# Patient Record
Sex: MALE | Race: OTHER
[De-identification: names, ages, dates, MRNs, and addresses within clinical notes are randomized per-mention and may not be internally consistent; named-entity substitution may affect disease eponyms.]

---

## 2019-06-11 ENCOUNTER — HOSPITAL ENCOUNTER (INPATIENT)
Dept: HOSPITAL 72 - EMR | Age: 64
LOS: 3 days | Discharge: HOME | DRG: 853 | End: 2019-06-14
Payer: SELF-PAY

## 2019-06-11 VITALS — SYSTOLIC BLOOD PRESSURE: 98 MMHG | DIASTOLIC BLOOD PRESSURE: 59 MMHG

## 2019-06-11 VITALS — BODY MASS INDEX: 29.66 KG/M2 | HEIGHT: 67 IN | WEIGHT: 189 LBS

## 2019-06-11 DIAGNOSIS — D69.6: ICD-10-CM

## 2019-06-11 DIAGNOSIS — F10.20: ICD-10-CM

## 2019-06-11 DIAGNOSIS — E11.65: ICD-10-CM

## 2019-06-11 DIAGNOSIS — A41.9: Primary | ICD-10-CM

## 2019-06-11 DIAGNOSIS — K35.80: ICD-10-CM

## 2019-06-11 DIAGNOSIS — G93.41: ICD-10-CM

## 2019-06-11 DIAGNOSIS — I44.7: ICD-10-CM

## 2019-06-11 LAB
ADD MANUAL DIFF: NO
ANION GAP SERPL CALC-SCNC: 16 MMOL/L (ref 5–15)
APTT BLD: 21 SEC (ref 23–33)
BUN SERPL-MCNC: 13 MG/DL (ref 7–18)
CALCIUM SERPL-MCNC: 9.1 MG/DL (ref 8.5–10.1)
CHLORIDE SERPL-SCNC: 98 MMOL/L (ref 98–107)
CO2 SERPL-SCNC: 21 MMOL/L (ref 21–32)
CREAT SERPL-MCNC: 1.3 MG/DL (ref 0.55–1.3)
ERYTHROCYTE [DISTWIDTH] IN BLOOD BY AUTOMATED COUNT: 11.7 % (ref 11.6–14.8)
HCT VFR BLD CALC: 45.4 % (ref 42–52)
HGB BLD-MCNC: 15.6 G/DL (ref 14.2–18)
INR PPP: 1 (ref 0.9–1.1)
MCV RBC AUTO: 84 FL (ref 80–99)
PLATELET # BLD: 150 K/UL (ref 150–450)
POTASSIUM SERPL-SCNC: 4 MMOL/L (ref 3.5–5.1)
RBC # BLD AUTO: 5.4 M/UL (ref 4.7–6.1)
SODIUM SERPL-SCNC: 134 MMOL/L (ref 136–145)
WBC # BLD AUTO: 4 K/UL (ref 4.8–10.8)

## 2019-06-11 PROCEDURE — 71045 X-RAY EXAM CHEST 1 VIEW: CPT

## 2019-06-11 PROCEDURE — 80048 BASIC METABOLIC PNL TOTAL CA: CPT

## 2019-06-11 PROCEDURE — 36415 COLL VENOUS BLD VENIPUNCTURE: CPT

## 2019-06-11 PROCEDURE — 96365 THER/PROPH/DIAG IV INF INIT: CPT

## 2019-06-11 PROCEDURE — 82550 ASSAY OF CK (CPK): CPT

## 2019-06-11 PROCEDURE — 81003 URINALYSIS AUTO W/O SCOPE: CPT

## 2019-06-11 PROCEDURE — 94003 VENT MGMT INPAT SUBQ DAY: CPT

## 2019-06-11 PROCEDURE — 93306 TTE W/DOPPLER COMPLETE: CPT

## 2019-06-11 PROCEDURE — 74177 CT ABD & PELVIS W/CONTRAST: CPT

## 2019-06-11 PROCEDURE — 96367 TX/PROPH/DG ADDL SEQ IV INF: CPT

## 2019-06-11 PROCEDURE — 82746 ASSAY OF FOLIC ACID SERUM: CPT

## 2019-06-11 PROCEDURE — 87040 BLOOD CULTURE FOR BACTERIA: CPT

## 2019-06-11 PROCEDURE — 83036 HEMOGLOBIN GLYCOSYLATED A1C: CPT

## 2019-06-11 PROCEDURE — 82607 VITAMIN B-12: CPT

## 2019-06-11 PROCEDURE — 99285 EMERGENCY DEPT VISIT HI MDM: CPT

## 2019-06-11 PROCEDURE — 84484 ASSAY OF TROPONIN QUANT: CPT

## 2019-06-11 PROCEDURE — 82553 CREATINE MB FRACTION: CPT

## 2019-06-11 PROCEDURE — 96375 TX/PRO/DX INJ NEW DRUG ADDON: CPT

## 2019-06-11 PROCEDURE — 82150 ASSAY OF AMYLASE: CPT

## 2019-06-11 PROCEDURE — 86901 BLOOD TYPING SEROLOGIC RH(D): CPT

## 2019-06-11 PROCEDURE — 82248 BILIRUBIN DIRECT: CPT

## 2019-06-11 PROCEDURE — 82962 GLUCOSE BLOOD TEST: CPT

## 2019-06-11 PROCEDURE — 86850 RBC ANTIBODY SCREEN: CPT

## 2019-06-11 PROCEDURE — 85025 COMPLETE CBC W/AUTO DIFF WBC: CPT

## 2019-06-11 PROCEDURE — 80329 ANALGESICS NON-OPIOID 1 OR 2: CPT

## 2019-06-11 PROCEDURE — 83605 ASSAY OF LACTIC ACID: CPT

## 2019-06-11 PROCEDURE — 85730 THROMBOPLASTIN TIME PARTIAL: CPT

## 2019-06-11 PROCEDURE — 83690 ASSAY OF LIPASE: CPT

## 2019-06-11 PROCEDURE — 94150 VITAL CAPACITY TEST: CPT

## 2019-06-11 PROCEDURE — 85007 BL SMEAR W/DIFF WBC COUNT: CPT

## 2019-06-11 PROCEDURE — 86900 BLOOD TYPING SEROLOGIC ABO: CPT

## 2019-06-11 PROCEDURE — 85610 PROTHROMBIN TIME: CPT

## 2019-06-11 PROCEDURE — 93005 ELECTROCARDIOGRAM TRACING: CPT

## 2019-06-11 PROCEDURE — 80053 COMPREHEN METABOLIC PANEL: CPT

## 2019-06-11 NOTE — NUR
ED Nurse Note:

Receieved report. Pt BIBA from home, c/o flu-like symptoms for 5 days. 
Ambulance personnel states pt is an alcoholic but has stopped drinking for a 
few weeks and is complaining of painful hard growth in RUQ. Upon assessment, 
the area is tender. Will carry out ER MD's orders.

## 2019-06-12 VITALS — SYSTOLIC BLOOD PRESSURE: 113 MMHG | DIASTOLIC BLOOD PRESSURE: 63 MMHG

## 2019-06-12 VITALS — SYSTOLIC BLOOD PRESSURE: 109 MMHG | DIASTOLIC BLOOD PRESSURE: 72 MMHG

## 2019-06-12 VITALS — DIASTOLIC BLOOD PRESSURE: 69 MMHG | SYSTOLIC BLOOD PRESSURE: 123 MMHG

## 2019-06-12 VITALS — SYSTOLIC BLOOD PRESSURE: 112 MMHG | DIASTOLIC BLOOD PRESSURE: 77 MMHG

## 2019-06-12 VITALS — DIASTOLIC BLOOD PRESSURE: 57 MMHG | SYSTOLIC BLOOD PRESSURE: 104 MMHG

## 2019-06-12 VITALS — SYSTOLIC BLOOD PRESSURE: 108 MMHG | DIASTOLIC BLOOD PRESSURE: 71 MMHG

## 2019-06-12 VITALS — DIASTOLIC BLOOD PRESSURE: 75 MMHG | SYSTOLIC BLOOD PRESSURE: 112 MMHG

## 2019-06-12 VITALS — SYSTOLIC BLOOD PRESSURE: 93 MMHG | DIASTOLIC BLOOD PRESSURE: 59 MMHG

## 2019-06-12 VITALS — DIASTOLIC BLOOD PRESSURE: 62 MMHG | SYSTOLIC BLOOD PRESSURE: 94 MMHG

## 2019-06-12 VITALS — SYSTOLIC BLOOD PRESSURE: 100 MMHG | DIASTOLIC BLOOD PRESSURE: 61 MMHG

## 2019-06-12 VITALS — SYSTOLIC BLOOD PRESSURE: 103 MMHG | DIASTOLIC BLOOD PRESSURE: 65 MMHG

## 2019-06-12 VITALS — DIASTOLIC BLOOD PRESSURE: 67 MMHG | SYSTOLIC BLOOD PRESSURE: 100 MMHG

## 2019-06-12 VITALS — DIASTOLIC BLOOD PRESSURE: 67 MMHG | SYSTOLIC BLOOD PRESSURE: 120 MMHG

## 2019-06-12 VITALS — SYSTOLIC BLOOD PRESSURE: 112 MMHG | DIASTOLIC BLOOD PRESSURE: 75 MMHG

## 2019-06-12 VITALS — SYSTOLIC BLOOD PRESSURE: 93 MMHG | DIASTOLIC BLOOD PRESSURE: 61 MMHG

## 2019-06-12 LAB
ALBUMIN SERPL-MCNC: 3.5 G/DL (ref 3.4–5)
ALBUMIN/GLOB SERPL: 0.9 {RATIO} (ref 1–2.7)
ALP SERPL-CCNC: 160 U/L (ref 46–116)
ALT SERPL-CCNC: 29 U/L (ref 12–78)
APPEARANCE UR: CLEAR
APTT PPP: YELLOW S
AST SERPL-CCNC: 41 U/L (ref 15–37)
BILIRUB DIRECT SERPL-MCNC: 0.3 MG/DL (ref 0–0.3)
BILIRUB SERPL-MCNC: 1.2 MG/DL (ref 0.2–1)
CK MB SERPL-MCNC: 0.9 NG/ML (ref 0–3.6)
CK SERPL-CCNC: 96 U/L (ref 26–308)
GLOBULIN SER-MCNC: 3.9 G/DL
GLUCOSE UR STRIP-MCNC: NEGATIVE MG/DL
KETONES UR QL STRIP: (no result)
LEUKOCYTE ESTERASE UR QL STRIP: (no result)
NITRITE UR QL STRIP: NEGATIVE
PH UR STRIP: 5 [PH] (ref 4.5–8)
PROT UR QL STRIP: (no result)
SP GR UR STRIP: 1 (ref 1–1.03)
UROBILINOGEN UR-MCNC: NORMAL MG/DL (ref 0–1)

## 2019-06-12 PROCEDURE — 0DTJ4ZZ RESECTION OF APPENDIX, PERCUTANEOUS ENDOSCOPIC APPROACH: ICD-10-PCS

## 2019-06-12 RX ADMIN — DOCUSATE SODIUM SCH MG: 100 CAPSULE, LIQUID FILLED ORAL at 17:48

## 2019-06-12 RX ADMIN — DEXTROSE MONOHYDRATE SCH MLS/HR: 50 INJECTION, SOLUTION INTRAVENOUS at 16:06

## 2019-06-12 RX ADMIN — DEXTROSE MONOHYDRATE SCH MLS/HR: 50 INJECTION, SOLUTION INTRAVENOUS at 21:21

## 2019-06-12 RX ADMIN — DEXTROSE AND SODIUM CHLORIDE SCH MLS/HR: 5; .45 INJECTION, SOLUTION INTRAVENOUS at 10:35

## 2019-06-12 RX ADMIN — DEXTROSE AND SODIUM CHLORIDE SCH MLS/HR: 5; .45 INJECTION, SOLUTION INTRAVENOUS at 21:21

## 2019-06-12 NOTE — CARDIAC ELECTROPHYSIOLOGY PN
Subjective


Subjective


[id number]





Objective





Last 24 Hour Vital Signs








  Date Time  Temp Pulse Resp B/P (MAP) Pulse Ox O2 Delivery O2 Flow Rate FiO2


 


6/12/19 08:05 98.7 111 22 112/75 97 Room Air 2.0 


 


6/12/19 08:04  111 22 112/75 97 Room Air  


 


6/12/19 06:35 98.7 113 24 112/75 92 Nasal Cannula 2.0 


 


6/12/19 05:10 98.7 118 23 109/72 94 Nasal Cannula 2.0 


 


6/12/19 03:30 98.7 120 22 108/71 93 Nasal Cannula 2.0 


 


6/12/19 02:00 98.9 125 23 112/77 93 Nasal Cannula 2.0 


 


6/12/19 01:00 98.9 128 22 123/69 91 Room Air  


 


6/12/19 00:00 99.6 137 26 120/67 93 Nasal Cannula 2.0 


 


6/11/19 22:55  150 31   Room Air  


 


6/11/19 22:55 102.0 15 31 98/59 93 Room Air  


 


6/11/19 22:30 102.6 147 18 98/60 (73) 98 Room Air  











Laboratory Tests








Test


  6/11/19


23:20 6/12/19


00:00 6/12/19


03:50 6/12/19


04:00


 


White Blood Count


  4.0 K/UL


(4.8-10.8)  L 


  


  


 


 


Red Blood Count


  5.40 M/UL


(4.70-6.10) 


  


  


 


 


Hemoglobin


  15.6 G/DL


(14.2-18.0) 


  


  


 


 


Hematocrit


  45.4 %


(42.0-52.0) 


  


  


 


 


Mean Corpuscular Volume 84 FL (80-99)     


 


Mean Corpuscular Hemoglobin


  28.9 PG


(27.0-31.0) 


  


  


 


 


Mean Corpuscular Hemoglobin


Concent 34.3 G/DL


(32.0-36.0) 


  


  


 


 


Red Cell Distribution Width


  11.7 %


(11.6-14.8) 


  


  


 


 


Platelet Count


  150 K/UL


(150-450) 


  


  


 


 


Mean Platelet Volume


  8.6 FL


(6.5-10.1) 


  


  


 


 


Neutrophils (%) (Auto)


  % (45.0-75.0)


  


  


  


 


 


Lymphocytes (%) (Auto)


  % (20.0-45.0)


  


  


  


 


 


Monocytes (%) (Auto)  % (1.0-10.0)     


 


Eosinophils (%) (Auto)  % (0.0-3.0)     


 


Basophils (%) (Auto)  % (0.0-2.0)     


 


Prothrombin Time


  10.2 SEC


(9.30-11.50) 


  


  


 


 


Prothromb Time International


Ratio 1.0 (0.9-1.1)  


  


  


  


 


 


Activated Partial


Thromboplast Time 21 SEC (23-33)


L 


  


  


 


 


Sodium Level


  134 MMOL/L


(136-145)  L 


  


  


 


 


Potassium Level


  4.0 MMOL/L


(3.5-5.1) 


  


  


 


 


Chloride Level


  98 MMOL/L


() 


  


  


 


 


Carbon Dioxide Level


  21 MMOL/L


(21-32) 


  


  


 


 


Anion Gap


  16 mmol/L


(5-15)  H 


  


  


 


 


Blood Urea Nitrogen


  13 mg/dL


(7-18) 


  


  


 


 


Creatinine


  1.3 MG/DL


(0.55-1.30) 


  


  


 


 


Estimat Glomerular Filtration


Rate 55.6 mL/min


(>60) 


  


  


 


 


Glucose Level


  265 MG/DL


()  H 


  


  


 


 


Lactic Acid Level


  4.40 mmol/L


(0.4-2.0)  H 4.40 mmol/L


(0.66-2.22)  H 


  3.30 mmol/L


(0.4-2.0)  H


 


Calcium Level


  9.1 MG/DL


(8.5-10.1) 


  


  


 


 


Total Bilirubin


  1.2 MG/DL


(0.2-1.0)  H 


  


  


 


 


Direct Bilirubin


  0.3 MG/DL


(0.0-0.3) 


  


  


 


 


Aspartate Amino Transf


(AST/SGOT) 41 U/L (15-37)


H 


  


  


 


 


Alanine Aminotransferase


(ALT/SGPT) 29 U/L (12-78)


  


  


  


 


 


Alkaline Phosphatase


  160 U/L


()  H 


  


  


 


 


Total Creatine Kinase


  96 U/L


() 


  


  


 


 


Creatine Kinase MB


  0.9 NG/ML


(0.0-3.6) 


  


  


 


 


Creatine Kinase MB Relative


Index 0.9  


  


  


  


 


 


Troponin I


  0.000 ng/mL


(0.000-0.056) 


  


  


 


 


Total Protein


  7.4 G/DL


(6.4-8.2) 


  


  


 


 


Albumin


  3.5 G/DL


(3.4-5.0) 


  


  


 


 


Globulin 3.9 g/dL     


 


Albumin/Globulin Ratio


  0.9 (1.0-2.7)


L 


  


  


 


 


Serum Alcohol < 3 mg/dL     


 


Urine Color   Yellow   


 


Urine Appearance   Clear   


 


Urine pH   5 (4.5-8.0)   


 


Urine Specific Gravity


  


  


  1.005


(1.005-1.035) 


 


 


Urine Protein


  


  


  2+ (NEGATIVE)


H 


 


 


Urine Glucose (UA)


  


  


  Negative


(NEGATIVE) 


 


 


Urine Ketones


  


  


  1+ (NEGATIVE)


H 


 


 


Urine Blood


  


  


  2+ (NEGATIVE)


H 


 


 


Urine Nitrite


  


  


  Negative


(NEGATIVE) 


 


 


Urine Bilirubin


  


  


  Negative


(NEGATIVE) 


 


 


Urine Urobilinogen


  


  


  Normal MG/DL


(0.0-1.0) 


 


 


Urine Leukocyte Esterase


  


  


  1+ (NEGATIVE)


H 


 


 


Urine RBC


  


  


  5-10 /HPF (0 -


0)  H 


 


 


Urine WBC


  


  


  2-4 /HPF (0 -


0) 


 


 


Urine Squamous Epithelial


Cells 


  


  Few /LPF


(NONE/OCC) 


 


 


Urine Bacteria


  


  


  Few /HPF


(NONE) 


 

















Jake George MD Jun 12, 2019 12:05

## 2019-06-12 NOTE — OPERATIVE NOTE - DICTATED
DATE OF OPERATION:  06/12/2019

PREOPERATIVE DIAGNOSIS:  Acute appendicitis.



POSTOPERATIVE DIAGNOSIS:  Acute appendicitis.



OPERATION PERFORMED:  Laparoscopic appendectomy.



ATTENDING PHYSICIAN:  Norberto Vazquez M.D.



ASSISTANT:  None.



ANESTHESIOLOGIST:  Dr. Idris Greene.



ANESTHESIA:  General GETA.



ESTIMATED BLOOD LOSS:  Minimal.



IV FLUIDS:  Please see anesthesia records.



COMPLICATIONS:  None.



DRAINS:  None.



WOUND CLASSIFICATION:  Class 3.



COUNTS:  Sponge and needle count correct x2.



SPECIMEN:  Appendix sent to pathology for review.



ANTIBIOTICS:  The patient on scheduled IV Zosyn.



INDICATIONS FOR PROCEDURE:  This is a 63-year-old male who presented to the

emergency department at [facility] complaining of worsening

abdominal pain.  The patient was identified to have abdominal tenderness,

dehydration, and lactic acidosis.  CT scan was performed which

demonstrated acute uncomplicated appendicitis.  Surgery was indicated and

recommended.  Risks, benefits, and alternatives were discussed with the

patient in detail, who expressed understanding and consented to surgery.



OPERATIVE NOTE:  The patient was taken to the operating room, placed on the

operating room table in supine position with left arm tucked.  All bony

prominences well padded.  SCDs were placed.  Preoperative time-out taken

identifying the patient, procedure, operative staff, and surgical staff.

General anesthesia was induced and the patient was intubated.  The abdomen

was clipped, prepped, draped in a standard surgical fashion.  Local

anesthetic was infiltrated through all port sites and throughout the case

for the patient's comfort.  An infraumbilical incision made using fresh

#11 scalpel and carried down to the fascia, which was elevated and

incised.  Entry into the abdomen was obtained using open Yulisa technique

without complication.  A 12 mm Yulisa trocar was inserted and the abdomen

was insufflated to 12 to 15 mmHg.  The patient tolerated the insufflation

well.  Laparoscope was inserted and the abdomen was inspected.  No injury

from initial trocar placement identified.  The right and left upper

quadrants were otherwise normal.  In the right lower quadrant, there was

dilated inflamed appendix identified.  The patient did have bilateral

inguinal hernias noted and uncomplicated.  Following this, secondary

trocars were placed under direct visualization beginning with a left lower

quadrant and a 12 mm followed by a suprapubic 5 mm without complication.

A grasper was used and the appendix was grasped and retracted toward the

pelvis.  The cecum was identified and tenia followed to the confluence

where the base of the appendix noted.  A window was made at the base of

the appendix without complication.  Following this, a laparoscopic linear

stapler was used to divide the base of the appendix without complication.

Following this, the mesoappendix was divided using a laparoscopic linear

staple in similar fashion.  Good hemostasis was identified from the staple

lines and both staple lines were hemostatic and stable.  The appendix was

placed in endoscopic retrieval bag and removed using the left lower

quadrant port.  The abdomen was inspected and no other abnormalities noted

at this time.  There was no pus, abscess, or other abnormalities

identified.  At this time, we began the conclusion of procedure.

Secondary trocars removed under direct visualization followed by the

umbilical trocar site.  The abdomen was desufflated.  The infraumbilical

and left lower quadrant port site fascia were reapproximated using

figure-of-eight #0 Vicryl sutures.  The remaining skin incisions were

reapproximated using 4-0 Monocryl subcuticular interrupted sutures.

Wounds were cleansed and benzoin, Steri-Strips followed by dressings were

applied.  The patient tolerated the procedure well, was extubated and

taken to postanesthetic care unit in a stable condition.









  ______________________________________________

  Norberto Vazquez M.D.





DR:  Sparkle

D:  06/12/2019 14:59

T:  06/12/2019 18:39

JOB#:  [id number]

CC:



HARDEEP

## 2019-06-12 NOTE — NUR
ED Nurse Note:

Pt in bed resting. Wife just arrived at bedside. ER MD updated pt's wife on 
plan of care and admission copletetion. Will continue to monitor.

## 2019-06-12 NOTE — CARDIOLOGY REPORT
--------------- APPROVED REPORT --------------





EKG Measurement

Heart [id number]

OR [id number]

[id number]

[id number]

HNe261





Sinus tachycardia

Cannot rule out Anterior infarct, age undetermined

T wave abnormality, consider inferolateral ischemia

Abnormal ECG

## 2019-06-12 NOTE — ANETHESIA PREOPERATIVE EVAL
Anesthesia Pre-op PMH/ROS


General


Date of Evaluation:  Jun 12, 2019


Anesthesiologist:  [place]


ASA Score:  ASA 2


Mallampati Score


Class I : Soft palate, uvula, fauces, pillars visible


Class II: Soft palate, uvula, fauces visible


Class III: Soft palate, base of uvula visible


Class IV: Only hard plate visible


Mallampati Classification:  Class II


Surgeon:  Taylor


Diagnosis:  Acute appendicitis


Surgical Procedure:  Lap appy


Anesthesia History:  none


Social History:  alcohol use


Family History:  no anesthesia problems


Allergies:  


Coded Allergies:  


     No Known Allergies (Unverified , 6/11/19)


Medications:  see eMAR


Patient NPO?:  Yes


NPO Date:  Jun 12, 2019


NPO Time:  18:00





Past Medical History


Cardiovascular:  Reports: arrhythmia - admitted in SVT, now resolved; 


   Denies: HTN, CAD, MI, valve dz, other


Pulmonary:  Denies: asthma, COPD, SERGEI, other


Gastrointestinal/Genitourinary:  Reports: GERD; 


   Denies: CRI, ESRD, other


Neurologic/Psychiatric:  Denies: dementia, CVA, depression/anxiety, TIA, other


Endocrine:  Denies: DM, hypothyroidism, steroids, other


HEENT:  Denies: cataract (L), cataract (R), glaucoma, Native (L), Native (R), other


Hematology/Immune:  Denies: anemia, DVT, bleeding disorder, other


Musculoskeletal/Integumentary:  Denies: OA, RA, DJD, DDD, edema, other





Anesthesia Pre-op Phys. Exam


Physician Exam





Last Vital Signs








  Date Time  Temp Pulse Resp B/P (MAP) Pulse Ox O2 Delivery O2 Flow Rate FiO2


 


6/12/19 08:05 98.7 111 22 112/75 97 Room Air 2.0 








Constitutional:  NAD


Cardiovascular:  RRR


Respiratory:  CTA





Airway Exam


Mallampati Score:  Class II





Anesthesia Pre-op A/P


Labs





Hematology








Test


  6/11/19


23:20


 


White Blood Count


  4.0 K/UL


(4.8-10.8)  L


 


Red Blood Count


  5.40 M/UL


(4.70-6.10)


 


Hemoglobin


  15.6 G/DL


(14.2-18.0)


 


Hematocrit


  45.4 %


(42.0-52.0)


 


Mean Corpuscular Volume 84 FL (80-99)  


 


Mean Corpuscular Hemoglobin


  28.9 PG


(27.0-31.0)


 


Mean Corpuscular Hemoglobin


Concent 34.3 G/DL


(32.0-36.0)


 


Red Cell Distribution Width


  11.7 %


(11.6-14.8)


 


Platelet Count


  150 K/UL


(150-450)


 


Mean Platelet Volume


  8.6 FL


(6.5-10.1)


 


Neutrophils (%) (Auto)


  % (45.0-75.0)


 


 


Lymphocytes (%) (Auto)


  % (20.0-45.0)


 


 


Monocytes (%) (Auto)  % (1.0-10.0)  


 


Eosinophils (%) (Auto)  % (0.0-3.0)  


 


Basophils (%) (Auto)  % (0.0-2.0)  








Coagulation








Test


  6/11/19


23:20


 


Prothrombin Time


  10.2 SEC


(9.30-11.50)


 


Prothromb Time International


Ratio 1.0 (0.9-1.1)  


 


 


Activated Partial


Thromboplast Time 21 SEC (23-33)


L








Chemistry








Test


  6/11/19


23:20 6/12/19


00:00 6/12/19


04:00


 


Sodium Level


  134 MMOL/L


(136-145)  L 


  


 


 


Potassium Level


  4.0 MMOL/L


(3.5-5.1) 


  


 


 


Chloride Level


  98 MMOL/L


() 


  


 


 


Carbon Dioxide Level


  21 MMOL/L


(21-32) 


  


 


 


Anion Gap


  16 mmol/L


(5-15)  H 


  


 


 


Blood Urea Nitrogen


  13 mg/dL


(7-18) 


  


 


 


Creatinine


  1.3 MG/DL


(0.55-1.30) 


  


 


 


Estimat Glomerular Filtration


Rate 55.6 mL/min


(>60) 


  


 


 


Glucose Level


  265 MG/DL


()  H 


  


 


 


Lactic Acid Level


  4.40 mmol/L


(0.4-2.0)  H 4.40 mmol/L


(0.66-2.22)  H 3.30 mmol/L


(0.4-2.0)  H


 


Calcium Level


  9.1 MG/DL


(8.5-10.1) 


  


 


 


Total Bilirubin


  1.2 MG/DL


(0.2-1.0)  H 


  


 


 


Direct Bilirubin


  0.3 MG/DL


(0.0-0.3) 


  


 


 


Aspartate Amino Transf


(AST/SGOT) 41 U/L (15-37)


H 


  


 


 


Alanine Aminotransferase


(ALT/SGPT) 29 U/L (12-78)


  


  


 


 


Alkaline Phosphatase


  160 U/L


()  H 


  


 


 


Total Creatine Kinase


  96 U/L


() 


  


 


 


Creatine Kinase MB


  0.9 NG/ML


(0.0-3.6) 


  


 


 


Creatine Kinase MB Relative


Index 0.9  


  


  


 


 


Troponin I


  0.000 ng/mL


(0.000-0.056) 


  


 


 


Total Protein


  7.4 G/DL


(6.4-8.2) 


  


 


 


Albumin


  3.5 G/DL


(3.4-5.0) 


  


 


 


Globulin 3.9 g/dL    


 


Albumin/Globulin Ratio


  0.9 (1.0-2.7)


L 


  


 











Studies


Pre-op Studies:  EKG - SVT





Risk Assessment & Plan


Assessment:


ASA II


Plan:


GA


Status Change Before Surgery:  No





Pre-Antibiotics


Drug:  Ancef 2g


Given Within 1 Hr of Incision:  Syl Swartz MD Jun 12, 2019 12:32

## 2019-06-12 NOTE — CONSULTATION
DATE OF CONSULTATION:  06/12/2019

INFECTIOUS DISEASE CONSULTATION



CONSULTING PHYSICIAN:  Lamin Felix M.D.



PRIMARY ATTENDING PHYSICIAN:  Keena Jensen M.D.



REASON FOR CONSULT:  Sepsis and acute appendicitis.



HISTORY OF PRESENT ILLNESS:  The patient is a 63-year-old  male

admitted last night complaining of abdominal pain in the right lower

quadrant.  The patient was found to have a fever of 102.6 in the ER and

had tachycardia with heart rate of 150.  The pain started yesterday, but

the day before yesterday, the patient did not feel good and has to stop

working.



PAST MEDICAL HISTORY:  History of alcohol abuse.



PAST SURGICAL HISTORY:  No history of previous surgery.



MEDICATIONS:   sodium chloride, and Tylenol.  He got a dose of Zosyn

in the ER.



ALLERGIES:  No known drug allergies.



SOCIAL HISTORY:  .  Lives at home with family.  Originally, he is

from [facility].  He is a .



REVIEW OF SYSTEMS:  Fever started yesterday, nausea without vomiting, and

abdominal pain.  No coughing.  No shortness of breath.  No diarrhea.



PHYSICAL EXAMINATION:

VITAL SIGNS:  Temperature 98.7, pulse 111, blood pressure 112/75.

GENERAL APPEARANCE:  No acute distress.  Seems overweight.

HEAD AND NECK:  Pink conjunctiva.

HEART:  Normal rate.

LUNGS:  Clear.

ABDOMEN:  Soft.  Tender in the right lower quadrant.

EXTREMITIES:  Has no edema.

NEUROLOGIC:  Awake, alert, and oriented x3.



LABORATORY AND DIAGNOSTIC DATA:  WBC is 4, hemoglobin 15.6, hematocrit

45.4, and platelets is 150,000.  Lactic acid is 3.3.  Sodium 134,

potassium 4, BUN 13, creatinine 1.3, and glucose is 355.  Bilirubin 1.2.



IMPRESSION:

1. Sepsis with fever and tachycardia and lactic acidosis.

2. Acute appendicitis.

3. Hyperglycemia.



RECOMMENDATIONS:  We will continue with Zosyn.  The patient will be seen

by the surgeon.



At the end of my exam, I thank Dr. Jensen for involving me in the care

of this patient.









  ______________________________________________

  Lamin Felix M.D.





DR:  NAFISA

D:  06/12/2019 10:44

T:  06/12/2019 17:00

JOB#:  [id number]

CC:



HARDEEP

## 2019-06-12 NOTE — EMERGENCY ROOM REPORT
History of Present Illness


General


Chief Complaint:  Flu Like Symptoms


Source:  Patient





Present Illness


HPI


Patient is a 63-year-old male brought in by EMS after increased abdominal pain.

  Patient had been reportedly having pain to the right lower abdomen.  This was 

worse with movement.  Per EMS patient had a prior history of alcohol abuse.  

Patient was noted to be somewhat tachycardic by EMS.  He has been having 

increased nausea .


Allergies:  


Coded Allergies:  


     No Known Allergies (Unverified , 6/11/19)





Patient History


Past Medical History:  see triage record


Reviewed Nursing Documentation:  PMH: Agreed; PSxH: Agreed





Nursing Documentation-PMH


Past Medical History:  No Stated History





Review of Systems


All Other Systems:  negative except mentioned in HPI





Physical Exam





Vital Signs








  Date Time  Temp Pulse Resp B/P (MAP) Pulse Ox O2 Delivery O2 Flow Rate FiO2


 


6/11/19 22:30 102.6 147 18 98/60 (73) 98 Room Air  


 


6/12/19 00:00       2.0 








Sp02 EP Interpretation:  reviewed, normal


General Appearance:  normal inspection, well appearing, no apparent distress, 

alert, GCS 15


Head:  atraumatic


ENT:  normal ENT inspection, hearing grossly normal, normal voice


Neck:  normal inspection, full range of motion, supple, no bony tend


Respiratory:  normal inspection, lungs clear, normal breath sounds, no 

respiratory distress, no retraction, no wheezing


Cardiovascular #1:  no edema, tachycardia


Gastrointestinal:  normal bowel sounds, no hernia, guarding, tenderness


Genitourinary:  no CVA tenderness


Musculoskeletal:  normal inspection, back normal, normal range of motion


Neurologic:  normal inspection, alert, oriented x3, responsive, CNs III-XII nml 

as tested, speech normal


Psychiatric:  normal inspection, judgement/insight normal, mood/affect normal


Skin:  normal inspection, normal color, no rash





Medical Decision Making


Diagnostic Impression:  


 Primary Impression:  


 Tachycardia


 Additional Impressions:  


 Febrile illness, acute


 Appendicitis


ER Course


Patient presented for abdominal pain.  Differential diagnosis include was not 

limited to myocardial infarction, pneumonia, appendicitis, cholecystitis, 

colitis among others.  Because of complexity of patient's case laboratory 

testing and imaging studies were ordered.  Patient was noted to have initial 

EKG with wide-complex tachycardia with a rate of 147.  QTc was 554.   patient 

was started on IV fluids he was given IV magnesium with improvement in his QRS 

duration as well as QT interval.  Patient was noted to have al low white blood 

count. Per the patient's wife patient does not drink alcohol regularly. Patient 

was started on IV fluids as well as IV antibiotics.  Dr. Vazquez was 

contacted for surgical consult.  Dr. Keena White was contacted for inpatient 

management due to panel physician





Labs








Test


  6/11/19


23:20 6/12/19


00:00


 


White Blood Count


  4.0 K/UL


(4.8-10.8) 


 


 


Red Blood Count


  5.40 M/UL


(4.70-6.10) 


 


 


Hemoglobin


  15.6 G/DL


(14.2-18.0) 


 


 


Hematocrit


  45.4 %


(42.0-52.0) 


 


 


Mean Corpuscular Volume 84 FL (80-99)  


 


Mean Corpuscular Hemoglobin


  28.9 PG


(27.0-31.0) 


 


 


Mean Corpuscular Hemoglobin


Concent 34.3 G/DL


(32.0-36.0) 


 


 


Red Cell Distribution Width


  11.7 %


(11.6-14.8) 


 


 


Platelet Count


  150 K/UL


(150-450) 


 


 


Mean Platelet Volume


  8.6 FL


(6.5-10.1) 


 


 


Neutrophils (%) (Auto)  % (45.0-75.0)  


 


Lymphocytes (%) (Auto)  % (20.0-45.0)  


 


Monocytes (%) (Auto)  % (1.0-10.0)  


 


Eosinophils (%) (Auto)  % (0.0-3.0)  


 


Basophils (%) (Auto)  % (0.0-2.0)  


 


Prothrombin Time


  10.2 SEC


(9.30-11.50) 


 


 


Prothromb Time International


Ratio 1.0 (0.9-1.1) 


  


 


 


Activated Partial


Thromboplast Time 21 SEC (23-33) 


  


 


 


Sodium Level


  134 MMOL/L


(136-145) 


 


 


Potassium Level


  4.0 MMOL/L


(3.5-5.1) 


 


 


Chloride Level


  98 MMOL/L


() 


 


 


Carbon Dioxide Level


  21 MMOL/L


(21-32) 


 


 


Anion Gap


  16 mmol/L


(5-15) 


 


 


Blood Urea Nitrogen


  13 mg/dL


(7-18) 


 


 


Creatinine


  1.3 MG/DL


(0.55-1.30) 


 


 


Estimat Glomerular Filtration


Rate 55.6 mL/min


(>60) 


 


 


Glucose Level


  265 MG/DL


() 


 


 


Calcium Level


  9.1 MG/DL


(8.5-10.1) 


 


 


Total Bilirubin


  1.2 MG/DL


(0.2-1.0) 


 


 


Direct Bilirubin


  0.3 MG/DL


(0.0-0.3) 


 


 


Aspartate Amino Transf


(AST/SGOT) 41 U/L (15-37) 


  


 


 


Alanine Aminotransferase


(ALT/SGPT) 29 U/L (12-78) 


  


 


 


Alkaline Phosphatase


  160 U/L


() 


 


 


Total Creatine Kinase


  96 U/L


() 


 


 


Creatine Kinase MB


  0.9 NG/ML


(0.0-3.6) 


 


 


Creatine Kinase MB Relative


Index 0.9 


  


 


 


Troponin I


  0.000 ng/mL


(0.000-0.056) 


 


 


Total Protein


  7.4 G/DL


(6.4-8.2) 


 


 


Albumin


  3.5 G/DL


(3.4-5.0) 


 


 


Globulin 3.9 g/dL  


 


Albumin/Globulin Ratio 0.9 (1.0-2.7)  


 


Serum Alcohol < 3 mg/dL  


 


Lactic Acid Level


  


  4.40 mmol/L


(0.66-2.22)








EKG Diagnostic Results


Rate:  tachycardiac


Rhythm:  NSR


ST Segments:  other - qt prolongation 147





Last Vital Signs








  Date Time  Temp Pulse Resp B/P (MAP) Pulse Ox O2 Delivery O2 Flow Rate FiO2


 


6/12/19 02:00 98.9 125 23 112/77 93 Nasal Cannula 2.0 








Status:  improved


Disposition:  ADMITTED AS INPATIENT


Condition:  Serious


Referrals:  


NOT CHOSEN [facility]/MD,REFERRING (PCP)











Rafa Barrett MD Jun 12, 2019 03:27

## 2019-06-12 NOTE — PRE-PROCEDURE NOTE/ATTESTATION
Pre-Procedure Note/Attestation


Complete Prior to Procedure


Planned Procedure:  not applicable


Procedure Narrative:


laparoscopic appendectomy possible open





Indications for Procedure


Pre-Operative Diagnosis:


acute appendicitis





Attestation


I attest that I discussed the nature of the procedure; its benefits; risks and 

complications; and alternatives (and the risks and benefits of such alternatives

), prior to the procedure, with the patient (or the patient's legal 

representative).





I attest that, if there was a reasonable possibility of needing a blood 

transfusion, the patient (or the patient's legal representative) was given the 

[facility] standardized written summary, pursuant 

to the [employer] [place] [employer] Blood Safety Act ([place] Health and Safety Code # 1645, as 

amended).





I attest that I re-evaluated the patient just prior to the surgery and that 

there has been no change in the patient's H&P, except as documented below:











Norberto Vazquez Jun 12, 2019 11:39

## 2019-06-12 NOTE — BRIEF OPERATIVE NOTE
Immediate Post Operative Note


Operative Note


Pre-op Diagnosis:


acute appendicitis


Procedure:


lap appy


Post-op Diagnosis:  same as pre-op


Surgeon:  sheeba


Anesthesiologist:  montana


Anesthesia:  general, local


Specimen:  yes


Complications:  none


Condition:  stable


Fluids:  see records


Estimated Blood Loss:  minimal


Drains:  none


Implant(s) used?:  No











Norberto Vazquez Jun 12, 2019 14:52

## 2019-06-12 NOTE — GENERAL PROGRESS NOTE
Assessment/Plan


Problem List:  


(1) Abdominal pain


ICD Codes:  R10.9 - Unspecified abdominal pain


SNOMED:  30387836


(2) Febrile illness, acute


ICD Codes:  R50.9 - Fever, unspecified


SNOMED:  276447308


(3) Tachycardia


ICD Codes:  R00.0 - Tachycardia, unspecified


SNOMED:  8731591


Assessment/Plan:


ordered CT


repeat labs





Subjective


ROS Limited/Unobtainable:  Yes


Allergies:  


Coded Allergies:  


     No Known Allergies (Unverified , 6/11/19)





Objective





Last 24 Hour Vital Signs








  Date Time  Temp Pulse Resp B/P (MAP) Pulse Ox O2 Delivery O2 Flow Rate FiO2


 


6/12/19 08:05 98.7 111 22 112/75 97 Room Air 2.0 


 


6/12/19 08:04  111 22 112/75 97 Room Air  


 


6/12/19 06:35 98.7 113 24 112/75 92 Nasal Cannula 2.0 


 


6/12/19 05:10 98.7 118 23 109/72 94 Nasal Cannula 2.0 


 


6/12/19 03:30 98.7 120 22 108/71 93 Nasal Cannula 2.0 


 


6/12/19 02:00 98.9 125 23 112/77 93 Nasal Cannula 2.0 


 


6/12/19 01:00 98.9 128 22 123/69 91 Room Air  


 


6/12/19 00:00 99.6 137 26 120/67 93 Nasal Cannula 2.0 


 


6/11/19 22:55  150 31   Room Air  


 


6/11/19 22:55 102.0 15 31 98/59 93 Room Air  


 


6/11/19 22:30 102.6 147 18 98/60 (73) 98 Room Air  








Laboratory Tests


6/11/19 23:20: 


White Blood Count 4.0L, Red Blood Count 5.40, Hemoglobin 15.6, Hematocrit 45.4, 

Mean Corpuscular Volume 84, Mean Corpuscular Hemoglobin 28.9, Mean Corpuscular 

Hemoglobin Concent 34.3, Red Cell Distribution Width 11.7, Platelet Count 150, 

Mean Platelet Volume 8.6, Neutrophils (%) (Auto) , Lymphocytes (%) (Auto) , 

Monocytes (%) (Auto) , Eosinophils (%) (Auto) , Basophils (%) (Auto) , 

Prothrombin Time 10.2, Prothromb Time International Ratio 1.0, Activated 

Partial Thromboplast Time 21L, Sodium Level 134L, Potassium Level 4.0, Chloride 

Level 98, Carbon Dioxide Level 21, Anion Gap 16H, Blood Urea Nitrogen 13, 

Creatinine 1.3, Estimat Glomerular Filtration Rate 55.6, Glucose Level 265H, 

Lactic Acid Level 4.40H, Calcium Level 9.1, Total Bilirubin 1.2H, Direct 

Bilirubin 0.3, Aspartate Amino Transf (AST/SGOT) 41H, Alanine Aminotransferase (

ALT/SGPT) 29, Alkaline Phosphatase 160H, Total Creatine Kinase 96, Creatine 

Kinase MB 0.9, Creatine Kinase MB Relative Index 0.9, Troponin I 0.000, Total 

Protein 7.4, Albumin 3.5, Globulin 3.9, Albumin/Globulin Ratio 0.9L, Serum 

Alcohol < 3


6/12/19 00:00: Lactic Acid Level 4.40H


6/12/19 03:50: 


Urine Color Yellow, Urine Appearance Clear, Urine pH 5, Urine Specific Gravity 

1.005, Urine Protein 2+H, Urine Glucose (UA) Negative, Urine Ketones 1+H, Urine 

Blood 2+H, Urine Nitrite Negative, Urine Bilirubin Negative, Urine Urobilinogen 

Normal, Urine Leukocyte Esterase 1+H, Urine RBC 5-10H, Urine WBC 2-4, Urine 

Squamous Epithelial Cells Few, Urine Bacteria Few


6/12/19 04:00: Lactic Acid Level 3.30H


Height (Feet):  5


Height (Inches):  7.00


Weight (Pounds):  190


General Appearance:  alert


EENT:  normal ENT inspection


Neck:  supple


Cardiovascular:  normal rate


Respiratory/Chest:  decreased breath sounds


Abdomen:  soft, tender


Extremities:  non-tender











Ronen Lopez MD Jun 12, 2019 10:18

## 2019-06-12 NOTE — NUR
HAND-OFF: 

Report given to Jenna ARCHER. Endorsed admission/ possible surgery prep/ report 
to tele floor after 0730 per instructions from night shift House Supervisor and 
CN. Reported pt has been NPO since 6/12/19 0000. Pt ready for disposition.

## 2019-06-12 NOTE — NUR
NURSE NOTES:

Received report from SUZI Kaur. Patient in bed asleep showing no signs of acute distress. 
Respiration even and non labored on room air. No SOB noted. IV lines patent and intact. Call 
light within reach. Bed in lowest position, wheels locked. All needs attended and met. Will 
continue to monitor.

## 2019-06-12 NOTE — NUR
Pt family member will like to speak to  for guidance on how to file for medical 
services.  L/m for Ginny to contact family member Amador  .

## 2019-06-12 NOTE — CONSULTATION
History of Present Illness


General


Date patient seen:  Jun 12, 2019


Reason for Hospitalization:  Flu Like Symptoms





Present Illness


HPI


63M RLQ abdominal pain for 1 day.  +nausea.  no emesis.  +EtOH.  in ED abnormal 

labs and CT with acute appy.  surgery called to evaluate. patient seen, chart 

reviewed, patient examined.  tachy.


Allergies:  


Coded Allergies:  


     No Known Allergies (Unverified , 6/11/19)





Medication History


Scheduled


No Known Medications* (NKM - No Known Medications*), 0 ., (Reported)





Patient History


History Provided By:  Patient, Medical Record, PMD


Healthcare decision maker





Resuscitation status





Advanced Directive on File








Past Medical/Surgical History


Past Medical/Surgical History:  


(1) Tachycardia


(2) Appendicitis


(3) Febrile illness, acute


(4) Abdominal pain





Review of Systems


Review of Symptoms


General ROS: no weight loss or fever


Psychological ROS: no depression or mood changes, no memory loss


Ophthalmic ROS: no visual changes or eye irritation


ENT ROS: no nasal congestion, hearing loss, dizziness


Allergy and Immunology ROS: no allergic symptoms or urticaria


Hematological and Lymphatic ROS: no swollen glands, unusual bleeding or bruising


Endocrine ROS: no polyuria, polydipsia, weight changes, temperature intolerance


Respiratory ROS: no cough, shortness of breath, or wheezing


Cardiovascular ROS: no chest pain or dyspnea on exertion


Gastrointestinal ROS:  abdominal pain, no bright red blood in stool.


Musculoskeletal ROS: no myalgias or arthralgias


Neurological ROS: no TIA or stroke symptoms


Dermatological ROS: no new or changing skin lesions, rashes or pruritis





Physical Exam


Physical Exam


General appearance:  alert, cooperative, no distress, appears stated age


Head:  Normocephalic, without obvious abnormality, atraumatic


Eyes:  conjunctivae/corneas clear. PERRL, EOM's intact. Fundi benign


Throat:  Lips, mucosa, and tongue normal. Teeth and gums normal


Neck:  supple, symmetrical, trachea midline, no adenopathy, thyroid: not 

enlarged, symmetric, no tenderness/mass/nodules, no carotid bruit and no JVD


Lungs:  clear to auscultation bilaterally


Heart:  regular rate and rhythm, S1, S2 normal, no murmur, click, rub or gallop


Abdomen:  soft, RLQ tender. Bowel sounds normal. No masses,  no organomegaly


Extremities:  extremities normal, atraumatic, no cyanosis or edema


Pulses:  2+ and symmetric


Skin:  Skin color, texture, turgor normal. No rashes or lesions


Neurologic:  Grossly normal





Last 24 Hour Vital Signs








  Date Time  Temp Pulse Resp B/P (MAP) Pulse Ox O2 Delivery O2 Flow Rate FiO2


 


6/12/19 08:05 98.7 111 22 112/75 97 Room Air 2.0 


 


6/12/19 08:04  111 22 112/75 97 Room Air  


 


6/12/19 06:35 98.7 113 24 112/75 92 Nasal Cannula 2.0 


 


6/12/19 05:10 98.7 118 23 109/72 94 Nasal Cannula 2.0 


 


6/12/19 03:30 98.7 120 22 108/71 93 Nasal Cannula 2.0 


 


6/12/19 02:00 98.9 125 23 112/77 93 Nasal Cannula 2.0 


 


6/12/19 01:00 98.9 128 22 123/69 91 Room Air  


 


6/12/19 00:00 99.6 137 26 120/67 93 Nasal Cannula 2.0 


 


6/11/19 22:55  150 31   Room Air  


 


6/11/19 22:55 102.0 15 31 98/59 93 Room Air  


 


6/11/19 22:30 102.6 147 18 98/60 (73) 98 Room Air  











Laboratory Tests








Test


  6/11/19


23:20 6/12/19


00:00 6/12/19


03:50 6/12/19


04:00


 


White Blood Count


  4.0 K/UL


(4.8-10.8)  L 


  


  


 


 


Red Blood Count


  5.40 M/UL


(4.70-6.10) 


  


  


 


 


Hemoglobin


  15.6 G/DL


(14.2-18.0) 


  


  


 


 


Hematocrit


  45.4 %


(42.0-52.0) 


  


  


 


 


Mean Corpuscular Volume 84 FL (80-99)     


 


Mean Corpuscular Hemoglobin


  28.9 PG


(27.0-31.0) 


  


  


 


 


Mean Corpuscular Hemoglobin


Concent 34.3 G/DL


(32.0-36.0) 


  


  


 


 


Red Cell Distribution Width


  11.7 %


(11.6-14.8) 


  


  


 


 


Platelet Count


  150 K/UL


(150-450) 


  


  


 


 


Mean Platelet Volume


  8.6 FL


(6.5-10.1) 


  


  


 


 


Neutrophils (%) (Auto)


  % (45.0-75.0)


  


  


  


 


 


Lymphocytes (%) (Auto)


  % (20.0-45.0)


  


  


  


 


 


Monocytes (%) (Auto)  % (1.0-10.0)     


 


Eosinophils (%) (Auto)  % (0.0-3.0)     


 


Basophils (%) (Auto)  % (0.0-2.0)     


 


Prothrombin Time


  10.2 SEC


(9.30-11.50) 


  


  


 


 


Prothromb Time International


Ratio 1.0 (0.9-1.1)  


  


  


  


 


 


Activated Partial


Thromboplast Time 21 SEC (23-33)


L 


  


  


 


 


Sodium Level


  134 MMOL/L


(136-145)  L 


  


  


 


 


Potassium Level


  4.0 MMOL/L


(3.5-5.1) 


  


  


 


 


Chloride Level


  98 MMOL/L


() 


  


  


 


 


Carbon Dioxide Level


  21 MMOL/L


(21-32) 


  


  


 


 


Anion Gap


  16 mmol/L


(5-15)  H 


  


  


 


 


Blood Urea Nitrogen


  13 mg/dL


(7-18) 


  


  


 


 


Creatinine


  1.3 MG/DL


(0.55-1.30) 


  


  


 


 


Estimat Glomerular Filtration


Rate 55.6 mL/min


(>60) 


  


  


 


 


Glucose Level


  265 MG/DL


()  H 


  


  


 


 


Lactic Acid Level


  4.40 mmol/L


(0.4-2.0)  H 4.40 mmol/L


(0.66-2.22)  H 


  3.30 mmol/L


(0.4-2.0)  H


 


Calcium Level


  9.1 MG/DL


(8.5-10.1) 


  


  


 


 


Total Bilirubin


  1.2 MG/DL


(0.2-1.0)  H 


  


  


 


 


Direct Bilirubin


  0.3 MG/DL


(0.0-0.3) 


  


  


 


 


Aspartate Amino Transf


(AST/SGOT) 41 U/L (15-37)


H 


  


  


 


 


Alanine Aminotransferase


(ALT/SGPT) 29 U/L (12-78)


  


  


  


 


 


Alkaline Phosphatase


  160 U/L


()  H 


  


  


 


 


Total Creatine Kinase


  96 U/L


() 


  


  


 


 


Creatine Kinase MB


  0.9 NG/ML


(0.0-3.6) 


  


  


 


 


Creatine Kinase MB Relative


Index 0.9  


  


  


  


 


 


Troponin I


  0.000 ng/mL


(0.000-0.056) 


  


  


 


 


Total Protein


  7.4 G/DL


(6.4-8.2) 


  


  


 


 


Albumin


  3.5 G/DL


(3.4-5.0) 


  


  


 


 


Globulin 3.9 g/dL     


 


Albumin/Globulin Ratio


  0.9 (1.0-2.7)


L 


  


  


 


 


Serum Alcohol < 3 mg/dL     


 


Urine Color   Yellow   


 


Urine Appearance   Clear   


 


Urine pH   5 (4.5-8.0)   


 


Urine Specific Gravity


  


  


  1.005


(1.005-1.035) 


 


 


Urine Protein


  


  


  2+ (NEGATIVE)


H 


 


 


Urine Glucose (UA)


  


  


  Negative


(NEGATIVE) 


 


 


Urine Ketones


  


  


  1+ (NEGATIVE)


H 


 


 


Urine Blood


  


  


  2+ (NEGATIVE)


H 


 


 


Urine Nitrite


  


  


  Negative


(NEGATIVE) 


 


 


Urine Bilirubin


  


  


  Negative


(NEGATIVE) 


 


 


Urine Urobilinogen


  


  


  Normal MG/DL


(0.0-1.0) 


 


 


Urine Leukocyte Esterase


  


  


  1+ (NEGATIVE)


H 


 


 


Urine RBC


  


  


  5-10 /HPF (0 -


0)  H 


 


 


Urine WBC


  


  


  2-4 /HPF (0 -


0) 


 


 


Urine Squamous Epithelial


Cells 


  


  Few /LPF


(NONE/OCC) 


 


 


Urine Bacteria


  


  


  Few /HPF


(NONE) 


 








Height (Feet):  5


Height (Inches):  7.00


Weight (Pounds):  190


Medications





Current Medications








 Medications


  (Trade)  Dose


 Ordered  Sig/Julisa


 Route


 PRN Reason  Start Time


 Stop Time Status Last Admin


Dose Admin


 


 Acetaminophen


  (Tylenol)  650 mg  Q4H  PRN


 ORAL


 Mild Pain/Temp > 100.5  6/12/19 09:00


 7/12/19 08:59   


 


 


 Barium Sulfate


  (Readi-Cat 2)  450 ml  NOW  PRN


 ORAL


 Radiology Procedure  6/12/19 10:15


 6/14/19 10:13   


 


 


 Dextrose/Sodium


 Chloride  1,000 ml @ 


 75 mls/hr  [id number]


 IV


   6/12/19 09:05


 7/12/19 09:04  6/12/19 10:35


 


 


 Iopamidol


  (Isovue-300


 100ml)  100 ml  NOW  PRN


 INJ


 Radiology Procedure  6/11/19 23:00


     


 


 


 Iopamidol


  (Isovue-300


 100ml)  100 ml  NOW  PRN


 INJ


 Radiology Procedure  6/12/19 10:15


 6/13/19 10:14   


 


 


 Piperacillin Sod/


 Tazobactam Sod


 3.375 gm/Sodium


 Chloride  110 ml @ 


 27.5 mls/hr  EVERY 8  HOURS


 IVPB


   6/12/19 14:00


 6/17/19 13:59   


 











Assessment/Plan


Problem List:  


(1) Appendicitis


Assessment & Plan:  63M acute appendicitis.  CT with acute appy.  exam with 

focal RLQ tenderness.  labs as above





NPO


IV fluids


IV Abx


consent


to OR for lap vs open appy


consent


thank you


ICD Codes:  K37 - Unspecified appendicitis


SNOMED:  [id number]











Norberto Vazquez Jun 12, 2019 11:38

## 2019-06-12 NOTE — CARDIOLOGY REPORT
--------------- APPROVED REPORT --------------





EXAM: Two-dimensional and M-mode echocardiogram with Doppler and color Doppler.



M-Mode DIMENSIONS 

IVSd1.0 (0.7-1.1cm)Left Atrium (MM)3.6 (1.6-4.0cm)

LVDd4.5 (3.5-5.6cm)Aortic Root3.0 (2.0-3.7cm)

PWd0.8 (0.7-1.1cm)Aortic Cusp Exc.2.1 (1.5-2.0cm)

IVSs1.5 cm

LVDs2.8 (2.5-4.0cm)

PWs1.5 cm





Technically difficult study due to poor acoustical windows.

Normal left ventricular chamber size, systolic function and wall motion to extent 

visualized.

Left ventricular ejection fraction estimated to be  55-60%.

 Mild left ventricular hypertrophy by 2-D.

 Anterior Echo-free space, may be due to pericardial fat or effusion.

All other cardiac chamber sizes are within normal limits. 

Aortic valve calcification with normal cusp excursion .

Mildly thickened mitral valve leaflets with normal excursion.

Mild mitral annulus and aortic root calcification.

Pulmonic valve not well visualized.

IVC at normal size with physiologic collapse .



A  color flow and spectral Doppler study was performed and revealed:

Trace aortic insufficiency .

Mitral diastolic velocities suggest reduced left ventricular relaxation c/w mild LV 

diastolic dysfunction (Grade I )

Trace  mitral regurgitation.

Trace tricuspid regurgitation.

Tricuspid  systolic velocities suggests peak right ventricular systolic pressure of  18 

mmHg.

## 2019-06-12 NOTE — NUR
ED Nurse Note:

Pt asleep in bed with wife at bedside. No distres noted. When awoken and asked, 
pt denied pain and went back to sleep. Will continue to monitor.

## 2019-06-12 NOTE — NUR
CASE MANAGEMENT: INITIAL REVIEW 



62 YO M PRESENTED TO ED FROM HOME 



CC: FLU LIKE SYMPTOMS. RIGHT LOWER PAIN.



PMHx: NONE STATED. 



SI:ABD PAIN.

T 102.6  RR 18 B/P 98/60 SATS 98% ON RA 

WBC 4   LACTIC ACID 4.4 TBILI 1.2 AST 41  



IS:NS BOLUS X1 

MAG SULFATE IV X1 

ATIVAN IV X1 

NS BOLUS X1 



***PATIENT ADMITTED TO TELE 6/11/2019 @ 3864***



DCP: PATIENT TO BE DISCHARGED TO HOME ONCE MEDICALLY CLEARED 



PLAN OF CARE: 

Pre-op Diagnosis:

acute appendicitis

Procedure:

lap appy

Post-op Diagnosis:  same as pre-op

-------------------------------------------------------------------------------

Addendum: 06/12/19 at 1607 by Gely Vazquez CM

-------------------------------------------------------------------------------

INTERQUAL MET

## 2019-06-12 NOTE — CONSULTATION
History of Present Illness


General


Date patient seen:  Jun 12, 2019


Chief Complaint:  Alcohol Intoxication/ AMS


Referring physician:  Dr. White





Present Illness


HPI


Marino Jesus is a 63-year-old male with a known history of alcohol 

abuse who was BIBA to [facility] ED complaining of right lower abdominal pain, 

worsened by movement with nausea.





He was diagnosed with appendicitis and underwent laparoscopic appendectomy this 

morning.





He is now evalutated by neurology for mental status and alcohol withdrawal.


Allergies:  


Coded Allergies:  


     No Known Allergies (Unverified , 6/11/19)





Medication History


Scheduled


No Known Medications* (NKM - No Known Medications*), 0 ., (Reported)





Patient History


History Provided By:  Patient, Medical Record


Healthcare decision maker





Resuscitation status


Full Code


Advanced Directive on File








Past Medical/Surgical History


Past Medical/Surgical History:  


(1) Alcohol abuse





Review of Systems


Constitutional:  Denies: no symptoms, see HPI, chills, sweats, fever, malaise, 

weakness, other


Eye:  Denies: no symptoms, see HPI, eye pain, blurred vision, tearing, double 

vision, nose pain, nose congestion, acuity changes, discharge, other


ENT:  Denies: no symptoms, see HPI, ear pain, ear discharge, nose pain, nose 

congestion, throat pain, throat swelling, mouth pain, hearing loss, nasal 

discharge, other


Respiratory:  Denies: no symptoms, see HPI, cough, orthopnea, shortness of 

breath, stridor, wheezing, BEE, sputum, other


Cardiovascular:  Denies: no symptoms, see HPI, chest pain, edema, palpitations, 

syncope, PND, other


Gastrointestinal:  Denies: no symptoms, see HPI, abdominal pain, constipation, 

diarrhea, nausea, vomiting, melena, hematemesis, other


Genitourinary:  Denies: no symptoms, see HPI, discharge, dysuria, frequency, 

hematuria, pain, retention, incontinence, urgency, vag bleed/dc, other


Musculoskeletal:  Denies: no symptoms, see HPI, back pain, gout, joint pain, 

joint swelling, muscle pain, muscle stiffness, other


Skin:  Denies: no symptoms, see HPI, rash, change in color, change in hair/nails

, dryness, lesions, other


Psychiatric:  Denies: no symptoms, see HPI, prior hx, anxiety, depressed 

feelings, emotional problems, SI, HI, hallucinations, other


Neurological:  Denies: no symptoms, see HPI, headache, numbness, paresthesia, 

seizure, tingling, tremors, focal weakness, syncope, dizziness, other


Endocrine:  Denies: no symptoms, see HPI, excessive sweating, flushing, 

intolerance to temperature, increased thirst, increased urine, unexplained 

weight loss, other


Hematologic/Lymphatic:  Denies: no symptoms, see HPI, anemia, blood clots, easy 

bleeding, easy bruising, swollen glands, diathesis, other





Physical Exam


General Appearance:  WD/WN, no apparent distress, alert, obese


Lines, tubes and drains:  peripheral


HEENT:  normocephalic, atraumatic, anicteric, mucous membranes moist, PERRL, 

EOMI, pharynx normal, supple, no JVD


Neck:  non-tender, normal alignment, supple, normal inspection


Respiratory/Chest:  normal breath sounds, no respiratory distress, no accessory 

muscle use


Cardiovascular/Chest:  no JVD


Extremities:  normal range of motion, non-tender, normal inspection, no calf 

tenderness, normal capillary refill, non-pitting, no edema, no cyanosis


Skin Exam:  normal pigmentation, warm/dry


Neurologic:  CNs II-XII grossly normal, no motor/sensory deficits, alert, 

oriented x 3, responsive, normal mood/affect, no Babinski


Musculoskeletal:  normal muscle bulk, no effusion





Last 24 Hour Vital Signs








  Date Time  Temp Pulse Resp B/P (MAP) Pulse Ox O2 Delivery O2 Flow Rate FiO2


 


6/12/19 16:00  103      


 


6/12/19 15:11 98.8 104 21 113/63 95 Nasal Cannula 3 


 


6/12/19 15:00  102 18 103/65 96 Nasal Cannula 3 


 


6/12/19 14:45  103 19 93/61 99 Simple Mask 6 


 


6/12/19 14:35  105 20 93/59 99 Simple Mask 6 


 


6/12/19 14:28  108 20 94/62 98 Simple Mask 6 


 


6/12/19 14:23  109 16 100/61 97 Simple Mask 6 


 


6/12/19 14:21  111 16  97   


 


6/12/19 14:18 98.7 111 16 104/57 97 Simple Mask 6 


 


6/12/19 13:16      Room Air  


 


6/12/19 12:00  113      


 


6/12/19 08:05 98.7 111 22 112/75 97 Room Air 2.0 


 


6/12/19 08:04  111 22 112/75 97 Room Air  


 


6/12/19 08:00  114      


 


6/12/19 06:35 98.7 113 24 112/75 92 Nasal Cannula 2.0 


 


6/12/19 05:10 98.7 118 23 109/72 94 Nasal Cannula 2.0 


 


6/12/19 03:30 98.7 120 22 108/71 93 Nasal Cannula 2.0 


 


6/12/19 02:00 98.9 125 23 112/77 93 Nasal Cannula 2.0 


 


6/12/19 01:00 98.9 128 22 123/69 91 Room Air  


 


6/12/19 00:00 99.6 137 26 120/67 93 Nasal Cannula 2.0 


 


6/11/19 22:55  150 31   Room Air  


 


6/11/19 22:55 102.0 15 31 98/59 93 Room Air  


 


6/11/19 22:30 102.6 147 18 98/60 (73) 98 Room Air  

















Intake and Output  


 


 6/11/19 6/12/19





 19:00 07:00


 


  


 


# Bowel Movements  2











Laboratory Tests








Test


  6/11/19


23:20 6/12/19


00:00 6/12/19


03:50 6/12/19


04:00


 


White Blood Count


  4.0 K/UL


(4.8-10.8)  L 


  


  


 


 


Red Blood Count


  5.40 M/UL


(4.70-6.10) 


  


  


 


 


Hemoglobin


  15.6 G/DL


(14.2-18.0) 


  


  


 


 


Hematocrit


  45.4 %


(42.0-52.0) 


  


  


 


 


Mean Corpuscular Volume 84 FL (80-99)     


 


Mean Corpuscular Hemoglobin


  28.9 PG


(27.0-31.0) 


  


  


 


 


Mean Corpuscular Hemoglobin


Concent 34.3 G/DL


(32.0-36.0) 


  


  


 


 


Red Cell Distribution Width


  11.7 %


(11.6-14.8) 


  


  


 


 


Platelet Count


  150 K/UL


(150-450) 


  


  


 


 


Mean Platelet Volume


  8.6 FL


(6.5-10.1) 


  


  


 


 


Neutrophils (%) (Auto)


  % (45.0-75.0)


  


  


  


 


 


Lymphocytes (%) (Auto)


  % (20.0-45.0)


  


  


  


 


 


Monocytes (%) (Auto)  % (1.0-10.0)     


 


Eosinophils (%) (Auto)  % (0.0-3.0)     


 


Basophils (%) (Auto)  % (0.0-2.0)     


 


Prothrombin Time


  10.2 SEC


(9.30-11.50) 


  


  


 


 


Prothromb Time International


Ratio 1.0 (0.9-1.1)  


  


  


  


 


 


Activated Partial


Thromboplast Time 21 SEC (23-33)


L 


  


  


 


 


Sodium Level


  134 MMOL/L


(136-145)  L 


  


  


 


 


Potassium Level


  4.0 MMOL/L


(3.5-5.1) 


  


  


 


 


Chloride Level


  98 MMOL/L


() 


  


  


 


 


Carbon Dioxide Level


  21 MMOL/L


(21-32) 


  


  


 


 


Anion Gap


  16 mmol/L


(5-15)  H 


  


  


 


 


Blood Urea Nitrogen


  13 mg/dL


(7-18) 


  


  


 


 


Creatinine


  1.3 MG/DL


(0.55-1.30) 


  


  


 


 


Estimat Glomerular Filtration


Rate 55.6 mL/min


(>60) 


  


  


 


 


Glucose Level


  265 MG/DL


()  H 


  


  


 


 


Lactic Acid Level


  4.40 mmol/L


(0.4-2.0)  H 4.40 mmol/L


(0.66-2.22)  H 


  3.30 mmol/L


(0.4-2.0)  H


 


Calcium Level


  9.1 MG/DL


(8.5-10.1) 


  


  


 


 


Total Bilirubin


  1.2 MG/DL


(0.2-1.0)  H 


  


  


 


 


Direct Bilirubin


  0.3 MG/DL


(0.0-0.3) 


  


  


 


 


Aspartate Amino Transf


(AST/SGOT) 41 U/L (15-37)


H 


  


  


 


 


Alanine Aminotransferase


(ALT/SGPT) 29 U/L (12-78)


  


  


  


 


 


Alkaline Phosphatase


  160 U/L


()  H 


  


  


 


 


Total Creatine Kinase


  96 U/L


() 


  


  


 


 


Creatine Kinase MB


  0.9 NG/ML


(0.0-3.6) 


  


  


 


 


Creatine Kinase MB Relative


Index 0.9  


  


  


  


 


 


Troponin I


  0.000 ng/mL


(0.000-0.056) 


  


  


 


 


Total Protein


  7.4 G/DL


(6.4-8.2) 


  


  


 


 


Albumin


  3.5 G/DL


(3.4-5.0) 


  


  


 


 


Globulin 3.9 g/dL     


 


Albumin/Globulin Ratio


  0.9 (1.0-2.7)


L 


  


  


 


 


Serum Alcohol < 3 mg/dL     


 


Urine Color   Yellow   


 


Urine Appearance   Clear   


 


Urine pH   5 (4.5-8.0)   


 


Urine Specific Gravity


  


  


  1.005


(1.005-1.035) 


 


 


Urine Protein


  


  


  2+ (NEGATIVE)


H 


 


 


Urine Glucose (UA)


  


  


  Negative


(NEGATIVE) 


 


 


Urine Ketones


  


  


  1+ (NEGATIVE)


H 


 


 


Urine Blood


  


  


  2+ (NEGATIVE)


H 


 


 


Urine Nitrite


  


  


  Negative


(NEGATIVE) 


 


 


Urine Bilirubin


  


  


  Negative


(NEGATIVE) 


 


 


Urine Urobilinogen


  


  


  Normal MG/DL


(0.0-1.0) 


 


 


Urine Leukocyte Esterase


  


  


  1+ (NEGATIVE)


H 


 


 


Urine RBC


  


  


  5-10 /HPF (0 -


0)  H 


 


 


Urine WBC


  


  


  2-4 /HPF (0 -


0) 


 


 


Urine Squamous Epithelial


Cells 


  


  Few /LPF


(NONE/OCC) 


 


 


Urine Bacteria


  


  


  Few /HPF


(NONE) 


 








Height (Feet):  5


Height (Inches):  7.00


Weight (Pounds):  189


Medications





Current Medications








 Medications


  (Trade)  Dose


 Ordered  Sig/Julisa


 Route


 PRN Reason  Start Time


 Stop Time Status Last Admin


Dose Admin


 


 Acetaminophen


  (Tylenol)  650 mg  Q4H  PRN


 ORAL


 Temp > 100.5  6/12/19 15:15


 7/12/19 08:59   


 


 


 Acetaminophen


  (Tylenol)  650 mg  Q6H  PRN


 ORAL


 Mild Pain (Pain Scale 1-3)  6/12/19 15:00


 7/12/19 14:59   


 


 


 Acetaminophen/


 Hydrocodone Bitart


  (Norco 10/325)  1 tab  Q4H  PRN


 ORAL


 Severe Pain (Pain Scale 7-10)  6/12/19 15:00


 6/19/19 14:59   


 


 


 Acetaminophen/


 Hydrocodone Bitart


  (Norco 5/325)  1 tab  Q4H  PRN


 ORAL


 Moderate Pain (Pain Scale 4-6)  6/12/19 15:00


 6/19/19 14:59   


 


 


 Al Hydroxide/Mg


 Hydroxide


  (Mylanta)  15 ml  Q6H  PRN


 ORAL


 DYSPEPSIA  6/12/19 15:00


 7/12/19 14:59   


 


 


 Barium Sulfate


  (Readi-Cat 2)  450 ml  NOW  PRN


 ORAL


 Radiology Procedure  6/12/19 10:15


 6/14/19 10:13   


 


 


 Dextrose/Sodium


 Chloride  1,000 ml @ 


 75 mls/hr  [id number]


 IV


   6/12/19 09:05


 7/12/19 09:04  6/12/19 10:35


 


 


 Diphenhydramine


 HCl


  (Benadryl)  25 mg  Q8H  PRN


 ORAL


 Itching/Pruritis  6/12/19 15:00


 7/12/19 14:59   


 


 


 Docusate Sodium


  (Colace)  100 mg  TWICE A  DAY


 ORAL


   6/12/19 18:00


 7/12/19 17:59  6/12/19 17:48


 


 


 Iopamidol


  (Isovue-300


 100ml)  100 ml  NOW  PRN


 INJ


 Radiology Procedure  6/11/19 23:00


     


 


 


 Iopamidol


  (Isovue-300


 100ml)  100 ml  NOW  PRN


 INJ


 Radiology Procedure  6/12/19 10:15


 6/13/19 10:14   


 


 


 Magnesium


 Hydroxide


  (Mom)  30 ml  BIDPRN  PRN


 ORAL


 Constipation  6/12/19 15:00


 7/12/19 14:59   


 


 


 Ondansetron HCl


  (Zofran)  4 mg  Q6H  PRN


 IVP


 Nausea & Vomiting  6/12/19 15:00


 7/12/19 14:59   


 


 


 Piperacillin Sod/


 Tazobactam Sod


 3.375 gm/Sodium


 Chloride  110 ml @ 


 27.5 mls/hr  EVERY 8  HOURS


 IVPB


   6/12/19 14:00


 6/17/19 13:59  6/12/19 16:06


 


 


 Sennosides


  (Senokot)  8.6 mg  BIDPRN  PRN


 ORAL


 Constipation  6/12/19 15:00


 7/12/19 14:59   


 











Assessment/Plan


Problem List:  


(1) Tachycardia


ICD Codes:  R00.0 - Tachycardia, unspecified


SNOMED:  3214837


(2) Appendicitis


ICD Codes:  K37 - Unspecified appendicitis


SNOMED:  98431439


(3) Febrile illness, acute


ICD Codes:  R50.9 - Fever, unspecified


SNOMED:  751124560


(4) Abdominal pain


ICD Codes:  R10.9 - Unspecified abdominal pain


SNOMED:  22922060


(5) Alcohol abuse


ICD Codes:  F10.10 - Alcohol abuse, uncomplicated


SNOMED:  88334406


(6) Acute metabolic encephalopathy


ICD Codes:  G93.41 - Metabolic encephalopathy


SNOMED:  09719401, 243037870


Status:  doing well, stable


Assessment/Plan:


Assess for alcohol withdrawal symptoms utilizing the CIWA scale.





Abx as per PMD/ID/GI





IF score > 8 then 1mg Ativan to be given





Manage postop pain





Continue neuro checks Q4





SBP< 140





Maintain normoglycemia- start oral hypoglycemic during hospitalization if 

necessary to prevent post op complications infections secondary to or worsened 

by hyperglycemia. 





No indication for neuroradiology at this time. 





Na 135-145





Checking HgBA1c 





Check Vitamin B12/ Folate levels











Margo Savage N.P. Jun 12, 2019 20:43

## 2019-06-12 NOTE — NUR
Received report from OR nurse Cespedes. pt came back to unit after surgery.  Pt back on cardiac 
monitor.   Pt in stable condition.

## 2019-06-12 NOTE — CONSULTATION
DATE OF CONSULTATION:  06/12/2019

CARDIOLOGY CONSULTATION:



CONSULTING PHYSICIAN:  Jake George M.D.



REFERRING PHYSICIAN:   Keena Jensen M.D.



REASON FOR CONSULTATION:  Wide complex tachycardia.



HISTORY OF PRESENT ILLNESS:  The patient is a 63-year-old gentleman with no

prior cardiac history, brought in by paramedics for increasing abdominal

pain, right lower quadrant.  The patient has prior history of alcohol use

and was tachycardic in the ER.  His first EKG at 22:50 showed wide complex

tachycardia at 144 beats per minute with left bundle-branch block

morphology, likely sinus tachycardia with aberrancy.  Repeat EKG on

06/11/2019 at 23:48 the same heart rate of 144, shows sinus tachycardia

with the aberrancy.  A Cardiology consultation was requested for further

evaluation.



REVIEW OF SYSTEMS:  Negative other than what was mentioned in history of

present illness.



PAST MEDICAL HISTORY:  As mentioned above.



FAMILY HISTORY:  Noncontributory.



SOCIAL HISTORY:  He lives at home.  Does not smoke.  Has history of

drinking.



PHYSICAL EXAMINATION:

VITAL SIGNS:  Show blood pressure of 112/75, pulse 110, respirations 18,

and temperature 98.7.

HEAD AND NECK:  Showed no JVD.

LUNGS:  Clear.

CARDIOVASCULAR:  Tachycardic.  S1, S2 with no gallop.

ABDOMEN:  Distended and tender.

EXTREMITIES:  No pitting edema.



DIAGNOSTIC DATA:  His CT of abdomen showed possible uncomplicated acute

appendicitis.



LABORATORY DATA:  Labs show white count of 4, hemoglobin of 15.7,

hematocrit of 45.4, and platelet count of 150.  Sodium 134, potassium is

4.0, BUN of 13, creatinine 1.7.  Lactic acid is 4.4.  Troponin is

negative.



ASSESSMENT AND PLAN:

1. Wide complex tachycardia.  This is likely aberrancy in sinus

tachycardia.  Even though the aberrancy is within the right bundle-branch

block morphology; however, currently the patient had left bundle-branch

block morphology.  This was causing even in the identical rate of 144

beats per minute.  The patient _______ the aberrancy, so it is

intermittent aberrant conduction.  Troponin is negative.  The patient has

no prior myocardial infarction or coronary artery disease or congestive

heart failure.  We will get an echocardiogram for further evaluation and

management.

2. Acute appendicitis.  The patient will be undergoing surgery by Dr. Vazquez.  The patient also was evaluated by Dr. Lopez from GI

perspective.



Thank you very much, Dr. Jensen, for allowing me to participate in the

care of this patient.  Please do not hesitate to contact me for any

questions regarding my evaluation.









  ______________________________________________

  Jake George M.D.





DR:  JAY

D:  06/12/2019 12:06

T:  06/12/2019 17:21

JOB#:  [id number]

CC:

## 2019-06-12 NOTE — IMMEDIATE POST-OP EVALUATION
Immediate Post-Op Evalulation


Immediate Post-Op Evalulation


Procedure:  Lap appy


Date of Evaluation:  Jun 12, 2019


Time of Evaluation:  14:23


IV Fluids:  500


Blood Products:  0


Estimated Blood Loss:  min


Urinary Output:  0


Blood Pressure Systolic:  104


Blood Pressure Diastolic:  57


Pulse Rate:  111


Respiratory Rate:  16


O2 Sat by Pulse Oximetry:  97


Temperature (Fahrenheit):  98.7


Pain Score (1-10):  0


Nausea:  No


Vomiting:  No


Complications


0


Patient Status:  awake, reacts, patent, none


Hydration Status:  adequate


Drug:  Ancef 2g


Given Within 1 Hr of Incision:  Yes











Syl Jamison MD Jun 12, 2019 14:21

## 2019-06-12 NOTE — DIAGNOSTIC IMAGING REPORT
Clinical Indication: Abdominal pain for 2 days

 

Technique:   No oral contrast utilized, per emergency room physician request  IV

administration nonionic contrast. Venous phase spiral acquisition obtained through

the abdomen and pelvis. Multiplanar reconstructions were generated. Total dose length

product 1141 mGycm. CTDIvol(s) 19 mGy. Dose reduction achieved using automated

exposure control

 

 

Comparison: none

 

Findings: The appendix is dilated and there is periappendiceal inflammation. A small

appendicolith is seen centrally in the lumen. Appendiceal diameter is 13 mm. No

extraluminal gas or fluid collections are associated. There is mild wall thickening

of the adjacent cecum and ascending colon.

 

No evidence of diverticulosis or diverticulitis. No small bowel distention. No free

or loculated intraperitoneal gas or fluid is evident. The distal esophagus, stomach,

duodenum are unremarkable.

 

The liver, gallbladder, bile ducts, pancreas, spleen, adrenals, kidneys are

unremarkable. No renal or ureteral calculi, hydronephrosis, or hydroureter. No pelvic

mass or adenopathy. There are mild degenerative changes of the lumbosacral junction.

 

Included lung bases are clear except for mild posterior dependent atelectatic

changes.

 

Impression: Positive for uncomplicated acute appendicitis

 

This agrees with the preliminary interpretation provided overnight by [facility]

teleradiology service.

 

 

 

The CT scanner at [facility] is accredited by the American College of

Radiology and the scans are performed using protocols designed to limit radiation

exposure to as low as reasonably achievable to attain images of sufficient resolution

adequate for diagnostic evaluation.

## 2019-06-12 NOTE — NUR
HAND-OFF: 

Report given to Erin Ying.  Pt surgical wound is intact.  Pt sleeping confortably in 
bed.

## 2019-06-13 VITALS — DIASTOLIC BLOOD PRESSURE: 85 MMHG | SYSTOLIC BLOOD PRESSURE: 107 MMHG

## 2019-06-13 VITALS — DIASTOLIC BLOOD PRESSURE: 93 MMHG | SYSTOLIC BLOOD PRESSURE: 136 MMHG

## 2019-06-13 VITALS — SYSTOLIC BLOOD PRESSURE: 135 MMHG | DIASTOLIC BLOOD PRESSURE: 81 MMHG

## 2019-06-13 VITALS — SYSTOLIC BLOOD PRESSURE: 104 MMHG | DIASTOLIC BLOOD PRESSURE: 72 MMHG

## 2019-06-13 VITALS — DIASTOLIC BLOOD PRESSURE: 81 MMHG | SYSTOLIC BLOOD PRESSURE: 129 MMHG

## 2019-06-13 VITALS — SYSTOLIC BLOOD PRESSURE: 113 MMHG | DIASTOLIC BLOOD PRESSURE: 78 MMHG

## 2019-06-13 LAB
ADD MANUAL DIFF: YES
ALBUMIN SERPL-MCNC: 2.8 G/DL (ref 3.4–5)
ALBUMIN/GLOB SERPL: 0.8 {RATIO} (ref 1–2.7)
ALP SERPL-CCNC: 102 U/L (ref 46–116)
ALT SERPL-CCNC: 27 U/L (ref 12–78)
AMYLASE SERPL-CCNC: 24 U/L (ref 25–115)
ANION GAP SERPL CALC-SCNC: 10 MMOL/L (ref 5–15)
AST SERPL-CCNC: 19 U/L (ref 15–37)
BILIRUB SERPL-MCNC: 0.9 MG/DL (ref 0.2–1)
BUN SERPL-MCNC: 20 MG/DL (ref 7–18)
CALCIUM SERPL-MCNC: 7.8 MG/DL (ref 8.5–10.1)
CHLORIDE SERPL-SCNC: 100 MMOL/L (ref 98–107)
CO2 SERPL-SCNC: 26 MMOL/L (ref 21–32)
CREAT SERPL-MCNC: 1.1 MG/DL (ref 0.55–1.3)
ERYTHROCYTE [DISTWIDTH] IN BLOOD BY AUTOMATED COUNT: 12.7 % (ref 11.6–14.8)
GLOBULIN SER-MCNC: 3.5 G/DL
HCT VFR BLD CALC: 39.5 % (ref 42–52)
HGB BLD-MCNC: 13.5 G/DL (ref 14.2–18)
MCV RBC AUTO: 88 FL (ref 80–99)
PLATELET # BLD: 121 K/UL (ref 150–450)
POTASSIUM SERPL-SCNC: 3.6 MMOL/L (ref 3.5–5.1)
RBC # BLD AUTO: 4.49 M/UL (ref 4.7–6.1)
SODIUM SERPL-SCNC: 136 MMOL/L (ref 136–145)
WBC # BLD AUTO: 18.4 K/UL (ref 4.8–10.8)

## 2019-06-13 RX ADMIN — DEXTROSE AND SODIUM CHLORIDE SCH MLS/HR: 5; .45 INJECTION, SOLUTION INTRAVENOUS at 11:45

## 2019-06-13 RX ADMIN — DOCUSATE SODIUM SCH MG: 100 CAPSULE, LIQUID FILLED ORAL at 09:23

## 2019-06-13 RX ADMIN — HYDROCODONE BITARTRATE AND ACETAMINOPHEN PRN TAB: 5; 325 TABLET ORAL at 03:49

## 2019-06-13 RX ADMIN — HYDROCODONE BITARTRATE AND ACETAMINOPHEN PRN TAB: 5; 325 TABLET ORAL at 23:54

## 2019-06-13 RX ADMIN — DOCUSATE SODIUM SCH MG: 100 CAPSULE, LIQUID FILLED ORAL at 16:58

## 2019-06-13 RX ADMIN — DEXTROSE MONOHYDRATE SCH MLS/HR: 50 INJECTION, SOLUTION INTRAVENOUS at 23:44

## 2019-06-13 RX ADMIN — DEXTROSE MONOHYDRATE SCH MLS/HR: 50 INJECTION, SOLUTION INTRAVENOUS at 13:35

## 2019-06-13 RX ADMIN — DEXTROSE MONOHYDRATE SCH MLS/HR: 50 INJECTION, SOLUTION INTRAVENOUS at 05:35

## 2019-06-13 NOTE — INFECTIOUS DISEASES PROG NOTE
Assessment/Plan


Assessment/Plan


IMPRESSION:


1. Sepsis with fever and tachycardia and lactic acidosis.


2. Acute appendicitis.


3. Hyperglycemia.DM type 2





RECOMMENDATIONS:  


We will continue with Zosyn perioperatively.





Subjective


ROS Limited/Unobtainable:  No


Constitutional:  Reports: no symptoms


Gastrointestinal/Abdominal:  Reports: no symptoms, other - had appendectomy 

yesterday, tolerate feeding


Genitourinary:  Reports: no symptoms


Allergies:  


Coded Allergies:  


     No Known Allergies (Unverified , 6/11/19)





Objective


Vital Signs





Last 24 Hour Vital Signs








  Date Time  Temp Pulse Resp B/P (MAP) Pulse Ox O2 Delivery O2 Flow Rate FiO2


 


6/13/19 09:00      Room Air  


 


6/13/19 08:00 98.5 96 18 113/78 (90) 94   


 


6/13/19 04:00  85      


 


6/13/19 04:00 97.3 98 18 107/85 (92) 97   


 


6/13/19 00:00 98.1 99 18 104/72 (83) 99   


 


6/13/19 00:00  99      


 


6/12/19 21:00      Room Air  


 


6/12/19 20:00  98      


 


6/12/19 20:00 97.5 98 18 100/67 (78) 95   


 


6/12/19 16:00  103      


 


6/12/19 15:11 98.8 104 21 113/63 95 Nasal Cannula 3 


 


6/12/19 15:00  102 18 103/65 96 Nasal Cannula 3 


 


6/12/19 14:45  103 19 93/61 99 Simple Mask 6 


 


6/12/19 14:35  105 20 93/59 99 Simple Mask 6 


 


6/12/19 14:28  108 20 94/62 98 Simple Mask 6 


 


6/12/19 14:23  109 16 100/61 97 Simple Mask 6 


 


6/12/19 14:21  111 16  97   


 


6/12/19 14:18 98.7 111 16 104/57 97 Simple Mask 6 


 


6/12/19 13:16      Room Air  


 


6/12/19 12:00  113      








Height (Feet):  5


Height (Inches):  7.00


Weight (Pounds):  189


General Appearance:  no acute distress


HEENT:  mucous membranes moist


Respiratory/Chest:  lungs clear


Cardiovascular:  normal rate


Abdomen:  soft, non tender


Extremities:  no edema


Neurologic/Psychiatric:  alert, oriented x 3, responsive





Laboratory Tests








Test


  6/13/19


05:25


 


White Blood Count


  18.4 K/UL


(4.8-10.8)  #H


 


Red Blood Count


  4.49 M/UL


(4.70-6.10)  L


 


Hemoglobin


  13.5 G/DL


(14.2-18.0)  L


 


Hematocrit


  39.5 %


(42.0-52.0)  L


 


Mean Corpuscular Volume 88 FL (80-99)  


 


Mean Corpuscular Hemoglobin


  30.0 PG


(27.0-31.0)


 


Mean Corpuscular Hemoglobin


Concent 34.1 G/DL


(32.0-36.0)


 


Red Cell Distribution Width


  12.7 %


(11.6-14.8)


 


Platelet Count


  121 K/UL


(150-450)  L


 


Mean Platelet Volume


  9.8 FL


(6.5-10.1)


 


Neutrophils (%) (Auto)


  % (45.0-75.0)


 


 


Lymphocytes (%) (Auto)


  % (20.0-45.0)


 


 


Monocytes (%) (Auto)  % (1.0-10.0)  


 


Eosinophils (%) (Auto)  % (0.0-3.0)  


 


Basophils (%) (Auto)  % (0.0-2.0)  


 


Differential Total Cells


Counted 100  


 


 


Neutrophils % (Manual) 88 % (45-75)  H


 


Lymphocytes % (Manual) 6 % (20-45)  L


 


Monocytes % (Manual) 4 % (1-10)  


 


Eosinophils % (Manual) 0 % (0-3)  


 


Basophils % (Manual) 0 % (0-2)  


 


Band Neutrophils 2 % (0-8)  


 


Platelet Estimate Decreased  L


 


Platelet Morphology Normal  


 


Red Blood Cell Morphology Normal  


 


Sodium Level


  136 MMOL/L


(136-145)


 


Potassium Level


  3.6 MMOL/L


(3.5-5.1)


 


Chloride Level


  100 MMOL/L


()


 


Carbon Dioxide Level


  26 MMOL/L


(21-32)


 


Anion Gap


  10 mmol/L


(5-15)


 


Blood Urea Nitrogen


  20 mg/dL


(7-18)  H


 


Creatinine


  1.1 MG/DL


(0.55-1.30)


 


Estimat Glomerular Filtration


Rate > 60 mL/min


(>60)


 


Glucose Level


  277 MG/DL


()  H


 


Hemoglobin A1c


  11.2 %


(4.3-6.0)  H


 


Calcium Level


  7.8 MG/DL


(8.5-10.1)  L


 


Total Bilirubin


  0.9 MG/DL


(0.2-1.0)


 


Aspartate Amino Transf


(AST/SGOT) 19 U/L (15-37)


 


 


Alanine Aminotransferase


(ALT/SGPT) 27 U/L (12-78)


 


 


Alkaline Phosphatase


  102 U/L


()


 


Total Protein


  6.3 G/DL


(6.4-8.2)  L


 


Albumin


  2.8 G/DL


(3.4-5.0)  L


 


Globulin 3.5 g/dL  


 


Albumin/Globulin Ratio


  0.8 (1.0-2.7)


L


 


Amylase Level


  24 U/L


()  L


 


Lipase


  45 U/L


()  L


 


Vitamin B12 Level


  254 PG/ML


(193-986)


 


Folate


  8.1 NG/ML


(8.6-58.9)  L











Current Medications








 Medications


  (Trade)  Dose


 Ordered  Sig/Julisa


 Route


 PRN Reason  Start Time


 Stop Time Status Last Admin


Dose Admin


 


 Acetaminophen


  (Tylenol)  650 mg  Q4H  PRN


 ORAL


 Temp > 100.5  6/12/19 15:15


 7/12/19 08:59   


 


 


 Acetaminophen


  (Tylenol)  650 mg  Q6H  PRN


 ORAL


 Mild Pain (Pain Scale 1-3)  6/12/19 15:00


 7/12/19 14:59   


 


 


 Acetaminophen/


 Hydrocodone Bitart


  (Norco 10/325)  1 tab  Q4H  PRN


 ORAL


 Severe Pain (Pain Scale 7-10)  6/12/19 15:00


 6/19/19 14:59   


 


 


 Acetaminophen/


 Hydrocodone Bitart


  (Norco 5/325)  1 tab  Q4H  PRN


 ORAL


 Moderate Pain (Pain Scale 4-6)  6/12/19 15:00


 6/19/19 14:59  6/13/19 03:49


 


 


 Al Hydroxide/Mg


 Hydroxide


  (Mylanta)  15 ml  Q6H  PRN


 ORAL


 DYSPEPSIA  6/12/19 15:00


 7/12/19 14:59   


 


 


 Barium Sulfate


  (Readi-Cat 2)  450 ml  NOW  PRN


 ORAL


 Radiology Procedure  6/12/19 10:15


 6/14/19 10:13   


 


 


 Dextrose/Sodium


 Chloride  1,000 ml @ 


 75 mls/hr  [id number]


 IV


   6/12/19 09:05


 7/12/19 09:04  6/12/19 21:21


 


 


 Diazepam


  (Valium)  10 mg  Q2H  PRN


 ORAL


 For Anxiety  6/13/19 00:00


 6/20/19 00:00   


 


 


 Diphenhydramine


 HCl


  (Benadryl)  25 mg  Q8H  PRN


 ORAL


 Itching/Pruritis  6/12/19 15:00


 7/12/19 14:59   


 


 


 Docusate Sodium


  (Colace)  100 mg  TWICE A  DAY


 ORAL


   6/12/19 18:00


 7/12/19 17:59  6/13/19 09:23


 


 


 Iopamidol


  (Isovue-300


 100ml)  100 ml  NOW  PRN


 INJ


 Radiology Procedure  6/11/19 23:00


     


 


 


 Magnesium


 Hydroxide


  (Mom)  30 ml  BIDPRN  PRN


 ORAL


 Constipation  6/12/19 15:00


 7/12/19 14:59   


 


 


 Ondansetron HCl


  (Zofran)  4 mg  Q6H  PRN


 IVP


 Nausea & Vomiting  6/12/19 15:00


 7/12/19 14:59   


 


 


 Piperacillin Sod/


 Tazobactam Sod


 3.375 gm/Sodium


 Chloride  110 ml @ 


 27.5 mls/hr  EVERY 8  HOURS


 IVPB


   6/12/19 14:00


 6/17/19 13:59  6/13/19 05:35


 


 


 Sennosides


  (Senokot)  8.6 mg  BIDPRN  PRN


 ORAL


 Constipation  6/12/19 15:00


 7/12/19 14:59   


 

















Lamin Felix MD Jun 13, 2019 10:45

## 2019-06-13 NOTE — NUR
CASE MANAGEMENT:REVIEW



6/13/19

SI: SEPSIS. ACUTE APPENDICITIS

POD #1.....S/P LAP APPY

98.5   96  18  113/78  94% ON RA

BUN+18.4    [id number]





IS: IVF@75/HR

IV ZOSYN [id number]

**: TELEMETRY STATUS

## 2019-06-13 NOTE — SURGERY PROGRESS NOTE
Surgery Progress Note


Subjective


Procedure Performed


lap appy


Additional Comments


Patient seen and examined at bedside much improved today no nausea vomiting 

fever chills tolerating diet ambulatory pain well controlled leukocytosis 18,000





Objective





Last 24 Hour Vital Signs








  Date Time  Temp Pulse Resp B/P (MAP) Pulse Ox O2 Delivery O2 Flow Rate FiO2


 


6/13/19 10:59  74 22  98   


 


6/13/19 09:00      Room Air  


 


6/13/19 08:00  97      


 


6/13/19 08:00 98.5 96 18 113/78 (90) 94   


 


6/13/19 04:00  85      


 


6/13/19 04:00 97.3 98 18 107/85 (92) 97   


 


6/13/19 00:00 98.1 99 18 104/72 (83) 99   


 


6/13/19 00:00  99      


 


6/12/19 21:00      Room Air  


 


6/12/19 20:00  98      


 


6/12/19 20:00 97.5 98 18 100/67 (78) 95   


 


6/12/19 16:00  103      


 


6/12/19 15:11 98.8 104 21 113/63 95 Nasal Cannula 3 


 


6/12/19 15:00  102 18 103/65 96 Nasal Cannula 3 


 


6/12/19 14:45  103 19 93/61 99 Simple Mask 6 


 


6/12/19 14:35  105 20 93/59 99 Simple Mask 6 


 


6/12/19 14:28  108 20 94/62 98 Simple Mask 6 


 


6/12/19 14:23  109 16 100/61 97 Simple Mask 6 


 


6/12/19 14:21  111 16  97   


 


6/12/19 14:18 98.7 111 16 104/57 97 Simple Mask 6 


 


6/12/19 13:16      Room Air  








I&O











Intake and Output  


 


 6/12/19 6/13/19





 19:00 07:00


 


Intake Total 600 ml 738.75 ml


 


Output Total 15 ml 


 


Balance 585 ml 738.75 ml


 


  


 


Intake Oral  240 ml


 


IV Total 600 ml 498.75 ml


 


Output Estimated Blood Loss 15 ml 


 


# Voids 1 2


 


# Bowel Movements 2 








Dressing:  dry


Wound:  clean


Drains:  none


Cardiovascular:  RSR


Respiratory:  clear


Abdomen:  soft, flat, non-tender, present bowel sounds


Extremities:  no tenderness, no cyanosis





Laboratory Tests








Test


  6/13/19


05:25


 


White Blood Count


  18.4 K/UL


(4.8-10.8)  #H


 


Red Blood Count


  4.49 M/UL


(4.70-6.10)  L


 


Hemoglobin


  13.5 G/DL


(14.2-18.0)  L


 


Hematocrit


  39.5 %


(42.0-52.0)  L


 


Mean Corpuscular Volume 88 FL (80-99)  


 


Mean Corpuscular Hemoglobin


  30.0 PG


(27.0-31.0)


 


Mean Corpuscular Hemoglobin


Concent 34.1 G/DL


(32.0-36.0)


 


Red Cell Distribution Width


  12.7 %


(11.6-14.8)


 


Platelet Count


  121 K/UL


(150-450)  L


 


Mean Platelet Volume


  9.8 FL


(6.5-10.1)


 


Neutrophils (%) (Auto)


  % (45.0-75.0)


 


 


Lymphocytes (%) (Auto)


  % (20.0-45.0)


 


 


Monocytes (%) (Auto)  % (1.0-10.0)  


 


Eosinophils (%) (Auto)  % (0.0-3.0)  


 


Basophils (%) (Auto)  % (0.0-2.0)  


 


Differential Total Cells


Counted 100  


 


 


Neutrophils % (Manual) 88 % (45-75)  H


 


Lymphocytes % (Manual) 6 % (20-45)  L


 


Monocytes % (Manual) 4 % (1-10)  


 


Eosinophils % (Manual) 0 % (0-3)  


 


Basophils % (Manual) 0 % (0-2)  


 


Band Neutrophils 2 % (0-8)  


 


Platelet Estimate Decreased  L


 


Platelet Morphology Normal  


 


Red Blood Cell Morphology Normal  


 


Sodium Level


  136 MMOL/L


(136-145)


 


Potassium Level


  3.6 MMOL/L


(3.5-5.1)


 


Chloride Level


  100 MMOL/L


()


 


Carbon Dioxide Level


  26 MMOL/L


(21-32)


 


Anion Gap


  10 mmol/L


(5-15)


 


Blood Urea Nitrogen


  20 mg/dL


(7-18)  H


 


Creatinine


  1.1 MG/DL


(0.55-1.30)


 


Estimat Glomerular Filtration


Rate > 60 mL/min


(>60)


 


Glucose Level


  277 MG/DL


()  H


 


Hemoglobin A1c


  11.2 %


(4.3-6.0)  H


 


Calcium Level


  7.8 MG/DL


(8.5-10.1)  L


 


Total Bilirubin


  0.9 MG/DL


(0.2-1.0)


 


Aspartate Amino Transf


(AST/SGOT) 19 U/L (15-37)


 


 


Alanine Aminotransferase


(ALT/SGPT) 27 U/L (12-78)


 


 


Alkaline Phosphatase


  102 U/L


()


 


Total Protein


  6.3 G/DL


(6.4-8.2)  L


 


Albumin


  2.8 G/DL


(3.4-5.0)  L


 


Globulin 3.5 g/dL  


 


Albumin/Globulin Ratio


  0.8 (1.0-2.7)


L


 


Amylase Level


  24 U/L


()  L


 


Lipase


  45 U/L


()  L


 


Vitamin B12 Level


  254 PG/ML


(193-986)


 


Folate


  8.1 NG/ML


(8.6-58.9)  L











Plan


Problems:  


(1) Appendicitis


Assessment & Plan:  63M acute appendicitis.  CT with acute appy.  exam with 

focal RLQ tenderness.  labs as above





Status post lap appendectomy


Leukocytosis today





Not ready for discharge


Continue with IV antibiotics


Repeat a.m. labs


Diet as tolerated


Activity as tolerated


Thank you














Norberto Vazquez Jun 13, 2019 12:07

## 2019-06-13 NOTE — NUR
P.T Note:

P.T evaluation completed. Pt is baseline independent in all aspect of functional activities 
therefore skilled P.T service is not warranted at this time. Encouraged patient OOB 
activities vs bedrest during stay. Pt verbalized  understanding. No further P.T follow up 
needed.D/C P.T services.

## 2019-06-13 NOTE — NUR
NURSE NOTES:



Received report from SUZI Taylor. Patient in bed awake showing no signs of acute distress. 
Family at bed side. Respiration even and non labored on room air. No SOB noted. Left AC and 
right AC IV patent and intact. Call light within reach. Bed in lowest position, wheels 
locked. All needs attended and met. Will continue to monitor.

## 2019-06-13 NOTE — NEUROLOGY PROGRESS NOTE
Interim History


Interim History


ROS Limited/Unobtainable:  Yes


Interim History


This visit was conducted on June 13, 2019 with  Dr. William Arce.





Review of Systems


Neuro Review of Systems


Stable but with increased tachycardia, pain, and leukocytosis today.





Objective


Physical Exam





Last Vital Signs








  Date Time  Temp Pulse Resp B/P (MAP) Pulse Ox O2 Delivery O2 Flow Rate FiO2


 


6/13/19 00:00 98.1 99 18 104/72 (83) 99   


 


6/12/19 21:00      Room Air  


 


6/12/19 15:11       3 











Laboratory Tests








Test


  6/12/19


03:50 6/12/19


04:00


 


Urine Color Yellow   


 


Urine Appearance Clear   


 


Urine pH 5 (4.5-8.0)   


 


Urine Specific Gravity


  1.005


(1.005-1.035) 


 


 


Urine Protein


  2+ (NEGATIVE)


H 


 


 


Urine Glucose (UA)


  Negative


(NEGATIVE) 


 


 


Urine Ketones


  1+ (NEGATIVE)


H 


 


 


Urine Blood


  2+ (NEGATIVE)


H 


 


 


Urine Nitrite


  Negative


(NEGATIVE) 


 


 


Urine Bilirubin


  Negative


(NEGATIVE) 


 


 


Urine Urobilinogen


  Normal MG/DL


(0.0-1.0) 


 


 


Urine Leukocyte Esterase


  1+ (NEGATIVE)


H 


 


 


Urine RBC


  5-10 /HPF (0 -


0)  H 


 


 


Urine WBC


  2-4 /HPF (0 -


0) 


 


 


Urine Squamous Epithelial


Cells Few /LPF


(NONE/OCC) 


 


 


Urine Bacteria


  Few /HPF


(NONE) 


 


 


Lactic Acid Level


  


  3.30 mmol/L


(0.4-2.0)  H








General:  well developed, well nourished


Head:  normocophalic


Neck:  no rigidity


EENT:  benign





Neurologic Exam


Mental Status:  awake, alert, oriented x4, normal cognition, good mathematical 

skills, normal recent memory, normal remote memory, preserved visuospatial 

function


Speech:  normal speech, no dysarthia


Language:  normal language, no aphasia


Cranial Nerve II:  fundus normal, visual fields, no papilledema


Cranial Nerves III, IV, VI:  PERRLA, EOMI, pupils


Cranial Nerve V:  normal facial sensations, temporales function normal, 

masseters function normal, pterygoids function normal


Cranial Nerve VII:  no facial asymmetry, normal facial expressions


Cranial Nerve VIII:  normal hearing, no nystagmus


Cranial Nerve IX:  normal palate elevation, gag response


Cranial Nerve X:  no voice hoarseness


Cranial Nerve XI:  SCM symmetric, trapezii function normal


Cranial Nerve XII:  tongue midline, no tongue atrophy/fasciculations


Motor System:  normal muscle tone, strength 5/5, no involuntary movement, no 

muscle wasting


Sensory:  normal pinprick, normal light touch, normal position sense, normal 

graphesthesia


Coordination:  normal finger to nose bilaterally, normal heel to shin 

bilaterally, negative Romberg test


Deep Tendon Reflexes:  2+ bicep (L), 2+ bicep (R), 2+ tricep (L), 2+ tricep (R)

, 2+ brachioradialis (L), 2+ brachioradialis (R), 2+ knee (L), 2+ knee (R), 2+ 

ankle (L), 2+ ankle (R)


Stance:  normal


Gait:  stable, normal regular, heel + toe gait





Impression/Recommendations


Problems:  


(1) Tachycardia


(2) Appendicitis


(3) Febrile illness, acute


(4) Abdominal pain


(5) Alcohol abuse


(6) Acute metabolic encephalopathy


Status:  doing well, stable


Recommendations


Maintain Normothermia with Tylenol


Abx as per ID





Maintain SBP< 140





Q4 neuro Obs 





PT intense when able. 





Pain management 





Na 135-145





Margo Han N.P. Jun 13, 2019 02:14

## 2019-06-13 NOTE — GI PROGRESS NOTE
Assessment/Plan


Problems:  


(1) Acute metabolic encephalopathy


ICD Codes:  G93.41 - Metabolic encephalopathy


SNOMED:  64079587, 833765720


(2) Abdominal pain


ICD Codes:  R10.9 - Unspecified abdominal pain


SNOMED:  42816861


(3) Appendicitis


ICD Codes:  K37 - Unspecified appendicitis


SNOMED:  86091427


(4) Alcohol abuse


ICD Codes:  F10.10 - Alcohol abuse, uncomplicated


SNOMED:  14056435


Status:  stable


Status Narrative


Discussed with Dr. Lopez.


Assessment/Plan


Status post laparoscopic appendectomy


Follow-up surgical recommendations


Monitor for postoperative nausea vomiting, Zofran as needed


Pain management


PPI


Follow labs


Outpatient GI procedures





The patient was seen and examined at bedside and all new and available data was 

reviewed in the patients chart. I agree with the above findings, impression 

and plan.  (Patient seen earlier today. Signature stamp does not reflect 

patient encounter time.). - Ronen Lopez MD





Subjective


Subjective


abdominal pain improved





Objective





Last 24 Hour Vital Signs








  Date Time  Temp Pulse Resp B/P (MAP) Pulse Ox O2 Delivery O2 Flow Rate FiO2


 


6/13/19 08:00 98.5 96 18 113/78 (90) 94   


 


6/13/19 04:00  85      


 


6/13/19 04:00 97.3 98 18 107/85 (92) 97   


 


6/13/19 00:00 98.1 99 18 104/72 (83) 99   


 


6/13/19 00:00  99      


 


6/12/19 21:00      Room Air  


 


6/12/19 20:00  98      


 


6/12/19 20:00 97.5 98 18 100/67 (78) 95   


 


6/12/19 16:00  103      


 


6/12/19 15:11 98.8 104 21 113/63 95 Nasal Cannula 3 


 


6/12/19 15:00  102 18 103/65 96 Nasal Cannula 3 


 


6/12/19 14:45  103 19 93/61 99 Simple Mask 6 


 


6/12/19 14:35  105 20 93/59 99 Simple Mask 6 


 


6/12/19 14:28  108 20 94/62 98 Simple Mask 6 


 


6/12/19 14:23  109 16 100/61 97 Simple Mask 6 


 


6/12/19 14:21  111 16  97   


 


6/12/19 14:18 98.7 111 16 104/57 97 Simple Mask 6 


 


6/12/19 13:16      Room Air  


 


6/12/19 12:00  113      

















Intake and Output  


 


 6/12/19 6/13/19





 19:00 07:00


 


Intake Total 600 ml 738.75 ml


 


Output Total 15 ml 


 


Balance 585 ml 738.75 ml


 


  


 


Intake Oral  240 ml


 


IV Total 600 ml 498.75 ml


 


Output Estimated Blood Loss 15 ml 


 


# Voids 1 2


 


# Bowel Movements 2 











Laboratory Tests








Test


  6/13/19


05:25


 


White Blood Count


  18.4 K/UL


(4.8-10.8)  #H


 


Red Blood Count


  4.49 M/UL


(4.70-6.10)  L


 


Hemoglobin


  13.5 G/DL


(14.2-18.0)  L


 


Hematocrit


  39.5 %


(42.0-52.0)  L


 


Mean Corpuscular Volume 88 FL (80-99)  


 


Mean Corpuscular Hemoglobin


  30.0 PG


(27.0-31.0)


 


Mean Corpuscular Hemoglobin


Concent 34.1 G/DL


(32.0-36.0)


 


Red Cell Distribution Width


  12.7 %


(11.6-14.8)


 


Platelet Count


  121 K/UL


(150-450)  L


 


Mean Platelet Volume


  9.8 FL


(6.5-10.1)


 


Neutrophils (%) (Auto)


  % (45.0-75.0)


 


 


Lymphocytes (%) (Auto)


  % (20.0-45.0)


 


 


Monocytes (%) (Auto)  % (1.0-10.0)  


 


Eosinophils (%) (Auto)  % (0.0-3.0)  


 


Basophils (%) (Auto)  % (0.0-2.0)  


 


Differential Total Cells


Counted 100  


 


 


Neutrophils % (Manual) 88 % (45-75)  H


 


Lymphocytes % (Manual) 6 % (20-45)  L


 


Monocytes % (Manual) 4 % (1-10)  


 


Eosinophils % (Manual) 0 % (0-3)  


 


Basophils % (Manual) 0 % (0-2)  


 


Band Neutrophils 2 % (0-8)  


 


Platelet Estimate Decreased  L


 


Platelet Morphology Normal  


 


Red Blood Cell Morphology Normal  


 


Sodium Level


  136 MMOL/L


(136-145)


 


Potassium Level


  3.6 MMOL/L


(3.5-5.1)


 


Chloride Level


  100 MMOL/L


()


 


Carbon Dioxide Level


  26 MMOL/L


(21-32)


 


Anion Gap


  10 mmol/L


(5-15)


 


Blood Urea Nitrogen


  20 mg/dL


(7-18)  H


 


Creatinine


  1.1 MG/DL


(0.55-1.30)


 


Estimat Glomerular Filtration


Rate > 60 mL/min


(>60)


 


Glucose Level


  277 MG/DL


()  H


 


Hemoglobin A1c


  11.2 %


(4.3-6.0)  H


 


Calcium Level


  7.8 MG/DL


(8.5-10.1)  L


 


Total Bilirubin


  0.9 MG/DL


(0.2-1.0)


 


Aspartate Amino Transf


(AST/SGOT) 19 U/L (15-37)


 


 


Alanine Aminotransferase


(ALT/SGPT) 27 U/L (12-78)


 


 


Alkaline Phosphatase


  102 U/L


()


 


Total Protein


  6.3 G/DL


(6.4-8.2)  L


 


Albumin


  2.8 G/DL


(3.4-5.0)  L


 


Globulin 3.5 g/dL  


 


Albumin/Globulin Ratio


  0.8 (1.0-2.7)


L


 


Amylase Level


  24 U/L


()  L


 


Lipase


  45 U/L


()  L


 


Vitamin B12 Level


  254 PG/ML


(193-986)


 


Folate


  8.1 NG/ML


(8.6-58.9)  L








Height (Feet):  5


Height (Inches):  7.00


Weight (Pounds):  189


General Appearance:  WD/WN, no apparent distress, alert


Cardiovascular:  normal rate


Respiratory/Chest:  normal breath sounds, no respiratory distress


Abdominal Exam:  normal bowel sounds, non tender, soft


Extremities:  non-tender











Alyssa Porter NP Jun 13, 2019 10:11

## 2019-06-13 NOTE — CONSULTATION
DATE OF CONSULTATION:  06/13/2019

CONSULTING PHYSICIAN:  Lewis Castillo M.D.



HISTORY OF PRESENT ILLNESS:  This is a 63-year-old male with a history of

cardiac issues and appendicitis who has been admitted to the hospital for

medical stabilization.  The patient has a history of alcohol abuse.  The

patient is having low energy, anxiety, and poor memory.



PAST PSYCHIATRIC HISTORY:  Anxiety.



PAST MEDICAL HISTORY:  As above.



ALLERGIES:  No known drug allergies.



SUBSTANCE ABUSE HISTORY:  Significant for alcohol.



MENTAL STATUS EXAMINATION:  The patient is alert and oriented x3.  Mood is

anxious.  Affect is constricted.  Congruent mood.  Thought process is

concrete.  Thought content, no suicidal or homicidal ideation.



ASSESSMENT:

Axis I  Alcohol abuse.

Axis II  Deferred.

Axis III  As above.

Axis IV  Low.

Axis V  50.



PLAN:

1. The patient will be started on Valium p.o. p.r.n. for alcohol

withdrawal.

2. We will continue to follow and readjust the medications.









  ______________________________________________

  Lewis Castillo M.D.





DR:  DANIEL

D:  06/13/2019 00:03

T:  06/13/2019 13:21

JOB#:  [id number]

CC:

## 2019-06-13 NOTE — NUR
NURSE NOTES:

Received report from Stuart ARCHER. Patient in bed asleep but responsive to verbal stimuli. No 
signs of distress noted. Respiration even and non labored on room air. Denied pain. IV lines 
in RAC 18G running with D5 1/2NS @75ml/hr and LAC 18G SL patent and intact. Call light 
within reach. Bed in lowest position and locked.. Will continue to plan of care.

## 2019-06-13 NOTE — PROGRESS NOTE
DATE:  06/13/2019



SUBJECTIVE:  The patient is doing better.  Continues to be anxious and has

some psychomotor agitation.  No suicidal or homicidal ideation.  Low

energy.  Insomnia.



MENTAL STATUS EXAMINATION:  Alert and oriented x3.  Mood is anxious.

Affect is blunted, congruent with mood and appropriate.  Thought process

is linear.  Thought content, no suicidal or homicidal ideations.  Memory

is impaired.  Insight and judgment is fair.



ASSESSMENT:

1. Alcohol dependence.

2. Alcohol withdrawal.



PLAN:

1. The patient will be continued p.r.n. Valium.

2. Thiamine and folate.

3. Provide the patient with reality orientation and supportive

therapy.









  ______________________________________________

  Lewis Castillo M.D.





DR:  ALTHEA

D:  06/13/2019 22:31

T:  06/13/2019 22:58

JOB#:  [id number]

CC:

## 2019-06-13 NOTE — HISTORY AND PHYSICAL REPORT
DATE OF ADMISSION:  06/11/2019

HISTORY OF PRESENT ILLNESS:  The patient is admitted for acute

appendicitis.  The patient is complaining of abdominal pain.  Admitted for

acute appendicitis.  The patient also complained of abdominal pain and

fever of 102.  The patient does have recent alcohol abuse.  The patient

has some wide complex tachycardia on the EKG.



PAST MEDICAL HISTORY:  None.



PAST SURGICAL HISTORY:  None.



SOCIAL HISTORY:  Denies history of smoking.  Denies history of drug abuse.

Does have history of alcohol abuse.



FAMILY HISTORY:  Noncontributory.



REVIEW OF SYSTEMS:  HEENT:  Denies headaches.  RESPIRATORY:  Denies

shortness of breath.  Denies cough.  CARDIOVASCULAR:  Denies chest pain.

GASTROINTESTINAL:  Reports abdominal pain and fever for 1 day.

EXTREMITIES:  Denies pain.  CENTRAL NERVOUS SYSTEM:  No change in vision

or speech pattern.



PHYSICAL EXAMINATION:

VITAL SIGNS:  Temperature 102, pulse 103, and blood pressure 93/61.

HEENT:  PERRLA.

NECK:  Supple.  No lymphadenopathy.

CHEST:  Clear to auscultation.

CARDIOVASCULAR:  Tachycardic.

GASTROINTESTINAL:  Soft.  There is right lower quadrant tenderness.

Abdomen is soft.  No rebound.

EXTREMITIES:  No edema.  Reflexes equal on both sides.  Moves all 4

extremities.  Sensory intact to light touch.



LABORATORY DATA:  WBC of 4, hemoglobin 15.2, platelets 150.  Sodium 134,

potassium 4, BUN of 13, creatinine 1.3, and glucose of 263.



ASSESSMENT AND PLAN:  Acute appendicitis, abdominal pain, elevated sugar.

I have asked Dr. George, Dr. Vazquez, Dr. Lopez, Dr. Castillo, Dr. Arce, and Dr. Lamin Felix to see the patient with altered mental

status and also to rule out DTs and also for his alcohol abuse and also

for his abnormal EKG.  Have been consulted for those reasons.









  ______________________________________________

  Keena Jensen M.D.





DR:  NATIVIDAD

D:  06/12/2019 21:30

T:  06/12/2019 22:46

JOB#:  [id number]

CC:

## 2019-06-13 NOTE — CARDIAC ELECTROPHYSIOLOGY PN
Assessment/Plan


Assessment/Plan





1. Wide complex tachycardia.  This is likely aberrancy in sinus


tachycardia.  Even though the aberrancy is within the right bundle-branch


block morphology; however, currently the patient had left bundle-branch


block morphology.   Troponins are negative.  The patient has


no prior myocardial infarction or coronary artery disease or congestive


heart failure. Echocardiogram Nl EF


2. Acute appendicitis.  S/P Lap Apy by Dr. Vazquez.   





OK to DC from cardiac stand point


DW RN





Subjective


Subjective


No CP or SOB. In SR on tele. Had lap Apy yesterday.





Objective





Last 24 Hour Vital Signs








  Date Time  Temp Pulse Resp B/P (MAP) Pulse Ox O2 Delivery O2 Flow Rate FiO2


 


6/13/19 08:00 98.5 96 18 113/78 (90) 94   


 


6/13/19 04:00  85      


 


6/13/19 04:00 97.3 98 18 107/85 (92) 97   


 


6/13/19 00:00 98.1 99 18 104/72 (83) 99   


 


6/13/19 00:00  99      


 


6/12/19 21:00      Room Air  


 


6/12/19 20:00  98      


 


6/12/19 20:00 97.5 98 18 100/67 (78) 95   


 


6/12/19 16:00  103      


 


6/12/19 15:11 98.8 104 21 113/63 95 Nasal Cannula 3 


 


6/12/19 15:00  102 18 103/65 96 Nasal Cannula 3 


 


6/12/19 14:45  103 19 93/61 99 Simple Mask 6 


 


6/12/19 14:35  105 20 93/59 99 Simple Mask 6 


 


6/12/19 14:28  108 20 94/62 98 Simple Mask 6 


 


6/12/19 14:23  109 16 100/61 97 Simple Mask 6 


 


6/12/19 14:21  111 16  97   


 


6/12/19 14:18 98.7 111 16 104/57 97 Simple Mask 6 


 


6/12/19 13:16      Room Air  


 


6/12/19 12:00  113      

















Intake and Output  


 


 6/12/19 6/13/19





 19:00 07:00


 


Intake Total 600 ml 738.75 ml


 


Output Total 15 ml 


 


Balance 585 ml 738.75 ml


 


  


 


Intake Oral  240 ml


 


IV Total 600 ml 498.75 ml


 


Output Estimated Blood Loss 15 ml 


 


# Voids 1 2


 


# Bowel Movements 2 











Laboratory Tests








Test


  6/13/19


05:25


 


White Blood Count


  18.4 K/UL


(4.8-10.8)  #H


 


Red Blood Count


  4.49 M/UL


(4.70-6.10)  L


 


Hemoglobin


  13.5 G/DL


(14.2-18.0)  L


 


Hematocrit


  39.5 %


(42.0-52.0)  L


 


Mean Corpuscular Volume 88 FL (80-99)  


 


Mean Corpuscular Hemoglobin


  30.0 PG


(27.0-31.0)


 


Mean Corpuscular Hemoglobin


Concent 34.1 G/DL


(32.0-36.0)


 


Red Cell Distribution Width


  12.7 %


(11.6-14.8)


 


Platelet Count


  121 K/UL


(150-450)  L


 


Mean Platelet Volume


  9.8 FL


(6.5-10.1)


 


Neutrophils (%) (Auto)


  % (45.0-75.0)


 


 


Lymphocytes (%) (Auto)


  % (20.0-45.0)


 


 


Monocytes (%) (Auto)  % (1.0-10.0)  


 


Eosinophils (%) (Auto)  % (0.0-3.0)  


 


Basophils (%) (Auto)  % (0.0-2.0)  


 


Differential Total Cells


Counted 100  


 


 


Neutrophils % (Manual) 88 % (45-75)  H


 


Lymphocytes % (Manual) 6 % (20-45)  L


 


Monocytes % (Manual) 4 % (1-10)  


 


Eosinophils % (Manual) 0 % (0-3)  


 


Basophils % (Manual) 0 % (0-2)  


 


Band Neutrophils 2 % (0-8)  


 


Platelet Estimate Decreased  L


 


Platelet Morphology Normal  


 


Red Blood Cell Morphology Normal  


 


Sodium Level


  136 MMOL/L


(136-145)


 


Potassium Level


  3.6 MMOL/L


(3.5-5.1)


 


Chloride Level


  100 MMOL/L


()


 


Carbon Dioxide Level


  26 MMOL/L


(21-32)


 


Anion Gap


  10 mmol/L


(5-15)


 


Blood Urea Nitrogen


  20 mg/dL


(7-18)  H


 


Creatinine


  1.1 MG/DL


(0.55-1.30)


 


Estimat Glomerular Filtration


Rate > 60 mL/min


(>60)


 


Glucose Level


  277 MG/DL


()  H


 


Hemoglobin A1c


  11.2 %


(4.3-6.0)  H


 


Calcium Level


  7.8 MG/DL


(8.5-10.1)  L


 


Total Bilirubin


  0.9 MG/DL


(0.2-1.0)


 


Aspartate Amino Transf


(AST/SGOT) 19 U/L (15-37)


 


 


Alanine Aminotransferase


(ALT/SGPT) 27 U/L (12-78)


 


 


Alkaline Phosphatase


  102 U/L


()


 


Total Protein


  6.3 G/DL


(6.4-8.2)  L


 


Albumin


  2.8 G/DL


(3.4-5.0)  L


 


Globulin 3.5 g/dL  


 


Albumin/Globulin Ratio


  0.8 (1.0-2.7)


L


 


Amylase Level


  24 U/L


()  L


 


Lipase


  45 U/L


()  L


 


Vitamin B12 Level


  254 PG/ML


(193-986)


 


Folate


  8.1 NG/ML


(8.6-58.9)  L








Objective





HEAD AND NECK:  Showed no JVD.


LUNGS:  Clear.


CARDIOVASCULAR:  Tachycardic.  S1, S2 with no gallop.


ABDOMEN:  S/P Lap Apy.


EXTREMITIES:  No pitting edema.











Jake George MD Jun 13, 2019 10:07

## 2019-06-13 NOTE — 48 HOUR POST ANESTHESIA EVAL
Post Anesthesia Evaluation


Procedure:  Lap appy


Date of Evaluation:  Jun 13, 2019


Time of Evaluation:  10:57


Blood Pressure Systolic:  128


0:  65


Pulse Rate:  74


Respiratory Rate:  22


Temperature (Fahrenheit):  97.6


O2 Sat by Pulse Oximetry:  98


Airway:  patent


Nausea:  No


Vomiting:  No


Pain Intensity:  2


Hydration Status:  adequate


Cardiopulmonary Status:


stable


Mental Status/LOC:  patient returned to baseline


Follow-up Care/Observations:


n/a


Post-Anesthesia Complications:


none


Follow-up care needed:  N/A











Jesús Dickinson MD Jun 13, 2019 10:59

## 2019-06-13 NOTE — GENERAL PROGRESS NOTE
Assessment/Plan


Problem List:  


(1) Appendicitis


ICD Codes:  K37 - Unspecified appendicitis


SNOMED:  17467994


(2) Abdominal pain


ICD Codes:  R10.9 - Unspecified abdominal pain


SNOMED:  56065818


(3) Febrile illness, acute


ICD Codes:  R50.9 - Fever, unspecified


SNOMED:  466663213


(4) Tachycardia


ICD Codes:  R00.0 - Tachycardia, unspecified


SNOMED:  9062878


Status:  stable, progressing


Assessment/Plan:


s/p appendectomy


at times has fever


niddm .consulted dr garcía





Subjective


Gastrointestinal/Abdominal:  Reports: abdominal pain


Allergies:  


Coded Allergies:  


     No Known Allergies (Unverified , 6/11/19)





Objective





Last 24 Hour Vital Signs








  Date Time  Temp Pulse Resp B/P (MAP) Pulse Ox O2 Delivery O2 Flow Rate FiO2


 


6/13/19 16:00 98.1 84 20 129/81 (97) 95   


 


6/13/19 16:00  91      


 


6/13/19 12:00 97.9 88 20 135/81 (99) 96   


 


6/13/19 12:00  94      


 


6/13/19 10:59  74 22  98   


 


6/13/19 09:00      Room Air  


 


6/13/19 08:00  97      


 


6/13/19 08:00 98.5 96 18 113/78 (90) 94   


 


6/13/19 04:00  85      


 


6/13/19 04:00 97.3 98 18 107/85 (92) 97   


 


6/13/19 00:00 98.1 99 18 104/72 (83) 99   


 


6/13/19 00:00  99      

















Intake and Output  


 


 6/12/19 6/13/19





 18:59 06:59


 


Intake Total 600 ml 738.75 ml


 


Output Total 15 ml 


 


Balance 585 ml 738.75 ml


 


  


 


Intake Oral  240 ml


 


IV Total 600 ml 498.75 ml


 


Output Estimated Blood Loss 15 ml 


 


# Voids 1 2


 


# Bowel Movements 2 








Laboratory Tests


6/13/19 05:25: 


White Blood Count 18.4#H, Red Blood Count 4.49L, Hemoglobin 13.5L, Hematocrit 

39.5L, Mean Corpuscular Volume 88, Mean Corpuscular Hemoglobin 30.0, Mean 

Corpuscular Hemoglobin Concent 34.1, Red Cell Distribution Width 12.7, Platelet 

Count 121L, Mean Platelet Volume 9.8, Neutrophils (%) (Auto) , Lymphocytes (%) (

Auto) , Monocytes (%) (Auto) , Eosinophils (%) (Auto) , Basophils (%) (Auto) , 

Differential Total Cells Counted 100, Neutrophils % (Manual) 88H, Lymphocytes % 

(Manual) 6L, Monocytes % (Manual) 4, Eosinophils % (Manual) 0, Basophils % (

Manual) 0, Band Neutrophils 2, Platelet Estimate DecreasedL, Platelet 

Morphology Normal, Red Blood Cell Morphology Normal, Sodium Level 136, 

Potassium Level 3.6, Chloride Level 100, Carbon Dioxide Level 26, Anion Gap 10, 

Blood Urea Nitrogen 20H, Creatinine 1.1, Estimat Glomerular Filtration Rate > 60

, Glucose Level 277H, Hemoglobin A1c 11.2H, Calcium Level 7.8L, Total Bilirubin 

0.9, Aspartate Amino Transf (AST/SGOT) 19, Alanine Aminotransferase (ALT/SGPT) 

27, Alkaline Phosphatase 102, Total Protein 6.3L, Albumin 2.8L, Globulin 3.5, 

Albumin/Globulin Ratio 0.8L, Amylase Level 24L, Lipase 45L, Vitamin B12 Level 

254, Folate 8.1L


Height (Feet):  5


Height (Inches):  7.00


Weight (Pounds):  189


Neck:  supple


Cardiovascular:  normal rate


Respiratory/Chest:  lungs clear











Keena Jensen MD Jun 13, 2019 22:05

## 2019-06-14 VITALS — DIASTOLIC BLOOD PRESSURE: 91 MMHG | SYSTOLIC BLOOD PRESSURE: 141 MMHG

## 2019-06-14 VITALS — SYSTOLIC BLOOD PRESSURE: 132 MMHG | DIASTOLIC BLOOD PRESSURE: 87 MMHG

## 2019-06-14 VITALS — DIASTOLIC BLOOD PRESSURE: 88 MMHG | SYSTOLIC BLOOD PRESSURE: 134 MMHG

## 2019-06-14 VITALS — DIASTOLIC BLOOD PRESSURE: 96 MMHG | SYSTOLIC BLOOD PRESSURE: 145 MMHG

## 2019-06-14 VITALS — SYSTOLIC BLOOD PRESSURE: 134 MMHG | DIASTOLIC BLOOD PRESSURE: 87 MMHG

## 2019-06-14 LAB
ADD MANUAL DIFF: NO
ANION GAP SERPL CALC-SCNC: 8 MMOL/L (ref 5–15)
BASOPHILS NFR BLD AUTO: 0.7 % (ref 0–2)
BUN SERPL-MCNC: 16 MG/DL (ref 7–18)
CALCIUM SERPL-MCNC: 8.4 MG/DL (ref 8.5–10.1)
CHLORIDE SERPL-SCNC: 102 MMOL/L (ref 98–107)
CO2 SERPL-SCNC: 26 MMOL/L (ref 21–32)
CREAT SERPL-MCNC: 0.9 MG/DL (ref 0.55–1.3)
EOSINOPHIL NFR BLD AUTO: 3.1 % (ref 0–3)
ERYTHROCYTE [DISTWIDTH] IN BLOOD BY AUTOMATED COUNT: 12.7 % (ref 11.6–14.8)
HCT VFR BLD CALC: 38.9 % (ref 42–52)
HGB BLD-MCNC: 13.1 G/DL (ref 14.2–18)
LYMPHOCYTES NFR BLD AUTO: 11.9 % (ref 20–45)
MCV RBC AUTO: 87 FL (ref 80–99)
MONOCYTES NFR BLD AUTO: 5.8 % (ref 1–10)
NEUTROPHILS NFR BLD AUTO: 78.5 % (ref 45–75)
PLATELET # BLD: 124 K/UL (ref 150–450)
POTASSIUM SERPL-SCNC: 3.6 MMOL/L (ref 3.5–5.1)
RBC # BLD AUTO: 4.45 M/UL (ref 4.7–6.1)
SODIUM SERPL-SCNC: 136 MMOL/L (ref 136–145)
WBC # BLD AUTO: 10.2 K/UL (ref 4.8–10.8)

## 2019-06-14 RX ADMIN — INSULIN ASPART SCH UNITS: 100 INJECTION, SOLUTION INTRAVENOUS; SUBCUTANEOUS at 16:53

## 2019-06-14 RX ADMIN — INSULIN ASPART SCH UNITS: 100 INJECTION, SOLUTION INTRAVENOUS; SUBCUTANEOUS at 16:52

## 2019-06-14 RX ADMIN — DEXTROSE MONOHYDRATE SCH MLS/HR: 50 INJECTION, SOLUTION INTRAVENOUS at 07:02

## 2019-06-14 RX ADMIN — INSULIN ASPART SCH UNITS: 100 INJECTION, SOLUTION INTRAVENOUS; SUBCUTANEOUS at 12:08

## 2019-06-14 RX ADMIN — DOCUSATE SODIUM SCH MG: 100 CAPSULE, LIQUID FILLED ORAL at 08:39

## 2019-06-14 RX ADMIN — INSULIN ASPART SCH UNITS: 100 INJECTION, SOLUTION INTRAVENOUS; SUBCUTANEOUS at 12:09

## 2019-06-14 RX ADMIN — DOCUSATE SODIUM SCH MG: 100 CAPSULE, LIQUID FILLED ORAL at 18:12

## 2019-06-14 NOTE — NUR
NURSE NOTES:

Spoke to dietician about diet education for patient. Dietician will try to speak to patient 
today and give diet teaching.

## 2019-06-14 NOTE — NUR
NURSE NOTES:

Patient is in bed awake and able to verbalize needs. Denies pain or SOB. Stable. Patient 
encouraged to use call light for assistance, verbalized understanding. Patient is in bed in 
locked and lowest position with call light within reach. Will continue to monitor.

## 2019-06-14 NOTE — CARDIAC ELECTROPHYSIOLOGY PN
Assessment/Plan


Assessment/Plan


1. Wide complex tachycardia.  This is likely aberrancy in sinus


tachycardia.  Even though the aberrancy is within the right bundle-branch


block morphology; however, currently the patient had left bundle-branch


block morphology.   Troponins are negative.  The patient has


no prior myocardial infarction or coronary artery disease or congestive


heart failure. Echocardiogram Nl EF


2. Acute appendicitis.  S/P Lap Apy by Dr. Vazquez.   





OK to DC from cardiac stand point


DW RN





Subjective


Subjective


No CP or SOB. Eating solid food.





Objective





Last 24 Hour Vital Signs








  Date Time  Temp Pulse Resp B/P (MAP) Pulse Ox O2 Delivery O2 Flow Rate FiO2


 


6/14/19 09:00      Room Air  


 


6/14/19 08:00 97.8 80 18 141/91 (108) 95   


 


6/14/19 04:00  82      


 


6/14/19 04:00 98.0 80 20 134/87 (103) 98   


 


6/14/19 00:00 99.2 83 20 134/88 (103) 94   


 


6/14/19 00:00  89      


 


6/13/19 21:00      Room Air  


 


6/13/19 20:00  87      


 


6/13/19 20:00 98.7 89 20 136/93 (107) 94   


 


6/13/19 16:00 98.1 84 20 129/81 (97) 95   


 


6/13/19 16:00  91      

















Intake and Output  


 


 6/13/19 6/14/19





 19:00 07:00


 


Intake Total 690 ml 


 


Balance 690 ml 


 


  


 


Intake Oral 280 ml 


 


IV Total 410 ml 


 


# Voids 3 











Laboratory Tests








Test


  6/14/19


05:00


 


White Blood Count


  10.2 K/UL


(4.8-10.8)


 


Red Blood Count


  4.45 M/UL


(4.70-6.10)  L


 


Hemoglobin


  13.1 G/DL


(14.2-18.0)  L


 


Hematocrit


  38.9 %


(42.0-52.0)  L


 


Mean Corpuscular Volume 87 FL (80-99)  


 


Mean Corpuscular Hemoglobin


  29.4 PG


(27.0-31.0)


 


Mean Corpuscular Hemoglobin


Concent 33.7 G/DL


(32.0-36.0)


 


Red Cell Distribution Width


  12.7 %


(11.6-14.8)


 


Platelet Count


  124 K/UL


(150-450)  L


 


Mean Platelet Volume


  8.9 FL


(6.5-10.1)


 


Neutrophils (%) (Auto)


  78.5 %


(45.0-75.0)  H


 


Lymphocytes (%) (Auto)


  11.9 %


(20.0-45.0)  L


 


Monocytes (%) (Auto)


  5.8 %


(1.0-10.0)


 


Eosinophils (%) (Auto)


  3.1 %


(0.0-3.0)  H


 


Basophils (%) (Auto)


  0.7 %


(0.0-2.0)


 


Sodium Level


  136 MMOL/L


(136-145)


 


Potassium Level


  3.6 MMOL/L


(3.5-5.1)


 


Chloride Level


  102 MMOL/L


()


 


Carbon Dioxide Level


  26 MMOL/L


(21-32)


 


Anion Gap


  8 mmol/L


(5-15)


 


Blood Urea Nitrogen


  16 mg/dL


(7-18)


 


Creatinine


  0.9 MG/DL


(0.55-1.30)


 


Estimat Glomerular Filtration


Rate > 60 mL/min


(>60)


 


Glucose Level


  224 MG/DL


()  H


 


Calcium Level


  8.4 MG/DL


(8.5-10.1)  L











Microbiology








 Date/Time


Source Procedure


Growth Status


 


 


 6/11/19 23:30


Blood Blood Culture - Preliminary


NO GROWTH AFTER 48 HOURS Resulted


 


 6/11/19 23:30


Blood Blood Culture - Preliminary


NO GROWTH AFTER 48 HOURS Resulted








Objective





HEAD AND NECK:  Showed no JVD.


LUNGS:  Clear.


CARDIOVASCULAR:  Tachycardic.  S1, S2 with no gallop.


ABDOMEN:  S/P Lap Apy.


EXTREMITIES:  No pitting edema.











Jake George MD Jun 14, 2019 13:12

## 2019-06-14 NOTE — NUR
NURSE NOTES:

Patient taught how to check blood sugar. Patient able to do return demonstration. Patient 
also taught how to administer insulin. Patient able to administer insulin independently. 
Thorough patient teaching given to patient about diabetes, insulin administration and 
effects, and blood sugar management.

## 2019-06-14 NOTE — CONSULTATION
DATE OF CONSULTATION:  06/14/2019

ENDOCRINOLOGY CONSULTATION



CONSULTING PHYSICIAN:  Nick Mcmahon M.D.



REFERRING PHYSICIAN:  Keena Jensen M.D.



REASON FOR CONSULTATION:  Diabetes management.



HISTORY OF PRESENT ILLNESS:  The patient is a 63-year-old male with history

of diabetes, presented to the hospital with abdominal pain, admitted for

acute appendicitis and high temperature and I was called to manage

diabetes.



PAST MEDICAL HISTORY:  None.



PAST SURGICAL HISTORY:  None.



SOCIAL HISTORY:  No smoking, alcohol, or drug use.



FAMILY HISTORY:  Noncontributory.



REVIEW OF SYSTEMS:  As per HPI.



PHYSICAL EXAMINATION:

VITAL SIGNS:  Blood pressure 134/87, pulse 82, temperature 98 degrees, and

respiratory rate of 20.

HEENT:  Pupils are reactive to light.  Sclerae are anicteric.

NECK:  No JVD.

LUNGS:  Clear.

HEART:  Regular.

ABDOMEN:  Positive bowel sounds.

EXTREMITIES:  No clubbing, cyanosis, or edema.



LABORATORY DATA:  Lactic acid of 4.4.  Sodium 136, potassium 3.6, chloride

100, bicarbonate 26, BUN 20, and creatinine 1.1.  Hemoglobin A1c of

11.2.



DIAGNOSES:

1. Appendicitis.

2. Diabetes, out of control.



PLAN:

1. Start Levemir 15 units daily.

2. Start NovoLog 5 units before each meal.

3. NovoLog sliding scale insulin at bedtime.

4. Further adjustment according to blood glucose values.  We will follow

the patient during hospital stay.



Thank you, Dr. Jensen, for the courtesy of this consultation.









  ______________________________________________

  Nick Mcmahon M.D.





DR:  RN/PL

D:  06/14/2019 06:47

T:  06/14/2019 06:58

JOB#:  [id number]

CC:



HARDEEP

## 2019-06-14 NOTE — CONSULTATION
History of Present Illness


General


Chief Complaint:  Flu Like Symptoms


Referring physician:  Dr. White





Present Illness


Allergies:  


Coded Allergies:  


     No Known Allergies (Unverified , 6/11/19)





Medication History


Scheduled


No Known Medications* (NKM - No Known Medications*), 0 ., (Reported)





Patient History


Healthcare decision maker





Resuscitation status





Advanced Directive on File








Physical Exam





Last 24 Hour Vital Signs








  Date Time  Temp Pulse Resp B/P (MAP) Pulse Ox O2 Delivery O2 Flow Rate FiO2


 


6/14/19 12:00 97.6 76 20 145/96 (112) 96   


 


6/14/19 09:00      Room Air  


 


6/14/19 08:00 97.8 80 18 141/91 (108) 95   


 


6/14/19 04:00  82      


 


6/14/19 04:00 98.0 80 20 134/87 (103) 98   


 


6/14/19 00:00 99.2 83 20 134/88 (103) 94   


 


6/14/19 00:00  89      


 


6/13/19 21:00      Room Air  


 


6/13/19 20:00  87      


 


6/13/19 20:00 98.7 89 20 136/93 (107) 94   


 


6/13/19 16:00 98.1 84 20 129/81 (97) 95   


 


6/13/19 16:00  91      

















Intake and Output  


 


 6/13/19 6/14/19





 19:00 07:00


 


Intake Total 690 ml 


 


Balance 690 ml 


 


  


 


Intake Oral 280 ml 


 


IV Total 410 ml 


 


# Voids 3 











Laboratory Tests








Test


  6/14/19


05:00


 


White Blood Count


  10.2 K/UL


(4.8-10.8)


 


Red Blood Count


  4.45 M/UL


(4.70-6.10)  L


 


Hemoglobin


  13.1 G/DL


(14.2-18.0)  L


 


Hematocrit


  38.9 %


(42.0-52.0)  L


 


Mean Corpuscular Volume 87 FL (80-99)  


 


Mean Corpuscular Hemoglobin


  29.4 PG


(27.0-31.0)


 


Mean Corpuscular Hemoglobin


Concent 33.7 G/DL


(32.0-36.0)


 


Red Cell Distribution Width


  12.7 %


(11.6-14.8)


 


Platelet Count


  124 K/UL


(150-450)  L


 


Mean Platelet Volume


  8.9 FL


(6.5-10.1)


 


Neutrophils (%) (Auto)


  78.5 %


(45.0-75.0)  H


 


Lymphocytes (%) (Auto)


  11.9 %


(20.0-45.0)  L


 


Monocytes (%) (Auto)


  5.8 %


(1.0-10.0)


 


Eosinophils (%) (Auto)


  3.1 %


(0.0-3.0)  H


 


Basophils (%) (Auto)


  0.7 %


(0.0-2.0)


 


Sodium Level


  136 MMOL/L


(136-145)


 


Potassium Level


  3.6 MMOL/L


(3.5-5.1)


 


Chloride Level


  102 MMOL/L


()


 


Carbon Dioxide Level


  26 MMOL/L


(21-32)


 


Anion Gap


  8 mmol/L


(5-15)


 


Blood Urea Nitrogen


  16 mg/dL


(7-18)


 


Creatinine


  0.9 MG/DL


(0.55-1.30)


 


Estimat Glomerular Filtration


Rate > 60 mL/min


(>60)


 


Glucose Level


  224 MG/DL


()  H


 


Calcium Level


  8.4 MG/DL


(8.5-10.1)  L








Height (Feet):  5


Height (Inches):  7.00


Weight (Pounds):  189


Medications





Current Medications








 Medications


  (Trade)  Dose


 Ordered  Sig/Julisa


 Route


 PRN Reason  Start Time


 Stop Time Status Last Admin


Dose Admin


 


 Acetaminophen


  (Tylenol)  650 mg  Q4H  PRN


 ORAL


 Temp > 100.5  6/14/19 07:37


 7/12/19 07:36   


 


 


 Acetaminophen/


 Hydrocodone Bitart


  (Norco 10/325)  1 tab  Q4H  PRN


 ORAL


 Severe Pain (Pain Scale 7-10)  6/14/19 07:37


 6/19/19 07:36   


 


 


 Acetaminophen/


 Hydrocodone Bitart


  (Norco 5/325)  1 tab  Q4H  PRN


 ORAL


 Moderate Pain (Pain Scale 4-6)  6/14/19 07:37


 6/19/19 07:36   


 


 


 Al Hydroxide/Mg


 Hydroxide


  (Mylanta)  15 ml  Q6H  PRN


 ORAL


 DYSPEPSIA  6/14/19 07:38


 7/14/19 07:37   


 


 


 Dextrose


  (Dextrose 50%)  25 ml  Q30M  PRN


 IV


 Hypoglycemia  6/14/19 07:45


 7/14/19 06:44   


 


 


 Dextrose


  (Dextrose 50%)  50 ml  Q30M  PRN


 IV


 Hypoglycemia  6/14/19 07:45


 7/14/19 06:44   


 


 


 Diazepam


  (Valium)  10 mg  Q2H  PRN


 ORAL


 For Anxiety  6/14/19 07:37


 6/21/19 07:36   


 


 


 Diphenhydramine


 HCl


  (Benadryl)  25 mg  Q8H  PRN


 ORAL


 Itching/Pruritis  6/14/19 07:37


 7/14/19 07:36   


 


 


 Docusate Sodium


  (Colace)  100 mg  TWICE A  DAY


 ORAL


   6/14/19 09:00


 7/12/19 17:59  6/14/19 08:39


 


 


 Insulin Aspart


  (NovoLOG)    BEFORE MEALS AND  HS


 SUBQ


   6/14/19 11:30


 7/14/19 11:29  6/14/19 12:09


 


 


 Insulin Aspart


  (NovoLOG)  5 units  NOVOTIAC


 SUBQ


   6/14/19 11:50


 7/14/19 11:49  6/14/19 12:08


 


 


 Insulin Detemir


  (Levemir)  15 units  DAILY


 SUBQ


   6/14/19 09:00


 7/14/19 08:59  6/14/19 08:43


 


 


 Magnesium


 Hydroxide


  (Mom)  30 ml  BIDPRN  PRN


 ORAL


 Constipation  6/14/19 07:38


 7/14/19 07:37   


 


 


 Ondansetron HCl


  (Zofran)  4 mg  Q6H  PRN


 IVP


 Nausea & Vomiting  6/14/19 09:00


 7/12/19 14:59   


 


 


 Sennosides


  (Senokot)  8.6 mg  BIDPRN  PRN


 ORAL


 Constipation  6/14/19 07:38


 7/14/19 07:37   


 











Assessment/Plan


Assessment/Plan:


Hematology Consultation





REQ MD: Keena Jensen


RFC: Thrombocytopenia, anemia, leukocytosis


Source:  Patient


DOS: 6/14/19





HPI


Patient is a 63-year-old male brought in by EMS after increased abdominal pain.

  Patient had been reportedly having pain to the right lower abdomen.  This was 

worse with movement.  Per EMS patient had a prior history of alcohol abuse.  

Patient was noted to be somewhat tachycardic by EMS.  He has been having 

increased nausea . noted to have appendicitis, is now s/o lap choly and is 

stable for dc.





Coded Allergies:  


     No Known Allergies (Unverified , 6/11/19)





Patient History


Past Medical History:  see triage record


Reviewed Nursing Documentation:  PMH: Agreed; PSxH: Agreed





Nursing Documentation-PMH


Past Medical History:  No Stated History





ROS:  negative except mentioned in HPI





PE


Vital Signs





Last 24 Hour Vital Signs








  Date Time  Temp Pulse Resp B/P (MAP) Pulse Ox O2 Delivery O2 Flow Rate FiO2


 


6/14/19 12:00 97.6 76 20 145/96 (112) 96   


 


6/14/19 09:00      Room Air  


 


6/14/19 08:00 97.8 80 18 141/91 (108) 95   


 


6/14/19 04:00  82      


 


6/14/19 04:00 98.0 80 20 134/87 (103) 98   


 


6/14/19 00:00 99.2 83 20 134/88 (103) 94   


 


6/14/19 00:00  89      


 


6/13/19 21:00      Room Air  


 


6/13/19 20:00  87      


 


6/13/19 20:00 98.7 89 20 136/93 (107) 94   


 


6/13/19 16:00 98.1 84 20 129/81 (97) 95   


 


6/13/19 16:00  91      








Physical Exam:


Vitals: reviewed


General Appearance:  NAD


HEENT:  normocephalic, atraumatic


Neck:  non-tender, normal alignment


Respiratory/Chest:  normal breath sounds bilaterally


Cardiovascular/Chest:  normal peripheral pulses, normal rate


Abdomen:  normal bowel sounds, soft, nontender


Extremities:  normal range of motion





Laboratory Tests








Test


  6/14/19


05:00


 


White Blood Count


  10.2 K/UL


(4.8-10.8)


 


Red Blood Count


  4.45 M/UL


(4.70-6.10)  L


 


Hemoglobin


  13.1 G/DL


(14.2-18.0)  L


 


Hematocrit


  38.9 %


(42.0-52.0)  L


 


Mean Corpuscular Volume 87 FL (80-99)  


 


Mean Corpuscular Hemoglobin


  29.4 PG


(27.0-31.0)


 


Mean Corpuscular Hemoglobin


Concent 33.7 G/DL


(32.0-36.0)


 


Red Cell Distribution Width


  12.7 %


(11.6-14.8)


 


Platelet Count


  124 K/UL


(150-450)  L


 


Mean Platelet Volume


  8.9 FL


(6.5-10.1)


 


Neutrophils (%) (Auto)


  78.5 %


(45.0-75.0)  H


 


Lymphocytes (%) (Auto)


  11.9 %


(20.0-45.0)  L


 


Monocytes (%) (Auto)


  5.8 %


(1.0-10.0)


 


Eosinophils (%) (Auto)


  3.1 %


(0.0-3.0)  H


 


Basophils (%) (Auto)


  0.7 %


(0.0-2.0)


 


Sodium Level


  136 MMOL/L


(136-145)


 


Potassium Level


  3.6 MMOL/L


(3.5-5.1)


 


Chloride Level


  102 MMOL/L


()


 


Carbon Dioxide Level


  26 MMOL/L


(21-32)


 


Anion Gap


  8 mmol/L


(5-15)


 


Blood Urea Nitrogen


  16 mg/dL


(7-18)


 


Creatinine


  0.9 MG/DL


(0.55-1.30)


 


Estimat Glomerular Filtration


Rate > 60 mL/min


(>60)


 


Glucose Level


  224 MG/DL


()  H


 


Calcium Level


  8.4 MG/DL


(8.5-10.1)  L











Assessment and Recs:


# Thrombocytopenia at this time range of 100-150k is s/p surgery


--> peripheral smear is reviewed


--> hepatitis and hiv have been reviewed, outpatient labs


--> imaging does now show cirrhosis and hsm


# Leukocytosis is due to underlying infection, s/p surgery


--> no evidence of malignancy, smear has been reviewed


--> hold off on tumor markers, no evidence of malignancy


--> esr and crp if does not resolve


# MF Tachycardia


--> as per cards eval, stable for dc


#  Febrile illness, acute


--> on abx, administered s/p surgery


--> ivf have been given





The timing of this note does not necessarily reflect the time of the patient 

was seen.





GREATLY APPRECIATE CONSULTATION.











Emmett Cook MD Jun 14, 2019 13:42

## 2019-06-14 NOTE — NUR
HAND-OFF: 

Report given to SUZI melton. Pt. transferred to [facility]-. No acute distress noted.

## 2019-06-14 NOTE — NEUROLOGY PROGRESS NOTE
Interim History


Interim History


ROS Limited/Unobtainable:  No


Interim History


This visit was performed on June 14, 2019 with Dr. Arce.





Objective


Physical Exam





Last Vital Signs








  Date Time  Temp Pulse Resp B/P (MAP) Pulse Ox O2 Delivery O2 Flow Rate FiO2


 


6/14/19 16:00 97.5 80 18 132/87 (102) 96   


 


6/14/19 09:00      Room Air  


 


6/12/19 15:11       3 











Laboratory Tests








Test


  6/14/19


05:00


 


White Blood Count


  10.2 K/UL


(4.8-10.8)


 


Red Blood Count


  4.45 M/UL


(4.70-6.10)  L


 


Hemoglobin


  13.1 G/DL


(14.2-18.0)  L


 


Hematocrit


  38.9 %


(42.0-52.0)  L


 


Mean Corpuscular Volume 87 FL (80-99)  


 


Mean Corpuscular Hemoglobin


  29.4 PG


(27.0-31.0)


 


Mean Corpuscular Hemoglobin


Concent 33.7 G/DL


(32.0-36.0)


 


Red Cell Distribution Width


  12.7 %


(11.6-14.8)


 


Platelet Count


  124 K/UL


(150-450)  L


 


Mean Platelet Volume


  8.9 FL


(6.5-10.1)


 


Neutrophils (%) (Auto)


  78.5 %


(45.0-75.0)  H


 


Lymphocytes (%) (Auto)


  11.9 %


(20.0-45.0)  L


 


Monocytes (%) (Auto)


  5.8 %


(1.0-10.0)


 


Eosinophils (%) (Auto)


  3.1 %


(0.0-3.0)  H


 


Basophils (%) (Auto)


  0.7 %


(0.0-2.0)


 


Sodium Level


  136 MMOL/L


(136-145)


 


Potassium Level


  3.6 MMOL/L


(3.5-5.1)


 


Chloride Level


  102 MMOL/L


()


 


Carbon Dioxide Level


  26 MMOL/L


(21-32)


 


Anion Gap


  8 mmol/L


(5-15)


 


Blood Urea Nitrogen


  16 mg/dL


(7-18)


 


Creatinine


  0.9 MG/DL


(0.55-1.30)


 


Estimat Glomerular Filtration


Rate > 60 mL/min


(>60)


 


Glucose Level


  224 MG/DL


()  H


 


Calcium Level


  8.4 MG/DL


(8.5-10.1)  L








General:  well developed, well nourished


Head:  normocophalic


Neck:  no rigidity


EENT:  benign





Neurologic Exam


Mental Status:  awake, alert, oriented x4, normal cognition, good mathematical 

skills, normal recent memory, normal remote memory, preserved visuospatial 

function


Speech:  normal speech, no dysarthia


Language:  normal language, no aphasia


Cranial Nerve II:  fundus normal, visual fields, no papilledema


Cranial Nerves III, IV, VI:  PERRLA, EOMI, pupils


Cranial Nerve V:  normal facial sensations, temporales function normal, 

masseters function normal, pterygoids function normal


Cranial Nerve VII:  no facial asymmetry, normal facial expressions


Cranial Nerve VIII:  normal hearing, no nystagmus


Cranial Nerve IX:  normal palate elevation, gag response


Cranial Nerve X:  no voice hoarseness


Cranial Nerve XI:  SCM symmetric, trapezii function normal


Cranial Nerve XII:  tongue midline, no tongue atrophy/fasciculations


Motor System:  normal muscle tone, strength 5/5, no involuntary movement, no 

muscle wasting


Sensory:  normal pinprick, normal light touch, normal position sense, normal 

graphesthesia


Coordination:  normal finger to nose bilaterally, normal heel to shin 

bilaterally, negative Romberg test


Deep Tendon Reflexes:  2+ bicep (L), 2+ bicep (R), 2+ tricep (L), 2+ tricep (R)

, 2+ brachioradialis (L), 2+ brachioradialis (R), 2+ knee (L), 2+ knee (R), 2+ 

ankle (L), 2+ ankle (R)


Stance:  normal


Gait:  stable, normal regular, heel + toe gait





Impression/Recommendations


Problems:  


(1) Tachycardia


(2) Appendicitis


(3) Febrile illness, acute


(4) Abdominal pain


(5) Alcohol abuse


(6) Acute metabolic encephalopathy


Status:  stable, progressing


Recommendations


Maintain Normothermia with Tylenol


Abx as per ID





Maintain SBP< 140





Q4 neuro Obs 





PT intense when able. 





Pain management 





Na 135-145





Margo Han N.P. Jun 14, 2019 19:29

## 2019-06-14 NOTE — SURGERY PROGRESS NOTE
Surgery Progress Note


Subjective


Procedure Performed


lap appy


Symptoms:  improved, tolerating diet, passing flatus, pain decreased





Objective





Last 24 Hour Vital Signs








  Date Time  Temp Pulse Resp B/P (MAP) Pulse Ox O2 Delivery O2 Flow Rate FiO2


 


6/14/19 09:00      Room Air  


 


6/14/19 08:00 97.8 80 18 141/91 (108) 95   


 


6/14/19 04:00  82      


 


6/14/19 04:00 98.0 80 20 134/87 (103) 98   


 


6/14/19 00:00 99.2 83 20 134/88 (103) 94   


 


6/14/19 00:00  89      


 


6/13/19 21:00      Room Air  


 


6/13/19 20:00  87      


 


6/13/19 20:00 98.7 89 20 136/93 (107) 94   


 


6/13/19 16:00 98.1 84 20 129/81 (97) 95   


 


6/13/19 16:00  91      








I&O











Intake and Output  


 


 6/13/19 6/14/19





 19:00 07:00


 


Intake Total 690 ml 


 


Balance 690 ml 


 


  


 


Intake Oral 280 ml 


 


IV Total 410 ml 


 


# Voids 3 








Dressing:  dry


Wound:  clean


Cardiovascular:  RSR


Respiratory:  clear


Abdomen:  soft, flat, non-tender, present bowel sounds


Extremities:  no tenderness, no cyanosis





Laboratory Tests








Test


  6/14/19


05:00


 


White Blood Count


  10.2 K/UL


(4.8-10.8)


 


Red Blood Count


  4.45 M/UL


(4.70-6.10)  L


 


Hemoglobin


  13.1 G/DL


(14.2-18.0)  L


 


Hematocrit


  38.9 %


(42.0-52.0)  L


 


Mean Corpuscular Volume 87 FL (80-99)  


 


Mean Corpuscular Hemoglobin


  29.4 PG


(27.0-31.0)


 


Mean Corpuscular Hemoglobin


Concent 33.7 G/DL


(32.0-36.0)


 


Red Cell Distribution Width


  12.7 %


(11.6-14.8)


 


Platelet Count


  124 K/UL


(150-450)  L


 


Mean Platelet Volume


  8.9 FL


(6.5-10.1)


 


Neutrophils (%) (Auto)


  78.5 %


(45.0-75.0)  H


 


Lymphocytes (%) (Auto)


  11.9 %


(20.0-45.0)  L


 


Monocytes (%) (Auto)


  5.8 %


(1.0-10.0)


 


Eosinophils (%) (Auto)


  3.1 %


(0.0-3.0)  H


 


Basophils (%) (Auto)


  0.7 %


(0.0-2.0)


 


Sodium Level


  136 MMOL/L


(136-145)


 


Potassium Level


  3.6 MMOL/L


(3.5-5.1)


 


Chloride Level


  102 MMOL/L


()


 


Carbon Dioxide Level


  26 MMOL/L


(21-32)


 


Anion Gap


  8 mmol/L


(5-15)


 


Blood Urea Nitrogen


  16 mg/dL


(7-18)


 


Creatinine


  0.9 MG/DL


(0.55-1.30)


 


Estimat Glomerular Filtration


Rate > 60 mL/min


(>60)


 


Glucose Level


  224 MG/DL


()  H


 


Calcium Level


  8.4 MG/DL


(8.5-10.1)  L











Plan


Problems:  


(1) Appendicitis


Assessment & Plan:  63M acute appendicitis.  CT with acute appy.  exam with 

focal RLQ tenderness.  labs as above





Status post lap appendectomy


Leukocytosis today





discharge





Diet as tolerated


Activity as tolerated


Thank you














Norberto Vazquez Jun 14, 2019 12:21

## 2019-06-14 NOTE — NUR
NURSE NOTES:

Patient encouraged to check blood sugar. Patient is able to check blood sugar independently. 
Patient was also given teaching about insulin and insulin administration. Patient is able to 
administer insulin independently. Patient verbalized understanding of insulin side effects 
and hypoglycemia. Patient verbalized understanding of complications of diabetes. Will 
continue to monitor.

## 2019-06-14 NOTE — NUR
NURSE NOTES:

Pt brought up via hospital bed w/all belongings accounted for and in no apparent distress. 
Pt A&Ox4, VSS, and dressing C/D/I. Will endorse to oncoming nurseSuad.

## 2019-06-14 NOTE — INFECTIOUS DISEASES PROG NOTE
Assessment/Plan


Assessment/Plan


IMPRESSION:


1. Sepsis with fever and tachycardia and lactic acidosis.


2. Acute appendicitis.


3. Hyperglycemia.DM type 2


4. Leukocytosis resolved





RECOMMENDATIONS:  


May discontinue Zosyn





Subjective


ROS Limited/Unobtainable:  No


Constitutional:  Reports: no symptoms


Gastrointestinal/Abdominal:  Reports: no symptoms


Genitourinary:  Reports: no symptoms


Endocrine:  Reports: other - started on Insulin


Allergies:  


Coded Allergies:  


     No Known Allergies (Unverified , 6/11/19)





Objective


Vital Signs





Last 24 Hour Vital Signs








  Date Time  Temp Pulse Resp B/P (MAP) Pulse Ox O2 Delivery O2 Flow Rate FiO2


 


6/14/19 09:00      Room Air  


 


6/14/19 08:00 97.8 80 18 141/91 (108) 95   


 


6/14/19 04:00  82      


 


6/14/19 04:00 98.0 80 20 134/87 (103) 98   


 


6/14/19 00:00 99.2 83 20 134/88 (103) 94   


 


6/14/19 00:00  89      


 


6/13/19 21:00      Room Air  


 


6/13/19 20:00  87      


 


6/13/19 20:00 98.7 89 20 136/93 (107) 94   


 


6/13/19 16:00 98.1 84 20 129/81 (97) 95   


 


6/13/19 16:00  91      


 


6/13/19 12:00 97.9 88 20 135/81 (99) 96   


 


6/13/19 12:00  94      


 


6/13/19 10:59  74 22  98   








Height (Feet):  5


Height (Inches):  7.00


Weight (Pounds):  189


HEENT:  mucous membranes moist


Respiratory/Chest:  lungs clear


Cardiovascular:  normal rate


Abdomen:  soft, non tender


Extremities:  no edema


Neurologic/Psychiatric:  alert, oriented x 3, responsive





Microbiology








 Date/Time


Source Procedure


Growth Status


 


 


 6/11/19 23:30


Blood Blood Culture - Preliminary


NO GROWTH AFTER 48 HOURS Resulted


 


 6/11/19 23:30


Blood Blood Culture - Preliminary


NO GROWTH AFTER 48 HOURS Resulted











Laboratory Tests








Test


  6/14/19


05:00


 


White Blood Count


  10.2 K/UL


(4.8-10.8)


 


Red Blood Count


  4.45 M/UL


(4.70-6.10)  L


 


Hemoglobin


  13.1 G/DL


(14.2-18.0)  L


 


Hematocrit


  38.9 %


(42.0-52.0)  L


 


Mean Corpuscular Volume 87 FL (80-99)  


 


Mean Corpuscular Hemoglobin


  29.4 PG


(27.0-31.0)


 


Mean Corpuscular Hemoglobin


Concent 33.7 G/DL


(32.0-36.0)


 


Red Cell Distribution Width


  12.7 %


(11.6-14.8)


 


Platelet Count


  124 K/UL


(150-450)  L


 


Mean Platelet Volume


  8.9 FL


(6.5-10.1)


 


Neutrophils (%) (Auto)


  78.5 %


(45.0-75.0)  H


 


Lymphocytes (%) (Auto)


  11.9 %


(20.0-45.0)  L


 


Monocytes (%) (Auto)


  5.8 %


(1.0-10.0)


 


Eosinophils (%) (Auto)


  3.1 %


(0.0-3.0)  H


 


Basophils (%) (Auto)


  0.7 %


(0.0-2.0)


 


Sodium Level


  136 MMOL/L


(136-145)


 


Potassium Level


  3.6 MMOL/L


(3.5-5.1)


 


Chloride Level


  102 MMOL/L


()


 


Carbon Dioxide Level


  26 MMOL/L


(21-32)


 


Anion Gap


  8 mmol/L


(5-15)


 


Blood Urea Nitrogen


  16 mg/dL


(7-18)


 


Creatinine


  0.9 MG/DL


(0.55-1.30)


 


Estimat Glomerular Filtration


Rate > 60 mL/min


(>60)


 


Glucose Level


  224 MG/DL


()  H


 


Calcium Level


  8.4 MG/DL


(8.5-10.1)  L











Current Medications








 Medications


  (Trade)  Dose


 Ordered  Sig/Julisa


 Route


 PRN Reason  Start Time


 Stop Time Status Last Admin


Dose Admin


 


 Acetaminophen


  (Tylenol)  650 mg  Q4H  PRN


 ORAL


 Temp > 100.5  6/14/19 07:37


 7/12/19 07:36   


 


 


 Acetaminophen/


 Hydrocodone Bitart


  (Norco 10/325)  1 tab  Q4H  PRN


 ORAL


 Severe Pain (Pain Scale 7-10)  6/14/19 07:37


 6/19/19 07:36   


 


 


 Acetaminophen/


 Hydrocodone Bitart


  (Norco 5/325)  1 tab  Q4H  PRN


 ORAL


 Moderate Pain (Pain Scale 4-6)  6/14/19 07:37


 6/19/19 07:36   


 


 


 Al Hydroxide/Mg


 Hydroxide


  (Mylanta)  15 ml  Q6H  PRN


 ORAL


 DYSPEPSIA  6/14/19 07:38


 7/14/19 07:37   


 


 


 Dextrose


  (Dextrose 50%)  25 ml  Q30M  PRN


 IV


 Hypoglycemia  6/14/19 07:45


 7/14/19 06:44   


 


 


 Dextrose


  (Dextrose 50%)  50 ml  Q30M  PRN


 IV


 Hypoglycemia  6/14/19 07:45


 7/14/19 06:44   


 


 


 Diazepam


  (Valium)  10 mg  Q2H  PRN


 ORAL


 For Anxiety  6/14/19 07:37


 6/21/19 07:36   


 


 


 Diphenhydramine


 HCl


  (Benadryl)  25 mg  Q8H  PRN


 ORAL


 Itching/Pruritis  6/14/19 07:37


 7/14/19 07:36   


 


 


 Docusate Sodium


  (Colace)  100 mg  TWICE A  DAY


 ORAL


   6/14/19 09:00


 7/12/19 17:59  6/14/19 08:39


 


 


 Insulin Aspart


  (NovoLOG)    BEFORE MEALS AND  HS


 SUBQ


   6/14/19 11:30


 7/14/19 11:29   


 


 


 Insulin Aspart


  (NovoLOG)  5 units  NOVOTIAC


 SUBQ


   6/14/19 11:50


 7/14/19 11:49   


 


 


 Insulin Detemir


  (Levemir)  15 units  DAILY


 SUBQ


   6/14/19 09:00


 7/14/19 08:59  6/14/19 08:43


 


 


 Magnesium


 Hydroxide


  (Mom)  30 ml  BIDPRN  PRN


 ORAL


 Constipation  6/14/19 07:38


 7/14/19 07:37   


 


 


 Ondansetron HCl


  (Zofran)  4 mg  Q6H  PRN


 IVP


 Nausea & Vomiting  6/14/19 09:00


 7/12/19 14:59   


 


 


 Piperacillin Sod/


 Tazobactam Sod


 3.375 gm/Sodium


 Chloride  110 ml @ 


 27.5 mls/hr  EVERY 8  HOURS


 IVPB


   6/14/19 14:00


 6/19/19 13:59   


 


 


 Sennosides


  (Senokot)  8.6 mg  BIDPRN  PRN


 ORAL


 Constipation  6/14/19 07:38


 7/14/19 07:37   


 

















Lamin Felix MD Jun 14, 2019 10:45

## 2019-06-14 NOTE — NUR
NURSE NOTES:

Patient discharged home as ordered. Stable. Denies pain or SOB. Thorough discharge teaching 
given by RN, patient verbalized understanding. Patient has prescription and all belongings. 
No IV access. Patient was instructed to  medications at [facility] on [address]. Skin is clean, 
dry, and intact. Verbalized understanding. Patient assisted downstairs without incident.

## 2019-06-14 NOTE — NUR
CASE MANAGEMENT:REVIEW



6/14/19

SI: SEPSIS. ACUTE APPENDICITIS

POD #2.....S/P LAP APPY

98.0   80  20  134/87  98% ON RA

H/H-13.1/38.9   PLT-124  GLUCOSE+224





IS: IV ZOSYN Q8HRS

SS INSULIN AC+HS

NORCO PO Q4HRS PRN PAIN

**: TELEMETRY STATUS



PLAN:

REGULAR DIET

AMBULATE

## 2019-06-15 NOTE — DISCHARGE SUMMARY
Discharge Summary


Discharge Summary


_


DATE OF ADMISSION: 6/11/2019





DATE OF DISCHARGE: 6/14/2019








DISCHARGED BY: Dr Jensen





REASON FOR ADMISSION: 


63 years old male with  history of alcohol abuse,  presented with abdominal 

pain.  


Pain was located in right lower abdomen and was worse with movement.  


Upon evaluation patient was febrile 102.6, tachycardic heart rate 147.  Blood 

pressure was 98/60.  Pulse oximetry was stable on room air.  


Laboratory work-up revealed WBC 4, hemoglobin 15.6 ,platelet count 150.  INR 

1.0.  


lactic acid 4.4 


Sodium 134.  


BUN 13, creatinine 1.3.  


Glucose 265.  


Total bilirubin 1.2, direct bilirubin 0.3 . AST 41,  ALT 29.


Troponin negative . CK 96 . EKG revealed sinus tachycardia with QT prolongation


Albumin 3.5 .


Serum alcohol level less than 3.  


Chest x-ray demonstrated no acute cardiopulmonary pathology..


CT of the abdomen and pelvis was positive for uncomplicated acute appendicitis.

  


Patient subsequently admitted for further management.


 


CONSULTANTS:


cardiologist Dr. Hart


neurologist Dr. Arce


ID specialist Dr. Marcano


hematologist/oncologist Dr. Cook


endocrinologist Dr. Mcmahon


surgery Dr. Vazquez


psychiatrist 


 


\[facility] [place] [facility]\"" COURSE: 


Patient admitted and  started on the IV fluids .


Patient was kept n.p.o.


Pain management was addressed.


Surgeon seen and evaluated patient.


Patient subsequently undergone  on 6/12 laparoscopic appendectomy.  


Pathology of appendix revealed acute appendicitis with periappendicitis.


 


Cardiologist seen and evaluated patient prior to surgery.  


Telemetry showed wide complex tachycardia.


Per cardiologist, this was likely aberrancy in sinus tachycardia.  Patient had 

intermittent aberrant conduction.  


Troponin was negative.  Patient had no prior history of myocardial infarction, 

coronary artery disease or congestive heart failure.  


Echocardiogram revealed ejection fraction of 55 to 60% with mild left 

ventricular hypertrophy.  No evidence of wall motion abnormality.  


Right ventricular systolic pressure of 18.  





Infectious disease specialist followed.  


Patient started on Zosyn.  


Leukocytosis and fever resolved.


Antibiotic discontinue prior to discharge.





Course of recovery was uneventful.


Pain management was addressed.


Antiemetic provided as needed.


Diet was slowly introduced and advanced as tolerated.


Patient was able to tolerate diet.


Bowel regimen instituted.  


Patient was ambulated freely.  No difficulty with voiding.





GI specialist recommended outpatient GI procedure.


Patient started on PPI.


Bowel regimen instituted.


Patient was counseled cessation of alcohol.





Endocrinologist followed   for diabetes out-of-control.  


Blood   sugar was managed with long-acting Levemir, pre-meal short acting 

insulin and sliding scale of insulin was implemented as needed.  


Patient was on diabetic diet.  


Hemoglobin A1c 11.2 , clearly not at goal


Patient was counseled on compliance with medication regimen at home.  





Hematologist followed.  


Patient  was found to have low platelets , prior to discharge 124.  


Per  hematologist there were multiply causes for thrombocytopenia, but  for  

this patient it  was most probably due to history of alcohol abuse.





Psychiatrist followed.  


Patient started on thiamine and folate  supplements.  


Patient was provided with reality orientation and supportive therapy.  


Patient was counseled on abstinence from alcohol.





Patient clinically stabilized and was ready for discharge home.








FINAL DIAGNOSES: 


Sepsis  (with fever,  tachycardia,  and lactic acidosis) due to acute 

appendicitis


Acute appendicitis


Status post laparoscopic appendectomy


Wide complex tachycardia


Acute metabolic encephalopathy


Alcohol abuse


Diabetes mellitus out of control, hemoglobin A1c 11.2


Alcohol dependency


Thrombocytopenia


 


DISCHARGE MEDICATIONS:


See Medication Reconciliation list.








DISCHARGE INSTRUCTIONS:


Patient was discharged home .


Follow up with primary care provider in one week.








I have been assigned to dictate discharge summary for this account. 


I was not involved in the patient's management.











Marilyn Cerrato NP Junior 15, 2019 22:16

## 2021-01-02 ENCOUNTER — HOSPITAL ENCOUNTER (INPATIENT)
Dept: HOSPITAL 72 - EMR | Age: 66
LOS: 36 days | DRG: 207 | End: 2021-02-07
Payer: MEDICARE

## 2021-01-02 VITALS — WEIGHT: 198 LBS | HEIGHT: 67 IN | BODY MASS INDEX: 31.08 KG/M2

## 2021-01-02 VITALS — SYSTOLIC BLOOD PRESSURE: 120 MMHG | DIASTOLIC BLOOD PRESSURE: 83 MMHG

## 2021-01-02 VITALS — DIASTOLIC BLOOD PRESSURE: 80 MMHG | SYSTOLIC BLOOD PRESSURE: 115 MMHG

## 2021-01-02 VITALS — SYSTOLIC BLOOD PRESSURE: 136 MMHG | DIASTOLIC BLOOD PRESSURE: 88 MMHG

## 2021-01-02 VITALS — SYSTOLIC BLOOD PRESSURE: 139 MMHG | DIASTOLIC BLOOD PRESSURE: 90 MMHG

## 2021-01-02 VITALS — DIASTOLIC BLOOD PRESSURE: 92 MMHG | SYSTOLIC BLOOD PRESSURE: 147 MMHG

## 2021-01-02 VITALS — DIASTOLIC BLOOD PRESSURE: 81 MMHG | SYSTOLIC BLOOD PRESSURE: 113 MMHG

## 2021-01-02 VITALS — SYSTOLIC BLOOD PRESSURE: 113 MMHG | DIASTOLIC BLOOD PRESSURE: 81 MMHG

## 2021-01-02 VITALS — DIASTOLIC BLOOD PRESSURE: 87 MMHG | SYSTOLIC BLOOD PRESSURE: 135 MMHG

## 2021-01-02 VITALS — DIASTOLIC BLOOD PRESSURE: 80 MMHG | SYSTOLIC BLOOD PRESSURE: 127 MMHG

## 2021-01-02 DIAGNOSIS — T81.82XA: ICD-10-CM

## 2021-01-02 DIAGNOSIS — R19.7: ICD-10-CM

## 2021-01-02 DIAGNOSIS — E86.0: ICD-10-CM

## 2021-01-02 DIAGNOSIS — J12.82: ICD-10-CM

## 2021-01-02 DIAGNOSIS — J96.01: ICD-10-CM

## 2021-01-02 DIAGNOSIS — I10: ICD-10-CM

## 2021-01-02 DIAGNOSIS — D64.9: ICD-10-CM

## 2021-01-02 DIAGNOSIS — Z66: ICD-10-CM

## 2021-01-02 DIAGNOSIS — D68.59: ICD-10-CM

## 2021-01-02 DIAGNOSIS — D69.6: ICD-10-CM

## 2021-01-02 DIAGNOSIS — E11.65: ICD-10-CM

## 2021-01-02 DIAGNOSIS — J95.811: ICD-10-CM

## 2021-01-02 DIAGNOSIS — I48.91: ICD-10-CM

## 2021-01-02 DIAGNOSIS — U07.1: Primary | ICD-10-CM

## 2021-01-02 DIAGNOSIS — N17.9: ICD-10-CM

## 2021-01-02 DIAGNOSIS — E46: ICD-10-CM

## 2021-01-02 DIAGNOSIS — R65.21: ICD-10-CM

## 2021-01-02 DIAGNOSIS — J98.2: ICD-10-CM

## 2021-01-02 DIAGNOSIS — E87.1: ICD-10-CM

## 2021-01-02 DIAGNOSIS — F10.10: ICD-10-CM

## 2021-01-02 DIAGNOSIS — G93.41: ICD-10-CM

## 2021-01-02 DIAGNOSIS — A41.9: ICD-10-CM

## 2021-01-02 LAB
ADD MANUAL DIFF: NO
ALBUMIN SERPL-MCNC: 2.7 G/DL (ref 3.4–5)
ALBUMIN/GLOB SERPL: 0.5 {RATIO} (ref 1–2.7)
ALP SERPL-CCNC: 163 U/L (ref 46–116)
ALT SERPL-CCNC: 25 U/L (ref 12–78)
ANION GAP SERPL CALC-SCNC: 6 MMOL/L (ref 5–15)
APTT BLD: 28 SEC (ref 23–33)
AST SERPL-CCNC: 42 U/L (ref 15–37)
BASOPHILS NFR BLD AUTO: 0.4 % (ref 0–2)
BILIRUB SERPL-MCNC: 0.8 MG/DL (ref 0.2–1)
BUN SERPL-MCNC: 23 MG/DL (ref 7–18)
CALCIUM SERPL-MCNC: 8.7 MG/DL (ref 8.5–10.1)
CHLORIDE SERPL-SCNC: 91 MMOL/L (ref 98–107)
CK SERPL-CCNC: 273 U/L (ref 26–308)
CO2 SERPL-SCNC: 29 MMOL/L (ref 21–32)
CREAT SERPL-MCNC: 1.3 MG/DL (ref 0.55–1.3)
EOSINOPHIL NFR BLD AUTO: 0 % (ref 0–3)
ERYTHROCYTE [DISTWIDTH] IN BLOOD BY AUTOMATED COUNT: 12.5 % (ref 11.6–14.8)
FERRITIN SERPL-MCNC: 633 NG/ML (ref 8–388)
GLOBULIN SER-MCNC: 5.5 G/DL
HCT VFR BLD CALC: 48.1 % (ref 42–52)
HGB BLD-MCNC: 16 G/DL (ref 14.2–18)
INR PPP: 1.1 (ref 0.9–1.1)
LDH SERPL L TO P-CCNC: 402 U/L (ref 81–234)
LYMPHOCYTES NFR BLD AUTO: 14.2 % (ref 20–45)
MCV RBC AUTO: 90 FL (ref 80–99)
MONOCYTES NFR BLD AUTO: 8.7 % (ref 1–10)
NEUTROPHILS NFR BLD AUTO: 76.7 % (ref 45–75)
PHOSPHATE SERPL-MCNC: 2.7 MG/DL (ref 2.5–4.9)
PLATELET # BLD: 115 K/UL (ref 150–450)
POTASSIUM SERPL-SCNC: 4.5 MMOL/L (ref 3.5–5.1)
RBC # BLD AUTO: 5.33 M/UL (ref 4.7–6.1)
SODIUM SERPL-SCNC: 126 MMOL/L (ref 136–145)
WBC # BLD AUTO: 6.1 K/UL (ref 4.8–10.8)

## 2021-01-02 PROCEDURE — 84550 ASSAY OF BLOOD/URIC ACID: CPT

## 2021-01-02 PROCEDURE — 82962 GLUCOSE BLOOD TEST: CPT

## 2021-01-02 PROCEDURE — 96361 HYDRATE IV INFUSION ADD-ON: CPT

## 2021-01-02 PROCEDURE — 87181 SC STD AGAR DILUTION PER AGT: CPT

## 2021-01-02 PROCEDURE — 93970 EXTREMITY STUDY: CPT

## 2021-01-02 PROCEDURE — 87324 CLOSTRIDIUM AG IA: CPT

## 2021-01-02 PROCEDURE — 85610 PROTHROMBIN TIME: CPT

## 2021-01-02 PROCEDURE — 83615 LACTATE (LD) (LDH) ENZYME: CPT

## 2021-01-02 PROCEDURE — 71045 X-RAY EXAM CHEST 1 VIEW: CPT

## 2021-01-02 PROCEDURE — 94003 VENT MGMT INPAT SUBQ DAY: CPT

## 2021-01-02 PROCEDURE — 96367 TX/PROPH/DG ADDL SEQ IV INF: CPT

## 2021-01-02 PROCEDURE — 87086 URINE CULTURE/COLONY COUNT: CPT

## 2021-01-02 PROCEDURE — 84300 ASSAY OF URINE SODIUM: CPT

## 2021-01-02 PROCEDURE — 93005 ELECTROCARDIOGRAM TRACING: CPT

## 2021-01-02 PROCEDURE — 94002 VENT MGMT INPAT INIT DAY: CPT

## 2021-01-02 PROCEDURE — 87070 CULTURE OTHR SPECIMN AEROBIC: CPT

## 2021-01-02 PROCEDURE — 83880 ASSAY OF NATRIURETIC PEPTIDE: CPT

## 2021-01-02 PROCEDURE — 80048 BASIC METABOLIC PNL TOTAL CA: CPT

## 2021-01-02 PROCEDURE — 80202 ASSAY OF VANCOMYCIN: CPT

## 2021-01-02 PROCEDURE — 85379 FIBRIN DEGRADATION QUANT: CPT

## 2021-01-02 PROCEDURE — 76937 US GUIDE VASCULAR ACCESS: CPT

## 2021-01-02 PROCEDURE — 80162 ASSAY OF DIGOXIN TOTAL: CPT

## 2021-01-02 PROCEDURE — 84100 ASSAY OF PHOSPHORUS: CPT

## 2021-01-02 PROCEDURE — 81003 URINALYSIS AUTO W/O SCOPE: CPT

## 2021-01-02 PROCEDURE — 82728 ASSAY OF FERRITIN: CPT

## 2021-01-02 PROCEDURE — 82550 ASSAY OF CK (CPK): CPT

## 2021-01-02 PROCEDURE — 74018 RADEX ABDOMEN 1 VIEW: CPT

## 2021-01-02 PROCEDURE — 80053 COMPREHEN METABOLIC PANEL: CPT

## 2021-01-02 PROCEDURE — 84478 ASSAY OF TRIGLYCERIDES: CPT

## 2021-01-02 PROCEDURE — 83036 HEMOGLOBIN GLYCOSYLATED A1C: CPT

## 2021-01-02 PROCEDURE — 94660 CPAP INITIATION&MGMT: CPT

## 2021-01-02 PROCEDURE — 82977 ASSAY OF GGT: CPT

## 2021-01-02 PROCEDURE — 87081 CULTURE SCREEN ONLY: CPT

## 2021-01-02 PROCEDURE — 93306 TTE W/DOPPLER COMPLETE: CPT

## 2021-01-02 PROCEDURE — 92950 HEART/LUNG RESUSCITATION CPR: CPT

## 2021-01-02 PROCEDURE — 96365 THER/PROPH/DIAG IV INF INIT: CPT

## 2021-01-02 PROCEDURE — 86710 INFLUENZA VIRUS ANTIBODY: CPT

## 2021-01-02 PROCEDURE — 82746 ASSAY OF FOLIC ACID SERUM: CPT

## 2021-01-02 PROCEDURE — 82306 VITAMIN D 25 HYDROXY: CPT

## 2021-01-02 PROCEDURE — 83735 ASSAY OF MAGNESIUM: CPT

## 2021-01-02 PROCEDURE — 87040 BLOOD CULTURE FOR BACTERIA: CPT

## 2021-01-02 PROCEDURE — 85730 THROMBOPLASTIN TIME PARTIAL: CPT

## 2021-01-02 PROCEDURE — 82140 ASSAY OF AMMONIA: CPT

## 2021-01-02 PROCEDURE — 85025 COMPLETE CBC W/AUTO DIFF WBC: CPT

## 2021-01-02 PROCEDURE — 96375 TX/PRO/DX INJ NEW DRUG ADDON: CPT

## 2021-01-02 PROCEDURE — 84443 ASSAY THYROID STIM HORMONE: CPT

## 2021-01-02 PROCEDURE — 85007 BL SMEAR W/DIFF WBC COUNT: CPT

## 2021-01-02 PROCEDURE — 86140 C-REACTIVE PROTEIN: CPT

## 2021-01-02 PROCEDURE — 36415 COLL VENOUS BLD VENIPUNCTURE: CPT

## 2021-01-02 PROCEDURE — 87205 SMEAR GRAM STAIN: CPT

## 2021-01-02 PROCEDURE — 82803 BLOOD GASES ANY COMBINATION: CPT

## 2021-01-02 PROCEDURE — 82607 VITAMIN B-12: CPT

## 2021-01-02 PROCEDURE — 82010 KETONE BODYS QUAN: CPT

## 2021-01-02 PROCEDURE — 83605 ASSAY OF LACTIC ACID: CPT

## 2021-01-02 PROCEDURE — 99291 CRITICAL CARE FIRST HOUR: CPT

## 2021-01-02 PROCEDURE — 84484 ASSAY OF TROPONIN QUANT: CPT

## 2021-01-02 PROCEDURE — 36569 INSJ PICC 5 YR+ W/O IMAGING: CPT

## 2021-01-02 PROCEDURE — 83690 ASSAY OF LIPASE: CPT

## 2021-01-02 PROCEDURE — 82248 BILIRUBIN DIRECT: CPT

## 2021-01-02 RX ADMIN — INSULIN ASPART SCH UNITS: 100 INJECTION, SOLUTION INTRAVENOUS; SUBCUTANEOUS at 17:56

## 2021-01-02 RX ADMIN — ENOXAPARIN SODIUM SCH MG: 80 INJECTION SUBCUTANEOUS at 21:03

## 2021-01-02 NOTE — NUR
ED Nurse Note:



Report received from KIT ARCHER.

Pt awake and alert on bed. On BIPAP S/T mode, FIO2 100, PS 5, BUR 12

## 2021-01-02 NOTE — NUR
ED Nurse Note:pt. started to desaturate on the bi-pap, was switched to 
non-rebreather, called respiratory therapist ,MD notified

## 2021-01-02 NOTE — CONSULTATION
DATE OF CONSULTATION:  01/02/2021

PULMONARY CONSULTATION



CONSULTING PHYSICIAN:  Sung Lemos MD.



HISTORY OF PRESENT ILLNESS:  This is a 65-year-old male with a history of

no known medical illnesses, who came to the hospital with shortness of

breath.  The patient is living at home and there are several COVID-19

positive persons there.  He has been unwell for the last two days.  The

patient was found to be hypoxic, improved with oxygenation.  At this time,

he is admitted to the ICU.



PAST MEDICAL HISTORY:  None.



ALLERGIES:  None.



REVIEW OF SYSTEMS:  Denies any headaches, hematemesis, melena, or

hematochezia.



PHYSICAL EXAMINATION:

GENERAL:  Reveals a 65-year-old male.  At this time, he is in moderate

distress, unable to speak in clear sentences.

VITAL SIGNS:  Blood pressure is 120/80, heart rate is 94, respirations are

22.  At this time, he is on a BiPAP 15/10, FiO2 100%.

CHEST:  Decreased breath sounds bilaterally.

HEART:  Normal heart sounds.

ABDOMEN:  Soft.

EXTREMITIES:  There is no edema.



LABORATORY DATA:  Most recent lab testing shows normal CBC.  Lactic acid

2.7.  Hemoglobin A1c 10.5.  Sodium 126, glucose 359, ferritin _____,

alkaline phosphatase 160, , CRP 23.  D-dimer is 2.03.  COVID

testing is positive for genetic material.  X-ray chest was obtained, which

shows mild bilateral infiltrates.



IMPRESSION:

1. COVID-19 pneumonia.

2. Diabetes mellitus.

3. Elevated inflammatory markers.

4. High D-dimer.

5. Hyponatremia.

6. Hyperglycemia.

7. Hypoxemia.



DISCUSSION:  Admit to ICU.  We will start Decadron.  Defer consideration of

remdesivir to ID specialist.  We will need IV fluids and diabetes control.

DVT prophylaxis.  We will follow.  We will also order BiPAP and keep

oxygen more than 92%.









  ______________________________________________

  Sung Lemos M.D.





DR:  EVETTE

D:  01/02/2021 17:53

T:  01/02/2021 20:21

JOB#:  [id number]

CC:

## 2021-01-02 NOTE — NUR
ED Nurse Note:



Recieved pt from home BIBA with c/o feeling very weak with body aches, pt in 
home with 2 xcovid positive family members, pt has no medical history, is 
awake, alert and oriented x 4, arrived with NRB mask and o2 sat of 99%, pt 
desats quickly when mask removed, pt denies chest pain or any pain, denies 
fever but feels very warm and has low grade temp, pt immediately gowned and 
palced on cardiac monitoring, pt speaks Angolan only, tanesha-emt at bedside to 
assist with translation, IV line immediately started and labs drawn, pt placed 
in covid isolation until results recieved, will continue to closely monitor.

## 2021-01-02 NOTE — NUR
ED Nurse Note:



Recieved report from SUZI Roberts. Patient awake, AAO x4, O2 sat 94%on 15L via NRB 
mask, will continue monitor

## 2021-01-02 NOTE — DIAGNOSTIC IMAGING REPORT
EXAM:

  XR Chest, 1 View

 

CLINICAL HISTORY:

  COUGH

 

TECHNIQUE:

  Frontal view of the chest.

 

COMPARISON:

Chest radiograph June 11, 2019

 

FINDINGS/IMPRESSION:

Mild airspace opacities at the lung bases, suspicious for infiltrate.  

Overall, mild worsening when compared to June 11, 2019.

 

Follow-up chest radiograph recommended.

 

No pleural effusion or pneumothorax.

 

Cardiomegaly.

## 2021-01-02 NOTE — HISTORY AND PHYSICAL REPORT
DATE OF ADMISSION:  01/02/2021

HISTORY OF PRESENT ILLNESS:  This is a 65-year-old male who came to the

emergency room for having abdominal pain, acute metabolic encephalopathy,

was found to have COVID-19 pneumonia virus.  The patient has acute

respiratory distress and failure, was intubated in the ER.  The patient

also has a history of alcohol abuse.  The patient is currently nonverbal,

in isolation, on ventilator in the ICU.  He is also hypotensive.  He is on

dopamine drip.  The patient looks like having a poor prognosis.



PAST MEDICAL HISTORY:  Significant alcohol abuse.



MEDICATIONS:  None.



ALLERGIES:  None.



FAMILY HISTORY:  Noncontributory.



SOCIAL HISTORY:  Lives at home.



PHYSICAL EXAMINATION:

GENERAL:  This is an elderly Greek male, currently on ventilator,

nonverbal, critically sick.  He is currently on BiPAP.

VITAL SIGNS:  His blood pressure is 115/80, pulse 83, respirations 15,

temperature 99.4 to 99.9.

SKIN:  Good skin turgor.

HEENT:  Eyes are closed.

NECK:  Supple.

CHEST:  Bilaterally decreased breath sounds.  Scattered crackles.

CARDIOVASCULAR:  Regular rhythm, tachycardia.

ABDOMEN:  Soft.  Positive bowel sounds.

EXTREMITIES:  No edema.

GENITOURINARY:  Deferred.



LABORATORY DATA:  White count 6.1, hemoglobin 16, hematocrit 48, platelets

are 115.  Chemistry panel, lactic acid 1.90, 2.70.  His ABG, pH 7.48, pCO2

30, pO2 65.  Coagulation, INR 1.1.



IMAGING:  Chest x-ray showing mild air space opacity, lung bases _____

infiltrate, mild worsening.



ASSESSMENT:

1. Acute respiratory failure.

2. COVID pneumonia.

3. History of alcohol abuse.

4. Hypotension.



PLAN:  The patient is admitted in ICU.  He is on BiPAP, continue.  Added

Lovenox.  Continue ceftriaxone, Decadron, Zithromax.  Consider pulmonary

consult and ID consult.









  ______________________________________________

  Ari Travis M.D.





DR:  ALE

D:  01/02/2021 15:57

T:  01/02/2021 17:02

JOB#:  [id number]

CC:

## 2021-01-02 NOTE — NUR
RESPIRATORY NOTES

PT placed on bipap, but bipap appears to be malfunctioning.

Call to other RT made to bring replacement bipap.

Bipap machine switched and is working properly.

SZUI Villatoro aware. Will continue to monitor.

## 2021-01-02 NOTE — NUR
CASE MANAGEMENT: INITIAL REVIEW 



1/2/2021



64 YO M BIBA FROM HOME 



CC: FLU LIKE SYMPTOMS 50% ON RA 



PMHx: no reported past medical history



SI;COVID. HYPOXIC. RESP FAILURE 

T 99  RR 22 B/P 142/83 99% ON 15L/NRB

LABS:   CL 91 BUN 23  AST 42 ALPP 163  CRP 23.1  

ABGs PH. 7488 PCO2 30.4 PO2 65.2 O2 SAT 93.7 



IS: AZITHROMYCIN IV X1

DECADRON IV X1 

NS BOLUS X1 

CEFTRIAXONE IV X1

ACETAMINOPHEN IV X1



***PATIENT ADMITTED TO ICU 1/2/2021 @ 0615****



DCP: HOME 



PLAN OF CARE: 

URINE CX

## 2021-01-02 NOTE — EMERGENCY ROOM REPORT
History of Present Illness


General


Chief Complaint:  Flu Like Symptoms


Source:  Patient





Present Illness


HPI


Disclaimer: Please note that this report is being documented using DRAG[employer] 

technology. This can lead to erroneous entry secondary to incorrect 

interpretation by the dictating instrument.





HPI: 65-year-old male no reported past medical history presents from home due to

shortness of breath.  Patient presented by EMS.  Apparently there are 2 Covid 

positive persons living in the house with the patient.  He states he has been 

sick for the past 2 days.  O2 saturation very low on EMS arrival.  Improved on 

supplemental oxygen.  Patient denies any nausea vomiting.  Does report fever.  

Patient has not been tested for Covid.


Allergies:  


Coded Allergies:  


     No Known Allergies (Unverified , 6/11/19)





COVID-19 Screening


Contact w/high risk pt:  Yes


Experienced COVID-19 symptoms?:  Yes


COVID-19 Testing performed PTA:  No


COVID-19 Screening:  [facility]I COVID-19





Patient History


Reviewed Nursing Documentation:  PMH: Agreed; PSxH: Agreed





Nursing Documentation-PMH


Hx Cardiac Problems:  No


Hx Cancer:  No


Hx Gastrointestinal Problems:  No


Hx Neurological Problems:  No





Review of Systems


All Other Systems:  negative except mentioned in HPI





Physical Exam





Vital Signs








  Date Time  Temp Pulse Resp B/P (MAP) Pulse Ox O2 Delivery O2 Flow Rate FiO2


 


1/2/21 05:40 99.0 120 22 142/83 (102) 95 Room Air  


 


1/2/21 06:15       15.0 








Sp02 EP Interpretation:  reviewed, abnormal


General Appearance:  GCS 15, moderate distress


Head:  normocephalic, atraumatic


Eyes:  bilateral eye PERRL, bilateral eye EOMI


ENT:  hearing grossly normal, moist mucus membranes


Neck:  full range of motion, supple


Respiratory:  no wheezing, respiratory distress - Moderate respiratory distress 

noted patient able to speak in 1-2 word sentences


Cardiovascular #1:  normal peripheral pulses, no murmur, tachycardia


Gastrointestinal:  non tender, soft, non-distended, no guarding


Neurologic:  alert, oriented x3, no focal defects


Skin:  normal color, warm/dry





Procedures


Critical Care Time


Critical Care Time


Critical care time is made on this patient due to presentation with COVID-19, 

hypoxia and respiratory distress requiring my acute intervention.  Critical care

time is 40 minutes and excludes procedures.





Medical Decision Making


Diagnostic Impression:  


   Primary Impression:  


   Pneumonia due to COVID-19 virus


   Additional Impression:  


   hypoxic respiratory failure


ER Course


MDM: Differential included but not limited to COVID-19, pneumonia, influenza, 

CHF to name a few





Clinical course-IV cardiac monitoring pulse oximetry, chest x-ray and septic 

work-up initiated.  Patient symptoms consistent with COVID-19.  COVID-19 testing

was positive.  In the ER he was given IV antibiotics, IV Decadron and placed on 

BiPAP for respiratory support.  Lovenox was ordered due to an elevated D-dimer. 

Patient will require admission the hospital secondary to hypoxic respiratory 

failure.  Will be admitted to the ICU.  Patient accepted by Dr. Travis





Labs - 


Laboratory Tests








Test


 1/2/21


06:00 1/2/21


06:25


 


White Blood Count


 6.1 K/UL


(4.8-10.8) 





 


Red Blood Count


 5.33 M/UL


(4.70-6.10) 





 


Hemoglobin


 16.0 G/DL


(14.2-18.0) 





 


Hematocrit


 48.1 %


(42.0-52.0) 





 


Mean Corpuscular Volume 90 FL (80-99)   


 


Mean Corpuscular Hemoglobin


 30.0 PG


(27.0-31.0) 





 


Mean Corpuscular Hemoglobin


Concent 33.3 G/DL


(32.0-36.0) 





 


Red Cell Distribution Width


 12.5 %


(11.6-14.8) 





 


Platelet Count


 115 K/UL


(150-450)  L 





 


Mean Platelet Volume


 12.1 FL


(6.5-10.1)  H 





 


Neutrophils (%) (Auto)


 76.7 %


(45.0-75.0)  H 





 


Lymphocytes (%) (Auto)


 14.2 %


(20.0-45.0)  L 





 


Monocytes (%) (Auto)


 8.7 %


(1.0-10.0) 





 


Eosinophils (%) (Auto)


 0.0 %


(0.0-3.0) 





 


Basophils (%) (Auto)


 0.4 %


(0.0-2.0) 





 


Prothrombin Time


 12.0 SEC


(9.30-11.50)  H 





 


Prothrombin Time INR 1.1 (0.9-1.1)   


 


Activated Partial


Thromboplast Time 28 SEC (23-33)


 





 


D-Dimer


 2.03 mg/L FEU


(0.00-0.49)  H 





 


Sodium Level


 126 MMOL/L


(136-145)  L 





 


Potassium Level


 4.5 MMOL/L


(3.5-5.1) 





 


Chloride Level


 91 MMOL/L


()  L 





 


Carbon Dioxide Level


 29 MMOL/L


(21-32) 





 


Anion Gap


 6 mmol/L


(5-15) 





 


Blood Urea Nitrogen


 23 mg/dL


(7-18)  H 





 


Creatinine


 1.3 MG/DL


(0.55-1.30) 





 


Estimated Glomerular


Filtration Rate 55.2 mL/min


(>60) 





 


Glucose Level


 359 MG/DL


()  H 





 


Hemoglobin A1c


 10.5 %


(4.3-6.0)  H 





 


Lactic Acid Level


 2.70 mmol/L


(0.4-2.0)  H 





 


Calcium Level


 8.7 MG/DL


(8.5-10.1) 





 


Phosphorus Level


 2.7 MG/DL


(2.5-4.9) 





 


Magnesium Level


 2.3 MG/DL


(1.8-2.4) 





 


Ferritin


 633 NG/ML


(8-388)  H 





 


Total Bilirubin


 0.8 MG/DL


(0.2-1.0) 





 


Aspartate Amino Transferase


(AST) 42 U/L (15-37)


H 





 


Alanine Aminotransferase (ALT)


 25 U/L (12-78)


 





 


Alkaline Phosphatase


 163 U/L


()  H 





 


Lactate Dehydrogenase


 402 U/L


()  H 





 


Total Creatine Kinase


 273 U/L


() 





 


Troponin I


 0.004 ng/mL


(0.000-0.056) 





 


C-Reactive Protein,


Quantitative 23.1 mg/dL


(0.00-0.90)  H 





 


Pro-B-Type Natriuretic Peptide


 180 pg/mL


(0-125)  H 





 


Total Protein


 8.2 G/DL


(6.4-8.2) 





 


Albumin


 2.7 G/DL


(3.4-5.0)  L 





 


Globulin 5.5 g/dL   


 


Albumin/Globulin Ratio


 0.5 (1.0-2.7)


L 





 


Lipase


 103 U/L


() 





 


Beta-Hydroxybutyric Acid Pending   


 


Arterial Blood pH


 


 7.488


(7.350-7.450)


 


Arterial Blood Partial


Pressure CO2 


 30.4 mmHg


(35.0-45.0)  L


 


Arterial Blood Partial


Pressure O2 


 65.2 mmHg


(75.0-100.0)  L


 


Arterial Blood HCO3


 


 22.5 mmol/L


(22.0-26.0)


 


Arterial Blood Oxygen


Saturation 


 93.7 %


()  L


 


Arterial Blood Base Excess  0.3 (-2-2)  


 


Abelardo Test  Positive  








Microbiology








 Date/Time


Source Procedure


Growth Status





 


 1/2/21 06:00


Nasal Nares Left - Final Complete


 


 1/2/21 06:00


Nasal Nares Left - Final Complete


 


 1/2/21 06:00


Nasopharynx SARS-CoV-2 RdRp Gene Assay - Final Complete











On reevaluation: Patient improved on BiPAP








Plan-admission to the ICU


EKG Diagnostic Results


Rate:  tachycardiac


Rhythm:  other - ycardia


Other Impression


PVCs,  Q waves anteriorly, abnormal EKG





Rhythm Strip Diag. Results


EP Interpretation:  yes


Rate:  96


Rhythm:  NSR





Chest X-Ray Diagnostic Results


Chest X-Ray Diagnostic Results :  


   Chest X-Ray Ordered:  Yes


   Indication:  Shortness of Breath


   EP Interpretation:  Yes


   Interpretation:  other - Bilateral infiltrates noted, no pneumothorax or 

cardiomegaly


   Electronically Signed by:  Timi Castro MD





Last Vital Signs








  Date Time  Temp Pulse Resp B/P (MAP) Pulse Ox O2 Delivery O2 Flow Rate FiO2


 


1/2/21 06:15  112 28   Non-Rebreather 15.0 


 


1/2/21 06:15 100.2   147/92 99   








Status:  improved


Disposition:  ADMITTED AS INPATIENT


Condition:  Critical


Referrals:  


NOT CHOSEN IPA/MD,REFERRING (PCP)











Timi Castro M.D.             Jan 2, 2021 06:37

## 2021-01-03 VITALS — SYSTOLIC BLOOD PRESSURE: 118 MMHG | DIASTOLIC BLOOD PRESSURE: 79 MMHG

## 2021-01-03 VITALS — SYSTOLIC BLOOD PRESSURE: 129 MMHG | DIASTOLIC BLOOD PRESSURE: 77 MMHG

## 2021-01-03 VITALS — SYSTOLIC BLOOD PRESSURE: 125 MMHG | DIASTOLIC BLOOD PRESSURE: 82 MMHG

## 2021-01-03 VITALS — SYSTOLIC BLOOD PRESSURE: 118 MMHG | DIASTOLIC BLOOD PRESSURE: 82 MMHG

## 2021-01-03 VITALS — SYSTOLIC BLOOD PRESSURE: 123 MMHG | DIASTOLIC BLOOD PRESSURE: 80 MMHG

## 2021-01-03 VITALS — SYSTOLIC BLOOD PRESSURE: 127 MMHG | DIASTOLIC BLOOD PRESSURE: 97 MMHG

## 2021-01-03 VITALS — DIASTOLIC BLOOD PRESSURE: 90 MMHG | SYSTOLIC BLOOD PRESSURE: 124 MMHG

## 2021-01-03 VITALS — SYSTOLIC BLOOD PRESSURE: 117 MMHG | DIASTOLIC BLOOD PRESSURE: 78 MMHG

## 2021-01-03 VITALS — DIASTOLIC BLOOD PRESSURE: 87 MMHG | SYSTOLIC BLOOD PRESSURE: 128 MMHG

## 2021-01-03 VITALS — SYSTOLIC BLOOD PRESSURE: 137 MMHG | DIASTOLIC BLOOD PRESSURE: 88 MMHG

## 2021-01-03 VITALS — DIASTOLIC BLOOD PRESSURE: 74 MMHG | SYSTOLIC BLOOD PRESSURE: 121 MMHG

## 2021-01-03 VITALS — SYSTOLIC BLOOD PRESSURE: 121 MMHG | DIASTOLIC BLOOD PRESSURE: 76 MMHG

## 2021-01-03 VITALS — SYSTOLIC BLOOD PRESSURE: 114 MMHG | DIASTOLIC BLOOD PRESSURE: 74 MMHG

## 2021-01-03 VITALS — SYSTOLIC BLOOD PRESSURE: 126 MMHG | DIASTOLIC BLOOD PRESSURE: 76 MMHG

## 2021-01-03 VITALS — DIASTOLIC BLOOD PRESSURE: 84 MMHG | SYSTOLIC BLOOD PRESSURE: 126 MMHG

## 2021-01-03 VITALS — DIASTOLIC BLOOD PRESSURE: 82 MMHG | SYSTOLIC BLOOD PRESSURE: 115 MMHG

## 2021-01-03 VITALS — DIASTOLIC BLOOD PRESSURE: 85 MMHG | SYSTOLIC BLOOD PRESSURE: 123 MMHG

## 2021-01-03 VITALS — DIASTOLIC BLOOD PRESSURE: 85 MMHG | SYSTOLIC BLOOD PRESSURE: 120 MMHG

## 2021-01-03 VITALS — DIASTOLIC BLOOD PRESSURE: 78 MMHG | SYSTOLIC BLOOD PRESSURE: 110 MMHG

## 2021-01-03 VITALS — DIASTOLIC BLOOD PRESSURE: 72 MMHG | SYSTOLIC BLOOD PRESSURE: 116 MMHG

## 2021-01-03 VITALS — DIASTOLIC BLOOD PRESSURE: 77 MMHG | SYSTOLIC BLOOD PRESSURE: 117 MMHG

## 2021-01-03 LAB
ADD MANUAL DIFF: NO
ALBUMIN SERPL-MCNC: 2.6 G/DL (ref 3.4–5)
ALBUMIN/GLOB SERPL: 0.5 {RATIO} (ref 1–2.7)
ALP SERPL-CCNC: 146 U/L (ref 46–116)
ALT SERPL-CCNC: 27 U/L (ref 12–78)
ANION GAP SERPL CALC-SCNC: 9 MMOL/L (ref 5–15)
AST SERPL-CCNC: 47 U/L (ref 15–37)
BASOPHILS NFR BLD AUTO: 0.1 % (ref 0–2)
BILIRUB SERPL-MCNC: 0.6 MG/DL (ref 0.2–1)
BUN SERPL-MCNC: 30 MG/DL (ref 7–18)
CALCIUM SERPL-MCNC: 9 MG/DL (ref 8.5–10.1)
CHLORIDE SERPL-SCNC: 96 MMOL/L (ref 98–107)
CO2 SERPL-SCNC: 26 MMOL/L (ref 21–32)
CREAT SERPL-MCNC: 0.9 MG/DL (ref 0.55–1.3)
EOSINOPHIL NFR BLD AUTO: 0 % (ref 0–3)
ERYTHROCYTE [DISTWIDTH] IN BLOOD BY AUTOMATED COUNT: 12.5 % (ref 11.6–14.8)
GLOBULIN SER-MCNC: 5.4 G/DL
HCT VFR BLD CALC: 48.3 % (ref 42–52)
HGB BLD-MCNC: 15.8 G/DL (ref 14.2–18)
LYMPHOCYTES NFR BLD AUTO: 9.7 % (ref 20–45)
MCV RBC AUTO: 92 FL (ref 80–99)
MONOCYTES NFR BLD AUTO: 6.8 % (ref 1–10)
NEUTROPHILS NFR BLD AUTO: 83.4 % (ref 45–75)
PLATELET # BLD: 160 K/UL (ref 150–450)
POTASSIUM SERPL-SCNC: 4.4 MMOL/L (ref 3.5–5.1)
RBC # BLD AUTO: 5.23 M/UL (ref 4.7–6.1)
SODIUM SERPL-SCNC: 130 MMOL/L (ref 136–145)
WBC # BLD AUTO: 8.4 K/UL (ref 4.8–10.8)

## 2021-01-03 RX ADMIN — ENOXAPARIN SODIUM SCH MG: 80 INJECTION SUBCUTANEOUS at 20:49

## 2021-01-03 RX ADMIN — INSULIN ASPART SCH UNITS: 100 INJECTION, SOLUTION INTRAVENOUS; SUBCUTANEOUS at 12:32

## 2021-01-03 RX ADMIN — INSULIN ASPART SCH UNITS: 100 INJECTION, SOLUTION INTRAVENOUS; SUBCUTANEOUS at 06:10

## 2021-01-03 RX ADMIN — INSULIN ASPART SCH UNITS: 100 INJECTION, SOLUTION INTRAVENOUS; SUBCUTANEOUS at 23:29

## 2021-01-03 RX ADMIN — SODIUM CHLORIDE SCH MLS/HR: 0.9 INJECTION INTRAVENOUS at 11:51

## 2021-01-03 RX ADMIN — INSULIN ASPART SCH UNITS: 100 INJECTION, SOLUTION INTRAVENOUS; SUBCUTANEOUS at 00:00

## 2021-01-03 RX ADMIN — INSULIN ASPART SCH UNITS: 100 INJECTION, SOLUTION INTRAVENOUS; SUBCUTANEOUS at 17:51

## 2021-01-03 RX ADMIN — DEXAMETHASONE SODIUM PHOSPHATE SCH MG: 10 INJECTION INTRAMUSCULAR; INTRAVENOUS at 09:00

## 2021-01-03 RX ADMIN — DEXAMETHASONE SODIUM PHOSPHATE SCH MG: 10 INJECTION INTRAMUSCULAR; INTRAVENOUS at 08:31

## 2021-01-03 RX ADMIN — ENOXAPARIN SODIUM SCH MG: 80 INJECTION SUBCUTANEOUS at 08:32

## 2021-01-03 RX ADMIN — AZITHROMYCIN DIHYDRATE SCH MLS/HR: 500 INJECTION, POWDER, LYOPHILIZED, FOR SOLUTION INTRAVENOUS at 14:44

## 2021-01-03 NOTE — NUR
NURSE NOTES:



patient turns in my direction when knocking on the door, he raised his hand and waived upon 
me entering the room, 

he remains on bipap with setting of 10/5 at 100% fio2 with backup rate of 12. 1/2 ns started 
at 100ml/hr through the right forearm peripheral IV, made aware of the purpose or the iv 
fluids and also educated on the antibiotic medication. 

he remains able to use the controller to change the channel on the television and is able to 
use his cellular phone.

## 2021-01-03 NOTE — NUR
NURSE NOTES:



Lg DANIELS of Dr. Thomson updated on the patient respiratory status, 

informed Respiratory therapist will titrate the patient fio2.

## 2021-01-03 NOTE — NUR
NURSE HAND-OFF REPORT: 



Latest Vital Signs: Temperature 97.1 , Pulse 82 , B/P 129 /77 , Respiratory Rate 23 , O2 SAT 
97 , Bi-pap, O2 Flow Rate 15.0 .  

Vital Sign Comment: 



EKG Rhythm: Sinus Rhythm

Rhythm change?: N 

MD Notified?: -

MD Response: 



Latest Singh Fall Score: 45  

Fall Risk: High Risk 

Safety Measures: Call light Within Reach, Bed Alarm Zone 1, Side Rails Side Rails x2, Bed 
position Low and Locked.

Fall Precautions: 

Yellow Socks

Yellow Gown

Door Sign

Patient Fall Education



Report given to SUZI Vu.
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
## 2021-01-03 NOTE — NUR
TRANSFER TO FLOOR:

Patient transferred to 246L as ordered, per Katie.   Report given to SUZI Dickerson. 
All Belongings sent with the pt. RN,ER tech, and RT transferred the pt to the 
floor in stable condition.

## 2021-01-03 NOTE — NUR
ED Nurse Note:

pt is on the bed sleeping and vitals are stable. pt has novolog order but we do 
not have in the pyxis.

## 2021-01-03 NOTE — NUR
NURSE NOTES:

Attempt to wean from BiPAP to nonrebreather. Pt non tolerating and desaturates to 79%.

Continued on BiPAP 100% fiO2.

Will monitor.

## 2021-01-03 NOTE — NUR
-------------------------------------------------------------------------------

          *** Note buck in EDM - 01/03/21 at 0325 by JACEK ***           

-------------------------------------------------------------------------------

ER DISCHARGE NOTE:

Patient is cleared to be discharged per ERMD, pt is aox4, on room air, with 
stable vital signs. pt was given dc and prescription instructions, pt was able 
to verbalize understanding, pt id band and iv site removed without 
complications. pt is able to ambulate with steady gait. pt took all belongings.

## 2021-01-03 NOTE — NUR
NURSE NOTES:

Patient asleep but easily arousable. Able to make needs known. Tolerating BiPAP settings 
well. Denies pain at this time. Accucheck performed and treated per protocol. Will continue 
to monitor.

## 2021-01-03 NOTE — NUR
NURSE NOTES:

Pt noncompliant and continuously tries to remove BiPAP despite all needs met.

Explained and reoriented patient. Nutrition and fluids offered.

## 2021-01-03 NOTE — NUR
NURSE NOTES:



oral care provided at the bedside, patient lips were cracked and dry, 

moisturized and cleaned with water. patient is able to clear throat and has a strong cough 
reflex. 

he is able to reposition himself with minimal assistance in the bed.

## 2021-01-03 NOTE — NUR
NURSE NOTES:

Patient accidently pulled out IV access. Inserted new IV access L AC 18g & L FA 18g, Patient 
is still wearing personal garments at this time. Presented hospital gown for patient but 
patient refusing to remove and put on hospital gown at this time- will respect patients 
wishes. Educated patient plan of care during ICU stay, informed patient the need to remove 
clothing and use gown during hospitalization, patient still refuses gown. Also informed 
patient about not getting out of bed but to use the call light for assistance (bed locked, 
lowest position and alarm engaged). Informed and educated patient about BIPAP usage and its 
benefits. Informed and educated patient overall plan of care, patient reiterated back with 
some reinforcement needed.

## 2021-01-03 NOTE — NUR
NURSE NOTES:



Dr. Travis updated on the patient condition and asked to consult dr. Davis for 
infectious disease. 

no temperature or increase in white blood cells on morning labs.

## 2021-01-03 NOTE — NUR
NURSE NOTES:

Received patient from ER under the care of Dr. Travis for the admitting dx of Covid-19 (+) 
and acute respiratory failure. Noted patient NKA and Full code status. On droplet/airborne 
isolation for Covid-19 (+) status. Patient is alert and oriented x4 verbally responsive and 
able to make needs known with clear speech in Persian. Patient also able to understand some 
English. Tolerating BiPAP settings well and patient noted saturation >98%. No acute distress 
or discomfort noted. Denies pain at this time. Will continue to monitor.

## 2021-01-03 NOTE — PULMONOLOGY PROGRESS NOTE
Subjective


ROS Limited/Unobtainable:  No


Constitutional:  Reports: no symptoms


HEENT:  Repors: no symptoms


Respiratory:  Reports: shortness of breath - better


Cardiovascular:  Reports: no symptoms


Gastrointestinal/Abdominal:  Reports: no symptoms


Allergies:  


Coded Allergies:  


     No Known Allergies (Unverified , 6/11/19)





Objective





Last 24 Hour Vital Signs








  Date Time  Temp Pulse Resp B/P (MAP) Pulse Ox O2 Delivery O2 Flow Rate FiO2


 


1/3/21 13:00  84 17 118/79 (92) 95   


 


1/3/21 12:00      Bi-pap  


 


1/3/21 12:00  84      


 


1/3/21 12:00 97.0 77 19 126/84 (98) 99   


 


1/3/21 12:00        100


 


1/3/21 11:00  77 19 115/82 (93) 95   


 


1/3/21 10:00  82 21 118/82 (94) 96   


 


1/3/21 09:00  81 19 120/85 (97) 98   


 


1/3/21 08:00  77      


 


1/3/21 08:00      Bi-pap  


 


1/3/21 08:00 97.0 80 17 116/72 (87) 99   


 


1/3/21 08:00        100


 


1/3/21 07:00  78 28 128/87 (101) 100   


 


1/3/21 06:00  83 24 127/97 (107) 96   


 


1/3/21 05:00  83 18 117/77 (90) 94   


 


1/3/21 04:00  86 22 137/88 (104) 92   


 


1/3/21 04:00      Bi-pap  


 


1/3/21 04:00  91      


 


1/3/21 04:00        100


 


1/3/21 03:00 97.6 83 20 124/90 (101) 98   


 


1/3/21 02:50  85      


 


1/3/21 02:40 97.2 72 24 126/75 98 Bi-pap  100


 


1/3/21 02:24      Bi-pap  


 


1/3/21 01:43  75 25  100   100


 


1/3/21 00:45 97.8 78 18 125/82 98 Bi-pap  100


 


1/2/21 23:00 98.9 81 22 113/81 99 Bi-pap 15.0 100


 


1/2/21 22:45  82 24  100   100


 


1/2/21 20:05 98.9 81 21 113/81 97 Bi-pap 15.0 100


 


1/2/21 19:00  83 26  99   100


 


1/2/21 17:35 98.9 83 22 120/83 97 Bi-pap 15.0 100

















Intake and Output  


 


 1/2/21 1/3/21





 19:00 07:00


 


Intake Total  400 ml


 


Output Total  500 ml


 


Balance  -100 ml


 


  


 


Intake Oral  400 ml


 


Output Urine Total  500 ml


 


# Voids 3 1








Objective


saturating at 100% on BiPAP today


General Appearance:  no acute distress


HEENT:  atraumatic


Respiratory:  decreased breath sounds


Cardiovascular:  normal rate, regular rhythm


Abdomen:  soft, non tender





Microbiology








 Date/Time


Source Procedure


Growth Status





 


 1/2/21 06:00


Nasal Nares Left - Final Complete


 


 1/2/21 06:00


Nasal Nares Left - Final Complete


 


 1/2/21 06:00


Nasopharynx SARS-CoV-2 RdRp Gene Assay - Final Complete








Laboratory Tests


1/3/21 04:45: 


White Blood Count 8.4, Red Blood Count 5.23, Hemoglobin 15.8, Hematocrit 48.3, 

Mean Corpuscular Volume 92, Mean Corpuscular Hemoglobin 30.3, Mean Corpuscular 

Hemoglobin Concent 32.8, Red Cell Distribution Width 12.5, Platelet Count 160, 

Mean Platelet Volume 12.3H, Neutrophils (%) (Auto) 83.4H, Lymphocytes (%) (Auto)

9.7L, Monocytes (%) (Auto) 6.8, Eosinophils (%) (Auto) 0.0, Basophils (%) (Auto)

0.1, Sodium Level 130L, Potassium Level 4.4, Chloride Level 96L, Carbon Dioxide 

Level 26, Anion Gap 9, Blood Urea Nitrogen 30H, Creatinine 0.9, Estimat 

Glomerular Filtration Rate > 60, Glucose Level 399H, Calcium Level 9.0, Total 

Bilirubin 0.6, Aspartate Amino Transf (AST/SGOT) 47H, Alanine Aminotransferase 

(ALT/SGPT) 27, Alkaline Phosphatase 146H, Total Protein 8.0, Albumin 2.6L, 

Globulin 5.4, Albumin/Globulin Ratio 0.5L


1/3/21 11:25: 


Arterial Blood pH 7.442, Arterial Blood Partial Pressure CO2 40.2, Arterial 

Blood Partial Pressure O2 [Pending], Arterial Blood HCO3 26.8H, Arterial Blood 

Oxygen Saturation 93.6L, Arterial Blood Base Excess 2.6H, Abelardo Test Positive


1/3/21 12:04: POC Whole Blood Glucose 392H





Current Medications








 Medications


  (Trade)  Dose


 Ordered  Sig/Julisa


 Route


 PRN Reason  Start Time


 Stop Time Status Last Admin


Dose Admin


 


 Acetaminophen


  (Tylenol)  650 mg  Q6H  PRN


 ORAL


 Temp >100.5  1/3/21 04:00


 2/2/21 03:59   





 


 Azithromycin 500


 mg/Dextrose  275 ml @ 


 275 mls/hr  Q24HRS


 IV


   1/3/21 14:00


 1/9/21 14:59  1/3/21 14:44





 


 Ceftriaxone


 Sodium 1 gm/


 Dextrose  55 ml @ 


 110 mls/hr  Q24H


 IVPB


   1/3/21 10:00


 1/10/21 09:59  1/3/21 11:51





 


 Dexamethasone


 Sodium Phosphate


  (Decadron 10mg/


 ml Inj)  6 mg  DAILY


 IV


   1/3/21 09:00


 1/12/21 09:01  1/3/21 08:31





 


 Dexamethasone


 Sodium Phosphate


  (Decadron 10mg/


 ml Inj)  6 mg  DAILY


 IV


   1/3/21 09:00


 1/12/21 09:01   





 


 Dextrose


  (Dextrose 50%)  25 ml  Q30M  PRN


 IV


 Hypoglycemia  1/2/21 18:00


 4/2/21 17:59   





 


 Dextrose


  (Dextrose 50%)  50 ml  Q30M  PRN


 IV


 Hypoglycemia  1/2/21 18:00


 4/2/21 17:59   





 


 Enoxaparin Sodium


  (Lovenox)  80 mg  EVERY 12  HOURS


 SUBQ


   1/2/21 21:00


 4/2/21 20:59  1/3/21 08:32





 


 Insulin Aspart


  (NovoLOG)    Q6HR


 SUBQ


   1/2/21 18:00


 4/2/21 17:59  1/3/21 12:32





 


 Sodium Chloride  1,000 ml @ 


 100 mls/hr  Q10H


 IV


   1/3/21 07:45


 2/2/21 07:44  1/3/21 11:51














Assessment/Plan


Assessment/Plan


1. COVID-19 pneumonia.


   - on Decadron, azithromycin, and ceftriaxone


   - Defer consideration of remdesivir to ID specialist.


   - currently saturating at 100% on BiPAP 10/5


2. Diabetes mellitus.


3. Elevated inflammatory markers.


4. High D-dimer.


5. Hyponatremia.


6. Hyperglycemia.


   - BG control


7. Hypoxemia., secondary to #1; improved





DVT prophylaxis.











Lg Miranda                  Yadiel 3, 2021 15:17

## 2021-01-03 NOTE — NUR
NURSE NOTES:



respiratory therapist titrated FIo2 to 90% with saturations holding at %. 

patient is talkative and denies any distress or shortness of breath, he is able to have full 
sentence conversations with no delay or having to stop to catch his breath. he is also able 
to reposition himself in bed to high fowlers with minimal assist. blood sugar taken with 
results of 343, educated the patient he will be getting insulin to cover his elevated blood 
sugars.

## 2021-01-03 NOTE — NUR
NURSE NOTES:

Report received from SUZI Bautista.

Pt A/Ox3 not oriented to time. Filipino speaker. PERRLA.

Observed on BiPAP with settings 10/5 at 100% fiO2 saturating 99%.

5-lead EKG shows SR at 70BPM. Afebrile.

Urinal at bedside. Call light within reach. 

Will continue monitoring.

## 2021-01-03 NOTE — NUR
NURSE NOTES:

Patient refused VRE culture swab. Explained risks and benefits, patient verbalized 
understanding but still refused. Respect patient rights.

## 2021-01-03 NOTE — NUR
NURSE NOTES:



at the bedside with patient providing education about dexamethasone and Lovenox, 

all questions answered and concerns addressed, made aware there is a ABG order to assess the 
oxygenation and the respiratory therapist will be in the room shortly to obtain blood 
sample. she remains on bipap at 10/5 at fio2 of 100% with backup rate of 12, he is currently 
saturations at 96-97% with no use of accessory muscles. he is able to speak in full 
sentences in native language of Hungarian, patient repositioned at lifted up to justyna head of 
the bed, patient is alert and oriented to name,time, place and situation. will continue to 
monitor.

## 2021-01-03 NOTE — NUR
NURSE HAND-OFF REPORT: 



Latest Vital Signs: Temperature 97.6 , Pulse 78 , B/P 128 /87 , Respiratory Rate 28 , O2 SAT 
100 , Bi-pap, O2 Flow Rate 15.0 .  

Vital Sign Comment: WNL



EKG Rhythm: Sinus Rhythm

Rhythm change?: N 

MD Notified?: -

MD Response: 



Latest Singh Fall Score: 60  

Fall Risk: High Risk 

Safety Measures: Call light Within Reach, Bed Alarm Zone 2, Side Rails Side Rails x2, Bed 
position Low and Locked.

Fall Precautions: 

Yellow Socks

Yellow Gown

Door Sign

Patient Fall Education



Report given to SUZI Bautista.

## 2021-01-04 VITALS — DIASTOLIC BLOOD PRESSURE: 94 MMHG | SYSTOLIC BLOOD PRESSURE: 139 MMHG

## 2021-01-04 VITALS — SYSTOLIC BLOOD PRESSURE: 144 MMHG | DIASTOLIC BLOOD PRESSURE: 92 MMHG

## 2021-01-04 VITALS — SYSTOLIC BLOOD PRESSURE: 141 MMHG | DIASTOLIC BLOOD PRESSURE: 88 MMHG

## 2021-01-04 VITALS — DIASTOLIC BLOOD PRESSURE: 79 MMHG | SYSTOLIC BLOOD PRESSURE: 154 MMHG

## 2021-01-04 VITALS — SYSTOLIC BLOOD PRESSURE: 128 MMHG | DIASTOLIC BLOOD PRESSURE: 81 MMHG

## 2021-01-04 VITALS — DIASTOLIC BLOOD PRESSURE: 79 MMHG | SYSTOLIC BLOOD PRESSURE: 120 MMHG

## 2021-01-04 VITALS — DIASTOLIC BLOOD PRESSURE: 79 MMHG | SYSTOLIC BLOOD PRESSURE: 148 MMHG

## 2021-01-04 VITALS — DIASTOLIC BLOOD PRESSURE: 86 MMHG | SYSTOLIC BLOOD PRESSURE: 139 MMHG

## 2021-01-04 VITALS — DIASTOLIC BLOOD PRESSURE: 75 MMHG | SYSTOLIC BLOOD PRESSURE: 116 MMHG

## 2021-01-04 VITALS — DIASTOLIC BLOOD PRESSURE: 107 MMHG | SYSTOLIC BLOOD PRESSURE: 148 MMHG

## 2021-01-04 VITALS — SYSTOLIC BLOOD PRESSURE: 129 MMHG | DIASTOLIC BLOOD PRESSURE: 79 MMHG

## 2021-01-04 VITALS — SYSTOLIC BLOOD PRESSURE: 150 MMHG | DIASTOLIC BLOOD PRESSURE: 74 MMHG

## 2021-01-04 VITALS — SYSTOLIC BLOOD PRESSURE: 143 MMHG | DIASTOLIC BLOOD PRESSURE: 72 MMHG

## 2021-01-04 VITALS — SYSTOLIC BLOOD PRESSURE: 128 MMHG | DIASTOLIC BLOOD PRESSURE: 73 MMHG

## 2021-01-04 VITALS — DIASTOLIC BLOOD PRESSURE: 82 MMHG | SYSTOLIC BLOOD PRESSURE: 150 MMHG

## 2021-01-04 VITALS — DIASTOLIC BLOOD PRESSURE: 76 MMHG | SYSTOLIC BLOOD PRESSURE: 146 MMHG

## 2021-01-04 VITALS — DIASTOLIC BLOOD PRESSURE: 86 MMHG | SYSTOLIC BLOOD PRESSURE: 127 MMHG

## 2021-01-04 VITALS — DIASTOLIC BLOOD PRESSURE: 113 MMHG | SYSTOLIC BLOOD PRESSURE: 146 MMHG

## 2021-01-04 VITALS — SYSTOLIC BLOOD PRESSURE: 128 MMHG | DIASTOLIC BLOOD PRESSURE: 71 MMHG

## 2021-01-04 VITALS — SYSTOLIC BLOOD PRESSURE: 132 MMHG | DIASTOLIC BLOOD PRESSURE: 82 MMHG

## 2021-01-04 VITALS — DIASTOLIC BLOOD PRESSURE: 86 MMHG | SYSTOLIC BLOOD PRESSURE: 126 MMHG

## 2021-01-04 VITALS — DIASTOLIC BLOOD PRESSURE: 78 MMHG | SYSTOLIC BLOOD PRESSURE: 128 MMHG

## 2021-01-04 VITALS — DIASTOLIC BLOOD PRESSURE: 74 MMHG | SYSTOLIC BLOOD PRESSURE: 129 MMHG

## 2021-01-04 VITALS — SYSTOLIC BLOOD PRESSURE: 138 MMHG | DIASTOLIC BLOOD PRESSURE: 86 MMHG

## 2021-01-04 LAB
APPEARANCE UR: CLEAR
APTT PPP: YELLOW S
GLUCOSE UR STRIP-MCNC: (no result) MG/DL
KETONES UR QL STRIP: (no result)
LEUKOCYTE ESTERASE UR QL STRIP: NEGATIVE
NITRITE UR QL STRIP: NEGATIVE
PH UR STRIP: 6 [PH] (ref 4.5–8)
PROT UR QL STRIP: (no result)
SP GR UR STRIP: 1.02 (ref 1–1.03)
UROBILINOGEN UR-MCNC: 8 MG/DL (ref 0–1)

## 2021-01-04 PROCEDURE — XW033E5 INTRODUCTION OF REMDESIVIR ANTI-INFECTIVE INTO PERIPHERAL VEIN, PERCUTANEOUS APPROACH, NEW TECHNOLOGY GROUP 5: ICD-10-PCS

## 2021-01-04 RX ADMIN — LORAZEPAM PRN MG: 1 TABLET ORAL at 22:55

## 2021-01-04 RX ADMIN — DEXAMETHASONE SODIUM PHOSPHATE SCH MG: 10 INJECTION INTRAMUSCULAR; INTRAVENOUS at 08:52

## 2021-01-04 RX ADMIN — INSULIN ASPART SCH UNITS: 100 INJECTION, SOLUTION INTRAVENOUS; SUBCUTANEOUS at 23:01

## 2021-01-04 RX ADMIN — ENOXAPARIN SODIUM SCH MG: 80 INJECTION SUBCUTANEOUS at 08:57

## 2021-01-04 RX ADMIN — SODIUM CHLORIDE SCH MLS/HR: 0.9 INJECTION INTRAVENOUS at 10:03

## 2021-01-04 RX ADMIN — LORAZEPAM PRN MG: 1 TABLET ORAL at 06:07

## 2021-01-04 RX ADMIN — ENOXAPARIN SODIUM SCH MG: 80 INJECTION SUBCUTANEOUS at 20:28

## 2021-01-04 RX ADMIN — INSULIN ASPART SCH UNITS: 100 INJECTION, SOLUTION INTRAVENOUS; SUBCUTANEOUS at 12:50

## 2021-01-04 RX ADMIN — DEXAMETHASONE SODIUM PHOSPHATE SCH MG: 10 INJECTION INTRAMUSCULAR; INTRAVENOUS at 08:54

## 2021-01-04 RX ADMIN — AZITHROMYCIN DIHYDRATE SCH MLS/HR: 500 INJECTION, POWDER, LYOPHILIZED, FOR SOLUTION INTRAVENOUS at 14:10

## 2021-01-04 RX ADMIN — INSULIN ASPART SCH UNITS: 100 INJECTION, SOLUTION INTRAVENOUS; SUBCUTANEOUS at 18:22

## 2021-01-04 RX ADMIN — INSULIN ASPART SCH UNITS: 100 INJECTION, SOLUTION INTRAVENOUS; SUBCUTANEOUS at 05:13

## 2021-01-04 NOTE — NUR
NURSE NOTES:Called RT as patient broke the BIPAP mask. Patient now on non-rebreather, 
awaiting RT to put BIPAP back on.

## 2021-01-04 NOTE — NUR
NURSE HAND-OFF REPORT: 



Latest Vital Signs: Temperature 98.1 , Pulse 85 , B/P 132 /82 , Respiratory Rate 20 , O2 SAT 
100 , Bi-pap, O2 Flow Rate 15.0 .  

Vital Sign Comment: WNL



EKG Rhythm: Sinus Rhythm

Rhythm change?: N 

MD Notified?: -

MD Response: 



Latest Singh Fall Score: 45  

Fall Risk: High Risk 

Safety Measures: Call light Within Reach, Bed Alarm Zone 1, Side Rails Side Rails x3, Bed 
position Low and Locked.

Fall Precautions: 

Yellow Socks

Yellow Gown

Door Sign

Patient Fall Education



Report given to SUZI Gonsalez.

## 2021-01-04 NOTE — DIAGNOSTIC IMAGING REPORT
Indication: Shortness of breath, leg edema

 

Technique: Grayscale and duplex images of the  bilateral lower extremity veins

 

Comparison: None

 

Findings: Bilaterally,   grayscale and duplex images demonstrate no evidence of

intraluminal thrombus. Normal phasic Doppler waveforms, demonstrating normal

augmentation response and no evidence of valvular insufficiency. Greater saphenous

vein(s) and tibial veins are patent. Normal compressibility. 

 

Impression: Negative for evidence of lower extremity deep venous thrombosis 

bilaterally

## 2021-01-04 NOTE — NUR
NURSE NOTES:Handoff received from SUZI Samson. Patient received awake and alert and able 
to make needs known. Patient is on BIPAP with settings 10/5 saturating at 98% tachypneic at 
32 RR, patient denies pain at this time. Patient is also on cardiac monitor with SR. patient 
is placed on isolation precautions for COVID-19 as well as fall and aspiration precautions. 
Patient observed pulling Condom cath off and IV line during the brief moment I took him off 
restraints to offer him some oral hydration. Patient placed back on bilateral soft wrist 
restraints for pulling at devices. Will continue to monitor patient.

## 2021-01-04 NOTE — NUR
NURSE NOTES:Whilst on Lunch patient managed to pull the BIPAP mask off, RT placed patient on 
non-rebreather as they could not find the BIPAP mask. Patient still agitated despite being 
given Haldol.

## 2021-01-04 NOTE — NUR
NURSE NOTES:Daughter called to check up on her father's status. Informed her that he is 
stable but confused and that he keeps removing the BIPAP.

## 2021-01-04 NOTE — NUR
CASE MANAGEMENT:REVIEW



1/4/21

SI: COVID PNA

98.1     81  24  139/86  100% ON BIPAP W/100% FIO2

GLUCOSE+258



IS: IV AZITHROMYCIN Q24

IV ROCEPHIN Q24

IV DECADRON Q24

IVF@100/HR

LOVENOX SQ Q12

**: ICU STATUS

DCP: FROM HOME

## 2021-01-04 NOTE — NUR
NURSE NOTES:Patient placed back on BIPAP, again re-enforced importance of BIPAP remaining on 
to help patient breathe and keep oxygen saturation in normal range. Patient is being closely 
monitored to reinforce and ensure patient compliance.

## 2021-01-04 NOTE — NUR
NURSE NOTES:

pt report received from Wallace ARCHER. pt condition remains unchanged. pt is alert and oriented 
times 3. pt is on BIPAP satting 94% O2. pt HR showing 90, no further compromise noted. pt 
bed is low, locked, armed, call light within reach, bed rails up times 3. will follow plan 
of care.

## 2021-01-04 NOTE — NUR
NURSE HAND-OFF REPORT: 



Latest Vital Signs: Temperature 98.7 , Pulse 87 , B/P 144 /92 , Respiratory Rate 28 , O2 SAT 
95 , Bi-pap, O2 Flow Rate 15.0 .  

Vital Sign Comment: 



EKG Rhythm: Sinus Rhythm

Rhythm change?: N 

MD Notified?: -

MD Response: 



Latest Singh Fall Score: 45  

Fall Risk: High Risk 

Safety Measures: Call light Within Reach, Bed Alarm Zone 1, Side Rails Side Rails x3, Bed 
position Low and Locked.

Fall Precautions: 

Yellow Socks

Yellow Gown

Door Sign

Patient Fall Education



Report given to SUZI Ghosh.

## 2021-01-04 NOTE — NUR
NURSE NOTES:Haldol administered at 1350, E-mar screen timed out and did not save the scanned 
medication.
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
## 2021-01-04 NOTE — PULMONOLOGY PROGRESS NOTE
Subjective


ROS Limited/Unobtainable:  No


Interval Events:  Seen in ICU


Constitutional:  Reports: no symptoms


HEENT:  Repors: no symptoms


Respiratory:  Reports: shortness of breath - better


Cardiovascular:  Reports: no symptoms


Gastrointestinal/Abdominal:  Reports: no symptoms


Allergies:  


Coded Allergies:  


     No Known Allergies (Unverified , 6/11/19)





Objective





Last 24 Hour Vital Signs








  Date Time  Temp Pulse Resp B/P (MAP) Pulse Ox O2 Delivery O2 Flow Rate FiO2


 


1/4/21 17:00  87 29 150/82 (104) 97   


 


1/4/21 16:35  74 25  98   100


 


1/4/21 16:00  82      


 


1/4/21 16:00      Bi-pap  


 


1/4/21 16:00        100


 


1/4/21 16:00  88 23 128/73 (91) 95   


 


1/4/21 15:00 98.7 88 18 116/75 (89) 96   


 


1/4/21 14:00  74 18 146/76 (99) 100   


 


1/4/21 13:00  76 18 129/74 (92) 100   


 


1/4/21 12:00      Bi-pap  


 


1/4/21 12:00        100


 


1/4/21 12:00  97      


 


1/4/21 12:00 98.1 88 22 154/79 (104) 98   


 


1/4/21 11:45  74 25  99   100


 


1/4/21 11:00  88 24 146/113 (124) 100   


 


1/4/21 10:00  88 24 138/86 (103) 100   


 


1/4/21 09:00  85 24 141/88 (105) 100   


 


1/4/21 08:00  88      


 


1/4/21 08:00  81 24 139/86 (103) 100   


 


1/4/21 08:00      Bi-pap  


 


1/4/21 08:00        100


 


1/4/21 07:00  85 20 132/82 (99) 100   


 


1/4/21 06:56  79 28  97   100


 


1/4/21 06:37  85 20 132/82 100   


 


1/4/21 06:07  88 29 128/71 95   


 


1/4/21 06:00  80 29 128/71 (90) 95   


 


1/4/21 05:00  75 16 120/79 (93) 95   


 


1/4/21 04:00      Bi-pap  


 


1/4/21 04:00        100


 


1/4/21 04:00  82      


 


1/4/21 04:00 98.1 74 18 127/86 (100) 100   


 


1/4/21 03:00  98 14 128/78 (95) 92   


 


1/4/21 02:00  80 16 126/86 (99) 92   


 


1/4/21 01:10  76 25  95   100


 


1/4/21 01:00  70 16 129/79 (96) 99   


 


1/4/21 00:00      Bi-pap  


 


1/4/21 00:00  79      


 


1/4/21 00:00 97.9 74 18 128/81 (97) 99   


 


1/3/21 23:00  98 21 121/76 (91) 95   


 


1/3/21 22:00  100 16 121/74 (90) 95   


 


1/3/21 21:00  70 16 126/76 (93) 99   


 


1/3/21 20:00      Bi-pap  


 


1/3/21 20:00 97.6 70 18 121/74 (90) 99   


 


1/3/21 20:00        100


 


1/3/21 20:00  78      


 


1/3/21 19:22  82 23  97   100


 


1/3/21 19:00  80 23 129/77 (94) 96   


 


1/3/21 18:00  81 26 117/78 (91) 98   

















Intake and Output  


 


 1/3/21 1/4/21





 19:00 07:00


 


Intake Total 1835 ml 1253 ml


 


Output Total 400 ml 450 ml


 


Balance 1435 ml 803 ml


 


  


 


Intake Oral 760 ml 333 ml


 


IV Total 1045 ml 920 ml


 


Other 30 ml 


 


Output Urine Total 400 ml 450 ml








Objective


Very agitated


Received Haldol and Ativan


General Appearance:  no acute distress


HEENT:  atraumatic


Respiratory:  decreased breath sounds


Cardiovascular:  normal rate, regular rhythm


Abdomen:  soft, non tender





Microbiology








 Date/Time


Source Procedure


Growth Status





 


 1/2/21 06:10


Blood Blood Culture - Preliminary


NO GROWTH AFTER 24 HOURS Resulted





 1/2/21 06:00


Nasal Nares Left - Final Complete


 


 1/2/21 06:00


Nasal Nares Left - Final Complete


 


 1/2/21 06:00


Nasopharynx SARS-CoV-2 RdRp Gene Assay - Final Complete


 


 1/2/21 06:00


Blood Blood Culture - Preliminary


NO GROWTH AFTER 24 HOURS Resulted








Laboratory Tests


1/3/21 20:30: POC Whole Blood Glucose 328H


1/4/21 03:00: 


Urine Color Yellow, Urine Appearance Clear, Urine pH 6, Urine Specific Gravity 

1.020, Urine Protein 1+H, Urine Glucose (UA) 4+H, Urine Ketones 2+H, Urine Blood

Negative, Urine Nitrite Negative, Urine Bilirubin Negative, Urine Urobilinogen 

8H, Urine Leukocyte Esterase Negative, Urine RBC 0-2H, Urine WBC 0-2, Urine 

Squamous Epithelial Cells Occasional, Urine Bacteria Occasional


1/4/21 05:09: POC Whole Blood Glucose 258H





Current Medications








 Medications


  (Trade)  Dose


 Ordered  Sig/Julisa


 Route


 PRN Reason  Start Time


 Stop Time Status Last Admin


Dose Admin


 


 Acetaminophen


  (Tylenol)  650 mg  Q6H  PRN


 ORAL


 Temp >100.5  1/3/21 04:00


 2/2/21 03:59   





 


 Ceftriaxone


 Sodium 1 gm/


 Dextrose  55 ml @ 


 110 mls/hr  Q24H


 IVPB


   1/3/21 10:00


 1/10/21 09:59  1/4/21 10:03





 


 Dexamethasone


 Sodium Phosphate


  (Decadron 10mg/


 ml Inj)  6 mg  DAILY


 IV


   1/3/21 09:00


 1/12/21 09:01  1/4/21 08:52





 


 Dextrose


  (Dextrose 50%)  25 ml  Q30M  PRN


 IV


 Hypoglycemia  1/2/21 18:00


 4/2/21 17:59   





 


 Dextrose


  (Dextrose 50%)  50 ml  Q30M  PRN


 IV


 Hypoglycemia  1/2/21 18:00


 4/2/21 17:59   





 


 Enoxaparin Sodium


  (Lovenox)  80 mg  EVERY 12  HOURS


 SUBQ


   1/2/21 21:00


 4/2/21 20:59  1/4/21 08:57





 


 Haloperidol


  (Haldol)  5 mg  Q6H  PRN


 ORAL


 Agitation  1/4/21 13:45


 2/18/21 13:44  1/4/21 15:28





 


 Insulin Aspart


  (NovoLOG)    Q6HR


 SUBQ


   1/2/21 18:00


 4/2/21 17:59  1/4/21 12:50





 


 Lorazepam


  (Ativan 2mg/ml


 1ml)  1 mg  ONCE


 IV


   1/4/21 17:45


 1/4/21 19:30   





 


 Lorazepam


  (Ativan)  1 mg  THREE TIMES A DAY  PRN


 ORAL


 For Anxiety  1/4/21 05:45


 1/11/21 05:44  1/4/21 06:07





 


 Quetiapine


 Fumarate


  (SEROqueL)  50 mg  Q12HR


 ORAL


   1/4/21 21:00


 2/18/21 20:59   





 


 Remdesivir 100 mg/


 Sodium Chloride  250 ml @ 


 250 mls/hr  Q24H


 IV


   1/5/21 20:00


 1/8/21 20:59   





 


 Remdesivir 200 mg/


 Sodium Chloride  250 ml @ 


 125 mls/hr  ONCE


 IV


   1/4/21 20:00


 1/4/21 21:59   





 


 Sodium Chloride  1,000 ml @ 


 100 mls/hr  Q10H


 IV


   1/3/21 07:45


 2/2/21 07:44  1/4/21 14:10














Assessment/Plan


Assessment/Plan


1. COVID-19 pneumonia.


   - on Decadron, azithromycin, and ceftriaxone


   - Defer consideration of remdesivir to ID specialist.


   - currently saturating at 100% on BiPAP 10/5; however keeps pulling off mask


2. Diabetes mellitus.


3. Elevated inflammatory markers.


4. High D-dimer.


5. Hyponatremia.


6. Hyperglycemia.


   - BG control


7. Hypoxemia., secondary to #1; improved





DVT prophylaxis.


Added Seroquel


Continue Ativan and Sung Claudio MD            Jan 4, 2021 17:44

## 2021-01-04 NOTE — NUR
NURSE NOTES:

Patient noncompliant and removes mask/IV multiple times during shift. Pt refuses care and is 
combative.

OnBilateral soft wrist restraints for attempting to remove medical devices despite all needs 
met.

Skin intact and pulses palpated.

Will monitor.

## 2021-01-04 NOTE — NUR
NURSE NOTES:Patient in restraints but still manages to constantly remove BIPAP by shuffling 
in the bed. Patient is confused and agitated, informed him that he is in the hospital to 
which patient replied " that's why I am feeling so bad" informed him that he needs to leave 
the BIPAP machine on otherwise he will not get enough oxygen. Patient verbalizes 
understanding but then continues to remove BIPAP. Patient requiring constant supervision to 
maintain compliance and reenforce compliance.

## 2021-01-04 NOTE — NUR
NURSE NOTES:Patient managed to remove the IV line that I replaced this morning. IV line 
replaced in the right hand and again wrapped in kerlix, informed patient that he needs to 
have IV access for his medications, antibiotics and IV fluids.

## 2021-01-04 NOTE — NUR
NURSE NOTES:

Pt continuously tries to remove mask despite all needs met. When asked what he wants and 
needs patient states that, "He would rather die than be on that mask."

No ideations observed. 

Left message for Dr. Jensen with request for PRN.

Will remain close to bedside.

-------------------------------------------------------------------------------

Addendum: 01/04/21 at 0232 by Mirella Diaz RN

-------------------------------------------------------------------------------

NURSE NOTES:

Pt continuously tries to remove mask despite all needs met. When asked what he wants and 
needs patient states that, "He would rather die than be on that mask."

No ideations observed. 

Left message for Dr. Travis with request for PRN.

Will remain close to bedside.

## 2021-01-04 NOTE — NUR
NURSE NOTES:MD rounded on patient, informed him that patient is agitated and has removed his 
IV line twice as well as continuously removes the BIPAP. MD gave order for 5MG Haldol PO Q6 
Hours PRN for agitation.

## 2021-01-04 NOTE — NUR
NURSE NOTES:

pt un compliant with BIPAP. O2 Oximeter needed to be replaced as pt removed it. R hand 22G 
IV removed by pt.

## 2021-01-04 NOTE — NUR
NURSE NOTES:

called DR Travis. left a message stating pt is pulling off BIPAP, Pulling O2 Oximeter and 
pulling IV lines as well ass kicking and spitting at staff. requested ativan IV. awaiting  
call back/ response.

## 2021-01-04 NOTE — CONSULTATION
DATE OF CONSULTATION:  01/04/2021

INFECTIOUS DISEASES CONSULTATION



CONSULTING PHYSICIAN:  Ashley Davis MD



REFERRING PHYSICIAN:  Moi Travis MD



REASON FOR CONSULTATION:  COVID-19 pneumonia.



HISTORY OF PRESENTING ILLNESS:  This is a 65-year-old gentleman with

history of alcohol use, who comes in with respiratory distress and was

found to have COVID-19 pneumonia.  An Infectious Diseases consultation has

been obtained for antibiotics.



PAST MEDICAL HISTORY:  History of alcohol use.



SOCIAL HISTORY:  He has history of alcohol use.



FAMILY HISTORY:  Unknown.



REVIEW OF SYSTEMS:  Unable to obtain currently.



MEDICATIONS:  As an inpatient, he is on haloperidol, lorazepam,

ceftriaxone, azithromycin, dexamethasone, Tylenol, Lovenox, and insulin.



ALLERGIES:  No known drug allergies.



PHYSICAL EXAMINATION:

VITAL SIGNS:  On examination, temperature of 98.7, T-max of 98.7, pulse of

74, respiratory rate 25, blood pressure 128/73, and O2 saturation of 98%

on BiPAP on 100% FiO2.

GENERAL:  Examination deferred due to COVID-19.



LABORATORY DATA:  White count of 8.4, hemoglobin 15.8, hematocrit 48.3, MCV

92, platelet count of 160,000; neutrophils of 83%.  Sodium 130, potassium

4.4, chloride 96, bicarb 26, BUN 30, creatinine 0.9, glucose 399, calcium

is 9.  Total bilirubin 0.6, AST 47, ALT 27, alkaline phosphatase 146,

total protein 8, albumin 2.6.  UA is showing 0-2 white cells.  Blood

cultures are negative.  COVID-19 test on 01/02/2021 is positive.  On

01/02/2021, nasal swab was negative for influenza.  Chest x-ray is showing

mild airspace opacities at the lung bases suspicious for infiltrate,

overall mild worsening.  Ultrasound of legs showed no evidence of DVT.



ASSESSMENT:  This is a 65-year-old gentleman with history of alcohol use,

who comes in and is found to have:



1. COVID-19 pneumonia.  He is on 100% FiO2 on BiPAP with saturation of

98%.

2. New-onset diabetes.



PLAN:

1. Continue dexamethasone day 3.

2. We will start the patient on remdesivir.

3. Continue ceftriaxone.

4. Discontinue azithromycin.

5. We will follow up the patient clinically.

6. Continue isolation.



I would like to thank Dr. Travis for this consultation.









  ______________________________________________

  Ashley Davis M.D.





DR:  Gracie

D:  01/04/2021 17:06

T:  01/04/2021 18:06

JOB#:  [id number]

CC:  Moi Travis MD; Fax#:  [phone]

## 2021-01-05 VITALS — DIASTOLIC BLOOD PRESSURE: 64 MMHG | SYSTOLIC BLOOD PRESSURE: 105 MMHG

## 2021-01-05 VITALS — DIASTOLIC BLOOD PRESSURE: 58 MMHG | SYSTOLIC BLOOD PRESSURE: 87 MMHG

## 2021-01-05 VITALS — DIASTOLIC BLOOD PRESSURE: 137 MMHG | SYSTOLIC BLOOD PRESSURE: 177 MMHG

## 2021-01-05 VITALS — DIASTOLIC BLOOD PRESSURE: 66 MMHG | SYSTOLIC BLOOD PRESSURE: 91 MMHG

## 2021-01-05 VITALS — DIASTOLIC BLOOD PRESSURE: 102 MMHG | SYSTOLIC BLOOD PRESSURE: 178 MMHG

## 2021-01-05 VITALS — DIASTOLIC BLOOD PRESSURE: 69 MMHG | SYSTOLIC BLOOD PRESSURE: 106 MMHG

## 2021-01-05 VITALS — SYSTOLIC BLOOD PRESSURE: 120 MMHG | DIASTOLIC BLOOD PRESSURE: 71 MMHG

## 2021-01-05 VITALS — SYSTOLIC BLOOD PRESSURE: 119 MMHG | DIASTOLIC BLOOD PRESSURE: 65 MMHG

## 2021-01-05 VITALS — DIASTOLIC BLOOD PRESSURE: 79 MMHG | SYSTOLIC BLOOD PRESSURE: 163 MMHG

## 2021-01-05 VITALS — SYSTOLIC BLOOD PRESSURE: 181 MMHG | DIASTOLIC BLOOD PRESSURE: 88 MMHG

## 2021-01-05 VITALS — DIASTOLIC BLOOD PRESSURE: 63 MMHG | SYSTOLIC BLOOD PRESSURE: 112 MMHG

## 2021-01-05 VITALS — DIASTOLIC BLOOD PRESSURE: 72 MMHG | SYSTOLIC BLOOD PRESSURE: 118 MMHG

## 2021-01-05 VITALS — DIASTOLIC BLOOD PRESSURE: 73 MMHG | SYSTOLIC BLOOD PRESSURE: 109 MMHG

## 2021-01-05 VITALS — DIASTOLIC BLOOD PRESSURE: 73 MMHG | SYSTOLIC BLOOD PRESSURE: 120 MMHG

## 2021-01-05 VITALS — SYSTOLIC BLOOD PRESSURE: 118 MMHG | DIASTOLIC BLOOD PRESSURE: 66 MMHG

## 2021-01-05 VITALS — SYSTOLIC BLOOD PRESSURE: 136 MMHG | DIASTOLIC BLOOD PRESSURE: 81 MMHG

## 2021-01-05 VITALS — SYSTOLIC BLOOD PRESSURE: 133 MMHG | DIASTOLIC BLOOD PRESSURE: 100 MMHG

## 2021-01-05 VITALS — SYSTOLIC BLOOD PRESSURE: 155 MMHG | DIASTOLIC BLOOD PRESSURE: 98 MMHG

## 2021-01-05 VITALS — DIASTOLIC BLOOD PRESSURE: 65 MMHG | SYSTOLIC BLOOD PRESSURE: 115 MMHG

## 2021-01-05 VITALS — SYSTOLIC BLOOD PRESSURE: 129 MMHG | DIASTOLIC BLOOD PRESSURE: 63 MMHG

## 2021-01-05 VITALS — DIASTOLIC BLOOD PRESSURE: 75 MMHG | SYSTOLIC BLOOD PRESSURE: 119 MMHG

## 2021-01-05 VITALS — DIASTOLIC BLOOD PRESSURE: 84 MMHG | SYSTOLIC BLOOD PRESSURE: 154 MMHG

## 2021-01-05 VITALS — DIASTOLIC BLOOD PRESSURE: 60 MMHG | SYSTOLIC BLOOD PRESSURE: 102 MMHG

## 2021-01-05 VITALS — DIASTOLIC BLOOD PRESSURE: 89 MMHG | SYSTOLIC BLOOD PRESSURE: 163 MMHG

## 2021-01-05 VITALS — SYSTOLIC BLOOD PRESSURE: 121 MMHG | DIASTOLIC BLOOD PRESSURE: 79 MMHG

## 2021-01-05 VITALS — DIASTOLIC BLOOD PRESSURE: 78 MMHG | SYSTOLIC BLOOD PRESSURE: 152 MMHG

## 2021-01-05 VITALS — SYSTOLIC BLOOD PRESSURE: 109 MMHG | DIASTOLIC BLOOD PRESSURE: 70 MMHG

## 2021-01-05 VITALS — DIASTOLIC BLOOD PRESSURE: 88 MMHG | SYSTOLIC BLOOD PRESSURE: 195 MMHG

## 2021-01-05 VITALS — SYSTOLIC BLOOD PRESSURE: 158 MMHG | DIASTOLIC BLOOD PRESSURE: 131 MMHG

## 2021-01-05 VITALS — SYSTOLIC BLOOD PRESSURE: 99 MMHG | DIASTOLIC BLOOD PRESSURE: 73 MMHG

## 2021-01-05 VITALS — SYSTOLIC BLOOD PRESSURE: 153 MMHG | DIASTOLIC BLOOD PRESSURE: 96 MMHG

## 2021-01-05 LAB
ADD MANUAL DIFF: YES
ALBUMIN SERPL-MCNC: 2.4 G/DL (ref 3.4–5)
ALBUMIN/GLOB SERPL: 0.5 {RATIO} (ref 1–2.7)
ALP SERPL-CCNC: 190 U/L (ref 46–116)
ALT SERPL-CCNC: 40 U/L (ref 12–78)
ANION GAP SERPL CALC-SCNC: 9 MMOL/L (ref 5–15)
AST SERPL-CCNC: 85 U/L (ref 15–37)
BILIRUB DIRECT SERPL-MCNC: 0.3 MG/DL (ref 0–0.3)
BILIRUB SERPL-MCNC: 0.7 MG/DL (ref 0.2–1)
BUN SERPL-MCNC: 16 MG/DL (ref 7–18)
CALCIUM SERPL-MCNC: 8.2 MG/DL (ref 8.5–10.1)
CHLORIDE SERPL-SCNC: 99 MMOL/L (ref 98–107)
CO2 SERPL-SCNC: 26 MMOL/L (ref 21–32)
CREAT SERPL-MCNC: 0.7 MG/DL (ref 0.55–1.3)
ERYTHROCYTE [DISTWIDTH] IN BLOOD BY AUTOMATED COUNT: 13.7 % (ref 11.6–14.8)
GLOBULIN SER-MCNC: 4.6 G/DL
HCT VFR BLD CALC: 44.3 % (ref 42–52)
HGB BLD-MCNC: 15.2 G/DL (ref 14.2–18)
MCV RBC AUTO: 87 FL (ref 80–99)
PLATELET # BLD: 188 K/UL (ref 150–450)
POTASSIUM SERPL-SCNC: 3.9 MMOL/L (ref 3.5–5.1)
RBC # BLD AUTO: 5.07 M/UL (ref 4.7–6.1)
SODIUM SERPL-SCNC: 134 MMOL/L (ref 136–145)
WBC # BLD AUTO: 8.9 K/UL (ref 4.8–10.8)

## 2021-01-05 PROCEDURE — 06HN33Z INSERTION OF INFUSION DEVICE INTO LEFT FEMORAL VEIN, PERCUTANEOUS APPROACH: ICD-10-PCS

## 2021-01-05 PROCEDURE — 0BH17EZ INSERTION OF ENDOTRACHEAL AIRWAY INTO TRACHEA, VIA NATURAL OR ARTIFICIAL OPENING: ICD-10-PCS

## 2021-01-05 PROCEDURE — 5A1955Z RESPIRATORY VENTILATION, GREATER THAN 96 CONSECUTIVE HOURS: ICD-10-PCS

## 2021-01-05 RX ADMIN — PROPOFOL PRN MLS/HR: 10 INJECTION, EMULSION INTRAVENOUS at 19:58

## 2021-01-05 RX ADMIN — DEXAMETHASONE SODIUM PHOSPHATE SCH MG: 10 INJECTION INTRAMUSCULAR; INTRAVENOUS at 08:25

## 2021-01-05 RX ADMIN — INSULIN ASPART SCH UNITS: 100 INJECTION, SOLUTION INTRAVENOUS; SUBCUTANEOUS at 18:32

## 2021-01-05 RX ADMIN — INSULIN ASPART SCH UNITS: 100 INJECTION, SOLUTION INTRAVENOUS; SUBCUTANEOUS at 12:00

## 2021-01-05 RX ADMIN — ENOXAPARIN SODIUM SCH MG: 80 INJECTION SUBCUTANEOUS at 19:56

## 2021-01-05 RX ADMIN — LORAZEPAM PRN MG: 2 INJECTION, SOLUTION INTRAMUSCULAR; INTRAVENOUS at 15:14

## 2021-01-05 RX ADMIN — LORAZEPAM PRN MG: 2 INJECTION, SOLUTION INTRAMUSCULAR; INTRAVENOUS at 18:02

## 2021-01-05 RX ADMIN — SODIUM CHLORIDE SCH MLS/HR: 0.9 INJECTION INTRAVENOUS at 09:55

## 2021-01-05 RX ADMIN — LORAZEPAM PRN MG: 2 INJECTION, SOLUTION INTRAMUSCULAR; INTRAVENOUS at 09:55

## 2021-01-05 RX ADMIN — ENOXAPARIN SODIUM SCH MG: 80 INJECTION SUBCUTANEOUS at 08:27

## 2021-01-05 RX ADMIN — INSULIN ASPART SCH UNITS: 100 INJECTION, SOLUTION INTRAVENOUS; SUBCUTANEOUS at 06:00

## 2021-01-05 NOTE — NUR
NURSE NOTES:

Patient given oral care. Cooling measures ongoing at this time. Patient temperature now is 
100.7F (ax). Patient remains sedated with RASS of -2. Blood pressures and HR have been 
stable so fare. Saturations continue to be low in the 70s and 80s while on 100% FiO2, MD 
made aware.

## 2021-01-05 NOTE — PULMONOLOGY PROGRESS NOTE
Subjective


ROS Limited/Unobtainable:  No


Interval Events:  Seen in ICU


Constitutional:  Reports: no symptoms


HEENT:  Repors: no symptoms


Respiratory:  Reports: shortness of breath - better


Cardiovascular:  Reports: no symptoms


Gastrointestinal/Abdominal:  Reports: no symptoms


Allergies:  


Coded Allergies:  


     No Known Allergies (Unverified , 6/11/19)





Objective





Last 24 Hour Vital Signs








  Date Time  Temp Pulse Resp B/P (MAP) Pulse Ox O2 Delivery O2 Flow Rate FiO2


 


1/5/21 18:02  105 33 109/70 80   


 


1/5/21 18:00 99.0 102 34 109/70 (83) 79   


 


1/5/21 17:00  102 33 118/72 (87) 79   


 


1/5/21 16:00      Bi-pap  


 


1/5/21 16:00  102      


 


1/5/21 16:00  102 33 106/69 (81) 80   


 


1/5/21 15:51  97 34     100


 


1/5/21 15:44  100 31 109/73 87   


 


1/5/21 15:14  113 32 158/98 81   


 


1/5/21 15:00  110 37 109/73 (85) 80   


 


1/5/21 15:00        100


 


1/5/21 14:00  116 35 119/65 (83) 65   


 


1/5/21 13:00  130 18 136/81 (99) 71   


 


1/5/21 13:00  129 29 136/81 (99) 72   


 


1/5/21 12:48  132 16     100


 


1/5/21 12:00      Bi-pap  


 


1/5/21 12:00  130      


 


1/5/21 12:00 100.0 131 45 152/78 (102) 80   


 


1/5/21 12:00        100


 


1/5/21 11:32  124 44  83   100


 


1/5/21 11:00  129 45 195/88 (123) 79   


 


1/5/21 10:25  129 45 158/97 84   


 


1/5/21 10:00  113 45 181/88 (119) 90   


 


1/5/21 09:55  115 30 158/100 99   


 


1/5/21 09:00  104 35 158/131 (140) 91   


 


1/5/21 08:00      Bi-pap  


 


1/5/21 08:00  106      


 


1/5/21 08:00 99.6 105 39 163/89 (113) 94   


 


1/5/21 08:00        100


 


1/5/21 07:10  108 26  95   100


 


1/5/21 07:00  101 41 153/96 (115) 98   


 


1/5/21 06:00  108 31 133/100 (111) 98   


 


1/5/21 05:00  103 42 154/84 (107) 96   


 


1/5/21 04:00        100


 


1/5/21 04:00 99.4 104 37 129/63 (85) 96   


 


1/5/21 04:00      Bi-pap  


 


1/5/21 04:00  105      


 


1/5/21 03:38  104 35  97   100


 


1/5/21 03:00  100 32 163/79 (107) 96   


 


1/5/21 02:00  101 28 177/137 (150) 98   


 


1/5/21 01:00  108 36 178/102 (127) 94   


 


1/5/21 00:00 99.2 91 26 155/98 (117) 93   


 


1/5/21 00:00      Bi-pap  


 


1/5/21 00:00  104      


 


1/5/21 00:00        100


 


1/4/21 23:25  82 35 148/79 91   


 


1/4/21 23:08  94 33  94   100


 


1/4/21 23:00  95 38 148/79 (102)    


 


1/4/21 22:55  101 35 148/101 98   


 


1/4/21 22:00  93 29 148/107 (121) 90   


 


1/4/21 21:00  94 28 139/94 (109) 96   


 


1/4/21 20:00 99.5 95 32 150/74 (99) 96   


 


1/4/21 20:00        100


 


1/4/21 20:00  89      


 


1/4/21 20:00      Bi-pap  


 


1/4/21 19:24  76 29  100   100


 


1/4/21 19:00  87 28 144/92 (109) 95   

















Intake and Output  


 


 1/4/21 1/5/21





 19:00 07:00


 


Intake Total 1420 ml 650 ml


 


Balance 1420 ml 650 ml


 


  


 


Intake Oral 220 ml 


 


IV Total 1200 ml 650 ml


 


# Voids 4 








Objective


Very agitated


Received Haldol and Ativan


General Appearance:  no acute distress


HEENT:  atraumatic


Respiratory:  decreased breath sounds


Cardiovascular:  normal rate, regular rhythm


Abdomen:  soft, non tender





Microbiology








 Date/Time


Source Procedure


Growth Status





 


 1/3/21 03:05


Nasal Nares MRSA Culture - Final


NO METHICILLIN RESISTANT STAPH AUREUS... Complete








Laboratory Tests


1/4/21 23:00: POC Whole Blood Glucose [Pending]


1/5/21 05:20: 


White Blood Count 8.9, Red Blood Count 5.07, Hemoglobin 15.2, Hematocrit 44.3, 

Mean Corpuscular Volume 87, Mean Corpuscular Hemoglobin 30.0, Mean Corpuscular 

Hemoglobin Concent 34.3, Red Cell Distribution Width 13.7, Platelet Count 188, 

Mean Platelet Volume 9.1, Neutrophils (%) (Auto) , Lymphocytes (%) (Auto) , 

Monocytes (%) (Auto) , Eosinophils (%) (Auto) , Basophils (%) (Auto) , 

Differential Total Cells Counted 100, Neutrophils % (Manual) 88H, Lymphocytes % 

(Manual) 8L, Monocytes % (Manual) 4, Eosinophils % (Manual) 0, Basophils % 

(Manual) 0, Band Neutrophils 0, Platelet Estimate Adequate, Platelet Morphology 

Normal, Red Blood Cell Morphology Normal, Sodium Level 134L, Potassium Level 

3.9, Chloride Level 99, Carbon Dioxide Level 26, Anion Gap 9, Blood Urea 

Nitrogen 16, Creatinine 0.7, Estimat Glomerular Filtration Rate > 60, Glucose 

Level 115H, Calcium Level 8.2L, Total Bilirubin 0.7, Direct Bilirubin 0.3, 

Aspartate Amino Transf (AST/SGOT) 85H, Alanine Aminotransferase (ALT/SGPT) 40, A

lkaline Phosphatase 190H, Total Protein 7.0, Albumin 2.4L, Globulin 4.6, 

Albumin/Globulin Ratio 0.5L, Triglycerides Level 134


1/5/21 06:29: POC Whole Blood Glucose 134H


1/5/21 09:55: 


Arterial Blood pH 7.384, Arterial Blood Partial Pressure CO2 48.1H, Arterial 

Blood Partial Pressure O2 , Arterial Blood HCO3 28.1H, Arterial Blood Oxygen 

Saturation 74.7*L, Arterial Blood Base Excess 2.2H, Abelardo Test Positive


1/5/21 14:10: 


Arterial Blood pH 7.359, Arterial Blood Partial Pressure CO2 51.7H, Arterial 

Blood Partial Pressure O2 34.9*L, Arterial Blood HCO3 28.5H, Arterial Blood 

Oxygen Saturation 68.4*L, Arterial Blood Base Excess 2.0, Abelardo Test Positive





Current Medications








 Medications


  (Trade)  Dose


 Ordered  Sig/Julisa


 Route


 PRN Reason  Start Time


 Stop Time Status Last Admin


Dose Admin


 


 Acetaminophen


  (Tylenol)  650 mg  Q6H  PRN


 ORAL


 Temp >100.5  1/3/21 04:00


 2/2/21 03:59   





 


 Ceftriaxone


 Sodium 1 gm/


 Dextrose  55 ml @ 


 110 mls/hr  Q24H


 IVPB


   1/3/21 10:00


 1/10/21 09:59  1/5/21 09:55





 


 Dexamethasone


 Sodium Phosphate


  (Decadron 10mg/


 ml Inj)  6 mg  DAILY


 IV


   1/3/21 09:00


 1/12/21 09:01  1/5/21 08:25





 


 Dextrose


  (Dextrose 50%)  25 ml  Q30M  PRN


 IV


 Hypoglycemia  1/2/21 18:00


 4/2/21 17:59   





 


 Dextrose


  (Dextrose 50%)  50 ml  Q30M  PRN


 IV


 Hypoglycemia  1/2/21 18:00


 4/2/21 17:59   





 


 Enoxaparin Sodium


  (Lovenox)  80 mg  EVERY 12  HOURS


 SUBQ


   1/2/21 21:00


 4/2/21 20:59  1/5/21 08:27





 


 Haloperidol


  (Haldol)  5 mg  Q6H  PRN


 ORAL


 Agitation  1/4/21 13:45


 2/18/21 13:44  1/4/21 22:56





 


 Haloperidol


 Lactate 5 mg/


 Dextrose  56 ml @ 


 224 mls/hr  Q6H  PRN


 IVPB


 Agitation  1/5/21 12:30


 2/19/21 12:29  1/5/21 13:20





 


 Insulin Aspart


  (NovoLOG)    Q6HR


 SUBQ


   1/2/21 18:00


 4/2/21 17:59  1/5/21 18:32





 


 Lorazepam


  (Ativan 2mg/ml


 1ml)  2 mg  Q4H  PRN


 IV


 For Anxiety IV/PO  1/5/21 00:00


 1/12/21 00:00  1/5/21 18:02





 


 Lorazepam


  (Ativan)  1 mg  THREE TIMES A DAY  PRN


 ORAL


 For Anxiety  1/4/21 05:45


 1/11/21 05:44  1/4/21 22:55





 


 Norepinephrine


 Bitartrate  250 ml @ 


 7.5 mls/hr  Q24H  PRN


 IV


 For hypotension  1/5/21 14:45


 1/8/21 14:45   





 


 Propofol  100 ml @ 0


 mls/hr  Q12H  PRN


 IV


 Agitation  1/5/21 15:27


 1/7/21 15:26   





 


 Quetiapine


 Fumarate


  (SEROqueL)  50 mg  Q12HR


 ORAL


   1/4/21 21:00


 2/18/21 20:59  1/5/21 08:26





 


 Remdesivir 100 mg/


 Sodium Chloride  250 ml @ 


 250 mls/hr  Q24H


 IV


   1/5/21 20:00


 1/8/21 20:59   





 


 Sodium Chloride  1,000 ml @ 


 100 mls/hr  Q10H


 IV


   1/3/21 07:45


 2/2/21 07:44  1/5/21 09:54














Assessment/Plan


Assessment/Plan


1. COVID-19 pneumonia.


   - on Decadron, azithromycin, and ceftriaxone


   - Intubated today; will continue AC mode for now


2. Diabetes mellitus.; 


3. Elevated inflammatory markers.


4. High D-dimer.


5. Hyponatremia.


6. Hyperglycemia.


   - BG control


7. Hypoxemia., secondary to #1; improved





DVT prophylaxis.


Added Seroquel


Continue Ativan and Sung Claudio MD            Jan 5, 2021 18:48

## 2021-01-05 NOTE — NUR
NURSE NOTES:

Wife of pt. called and with the help of her son who speaks English put the wife on speaker 
phone. Gave them update that the pt. got intubated due to poor O2 saturation (Abg. results). 
They appreciated the update and they verbalized understanding.

## 2021-01-05 NOTE — NUR
NURSE HAND-OFF REPORT: 



Latest Vital Signs: Temperature 99.4 , Pulse 108 , B/P 153 /96 , Respiratory Rate 26 , O2 
SAT 95 , Bi-pap, O2 Flow Rate 15.0 .  

Vital Sign Comment: [monitor O2 sat]



EKG Rhythm: Sinus Rhythm

Rhythm change?: N 

MD Notified?: -

MD Response: 



Latest Singh Fall Score: 45  

Fall Risk: High Risk 

Safety Measures: Call light Within Reach, Bed Alarm Zone 1, Side Rails Side Rails x3, Bed 
position Low and Locked.

Fall Precautions: 

Yellow Socks

Yellow Gown

Door Sign

Patient Fall Education



Report given to [Olive ARCHER].

## 2021-01-05 NOTE — NUR
NURSE NOTES:

Called [phone] Dr. eLmos and left message regarding pt. Abg. result O2 sat 74.7 
awaiting for response.

## 2021-01-05 NOTE — NUR
NURSE NOTES:

Pt. in bed, awake, alert/confused. No sign of distress. On Bipap 10/5, Fi O2 at 100%. No 
grimacing noted. IV site at right FA #18g. in placed patent/intact running 1/2 NS at 
100cc/hr. Bilateral soft wrist restrains in placed. +CMS. Bed in low position, locked. Call 
light within reach. Will cont. to monitor.

## 2021-01-05 NOTE — EMERGENCY ROOM REPORT
History of Present Illness


General


Chief Complaint:  Flu Like Symptoms


Source:  Medical Record





Present Illness


Allergies:  


Coded Allergies:  


     No Known Allergies (Unverified , 6/11/19)





COVID-19 Screening


Contact w/high risk pt:  Yes


Experienced COVID-19 symptoms?:  Yes


COVID-19 Testing performed PTA:  No


COVID-19 Screening:  Positive COVID-19





Nursing Documentation-PMH


Hx Cardiac Problems:  No


Hx Cancer:  No


Hx Gastrointestinal Problems:  No


Hx Neurological Problems:  No





Physical Exam





Vital Signs








  Date Time  Temp Pulse Resp B/P (MAP) Pulse Ox O2 Delivery O2 Flow Rate FiO2


 


1/2/21 05:40 99.0 120 22 142/83 (102) 95 Room Air  


 


1/2/21 06:15       15.0 


 


1/2/21 07:40        100











Procedures


Intubation


Intubation :  


   Consent:  Emergent


   Intubation Method:  orotracheal


   Tube Size (cm):  7.5


   Medications:  Etomidate, Rocuronium


   Breath Sounds after Intubation:  equal


   Intubation Complications:  no complications


   Post Intubation Xray:  Yes


   Attempts:  One


   Patient Tolerated:  Well


   Complications:  None





Medical Decision Making


Diagnostic Impression:  


   Primary Impression:  


   Pneumonia due to COVID-19 virus


   Additional Impression:  


   hypoxic respiratory failure


ER Course


I was called to the ICU to evaluate this patient.  Covid positive.  Hypoxic on 

BiPAP.  Chest x-ray shows diffuse bilateral patchy opacities.  Patient sedated. 

I wore full PPE.  I intubated without complication.  Placed on ventilator.  

Chest x-ray confirms ET tube placement





Last Vital Signs








  Date Time  Temp Pulse Resp B/P (MAP) Pulse Ox O2 Delivery O2 Flow Rate FiO2


 


1/5/21 14:00  116 35 119/65 (83) 65   


 


1/5/21 12:48        100


 


1/5/21 12:00      Bi-pap  


 


1/5/21 12:00 100.0       


 


1/2/21 23:00       15.0 








Disposition:  ADMITTED AS INPATIENT


Condition:  Critical


Referrals:  


NOT CHOSEN IPA/MD,REFERRING (PCP)











Gene Pulido MD             Jan 5, 2021 14:49

## 2021-01-05 NOTE — NUR
NURSE NOTES:

Called Dr. Lemos regarding pt. to be intubated for meds to calmed him down. Dr. Lemos 
ordered Ativan 2mg. Q4hrs.  PRN IVP and Haldol 5mg. IVP Q6hr. PRN. to alternate.

## 2021-01-05 NOTE — GENERAL PROGRESS NOTE
Subjective


Allergies:  


Coded Allergies:  


     No Known Allergies (Unverified , 6/11/19)


Subjective


very agitated


pulling bipap macine





Objective





Last 24 Hour Vital Signs








  Date Time  Temp Pulse Resp B/P (MAP) Pulse Ox O2 Delivery O2 Flow Rate FiO2


 


1/5/21 09:55  115 30 158/100 99   


 


1/5/21 08:00 99.6 105 39 163/89 (113) 94   


 


1/5/21 07:10  108 26  95   100


 


1/5/21 07:00  101 41 153/96 (115) 98   


 


1/5/21 06:00  108 31 133/100 (111) 98   


 


1/5/21 05:00  103 42 154/84 (107) 96   


 


1/5/21 04:00        100


 


1/5/21 04:00 99.4 104 37 129/63 (85) 96   


 


1/5/21 04:00      Bi-pap  


 


1/5/21 04:00  105      


 


1/5/21 03:38  104 35  97   100


 


1/5/21 03:00  100 32 163/79 (107) 96   


 


1/5/21 02:00  101 28 177/137 (150) 98   


 


1/5/21 01:00  108 36 178/102 (127) 94   


 


1/5/21 00:00 99.2 91 26 155/98 (117) 93   


 


1/5/21 00:00      Bi-pap  


 


1/5/21 00:00  104      


 


1/5/21 00:00        100


 


1/4/21 23:25  82 35 148/79 91   


 


1/4/21 23:08  94 33  94   100


 


1/4/21 23:00  95 38 148/79 (102)    


 


1/4/21 22:55  101 35 148/101 98   


 


1/4/21 22:00  93 29 148/107 (121) 90   


 


1/4/21 21:00  94 28 139/94 (109) 96   


 


1/4/21 20:00 99.5 95 32 150/74 (99) 96   


 


1/4/21 20:00        100


 


1/4/21 20:00  89      


 


1/4/21 20:00      Bi-pap  


 


1/4/21 19:24  76 29  100   100


 


1/4/21 19:00  87 28 144/92 (109) 95   


 


1/4/21 18:25  85 23 156/88 92   


 


1/4/21 18:00  87 29 143/72 (95) 97   


 


1/4/21 17:53  76 21 150/82 95   


 


1/4/21 17:00  87 29 150/82 (104) 97   


 


1/4/21 16:35  74 25  98   100


 


1/4/21 16:00  82      


 


1/4/21 16:00      Bi-pap  


 


1/4/21 16:00        100


 


1/4/21 16:00  88 23 128/73 (91) 95   


 


1/4/21 15:00 98.7 88 18 116/75 (89) 96   


 


1/4/21 14:00  74 18 146/76 (99) 100   


 


1/4/21 13:00  76 18 129/74 (92) 100   


 


1/4/21 12:00      Bi-pap  


 


1/4/21 12:00        100


 


1/4/21 12:00  97      


 


1/4/21 12:00 98.1 88 22 154/79 (104) 98   


 


1/4/21 11:45  74 25  99   100


 


1/4/21 11:00  88 24 146/113 (124) 100   

















Intake and Output  


 


 1/4/21 1/5/21





 19:00 07:00


 


Intake Total 1420 ml 650 ml


 


Balance 1420 ml 650 ml


 


  


 


Intake Oral 220 ml 


 


IV Total 1200 ml 650 ml


 


# Voids 4 








Laboratory Tests


1/4/21 18:14: POC Whole Blood Glucose 310H


1/4/21 23:00: POC Whole Blood Glucose [Pending]


1/5/21 05:20: 


White Blood Count 8.9, Red Blood Count 5.07, Hemoglobin 15.2, Hematocrit 44.3, 

Mean Corpuscular Volume 87, Mean Corpuscular Hemoglobin 30.0, Mean Corpuscular 

Hemoglobin Concent 34.3, Red Cell Distribution Width 13.7, Platelet Count 188, 

Mean Platelet Volume 9.1, Neutrophils (%) (Auto) , Lymphocytes (%) (Auto) , 

Monocytes (%) (Auto) , Eosinophils (%) (Auto) , Basophils (%) (Auto) , 

Neutrophils % (Manual) [Pending], Lymphocytes % (Manual) [Pending], Platelet 

Estimate [Pending], Platelet Morphology [Pending], Sodium Level 134L, Potassium 

Level 3.9, Chloride Level 99, Carbon Dioxide Level 26, Anion Gap 9, Blood Urea 

Nitrogen 16, Creatinine 0.7, Estimat Glomerular Filtration Rate > 60, Glucose 

Level 115H, Calcium Level 8.2L, Total Bilirubin 0.7, Direct Bilirubin 0.3, 

Aspartate Amino Transf (AST/SGOT) 85H, Alanine Aminotransferase (ALT/SGPT) 40, 

Alkaline Phosphatase 190H, Total Protein 7.0, Albumin 2.4L, Globulin 4.6, 

Albumin/Globulin Ratio 0.5L


1/5/21 06:29: POC Whole Blood Glucose 134H


1/5/21 09:55: 


Arterial Blood pH 7.384, Arterial Blood Partial Pressure CO2 48.1H, Arterial 

Blood Partial Pressure O2 , Arterial Blood HCO3 28.1H, Arterial Blood Oxygen 

Saturation 74.7*L, Arterial Blood Base Excess 2.2H, Abelardo Test Positive


Height (Feet):  5


Height (Inches):  7.00


Weight (Pounds):  91


General Appearance:  combative


EENT:  PERRL/EOMI


Neck:  supple


Cardiovascular:  regular rhythm


Respiratory/Chest:  crackles/rales


Abdomen:  non tender, soft


Extremities:  non-tender





Assessment/Plan


Assessment/Plan:


covid pna


ac resp distress


etoh abused


agitated -halodol


cont on bipap at icu


cont iv abx and iv decadrone


pulmonary and gi on consult











Moi Travis MD              Jan 5, 2021 10:35

## 2021-01-05 NOTE — NUR
NURSE NOTES:

Received patient from Olive ARCHER. Patient is recently intubated today ETT size 7.5 and 23cm 
at the lower lip patient vents settings are AC 16/500tv, 100% FiO2 and peep of 10. There is 
two peripheral IV access that are patent and intact, SL. There is also a right femoral TLC 
that is patent and and intact. Patient Current HR is 111 ST, 121/78, 79% FiO2 and RR of 30. 
Safety measures are in place, will continue to monitor.

## 2021-01-05 NOTE — NUR
NURSE NOTES:

Patients temperature noted to be 101. Cooling measures given, Tylenol 650mg via OGT given. 
Stern catheter inserted.

## 2021-01-05 NOTE — NUR
NURSE NOTES:

pt remains moving in bed. BIPAP still on, pt sating 90% O2. O2 Oximeter repositioned 
correctly.

## 2021-01-05 NOTE — DIAGNOSTIC IMAGING REPORT
EXAM:

  XR Abdomen, 2 Views

 

CLINICAL HISTORY:

  NGT

 

TECHNIQUE:

  Frontal view of the abdomen/pelvis with upright view of the abdomen.

 

COMPARISON:

  Chest radiograph 1/2/2021

 

FINDINGS:

  Lower thorax:  Opacities are visualized within the lung bases, 

partially included in the field-of-view, similar to the previous exam.

  Intraperitoneal space:  No free air.

  Gastrointestinal tract:  Unremarkable.  No dilation.

  Bones/joints:  Unremarkable.

  Tubes, lines and devices:  An enteric tube courses below the level of 

the diaphragm with the tip overlying the expected region of the body of 

the stomach.  The side port appears to be below the gastroesophageal 

junction.

 

IMPRESSION:     

1.  An enteric tube appears to terminate within the stomach with the side 

port below the level of the gastroesophageal junction.

2.  Bibasilar opacities, partially included in the field-of-view, as seen 

previously.

## 2021-01-05 NOTE — OPERATIVE NOTE - PDOC
Operative Note


Operative Note


Date of Operation/Procedure:  Jan 5, 2021


Pre-op Diagnosis:


Sepsis


Covid positive


Respiratory insufficiency


Hypotension


Procedure:


Left femoral central venous catheter insertion


Post-op Diagnosis:  same as pre-op


Surgeon:  Norberto Vazquez MD


Anesthesia:  local


Specimen:  none


Complications:  none


Condition:  unstable


Estimated Blood Loss:  minimal


Drains:  none


Implant(s) used?:  No


Indications for Procedure


65-year-old male currently intensive care unit [facility] Covid 

positive vent support worsening and desaturating agitated hypotensive 

potentially requiring pressors right away surgery asked to place central venous 

catheter for meds fluids and care plan.  Emergency catheter insertion performed 

at bedside medically necessary for patient's best interest and continued care 

patient full code


Description of Procedure


Patient was made comfortable bedside in supine position left groin was prepped 

and draped in the same surgical fashion.  Anatomic landmarks identified.  Left 

femoral vein cannulated on first stick.  Good venous flow identified.  Guidewire

placed over needle needle removed.  Small skin incision made around guidewire.  

Dilator used triple-lumen catheter inserted over the guidewire and guidewire 

removed and discarded.  All 3 ports aspirated and flushed appropriately venous 

blood.  Line sutured in place dressings applied line ready for use.  Thank you











Norberto Vazquez                 Jan 5, 2021 15:26

## 2021-01-05 NOTE — NUR
CASE MANAGEMENT:REVIEW



1/5/21

SI: COVID PNA

99.6    113  45  181/88  90% ON BIPAP W/100% FIO2

GLUCOSE+115





IS: IV REMDESIVIR Q24 (2/5)

IV ROCEPHIN Q24

IV DECADRON Q24

IVF@100/HR

LOVENOX SQ Q12

**: ICU STATUS

DCP: FROM HOME

## 2021-01-05 NOTE — NUR
NURSE HAND-OFF REPORT: 



Latest Vital Signs: Temperature 99.0 , Pulse 107 , B/P 109 /70 , Respiratory Rate 35 , O2 
SAT 72 , Bi-pap, O2 Flow Rate 15.0 .  

Vital Sign Comment: wnl



EKG Rhythm: Sinus Tachycardia

Rhythm change?: N 

MD Notified?: -

MD Response: 



Latest Singh Fall Score: 45  

Fall Risk: High Risk 

Safety Measures: Call light Within Reach, Bed Alarm Zone 1, Side Rails Side Rails x3, Bed 
position Low and Locked.

Fall Precautions: 

Yellow Socks

Yellow Gown

Door Sign

Patient Fall Education



Report given to Tenzin ARCHER.

## 2021-01-05 NOTE — NUR
NURSE NOTES:

Pt. seen by Dr. Lemos and ordered to be intubated and central line. Informed CN and called 
ER MD.

## 2021-01-05 NOTE — DIAGNOSTIC IMAGING REPORT
Indication: Post intubation

 

Technique: One view of the chest

 

Comparison: 1/2/2021

 

Findings: Interim endotracheal intubation, endotracheal tube tip in good position

approximately 4 cm above the tito. There are extensive bilateral infiltrates, which

are markedly increased from the prior study. Pleural spaces are probably clear, not

well demonstrated

 

Impression: Satisfactory endotracheal intubation

 

Markedly increased and now extensive bilateral infiltrates

## 2021-01-06 VITALS — DIASTOLIC BLOOD PRESSURE: 61 MMHG | SYSTOLIC BLOOD PRESSURE: 107 MMHG

## 2021-01-06 VITALS — SYSTOLIC BLOOD PRESSURE: 126 MMHG | DIASTOLIC BLOOD PRESSURE: 70 MMHG

## 2021-01-06 VITALS — SYSTOLIC BLOOD PRESSURE: 113 MMHG | DIASTOLIC BLOOD PRESSURE: 71 MMHG

## 2021-01-06 VITALS — DIASTOLIC BLOOD PRESSURE: 68 MMHG | SYSTOLIC BLOOD PRESSURE: 118 MMHG

## 2021-01-06 VITALS — DIASTOLIC BLOOD PRESSURE: 109 MMHG | SYSTOLIC BLOOD PRESSURE: 155 MMHG

## 2021-01-06 VITALS — SYSTOLIC BLOOD PRESSURE: 101 MMHG | DIASTOLIC BLOOD PRESSURE: 59 MMHG

## 2021-01-06 VITALS — DIASTOLIC BLOOD PRESSURE: 104 MMHG | SYSTOLIC BLOOD PRESSURE: 125 MMHG

## 2021-01-06 VITALS — SYSTOLIC BLOOD PRESSURE: 116 MMHG | DIASTOLIC BLOOD PRESSURE: 74 MMHG

## 2021-01-06 VITALS — SYSTOLIC BLOOD PRESSURE: 154 MMHG | DIASTOLIC BLOOD PRESSURE: 89 MMHG

## 2021-01-06 VITALS — SYSTOLIC BLOOD PRESSURE: 110 MMHG | DIASTOLIC BLOOD PRESSURE: 91 MMHG

## 2021-01-06 VITALS — DIASTOLIC BLOOD PRESSURE: 74 MMHG | SYSTOLIC BLOOD PRESSURE: 120 MMHG

## 2021-01-06 VITALS — DIASTOLIC BLOOD PRESSURE: 69 MMHG | SYSTOLIC BLOOD PRESSURE: 106 MMHG

## 2021-01-06 VITALS — DIASTOLIC BLOOD PRESSURE: 75 MMHG | SYSTOLIC BLOOD PRESSURE: 112 MMHG

## 2021-01-06 VITALS — SYSTOLIC BLOOD PRESSURE: 106 MMHG | DIASTOLIC BLOOD PRESSURE: 70 MMHG

## 2021-01-06 VITALS — SYSTOLIC BLOOD PRESSURE: 120 MMHG | DIASTOLIC BLOOD PRESSURE: 65 MMHG

## 2021-01-06 VITALS — DIASTOLIC BLOOD PRESSURE: 99 MMHG | SYSTOLIC BLOOD PRESSURE: 114 MMHG

## 2021-01-06 VITALS — SYSTOLIC BLOOD PRESSURE: 107 MMHG | DIASTOLIC BLOOD PRESSURE: 67 MMHG

## 2021-01-06 VITALS — DIASTOLIC BLOOD PRESSURE: 68 MMHG | SYSTOLIC BLOOD PRESSURE: 120 MMHG

## 2021-01-06 VITALS — SYSTOLIC BLOOD PRESSURE: 121 MMHG | DIASTOLIC BLOOD PRESSURE: 73 MMHG

## 2021-01-06 VITALS — SYSTOLIC BLOOD PRESSURE: 109 MMHG | DIASTOLIC BLOOD PRESSURE: 73 MMHG

## 2021-01-06 VITALS — SYSTOLIC BLOOD PRESSURE: 118 MMHG | DIASTOLIC BLOOD PRESSURE: 71 MMHG

## 2021-01-06 VITALS — DIASTOLIC BLOOD PRESSURE: 67 MMHG | SYSTOLIC BLOOD PRESSURE: 112 MMHG

## 2021-01-06 VITALS — DIASTOLIC BLOOD PRESSURE: 67 MMHG | SYSTOLIC BLOOD PRESSURE: 120 MMHG

## 2021-01-06 VITALS — SYSTOLIC BLOOD PRESSURE: 106 MMHG | DIASTOLIC BLOOD PRESSURE: 65 MMHG

## 2021-01-06 VITALS — SYSTOLIC BLOOD PRESSURE: 101 MMHG | DIASTOLIC BLOOD PRESSURE: 68 MMHG

## 2021-01-06 VITALS — SYSTOLIC BLOOD PRESSURE: 112 MMHG | DIASTOLIC BLOOD PRESSURE: 67 MMHG

## 2021-01-06 VITALS — SYSTOLIC BLOOD PRESSURE: 89 MMHG | DIASTOLIC BLOOD PRESSURE: 62 MMHG

## 2021-01-06 VITALS — DIASTOLIC BLOOD PRESSURE: 60 MMHG | SYSTOLIC BLOOD PRESSURE: 105 MMHG

## 2021-01-06 VITALS — DIASTOLIC BLOOD PRESSURE: 75 MMHG | SYSTOLIC BLOOD PRESSURE: 122 MMHG

## 2021-01-06 VITALS — DIASTOLIC BLOOD PRESSURE: 75 MMHG | SYSTOLIC BLOOD PRESSURE: 142 MMHG

## 2021-01-06 VITALS — DIASTOLIC BLOOD PRESSURE: 73 MMHG | SYSTOLIC BLOOD PRESSURE: 138 MMHG

## 2021-01-06 VITALS — DIASTOLIC BLOOD PRESSURE: 70 MMHG | SYSTOLIC BLOOD PRESSURE: 115 MMHG

## 2021-01-06 VITALS — SYSTOLIC BLOOD PRESSURE: 129 MMHG | DIASTOLIC BLOOD PRESSURE: 76 MMHG

## 2021-01-06 LAB
ADD MANUAL DIFF: YES
ALBUMIN SERPL-MCNC: 2 G/DL (ref 3.4–5)
ALBUMIN/GLOB SERPL: 0.5 {RATIO} (ref 1–2.7)
ALP SERPL-CCNC: 164 U/L (ref 46–116)
ALT SERPL-CCNC: 60 U/L (ref 12–78)
ANION GAP SERPL CALC-SCNC: 5 MMOL/L (ref 5–15)
AST SERPL-CCNC: 69 U/L (ref 15–37)
BILIRUB DIRECT SERPL-MCNC: 0.3 MG/DL (ref 0–0.3)
BILIRUB SERPL-MCNC: 0.6 MG/DL (ref 0.2–1)
BUN SERPL-MCNC: 25 MG/DL (ref 7–18)
CALCIUM SERPL-MCNC: 8.4 MG/DL (ref 8.5–10.1)
CHLORIDE SERPL-SCNC: 101 MMOL/L (ref 98–107)
CO2 SERPL-SCNC: 29 MMOL/L (ref 21–32)
CREAT SERPL-MCNC: 0.8 MG/DL (ref 0.55–1.3)
ERYTHROCYTE [DISTWIDTH] IN BLOOD BY AUTOMATED COUNT: 13.8 % (ref 11.6–14.8)
GLOBULIN SER-MCNC: 4.4 G/DL
HCT VFR BLD CALC: 41.4 % (ref 42–52)
HGB BLD-MCNC: 14.3 G/DL (ref 14.2–18)
MCV RBC AUTO: 88 FL (ref 80–99)
PLATELET # BLD: 172 K/UL (ref 150–450)
POTASSIUM SERPL-SCNC: 4.2 MMOL/L (ref 3.5–5.1)
RBC # BLD AUTO: 4.69 M/UL (ref 4.7–6.1)
SODIUM SERPL-SCNC: 135 MMOL/L (ref 136–145)
WBC # BLD AUTO: 9.5 K/UL (ref 4.8–10.8)

## 2021-01-06 RX ADMIN — INSULIN ASPART SCH UNITS: 100 INJECTION, SOLUTION INTRAVENOUS; SUBCUTANEOUS at 00:00

## 2021-01-06 RX ADMIN — ENOXAPARIN SODIUM SCH MG: 80 INJECTION SUBCUTANEOUS at 20:01

## 2021-01-06 RX ADMIN — INSULIN ASPART SCH UNITS: 100 INJECTION, SOLUTION INTRAVENOUS; SUBCUTANEOUS at 12:00

## 2021-01-06 RX ADMIN — PROPOFOL PRN MLS/HR: 10 INJECTION, EMULSION INTRAVENOUS at 05:33

## 2021-01-06 RX ADMIN — METHYLPREDNISOLONE SODIUM SUCCINATE SCH MG: 40 INJECTION, POWDER, LYOPHILIZED, FOR SOLUTION INTRAMUSCULAR; INTRAVENOUS at 20:04

## 2021-01-06 RX ADMIN — ENOXAPARIN SODIUM SCH MG: 80 INJECTION SUBCUTANEOUS at 08:22

## 2021-01-06 RX ADMIN — CHLORHEXIDINE GLUCONATE SCH APPLIC: 213 SOLUTION TOPICAL at 20:00

## 2021-01-06 RX ADMIN — INSULIN ASPART SCH UNITS: 100 INJECTION, SOLUTION INTRAVENOUS; SUBCUTANEOUS at 18:23

## 2021-01-06 RX ADMIN — SODIUM CHLORIDE SCH MLS/HR: 0.9 INJECTION INTRAVENOUS at 10:56

## 2021-01-06 RX ADMIN — INSULIN ASPART SCH UNITS: 100 INJECTION, SOLUTION INTRAVENOUS; SUBCUTANEOUS at 06:22

## 2021-01-06 RX ADMIN — INSULIN ASPART SCH UNITS: 100 INJECTION, SOLUTION INTRAVENOUS; SUBCUTANEOUS at 23:44

## 2021-01-06 RX ADMIN — DEXAMETHASONE SODIUM PHOSPHATE SCH MG: 10 INJECTION INTRAMUSCULAR; INTRAVENOUS at 08:21

## 2021-01-06 RX ADMIN — LORAZEPAM PRN MG: 2 INJECTION, SOLUTION INTRAMUSCULAR; INTRAVENOUS at 20:01

## 2021-01-06 RX ADMIN — PROPOFOL PRN MLS/HR: 10 INJECTION, EMULSION INTRAVENOUS at 17:50

## 2021-01-06 NOTE — NUR
NURSE NOTES:

All due PM meds given, Patient lavaged and suctioned. Oral care performed. NAD at this time. 
Patient remains sedated well RASS -2. Cooling measures ongoing at this time.

## 2021-01-06 NOTE — CARDIOLOGY REPORT
--------------- APPROVED REPORT --------------





EKG Measurement

Heart [id number]

CO [id number]

[id number]

[id number]

[id number]



<Conclusion>

Sinus tachycardia 

Anterior infarct, age undetermined

Abnormal ECG

## 2021-01-06 NOTE — NUR
NURSE NOTES:



Report received from SUZI Beltran. Patient is shivering in bed. Sedated in bed. RASS -2. 
Impulsive at times, trying to reach ETT. Continued bilateral soft wrist restraints. ST on 
cardiac monitor with 's. Fever 101.5 noted. Will start cooling measures and administer 
Tylenol PRN. Orally intubated. ETT 7.5/23cm at lip line. AC 16, , FiO2 100%, P 10. O2 
sat 92-94%. Abdominal muscle use noted. Left NGT in place and kept NPO as ordered. Stern in 
place draining to gravity. IV to right FA G18 and right femoral TLC patent and asymptomatic. 
1/2 NS is running at 100cc/hr. Propofol at 15mcg/kg/min. Bed in lowest position. Side rails 
up x 3. Will resume plan of care.

## 2021-01-06 NOTE — PULMONOLOGY PROGRESS NOTE
Subjective


ROS Limited/Unobtainable:  No


Interval Events:  Seen in ICU


Constitutional:  Reports: no symptoms


HEENT:  Repors: no symptoms


Respiratory:  Reports: shortness of breath - better


Cardiovascular:  Reports: no symptoms


Gastrointestinal/Abdominal:  Reports: no symptoms


Allergies:  


Coded Allergies:  


     No Known Allergies (Unverified , 6/11/19)





Objective





Last 24 Hour Vital Signs








  Date Time  Temp Pulse Resp B/P (MAP) Pulse Ox O2 Delivery O2 Flow Rate FiO2


 


1/6/21 14:00  100 26 120/65 (83) 94   


 


1/6/21 13:27   27 120/65  Mechanical Ventilator  100


 


1/6/21 13:00  100 27 118/71 (87) 95   


 


1/6/21 12:27   27 112/69  Mechanical Ventilator  100


 


1/6/21 12:00  100      


 


1/6/21 12:00      Mechanical Ventilator  


 


1/6/21 12:00        100


 


1/6/21 12:00  100 28 120/67 (84) 94   


 


1/6/21 11:27   28 115/72  Mechanical Ventilator  100


 


1/6/21 11:00 99.4 102 28 120/68 (85) 94   


 


1/6/21 10:27   29 119/69  Mechanical Ventilator  100


 


1/6/21 10:00  108 30 109/73 (85) 92   


 


1/6/21 09:27   28 99/69  Mechanical Ventilator  100


 


1/6/21 09:00  120 29 106/70 (82) 93   


 


1/6/21 08:27   33 117/73  Mechanical Ventilator  100


 


1/6/21 08:02 101.6       


 


1/6/21 08:00  123      


 


1/6/21 08:00  127 31 129/76 (93) 92   


 


1/6/21 08:00      Mechanical Ventilator  


 


1/6/21 08:00        100


 


1/6/21 07:30  124 31     100


 


1/6/21 07:27   32 150/76  Mechanical Ventilator  100


 


1/6/21 07:00 101.5 120 30 155/109 (124) 92   


 


1/6/21 06:27   29 134/76  Mechanical Ventilator  100


 


1/6/21 06:00  100 28 118/68 (85) 84   


 


1/6/21 05:33   20 114/79  Mechanical Ventilator  100


 


1/6/21 05:27   29 112/69  Mechanical Ventilator  100


 


1/6/21 05:00  96 29 120/74 (89) 83   


 


1/6/21 04:30  95 27 125/104 (111) 77   


 


1/6/21 04:27   28 110/61  Mechanical Ventilator  100


 


1/6/21 04:00        100


 


1/6/21 04:00 99.6 93 26 106/69 (81) 78   


 


1/6/21 04:00      Mechanical Ventilator  


 


1/6/21 04:00  93      


 


1/6/21 03:30  89 31 121/73 (89) 72   


 


1/6/21 03:27   27 116/69  Mechanical Ventilator  100


 


1/6/21 03:23  93 33     100


 


1/6/21 03:00  92 27 107/67 (80) 74   


 


1/6/21 02:27   26 107/81  Mechanical Ventilator  100


 


1/6/21 02:00  91 27 105/60 (75) 70   


 


1/6/21 01:30  90 26 107/61 (76) 75   


 


1/6/21 01:27   28 102/63  Mechanical Ventilator  100


 


1/6/21 01:00  89 28 110/91 (97) 78   


 


1/6/21 00:30  91 25 101/59 (73) 80   


 


1/6/21 00:27   28 101/59  Mechanical Ventilator  100


 


1/6/21 00:00        100


 


1/6/21 00:00  90      


 


1/6/21 00:00 99.9 92 26 89/62 (71) 80   


 


1/6/21 00:00      Mechanical Ventilator  


 


1/5/21 23:30  95 28 91/66 (74) 83   


 


1/5/21 23:27   28 100/59  Mechanical Ventilator  100


 


1/5/21 23:19  93 29     100


 


1/5/21 23:00  98 27 87/58 (68) 77   


 


1/5/21 22:34 100.5       


 


1/5/21 22:30  99 28 102/60 (74) 80   


 


1/5/21 22:27   28 95/61  Mechanical Ventilator  100


 


1/5/21 22:00  104 30 112/63 (79) 83   


 


1/5/21 21:30  103 30 115/65 (82) 73   


 


1/5/21 21:27   28 98/59  Mechanical Ventilator  100


 


1/5/21 21:00  105 30 99/73 (82) 79   


 


1/5/21 20:45  109 31 118/66 (83) 74   


 


1/5/21 20:30  109 31 120/71 (87) 75   


 


1/5/21 20:27   22 123/58  Mechanical Ventilator 100.0 


 


1/5/21 20:24 101.0 108 32 105/64 (78) 84   


 


1/5/21 20:13   30 125/62  Mechanical Ventilator  100


 


1/5/21 20:00      Mechanical Ventilator  


 


1/5/21 20:00  106      


 


1/5/21 20:00        100


 


1/5/21 20:00  110 33 120/73 (89) 77   


 


1/5/21 19:58   30 121/78  Mechanical Ventilator  100


 


1/5/21 19:30  110 36 119/75 (90) 95   


 


1/5/21 19:21  120 36     100


 


1/5/21 19:00  106 33 121/79 (93) 72   


 


1/5/21 18:32  107 35 109/70 72   


 


1/5/21 18:02  105 33 109/70 80   


 


1/5/21 18:00 99.0 102 34 109/70 (83) 79   


 


1/5/21 17:00  102 33 118/72 (87) 79   


 


1/5/21 16:00      Bi-pap  


 


1/5/21 16:00  102      


 


1/5/21 16:00  102 33 106/69 (81) 80   


 


1/5/21 15:51  97 34     100


 


1/5/21 15:44  100 31 109/73 87   


 


1/5/21 15:14  113 32 158/98 81   

















Intake and Output  


 


 1/5/21 1/6/21





 19:00 07:00


 


Intake Total 900 ml 1584.350 ml


 


Output Total 240 ml 1615 ml


 


Balance 660 ml -30.650 ml


 


  


 


Free Water  50 ml


 


IV Total 900 ml 1534.350 ml


 


Output Urine Total 240 ml 1615 ml








Objective


Very agitated


Received Haldol and Ativan


General Appearance:  no acute distress


HEENT:  atraumatic


Respiratory:  decreased breath sounds


Cardiovascular:  normal rate, regular rhythm


Abdomen:  soft, non tender


Laboratory Tests


1/6/21 00:53: POC Whole Blood Glucose [Pending]


1/6/21 04:00: 


White Blood Count 9.5, Red Blood Count 4.69L, Hemoglobin 14.3, Hematocrit 41.4L,

 Mean Corpuscular Volume 88, Mean Corpuscular Hemoglobin 30.5, Mean Corpuscular 

Hemoglobin Concent 34.5, Red Cell Distribution Width 13.8, Platelet Count 172, 

Mean Platelet Volume 8.6, Neutrophils (%) (Auto) , Lymphocytes (%) (Auto) , 

Monocytes (%) (Auto) , Eosinophils (%) (Auto) , Basophils (%) (Auto) , 

Differential Total Cells Counted 100, Neutrophils % (Manual) 91H, Lymphocytes % 

(Manual) 7L, Monocytes % (Manual) 2, Eosinophils % (Manual) 0, Basophils % 

(Manual) 0, Band Neutrophils 0, Platelet Estimate Adequate, Platelet Morphology 

Normal, Red Blood Cell Morphology Normal, Sodium Level 135L, Potassium Level 

4.2, Chloride Level 101, Carbon Dioxide Level 29, Anion Gap 5, Blood Urea 

Nitrogen 25H, Creatinine 0.8, Estimat Glomerular Filtration Rate > 60, Glucose 

Level 148H, Calcium Level 8.4L, Total Bilirubin 0.6, Direct Bilirubin 0.3, 

Aspartate Amino Transf (AST/SGOT) 69H, Alanine Aminotransferase (ALT/SGPT) 60, 

Alkaline Phosphatase 164H, Total Protein 6.4, Albumin 2.0L, Globulin 4.4, 

Albumin/Globulin Ratio 0.5L


1/6/21 06:10: POC Whole Blood Glucose [Pending]


1/6/21 11:03: POC Whole Blood Glucose [Pending]





Current Medications








 Medications


  (Trade)  Dose


 Ordered  Sig/Julisa


 Route


 PRN Reason  Start Time


 Stop Time Status Last Admin


Dose Admin


 


 Acetaminophen


  (Tylenol)  650 mg  Q6H  PRN


 ORAL


 Temp >100.5  1/3/21 04:00


 2/2/21 03:59  1/6/21 07:32





 


 Ceftriaxone


 Sodium 1 gm/


 Dextrose  55 ml @ 


 110 mls/hr  Q24H


 IVPB


   1/3/21 10:00


 1/10/21 09:59  1/6/21 10:56





 


 Chlorhexidine


 Gluconate


  (Vanessa-Hex 2%)  1 applic  DAILY@2000


 TOPIC


   1/6/21 20:00


 4/6/21 19:59   





 


 Dexamethasone


 Sodium Phosphate


  (Decadron 10mg/


 ml Inj)  6 mg  DAILY


 IV


   1/3/21 09:00


 1/12/21 09:01  1/6/21 08:21





 


 Dextrose


  (Dextrose 50%)  25 ml  Q30M  PRN


 IV


 Hypoglycemia  1/2/21 18:00


 4/2/21 17:59   





 


 Dextrose


  (Dextrose 50%)  50 ml  Q30M  PRN


 IV


 Hypoglycemia  1/2/21 18:00


 4/2/21 17:59   





 


 Enoxaparin Sodium


  (Lovenox)  80 mg  EVERY 12  HOURS


 SUBQ


   1/2/21 21:00


 4/2/21 20:59  1/6/21 08:22





 


 Haloperidol


  (Haldol)  5 mg  Q6H  PRN


 ORAL


 Agitation  1/4/21 13:45


 2/18/21 13:44  1/4/21 22:56





 


 Haloperidol


 Lactate 5 mg/


 Dextrose  56 ml @ 


 224 mls/hr  Q6H  PRN


 IVPB


 Agitation  1/5/21 12:30


 2/19/21 12:29  1/5/21 13:20





 


 Insulin Aspart


  (NovoLOG)    Q6HR


 SUBQ


   1/2/21 18:00


 4/2/21 17:59  1/6/21 06:22





 


 Lorazepam


  (Ativan 2mg/ml


 1ml)  2 mg  Q4H  PRN


 IV


 For Anxiety IV/PO  1/5/21 00:00


 1/12/21 00:00  1/5/21 18:02





 


 Lorazepam


  (Ativan)  1 mg  THREE TIMES A DAY  PRN


 ORAL


 For Anxiety  1/4/21 05:45


 1/11/21 05:44  1/4/21 22:55





 


 Norepinephrine


 Bitartrate  250 ml @ 


 7.5 mls/hr  Q24H  PRN


 IV


 For hypotension  1/5/21 14:45


 1/8/21 14:45   





 


 Propofol  100 ml @ 0


 mls/hr  Q12H  PRN


 IV


 Agitation  1/5/21 15:27


 1/7/21 15:26  1/6/21 05:33





 


 Quetiapine


 Fumarate


  (SEROqueL)  50 mg  Q12HR


 ORAL


   1/4/21 21:00


 2/18/21 20:59  1/6/21 08:21





 


 Remdesivir 100 mg/


 Sodium Chloride  250 ml @ 


 250 mls/hr  Q24H


 IV


   1/5/21 20:00


 1/8/21 20:59  1/5/21 20:28





 


 Sodium Chloride  1,000 ml @ 


 100 mls/hr  Q10H


 IV


   1/3/21 07:45


 2/2/21 07:44  1/6/21 05:57














Assessment/Plan


Assessment/Plan


1. COVID-19 pneumonia.


   - on Decadron, azithromycin, and ceftriaxone


   - Intubated now; will continue AC mode for now


2. Diabetes mellitus.; 


3. Elevated inflammatory markers.


4. High D-dimer.


5. Hyponatremia.


6. Hyperglycemia.


   - BG control


7. Hypoxemia., secondary to #1; improved





DVT prophylaxis.


Added Seroquel


Continue Ativan and Sung Claudio MD            Jan 6, 2021 15:01

## 2021-01-06 NOTE — NUR
NURSE HAND-OFF REPORT: 



Latest Vital Signs: Temperature 99.1 , Pulse 121 , B/P 101 /68 , Respiratory Rate 30 , O2 
SAT 97 , Mechanical Ventilator, O2 Flow Rate  .  

Vital Sign Comment: 



EKG Rhythm: Sinus Tachycardia

Rhythm change?: N 

MD Notified?: -

MD Response: 



Latest Singh Fall Score: 60  

Fall Risk: High Risk 

Safety Measures: Call light Within Reach, Bed Alarm Zone 1, Side Rails Side Rails x3, Bed 
position Low and Locked.

Fall Precautions: 

Yellow Socks

Yellow Gown

Door Sign

Patient Fall Education



Report given to SUZI Dave.

## 2021-01-06 NOTE — NUR
NURSE NOTES:

Patient saturations remains low, saturating in the 70s, MD is aware. Patient FIO2 100%. BP 
remains stable HR is 93 NSR. ROM performed.

## 2021-01-06 NOTE — NUR
RD ASSESSMENT & RECOMMENDATIONS

SEE CARE ACTIVITY FOR COMPLETE ASSESSMENT



DAILY ESTIMATED NEEDS:

Needs based on Critical Care/ 73kg abw 

22-28  kcals/kg 

6912-4308  total kcals

1.2-2  g protein/kg

  g total protein 

25-30  mL/kg

0042-3007  total fluid mLs



NUTRITION DIAGNOSIS:

Swallowing difficulty R/T respiratory failure as evidenced by pt now

orally intubated, OGT in place, NPO



 

CURRENT TF:NPO 



 



ENTERAL NUTRITION RECOMMENDATIONS:

Vital AF 1.2 @ 60ml/hr x 24 hrs  to provide 1440ml, 1728kcal, 116g prot, 1167ml free water 



* As medically appropriate, initiate critical care and carb controlled TF formula of Vital 
AF 1.2

* Initiate @ 20ml/hr x 6hrs, advance 10ml q 4-6 hrs as tolerated to goal rate

* HOB over 30 degrees/ water flush per MD

* Does not exceed est kcal needs w/ Propofol running @ 8.083ml/hr x 24 hrs (provides 213 
lipid kcal)



 



ADDITIONAL RECOMMENDATIONS:

* Calibrated bedscale wt 

* Monitor BGs closely w/ Decadron and TF, need for long acting insulin 

* Monitor lytes, replete as needed  

* Monitor Propofol rate, need to adjust TF rate

## 2021-01-06 NOTE — NUR
NURSE NOTES:



Oral care done. /67, . O2 sat 94-96%. Afebrile. Propofol is running at 
15mcg/kg/min. RASS -2. Will continue to monitor.

## 2021-01-06 NOTE — NUR
NURSE NOTES:

Bed Bath given, am labs performed. Cooling measures given. Patient remains slightly sedated.

## 2021-01-06 NOTE — NUR
NURSE NOTES:

Patient repositioned and given oral care. Patient remains sedated RASS -2, patient still has 
low grade fever of 99.9, cooling measures ongoing. NAD at this time.

## 2021-01-06 NOTE — NUR
NURSE NOTES:



Updated Dr Travis regarding patient's current condition including NA level and current IVF. 
He ordered to keep the same IVF with the same rate. Will continue the order.

## 2021-01-06 NOTE — NUR
NURSE HAND-OFF REPORT: 



Latest Vital Signs: Temperature 99.7 , Pulse 127 , B/P 129 /76 , Respiratory Rate 31 , O2 
SAT 92 , Mechanical Ventilator, O2 Flow Rate  .  

Vital Sign Comment: 



EKG Rhythm: Sinus Tachycardia

Rhythm change?: N 

MD Notified?: -

MD Response: 



Latest Singh Fall Score: 50  

Fall Risk: High Risk 

Safety Measures: Call light Within Reach, Bed Alarm Zone 1, Side Rails Side Rails x2, Bed 
position Low and Locked.

Fall Precautions: 

Yellow Socks

Yellow Gown

Door Sign

Patient Fall Education



Report given to Tesha ARCHER.

## 2021-01-06 NOTE — NUR
NURSE NOTES:



Dr Davis here to see the patient. Updated her with patient's current condition 
including fever. Awaiting new orders to be entered.

## 2021-01-06 NOTE — NUR
NURSE NOTES:

Report received from SUZI Collins. Sedated in bed. RASS -2. Impulsive at times, trying to 
reach ETT. Continued bilateral soft wrist restraints. ST on cardiac monitor with 's. 
Fever 101.5 noted. Will start cooling measures and administer Tylenol PRN. Orally intubated. 
ETT 7.5/23cm at lip line. AC 16, , FiO2 100%, P 10. O2 sat 92-94%. Abdominal muscle 
use noted. Left NGT in place and kept NPO as ordered. Stern in place draining to gravity. IV 
to right FA G18 and right femoral TLC patent and asymptomatic. 1/2 NS is running at 
100cc/hr. Propofol at 15mcg/kg/min. Bed in lowest position. Side rails up x 3. Will resume 
plan of care.

## 2021-01-06 NOTE — INFECTIOUS DISEASES PROG NOTE
Assessment/Plan


Assessment/Plan


antibiotics : ceftriaxone, remdesivir





A


1. COVID-19 pneumonia.


on 100% FiO2 with saturation of 87 %


2. New-onset diabetes.


3. respiratory failure





P


1. Continue remdesivir day 3.


2. Continue ceftriaxone.


3. start solumedrol


4. d/c dexamethasone


5. 1 dose ivermectin


6. Continue isolation.





Subjective


ROS Limited/Unobtainable:  Yes


Allergies:  


Coded Allergies:  


     No Known Allergies (Unverified , 6/11/19)





Objective





Last 24 Hour Vital Signs








  Date Time  Temp Pulse Resp B/P (MAP) Pulse Ox O2 Delivery O2 Flow Rate FiO2


 


1/6/21 17:00  126 34 142/75 (97) 87   


 


1/6/21 16:00      Mechanical Ventilator  


 


1/6/21 16:00        100


 


1/6/21 16:00 99.0 113 32 138/73 (94) 91   


 


1/6/21 15:35  105      


 


1/6/21 15:27   37 125/77  Mechanical Ventilator  100


 


1/6/21 15:00  103 27 116/74 (88) 95   


 


1/6/21 14:27   28 116/74  Mechanical Ventilator  100


 


1/6/21 14:00  100 26 120/65 (83) 94   


 


1/6/21 13:50  101 30     100


 


1/6/21 13:27   27 120/65  Mechanical Ventilator  100


 


1/6/21 13:00  100 27 118/71 (87) 95   


 


1/6/21 12:27   27 112/69  Mechanical Ventilator  100


 


1/6/21 12:00  100      


 


1/6/21 12:00      Mechanical Ventilator  


 


1/6/21 12:00        100


 


1/6/21 12:00  100 28 120/67 (84) 94   


 


1/6/21 11:27   28 115/72  Mechanical Ventilator  100


 


1/6/21 11:00 99.4 102 28 120/68 (85) 94   


 


1/6/21 10:27   29 119/69  Mechanical Ventilator  100


 


1/6/21 10:00  108 30 109/73 (85) 92   


 


1/6/21 09:27   28 99/69  Mechanical Ventilator  100


 


1/6/21 09:00  120 29 106/70 (82) 93   


 


1/6/21 08:27   33 117/73  Mechanical Ventilator  100


 


1/6/21 08:02 101.6       


 


1/6/21 08:00  123      


 


1/6/21 08:00  127 31 129/76 (93) 92   


 


1/6/21 08:00      Mechanical Ventilator  


 


1/6/21 08:00        100


 


1/6/21 07:30  124 31     100


 


1/6/21 07:27   32 150/76  Mechanical Ventilator  100


 


1/6/21 07:00 101.5 120 30 155/109 (124) 92   


 


1/6/21 06:27   29 134/76  Mechanical Ventilator  100


 


1/6/21 06:00  100 28 118/68 (85) 84   


 


1/6/21 05:33   20 114/79  Mechanical Ventilator  100


 


1/6/21 05:27   29 112/69  Mechanical Ventilator  100


 


1/6/21 05:00  96 29 120/74 (89) 83   


 


1/6/21 04:30  95 27 125/104 (111) 77   


 


1/6/21 04:27   28 110/61  Mechanical Ventilator  100


 


1/6/21 04:00        100


 


1/6/21 04:00 99.6 93 26 106/69 (81) 78   


 


1/6/21 04:00      Mechanical Ventilator  


 


1/6/21 04:00  93      


 


1/6/21 03:30  89 31 121/73 (89) 72   


 


1/6/21 03:27   27 116/69  Mechanical Ventilator  100


 


1/6/21 03:23  93 33     100


 


1/6/21 03:00  92 27 107/67 (80) 74   


 


1/6/21 02:27   26 107/81  Mechanical Ventilator  100


 


1/6/21 02:00  91 27 105/60 (75) 70   


 


1/6/21 01:30  90 26 107/61 (76) 75   


 


1/6/21 01:27   28 102/63  Mechanical Ventilator  100


 


1/6/21 01:00  89 28 110/91 (97) 78   


 


1/6/21 00:30  91 25 101/59 (73) 80   


 


1/6/21 00:27   28 101/59  Mechanical Ventilator  100


 


1/6/21 00:00        100


 


1/6/21 00:00  90      


 


1/6/21 00:00 99.9 92 26 89/62 (71) 80   


 


1/6/21 00:00      Mechanical Ventilator  


 


1/5/21 23:30  95 28 91/66 (74) 83   


 


1/5/21 23:27   28 100/59  Mechanical Ventilator  100


 


1/5/21 23:19  93 29     100


 


1/5/21 23:00  98 27 87/58 (68) 77   


 


1/5/21 22:34 100.5       


 


1/5/21 22:30  99 28 102/60 (74) 80   


 


1/5/21 22:27   28 95/61  Mechanical Ventilator  100


 


1/5/21 22:00  104 30 112/63 (79) 83   


 


1/5/21 21:30  103 30 115/65 (82) 73   


 


1/5/21 21:27   28 98/59  Mechanical Ventilator  100


 


1/5/21 21:00  105 30 99/73 (82) 79   


 


1/5/21 20:45  109 31 118/66 (83) 74   


 


1/5/21 20:30  109 31 120/71 (87) 75   


 


1/5/21 20:27   22 123/58  Mechanical Ventilator 100.0 


 


1/5/21 20:24 101.0 108 32 105/64 (78) 84   


 


1/5/21 20:13   30 125/62  Mechanical Ventilator  100


 


1/5/21 20:00      Mechanical Ventilator  


 


1/5/21 20:00  106      


 


1/5/21 20:00        100


 


1/5/21 20:00  110 33 120/73 (89) 77   


 


1/5/21 19:58   30 121/78  Mechanical Ventilator  100


 


1/5/21 19:30  110 36 119/75 (90) 95   


 


1/5/21 19:21  120 36     100


 


1/5/21 19:00  106 33 121/79 (93) 72   


 


1/5/21 18:32  107 35 109/70 72   


 


1/5/21 18:02  105 33 109/70 80   


 


1/5/21 18:00 99.0 102 34 109/70 (83) 79   








Height (Feet):  5


Height (Inches):  7.00


Weight (Pounds):  198


HEENT:  other - intubated





Laboratory Tests








Test


 1/6/21


00:53 1/6/21


04:00 1/6/21


06:10 1/6/21


11:03


 


POC Whole Blood Glucose Pending    Pending   Pending  


 


White Blood Count


 


 9.5 K/UL


(4.8-10.8) 


 





 


Red Blood Count


 


 4.69 M/UL


(4.70-6.10)  L 


 





 


Hemoglobin


 


 14.3 G/DL


(14.2-18.0) 


 





 


Hematocrit


 


 41.4 %


(42.0-52.0)  L 


 





 


Mean Corpuscular Volume  88 FL (80-99)    


 


Mean Corpuscular Hemoglobin


 


 30.5 PG


(27.0-31.0) 


 





 


Mean Corpuscular Hemoglobin


Concent 


 34.5 G/DL


(32.0-36.0) 


 





 


Red Cell Distribution Width


 


 13.8 %


(11.6-14.8) 


 





 


Platelet Count


 


 172 K/UL


(150-450) 


 





 


Mean Platelet Volume


 


 8.6 FL


(6.5-10.1) 


 





 


Neutrophils (%) (Auto)


 


 % (45.0-75.0)


 


 





 


Lymphocytes (%) (Auto)


 


 % (20.0-45.0)


 


 





 


Monocytes (%) (Auto)   % (1.0-10.0)    


 


Eosinophils (%) (Auto)   % (0.0-3.0)    


 


Basophils (%) (Auto)   % (0.0-2.0)    


 


Differential Total Cells


Counted 


 100  


 


 





 


Neutrophils % (Manual)  91 % (45-75)  H  


 


Lymphocytes % (Manual)  7 % (20-45)  L  


 


Monocytes % (Manual)  2 % (1-10)    


 


Eosinophils % (Manual)  0 % (0-3)    


 


Basophils % (Manual)  0 % (0-2)    


 


Band Neutrophils  0 % (0-8)    


 


Platelet Estimate  Adequate    


 


Platelet Morphology  Normal    


 


Red Blood Cell Morphology  Normal    


 


Sodium Level


 


 135 MMOL/L


(136-145)  L 


 





 


Potassium Level


 


 4.2 MMOL/L


(3.5-5.1) 


 





 


Chloride Level


 


 101 MMOL/L


() 


 





 


Carbon Dioxide Level


 


 29 MMOL/L


(21-32) 


 





 


Anion Gap


 


 5 mmol/L


(5-15) 


 





 


Blood Urea Nitrogen


 


 25 mg/dL


(7-18)  H 


 





 


Creatinine


 


 0.8 MG/DL


(0.55-1.30) 


 





 


Estimat Glomerular Filtration


Rate 


 > 60 mL/min


(>60) 


 





 


Glucose Level


 


 148 MG/DL


()  H 


 





 


Calcium Level


 


 8.4 MG/DL


(8.5-10.1)  L 


 





 


Total Bilirubin


 


 0.6 MG/DL


(0.2-1.0) 


 





 


Direct Bilirubin


 


 0.3 MG/DL


(0.0-0.3) 


 





 


Aspartate Amino Transf


(AST/SGOT) 


 69 U/L (15-37)


H 


 





 


Alanine Aminotransferase


(ALT/SGPT) 


 60 U/L (12-78)


 


 





 


Alkaline Phosphatase


 


 164 U/L


()  H 


 





 


Total Protein


 


 6.4 G/DL


(6.4-8.2) 


 





 


Albumin


 


 2.0 G/DL


(3.4-5.0)  L 


 





 


Globulin  4.4 g/dL    


 


Albumin/Globulin Ratio


 


 0.5 (1.0-2.7)


L 


 














Current Medications








 Medications


  (Trade)  Dose


 Ordered  Sig/Julisa


 Route


 PRN Reason  Start Time


 Stop Time Status Last Admin


Dose Admin


 


 Acetaminophen


  (Tylenol)  650 mg  Q6H  PRN


 ORAL


 Temp >100.5  1/3/21 04:00


 2/2/21 03:59  1/6/21 07:32





 


 Ceftriaxone


 Sodium 1 gm/


 Dextrose  55 ml @ 


 110 mls/hr  Q24H


 IVPB


   1/3/21 10:00


 1/10/21 09:59  1/6/21 10:56





 


 Chlorhexidine


 Gluconate


  (Vanessa-Hex 2%)  1 applic  DAILY@2000


 TOPIC


   1/6/21 20:00


 4/6/21 19:59   





 


 Dexamethasone


 Sodium Phosphate


  (Decadron 10mg/


 ml Inj)  6 mg  DAILY


 IV


   1/3/21 09:00


 1/12/21 09:01  1/6/21 08:21





 


 Dextrose


  (Dextrose 50%)  25 ml  Q30M  PRN


 IV


 Hypoglycemia  1/2/21 18:00


 4/2/21 17:59   





 


 Dextrose


  (Dextrose 50%)  50 ml  Q30M  PRN


 IV


 Hypoglycemia  1/2/21 18:00


 4/2/21 17:59   





 


 Enoxaparin Sodium


  (Lovenox)  80 mg  EVERY 12  HOURS


 SUBQ


   1/2/21 21:00


 4/2/21 20:59  1/6/21 08:22





 


 Haloperidol


  (Haldol)  5 mg  Q6H  PRN


 ORAL


 Agitation  1/4/21 13:45


 2/18/21 13:44  1/4/21 22:56





 


 Haloperidol


 Lactate 5 mg/


 Dextrose  56 ml @ 


 224 mls/hr  Q6H  PRN


 IVPB


 Agitation  1/5/21 12:30


 2/19/21 12:29  1/5/21 13:20





 


 Insulin Aspart


  (NovoLOG)    Q6HR


 SUBQ


   1/2/21 18:00


 4/2/21 17:59  1/6/21 06:22





 


 Lorazepam


  (Ativan 2mg/ml


 1ml)  2 mg  Q4H  PRN


 IV


 For Anxiety IV/PO  1/5/21 00:00


 1/12/21 00:00  1/5/21 18:02





 


 Lorazepam


  (Ativan)  1 mg  THREE TIMES A DAY  PRN


 ORAL


 For Anxiety  1/4/21 05:45


 1/11/21 05:44  1/4/21 22:55





 


 Norepinephrine


 Bitartrate  250 ml @ 


 7.5 mls/hr  Q24H  PRN


 IV


 For hypotension  1/5/21 14:45


 1/8/21 14:45   





 


 Propofol  100 ml @ 0


 mls/hr  Q12H  PRN


 IV


 Agitation  1/5/21 15:27


 1/7/21 15:26  1/6/21 05:33





 


 Quetiapine


 Fumarate


  (SEROqueL)  50 mg  Q12HR


 ORAL


   1/4/21 21:00


 2/18/21 20:59  1/6/21 08:21





 


 Remdesivir 100 mg/


 Sodium Chloride  250 ml @ 


 250 mls/hr  Q24H


 IV


   1/5/21 20:00


 1/8/21 20:59  1/5/21 20:28





 


 Sodium Chloride  1,000 ml @ 


 100 mls/hr  Q10H


 IV


   1/3/21 07:45


 2/2/21 07:44  1/6/21 15:00




















Ashley Davis MD       Jan 6, 2021 17:15

## 2021-01-06 NOTE — GENERAL PROGRESS NOTE
Subjective


Allergies:  


Coded Allergies:  


     No Known Allergies (Unverified , 6/11/19)


Subjective


on ventilator


unresponsive


in icu





Objective





Last 24 Hour Vital Signs








  Date Time  Temp Pulse Resp B/P (MAP) Pulse Ox O2 Delivery O2 Flow Rate FiO2


 


1/6/21 17:00  126 34 142/75 (97) 87   


 


1/6/21 16:00      Mechanical Ventilator  


 


1/6/21 16:00        100


 


1/6/21 16:00 99.0 113 32 138/73 (94) 91   


 


1/6/21 15:35  105      


 


1/6/21 15:27   37 125/77  Mechanical Ventilator  100


 


1/6/21 15:00  103 27 116/74 (88) 95   


 


1/6/21 14:27   28 116/74  Mechanical Ventilator  100


 


1/6/21 14:00  100 26 120/65 (83) 94   


 


1/6/21 13:50  101 30     100


 


1/6/21 13:27   27 120/65  Mechanical Ventilator  100


 


1/6/21 13:00  100 27 118/71 (87) 95   


 


1/6/21 12:27   27 112/69  Mechanical Ventilator  100


 


1/6/21 12:00  100      


 


1/6/21 12:00      Mechanical Ventilator  


 


1/6/21 12:00        100


 


1/6/21 12:00  100 28 120/67 (84) 94   


 


1/6/21 11:27   28 115/72  Mechanical Ventilator  100


 


1/6/21 11:00 99.4 102 28 120/68 (85) 94   


 


1/6/21 10:27   29 119/69  Mechanical Ventilator  100


 


1/6/21 10:00  108 30 109/73 (85) 92   


 


1/6/21 09:27   28 99/69  Mechanical Ventilator  100


 


1/6/21 09:00  120 29 106/70 (82) 93   


 


1/6/21 08:27   33 117/73  Mechanical Ventilator  100


 


1/6/21 08:02 101.6       


 


1/6/21 08:00  123      


 


1/6/21 08:00  127 31 129/76 (93) 92   


 


1/6/21 08:00      Mechanical Ventilator  


 


1/6/21 08:00        100


 


1/6/21 07:30  124 31     100


 


1/6/21 07:27   32 150/76  Mechanical Ventilator  100


 


1/6/21 07:00 101.5 120 30 155/109 (124) 92   


 


1/6/21 06:27   29 134/76  Mechanical Ventilator  100


 


1/6/21 06:00  100 28 118/68 (85) 84   


 


1/6/21 05:33   20 114/79  Mechanical Ventilator  100


 


1/6/21 05:27   29 112/69  Mechanical Ventilator  100


 


1/6/21 05:00  96 29 120/74 (89) 83   


 


1/6/21 04:30  95 27 125/104 (111) 77   


 


1/6/21 04:27   28 110/61  Mechanical Ventilator  100


 


1/6/21 04:00        100


 


1/6/21 04:00 99.6 93 26 106/69 (81) 78   


 


1/6/21 04:00      Mechanical Ventilator  


 


1/6/21 04:00  93      


 


1/6/21 03:30  89 31 121/73 (89) 72   


 


1/6/21 03:27   27 116/69  Mechanical Ventilator  100


 


1/6/21 03:23  93 33     100


 


1/6/21 03:00  92 27 107/67 (80) 74   


 


1/6/21 02:27   26 107/81  Mechanical Ventilator  100


 


1/6/21 02:00  91 27 105/60 (75) 70   


 


1/6/21 01:30  90 26 107/61 (76) 75   


 


1/6/21 01:27   28 102/63  Mechanical Ventilator  100


 


1/6/21 01:00  89 28 110/91 (97) 78   


 


1/6/21 00:30  91 25 101/59 (73) 80   


 


1/6/21 00:27   28 101/59  Mechanical Ventilator  100


 


1/6/21 00:00        100


 


1/6/21 00:00  90      


 


1/6/21 00:00 99.9 92 26 89/62 (71) 80   


 


1/6/21 00:00      Mechanical Ventilator  


 


1/5/21 23:30  95 28 91/66 (74) 83   


 


1/5/21 23:27   28 100/59  Mechanical Ventilator  100


 


1/5/21 23:19  93 29     100


 


1/5/21 23:00  98 27 87/58 (68) 77   


 


1/5/21 22:34 100.5       


 


1/5/21 22:30  99 28 102/60 (74) 80   


 


1/5/21 22:27   28 95/61  Mechanical Ventilator  100


 


1/5/21 22:00  104 30 112/63 (79) 83   


 


1/5/21 21:30  103 30 115/65 (82) 73   


 


1/5/21 21:27   28 98/59  Mechanical Ventilator  100


 


1/5/21 21:00  105 30 99/73 (82) 79   


 


1/5/21 20:45  109 31 118/66 (83) 74   


 


1/5/21 20:30  109 31 120/71 (87) 75   


 


1/5/21 20:27   22 123/58  Mechanical Ventilator 100.0 


 


1/5/21 20:24 101.0 108 32 105/64 (78) 84   


 


1/5/21 20:13   30 125/62  Mechanical Ventilator  100


 


1/5/21 20:00      Mechanical Ventilator  


 


1/5/21 20:00  106      


 


1/5/21 20:00        100


 


1/5/21 20:00  110 33 120/73 (89) 77   


 


1/5/21 19:58   30 121/78  Mechanical Ventilator  100


 


1/5/21 19:30  110 36 119/75 (90) 95   


 


1/5/21 19:21  120 36     100


 


1/5/21 19:00  106 33 121/79 (93) 72   


 


1/5/21 18:32  107 35 109/70 72   


 


1/5/21 18:02  105 33 109/70 80   


 


1/5/21 18:00 99.0 102 34 109/70 (83) 79   

















Intake and Output  


 


 1/5/21 1/6/21





 19:00 07:00


 


Intake Total 900 ml 1584.350 ml


 


Output Total 240 ml 1615 ml


 


Balance 660 ml -30.650 ml


 


  


 


Free Water  50 ml


 


IV Total 900 ml 1534.350 ml


 


Output Urine Total 240 ml 1615 ml








Laboratory Tests


1/6/21 00:53: POC Whole Blood Glucose [Pending]


1/6/21 04:00: 


White Blood Count 9.5, Red Blood Count 4.69L, Hemoglobin 14.3, Hematocrit 41.4L,

 Mean Corpuscular Volume 88, Mean Corpuscular Hemoglobin 30.5, Mean Corpuscular 

Hemoglobin Concent 34.5, Red Cell Distribution Width 13.8, Platelet Count 172, M

thu Platelet Volume 8.6, Neutrophils (%) (Auto) , Lymphocytes (%) (Auto) , 

Monocytes (%) (Auto) , Eosinophils (%) (Auto) , Basophils (%) (Auto) , 

Differential Total Cells Counted 100, Neutrophils % (Manual) 91H, Lymphocytes % 

(Manual) 7L, Monocytes % (Manual) 2, Eosinophils % (Manual) 0, Basophils % 

(Manual) 0, Band Neutrophils 0, Platelet Estimate Adequate, Platelet Morphology 

Normal, Red Blood Cell Morphology Normal, Sodium Level 135L, Potassium Level 

4.2, Chloride Level 101, Carbon Dioxide Level 29, Anion Gap 5, Blood Urea 

Nitrogen 25H, Creatinine 0.8, Estimat Glomerular Filtration Rate > 60, Glucose 

Level 148H, Calcium Level 8.4L, Total Bilirubin 0.6, Direct Bilirubin 0.3, 

Aspartate Amino Transf (AST/SGOT) 69H, Alanine Aminotransferase (ALT/SGPT) 60, 

Alkaline Phosphatase 164H, Total Protein 6.4, Albumin 2.0L, Globulin 4.4, 

Albumin/Globulin Ratio 0.5L


1/6/21 06:10: POC Whole Blood Glucose [Pending]


1/6/21 11:03: POC Whole Blood Glucose [Pending]


Height (Feet):  5


Height (Inches):  7.00


Weight (Pounds):  198


General Appearance:  lethargic


Neck:  supple


Cardiovascular:  regular rhythm


Respiratory/Chest:  rhonchi - bilaterally


Abdomen:  non tender, soft


Extremities:  non-tender





Assessment/Plan


Assessment/Plan:


covid pna


ac resp distress on ventilator


etoh abused


agitated -halodol


cont on bipap at icu


cont iv abx and iv decadrone


pulmonary and gi on consult











Moi Travis MD              Jan 6, 2021 17:06

## 2021-01-06 NOTE — NUR
NURSE NOTES:



Turned and repositioned patient. Continued cooling measures. RASS -2. Will keep Propofol at 
15mcg/kg/min. Will continue to monitor.

## 2021-01-06 NOTE — NUR
NURSE NOTES:



Cleaned and repositioned patient. Abdominal muscle use while breathing with RR 30's noted. 
O2 sat 96-97%. Will continue to monitor.

## 2021-01-06 NOTE — NUR
CASE MANAGEMENT:REVIEW



1/6/21

SI: COVID PNA. INTUBATED 

99.6   96  29  120/74  SATS~ 83% ON VENT SUPPORT W/100% FIO2

BUN+25   GLUCOSE+148





IS: IV REMDESIVIR Q24 (4/5)

IV ROCEPHIN Q24

IV DECADRON Q24

IVF@100/HR

LOVENOX SQ Q12

**: ICU STATUS

DCP: FROM HOME

## 2021-01-06 NOTE — CONSULTATION
History of Present Illness


General


Date patient seen:  Jan 6, 2021


Reason for Hospitalization:  Flu Like Symptoms





Present Illness


HPI


65-year-old male Covid positive currently intensive care unit [facility] on support.  Recent decline status post intravenous catheter insertion.  

Continues to have abnormal labs elevated LFTs and remains critically ill 

intensive care unit on ventilator support.  Surgery called to evaluate assist 

with care.  Patient seen patient by chart reviewed.  Oozing noted from line 

placement. as report c/o abd pain on admission


Allergies:  


Coded Allergies:  


     No Known Allergies (Unverified , 6/11/19)





COVID-19 Screening


Contact w/high risk pt:  Yes


Experienced COVID-19 symptoms?:  Yes


Coronavirus symptoms experienc:  Shortness of Breath, Cough





Medication History


Scheduled


No Known Medications* (NKM - No Known Medications*), 0 ., (Reported)





Scheduled PRN


Dextromethorphan Hb/Doxylamine (Vicks Nyquil Cough Liquid), 236 ML PO EVERY 6 

HOURS PRN for For Cough, (Reported)





Patient History


Limited by:  medical condition


History Provided By:  Medical Record, PMD


Healthcare decision maker





Resuscitation status





Advanced Directive on File








Past Medical/Surgical History


Past Medical/Surgical History:  


(1) Abdominal pain


(2) Acute metabolic encephalopathy


(3) Hypoxia


(4) Pneumonia due to COVID-19 virus





Review of Systems


Review of Symptoms


General ROS: no weight loss or fever


Psychological ROS: no depression or mood changes, no memory loss


Ophthalmic ROS: no visual changes or eye irritation


ENT ROS: no nasal congestion, hearing loss, dizziness


Allergy and Immunology ROS: no allergic symptoms or urticaria


Hematological and Lymphatic ROS: no swollen glands, unusual bleeding or bruising


Endocrine ROS: no polyuria, polydipsia, weight changes, temperature intolerance


Respiratory ROS: no cough, shortness of breath, or wheezing


Cardiovascular ROS: no chest pain or dyspnea on exertion


Gastrointestinal ROS: denies abdominal pain, bright red blood in stool.


Musculoskeletal ROS: no myalgias or arthralgias


Neurological ROS: no TIA or stroke symptoms


Dermatological ROS: no new or changing skin lesions, rashes or pruritis


limited given condition





Physical Exam


Physical Exam


General appearance:  mod distress, appears stated age


Head:  Normocephalic, without obvious abnormality, atraumatic


Eyes:  conjunctivae/corneas clear. PERRL, EOM's intact. Fundi benign


Throat:  Lips, mucosa, and tongue normal. Teeth and gums normal


Neck:  supple, symmetrical, trachea midline, no adenopathy, thyroid: not 

enlarged, symmetric, no tenderness/mass/nodules, no carotid bruit and no JVD


Lungs:  dec to auscultation bilaterally ett on vent


Heart:  regular rate and rhythm, S1, S2 normal, no murmur, click, rub or gallop


Abdomen:  soft, non-tender. Bowel sounds normal. No masses,  no organomegaly


Extremities:  extremities normal, atraumatic, no cyanosis or edema


Pulses:  2+ and symmetric


Skin:  Skin color, texture, turgor normal. No rashes or lesions


Neurologic:  Grossly normal





Last 24 Hour Vital Signs








  Date Time  Temp Pulse Resp B/P (MAP) Pulse Ox O2 Delivery O2 Flow Rate FiO2


 


1/6/21 11:27   28 115/72  Mechanical Ventilator  100


 


1/6/21 11:00 99.4 102 28 120/68 (85) 94   


 


1/6/21 10:27   29 119/69  Mechanical Ventilator  100


 


1/6/21 10:00  108 30 109/73 (85) 92   


 


1/6/21 09:27   28 99/69  Mechanical Ventilator  100


 


1/6/21 09:00  120 29 106/70 (82) 93   


 


1/6/21 08:27   33 117/73  Mechanical Ventilator  100


 


1/6/21 08:02 101.6       


 


1/6/21 08:00  127 31 129/76 (93) 92   


 


1/6/21 08:00      Mechanical Ventilator  


 


1/6/21 08:00        100


 


1/6/21 07:27   32 150/76  Mechanical Ventilator  100


 


1/6/21 07:00 101.5 120 30 155/109 (124) 92   


 


1/6/21 06:27   29 134/76  Mechanical Ventilator  100


 


1/6/21 06:00  100 28 118/68 (85) 84   


 


1/6/21 05:33   20 114/79  Mechanical Ventilator  100


 


1/6/21 05:27   29 112/69  Mechanical Ventilator  100


 


1/6/21 05:00  96 29 120/74 (89) 83   


 


1/6/21 04:30  95 27 125/104 (111) 77   


 


1/6/21 04:27   28 110/61  Mechanical Ventilator  100


 


1/6/21 04:00        100


 


1/6/21 04:00 99.6 93 26 106/69 (81) 78   


 


1/6/21 04:00      Mechanical Ventilator  


 


1/6/21 04:00  93      


 


1/6/21 03:30  89 31 121/73 (89) 72   


 


1/6/21 03:27   27 116/69  Mechanical Ventilator  100


 


1/6/21 03:23  93 33     100


 


1/6/21 03:00  92 27 107/67 (80) 74   


 


1/6/21 02:27   26 107/81  Mechanical Ventilator  100


 


1/6/21 02:00  91 27 105/60 (75) 70   


 


1/6/21 01:30  90 26 107/61 (76) 75   


 


1/6/21 01:27   28 102/63  Mechanical Ventilator  100


 


1/6/21 01:00  89 28 110/91 (97) 78   


 


1/6/21 00:30  91 25 101/59 (73) 80   


 


1/6/21 00:27   28 101/59  Mechanical Ventilator  100


 


1/6/21 00:00        100


 


1/6/21 00:00  90      


 


1/6/21 00:00 99.9 92 26 89/62 (71) 80   


 


1/6/21 00:00      Mechanical Ventilator  


 


1/5/21 23:30  95 28 91/66 (74) 83   


 


1/5/21 23:27   28 100/59  Mechanical Ventilator  100


 


1/5/21 23:19  93 29     100


 


1/5/21 23:00  98 27 87/58 (68) 77   


 


1/5/21 22:34 100.5       


 


1/5/21 22:30  99 28 102/60 (74) 80   


 


1/5/21 22:27   28 95/61  Mechanical Ventilator  100


 


1/5/21 22:00  104 30 112/63 (79) 83   


 


1/5/21 21:30  103 30 115/65 (82) 73   


 


1/5/21 21:27   28 98/59  Mechanical Ventilator  100


 


1/5/21 21:00  105 30 99/73 (82) 79   


 


1/5/21 20:45  109 31 118/66 (83) 74   


 


1/5/21 20:30  109 31 120/71 (87) 75   


 


1/5/21 20:27   22 123/58  Mechanical Ventilator 100.0 


 


1/5/21 20:24 101.0 108 32 105/64 (78) 84   


 


1/5/21 20:13   30 125/62  Mechanical Ventilator  100


 


1/5/21 20:00      Mechanical Ventilator  


 


1/5/21 20:00  106      


 


1/5/21 20:00        100


 


1/5/21 20:00  110 33 120/73 (89) 77   


 


1/5/21 19:58   30 121/78  Mechanical Ventilator  100


 


1/5/21 19:30  110 36 119/75 (90) 95   


 


1/5/21 19:21  120 36     100


 


1/5/21 19:00  106 33 121/79 (93) 72   


 


1/5/21 18:32  107 35 109/70 72   


 


1/5/21 18:02  105 33 109/70 80   


 


1/5/21 18:00 99.0 102 34 109/70 (83) 79   


 


1/5/21 17:00  102 33 118/72 (87) 79   


 


1/5/21 16:00      Bi-pap  


 


1/5/21 16:00  102      


 


1/5/21 16:00  102 33 106/69 (81) 80   


 


1/5/21 15:51  97 34     100


 


1/5/21 15:44  100 31 109/73 87   


 


1/5/21 15:14  113 32 158/98 81   


 


1/5/21 15:00  110 37 109/73 (85) 80   


 


1/5/21 15:00        100


 


1/5/21 14:00  116 35 119/65 (83) 65   

















Intake and Output  


 


 1/5/21 1/6/21





 19:00 07:00


 


Intake Total 900 ml 1584.350 ml


 


Output Total 240 ml 1615 ml


 


Balance 660 ml -30.650 ml


 


  


 


Free Water  50 ml


 


IV Total 900 ml 1534.350 ml


 


Output Urine Total 240 ml 1615 ml











Laboratory Tests








Test


 1/5/21


14:10 1/6/21


00:53 1/6/21


04:00 1/6/21


06:10


 


Arterial Blood pH


 7.359


(7.350-7.450) 


 


 





 


Arterial Blood Partial


Pressure CO2 51.7 mmHg


(35.0-45.0)  H 


 


 





 


Arterial Blood Partial


Pressure O2 34.9 mmHg


(75.0-100.0) 


 


 





 


Arterial Blood HCO3


 28.5 mmol/L


(22.0-26.0)  H 


 


 





 


Arterial Blood Oxygen


Saturation 68.4 %


()  *L 


 


 





 


Arterial Blood Base Excess 2.0 (-2-2)     


 


Abelardo Test Positive     


 


POC Whole Blood Glucose  Pending    Pending  


 


White Blood Count


 


 


 9.5 K/UL


(4.8-10.8) 





 


Red Blood Count


 


 


 4.69 M/UL


(4.70-6.10)  L 





 


Hemoglobin


 


 


 14.3 G/DL


(14.2-18.0) 





 


Hematocrit


 


 


 41.4 %


(42.0-52.0)  L 





 


Mean Corpuscular Volume   88 FL (80-99)   


 


Mean Corpuscular Hemoglobin


 


 


 30.5 PG


(27.0-31.0) 





 


Mean Corpuscular Hemoglobin


Concent 


 


 34.5 G/DL


(32.0-36.0) 





 


Red Cell Distribution Width


 


 


 13.8 %


(11.6-14.8) 





 


Platelet Count


 


 


 172 K/UL


(150-450) 





 


Mean Platelet Volume


 


 


 8.6 FL


(6.5-10.1) 





 


Neutrophils (%) (Auto)


 


 


 % (45.0-75.0)


 





 


Lymphocytes (%) (Auto)


 


 


 % (20.0-45.0)


 





 


Monocytes (%) (Auto)    % (1.0-10.0)   


 


Eosinophils (%) (Auto)    % (0.0-3.0)   


 


Basophils (%) (Auto)    % (0.0-2.0)   


 


Differential Total Cells


Counted 


 


 100  


 





 


Neutrophils % (Manual)   91 % (45-75)  H 


 


Lymphocytes % (Manual)   7 % (20-45)  L 


 


Monocytes % (Manual)   2 % (1-10)   


 


Eosinophils % (Manual)   0 % (0-3)   


 


Basophils % (Manual)   0 % (0-2)   


 


Band Neutrophils   0 % (0-8)   


 


Platelet Estimate   Adequate   


 


Platelet Morphology   Normal   


 


Red Blood Cell Morphology   Normal   


 


Sodium Level


 


 


 135 MMOL/L


(136-145)  L 





 


Potassium Level


 


 


 4.2 MMOL/L


(3.5-5.1) 





 


Chloride Level


 


 


 101 MMOL/L


() 





 


Carbon Dioxide Level


 


 


 29 MMOL/L


(21-32) 





 


Anion Gap


 


 


 5 mmol/L


(5-15) 





 


Blood Urea Nitrogen


 


 


 25 mg/dL


(7-18)  H 





 


Creatinine


 


 


 0.8 MG/DL


(0.55-1.30) 





 


Estimat Glomerular Filtration


Rate 


 


 > 60 mL/min


(>60) 





 


Glucose Level


 


 


 148 MG/DL


()  H 





 


Calcium Level


 


 


 8.4 MG/DL


(8.5-10.1)  L 





 


Total Bilirubin


 


 


 0.6 MG/DL


(0.2-1.0) 





 


Direct Bilirubin


 


 


 0.3 MG/DL


(0.0-0.3) 





 


Aspartate Amino Transf


(AST/SGOT) 


 


 69 U/L (15-37)


H 





 


Alanine Aminotransferase


(ALT/SGPT) 


 


 60 U/L (12-78)


 





 


Alkaline Phosphatase


 


 


 164 U/L


()  H 





 


Total Protein


 


 


 6.4 G/DL


(6.4-8.2) 





 


Albumin


 


 


 2.0 G/DL


(3.4-5.0)  L 





 


Globulin   4.4 g/dL   


 


Albumin/Globulin Ratio


 


 


 0.5 (1.0-2.7)


L 





 


Test


 1/6/21


11:03 


 


 





 


POC Whole Blood Glucose Pending     








Height (Feet):  5


Height (Inches):  7.00


Weight (Pounds):  198


Medications





Current Medications








 Medications


  (Trade)  Dose


 Ordered  Sig/Julisa


 Route


 PRN Reason  Start Time


 Stop Time Status Last Admin


Dose Admin


 


 Acetaminophen


  (Tylenol)  650 mg  Q6H  PRN


 ORAL


 Temp >100.5  1/3/21 04:00


 2/2/21 03:59  1/6/21 07:32





 


 Ceftriaxone


 Sodium 1 gm/


 Dextrose  55 ml @ 


 110 mls/hr  Q24H


 IVPB


   1/3/21 10:00


 1/10/21 09:59  1/6/21 10:56





 


 Chlorhexidine


 Gluconate


  (Vanessa-Hex 2%)  1 applic  DAILY@2000


 TOPIC


   1/6/21 20:00


 4/6/21 19:59   





 


 Dexamethasone


 Sodium Phosphate


  (Decadron 10mg/


 ml Inj)  6 mg  DAILY


 IV


   1/3/21 09:00


 1/12/21 09:01  1/6/21 08:21





 


 Dextrose


  (Dextrose 50%)  25 ml  Q30M  PRN


 IV


 Hypoglycemia  1/2/21 18:00


 4/2/21 17:59   





 


 Dextrose


  (Dextrose 50%)  50 ml  Q30M  PRN


 IV


 Hypoglycemia  1/2/21 18:00


 4/2/21 17:59   





 


 Enoxaparin Sodium


  (Lovenox)  80 mg  EVERY 12  HOURS


 SUBQ


   1/2/21 21:00


 4/2/21 20:59  1/6/21 08:22





 


 Haloperidol


  (Haldol)  5 mg  Q6H  PRN


 ORAL


 Agitation  1/4/21 13:45


 2/18/21 13:44  1/4/21 22:56





 


 Haloperidol


 Lactate 5 mg/


 Dextrose  56 ml @ 


 224 mls/hr  Q6H  PRN


 IVPB


 Agitation  1/5/21 12:30


 2/19/21 12:29  1/5/21 13:20





 


 Insulin Aspart


  (NovoLOG)    Q6HR


 SUBQ


   1/2/21 18:00


 4/2/21 17:59  1/6/21 06:22





 


 Lorazepam


  (Ativan 2mg/ml


 1ml)  2 mg  Q4H  PRN


 IV


 For Anxiety IV/PO  1/5/21 00:00


 1/12/21 00:00  1/5/21 18:02





 


 Lorazepam


  (Ativan)  1 mg  THREE TIMES A DAY  PRN


 ORAL


 For Anxiety  1/4/21 05:45


 1/11/21 05:44  1/4/21 22:55





 


 Norepinephrine


 Bitartrate  250 ml @ 


 7.5 mls/hr  Q24H  PRN


 IV


 For hypotension  1/5/21 14:45


 1/8/21 14:45   





 


 Propofol  100 ml @ 0


 mls/hr  Q12H  PRN


 IV


 Agitation  1/5/21 15:27


 1/7/21 15:26  1/6/21 05:33





 


 Quetiapine


 Fumarate


  (SEROqueL)  50 mg  Q12HR


 ORAL


   1/4/21 21:00


 2/18/21 20:59  1/6/21 08:21





 


 Remdesivir 100 mg/


 Sodium Chloride  250 ml @ 


 250 mls/hr  Q24H


 IV


   1/5/21 20:00


 1/8/21 20:59  1/5/21 20:28





 


 Sodium Chloride  1,000 ml @ 


 100 mls/hr  Q10H


 IV


   1/3/21 07:45


 2/2/21 07:44  1/6/21 05:57














Assessment/Plan


Problem List:  


(1) Pneumonia due to COVID-19 virus


Assessment & Plan:  Interim endotracheal intubation, endotracheal tube tip in 

good position


approximately 4 cm above the tito. There are extensive bilateral infiltrates, 

which


are markedly increased from the prior study. Pleural spaces are probably clear, 

not


well demonstrated


Markedly increased and now extensive bilateral infiltrates 


cont as per pulm and iID


ICD Codes:  U07.1 - COVID-19; J12.82 - Pneumonia due to coronavirus disease 2019


SNOMED:  585292506290849486


(2) Hypoxia


ICD Codes:  R09.02 - Hypoxemia


SNOMED:  080375032


(3) Abdominal pain


Assessment & Plan:  65M abd pain, septic, covid, intubated ons upport


currently abd exam benign but limited given condition


line bo mary noted


okay for meds and feeds


nutritional optimization 


DAILY ESTIMATED NEEDS:


Needs based on Critical Care/ 73kg abw 


22-28  kcals/kg 


5799-0696  total kcals


1.2-2  g protein/kg


  g total protein 


25-30  mL/kg


3810-9454  total fluid mLs





NUTRITION DIAGNOSIS:


Swallowing difficulty R/T respiratory failure as evidenced by pt now


orally intubated, OGT in place, NPO





 


CURRENT TF:NPO 





 





ENTERAL NUTRITION RECOMMENDATIONS:


Vital AF 1.2 @ 60ml/hr x 24 hrs  to provide 1440ml, 1728kcal, 116g prot, 1167ml 

free water 





* As medically appropriate, initiate critical care and carb controlled TF 

formula of Vital AF 1.2


* Initiate @ 20ml/hr x 6hrs, advance 10ml q 4-6 hrs as tolerated to goal rate


* HOB over 30 degrees/ water flush per MD


* Does not exceed est kcal needs w/ Propofol running @ 8.083ml/hr x 24 hrs 

(provides 213 lipid kcal)





 





ADDITIONAL RECOMMENDATIONS:


* Calibrated bedscale wt 


* Monitor BGs closely w/ Decadron and TF, need for long acting insulin 


* Monitor lytes, replete as needed  


* Monitor Propofol rate, need to adjust TF rate


ICD Codes:  R10.9 - Unspecified abdominal pain


SNOMED:  38582093


(4) Acute metabolic encephalopathy


ICD Codes:  G93.41 - Metabolic encephalopathy


SNOMED:  89184435, 556016754











Norberto Vazquez                 Jan 6, 2021 13:14

## 2021-01-07 VITALS — SYSTOLIC BLOOD PRESSURE: 108 MMHG | DIASTOLIC BLOOD PRESSURE: 71 MMHG

## 2021-01-07 VITALS — SYSTOLIC BLOOD PRESSURE: 120 MMHG | DIASTOLIC BLOOD PRESSURE: 72 MMHG

## 2021-01-07 VITALS — DIASTOLIC BLOOD PRESSURE: 69 MMHG | SYSTOLIC BLOOD PRESSURE: 153 MMHG

## 2021-01-07 VITALS — DIASTOLIC BLOOD PRESSURE: 74 MMHG | SYSTOLIC BLOOD PRESSURE: 110 MMHG

## 2021-01-07 VITALS — DIASTOLIC BLOOD PRESSURE: 81 MMHG | SYSTOLIC BLOOD PRESSURE: 110 MMHG

## 2021-01-07 VITALS — DIASTOLIC BLOOD PRESSURE: 68 MMHG | SYSTOLIC BLOOD PRESSURE: 148 MMHG

## 2021-01-07 VITALS — DIASTOLIC BLOOD PRESSURE: 72 MMHG | SYSTOLIC BLOOD PRESSURE: 114 MMHG

## 2021-01-07 VITALS — DIASTOLIC BLOOD PRESSURE: 72 MMHG | SYSTOLIC BLOOD PRESSURE: 102 MMHG

## 2021-01-07 VITALS — DIASTOLIC BLOOD PRESSURE: 75 MMHG | SYSTOLIC BLOOD PRESSURE: 118 MMHG

## 2021-01-07 VITALS — SYSTOLIC BLOOD PRESSURE: 145 MMHG | DIASTOLIC BLOOD PRESSURE: 87 MMHG

## 2021-01-07 VITALS — SYSTOLIC BLOOD PRESSURE: 100 MMHG | DIASTOLIC BLOOD PRESSURE: 74 MMHG

## 2021-01-07 VITALS — DIASTOLIC BLOOD PRESSURE: 77 MMHG | SYSTOLIC BLOOD PRESSURE: 107 MMHG

## 2021-01-07 VITALS — SYSTOLIC BLOOD PRESSURE: 128 MMHG | DIASTOLIC BLOOD PRESSURE: 80 MMHG

## 2021-01-07 VITALS — DIASTOLIC BLOOD PRESSURE: 68 MMHG | SYSTOLIC BLOOD PRESSURE: 115 MMHG

## 2021-01-07 VITALS — DIASTOLIC BLOOD PRESSURE: 72 MMHG | SYSTOLIC BLOOD PRESSURE: 122 MMHG

## 2021-01-07 VITALS — SYSTOLIC BLOOD PRESSURE: 131 MMHG | DIASTOLIC BLOOD PRESSURE: 69 MMHG

## 2021-01-07 VITALS — SYSTOLIC BLOOD PRESSURE: 109 MMHG | DIASTOLIC BLOOD PRESSURE: 67 MMHG

## 2021-01-07 VITALS — SYSTOLIC BLOOD PRESSURE: 123 MMHG | DIASTOLIC BLOOD PRESSURE: 70 MMHG

## 2021-01-07 VITALS — SYSTOLIC BLOOD PRESSURE: 141 MMHG | DIASTOLIC BLOOD PRESSURE: 118 MMHG

## 2021-01-07 VITALS — SYSTOLIC BLOOD PRESSURE: 115 MMHG | DIASTOLIC BLOOD PRESSURE: 81 MMHG

## 2021-01-07 VITALS — DIASTOLIC BLOOD PRESSURE: 70 MMHG | SYSTOLIC BLOOD PRESSURE: 124 MMHG

## 2021-01-07 VITALS — SYSTOLIC BLOOD PRESSURE: 119 MMHG | DIASTOLIC BLOOD PRESSURE: 73 MMHG

## 2021-01-07 VITALS — SYSTOLIC BLOOD PRESSURE: 150 MMHG | DIASTOLIC BLOOD PRESSURE: 86 MMHG

## 2021-01-07 VITALS — DIASTOLIC BLOOD PRESSURE: 75 MMHG | SYSTOLIC BLOOD PRESSURE: 138 MMHG

## 2021-01-07 VITALS — SYSTOLIC BLOOD PRESSURE: 154 MMHG | DIASTOLIC BLOOD PRESSURE: 116 MMHG

## 2021-01-07 VITALS — DIASTOLIC BLOOD PRESSURE: 76 MMHG | SYSTOLIC BLOOD PRESSURE: 127 MMHG

## 2021-01-07 VITALS — SYSTOLIC BLOOD PRESSURE: 110 MMHG | DIASTOLIC BLOOD PRESSURE: 81 MMHG

## 2021-01-07 VITALS — SYSTOLIC BLOOD PRESSURE: 153 MMHG | DIASTOLIC BLOOD PRESSURE: 91 MMHG

## 2021-01-07 VITALS — SYSTOLIC BLOOD PRESSURE: 106 MMHG | DIASTOLIC BLOOD PRESSURE: 67 MMHG

## 2021-01-07 VITALS — SYSTOLIC BLOOD PRESSURE: 130 MMHG | DIASTOLIC BLOOD PRESSURE: 84 MMHG

## 2021-01-07 VITALS — DIASTOLIC BLOOD PRESSURE: 85 MMHG | SYSTOLIC BLOOD PRESSURE: 118 MMHG

## 2021-01-07 VITALS — DIASTOLIC BLOOD PRESSURE: 74 MMHG | SYSTOLIC BLOOD PRESSURE: 129 MMHG

## 2021-01-07 VITALS — SYSTOLIC BLOOD PRESSURE: 115 MMHG | DIASTOLIC BLOOD PRESSURE: 77 MMHG

## 2021-01-07 VITALS — DIASTOLIC BLOOD PRESSURE: 70 MMHG | SYSTOLIC BLOOD PRESSURE: 102 MMHG

## 2021-01-07 VITALS — SYSTOLIC BLOOD PRESSURE: 104 MMHG | DIASTOLIC BLOOD PRESSURE: 72 MMHG

## 2021-01-07 VITALS — DIASTOLIC BLOOD PRESSURE: 73 MMHG | SYSTOLIC BLOOD PRESSURE: 115 MMHG

## 2021-01-07 VITALS — SYSTOLIC BLOOD PRESSURE: 120 MMHG | DIASTOLIC BLOOD PRESSURE: 74 MMHG

## 2021-01-07 VITALS — SYSTOLIC BLOOD PRESSURE: 113 MMHG | DIASTOLIC BLOOD PRESSURE: 76 MMHG

## 2021-01-07 VITALS — DIASTOLIC BLOOD PRESSURE: 73 MMHG | SYSTOLIC BLOOD PRESSURE: 116 MMHG

## 2021-01-07 VITALS — SYSTOLIC BLOOD PRESSURE: 108 MMHG | DIASTOLIC BLOOD PRESSURE: 61 MMHG

## 2021-01-07 VITALS — DIASTOLIC BLOOD PRESSURE: 61 MMHG | SYSTOLIC BLOOD PRESSURE: 96 MMHG

## 2021-01-07 VITALS — SYSTOLIC BLOOD PRESSURE: 135 MMHG | DIASTOLIC BLOOD PRESSURE: 73 MMHG

## 2021-01-07 VITALS — DIASTOLIC BLOOD PRESSURE: 73 MMHG | SYSTOLIC BLOOD PRESSURE: 107 MMHG

## 2021-01-07 VITALS — SYSTOLIC BLOOD PRESSURE: 153 MMHG | DIASTOLIC BLOOD PRESSURE: 96 MMHG

## 2021-01-07 VITALS — DIASTOLIC BLOOD PRESSURE: 71 MMHG | SYSTOLIC BLOOD PRESSURE: 118 MMHG

## 2021-01-07 VITALS — SYSTOLIC BLOOD PRESSURE: 127 MMHG | DIASTOLIC BLOOD PRESSURE: 75 MMHG

## 2021-01-07 VITALS — DIASTOLIC BLOOD PRESSURE: 76 MMHG | SYSTOLIC BLOOD PRESSURE: 126 MMHG

## 2021-01-07 VITALS — SYSTOLIC BLOOD PRESSURE: 117 MMHG | DIASTOLIC BLOOD PRESSURE: 72 MMHG

## 2021-01-07 LAB
ADD MANUAL DIFF: YES
ALBUMIN SERPL-MCNC: 1.7 G/DL (ref 3.4–5)
ALBUMIN/GLOB SERPL: 0.4 {RATIO} (ref 1–2.7)
ALP SERPL-CCNC: 142 U/L (ref 46–116)
ALT SERPL-CCNC: 38 U/L (ref 12–78)
ANION GAP SERPL CALC-SCNC: 7 MMOL/L (ref 5–15)
AST SERPL-CCNC: 27 U/L (ref 15–37)
BILIRUB DIRECT SERPL-MCNC: 0.3 MG/DL (ref 0–0.3)
BILIRUB SERPL-MCNC: 0.5 MG/DL (ref 0.2–1)
BUN SERPL-MCNC: 22 MG/DL (ref 7–18)
CALCIUM SERPL-MCNC: 8.3 MG/DL (ref 8.5–10.1)
CHLORIDE SERPL-SCNC: 99 MMOL/L (ref 98–107)
CO2 SERPL-SCNC: 27 MMOL/L (ref 21–32)
CREAT SERPL-MCNC: 0.7 MG/DL (ref 0.55–1.3)
ERYTHROCYTE [DISTWIDTH] IN BLOOD BY AUTOMATED COUNT: 12.8 % (ref 11.6–14.8)
GLOBULIN SER-MCNC: 4.5 G/DL
HCT VFR BLD CALC: 42.5 % (ref 42–52)
HGB BLD-MCNC: 14 G/DL (ref 14.2–18)
MCV RBC AUTO: 93 FL (ref 80–99)
PLATELET # BLD: 223 K/UL (ref 150–450)
POTASSIUM SERPL-SCNC: 4.6 MMOL/L (ref 3.5–5.1)
RBC # BLD AUTO: 4.58 M/UL (ref 4.7–6.1)
SODIUM SERPL-SCNC: 133 MMOL/L (ref 136–145)
TRIGL SERPL-MCNC: 82 MG/DL (ref 30–150)
WBC # BLD AUTO: 9.7 K/UL (ref 4.8–10.8)

## 2021-01-07 RX ADMIN — INSULIN ASPART SCH UNITS: 100 INJECTION, SOLUTION INTRAVENOUS; SUBCUTANEOUS at 06:00

## 2021-01-07 RX ADMIN — INSULIN ASPART SCH UNITS: 100 INJECTION, SOLUTION INTRAVENOUS; SUBCUTANEOUS at 13:28

## 2021-01-07 RX ADMIN — ENOXAPARIN SODIUM SCH MG: 80 INJECTION SUBCUTANEOUS at 20:44

## 2021-01-07 RX ADMIN — METHYLPREDNISOLONE SODIUM SUCCINATE SCH MG: 40 INJECTION, POWDER, LYOPHILIZED, FOR SOLUTION INTRAMUSCULAR; INTRAVENOUS at 20:43

## 2021-01-07 RX ADMIN — MIDAZOLAM HYDROCHLORIDE PRN MLS/HR: 5 INJECTION INTRAMUSCULAR; INTRAVENOUS at 22:48

## 2021-01-07 RX ADMIN — PROPOFOL PRN MLS/HR: 10 INJECTION, EMULSION INTRAVENOUS at 04:07

## 2021-01-07 RX ADMIN — ENOXAPARIN SODIUM SCH MG: 80 INJECTION SUBCUTANEOUS at 09:51

## 2021-01-07 RX ADMIN — INSULIN ASPART SCH UNITS: 100 INJECTION, SOLUTION INTRAVENOUS; SUBCUTANEOUS at 18:18

## 2021-01-07 RX ADMIN — CHLORHEXIDINE GLUCONATE SCH APPLIC: 213 SOLUTION TOPICAL at 20:41

## 2021-01-07 RX ADMIN — SODIUM CHLORIDE SCH MLS/HR: 0.9 INJECTION INTRAVENOUS at 09:50

## 2021-01-07 RX ADMIN — PROPOFOL PRN MLS/HR: 10 INJECTION, EMULSION INTRAVENOUS at 13:13

## 2021-01-07 RX ADMIN — METHYLPREDNISOLONE SODIUM SUCCINATE SCH MG: 40 INJECTION, POWDER, LYOPHILIZED, FOR SOLUTION INTRAMUSCULAR; INTRAVENOUS at 09:50

## 2021-01-07 NOTE — NUR
NURSE HAND-OFF REPORT: 



Latest Vital Signs: Temperature 99.8 , Pulse 114 , B/P 126 /76 , Respiratory Rate 35 , O2 
SAT 97 , Mechanical Ventilator, O2 Flow Rate  .  

Vital Sign Comment: 



EKG Rhythm: Sinus Tachycardia

Rhythm change?: N 

MD Notified?: -

MD Response: 



Latest Singh Fall Score: 60  

Fall Risk: High Risk 

Safety Measures: Call light Within Reach, Bed Alarm Zone 1, Side Rails Side Rails x3, Bed 
position Low and Locked.

Fall Precautions: 

Yellow Socks

Yellow Gown

Door Sign

Patient Fall Education



Report given to SUZI Montes.

## 2021-01-07 NOTE — NUR
NURSE NOTES:



Dr. Lemos updated on the patient status this morning. made aware of the patient ABG 
results from this morning. 

no changed to the ventilator setting given and to maintain current setting. also to maintain 
the patient on propofol to sedated to a RASS scale of -2. chest x-ray reviewed and will 
place order for lasix drip. no verbal orders given at this time.

## 2021-01-07 NOTE — SURGERY PROGRESS NOTE
Surgery Progress Note


Subjective


Procedure Performed


Left femoral central venous catheter insertion


Additional Comments


no acute events


ill appearing on support


no n/v


labs noted


exam stable 


line okay 


no bleeding





Objective





Last 24 Hour Vital Signs








  Date Time  Temp Pulse Resp B/P (MAP) Pulse Ox O2 Delivery O2 Flow Rate FiO2


 


1/7/21 11:00   26 118/85  Mechanical Ventilator  100


 


1/7/21 11:00  93 32     100


 


1/7/21 10:30   28 154/116  Mechanical Ventilator  100


 


1/7/21 10:00   29 106/67  Mechanical Ventilator  100


 


1/7/21 09:00   29 109/67  Mechanical Ventilator  100


 


1/7/21 08:30   25 119/70  Mechanical Ventilator  100


 


1/7/21 08:00  92      


 


1/7/21 08:00        100


 


1/7/21 08:00 97.2 98 27 123/70 (87) 97   


 


1/7/21 08:00   29 123/70  Mechanical Ventilator  100


 


1/7/21 08:00      Mechanical Ventilator  


 


1/7/21 07:30  93 21 153/96 (115) 99   


 


1/7/21 07:10  97 30     100


 


1/7/21 07:00  94 21 153/69 (97) 96   


 


1/7/21 06:45  94 23 153/91 (111) 95   


 


1/7/21 06:30  95 23 148/68 (94) 97   


 


1/7/21 06:27   29 151/103  Mechanical Ventilator  100


 


1/7/21 06:00  95 23 130/84 (99) 96   


 


1/7/21 05:30  100 27 127/75 (92) 95   


 


1/7/21 05:27   28 155/101  Mechanical Ventilator  100


 


1/7/21 05:00  105 28 138/75 (96) 95   


 


1/7/21 04:30  101 28 150/86 (107) 98   


 


1/7/21 04:27   28 136/69  Mechanical Ventilator  100


 


1/7/21 04:07   27 110/74  Mechanical Ventilator  100


 


1/7/21 04:00  99      


 


1/7/21 04:00      Mechanical Ventilator  


 


1/7/21 04:00 97.9 84 23 110/74 (86) 98   


 


1/7/21 04:00        100


 


1/7/21 03:30  83 22 107/73 (84) 98   


 


1/7/21 03:27   28 125/89  Mechanical Ventilator  100


 


1/7/21 03:00  83 26 107/77 (87) 98   


 


1/7/21 02:30  85 22 102/72 (82) 97   


 


1/7/21 02:27   28 149/80  Mechanical Ventilator  100


 


1/7/21 02:00  89 21 104/72 (83) 99   


 


1/7/21 01:56  89 28     100


 


1/7/21 01:30  96 21 145/87 (106) 100   


 


1/7/21 01:27   28 136/58  Mechanical Ventilator  100


 


1/7/21 01:00  100 30 141/118 (126) 92   


 


1/7/21 01:00   28 113/62  Mechanical Ventilator  100


 


1/7/21 00:30  89 22 115/77 (90) 99   


 


1/7/21 00:27   28 125/75  Mechanical Ventilator  100


 


1/7/21 00:00        100


 


1/7/21 00:00 98.0 88 22 115/81 (92) 98   


 


1/7/21 00:00      Mechanical Ventilator  


 


1/7/21 00:00  100      


 


1/6/21 23:30  96 22 122/75 (91) 99   


 


1/6/21 23:27   28 126/80  Mechanical Ventilator  100


 


1/6/21 23:00  111 24 114/99 (104) 97   


 


1/6/21 22:30  114 30 154/89 (110) 89   


 


1/6/21 22:27   28 119/75  Mechanical Ventilator  100


 


1/6/21 22:00  96 25 112/75 (87) 99   


 


1/6/21 21:30  99 28 115/70 (85) 99   


 


1/6/21 21:27   30 133/69  Mechanical Ventilator  100


 


1/6/21 21:00  105 27 112/67 (82) 98   


 


1/6/21 20:31  108 28 112/67 98   


 


1/6/21 20:30  110 29 112/67 (82) 96   


 


1/6/21 20:27   28 126/75  Mechanical Ventilator  100


 


1/6/21 20:01  115 36 117/78 100   


 


1/6/21 20:00      Mechanical Ventilator  


 


1/6/21 20:00  108      


 


1/6/21 20:00 99.9 113 29 113/71 (85) 98   


 


1/6/21 20:00        100


 


1/6/21 19:58  102 27     100


 


1/6/21 19:30  117 30 106/65 (79) 97   


 


1/6/21 19:27   30 106/69  Mechanical Ventilator  100


 


1/6/21 19:00  121 30 101/68 (79) 97   


 


1/6/21 18:27   31 126/70  Mechanical Ventilator  100


 


1/6/21 18:27 99.1       


 


1/6/21 18:00  123 31 126/70 (88) 97   


 


1/6/21 17:50   29 114/71  Mechanical Ventilator  100


 


1/6/21 17:27   35 131/74  Mechanical Ventilator  100


 


1/6/21 17:00  126 34 142/75 (97) 87   


 


1/6/21 16:27   39 138/120  Mechanical Ventilator  100


 


1/6/21 16:00      Mechanical Ventilator  


 


1/6/21 16:00        100


 


1/6/21 16:00 99.0 113 32 138/73 (94) 91   


 


1/6/21 15:35  105      


 


1/6/21 15:27   37 125/77  Mechanical Ventilator  100


 


1/6/21 15:00  103 27 116/74 (88) 95   


 


1/6/21 14:27   28 116/74  Mechanical Ventilator  100


 


1/6/21 14:00  100 26 120/65 (83) 94   


 


1/6/21 13:50  101 30     100


 


1/6/21 13:27   27 120/65  Mechanical Ventilator  100


 


1/6/21 13:00  100 27 118/71 (87) 95   








I&O











Intake and Output  


 


 1/6/21 1/7/21





 19:00 07:00


 


Intake Total 1401.996 ml 1605.079 ml


 


Output Total 530 ml 780 ml


 


Balance 871.996 ml 825.079 ml


 


  


 


Free Water  50 ml


 


IV Total 1351.996 ml 1555.079 ml


 


Other 50 ml 


 


Output Urine Total 530 ml 780 ml








Dressing:  saturated


Cardiovascular:  RSR


Respiratory:  decreased breath sounds, other


Abdomen:  soft, non-tender, present bowel sounds, non-distended


Extremities:  no cyanosis





Laboratory Tests








Test


 1/6/21


18:03 1/6/21


23:33 1/7/21


03:40 1/7/21


06:58


 


POC Whole Blood Glucose Pending   Pending    Pending  


 


White Blood Count


 


 


 9.7 K/UL


(4.8-10.8) 





 


Red Blood Count


 


 


 4.58 M/UL


(4.70-6.10)  L 





 


Hemoglobin


 


 


 14.0 G/DL


(14.2-18.0)  L 





 


Hematocrit


 


 


 42.5 %


(42.0-52.0) 





 


Mean Corpuscular Volume   93 FL (80-99)   


 


Mean Corpuscular Hemoglobin


 


 


 30.5 PG


(27.0-31.0) 





 


Mean Corpuscular Hemoglobin


Concent 


 


 32.9 G/DL


(32.0-36.0) 





 


Red Cell Distribution Width


 


 


 12.8 %


(11.6-14.8) 





 


Platelet Count


 


 


 223 K/UL


(150-450) 





 


Mean Platelet Volume


 


 


 9.1 FL


(6.5-10.1) 





 


Neutrophils (%) (Auto)


 


 


 % (45.0-75.0)


 





 


Lymphocytes (%) (Auto)


 


 


 % (20.0-45.0)


 





 


Monocytes (%) (Auto)    % (1.0-10.0)   


 


Eosinophils (%) (Auto)    % (0.0-3.0)   


 


Basophils (%) (Auto)    % (0.0-2.0)   


 


Neutrophils % (Manual)   Pending   


 


Lymphocytes % (Manual)   Pending   


 


Platelet Estimate   Pending   


 


Platelet Morphology   Pending   


 


Sodium Level


 


 


 133 MMOL/L


(136-145)  L 





 


Potassium Level


 


 


 4.6 MMOL/L


(3.5-5.1) 





 


Chloride Level


 


 


 99 MMOL/L


() 





 


Carbon Dioxide Level


 


 


 27 MMOL/L


(21-32) 





 


Anion Gap


 


 


 7 mmol/L


(5-15) 





 


Blood Urea Nitrogen


 


 


 22 mg/dL


(7-18)  H 





 


Creatinine


 


 


 0.7 MG/DL


(0.55-1.30) 





 


Estimat Glomerular Filtration


Rate 


 


 > 60 mL/min


(>60) 





 


Glucose Level


 


 


 196 MG/DL


()  H 





 


Calcium Level


 


 


 8.3 MG/DL


(8.5-10.1)  L 





 


Total Bilirubin


 


 


 0.5 MG/DL


(0.2-1.0) 





 


Direct Bilirubin


 


 


 0.3 MG/DL


(0.0-0.3) 





 


Aspartate Amino Transf


(AST/SGOT) 


 


 27 U/L (15-37)


 





 


Alanine Aminotransferase


(ALT/SGPT) 


 


 38 U/L (12-78)


 





 


Alkaline Phosphatase


 


 


 142 U/L


()  H 





 


Total Protein


 


 


 6.2 G/DL


(6.4-8.2)  L 





 


Albumin


 


 


 1.7 G/DL


(3.4-5.0)  L 





 


Globulin   4.5 g/dL   


 


Albumin/Globulin Ratio


 


 


 0.4 (1.0-2.7)


L 





 


Triglycerides Level


 


 


 82 MG/DL


() 





 


Test


 1/7/21


08:12 


 


 





 


Arterial Blood pH


 7.320


(7.350-7.450) 


 


 





 


Arterial Blood Partial


Pressure CO2 49.7 mmHg


(35.0-45.0)  H 


 


 





 


Arterial Blood Partial


Pressure O2 58.1 mmHg


(75.0-100.0)  L 


 


 





 


Arterial Blood HCO3


 25.0 mmol/L


(22.0-26.0) 


 


 





 


Arterial Blood Oxygen


Saturation 89.4 %


()  *L 


 


 





 


Arterial Blood Base Excess -1.7 (-2-2)     


 


Abelardo Test Positive     











Plan


Problems:  


(1) Pneumonia due to COVID-19 virus


Assessment & Plan:  Interim endotracheal intubation, endotracheal tube tip in 

good position


approximately 4 cm above the tito. There are extensive bilateral infiltrates, 

which


are markedly increased from the prior study. Pleural spaces are probably clear, 

not


well demonstrated


Markedly increased and now extensive bilateral infiltrates 


cont as per pulm and iID





(2) Hypoxia


(3) Abdominal pain


Assessment & Plan:  65M abd pain, septic, covid, intubated ons upport


currently abd exam benign but limited given condition


line bo mary noted


okay for meds and feeds


nutritional optimization 


DAILY ESTIMATED NEEDS:


Needs based on Critical Care/ 72kg abw 


22-28  kcals/kg 


9168-2905  total kcals


1.2-2  g protein/kg


  g total protein 


25-30  mL/kg


4551-8691  total fluid mLs





NUTRITION DIAGNOSIS:


Swallowing difficulty R/T respiratory failure as evidenced by pt now


orally intubated, OGT in place, NPO





 


CURRENT TF:NPO 





 





ENTERAL NUTRITION RECOMMENDATIONS:


Vital AF 1.2 @ 60ml/hr x 24 hrs  to provide 1440ml, 1728kcal, 116g prot, 1167ml 

free water 





* As medically appropriate, initiate critical care and carb controlled TF 

formula of Vital AF 1.2


* Initiate @ 20ml/hr x 6hrs, advance 10ml q 4-6 hrs as tolerated to goal rate


* HOB over 30 degrees/ water flush per MD


* Does not exceed est kcal needs w/ Propofol running @ 8.083ml/hr x 24 hrs 

(provides 213 lipid kcal)





 





ADDITIONAL RECOMMENDATIONS:


* Calibrated bedscale wt 


* Monitor BGs closely w/ Decadron and TF, need for long acting insulin 


* Monitor lytes, replete as needed  


* Monitor Propofol rate, need to adjust TF rate  





(4) Acute metabolic encephalopathy











Norberto Vazquez                 Jan 7, 2021 12:29

## 2021-01-07 NOTE — GENERAL PROGRESS NOTE
Subjective


Allergies:  


Coded Allergies:  


     No Known Allergies (Unverified , 6/11/19)


Subjective


on ventilator


unresponsive


in icu





Objective





Last 24 Hour Vital Signs








  Date Time  Temp Pulse Resp B/P (MAP) Pulse Ox O2 Delivery O2 Flow Rate FiO2


 


1/7/21 14:55  111 27     100


 


1/7/21 13:13   27 115/73  Mechanical Ventilator  100


 


1/7/21 13:00  95 23 115/73 (87) 99   


 


1/7/21 12:30  92 23 102/70 (81) 98   


 


1/7/21 12:00        100


 


1/7/21 12:00  93      


 


1/7/21 12:00      Mechanical Ventilator  


 


1/7/21 12:00 99.2 92 21 96/61 (73) 98   


 


1/7/21 11:30  94 20 108/61 (77) 99   


 


1/7/21 11:00   26 118/85  Mechanical Ventilator  100


 


1/7/21 11:00  93 32     100


 


1/7/21 11:00  99 23 118/85 (96) 100   


 


1/7/21 10:30  97 23 154/116 (129) 98   


 


1/7/21 10:30   28 154/116  Mechanical Ventilator  100


 


1/7/21 10:00  84 24 106/67 (80) 99   


 


1/7/21 10:00   29 106/67  Mechanical Ventilator  100


 


1/7/21 09:30  85 23 100/74 (83) 99   


 


1/7/21 09:00   29 109/67  Mechanical Ventilator  100


 


1/7/21 09:00  85 24 109/67 (81) 100   


 


1/7/21 08:30  92 23 110/74 (86) 98   


 


1/7/21 08:30   25 119/70  Mechanical Ventilator  100


 


1/7/21 08:00  92      


 


1/7/21 08:00        100


 


1/7/21 08:00 97.2 98 27 123/70 (87) 97   


 


1/7/21 08:00   29 123/70  Mechanical Ventilator  100


 


1/7/21 08:00      Mechanical Ventilator  


 


1/7/21 07:30  93 21 153/96 (115) 99   


 


1/7/21 07:10  97 30     100


 


1/7/21 07:00  94 21 153/69 (97) 96   


 


1/7/21 06:45  94 23 153/91 (111) 95   


 


1/7/21 06:30  95 23 148/68 (94) 97   


 


1/7/21 06:27   29 151/103  Mechanical Ventilator  100


 


1/7/21 06:00  95 23 130/84 (99) 96   


 


1/7/21 05:30  100 27 127/75 (92) 95   


 


1/7/21 05:27   28 155/101  Mechanical Ventilator  100


 


1/7/21 05:00  105 28 138/75 (96) 95   


 


1/7/21 04:30  101 28 150/86 (107) 98   


 


1/7/21 04:27   28 136/69  Mechanical Ventilator  100


 


1/7/21 04:07   27 110/74  Mechanical Ventilator  100


 


1/7/21 04:00  99      


 


1/7/21 04:00      Mechanical Ventilator  


 


1/7/21 04:00 97.9 84 23 110/74 (86) 98   


 


1/7/21 04:00        100


 


1/7/21 03:30  83 22 107/73 (84) 98   


 


1/7/21 03:27   28 125/89  Mechanical Ventilator  100


 


1/7/21 03:00  83 26 107/77 (87) 98   


 


1/7/21 02:30  85 22 102/72 (82) 97   


 


1/7/21 02:27   28 149/80  Mechanical Ventilator  100


 


1/7/21 02:00  89 21 104/72 (83) 99   


 


1/7/21 01:56  89 28     100


 


1/7/21 01:30  96 21 145/87 (106) 100   


 


1/7/21 01:27   28 136/58  Mechanical Ventilator  100


 


1/7/21 01:00  100 30 141/118 (126) 92   


 


1/7/21 01:00   28 113/62  Mechanical Ventilator  100


 


1/7/21 00:30  89 22 115/77 (90) 99   


 


1/7/21 00:27   28 125/75  Mechanical Ventilator  100


 


1/7/21 00:00        100


 


1/7/21 00:00 98.0 88 22 115/81 (92) 98   


 


1/7/21 00:00      Mechanical Ventilator  


 


1/7/21 00:00  100      


 


1/6/21 23:30  96 22 122/75 (91) 99   


 


1/6/21 23:27   28 126/80  Mechanical Ventilator  100


 


1/6/21 23:00  111 24 114/99 (104) 97   


 


1/6/21 22:30  114 30 154/89 (110) 89   


 


1/6/21 22:27   28 119/75  Mechanical Ventilator  100


 


1/6/21 22:00  96 25 112/75 (87) 99   


 


1/6/21 21:30  99 28 115/70 (85) 99   


 


1/6/21 21:27   30 133/69  Mechanical Ventilator  100


 


1/6/21 21:00  105 27 112/67 (82) 98   


 


1/6/21 20:31  108 28 112/67 98   


 


1/6/21 20:30  110 29 112/67 (82) 96   


 


1/6/21 20:27   28 126/75  Mechanical Ventilator  100


 


1/6/21 20:01  115 36 117/78 100   


 


1/6/21 20:00      Mechanical Ventilator  


 


1/6/21 20:00  108      


 


1/6/21 20:00 99.9 113 29 113/71 (85) 98   


 


1/6/21 20:00        100


 


1/6/21 19:58  102 27     100


 


1/6/21 19:30  117 30 106/65 (79) 97   


 


1/6/21 19:27   30 106/69  Mechanical Ventilator  100


 


1/6/21 19:00  121 30 101/68 (79) 97   


 


1/6/21 18:27   31 126/70  Mechanical Ventilator  100


 


1/6/21 18:27 99.1       


 


1/6/21 18:00  123 31 126/70 (88) 97   


 


1/6/21 17:50   29 114/71  Mechanical Ventilator  100


 


1/6/21 17:27   35 131/74  Mechanical Ventilator  100


 


1/6/21 17:00  126 34 142/75 (97) 87   

















Intake and Output  


 


 1/6/21 1/7/21





 19:00 07:00


 


Intake Total 1401.996 ml 1605.079 ml


 


Output Total 530 ml 780 ml


 


Balance 871.996 ml 825.079 ml


 


  


 


Free Water  50 ml


 


IV Total 1351.996 ml 1555.079 ml


 


Other 50 ml 


 


Output Urine Total 530 ml 780 ml








Laboratory Tests


1/6/21 18:03: POC Whole Blood Glucose [Pending]


1/6/21 23:33: POC Whole Blood Glucose [Pending]


1/7/21 03:40: 


White Blood Count 9.7, Red Blood Count 4.58L, Hemoglobin 14.0L, Hematocrit 42.5,

 Mean Corpuscular Volume 93, Mean Corpuscular Hemoglobin 30.5, Mean Corpuscular 

Hemoglobin Concent 32.9, Red Cell Distribution Width 12.8, Platelet Count 223, 

Mean Platelet Volume 9.1, Neutrophils (%) (Auto) , Lymphocytes (%) (Auto) , 

Monocytes (%) (Auto) , Eosinophils (%) (Auto) , Basophils (%) (Auto) , 

Differential Total Cells Counted 100, Neutrophils % (Manual) 93H, Lymphocytes % 

(Manual) 4L, Monocytes % (Manual) 3, Eosinophils % (Manual) 0, Basophils % 

(Manual) 0, Band Neutrophils 0, Platelet Estimate Adequate, Platelet Morphology 

Normal, Red Blood Cell Morphology Normal, Sodium Level 133L, Potassium Level 

4.6, Chloride Level 99, Carbon Dioxide Level 27, Anion Gap 7, Blood Urea 

Nitrogen 22H, Creatinine 0.7, Estimat Glomerular Filtration Rate > 60, Glucose 

Level 196H, Calcium Level 8.3L, Total Bilirubin 0.5, Direct Bilirubin 0.3, 

Aspartate Amino Transf (AST/SGOT) 27, Alanine Aminotransferase (ALT/SGPT) 38, 

Alkaline Phosphatase 142H, Total Protein 6.2L, Albumin 1.7L, Globulin 4.5, 

Albumin/Globulin Ratio 0.4L, Triglycerides Level 82


1/7/21 06:58: POC Whole Blood Glucose [Pending]


1/7/21 08:12: 


Arterial Blood pH 7.320L, Arterial Blood Partial Pressure CO2 49.7H, Arterial 

Blood Partial Pressure O2 58.1L, Arterial Blood HCO3 25.0, Arterial Blood Oxygen

 Saturation 89.4*L, Arterial Blood Base Excess -1.7, Abelardo Test Positive


1/7/21 13:19: POC Whole Blood Glucose 256H


Height (Feet):  5


Height (Inches):  7.00


Weight (Pounds):  198


General Appearance:  no apparent distress


Neck:  supple


Cardiovascular:  regular rhythm


Respiratory/Chest:  crackles/rales


Abdomen:  non tender, soft


Extremities:  non-tender





Assessment/Plan


Assessment/Plan:


covid pna


ac resp distress on ventilator


etoh abused


agitated -halodol


cont on bipap at icu


cont iv abx and iv decadrone


pulmonary and gi on consult











Moi Travis MD              Jan 7, 2021 16:27

## 2021-01-07 NOTE — NUR
NURSE NOTES:

Patient repositioned and given oral care, patient doing well and remains RASS -2. SpO2 100%. 
BP stable.

## 2021-01-07 NOTE — PULMONOLOGY PROGRESS NOTE
Subjective


ROS Limited/Unobtainable:  Yes


Interval Events:  Seen in ICU, chest x-ray done shows complete whiteout 

bilaterally


Constitutional:  Reports: fever


HEENT:  Repors: no symptoms


Respiratory:  Reports: shortness of breath - better


Cardiovascular:  Reports: no symptoms


Gastrointestinal/Abdominal:  Reports: no symptoms


Allergies:  


Coded Allergies:  


     No Known Allergies (Unverified , 6/11/19)





Objective





Last 24 Hour Vital Signs








  Date Time  Temp Pulse Resp B/P (MAP) Pulse Ox O2 Delivery O2 Flow Rate FiO2


 


1/7/21 08:00        100


 


1/7/21 08:00 97.2 98 27 123/70 (87) 97   


 


1/7/21 08:00      Mechanical Ventilator  


 


1/7/21 07:30  93 21 153/96 (115) 99   


 


1/7/21 07:10  97 30     100


 


1/7/21 07:00  94 21 153/69 (97) 96   


 


1/7/21 06:45  94 23 153/91 (111) 95   


 


1/7/21 06:30  95 23 148/68 (94) 97   


 


1/7/21 06:27   29 151/103  Mechanical Ventilator  100


 


1/7/21 06:00  95 23 130/84 (99) 96   


 


1/7/21 05:30  100 27 127/75 (92) 95   


 


1/7/21 05:27   28 155/101  Mechanical Ventilator  100


 


1/7/21 05:00  105 28 138/75 (96) 95   


 


1/7/21 04:30  101 28 150/86 (107) 98   


 


1/7/21 04:27   28 136/69  Mechanical Ventilator  100


 


1/7/21 04:07   27 110/74  Mechanical Ventilator  100


 


1/7/21 04:00  99      


 


1/7/21 04:00      Mechanical Ventilator  


 


1/7/21 04:00 97.9 84 23 110/74 (86) 98   


 


1/7/21 04:00        100


 


1/7/21 03:30  83 22 107/73 (84) 98   


 


1/7/21 03:27   28 125/89  Mechanical Ventilator  100


 


1/7/21 03:00  83 26 107/77 (87) 98   


 


1/7/21 02:30  85 22 102/72 (82) 97   


 


1/7/21 02:27   28 149/80  Mechanical Ventilator  100


 


1/7/21 02:00  89 21 104/72 (83) 99   


 


1/7/21 01:56  89 28     100


 


1/7/21 01:30  96 21 145/87 (106) 100   


 


1/7/21 01:27   28 136/58  Mechanical Ventilator  100


 


1/7/21 01:00  100 30 141/118 (126) 92   


 


1/7/21 01:00   28 113/62  Mechanical Ventilator  100


 


1/7/21 00:30  89 22 115/77 (90) 99   


 


1/7/21 00:27   28 125/75  Mechanical Ventilator  100


 


1/7/21 00:00        100


 


1/7/21 00:00 98.0 88 22 115/81 (92) 98   


 


1/7/21 00:00      Mechanical Ventilator  


 


1/7/21 00:00  100      


 


1/6/21 23:30  96 22 122/75 (91) 99   


 


1/6/21 23:27   28 126/80  Mechanical Ventilator  100


 


1/6/21 23:00  111 24 114/99 (104) 97   


 


1/6/21 22:30  114 30 154/89 (110) 89   


 


1/6/21 22:27   28 119/75  Mechanical Ventilator  100


 


1/6/21 22:00  96 25 112/75 (87) 99   


 


1/6/21 21:30  99 28 115/70 (85) 99   


 


1/6/21 21:27   30 133/69  Mechanical Ventilator  100


 


1/6/21 21:00  105 27 112/67 (82) 98   


 


1/6/21 20:31  108 28 112/67 98   


 


1/6/21 20:30  110 29 112/67 (82) 96   


 


1/6/21 20:27   28 126/75  Mechanical Ventilator  100


 


1/6/21 20:01  115 36 117/78 100   


 


1/6/21 20:00      Mechanical Ventilator  


 


1/6/21 20:00  108      


 


1/6/21 20:00 99.9 113 29 113/71 (85) 98   


 


1/6/21 20:00        100


 


1/6/21 19:58  102 27     100


 


1/6/21 19:30  117 30 106/65 (79) 97   


 


1/6/21 19:27   30 106/69  Mechanical Ventilator  100


 


1/6/21 19:00  121 30 101/68 (79) 97   


 


1/6/21 18:27   31 126/70  Mechanical Ventilator  100


 


1/6/21 18:27 99.1       


 


1/6/21 18:00  123 31 126/70 (88) 97   


 


1/6/21 17:50   29 114/71  Mechanical Ventilator  100


 


1/6/21 17:27   35 131/74  Mechanical Ventilator  100


 


1/6/21 17:00  126 34 142/75 (97) 87   


 


1/6/21 16:27   39 138/120  Mechanical Ventilator  100


 


1/6/21 16:00      Mechanical Ventilator  


 


1/6/21 16:00        100


 


1/6/21 16:00 99.0 113 32 138/73 (94) 91   


 


1/6/21 15:35  105      


 


1/6/21 15:27   37 125/77  Mechanical Ventilator  100


 


1/6/21 15:00  103 27 116/74 (88) 95   


 


1/6/21 14:27   28 116/74  Mechanical Ventilator  100


 


1/6/21 14:00  100 26 120/65 (83) 94   


 


1/6/21 13:50  101 30     100


 


1/6/21 13:27   27 120/65  Mechanical Ventilator  100


 


1/6/21 13:00  100 27 118/71 (87) 95   


 


1/6/21 12:27   27 112/69  Mechanical Ventilator  100


 


1/6/21 12:00  100      


 


1/6/21 12:00      Mechanical Ventilator  


 


1/6/21 12:00        100


 


1/6/21 12:00  100 28 120/67 (84) 94   


 


1/6/21 11:27   28 115/72  Mechanical Ventilator  100


 


1/6/21 11:00 99.4 102 28 120/68 (85) 94   


 


1/6/21 10:27   29 119/69  Mechanical Ventilator  100


 


1/6/21 10:00  108 30 109/73 (85) 92   

















Intake and Output  


 


 1/6/21 1/7/21





 19:00 07:00


 


Intake Total 1401.996 ml 1496.996 ml


 


Output Total 530 ml 780 ml


 


Balance 871.996 ml 716.996 ml


 


  


 


Free Water  50 ml


 


IV Total 1351.996 ml 1446.996 ml


 


Other 50 ml 


 


Output Urine Total 530 ml 780 ml








Objective


On propofol drip now.


General Appearance:  no acute distress


HEENT:  atraumatic


Respiratory:  decreased breath sounds


Cardiovascular:  normal rate, regular rhythm


Abdomen:  soft, non tender


Laboratory Tests


1/6/21 11:03: POC Whole Blood Glucose [Pending]


1/6/21 18:03: POC Whole Blood Glucose [Pending]


1/6/21 23:33: POC Whole Blood Glucose [Pending]


1/7/21 03:40: 


White Blood Count 9.7, Red Blood Count 4.58L, Hemoglobin 14.0L, Hematocrit 42.5,

 Mean Corpuscular Volume 93, Mean Corpuscular Hemoglobin 30.5, Mean Corpuscular 

Hemoglobin Concent 32.9, Red Cell Distribution Width 12.8, Platelet Count 223, 

Mean Platelet Volume 9.1, Neutrophils (%) (Auto) , Lymphocytes (%) (Auto) , M

onocytes (%) (Auto) , Eosinophils (%) (Auto) , Basophils (%) (Auto) , 

Neutrophils % (Manual) [Pending], Lymphocytes % (Manual) [Pending], Platelet 

Estimate [Pending], Platelet Morphology [Pending], Sodium Level 133L, Potassium 

Level 4.6, Chloride Level 99, Carbon Dioxide Level 27, Anion Gap 7, Blood Urea 

Nitrogen 22H, Creatinine 0.7, Estimat Glomerular Filtration Rate > 60, Glucose 

Level 196H, Calcium Level 8.3L, Total Bilirubin 0.5, Direct Bilirubin 0.3, 

Aspartate Amino Transf (AST/SGOT) 27, Alanine Aminotransferase (ALT/SGPT) 38, 

Alkaline Phosphatase 142H, Total Protein 6.2L, Albumin 1.7L, Globulin 4.5, 

Albumin/Globulin Ratio 0.4L, Triglycerides Level 82


1/7/21 06:58: POC Whole Blood Glucose [Pending]


1/7/21 08:12: 


Arterial Blood pH 7.320L, Arterial Blood Partial Pressure CO2 49.7H, Arterial 

Blood Partial Pressure O2 58.1L, Arterial Blood HCO3 25.0, Arterial Blood Oxygen

Saturation 89.4*L, Arterial Blood Base Excess -1.7, Abelardo Test Positive





Current Medications








 Medications


  (Trade)  Dose


 Ordered  Sig/Julisa


 Route


 PRN Reason  Start Time


 Stop Time Status Last Admin


Dose Admin


 


 Acetaminophen


  (Tylenol)  650 mg  Q6H  PRN


 ORAL


 Temp >100.5  1/3/21 04:00


 2/2/21 03:59  1/6/21 17:57





 


 Ceftriaxone


 Sodium 1 gm/


 Dextrose  55 ml @ 


 110 mls/hr  Q24H


 IVPB


   1/3/21 10:00


 1/10/21 09:59  1/6/21 10:56





 


 Chlorhexidine


 Gluconate


  (Vanessa-Hex 2%)  1 applic  DAILY@2000


 TOPIC


   1/6/21 20:00


 4/6/21 19:59  1/6/21 20:00





 


 Dextrose


  (Dextrose 50%)  25 ml  Q30M  PRN


 IV


 Hypoglycemia  1/2/21 18:00


 4/2/21 17:59   





 


 Dextrose


  (Dextrose 50%)  50 ml  Q30M  PRN


 IV


 Hypoglycemia  1/2/21 18:00


 4/2/21 17:59   





 


 Enoxaparin Sodium


  (Lovenox)  80 mg  EVERY 12  HOURS


 SUBQ


   1/2/21 21:00


 4/2/21 20:59  1/6/21 20:01





 


 Haloperidol


  (Haldol)  5 mg  Q6H  PRN


 ORAL


 Agitation  1/4/21 13:45


 2/18/21 13:44  1/4/21 22:56





 


 Haloperidol


 Lactate 5 mg/


 Dextrose  56 ml @ 


 224 mls/hr  Q6H  PRN


 IVPB


 Agitation  1/5/21 12:30


 2/19/21 12:29  1/5/21 13:20





 


 Insulin Aspart


  (NovoLOG)    Q6HR


 SUBQ


   1/2/21 18:00


 4/2/21 17:59  1/7/21 06:00





 


 Lorazepam


  (Ativan 2mg/ml


 1ml)  2 mg  Q4H  PRN


 IV


 For Anxiety IV/PO  1/5/21 00:00


 1/12/21 00:00  1/6/21 20:01





 


 Lorazepam


  (Ativan)  1 mg  THREE TIMES A DAY  PRN


 ORAL


 For Anxiety  1/4/21 05:45


 1/11/21 05:44  1/4/21 22:55





 


 Methylprednisolone


 Sodium Succinate


  (Solu-MEDROL)  40 mg  EVERY 12  HOURS


 IVP


   1/6/21 21:00


 4/6/21 20:59  1/6/21 20:04





 


 Norepinephrine


 Bitartrate  250 ml @ 


 7.5 mls/hr  Q24H  PRN


 IV


 For hypotension  1/5/21 14:45


 1/8/21 14:45   





 


 Propofol  100 ml @ 0


 mls/hr  Q12H  PRN


 IV


 Agitation  1/5/21 15:27


 1/7/21 15:26  1/7/21 04:07





 


 Quetiapine


 Fumarate


  (SEROqueL)  50 mg  Q12HR


 ORAL


   1/4/21 21:00


 2/18/21 20:59  1/6/21 20:00





 


 Remdesivir 100 mg/


 Sodium Chloride  250 ml @ 


 250 mls/hr  Q24H


 IV


   1/5/21 20:00


 1/8/21 20:59  1/6/21 20:00





 


 Sodium Chloride  1,000 ml @ 


 100 mls/hr  Q10H


 IV


   1/3/21 07:45


 2/2/21 07:44  1/7/21 02:00














Assessment/Plan


Assessment/Plan


1. COVID-19 pneumonia.


   - on Decadron, azithromycin, and ceftriaxone


   - Intubated now; will continue AC mode for now


          -Cuff leak noted.  Further air instilled ABG reviewed. 


2. Diabetes mellitus.; 


3. Elevated inflammatory markers.


4. High D-dimer.


5. Hyponatremia.


6. Hyperglycemia.


   - BG control


7. Hypoxemia., secondary to #1; improved





DVT prophylaxis   On Lovenox.


On propofol drip





I will initiate Lasix drip.


May need pressors.











Sung Lemos MD            Jan 7, 2021 09:44

## 2021-01-07 NOTE — NUR
NURSE NOTES:



Dr. Felix updated at the bedside of the patient WBC of 9.7 and temperature of 97.2 taken 
axillary. 

no orders given at this time.

## 2021-01-07 NOTE — NUR
NURSE NOTES:



Lasix drip started at rate of 10ml/hr at dose of 10mg/hr. 

urine is noted to be dark myrna.

## 2021-01-07 NOTE — NUR
NURSE NOTES:

Glucose noted to be 236, insulin coverage given.Patient repositioned and oral care 
performed. Patient temperature now is 98.0F (Rectal). ROM performed, Stern care performed.

## 2021-01-07 NOTE — NUR
NURSE NOTES:

Received patient from [facility]. patient is sedated without any acute distress noted. sinus 
tachycardia on the monitor. patient is intubated ETT size 7.5, 23cm at the lip. vent 
settings AC 16  FiO2 100% PEEP 10, tolerating well. NGT on the left nare is in place. 
bilateral soft restraints on. RIGHT femoral TLC is dry and intact. currently sedated with 
versed at 8mg/hr. lasix drip @10ml/hr. 1/2NS @25ml/hr. bed to lowest position and locked. 
Call light within easy reach. Will continue plan of care.

## 2021-01-07 NOTE — DIAGNOSTIC IMAGING REPORT
Indication: Cough

 

Technique: One view of the chest

 

Comparison: 1/5/2021

 

Findings: Bilateral infiltrates are unchanged. Stable endotracheal tube position.

Interim orogastric tube placement, tip projected at the level of the gastric fundus.

 

Impression: Satisfactory orogastric tube placement. Otherwise unchanged, over 2 days,

findings as above.

## 2021-01-07 NOTE — NUR
NURSE NOTES:



Propofol stopped and started versed drip at 1mg/hr at rate of 2ml/hr, 

patient remains on Lasix drip for fluid removal from the lungs. saturations remain at 
97-99%. 

restraints remains on bilateral wrist for reaching towards that ET-tube and while versed 
drip is adjusted.

## 2021-01-07 NOTE — NUR
NURSE NOTES:



Versed drip ordered to be started at 1100 to remove fluid from the lungs as seen in the 
chest x-ray, 

saturations remains at 98-99% with and RR at 22-26.

## 2021-01-07 NOTE — NUR
NURSE NOTES:



currently rass scale is -1 with patient being restless and moving in bed, propofol increased 
to 20mcg/kg/min. 

Respiratory therapist is at the bedside collecting ABG sample. patient repositioned in bed 
with assistance, 

patient hands are restraints for reaching towards the central Line and ET-tube. Current 
ventilator setting are AC 16, TV: 500, Fio2 of 100% and peep of 10.

## 2021-01-07 NOTE — NUR
NURSE NOTES:



versed drip increased to 8mg/hr at rate of 16ml/hr. patient is comfortable and relaxed, 
remains sedated at rass scale of -2. bilaeteral wrist restraints remain on the patient while 
patient is properly sedated, will continue to monitor sedation .

## 2021-01-07 NOTE — NUR
NURSE NOTES:



urine output increased to approximately 160-250ml/hr and urine is not straw colored, 

patient remains on propofol at 30mcg/min to reach sedation of -2 rass scale. 

-------------------------------------------------------------------------------

Addendum: 01/07/21 at 2006 by Michele Johnson RN

-------------------------------------------------------------------------------

time ti be at 1600

## 2021-01-07 NOTE — INFECTIOUS DISEASES PROG NOTE
Assessment/Plan


Assessment/Plan


A


1. COVID-19 pneumonia.


2. New-onset diabetes.


3. Hypoxic respiratory failure





P


1. Continue remdesivir day 4.


2. Continue ceftriaxone.


3. Continue solumedrol


4. Continue isolation.





Subjective


ROS Limited/Unobtainable:  Yes


Constitutional:  Denies: fever


Neurologic:  Reports: other - on restraint


Allergies:  


Coded Allergies:  


     No Known Allergies (Unverified , 6/11/19)





Objective





Last 24 Hour Vital Signs








  Date Time  Temp Pulse Resp B/P (MAP) Pulse Ox O2 Delivery O2 Flow Rate FiO2


 


1/7/21 08:00        100


 


1/7/21 08:00 97.2 98 27 123/70 (87) 97   


 


1/7/21 07:30  93 21 153/96 (115) 99   


 


1/7/21 07:10  97 30     100


 


1/7/21 07:00  94 21 153/69 (97) 96   


 


1/7/21 06:45  94 23 153/91 (111) 95   


 


1/7/21 06:30  95 23 148/68 (94) 97   


 


1/7/21 06:27   29 151/103  Mechanical Ventilator  100


 


1/7/21 06:00  95 23 130/84 (99) 96   


 


1/7/21 05:30  100 27 127/75 (92) 95   


 


1/7/21 05:27   28 155/101  Mechanical Ventilator  100


 


1/7/21 05:00  105 28 138/75 (96) 95   


 


1/7/21 04:30  101 28 150/86 (107) 98   


 


1/7/21 04:27   28 136/69  Mechanical Ventilator  100


 


1/7/21 04:07   27 110/74  Mechanical Ventilator  100


 


1/7/21 04:00  99      


 


1/7/21 04:00      Mechanical Ventilator  


 


1/7/21 04:00 97.9 84 23 110/74 (86) 98   


 


1/7/21 04:00        100


 


1/7/21 03:30  83 22 107/73 (84) 98   


 


1/7/21 03:27   28 125/89  Mechanical Ventilator  100


 


1/7/21 03:00  83 26 107/77 (87) 98   


 


1/7/21 02:30  85 22 102/72 (82) 97   


 


1/7/21 02:27   28 149/80  Mechanical Ventilator  100


 


1/7/21 02:00  89 21 104/72 (83) 99   


 


1/7/21 01:56  89 28     100


 


1/7/21 01:30  96 21 145/87 (106) 100   


 


1/7/21 01:27   28 136/58  Mechanical Ventilator  100


 


1/7/21 01:00  100 30 141/118 (126) 92   


 


1/7/21 01:00   28 113/62  Mechanical Ventilator  100


 


1/7/21 00:30  89 22 115/77 (90) 99   


 


1/7/21 00:27   28 125/75  Mechanical Ventilator  100


 


1/7/21 00:00        100


 


1/7/21 00:00 98.0 88 22 115/81 (92) 98   


 


1/7/21 00:00      Mechanical Ventilator  


 


1/7/21 00:00  100      


 


1/6/21 23:30  96 22 122/75 (91) 99   


 


1/6/21 23:27   28 126/80  Mechanical Ventilator  100


 


1/6/21 23:00  111 24 114/99 (104) 97   


 


1/6/21 22:30  114 30 154/89 (110) 89   


 


1/6/21 22:27   28 119/75  Mechanical Ventilator  100


 


1/6/21 22:00  96 25 112/75 (87) 99   


 


1/6/21 21:30  99 28 115/70 (85) 99   


 


1/6/21 21:27   30 133/69  Mechanical Ventilator  100


 


1/6/21 21:00  105 27 112/67 (82) 98   


 


1/6/21 20:31  108 28 112/67 98   


 


1/6/21 20:30  110 29 112/67 (82) 96   


 


1/6/21 20:27   28 126/75  Mechanical Ventilator  100


 


1/6/21 20:01  115 36 117/78 100   


 


1/6/21 20:00      Mechanical Ventilator  


 


1/6/21 20:00  108      


 


1/6/21 20:00 99.9 113 29 113/71 (85) 98   


 


1/6/21 20:00        100


 


1/6/21 19:58  102 27     100


 


1/6/21 19:30  117 30 106/65 (79) 97   


 


1/6/21 19:27   30 106/69  Mechanical Ventilator  100


 


1/6/21 19:00  121 30 101/68 (79) 97   


 


1/6/21 18:27   31 126/70  Mechanical Ventilator  100


 


1/6/21 18:27 99.1       


 


1/6/21 18:00  123 31 126/70 (88) 97   


 


1/6/21 17:50   29 114/71  Mechanical Ventilator  100


 


1/6/21 17:27   35 131/74  Mechanical Ventilator  100


 


1/6/21 17:00  126 34 142/75 (97) 87   


 


1/6/21 16:27   39 138/120  Mechanical Ventilator  100


 


1/6/21 16:00      Mechanical Ventilator  


 


1/6/21 16:00        100


 


1/6/21 16:00 99.0 113 32 138/73 (94) 91   


 


1/6/21 15:35  105      


 


1/6/21 15:27   37 125/77  Mechanical Ventilator  100


 


1/6/21 15:00  103 27 116/74 (88) 95   


 


1/6/21 14:27   28 116/74  Mechanical Ventilator  100


 


1/6/21 14:00  100 26 120/65 (83) 94   


 


1/6/21 13:50  101 30     100


 


1/6/21 13:27   27 120/65  Mechanical Ventilator  100


 


1/6/21 13:00  100 27 118/71 (87) 95   


 


1/6/21 12:27   27 112/69  Mechanical Ventilator  100


 


1/6/21 12:00  100      


 


1/6/21 12:00      Mechanical Ventilator  


 


1/6/21 12:00        100


 


1/6/21 12:00  100 28 120/67 (84) 94   


 


1/6/21 11:27   28 115/72  Mechanical Ventilator  100


 


1/6/21 11:00 99.4 102 28 120/68 (85) 94   


 


1/6/21 10:27   29 119/69  Mechanical Ventilator  100


 


1/6/21 10:00  108 30 109/73 (85) 92   


 


1/6/21 09:27   28 99/69  Mechanical Ventilator  100


 


1/6/21 09:00  120 29 106/70 (82) 93   








Height (Feet):  5


Height (Inches):  7.00


Weight (Pounds):  198


HEENT:  other - orally intubated


Respiratory/Chest:  other - on ventilator, XLU7=280%


Cardiovascular:  normal rate


Abdomen:  soft, non tender


Neurologic/Psychiatric:  unresponsiveness, other - sedated





Laboratory Tests








Test


 1/6/21


11:03 1/6/21


18:03 1/6/21


23:33 1/7/21


03:40


 


POC Whole Blood Glucose Pending   Pending   Pending   


 


White Blood Count


 


 


 


 9.7 K/UL


(4.8-10.8)


 


Red Blood Count


 


 


 


 4.58 M/UL


(4.70-6.10)  L


 


Hemoglobin


 


 


 


 14.0 G/DL


(14.2-18.0)  L


 


Hematocrit


 


 


 


 42.5 %


(42.0-52.0)


 


Mean Corpuscular Volume    93 FL (80-99)  


 


Mean Corpuscular Hemoglobin


 


 


 


 30.5 PG


(27.0-31.0)


 


Mean Corpuscular Hemoglobin


Concent 


 


 


 32.9 G/DL


(32.0-36.0)


 


Red Cell Distribution Width


 


 


 


 12.8 %


(11.6-14.8)


 


Platelet Count


 


 


 


 223 K/UL


(150-450)


 


Mean Platelet Volume


 


 


 


 9.1 FL


(6.5-10.1)


 


Neutrophils (%) (Auto)


 


 


 


 % (45.0-75.0)





 


Lymphocytes (%) (Auto)


 


 


 


 % (20.0-45.0)





 


Monocytes (%) (Auto)     % (1.0-10.0)  


 


Eosinophils (%) (Auto)     % (0.0-3.0)  


 


Basophils (%) (Auto)     % (0.0-2.0)  


 


Neutrophils % (Manual)    Pending  


 


Lymphocytes % (Manual)    Pending  


 


Platelet Estimate    Pending  


 


Platelet Morphology    Pending  


 


Sodium Level


 


 


 


 133 MMOL/L


(136-145)  L


 


Potassium Level


 


 


 


 4.6 MMOL/L


(3.5-5.1)


 


Chloride Level


 


 


 


 99 MMOL/L


()


 


Carbon Dioxide Level


 


 


 


 27 MMOL/L


(21-32)


 


Anion Gap


 


 


 


 7 mmol/L


(5-15)


 


Blood Urea Nitrogen


 


 


 


 22 mg/dL


(7-18)  H


 


Creatinine


 


 


 


 0.7 MG/DL


(0.55-1.30)


 


Estimat Glomerular Filtration


Rate 


 


 


 > 60 mL/min


(>60)


 


Glucose Level


 


 


 


 196 MG/DL


()  H


 


Calcium Level


 


 


 


 8.3 MG/DL


(8.5-10.1)  L


 


Total Bilirubin


 


 


 


 0.5 MG/DL


(0.2-1.0)


 


Direct Bilirubin


 


 


 


 0.3 MG/DL


(0.0-0.3)


 


Aspartate Amino Transf


(AST/SGOT) 


 


 


 27 U/L (15-37)





 


Alanine Aminotransferase


(ALT/SGPT) 


 


 


 38 U/L (12-78)





 


Alkaline Phosphatase


 


 


 


 142 U/L


()  H


 


Total Protein


 


 


 


 6.2 G/DL


(6.4-8.2)  L


 


Albumin


 


 


 


 1.7 G/DL


(3.4-5.0)  L


 


Globulin    4.5 g/dL  


 


Albumin/Globulin Ratio


 


 


 


 0.4 (1.0-2.7)


L


 


Triglycerides Level


 


 


 


 82 MG/DL


()


 


Test


 1/7/21


06:58 


 


 





 


POC Whole Blood Glucose Pending     











Current Medications








 Medications


  (Trade)  Dose


 Ordered  Sig/Julisa


 Route


 PRN Reason  Start Time


 Stop Time Status Last Admin


Dose Admin


 


 Acetaminophen


  (Tylenol)  650 mg  Q6H  PRN


 ORAL


 Temp >100.5  1/3/21 04:00


 2/2/21 03:59  1/6/21 17:57





 


 Ceftriaxone


 Sodium 1 gm/


 Dextrose  55 ml @ 


 110 mls/hr  Q24H


 IVPB


   1/3/21 10:00


 1/10/21 09:59  1/6/21 10:56





 


 Chlorhexidine


 Gluconate


  (Vanessa-Hex 2%)  1 applic  DAILY@2000


 TOPIC


   1/6/21 20:00


 4/6/21 19:59  1/6/21 20:00





 


 Dextrose


  (Dextrose 50%)  25 ml  Q30M  PRN


 IV


 Hypoglycemia  1/2/21 18:00


 4/2/21 17:59   





 


 Dextrose


  (Dextrose 50%)  50 ml  Q30M  PRN


 IV


 Hypoglycemia  1/2/21 18:00


 4/2/21 17:59   





 


 Enoxaparin Sodium


  (Lovenox)  80 mg  EVERY 12  HOURS


 SUBQ


   1/2/21 21:00


 4/2/21 20:59  1/6/21 20:01





 


 Haloperidol


  (Haldol)  5 mg  Q6H  PRN


 ORAL


 Agitation  1/4/21 13:45


 2/18/21 13:44  1/4/21 22:56





 


 Haloperidol


 Lactate 5 mg/


 Dextrose  56 ml @ 


 224 mls/hr  Q6H  PRN


 IVPB


 Agitation  1/5/21 12:30


 2/19/21 12:29  1/5/21 13:20





 


 Insulin Aspart


  (NovoLOG)    Q6HR


 SUBQ


   1/2/21 18:00


 4/2/21 17:59  1/7/21 06:00





 


 Lorazepam


  (Ativan 2mg/ml


 1ml)  2 mg  Q4H  PRN


 IV


 For Anxiety IV/PO  1/5/21 00:00


 1/12/21 00:00  1/6/21 20:01





 


 Lorazepam


  (Ativan)  1 mg  THREE TIMES A DAY  PRN


 ORAL


 For Anxiety  1/4/21 05:45


 1/11/21 05:44  1/4/21 22:55





 


 Methylprednisolone


 Sodium Succinate


  (Solu-MEDROL)  40 mg  EVERY 12  HOURS


 IVP


   1/6/21 21:00


 4/6/21 20:59  1/6/21 20:04





 


 Norepinephrine


 Bitartrate  250 ml @ 


 7.5 mls/hr  Q24H  PRN


 IV


 For hypotension  1/5/21 14:45


 1/8/21 14:45   





 


 Propofol  100 ml @ 0


 mls/hr  Q12H  PRN


 IV


 Agitation  1/5/21 15:27


 1/7/21 15:26  1/7/21 04:07





 


 Quetiapine


 Fumarate


  (SEROqueL)  50 mg  Q12HR


 ORAL


   1/4/21 21:00


 2/18/21 20:59  1/6/21 20:00





 


 Remdesivir 100 mg/


 Sodium Chloride  250 ml @ 


 250 mls/hr  Q24H


 IV


   1/5/21 20:00


 1/8/21 20:59  1/6/21 20:00





 


 Sodium Chloride  1,000 ml @ 


 100 mls/hr  Q10H


 IV


   1/3/21 07:45


 2/2/21 07:44  1/7/21 02:00




















Lamin Felix MD                Jan 7, 2021 08:38

## 2021-01-08 VITALS — DIASTOLIC BLOOD PRESSURE: 75 MMHG | SYSTOLIC BLOOD PRESSURE: 114 MMHG

## 2021-01-08 VITALS — SYSTOLIC BLOOD PRESSURE: 106 MMHG | DIASTOLIC BLOOD PRESSURE: 70 MMHG

## 2021-01-08 VITALS — DIASTOLIC BLOOD PRESSURE: 72 MMHG | SYSTOLIC BLOOD PRESSURE: 109 MMHG

## 2021-01-08 VITALS — SYSTOLIC BLOOD PRESSURE: 115 MMHG | DIASTOLIC BLOOD PRESSURE: 72 MMHG

## 2021-01-08 VITALS — DIASTOLIC BLOOD PRESSURE: 70 MMHG | SYSTOLIC BLOOD PRESSURE: 93 MMHG

## 2021-01-08 VITALS — DIASTOLIC BLOOD PRESSURE: 77 MMHG | SYSTOLIC BLOOD PRESSURE: 118 MMHG

## 2021-01-08 VITALS — SYSTOLIC BLOOD PRESSURE: 117 MMHG | DIASTOLIC BLOOD PRESSURE: 72 MMHG

## 2021-01-08 VITALS — DIASTOLIC BLOOD PRESSURE: 73 MMHG | SYSTOLIC BLOOD PRESSURE: 118 MMHG

## 2021-01-08 VITALS — DIASTOLIC BLOOD PRESSURE: 72 MMHG | SYSTOLIC BLOOD PRESSURE: 96 MMHG

## 2021-01-08 VITALS — DIASTOLIC BLOOD PRESSURE: 71 MMHG | SYSTOLIC BLOOD PRESSURE: 108 MMHG

## 2021-01-08 VITALS — DIASTOLIC BLOOD PRESSURE: 74 MMHG | SYSTOLIC BLOOD PRESSURE: 110 MMHG

## 2021-01-08 VITALS — SYSTOLIC BLOOD PRESSURE: 107 MMHG | DIASTOLIC BLOOD PRESSURE: 68 MMHG

## 2021-01-08 VITALS — DIASTOLIC BLOOD PRESSURE: 67 MMHG | SYSTOLIC BLOOD PRESSURE: 97 MMHG

## 2021-01-08 VITALS — SYSTOLIC BLOOD PRESSURE: 100 MMHG | DIASTOLIC BLOOD PRESSURE: 71 MMHG

## 2021-01-08 VITALS — DIASTOLIC BLOOD PRESSURE: 71 MMHG | SYSTOLIC BLOOD PRESSURE: 112 MMHG

## 2021-01-08 VITALS — DIASTOLIC BLOOD PRESSURE: 66 MMHG | SYSTOLIC BLOOD PRESSURE: 96 MMHG

## 2021-01-08 VITALS — DIASTOLIC BLOOD PRESSURE: 69 MMHG | SYSTOLIC BLOOD PRESSURE: 107 MMHG

## 2021-01-08 VITALS — SYSTOLIC BLOOD PRESSURE: 112 MMHG | DIASTOLIC BLOOD PRESSURE: 78 MMHG

## 2021-01-08 VITALS — SYSTOLIC BLOOD PRESSURE: 121 MMHG | DIASTOLIC BLOOD PRESSURE: 77 MMHG

## 2021-01-08 VITALS — SYSTOLIC BLOOD PRESSURE: 104 MMHG | DIASTOLIC BLOOD PRESSURE: 71 MMHG

## 2021-01-08 VITALS — DIASTOLIC BLOOD PRESSURE: 73 MMHG | SYSTOLIC BLOOD PRESSURE: 103 MMHG

## 2021-01-08 VITALS — SYSTOLIC BLOOD PRESSURE: 104 MMHG | DIASTOLIC BLOOD PRESSURE: 74 MMHG

## 2021-01-08 VITALS — DIASTOLIC BLOOD PRESSURE: 71 MMHG | SYSTOLIC BLOOD PRESSURE: 107 MMHG

## 2021-01-08 VITALS — DIASTOLIC BLOOD PRESSURE: 71 MMHG | SYSTOLIC BLOOD PRESSURE: 119 MMHG

## 2021-01-08 VITALS — DIASTOLIC BLOOD PRESSURE: 71 MMHG | SYSTOLIC BLOOD PRESSURE: 111 MMHG

## 2021-01-08 VITALS — DIASTOLIC BLOOD PRESSURE: 69 MMHG | SYSTOLIC BLOOD PRESSURE: 101 MMHG

## 2021-01-08 VITALS — SYSTOLIC BLOOD PRESSURE: 115 MMHG | DIASTOLIC BLOOD PRESSURE: 69 MMHG

## 2021-01-08 VITALS — DIASTOLIC BLOOD PRESSURE: 70 MMHG | SYSTOLIC BLOOD PRESSURE: 125 MMHG

## 2021-01-08 VITALS — SYSTOLIC BLOOD PRESSURE: 109 MMHG | DIASTOLIC BLOOD PRESSURE: 70 MMHG

## 2021-01-08 VITALS — SYSTOLIC BLOOD PRESSURE: 106 MMHG | DIASTOLIC BLOOD PRESSURE: 75 MMHG

## 2021-01-08 VITALS — SYSTOLIC BLOOD PRESSURE: 131 MMHG | DIASTOLIC BLOOD PRESSURE: 71 MMHG

## 2021-01-08 VITALS — DIASTOLIC BLOOD PRESSURE: 73 MMHG | SYSTOLIC BLOOD PRESSURE: 108 MMHG

## 2021-01-08 VITALS — DIASTOLIC BLOOD PRESSURE: 72 MMHG | SYSTOLIC BLOOD PRESSURE: 115 MMHG

## 2021-01-08 VITALS — SYSTOLIC BLOOD PRESSURE: 103 MMHG | DIASTOLIC BLOOD PRESSURE: 72 MMHG

## 2021-01-08 VITALS — DIASTOLIC BLOOD PRESSURE: 78 MMHG | SYSTOLIC BLOOD PRESSURE: 113 MMHG

## 2021-01-08 VITALS — DIASTOLIC BLOOD PRESSURE: 69 MMHG | SYSTOLIC BLOOD PRESSURE: 105 MMHG

## 2021-01-08 VITALS — SYSTOLIC BLOOD PRESSURE: 115 MMHG | DIASTOLIC BLOOD PRESSURE: 70 MMHG

## 2021-01-08 VITALS — SYSTOLIC BLOOD PRESSURE: 105 MMHG | DIASTOLIC BLOOD PRESSURE: 71 MMHG

## 2021-01-08 VITALS — SYSTOLIC BLOOD PRESSURE: 113 MMHG | DIASTOLIC BLOOD PRESSURE: 74 MMHG

## 2021-01-08 VITALS — DIASTOLIC BLOOD PRESSURE: 72 MMHG | SYSTOLIC BLOOD PRESSURE: 106 MMHG

## 2021-01-08 VITALS — SYSTOLIC BLOOD PRESSURE: 117 MMHG | DIASTOLIC BLOOD PRESSURE: 77 MMHG

## 2021-01-08 VITALS — DIASTOLIC BLOOD PRESSURE: 73 MMHG | SYSTOLIC BLOOD PRESSURE: 120 MMHG

## 2021-01-08 VITALS — DIASTOLIC BLOOD PRESSURE: 73 MMHG | SYSTOLIC BLOOD PRESSURE: 106 MMHG

## 2021-01-08 VITALS — SYSTOLIC BLOOD PRESSURE: 112 MMHG | DIASTOLIC BLOOD PRESSURE: 75 MMHG

## 2021-01-08 VITALS — DIASTOLIC BLOOD PRESSURE: 70 MMHG | SYSTOLIC BLOOD PRESSURE: 105 MMHG

## 2021-01-08 VITALS — DIASTOLIC BLOOD PRESSURE: 70 MMHG | SYSTOLIC BLOOD PRESSURE: 103 MMHG

## 2021-01-08 LAB
ADD MANUAL DIFF: YES
ALBUMIN SERPL-MCNC: 1.8 G/DL (ref 3.4–5)
ALBUMIN/GLOB SERPL: 0.4 {RATIO} (ref 1–2.7)
ALP SERPL-CCNC: 140 U/L (ref 46–116)
ALT SERPL-CCNC: 32 U/L (ref 12–78)
ANION GAP SERPL CALC-SCNC: 4 MMOL/L (ref 5–15)
AST SERPL-CCNC: 19 U/L (ref 15–37)
BILIRUB DIRECT SERPL-MCNC: 0.2 MG/DL (ref 0–0.3)
BILIRUB SERPL-MCNC: 0.3 MG/DL (ref 0.2–1)
BUN SERPL-MCNC: 28 MG/DL (ref 7–18)
CALCIUM SERPL-MCNC: 8.6 MG/DL (ref 8.5–10.1)
CHLORIDE SERPL-SCNC: 98 MMOL/L (ref 98–107)
CO2 SERPL-SCNC: 34 MMOL/L (ref 21–32)
CREAT SERPL-MCNC: 0.9 MG/DL (ref 0.55–1.3)
ERYTHROCYTE [DISTWIDTH] IN BLOOD BY AUTOMATED COUNT: 13 % (ref 11.6–14.8)
GLOBULIN SER-MCNC: 4.8 G/DL
HCT VFR BLD CALC: 44.5 % (ref 42–52)
HGB BLD-MCNC: 14.5 G/DL (ref 14.2–18)
MCV RBC AUTO: 93 FL (ref 80–99)
PLATELET # BLD: 305 K/UL (ref 150–450)
POTASSIUM SERPL-SCNC: 4.5 MMOL/L (ref 3.5–5.1)
RBC # BLD AUTO: 4.78 M/UL (ref 4.7–6.1)
SODIUM SERPL-SCNC: 136 MMOL/L (ref 136–145)
WBC # BLD AUTO: 6.8 K/UL (ref 4.8–10.8)

## 2021-01-08 RX ADMIN — INSULIN ASPART SCH UNITS: 100 INJECTION, SOLUTION INTRAVENOUS; SUBCUTANEOUS at 17:27

## 2021-01-08 RX ADMIN — ENOXAPARIN SODIUM SCH MG: 80 INJECTION SUBCUTANEOUS at 09:13

## 2021-01-08 RX ADMIN — INSULIN ASPART SCH UNITS: 100 INJECTION, SOLUTION INTRAVENOUS; SUBCUTANEOUS at 23:33

## 2021-01-08 RX ADMIN — ENOXAPARIN SODIUM SCH MG: 80 INJECTION SUBCUTANEOUS at 20:24

## 2021-01-08 RX ADMIN — METHYLPREDNISOLONE SODIUM SUCCINATE SCH MG: 40 INJECTION, POWDER, LYOPHILIZED, FOR SOLUTION INTRAMUSCULAR; INTRAVENOUS at 20:23

## 2021-01-08 RX ADMIN — MIDAZOLAM HYDROCHLORIDE PRN MLS/HR: 5 INJECTION INTRAMUSCULAR; INTRAVENOUS at 11:10

## 2021-01-08 RX ADMIN — Medication PRN MLS/HR: at 21:15

## 2021-01-08 RX ADMIN — CHLORHEXIDINE GLUCONATE SCH APPLIC: 213 SOLUTION TOPICAL at 20:23

## 2021-01-08 RX ADMIN — INSULIN ASPART SCH UNITS: 100 INJECTION, SOLUTION INTRAVENOUS; SUBCUTANEOUS at 05:24

## 2021-01-08 RX ADMIN — SODIUM CHLORIDE SCH MLS/HR: 0.9 INJECTION INTRAVENOUS at 09:13

## 2021-01-08 RX ADMIN — METHYLPREDNISOLONE SODIUM SUCCINATE SCH MG: 40 INJECTION, POWDER, LYOPHILIZED, FOR SOLUTION INTRAMUSCULAR; INTRAVENOUS at 09:12

## 2021-01-08 RX ADMIN — INSULIN ASPART SCH UNITS: 100 INJECTION, SOLUTION INTRAVENOUS; SUBCUTANEOUS at 12:47

## 2021-01-08 RX ADMIN — INSULIN ASPART SCH UNITS: 100 INJECTION, SOLUTION INTRAVENOUS; SUBCUTANEOUS at 00:44

## 2021-01-08 NOTE — SURGERY PROGRESS NOTE
Surgery Progress Note


Subjective


Procedure Performed


Left femoral central venous catheter insertion


Additional Comments


ill appearing


no support


labs noted





Objective





Last 24 Hour Vital Signs








  Date Time  Temp Pulse Resp B/P (MAP) Pulse Ox O2 Delivery O2 Flow Rate FiO2


 


1/8/21 11:10   28   Mechanical Ventilator  100


 


1/8/21 10:45  111 28     80


 


1/8/21 09:30  108 29 117/77 (90) 99   


 


1/8/21 09:00  108 29 109/70 (83) 99   


 


1/8/21 09:00   28   Mechanical Ventilator  100


 


1/8/21 08:30  105 29 106/73 (84) 99   


 


1/8/21 08:00      Mechanical Ventilator  


 


1/8/21 08:00 100.4 108 30 115/72 (86) 98   


 


1/8/21 08:00   28   Mechanical Ventilator  100


 


1/8/21 08:00        100


 


1/8/21 07:30  104 27 105/70 (82) 98   


 


1/8/21 07:00   27     


 


1/8/21 07:00  105 27 104/74 (84) 98   


 


1/8/21 06:50  105 26     80


 


1/8/21 06:36   27     


 


1/8/21 06:30  108 28 131/71 (91) 98   


 


1/8/21 06:00  104 26 107/68 (81) 98   


 


1/8/21 05:30  105 28 112/71 (85) 99   


 


1/8/21 05:00  105 26 107/69 (82) 99   


 


1/8/21 05:00   27     


 


1/8/21 04:30  107 27 117/72 (87) 100   


 


1/8/21 04:15  112 29 121/77 (92) 98   


 


1/8/21 04:00        100


 


1/8/21 04:00 100.0 108 27 106/72 (83) 98   


 


1/8/21 04:00      Mechanical Ventilator  


 


1/8/21 04:00  107      


 


1/8/21 04:00   27     


 


1/8/21 03:45  109 27 106/70 (82) 99   


 


1/8/21 03:30  109 27 103/72 (82) 98   


 


1/8/21 03:00  110 26 101/69 (80) 98   


 


1/8/21 03:00   26     


 


1/8/21 02:51  112 26     80


 


1/8/21 02:30  112 26 108/73 (85) 100   


 


1/8/21 02:00   25     


 


1/8/21 02:00  113 26 119/71 (87) 100   


 


1/8/21 01:30  115 26 111/71 (84) 100   


 


1/8/21 01:00  114 26 115/69 (84) 100   


 


1/8/21 01:00   26     


 


1/8/21 00:30  115 26 115/70 (85) 100   


 


1/8/21 00:00 100.0 119 26 125/70 (88) 100   


 


1/8/21 00:00      Mechanical Ventilator  


 


1/8/21 00:00   25     


 


1/7/21 23:30  122 29 116/73 (87) 100   


 


1/7/21 23:00  122 28 122/72 (89) 100   


 


1/7/21 23:00   25     


 


1/7/21 22:52  108 28     100


 


1/7/21 22:48   30     


 


1/7/21 22:30  122 27 128/80 (96) 100   


 


1/7/21 22:00   29     


 


1/7/21 22:00  122 28 131/69 (89) 100   


 


1/7/21 21:30  121 28 113/76 (88) 100   


 


1/7/21 21:00  123 28 129/74 (92) 100   


 


1/7/21 21:00   28     


 


1/7/21 20:30  123 29 110/81 (91) 100   


 


1/7/21 20:00   31     


 


1/7/21 20:00      Mechanical Ventilator  


 


1/7/21 20:00  122      


 


1/7/21 20:00 99.2 120 34 124/75 (91) 100   


 


1/7/21 20:00        100


 


1/7/21 19:30  109 32 120/74 (89) 98   


 


1/7/21 19:11  114 35     100


 


1/7/21 19:00  111 30 126/76 (93) 97   


 


1/7/21 19:00   28   Mechanical Ventilator  100


 


1/7/21 18:30  109 30 135/73 (93) 98   


 


1/7/21 18:30   29   Mechanical Ventilator  100


 


1/7/21 18:25   28   Mechanical Ventilator  100


 


1/7/21 18:20   28   Mechanical Ventilator  100


 


1/7/21 18:15   28   Mechanical Ventilator  100


 


1/7/21 18:10   28   Mechanical Ventilator  100


 


1/7/21 18:05   28   Mechanical Ventilator  100


 


1/7/21 18:00   27   Mechanical Ventilator  100


 


1/7/21 18:00   28 108/71  Mechanical Ventilator  100


 


1/7/21 18:00  106 27 108/71 (83) 96   


 


1/7/21 17:55   29   Mechanical Ventilator  100


 


1/7/21 17:30  105 29 114/72 (86) 98   


 


1/7/21 17:00   28 118/71  Mechanical Ventilator  100


 


1/7/21 17:00  108 29 118/71 (87) 97   


 


1/7/21 16:30  107 30 115/68 (84) 98   


 


1/7/21 16:00      Mechanical Ventilator  


 


1/7/21 16:00   28 124/70  Mechanical Ventilator  100


 


1/7/21 16:00 99.8 108 27 124/70 (88) 97   


 


1/7/21 16:00  110      


 


1/7/21 15:30  110 28 118/75 (89) 96   


 


1/7/21 15:00   27 120/72  Mechanical Ventilator  100


 


1/7/21 15:00  111 27 120/72 (88) 97   


 


1/7/21 14:55  111 27     100


 


1/7/21 14:30  107 28 117/72 (87) 97   


 


1/7/21 14:00        100


 


1/7/21 14:00  105 27 127/76 (93) 96   


 


1/7/21 13:30  98 23 119/73 (88) 98   


 


1/7/21 13:13   27 115/73  Mechanical Ventilator  100


 


1/7/21 13:00   27 115/73  Mechanical Ventilator  100


 


1/7/21 13:00  95 23 115/73 (87) 99   


 


1/7/21 12:30  92 23 102/70 (81) 98   


 


1/7/21 12:00        100


 


1/7/21 12:00  93      


 


1/7/21 12:00   27 96/61  Mechanical Ventilator  100


 


1/7/21 12:00      Mechanical Ventilator  


 


1/7/21 12:00 99.2 92 21 96/61 (73) 98   








I&O











Intake and Output  


 


 1/7/21 1/8/21





 19:00 07:00


 


Intake Total 1075.11202 ml 168 ml


 


Output Total 1685 ml 2930 ml


 


Balance -609.03633 ml -2762 ml


 


  


 


IV Total 1075.56703 ml 168 ml


 


Output Urine Total 1685 ml 2930 ml








Dressing:  saturated


Cardiovascular:  RSR


Respiratory:  decreased breath sounds


Abdomen:  soft, non-tender, present bowel sounds


Extremities:  no cyanosis





Laboratory Tests








Test


 1/7/21


13:19 1/7/21


18:02 1/8/21


00:37 1/8/21


05:21


 


POC Whole Blood Glucose


 256 MG/DL


()  H 306 MG/DL


()  H 280 MG/DL


()  H 305 MG/DL


()  H


 


Test


 1/8/21


06:32 1/8/21


08:33 


 





 


White Blood Count


 6.8 K/UL


(4.8-10.8) 


 


 





 


Red Blood Count


 4.78 M/UL


(4.70-6.10) 


 


 





 


Hemoglobin


 14.5 G/DL


(14.2-18.0) 


 


 





 


Hematocrit


 44.5 %


(42.0-52.0) 


 


 





 


Mean Corpuscular Volume 93 FL (80-99)     


 


Mean Corpuscular Hemoglobin


 30.3 PG


(27.0-31.0) 


 


 





 


Mean Corpuscular Hemoglobin


Concent 32.6 G/DL


(32.0-36.0) 


 


 





 


Red Cell Distribution Width


 13.0 %


(11.6-14.8) 


 


 





 


Platelet Count


 305 K/UL


(150-450) 


 


 





 


Mean Platelet Volume


 8.2 FL


(6.5-10.1) 


 


 





 


Neutrophils (%) (Auto)


 % (45.0-75.0)


 


 


 





 


Lymphocytes (%) (Auto)


 % (20.0-45.0)


 


 


 





 


Monocytes (%) (Auto)  % (1.0-10.0)     


 


Eosinophils (%) (Auto)  % (0.0-3.0)     


 


Basophils (%) (Auto)  % (0.0-2.0)     


 


Differential Total Cells


Counted 100  


 


 


 





 


Neutrophils % (Manual) 90 % (45-75)  H   


 


Lymphocytes % (Manual) 5 % (20-45)  L   


 


Monocytes % (Manual) 5 % (1-10)     


 


Eosinophils % (Manual) 0 % (0-3)     


 


Basophils % (Manual) 0 % (0-2)     


 


Band Neutrophils 0 % (0-8)     


 


Platelet Estimate Adequate     


 


Platelet Morphology Normal     


 


Red Blood Cell Morphology Normal     


 


Sodium Level


 136 MMOL/L


(136-145) 


 


 





 


Potassium Level


 4.5 MMOL/L


(3.5-5.1) 


 


 





 


Chloride Level


 98 MMOL/L


() 


 


 





 


Carbon Dioxide Level


 34 MMOL/L


(21-32)  H 


 


 





 


Anion Gap


 4 mmol/L


(5-15)  L 


 


 





 


Blood Urea Nitrogen


 28 mg/dL


(7-18)  H 


 


 





 


Creatinine


 0.9 MG/DL


(0.55-1.30) 


 


 





 


Estimat Glomerular Filtration


Rate > 60 mL/min


(>60) 


 


 





 


Glucose Level


 296 MG/DL


()  #H 


 


 





 


Calcium Level


 8.6 MG/DL


(8.5-10.1) 


 


 





 


Total Bilirubin


 0.3 MG/DL


(0.2-1.0) 


 


 





 


Direct Bilirubin


 0.2 MG/DL


(0.0-0.3) 


 


 





 


Aspartate Amino Transf


(AST/SGOT) 19 U/L (15-37)


 


 


 





 


Alanine Aminotransferase


(ALT/SGPT) 32 U/L (12-78)


 


 


 





 


Alkaline Phosphatase


 140 U/L


()  H 


 


 





 


Total Protein


 6.6 G/DL


(6.4-8.2) 


 


 





 


Albumin


 1.8 G/DL


(3.4-5.0)  L 


 


 





 


Globulin 4.8 g/dL     


 


Albumin/Globulin Ratio


 0.4 (1.0-2.7)


L 


 


 





 


Arterial Blood pH


 


 7.380


(7.350-7.450) 


 





 


Arterial Blood Partial


Pressure CO2 


 60.0 mmHg


(35.0-45.0)  *H 


 





 


Arterial Blood Partial


Pressure O2 


 77.7 mmHg


(75.0-100.0) 


 





 


Arterial Blood HCO3


 


 34.7 mmol/L


(22.0-26.0)  H 


 





 


Arterial Blood Oxygen


Saturation 


 95.2 %


() 


 





 


Arterial Blood Base Excess  7.4 (-2-2)  H  


 


Abelardo Test  Positive    











Plan


Problems:  


(1) Pneumonia due to COVID-19 virus


Assessment & Plan:  Interim endotracheal intubation, endotracheal tube tip in 

good position


approximately 4 cm above the tito. There are extensive bilateral infiltrates, 

which


are markedly increased from the prior study. Pleural spaces are probably clear, 

not


well demonstrated


Markedly increased and now extensive bilateral infiltrates 


cont as per pulm and iID





(2) Hypoxia


(3) Abdominal pain


Assessment & Plan:  65M abd pain, septic, covid, intubated ons upport


currently abd exam benign but limited given condition


line bo mary noted


okay for meds and feeds


nutritional optimization 


DAILY ESTIMATED NEEDS:


Needs based on Critical Care/ 72kg abw 


22-28  kcals/kg 


7497-2470  total kcals


1.2-2  g protein/kg


  g total protein 


25-30  mL/kg


3236-8816  total fluid mLs





NUTRITION DIAGNOSIS:


Swallowing difficulty R/T respiratory failure as evidenced by pt now


orally intubated, OGT in place, NPO





 


CURRENT TF:NPO 





 





ENTERAL NUTRITION RECOMMENDATIONS:


Vital AF 1.2 @ 60ml/hr x 24 hrs  to provide 1440ml, 1728kcal, 116g prot, 1167ml 

free water 





* As medically appropriate, initiate critical care and carb controlled TF 

formula of Vital AF 1.2


* Initiate @ 20ml/hr x 6hrs, advance 10ml q 4-6 hrs as tolerated to goal rate


* HOB over 30 degrees/ water flush per MD


* Does not exceed est kcal needs w/ Propofol running @ 8.083ml/hr x 24 hrs 

(provides 213 lipid kcal)





 





ADDITIONAL RECOMMENDATIONS:


* Calibrated bedscale wt 


* Monitor BGs closely w/ Decadron and TF, need for long acting insulin 


* Monitor lytes, replete as needed  


* Monitor Propofol rate, need to adjust TF rate  





(4) Acute metabolic encephalopathy











Norberto Vazquez                 Jan 8, 2021 11:59

## 2021-01-08 NOTE — NUR
NURSE NOTES:



Temperature noted to be at 100.1 taken axillary, 

patient placed on cooling blanket, ice packs, 

ordered for rectal probe to arrive from central.

## 2021-01-08 NOTE — NUR
NURSE NOTES:



Dr. Davis is at the bedside and updated of patient low grade fever of 100.4 taken 
axillary, 

no verbal orders given at this time.

## 2021-01-08 NOTE — NUR
Nurse Notes: 

Patient sedated with 7.5 ETT at 23cm on ventilator AC 16  FiO2 100% PEEP 10. /70 
 Sinus Tachy on monitor. Right femoral TLC running versed 8mg/hr, Lasix 10ml/hr,  NS 
@25ml/hr. left nare NGT clamped and patent. Stern catheter draining light myrna urine. 
Patient repositioned and oral care provided.

## 2021-01-08 NOTE — NUR
Nurse Notes: 

Patient received from [facility]. Patient sedated with 7.5 ETT at 23cm on ventilator AC 16 TV 
500 FiO2 100% PEEP 10. /72  Sinus Tachy on monitor. Right femoral TLC running 
versed 8mg/hr, Lasix 10ml/hr,  NS @25ml/hr. left nare NGT clamped and patent. Stern 
catheter draining light myrna urine. skin warm dry intact.  Patient repositioned and oral 
care provided.

## 2021-01-08 NOTE — NUR
NURSE NOTES:



Patient remains on versed at dose of 8mg/hr at rate of 16ml/hr, 

currently the patient is on NPO with no feeding order. 

cooling measures placed on the patient to lower the elevated temperature, also repositioned 
in bed and removed any winkles on his bedsheets.

## 2021-01-08 NOTE — NUR
RD ASSESSMENT & RECOMMENDATIONS

SEE CARE ACTIVITY FOR COMPLETE ASSESSMENT



DAILY ESTIMATED NEEDS:

Needs based on Critical Care/ 73kg abw 

22-28  kcals/kg 

[id number]  total kcals

1.2-2  g protein/kg

  g total protein 

20-25  mL/kg

[id number]  total fluid mLs



NUTRITION DIAGNOSIS:

Swallowing difficulty R/T respiratory failure as evidenced by pt now

orally intubated, OGT in place, NPO





 

CURRENT TF:NPO 



 



ENTERAL NUTRITION RECOMMENDATIONS:

Vital AF 1.2 @ 60ml/hr x 24 hrs  to provide 1440ml, 1728kcal, 116g prot, 1167ml free water 



* As medically appropriate, initiate critical care and carb controlled TF formula of Vital 
AF 1.2

* Initiate @ 20ml/hr x 6hrs, advance 10ml q 4-6 hrs as tolerated to goal rate

* HOB over 30 degrees/ water flush per MD





 



ADDITIONAL RECOMMENDATIONS:

* Calibrated bedscale wt 

* Monitor BGs closely w/ steroidal med and TF  

    -> rec long acting insulin w/ TF 

* Monitor lytes, replete as needed

## 2021-01-08 NOTE — NUR
NURSE NOTES:



versed drip remains at 8 mg/hr at rate of 16ml/hr, patient is calm and relaxed. 

cooling tower for the cooling blanket is in the room filled with water and connected, 
awaiting for rectal probe to be placed.

## 2021-01-08 NOTE — NUR
NURSE NOTES:



Dr. Travis assessing patient at the bedside and reviewing the patient chart, no verbal 
orders given at this time.

## 2021-01-08 NOTE — GENERAL PROGRESS NOTE
Subjective


Allergies:  


Coded Allergies:  


     No Known Allergies (Unverified , 6/11/19)


Subjective


on ventilator


unresponsive


in icu





Objective





Last 24 Hour Vital Signs








  Date Time  Temp Pulse Resp B/P (MAP) Pulse Ox O2 Delivery O2 Flow Rate FiO2


 


1/8/21 13:30  110 26 93/70 (78) 98   


 


1/8/21 13:00  115 28 118/73 (88) 99   


 


1/8/21 12:30  116 28 118/77 (91) 99   


 


1/8/21 12:00      Mechanical Ventilator  


 


1/8/21 12:00        100


 


1/8/21 12:00  117      


 


1/8/21 12:00 100.7 115 31 113/78 (90) 99   


 


1/8/21 11:30  111 28 120/73 (89) 98   


 


1/8/21 11:10   28   Mechanical Ventilator  100


 


1/8/21 11:00  111 27 109/72 (84) 98   


 


1/8/21 10:45  111 28     80


 


1/8/21 10:30  112 28 114/75 (88) 99   


 


1/8/21 10:00  110 30 112/75 (87) 99   


 


1/8/21 09:30  108 29 117/77 (90) 99   


 


1/8/21 09:00  108 29 109/70 (83) 99   


 


1/8/21 09:00   28   Mechanical Ventilator  100


 


1/8/21 08:30  105 29 106/73 (84) 99   


 


1/8/21 08:00  104      


 


1/8/21 08:00      Mechanical Ventilator  


 


1/8/21 08:00 100.4 108 30 115/72 (86) 98   


 


1/8/21 08:00   28   Mechanical Ventilator  100


 


1/8/21 08:00        100


 


1/8/21 07:30  104 27 105/70 (82) 98   


 


1/8/21 07:00   27     


 


1/8/21 07:00  105 27 104/74 (84) 98   


 


1/8/21 06:50  105 26     80


 


1/8/21 06:36   27     


 


1/8/21 06:30  108 28 131/71 (91) 98   


 


1/8/21 06:00  104 26 107/68 (81) 98   


 


1/8/21 05:30  105 28 112/71 (85) 99   


 


1/8/21 05:00  105 26 107/69 (82) 99   


 


1/8/21 05:00   27     


 


1/8/21 04:30  107 27 117/72 (87) 100   


 


1/8/21 04:15  112 29 121/77 (92) 98   


 


1/8/21 04:00        100


 


1/8/21 04:00 100.0 108 27 106/72 (83) 98   


 


1/8/21 04:00      Mechanical Ventilator  


 


1/8/21 04:00  107      


 


1/8/21 04:00   27     


 


1/8/21 03:45  109 27 106/70 (82) 99   


 


1/8/21 03:30  109 27 103/72 (82) 98   


 


1/8/21 03:00  110 26 101/69 (80) 98   


 


1/8/21 03:00   26     


 


1/8/21 02:51  112 26     80


 


1/8/21 02:30  112 26 108/73 (85) 100   


 


1/8/21 02:00   25     


 


1/8/21 02:00  113 26 119/71 (87) 100   


 


1/8/21 01:30  115 26 111/71 (84) 100   


 


1/8/21 01:00  114 26 115/69 (84) 100   


 


1/8/21 01:00   26     


 


1/8/21 00:30  115 26 115/70 (85) 100   


 


1/8/21 00:00 100.0 119 26 125/70 (88) 100   


 


1/8/21 00:00      Mechanical Ventilator  


 


1/8/21 00:00   25     


 


1/7/21 23:30  122 29 116/73 (87) 100   


 


1/7/21 23:00  122 28 122/72 (89) 100   


 


1/7/21 23:00   25     


 


1/7/21 22:52  108 28     100


 


1/7/21 22:48   30     


 


1/7/21 22:30  122 27 128/80 (96) 100   


 


1/7/21 22:00   29     


 


1/7/21 22:00  122 28 131/69 (89) 100   


 


1/7/21 21:30  121 28 113/76 (88) 100   


 


1/7/21 21:00  123 28 129/74 (92) 100   


 


1/7/21 21:00   28     


 


1/7/21 20:30  123 29 110/81 (91) 100   


 


1/7/21 20:00   31     


 


1/7/21 20:00      Mechanical Ventilator  


 


1/7/21 20:00  122      


 


1/7/21 20:00 99.2 120 34 124/75 (91) 100   


 


1/7/21 20:00        100


 


1/7/21 19:30  109 32 120/74 (89) 98   


 


1/7/21 19:11  114 35     100


 


1/7/21 19:00  111 30 126/76 (93) 97   


 


1/7/21 19:00   28   Mechanical Ventilator  100


 


1/7/21 18:30  109 30 135/73 (93) 98   


 


1/7/21 18:30   29   Mechanical Ventilator  100


 


1/7/21 18:25   28   Mechanical Ventilator  100


 


1/7/21 18:20   28   Mechanical Ventilator  100


 


1/7/21 18:15   28   Mechanical Ventilator  100


 


1/7/21 18:10   28   Mechanical Ventilator  100


 


1/7/21 18:05   28   Mechanical Ventilator  100


 


1/7/21 18:00   27   Mechanical Ventilator  100


 


1/7/21 18:00   28 108/71  Mechanical Ventilator  100


 


1/7/21 18:00  106 27 108/71 (83) 96   


 


1/7/21 17:55   29   Mechanical Ventilator  100


 


1/7/21 17:30  105 29 114/72 (86) 98   


 


1/7/21 17:00   28 118/71  Mechanical Ventilator  100


 


1/7/21 17:00  108 29 118/71 (87) 97   


 


1/7/21 16:30  107 30 115/68 (84) 98   


 


1/7/21 16:00      Mechanical Ventilator  


 


1/7/21 16:00   28 124/70  Mechanical Ventilator  100


 


1/7/21 16:00 99.8 108 27 124/70 (88) 97   


 


1/7/21 16:00  110      


 


1/7/21 15:30  110 28 118/75 (89) 96   


 


1/7/21 15:00   27 120/72  Mechanical Ventilator  100


 


1/7/21 15:00  111 27 120/72 (88) 97   


 


1/7/21 14:55  111 27     100


 


1/7/21 14:30  107 28 117/72 (87) 97   

















Intake and Output  


 


 1/7/21 1/8/21





 19:00 07:00


 


Intake Total 1075.48872 ml 168 ml


 


Output Total 1685 ml 2930 ml


 


Balance -609.63621 ml -2762 ml


 


  


 


IV Total 1075.16332 ml 168 ml


 


Output Urine Total 1685 ml 2930 ml








Laboratory Tests


1/7/21 18:02: POC Whole Blood Glucose 306H


1/8/21 00:37: POC Whole Blood Glucose 280H


1/8/21 05:21: POC Whole Blood Glucose 305H


1/8/21 06:32: 


White Blood Count 6.8, Red Blood Count 4.78, Hemoglobin 14.5, Hematocrit 44.5, 

Mean Corpuscular Volume 93, Mean Corpuscular Hemoglobin 30.3, Mean Corpuscular 

Hemoglobin Concent 32.6, Red Cell Distribution Width 13.0, Platelet Count 305, 

Mean Platelet Volume 8.2, Neutrophils (%) (Auto) , Lymphocytes (%) (Auto) , 

Monocytes (%) (Auto) , Eosinophils (%) (Auto) , Basophils (%) (Auto) , 

Differential Total Cells Counted 100, Neutrophils % (Manual) 90H, Lymphocytes % 

(Manual) 5L, Monocytes % (Manual) 5, Eosinophils % (Manual) 0, Basophils % 

(Manual) 0, Band Neutrophils 0, Platelet Estimate Adequate, Platelet Morphology 

Normal, Red Blood Cell Morphology Normal, Sodium Level 136, Potassium Level 4.5,

Chloride Level 98, Carbon Dioxide Level 34H, Anion Gap 4L, Blood Urea Nitrogen 

28H, Creatinine 0.9, Estimat Glomerular Filtration Rate > 60, Glucose Level 

296#H, Calcium Level 8.6, Total Bilirubin 0.3, Direct Bilirubin 0.2, Aspartate 

Amino Transf (AST/SGOT) 19, Alanine Aminotransferase (ALT/SGPT) 32, Alkaline 

Phosphatase 140H, Total Protein 6.6, Albumin 1.8L, Globulin 4.8, 

Albumin/Globulin Ratio 0.4L


1/8/21 08:33: 


Arterial Blood pH 7.380, Arterial Blood Partial Pressure CO2 60.0*H, Arterial 

Blood Partial Pressure O2 77.7, Arterial Blood HCO3 34.7H, Arterial Blood Oxygen

Saturation 95.2, Arterial Blood Base Excess 7.4H, Abelardo Test Positive


Height (Feet):  5


Height (Inches):  7.00


Weight (Pounds):  198


General Appearance:  alert


EENT:  PERRL/EOMI


Neck:  supple


Cardiovascular:  normal rate


Respiratory/Chest:  crackles/rales


Abdomen:  non tender, soft


Extremities:  non-tender





Assessment/Plan


Assessment/Plan:


covid pna


ac resp distress on ventilator


etoh abused


agitated -halodol


cont on bipap at icu


cont iv abx and iv decadrone


pulmonary and gi on consult











Moi Travis MD              Jan 8, 2021 14:13

## 2021-01-08 NOTE — NUR
NURSE NOTES:



order to renew versed drip obtained from Dr. Lemos to continue the patient sedated at rass 
scale of -2.

## 2021-01-08 NOTE — NUR
NURSE HAND-OFF REPORT: 



Latest Vital Signs: Temperature 100.0 , Pulse 108 , B/P 112 /78 , Respiratory Rate 26 , O2 
SAT 99 , Mechanical Ventilator, O2 Flow Rate  .  

Vital Sign Comment: 



EKG Rhythm: Sinus Tachycardia

Rhythm change?: N 

MD Notified?: -

MD Response: 



Latest Singh Fall Score: 60  

Fall Risk: High Risk 

Safety Measures: Call light Within Reach, Bed Alarm Zone 1, Side Rails Side Rails x3, Bed 
position Low and Locked.

Fall Precautions: 

Yellow Socks

Yellow Gown

Door Sign

Patient Fall Education



Report given to SUZI Chung.

## 2021-01-08 NOTE — NUR
NURSE HAND-OFF REPORT: 



Latest Vital Signs: Temperature 100.0 , Pulse 104 , B/P 107 /68 , Respiratory Rate 26 , O2 
SAT 98 , Mechanical Ventilator, O2 Flow Rate  .  

Vital Sign Comment: stable



EKG Rhythm: Sinus Tachycardia

Rhythm change?: N 

MD Notified?: -

MD Response: 



Latest Singh Fall Score: 60  

Fall Risk: High Risk 

Safety Measures: Call light Within Reach, Bed Alarm Zone 1, Side Rails Side Rails x3, Bed 
position Low and Locked.

Fall Precautions: 

Yellow Socks

Yellow Gown

Door Sign

Patient Fall Education



Report given to Michele ARCHER.

## 2021-01-08 NOTE — NUR
NURSE NOTES:

new bag of versed hung. 8mg/hr rate 16ml/hr. patient remains sedated without any acute 
distress noted.

## 2021-01-08 NOTE — PULMONOLOGY PROGRESS NOTE
Subjective


ROS Limited/Unobtainable:  Yes


Interval Events:  None new reported.  On Versed drip.


Constitutional:  Reports: no symptoms


HEENT:  Repors: no symptoms


Respiratory:  Reports: shortness of breath - better


Cardiovascular:  Reports: no symptoms


Gastrointestinal/Abdominal:  Reports: no symptoms


Allergies:  


Coded Allergies:  


     No Known Allergies (Unverified , 6/11/19)





Objective





Last 24 Hour Vital Signs








  Date Time  Temp Pulse Resp B/P (MAP) Pulse Ox O2 Delivery O2 Flow Rate FiO2


 


1/8/21 14:00  109 26 113/74 (87) 98   


 


1/8/21 13:30  110 26 93/70 (78) 98   


 


1/8/21 13:00  115 28 118/73 (88) 99   


 


1/8/21 12:30  116 28 118/77 (91) 99   


 


1/8/21 12:00      Mechanical Ventilator  


 


1/8/21 12:00        100


 


1/8/21 12:00  117      


 


1/8/21 12:00 100.7 115 31 113/78 (90) 99   


 


1/8/21 11:30  111 28 120/73 (89) 98   


 


1/8/21 11:10   28   Mechanical Ventilator  100


 


1/8/21 11:00  111 27 109/72 (84) 98   


 


1/8/21 10:45  111 28     80


 


1/8/21 10:30  112 28 114/75 (88) 99   


 


1/8/21 10:00  110 30 112/75 (87) 99   


 


1/8/21 09:30  108 29 117/77 (90) 99   


 


1/8/21 09:00  108 29 109/70 (83) 99   


 


1/8/21 09:00   28   Mechanical Ventilator  100


 


1/8/21 08:30  105 29 106/73 (84) 99   


 


1/8/21 08:00  104      


 


1/8/21 08:00      Mechanical Ventilator  


 


1/8/21 08:00 100.4 108 30 115/72 (86) 98   


 


1/8/21 08:00   28   Mechanical Ventilator  100


 


1/8/21 08:00        100


 


1/8/21 07:30  104 27 105/70 (82) 98   


 


1/8/21 07:00   27     


 


1/8/21 07:00  105 27 104/74 (84) 98   


 


1/8/21 06:50  105 26     80


 


1/8/21 06:36   27     


 


1/8/21 06:30  108 28 131/71 (91) 98   


 


1/8/21 06:00  104 26 107/68 (81) 98   


 


1/8/21 05:30  105 28 112/71 (85) 99   


 


1/8/21 05:00  105 26 107/69 (82) 99   


 


1/8/21 05:00   27     


 


1/8/21 04:30  107 27 117/72 (87) 100   


 


1/8/21 04:15  112 29 121/77 (92) 98   


 


1/8/21 04:00        100


 


1/8/21 04:00 100.0 108 27 106/72 (83) 98   


 


1/8/21 04:00      Mechanical Ventilator  


 


1/8/21 04:00  107      


 


1/8/21 04:00   27     


 


1/8/21 03:45  109 27 106/70 (82) 99   


 


1/8/21 03:30  109 27 103/72 (82) 98   


 


1/8/21 03:00  110 26 101/69 (80) 98   


 


1/8/21 03:00   26     


 


1/8/21 02:51  112 26     80


 


1/8/21 02:30  112 26 108/73 (85) 100   


 


1/8/21 02:00   25     


 


1/8/21 02:00  113 26 119/71 (87) 100   


 


1/8/21 01:30  115 26 111/71 (84) 100   


 


1/8/21 01:00  114 26 115/69 (84) 100   


 


1/8/21 01:00   26     


 


1/8/21 00:30  115 26 115/70 (85) 100   


 


1/8/21 00:00 100.0 119 26 125/70 (88) 100   


 


1/8/21 00:00      Mechanical Ventilator  


 


1/8/21 00:00   25     


 


1/7/21 23:30  122 29 116/73 (87) 100   


 


1/7/21 23:00  122 28 122/72 (89) 100   


 


1/7/21 23:00   25     


 


1/7/21 22:52  108 28     100


 


1/7/21 22:48   30     


 


1/7/21 22:30  122 27 128/80 (96) 100   


 


1/7/21 22:00   29     


 


1/7/21 22:00  122 28 131/69 (89) 100   


 


1/7/21 21:30  121 28 113/76 (88) 100   


 


1/7/21 21:00  123 28 129/74 (92) 100   


 


1/7/21 21:00   28     


 


1/7/21 20:30  123 29 110/81 (91) 100   


 


1/7/21 20:00   31     


 


1/7/21 20:00      Mechanical Ventilator  


 


1/7/21 20:00  122      


 


1/7/21 20:00 99.2 120 34 124/75 (91) 100   


 


1/7/21 20:00        100


 


1/7/21 19:30  109 32 120/74 (89) 98   


 


1/7/21 19:11  114 35     100


 


1/7/21 19:00  111 30 126/76 (93) 97   


 


1/7/21 19:00   28   Mechanical Ventilator  100


 


1/7/21 18:30  109 30 135/73 (93) 98   


 


1/7/21 18:30   29   Mechanical Ventilator  100


 


1/7/21 18:25   28   Mechanical Ventilator  100


 


1/7/21 18:20   28   Mechanical Ventilator  100


 


1/7/21 18:15   28   Mechanical Ventilator  100


 


1/7/21 18:10   28   Mechanical Ventilator  100


 


1/7/21 18:05   28   Mechanical Ventilator  100


 


1/7/21 18:00   27   Mechanical Ventilator  100


 


1/7/21 18:00   28 108/71  Mechanical Ventilator  100


 


1/7/21 18:00  106 27 108/71 (83) 96   


 


1/7/21 17:55   29   Mechanical Ventilator  100


 


1/7/21 17:30  105 29 114/72 (86) 98   


 


1/7/21 17:00   28 118/71  Mechanical Ventilator  100


 


1/7/21 17:00  108 29 118/71 (87) 97   


 


1/7/21 16:30  107 30 115/68 (84) 98   


 


1/7/21 16:00      Mechanical Ventilator  


 


1/7/21 16:00   28 124/70  Mechanical Ventilator  100


 


1/7/21 16:00 99.8 108 27 124/70 (88) 97   


 


1/7/21 16:00  110      


 


1/7/21 15:30  110 28 118/75 (89) 96   


 


1/7/21 15:00   27 120/72  Mechanical Ventilator  100


 


1/7/21 15:00  111 27 120/72 (88) 97   


 


1/7/21 14:55  111 27     100


 


1/7/21 14:30  107 28 117/72 (87) 97   

















Intake and Output  


 


 1/7/21 1/8/21





 19:00 07:00


 


Intake Total 1075.85056 ml 168 ml


 


Output Total 1685 ml 2930 ml


 


Balance -609.74757 ml -2762 ml


 


  


 


IV Total 1075.29511 ml 168 ml


 


Output Urine Total 1685 ml 2930 ml








Objective


On a Versed drip


General Appearance:  no acute distress


HEENT:  atraumatic


Respiratory:  decreased breath sounds


Cardiovascular:  normal rate, regular rhythm


Abdomen:  soft, non tender


Laboratory Tests


1/7/21 18:02: POC Whole Blood Glucose 306H


1/8/21 00:37: POC Whole Blood Glucose 280H


1/8/21 05:21: POC Whole Blood Glucose 305H


1/8/21 06:32: 


White Blood Count 6.8, Red Blood Count 4.78, Hemoglobin 14.5, Hematocrit 44.5, 

Mean Corpuscular Volume 93, Mean Corpuscular Hemoglobin 30.3, Mean Corpuscular 

Hemoglobin Concent 32.6, Red Cell Distribution Width 13.0, Platelet Count 305, 

Mean Platelet Volume 8.2, Neutrophils (%) (Auto) , Lymphocytes (%) (Auto) , 

Monocytes (%) (Auto) , Eosinophils (%) (Auto) , Basophils (%) (Auto) , 

Differential Total Cells Counted 100, Neutrophils % (Manual) 90H, Lymphocytes % 

(Manual) 5L, Monocytes % (Manual) 5, Eosinophils % (Manual) 0, Basophils % 

(Manual) 0, Band Neutrophils 0, Platelet Estimate Adequate, Platelet Morphology 

Normal, Red Blood Cell Morphology Normal, Sodium Level 136, Potassium Level 4.5,

Chloride Level 98, Carbon Dioxide Level 34H, Anion Gap 4L, Blood Urea Nitrogen 

28H, Creatinine 0.9, Estimat Glomerular Filtration Rate > 60, Glucose Level 

296#H, Calcium Level 8.6, Total Bilirubin 0.3, Direct Bilirubin 0.2, Aspartate 

Amino Transf (AST/SGOT) 19, Alanine Aminotransferase (ALT/SGPT) 32, Alkaline 

Phosphatase 140H, Total Protein 6.6, Albumin 1.8L, Globulin 4.8, 

Albumin/Globulin Ratio 0.4L


1/8/21 08:33: 


Arterial Blood pH 7.380, Arterial Blood Partial Pressure CO2 60.0*H, Arterial 

Blood Partial Pressure O2 77.7, Arterial Blood HCO3 34.7H, Arterial Blood Oxygen

Saturation 95.2, Arterial Blood Base Excess 7.4H, Abelardo Test Positive





Current Medications








 Medications


  (Trade)  Dose


 Ordered  Sig/Julisa


 Route


 PRN Reason  Start Time


 Stop Time Status Last Admin


Dose Admin


 


 Acetaminophen


  (Tylenol)  650 mg  Q6H  PRN


 ORAL


 Temp >100.5  1/3/21 04:00


 2/2/21 03:59  1/8/21 12:45





 


 Ceftriaxone


 Sodium 1 gm/


 Dextrose  55 ml @ 


 110 mls/hr  Q24H


 IVPB


   1/3/21 10:00


 1/10/21 09:59  1/8/21 09:13





 


 Chlorhexidine


 Gluconate


  (Vanessa-Hex 2%)  1 applic  DAILY@2000


 TOPIC


   1/6/21 20:00


 4/6/21 19:59  1/7/21 20:41





 


 Dextrose


  (Dextrose 50%)  25 ml  Q30M  PRN


 IV


 Hypoglycemia  1/2/21 18:00


 4/2/21 17:59   





 


 Dextrose


  (Dextrose 50%)  50 ml  Q30M  PRN


 IV


 Hypoglycemia  1/2/21 18:00


 4/2/21 17:59   





 


 Enoxaparin Sodium


  (Lovenox)  80 mg  EVERY 12  HOURS


 SUBQ


   1/2/21 21:00


 4/2/21 20:59  1/8/21 09:13





 


 Furosemide 100 mg/


 Dextrose  100 ml @ 


 10 mls/hr  Q10H


 IV


   1/7/21 11:00


 2/6/21 10:59  1/8/21 06:36





 


 Haloperidol


  (Haldol)  5 mg  Q6H  PRN


 ORAL


 Agitation  1/4/21 13:45


 2/18/21 13:44  1/4/21 22:56





 


 Haloperidol


 Lactate 5 mg/


 Dextrose  56 ml @ 


 224 mls/hr  Q6H  PRN


 IVPB


 Agitation  1/5/21 12:30


 2/19/21 12:29  1/5/21 13:20





 


 Insulin Aspart


  (NovoLOG)    Q6HR


 SUBQ


   1/2/21 18:00


 4/2/21 17:59  1/8/21 12:47





 


 Lorazepam


  (Ativan 2mg/ml


 1ml)  2 mg  Q4H  PRN


 IV


 For Anxiety IV/PO  1/5/21 00:00


 1/12/21 00:00  1/6/21 20:01





 


 Lorazepam


  (Ativan)  1 mg  THREE TIMES A DAY  PRN


 ORAL


 For Anxiety  1/4/21 05:45


 1/11/21 05:44  1/4/21 22:55





 


 Methylprednisolone


 Sodium Succinate


  (Solu-MEDROL)  40 mg  EVERY 12  HOURS


 IVP


   1/6/21 21:00


 4/6/21 20:59  1/8/21 09:12





 


 Midazolam HCl  100 ml @ 0


 mls/hr  Q24H  PRN


 IV


 SEDATION  1/8/21 21:00


 1/15/21 20:59   





 


 Midazolam HCl 100


 mg/Sodium Chloride  200 ml @ 0


 mls/hr  Q24H  PRN


 IV


 SEDATION  1/8/21 00:00


 1/8/21 21:00  1/8/21 11:10





 


 Norepinephrine


 Bitartrate  250 ml @ 


 7.5 mls/hr  Q24H  PRN


 IV


 For hypotension  1/5/21 14:45


 1/8/21 14:45   





 


 Quetiapine


 Fumarate


  (SEROqueL)  50 mg  Q12HR


 ORAL


   1/4/21 21:00


 2/18/21 20:59  1/6/21 20:00





 


 Remdesivir 100 mg/


 Sodium Chloride  250 ml @ 


 250 mls/hr  Q24H


 IV


   1/5/21 20:00


 1/8/21 20:59  1/7/21 20:41





 


 Sodium Chloride  1,000 ml @ 


 25 mls/hr  Q24H


 IV


   1/3/21 07:45


 2/2/21 07:44  1/7/21 15:37














Assessment/Plan


Assessment/Plan


1. COVID-19 pneumonia.


   - on Decadron, azithromycin, and ceftriaxone


   - Intubated now; will continue AC mode for now


          -Currently 100% FiO2.


          -Cuff leak noted.  Further air instilled ABG reviewed. 


2. Diabetes mellitus.; 


3. Elevated inflammatory markers.


4. High D-dimer.


5. Hyponatremia.


6. Hyperglycemia.


   - BG control


7. Hypoxemia., secondary to #1; improved





DVT prophylaxis   On Lovenox.


On propofol drip





I will initiate Lasix drip.


May need pressors.











Sung Lemos MD            Jan 8, 2021 14:20

## 2021-01-08 NOTE — INFECTIOUS DISEASES PROG NOTE
Assessment/Plan


Assessment/Plan


antibiotics : ceftriaxone, remdesivir





A


1. COVID-19 pneumonia.


on 100% FiO2 with saturation of 98 %


s/p remdesivir


s/p ivermectin


2. New-onset diabetes.


3. respiratory failure





P


1. Complete remdesivir day 5.


2. Continue ceftriaxone.


3. continue solumedrol


4. Continue isolation.





Subjective


ROS Limited/Unobtainable:  Yes


Allergies:  


Coded Allergies:  


     No Known Allergies (Unverified , 6/11/19)





Objective





Last 24 Hour Vital Signs








  Date Time  Temp Pulse Resp B/P (MAP) Pulse Ox O2 Delivery O2 Flow Rate FiO2


 


1/8/21 12:00 100.7 115 31 113/78 (90) 99   


 


1/8/21 11:30  111 28 120/73 (89) 98   


 


1/8/21 11:10   28   Mechanical Ventilator  100


 


1/8/21 11:00  111 27 109/72 (84) 98   


 


1/8/21 10:45  111 28     80


 


1/8/21 10:30  112 28 114/75 (88) 99   


 


1/8/21 10:00  110 30 112/75 (87) 99   


 


1/8/21 09:30  108 29 117/77 (90) 99   


 


1/8/21 09:00  108 29 109/70 (83) 99   


 


1/8/21 09:00   28   Mechanical Ventilator  100


 


1/8/21 08:30  105 29 106/73 (84) 99   


 


1/8/21 08:00      Mechanical Ventilator  


 


1/8/21 08:00 100.4 108 30 115/72 (86) 98   


 


1/8/21 08:00   28   Mechanical Ventilator  100


 


1/8/21 08:00        100


 


1/8/21 07:30  104 27 105/70 (82) 98   


 


1/8/21 07:00   27     


 


1/8/21 07:00  105 27 104/74 (84) 98   


 


1/8/21 06:50  105 26     80


 


1/8/21 06:36   27     


 


1/8/21 06:30  108 28 131/71 (91) 98   


 


1/8/21 06:00  104 26 107/68 (81) 98   


 


1/8/21 05:30  105 28 112/71 (85) 99   


 


1/8/21 05:00  105 26 107/69 (82) 99   


 


1/8/21 05:00   27     


 


1/8/21 04:30  107 27 117/72 (87) 100   


 


1/8/21 04:15  112 29 121/77 (92) 98   


 


1/8/21 04:00        100


 


1/8/21 04:00 100.0 108 27 106/72 (83) 98   


 


1/8/21 04:00      Mechanical Ventilator  


 


1/8/21 04:00  107      


 


1/8/21 04:00   27     


 


1/8/21 03:45  109 27 106/70 (82) 99   


 


1/8/21 03:30  109 27 103/72 (82) 98   


 


1/8/21 03:00  110 26 101/69 (80) 98   


 


1/8/21 03:00   26     


 


1/8/21 02:51  112 26     80


 


1/8/21 02:30  112 26 108/73 (85) 100   


 


1/8/21 02:00   25     


 


1/8/21 02:00  113 26 119/71 (87) 100   


 


1/8/21 01:30  115 26 111/71 (84) 100   


 


1/8/21 01:00  114 26 115/69 (84) 100   


 


1/8/21 01:00   26     


 


1/8/21 00:30  115 26 115/70 (85) 100   


 


1/8/21 00:00 100.0 119 26 125/70 (88) 100   


 


1/8/21 00:00      Mechanical Ventilator  


 


1/8/21 00:00   25     


 


1/7/21 23:30  122 29 116/73 (87) 100   


 


1/7/21 23:00  122 28 122/72 (89) 100   


 


1/7/21 23:00   25     


 


1/7/21 22:52  108 28     100


 


1/7/21 22:48   30     


 


1/7/21 22:30  122 27 128/80 (96) 100   


 


1/7/21 22:00   29     


 


1/7/21 22:00  122 28 131/69 (89) 100   


 


1/7/21 21:30  121 28 113/76 (88) 100   


 


1/7/21 21:00  123 28 129/74 (92) 100   


 


1/7/21 21:00   28     


 


1/7/21 20:30  123 29 110/81 (91) 100   


 


1/7/21 20:00   31     


 


1/7/21 20:00      Mechanical Ventilator  


 


1/7/21 20:00  122      


 


1/7/21 20:00 99.2 120 34 124/75 (91) 100   


 


1/7/21 20:00        100


 


1/7/21 19:30  109 32 120/74 (89) 98   


 


1/7/21 19:11  114 35     100


 


1/7/21 19:00  111 30 126/76 (93) 97   


 


1/7/21 19:00   28   Mechanical Ventilator  100


 


1/7/21 18:30  109 30 135/73 (93) 98   


 


1/7/21 18:30   29   Mechanical Ventilator  100


 


1/7/21 18:25   28   Mechanical Ventilator  100


 


1/7/21 18:20   28   Mechanical Ventilator  100


 


1/7/21 18:15   28   Mechanical Ventilator  100


 


1/7/21 18:10   28   Mechanical Ventilator  100


 


1/7/21 18:05   28   Mechanical Ventilator  100


 


1/7/21 18:00   27   Mechanical Ventilator  100


 


1/7/21 18:00   28 108/71  Mechanical Ventilator  100


 


1/7/21 18:00  106 27 108/71 (83) 96   


 


1/7/21 17:55   29   Mechanical Ventilator  100


 


1/7/21 17:30  105 29 114/72 (86) 98   


 


1/7/21 17:00   28 118/71  Mechanical Ventilator  100


 


1/7/21 17:00  108 29 118/71 (87) 97   


 


1/7/21 16:30  107 30 115/68 (84) 98   


 


1/7/21 16:00      Mechanical Ventilator  


 


1/7/21 16:00   28 124/70  Mechanical Ventilator  100


 


1/7/21 16:00 99.8 108 27 124/70 (88) 97   


 


1/7/21 16:00  110      


 


1/7/21 15:30  110 28 118/75 (89) 96   


 


1/7/21 15:00   27 120/72  Mechanical Ventilator  100


 


1/7/21 15:00  111 27 120/72 (88) 97   


 


1/7/21 14:55  111 27     100


 


1/7/21 14:30  107 28 117/72 (87) 97   


 


1/7/21 14:00        100


 


1/7/21 14:00  105 27 127/76 (93) 96   


 


1/7/21 13:30  98 23 119/73 (88) 98   


 


1/7/21 13:13   27 115/73  Mechanical Ventilator  100


 


1/7/21 13:00   27 115/73  Mechanical Ventilator  100


 


1/7/21 13:00  95 23 115/73 (87) 99   


 


1/7/21 12:30  92 23 102/70 (81) 98   








Height (Feet):  5


Height (Inches):  7.00


Weight (Pounds):  198


HEENT:  other - intubated





Laboratory Tests








Test


 1/7/21


13:19 1/7/21


18:02 1/8/21


00:37 1/8/21


05:21


 


POC Whole Blood Glucose


 256 MG/DL


()  H 306 MG/DL


()  H 280 MG/DL


()  H 305 MG/DL


()  H


 


Test


 1/8/21


06:32 1/8/21


08:33 


 





 


White Blood Count


 6.8 K/UL


(4.8-10.8) 


 


 





 


Red Blood Count


 4.78 M/UL


(4.70-6.10) 


 


 





 


Hemoglobin


 14.5 G/DL


(14.2-18.0) 


 


 





 


Hematocrit


 44.5 %


(42.0-52.0) 


 


 





 


Mean Corpuscular Volume 93 FL (80-99)     


 


Mean Corpuscular Hemoglobin


 30.3 PG


(27.0-31.0) 


 


 





 


Mean Corpuscular Hemoglobin


Concent 32.6 G/DL


(32.0-36.0) 


 


 





 


Red Cell Distribution Width


 13.0 %


(11.6-14.8) 


 


 





 


Platelet Count


 305 K/UL


(150-450) 


 


 





 


Mean Platelet Volume


 8.2 FL


(6.5-10.1) 


 


 





 


Neutrophils (%) (Auto)


 % (45.0-75.0)


 


 


 





 


Lymphocytes (%) (Auto)


 % (20.0-45.0)


 


 


 





 


Monocytes (%) (Auto)  % (1.0-10.0)     


 


Eosinophils (%) (Auto)  % (0.0-3.0)     


 


Basophils (%) (Auto)  % (0.0-2.0)     


 


Differential Total Cells


Counted 100  


 


 


 





 


Neutrophils % (Manual) 90 % (45-75)  H   


 


Lymphocytes % (Manual) 5 % (20-45)  L   


 


Monocytes % (Manual) 5 % (1-10)     


 


Eosinophils % (Manual) 0 % (0-3)     


 


Basophils % (Manual) 0 % (0-2)     


 


Band Neutrophils 0 % (0-8)     


 


Platelet Estimate Adequate     


 


Platelet Morphology Normal     


 


Red Blood Cell Morphology Normal     


 


Sodium Level


 136 MMOL/L


(136-145) 


 


 





 


Potassium Level


 4.5 MMOL/L


(3.5-5.1) 


 


 





 


Chloride Level


 98 MMOL/L


() 


 


 





 


Carbon Dioxide Level


 34 MMOL/L


(21-32)  H 


 


 





 


Anion Gap


 4 mmol/L


(5-15)  L 


 


 





 


Blood Urea Nitrogen


 28 mg/dL


(7-18)  H 


 


 





 


Creatinine


 0.9 MG/DL


(0.55-1.30) 


 


 





 


Estimat Glomerular Filtration


Rate > 60 mL/min


(>60) 


 


 





 


Glucose Level


 296 MG/DL


()  #H 


 


 





 


Calcium Level


 8.6 MG/DL


(8.5-10.1) 


 


 





 


Total Bilirubin


 0.3 MG/DL


(0.2-1.0) 


 


 





 


Direct Bilirubin


 0.2 MG/DL


(0.0-0.3) 


 


 





 


Aspartate Amino Transf


(AST/SGOT) 19 U/L (15-37)


 


 


 





 


Alanine Aminotransferase


(ALT/SGPT) 32 U/L (12-78)


 


 


 





 


Alkaline Phosphatase


 140 U/L


()  H 


 


 





 


Total Protein


 6.6 G/DL


(6.4-8.2) 


 


 





 


Albumin


 1.8 G/DL


(3.4-5.0)  L 


 


 





 


Globulin 4.8 g/dL     


 


Albumin/Globulin Ratio


 0.4 (1.0-2.7)


L 


 


 





 


Arterial Blood pH


 


 7.380


(7.350-7.450) 


 





 


Arterial Blood Partial


Pressure CO2 


 60.0 mmHg


(35.0-45.0)  *H 


 





 


Arterial Blood Partial


Pressure O2 


 77.7 mmHg


(75.0-100.0) 


 





 


Arterial Blood HCO3


 


 34.7 mmol/L


(22.0-26.0)  H 


 





 


Arterial Blood Oxygen


Saturation 


 95.2 %


() 


 





 


Arterial Blood Base Excess  7.4 (-2-2)  H  


 


Abelardo Test  Positive    











Current Medications








 Medications


  (Trade)  Dose


 Ordered  Sig/Julisa


 Route


 PRN Reason  Start Time


 Stop Time Status Last Admin


Dose Admin


 


 Acetaminophen


  (Tylenol)  650 mg  Q6H  PRN


 ORAL


 Temp >100.5  1/3/21 04:00


 2/2/21 03:59  1/6/21 17:57





 


 Ceftriaxone


 Sodium 1 gm/


 Dextrose  55 ml @ 


 110 mls/hr  Q24H


 IVPB


   1/3/21 10:00


 1/10/21 09:59  1/8/21 09:13





 


 Chlorhexidine


 Gluconate


  (Vanessa-Hex 2%)  1 applic  DAILY@2000


 TOPIC


   1/6/21 20:00


 4/6/21 19:59  1/7/21 20:41





 


 Dextrose


  (Dextrose 50%)  25 ml  Q30M  PRN


 IV


 Hypoglycemia  1/2/21 18:00


 4/2/21 17:59   





 


 Dextrose


  (Dextrose 50%)  50 ml  Q30M  PRN


 IV


 Hypoglycemia  1/2/21 18:00


 4/2/21 17:59   





 


 Enoxaparin Sodium


  (Lovenox)  80 mg  EVERY 12  HOURS


 SUBQ


   1/2/21 21:00


 4/2/21 20:59  1/8/21 09:13





 


 Furosemide 100 mg/


 Dextrose  100 ml @ 


 10 mls/hr  Q10H


 IV


   1/7/21 11:00


 2/6/21 10:59  1/8/21 06:36





 


 Haloperidol


  (Haldol)  5 mg  Q6H  PRN


 ORAL


 Agitation  1/4/21 13:45


 2/18/21 13:44  1/4/21 22:56





 


 Haloperidol


 Lactate 5 mg/


 Dextrose  56 ml @ 


 224 mls/hr  Q6H  PRN


 IVPB


 Agitation  1/5/21 12:30


 2/19/21 12:29  1/5/21 13:20





 


 Insulin Aspart


  (NovoLOG)    Q6HR


 SUBQ


   1/2/21 18:00


 4/2/21 17:59  1/8/21 05:24





 


 Lorazepam


  (Ativan 2mg/ml


 1ml)  2 mg  Q4H  PRN


 IV


 For Anxiety IV/PO  1/5/21 00:00


 1/12/21 00:00  1/6/21 20:01





 


 Lorazepam


  (Ativan)  1 mg  THREE TIMES A DAY  PRN


 ORAL


 For Anxiety  1/4/21 05:45


 1/11/21 05:44  1/4/21 22:55





 


 Methylprednisolone


 Sodium Succinate


  (Solu-MEDROL)  40 mg  EVERY 12  HOURS


 IVP


   1/6/21 21:00


 4/6/21 20:59  1/8/21 09:12





 


 Midazolam HCl 100


 mg/Sodium Chloride  200 ml @ 0


 mls/hr  Q24H  PRN


 IV


 SEDATION  1/8/21 00:00


 1/10/21 00:00  1/8/21 11:10





 


 Norepinephrine


 Bitartrate  250 ml @ 


 7.5 mls/hr  Q24H  PRN


 IV


 For hypotension  1/5/21 14:45


 1/8/21 14:45   





 


 Quetiapine


 Fumarate


  (SEROqueL)  50 mg  Q12HR


 ORAL


   1/4/21 21:00


 2/18/21 20:59  1/6/21 20:00





 


 Remdesivir 100 mg/


 Sodium Chloride  250 ml @ 


 250 mls/hr  Q24H


 IV


   1/5/21 20:00


 1/8/21 20:59  1/7/21 20:41





 


 Sodium Chloride  1,000 ml @ 


 25 mls/hr  Q24H


 IV


   1/3/21 07:45


 2/2/21 07:44  1/7/21 15:37




















Ashley Davis MD       Jan 8, 2021 12:29

## 2021-01-08 NOTE — NUR
CASE MANAGEMENT:REVIEW



1/7/21

SI: COVID PNA. INTUBATED 

100.0   105  27  104/74   SATS~ 98% ON VENT SUPPORT W/80% FIO2

BUN+25   GLUCOSE+148





IS: LASIX GTT

IV REMDESIVIR Q24 (5/5)

IV SOLUMEDROL Q12

IV ROCEPHIN Q24

IVF@25/HR

LOVENOX SQ Q12

**: ICU STATUS

DCP: FROM HOME

## 2021-01-08 NOTE — NUR
NURSE NOTES:



Dr. Lemos updated at the bedside regarding patient ABG results, remains on lasix drip at 
10 mg/hr at rate of 10ml/hr.

ordered to maintain setting at AC 16, TV: 500, Fio2 of 100% and peep of 10, no verbal orders 
given at this time.

## 2021-01-09 VITALS — DIASTOLIC BLOOD PRESSURE: 75 MMHG | SYSTOLIC BLOOD PRESSURE: 106 MMHG

## 2021-01-09 VITALS — SYSTOLIC BLOOD PRESSURE: 117 MMHG | DIASTOLIC BLOOD PRESSURE: 75 MMHG

## 2021-01-09 VITALS — DIASTOLIC BLOOD PRESSURE: 73 MMHG | SYSTOLIC BLOOD PRESSURE: 121 MMHG

## 2021-01-09 VITALS — DIASTOLIC BLOOD PRESSURE: 72 MMHG | SYSTOLIC BLOOD PRESSURE: 115 MMHG

## 2021-01-09 VITALS — SYSTOLIC BLOOD PRESSURE: 101 MMHG | DIASTOLIC BLOOD PRESSURE: 68 MMHG

## 2021-01-09 VITALS — DIASTOLIC BLOOD PRESSURE: 76 MMHG | SYSTOLIC BLOOD PRESSURE: 93 MMHG

## 2021-01-09 VITALS — DIASTOLIC BLOOD PRESSURE: 71 MMHG | SYSTOLIC BLOOD PRESSURE: 116 MMHG

## 2021-01-09 VITALS — DIASTOLIC BLOOD PRESSURE: 70 MMHG | SYSTOLIC BLOOD PRESSURE: 104 MMHG

## 2021-01-09 VITALS — SYSTOLIC BLOOD PRESSURE: 109 MMHG | DIASTOLIC BLOOD PRESSURE: 73 MMHG

## 2021-01-09 VITALS — SYSTOLIC BLOOD PRESSURE: 108 MMHG | DIASTOLIC BLOOD PRESSURE: 70 MMHG

## 2021-01-09 VITALS — DIASTOLIC BLOOD PRESSURE: 70 MMHG | SYSTOLIC BLOOD PRESSURE: 101 MMHG

## 2021-01-09 VITALS — SYSTOLIC BLOOD PRESSURE: 116 MMHG | DIASTOLIC BLOOD PRESSURE: 75 MMHG

## 2021-01-09 VITALS — DIASTOLIC BLOOD PRESSURE: 71 MMHG | SYSTOLIC BLOOD PRESSURE: 95 MMHG

## 2021-01-09 VITALS — SYSTOLIC BLOOD PRESSURE: 105 MMHG | DIASTOLIC BLOOD PRESSURE: 73 MMHG

## 2021-01-09 VITALS — DIASTOLIC BLOOD PRESSURE: 71 MMHG | SYSTOLIC BLOOD PRESSURE: 111 MMHG

## 2021-01-09 VITALS — SYSTOLIC BLOOD PRESSURE: 95 MMHG | DIASTOLIC BLOOD PRESSURE: 68 MMHG

## 2021-01-09 VITALS — DIASTOLIC BLOOD PRESSURE: 69 MMHG | SYSTOLIC BLOOD PRESSURE: 107 MMHG

## 2021-01-09 VITALS — DIASTOLIC BLOOD PRESSURE: 69 MMHG | SYSTOLIC BLOOD PRESSURE: 113 MMHG

## 2021-01-09 VITALS — SYSTOLIC BLOOD PRESSURE: 105 MMHG | DIASTOLIC BLOOD PRESSURE: 76 MMHG

## 2021-01-09 VITALS — SYSTOLIC BLOOD PRESSURE: 104 MMHG | DIASTOLIC BLOOD PRESSURE: 73 MMHG

## 2021-01-09 VITALS — DIASTOLIC BLOOD PRESSURE: 69 MMHG | SYSTOLIC BLOOD PRESSURE: 101 MMHG

## 2021-01-09 VITALS — DIASTOLIC BLOOD PRESSURE: 73 MMHG | SYSTOLIC BLOOD PRESSURE: 104 MMHG

## 2021-01-09 VITALS — SYSTOLIC BLOOD PRESSURE: 109 MMHG | DIASTOLIC BLOOD PRESSURE: 70 MMHG

## 2021-01-09 VITALS — SYSTOLIC BLOOD PRESSURE: 108 MMHG | DIASTOLIC BLOOD PRESSURE: 75 MMHG

## 2021-01-09 VITALS — SYSTOLIC BLOOD PRESSURE: 105 MMHG | DIASTOLIC BLOOD PRESSURE: 72 MMHG

## 2021-01-09 VITALS — SYSTOLIC BLOOD PRESSURE: 113 MMHG | DIASTOLIC BLOOD PRESSURE: 73 MMHG

## 2021-01-09 VITALS — SYSTOLIC BLOOD PRESSURE: 111 MMHG | DIASTOLIC BLOOD PRESSURE: 70 MMHG

## 2021-01-09 VITALS — DIASTOLIC BLOOD PRESSURE: 65 MMHG | SYSTOLIC BLOOD PRESSURE: 105 MMHG

## 2021-01-09 VITALS — SYSTOLIC BLOOD PRESSURE: 113 MMHG | DIASTOLIC BLOOD PRESSURE: 77 MMHG

## 2021-01-09 VITALS — SYSTOLIC BLOOD PRESSURE: 95 MMHG | DIASTOLIC BLOOD PRESSURE: 69 MMHG

## 2021-01-09 VITALS — DIASTOLIC BLOOD PRESSURE: 70 MMHG | SYSTOLIC BLOOD PRESSURE: 109 MMHG

## 2021-01-09 VITALS — SYSTOLIC BLOOD PRESSURE: 119 MMHG | DIASTOLIC BLOOD PRESSURE: 74 MMHG

## 2021-01-09 VITALS — SYSTOLIC BLOOD PRESSURE: 105 MMHG | DIASTOLIC BLOOD PRESSURE: 68 MMHG

## 2021-01-09 VITALS — SYSTOLIC BLOOD PRESSURE: 111 MMHG | DIASTOLIC BLOOD PRESSURE: 69 MMHG

## 2021-01-09 LAB
ADD MANUAL DIFF: YES
ANION GAP SERPL CALC-SCNC: 4 MMOL/L (ref 5–15)
BUN SERPL-MCNC: 37 MG/DL (ref 7–18)
CALCIUM SERPL-MCNC: 8.2 MG/DL (ref 8.5–10.1)
CHLORIDE SERPL-SCNC: 99 MMOL/L (ref 98–107)
CO2 SERPL-SCNC: 37 MMOL/L (ref 21–32)
CREAT SERPL-MCNC: 1.1 MG/DL (ref 0.55–1.3)
ERYTHROCYTE [DISTWIDTH] IN BLOOD BY AUTOMATED COUNT: 13.6 % (ref 11.6–14.8)
HCT VFR BLD CALC: 44.4 % (ref 42–52)
HGB BLD-MCNC: 14.3 G/DL (ref 14.2–18)
MCV RBC AUTO: 93 FL (ref 80–99)
PLATELET # BLD: 324 K/UL (ref 150–450)
POTASSIUM SERPL-SCNC: 4.4 MMOL/L (ref 3.5–5.1)
RBC # BLD AUTO: 4.77 M/UL (ref 4.7–6.1)
SODIUM SERPL-SCNC: 140 MMOL/L (ref 136–145)
WBC # BLD AUTO: 4.7 K/UL (ref 4.8–10.8)

## 2021-01-09 RX ADMIN — METHYLPREDNISOLONE SODIUM SUCCINATE SCH MG: 40 INJECTION, POWDER, LYOPHILIZED, FOR SOLUTION INTRAMUSCULAR; INTRAVENOUS at 09:32

## 2021-01-09 RX ADMIN — INSULIN ASPART SCH UNITS: 100 INJECTION, SOLUTION INTRAVENOUS; SUBCUTANEOUS at 18:22

## 2021-01-09 RX ADMIN — Medication PRN MLS/HR: at 03:48

## 2021-01-09 RX ADMIN — METHYLPREDNISOLONE SODIUM SUCCINATE SCH MG: 40 INJECTION, POWDER, LYOPHILIZED, FOR SOLUTION INTRAMUSCULAR; INTRAVENOUS at 20:39

## 2021-01-09 RX ADMIN — INSULIN ASPART SCH UNITS: 100 INJECTION, SOLUTION INTRAVENOUS; SUBCUTANEOUS at 13:15

## 2021-01-09 RX ADMIN — Medication PRN MLS/HR: at 18:09

## 2021-01-09 RX ADMIN — ENOXAPARIN SODIUM SCH MG: 80 INJECTION SUBCUTANEOUS at 09:33

## 2021-01-09 RX ADMIN — INSULIN ASPART SCH UNITS: 100 INJECTION, SOLUTION INTRAVENOUS; SUBCUTANEOUS at 23:37

## 2021-01-09 RX ADMIN — ACETAZOLAMIDE SCH MG: 500 CAPSULE, EXTENDED RELEASE ORAL at 15:56

## 2021-01-09 RX ADMIN — INSULIN ASPART SCH UNITS: 100 INJECTION, SOLUTION INTRAVENOUS; SUBCUTANEOUS at 05:32

## 2021-01-09 RX ADMIN — CHLORHEXIDINE GLUCONATE SCH APPLIC: 213 SOLUTION TOPICAL at 20:39

## 2021-01-09 RX ADMIN — ACETAZOLAMIDE SCH MG: 500 CAPSULE, EXTENDED RELEASE ORAL at 20:39

## 2021-01-09 RX ADMIN — SODIUM CHLORIDE SCH MLS/HR: 0.9 INJECTION INTRAVENOUS at 09:32

## 2021-01-09 RX ADMIN — ENOXAPARIN SODIUM SCH MG: 80 INJECTION SUBCUTANEOUS at 20:45

## 2021-01-09 RX ADMIN — Medication PRN MLS/HR: at 10:00

## 2021-01-09 NOTE — NUR
NURSE NOTES:



Dr. Felix updated on the patient white blood cell count and patient having a temperature. 

no new orders given at this time.

## 2021-01-09 NOTE — NUR
Nurse Notes: 

Patient sedated with 7.5 ETT at 23cm on ventilator AC 16  FiO2 80% PEEP 10. /71 
 Sinus Tachy on monitor.  Right femoral TLC running versed 8mg/hr, Lasix 10ml/hr,  NS 
@25ml/hr. left nare NGT clamped and patent. Stern catheter draining light myrna urine. 
Patient repositioned and oral care provided.

## 2021-01-09 NOTE — NUR
NURSE NOTES:



Dr. Travis at the bedside and updated on the patient progress and condition, 

remains on versed drip for sedation at 8mg/hr, also updated the blood sugar has been 
elevated, ordered to increase the patient sliding scale to resistant and have blood sugar 
checks remains q6hrs.

## 2021-01-09 NOTE — NUR
Nurse Notes: 

Patient sedated with 7.5 ETT at 23cm on ventilator AC 16  FiO2 60% PEEP 10. /75 
 Sinus Tachy on monitor, temp 101.5F rectal patient on cooling blanket. Right femoral 
TLC running versed 8mg/hr, Lasix 10ml/hr,  NS @25ml/hr. left nare NGT clamped and patent. 
Stern catheter draining light myrna urine. Patient given CHG bath, repositioned and oral 
care provided.

## 2021-01-09 NOTE — NUR
NURSE NOTES:



patient remains on versed drip at rate of 18ml/hr at dose of 8mg/hr, patient remains sedated 
at rass scale of -2, 



tube feeding order obtained from Dr. Lemos to begin on vital af at 1.2, 

Left NG-tube remains in place and secured to the bridge of the nose, 



remains on the cooling blanket for temperature of 100.0 taken rectally, temperature is now 
at 99.3 F rectally indicated by the cooling blanket tower.

## 2021-01-09 NOTE — NUR
Nurse Notes: 

Patient sedated with 7.5 ETT at 23cm on ventilator AC 16  FiO2 80% PEEP 10. /73 
 Sinus Tachy on monitor, temp 100.5F axillary patient placed on cooling blanket. Right 
femoral TLC running versed 8mg/hr, Lasix 10ml/hr,  NS @25ml/hr. left nare NGT clamped and 
patent. Stern catheter draining light myrna urine. Patient repositioned and oral care 
provided.

## 2021-01-09 NOTE — NUR
NURSE NOTES:

Report received from SUZI Bautista. Observed pt lying in the bed, sedated. ST with HR of 110s 
noted. ETT 7.5, 23cm at lips; AC 16/500/60%/PEEP 10, saturating at 99%. L NGT noted, intact. 
F/C intact, yellow urine noted. IV On R FA 18G, intact. R Femoral TLC, running Versed at 
8mg/hr, Lasix 10ml/hr. 1/2 NS at 25cc/hr. Bed in the lowest position. Side rails up x3. Will 
continue to monitor.

## 2021-01-09 NOTE — INFECTIOUS DISEASES PROG NOTE
Assessment/Plan


Assessment/Plan


A


1. COVID-19 pneumonia.


2. New-onset diabetes.


3. Hypoxic respiratory failure





P


1. Finished remdesivir course


2. Continue ceftriaxone.


3. Continue solumedrol


4. Continue isolation.





Subjective


ROS Limited/Unobtainable:  Yes


Constitutional:  Reports: fever, other - T=101.5


Neurologic:  Reports: confusion, other - on restraint


Allergies:  


Coded Allergies:  


     No Known Allergies (Unverified , 6/11/19)





Objective





Last 24 Hour Vital Signs








  Date Time  Temp Pulse Resp B/P (MAP) Pulse Ox O2 Delivery O2 Flow Rate FiO2


 


1/9/21 10:00   25   Mechanical Ventilator  60


 


1/9/21 09:30  108 24 113/69 (84) 96   


 


1/9/21 09:00  101 23 105/65 (78) 97   


 


1/9/21 09:00   25   Mechanical Ventilator  60


 


1/9/21 08:30  96 22 101/69 (80) 96   


 


1/9/21 08:00 98.6 93 22 107/69 (82) 96   


 


1/9/21 08:00        60


 


1/9/21 08:00   26   Mechanical Ventilator  60


 


1/9/21 07:30  96 23 101/68 (79) 95   


 


1/9/21 07:00   21   Mechanical Ventilator  60


 


1/9/21 07:00  90 20 95/69 (78) 98   


 


1/9/21 06:30  90 21     60


 


1/9/21 06:00  98 21 101/70 (80) 97   


 


1/9/21 06:00   22   Mechanical Ventilator  60


 


1/9/21 05:00   22   Mechanical Ventilator  60


 


1/9/21 05:00  110 22 108/70 (83) 97   


 


1/9/21 04:00        60


 


1/9/21 04:00      Mechanical Ventilator  


 


1/9/21 04:00   26   Mechanical Ventilator  60


 


1/9/21 04:00 101.5 122 26 117/75 (89) 100   


 


1/9/21 04:00  124      


 


1/9/21 03:54  124 25     60


 


1/9/21 03:48   26   Mechanical Ventilator  80


 


1/9/21 03:00   27   Mechanical Ventilator  80


 


1/9/21 03:00  122 25 121/73 (89) 99   


 


1/9/21 02:00   25   Mechanical Ventilator  80


 


1/9/21 02:00  119 25 116/71 (86) 99   


 


1/9/21 01:00  119 27 111/71 (84) 100   


 


1/9/21 01:00   26   Mechanical Ventilator  80


 


1/9/21 00:00 100.5 116 26 109/73 (85) 99   


 


1/9/21 00:00   25   Mechanical Ventilator  80


 


1/9/21 00:00      Mechanical Ventilator  


 


1/9/21 00:00  116      


 


1/8/21 23:34  114 28     80


 


1/8/21 23:00  111 25 100/71 (81) 99   


 


1/8/21 23:00   24   Mechanical Ventilator  100


 


1/8/21 22:00   26   Mechanical Ventilator  100


 


1/8/21 22:00  105 26 103/70 (81) 98   


 


1/8/21 21:15   27   Mechanical Ventilator  100


 


1/8/21 21:00  108 28 110/74 (86) 100   


 


1/8/21 21:00   27   Mechanical Ventilator  100


 


1/8/21 20:00  108      


 


1/8/21 20:00   26   Mechanical Ventilator  100


 


1/8/21 20:00      Mechanical Ventilator  


 


1/8/21 20:00 100.3 107 25 103/73 (83) 99   


 


1/8/21 20:00        100


 


1/8/21 19:56  109 26     80


 


1/8/21 19:30  107 25 107/71 (83) 98   


 


1/8/21 19:00   26   Mechanical Ventilator  100


 


1/8/21 19:00  108 28 115/72 (86) 98   


 


1/8/21 18:30  108 26 106/75 (85) 98   


 


1/8/21 18:00  107 26 108/71 (83) 98   


 


1/8/21 18:00   27   Mechanical Ventilator  100


 


1/8/21 17:30  108 26 105/71 (82) 99   


 


1/8/21 17:00  108 27 112/78 (89) 99   


 


1/8/21 17:00   27   Mechanical Ventilator  100


 


1/8/21 16:30  110 25  98 Mechanical Ventilator  80


 


1/8/21 16:30  110 30 105/69 (81) 98   


 


1/8/21 16:00        100


 


1/8/21 16:00      Mechanical Ventilator  


 


1/8/21 16:00  106      


 


1/8/21 16:00   27   Mechanical Ventilator  100


 


1/8/21 16:00 100.0 105 26 96/66 (76) 98   


 


1/8/21 15:30  106 25 96/72 (80) 99   


 


1/8/21 15:00   26   Mechanical Ventilator  100


 


1/8/21 15:00  106 26 97/67 (77) 98   


 


1/8/21 14:40  108 25     80


 


1/8/21 14:30  108 27 104/71 (82) 99   


 


1/8/21 14:27 100.2       


 


1/8/21 14:00   26   Mechanical Ventilator  100


 


1/8/21 14:00  109 26 113/74 (87) 98   


 


1/8/21 13:30  110 26 93/70 (78) 98   


 


1/8/21 13:00  115 28 118/73 (88) 99   


 


1/8/21 13:00   29   Mechanical Ventilator  100


 


1/8/21 12:30  116 28 118/77 (91) 99   


 


1/8/21 12:00      Mechanical Ventilator  


 


1/8/21 12:00        100


 


1/8/21 12:00  117      


 


1/8/21 12:00   28   Mechanical Ventilator  100


 


1/8/21 12:00 100.7 115 31 113/78 (90) 99   


 


1/8/21 11:30  111 28 120/73 (89) 98   


 


1/8/21 11:10   28   Mechanical Ventilator  100


 


1/8/21 11:00   29   Mechanical Ventilator  100


 


1/8/21 11:00  111 27 109/72 (84) 98   


 


1/8/21 10:45  111 28     80


 


1/8/21 10:30  112 28 114/75 (88) 99   








Height (Feet):  5


Height (Inches):  7.00


Weight (Pounds):  198


HEENT:  other - orally intubated


Respiratory/Chest:  other - on ventilator, FIO2=60%


Cardiovascular:  tachycardia


Abdomen:  soft, non tender, other - OG tube


Extremities:  other - hands edema


Neurologic/Psychiatric:  aphasia





Laboratory Tests








Test


 1/8/21


17:16 1/8/21


23:17 1/9/21


04:00 1/9/21


08:20


 


POC Whole Blood Glucose


 318 MG/DL


()  H Pending  


 


 





 


White Blood Count


 


 


 4.7 K/UL


(4.8-10.8)  L 





 


Red Blood Count


 


 


 4.77 M/UL


(4.70-6.10) 





 


Hemoglobin


 


 


 14.3 G/DL


(14.2-18.0) 





 


Hematocrit


 


 


 44.4 %


(42.0-52.0) 





 


Mean Corpuscular Volume   93 FL (80-99)   


 


Mean Corpuscular Hemoglobin


 


 


 29.9 PG


(27.0-31.0) 





 


Mean Corpuscular Hemoglobin


Concent 


 


 32.1 G/DL


(32.0-36.0) 





 


Red Cell Distribution Width


 


 


 13.6 %


(11.6-14.8) 





 


Platelet Count


 


 


 324 K/UL


(150-450) 





 


Mean Platelet Volume


 


 


 8.4 FL


(6.5-10.1) 





 


Neutrophils (%) (Auto)


 


 


 % (45.0-75.0)


 





 


Lymphocytes (%) (Auto)


 


 


 % (20.0-45.0)


 





 


Monocytes (%) (Auto)    % (1.0-10.0)   


 


Eosinophils (%) (Auto)    % (0.0-3.0)   


 


Basophils (%) (Auto)    % (0.0-2.0)   


 


Differential Total Cells


Counted 


 


 100  


 





 


Neutrophils % (Manual)   93 % (45-75)  H 


 


Lymphocytes % (Manual)   5 % (20-45)  L 


 


Monocytes % (Manual)   2 % (1-10)   


 


Eosinophils % (Manual)   0 % (0-3)   


 


Basophils % (Manual)   0 % (0-2)   


 


Band Neutrophils   0 % (0-8)   


 


Platelet Estimate   Adequate   


 


Platelet Morphology   Normal   


 


Red Blood Cell Morphology   Normal   


 


Sodium Level


 


 


 140 MMOL/L


(136-145) 





 


Potassium Level


 


 


 4.4 MMOL/L


(3.5-5.1) 





 


Chloride Level


 


 


 99 MMOL/L


() 





 


Carbon Dioxide Level


 


 


 37 MMOL/L


(21-32)  H 





 


Anion Gap


 


 


 4 mmol/L


(5-15)  L 





 


Blood Urea Nitrogen


 


 


 37 mg/dL


(7-18)  H 





 


Creatinine


 


 


 1.1 MG/DL


(0.55-1.30) 





 


Estimat Glomerular Filtration


Rate 


 


 > 60 mL/min


(>60) 





 


Glucose Level


 


 


 318 MG/DL


()  H 





 


Calcium Level


 


 


 8.2 MG/DL


(8.5-10.1)  L 





 


Arterial Blood pH


 


 


 


 7.430


(7.350-7.450)


 


Arterial Blood Partial


Pressure CO2 


 


 


 60.7 mmHg


(35.0-45.0)  *H


 


Arterial Blood Partial


Pressure O2 


 


 


 59.8 mmHg


(75.0-100.0)  L


 


Arterial Blood HCO3


 


 


 


 39.4 mmol/L


(22.0-26.0)  H


 


Arterial Blood Oxygen


Saturation 


 


 


 90.2 %


()  L


 


Arterial Blood Base Excess    12.2 (-2-2)  *H


 


Abelardo Test    Positive  











Current Medications








 Medications


  (Trade)  Dose


 Ordered  Sig/Julisa


 Route


 PRN Reason  Start Time


 Stop Time Status Last Admin


Dose Admin


 


 Acetaminophen


  (Tylenol)  650 mg  Q6H  PRN


 ORAL


 Temp >100.5  1/3/21 04:00


 2/2/21 03:59  1/8/21 12:45





 


 Ceftriaxone


 Sodium 1 gm/


 Dextrose  55 ml @ 


 110 mls/hr  Q24H


 IVPB


   1/3/21 10:00


 1/10/21 09:59  1/9/21 09:32





 


 Chlorhexidine


 Gluconate


  (Vanessa-Hex 2%)  1 applic  DAILY@2000


 TOPIC


   1/6/21 20:00


 4/6/21 19:59  1/8/21 20:23





 


 Dextrose


  (Dextrose 50%)  25 ml  Q30M  PRN


 IV


 Hypoglycemia  1/2/21 18:00


 4/2/21 17:59   





 


 Dextrose


  (Dextrose 50%)  50 ml  Q30M  PRN


 IV


 Hypoglycemia  1/2/21 18:00


 4/2/21 17:59   





 


 Enoxaparin Sodium


  (Lovenox)  80 mg  EVERY 12  HOURS


 SUBQ


   1/2/21 21:00


 4/2/21 20:59  1/9/21 09:33





 


 Furosemide 100 mg/


 Dextrose  100 ml @ 


 10 mls/hr  Q10H


 IV


   1/7/21 11:00


 2/6/21 10:59  1/9/21 02:13





 


 Haloperidol


  (Haldol)  5 mg  Q6H  PRN


 ORAL


 Agitation  1/4/21 13:45


 2/18/21 13:44  1/4/21 22:56





 


 Haloperidol


 Lactate 5 mg/


 Dextrose  56 ml @ 


 224 mls/hr  Q6H  PRN


 IVPB


 Agitation  1/5/21 12:30


 2/19/21 12:29  1/5/21 13:20





 


 Insulin Aspart


  (NovoLOG)    Q6HR


 SUBQ


   1/2/21 18:00


 4/2/21 17:59  1/9/21 05:32





 


 Lorazepam


  (Ativan 2mg/ml


 1ml)  2 mg  Q4H  PRN


 IV


 For Anxiety IV/PO  1/5/21 00:00


 1/12/21 00:00  1/6/21 20:01





 


 Lorazepam


  (Ativan)  1 mg  THREE TIMES A DAY  PRN


 ORAL


 For Anxiety  1/4/21 05:45


 1/11/21 05:44  1/4/21 22:55





 


 Methylprednisolone


 Sodium Succinate


  (Solu-MEDROL)  40 mg  EVERY 12  HOURS


 IVP


   1/6/21 21:00


 4/6/21 20:59  1/9/21 09:32





 


 Midazolam HCl  100 ml @ 0


 mls/hr  Q24H  PRN


 IV


 SEDATION  1/8/21 21:00


 1/15/21 20:59  1/9/21 10:00





 


 Quetiapine


 Fumarate


  (SEROqueL)  50 mg  Q12HR


 ORAL


   1/4/21 21:00


 2/18/21 20:59  1/8/21 20:23





 


 Sodium Chloride  1,000 ml @ 


 25 mls/hr  Q24H


 IV


   1/3/21 07:45


 2/2/21 07:44  1/9/21 05:31




















Lamin Felix MD                Jan 9, 2021 10:28

## 2021-01-09 NOTE — DIAGNOSTIC IMAGING REPORT
EXAM:

  XR Chest, 1 View

 

CLINICAL HISTORY:

  ABN CHST

 

TECHNIQUE:

  Frontal view of the chest.

 

COMPARISON:

  Chest radiograph on 1/7/2021

 

FINDINGS:

  Hardware: Endotracheal tube terminates in the region of the mid 

thoracic trachea, approximately 4.9 cm above the tito.  An enteric tube 

terminates in the region of the stomach.

 

  Lungs/pleura: Patchy opacities throughout the lungs with slightly 

improved aeration in the upper lungs.

 

  Heart/mediastinum: Stable mild enlargement of the cardiac silhouette.

 

  Soft tissues: Unremarkable.

 

  Bones: No acute fracture.

 

  Upper abdomen: Normal.

 

IMPRESSION:   

1.  Endotracheal tube terminates in the region of the mid thoracic 

trachea, approximately 4.9 cm above the tito.  An enteric tube 

terminates in the region of the stomach.

2.  Patchy opacities throughout the lungs with slightly improved aeration 

in the upper lungs.

## 2021-01-09 NOTE — NUR
NURSE HAND-OFF REPORT: 



Latest Vital Signs: Temperature 100.0 , Pulse 88 , B/P 95 /68 , Respiratory Rate 21 , O2 SAT 
97 , Mechanical Ventilator, O2 Flow Rate  .  

Vital Sign Comment: 



EKG Rhythm: Sinus Rhythm

Rhythm change?: N 

MD Notified?: -

MD Response: 



Latest Singh Fall Score: 60  

Fall Risk: High Risk 

Safety Measures: Call light Within Reach, Bed Alarm Zone 1, Side Rails Side Rails x3, Bed 
position Low and Locked.

Fall Precautions: 

Yellow Socks

Yellow Gown

Door Sign

Patient Fall Education



Report given to SUZI Pacheco. 

sedated on versed at 8mg/hr at rate of 16ml/hr, lasix drip at 10mg/hr at 10ml/hr.

## 2021-01-09 NOTE — NUR
NURSE HAND-OFF REPORT: 



Latest Vital Signs: Temperature 101.5 , Pulse 90 , B/P 95 /69 , Respiratory Rate 21 , O2 SAT 
98 , Mechanical Ventilator, O2 Flow Rate  .  

Vital Sign Comment: WNL



EKG Rhythm: Sinus Rhythm

Rhythm change?: N 

MD Notified?: -

MD Response: 



Latest Singh Fall Score: 60  

Fall Risk: High Risk 

Safety Measures: Call light Within Reach, Bed Alarm Zone 1, Side Rails Side Rails x3, Bed 
position Low and Locked.

Fall Precautions: 

Yellow Socks

Yellow Gown

Door Sign

Patient Fall Education



Report given to SUZI Bautista.

## 2021-01-09 NOTE — NUR
NURSE NOTES:



Dr. Lemos notified of patient abg results and setting of AC16, TV: 60, peep of 10. Et-tube 
is 7.5 at 23 cm. 

patient remains NPO at this time with NG-tube clamped. 

to continue lasix drip and will also have Diamox added to the patient medication. 

no further orders given at this time.

## 2021-01-09 NOTE — NUR
Nurse Notes: 

Patient sedated with 7.5 ETT at 23cm on ventilator AC 16  FiO2 60% PEEP 10. /70 
HR98 NSR on monitor temp 98.8F rectal.  Right femoral TLC running versed 8mg/hr, Lasix 
10ml/hr,  NS @25ml/hr. left nare NGT clamped and patent. Stenr catheter draining pale 
urine. Patient repositioned and oral care provided.

## 2021-01-09 NOTE — GENERAL PROGRESS NOTE
Subjective


Allergies:  


Coded Allergies:  


     No Known Allergies (Unverified , 6/11/19)


Subjective


on ventilator 60% fio2


on sediation


unresponsive


in icu





Objective





Last 24 Hour Vital Signs








  Date Time  Temp Pulse Resp B/P (MAP) Pulse Ox O2 Delivery O2 Flow Rate FiO2


 


1/9/21 14:00   25   Mechanical Ventilator  60


 


1/9/21 13:00   24   Mechanical Ventilator  60


 


1/9/21 13:00  88 23 105/76 (86) 96   


 


1/9/21 12:30  90 21 93/76 (82) 96   


 


1/9/21 12:00 97.5 94 23 104/70 (81) 96   


 


1/9/21 12:00   24   Mechanical Ventilator  60


 


1/9/21 12:00  100      


 


1/9/21 12:00        60


 


1/9/21 12:00      Mechanical Ventilator  


 


1/9/21 11:30  99 24 108/75 (86) 97   


 


1/9/21 11:00  111 24 104/73 (83) 97   


 


1/9/21 11:00   23   Mechanical Ventilator  60


 


1/9/21 10:30  88 22     60


 


1/9/21 10:30  108 23 113/73 (86) 97   


 


1/9/21 10:00   25   Mechanical Ventilator  60


 


1/9/21 10:00  111 24 104/73 (83) 97   


 


1/9/21 09:30  108 24 113/69 (84) 96   


 


1/9/21 09:00  101 23 105/65 (78) 97   


 


1/9/21 09:00   25   Mechanical Ventilator  60


 


1/9/21 08:30  96 22 101/69 (80) 96   


 


1/9/21 08:00 98.6 93 22 107/69 (82) 96   


 


1/9/21 08:00  93      


 


1/9/21 08:00      Mechanical Ventilator  


 


1/9/21 08:00        60


 


1/9/21 08:00   26   Mechanical Ventilator  60


 


1/9/21 07:30  96 23 101/68 (79) 95   


 


1/9/21 07:00   21   Mechanical Ventilator  60


 


1/9/21 07:00  90 20 95/69 (78) 98   


 


1/9/21 06:30  90 21     60


 


1/9/21 06:00  98 21 101/70 (80) 97   


 


1/9/21 06:00   22   Mechanical Ventilator  60


 


1/9/21 05:00   22   Mechanical Ventilator  60


 


1/9/21 05:00  110 22 108/70 (83) 97   


 


1/9/21 04:00        60


 


1/9/21 04:00      Mechanical Ventilator  


 


1/9/21 04:00   26   Mechanical Ventilator  60


 


1/9/21 04:00 101.5 122 26 117/75 (89) 100   


 


1/9/21 04:00  124      


 


1/9/21 03:54  124 25     60


 


1/9/21 03:48   26   Mechanical Ventilator  80


 


1/9/21 03:00   27   Mechanical Ventilator  80


 


1/9/21 03:00  122 25 121/73 (89) 99   


 


1/9/21 02:00   25   Mechanical Ventilator  80


 


1/9/21 02:00  119 25 116/71 (86) 99   


 


1/9/21 01:00  119 27 111/71 (84) 100   


 


1/9/21 01:00   26   Mechanical Ventilator  80


 


1/9/21 00:00 100.5 116 26 109/73 (85) 99   


 


1/9/21 00:00   25   Mechanical Ventilator  80


 


1/9/21 00:00      Mechanical Ventilator  


 


1/9/21 00:00  116      


 


1/8/21 23:34  114 28     80


 


1/8/21 23:00  111 25 100/71 (81) 99   


 


1/8/21 23:00   24   Mechanical Ventilator  100


 


1/8/21 22:00   26   Mechanical Ventilator  100


 


1/8/21 22:00  105 26 103/70 (81) 98   


 


1/8/21 21:15   27   Mechanical Ventilator  100


 


1/8/21 21:00  108 28 110/74 (86) 100   


 


1/8/21 21:00   27   Mechanical Ventilator  100


 


1/8/21 20:00  108      


 


1/8/21 20:00   26   Mechanical Ventilator  100


 


1/8/21 20:00      Mechanical Ventilator  


 


1/8/21 20:00 100.3 107 25 103/73 (83) 99   


 


1/8/21 20:00        100


 


1/8/21 19:56  109 26     80


 


1/8/21 19:30  107 25 107/71 (83) 98   


 


1/8/21 19:00   26   Mechanical Ventilator  100


 


1/8/21 19:00  108 28 115/72 (86) 98   


 


1/8/21 18:30  108 26 106/75 (85) 98   


 


1/8/21 18:00  107 26 108/71 (83) 98   


 


1/8/21 18:00   27   Mechanical Ventilator  100


 


1/8/21 17:30  108 26 105/71 (82) 99   


 


1/8/21 17:00  108 27 112/78 (89) 99   


 


1/8/21 17:00   27   Mechanical Ventilator  100


 


1/8/21 16:30  110 25  98 Mechanical Ventilator  80


 


1/8/21 16:30  110 30 105/69 (81) 98   


 


1/8/21 16:00        100


 


1/8/21 16:00      Mechanical Ventilator  


 


1/8/21 16:00  106      


 


1/8/21 16:00   27   Mechanical Ventilator  100


 


1/8/21 16:00 100.0 105 26 96/66 (76) 98   


 


1/8/21 15:30  106 25 96/72 (80) 99   


 


1/8/21 15:00   26   Mechanical Ventilator  100


 


1/8/21 15:00  106 26 97/67 (77) 98   

















Intake and Output  


 


 1/8/21 1/9/21





 19:00 07:00


 


Intake Total 681.27038 ml 785.1993 ml


 


Output Total 1510 ml 575 ml


 


Balance -829.98743 ml 210.1993 ml


 


  


 


Free Water 95 ml 


 


IV Total 586.37244 ml 755.1993 ml


 


Other  30 ml


 


Output Urine Total 1510 ml 575 ml








Laboratory Tests


1/8/21 17:16: POC Whole Blood Glucose 318H


1/8/21 23:17: POC Whole Blood Glucose [Pending]


1/9/21 04:00: 


White Blood Count 4.7L, Red Blood Count 4.77, Hemoglobin 14.3, Hematocrit 44.4, 

Mean Corpuscular Volume 93, Mean Corpuscular Hemoglobin 29.9, Mean Corpuscular 

Hemoglobin Concent 32.1, Red Cell Distribution Width 13.6, Platelet Count 324, 

Mean Platelet Volume 8.4, Neutrophils (%) (Auto) , Lymphocytes (%) (Auto) , 

Monocytes (%) (Auto) , Eosinophils (%) (Auto) , Basophils (%) (Auto) , 

Differential Total Cells Counted 100, Neutrophils % (Manual) 93H, Lymphocytes % 

(Manual) 5L, Monocytes % (Manual) 2, Eosinophils % (Manual) 0, Basophils % 

(Manual) 0, Band Neutrophils 0, Platelet Estimate Adequate, Platelet Morphology 

Normal, Red Blood Cell Morphology Normal, Sodium Level 140, Potassium Level 4.4,

 Chloride Level 99, Carbon Dioxide Level 37H, Anion Gap 4L, Blood Urea Nitrogen 

37H, Creatinine 1.1, Estimat Glomerular Filtration Rate > 60, Glucose Level 318H

, Calcium Level 8.2L


1/9/21 08:20: 


Arterial Blood pH 7.430, Arterial Blood Partial Pressure CO2 60.7*H, Arterial 

Blood Partial Pressure O2 59.8L, Arterial Blood HCO3 39.4H, Arterial Blood 

Oxygen Saturation 90.2L, Arterial Blood Base Excess 12.2*H, Abelardo Test Positive


Height (Feet):  5


Height (Inches):  7.00


Weight (Pounds):  198


General Appearance:  lethargic, moderate distress


Neck:  supple


Cardiovascular:  regular rhythm


Respiratory/Chest:  crackles/rales, rhonchi - bilaterally


Abdomen:  non tender, soft


Extremities:  non-tender





Assessment/Plan


Assessment/Plan:


covid pna


ac resp distress on ventilator


etoh abused


agitated -halodol


cont on bipap at icu


cont iv abx and iv decadrone


pulmonary and gi on consult


dw charge nurse











Moi Travis MD              Jan 9, 2021 14:43

## 2021-01-09 NOTE — PULMONOLOGY PROGRESS NOTE
Subjective


ROS Limited/Unobtainable:  Yes


Interval Events:  None new reported.  On Versed drip.


Constitutional:  Reports: fever, other - T=101.5


HEENT:  Repors: no symptoms


Respiratory:  Reports: shortness of breath - better


Cardiovascular:  Reports: no symptoms


Gastrointestinal/Abdominal:  Reports: no symptoms


Allergies:  


Coded Allergies:  


     No Known Allergies (Unverified , 6/11/19)





Objective





Last 24 Hour Vital Signs








  Date Time  Temp Pulse Resp B/P (MAP) Pulse Ox O2 Delivery O2 Flow Rate FiO2


 


1/9/21 11:30  99 24 108/75 (86) 97   


 


1/9/21 11:00  111 24 104/73 (83) 97   


 


1/9/21 10:30  108 23 113/73 (86) 97   


 


1/9/21 10:00   25   Mechanical Ventilator  60


 


1/9/21 10:00  111 24 104/73 (83) 97   


 


1/9/21 09:30  108 24 113/69 (84) 96   


 


1/9/21 09:00  101 23 105/65 (78) 97   


 


1/9/21 09:00   25   Mechanical Ventilator  60


 


1/9/21 08:30  96 22 101/69 (80) 96   


 


1/9/21 08:00 98.6 93 22 107/69 (82) 96   


 


1/9/21 08:00      Mechanical Ventilator  


 


1/9/21 08:00        60


 


1/9/21 08:00   26   Mechanical Ventilator  60


 


1/9/21 07:30  96 23 101/68 (79) 95   


 


1/9/21 07:00   21   Mechanical Ventilator  60


 


1/9/21 07:00  90 20 95/69 (78) 98   


 


1/9/21 06:30  90 21     60


 


1/9/21 06:00  98 21 101/70 (80) 97   


 


1/9/21 06:00   22   Mechanical Ventilator  60


 


1/9/21 05:00   22   Mechanical Ventilator  60


 


1/9/21 05:00  110 22 108/70 (83) 97   


 


1/9/21 04:00        60


 


1/9/21 04:00      Mechanical Ventilator  


 


1/9/21 04:00   26   Mechanical Ventilator  60


 


1/9/21 04:00 101.5 122 26 117/75 (89) 100   


 


1/9/21 04:00  124      


 


1/9/21 03:54  124 25     60


 


1/9/21 03:48   26   Mechanical Ventilator  80


 


1/9/21 03:00   27   Mechanical Ventilator  80


 


1/9/21 03:00  122 25 121/73 (89) 99   


 


1/9/21 02:00   25   Mechanical Ventilator  80


 


1/9/21 02:00  119 25 116/71 (86) 99   


 


1/9/21 01:00  119 27 111/71 (84) 100   


 


1/9/21 01:00   26   Mechanical Ventilator  80


 


1/9/21 00:00 100.5 116 26 109/73 (85) 99   


 


1/9/21 00:00   25   Mechanical Ventilator  80


 


1/9/21 00:00      Mechanical Ventilator  


 


1/9/21 00:00  116      


 


1/8/21 23:34  114 28     80


 


1/8/21 23:00  111 25 100/71 (81) 99   


 


1/8/21 23:00   24   Mechanical Ventilator  100


 


1/8/21 22:00   26   Mechanical Ventilator  100


 


1/8/21 22:00  105 26 103/70 (81) 98   


 


1/8/21 21:15   27   Mechanical Ventilator  100


 


1/8/21 21:00  108 28 110/74 (86) 100   


 


1/8/21 21:00   27   Mechanical Ventilator  100


 


1/8/21 20:00  108      


 


1/8/21 20:00   26   Mechanical Ventilator  100


 


1/8/21 20:00      Mechanical Ventilator  


 


1/8/21 20:00 100.3 107 25 103/73 (83) 99   


 


1/8/21 20:00        100


 


1/8/21 19:56  109 26     80


 


1/8/21 19:30  107 25 107/71 (83) 98   


 


1/8/21 19:00   26   Mechanical Ventilator  100


 


1/8/21 19:00  108 28 115/72 (86) 98   


 


1/8/21 18:30  108 26 106/75 (85) 98   


 


1/8/21 18:00  107 26 108/71 (83) 98   


 


1/8/21 18:00   27   Mechanical Ventilator  100


 


1/8/21 17:30  108 26 105/71 (82) 99   


 


1/8/21 17:00  108 27 112/78 (89) 99   


 


1/8/21 17:00   27   Mechanical Ventilator  100


 


1/8/21 16:30  110 25  98 Mechanical Ventilator  80


 


1/8/21 16:30  110 30 105/69 (81) 98   


 


1/8/21 16:00        100


 


1/8/21 16:00      Mechanical Ventilator  


 


1/8/21 16:00  106      


 


1/8/21 16:00   27   Mechanical Ventilator  100


 


1/8/21 16:00 100.0 105 26 96/66 (76) 98   


 


1/8/21 15:30  106 25 96/72 (80) 99   


 


1/8/21 15:00   26   Mechanical Ventilator  100


 


1/8/21 15:00  106 26 97/67 (77) 98   


 


1/8/21 14:40  108 25     80


 


1/8/21 14:30  108 27 104/71 (82) 99   


 


1/8/21 14:27 100.2       


 


1/8/21 14:00   26   Mechanical Ventilator  100


 


1/8/21 14:00  109 26 113/74 (87) 98   


 


1/8/21 13:30  110 26 93/70 (78) 98   


 


1/8/21 13:00  115 28 118/73 (88) 99   


 


1/8/21 13:00   29   Mechanical Ventilator  100


 


1/8/21 12:30  116 28 118/77 (91) 99   

















Intake and Output  


 


 1/8/21 1/9/21





 19:00 07:00


 


Intake Total 681.41085 ml 785.1993 ml


 


Output Total 1510 ml 575 ml


 


Balance -829.47648 ml 210.1993 ml


 


  


 


Free Water 95 ml 


 


IV Total 586.93711 ml 755.1993 ml


 


Other  30 ml


 


Output Urine Total 1510 ml 575 ml








Objective


On a Versed drip


General Appearance:  no acute distress


HEENT:  atraumatic


Respiratory:  decreased breath sounds


Cardiovascular:  normal rate, regular rhythm


Abdomen:  soft, non tender


Laboratory Tests


1/8/21 17:16: POC Whole Blood Glucose 318H


1/8/21 23:17: POC Whole Blood Glucose [Pending]


1/9/21 04:00: 


White Blood Count 4.7L, Red Blood Count 4.77, Hemoglobin 14.3, Hematocrit 44.4, 

Mean Corpuscular Volume 93, Mean Corpuscular Hemoglobin 29.9, Mean Corpuscular 

Hemoglobin Concent 32.1, Red Cell Distribution Width 13.6, Platelet Count 324, 

Mean Platelet Volume 8.4, Neutrophils (%) (Auto) , Lymphocytes (%) (Auto) , 

Monocytes (%) (Auto) , Eosinophils (%) (Auto) , Basophils (%) (Auto) , 

Differential Total Cells Counted 100, Neutrophils % (Manual) 93H, Lymphocytes % 

(Manual) 5L, Monocytes % (Manual) 2, Eosinophils % (Manual) 0, Basophils % 

(Manual) 0, Band Neutrophils 0, Platelet Estimate Adequate, Platelet Morphology 

Normal, Red Blood Cell Morphology Normal, Sodium Level 140, Potassium Level 4.4,

 Chloride Level 99, Carbon Dioxide Level 37H, Anion Gap 4L, Blood Urea Nitrogen 

37H, Creatinine 1.1, Estimat Glomerular Filtration Rate > 60, Glucose Level 318H

, Calcium Level 8.2L


1/9/21 08:20: 


Arterial Blood pH 7.430, Arterial Blood Partial Pressure CO2 60.7*H, Arterial 

Blood Partial Pressure O2 59.8L, Arterial Blood HCO3 39.4H, Arterial Blood 

Oxygen Saturation 90.2L, Arterial Blood Base Excess 12.2*H, Abelardo Test Positive





Current Medications








 Medications


  (Trade)  Dose


 Ordered  Sig/Julisa


 Route


 PRN Reason  Start Time


 Stop Time Status Last Admin


Dose Admin


 


 Acetaminophen


  (Tylenol)  650 mg  Q6H  PRN


 ORAL


 Temp >100.5  1/3/21 04:00


 2/2/21 03:59  1/8/21 12:45





 


 Ceftriaxone


 Sodium 1 gm/


 Dextrose  55 ml @ 


 110 mls/hr  Q24H


 IVPB


   1/3/21 10:00


 1/10/21 09:59  1/9/21 09:32





 


 Chlorhexidine


 Gluconate


  (Vanessa-Hex 2%)  1 applic  DAILY@2000


 TOPIC


   1/6/21 20:00


 4/6/21 19:59  1/8/21 20:23





 


 Dextrose


  (Dextrose 50%)  25 ml  Q30M  PRN


 IV


 Hypoglycemia  1/2/21 18:00


 4/2/21 17:59   





 


 Dextrose


  (Dextrose 50%)  50 ml  Q30M  PRN


 IV


 Hypoglycemia  1/2/21 18:00


 4/2/21 17:59   





 


 Enoxaparin Sodium


  (Lovenox)  80 mg  EVERY 12  HOURS


 SUBQ


   1/2/21 21:00


 4/2/21 20:59  1/9/21 09:33





 


 Furosemide 100 mg/


 Dextrose  100 ml @ 


 10 mls/hr  Q10H


 IV


   1/7/21 11:00


 2/6/21 10:59  1/9/21 02:13





 


 Haloperidol


  (Haldol)  5 mg  Q6H  PRN


 ORAL


 Agitation  1/4/21 13:45


 2/18/21 13:44  1/4/21 22:56





 


 Haloperidol


 Lactate 5 mg/


 Dextrose  56 ml @ 


 224 mls/hr  Q6H  PRN


 IVPB


 Agitation  1/5/21 12:30


 2/19/21 12:29  1/5/21 13:20





 


 Insulin Aspart


  (NovoLOG)    Q6HR


 SUBQ


   1/2/21 18:00


 4/2/21 17:59  1/9/21 05:32





 


 Lorazepam


  (Ativan 2mg/ml


 1ml)  2 mg  Q4H  PRN


 IV


 For Anxiety IV/PO  1/5/21 00:00


 1/12/21 00:00  1/6/21 20:01





 


 Lorazepam


  (Ativan)  1 mg  THREE TIMES A DAY  PRN


 ORAL


 For Anxiety  1/4/21 05:45


 1/11/21 05:44  1/4/21 22:55





 


 Methylprednisolone


 Sodium Succinate


  (Solu-MEDROL)  40 mg  EVERY 12  HOURS


 IVP


   1/6/21 21:00


 4/6/21 20:59  1/9/21 09:32





 


 Midazolam HCl  100 ml @ 0


 mls/hr  Q24H  PRN


 IV


 SEDATION  1/8/21 21:00


 1/15/21 20:59  1/9/21 10:00





 


 Quetiapine


 Fumarate


  (SEROqueL)  50 mg  Q12HR


 ORAL


   1/4/21 21:00


 2/18/21 20:59  1/8/21 20:23





 


 Sodium Chloride  1,000 ml @ 


 25 mls/hr  Q24H


 IV


   1/3/21 07:45


 2/2/21 07:44  1/9/21 05:31














Assessment/Plan


Assessment/Plan


1. COVID-19 pneumonia.


   - on Decadron, azithromycin, and ceftriaxone


   - Intubated now; will continue AC mode for now


          -Currently 60% FiO2.


          -Has elevation of pCO2


2. Diabetes mellitus.; 


3. Elevated inflammatory markers.


4. High D-dimer.


5. Hyponatremia.


6. Hyperglycemia.


   - BG control


7. Hypoxemia., secondary to #1; improved





DVT prophylaxis   On Lovenox.


On propofol drip





I will continue Lasix drip.


I will add Diamox


May need pressors.











Sung Lemos MD            Jan 9, 2021 12:28

## 2021-01-09 NOTE — SURGERY PROGRESS NOTE
Surgery Progress Note


Subjective


Procedure Performed


Left femoral central venous catheter insertion


Additional Comments


weaning down to 60% now


titrate sedation


labs noted


imaging reviewed





Objective





Last 24 Hour Vital Signs








  Date Time  Temp Pulse Resp B/P (MAP) Pulse Ox O2 Delivery O2 Flow Rate FiO2


 


1/9/21 10:00   25   Mechanical Ventilator  60


 


1/9/21 10:00  111 24 104/73 (83) 97   


 


1/9/21 09:30  108 24 113/69 (84) 96   


 


1/9/21 09:00  101 23 105/65 (78) 97   


 


1/9/21 09:00   25   Mechanical Ventilator  60


 


1/9/21 08:30  96 22 101/69 (80) 96   


 


1/9/21 08:00 98.6 93 22 107/69 (82) 96   


 


1/9/21 08:00      Mechanical Ventilator  


 


1/9/21 08:00        60


 


1/9/21 08:00   26   Mechanical Ventilator  60


 


1/9/21 07:30  96 23 101/68 (79) 95   


 


1/9/21 07:00   21   Mechanical Ventilator  60


 


1/9/21 07:00  90 20 95/69 (78) 98   


 


1/9/21 06:30  90 21     60


 


1/9/21 06:00  98 21 101/70 (80) 97   


 


1/9/21 06:00   22   Mechanical Ventilator  60


 


1/9/21 05:00   22   Mechanical Ventilator  60


 


1/9/21 05:00  110 22 108/70 (83) 97   


 


1/9/21 04:00        60


 


1/9/21 04:00      Mechanical Ventilator  


 


1/9/21 04:00   26   Mechanical Ventilator  60


 


1/9/21 04:00 101.5 122 26 117/75 (89) 100   


 


1/9/21 04:00  124      


 


1/9/21 03:54  124 25     60


 


1/9/21 03:48   26   Mechanical Ventilator  80


 


1/9/21 03:00   27   Mechanical Ventilator  80


 


1/9/21 03:00  122 25 121/73 (89) 99   


 


1/9/21 02:00   25   Mechanical Ventilator  80


 


1/9/21 02:00  119 25 116/71 (86) 99   


 


1/9/21 01:00  119 27 111/71 (84) 100   


 


1/9/21 01:00   26   Mechanical Ventilator  80


 


1/9/21 00:00 100.5 116 26 109/73 (85) 99   


 


1/9/21 00:00   25   Mechanical Ventilator  80


 


1/9/21 00:00      Mechanical Ventilator  


 


1/9/21 00:00  116      


 


1/8/21 23:34  114 28     80


 


1/8/21 23:00  111 25 100/71 (81) 99   


 


1/8/21 23:00   24   Mechanical Ventilator  100


 


1/8/21 22:00   26   Mechanical Ventilator  100


 


1/8/21 22:00  105 26 103/70 (81) 98   


 


1/8/21 21:15   27   Mechanical Ventilator  100


 


1/8/21 21:00  108 28 110/74 (86) 100   


 


1/8/21 21:00   27   Mechanical Ventilator  100


 


1/8/21 20:00  108      


 


1/8/21 20:00   26   Mechanical Ventilator  100


 


1/8/21 20:00      Mechanical Ventilator  


 


1/8/21 20:00 100.3 107 25 103/73 (83) 99   


 


1/8/21 20:00        100


 


1/8/21 19:56  109 26     80


 


1/8/21 19:30  107 25 107/71 (83) 98   


 


1/8/21 19:00   26   Mechanical Ventilator  100


 


1/8/21 19:00  108 28 115/72 (86) 98   


 


1/8/21 18:30  108 26 106/75 (85) 98   


 


1/8/21 18:00  107 26 108/71 (83) 98   


 


1/8/21 18:00   27   Mechanical Ventilator  100


 


1/8/21 17:30  108 26 105/71 (82) 99   


 


1/8/21 17:00  108 27 112/78 (89) 99   


 


1/8/21 17:00   27   Mechanical Ventilator  100


 


1/8/21 16:30  110 25  98 Mechanical Ventilator  80


 


1/8/21 16:30  110 30 105/69 (81) 98   


 


1/8/21 16:00        100


 


1/8/21 16:00      Mechanical Ventilator  


 


1/8/21 16:00  106      


 


1/8/21 16:00   27   Mechanical Ventilator  100


 


1/8/21 16:00 100.0 105 26 96/66 (76) 98   


 


1/8/21 15:30  106 25 96/72 (80) 99   


 


1/8/21 15:00   26   Mechanical Ventilator  100


 


1/8/21 15:00  106 26 97/67 (77) 98   


 


1/8/21 14:40  108 25     80


 


1/8/21 14:30  108 27 104/71 (82) 99   


 


1/8/21 14:27 100.2       


 


1/8/21 14:00   26   Mechanical Ventilator  100


 


1/8/21 14:00  109 26 113/74 (87) 98   


 


1/8/21 13:30  110 26 93/70 (78) 98   


 


1/8/21 13:00  115 28 118/73 (88) 99   


 


1/8/21 13:00   29   Mechanical Ventilator  100


 


1/8/21 12:30  116 28 118/77 (91) 99   


 


1/8/21 12:00      Mechanical Ventilator  


 


1/8/21 12:00        100


 


1/8/21 12:00  117      


 


1/8/21 12:00   28   Mechanical Ventilator  100


 


1/8/21 12:00 100.7 115 31 113/78 (90) 99   


 


1/8/21 11:30  111 28 120/73 (89) 98   


 


1/8/21 11:10   28   Mechanical Ventilator  100








I&O











Intake and Output  


 


 1/8/21 1/9/21





 19:00 07:00


 


Intake Total 681.12178 ml 785.1993 ml


 


Output Total 1510 ml 575 ml


 


Balance -829.36556 ml 210.1993 ml


 


  


 


Free Water 95 ml 


 


IV Total 586.17475 ml 755.1993 ml


 


Other  30 ml


 


Output Urine Total 1510 ml 575 ml








Dressing:  saturated


Drains:  other


Cardiovascular:  RSR


Respiratory:  decreased breath sounds


Abdomen:  soft, non-tender, present bowel sounds, non-distended


Extremities:  no tenderness, no cyanosis





Laboratory Tests








Test


 1/8/21


17:16 1/8/21


23:17 1/9/21


04:00 1/9/21


08:20


 


POC Whole Blood Glucose


 318 MG/DL


()  H Pending  


 


 





 


White Blood Count


 


 


 4.7 K/UL


(4.8-10.8)  L 





 


Red Blood Count


 


 


 4.77 M/UL


(4.70-6.10) 





 


Hemoglobin


 


 


 14.3 G/DL


(14.2-18.0) 





 


Hematocrit


 


 


 44.4 %


(42.0-52.0) 





 


Mean Corpuscular Volume   93 FL (80-99)   


 


Mean Corpuscular Hemoglobin


 


 


 29.9 PG


(27.0-31.0) 





 


Mean Corpuscular Hemoglobin


Concent 


 


 32.1 G/DL


(32.0-36.0) 





 


Red Cell Distribution Width


 


 


 13.6 %


(11.6-14.8) 





 


Platelet Count


 


 


 324 K/UL


(150-450) 





 


Mean Platelet Volume


 


 


 8.4 FL


(6.5-10.1) 





 


Neutrophils (%) (Auto)


 


 


 % (45.0-75.0)


 





 


Lymphocytes (%) (Auto)


 


 


 % (20.0-45.0)


 





 


Monocytes (%) (Auto)    % (1.0-10.0)   


 


Eosinophils (%) (Auto)    % (0.0-3.0)   


 


Basophils (%) (Auto)    % (0.0-2.0)   


 


Differential Total Cells


Counted 


 


 100  


 





 


Neutrophils % (Manual)   93 % (45-75)  H 


 


Lymphocytes % (Manual)   5 % (20-45)  L 


 


Monocytes % (Manual)   2 % (1-10)   


 


Eosinophils % (Manual)   0 % (0-3)   


 


Basophils % (Manual)   0 % (0-2)   


 


Band Neutrophils   0 % (0-8)   


 


Platelet Estimate   Adequate   


 


Platelet Morphology   Normal   


 


Red Blood Cell Morphology   Normal   


 


Sodium Level


 


 


 140 MMOL/L


(136-145) 





 


Potassium Level


 


 


 4.4 MMOL/L


(3.5-5.1) 





 


Chloride Level


 


 


 99 MMOL/L


() 





 


Carbon Dioxide Level


 


 


 37 MMOL/L


(21-32)  H 





 


Anion Gap


 


 


 4 mmol/L


(5-15)  L 





 


Blood Urea Nitrogen


 


 


 37 mg/dL


(7-18)  H 





 


Creatinine


 


 


 1.1 MG/DL


(0.55-1.30) 





 


Estimat Glomerular Filtration


Rate 


 


 > 60 mL/min


(>60) 





 


Glucose Level


 


 


 318 MG/DL


()  H 





 


Calcium Level


 


 


 8.2 MG/DL


(8.5-10.1)  L 





 


Arterial Blood pH


 


 


 


 7.430


(7.350-7.450)


 


Arterial Blood Partial


Pressure CO2 


 


 


 60.7 mmHg


(35.0-45.0)  *H


 


Arterial Blood Partial


Pressure O2 


 


 


 59.8 mmHg


(75.0-100.0)  L


 


Arterial Blood HCO3


 


 


 


 39.4 mmol/L


(22.0-26.0)  H


 


Arterial Blood Oxygen


Saturation 


 


 


 90.2 %


()  L


 


Arterial Blood Base Excess    12.2 (-2-2)  *H


 


Abelardo Test    Positive  











Plan


Problems:  


(1) Pneumonia due to COVID-19 virus


Assessment & Plan:  Interim endotracheal intubation, endotracheal tube tip in 

good position


approximately 4 cm above the tito. There are extensive bilateral infiltrates, 

which


are markedly increased from the prior study. Pleural spaces are probably clear, 

not


well demonstrated


Markedly increased and now extensive bilateral infiltrates 


cont as per pulm and iID





(2) Hypoxia


(3) Abdominal pain


Assessment & Plan:  65M abd pain, septic, covid, intubated ons upport


currently abd exam benign but limited given condition


line bo mary noted


okay for meds and feeds


nutritional optimization 


DAILY ESTIMATED NEEDS:


Needs based on Critical Care/ 72kg abw 


22-28  kcals/kg 


2972-0938  total kcals


1.2-2  g protein/kg


  g total protein 


25-30  mL/kg


9150-5248  total fluid mLs





NUTRITION DIAGNOSIS:


Swallowing difficulty R/T respiratory failure as evidenced by pt now


orally intubated, OGT in place, NPO





 


CURRENT TF:NPO 





 





ENTERAL NUTRITION RECOMMENDATIONS:


Vital AF 1.2 @ 60ml/hr x 24 hrs  to provide 1440ml, 1728kcal, 116g prot, 1167ml 

free water 





* As medically appropriate, initiate critical care and carb controlled TF fo

rmula of Vital AF 1.2


* Initiate @ 20ml/hr x 6hrs, advance 10ml q 4-6 hrs as tolerated to goal rate


* HOB over 30 degrees/ water flush per MD


* Does not exceed est kcal needs w/ Propofol running @ 8.083ml/hr x 24 hrs 

(provides 213 lipid kcal)





 





ADDITIONAL RECOMMENDATIONS:


* Calibrated bedscale wt 


* Monitor BGs closely w/ Decadron and TF, need for long acting insulin 


* Monitor lytes, replete as needed  


* Monitor Propofol rate, need to adjust TF rate  





(4) Acute metabolic encephalopathy











Norberto Vazquez                 Jan 9, 2021 11:07

## 2021-01-10 VITALS — DIASTOLIC BLOOD PRESSURE: 78 MMHG | SYSTOLIC BLOOD PRESSURE: 97 MMHG

## 2021-01-10 VITALS — DIASTOLIC BLOOD PRESSURE: 84 MMHG | SYSTOLIC BLOOD PRESSURE: 123 MMHG

## 2021-01-10 VITALS — DIASTOLIC BLOOD PRESSURE: 78 MMHG | SYSTOLIC BLOOD PRESSURE: 101 MMHG

## 2021-01-10 VITALS — DIASTOLIC BLOOD PRESSURE: 69 MMHG | SYSTOLIC BLOOD PRESSURE: 107 MMHG

## 2021-01-10 VITALS — DIASTOLIC BLOOD PRESSURE: 73 MMHG | SYSTOLIC BLOOD PRESSURE: 102 MMHG

## 2021-01-10 VITALS — DIASTOLIC BLOOD PRESSURE: 86 MMHG | SYSTOLIC BLOOD PRESSURE: 127 MMHG

## 2021-01-10 VITALS — DIASTOLIC BLOOD PRESSURE: 83 MMHG | SYSTOLIC BLOOD PRESSURE: 113 MMHG

## 2021-01-10 VITALS — SYSTOLIC BLOOD PRESSURE: 117 MMHG | DIASTOLIC BLOOD PRESSURE: 81 MMHG

## 2021-01-10 VITALS — SYSTOLIC BLOOD PRESSURE: 123 MMHG | DIASTOLIC BLOOD PRESSURE: 86 MMHG

## 2021-01-10 VITALS — SYSTOLIC BLOOD PRESSURE: 117 MMHG | DIASTOLIC BLOOD PRESSURE: 76 MMHG

## 2021-01-10 VITALS — DIASTOLIC BLOOD PRESSURE: 59 MMHG | SYSTOLIC BLOOD PRESSURE: 108 MMHG

## 2021-01-10 VITALS — SYSTOLIC BLOOD PRESSURE: 139 MMHG | DIASTOLIC BLOOD PRESSURE: 96 MMHG

## 2021-01-10 VITALS — DIASTOLIC BLOOD PRESSURE: 72 MMHG | SYSTOLIC BLOOD PRESSURE: 99 MMHG

## 2021-01-10 VITALS — SYSTOLIC BLOOD PRESSURE: 105 MMHG | DIASTOLIC BLOOD PRESSURE: 73 MMHG

## 2021-01-10 VITALS — DIASTOLIC BLOOD PRESSURE: 79 MMHG | SYSTOLIC BLOOD PRESSURE: 106 MMHG

## 2021-01-10 VITALS — DIASTOLIC BLOOD PRESSURE: 78 MMHG | SYSTOLIC BLOOD PRESSURE: 102 MMHG

## 2021-01-10 VITALS — SYSTOLIC BLOOD PRESSURE: 118 MMHG | DIASTOLIC BLOOD PRESSURE: 78 MMHG

## 2021-01-10 VITALS — SYSTOLIC BLOOD PRESSURE: 127 MMHG | DIASTOLIC BLOOD PRESSURE: 90 MMHG

## 2021-01-10 VITALS — DIASTOLIC BLOOD PRESSURE: 78 MMHG | SYSTOLIC BLOOD PRESSURE: 117 MMHG

## 2021-01-10 VITALS — DIASTOLIC BLOOD PRESSURE: 75 MMHG | SYSTOLIC BLOOD PRESSURE: 112 MMHG

## 2021-01-10 VITALS — SYSTOLIC BLOOD PRESSURE: 108 MMHG | DIASTOLIC BLOOD PRESSURE: 68 MMHG

## 2021-01-10 VITALS — DIASTOLIC BLOOD PRESSURE: 77 MMHG | SYSTOLIC BLOOD PRESSURE: 105 MMHG

## 2021-01-10 VITALS — SYSTOLIC BLOOD PRESSURE: 140 MMHG | DIASTOLIC BLOOD PRESSURE: 85 MMHG

## 2021-01-10 VITALS — DIASTOLIC BLOOD PRESSURE: 93 MMHG | SYSTOLIC BLOOD PRESSURE: 132 MMHG

## 2021-01-10 LAB
ADD MANUAL DIFF: YES
ANION GAP SERPL CALC-SCNC: 1 MMOL/L (ref 5–15)
BUN SERPL-MCNC: 47 MG/DL (ref 7–18)
CALCIUM SERPL-MCNC: 8.6 MG/DL (ref 8.5–10.1)
CHLORIDE SERPL-SCNC: 101 MMOL/L (ref 98–107)
CO2 SERPL-SCNC: 38 MMOL/L (ref 21–32)
CREAT SERPL-MCNC: 1 MG/DL (ref 0.55–1.3)
ERYTHROCYTE [DISTWIDTH] IN BLOOD BY AUTOMATED COUNT: 13 % (ref 11.6–14.8)
HCT VFR BLD CALC: 45.3 % (ref 42–52)
HGB BLD-MCNC: 14.5 G/DL (ref 14.2–18)
MCV RBC AUTO: 95 FL (ref 80–99)
PLATELET # BLD: 336 K/UL (ref 150–450)
POTASSIUM SERPL-SCNC: 3.5 MMOL/L (ref 3.5–5.1)
RBC # BLD AUTO: 4.8 M/UL (ref 4.7–6.1)
SODIUM SERPL-SCNC: 140 MMOL/L (ref 136–145)
WBC # BLD AUTO: 5.9 K/UL (ref 4.8–10.8)

## 2021-01-10 RX ADMIN — Medication PRN MLS/HR: at 11:28

## 2021-01-10 RX ADMIN — INSULIN ASPART SCH UNITS: 100 INJECTION, SOLUTION INTRAVENOUS; SUBCUTANEOUS at 18:49

## 2021-01-10 RX ADMIN — CHLORHEXIDINE GLUCONATE SCH APPLIC: 213 SOLUTION TOPICAL at 20:06

## 2021-01-10 RX ADMIN — SODIUM CHLORIDE SCH MLS/HR: 0.9 INJECTION INTRAVENOUS at 11:27

## 2021-01-10 RX ADMIN — METHYLPREDNISOLONE SODIUM SUCCINATE SCH MG: 40 INJECTION, POWDER, LYOPHILIZED, FOR SOLUTION INTRAMUSCULAR; INTRAVENOUS at 08:21

## 2021-01-10 RX ADMIN — METHYLPREDNISOLONE SODIUM SUCCINATE SCH MG: 40 INJECTION, POWDER, LYOPHILIZED, FOR SOLUTION INTRAMUSCULAR; INTRAVENOUS at 20:06

## 2021-01-10 RX ADMIN — ENOXAPARIN SODIUM SCH MG: 80 INJECTION SUBCUTANEOUS at 08:20

## 2021-01-10 RX ADMIN — Medication PRN MLS/HR: at 17:11

## 2021-01-10 RX ADMIN — ACETAZOLAMIDE SCH MG: 500 CAPSULE, EXTENDED RELEASE ORAL at 08:21

## 2021-01-10 RX ADMIN — Medication PRN MLS/HR: at 02:10

## 2021-01-10 RX ADMIN — INSULIN ASPART SCH UNITS: 100 INJECTION, SOLUTION INTRAVENOUS; SUBCUTANEOUS at 11:45

## 2021-01-10 RX ADMIN — Medication PRN MLS/HR: at 23:18

## 2021-01-10 RX ADMIN — INSULIN ASPART SCH UNITS: 100 INJECTION, SOLUTION INTRAVENOUS; SUBCUTANEOUS at 06:03

## 2021-01-10 RX ADMIN — ACETAZOLAMIDE SCH MG: 500 CAPSULE, EXTENDED RELEASE ORAL at 20:07

## 2021-01-10 RX ADMIN — ENOXAPARIN SODIUM SCH MG: 80 INJECTION SUBCUTANEOUS at 20:09

## 2021-01-10 NOTE — NUR
NURSE HAND-OFF REPORT: 

Versed at 8mg, RASS of -2 noted. Lasix drip at 10mg/hr, avg output 130cc/hr. SR noted. 
Tolerating AC 16/500/60%/PEEP10. Tolerating feeding now Vital at 30cc/hr. No fever at this 
time. 



Latest Vital Signs: Temperature 97.5 , Pulse 89 , B/P 132 /93 , Respiratory Rate 17 , O2 SAT 
100 , Mechanical Ventilator, O2 Flow Rate  .  

Vital Sign Comment: []



EKG Rhythm: Sinus Rhythm

Rhythm change?: N 

MD Notified?: -

MD Response: 



Latest Singh Fall Score: 60  

Fall Risk: High Risk 

Safety Measures: Call light Within Reach, Bed Alarm Zone 1, Side Rails Side Rails x3, Bed 
position Low and Locked.

Fall Precautions: 

Yellow Socks

Yellow Gown

Door Sign

Patient Fall Education



Report given to SUZI Scott.

## 2021-01-10 NOTE — NUR
NURSE NOTES:

No acute change noted. RASS of -2 noted on Versed 6mg/hr. SR noted. Tolerating vent, 
saturating at 97%. Tolerating feeding. Reposition done. Oral care given. Will continue to 
monitor.

## 2021-01-10 NOTE — NUR
NURSE NOTES:

No acute change noted. RASS of -2 noted, Versed at 6mg/hr. SR noted. Tolerating vent, AC 
16/500/60%/10, saturating 97%. Reposition done. Oral care given. Will continue to monitor.

## 2021-01-10 NOTE — NUR
NURSE NOTES:



Pt's BG checked, 405, insulin given per sliding scale order. Residual checked, 200 mL, tube 
feeding held, will endorse to night shift RN. Pt's VSS, no signs of distress noted. Will 
continue to monitor.

## 2021-01-10 NOTE — NUR
NURSE NOTES:

Noted blood sugar been 400s, notified  and  and new order received; will 
be carried out.

## 2021-01-10 NOTE — NUR
NURSE NOTES:



Pt repositioned again. Bed bath provided, bed linens and gown changed. Pt's VS remain 
stable, no signs of distress noted. Will continue to monitor.

## 2021-01-10 NOTE — NUR
NURSE HAND-OFF REPORT: 



Latest Vital Signs: Temperature 97.0 , Pulse 113 , B/P 140 /85 , Respiratory Rate 25 , O2 
SAT 96 , Mechanical Ventilator, FiO2 60%. .  

Vital Sign Comment: 



EKG Rhythm: Sinus Tachycardia

Rhythm change?: N 

MD Notified?: -

MD Response: 



Latest Singh Fall Score: 50  

Fall Risk: High Risk 

Safety Measures: Call light Within Reach, Bed Alarm Zone 1, Side Rails Side Rails x3, Bed 
position Low and Locked.

Fall Precautions: 

Yellow Socks

Yellow Gown

Door Sign

Patient Fall Education



Report given to Junior RN for continuity of care. Pt stable.

## 2021-01-10 NOTE — NUR
NURSE NOTES:



Received bedside report from SUZI Pacheco. Pt's VSS, no signs of distress noted. Pt sedated, does 
not track, does not follow commands. SR on the cardiac monitor, HR 80-90. Pt intubated, on 
mechanical ventilator, ETT 7.5 at 23cm lip line, with the following vent settings: A/C rate 
16, T/V 500, 60% FiO2, Peep 10, O2 sat %, respirations even and unlabored. Pt has NGT 
through left nare with Vital AF 1.2 running at 30 cc/hr. Pt has cash catheter, draining 
well, clear yellow urine noted. Skin intact. Pt has Left Femoral TLC with Versed running at 
8mg/hr, Lasix drip 10mg, and 1/2 NS at 25 mL/hr. No signs of infection, bleeding, or 
complications noted from central line and peripheral IV site. Pt has bilateral soft wrist 
restraints, no signs of injury noted from extremities. HOB at 30 degrees. Bed rails up, bed 
locked and in lowest position. Safety precautions maintained. Will continue to monitor.

## 2021-01-10 NOTE — NUR
NURSE NOTES:



Current ABG results reported to MD Lemos. Per MD, continue current ventilator settings.

## 2021-01-10 NOTE — GENERAL PROGRESS NOTE
Subjective


Allergies:  


Coded Allergies:  


     No Known Allergies (Unverified , 6/11/19)


All Systems:  reviewed and negative except above


Subjective


1/10 on ventilator 60% fio2, on sediation, unresponsive, in icu











Objective





Last 24 Hour Vital Signs








  Date Time  Temp Pulse Resp B/P (MAP) Pulse Ox O2 Delivery O2 Flow Rate FiO2


 


1/10/21 07:45  73 16     60


 


1/10/21 07:00   17   Mechanical Ventilator  60


 


1/10/21 07:00  89 15 132/93 (106) 100   


 


1/10/21 06:45   25   Mechanical Ventilator  60


 


1/10/21 06:30   23   Mechanical Ventilator  60


 


1/10/21 06:15   24   Mechanical Ventilator  60


 


1/10/21 06:00  82 20 117/78 (91) 99   


 


1/10/21 06:00   25   Mechanical Ventilator  60


 


1/10/21 05:00   21   Mechanical Ventilator  60


 


1/10/21 05:00  89 20 108/68 (81) 97   


 


1/10/21 04:00  88      


 


1/10/21 04:00 97.5 89 26 107/69 (82) 96   


 


1/10/21 04:00        60


 


1/10/21 04:00   20   Mechanical Ventilator  60


 


1/10/21 04:00      Mechanical Ventilator  


 


1/10/21 03:48  82 25     60


 


1/10/21 03:00   23   Mechanical Ventilator  60


 


1/10/21 03:00  82 17 108/59 (75) 97   


 


1/10/21 02:10   18   Mechanical Ventilator  60


 


1/10/21 02:00  76 16 112/75 (87) 98   


 


1/10/21 02:00   22   Mechanical Ventilator  60


 


1/10/21 01:00  77 18 105/73 (84) 97   


 


1/10/21 01:00   18   Mechanical Ventilator  60


 


1/10/21 00:00        60


 


1/10/21 00:00   18   Mechanical Ventilator  60


 


1/10/21 00:00      Mechanical Ventilator  


 


1/10/21 00:00 98.9 79 16 101/78 (86) 97   


 


1/10/21 00:00  83      


 


1/9/21 23:56  79 21     60


 


1/9/21 23:00   18   Mechanical Ventilator  60


 


1/9/21 23:00  88 20 119/74 (89) 99   


 


1/9/21 22:00  100 20 111/69 (83) 97   


 


1/9/21 22:00   19   Mechanical Ventilator  97


 


1/9/21 21:00   20   Mechanical Ventilator  60


 


1/9/21 21:00  109 22 113/77 (89) 98   


 


1/9/21 20:45   20   Mechanical Ventilator  60


 


1/9/21 20:30   20   Mechanical Ventilator  60


 


1/9/21 20:15   21   Non-Rebreather  60


 


1/9/21 20:00      Mechanical Ventilator  


 


1/9/21 20:00 99.3 102 24 116/75 (89) 97   


 


1/9/21 20:00   22   Mechanical Ventilator  60


 


1/9/21 20:00  92      


 


1/9/21 20:00        60


 


1/9/21 19:55  105 24     60


 


1/9/21 19:00   21   Mechanical Ventilator  100


 


1/9/21 19:00  89 22 95/71 (79) 96   


 


1/9/21 18:30  88 22 95/68 (77) 97   


 


1/9/21 18:09   21   Mechanical Ventilator  60


 


1/9/21 18:00  88 20 105/73 (84) 97   


 


1/9/21 18:00   22   Mechanical Ventilator  60


 


1/9/21 17:30  95 21 105/72 (83) 97   


 


1/9/21 17:00   24   Mechanical Ventilator  60


 


1/9/21 17:00  95 21 105/68 (80) 96   


 


1/9/21 16:30  100 21 101/68 (79) 96   


 


1/9/21 16:01 100.0 107 21 109/70 (83) 96   


 


1/9/21 16:00      Mechanical Ventilator  


 


1/9/21 16:00  107 21 109/70 (83) 96   


 


1/9/21 16:00   23   Mechanical Ventilator  60


 


1/9/21 16:00  94      


 


1/9/21 16:00        60


 


1/9/21 15:30  110 25 111/70 (84) 97   


 


1/9/21 15:00  110 24 117/75 (89) 96   


 


1/9/21 15:00   24   Mechanical Ventilator  60


 


1/9/21 14:30  111 25     60


 


1/9/21 14:30  103 24 115/72 (86) 96   


 


1/9/21 14:00   25   Mechanical Ventilator  60


 


1/9/21 14:00  96 23 115/72 (86) 95   


 


1/9/21 13:30  91 22 106/75 (85) 96   


 


1/9/21 13:00   24   Mechanical Ventilator  60


 


1/9/21 13:00  88 23 105/76 (86) 96   


 


1/9/21 12:30  90 21 93/76 (82) 96   


 


1/9/21 12:00 97.5 94 23 104/70 (81) 96   


 


1/9/21 12:00   24   Mechanical Ventilator  60


 


1/9/21 12:00  100      


 


1/9/21 12:00        60


 


1/9/21 12:00      Mechanical Ventilator  


 


1/9/21 11:30  99 24 108/75 (86) 97   


 


1/9/21 11:00  111 24 104/73 (83) 97   


 


1/9/21 11:00   23   Mechanical Ventilator  60


 


1/9/21 10:30  88 22     60


 


1/9/21 10:30  108 23 113/73 (86) 97   

















Intake and Output  


 


 1/9/21 1/10/21





 19:00 07:00


 


Intake Total 589.1 ml 867.9 ml


 


Output Total 1850 ml 1575 ml


 


Balance -1260.9 ml -707.1 ml


 


  


 


Free Water  90 ml


 


IV Total 549.1 ml 567.9 ml


 


Tube Feeding 10 ml 210 ml


 


Other 30 ml 


 


Output Urine Total 1850 ml 1575 ml








Laboratory Tests


1/9/21 13:10: POC Whole Blood Glucose 356H


1/9/21 18:15: POC Whole Blood Glucose 379H


1/9/21 23:17: POC Whole Blood Glucose [Pending]


1/10/21 04:00: 


Arterial Blood pH 7.436, Arterial Blood Partial Pressure CO2 53.9H, Arterial 

Blood Partial Pressure O2 61.0L, Arterial Blood HCO3 35.5H, Arterial Blood 

Oxygen Saturation 90.6L, Arterial Blood Base Excess 9.2*H, Abelardo Test Positive


1/10/21 05:45: 


White Blood Count 5.9, Red Blood Count 4.80, Hemoglobin 14.5, Hematocrit 45.3, 

Mean Corpuscular Volume 95, Mean Corpuscular Hemoglobin 30.3, Mean Corpuscular 

Hemoglobin Concent 32.1, Red Cell Distribution Width 13.0, Platelet Count 336, 

Mean Platelet Volume 7.5, Neutrophils (%) (Auto) , Lymphocytes (%) (Auto) , 

Monocytes (%) (Auto) , Eosinophils (%) (Auto) , Basophils (%) (Auto) , 

Neutrophils % (Manual) [Pending], Lymphocytes % (Manual) [Pending], Platelet 

Estimate [Pending], Platelet Morphology [Pending], Sodium Level 140, Potassium 

Level 3.5, Chloride Level 101, Carbon Dioxide Level 38H, Anion Gap 1L, Blood 

Urea Nitrogen 47H, Creatinine 1.0, Estimat Glomerular Filtration Rate > 60, 

Glucose Level 386H, Calcium Level 8.6


1/10/21 05:57: POC Whole Blood Glucose [Pending]


Height (Feet):  5


Height (Inches):  7.00


Weight (Pounds):  198


Objective


General Appearance:  lethargic, moderate distress


Neck:  supple


Cardiovascular:  regular rhythm


Respiratory/Chest:  crackles/rales, rhonchi - bilaterally vent++


Abdomen:  non tender, soft


Extremities:  non-tender





Assessment/Plan


Assessment/Plan:


Covering Dr. Thaddeus pak [facility] resp distress on ventilator


etoh abused


agitated -halodol


vent


cont iv abx and iv decadrone


pulmonary and gi on consult


dw charge nurse











Emmett Cook MD          Yadiel 10, 2021 10:19

## 2021-01-10 NOTE — NUR
NURSE NOTES:

RASS of -2 noted. Fever noted, cooling measures done, T of 99.8 noted at this time. 
Reposition done. Will continue to monitor.

## 2021-01-10 NOTE — NUR
NURSE NOTES:



Pt's VSS, no signs of distress noted. Safety precautions maintained. Will continue to 
monitor.

## 2021-01-10 NOTE — NUR
NURSE NOTES:

Report received form SUZI Scott. Observe pt lying in the bed, sedated, Versed at 8mg. ST 
with HR of 120s noted. ETT 7.5, 23cm at lip, AC 16/500/60%/PEEP10, saturating 97%. Noted pt 
having fever, T of 101, cooling measures done, PRN given. GT intact, tube feeding on hold 
per high residual >200cc noted at this time. F/C intact, draining yellow urine, avg 
100-150cc/hr noted, on Lasix drip 10mg/hr. IV on R FA 18G. R femoral TLC intact. Bed in the 
lowest position. Side rails up x3. Will continue to monitor.

## 2021-01-10 NOTE — NUR
NURSE NOTES:



Pt's temp checked, 94.2 rectally. Hot packs and blankets provided to pt. Will re-check temp.

## 2021-01-10 NOTE — DIAGNOSTIC IMAGING REPORT
EXAM:

  XR Chest, 1 View

 

CLINICAL HISTORY:

  ABD TEND

 

TECHNIQUE:

  Frontal view of the chest.

 

COMPARISON:

Chest radiograph January 09, 2021.  

 

FINDINGS/IMPRESSION:

Endotracheal tube terminates 4.6 cm above the tito.

 

Small bilateral pleural effusions.  Bilateral airspace opacities, 

correlate for edema versus infiltrate (favored).  Overall, mild 

improvement when compared to January 09, 2021.

 

Cardiomegaly, unchanged.

## 2021-01-10 NOTE — NUR
NURSE NOTES:



Pt's temp rechecked, 95.5, blankets and hot packs given. Pt's BG checked, 425, insulin given 
per MD order. Tube feeding residual checked, 170, Tube Feeding rate decreased down to 
20mL/hr. VSS, no signs of distress noted. Will continue to monitor.

## 2021-01-10 NOTE — NUR
NURSE NOTES:

No acute change noted. Bed bath given. Cooling blanket in-placed. T of 97.5 noted at this 
time. Will continue to monitor.

## 2021-01-10 NOTE — SURGERY PROGRESS NOTE
Surgery Progress Note


Subjective


Procedure Performed


Left femoral central venous catheter insertion


Additional Comments


ill appearing


on support


labs noted


exam unchanged





Objective





Last 24 Hour Vital Signs








  Date Time  Temp Pulse Resp B/P (MAP) Pulse Ox O2 Delivery O2 Flow Rate FiO2


 


1/10/21 13:00  90 20 102/78 (86) 97   


 


1/10/21 12:00      Mechanical Ventilator  


 


1/10/21 12:00 95.5 81 17 99/72 (81) 95   


 


1/10/21 12:00        60


 


1/10/21 11:29  80      


 


1/10/21 11:28   18   Mechanical Ventilator  60


 


1/10/21 11:08  84 24     60


 


1/10/21 11:00  76 15 97/78 (84) 97   


 


1/10/21 10:00  72 14 113/83 (93) 97   


 


1/10/21 09:00  73 13 139/96 (110) 99   


 


1/10/21 08:03  75      


 


1/10/21 08:00 94.2 78 15 105/77 (86) 99   


 


1/10/21 08:00      Mechanical Ventilator  


 


1/10/21 08:00        60


 


1/10/21 07:45  73 16     60


 


1/10/21 07:00   17   Mechanical Ventilator  60


 


1/10/21 07:00  89 15 132/93 (106) 100   


 


1/10/21 06:45   25   Mechanical Ventilator  60


 


1/10/21 06:30   23   Mechanical Ventilator  60


 


1/10/21 06:15   24   Mechanical Ventilator  60


 


1/10/21 06:00  82 20 117/78 (91) 99   


 


1/10/21 06:00   25   Mechanical Ventilator  60


 


1/10/21 05:00   21   Mechanical Ventilator  60


 


1/10/21 05:00  89 20 108/68 (81) 97   


 


1/10/21 04:00  88      


 


1/10/21 04:00 97.5 89 26 107/69 (82) 96   


 


1/10/21 04:00        60


 


1/10/21 04:00   20   Mechanical Ventilator  60


 


1/10/21 04:00      Mechanical Ventilator  


 


1/10/21 03:48  82 25     60


 


1/10/21 03:00   23   Mechanical Ventilator  60


 


1/10/21 03:00  82 17 108/59 (75) 97   


 


1/10/21 02:10   18   Mechanical Ventilator  60


 


1/10/21 02:00  76 16 112/75 (87) 98   


 


1/10/21 02:00   22   Mechanical Ventilator  60


 


1/10/21 01:00  77 18 105/73 (84) 97   


 


1/10/21 01:00   18   Mechanical Ventilator  60


 


1/10/21 00:00        60


 


1/10/21 00:00   18   Mechanical Ventilator  60


 


1/10/21 00:00      Mechanical Ventilator  


 


1/10/21 00:00 98.9 79 16 101/78 (86) 97   


 


1/10/21 00:00  83      


 


1/9/21 23:56  79 21     60


 


1/9/21 23:00   18   Mechanical Ventilator  60


 


1/9/21 23:00  88 20 119/74 (89) 99   


 


1/9/21 22:00  100 20 111/69 (83) 97   


 


1/9/21 22:00   19   Mechanical Ventilator  97


 


1/9/21 21:00   20   Mechanical Ventilator  60


 


1/9/21 21:00  109 22 113/77 (89) 98   


 


1/9/21 20:45   20   Mechanical Ventilator  60


 


1/9/21 20:30   20   Mechanical Ventilator  60


 


1/9/21 20:15   21   Non-Rebreather  60


 


1/9/21 20:00      Mechanical Ventilator  


 


1/9/21 20:00 99.3 102 24 116/75 (89) 97   


 


1/9/21 20:00   22   Mechanical Ventilator  60


 


1/9/21 20:00  92      


 


1/9/21 20:00        60


 


1/9/21 19:55  105 24     60


 


1/9/21 19:00   21   Mechanical Ventilator  100


 


1/9/21 19:00  89 22 95/71 (79) 96   


 


1/9/21 18:30  88 22 95/68 (77) 97   


 


1/9/21 18:09   21   Mechanical Ventilator  60


 


1/9/21 18:00  88 20 105/73 (84) 97   


 


1/9/21 18:00   22   Mechanical Ventilator  60


 


1/9/21 17:30  95 21 105/72 (83) 97   


 


1/9/21 17:00   24   Mechanical Ventilator  60


 


1/9/21 17:00  95 21 105/68 (80) 96   


 


1/9/21 16:30  100 21 101/68 (79) 96   


 


1/9/21 16:01 100.0 107 21 109/70 (83) 96   


 


1/9/21 16:00      Mechanical Ventilator  


 


1/9/21 16:00  107 21 109/70 (83) 96   


 


1/9/21 16:00   23   Mechanical Ventilator  60


 


1/9/21 16:00  94      


 


1/9/21 16:00        60


 


1/9/21 15:30  110 25 111/70 (84) 97   


 


1/9/21 15:00  110 24 117/75 (89) 96   


 


1/9/21 15:00   24   Mechanical Ventilator  60


 


1/9/21 14:30  111 25     60


 


1/9/21 14:30  103 24 115/72 (86) 96   








I&O











Intake and Output  


 


 1/9/21 1/10/21





 19:00 07:00


 


Intake Total 589.1 ml 867.9 ml


 


Output Total 1850 ml 1575 ml


 


Balance -1260.9 ml -707.1 ml


 


  


 


Free Water  90 ml


 


IV Total 549.1 ml 567.9 ml


 


Tube Feeding 10 ml 210 ml


 


Other 30 ml 


 


Output Urine Total 1850 ml 1575 ml








Dressing:  saturated


Cardiovascular:  RSR


Respiratory:  decreased breath sounds


Abdomen:  soft, non-tender, present bowel sounds


Extremities:  no tenderness, no cyanosis





Laboratory Tests








Test


 1/9/21


18:15 1/9/21


23:17 1/10/21


04:00 1/10/21


05:45


 


POC Whole Blood Glucose


 379 MG/DL


()  H Pending  


 


 





 


Arterial Blood pH


 


 


 7.436


(7.350-7.450) 





 


Arterial Blood Partial


Pressure CO2 


 


 53.9 mmHg


(35.0-45.0)  H 





 


Arterial Blood Partial


Pressure O2 


 


 61.0 mmHg


(75.0-100.0)  L 





 


Arterial Blood HCO3


 


 


 35.5 mmol/L


(22.0-26.0)  H 





 


Arterial Blood Oxygen


Saturation 


 


 90.6 %


()  L 





 


Arterial Blood Base Excess   9.2 (-2-2)  *H 


 


Abelardo Test   Positive   


 


White Blood Count


 


 


 


 5.9 K/UL


(4.8-10.8)


 


Red Blood Count


 


 


 


 4.80 M/UL


(4.70-6.10)


 


Hemoglobin


 


 


 


 14.5 G/DL


(14.2-18.0)


 


Hematocrit


 


 


 


 45.3 %


(42.0-52.0)


 


Mean Corpuscular Volume    95 FL (80-99)  


 


Mean Corpuscular Hemoglobin


 


 


 


 30.3 PG


(27.0-31.0)


 


Mean Corpuscular Hemoglobin


Concent 


 


 


 32.1 G/DL


(32.0-36.0)


 


Red Cell Distribution Width


 


 


 


 13.0 %


(11.6-14.8)


 


Platelet Count


 


 


 


 336 K/UL


(150-450)


 


Mean Platelet Volume


 


 


 


 7.5 FL


(6.5-10.1)


 


Neutrophils (%) (Auto)


 


 


 


 % (45.0-75.0)





 


Lymphocytes (%) (Auto)


 


 


 


 % (20.0-45.0)





 


Monocytes (%) (Auto)     % (1.0-10.0)  


 


Eosinophils (%) (Auto)     % (0.0-3.0)  


 


Basophils (%) (Auto)     % (0.0-2.0)  


 


Differential Total Cells


Counted 


 


 


 100  





 


Neutrophils % (Manual)    92 % (45-75)  H


 


Lymphocytes % (Manual)    3 % (20-45)  L


 


Monocytes % (Manual)    5 % (1-10)  


 


Eosinophils % (Manual)    0 % (0-3)  


 


Basophils % (Manual)    0 % (0-2)  


 


Band Neutrophils    0 % (0-8)  


 


Platelet Estimate    Adequate  


 


Platelet Morphology    Normal  


 


Red Blood Cell Morphology    Normal  


 


Sodium Level


 


 


 


 140 MMOL/L


(136-145)


 


Potassium Level


 


 


 


 3.5 MMOL/L


(3.5-5.1)


 


Chloride Level


 


 


 


 101 MMOL/L


()


 


Carbon Dioxide Level


 


 


 


 38 MMOL/L


(21-32)  H


 


Anion Gap


 


 


 


 1 mmol/L


(5-15)  L


 


Blood Urea Nitrogen


 


 


 


 47 mg/dL


(7-18)  H


 


Creatinine


 


 


 


 1.0 MG/DL


(0.55-1.30)


 


Estimat Glomerular Filtration


Rate 


 


 


 > 60 mL/min


(>60)


 


Glucose Level


 


 


 


 386 MG/DL


()  H


 


Calcium Level


 


 


 


 8.6 MG/DL


(8.5-10.1)


 


Test


 1/10/21


05:57 


 


 





 


POC Whole Blood Glucose Pending     











Plan


Problems:  


(1) Pneumonia due to COVID-19 virus


Assessment & Plan:  Interim endotracheal intubation, endotracheal tube tip in 

good position


approximately 4 cm above the tito. There are extensive bilateral infiltrates, 

which


are markedly increased from the prior study. Pleural spaces are probably clear, 

not


well demonstrated


Markedly increased and now extensive bilateral infiltrates 


cont as per pulm and iID





(2) Hypoxia


(3) Abdominal pain


Assessment & Plan:  65M abd pain, septic, covid, intubated ons upport


currently abd exam benign but limited given condition


line bo mary noted


okay for meds and feeds


nutritional optimization 


DAILY ESTIMATED NEEDS:


Needs based on Critical Care/ 72kg abw 


22-28  kcals/kg 


9190-6694  total kcals


1.2-2  g protein/kg


  g total protein 


25-30  mL/kg


1519-5341  total fluid mLs





NUTRITION DIAGNOSIS:


Swallowing difficulty R/T respiratory failure as evidenced by pt now


orally intubated, OGT in place, NPO





 


CURRENT TF:NPO 





 





ENTERAL NUTRITION RECOMMENDATIONS:


Vital AF 1.2 @ 60ml/hr x 24 hrs  to provide 1440ml, 1728kcal, 116g prot, 1167ml 

free water 





* As medically appropriate, initiate critical care and carb controlled TF 

formula of Vital AF 1.2


* Initiate @ 20ml/hr x 6hrs, advance 10ml q 4-6 hrs as tolerated to goal rate


* HOB over 30 degrees/ water flush per MD


* Does not exceed est kcal needs w/ Propofol running @ 8.083ml/hr x 24 hrs 

(provides 213 lipid kcal)





 





ADDITIONAL RECOMMENDATIONS:


* Calibrated bedscale wt 


* Monitor BGs closely w/ Decadron and TF, need for long acting insulin 


* Monitor lytes, replete as needed  


* Monitor Propofol rate, need to adjust TF rate  





(4) Acute metabolic encephalopathy











Norberto Vazquez                Yadiel 10, 2021 14:12

## 2021-01-10 NOTE — PULMONOLOGY PROGRESS NOTE
Subjective


ROS Limited/Unobtainable:  Yes


Interval Events:  None new reported.  On Versed drip.


Constitutional:  Reports: fever, other - T=101.5


HEENT:  Repors: no symptoms


Respiratory:  Reports: shortness of breath - better


Cardiovascular:  Reports: no symptoms


Gastrointestinal/Abdominal:  Reports: no symptoms


Allergies:  


Coded Allergies:  


     No Known Allergies (Unverified , 6/11/19)





Objective





Last 24 Hour Vital Signs








  Date Time  Temp Pulse Resp B/P (MAP) Pulse Ox O2 Delivery O2 Flow Rate FiO2


 


1/10/21 07:00  89 15 132/93 (106) 100   


 


1/10/21 06:45   25   Mechanical Ventilator  60


 


1/10/21 06:30   23   Mechanical Ventilator  60


 


1/10/21 06:15   24   Mechanical Ventilator  60


 


1/10/21 06:00  82 20 117/78 (91) 99   


 


1/10/21 06:00   25   Mechanical Ventilator  60


 


1/10/21 05:00   21   Mechanical Ventilator  60


 


1/10/21 05:00  89 20 108/68 (81) 97   


 


1/10/21 04:00  88      


 


1/10/21 04:00 97.5 89 26 107/69 (82) 96   


 


1/10/21 04:00        60


 


1/10/21 04:00   20   Mechanical Ventilator  60


 


1/10/21 04:00      Mechanical Ventilator  


 


1/10/21 03:48  82 25     60


 


1/10/21 03:00   23   Mechanical Ventilator  60


 


1/10/21 03:00  82 17 108/59 (75) 97   


 


1/10/21 02:10   18   Mechanical Ventilator  60


 


1/10/21 02:00  76 16 112/75 (87) 98   


 


1/10/21 02:00   22   Mechanical Ventilator  60


 


1/10/21 01:00  77 18 105/73 (84) 97   


 


1/10/21 01:00   18   Mechanical Ventilator  60


 


1/10/21 00:00        60


 


1/10/21 00:00   18   Mechanical Ventilator  60


 


1/10/21 00:00      Mechanical Ventilator  


 


1/10/21 00:00 98.9 79 16 101/78 (86) 97   


 


1/10/21 00:00  83      


 


1/9/21 23:56  79 21     60


 


1/9/21 23:00   18   Mechanical Ventilator  60


 


1/9/21 23:00  88 20 119/74 (89) 99   


 


1/9/21 22:00  100 20 111/69 (83) 97   


 


1/9/21 22:00   19   Mechanical Ventilator  97


 


1/9/21 21:00   20   Mechanical Ventilator  60


 


1/9/21 21:00  109 22 113/77 (89) 98   


 


1/9/21 20:45   20   Mechanical Ventilator  60


 


1/9/21 20:30   20   Mechanical Ventilator  60


 


1/9/21 20:15   21   Non-Rebreather  60


 


1/9/21 20:00      Mechanical Ventilator  


 


1/9/21 20:00 99.3 102 24 116/75 (89) 97   


 


1/9/21 20:00   22   Mechanical Ventilator  60


 


1/9/21 20:00  92      


 


1/9/21 20:00        60


 


1/9/21 19:55  105 24     60


 


1/9/21 19:00   21   Mechanical Ventilator  100


 


1/9/21 19:00  89 22 95/71 (79) 96   


 


1/9/21 18:30  88 22 95/68 (77) 97   


 


1/9/21 18:09   21   Mechanical Ventilator  60


 


1/9/21 18:00  88 20 105/73 (84) 97   


 


1/9/21 18:00   22   Mechanical Ventilator  60


 


1/9/21 17:30  95 21 105/72 (83) 97   


 


1/9/21 17:00   24   Mechanical Ventilator  60


 


1/9/21 17:00  95 21 105/68 (80) 96   


 


1/9/21 16:30  100 21 101/68 (79) 96   


 


1/9/21 16:01 100.0 107 21 109/70 (83) 96   


 


1/9/21 16:00      Mechanical Ventilator  


 


1/9/21 16:00  107 21 109/70 (83) 96   


 


1/9/21 16:00   23   Mechanical Ventilator  60


 


1/9/21 16:00  94      


 


1/9/21 16:00        60


 


1/9/21 15:30  110 25 111/70 (84) 97   


 


1/9/21 15:00  110 24 117/75 (89) 96   


 


1/9/21 15:00   24   Mechanical Ventilator  60


 


1/9/21 14:30  111 25     60


 


1/9/21 14:30  103 24 115/72 (86) 96   


 


1/9/21 14:00   25   Mechanical Ventilator  60


 


1/9/21 14:00  96 23 115/72 (86) 95   


 


1/9/21 13:30  91 22 106/75 (85) 96   


 


1/9/21 13:00   24   Mechanical Ventilator  60


 


1/9/21 13:00  88 23 105/76 (86) 96   


 


1/9/21 12:30  90 21 93/76 (82) 96   


 


1/9/21 12:00 97.5 94 23 104/70 (81) 96   


 


1/9/21 12:00   24   Mechanical Ventilator  60


 


1/9/21 12:00  100      


 


1/9/21 12:00        60


 


1/9/21 12:00      Mechanical Ventilator  


 


1/9/21 11:30  99 24 108/75 (86) 97   


 


1/9/21 11:00  111 24 104/73 (83) 97   


 


1/9/21 11:00   23   Mechanical Ventilator  60


 


1/9/21 10:30  88 22     60


 


1/9/21 10:30  108 23 113/73 (86) 97   


 


1/9/21 10:00   25   Mechanical Ventilator  60


 


1/9/21 10:00  111 24 104/73 (83) 97   


 


1/9/21 09:30  108 24 113/69 (84) 96   


 


1/9/21 09:00  101 23 105/65 (78) 97   


 


1/9/21 09:00   25   Mechanical Ventilator  60


 


1/9/21 08:30  96 22 101/69 (80) 96   


 


1/9/21 08:00 98.6 93 22 107/69 (82) 96   


 


1/9/21 08:00  93      


 


1/9/21 08:00      Mechanical Ventilator  


 


1/9/21 08:00        60


 


1/9/21 08:00   26   Mechanical Ventilator  60


 


1/9/21 07:30  96 23 101/68 (79) 95   

















Intake and Output  


 


 1/9/21 1/10/21





 19:00 07:00


 


Intake Total 589.1 ml 828.9 ml


 


Output Total 1850 ml 1575 ml


 


Balance -1260.9 ml -746.1 ml


 


  


 


Free Water  90 ml


 


IV Total 549.1 ml 528.9 ml


 


Tube Feeding 10 ml 210 ml


 


Other 30 ml 


 


Output Urine Total 1850 ml 1575 ml








Objective


On a Versed drip


General Appearance:  no acute distress


HEENT:  atraumatic


Respiratory:  decreased breath sounds


Cardiovascular:  normal rate, regular rhythm


Abdomen:  soft, non tender





Microbiology








 Date/Time


Source Procedure


Growth Status





 


 1/8/21 04:30


Rectum VRE Culture - Final


NO VANCOMYCIN RESISTANT ENTEROCOCCUS ... Complete








Laboratory Tests


1/9/21 08:20: 


Arterial Blood pH 7.430, Arterial Blood Partial Pressure CO2 60.7*H, Arterial 

Blood Partial Pressure O2 59.8L, Arterial Blood HCO3 39.4H, Arterial Blood 

Oxygen Saturation 90.2L, Arterial Blood Base Excess 12.2*H, Abelardo Test Positive


1/9/21 13:10: POC Whole Blood Glucose 356H


1/9/21 18:15: POC Whole Blood Glucose 379H


1/9/21 23:17: POC Whole Blood Glucose [Pending]


1/10/21 05:45: 


White Blood Count 5.9, Red Blood Count 4.80, Hemoglobin 14.5, Hematocrit 45.3, 

Mean Corpuscular Volume 95, Mean Corpuscular Hemoglobin 30.3, Mean Corpuscular 

Hemoglobin Concent 32.1, Red Cell Distribution Width 13.0, Platelet Count 336, 

Mean Platelet Volume 7.5, Neutrophils (%) (Auto) , Lymphocytes (%) (Auto) , 

Monocytes (%) (Auto) , Eosinophils (%) (Auto) , Basophils (%) (Auto) , 

Neutrophils % (Manual) [Pending], Lymphocytes % (Manual) [Pending], Platelet 

Estimate [Pending], Platelet Morphology [Pending], Sodium Level 140, Potassium 

Level 3.5, Chloride Level 101, Carbon Dioxide Level 38H, Anion Gap 1L, Blood 

Urea Nitrogen 47H, Creatinine 1.0, Estimat Glomerular Filtration Rate > 60, 

Glucose Level 386H, Calcium Level 8.6


1/10/21 05:57: POC Whole Blood Glucose [Pending]





Current Medications








 Medications


  (Trade)  Dose


 Ordered  Sig/Julisa


 Route


 PRN Reason  Start Time


 Stop Time Status Last Admin


Dose Admin


 


 Acetaminophen


  (Tylenol)  650 mg  Q6H  PRN


 ORAL


 Temp >100.5  1/3/21 04:00


 2/2/21 03:59  1/8/21 12:45





 


 Acetazolamide


  (Diamox Sequel)  500 mg  EVERY 12  HOURS


 ORAL


   1/9/21 13:30


 1/10/21 21:01  1/9/21 20:39





 


 Ceftriaxone


 Sodium 1 gm/


 Dextrose  55 ml @ 


 110 mls/hr  Q24H


 IVPB


   1/3/21 10:00


 1/10/21 09:59  1/9/21 09:32





 


 Chlorhexidine


 Gluconate


  (Vanessa-Hex 2%)  1 applic  DAILY@2000


 TOPIC


   1/6/21 20:00


 4/6/21 19:59  1/9/21 20:39





 


 Dextrose


  (Dextrose 50%)  25 ml  Q30M  PRN


 IV


 Hypoglycemia  1/9/21 13:30


 4/9/21 13:29   





 


 Dextrose


  (Dextrose 50%)  50 ml  Q30M  PRN


 IV


 Hypoglycemia  1/9/21 13:30


 4/9/21 13:29   





 


 Enoxaparin Sodium


  (Lovenox)  80 mg  EVERY 12  HOURS


 SUBQ


   1/2/21 21:00


 4/2/21 20:59  1/9/21 20:45





 


 Furosemide 100 mg/


 Dextrose  100 ml @ 


 10 mls/hr  Q10H


 IV


   1/7/21 11:00


 2/6/21 10:59  1/9/21 23:12





 


 Haloperidol


  (Haldol)  5 mg  Q6H  PRN


 ORAL


 Agitation  1/4/21 13:45


 2/18/21 13:44  1/4/21 22:56





 


 Haloperidol


 Lactate 5 mg/


 Dextrose  56 ml @ 


 224 mls/hr  Q6H  PRN


 IVPB


 Agitation  1/5/21 12:30


 2/19/21 12:29  1/5/21 13:20





 


 Insulin Aspart


  (NovoLOG)    Q6HR


 SUBQ


   1/9/21 18:00


 4/9/21 17:59  1/10/21 06:03





 


 Lorazepam


  (Ativan 2mg/ml


 1ml)  2 mg  Q4H  PRN


 IV


 For Anxiety IV/PO  1/5/21 00:00


 1/12/21 00:00  1/6/21 20:01





 


 Lorazepam


  (Ativan)  1 mg  THREE TIMES A DAY  PRN


 ORAL


 For Anxiety  1/4/21 05:45


 1/11/21 05:44  1/4/21 22:55





 


 Methylprednisolone


 Sodium Succinate


  (Solu-MEDROL)  40 mg  EVERY 12  HOURS


 IVP


   1/6/21 21:00


 4/6/21 20:59  1/9/21 20:39





 


 Midazolam HCl  100 ml @ 0


 mls/hr  Q24H  PRN


 IV


 SEDATION  1/8/21 21:00


 1/15/21 20:59  1/10/21 02:10





 


 Quetiapine


 Fumarate


  (SEROqueL)  50 mg  Q12HR


 ORAL


   1/4/21 21:00


 2/18/21 20:59  1/8/21 20:23





 


 Sodium Chloride  1,000 ml @ 


 25 mls/hr  Q24H


 IV


   1/3/21 07:45


 2/2/21 07:44  1/9/21 05:31














Assessment/Plan


Assessment/Plan


1. COVID-19 pneumonia.


   - on Decadron, azithromycin, and ceftriaxone


   - Intubated now; will continue AC mode for now


          -Currently 60% FiO2.


          -Has elevation of pCO2


2. Diabetes mellitus.; 


3. Elevated inflammatory markers.


4. High D-dimer.


5. Hyponatremia.


6. Hyperglycemia.


   - BG control


7. Hypoxemia., secondary to #1; improved





DVT prophylaxis   On Lovenox.


On propofol drip





I will continue Lasix drip.


I will add Diamox


May need pressors.











Sung Lemos MD           Yadiel 10, 2021 07:20

## 2021-01-10 NOTE — NUR
NURSE NOTES:



Tube feeding placement checked, tip of tube in right position. 75 cc residual noted from 
NGT, Tube Feeding rate increased to 40 mL/hr. Scheduled medications given per MD order, pt 
tolerated well. Will continue to monitor.

## 2021-01-11 VITALS — SYSTOLIC BLOOD PRESSURE: 109 MMHG | DIASTOLIC BLOOD PRESSURE: 81 MMHG

## 2021-01-11 VITALS — SYSTOLIC BLOOD PRESSURE: 95 MMHG | DIASTOLIC BLOOD PRESSURE: 77 MMHG

## 2021-01-11 VITALS — SYSTOLIC BLOOD PRESSURE: 93 MMHG | DIASTOLIC BLOOD PRESSURE: 79 MMHG

## 2021-01-11 VITALS — SYSTOLIC BLOOD PRESSURE: 125 MMHG | DIASTOLIC BLOOD PRESSURE: 81 MMHG

## 2021-01-11 VITALS — SYSTOLIC BLOOD PRESSURE: 123 MMHG | DIASTOLIC BLOOD PRESSURE: 85 MMHG

## 2021-01-11 VITALS — DIASTOLIC BLOOD PRESSURE: 79 MMHG | SYSTOLIC BLOOD PRESSURE: 108 MMHG

## 2021-01-11 VITALS — DIASTOLIC BLOOD PRESSURE: 80 MMHG | SYSTOLIC BLOOD PRESSURE: 96 MMHG

## 2021-01-11 VITALS — DIASTOLIC BLOOD PRESSURE: 86 MMHG | SYSTOLIC BLOOD PRESSURE: 123 MMHG

## 2021-01-11 VITALS — SYSTOLIC BLOOD PRESSURE: 126 MMHG | DIASTOLIC BLOOD PRESSURE: 81 MMHG

## 2021-01-11 VITALS — SYSTOLIC BLOOD PRESSURE: 114 MMHG | DIASTOLIC BLOOD PRESSURE: 77 MMHG

## 2021-01-11 VITALS — SYSTOLIC BLOOD PRESSURE: 110 MMHG | DIASTOLIC BLOOD PRESSURE: 81 MMHG

## 2021-01-11 VITALS — SYSTOLIC BLOOD PRESSURE: 103 MMHG | DIASTOLIC BLOOD PRESSURE: 77 MMHG

## 2021-01-11 VITALS — DIASTOLIC BLOOD PRESSURE: 77 MMHG | SYSTOLIC BLOOD PRESSURE: 102 MMHG

## 2021-01-11 VITALS — DIASTOLIC BLOOD PRESSURE: 85 MMHG | SYSTOLIC BLOOD PRESSURE: 115 MMHG

## 2021-01-11 VITALS — DIASTOLIC BLOOD PRESSURE: 79 MMHG | SYSTOLIC BLOOD PRESSURE: 116 MMHG

## 2021-01-11 VITALS — SYSTOLIC BLOOD PRESSURE: 119 MMHG | DIASTOLIC BLOOD PRESSURE: 81 MMHG

## 2021-01-11 VITALS — SYSTOLIC BLOOD PRESSURE: 103 MMHG | DIASTOLIC BLOOD PRESSURE: 80 MMHG

## 2021-01-11 VITALS — SYSTOLIC BLOOD PRESSURE: 102 MMHG | DIASTOLIC BLOOD PRESSURE: 74 MMHG

## 2021-01-11 VITALS — SYSTOLIC BLOOD PRESSURE: 112 MMHG | DIASTOLIC BLOOD PRESSURE: 83 MMHG

## 2021-01-11 VITALS — SYSTOLIC BLOOD PRESSURE: 105 MMHG | DIASTOLIC BLOOD PRESSURE: 75 MMHG

## 2021-01-11 VITALS — DIASTOLIC BLOOD PRESSURE: 92 MMHG | SYSTOLIC BLOOD PRESSURE: 121 MMHG

## 2021-01-11 VITALS — DIASTOLIC BLOOD PRESSURE: 85 MMHG | SYSTOLIC BLOOD PRESSURE: 114 MMHG

## 2021-01-11 VITALS — SYSTOLIC BLOOD PRESSURE: 128 MMHG | DIASTOLIC BLOOD PRESSURE: 77 MMHG

## 2021-01-11 VITALS — DIASTOLIC BLOOD PRESSURE: 80 MMHG | SYSTOLIC BLOOD PRESSURE: 115 MMHG

## 2021-01-11 LAB
ADD MANUAL DIFF: NO
ANION GAP SERPL CALC-SCNC: 5 MMOL/L (ref 5–15)
BASOPHILS NFR BLD AUTO: 0.4 % (ref 0–2)
BUN SERPL-MCNC: 51 MG/DL (ref 7–18)
CALCIUM SERPL-MCNC: 8.9 MG/DL (ref 8.5–10.1)
CHLORIDE SERPL-SCNC: 106 MMOL/L (ref 98–107)
CO2 SERPL-SCNC: 33 MMOL/L (ref 21–32)
CREAT SERPL-MCNC: 1 MG/DL (ref 0.55–1.3)
EOSINOPHIL NFR BLD AUTO: 0.1 % (ref 0–3)
ERYTHROCYTE [DISTWIDTH] IN BLOOD BY AUTOMATED COUNT: 13.9 % (ref 11.6–14.8)
HCT VFR BLD CALC: 47.9 % (ref 42–52)
HGB BLD-MCNC: 15.6 G/DL (ref 14.2–18)
LYMPHOCYTES NFR BLD AUTO: 4.6 % (ref 20–45)
MCV RBC AUTO: 91 FL (ref 80–99)
MONOCYTES NFR BLD AUTO: 4.6 % (ref 1–10)
NEUTROPHILS NFR BLD AUTO: 90.3 % (ref 45–75)
PLATELET # BLD: 345 K/UL (ref 150–450)
POTASSIUM SERPL-SCNC: 3.6 MMOL/L (ref 3.5–5.1)
RBC # BLD AUTO: 5.25 M/UL (ref 4.7–6.1)
SODIUM SERPL-SCNC: 144 MMOL/L (ref 136–145)
WBC # BLD AUTO: 7.8 K/UL (ref 4.8–10.8)

## 2021-01-11 RX ADMIN — ENOXAPARIN SODIUM SCH MG: 80 INJECTION SUBCUTANEOUS at 09:15

## 2021-01-11 RX ADMIN — SODIUM CHLORIDE SCH MLS/HR: 0.9 INJECTION INTRAVENOUS at 09:14

## 2021-01-11 RX ADMIN — INSULIN DETEMIR SCH UNITS: 100 INJECTION, SOLUTION SUBCUTANEOUS at 17:54

## 2021-01-11 RX ADMIN — ENOXAPARIN SODIUM SCH MG: 80 INJECTION SUBCUTANEOUS at 21:44

## 2021-01-11 RX ADMIN — Medication PRN MLS/HR: at 12:37

## 2021-01-11 RX ADMIN — Medication PRN MLS/HR: at 06:02

## 2021-01-11 RX ADMIN — INSULIN DETEMIR SCH UNITS: 100 INJECTION, SOLUTION SUBCUTANEOUS at 09:49

## 2021-01-11 RX ADMIN — Medication PRN MLS/HR: at 18:59

## 2021-01-11 RX ADMIN — INSULIN ASPART SCH UNITS: 100 INJECTION, SOLUTION INTRAVENOUS; SUBCUTANEOUS at 05:37

## 2021-01-11 RX ADMIN — METHYLPREDNISOLONE SODIUM SUCCINATE SCH MG: 40 INJECTION, POWDER, LYOPHILIZED, FOR SOLUTION INTRAMUSCULAR; INTRAVENOUS at 09:13

## 2021-01-11 RX ADMIN — INSULIN ASPART SCH UNITS: 100 INJECTION, SOLUTION INTRAVENOUS; SUBCUTANEOUS at 13:08

## 2021-01-11 RX ADMIN — INSULIN ASPART SCH UNITS: 100 INJECTION, SOLUTION INTRAVENOUS; SUBCUTANEOUS at 09:50

## 2021-01-11 RX ADMIN — INSULIN ASPART SCH UNITS: 100 INJECTION, SOLUTION INTRAVENOUS; SUBCUTANEOUS at 17:53

## 2021-01-11 RX ADMIN — INSULIN ASPART SCH UNITS: 100 INJECTION, SOLUTION INTRAVENOUS; SUBCUTANEOUS at 21:38

## 2021-01-11 RX ADMIN — CHLORHEXIDINE GLUCONATE SCH APPLIC: 213 SOLUTION TOPICAL at 21:39

## 2021-01-11 RX ADMIN — INSULIN ASPART SCH UNITS: 100 INJECTION, SOLUTION INTRAVENOUS; SUBCUTANEOUS at 00:32

## 2021-01-11 NOTE — SURGERY PROGRESS NOTE
Surgery Progress Note


Subjective


Procedure Performed


Left femoral central venous catheter insertion


Additional Comments


labs okay


on support


no n/v


abd exam stable





Objective





Last 24 Hour Vital Signs








  Date Time  Temp Pulse Resp B/P (MAP) Pulse Ox O2 Delivery O2 Flow Rate FiO2


 


1/11/21 18:59   18   Mechanical Ventilator  70


 


1/11/21 18:00  110 22 128/77 (94) 100   


 


1/11/21 17:00  113 21 126/81 (96) 100   


 


1/11/21 16:00 99.2 108 21 109/81 (90) 100   


 


1/11/21 16:00      Mechanical Ventilator  


 


1/11/21 16:00        70


 


1/11/21 15:35  107      


 


1/11/21 15:20  111 23     70


 


1/11/21 15:00  104 20 123/86 (98) 100   


 


1/11/21 14:00   19   Mechanical Ventilator  70


 


1/11/21 14:00  104 18 114/77 (89) 99   


 


1/11/21 13:00   18   Mechanical Ventilator  70


 


1/11/21 13:00  101 20 115/80 (92) 100   


 


1/11/21 12:37   18   Mechanical Ventilator  70


 


1/11/21 12:00      Mechanical Ventilator  


 


1/11/21 12:00 97.0 94 18 115/85 (95) 99   


 


1/11/21 12:00   18   Mechanical Ventilator  70


 


1/11/21 12:00        70


 


1/11/21 11:38  93      


 


1/11/21 11:20  91 24     70


 


1/11/21 11:00  89 17 102/77 (85) 98   


 


1/11/21 11:00   19   Mechanical Ventilator  70


 


1/11/21 10:00  86 17 108/79 (89) 100   


 


1/11/21 10:00   19   Mechanical Ventilator  70


 


1/11/21 09:00   18   Mechanical Ventilator  70


 


1/11/21 09:00  82 16 102/74 (83) 100   


 


1/11/21 08:50        70


 


1/11/21 08:40        70


 


1/11/21 08:00        60


 


1/11/21 08:00   16   Mechanical Ventilator  60


 


1/11/21 08:00      Mechanical Ventilator  


 


1/11/21 08:00 96.5 80 16 95/77 (83) 98   


 


1/11/21 07:35  79      


 


1/11/21 07:30  91 25     60


 


1/11/21 07:00  78 15 96/80 (85) 99   


 


1/11/21 07:00   16   Mechanical Ventilator  60


 


1/11/21 06:02      Mechanical Ventilator  60


 


1/11/21 06:00  76 16 103/80 (88) 98   


 


1/11/21 05:34   15   Mechanical Ventilator  60


 


1/11/21 05:00   15   Mechanical Ventilator  60


 


1/11/21 05:00  78 16 93/79 (84) 97   


 


1/11/21 04:00        60


 


1/11/21 04:00   18   Mechanical Ventilator  60


 


1/11/21 04:00 99.0 82 17 121/92 (102) 100   


 


1/11/21 04:00  75      


 


1/11/21 04:00      Mechanical Ventilator  


 


1/11/21 03:30  71 19     60


 


1/11/21 03:00   18   Mechanical Ventilator  60


 


1/11/21 03:00  75 14 103/77 (86) 99   


 


1/11/21 02:00  78 14 105/75 (85) 100   


 


1/11/21 02:00   15   Mechanical Ventilator  60


 


1/11/21 01:00  86 16 110/81 (91) 99   


 


1/11/21 01:00   16   Mechanical Ventilator  60


 


1/11/21 00:00   19   Mechanical Ventilator  60


 


1/11/21 00:00 97.0 102 16 114/85 (95) 100   


 


1/11/21 00:00      Mechanical Ventilator  


 


1/11/21 00:00  117      


 


1/10/21 23:19  113 20     60


 


1/10/21 23:18   18   Mechanical Ventilator  60


 


1/10/21 23:15   19   Mechanical Ventilator  60


 


1/10/21 23:00  117 22 117/81 (93) 99   


 


1/10/21 23:00   21   Mechanical Ventilator  60


 


1/10/21 22:00   22   Mechanical Ventilator  60


 


1/10/21 22:00  121 22 123/84 (97) 97   


 


1/10/21 21:00  129 23 117/76 (90) 97   


 


1/10/21 21:00   22   Mechanical Ventilator  60


 


1/10/21 20:37 100.3       


 


1/10/21 20:00      Mechanical Ventilator  


 


1/10/21 20:00  129      


 


1/10/21 20:00        60


 


1/10/21 20:00 101.0 132 25 127/90 (102) 98   


 


1/10/21 20:00   22   Mechanical Ventilator  60


 


1/10/21 19:30  130 25     60








I&O











Intake and Output  


 


 1/10/21 1/11/21





 19:00 07:00


 


Intake Total 989.66 ml 663.5 ml


 


Output Total 1715 ml 1540 ml


 


Balance -725.34 ml -876.5 ml


 


  


 


Free Water 50 ml 20 ml


 


IV Total 669.66 ml 603.5 ml


 


Tube Feeding 270 ml 40 ml


 


Output Urine Total 1715 ml 1540 ml








Cardiovascular:  RSR


Respiratory:  decreased breath sounds


Abdomen:  soft, flat, non-tender, non-distended, decreased bowel sounds


Extremities:  no tenderness, no cyanosis





Laboratory Tests








Test


 1/11/21


00:22 1/11/21


05:06 1/11/21


08:24 1/11/21


08:35


 


POC Whole Blood Glucose


 398 MG/DL


()  H Pending  


 


 





 


Arterial Blood pH


 


 


 7.448


(7.350-7.450) 





 


Arterial Blood Partial


Pressure CO2 


 


 47.2 mmHg


(35.0-45.0)  H 





 


Arterial Blood Partial


Pressure O2 


 


 57.5 mmHg


(75.0-100.0)  L 





 


Arterial Blood HCO3


 


 


 31.9 mmol/L


(22.0-26.0)  H 





 


Arterial Blood Oxygen


Saturation 


 


 89.7 %


()  *L 





 


Arterial Blood Base Excess   6.7 (-2-2)  H 


 


Abelardo Test   Positive   


 


White Blood Count


 


 


 


 7.8 K/UL


(4.8-10.8)


 


Red Blood Count


 


 


 


 5.25 M/UL


(4.70-6.10)


 


Hemoglobin


 


 


 


 15.6 G/DL


(14.2-18.0)


 


Hematocrit


 


 


 


 47.9 %


(42.0-52.0)


 


Mean Corpuscular Volume    91 FL (80-99)  


 


Mean Corpuscular Hemoglobin


 


 


 


 29.6 PG


(27.0-31.0)


 


Mean Corpuscular Hemoglobin


Concent 


 


 


 32.5 G/DL


(32.0-36.0)


 


Red Cell Distribution Width


 


 


 


 13.9 %


(11.6-14.8)


 


Platelet Count


 


 


 


 345 K/UL


(150-450)


 


Mean Platelet Volume


 


 


 


 8.6 FL


(6.5-10.1)


 


Neutrophils (%) (Auto)


 


 


 


 90.3 %


(45.0-75.0)  H


 


Lymphocytes (%) (Auto)


 


 


 


 4.6 %


(20.0-45.0)  L


 


Monocytes (%) (Auto)


 


 


 


 4.6 %


(1.0-10.0)


 


Eosinophils (%) (Auto)


 


 


 


 0.1 %


(0.0-3.0)


 


Basophils (%) (Auto)


 


 


 


 0.4 %


(0.0-2.0)


 


Sodium Level


 


 


 


 144 MMOL/L


(136-145)


 


Potassium Level


 


 


 


 3.6 MMOL/L


(3.5-5.1)


 


Chloride Level


 


 


 


 106 MMOL/L


()


 


Carbon Dioxide Level


 


 


 


 33 MMOL/L


(21-32)  H


 


Anion Gap


 


 


 


 5 mmol/L


(5-15)


 


Blood Urea Nitrogen


 


 


 


 51 mg/dL


(7-18)  H


 


Creatinine


 


 


 


 1.0 MG/DL


(0.55-1.30)


 


Estimat Glomerular Filtration


Rate 


 


 


 > 60 mL/min


(>60)


 


Glucose Level


 


 


 


 290 MG/DL


()  H


 


Calcium Level


 


 


 


 8.9 MG/DL


(8.5-10.1)











Plan


Problems:  


(1) Pneumonia due to COVID-19 virus


Assessment & Plan:  Interim endotracheal intubation, endotracheal tube tip in 

good position


approximately 4 cm above the tito. There are extensive bilateral infiltrates, 

which


are markedly increased from the prior study. Pleural spaces are probably clear, 

not


well demonstrated


Markedly increased and now extensive bilateral infiltrates 


cont as per pulm and iID





(2) Hypoxia


(3) Abdominal pain


Assessment & Plan:  65M abd pain, septic, covid, intubated ons upport


currently abd exam benign but limited given condition


line bo mary noted


okay for meds and feeds


nutritional optimization 


DAILY ESTIMATED NEEDS:


Needs based on Critical Care/ 73kg abw 


22-28  kcals/kg 


9808-2050  total kcals


1.2-2  g protein/kg


  g total protein 


25-30  mL/kg


8611-9847  total fluid mLs





NUTRITION DIAGNOSIS:


Swallowing difficulty R/T respiratory failure as evidenced by pt now


orally intubated, OGT in place, NPO





 


CURRENT TF:NPO 





 





ENTERAL NUTRITION RECOMMENDATIONS:


Vital AF 1.2 @ 60ml/hr x 24 hrs  to provide 1440ml, 1728kcal, 116g prot, 1167ml 

free water 





* As medically appropriate, initiate critical care and carb controlled TF 

formula of Vital AF 1.2


* Initiate @ 20ml/hr x 6hrs, advance 10ml q 4-6 hrs as tolerated to goal rate


* HOB over 30 degrees/ water flush per MD


* Does not exceed est kcal needs w/ Propofol running @ 8.083ml/hr x 24 hrs 

(provides 213 lipid kcal)





 





ADDITIONAL RECOMMENDATIONS:


* Calibrated bedscale wt 


* Monitor BGs closely w/ Decadron and TF, need for long acting insulin 


* Monitor lytes, replete as needed  


* Monitor Propofol rate, need to adjust TF rate  





(4) Acute metabolic encephalopathy











Norberto Vazquez                Jan 11, 2021 19:29

## 2021-01-11 NOTE — CONSULTATION
DATE OF CONSULTATION:  01/11/2021

ENDOCRINOLOGY CONSULTATION



CONSULTING PHYSICIAN:  Nick Mcmahon M.D.



REFERRING PHYSICIAN:  Ari Travis MD.



REASON FOR CONSULTATION:  Diabetes management.



HISTORY OF PRESENT ILLNESS:  It is important to note that history is

obtained from review from the chart and medial records since the patient

is intubated in the ICU and is unable to provide any meaningful history.



The patient is a 65-year-old male known to me from November 2019 when he

was admitted to [facility] for appendectomy.  He was

readmitted with COVID pneumonia, admitted to the ICU, intubated and

started on steroids which raised the glucose significantly; therefore,

Endocrinology was consulted.



PAST MEDICAL HISTORY:

1. Alcohol abuse.

2. Hypertension.



PAST SURGICAL HISTORY: non listed .



FAMILY HISTORY:  Unknown.



REVIEW OF SYSTEMS:  Unable to obtain.



MEDICATIONS:  Reviewed and reconciled.



ALLERGIES TO MEDICATIONS:  None.



PHYSICAL EXAMINATION:

VITAL SIGNS:  Blood pressure 102/80, pulse of 76, temperature 99,

respiratory rate of 16.



Rest of the exam is deferred to COVID isolation.



LABORATORY VALUES:  WBC 5.9, hemoglobin 14.5, hematocrit 45, platelet count

335.  Sodium 130, potassium 3.5, chloride 101, bicarb 38, anion gap 1

, BUN 47, creatinine 1, glucose of 386.  Hemoglobin A1c 10.5.



DIAGNOSES:

1. COVID pneumonia.

2. Respiratory failure.

3. Diabetes out of control.



PLAN:

1. Start Levemir 20 units b.i.d.

2. Start NovoLog every 4 hours high dose sliding scale.

3. Hypoglycemia protocol is in order.

4. Further adjustment according to the glucose values.



Thank you, Dr. Travis, for the courtesy of this consultation.









  ______________________________________________

  Nick Mcmahon M.D.





DR:  RN/apm

D:  01/11/2021 06:57

T:  01/11/2021 07:10

JOB#:  [id number]

CC:



HARDEEP

## 2021-01-11 NOTE — NUR
NURSE NOTES:

T of 99.4 noted. Cooling blanket on. SR noted. No acute change at this time. Will continue 
to monitor.

## 2021-01-11 NOTE — NUR
NURSE NOTES:



Informed by RT regarding pt's ABG results. Per RT, he will increase FiO2 to 70%. Pt's VSS, 
no signs of distress noted. Will report ABG results to MD.

## 2021-01-11 NOTE — NUR
NURSE NOTES:



BG checked, 295, insulin given per sliding scale order. Tube feeding residual checked, 25mL, 
tube feeding rate increased to 30 mL/hr. Pt tolerating well.

## 2021-01-11 NOTE — NUR
NURSE NOTES:



Pt's VSS, afebrile, no signs of distress noted. Safety precautions maintained. Will continue 
to monitor.

## 2021-01-11 NOTE — INFECTIOUS DISEASES PROG NOTE
Assessment/Plan


Assessment/Plan


antibiotics : ceftriaxone





A


1. COVID-19 pneumonia.


on 70% FiO2 with saturation of 99 %


s/p remdesivir


s/p ivermectin


2. New-onset diabetes.


3. respiratory failure





P


1. Continue ceftriaxone.


2. continue solumedrol taper doses


3. Continue isolation.





Subjective


ROS Limited/Unobtainable:  Yes


Allergies:  


Coded Allergies:  


     No Known Allergies (Unverified , 6/11/19)





Objective





Last 24 Hour Vital Signs








  Date Time  Temp Pulse Resp B/P (MAP) Pulse Ox O2 Delivery O2 Flow Rate FiO2


 


1/11/21 12:00      Mechanical Ventilator  


 


1/11/21 12:00 97.0 94 18 115/85 (95) 99   


 


1/11/21 11:38  93      


 


1/11/21 11:00  89 17 102/77 (85) 98   


 


1/11/21 10:00  86 17 108/79 (89) 100   


 


1/11/21 09:00  82 16 102/74 (83) 100   


 


1/11/21 08:50        70


 


1/11/21 08:00        60


 


1/11/21 08:00      Mechanical Ventilator  


 


1/11/21 08:00 96.5 80 16 95/77 (83) 98   


 


1/11/21 07:35  79      


 


1/11/21 07:00  78 15 96/80 (85) 99   


 


1/11/21 07:00   16   Mechanical Ventilator  60


 


1/11/21 06:02      Mechanical Ventilator  60


 


1/11/21 06:00  76 16 103/80 (88) 98   


 


1/11/21 05:34   15   Mechanical Ventilator  60


 


1/11/21 05:00   15   Mechanical Ventilator  60


 


1/11/21 05:00  78 16 93/79 (84) 97   


 


1/11/21 04:00        60


 


1/11/21 04:00   18   Mechanical Ventilator  60


 


1/11/21 04:00 99.0 82 17 121/92 (102) 100   


 


1/11/21 04:00  75      


 


1/11/21 04:00      Mechanical Ventilator  


 


1/11/21 03:30  71 19     60


 


1/11/21 03:00   18   Mechanical Ventilator  60


 


1/11/21 03:00  75 14 103/77 (86) 99   


 


1/11/21 02:00  78 14 105/75 (85) 100   


 


1/11/21 02:00   15   Mechanical Ventilator  60


 


1/11/21 01:00  86 16 110/81 (91) 99   


 


1/11/21 01:00   16   Mechanical Ventilator  60


 


1/11/21 00:00   19   Mechanical Ventilator  60


 


1/11/21 00:00 97.0 102 16 114/85 (95) 100   


 


1/11/21 00:00      Mechanical Ventilator  


 


1/11/21 00:00  117      


 


1/10/21 23:19  113 20     60


 


1/10/21 23:18   18   Mechanical Ventilator  60


 


1/10/21 23:15   19   Mechanical Ventilator  60


 


1/10/21 23:00  117 22 117/81 (93) 99   


 


1/10/21 23:00   21   Mechanical Ventilator  60


 


1/10/21 22:00   22   Mechanical Ventilator  60


 


1/10/21 22:00  121 22 123/84 (97) 97   


 


1/10/21 21:00  129 23 117/76 (90) 97   


 


1/10/21 21:00   22   Mechanical Ventilator  60


 


1/10/21 20:37 100.3       


 


1/10/21 20:00      Mechanical Ventilator  


 


1/10/21 20:00  129      


 


1/10/21 20:00        60


 


1/10/21 20:00 101.0 132 25 127/90 (102) 98   


 


1/10/21 20:00   22   Mechanical Ventilator  60


 


1/10/21 19:30  130 25     60


 


1/10/21 19:00  126 25  96   


 


1/10/21 19:00  113 25 140/85 (103) 96   


 


1/10/21 19:00   19   Mechanical Ventilator  60


 


1/10/21 18:00   19   Mechanical Ventilator  60


 


1/10/21 18:00  117 23 127/86 (100) 97   


 


1/10/21 17:11   19   Mechanical Ventilator  60


 


1/10/21 17:00  115 21 118/78 (91) 95   


 


1/10/21 17:00   21   Mechanical Ventilator  60


 


1/10/21 16:00      Mechanical Ventilator  


 


1/10/21 16:00        60


 


1/10/21 16:00   19   Mechanical Ventilator  60


 


1/10/21 16:00 97.0 107 22 123/86 (98) 96   


 


1/10/21 15:03  101      


 


1/10/21 15:00   21   Mechanical Ventilator  60


 


1/10/21 15:00  104 27     60


 


1/10/21 15:00  99 21 106/79 (88) 97   


 


1/10/21 14:00  92 20 102/73 (83) 97   


 


1/10/21 14:00   14   Mechanical Ventilator  60


 


1/10/21 13:00   14   Mechanical Ventilator  60


 


1/10/21 13:00  90 20 102/78 (86) 97   








Height (Feet):  5


Height (Inches):  7.00


Weight (Pounds):  198





Laboratory Tests








Test


 1/11/21


00:22 1/11/21


05:06 1/11/21


08:24 1/11/21


08:35


 


POC Whole Blood Glucose


 398 MG/DL


()  H Pending  


 


 





 


Arterial Blood pH


 


 


 7.448


(7.350-7.450) 





 


Arterial Blood Partial


Pressure CO2 


 


 47.2 mmHg


(35.0-45.0)  H 





 


Arterial Blood Partial


Pressure O2 


 


 57.5 mmHg


(75.0-100.0)  L 





 


Arterial Blood HCO3


 


 


 31.9 mmol/L


(22.0-26.0)  H 





 


Arterial Blood Oxygen


Saturation 


 


 89.7 %


()  *L 





 


Arterial Blood Base Excess   6.7 (-2-2)  H 


 


Abelardo Test   Positive   


 


White Blood Count


 


 


 


 7.8 K/UL


(4.8-10.8)


 


Red Blood Count


 


 


 


 5.25 M/UL


(4.70-6.10)


 


Hemoglobin


 


 


 


 15.6 G/DL


(14.2-18.0)


 


Hematocrit


 


 


 


 47.9 %


(42.0-52.0)


 


Mean Corpuscular Volume    91 FL (80-99)  


 


Mean Corpuscular Hemoglobin


 


 


 


 29.6 PG


(27.0-31.0)


 


Mean Corpuscular Hemoglobin


Concent 


 


 


 32.5 G/DL


(32.0-36.0)


 


Red Cell Distribution Width


 


 


 


 13.9 %


(11.6-14.8)


 


Platelet Count


 


 


 


 345 K/UL


(150-450)


 


Mean Platelet Volume


 


 


 


 8.6 FL


(6.5-10.1)


 


Neutrophils (%) (Auto)


 


 


 


 90.3 %


(45.0-75.0)  H


 


Lymphocytes (%) (Auto)


 


 


 


 4.6 %


(20.0-45.0)  L


 


Monocytes (%) (Auto)


 


 


 


 4.6 %


(1.0-10.0)


 


Eosinophils (%) (Auto)


 


 


 


 0.1 %


(0.0-3.0)


 


Basophils (%) (Auto)


 


 


 


 0.4 %


(0.0-2.0)


 


Sodium Level


 


 


 


 144 MMOL/L


(136-145)


 


Potassium Level


 


 


 


 3.6 MMOL/L


(3.5-5.1)


 


Chloride Level


 


 


 


 106 MMOL/L


()


 


Carbon Dioxide Level


 


 


 


 33 MMOL/L


(21-32)  H


 


Anion Gap


 


 


 


 5 mmol/L


(5-15)


 


Blood Urea Nitrogen


 


 


 


 51 mg/dL


(7-18)  H


 


Creatinine


 


 


 


 1.0 MG/DL


(0.55-1.30)


 


Estimat Glomerular Filtration


Rate 


 


 


 > 60 mL/min


(>60)


 


Glucose Level


 


 


 


 290 MG/DL


()  H


 


Calcium Level


 


 


 


 8.9 MG/DL


(8.5-10.1)











Current Medications








 Medications


  (Trade)  Dose


 Ordered  Sig/Julisa


 Route


 PRN Reason  Start Time


 Stop Time Status Last Admin


Dose Admin


 


 Acetaminophen


  (Tylenol)  650 mg  Q6H  PRN


 ORAL


 Temp >100.5  1/3/21 04:00


 2/2/21 03:59  1/10/21 20:07





 


 Ceftriaxone


 Sodium 1 gm/


 Dextrose  55 ml @ 


 110 mls/hr  DAILY


 IVPB


   1/10/21 11:00


 1/17/21 10:59  1/11/21 09:14





 


 Chlorhexidine


 Gluconate


  (Vanessa-Hex 2%)  1 applic  DAILY@2000


 TOPIC


   1/6/21 20:00


 4/6/21 19:59  1/10/21 20:06





 


 Dextrose


  (Dextrose 50%)  25 ml  Q30M  PRN


 IV


 Hypoglycemia  1/9/21 13:30


 4/9/21 13:29   





 


 Dextrose


  (Dextrose 50%)  50 ml  Q30M  PRN


 IV


 Hypoglycemia  1/9/21 13:30


 4/9/21 13:29   





 


 Enoxaparin Sodium


  (Lovenox)  80 mg  EVERY 12  HOURS


 SUBQ


   1/2/21 21:00


 4/2/21 20:59  1/11/21 09:15





 


 Furosemide 100 mg/


 Dextrose  100 ml @ 


 10 mls/hr  Q10H


 IV


   1/7/21 11:00


 2/6/21 10:59  1/11/21 03:34





 


 Haloperidol


  (Haldol)  5 mg  Q6H  PRN


 ORAL


 Agitation  1/4/21 13:45


 2/18/21 13:44  1/4/21 22:56





 


 Haloperidol


 Lactate 5 mg/


 Dextrose  56 ml @ 


 224 mls/hr  Q6H  PRN


 IVPB


 Agitation  1/5/21 12:30


 2/19/21 12:29  1/5/21 13:20





 


 Insulin Aspart


  (NovoLOG)    Q4HR


 SUBQ


   1/11/21 05:00


 4/9/21 17:59  1/11/21 09:50





 


 Insulin Detemir


  (Levemir)  20 units  BID


 SUBQ


   1/11/21 09:00


 4/11/21 08:59  1/11/21 09:49





 


 Lorazepam


  (Ativan 2mg/ml


 1ml)  2 mg  Q4H  PRN


 IV


 For Anxiety IV/PO  1/5/21 00:00


 1/12/21 00:00  1/6/21 20:01





 


 Methylprednisolone


 Sodium Succinate


  (Solu-MEDROL)  40 mg  EVERY 12  HOURS


 IVP


   1/6/21 21:00


 4/6/21 20:59  1/11/21 09:13





 


 Midazolam HCl  100 ml @ 0


 mls/hr  Q24H  PRN


 IV


 SEDATION  1/8/21 21:00


 1/15/21 20:59  1/11/21 06:02





 


 Quetiapine


 Fumarate


  (SEROqueL)  50 mg  Q12HR


 ORAL


   1/4/21 21:00


 2/18/21 20:59  1/11/21 09:14





 


 Sodium Chloride  1,000 ml @ 


 25 mls/hr  Q24H


 IV


   1/3/21 07:45


 2/2/21 07:44  1/10/21 18:12




















Ashley Davis MD      Jan 11, 2021 12:31

## 2021-01-11 NOTE — NUR
SI: Respiratory failure, COVID-PNA, ETT/Vent support

T-97 (ax), , RR -20, Bp 115/80

AC 16, , PEEP 10, Fio2 70%, O2 sat 100%

WBC 7.8, BUN 51

cxray-mild improvement since 1-9-21.  Bilateral opacities



IS:      Lasix Gtt

Solu-Medrol IV q 12 h

Rocephin IV q 24 H

Lovenox SQ q 12 h



*****ICU Status****

## 2021-01-11 NOTE — NUR
RD ASSESSMENT & RECOMMENDATIONS

SEE CARE ACTIVITY FOR COMPLETE ASSESSMENT



DAILY ESTIMATED NEEDS:

Needs based on Critical Care/ 73kg abw 

22-28  kcals/kg 

2519-2672  total kcals

1.2-2  g protein/kg

  g total protein 

20-25  mL/kg

1498-3189  total fluid mLs



NUTRITION DIAGNOSIS:

Swallowing difficulty R/T respiratory failure as evidenced by pt now

orally intubated, OGT feeds. 



CURRENT TF:NPO 





ENTERAL NUTRITION RECOMMENDATIONS:

Vital AF 1.2 @ 60ml/hr x 24 hrs  to provide 1440ml, 1728kcal, 116g prot, 1167ml free water 



* As medically appropriate, initiate critical care and carb controlled TF

formula of Vital AF 1.2

* Initiate @ 20ml/hr x 6hrs, advance 10ml q 4-6 hrs as tolerated to goal rate

* HOB over 30 degrees/ water flush per MD







ADDITIONAL RECOMMENDATIONS:

* Calibrated bedscale wt 

* Monitor BGs closely w/ steroidal med and TF  

    -> rec long acting insulin w/ TF 

* Monitor lytes, replete as needed

## 2021-01-11 NOTE — GENERAL PROGRESS NOTE
Subjective


Allergies:  


Coded Allergies:  


     No Known Allergies (Unverified , 6/11/19)


Subjective


on ventilator 60% fio2


on sediation


unresponsive


in icu





Objective





Last 24 Hour Vital Signs








  Date Time  Temp Pulse Resp B/P (MAP) Pulse Ox O2 Delivery O2 Flow Rate FiO2


 


1/11/21 14:00   19   Mechanical Ventilator  70


 


1/11/21 13:00   18   Mechanical Ventilator  70


 


1/11/21 13:00  101 20 115/80 (92) 100   


 


1/11/21 12:37   18   Mechanical Ventilator  70


 


1/11/21 12:00      Mechanical Ventilator  


 


1/11/21 12:00 97.0 94 18 115/85 (95) 99   


 


1/11/21 12:00   18   Mechanical Ventilator  70


 


1/11/21 11:38  93      


 


1/11/21 11:20  91 24     70


 


1/11/21 11:00  89 17 102/77 (85) 98   


 


1/11/21 11:00   19   Mechanical Ventilator  70


 


1/11/21 10:00  86 17 108/79 (89) 100   


 


1/11/21 10:00   19   Mechanical Ventilator  70


 


1/11/21 09:00   18   Mechanical Ventilator  70


 


1/11/21 09:00  82 16 102/74 (83) 100   


 


1/11/21 08:50        70


 


1/11/21 08:40        70


 


1/11/21 08:00        60


 


1/11/21 08:00   16   Mechanical Ventilator  60


 


1/11/21 08:00      Mechanical Ventilator  


 


1/11/21 08:00 96.5 80 16 95/77 (83) 98   


 


1/11/21 07:35  79      


 


1/11/21 07:30  91 25     60


 


1/11/21 07:00  78 15 96/80 (85) 99   


 


1/11/21 07:00   16   Mechanical Ventilator  60


 


1/11/21 06:02      Mechanical Ventilator  60


 


1/11/21 06:00  76 16 103/80 (88) 98   


 


1/11/21 05:34   15   Mechanical Ventilator  60


 


1/11/21 05:00   15   Mechanical Ventilator  60


 


1/11/21 05:00  78 16 93/79 (84) 97   


 


1/11/21 04:00        60


 


1/11/21 04:00   18   Mechanical Ventilator  60


 


1/11/21 04:00 99.0 82 17 121/92 (102) 100   


 


1/11/21 04:00  75      


 


1/11/21 04:00      Mechanical Ventilator  


 


1/11/21 03:30  71 19     60


 


1/11/21 03:00   18   Mechanical Ventilator  60


 


1/11/21 03:00  75 14 103/77 (86) 99   


 


1/11/21 02:00  78 14 105/75 (85) 100   


 


1/11/21 02:00   15   Mechanical Ventilator  60


 


1/11/21 01:00  86 16 110/81 (91) 99   


 


1/11/21 01:00   16   Mechanical Ventilator  60


 


1/11/21 00:00   19   Mechanical Ventilator  60


 


1/11/21 00:00 97.0 102 16 114/85 (95) 100   


 


1/11/21 00:00      Mechanical Ventilator  


 


1/11/21 00:00  117      


 


1/10/21 23:19  113 20     60


 


1/10/21 23:18   18   Mechanical Ventilator  60


 


1/10/21 23:15   19   Mechanical Ventilator  60


 


1/10/21 23:00  117 22 117/81 (93) 99   


 


1/10/21 23:00   21   Mechanical Ventilator  60


 


1/10/21 22:00   22   Mechanical Ventilator  60


 


1/10/21 22:00  121 22 123/84 (97) 97   


 


1/10/21 21:00  129 23 117/76 (90) 97   


 


1/10/21 21:00   22   Mechanical Ventilator  60


 


1/10/21 20:37 100.3       


 


1/10/21 20:00      Mechanical Ventilator  


 


1/10/21 20:00  129      


 


1/10/21 20:00        60


 


1/10/21 20:00 101.0 132 25 127/90 (102) 98   


 


1/10/21 20:00   22   Mechanical Ventilator  60


 


1/10/21 19:30  130 25     60


 


1/10/21 19:00  126 25  96   


 


1/10/21 19:00  113 25 140/85 (103) 96   


 


1/10/21 19:00   19   Mechanical Ventilator  60


 


1/10/21 18:00   19   Mechanical Ventilator  60


 


1/10/21 18:00  117 23 127/86 (100) 97   


 


1/10/21 17:11   19   Mechanical Ventilator  60


 


1/10/21 17:00  115 21 118/78 (91) 95   


 


1/10/21 17:00   21   Mechanical Ventilator  60


 


1/10/21 16:00      Mechanical Ventilator  


 


1/10/21 16:00        60


 


1/10/21 16:00   19   Mechanical Ventilator  60


 


1/10/21 16:00 97.0 107 22 123/86 (98) 96   

















Intake and Output  


 


 1/10/21 1/11/21





 19:00 07:00


 


Intake Total 989.66 ml 663.5 ml


 


Output Total 1715 ml 1540 ml


 


Balance -725.34 ml -876.5 ml


 


  


 


Free Water 50 ml 20 ml


 


IV Total 669.66 ml 603.5 ml


 


Tube Feeding 270 ml 40 ml


 


Output Urine Total 1715 ml 1540 ml








Laboratory Tests


1/11/21 00:22: POC Whole Blood Glucose 398H


1/11/21 05:06: POC Whole Blood Glucose [Pending]


1/11/21 08:24: 


Arterial Blood pH 7.448, Arterial Blood Partial Pressure CO2 47.2H, Arterial 

Blood Partial Pressure O2 57.5L, Arterial Blood HCO3 31.9H, Arterial Blood 

Oxygen Saturation 89.7*L, Arterial Blood Base Excess 6.7H, Abelardo Test Positive


1/11/21 08:35: 


White Blood Count 7.8, Red Blood Count 5.25, Hemoglobin 15.6, Hematocrit 47.9, 

Mean Corpuscular Volume 91, Mean Corpuscular Hemoglobin 29.6, Mean Corpuscular 

Hemoglobin Concent 32.5, Red Cell Distribution Width 13.9, Platelet Count 345, 

Mean Platelet Volume 8.6, Neutrophils (%) (Auto) 90.3H, Lymphocytes (%) (Auto) 

4.6L, Monocytes (%) (Auto) 4.6, Eosinophils (%) (Auto) 0.1, Basophils (%) (Auto)

 0.4, Sodium Level 144, Potassium Level 3.6, Chloride Level 106, Carbon Dioxide 

Level 33H, Anion Gap 5, Blood Urea Nitrogen 51H, Creatinine 1.0, Estimat 

Glomerular Filtration Rate > 60, Glucose Level 290H, Calcium Level 8.9


Height (Feet):  5


Height (Inches):  7.00


Weight (Pounds):  198


General Appearance:  no apparent distress


Neck:  supple


Cardiovascular:  regular rhythm


Respiratory/Chest:  crackles/rales


Abdomen:  non tender, soft


Extremities:  non-tender





Assessment/Plan


Assessment/Plan:


covid pna


ac resp distress on ventilator


etoh abused


agitated -halodol


cont on bipap at icu


cont iv abx and iv decadrone


pulmonary and gi on consult


dw charge nurse











Moi Travis MD             Jan 11, 2021 15:24

## 2021-01-11 NOTE — PULMONOLOGY PROGRESS NOTE
Subjective


ROS Limited/Unobtainable:  Yes


Interval Events:  None new reported.  On Versed drip.


Constitutional:  Reports: fever, other - T=101.5


HEENT:  Repors: no symptoms


Respiratory:  Reports: shortness of breath - better


Cardiovascular:  Reports: no symptoms


Gastrointestinal/Abdominal:  Reports: no symptoms


Allergies:  


Coded Allergies:  


     No Known Allergies (Unverified , 6/11/19)


All Systems:  reviewed and negative except above





Objective





Last 24 Hour Vital Signs








  Date Time  Temp Pulse Resp B/P (MAP) Pulse Ox O2 Delivery O2 Flow Rate FiO2


 


1/11/21 11:00  89 17 102/77 (85) 98   


 


1/11/21 10:00  86 17 108/79 (89) 100   


 


1/11/21 09:00  82 16 102/74 (83) 100   


 


1/11/21 08:50        70


 


1/11/21 08:00        60


 


1/11/21 08:00      Mechanical Ventilator  


 


1/11/21 08:00 96.5 80 16 95/77 (83) 98   


 


1/11/21 07:35  79      


 


1/11/21 07:00  78 15 96/80 (85) 99   


 


1/11/21 07:00   16   Mechanical Ventilator  60


 


1/11/21 06:02      Mechanical Ventilator  60


 


1/11/21 06:00  76 16 103/80 (88) 98   


 


1/11/21 05:34   15   Mechanical Ventilator  60


 


1/11/21 05:00   15   Mechanical Ventilator  60


 


1/11/21 05:00  78 16 93/79 (84) 97   


 


1/11/21 04:00        60


 


1/11/21 04:00   18   Mechanical Ventilator  60


 


1/11/21 04:00 99.0 82 17 121/92 (102) 100   


 


1/11/21 04:00  75      


 


1/11/21 04:00      Mechanical Ventilator  


 


1/11/21 03:30  71 19     60


 


1/11/21 03:00   18   Mechanical Ventilator  60


 


1/11/21 03:00  75 14 103/77 (86) 99   


 


1/11/21 02:00  78 14 105/75 (85) 100   


 


1/11/21 02:00   15   Mechanical Ventilator  60


 


1/11/21 01:00  86 16 110/81 (91) 99   


 


1/11/21 01:00   16   Mechanical Ventilator  60


 


1/11/21 00:00   19   Mechanical Ventilator  60


 


1/11/21 00:00 97.0 102 16 114/85 (95) 100   


 


1/11/21 00:00      Mechanical Ventilator  


 


1/11/21 00:00  117      


 


1/10/21 23:19  113 20     60


 


1/10/21 23:18   18   Mechanical Ventilator  60


 


1/10/21 23:15   19   Mechanical Ventilator  60


 


1/10/21 23:00  117 22 117/81 (93) 99   


 


1/10/21 23:00   21   Mechanical Ventilator  60


 


1/10/21 22:00   22   Mechanical Ventilator  60


 


1/10/21 22:00  121 22 123/84 (97) 97   


 


1/10/21 21:00  129 23 117/76 (90) 97   


 


1/10/21 21:00   22   Mechanical Ventilator  60


 


1/10/21 20:37 100.3       


 


1/10/21 20:00      Mechanical Ventilator  


 


1/10/21 20:00  129      


 


1/10/21 20:00        60


 


1/10/21 20:00 101.0 132 25 127/90 (102) 98   


 


1/10/21 20:00   22   Mechanical Ventilator  60


 


1/10/21 19:30  130 25     60


 


1/10/21 19:00  126 25  96   


 


1/10/21 19:00  113 25 140/85 (103) 96   


 


1/10/21 19:00   19   Mechanical Ventilator  60


 


1/10/21 18:00   19   Mechanical Ventilator  60


 


1/10/21 18:00  117 23 127/86 (100) 97   


 


1/10/21 17:11   19   Mechanical Ventilator  60


 


1/10/21 17:00  115 21 118/78 (91) 95   


 


1/10/21 17:00   21   Mechanical Ventilator  60


 


1/10/21 16:00      Mechanical Ventilator  


 


1/10/21 16:00        60


 


1/10/21 16:00   19   Mechanical Ventilator  60


 


1/10/21 16:00 97.0 107 22 123/86 (98) 96   


 


1/10/21 15:03  101      


 


1/10/21 15:00   21   Mechanical Ventilator  60


 


1/10/21 15:00  104 27     60


 


1/10/21 15:00  99 21 106/79 (88) 97   


 


1/10/21 14:00  92 20 102/73 (83) 97   


 


1/10/21 14:00   14   Mechanical Ventilator  60


 


1/10/21 13:00   14   Mechanical Ventilator  60


 


1/10/21 13:00  90 20 102/78 (86) 97   


 


1/10/21 12:00      Mechanical Ventilator  


 


1/10/21 12:00 95.5 81 17 99/72 (81) 95   


 


1/10/21 12:00        60


 


1/10/21 12:00   16   Mechanical Ventilator  60

















Intake and Output0  


 


 1/10/21 1/11/21





 19:00 07:00


 


Intake Total 989.66 ml 663.5 ml


 


Output Total 1715 ml 1540 ml


 


Balance -725.34 ml -876.5 ml


 


  


 


Free Water 50 ml 20 ml


 


IV Total 669.66 ml 603.5 ml


 


Tube Feeding 270 ml 40 ml


 


Output Urine Total 1715 ml 1540 ml








Objective


On a Versed drip


General Appearance:  no acute distress


HEENT:  atraumatic


Respiratory:  decreased breath sounds


Cardiovascular:  normal rate, regular rhythm


Abdomen:  soft, non tender


Laboratory Tests


1/11/21 00:22: POC Whole Blood Glucose 398H


1/11/21 05:06: POC Whole Blood Glucose [Pending]


1/11/21 08:24: 


Arterial Blood pH 7.448, Arterial Blood Partial Pressure CO2 47.2H, Arterial 

Blood Partial Pressure O2 57.5L, Arterial Blood HCO3 31.9H, Arterial Blood 

Oxygen Saturation 89.7*L, Arterial Blood Base Excess 6.7H, Abelardo Test Positive


1/11/21 08:35: 


White Blood Count 7.8, Red Blood Count 5.25, Hemoglobin 15.6, Hematocrit 47.9, 

Mean Corpuscular Volume 91, Mean Corpuscular Hemoglobin 29.6, Mean Corpuscular 

Hemoglobin Concent 32.5, Red Cell Distribution Width 13.9, Platelet Count 345, 

Mean Platelet Volume 8.6, Neutrophils (%) (Auto) 90.3H, Lymphocytes (%) (Auto) 

4.6L, Monocytes (%) (Auto) 4.6, Eosinophils (%) (Auto) 0.1, Basophils (%) (Auto)

 0.4, Sodium Level 144, Potassium Level 3.6, Chloride Level 106, Carbon Dioxide 

Level 33H, Anion Gap 5, Blood Urea Nitrogen 51H, Creatinine 1.0, Estimat 

Glomerular Filtration Rate > 60, Glucose Level 290H, Calcium Level 8.9





Current Medications








 Medications


  (Trade)  Dose


 Ordered  Sig/Julisa


 Route


 PRN Reason  Start Time


 Stop Time Status Last Admin


Dose Admin


 


 Acetaminophen


  (Tylenol)  650 mg  Q6H  PRN


 ORAL


 Temp >100.5  1/3/21 04:00


 2/2/21 03:59  1/10/21 20:07





 


 Ceftriaxone


 Sodium 1 gm/


 Dextrose  55 ml @ 


 110 mls/hr  DAILY


 IVPB


   1/10/21 11:00


 1/17/21 10:59  1/11/21 09:14





 


 Chlorhexidine


 Gluconate


  (Vanessa-Hex 2%)  1 applic  DAILY@2000


 TOPIC


   1/6/21 20:00


 4/6/21 19:59  1/10/21 20:06





 


 Dextrose


  (Dextrose 50%)  25 ml  Q30M  PRN


 IV


 Hypoglycemia  1/9/21 13:30


 4/9/21 13:29   





 


 Dextrose


  (Dextrose 50%)  50 ml  Q30M  PRN


 IV


 Hypoglycemia  1/9/21 13:30


 4/9/21 13:29   





 


 Enoxaparin Sodium


  (Lovenox)  80 mg  EVERY 12  HOURS


 SUBQ


   1/2/21 21:00


 4/2/21 20:59  1/11/21 09:15





 


 Furosemide 100 mg/


 Dextrose  100 ml @ 


 10 mls/hr  Q10H


 IV


   1/7/21 11:00


 2/6/21 10:59  1/11/21 03:34





 


 Haloperidol


  (Haldol)  5 mg  Q6H  PRN


 ORAL


 Agitation  1/4/21 13:45


 2/18/21 13:44  1/4/21 22:56





 


 Haloperidol


 Lactate 5 mg/


 Dextrose  56 ml @ 


 224 mls/hr  Q6H  PRN


 IVPB


 Agitation  1/5/21 12:30


 2/19/21 12:29  1/5/21 13:20





 


 Insulin Aspart


  (NovoLOG)    Q4HR


 SUBQ


   1/11/21 05:00


 4/9/21 17:59  1/11/21 09:50





 


 Insulin Detemir


  (Levemir)  20 units  BID


 SUBQ


   1/11/21 09:00


 4/11/21 08:59  1/11/21 09:49





 


 Lorazepam


  (Ativan 2mg/ml


 1ml)  2 mg  Q4H  PRN


 IV


 For Anxiety IV/PO  1/5/21 00:00


 1/12/21 00:00  1/6/21 20:01





 


 Methylprednisolone


 Sodium Succinate


  (Solu-MEDROL)  40 mg  EVERY 12  HOURS


 IVP


   1/6/21 21:00


 4/6/21 20:59  1/11/21 09:13





 


 Midazolam HCl  100 ml @ 0


 mls/hr  Q24H  PRN


 IV


 SEDATION  1/8/21 21:00


 1/15/21 20:59  1/11/21 06:02





 


 Quetiapine


 Fumarate


  (SEROqueL)  50 mg  Q12HR


 ORAL


   1/4/21 21:00


 2/18/21 20:59  1/11/21 09:14





 


 Sodium Chloride  1,000 ml @ 


 25 mls/hr  Q24H


 IV


   1/3/21 07:45


 2/2/21 07:44  1/10/21 18:12














Assessment/Plan


Assessment/Plan


1. COVID-19 pneumonia.


   - on Decadron, azithromycin, and ceftriaxone


   - Intubated now; will continue AC mode for now


          -Currently 60-70% FiO2.


          -Has elevation of pCO2


2. Diabetes mellitus.; 


3. Elevated inflammatory markers.


4. High D-dimer.


5. Hyponatremia.


6. Hyperglycemia.


   - BG control


7. Hypoxemia., secondary to #1; improved





DVT prophylaxis   On Lovenox.


Sedated





I will continue Lasix drip.


I willcontinue Diamox


May need pressors.











Sung Lemos MD           Jan 11, 2021 11:36

## 2021-01-11 NOTE — NUR
NURSE NOTES:



Received bedside report from SUZI Pacheco. Pt's VSS, no signs of distress noted. Pt sedated, does 
not track, does not follow commands. SR on the cardiac monitor, HR 80-90. Pt intubated, on 
mechanical ventilator, ETT 7.5 at 23cm lip line, with the following vent settings: A/C rate 
16, T/V 500, 60% FiO2, Peep 10, O2 sat %, respirations even and unlabored. Pt has NGT 
through left nare with Vital AF 1.2 running at 10 cc/hr. Pt has cash catheter, draining 
well, clear yellow urine noted. Skin intact. Pt has Left Femoral TLC with Versed running at 
8mg/hr, Lasix drip 10mg, and 1/2 NS at 25 mL/hr. No signs of infection, bleeding, or 
complications noted from central line and peripheral IV site. Pt has bilateral soft wrist 
restraints, no signs of injury noted from extremities. HOB at 30 degrees. Bed rails up, bed 
locked and in lowest position. Safety precautions maintained. Will continue to monitor.

## 2021-01-11 NOTE — NUR
NURSE HAND-OFF REPORT: 



Latest Vital Signs: Temperature 99.2 , Pulse 114 , B/P 125 /81 , Respiratory Rate 19 , O2 
 , Mechanical Ventilator, FiO2 70%.  

Vital Sign Comment: 



EKG Rhythm: Sinus Tachycardia

Rhythm change?: N 

MD Notified?: -

MD Response: 



Latest Singh Fall Score: 50  

Fall Risk: High Risk 

Safety Measures: Call light Within Reach, Bed Alarm Zone 1, Side Rails Side Rails x3, Bed 
position Low and Locked.

Fall Precautions: 

Yellow Socks

Yellow Gown

Door Sign

Patient Fall Education



Report given to SUZI Vu for continuity of care. Pt stable.

## 2021-01-11 NOTE — NUR
NURSE NOTES:

Report received from SUZI Byrne.

RASS Score of -2 upon assessment; lightly sedated. PERRLA.

5-lead EKG shows ST at 119 bpm.

Fever of 101.4. Cooling measures initiated.

Saturating 100% on vent settings of AC 16, Vt 500, 70% fiO2, PEEP 10.

OGT running Vital AF @ 40 mL/hr with 10mL residual.

Stern draining well to gravity. 

Left Femoral TLC running Versed at 8mg/hr and Lasix 10mg/hr

Bilateral soft wrist restraints observed for attempting to pull medical devices.

Bed kept in lowest and locked position. Bed alarm on. Side rails up x3. Call light within 
reach.

Will continue to monitor.

## 2021-01-11 NOTE — NUR
NURSE NOTES:



Made MD Travis aware of pt's last BM 1/1/21, received order for Colace PRN. Per MD, 
discontinue PRN ativan. Will put in all orders. Pt's VSS, no signs of distress noted. Safety 
precautions maintained. Will continue to monitor.


-------------------------------------------------------------------------------

Addendum: 01/11/21 at 2024 by Sonia Kennedy RN

-------------------------------------------------------------------------------

NURSE NOTES:



Made MD Travis aware of pt's last BM 1/1/21, received order for Colace PRN. Per MD, 
discontinue PRN ativan. Will put in all orders. Pt's VSS, no signs of distress noted. Tube 
feeding residual checked, 25mL, tube feeding rate increased to 40mL/hr. Safety precautions 
maintained. Will continue to monitor.

## 2021-01-11 NOTE — NUR
NURSE NOTES:



Pt's VSS, afebrile. Tube feeding placement checked, tip of tube in its proper position. Tube 
feeding residual is 15mL, tube rate increased to 20mL/hr. Scheduled medications given per MD 
order, pt tolerated well. BG checked, 286, insulin given per sliding scale order. Will 
continue to monitor.

## 2021-01-11 NOTE — NUR
NURSE HAND-OFF REPORT: 



No acute change. Had spike fever around midnight, now off cooling blanket. Versed at 8mg/hr 
at this time. SR noted. BP stable. Feeding; Vital AF at 10cc/hr.



Latest Vital Signs: Temperature 99.0 , Pulse 78 , B/P 96 /80 , Respiratory Rate 15 , O2 SAT 
99 , Mechanical Ventilator, O2 Flow Rate  .  

Vital Sign Comment: []



EKG Rhythm: Sinus Rhythm

Rhythm change?: N 

MD Notified?: -

MD Response: 



Latest Sinhg Fall Score: 50  

Fall Risk: High Risk 

Safety Measures: Call light Within Reach, Bed Alarm Zone 1, Side Rails Side Rails x3, Bed 
position Low and Locked.

Fall Precautions: 

Yellow Socks

Yellow Gown

Door Sign

Patient Fall Education



Report given to SUZI Scott.

## 2021-01-11 NOTE — NUR
NURSE NOTES:

No acute change noted. T of 97.8 noted, cooling blanket on hold. SR on cardiac monitor. 
Tolerating vent, saturating at 100%. GT residual 50cc noted, feeding still on hold. 
Reposition done. Will continue to monitor.

## 2021-01-11 NOTE — NUR
NURSE NOTES:

No acute change noted. Vital stable. RASS of -2 noted. Bed bath given. Reposition done. Will 
continue to monitor.

## 2021-01-11 NOTE — NUR
NURSE NOTES:



MD Lemos at bedside to assess pt. ABG results given to MD. Per MD Lemos, continue 
current ventilator settings: A/C rate 16, T/V 500, 70% FiO2, Peep 10. Will continue to 
monitor pt.

## 2021-01-12 VITALS — SYSTOLIC BLOOD PRESSURE: 119 MMHG | DIASTOLIC BLOOD PRESSURE: 87 MMHG

## 2021-01-12 VITALS — DIASTOLIC BLOOD PRESSURE: 85 MMHG | SYSTOLIC BLOOD PRESSURE: 101 MMHG

## 2021-01-12 VITALS — DIASTOLIC BLOOD PRESSURE: 78 MMHG | SYSTOLIC BLOOD PRESSURE: 120 MMHG

## 2021-01-12 VITALS — DIASTOLIC BLOOD PRESSURE: 74 MMHG | SYSTOLIC BLOOD PRESSURE: 103 MMHG

## 2021-01-12 VITALS — DIASTOLIC BLOOD PRESSURE: 76 MMHG | SYSTOLIC BLOOD PRESSURE: 120 MMHG

## 2021-01-12 VITALS — SYSTOLIC BLOOD PRESSURE: 121 MMHG | DIASTOLIC BLOOD PRESSURE: 71 MMHG

## 2021-01-12 VITALS — DIASTOLIC BLOOD PRESSURE: 86 MMHG | SYSTOLIC BLOOD PRESSURE: 115 MMHG

## 2021-01-12 VITALS — SYSTOLIC BLOOD PRESSURE: 115 MMHG | DIASTOLIC BLOOD PRESSURE: 87 MMHG

## 2021-01-12 VITALS — SYSTOLIC BLOOD PRESSURE: 126 MMHG | DIASTOLIC BLOOD PRESSURE: 93 MMHG

## 2021-01-12 VITALS — SYSTOLIC BLOOD PRESSURE: 119 MMHG | DIASTOLIC BLOOD PRESSURE: 76 MMHG

## 2021-01-12 VITALS — DIASTOLIC BLOOD PRESSURE: 88 MMHG | SYSTOLIC BLOOD PRESSURE: 117 MMHG

## 2021-01-12 VITALS — DIASTOLIC BLOOD PRESSURE: 81 MMHG | SYSTOLIC BLOOD PRESSURE: 123 MMHG

## 2021-01-12 VITALS — SYSTOLIC BLOOD PRESSURE: 122 MMHG | DIASTOLIC BLOOD PRESSURE: 84 MMHG

## 2021-01-12 VITALS — DIASTOLIC BLOOD PRESSURE: 83 MMHG | SYSTOLIC BLOOD PRESSURE: 112 MMHG

## 2021-01-12 VITALS — SYSTOLIC BLOOD PRESSURE: 111 MMHG | DIASTOLIC BLOOD PRESSURE: 84 MMHG

## 2021-01-12 VITALS — SYSTOLIC BLOOD PRESSURE: 130 MMHG | DIASTOLIC BLOOD PRESSURE: 96 MMHG

## 2021-01-12 VITALS — DIASTOLIC BLOOD PRESSURE: 81 MMHG | SYSTOLIC BLOOD PRESSURE: 111 MMHG

## 2021-01-12 VITALS — SYSTOLIC BLOOD PRESSURE: 116 MMHG | DIASTOLIC BLOOD PRESSURE: 77 MMHG

## 2021-01-12 VITALS — DIASTOLIC BLOOD PRESSURE: 81 MMHG | SYSTOLIC BLOOD PRESSURE: 126 MMHG

## 2021-01-12 VITALS — DIASTOLIC BLOOD PRESSURE: 80 MMHG | SYSTOLIC BLOOD PRESSURE: 114 MMHG

## 2021-01-12 VITALS — SYSTOLIC BLOOD PRESSURE: 128 MMHG | DIASTOLIC BLOOD PRESSURE: 87 MMHG

## 2021-01-12 VITALS — DIASTOLIC BLOOD PRESSURE: 85 MMHG | SYSTOLIC BLOOD PRESSURE: 123 MMHG

## 2021-01-12 VITALS — DIASTOLIC BLOOD PRESSURE: 84 MMHG | SYSTOLIC BLOOD PRESSURE: 121 MMHG

## 2021-01-12 VITALS — SYSTOLIC BLOOD PRESSURE: 111 MMHG | DIASTOLIC BLOOD PRESSURE: 80 MMHG

## 2021-01-12 LAB
ADD MANUAL DIFF: NO
ANION GAP SERPL CALC-SCNC: 6 MMOL/L (ref 5–15)
BUN SERPL-MCNC: 50 MG/DL (ref 7–18)
CALCIUM SERPL-MCNC: 8.3 MG/DL (ref 8.5–10.1)
CHLORIDE SERPL-SCNC: 105 MMOL/L (ref 98–107)
CO2 SERPL-SCNC: 35 MMOL/L (ref 21–32)
CREAT SERPL-MCNC: 1.3 MG/DL (ref 0.55–1.3)
ERYTHROCYTE [DISTWIDTH] IN BLOOD BY AUTOMATED COUNT: 13.6 % (ref 11.6–14.8)
HCT VFR BLD CALC: 51.5 % (ref 42–52)
HGB BLD-MCNC: 15.9 G/DL (ref 14.2–18)
MCV RBC AUTO: 97 FL (ref 80–99)
PLATELET # BLD: 337 K/UL (ref 150–450)
POTASSIUM SERPL-SCNC: 3.2 MMOL/L (ref 3.5–5.1)
RBC # BLD AUTO: 5.31 M/UL (ref 4.7–6.1)
SODIUM SERPL-SCNC: 146 MMOL/L (ref 136–145)
WBC # BLD AUTO: 11.4 K/UL (ref 4.8–10.8)

## 2021-01-12 RX ADMIN — INSULIN ASPART SCH UNITS: 100 INJECTION, SOLUTION INTRAVENOUS; SUBCUTANEOUS at 09:10

## 2021-01-12 RX ADMIN — INSULIN ASPART SCH UNITS: 100 INJECTION, SOLUTION INTRAVENOUS; SUBCUTANEOUS at 20:56

## 2021-01-12 RX ADMIN — Medication PRN MLS/HR: at 09:14

## 2021-01-12 RX ADMIN — Medication PRN MLS/HR: at 22:15

## 2021-01-12 RX ADMIN — Medication PRN MLS/HR: at 01:40

## 2021-01-12 RX ADMIN — ENOXAPARIN SODIUM SCH MG: 80 INJECTION SUBCUTANEOUS at 08:33

## 2021-01-12 RX ADMIN — INSULIN ASPART SCH UNITS: 100 INJECTION, SOLUTION INTRAVENOUS; SUBCUTANEOUS at 12:31

## 2021-01-12 RX ADMIN — INSULIN DETEMIR SCH UNITS: 100 INJECTION, SOLUTION SUBCUTANEOUS at 09:11

## 2021-01-12 RX ADMIN — SODIUM CHLORIDE SCH MLS/HR: 0.9 INJECTION INTRAVENOUS at 08:32

## 2021-01-12 RX ADMIN — INSULIN DETEMIR SCH UNITS: 100 INJECTION, SOLUTION SUBCUTANEOUS at 20:56

## 2021-01-12 RX ADMIN — INSULIN ASPART SCH UNITS: 100 INJECTION, SOLUTION INTRAVENOUS; SUBCUTANEOUS at 04:37

## 2021-01-12 RX ADMIN — CHLORHEXIDINE GLUCONATE SCH APPLIC: 213 SOLUTION TOPICAL at 20:53

## 2021-01-12 RX ADMIN — ENOXAPARIN SODIUM SCH MG: 80 INJECTION SUBCUTANEOUS at 20:55

## 2021-01-12 RX ADMIN — INSULIN ASPART SCH UNITS: 100 INJECTION, SOLUTION INTRAVENOUS; SUBCUTANEOUS at 00:29

## 2021-01-12 RX ADMIN — Medication PRN MLS/HR: at 15:33

## 2021-01-12 RX ADMIN — INSULIN ASPART SCH UNITS: 100 INJECTION, SOLUTION INTRAVENOUS; SUBCUTANEOUS at 18:00

## 2021-01-12 NOTE — NUR
NURSE NOTES:

Pt. in bed, sedated. No s/sx of distress. ETT in placed with setting AC16//Fi O2 of 
70%/P10. No grimacing noted. HOB elevated at all times. NGT left nares in placed running 
Glucerna 1.2 at 20cc/hr. No n/v noted. Tolerating well. F/C in placed patent/intact draining 
light myrna colored urine. Left femoral cath TLC in placed patent/intact running Versed 
8mg/hr. and on other lumen Lasix 10mg/hr. and last lumen 1/2 NS at 25cc/hr. Bed in low 
position, locked. Call light within reach. Will cont. to monitor.

## 2021-01-12 NOTE — INFECTIOUS DISEASES PROG NOTE
Assessment/Plan


Assessment/Plan


A


1. COVID-19 pneumonia.


2. New-onset diabetes.


3. Hypoxic respiratory failure





P


1. Finished remdesivir course


2. Continue ceftriaxone.


3. Continue solumedrol


4. Continue isolation.





Subjective


ROS Limited/Unobtainable:  Yes


Constitutional:  Reports: fever, other - Zz=[id number]


Neurologic:  Reports: other - on restraint


Allergies:  


Coded Allergies:  


     No Known Allergies (Unverified , 6/11/19)





Objective





Last 24 Hour Vital Signs








  Date Time  Temp Pulse Resp B/P (MAP) Pulse Ox O2 Delivery O2 Flow Rate FiO2


 


1/12/21 09:14   27   Mechanical Ventilator 100.0 65


 


1/12/21 08:00 99.8 124 27 119/87 (98) 99   


 


1/12/21 07:00  126 27 116/77 (90) 98   


 


1/12/21 06:00   27   Mechanical Ventilator  65


 


1/12/21 06:00  125 25 122/84 (97) 100   


 


1/12/21 05:16 100.0       


 


1/12/21 05:00  127 27 123/81 (95) 99   


 


1/12/21 05:00   23   Mechanical Ventilator  65


 


1/12/21 04:00      Mechanical Ventilator  


 


1/12/21 04:00 100.9 127 27 126/93 (104) 97   


 


1/12/21 04:00   21   Mechanical Ventilator  65


 


1/12/21 04:00  120      


 


1/12/21 04:00        70


 


1/12/21 03:06  120 25     65


 


1/12/21 03:00  123 24 123/85 (98) 97   


 


1/12/21 03:00   25   Mechanical Ventilator  65


 


1/12/21 02:10 100.0       


 


1/12/21 02:00  119 18 115/86 (96) 100   


 


1/12/21 02:00   23   Mechanical Ventilator  65


 


1/12/21 01:40   24     70


 


1/12/21 01:00   22   Mechanical Ventilator  65


 


1/12/21 01:00  122 21 128/87 (101) 100   


 


1/12/21 00:00        70


 


1/12/21 00:00 100.0 121 23 121/84 (96) 100   


 


1/12/21 00:00   20   Mechanical Ventilator  65


 


1/12/21 00:00  118      


 


1/12/21 00:00      Mechanical Ventilator  


 


1/11/21 23:01  126 23     70


 


1/11/21 23:00  125 23 112/83 (93) 100   


 


1/11/21 23:00   20   Mechanical Ventilator  70


 


1/11/21 22:14 100.2       


 


1/11/21 22:00  126 23 116/79 (91) 100   


 


1/11/21 22:00   23   Mechanical Ventilator  70


 


1/11/21 21:00   20   Mechanical Ventilator  70


 


1/11/21 21:00  120 25 119/81 (94) 100   


 


1/11/21 20:00   23   Mechanical Ventilator  70


 


1/11/21 20:00  123      


 


1/11/21 20:00      Mechanical Ventilator  


 


1/11/21 20:00 100.1 120 23 123/85 (98) 100   


 


1/11/21 20:00        70


 


1/11/21 19:29 100.1       


 


1/11/21 19:25  119 24     70


 


1/11/21 19:00   19   Mechanical Ventilator  70


 


1/11/21 19:00  114 22 125/81 (96) 100   


 


1/11/21 18:59   18   Mechanical Ventilator  70


 


1/11/21 18:52   19   Mechanical Ventilator  70


 


1/11/21 18:00  110 22 128/77 (94) 100   


 


1/11/21 18:00   19   Mechanical Ventilator  70


 


1/11/21 17:00  113 21 126/81 (96) 100   


 


1/11/21 17:00   20   Mechanical Ventilator  70


 


1/11/21 16:00 99.2 108 21 109/81 (90) 100   


 


1/11/21 16:00   18   Mechanical Ventilator  70


 


1/11/21 16:00      Mechanical Ventilator  


 


1/11/21 16:00        70


 


1/11/21 15:35  107      


 


1/11/21 15:20  111 23     70


 


1/11/21 15:00  104 20 123/86 (98) 100   


 


1/11/21 15:00   18   Mechanical Ventilator  70


 


1/11/21 14:00   19   Mechanical Ventilator  70


 


1/11/21 14:00  104 18 114/77 (89) 99   


 


1/11/21 13:00   18   Mechanical Ventilator  70


 


1/11/21 13:00  101 20 115/80 (92) 100   


 


1/11/21 12:37   18   Mechanical Ventilator  70


 


1/11/21 12:00      Mechanical Ventilator  


 


1/11/21 12:00 97.0 94 18 115/85 (95) 99   


 


1/11/21 12:00   18   Mechanical Ventilator  70


 


1/11/21 12:00        70


 


1/11/21 11:38  93      


 


1/11/21 11:20  91 24     70


 


1/11/21 11:00  89 17 102/77 (85) 98   


 


1/11/21 11:00   19   Mechanical Ventilator  70


 


1/11/21 10:00  86 17 108/79 (89) 100   


 


1/11/21 10:00   19   Mechanical Ventilator  70








Height (Feet):  5


Height (Inches):  7.00


Weight (Pounds):  198


HEENT:  other - orally intubated


Respiratory/Chest:  other - on ventilator, FIO2=65%


Cardiovascular:  tachycardia


Abdomen:  soft, non tender, other - NG tube


Neurologic/Psychiatric:  other - sedated





Laboratory Tests








Test


 1/12/21


00:25 1/12/21


04:00 1/12/21


04:28 1/12/21


07:35


 


POC Whole Blood Glucose


 Pending  


 


 209 MG/DL


()  H 





 


White Blood Count


 


 11.4 K/UL


(4.8-10.8)  H 


 





 


Red Blood Count


 


 5.31 M/UL


(4.70-6.10) 


 





 


Hemoglobin


 


 15.9 G/DL


(14.2-18.0) 


 





 


Hematocrit


 


 51.5 %


(42.0-52.0) 


 





 


Mean Corpuscular Volume  97 FL (80-99)    


 


Mean Corpuscular Hemoglobin


 


 29.9 PG


(27.0-31.0) 


 





 


Mean Corpuscular Hemoglobin


Concent 


 30.8 G/DL


(32.0-36.0)  L 


 





 


Red Cell Distribution Width


 


 13.6 %


(11.6-14.8) 


 





 


Platelet Count


 


 337 K/UL


(150-450) 


 





 


Mean Platelet Volume


 


 7.8 FL


(6.5-10.1) 


 





 


Neutrophils (%) (Auto)


 


 % (45.0-75.0)


 


 





 


Lymphocytes (%) (Auto)


 


 % (20.0-45.0)


 


 





 


Monocytes (%) (Auto)   % (1.0-10.0)    


 


Eosinophils (%) (Auto)   % (0.0-3.0)    


 


Basophils (%) (Auto)   % (0.0-2.0)    


 


Sodium Level


 


 146 MMOL/L


(136-145)  H 


 





 


Potassium Level


 


 3.2 MMOL/L


(3.5-5.1)  L 


 





 


Chloride Level


 


 105 MMOL/L


() 


 





 


Carbon Dioxide Level


 


 35 MMOL/L


(21-32)  H 


 





 


Anion Gap


 


 6 mmol/L


(5-15) 


 





 


Blood Urea Nitrogen


 


 50 mg/dL


(7-18)  H 


 





 


Creatinine


 


 1.3 MG/DL


(0.55-1.30) 


 





 


Estimat Glomerular Filtration


Rate 


 55.2 mL/min


(>60) 


 





 


Glucose Level


 


 225 MG/DL


()  H 


 





 


Calcium Level


 


 8.3 MG/DL


(8.5-10.1)  L 


 





 


Arterial Blood pH


 


 


 


 7.376


(7.350-7.450)


 


Arterial Blood Partial


Pressure CO2 


 


 


 68.1 mmHg


(35.0-45.0)  *H


 


Arterial Blood Partial


Pressure O2 


 


 


 80.7 mmHg


(75.0-100.0)


 


Arterial Blood HCO3


 


 


 


 39.0 mmol/L


(22.0-26.0)  H


 


Arterial Blood Oxygen


Saturation 


 


 


 95.4 %


()


 


Arterial Blood Base Excess    10.3 (-2-2)  *H


 


Abelardo Test    Positive  











Current Medications








 Medications


  (Trade)  Dose


 Ordered  Sig/Julisa


 Route


 PRN Reason  Start Time


 Stop Time Status Last Admin


Dose Admin


 


 Acetaminophen


  (Tylenol)  650 mg  Q6H  PRN


 ORAL


 Temp >100.5  1/3/21 04:00


 2/2/21 03:59  1/12/21 04:46





 


 Ceftriaxone


 Sodium 1 gm/


 Dextrose  55 ml @ 


 110 mls/hr  DAILY


 IVPB


   1/10/21 11:00


 1/17/21 10:59  1/12/21 08:32





 


 Chlorhexidine


 Gluconate


  (Vanessa-Hex 2%)  1 applic  DAILY@2000


 TOPIC


   1/6/21 20:00


 4/6/21 19:59  1/11/21 21:39





 


 Dextrose


  (Dextrose 50%)  25 ml  Q30M  PRN


 IV


 Hypoglycemia  1/9/21 13:30


 4/9/21 13:29   





 


 Dextrose


  (Dextrose 50%)  50 ml  Q30M  PRN


 IV


 Hypoglycemia  1/9/21 13:30


 4/9/21 13:29   





 


 Docusate Sodium


  (Colace)  100 mg  TIDPRN  PRN


 GT


 Constipation  1/12/21 01:15


 2/11/21 01:14  1/12/21 01:42





 


 Enoxaparin Sodium


  (Lovenox)  80 mg  EVERY 12  HOURS


 SUBQ


   1/2/21 21:00


 4/2/21 20:59  1/12/21 08:33





 


 Furosemide 100 mg/


 Dextrose  100 ml @ 


 10 mls/hr  Q10H


 IV


   1/7/21 11:00


 2/6/21 10:59  1/12/21 00:28





 


 Haloperidol


  (Haldol)  5 mg  Q6H  PRN


 ORAL


 Agitation  1/4/21 13:45


 2/18/21 13:44  1/4/21 22:56





 


 Haloperidol


 Lactate 5 mg/


 Dextrose  56 ml @ 


 224 mls/hr  Q6H  PRN


 IVPB


 Agitation  1/5/21 12:30


 2/19/21 12:29  1/5/21 13:20





 


 Insulin Aspart


  (NovoLOG)    Q4HR


 SUBQ


   1/11/21 05:00


 4/9/21 17:59  1/12/21 09:10





 


 Insulin Detemir


  (Levemir)  26 units  Q12HR


 SUBQ


   1/12/21 09:00


 4/12/21 08:59  1/12/21 09:11





 


 Methylprednisolone


 Sodium Succinate


  (Solu-MEDROL)  10 mg  DAILY


 IVP


   1/17/21 09:00


 1/18/21 09:01   





 


 Methylprednisolone


 Sodium Succinate


  (Solu-MEDROL)  20 mg  DAILY


 IVP


   1/15/21 09:00


 1/16/21 09:01   





 


 Methylprednisolone


 Sodium Succinate


  (Solu-MEDROL)  30 mg  DAILY


 IVP


   1/13/21 09:00


 1/14/21 09:01   





 


 Midazolam HCl  100 ml @ 0


 mls/hr  Q24H  PRN


 IV


 SEDATION  1/8/21 21:00


 1/15/21 20:59  1/12/21 09:14





 


 Quetiapine


 Fumarate


  (SEROqueL)  50 mg  Q12HR


 ORAL


   1/4/21 21:00


 2/18/21 20:59  1/12/21 08:31





 


 Sodium Chloride  1,000 ml @ 


 25 mls/hr  Q24H


 IV


   1/3/21 07:45


 2/2/21 07:44  1/11/21 17:16




















Lamin Felix MD               Jan 12, 2021 09:58

## 2021-01-12 NOTE — NUR
NURSE NOTES:

RT weaned down fiO2 to 65%. Pt saturating 99%.

No cardiopulmonary distress noted.

Tolerating versed at -2 RASS.

Good output noted on Lasix drip.

Will monitor.

## 2021-01-12 NOTE — NUR
NURSE HAND-OFF REPORT: 



Latest Vital Signs: Temperature 100.0 , Pulse 125 , B/P 122 /84 , Respiratory Rate 25 , O2 
 , Mechanical Ventilator, O2 Flow Rate  .  

Vital Sign Comment: WNL



EKG Rhythm: Sinus Tachycardia

Rhythm change?: N 

MD Notified?: -

MD Response: 



Latest Singh Fall Score: 50  

Fall Risk: High Risk 

Safety Measures: Call light Within Reach, Bed Alarm Zone 1, Side Rails Side Rails x3, Bed 
position Low and Locked.

Fall Precautions: 

Yellow Socks

Yellow Gown

Door Sign

Patient Fall Education



Report given to SUZI Schaefer.

## 2021-01-12 NOTE — NUR
RD ASSESSMENT & RECOMMENDATIONS

SEE CARE ACTIVITY FOR COMPLETE ASSESSMENT



DAILY ESTIMATED NEEDS:

Needs based on Critical Care/ 73kg abw 

22-28  kcals/kg 

6624-3296  total kcals

1.2-2  g protein/kg

  g total protein 

20-25  mL/kg

1700-5974  total fluid mLs





NUTRITION DIAGNOSIS:

Swallowing difficulty R/T respiratory failure as evidenced by pt now

orally intubated, on OGT feeds.



 

CURRENT TF: Vital 1.2 goal of 60ml/hr x24 hrs  



 



ENTERAL NUTRITION RECOMMENDATIONS:

Vital AF 1.2 for CARB CONTROL and critical care @ goal 55ml/hr x 24 hrs  to provide 1320ml, 
1584kcal, 99g prot, 1070ml free water 



* Continue Vital AF 1.2 for carb control and critical care

* LOWER goal rate to 55ml/hr for improved BG control (will meet 99% est kcal and 100% est 
prot needs)

* HOB over 30 degrees/ water flush per MD

--

Substitute w/ Glucerna 1.2 while Vital AF out of stock

  Glucerna 1.2 @ 55ml/hr x 24 hrs + Prosource 1kpt QD-> 1320ml, 1584kcal, 79g+ 11g prot, 
1065ml free water



 

 



ADDITIONAL RECOMMENDATIONS:

* Calibrated bedscale wt 

* Monitor BGs closely w/ steroidal med and TF  

    ->  now on long acting insulin + q4 novolog 

* Monitor lytes, replete as needed  

* Increase water flushes for possible water deficits 

* Feed w/ tolerance and hemodynamic stability

## 2021-01-12 NOTE — DIAGNOSTIC IMAGING REPORT
Indication: Shortness of breath

 

Technique: One view of the chest

 

Comparison: none

 

Findings: Stable satisfactory endotracheal and orogastric tube positions. There

considerable improvement in previously demonstrated bilateral infiltrates versus

edema, some residual. Normal heart size.

 

Impression: Considerably improved bilateral filtrates versus edema, over 2 days

## 2021-01-12 NOTE — SURGERY PROGRESS NOTE
Surgery Progress Note


Subjective


Procedure Performed


Left femoral central venous catheter insertion


Additional Comments


ill appearing on support


no n/v


labs noted





Objective





Last 24 Hour Vital Signs








  Date Time  Temp Pulse Resp B/P (MAP) Pulse Ox O2 Delivery O2 Flow Rate FiO2


 


1/12/21 19:28  118 24     55


 


1/12/21 19:00  119 25 120/76 (91) 99   


 


1/12/21 18:00   26   Endotracheal Tube  55


 


1/12/21 18:00  118 26 119/76 (90) 98   


 


1/12/21 17:00 99.0 117 26 115/87 (96) 99   


 


1/12/21 17:00   26   Endotracheal Tube  55


 


1/12/21 16:03 99.6       


 


1/12/21 16:00  121 25 114/80 (91) 99   


 


1/12/21 16:00        55


 


1/12/21 16:00   25   Endotracheal Tube  55


 


1/12/21 16:00      Mechanical Ventilator  


 


1/12/21 16:00  117      


 


1/12/21 15:33   27     55


 


1/12/21 15:00   27   Endotracheal Tube  55


 


1/12/21 15:00  122 26 101/85 (90) 99   


 


1/12/21 14:00  122 26 121/71 (88) 100   


 


1/12/21 14:00   26   Endotracheal Tube  55


 


1/12/21 13:26  122 26     55


 


1/12/21 13:00  120 25 112/83 (93) 100   


 


1/12/21 13:00   25   Endotracheal Tube  65


 


1/12/21 12:00  120 25 111/81 (91) 100   


 


1/12/21 12:00        65


 


1/12/21 12:00   25   Endotracheal Tube  65


 


1/12/21 12:00      Mechanical Ventilator  


 


1/12/21 11:29  119      


 


1/12/21 11:00  120 25 111/80 (90) 100   


 


1/12/21 11:00   24   Endotracheal Tube  65


 


1/12/21 10:00   26   Endotracheal Tube  65


 


1/12/21 10:00  120 25 103/74 (84) 98   


 


1/12/21 09:44 99.8       


 


1/12/21 09:14   27   Mechanical Ventilator 100.0 65


 


1/12/21 09:00  122 27 111/84 (93) 99   


 


1/12/21 08:00 99.8 124 27 119/87 (98) 99   


 


1/12/21 08:00        70


 


1/12/21 08:00   23   Endotracheal Tube  70


 


1/12/21 08:00      Mechanical Ventilator  


 


1/12/21 07:38  125      


 


1/12/21 07:16  123 28     65


 


1/12/21 07:00   26   Endotracheal Tube  70


 


1/12/21 07:00  126 27 116/77 (90) 98   


 


1/12/21 06:00   27   Mechanical Ventilator  65


 


1/12/21 06:00  125 25 122/84 (97) 100   


 


1/12/21 05:16 100.0       


 


1/12/21 05:00  127 27 123/81 (95) 99   


 


1/12/21 05:00   23   Mechanical Ventilator  65


 


1/12/21 04:00      Mechanical Ventilator  


 


1/12/21 04:00 100.9 127 27 126/93 (104) 97   


 


1/12/21 04:00   21   Mechanical Ventilator  65


 


1/12/21 04:00  120      


 


1/12/21 04:00        70


 


1/12/21 03:06  120 25     65


 


1/12/21 03:00  123 24 123/85 (98) 97   


 


1/12/21 03:00   25   Mechanical Ventilator  65


 


1/12/21 02:10 100.0       


 


1/12/21 02:00  119 18 115/86 (96) 100   


 


1/12/21 02:00   23   Mechanical Ventilator  65


 


1/12/21 01:40   24     70


 


1/12/21 01:00   22   Mechanical Ventilator  65


 


1/12/21 01:00  122 21 128/87 (101) 100   


 


1/12/21 00:00        70


 


1/12/21 00:00 100.0 121 23 121/84 (96) 100   


 


1/12/21 00:00   20   Mechanical Ventilator  65


 


1/12/21 00:00  118      


 


1/12/21 00:00      Mechanical Ventilator  


 


1/11/21 23:01  126 23     70


 


1/11/21 23:00  125 23 112/83 (93) 100   


 


1/11/21 23:00   20   Mechanical Ventilator  70


 


1/11/21 22:14 100.2       


 


1/11/21 22:00  126 23 116/79 (91) 100   


 


1/11/21 22:00   23   Mechanical Ventilator  70








I&O











Intake and Output  


 


 1/11/21 1/12/21





 19:00 07:00


 


Intake Total 1014.76 ml 1036.66 ml


 


Output Total 1440 ml 730 ml


 


Balance -425.24 ml 306.66 ml


 


  


 


Free Water 35 ml 120 ml


 


IV Total 649.76 ml 596.66 ml


 


Tube Feeding 330 ml 320 ml


 


Output Urine Total 1440 ml 730 ml








Dressing:  saturated


Cardiovascular:  RSR


Respiratory:  decreased breath sounds


Abdomen:  non-tender, present bowel sounds


Extremities:  no tenderness, no cyanosis





Laboratory Tests








Test


 1/12/21


00:25 1/12/21


04:00 1/12/21


04:28 1/12/21


07:35


 


POC Whole Blood Glucose


 Pending  


 


 209 MG/DL


()  H 





 


White Blood Count


 


 11.4 K/UL


(4.8-10.8)  H 


 





 


Red Blood Count


 


 5.31 M/UL


(4.70-6.10) 


 





 


Hemoglobin


 


 15.9 G/DL


(14.2-18.0) 


 





 


Hematocrit


 


 51.5 %


(42.0-52.0) 


 





 


Mean Corpuscular Volume  97 FL (80-99)    


 


Mean Corpuscular Hemoglobin


 


 29.9 PG


(27.0-31.0) 


 





 


Mean Corpuscular Hemoglobin


Concent 


 30.8 G/DL


(32.0-36.0)  L 


 





 


Red Cell Distribution Width


 


 13.6 %


(11.6-14.8) 


 





 


Platelet Count


 


 337 K/UL


(150-450) 


 





 


Mean Platelet Volume


 


 7.8 FL


(6.5-10.1) 


 





 


Neutrophils (%) (Auto)


 


 % (45.0-75.0)


 


 





 


Lymphocytes (%) (Auto)


 


 % (20.0-45.0)


 


 





 


Monocytes (%) (Auto)   % (1.0-10.0)    


 


Eosinophils (%) (Auto)   % (0.0-3.0)    


 


Basophils (%) (Auto)   % (0.0-2.0)    


 


Sodium Level


 


 146 MMOL/L


(136-145)  H 


 





 


Potassium Level


 


 3.2 MMOL/L


(3.5-5.1)  L 


 





 


Chloride Level


 


 105 MMOL/L


() 


 





 


Carbon Dioxide Level


 


 35 MMOL/L


(21-32)  H 


 





 


Anion Gap


 


 6 mmol/L


(5-15) 


 





 


Blood Urea Nitrogen


 


 50 mg/dL


(7-18)  H 


 





 


Creatinine


 


 1.3 MG/DL


(0.55-1.30) 


 





 


Estimat Glomerular Filtration


Rate 


 55.2 mL/min


(>60) 


 





 


Glucose Level


 


 225 MG/DL


()  H 


 





 


Calcium Level


 


 8.3 MG/DL


(8.5-10.1)  L 


 





 


Arterial Blood pH


 


 


 


 7.376


(7.350-7.450)


 


Arterial Blood Partial


Pressure CO2 


 


 


 68.1 mmHg


(35.0-45.0)  *H


 


Arterial Blood Partial


Pressure O2 


 


 


 80.7 mmHg


(75.0-100.0)


 


Arterial Blood HCO3


 


 


 


 39.0 mmol/L


(22.0-26.0)  H


 


Arterial Blood Oxygen


Saturation 


 


 


 95.4 %


()


 


Arterial Blood Base Excess    10.3 (-2-2)  *H


 


Abelardo Test    Positive  


 


Test


 1/12/21


20:43 


 


 





 


POC Whole Blood Glucose Pending     











Plan


Problems:  


(1) Pneumonia due to COVID-19 virus


Assessment & Plan:  Interim endotracheal intubation, endotracheal tube tip in 

good position


approximately 4 cm above the tito. There are extensive bilateral infiltrates, 

which


are markedly increased from the prior study. Pleural spaces are probably clear, 

not


well demonstrated


Markedly increased and now extensive bilateral infiltrates 


cont as per pulm and iID





(2) Hypoxia


(3) Abdominal pain


Assessment & Plan:  65M abd pain, septic, covid, intubated ons upport


currently abd exam benign but limited given condition


line bo mary noted


okay for meds and feeds


nutritional optimization 


DAILY ESTIMATED NEEDS:


Needs based on Critical Care/ 73kg abw 


22-28  kcals/kg 


6733-2285  total kcals


1.2-2  g protein/kg


  g total protein 


25-30  mL/kg


0336-9630  total fluid mLs





NUTRITION DIAGNOSIS:


Swallowing difficulty R/T respiratory failure as evidenced by pt now


orally intubated, OGT in place, NPO





 


CURRENT TF:NPO 





 





ENTERAL NUTRITION RECOMMENDATIONS:


Vital AF 1.2 @ 60ml/hr x 24 hrs  to provide 1440ml, 1728kcal, 116g prot, 1167ml 

free water 





* As medically appropriate, initiate critical care and carb controlled TF 

formula of Vital AF 1.2


* Initiate @ 20ml/hr x 6hrs, advance 10ml q 4-6 hrs as tolerated to goal rate


* HOB over 30 degrees/ water flush per MD


* Does not exceed est kcal needs w/ Propofol running @ 8.083ml/hr x 24 hrs 

(provides 213 lipid kcal)





 





ADDITIONAL RECOMMENDATIONS:


* Calibrated bedscale wt 


* Monitor BGs closely w/ Decadron and TF, need for long acting insulin 


* Monitor lytes, replete as needed  


* Monitor Propofol rate, need to adjust TF rate  





(4) Acute metabolic encephalopathy











Norberto Vazquez                Jan 12, 2021 21:16

## 2021-01-12 NOTE — NUR
NURSE NOTES:

Report received from SUZI Schaefer with update.

Upon assessment pt appears with RASS Score of -2. PERRLA.

5-lead EKG shows ST at 122 bpm.

Fever of 101.0. Cooling measures initiated.

Saturating 100% on vent settings of AC 16, Vt 500, 70% fiO2, PEEP 10.

NGT running Vital AF @ 40 mL/hr with 0mL residual.

Stern draining well to gravity. 

Left Femoral TLC running Versed at 8mg/hr and Lasix 10mg/hr.

Bilateral soft wrist restraints observed.

Bed kept in lowest and locked position. Bed alarm on. Side rails up x3. Call light within 
reach.

Will continue to monitor.

## 2021-01-12 NOTE — GENERAL PROGRESS NOTE
Subjective


ROS Limited/Unobtainable:  Yes


Allergies:  


Coded Allergies:  


     No Known Allergies (Unverified , 6/11/19)


Subjective


events noted - interval notes reviewed 


glucose values improved but still on higher side 


intubated 











Item Value  Date Time


 


Bedside Blood Glucose 209 mg/dl H 1/12/21 0437


 


Bedside Blood Glucose 297 mg/dl H 1/12/21 0029


 


Bedside Blood Glucose 260 mg/dl H 1/11/21 2138


 


Bedside Blood Glucose 325 mg/dl H 1/11/21 1754


 


Bedside Blood Glucose 295 mg/dl H 1/11/21 1308


 


Bedside Blood Glucose 286 mg/dl H 1/11/21 0950











Objective





Last 24 Hour Vital Signs








  Date Time  Temp Pulse Resp B/P (MAP) Pulse Ox O2 Delivery O2 Flow Rate FiO2


 


1/12/21 07:00  126 27 116/77 (90) 98   


 


1/12/21 06:00   27   Mechanical Ventilator  65


 


1/12/21 06:00  125 25 122/84 (97) 100   


 


1/12/21 05:16 100.0       


 


1/12/21 05:00  127 27 123/81 (95) 99   


 


1/12/21 05:00   23   Mechanical Ventilator  65


 


1/12/21 04:00      Mechanical Ventilator  


 


1/12/21 04:00 100.9 127 27 126/93 (104) 97   


 


1/12/21 04:00   21   Mechanical Ventilator  65


 


1/12/21 04:00  120      


 


1/12/21 04:00        70


 


1/12/21 03:06  120 25     65


 


1/12/21 03:00  123 24 123/85 (98) 97   


 


1/12/21 03:00   25   Mechanical Ventilator  65


 


1/12/21 02:10 100.0       


 


1/12/21 02:00  119 18 115/86 (96) 100   


 


1/12/21 02:00   23   Mechanical Ventilator  65


 


1/12/21 01:40   24     70


 


1/12/21 01:00   22   Mechanical Ventilator  65


 


1/12/21 01:00  122 21 128/87 (101) 100   


 


1/12/21 00:00        70


 


1/12/21 00:00 100.0 121 23 121/84 (96) 100   


 


1/12/21 00:00   20   Mechanical Ventilator  65


 


1/12/21 00:00  118      


 


1/12/21 00:00      Mechanical Ventilator  


 


1/11/21 23:01  126 23     70


 


1/11/21 23:00  125 23 112/83 (93) 100   


 


1/11/21 23:00   20   Mechanical Ventilator  70


 


1/11/21 22:14 100.2       


 


1/11/21 22:00  126 23 116/79 (91) 100   


 


1/11/21 22:00   23   Mechanical Ventilator  70


 


1/11/21 21:00   20   Mechanical Ventilator  70


 


1/11/21 21:00  120 25 119/81 (94) 100   


 


1/11/21 20:00   23   Mechanical Ventilator  70


 


1/11/21 20:00  123      


 


1/11/21 20:00      Mechanical Ventilator  


 


1/11/21 20:00 100.1 120 23 123/85 (98) 100   


 


1/11/21 20:00        70


 


1/11/21 19:29 100.1       


 


1/11/21 19:25  119 24     70


 


1/11/21 19:00   19   Mechanical Ventilator  70


 


1/11/21 19:00  114 22 125/81 (96) 100   


 


1/11/21 18:59   18   Mechanical Ventilator  70


 


1/11/21 18:52   19   Mechanical Ventilator  70


 


1/11/21 18:00  110 22 128/77 (94) 100   


 


1/11/21 18:00   19   Mechanical Ventilator  70


 


1/11/21 17:00  113 21 126/81 (96) 100   


 


1/11/21 17:00   20   Mechanical Ventilator  70


 


1/11/21 16:00 99.2 108 21 109/81 (90) 100   


 


1/11/21 16:00   18   Mechanical Ventilator  70


 


1/11/21 16:00      Mechanical Ventilator  


 


1/11/21 16:00        70


 


1/11/21 15:35  107      


 


1/11/21 15:20  111 23     70


 


1/11/21 15:00  104 20 123/86 (98) 100   


 


1/11/21 15:00   18   Mechanical Ventilator  70


 


1/11/21 14:00   19   Mechanical Ventilator  70


 


1/11/21 14:00  104 18 114/77 (89) 99   


 


1/11/21 13:00   18   Mechanical Ventilator  70


 


1/11/21 13:00  101 20 115/80 (92) 100   


 


1/11/21 12:37   18   Mechanical Ventilator  70


 


1/11/21 12:00      Mechanical Ventilator  


 


1/11/21 12:00 97.0 94 18 115/85 (95) 99   


 


1/11/21 12:00   18   Mechanical Ventilator  70


 


1/11/21 12:00        70


 


1/11/21 11:38  93      


 


1/11/21 11:20  91 24     70


 


1/11/21 11:00  89 17 102/77 (85) 98   


 


1/11/21 11:00   19   Mechanical Ventilator  70


 


1/11/21 10:00  86 17 108/79 (89) 100   


 


1/11/21 10:00   19   Mechanical Ventilator  70


 


1/11/21 09:00   18   Mechanical Ventilator  70


 


1/11/21 09:00  82 16 102/74 (83) 100   


 


1/11/21 08:50        70


 


1/11/21 08:40        70


 


1/11/21 08:00        60


 


1/11/21 08:00   16   Mechanical Ventilator  60


 


1/11/21 08:00      Mechanical Ventilator  


 


1/11/21 08:00 96.5 80 16 95/77 (83) 98   


 


1/11/21 07:35  79      


 


1/11/21 07:30  91 25     60

















Intake and Output  


 


 1/11/21 1/12/21





 19:00 07:00


 


Intake Total 1014.76 ml 985.66 ml


 


Output Total 1440 ml 730 ml


 


Balance -425.24 ml 255.66 ml


 


  


 


Free Water 35 ml 120 ml


 


IV Total 649.76 ml 545.66 ml


 


Tube Feeding 330 ml 320 ml


 


Output Urine Total 1440 ml 730 ml








Laboratory Tests


1/11/21 08:24: 


Arterial Blood pH 7.448, Arterial Blood Partial Pressure CO2 47.2H, Arterial 

Blood Partial Pressure O2 57.5L, Arterial Blood HCO3 31.9H, Arterial Blood 

Oxygen Saturation 89.7*L, Arterial Blood Base Excess 6.7H, Abelardo Test Positive


1/11/21 08:35: 


White Blood Count 7.8, Red Blood Count 5.25, Hemoglobin 15.6, Hematocrit 47.9, 

Mean Corpuscular Volume 91, Mean Corpuscular Hemoglobin 29.6, Mean Corpuscular 

Hemoglobin Concent 32.5, Red Cell Distribution Width 13.9, Platelet Count 345, 

Mean Platelet Volume 8.6, Neutrophils (%) (Auto) 90.3H, Lymphocytes (%) (Auto) 

4.6L, Monocytes (%) (Auto) 4.6, Eosinophils (%) (Auto) 0.1, Basophils (%) (Auto)

0.4, Sodium Level 144, Potassium Level 3.6, Chloride Level 106, Carbon Dioxide 

Level 33H, Anion Gap 5, Blood Urea Nitrogen 51H, Creatinine 1.0, Estimat 

Glomerular Filtration Rate > 60, Glucose Level 290H, Calcium Level 8.9


1/12/21 00:25: POC Whole Blood Glucose [Pending]


1/12/21 04:00: 


White Blood Count 11.4H, Red Blood Count 5.31, Hemoglobin 15.9, Hematocrit 51.5,

 Mean Corpuscular Volume 97, Mean Corpuscular Hemoglobin 29.9, Mean Corpuscular 

Hemoglobin Concent 30.8L, Red Cell Distribution Width 13.6, Platelet Count 337, 

Mean Platelet Volume 7.8, Neutrophils (%) (Auto) , Lymphocytes (%) (Auto) , 

Monocytes (%) (Auto) , Eosinophils (%) (Auto) , Basophils (%) (Auto) , Sodium 

Level 146H, Potassium Level 3.2L, Chloride Level 105, Carbon Dioxide Level 35H, 

Anion Gap 6, Blood Urea Nitrogen 50H, Creatinine 1.3, Estimat Glomerular 

Filtration Rate 55.2, Glucose Level 225H, Calcium Level 8.3L


1/12/21 04:28: POC Whole Blood Glucose 209H


Height (Feet):  5


Height (Inches):  7.00


Weight (Pounds):  198


Objective





Current Medications








 Medications


  (Trade)  Dose


 Ordered  Sig/Julisa


 Route


 PRN Reason  Start Time


 Stop Time Status Last Admin


Dose Admin


 


 Acetaminophen


  (Tylenol)  650 mg  Q6H  PRN


 ORAL


 Temp >100.5  1/3/21 04:00


 2/2/21 03:59  1/12/21 04:46





 


 Ceftriaxone


 Sodium 1 gm/


 Dextrose  55 ml @ 


 110 mls/hr  DAILY


 IVPB


   1/10/21 11:00


 1/17/21 10:59  1/11/21 09:14





 


 Chlorhexidine


 Gluconate


  (Vanessa-Hex 2%)  1 applic  DAILY@2000


 TOPIC


   1/6/21 20:00


 4/6/21 19:59  1/11/21 21:39





 


 Dextrose


  (Dextrose 50%)  25 ml  Q30M  PRN


 IV


 Hypoglycemia  1/9/21 13:30


 4/9/21 13:29   





 


 Dextrose


  (Dextrose 50%)  50 ml  Q30M  PRN


 IV


 Hypoglycemia  1/9/21 13:30


 4/9/21 13:29   





 


 Docusate Sodium


  (Colace)  100 mg  TIDPRN  PRN


 GT


 Constipation  1/12/21 01:15


 2/11/21 01:14  1/12/21 01:42





 


 Enoxaparin Sodium


  (Lovenox)  80 mg  EVERY 12  HOURS


 SUBQ


   1/2/21 21:00


 4/2/21 20:59  1/11/21 21:44





 


 Furosemide 100 mg/


 Dextrose  100 ml @ 


 10 mls/hr  Q10H


 IV


   1/7/21 11:00


 2/6/21 10:59  1/12/21 00:28





 


 Haloperidol


  (Haldol)  5 mg  Q6H  PRN


 ORAL


 Agitation  1/4/21 13:45


 2/18/21 13:44  1/4/21 22:56





 


 Haloperidol


 Lactate 5 mg/


 Dextrose  56 ml @ 


 224 mls/hr  Q6H  PRN


 IVPB


 Agitation  1/5/21 12:30


 2/19/21 12:29  1/5/21 13:20





 


 Insulin Aspart


  (NovoLOG)    Q4HR


 SUBQ


   1/11/21 05:00


 4/9/21 17:59  1/12/21 04:37





 


 Insulin Detemir


  (Levemir)  20 units  BID


 SUBQ


   1/11/21 09:00


 4/11/21 08:59  1/11/21 17:54





 


 Methylprednisolone


 Sodium Succinate


  (Solu-MEDROL)  10 mg  DAILY


 IVP


   1/17/21 09:00


 1/18/21 09:01   





 


 Methylprednisolone


 Sodium Succinate


  (Solu-MEDROL)  20 mg  DAILY


 IVP


   1/15/21 09:00


 1/16/21 09:01   





 


 Methylprednisolone


 Sodium Succinate


  (Solu-MEDROL)  30 mg  DAILY


 IVP


   1/13/21 09:00


 1/14/21 09:01   





 


 Methylprednisolone


 Sodium Succinate


  (Solu-MEDROL)  40 mg  DAILY


 IVP


   1/12/21 09:00


 1/12/21 09:01   





 


 Midazolam HCl  100 ml @ 0


 mls/hr  Q24H  PRN


 IV


 SEDATION  1/8/21 21:00


 1/15/21 20:59  1/12/21 01:40





 


 Quetiapine


 Fumarate


  (SEROqueL)  50 mg  Q12HR


 ORAL


   1/4/21 21:00


 2/18/21 20:59  1/11/21 21:38





 


 Sodium Chloride  1,000 ml @ 


 25 mls/hr  Q24H


 IV


   1/3/21 07:45


 2/2/21 07:44  1/11/21 17:16














Assessment/Plan


Problem List:  


(1) Diabetes mellitus out of control


ICD Codes:  E11.65 - Type 2 diabetes mellitus with hyperglycemia


SNOMED:  13762904, [id number]


(2) Pneumonia due to COVID-19 virus


ICD Codes:  U07.1 - COVID-19; J12.82 - Pneumonia due to coronavirus disease 2019


SNOMED:  [id number]


Assessment/Plan:


increase Levemir to 26 units bid 


continue Novolog sliding scale every 4 hours











Nick Mcmahon MD                 Jan 12, 2021 07:22

## 2021-01-12 NOTE — GENERAL PROGRESS NOTE
Subjective


Allergies:  


Coded Allergies:  


     No Known Allergies (Unverified , 6/11/19)


Subjective


on ventilator 60% fio2


on sediation


unresponsive


in icu





Objective





Last 24 Hour Vital Signs








  Date Time  Temp Pulse Resp B/P (MAP) Pulse Ox O2 Delivery O2 Flow Rate FiO2


 


1/12/21 15:33   27     55


 


1/12/21 15:00   27   Endotracheal Tube  55


 


1/12/21 15:00  122 26 101/85 (90) 99   


 


1/12/21 14:00  122 26 121/71 (88) 100   


 


1/12/21 14:00   26   Endotracheal Tube  55


 


1/12/21 13:00  120 25 112/83 (93) 100   


 


1/12/21 13:00   25   Endotracheal Tube  65


 


1/12/21 12:00  120 25 111/81 (91) 100   


 


1/12/21 12:00        65


 


1/12/21 12:00   25   Endotracheal Tube  65


 


1/12/21 12:00      Mechanical Ventilator  


 


1/12/21 11:29  119      


 


1/12/21 11:00  120 25 111/80 (90) 100   


 


1/12/21 11:00   24   Endotracheal Tube  65


 


1/12/21 10:00   26   Endotracheal Tube  65


 


1/12/21 10:00  120 25 103/74 (84) 98   


 


1/12/21 09:44 99.8       


 


1/12/21 09:14   27   Mechanical Ventilator 100.0 65


 


1/12/21 09:00  122 27 111/84 (93) 99   


 


1/12/21 08:00 99.8 124 27 119/87 (98) 99   


 


1/12/21 08:00        70


 


1/12/21 08:00   23   Endotracheal Tube  70


 


1/12/21 08:00      Mechanical Ventilator  


 


1/12/21 07:38  125      


 


1/12/21 07:00   26   Endotracheal Tube  70


 


1/12/21 07:00  126 27 116/77 (90) 98   


 


1/12/21 06:00   27   Mechanical Ventilator  65


 


1/12/21 06:00  125 25 122/84 (97) 100   


 


1/12/21 05:16 100.0       


 


1/12/21 05:00  127 27 123/81 (95) 99   


 


1/12/21 05:00   23   Mechanical Ventilator  65


 


1/12/21 04:00      Mechanical Ventilator  


 


1/12/21 04:00 100.9 127 27 126/93 (104) 97   


 


1/12/21 04:00   21   Mechanical Ventilator  65


 


1/12/21 04:00  120      


 


1/12/21 04:00        70


 


1/12/21 03:06  120 25     65


 


1/12/21 03:00  123 24 123/85 (98) 97   


 


1/12/21 03:00   25   Mechanical Ventilator  65


 


1/12/21 02:10 100.0       


 


1/12/21 02:00  119 18 115/86 (96) 100   


 


1/12/21 02:00   23   Mechanical Ventilator  65


 


1/12/21 01:40   24     70


 


1/12/21 01:00   22   Mechanical Ventilator  65


 


1/12/21 01:00  122 21 128/87 (101) 100   


 


1/12/21 00:00        70


 


1/12/21 00:00 100.0 121 23 121/84 (96) 100   


 


1/12/21 00:00   20   Mechanical Ventilator  65


 


1/12/21 00:00  118      


 


1/12/21 00:00      Mechanical Ventilator  


 


1/11/21 23:01  126 23     70


 


1/11/21 23:00  125 23 112/83 (93) 100   


 


1/11/21 23:00   20   Mechanical Ventilator  70


 


1/11/21 22:14 100.2       


 


1/11/21 22:00  126 23 116/79 (91) 100   


 


1/11/21 22:00   23   Mechanical Ventilator  70


 


1/11/21 21:00   20   Mechanical Ventilator  70


 


1/11/21 21:00  120 25 119/81 (94) 100   


 


1/11/21 20:00   23   Mechanical Ventilator  70


 


1/11/21 20:00  123      


 


1/11/21 20:00      Mechanical Ventilator  


 


1/11/21 20:00 100.1 120 23 123/85 (98) 100   


 


1/11/21 20:00        70


 


1/11/21 19:29 100.1       


 


1/11/21 19:25  119 24     70


 


1/11/21 19:00   19   Mechanical Ventilator  70


 


1/11/21 19:00  114 22 125/81 (96) 100   


 


1/11/21 18:59   18   Mechanical Ventilator  70


 


1/11/21 18:52   19   Mechanical Ventilator  70


 


1/11/21 18:00  110 22 128/77 (94) 100   


 


1/11/21 18:00   19   Mechanical Ventilator  70


 


1/11/21 17:00  113 21 126/81 (96) 100   


 


1/11/21 17:00   20   Mechanical Ventilator  70

















Intake and Output  


 


 1/11/21 1/12/21





 19:00 07:00


 


Intake Total 1014.76 ml 1036.66 ml


 


Output Total 1440 ml 730 ml


 


Balance -425.24 ml 306.66 ml


 


  


 


Free Water 35 ml 120 ml


 


IV Total 649.76 ml 596.66 ml


 


Tube Feeding 330 ml 320 ml


 


Output Urine Total 1440 ml 730 ml








Laboratory Tests


1/12/21 00:25: POC Whole Blood Glucose [Pending]


1/12/21 04:00: 


White Blood Count 11.4H, Red Blood Count 5.31, Hemoglobin 15.9, Hematocrit 51.5,

 Mean Corpuscular Volume 97, Mean Corpuscular Hemoglobin 29.9, Mean Corpuscular 

Hemoglobin Concent 30.8L, Red Cell Distribution Width 13.6, Platelet Count 337, 

Mean Platelet Volume 7.8, Neutrophils (%) (Auto) , Lymphocytes (%) (Auto) , 

Monocytes (%) (Auto) , Eosinophils (%) (Auto) , Basophils (%) (Auto) , Sodium 

Level 146H, Potassium Level 3.2L, Chloride Level 105, Carbon Dioxide Level 35H, 

Anion Gap 6, Blood Urea Nitrogen 50H, Creatinine 1.3, Estimat Glomerular 

Filtration Rate 55.2, Glucose Level 225H, Calcium Level 8.3L


1/12/21 04:28: POC Whole Blood Glucose 209H


1/12/21 07:35: 


Arterial Blood pH 7.376, Arterial Blood Partial Pressure CO2 68.1*H, Arterial 

Blood Partial Pressure O2 80.7, Arterial Blood HCO3 39.0H, Arterial Blood Oxygen

 Saturation 95.4, Arterial Blood Base Excess 10.3*H, Abelardo Test Positive


Height (Feet):  5


Height (Inches):  7.00


Weight (Pounds):  198


General Appearance:  lethargic


Neck:  supple


Cardiovascular:  normal rate, regular rhythm, tachycardia


Respiratory/Chest:  rhonchi - bilaterally


Abdomen:  non tender, soft


Extremities:  non-tender





Assessment/Plan


Assessment/Plan:


covid pna


ac resp distress on ventilator


etoh abused


dehydration


agitated -halodol


fever susided


cont on ventilator at icu


cont iv abx and iv decadrone


pulmonary and gi on consult


dw charge nurse











Moi Travis MD             Jan 12, 2021 16:07

## 2021-01-12 NOTE — PULMONOLOGY PROGRESS NOTE
Subjective


ROS Limited/Unobtainable:  Yes


Interval Events:  None new reported.  On Versed drip.


Constitutional:  Reports: fever, other - Jb=335.9


HEENT:  Repors: no symptoms


Respiratory:  Reports: shortness of breath - better


Cardiovascular:  Reports: no symptoms


Gastrointestinal/Abdominal:  Reports: no symptoms


Allergies:  


Coded Allergies:  


     No Known Allergies (Unverified , 6/11/19)


All Systems:  reviewed and negative except above





Objective





Last 24 Hour Vital Signs








  Date Time  Temp Pulse Resp B/P (MAP) Pulse Ox O2 Delivery O2 Flow Rate FiO2


 


1/12/21 11:00  120 25 111/80 (90) 100   


 


1/12/21 10:00  120 25 103/74 (84) 98   


 


1/12/21 09:44 99.8       


 


1/12/21 09:14   27   Mechanical Ventilator 100.0 65


 


1/12/21 09:00  122 27 111/84 (93) 99   


 


1/12/21 08:00 99.8 124 27 119/87 (98) 99   


 


1/12/21 08:00        70


 


1/12/21 08:00      Mechanical Ventilator  


 


1/12/21 07:00  126 27 116/77 (90) 98   


 


1/12/21 06:00   27   Mechanical Ventilator  65


 


1/12/21 06:00  125 25 122/84 (97) 100   


 


1/12/21 05:16 100.0       


 


1/12/21 05:00  127 27 123/81 (95) 99   


 


1/12/21 05:00   23   Mechanical Ventilator  65


 


1/12/21 04:00      Mechanical Ventilator  


 


1/12/21 04:00 100.9 127 27 126/93 (104) 97   


 


1/12/21 04:00   21   Mechanical Ventilator  65


 


1/12/21 04:00  120      


 


1/12/21 04:00        70


 


1/12/21 03:06  120 25     65


 


1/12/21 03:00  123 24 123/85 (98) 97   


 


1/12/21 03:00   25   Mechanical Ventilator  65


 


1/12/21 02:10 100.0       


 


1/12/21 02:00  119 18 115/86 (96) 100   


 


1/12/21 02:00   23   Mechanical Ventilator  65


 


1/12/21 01:40   24     70


 


1/12/21 01:00   22   Mechanical Ventilator  65


 


1/12/21 01:00  122 21 128/87 (101) 100   


 


1/12/21 00:00        70


 


1/12/21 00:00 100.0 121 23 121/84 (96) 100   


 


1/12/21 00:00   20   Mechanical Ventilator  65


 


1/12/21 00:00  118      


 


1/12/21 00:00      Mechanical Ventilator  


 


1/11/21 23:01  126 23     70


 


1/11/21 23:00  125 23 112/83 (93) 100   


 


1/11/21 23:00   20   Mechanical Ventilator  70


 


1/11/21 22:14 100.2       


 


1/11/21 22:00  126 23 116/79 (91) 100   


 


1/11/21 22:00   23   Mechanical Ventilator  70


 


1/11/21 21:00   20   Mechanical Ventilator  70


 


1/11/21 21:00  120 25 119/81 (94) 100   


 


1/11/21 20:00   23   Mechanical Ventilator  70


 


1/11/21 20:00  123      


 


1/11/21 20:00      Mechanical Ventilator  


 


1/11/21 20:00 100.1 120 23 123/85 (98) 100   


 


1/11/21 20:00        70


 


1/11/21 19:29 100.1       


 


1/11/21 19:25  119 24     70


 


1/11/21 19:00   19   Mechanical Ventilator  70


 


1/11/21 19:00  114 22 125/81 (96) 100   


 


1/11/21 18:59   18   Mechanical Ventilator  70


 


1/11/21 18:52   19   Mechanical Ventilator  70


 


1/11/21 18:00  110 22 128/77 (94) 100   


 


1/11/21 18:00   19   Mechanical Ventilator  70


 


1/11/21 17:00  113 21 126/81 (96) 100   


 


1/11/21 17:00   20   Mechanical Ventilator  70


 


1/11/21 16:00 99.2 108 21 109/81 (90) 100   


 


1/11/21 16:00   18   Mechanical Ventilator  70


 


1/11/21 16:00      Mechanical Ventilator  


 


1/11/21 16:00        70


 


1/11/21 15:35  107      


 


1/11/21 15:20  111 23     70


 


1/11/21 15:00  104 20 123/86 (98) 100   


 


1/11/21 15:00   18   Mechanical Ventilator  70


 


1/11/21 14:00   19   Mechanical Ventilator  70


 


1/11/21 14:00  104 18 114/77 (89) 99   


 


1/11/21 13:00   18   Mechanical Ventilator  70


 


1/11/21 13:00  101 20 115/80 (92) 100   


 


1/11/21 12:37   18   Mechanical Ventilator  70

















Intake and Output  


 


 1/11/21 1/12/21





 19:00 07:00


 


Intake Total 1014.76 ml 985.66 ml


 


Output Total 1440 ml 730 ml


 


Balance -425.24 ml 255.66 ml


 


  


 


Free Water 35 ml 120 ml


 


IV Total 649.76 ml 545.66 ml


 


Tube Feeding 330 ml 320 ml


 


Output Urine Total 1440 ml 730 ml








Objective


On a Versed drip


General Appearance:  no acute distress


HEENT:  atraumatic


Respiratory:  decreased breath sounds


Cardiovascular:  normal rate, regular rhythm


Abdomen:  soft, non tender


Laboratory Tests


1/12/21 00:25: POC Whole Blood Glucose [Pending]


1/12/21 04:00: 


White Blood Count 11.4H, Red Blood Count 5.31, Hemoglobin 15.9, Hematocrit 51.5,

 Mean Corpuscular Volume 97, Mean Corpuscular Hemoglobin 29.9, Mean Corpuscular 

Hemoglobin Concent 30.8L, Red Cell Distribution Width 13.6, Platelet Count 337, 

Mean Platelet Volume 7.8, Neutrophils (%) (Auto) , Lymphocytes (%) (Auto) , 

Monocytes (%) (Auto) , Eosinophils (%) (Auto) , Basophils (%) (Auto) , Sodium 

Level 146H, Potassium Level 3.2L, Chloride Level 105, Carbon Dioxide Level 35H, 

Anion Gap 6, Blood Urea Nitrogen 50H, Creatinine 1.3, Estimat Glomerular 

Filtration Rate 55.2, Glucose Level 225H, Calcium Level 8.3L


1/12/21 04:28: POC Whole Blood Glucose 209H


1/12/21 07:35: 


Arterial Blood pH 7.376, Arterial Blood Partial Pressure CO2 68.1*H, Arterial 

Blood Partial Pressure O2 80.7, Arterial Blood HCO3 39.0H, Arterial Blood Oxygen

 Saturation 95.4, Arterial Blood Base Excess 10.3*H, Abelardo Test Positive





Current Medications








 Medications


  (Trade)  Dose


 Ordered  Sig/Julisa


 Route


 PRN Reason  Start Time


 Stop Time Status Last Admin


Dose Admin


 


 Acetaminophen


  (Tylenol)  650 mg  Q6H  PRN


 ORAL


 Temp >100.5  1/3/21 04:00


 2/2/21 03:59  1/12/21 04:46





 


 Ceftriaxone


 Sodium 1 gm/


 Dextrose  55 ml @ 


 110 mls/hr  DAILY


 IVPB


   1/10/21 11:00


 1/17/21 10:59  1/12/21 08:32





 


 Chlorhexidine


 Gluconate


  (Vanessa-Hex 2%)  1 applic  DAILY@2000


 TOPIC


   1/6/21 20:00


 4/6/21 19:59  1/11/21 21:39





 


 Dextrose


  (Dextrose 50%)  25 ml  Q30M  PRN


 IV


 Hypoglycemia  1/9/21 13:30


 4/9/21 13:29   





 


 Dextrose


  (Dextrose 50%)  50 ml  Q30M  PRN


 IV


 Hypoglycemia  1/9/21 13:30


 4/9/21 13:29   





 


 Docusate Sodium


  (Colace)  100 mg  TIDPRN  PRN


 GT


 Constipation  1/12/21 01:15


 2/11/21 01:14  1/12/21 01:42





 


 Enoxaparin Sodium


  (Lovenox)  80 mg  EVERY 12  HOURS


 SUBQ


   1/2/21 21:00


 4/2/21 20:59  1/12/21 08:33





 


 Furosemide 100 mg/


 Dextrose  100 ml @ 


 10 mls/hr  Q10H


 IV


   1/7/21 11:00


 2/6/21 10:59  1/12/21 10:51





 


 Haloperidol


  (Haldol)  5 mg  Q6H  PRN


 ORAL


 Agitation  1/4/21 13:45


 2/18/21 13:44  1/4/21 22:56





 


 Haloperidol


 Lactate 5 mg/


 Dextrose  56 ml @ 


 224 mls/hr  Q6H  PRN


 IVPB


 Agitation  1/5/21 12:30


 2/19/21 12:29  1/5/21 13:20





 


 Insulin Aspart


  (NovoLOG)    Q4HR


 SUBQ


   1/11/21 05:00


 4/9/21 17:59  1/12/21 12:31





 


 Insulin Detemir


  (Levemir)  26 units  Q12HR


 SUBQ


   1/12/21 09:00


 4/12/21 08:59  1/12/21 09:11





 


 Methylprednisolone


 Sodium Succinate


  (Solu-MEDROL)  10 mg  DAILY


 IVP


   1/17/21 09:00


 1/18/21 09:01   





 


 Methylprednisolone


 Sodium Succinate


  (Solu-MEDROL)  20 mg  DAILY


 IVP


   1/15/21 09:00


 1/16/21 09:01   





 


 Methylprednisolone


 Sodium Succinate


  (Solu-MEDROL)  30 mg  DAILY


 IVP


   1/13/21 09:00


 1/14/21 09:01   





 


 Midazolam HCl  100 ml @ 0


 mls/hr  Q24H  PRN


 IV


 SEDATION  1/8/21 21:00


 1/15/21 20:59  1/12/21 09:14





 


 Quetiapine


 Fumarate


  (SEROqueL)  50 mg  Q12HR


 ORAL


   1/4/21 21:00


 2/18/21 20:59  1/12/21 08:31





 


 Sodium Chloride  1,000 ml @ 


 25 mls/hr  Q24H


 IV


   1/3/21 07:45


 2/2/21 07:44  1/11/21 17:16














Assessment/Plan


Assessment/Plan


1. COVID-19 pneumonia.


   - on Decadron, azithromycin, and ceftriaxone


   - Intubated now; will continue AC mode for now


          -Currently 65% FiO2.


        


2. Diabetes mellitus.; 


3. Elevated inflammatory markers.


4. High D-dimer.


5. Hyponatremia.


6. Hyperglycemia.


   - BG control


7. Hypoxemia., secondary to #1; improved





DVT prophylaxis   On Lovenox.


Sedated





I will continue Lasix drip.


I willcontinue Diamox


May need pressors.











Sung Lemos MD           Jan 12, 2021 12:36

## 2021-01-12 NOTE — NUR
NURSE HAND-OFF REPORT: Notified Dr. Travis K+ 3.2 and ordered K+ 40meq IV



Latest Vital Signs: Temperature 99.0 , Pulse 118 , B/P 119 /76 , Respiratory Rate 26 , O2 
SAT 98 , Endotracheal Tube, O2 Flow Rate  .  

Vital Sign Comment: [email or web address]



EKG Rhythm: Sinus Tachycardia

Rhythm change?: N 

MD Notified?: -

MD Response: 



Latest Singh Fall Score: 50  

Fall Risk: High Risk 

Safety Measures: Call light Within Reach, Bed Alarm Zone 1, Side Rails Side Rails x3, Bed 
position Low and Locked.

Fall Precautions: 

Yellow Socks

Yellow Gown

Door Sign

Patient Fall Education



Report given to Mali ARCHER.

## 2021-01-12 NOTE — NUR
NURSE NOTES:

Fever over 100.5 throughout shift. Tylenol PRN and cooling measures initiated.

No distress noted. Bed bath and oral care provided. Will monitor.

## 2021-01-13 VITALS — DIASTOLIC BLOOD PRESSURE: 83 MMHG | SYSTOLIC BLOOD PRESSURE: 126 MMHG

## 2021-01-13 VITALS — SYSTOLIC BLOOD PRESSURE: 118 MMHG | DIASTOLIC BLOOD PRESSURE: 77 MMHG

## 2021-01-13 VITALS — SYSTOLIC BLOOD PRESSURE: 100 MMHG | DIASTOLIC BLOOD PRESSURE: 70 MMHG

## 2021-01-13 VITALS — DIASTOLIC BLOOD PRESSURE: 87 MMHG | SYSTOLIC BLOOD PRESSURE: 112 MMHG

## 2021-01-13 VITALS — DIASTOLIC BLOOD PRESSURE: 76 MMHG | SYSTOLIC BLOOD PRESSURE: 104 MMHG

## 2021-01-13 VITALS — DIASTOLIC BLOOD PRESSURE: 82 MMHG | SYSTOLIC BLOOD PRESSURE: 107 MMHG

## 2021-01-13 VITALS — SYSTOLIC BLOOD PRESSURE: 106 MMHG | DIASTOLIC BLOOD PRESSURE: 80 MMHG

## 2021-01-13 VITALS — DIASTOLIC BLOOD PRESSURE: 70 MMHG | SYSTOLIC BLOOD PRESSURE: 100 MMHG

## 2021-01-13 VITALS — SYSTOLIC BLOOD PRESSURE: 100 MMHG | DIASTOLIC BLOOD PRESSURE: 82 MMHG

## 2021-01-13 VITALS — DIASTOLIC BLOOD PRESSURE: 92 MMHG | SYSTOLIC BLOOD PRESSURE: 107 MMHG

## 2021-01-13 VITALS — SYSTOLIC BLOOD PRESSURE: 118 MMHG | DIASTOLIC BLOOD PRESSURE: 96 MMHG

## 2021-01-13 VITALS — SYSTOLIC BLOOD PRESSURE: 116 MMHG | DIASTOLIC BLOOD PRESSURE: 84 MMHG

## 2021-01-13 VITALS — SYSTOLIC BLOOD PRESSURE: 114 MMHG | DIASTOLIC BLOOD PRESSURE: 85 MMHG

## 2021-01-13 VITALS — SYSTOLIC BLOOD PRESSURE: 113 MMHG | DIASTOLIC BLOOD PRESSURE: 91 MMHG

## 2021-01-13 VITALS — SYSTOLIC BLOOD PRESSURE: 120 MMHG | DIASTOLIC BLOOD PRESSURE: 94 MMHG

## 2021-01-13 VITALS — DIASTOLIC BLOOD PRESSURE: 71 MMHG | SYSTOLIC BLOOD PRESSURE: 102 MMHG

## 2021-01-13 VITALS — DIASTOLIC BLOOD PRESSURE: 74 MMHG | SYSTOLIC BLOOD PRESSURE: 103 MMHG

## 2021-01-13 VITALS — SYSTOLIC BLOOD PRESSURE: 127 MMHG | DIASTOLIC BLOOD PRESSURE: 94 MMHG

## 2021-01-13 VITALS — DIASTOLIC BLOOD PRESSURE: 85 MMHG | SYSTOLIC BLOOD PRESSURE: 130 MMHG

## 2021-01-13 VITALS — SYSTOLIC BLOOD PRESSURE: 112 MMHG | DIASTOLIC BLOOD PRESSURE: 85 MMHG

## 2021-01-13 VITALS — SYSTOLIC BLOOD PRESSURE: 110 MMHG | DIASTOLIC BLOOD PRESSURE: 80 MMHG

## 2021-01-13 VITALS — SYSTOLIC BLOOD PRESSURE: 104 MMHG | DIASTOLIC BLOOD PRESSURE: 75 MMHG

## 2021-01-13 VITALS — SYSTOLIC BLOOD PRESSURE: 102 MMHG | DIASTOLIC BLOOD PRESSURE: 71 MMHG

## 2021-01-13 VITALS — SYSTOLIC BLOOD PRESSURE: 108 MMHG | DIASTOLIC BLOOD PRESSURE: 82 MMHG

## 2021-01-13 LAB
ADD MANUAL DIFF: YES
ANION GAP SERPL CALC-SCNC: 5 MMOL/L (ref 5–15)
BUN SERPL-MCNC: 39 MG/DL (ref 7–18)
CALCIUM SERPL-MCNC: 9.3 MG/DL (ref 8.5–10.1)
CHLORIDE SERPL-SCNC: 104 MMOL/L (ref 98–107)
CO2 SERPL-SCNC: 36 MMOL/L (ref 21–32)
CREAT SERPL-MCNC: 1.2 MG/DL (ref 0.55–1.3)
ERYTHROCYTE [DISTWIDTH] IN BLOOD BY AUTOMATED COUNT: 12.7 % (ref 11.6–14.8)
HCT VFR BLD CALC: 53.9 % (ref 42–52)
HGB BLD-MCNC: 16.6 G/DL (ref 14.2–18)
MCV RBC AUTO: 92 FL (ref 80–99)
PLATELET # BLD: 298 K/UL (ref 150–450)
POTASSIUM SERPL-SCNC: 3.4 MMOL/L (ref 3.5–5.1)
RBC # BLD AUTO: 5.84 M/UL (ref 4.7–6.1)
SODIUM SERPL-SCNC: 145 MMOL/L (ref 136–145)
WBC # BLD AUTO: 18.6 K/UL (ref 4.8–10.8)

## 2021-01-13 RX ADMIN — INSULIN ASPART SCH UNITS: 100 INJECTION, SOLUTION INTRAVENOUS; SUBCUTANEOUS at 13:14

## 2021-01-13 RX ADMIN — INSULIN DETEMIR SCH UNITS: 100 INJECTION, SOLUTION SUBCUTANEOUS at 09:24

## 2021-01-13 RX ADMIN — Medication PRN MLS/HR: at 06:12

## 2021-01-13 RX ADMIN — CHLORHEXIDINE GLUCONATE SCH APPLIC: 213 SOLUTION TOPICAL at 20:00

## 2021-01-13 RX ADMIN — INSULIN DETEMIR SCH UNITS: 100 INJECTION, SOLUTION SUBCUTANEOUS at 21:22

## 2021-01-13 RX ADMIN — INSULIN ASPART SCH UNITS: 100 INJECTION, SOLUTION INTRAVENOUS; SUBCUTANEOUS at 21:23

## 2021-01-13 RX ADMIN — INSULIN ASPART SCH UNITS: 100 INJECTION, SOLUTION INTRAVENOUS; SUBCUTANEOUS at 16:53

## 2021-01-13 RX ADMIN — ENOXAPARIN SODIUM SCH MG: 80 INJECTION SUBCUTANEOUS at 09:25

## 2021-01-13 RX ADMIN — ENOXAPARIN SODIUM SCH MG: 80 INJECTION SUBCUTANEOUS at 21:23

## 2021-01-13 RX ADMIN — METHYLPREDNISOLONE SODIUM SUCCINATE SCH MG: 40 INJECTION, POWDER, LYOPHILIZED, FOR SOLUTION INTRAMUSCULAR; INTRAVENOUS at 09:23

## 2021-01-13 RX ADMIN — Medication PRN MLS/HR: at 12:59

## 2021-01-13 RX ADMIN — INSULIN ASPART SCH UNITS: 100 INJECTION, SOLUTION INTRAVENOUS; SUBCUTANEOUS at 09:27

## 2021-01-13 RX ADMIN — INSULIN ASPART SCH UNITS: 100 INJECTION, SOLUTION INTRAVENOUS; SUBCUTANEOUS at 06:10

## 2021-01-13 RX ADMIN — SODIUM CHLORIDE SCH MLS/HR: 0.9 INJECTION INTRAVENOUS at 09:23

## 2021-01-13 RX ADMIN — Medication PRN MLS/HR: at 18:40

## 2021-01-13 RX ADMIN — INSULIN ASPART SCH UNITS: 100 INJECTION, SOLUTION INTRAVENOUS; SUBCUTANEOUS at 02:05

## 2021-01-13 NOTE — SURGERY PROGRESS NOTE
Surgery Progress Note


Subjective


Procedure Performed


Left femoral central venous catheter insertion


Additional Comments


ill appearing


on support


exam stable


cont weaning





Objective





Last 24 Hour Vital Signs








  Date Time  Temp Pulse Resp B/P (MAP) Pulse Ox O2 Delivery O2 Flow Rate FiO2


 


1/13/21 17:00   22   Mechanical Ventilator  65


 


1/13/21 16:00  108      


 


1/13/21 16:00        65


 


1/13/21 16:00   22   Mechanical Ventilator  65


 


1/13/21 16:00 97.0 108 19 100/70 (80) 94   


 


1/13/21 16:00      Mechanical Ventilator  


 


1/13/21 15:05  112 22     55


 


1/13/21 15:00  108 19 110/80 (90) 94   


 


1/13/21 15:00   22   Mechanical Ventilator  65


 


1/13/21 14:00  113 21 100/70 (80) 94   


 


1/13/21 14:00   22   Mechanical Ventilator  65


 


1/13/21 13:00   22   Mechanical Ventilator  65


 


1/13/21 13:00  112 21 107/82 (90) 94   


 


1/13/21 12:59   22   Mechanical Ventilator  65


 


1/13/21 12:15 98.1 111 21 102/71 (81) 93   


 


1/13/21 12:00  111 21 102/71 (81) 93   


 


1/13/21 12:00   22   Mechanical Ventilator  65


 


1/13/21 12:00  111      


 


1/13/21 12:00      Mechanical Ventilator  


 


1/13/21 12:00        65


 


1/13/21 11:05  104 24     55


 


1/13/21 11:00  107 18 104/75 (85) 91   


 


1/13/21 11:00   22   Mechanical Ventilator  65


 


1/13/21 10:00   18   Mechanical Ventilator  65


 


1/13/21 10:00  107 17 103/74 (84) 91   


 


1/13/21 09:00  107 18 116/84 (95) 91   


 


1/13/21 09:00   18   Mechanical Ventilator  65


 


1/13/21 08:00 97.0 108 18 118/96 (103) 91   


 


1/13/21 08:00  108      


 


1/13/21 08:00   22   Mechanical Ventilator  65


 


1/13/21 08:00        65


 


1/13/21 08:00      Mechanical Ventilator  


 


1/13/21 07:43  98 21     55


 


1/13/21 07:00  109 18 127/94 (105) 90   


 


1/13/21 07:00   20   Mechanical Ventilator  65


 


1/13/21 06:42 97.3       


 


1/13/21 06:12   24     75


 


1/13/21 06:00   19   Mechanical Ventilator  65


 


1/13/21 06:00  108 22 130/85 (100) 92   


 


1/13/21 05:00  112 22 107/92 (97) 97   


 


1/13/21 05:00   16   Mechanical Ventilator  65


 


1/13/21 04:00        55


 


1/13/21 04:00  109      


 


1/13/21 04:00   18   Mechanical Ventilator  75


 


1/13/21 04:00 97.3 106 21 106/80 (89) 98   


 


1/13/21 04:00      Mechanical Ventilator  


 


1/13/21 03:00  119 20 114/85 (95) 100   


 


1/13/21 03:00   18   Mechanical Ventilator  75


 


1/13/21 02:33  116 22     55


 


1/13/21 02:00   20   Mechanical Ventilator  75


 


1/13/21 02:00  119 24 112/85 (94) 97   


 


1/13/21 01:00  131 25 113/91 (98) 94   


 


1/13/21 01:00   26   Mechanical Ventilator  75


 


1/13/21 00:00  136      


 


1/13/21 00:00      Mechanical Ventilator  


 


1/13/21 00:00   24   Mechanical Ventilator  75


 


1/13/21 00:00 100.0 135 27 126/83 (97) 95   


 


1/13/21 00:00        70


 


1/12/21 23:05  139 27     55


 


1/12/21 23:03 102.5       


 


1/12/21 23:00  137 28 126/81 (96) 92   


 


1/12/21 23:00   25   Mechanical Ventilator  75


 


1/12/21 22:15   29   Mechanical Ventilator  55


 


1/12/21 22:00 102.2 139 29 117/88 (98) 93   


 


1/12/21 22:00   24   Mechanical Ventilator  75


 


1/12/21 21:00  128 27 130/96 (107) 93   


 


1/12/21 21:00   25   Mechanical Ventilator  75


 


1/12/21 20:00      Mechanical Ventilator  


 


1/12/21 20:00   23   Mechanical Ventilator  55


 


1/12/21 20:00        55


 


1/12/21 20:00 101.4 120 26 120/78 (92) 98   


 


1/12/21 20:00  120      


 


1/12/21 19:28  118 24     55


 


1/12/21 19:00  119 25 120/76 (91) 99   


 


1/12/21 19:00   22   Mechanical Ventilator  55


 


1/12/21 18:00   26   Endotracheal Tube  55


 


1/12/21 18:00  118 26 119/76 (90) 98   








I&O











Intake and Output  


 


 1/12/21 1/13/21





 19:00 07:00


 


Intake Total 1011 ml 808.22 ml


 


Output Total 480 ml 565 ml


 


Balance 531 ml 243.22 ml


 


  


 


Free Water 200 ml 60 ml


 


IV Total 571 ml 508.22 ml


 


Tube Feeding 240 ml 240 ml


 


Output Urine Total 480 ml 565 ml








Dressing:  saturated


Cardiovascular:  RSR


Respiratory:  decreased breath sounds


Abdomen:  non-tender, present bowel sounds, non-distended


Extremities:  no tenderness, no cyanosis





Laboratory Tests








Test


 1/12/21


20:43 1/13/21


01:56 1/13/21


04:45 1/13/21


07:43


 


POC Whole Blood Glucose


 Pending  


 164 MG/DL


()  H 


 





 


White Blood Count


 


 


 18.6 K/UL


(4.8-10.8)  #H 





 


Red Blood Count


 


 


 5.84 M/UL


(4.70-6.10) 





 


Hemoglobin


 


 


 16.6 G/DL


(14.2-18.0) 





 


Hematocrit


 


 


 53.9 %


(42.0-52.0)  H 





 


Mean Corpuscular Volume   92 FL (80-99)   


 


Mean Corpuscular Hemoglobin


 


 


 28.4 PG


(27.0-31.0) 





 


Mean Corpuscular Hemoglobin


Concent 


 


 30.8 G/DL


(32.0-36.0)  L 





 


Red Cell Distribution Width


 


 


 12.7 %


(11.6-14.8) 





 


Platelet Count


 


 


 298 K/UL


(150-450) 





 


Mean Platelet Volume


 


 


 9.1 FL


(6.5-10.1) 





 


Neutrophils (%) (Auto)


 


 


 % (45.0-75.0)


 





 


Lymphocytes (%) (Auto)


 


 


 % (20.0-45.0)


 





 


Monocytes (%) (Auto)    % (1.0-10.0)   


 


Eosinophils (%) (Auto)    % (0.0-3.0)   


 


Basophils (%) (Auto)    % (0.0-2.0)   


 


Differential Total Cells


Counted 


 


 100  


 





 


Neutrophils % (Manual)   95 % (45-75)  H 


 


Lymphocytes % (Manual)   2 % (20-45)  L 


 


Monocytes % (Manual)   3 % (1-10)   


 


Eosinophils % (Manual)   0 % (0-3)   


 


Basophils % (Manual)   0 % (0-2)   


 


Band Neutrophils   0 % (0-8)   


 


Platelet Estimate   Adequate   


 


Platelet Morphology   Normal   


 


Red Blood Cell Morphology   Normal   


 


Sodium Level


 


 


 145 MMOL/L


(136-145) 





 


Potassium Level


 


 


 3.4 MMOL/L


(3.5-5.1)  L 





 


Chloride Level


 


 


 104 MMOL/L


() 





 


Carbon Dioxide Level


 


 


 36 MMOL/L


(21-32)  H 





 


Anion Gap


 


 


 5 mmol/L


(5-15) 





 


Blood Urea Nitrogen


 


 


 39 mg/dL


(7-18)  H 





 


Creatinine


 


 


 1.2 MG/DL


(0.55-1.30) 





 


Estimat Glomerular Filtration


Rate 


 


 > 60 mL/min


(>60) 





 


Glucose Level


 


 


 157 MG/DL


()  H 





 


Calcium Level


 


 


 9.3 MG/DL


(8.5-10.1) 





 


Arterial Blood pH


 


 


 


 7.406


(7.350-7.450)


 


Arterial Blood Partial


Pressure CO2 


 


 


 58.7 mmHg


(35.0-45.0)  *H


 


Arterial Blood Partial


Pressure O2 


 


 


 66.7 mmHg


(75.0-100.0)  L


 


Arterial Blood HCO3


 


 


 


 36.0 mmol/L


(22.0-26.0)  H


 


Arterial Blood Oxygen


Saturation 


 


 


 93.2 %


()  L


 


Arterial Blood Base Excess    8.6 (-2-2)  H


 


Abelardo Test    Positive  











Plan


Problems:  


(1) Pneumonia due to COVID-19 virus


Assessment & Plan:  Interim endotracheal intubation, endotracheal tube tip in 

good position


approximately 4 cm above the tito. There are extensive bilateral infiltrates, 

which


are markedly increased from the prior study. Pleural spaces are probably clear, 

not


well demonstrated


Markedly increased and now extensive bilateral infiltrates 


cont as per pulm and iID





(2) Hypoxia


(3) Abdominal pain


Assessment & Plan:  65M abd pain, septic, covid, intubated ons upport


currently abd exam benign but limited given condition


line bo mayr noted


bo for meds and feeds


nutritional optimization 


DAILY ESTIMATED NEEDS:


Needs based on Critical Care/ 72kg abw 


22-28  kcals/kg 


1866-7964  total kcals


1.2-2  g protein/kg


  g total protein 


25-30  mL/kg


1869-1551  total fluid mLs





NUTRITION DIAGNOSIS:


Swallowing difficulty R/T respiratory failure as evidenced by pt now


orally intubated, OGT in place, NPO





 


CURRENT TF:NPO 





 





ENTERAL NUTRITION RECOMMENDATIONS:


Vital AF 1.2 @ 60ml/hr x 24 hrs  to provide 1440ml, 1728kcal, 116g prot, 1167ml 

free water 





* As medically appropriate, initiate critical care and carb controlled TF 

formula of Vital AF 1.2


* Initiate @ 20ml/hr x 6hrs, advance 10ml q 4-6 hrs as tolerated to goal rate


* HOB over 30 degrees/ water flush per MD


* Does not exceed est kcal needs w/ Propofol running @ 8.083ml/hr x 24 hrs 

(provides 213 lipid kcal)





 





ADDITIONAL RECOMMENDATIONS:


* Calibrated bedscale wt 


* Monitor BGs closely w/ Decadron and TF, need for long acting insulin 


* Monitor lytes, replete as needed  


* Monitor Propofol rate, need to adjust TF rate  





(4) Acute metabolic encephalopathy











Norberto Vazquez                Jan 13, 2021 17:29

## 2021-01-13 NOTE — NUR
SI: Respiratory failure, COVID-PNA, ETT/Vent support

     T-98.1 (ax), , RR -19, Bp 110/80

     AC 16, , PEEP 10, Fio2 65%, O2 sat 93%

     WBC 18.6, BUN 39

     cxray-increased rt infrahilar infiltrate and left basilar streaky infiltrate



IS: Solu-Medrol IV q 12 h

     Rocephin IV q 24 H

     Lovenox SQ q 12 h

     Versed GTT

     Lasix GTT



*****ICU Status****

## 2021-01-13 NOTE — GENERAL PROGRESS NOTE
Subjective


Allergies:  


Coded Allergies:  


     No Known Allergies (Unverified , 6/11/19)


Subjective


on ventilator 60% fio2


on sediation


unresponsive


in icu





Objective





Last 24 Hour Vital Signs








  Date Time  Temp Pulse Resp B/P (MAP) Pulse Ox O2 Delivery O2 Flow Rate FiO2


 


1/13/21 17:00  110 21 116/84 (95) 92   


 


1/13/21 17:00   22   Mechanical Ventilator  65


 


1/13/21 16:00  108      


 


1/13/21 16:00        65


 


1/13/21 16:00   22   Mechanical Ventilator  65


 


1/13/21 16:00 97.0 108 19 100/70 (80) 94   


 


1/13/21 16:00      Mechanical Ventilator  


 


1/13/21 15:05  112 22     55


 


1/13/21 15:00  108 19 110/80 (90) 94   


 


1/13/21 15:00   22   Mechanical Ventilator  65


 


1/13/21 14:00  113 21 100/70 (80) 94   


 


1/13/21 14:00   22   Mechanical Ventilator  65


 


1/13/21 13:00   22   Mechanical Ventilator  65


 


1/13/21 13:00  112 21 107/82 (90) 94   


 


1/13/21 12:59   22   Mechanical Ventilator  65


 


1/13/21 12:15 98.1 111 21 102/71 (81) 93   


 


1/13/21 12:00  111 21 102/71 (81) 93   


 


1/13/21 12:00   22   Mechanical Ventilator  65


 


1/13/21 12:00  111      


 


1/13/21 12:00      Mechanical Ventilator  


 


1/13/21 12:00        65


 


1/13/21 11:05  104 24     55


 


1/13/21 11:00  107 18 104/75 (85) 91   


 


1/13/21 11:00   22   Mechanical Ventilator  65


 


1/13/21 10:00   18   Mechanical Ventilator  65


 


1/13/21 10:00  107 17 103/74 (84) 91   


 


1/13/21 09:00  107 18 116/84 (95) 91   


 


1/13/21 09:00   18   Mechanical Ventilator  65


 


1/13/21 08:00 97.0 108 18 118/96 (103) 91   


 


1/13/21 08:00  108      


 


1/13/21 08:00   22   Mechanical Ventilator  65


 


1/13/21 08:00        65


 


1/13/21 08:00      Mechanical Ventilator  


 


1/13/21 07:43  98 21     55


 


1/13/21 07:00  109 18 127/94 (105) 90   


 


1/13/21 07:00   20   Mechanical Ventilator  65


 


1/13/21 06:42 97.3       


 


1/13/21 06:12   24     75


 


1/13/21 06:00   19   Mechanical Ventilator  65


 


1/13/21 06:00  108 22 130/85 (100) 92   


 


1/13/21 05:00  112 22 107/92 (97) 97   


 


1/13/21 05:00   16   Mechanical Ventilator  65


 


1/13/21 04:00        55


 


1/13/21 04:00  109      


 


1/13/21 04:00   18   Mechanical Ventilator  75


 


1/13/21 04:00 97.3 106 21 106/80 (89) 98   


 


1/13/21 04:00      Mechanical Ventilator  


 


1/13/21 03:00  119 20 114/85 (95) 100   


 


1/13/21 03:00   18   Mechanical Ventilator  75


 


1/13/21 02:33  116 22     55


 


1/13/21 02:00   20   Mechanical Ventilator  75


 


1/13/21 02:00  119 24 112/85 (94) 97   


 


1/13/21 01:00  131 25 113/91 (98) 94   


 


1/13/21 01:00   26   Mechanical Ventilator  75


 


1/13/21 00:00  136      


 


1/13/21 00:00      Mechanical Ventilator  


 


1/13/21 00:00   24   Mechanical Ventilator  75


 


1/13/21 00:00 100.0 135 27 126/83 (97) 95   


 


1/13/21 00:00        70


 


1/12/21 23:05  139 27     55


 


1/12/21 23:03 102.5       


 


1/12/21 23:00  137 28 126/81 (96) 92   


 


1/12/21 23:00   25   Mechanical Ventilator  75


 


1/12/21 22:15   29   Mechanical Ventilator  55


 


1/12/21 22:00 102.2 139 29 117/88 (98) 93   


 


1/12/21 22:00   24   Mechanical Ventilator  75


 


1/12/21 21:00  128 27 130/96 (107) 93   


 


1/12/21 21:00   25   Mechanical Ventilator  75


 


1/12/21 20:00      Mechanical Ventilator  


 


1/12/21 20:00   23   Mechanical Ventilator  55


 


1/12/21 20:00        55


 


1/12/21 20:00 101.4 120 26 120/78 (92) 98   


 


1/12/21 20:00  120      


 


1/12/21 19:28  118 24     55


 


1/12/21 19:00  119 25 120/76 (91) 99   


 


1/12/21 19:00   22   Mechanical Ventilator  55

















Intake and Output  


 


 1/12/21 1/13/21





 19:00 07:00


 


Intake Total 1011 ml 808.22 ml


 


Output Total 480 ml 565 ml


 


Balance 531 ml 243.22 ml


 


  


 


Free Water 200 ml 60 ml


 


IV Total 571 ml 508.22 ml


 


Tube Feeding 240 ml 240 ml


 


Output Urine Total 480 ml 565 ml








Laboratory Tests


1/12/21 20:43: POC Whole Blood Glucose [Pending]


1/13/21 01:56: POC Whole Blood Glucose 164H


1/13/21 04:45: 


White Blood Count 18.6#H, Red Blood Count 5.84, Hemoglobin 16.6, Hematocrit 

53.9H, Mean Corpuscular Volume 92, Mean Corpuscular Hemoglobin 28.4, Mean 

Corpuscular Hemoglobin Concent 30.8L, Red Cell Distribution Width 12.7, Platelet

 Count 298, Mean Platelet Volume 9.1, Neutrophils (%) (Auto) , Lymphocytes (%) 

(Auto) , Monocytes (%) (Auto) , Eosinophils (%) (Auto) , Basophils (%) (Auto) , 

Differential Total Cells Counted 100, Neutrophils % (Manual) 95H, Lymphocytes % 

(Manual) 2L, Monocytes % (Manual) 3, Eosinophils % (Manual) 0, Basophils % 

(Manual) 0, Band Neutrophils 0, Platelet Estimate Adequate, Platelet Morphology 

Normal, Red Blood Cell Morphology Normal, Sodium Level 145, Potassium Level 3.4L

, Chloride Level 104, Carbon Dioxide Level 36H, Anion Gap 5, Blood Urea Nitrogen

 39H, Creatinine 1.2, Estimat Glomerular Filtration Rate > 60, Glucose Level 

157H, Calcium Level 9.3


1/13/21 07:43: 


Arterial Blood pH 7.406, Arterial Blood Partial Pressure CO2 58.7*H, Arterial 

Blood Partial Pressure O2 66.7L, Arterial Blood HCO3 36.0H, Arterial Blood 

Oxygen Saturation 93.2L, Arterial Blood Base Excess 8.6H, Abelardo Test Positive


Height (Feet):  5


Height (Inches):  7.00


Weight (Pounds):  198


General Appearance:  no apparent distress


Neck:  supple


Cardiovascular:  regular rhythm


Respiratory/Chest:  rhonchi - bilaterally


Abdomen:  non tender, soft


Extremities:  non-tender





Assessment/Plan


Assessment/Plan:


covid pna


ac resp distress on ventilator


etoh abused


dehydration


agitated -halodol


fever susided


cont on ventilator at icu


cont iv abx and iv decadrone


pulmonary and gi on consult


dw charge nurse











Mio Travis MD             Jan 13, 2021 18:18

## 2021-01-13 NOTE — DIAGNOSTIC IMAGING REPORT
Indication: Cough

 

Technique: One view of the chest

 

Comparison: 1/12/2021

 

Findings: There is increased infiltrate in the right infrahilar region. There is

questionably some streaky infiltrate at the left lung base, stable to slightly

increased. Stable endotracheal tube position.

 

Impression: Increased right infrahilar infiltrate and left basilar streaky infiltrate

 

Stable endotracheal tube

## 2021-01-13 NOTE — NUR
NURSE HAND-OFF REPORT: 



Latest Vital Signs: Temperature 97.0 , Pulse 104 , B/P 104 /76 , Respiratory Rate 18 , O2 
SAT 92 , Mechanical Ventilator, FiO2 65%  .  

Vital Sign Comment: 



EKG Rhythm: Sinus Tachycardia

Rhythm change?: N 

MD Notified?: -

MD Response: 



Latest Singh Fall Score: 50  

Fall Risk: High Risk 

Safety Measures: Call light Within Reach, Bed Alarm Zone 1, Side Rails Side Rails x3, Bed 
position Low and Locked.

Fall Precautions: 

Yellow Socks

Yellow Gown

Door Sign

Patient Fall Education



Report given to SUZI Dave.

## 2021-01-13 NOTE — NUR
NURSE NOTES:



Pt turned and repositioned. Pt remains on cooling blanket to prevent spike in temp. No 
distress noted. - covered with 8 units Novolog insulin, per sliding scale.

## 2021-01-13 NOTE — NUR
NURSE NOTES:



Report received form SUZI Dave. Pt observed lying in the bed, sedated RASS -2, Versed at 
8mg/hr. ST with HR of 110s noted on cardiac monitor. ETT 7.5, 23cm at lip, AC 
16/500/65%/PEEP10, saturating 90%. Pt on a cooling blanket; Afebrile at this time. NGT 
intact and patent, running  Glucerna 1.2 @ 20mL/hr (Hospital out of stock of Vital AF, which 
is pt's original feeding order). F/C intact, draining yellow urine. IV on R FA 18G. R 
femoral TLC intact, running Lasix @ 10mg/hr and 1/2 NS @ 25mL/hr, as well as Versed drip. 
Bed in the lowest position. Side rails up x3. Will continue to monitor.

## 2021-01-13 NOTE — NUR
NURSE NOTES:

Bed bath and oral care provided. Bowel movement observed.

Fever resolved after cooling measures initiated to 98.9 rectally.

No cardiopulmonary distress noted. Will monitor.

## 2021-01-13 NOTE — PULMONOLOGY PROGRESS NOTE
Subjective


ROS Limited/Unobtainable:  Yes


Interval Events:  None new reported.  On Versed drip.


Constitutional:  Reports: fever, other - El=[id number]


HEENT:  Repors: no symptoms


Respiratory:  Reports: shortness of breath - better


Cardiovascular:  Reports: no symptoms


Gastrointestinal/Abdominal:  Reports: no symptoms


Allergies:  


Coded Allergies:  


     No Known Allergies (Unverified , 6/11/19)


All Systems:  reviewed and negative except above





Objective





Last 24 Hour Vital Signs








  Date Time  Temp Pulse Resp B/P (MAP) Pulse Ox O2 Delivery O2 Flow Rate FiO2


 


1/13/21 17:00   22   Mechanical Ventilator  65


 


1/13/21 16:00  108      


 


1/13/21 16:00        65


 


1/13/21 16:00   22   Mechanical Ventilator  65


 


1/13/21 16:00 97.0 108 19 100/70 (80) 94   


 


1/13/21 16:00      Mechanical Ventilator  


 


1/13/21 15:05  112 22     55


 


1/13/21 15:00  108 19 110/80 (90) 94   


 


1/13/21 15:00   22   Mechanical Ventilator  65


 


1/13/21 14:00  113 21 100/70 (80) 94   


 


1/13/21 14:00   22   Mechanical Ventilator  65


 


1/13/21 13:00   22   Mechanical Ventilator  65


 


1/13/21 13:00  112 21 107/82 (90) 94   


 


1/13/21 12:59   22   Mechanical Ventilator  65


 


1/13/21 12:15 98.1 111 21 102/71 (81) 93   


 


1/13/21 12:00  111 21 102/71 (81) 93   


 


1/13/21 12:00   22   Mechanical Ventilator  65


 


1/13/21 12:00  111      


 


1/13/21 12:00      Mechanical Ventilator  


 


1/13/21 12:00        65


 


1/13/21 11:05  104 24     55


 


1/13/21 11:00  107 18 104/75 (85) 91   


 


1/13/21 11:00   22   Mechanical Ventilator  65


 


1/13/21 10:00   18   Mechanical Ventilator  65


 


1/13/21 10:00  107 17 103/74 (84) 91   


 


1/13/21 09:00  107 18 116/84 (95) 91   


 


1/13/21 09:00   18   Mechanical Ventilator  65


 


1/13/21 08:00 97.0 108 18 118/96 (103) 91   


 


1/13/21 08:00  108      


 


1/13/21 08:00   22   Mechanical Ventilator  65


 


1/13/21 08:00        65


 


1/13/21 08:00      Mechanical Ventilator  


 


1/13/21 07:43  98 21     55


 


1/13/21 07:00  109 18 127/94 (105) 90   


 


1/13/21 07:00   20   Mechanical Ventilator  65


 


1/13/21 06:42 97.3       


 


1/13/21 06:12   24     75


 


1/13/21 06:00   19   Mechanical Ventilator  65


 


1/13/21 06:00  108 22 130/85 (100) 92   


 


1/13/21 05:00  112 22 107/92 (97) 97   


 


1/13/21 05:00   16   Mechanical Ventilator  65


 


1/13/21 04:00        55


 


1/13/21 04:00  109      


 


1/13/21 04:00   18   Mechanical Ventilator  75


 


1/13/21 04:00 97.3 106 21 106/80 (89) 98   


 


1/13/21 04:00      Mechanical Ventilator  


 


1/13/21 03:00  119 20 114/85 (95) 100   


 


1/13/21 03:00   18   Mechanical Ventilator  75


 


1/13/21 02:33  116 22     55


 


1/13/21 02:00   20   Mechanical Ventilator  75


 


1/13/21 02:00  119 24 112/85 (94) 97   


 


1/13/21 01:00  131 25 113/91 (98) 94   


 


1/13/21 01:00   26   Mechanical Ventilator  75


 


1/13/21 00:00  136      


 


1/13/21 00:00      Mechanical Ventilator  


 


1/13/21 00:00   24   Mechanical Ventilator  75


 


1/13/21 00:00 100.0 135 27 126/83 (97) 95   


 


1/13/21 00:00        70


 


1/12/21 23:05  139 27     55


 


1/12/21 23:03 102.5       


 


1/12/21 23:00  137 28 126/81 (96) 92   


 


1/12/21 23:00   25   Mechanical Ventilator  75


 


1/12/21 22:15   29   Mechanical Ventilator  55


 


1/12/21 22:00 102.2 139 29 117/88 (98) 93   


 


1/12/21 22:00   24   Mechanical Ventilator  75


 


1/12/21 21:00  128 27 130/96 (107) 93   


 


1/12/21 21:00   25   Mechanical Ventilator  75


 


1/12/21 20:00      Mechanical Ventilator  


 


1/12/21 20:00   23   Mechanical Ventilator  55


 


1/12/21 20:00        55


 


1/12/21 20:00 101.4 120 26 120/78 (92) 98   


 


1/12/21 20:00  120      


 


1/12/21 19:28  118 24     55


 


1/12/21 19:00  119 25 120/76 (91) 99   


 


1/12/21 19:00   22   Mechanical Ventilator  55


 


1/12/21 18:00   26   Endotracheal Tube  55


 


1/12/21 18:00  118 26 119/76 (90) 98   

















Intake and Output  


 


 1/12/21 1/13/21





 19:00 07:00


 


Intake Total 1011 ml 808.22 ml


 


Output Total 480 ml 565 ml


 


Balance 531 ml 243.22 ml


 


  


 


Free Water 200 ml 60 ml


 


IV Total 571 ml 508.22 ml


 


Tube Feeding 240 ml 240 ml


 


Output Urine Total 480 ml 565 ml








Objective


On a Versed drip


General Appearance:  no acute distress


HEENT:  atraumatic


Respiratory:  decreased breath sounds


Cardiovascular:  normal rate, regular rhythm


Abdomen:  soft, non tender


Laboratory Tests


1/12/21 20:43: POC Whole Blood Glucose [Pending]


1/13/21 01:56: POC Whole Blood Glucose 164H


1/13/21 04:45: 


White Blood Count 18.6#H, Red Blood Count 5.84, Hemoglobin 16.6, Hematocrit 

53.9H, Mean Corpuscular Volume 92, Mean Corpuscular Hemoglobin 28.4, Mean 

Corpuscular Hemoglobin Concent 30.8L, Red Cell Distribution Width 12.7, Platelet

Count 298, Mean Platelet Volume 9.1, Neutrophils (%) (Auto) , Lymphocytes (%) 

(Auto) , Monocytes (%) (Auto) , Eosinophils (%) (Auto) , Basophils (%) (Auto) , 

Differential Total Cells Counted 100, Neutrophils % (Manual) 95H, Lymphocytes % 

(Manual) 2L, Monocytes % (Manual) 3, Eosinophils % (Manual) 0, Basophils % 

(Manual) 0, Band Neutrophils 0, Platelet Estimate Adequate, Platelet Morphology 

Normal, Red Blood Cell Morphology Normal, Sodium Level 145, Potassium Level 3.4L

, Chloride Level 104, Carbon Dioxide Level 36H, Anion Gap 5, Blood Urea Nitrogen

39H, Creatinine 1.2, Estimat Glomerular Filtration Rate > 60, Glucose Level 157H

, Calcium Level 9.3


1/13/21 07:43: 


Arterial Blood pH 7.406, Arterial Blood Partial Pressure CO2 58.7*H, Arterial 

Blood Partial Pressure O2 66.7L, Arterial Blood HCO3 36.0H, Arterial Blood O

xygen Saturation 93.2L, Arterial Blood Base Excess 8.6H, Abelardo Test Positive





Current Medications








 Medications


  (Trade)  Dose


 Ordered  Sig/Julisa


 Route


 PRN Reason  Start Time


 Stop Time Status Last Admin


Dose Admin


 


 Acetaminophen


  (Tylenol)  650 mg  Q6H  PRN


 ORAL


 Temp >100.5  1/3/21 04:00


 2/2/21 03:59  1/12/21 22:33





 


 Ceftriaxone


 Sodium 1 gm/


 Dextrose  55 ml @ 


 110 mls/hr  DAILY


 IVPB


   1/10/21 11:00


 1/17/21 10:59  1/13/21 09:23





 


 Chlorhexidine


 Gluconate


  (Vanessa-Hex 2%)  1 applic  DAILY@2000


 TOPIC


   1/6/21 20:00


 4/6/21 19:59  1/12/21 20:53





 


 Dextrose


  (Dextrose 50%)  25 ml  Q30M  PRN


 IV


 Hypoglycemia  1/9/21 13:30


 4/9/21 13:29   





 


 Dextrose


  (Dextrose 50%)  50 ml  Q30M  PRN


 IV


 Hypoglycemia  1/9/21 13:30


 4/9/21 13:29   





 


 Docusate Sodium


  (Colace)  100 mg  TIDPRN  PRN


 GT


 Constipation  1/12/21 01:15


 2/11/21 01:14  1/12/21 01:42





 


 Enoxaparin Sodium


  (Lovenox)  80 mg  EVERY 12  HOURS


 SUBQ


   1/2/21 21:00


 4/2/21 20:59  1/13/21 09:25





 


 Furosemide 100 mg/


 Dextrose  100 ml @ 


 10 mls/hr  Q10H


 IV


   1/7/21 11:00


 2/6/21 10:59  1/13/21 16:30





 


 Haloperidol


  (Haldol)  5 mg  Q6H  PRN


 ORAL


 Agitation  1/4/21 13:45


 2/18/21 13:44  1/4/21 22:56





 


 Haloperidol


 Lactate 5 mg/


 Dextrose  56 ml @ 


 224 mls/hr  Q6H  PRN


 IVPB


 Agitation  1/5/21 12:30


 2/19/21 12:29  1/5/21 13:20





 


 Insulin Aspart


  (NovoLOG)    Q4HR


 SUBQ


   1/11/21 05:00


 4/9/21 17:59  1/13/21 16:53





 


 Insulin Detemir


  (Levemir)  26 units  Q12HR


 SUBQ


   1/12/21 09:00


 4/12/21 08:59  1/13/21 09:24





 


 Methylprednisolone


 Sodium Succinate


  (Solu-MEDROL)  10 mg  DAILY


 IVP


   1/17/21 09:00


 1/18/21 09:01   





 


 Methylprednisolone


 Sodium Succinate


  (Solu-MEDROL)  20 mg  DAILY


 IVP


   1/15/21 09:00


 1/16/21 09:01   





 


 Methylprednisolone


 Sodium Succinate


  (Solu-MEDROL)  30 mg  DAILY


 IVP


   1/13/21 09:00


 1/14/21 09:01  1/13/21 09:23





 


 Midazolam HCl  100 ml @ 0


 mls/hr  Q24H  PRN


 IV


 SEDATION  1/8/21 21:00


 1/15/21 20:59  1/13/21 12:59





 


 Quetiapine


 Fumarate


  (SEROqueL)  50 mg  Q12HR


 ORAL


   1/4/21 21:00


 2/18/21 20:59  1/13/21 09:23





 


 Sodium Chloride  1,000 ml @ 


 25 mls/hr  Q24H


 IV


   1/3/21 07:45


 2/2/21 07:44  1/13/21 05:35














Assessment/Plan


Assessment/Plan


1. COVID-19 pneumonia.


   - on Decadron, azithromycin, and ceftriaxone


   - Intubated now; will continue AC mode for now


          -Currently 65% FiO2.


        


2. Diabetes mellitus.; 


3. Elevated inflammatory markers.


4. High D-dimer.


5. Hyponatremia.


6. Hyperglycemia.


   - BG control


7. Hypoxemia., secondary to #1; improved





DVT prophylaxis   On Lovenox.


Sedated





I will continue Lasix drip.


I willcontinue Diamox


May need pressors.











Sung Lemos MD           Jan 13, 2021 17:26

## 2021-01-13 NOTE — NUR
NURSE NOTES:



Pt remains sedated RASS -2, running Versed @ 8mg/hr. Pt is afebrile, on a cooling blanket. 
No distress noted. O2sat 94%

## 2021-01-13 NOTE — NUR
NURSE NOTES:

Report received form SUZI Perez. Pt observed lying in the bed, sedated RASS -2, Versed at 
8mg/hr. ST with HR of 110s noted on cardiac monitor. ETT 7.5, 23cm at lip, AC 
16/500/65%/PEEP10, saturating 90%. Pt on a cooling blanket; Afebrile at this time. NGT 
intact and patent, running  Glucerna 1.2 @ 20mL/hr (Hospital out of stock of Vital AF, which 
is pt's original feeding order). F/C intact, draining yellow urine. IV on R FA 18G. R 
femoral TLC intact, running Lasix @ 10mg/hr and 1/2 NS @ 25mL/hr, as well as Versed drip. 
Bed in the lowest position. Side rails up x3. Will continue to monitor.

## 2021-01-13 NOTE — INFECTIOUS DISEASES PROG NOTE
Assessment/Plan


Assessment/Plan


antibiotics : ceftriaxone





A


1. COVID-19 pneumonia.


on 65% FiO2 with saturation of 91 %


s/p remdesivir


s/p ivermectin


2. New-onset diabetes.


3. respiratory failure





P


1. Continue ceftriaxone.


2. continue solumedrol taper doses


3. Continue isolation.





Subjective


ROS Limited/Unobtainable:  Yes


Allergies:  


Coded Allergies:  


     No Known Allergies (Unverified , 6/11/19)





Objective





Last 24 Hour Vital Signs








  Date Time  Temp Pulse Resp B/P (MAP) Pulse Ox O2 Delivery O2 Flow Rate FiO2


 


1/13/21 10:00   18   Mechanical Ventilator  65


 


1/13/21 10:00  107 17 103/74 (84) 91   


 


1/13/21 09:00  107 18 116/84 (95) 91   


 


1/13/21 09:00   18   Mechanical Ventilator  65


 


1/13/21 08:00 97.0 108 18 118/96 (103) 91   


 


1/13/21 08:00  108      


 


1/13/21 08:00   22   Mechanical Ventilator  65


 


1/13/21 08:00        65


 


1/13/21 08:00      Mechanical Ventilator  


 


1/13/21 07:43  98 21     55


 


1/13/21 07:00  109 18 127/94 (105) 90   


 


1/13/21 07:00   20   Mechanical Ventilator  65


 


1/13/21 06:42 97.3       


 


1/13/21 06:12   24     75


 


1/13/21 06:00   19   Mechanical Ventilator  65


 


1/13/21 06:00  108 22 130/85 (100) 92   


 


1/13/21 05:00  112 22 107/92 (97) 97   


 


1/13/21 05:00   16   Mechanical Ventilator  65


 


1/13/21 04:00        55


 


1/13/21 04:00  109      


 


1/13/21 04:00   18   Mechanical Ventilator  75


 


1/13/21 04:00 97.3 106 21 106/80 (89) 98   


 


1/13/21 04:00      Mechanical Ventilator  


 


1/13/21 03:00  119 20 114/85 (95) 100   


 


1/13/21 03:00   18   Mechanical Ventilator  75


 


1/13/21 02:33  116 22     55


 


1/13/21 02:00   20   Mechanical Ventilator  75


 


1/13/21 02:00  119 24 112/85 (94) 97   


 


1/13/21 01:00  131 25 113/91 (98) 94   


 


1/13/21 01:00   26   Mechanical Ventilator  75


 


1/13/21 00:00  136      


 


1/13/21 00:00      Mechanical Ventilator  


 


1/13/21 00:00   24   Mechanical Ventilator  75


 


1/13/21 00:00 100.0 135 27 126/83 (97) 95   


 


1/13/21 00:00        70


 


1/12/21 23:05  139 27     55


 


1/12/21 23:03 102.5       


 


1/12/21 23:00  137 28 126/81 (96) 92   


 


1/12/21 23:00   25   Mechanical Ventilator  75


 


1/12/21 22:15   29   Mechanical Ventilator  55


 


1/12/21 22:00 102.2 139 29 117/88 (98) 93   


 


1/12/21 22:00   24   Mechanical Ventilator  75


 


1/12/21 21:00  128 27 130/96 (107) 93   


 


1/12/21 21:00   25   Mechanical Ventilator  75


 


1/12/21 20:00      Mechanical Ventilator  


 


1/12/21 20:00   23   Mechanical Ventilator  55


 


1/12/21 20:00        55


 


1/12/21 20:00 101.4 120 26 120/78 (92) 98   


 


1/12/21 20:00  120      


 


1/12/21 19:28  118 24     55


 


1/12/21 19:00  119 25 120/76 (91) 99   


 


1/12/21 19:00   22   Mechanical Ventilator  55


 


1/12/21 18:00   26   Endotracheal Tube  55


 


1/12/21 18:00  118 26 119/76 (90) 98   


 


1/12/21 17:00 99.0 117 26 115/87 (96) 99   


 


1/12/21 17:00   26   Endotracheal Tube  55


 


1/12/21 16:03 99.6       


 


1/12/21 16:00  121 25 114/80 (91) 99   


 


1/12/21 16:00        55


 


1/12/21 16:00   25   Endotracheal Tube  55


 


1/12/21 16:00      Mechanical Ventilator  


 


1/12/21 16:00  117      


 


1/12/21 15:33   27     55


 


1/12/21 15:00   27   Endotracheal Tube  55


 


1/12/21 15:00  122 26 101/85 (90) 99   


 


1/12/21 14:00  122 26 121/71 (88) 100   


 


1/12/21 14:00   26   Endotracheal Tube  55


 


1/12/21 13:26  122 26     55


 


1/12/21 13:00  120 25 112/83 (93) 100   


 


1/12/21 13:00   25   Endotracheal Tube  65


 


1/12/21 12:00  120 25 111/81 (91) 100   


 


1/12/21 12:00        65


 


1/12/21 12:00   25   Endotracheal Tube  65


 


1/12/21 12:00      Mechanical Ventilator  


 


1/12/21 11:29  119      








Height (Feet):  5


Height (Inches):  7.00


Weight (Pounds):  198





Laboratory Tests








Test


 1/12/21


20:43 1/13/21


01:56 1/13/21


04:45 1/13/21


07:43


 


POC Whole Blood Glucose


 Pending  


 164 MG/DL


()  H 


 





 


White Blood Count


 


 


 18.6 K/UL


(4.8-10.8)  #H 





 


Red Blood Count


 


 


 5.84 M/UL


(4.70-6.10) 





 


Hemoglobin


 


 


 16.6 G/DL


(14.2-18.0) 





 


Hematocrit


 


 


 53.9 %


(42.0-52.0)  H 





 


Mean Corpuscular Volume   92 FL (80-99)   


 


Mean Corpuscular Hemoglobin


 


 


 28.4 PG


(27.0-31.0) 





 


Mean Corpuscular Hemoglobin


Concent 


 


 30.8 G/DL


(32.0-36.0)  L 





 


Red Cell Distribution Width


 


 


 12.7 %


(11.6-14.8) 





 


Platelet Count


 


 


 298 K/UL


(150-450) 





 


Mean Platelet Volume


 


 


 9.1 FL


(6.5-10.1) 





 


Neutrophils (%) (Auto)


 


 


 % (45.0-75.0)


 





 


Lymphocytes (%) (Auto)


 


 


 % (20.0-45.0)


 





 


Monocytes (%) (Auto)    % (1.0-10.0)   


 


Eosinophils (%) (Auto)    % (0.0-3.0)   


 


Basophils (%) (Auto)    % (0.0-2.0)   


 


Differential Total Cells


Counted 


 


 100  


 





 


Neutrophils % (Manual)   95 % (45-75)  H 


 


Lymphocytes % (Manual)   2 % (20-45)  L 


 


Monocytes % (Manual)   3 % (1-10)   


 


Eosinophils % (Manual)   0 % (0-3)   


 


Basophils % (Manual)   0 % (0-2)   


 


Band Neutrophils   0 % (0-8)   


 


Platelet Estimate   Adequate   


 


Platelet Morphology   Normal   


 


Red Blood Cell Morphology   Normal   


 


Sodium Level


 


 


 145 MMOL/L


(136-145) 





 


Potassium Level


 


 


 3.4 MMOL/L


(3.5-5.1)  L 





 


Chloride Level


 


 


 104 MMOL/L


() 





 


Carbon Dioxide Level


 


 


 36 MMOL/L


(21-32)  H 





 


Anion Gap


 


 


 5 mmol/L


(5-15) 





 


Blood Urea Nitrogen


 


 


 39 mg/dL


(7-18)  H 





 


Creatinine


 


 


 1.2 MG/DL


(0.55-1.30) 





 


Estimat Glomerular Filtration


Rate 


 


 > 60 mL/min


(>60) 





 


Glucose Level


 


 


 157 MG/DL


()  H 





 


Calcium Level


 


 


 9.3 MG/DL


(8.5-10.1) 





 


Arterial Blood pH


 


 


 


 7.406


(7.350-7.450)


 


Arterial Blood Partial


Pressure CO2 


 


 


 58.7 mmHg


(35.0-45.0)  *H


 


Arterial Blood Partial


Pressure O2 


 


 


 66.7 mmHg


(75.0-100.0)  L


 


Arterial Blood HCO3


 


 


 


 36.0 mmol/L


(22.0-26.0)  H


 


Arterial Blood Oxygen


Saturation 


 


 


 93.2 %


()  L


 


Arterial Blood Base Excess    8.6 (-2-2)  H


 


Abelardo Test    Positive  











Current Medications








 Medications


  (Trade)  Dose


 Ordered  Sig/Julisa


 Route


 PRN Reason  Start Time


 Stop Time Status Last Admin


Dose Admin


 


 Acetaminophen


  (Tylenol)  650 mg  Q6H  PRN


 ORAL


 Temp >100.5  1/3/21 04:00


 2/2/21 03:59  1/12/21 22:33





 


 Ceftriaxone


 Sodium 1 gm/


 Dextrose  55 ml @ 


 110 mls/hr  DAILY


 IVPB


   1/10/21 11:00


 1/17/21 10:59  1/13/21 09:23





 


 Chlorhexidine


 Gluconate


  (Vanessa-Hex 2%)  1 applic  DAILY@2000


 TOPIC


   1/6/21 20:00


 4/6/21 19:59  1/12/21 20:53





 


 Dextrose


  (Dextrose 50%)  25 ml  Q30M  PRN


 IV


 Hypoglycemia  1/9/21 13:30


 4/9/21 13:29   





 


 Dextrose


  (Dextrose 50%)  50 ml  Q30M  PRN


 IV


 Hypoglycemia  1/9/21 13:30


 4/9/21 13:29   





 


 Docusate Sodium


  (Colace)  100 mg  TIDPRN  PRN


 GT


 Constipation  1/12/21 01:15


 2/11/21 01:14  1/12/21 01:42





 


 Enoxaparin Sodium


  (Lovenox)  80 mg  EVERY 12  HOURS


 SUBQ


   1/2/21 21:00


 4/2/21 20:59  1/13/21 09:25





 


 Furosemide 100 mg/


 Dextrose  100 ml @ 


 10 mls/hr  Q10H


 IV


   1/7/21 11:00


 2/6/21 10:59  1/13/21 06:10





 


 Haloperidol


  (Haldol)  5 mg  Q6H  PRN


 ORAL


 Agitation  1/4/21 13:45


 2/18/21 13:44  1/4/21 22:56





 


 Haloperidol


 Lactate 5 mg/


 Dextrose  56 ml @ 


 224 mls/hr  Q6H  PRN


 IVPB


 Agitation  1/5/21 12:30


 2/19/21 12:29  1/5/21 13:20





 


 Insulin Aspart


  (NovoLOG)    Q4HR


 SUBQ


   1/11/21 05:00


 4/9/21 17:59  1/13/21 09:27





 


 Insulin Detemir


  (Levemir)  26 units  Q12HR


 SUBQ


   1/12/21 09:00


 4/12/21 08:59  1/13/21 09:24





 


 Methylprednisolone


 Sodium Succinate


  (Solu-MEDROL)  10 mg  DAILY


 IVP


   1/17/21 09:00


 1/18/21 09:01   





 


 Methylprednisolone


 Sodium Succinate


  (Solu-MEDROL)  20 mg  DAILY


 IVP


   1/15/21 09:00


 1/16/21 09:01   





 


 Methylprednisolone


 Sodium Succinate


  (Solu-MEDROL)  30 mg  DAILY


 IVP


   1/13/21 09:00


 1/14/21 09:01  1/13/21 09:23





 


 Midazolam HCl  100 ml @ 0


 mls/hr  Q24H  PRN


 IV


 SEDATION  1/8/21 21:00


 1/15/21 20:59  1/13/21 06:12





 


 Quetiapine


 Fumarate


  (SEROqueL)  50 mg  Q12HR


 ORAL


   1/4/21 21:00


 2/18/21 20:59  1/13/21 09:23





 


 Sodium Chloride  1,000 ml @ 


 25 mls/hr  Q24H


 IV


   1/3/21 07:45


 2/2/21 07:44  1/13/21 05:35




















Ashley Davis MD      Jan 13, 2021 11:09

## 2021-01-13 NOTE — NUR
NURSE NOTES:

All due meds given. Patient suctioned and repositioned, patient is afebrile. HR ranging 
around 110-120 Sinus Tach.Oral care given. Patient still will remains cooling measures. 
FLACC score 0/10. Patient is making good urine output. Chest tube remains patent and 
secured. Patient remains saturating > 92%.

## 2021-01-13 NOTE — NUR
NURSE NOTES:



Dr Lemos at bedside assessing pt. Updated him on pt's current condition. Informed him of 
ABG results. No orders to make changes to the ventilator at this time.

## 2021-01-13 NOTE — NUR
NURSE NOTES:



Pt turned and repositioned for comfort. Oral care done. No distress noted at this time.

## 2021-01-13 NOTE — NUR
NURSE NOTES:



AM medication administered as ordered. Tube feeding continues to run; No residual noted. Pt 
remains afebrile. No distress noted.

## 2021-01-13 NOTE — GENERAL PROGRESS NOTE
Subjective


ROS Limited/Unobtainable:  Yes


Allergies:  


Coded Allergies:  


     No Known Allergies (Unverified , 6/11/19)


Subjective


events noted - interval notes reviewed 


intubated 


glucose values improved 











Item Value  Date Time


 


Bedside Blood Glucose 173 mg/dl H 1/13/21 0610


 


Bedside Blood Glucose 167 mg/dl H 1/13/21 0205


 


Bedside Blood Glucose 175 mg/dl H 1/12/21 2056


 


Bedside Blood Glucose 218 mg/dl H 1/12/21 1800


 


Bedside Blood Glucose 195 mg/dl H 1/12/21 1231


 


Bedside Blood Glucose 237 mg/dl H 1/12/21 0911


 


Bedside Blood Glucose 209 mg/dl H 1/12/21 0437











Objective





Last 24 Hour Vital Signs








  Date Time  Temp Pulse Resp B/P (MAP) Pulse Ox O2 Delivery O2 Flow Rate FiO2


 


1/13/21 06:12   24     75


 


1/13/21 03:00  119 20 114/85 (95) 100   


 


1/13/21 02:33  116 22     55


 


1/13/21 02:00  119 24 112/85 (94) 97   


 


1/13/21 01:00  131 25 113/91 (98) 94   


 


1/13/21 00:00      Mechanical Ventilator  


 


1/13/21 00:00 100.0 135 27 126/83 (97) 95   


 


1/13/21 00:00        70


 


1/12/21 23:05  139 27     55


 


1/12/21 23:03 102.5       


 


1/12/21 23:00  137 28 126/81 (96) 92   


 


1/12/21 23:00   25   Mechanical Ventilator  75


 


1/12/21 22:15   29   Mechanical Ventilator  55


 


1/12/21 22:00 102.2 139 29 117/88 (98) 93   


 


1/12/21 22:00   24   Mechanical Ventilator  75


 


1/12/21 21:00  128 27 130/96 (107) 93   


 


1/12/21 21:00   25   Mechanical Ventilator  75


 


1/12/21 20:00      Mechanical Ventilator  


 


1/12/21 20:00   23   Mechanical Ventilator  55


 


1/12/21 20:00        55


 


1/12/21 20:00 101.4 120 26 120/78 (92) 98   


 


1/12/21 19:28  118 24     55


 


1/12/21 19:00  119 25 120/76 (91) 99   


 


1/12/21 19:00   22   Mechanical Ventilator  55


 


1/12/21 18:00   26   Endotracheal Tube  55


 


1/12/21 18:00  118 26 119/76 (90) 98   


 


1/12/21 17:00 99.0 117 26 115/87 (96) 99   


 


1/12/21 17:00   26   Endotracheal Tube  55


 


1/12/21 16:03 99.6       


 


1/12/21 16:00  121 25 114/80 (91) 99   


 


1/12/21 16:00        55


 


1/12/21 16:00   25   Endotracheal Tube  55


 


1/12/21 16:00      Mechanical Ventilator  


 


1/12/21 16:00  117      


 


1/12/21 15:33   27     55


 


1/12/21 15:00   27   Endotracheal Tube  55


 


1/12/21 15:00  122 26 101/85 (90) 99   


 


1/12/21 14:00  122 26 121/71 (88) 100   


 


1/12/21 14:00   26   Endotracheal Tube  55


 


1/12/21 13:26  122 26     55


 


1/12/21 13:00  120 25 112/83 (93) 100   


 


1/12/21 13:00   25   Endotracheal Tube  65


 


1/12/21 12:00  120 25 111/81 (91) 100   


 


1/12/21 12:00        65


 


1/12/21 12:00   25   Endotracheal Tube  65


 


1/12/21 12:00      Mechanical Ventilator  


 


1/12/21 11:29  119      


 


1/12/21 11:00  120 25 111/80 (90) 100   


 


1/12/21 11:00   24   Endotracheal Tube  65


 


1/12/21 10:00   26   Endotracheal Tube  65


 


1/12/21 10:00  120 25 103/74 (84) 98   


 


1/12/21 09:44 99.8       


 


1/12/21 09:14   27   Mechanical Ventilator 100.0 65


 


1/12/21 09:00  122 27 111/84 (93) 99   


 


1/12/21 08:00 99.8 124 27 119/87 (98) 99   


 


1/12/21 08:00        70


 


1/12/21 08:00   23   Endotracheal Tube  70


 


1/12/21 08:00      Mechanical Ventilator  


 


1/12/21 07:38  125      


 


1/12/21 07:16  123 28     65


 


1/12/21 07:00   26   Endotracheal Tube  70


 


1/12/21 07:00  126 27 116/77 (90) 98   

















Intake and Output  


 


 1/12/21 1/13/21





 19:00 07:00


 


Intake Total 1011 ml 726.12 ml


 


Output Total 480 ml 375 ml


 


Balance 531 ml 351.12 ml


 


  


 


Free Water 200 ml 60 ml


 


IV Total 571 ml 291.12 ml


 


Tube Feeding 240 ml 375 ml


 


Output Urine Total 480 ml 375 ml








Laboratory Tests


1/12/21 07:35: 


Arterial Blood pH 7.376, Arterial Blood Partial Pressure CO2 68.1*H, Arterial 

Blood Partial Pressure O2 80.7, Arterial Blood HCO3 39.0H, Arterial Blood Oxygen

Saturation 95.4, Arterial Blood Base Excess 10.3*H, Abelardo Test Positive


1/12/21 20:43: POC Whole Blood Glucose [Pending]


1/13/21 01:56: POC Whole Blood Glucose 164H


1/13/21 04:45: 


White Blood Count 18.6#H, Red Blood Count 5.84, Hemoglobin 16.6, Hematocrit 

53.9H, Mean Corpuscular Volume 92, Mean Corpuscular Hemoglobin 28.4, Mean 

Corpuscular Hemoglobin Concent 30.8L, Red Cell Distribution Width 12.7, Platelet

 Count 298, Mean Platelet Volume 9.1, Neutrophils (%) (Auto) , Lymphocytes (%) 

(Auto) , Monocytes (%) (Auto) , Eosinophils (%) (Auto) , Basophils (%) (Auto) , 

Neutrophils % (Manual) [Pending], Lymphocytes % (Manual) [Pending], Platelet Est

imate [Pending], Platelet Morphology [Pending], Sodium Level 145, Potassium 

Level 3.4L, Chloride Level 104, Carbon Dioxide Level 36H, Anion Gap 5, Blood 

Urea Nitrogen 39H, Creatinine 1.2, Estimat Glomerular Filtration Rate > 60, 

Glucose Level 157H, Calcium Level 9.3


Height (Feet):  5


Height (Inches):  7.00


Weight (Pounds):  198


Objective





Current Medications








 Medications


  (Trade)  Dose


 Ordered  Sig/Julisa


 Route


 PRN Reason  Start Time


 Stop Time Status Last Admin


Dose Admin


 


 Acetaminophen


  (Tylenol)  650 mg  Q6H  PRN


 ORAL


 Temp >100.5  1/3/21 04:00


 2/2/21 03:59  1/12/21 22:33





 


 Ceftriaxone


 Sodium 1 gm/


 Dextrose  55 ml @ 


 110 mls/hr  DAILY


 IVPB


   1/10/21 11:00


 1/17/21 10:59  1/12/21 08:32





 


 Chlorhexidine


 Gluconate


  (Vanessa-Hex 2%)  1 applic  DAILY@2000


 TOPIC


   1/6/21 20:00


 4/6/21 19:59  1/12/21 20:53





 


 Dextrose


  (Dextrose 50%)  25 ml  Q30M  PRN


 IV


 Hypoglycemia  1/9/21 13:30


 4/9/21 13:29   





 


 Dextrose


  (Dextrose 50%)  50 ml  Q30M  PRN


 IV


 Hypoglycemia  1/9/21 13:30


 4/9/21 13:29   





 


 Docusate Sodium


  (Colace)  100 mg  TIDPRN  PRN


 GT


 Constipation  1/12/21 01:15


 2/11/21 01:14  1/12/21 01:42





 


 Enoxaparin Sodium


  (Lovenox)  80 mg  EVERY 12  HOURS


 SUBQ


   1/2/21 21:00


 4/2/21 20:59  1/12/21 20:55





 


 Furosemide 100 mg/


 Dextrose  100 ml @ 


 10 mls/hr  Q10H


 IV


   1/7/21 11:00


 2/6/21 10:59  1/13/21 06:10





 


 Haloperidol


  (Haldol)  5 mg  Q6H  PRN


 ORAL


 Agitation  1/4/21 13:45


 2/18/21 13:44  1/4/21 22:56





 


 Haloperidol


 Lactate 5 mg/


 Dextrose  56 ml @ 


 224 mls/hr  Q6H  PRN


 IVPB


 Agitation  1/5/21 12:30


 2/19/21 12:29  1/5/21 13:20





 


 Insulin Aspart


  (NovoLOG)    Q4HR


 SUBQ


   1/11/21 05:00


 4/9/21 17:59  1/13/21 06:10





 


 Insulin Detemir


  (Levemir)  26 units  Q12HR


 SUBQ


   1/12/21 09:00


 4/12/21 08:59  1/12/21 20:56





 


 Methylprednisolone


 Sodium Succinate


  (Solu-MEDROL)  10 mg  DAILY


 IVP


   1/17/21 09:00


 1/18/21 09:01   





 


 Methylprednisolone


 Sodium Succinate


  (Solu-MEDROL)  20 mg  DAILY


 IVP


   1/15/21 09:00


 1/16/21 09:01   





 


 Methylprednisolone


 Sodium Succinate


  (Solu-MEDROL)  30 mg  DAILY


 IVP


   1/13/21 09:00


 1/14/21 09:01   





 


 Midazolam HCl  100 ml @ 0


 mls/hr  Q24H  PRN


 IV


 SEDATION  1/8/21 21:00


 1/15/21 20:59  1/13/21 06:12





 


 Quetiapine


 Fumarate


  (SEROqueL)  50 mg  Q12HR


 ORAL


   1/4/21 21:00


 2/18/21 20:59  1/12/21 20:54





 


 Sodium Chloride  1,000 ml @ 


 25 mls/hr  Q24H


 IV


   1/3/21 07:45


 2/2/21 07:44  1/13/21 05:35














Assessment/Plan


Problem List:  


(1) Diabetes mellitus out of control


ICD Codes:  E11.65 - Type 2 diabetes mellitus with hyperglycemia


SNOMED:  37418731, [id number]


(2) Pneumonia due to COVID-19 virus


ICD Codes:  U07.1 - COVID-19; J12.82 - Pneumonia due to coronavirus disease 2019


SNOMED:  [id number]


Assessment/Plan:


continue Levemir 26 units bid 


continue Novolog sliding scale every 4 hours











Nick Mcmahon MD                 Jan 13, 2021 06:25

## 2021-01-13 NOTE — NUR
NURSE HAND-OFF REPORT: 



Latest Vital Signs: Temperature 97.3 , Pulse 109 , B/P 127 /94 , Respiratory Rate 20 , O2 
SAT 90 , Mechanical Ventilator, O2 Flow Rate  .  

Vital Sign Comment: WNL. Fever resolved.



EKG Rhythm: Sinus Tachycardia

Rhythm change?: N 

MD Notified?: -

MD Response: 



Latest Singh Fall Score: 50  

Fall Risk: High Risk 

Safety Measures: Call light Within Reach, Bed Alarm Zone 1, Side Rails Side Rails x3, Bed 
position Low and Locked.

Fall Precautions: 

Yellow Socks

Yellow Gown

Door Sign

Patient Fall Education



Report given to SUZI Perez.

## 2021-01-14 VITALS — SYSTOLIC BLOOD PRESSURE: 106 MMHG | DIASTOLIC BLOOD PRESSURE: 92 MMHG

## 2021-01-14 VITALS — DIASTOLIC BLOOD PRESSURE: 99 MMHG | SYSTOLIC BLOOD PRESSURE: 124 MMHG

## 2021-01-14 VITALS — DIASTOLIC BLOOD PRESSURE: 86 MMHG | SYSTOLIC BLOOD PRESSURE: 124 MMHG

## 2021-01-14 VITALS — DIASTOLIC BLOOD PRESSURE: 78 MMHG | SYSTOLIC BLOOD PRESSURE: 106 MMHG

## 2021-01-14 VITALS — DIASTOLIC BLOOD PRESSURE: 92 MMHG | SYSTOLIC BLOOD PRESSURE: 124 MMHG

## 2021-01-14 VITALS — SYSTOLIC BLOOD PRESSURE: 127 MMHG | DIASTOLIC BLOOD PRESSURE: 86 MMHG

## 2021-01-14 VITALS — DIASTOLIC BLOOD PRESSURE: 86 MMHG | SYSTOLIC BLOOD PRESSURE: 122 MMHG

## 2021-01-14 VITALS — SYSTOLIC BLOOD PRESSURE: 125 MMHG | DIASTOLIC BLOOD PRESSURE: 94 MMHG

## 2021-01-14 VITALS — DIASTOLIC BLOOD PRESSURE: 76 MMHG | SYSTOLIC BLOOD PRESSURE: 110 MMHG

## 2021-01-14 VITALS — SYSTOLIC BLOOD PRESSURE: 121 MMHG | DIASTOLIC BLOOD PRESSURE: 85 MMHG

## 2021-01-14 VITALS — SYSTOLIC BLOOD PRESSURE: 137 MMHG | DIASTOLIC BLOOD PRESSURE: 93 MMHG

## 2021-01-14 VITALS — DIASTOLIC BLOOD PRESSURE: 71 MMHG | SYSTOLIC BLOOD PRESSURE: 97 MMHG

## 2021-01-14 VITALS — DIASTOLIC BLOOD PRESSURE: 83 MMHG | SYSTOLIC BLOOD PRESSURE: 106 MMHG

## 2021-01-14 VITALS — DIASTOLIC BLOOD PRESSURE: 80 MMHG | SYSTOLIC BLOOD PRESSURE: 131 MMHG

## 2021-01-14 VITALS — SYSTOLIC BLOOD PRESSURE: 98 MMHG | DIASTOLIC BLOOD PRESSURE: 74 MMHG

## 2021-01-14 VITALS — DIASTOLIC BLOOD PRESSURE: 59 MMHG | SYSTOLIC BLOOD PRESSURE: 88 MMHG

## 2021-01-14 VITALS — DIASTOLIC BLOOD PRESSURE: 92 MMHG | SYSTOLIC BLOOD PRESSURE: 122 MMHG

## 2021-01-14 VITALS — DIASTOLIC BLOOD PRESSURE: 85 MMHG | SYSTOLIC BLOOD PRESSURE: 121 MMHG

## 2021-01-14 VITALS — SYSTOLIC BLOOD PRESSURE: 106 MMHG | DIASTOLIC BLOOD PRESSURE: 80 MMHG

## 2021-01-14 VITALS — DIASTOLIC BLOOD PRESSURE: 88 MMHG | SYSTOLIC BLOOD PRESSURE: 125 MMHG

## 2021-01-14 VITALS — DIASTOLIC BLOOD PRESSURE: 84 MMHG | SYSTOLIC BLOOD PRESSURE: 122 MMHG

## 2021-01-14 VITALS — SYSTOLIC BLOOD PRESSURE: 130 MMHG | DIASTOLIC BLOOD PRESSURE: 78 MMHG

## 2021-01-14 VITALS — SYSTOLIC BLOOD PRESSURE: 127 MMHG | DIASTOLIC BLOOD PRESSURE: 91 MMHG

## 2021-01-14 VITALS — DIASTOLIC BLOOD PRESSURE: 88 MMHG | SYSTOLIC BLOOD PRESSURE: 129 MMHG

## 2021-01-14 LAB
ADD MANUAL DIFF: YES
ALBUMIN SERPL-MCNC: 1.9 G/DL (ref 3.4–5)
ALBUMIN/GLOB SERPL: 0.4 {RATIO} (ref 1–2.7)
ALP SERPL-CCNC: 100 U/L (ref 46–116)
ALT SERPL-CCNC: 20 U/L (ref 12–78)
ANION GAP SERPL CALC-SCNC: 3 MMOL/L (ref 5–15)
AST SERPL-CCNC: 30 U/L (ref 15–37)
BILIRUB SERPL-MCNC: 0.8 MG/DL (ref 0.2–1)
BUN SERPL-MCNC: 42 MG/DL (ref 7–18)
CALCIUM SERPL-MCNC: 9.3 MG/DL (ref 8.5–10.1)
CHLORIDE SERPL-SCNC: 105 MMOL/L (ref 98–107)
CO2 SERPL-SCNC: 38 MMOL/L (ref 21–32)
CREAT SERPL-MCNC: 1 MG/DL (ref 0.55–1.3)
ERYTHROCYTE [DISTWIDTH] IN BLOOD BY AUTOMATED COUNT: 12.6 % (ref 11.6–14.8)
GLOBULIN SER-MCNC: 5.4 G/DL
HCT VFR BLD CALC: 53.7 % (ref 42–52)
HGB BLD-MCNC: 16.7 G/DL (ref 14.2–18)
MCV RBC AUTO: 91 FL (ref 80–99)
PLATELET # BLD: 282 K/UL (ref 150–450)
POTASSIUM SERPL-SCNC: 3.9 MMOL/L (ref 3.5–5.1)
RBC # BLD AUTO: 5.87 M/UL (ref 4.7–6.1)
SODIUM SERPL-SCNC: 146 MMOL/L (ref 136–145)
WBC # BLD AUTO: 15 K/UL (ref 4.8–10.8)

## 2021-01-14 RX ADMIN — METHYLPREDNISOLONE SODIUM SUCCINATE SCH MG: 40 INJECTION, POWDER, LYOPHILIZED, FOR SOLUTION INTRAMUSCULAR; INTRAVENOUS at 08:36

## 2021-01-14 RX ADMIN — INSULIN ASPART SCH UNITS: 100 INJECTION, SOLUTION INTRAVENOUS; SUBCUTANEOUS at 04:31

## 2021-01-14 RX ADMIN — SODIUM CHLORIDE SCH MLS/HR: 0.9 INJECTION INTRAVENOUS at 12:49

## 2021-01-14 RX ADMIN — SODIUM CHLORIDE SCH MLS/HR: 0.9 INJECTION INTRAVENOUS at 08:38

## 2021-01-14 RX ADMIN — ENOXAPARIN SODIUM SCH MG: 80 INJECTION SUBCUTANEOUS at 21:11

## 2021-01-14 RX ADMIN — INSULIN DETEMIR SCH UNITS: 100 INJECTION, SOLUTION SUBCUTANEOUS at 21:00

## 2021-01-14 RX ADMIN — INSULIN ASPART SCH UNITS: 100 INJECTION, SOLUTION INTRAVENOUS; SUBCUTANEOUS at 00:14

## 2021-01-14 RX ADMIN — INSULIN ASPART SCH UNITS: 100 INJECTION, SOLUTION INTRAVENOUS; SUBCUTANEOUS at 12:56

## 2021-01-14 RX ADMIN — INSULIN ASPART SCH UNITS: 100 INJECTION, SOLUTION INTRAVENOUS; SUBCUTANEOUS at 17:06

## 2021-01-14 RX ADMIN — ENOXAPARIN SODIUM SCH MG: 80 INJECTION SUBCUTANEOUS at 08:37

## 2021-01-14 RX ADMIN — INSULIN DETEMIR SCH UNITS: 100 INJECTION, SOLUTION SUBCUTANEOUS at 08:21

## 2021-01-14 RX ADMIN — PANTOPRAZOLE SODIUM SCH MG: 40 INJECTION, POWDER, FOR SOLUTION INTRAVENOUS at 08:36

## 2021-01-14 RX ADMIN — CHLORHEXIDINE GLUCONATE SCH APPLIC: 213 SOLUTION TOPICAL at 19:49

## 2021-01-14 NOTE — NUR
NURSE NOTES:

Pt's son, Amador called. gave him an updates and answered his questions and concerns. 

Turned repositioned and Oral care provided.

## 2021-01-14 NOTE — INFECTIOUS DISEASES PROG NOTE
Assessment/Plan


Assessment/Plan


A


1. COVID-19 pneumonia.


2. New-onset diabetes.


3. Hypoxic respiratory failure


4. Sepsis with fever, tachycardia & leukocytosis





P


1. Finished remdesivir course


2. Change ceftriaxone to Cefepime


3. Continue solumedrol


4. Sputum culture, blood culture


5. start on Vancomycin





Subjective


ROS Limited/Unobtainable:  Yes


Constitutional:  Reports: fever, other - T=101.4


Allergies:  


Coded Allergies:  


     No Known Allergies (Unverified , 6/11/19)





Objective





Last 24 Hour Vital Signs








  Date Time  Temp Pulse Resp B/P (MAP) Pulse Ox O2 Delivery O2 Flow Rate FiO2


 


1/14/21 07:00  123 25 106/83 (91) 97   


 


1/14/21 06:30 101.4 127 26 121/85 (97) 99   


 


1/14/21 06:00   26   Mechanical Ventilator  70


 


1/14/21 06:00  127 27 122/92 (102) 97   


 


1/14/21 05:08 100.5       


 


1/14/21 05:00  110 26 130/78 (95) 98   


 


1/14/21 05:00   26   Mechanical Ventilator  70


 


1/14/21 04:00  126      


 


1/14/21 04:00 100.4 123 25 124/86 (99) 93   


 


1/14/21 04:00        70


 


1/14/21 04:00   26   Mechanical Ventilator  70


 


1/14/21 04:00      Mechanical Ventilator  


 


1/14/21 03:25  114 22     70


 


1/14/21 03:00  113 21 98/74 (82) 98   


 


1/14/21 03:00   26   Mechanical Ventilator  70


 


1/14/21 02:00  108 19 97/71 (80) 99   


 


1/14/21 02:00   26   Mechanical Ventilator  70


 


1/14/21 01:00  105 19 88/59 (69) 98   


 


1/14/21 01:00   26   Mechanical Ventilator  70


 


1/14/21 00:00        70


 


1/14/21 00:00   26   Mechanical Ventilator  70


 


1/14/21 00:00 98.7 106 18 124/92 (103)    


 


1/14/21 00:00      Mechanical Ventilator  


 


1/13/21 23:46  106 19     70


 


1/13/21 23:00   26   Mechanical Ventilator  70


 


1/13/21 23:00  102 13 120/94 (103) 97   


 


1/13/21 22:00  97 17 112/87 (95) 97   


 


1/13/21 22:00   26   Mechanical Ventilator  70


 


1/13/21 21:00   26   Mechanical Ventilator  70


 


1/13/21 21:00  98 17 100/82 (88) 99   


 


1/13/21 20:00      Mechanical Ventilator  


 


1/13/21 20:00   26   Mechanical Ventilator  70


 


1/13/21 20:00        70


 


1/13/21 20:00 98.0 99 19 108/82 (91) 97   


 


1/13/21 20:00  101      


 


1/13/21 19:45   26   Mechanical Ventilator  70


 


1/13/21 19:41  100 18     70


 


1/13/21 19:00  104 18 104/76 (85) 92   


 


1/13/21 19:00   22   Mechanical Ventilator  65


 


1/13/21 18:40   22   Mechanical Ventilator  65


 


1/13/21 18:00   22   Mechanical Ventilator  65


 


1/13/21 18:00  109 21 118/77 (91) 93   


 


1/13/21 17:00  110 21 116/84 (95) 92   


 


1/13/21 17:00   22   Mechanical Ventilator  65


 


1/13/21 16:00  108      


 


1/13/21 16:00        65


 


1/13/21 16:00   22   Mechanical Ventilator  65


 


1/13/21 16:00 97.0 108 19 100/70 (80) 94   


 


1/13/21 16:00      Mechanical Ventilator  


 


1/13/21 15:05  112 22     55


 


1/13/21 15:00  108 19 110/80 (90) 94   


 


1/13/21 15:00   22   Mechanical Ventilator  65


 


1/13/21 14:00  113 21 100/70 (80) 94   


 


1/13/21 14:00   22   Mechanical Ventilator  65


 


1/13/21 13:00   22   Mechanical Ventilator  65


 


1/13/21 13:00  112 21 107/82 (90) 94   


 


1/13/21 12:59   22   Mechanical Ventilator  65


 


1/13/21 12:15 98.1 111 21 102/71 (81) 93   


 


1/13/21 12:00  111 21 102/71 (81) 93   


 


1/13/21 12:00   22   Mechanical Ventilator  65


 


1/13/21 12:00  111      


 


1/13/21 12:00      Mechanical Ventilator  


 


1/13/21 12:00        65


 


1/13/21 11:05  104 24     55


 


1/13/21 11:00  107 18 104/75 (85) 91   


 


1/13/21 11:00   22   Mechanical Ventilator  65


 


1/13/21 10:00   18   Mechanical Ventilator  65


 


1/13/21 10:00  107 17 103/74 (84) 91   








Height (Feet):  5


Height (Inches):  7.00


Weight (Pounds):  198


HEENT:  mucous membranes moist, other - orally intubated


Respiratory/Chest:  other - on Ventilator, FIO2=70%


Cardiovascular:  tachycardia, other - R femoral line


Extremities:  no edema


Neurologic/Psychiatric:  other - opens eyes, sedated





Laboratory Tests








Test


 1/13/21


21:03 1/14/21


00:02 1/14/21


04:30


 


POC Whole Blood Glucose Pending   Pending   


 


White Blood Count


 


 


 15.0 K/UL


(4.8-10.8)  H


 


Red Blood Count


 


 


 5.87 M/UL


(4.70-6.10)


 


Hemoglobin


 


 


 16.7 G/DL


(14.2-18.0)


 


Hematocrit


 


 


 53.7 %


(42.0-52.0)  H


 


Mean Corpuscular Volume   91 FL (80-99)  


 


Mean Corpuscular Hemoglobin


 


 


 28.5 PG


(27.0-31.0)


 


Mean Corpuscular Hemoglobin


Concent 


 


 31.2 G/DL


(32.0-36.0)  L


 


Red Cell Distribution Width


 


 


 12.6 %


(11.6-14.8)


 


Platelet Count


 


 


 282 K/UL


(150-450)


 


Mean Platelet Volume


 


 


 9.5 FL


(6.5-10.1)


 


Neutrophils (%) (Auto)


 


 


 % (45.0-75.0)





 


Lymphocytes (%) (Auto)


 


 


 % (20.0-45.0)





 


Monocytes (%) (Auto)    % (1.0-10.0)  


 


Eosinophils (%) (Auto)    % (0.0-3.0)  


 


Basophils (%) (Auto)    % (0.0-2.0)  


 


Differential Total Cells


Counted 


 


 100  





 


Neutrophils % (Manual)   93 % (45-75)  H


 


Lymphocytes % (Manual)   5 % (20-45)  L


 


Monocytes % (Manual)   2 % (1-10)  


 


Eosinophils % (Manual)   0 % (0-3)  


 


Basophils % (Manual)   0 % (0-2)  


 


Band Neutrophils   0 % (0-8)  


 


Platelet Estimate   Adequate  


 


Platelet Morphology   Normal  


 


Polychromasia   1+  


 


Sodium Level


 


 


 146 MMOL/L


(136-145)  H


 


Potassium Level


 


 


 3.9 MMOL/L


(3.5-5.1)


 


Chloride Level


 


 


 105 MMOL/L


()


 


Carbon Dioxide Level


 


 


 38 MMOL/L


(21-32)  H


 


Anion Gap


 


 


 3 mmol/L


(5-15)  L


 


Blood Urea Nitrogen


 


 


 42 mg/dL


(7-18)  H


 


Creatinine


 


 


 1.0 MG/DL


(0.55-1.30)


 


Estimat Glomerular Filtration


Rate 


 


 > 60 mL/min


(>60)


 


Glucose Level


 


 


 90 MG/DL


()


 


Calcium Level


 


 


 9.3 MG/DL


(8.5-10.1)


 


Total Bilirubin


 


 


 0.8 MG/DL


(0.2-1.0)


 


Aspartate Amino Transf


(AST/SGOT) 


 


 30 U/L (15-37)





 


Alanine Aminotransferase


(ALT/SGPT) 


 


 20 U/L (12-78)





 


Alkaline Phosphatase


 


 


 100 U/L


()


 


Total Protein


 


 


 7.3 G/DL


(6.4-8.2)


 


Albumin


 


 


 1.9 G/DL


(3.4-5.0)  L


 


Globulin   5.4 g/dL  


 


Albumin/Globulin Ratio


 


 


 0.4 (1.0-2.7)


L











Current Medications








 Medications


  (Trade)  Dose


 Ordered  Sig/Julisa


 Route


 PRN Reason  Start Time


 Stop Time Status Last Admin


Dose Admin


 


 Acetaminophen


  (Tylenol)  650 mg  Q6H  PRN


 ORAL


 Temp >100.5  1/3/21 04:00


 2/2/21 03:59  1/14/21 04:31





 


 Ceftriaxone


 Sodium 1 gm/


 Dextrose  55 ml @ 


 110 mls/hr  DAILY


 IVPB


   1/10/21 11:00


 1/17/21 10:59  1/14/21 08:38





 


 Chlorhexidine


 Gluconate


  (Vanessa-Hex 2%)  1 applic  DAILY@2000


 TOPIC


   1/6/21 20:00


 4/6/21 19:59  1/13/21 20:00





 


 Dextrose


  (Dextrose 50%)  25 ml  Q30M  PRN


 IV


 Hypoglycemia  1/9/21 13:30


 4/9/21 13:29   





 


 Dextrose


  (Dextrose 50%)  50 ml  Q30M  PRN


 IV


 Hypoglycemia  1/9/21 13:30


 4/9/21 13:29   





 


 Docusate Sodium


  (Colace)  100 mg  TIDPRN  PRN


 GT


 Constipation  1/12/21 01:15


 2/11/21 01:14  1/12/21 01:42





 


 Enoxaparin Sodium


  (Lovenox)  80 mg  EVERY 12  HOURS


 SUBQ


   1/2/21 21:00


 4/2/21 20:59  1/14/21 08:37





 


 Furosemide 100 mg/


 Dextrose  100 ml @ 


 10 mls/hr  Q10H


 IV


   1/7/21 11:00


 2/6/21 10:59  1/14/21 03:00





 


 Haloperidol


  (Haldol)  5 mg  Q6H  PRN


 ORAL


 Agitation  1/4/21 13:45


 2/18/21 13:44  1/4/21 22:56





 


 Haloperidol


 Lactate 5 mg/


 Dextrose  56 ml @ 


 224 mls/hr  Q6H  PRN


 IVPB


 Agitation  1/5/21 12:30


 2/19/21 12:29  1/5/21 13:20





 


 Insulin Aspart


  (NovoLOG)    Q6HR


 SUBQ


   1/14/21 12:00


 4/9/21 17:59   





 


 Insulin Detemir


  (Levemir)  20 units  Q12HR


 SUBQ


   1/14/21 09:00


 4/12/21 08:59   





 


 Methylprednisolone


 Sodium Succinate


  (Solu-MEDROL)  10 mg  DAILY


 IVP


   1/17/21 09:00


 1/18/21 09:01   





 


 Methylprednisolone


 Sodium Succinate


  (Solu-MEDROL)  20 mg  DAILY


 IVP


   1/15/21 09:00


 1/16/21 09:01   





 


 Midazolam HCl  100 ml @ 0


 mls/hr  Q24H  PRN


 IV


 SEDATION  1/8/21 21:00


 1/15/21 20:59  1/13/21 18:40





 


 Pantoprazole


  (Protonix)  40 mg  DAILY


 IVP


   1/14/21 09:00


 2/13/21 08:59  1/14/21 08:36





 


 Quetiapine


 Fumarate


  (SEROqueL)  50 mg  Q12HR


 ORAL


   1/4/21 21:00


 2/18/21 20:59  1/14/21 08:36





 


 Sodium Chloride  1,000 ml @ 


 25 mls/hr  Q24H


 IV


   1/3/21 07:45


 2/2/21 07:44  1/13/21 05:35




















Lamin Felix MD               Jan 14, 2021 09:07

## 2021-01-14 NOTE — GENERAL PROGRESS NOTE
Subjective


ROS Limited/Unobtainable:  Yes


Allergies:  


Coded Allergies:  


     No Known Allergies (Unverified , 6/11/19)


Subjective


events noted - interval notes reviewed 


intubated 


glucose values stable 











Item Value  Date Time


 


Bedside Blood Glucose 109 mg/dl 1/14/21 0431


 


Bedside Blood Glucose 151 mg/dl H 1/14/21 0014


 


Bedside Blood Glucose 231 mg/dl H 1/13/21 2123


 


Bedside Blood Glucose 246 mg/dl H 1/13/21 1653


 


Bedside Blood Glucose 245 mg/dl H 1/13/21 1314


 


Bedside Blood Glucose 212 mg/dl H 1/13/21 0927


 


Bedside Blood Glucose 173 mg/dl H 1/13/21 0610











Objective





Last 24 Hour Vital Signs








  Date Time  Temp Pulse Resp B/P (MAP) Pulse Ox O2 Delivery O2 Flow Rate FiO2


 


1/14/21 05:08 100.5       


 


1/14/21 05:00  110 26 130/78 (95) 98   


 


1/14/21 05:00   26   Mechanical Ventilator  70


 


1/14/21 04:00  126      


 


1/14/21 04:00 100.4 123 25 124/86 (99) 93   


 


1/14/21 04:00        70


 


1/14/21 04:00   26   Mechanical Ventilator  70


 


1/14/21 04:00      Mechanical Ventilator  


 


1/14/21 03:25  114 22     70


 


1/14/21 03:00  113 21 98/74 (82) 98   


 


1/14/21 03:00   26   Mechanical Ventilator  70


 


1/14/21 02:00  108 19 97/71 (80) 99   


 


1/14/21 02:00   26   Mechanical Ventilator  70


 


1/14/21 01:00  105 19 88/59 (69) 98   


 


1/14/21 01:00   26   Mechanical Ventilator  70


 


1/14/21 00:00        70


 


1/14/21 00:00   26   Mechanical Ventilator  70


 


1/14/21 00:00 98.7 106 18 124/92 (103)    


 


1/14/21 00:00      Mechanical Ventilator  


 


1/13/21 23:46  106 19     70


 


1/13/21 23:00   26   Mechanical Ventilator  70


 


1/13/21 23:00  102 13 120/94 (103) 97   


 


1/13/21 22:00  97 17 112/87 (95) 97   


 


1/13/21 22:00   26   Mechanical Ventilator  70


 


1/13/21 21:00   26   Mechanical Ventilator  70


 


1/13/21 21:00  98 17 100/82 (88) 99   


 


1/13/21 20:00      Mechanical Ventilator  


 


1/13/21 20:00   26   Mechanical Ventilator  70


 


1/13/21 20:00        70


 


1/13/21 20:00 98.0 99 19 108/82 (91) 97   


 


1/13/21 20:00  101      


 


1/13/21 19:45   26   Mechanical Ventilator  70


 


1/13/21 19:41  100 18     70


 


1/13/21 19:00  104 18 104/76 (85) 92   


 


1/13/21 19:00   22   Mechanical Ventilator  65


 


1/13/21 18:40   22   Mechanical Ventilator  65


 


1/13/21 18:00   22   Mechanical Ventilator  65


 


1/13/21 18:00  109 21 118/77 (91) 93   


 


1/13/21 17:00  110 21 116/84 (95) 92   


 


1/13/21 17:00   22   Mechanical Ventilator  65


 


1/13/21 16:00  108      


 


1/13/21 16:00        65


 


1/13/21 16:00   22   Mechanical Ventilator  65


 


1/13/21 16:00 97.0 108 19 100/70 (80) 94   


 


1/13/21 16:00      Mechanical Ventilator  


 


1/13/21 15:05  112 22     55


 


1/13/21 15:00  108 19 110/80 (90) 94   


 


1/13/21 15:00   22   Mechanical Ventilator  65


 


1/13/21 14:00  113 21 100/70 (80) 94   


 


1/13/21 14:00   22   Mechanical Ventilator  65


 


1/13/21 13:00   22   Mechanical Ventilator  65


 


1/13/21 13:00  112 21 107/82 (90) 94   


 


1/13/21 12:59   22   Mechanical Ventilator  65


 


1/13/21 12:15 98.1 111 21 102/71 (81) 93   


 


1/13/21 12:00  111 21 102/71 (81) 93   


 


1/13/21 12:00   22   Mechanical Ventilator  65


 


1/13/21 12:00  111      


 


1/13/21 12:00      Mechanical Ventilator  


 


1/13/21 12:00        65


 


1/13/21 11:05  104 24     55


 


1/13/21 11:00  107 18 104/75 (85) 91   


 


1/13/21 11:00   22   Mechanical Ventilator  65


 


1/13/21 10:00   18   Mechanical Ventilator  65


 


1/13/21 10:00  107 17 103/74 (84) 91   


 


1/13/21 09:00  107 18 116/84 (95) 91   


 


1/13/21 09:00   18   Mechanical Ventilator  65


 


1/13/21 08:00 97.0 108 18 118/96 (103) 91   


 


1/13/21 08:00  108      


 


1/13/21 08:00   22   Mechanical Ventilator  65


 


1/13/21 08:00        65


 


1/13/21 08:00      Mechanical Ventilator  


 


1/13/21 07:43  98 21     55


 


1/13/21 07:00  109 18 127/94 (105) 90   


 


1/13/21 07:00   20   Mechanical Ventilator  65


 


1/13/21 06:42 97.3       

















Intake and Output  


 


 1/13/21 1/14/21





 19:00 07:00


 


Intake Total 1070.59 ml 662 ml


 


Output Total 950 ml 1025 ml


 


Balance 120.59 ml -363 ml


 


  


 


Free Water  90 ml


 


IV Total 710.59 ml 372 ml


 


Tube Feeding 240 ml 200 ml


 


Other 120 ml 


 


Output Urine Total 950 ml 1025 ml








Laboratory Tests


1/13/21 07:43: 


Arterial Blood pH 7.406, Arterial Blood Partial Pressure CO2 58.7*H, Arterial 

Blood Partial Pressure O2 66.7L, Arterial Blood HCO3 36.0H, Arterial Blood 

Oxygen Saturation 93.2L, Arterial Blood Base Excess 8.6H, Abelardo Test Positive


1/13/21 21:03: POC Whole Blood Glucose [Pending]


1/14/21 00:02: POC Whole Blood Glucose [Pending]


1/14/21 04:30: 


White Blood Count [Pending], Red Blood Count [Pending], Hemoglobin [Pending], 

Hematocrit [Pending], Mean Corpuscular Volume [Pending], Mean Corpuscular 

Hemoglobin [Pending], Mean Corpuscular Hemoglobin Concent [Pending], Red Cell 

Distribution Width [Pending], Platelet Count [Pending], Mean Platelet Volume 

[Pending], Neutrophils (%) (Auto) [Pending], Lymphocytes (%) (Auto) [Pending], 

Monocytes (%) (Auto) [Pending], Eosinophils (%) (Auto) [Pending], Basophils (%) 

(Auto) [Pending], Sodium Level [Pending], Potassium Level [Pending], Chloride 

Level [Pending], Carbon Dioxide Level [Pending], Blood Urea Nitrogen [Pending], 

Creatinine [Pending], Estimat Glomerular Filtration Rate [Pending], Glucose 

Level [Pending], Calcium Level [Pending], Total Bilirubin [Pending], Aspartate 

Amino Transf (AST/SGOT) [Pending], Alanine Aminotransferase (ALT/SGPT) 

[Pending], Alkaline Phosphatase [Pending], Total Protein [Pending], Albumin 

[Pending], Globulin [Pending]


Height (Feet):  5


Height (Inches):  7.00


Weight (Pounds):  198


Objective





Current Medications








 Medications


  (Trade)  Dose


 Ordered  Sig/Julisa


 Route


 PRN Reason  Start Time


 Stop Time Status Last Admin


Dose Admin


 


 Acetaminophen


  (Tylenol)  650 mg  Q6H  PRN


 ORAL


 Temp >100.5  1/3/21 04:00


 2/2/21 03:59  1/14/21 04:31





 


 Ceftriaxone


 Sodium 1 gm/


 Dextrose  55 ml @ 


 110 mls/hr  DAILY


 IVPB


   1/10/21 11:00


 1/17/21 10:59  1/13/21 09:23





 


 Chlorhexidine


 Gluconate


  (Vnaessa-Hex 2%)  1 applic  DAILY@2000


 TOPIC


   1/6/21 20:00


 4/6/21 19:59  1/13/21 20:00





 


 Dextrose


  (Dextrose 50%)  25 ml  Q30M  PRN


 IV


 Hypoglycemia  1/9/21 13:30


 4/9/21 13:29   





 


 Dextrose


  (Dextrose 50%)  50 ml  Q30M  PRN


 IV


 Hypoglycemia  1/9/21 13:30


 4/9/21 13:29   





 


 Docusate Sodium


  (Colace)  100 mg  TIDPRN  PRN


 GT


 Constipation  1/12/21 01:15


 2/11/21 01:14  1/12/21 01:42





 


 Enoxaparin Sodium


  (Lovenox)  80 mg  EVERY 12  HOURS


 SUBQ


   1/2/21 21:00


 4/2/21 20:59  1/13/21 21:23





 


 Furosemide 100 mg/


 Dextrose  100 ml @ 


 10 mls/hr  Q10H


 IV


   1/7/21 11:00


 2/6/21 10:59  1/14/21 03:00





 


 Haloperidol


  (Haldol)  5 mg  Q6H  PRN


 ORAL


 Agitation  1/4/21 13:45


 2/18/21 13:44  1/4/21 22:56





 


 Haloperidol


 Lactate 5 mg/


 Dextrose  56 ml @ 


 224 mls/hr  Q6H  PRN


 IVPB


 Agitation  1/5/21 12:30


 2/19/21 12:29  1/5/21 13:20





 


 Insulin Aspart


  (NovoLOG)    Q4HR


 SUBQ


   1/11/21 05:00


 4/9/21 17:59  1/14/21 00:14





 


 Insulin Detemir


  (Levemir)  26 units  Q12HR


 SUBQ


   1/12/21 09:00


 4/12/21 08:59  1/13/21 21:22





 


 Methylprednisolone


 Sodium Succinate


  (Solu-MEDROL)  10 mg  DAILY


 IVP


   1/17/21 09:00


 1/18/21 09:01   





 


 Methylprednisolone


 Sodium Succinate


  (Solu-MEDROL)  20 mg  DAILY


 IVP


   1/15/21 09:00


 1/16/21 09:01   





 


 Methylprednisolone


 Sodium Succinate


  (Solu-MEDROL)  30 mg  DAILY


 IVP


   1/13/21 09:00


 1/14/21 09:01  1/13/21 09:23





 


 Midazolam HCl  100 ml @ 0


 mls/hr  Q24H  PRN


 IV


 SEDATION  1/8/21 21:00


 1/15/21 20:59  1/13/21 18:40





 


 Quetiapine


 Fumarate


  (SEROqueL)  50 mg  Q12HR


 ORAL


   1/4/21 21:00


 2/18/21 20:59  1/13/21 21:22





 


 Sodium Chloride  1,000 ml @ 


 25 mls/hr  Q24H


 IV


   1/3/21 07:45


 2/2/21 07:44  1/13/21 05:35














Assessment/Plan


Problem List:  


(1) Diabetes mellitus out of control


ICD Codes:  E11.65 - Type 2 diabetes mellitus with hyperglycemia


SNOMED:  74679113, [id number]


(2) Pneumonia due to COVID-19 virus


ICD Codes:  U07.1 - COVID-19; J12.82 - Pneumonia due to coronavirus disease 2019


SNOMED:  [id number]


Assessment/Plan:


reduce Levemir to 20 units bid 


continue Novolog sliding scale every 4 hours











Nick Mcmahon MD                 Jan 14, 2021 06:21

## 2021-01-14 NOTE — DIAGNOSTIC IMAGING REPORT
Indication: Cough

 

Technique: One view of the chest

 

Comparison: 1/13/2021

 

Findings: There is increasing density and extent of infiltrate at the right lung

base, and increased infiltrate at the left lung base. The pleural spaces remain

clear. Endotracheal tube position remains at the thoracic inlet. The orogastric tube

is retracted some, and the proximal sidehole may be of the gastroesophageal junction.

 

Impression: Increasing bilateral infiltrates

 

Somewhat high position of orogastric tube. Advancement recommended

## 2021-01-14 NOTE — NUR
NURSE NOTES:

Pt repositioned

PO care provided.

Afebrile with ice packs on. 

Seen by Dr Lemos - ABG results reviewed. order received for additional ABG draw at 1400.

## 2021-01-14 NOTE — NUR
NURSE NOTES:

Pt received from [facility] L[facility]. 

Pt is sedated, opens eyes, reached RASS -2.

Pt is unable to follow simple commands at this time; withdraws to light pain stimuli; gag 
reflex active. 

Pt is ST on cardiac monitor with 2+ radial and dorsalis pedis pulses, 1+ pitting edema noted 
to right hand. 

Pt is orally intubated with ETT 7.5/23cm, AC 16, , FiO2 40 %, Peep 10. 

Left naris NGT is running Vital AF 1.2 at 55mL/hr. Abdomen is round and soft w/ active bowel 
sounds to all quadrants. 

F/C noted draining yellow urine. 

Skin is intact. 

Pt has a left femoral TLC, dressing CDI, Versed gtt @1mg/hr, Lasix gtt @10mg/hr, and 1/2 NS 
@25 mL/hr. 

RFA 18 IV noted saline-locked. 

Safety measures observed and no acute distress noted. will continue to monitor.

## 2021-01-14 NOTE — NUR
NURSE NOTES:

Pt repositioned.

Seen by Dr Umberto Felix.

Sputum and blood cultures collected and sent down to lab.

## 2021-01-14 NOTE — NUR
NURSE HAND-OFF REPORT: 



Latest Vital Signs: Temperature 98.0 , Pulse 116 , B/P 122 /84 , Respiratory Rate 21 , O2 
 , Mechanical Ventilator, FiO2 60 %  .  



EKG Rhythm: Sinus Tachycardia

Rhythm change?: N 

MD Notified?: n/a

MD Response: n/a



Latest Singh Fall Score: 50  

Fall Risk: High Risk 

Safety Measures: Call light Within Reach, Bed Alarm Zone 1, Side Rails Side Rails x3, Bed 
position Low and Locked.

Fall Precautions: 

Yellow Socks

Yellow Gown

Door Sign

Patient Fall Education



Report given to Virginie Coleman RN.

## 2021-01-14 NOTE — NUR
NURSE NOTES:

Pt repositioned.

PO care provided.

Afebrile with cooling measures continued. 

No distress noted.

## 2021-01-14 NOTE — PULMONOLOGY PROGRESS NOTE
Subjective


ROS Limited/Unobtainable:  Yes


Interval Events:  None new reported.  On Versed drip.


Constitutional:  Reports: fever, other - T=101.4


HEENT:  Repors: no symptoms


Respiratory:  Reports: shortness of breath - better


Cardiovascular:  Reports: no symptoms


Gastrointestinal/Abdominal:  Reports: no symptoms


Allergies:  


Coded Allergies:  


     No Known Allergies (Unverified , 6/11/19)


All Systems:  reviewed and negative except above





Objective





Last 24 Hour Vital Signs








  Date Time  Temp Pulse Resp B/P (MAP) Pulse Ox O2 Delivery O2 Flow Rate FiO2


 


1/14/21 16:00        60


 


1/14/21 16:00  111      


 


1/14/21 16:00 98.0 116 23 127/86 (100) 100   


 


1/14/21 16:00      Mechanical Ventilator  


 


1/14/21 15:00   18   Mechanical Ventilator  60


 


1/14/21 15:00  112 22 122/86 (98) 100   


 


1/14/21 14:00   16   Mechanical Ventilator  60


 


1/14/21 14:00  111 18 125/88 (100) 100   


 


1/14/21 13:00  104 16 106/80 (89) 100   


 


1/14/21 13:00   17   Mechanical Ventilator  60


 


1/14/21 12:00 97.8 101 16 106/92 (97) 100   


 


1/14/21 12:00  112      


 


1/14/21 12:00   17   Mechanical Ventilator  60


 


1/14/21 12:00        60


 


1/14/21 12:00      Mechanical Ventilator  


 


1/14/21 11:00  103 16 106/78 (87) 100   


 


1/14/21 11:00   16   Mechanical Ventilator  60


 


1/14/21 10:00  109 18 110/76 (87) 99   


 


1/14/21 10:00   17   Mechanical Ventilator  60


 


1/14/21 09:00  110 18 124/99 (107) 98   


 


1/14/21 09:00   18   Mechanical Ventilator  60


 


1/14/21 08:00  118      


 


1/14/21 08:00   21   Mechanical Ventilator  60


 


1/14/21 08:00        60


 


1/14/21 08:00      Mechanical Ventilator  


 


1/14/21 08:00 98.6 120 18 125/94 (104) 69   


 


1/14/21 07:00  123 25 106/83 (91) 97   


 


1/14/21 07:00   23   Mechanical Ventilator  70


 


1/14/21 06:30 101.4 127 26 121/85 (97) 99   


 


1/14/21 06:00   26   Mechanical Ventilator  70


 


1/14/21 06:00  127 27 122/92 (102) 97   


 


1/14/21 05:08 100.5       


 


1/14/21 05:00  110 26 130/78 (95) 98   


 


1/14/21 05:00   26   Mechanical Ventilator  70


 


1/14/21 04:00  126      


 


1/14/21 04:00 100.4 123 25 124/86 (99) 93   


 


1/14/21 04:00        70


 


1/14/21 04:00   26   Mechanical Ventilator  70


 


1/14/21 04:00      Mechanical Ventilator  


 


1/14/21 03:25  114 22     70


 


1/14/21 03:00  113 21 98/74 (82) 98   


 


1/14/21 03:00   26   Mechanical Ventilator  70


 


1/14/21 02:00  108 19 97/71 (80) 99   


 


1/14/21 02:00   26   Mechanical Ventilator  70


 


1/14/21 01:00  105 19 88/59 (69) 98   


 


1/14/21 01:00   26   Mechanical Ventilator  70


 


1/14/21 00:00        70


 


1/14/21 00:00   26   Mechanical Ventilator  70


 


1/14/21 00:00 98.7 106 18 124/92 (103)    


 


1/14/21 00:00      Mechanical Ventilator  


 


1/13/21 23:46  106 19     70


 


1/13/21 23:00   26   Mechanical Ventilator  70


 


1/13/21 23:00  102 13 120/94 (103) 97   


 


1/13/21 22:00  97 17 112/87 (95) 97   


 


1/13/21 22:00   26   Mechanical Ventilator  70


 


1/13/21 21:00   26   Mechanical Ventilator  70


 


1/13/21 21:00  98 17 100/82 (88) 99   


 


1/13/21 20:00      Mechanical Ventilator  


 


1/13/21 20:00   26   Mechanical Ventilator  70


 


1/13/21 20:00        70


 


1/13/21 20:00 98.0 99 19 108/82 (91) 97   


 


1/13/21 20:00  101      


 


1/13/21 19:45   26   Mechanical Ventilator  70


 


1/13/21 19:41  100 18     70


 


1/13/21 19:00  104 18 104/76 (85) 92   


 


1/13/21 19:00   22   Mechanical Ventilator  65


 


1/13/21 18:40   22   Mechanical Ventilator  65


 


1/13/21 18:00   22   Mechanical Ventilator  65


 


1/13/21 18:00  109 21 118/77 (91) 93   


 


1/13/21 17:00  110 21 116/84 (95) 92   


 


1/13/21 17:00   22   Mechanical Ventilator  65

















Intake and Output  


 


 1/13/21 1/14/21





 19:00 07:00


 


Intake Total 1070.59 ml 786 ml


 


Output Total 950 ml 1175 ml


 


Balance 120.59 ml -389 ml


 


  


 


Free Water  100 ml


 


IV Total 710.59 ml 446 ml


 


Tube Feeding 240 ml 240 ml


 


Other 120 ml 


 


Output Urine Total 950 ml 1175 ml








Objective


On a Versed drip


General Appearance:  no acute distress


HEENT:  atraumatic


Respiratory:  decreased breath sounds


Cardiovascular:  normal rate, regular rhythm


Abdomen:  soft, non tender


Laboratory Tests


1/13/21 21:03: POC Whole Blood Glucose [Pending]


1/14/21 00:02: POC Whole Blood Glucose [Pending]


1/14/21 04:30: 


White Blood Count 15.0H, Red Blood Count 5.87, Hemoglobin 16.7, Hematocrit 53.7H

, Mean Corpuscular Volume 91, Mean Corpuscular Hemoglobin 28.5, Mean Corpuscular

 Hemoglobin Concent 31.2L, Red Cell Distribution Width 12.6, Platelet Count 282,

 Mean Platelet Volume 9.5, Neutrophils (%) (Auto) , Lymphocytes (%) (Auto) , 

Monocytes (%) (Auto) , Eosinophils (%) (Auto) , Basophils (%) (Auto) , Diff

erential Total Cells Counted 100, Neutrophils % (Manual) 93H, Lymphocytes % 

(Manual) 5L, Monocytes % (Manual) 2, Eosinophils % (Manual) 0, Basophils % 

(Manual) 0, Band Neutrophils 0, Platelet Estimate Adequate, Platelet Morphology 

Normal, Polychromasia 1+, Sodium Level 146H, Potassium Level 3.9, Chloride Level

 105, Carbon Dioxide Level 38H, Anion Gap 3L, Blood Urea Nitrogen 42H, Crea

tinine 1.0, Estimat Glomerular Filtration Rate > 60, Glucose Level 90, Calcium 

Level 9.3, Total Bilirubin 0.8, Aspartate Amino Transf (AST/SGOT) 30, Alanine 

Aminotransferase (ALT/SGPT) 20, Alkaline Phosphatase 100, Total Protein 7.3, 

Albumin 1.9L, Globulin 5.4, Albumin/Globulin Ratio 0.4L


1/14/21 09:54: 


Arterial Blood pH 7.480H, Arterial Blood Partial Pressure CO2 49.6H, Arterial 

Blood Partial Pressure O2 55.1L, Arterial Blood HCO3 36.1H, Arterial Blood 

Oxygen Saturation 90.0L, Arterial Blood Base Excess 10.6*H, Abelardo Test Positive


1/14/21 15:55: 


Arterial Blood pH 7.489H, Arterial Blood Partial Pressure CO2 50.9H, Arterial 

Blood Partial Pressure O2 60.7L, Arterial Blood HCO3 37.8H, Arterial Blood 

Oxygen Saturation 92.4L, Arterial Blood Base Excess 12.2*H, Abelardo Test Positive





Current Medications








 Medications


  (Trade)  Dose


 Ordered  Sig/Julisa


 Route


 PRN Reason  Start Time


 Stop Time Status Last Admin


Dose Admin


 


 Acetaminophen


  (Tylenol)  650 mg  Q6H  PRN


 ORAL


 Temp >100.5  1/3/21 04:00


 2/2/21 03:59  1/14/21 04:31





 


 Cefepime HCl 1 gm/


 Dextrose  55 ml @ 


 110 mls/hr  Q12H


 IVPB


   1/14/21 13:00


 1/21/21 12:59  1/14/21 12:49





 


 Chlorhexidine


 Gluconate


  (Vanessa-Hex 2%)  1 applic  DAILY@2000


 TOPIC


   1/6/21 20:00


 4/6/21 19:59  1/13/21 20:00





 


 Dextrose


  (Dextrose 50%)  25 ml  Q30M  PRN


 IV


 Hypoglycemia  1/9/21 13:30


 4/9/21 13:29   





 


 Dextrose


  (Dextrose 50%)  50 ml  Q30M  PRN


 IV


 Hypoglycemia  1/9/21 13:30


 4/9/21 13:29   





 


 Docusate Sodium


  (Colace)  100 mg  TIDPRN  PRN


 GT


 Constipation  1/12/21 01:15


 2/11/21 01:14  1/12/21 01:42





 


 Enoxaparin Sodium


  (Lovenox)  80 mg  EVERY 12  HOURS


 SUBQ


   1/2/21 21:00


 4/2/21 20:59  1/14/21 08:37





 


 Furosemide 100 mg/


 Dextrose  100 ml @ 


 10 mls/hr  Q10H


 IV


   1/7/21 11:00


 2/6/21 10:59  1/14/21 12:50





 


 Haloperidol


  (Haldol)  5 mg  Q6H  PRN


 ORAL


 Agitation  1/4/21 13:45


 2/18/21 13:44  1/4/21 22:56





 


 Haloperidol


 Lactate 5 mg/


 Dextrose  56 ml @ 


 224 mls/hr  Q6H  PRN


 IVPB


 Agitation  1/5/21 12:30


 2/19/21 12:29  1/5/21 13:20





 


 Insulin Aspart


  (NovoLOG)    Q6HR


 SUBQ


   1/14/21 12:00


 4/9/21 17:59  1/14/21 12:56





 


 Insulin Detemir


  (Levemir)  20 units  Q12HR


 SUBQ


   1/14/21 09:00


 4/12/21 08:59   





 


 Methylprednisolone


 Sodium Succinate


  (Solu-MEDROL)  10 mg  DAILY


 IVP


   1/17/21 09:00


 1/18/21 09:01   





 


 Methylprednisolone


 Sodium Succinate


  (Solu-MEDROL)  20 mg  DAILY


 IVP


   1/15/21 09:00


 1/16/21 09:01   





 


 Midazolam HCl  100 ml @ 0


 mls/hr  Q24H  PRN


 IV


 SEDATION  1/8/21 21:00


 1/15/21 20:59  1/13/21 18:40





 


 Pantoprazole


  (Protonix)  40 mg  DAILY


 IVP


   1/14/21 09:00


 2/13/21 08:59  1/14/21 08:36





 


 Quetiapine


 Fumarate


  (SEROqueL)  50 mg  Q12HR


 ORAL


   1/4/21 21:00


 2/18/21 20:59  1/14/21 08:36





 


 Sodium Chloride  1,000 ml @ 


 25 mls/hr  Q24H


 IV


   1/3/21 07:45


 2/2/21 07:44  1/13/21 05:35





 


 Vancomycin HCl  250 ml @ 


 166.667


 mls/hr  Q12H


 IVPB


   1/14/21 22:00


 1/19/21 21:59   





 


 Vancomycin HCl


  ([facility]


 to dose)  1 ea  DAILY  PRN


 MISC


 Per rx protocol  1/14/21 09:15


 2/13/21 09:14   














Assessment/Plan


Assessment/Plan


1. COVID-19 pneumonia.


   - on Decadron, azithromycin, and ceftriaxone


   - Intubated now; will continue AC mode for now


          -Currently 65% FiO2. PEEP 10


        


2. Diabetes mellitus.; 


3. Elevated inflammatory markers.


4. High D-dimer.


5. Hyponatremia.


6. Hyperglycemia.


   - BG control


7. Hypoxemia., secondary to #1; improved





DVT prophylaxis   On Lovenox.


Sedated





I will continue Lasix drip.


I willcontinue Diamox


May need pressors.











Sung Lemos MD           Jan 14, 2021 16:59

## 2021-01-14 NOTE — NUR
NURSE NOTES:

Patients blood pressures remains stable at this time, saturating above 97%. Patients versed 
gtt at 1mg/hr. Gag reflexes remains active. pupils and blinking reflexes remains intact. 
FLACC score 0/10. Repositioned patient, safety measures observed.

## 2021-01-14 NOTE — SURGERY PROGRESS NOTE
Surgery Progress Note


Subjective


Procedure Performed


Left femoral central venous catheter insertion


Additional Comments


leukocytosis


drainage from line changed


no n/v


ill appearing





Objective





Last 24 Hour Vital Signs








  Date Time  Temp Pulse Resp B/P (MAP) Pulse Ox O2 Delivery O2 Flow Rate FiO2


 


1/14/21 13:00  104 16 106/80 (89) 100   


 


1/14/21 12:00 97.8 101 16 106/92 (97) 100   


 


1/14/21 12:00        60


 


1/14/21 12:00      Mechanical Ventilator  


 


1/14/21 11:00  103 16 106/78 (87) 100   


 


1/14/21 11:00   16   Mechanical Ventilator  60


 


1/14/21 10:00  109 18 110/76 (87) 99   


 


1/14/21 10:00   17   Mechanical Ventilator  60


 


1/14/21 09:00  110 18 124/99 (107) 98   


 


1/14/21 09:00   18   Mechanical Ventilator  60


 


1/14/21 08:00  118      


 


1/14/21 08:00   21   Mechanical Ventilator  60


 


1/14/21 08:00        60


 


1/14/21 08:00      Mechanical Ventilator  


 


1/14/21 08:00 98.6 120 18 125/94 (104) 69   


 


1/14/21 07:00  123 25 106/83 (91) 97   


 


1/14/21 07:00   23   Mechanical Ventilator  70


 


1/14/21 06:30 101.4 127 26 121/85 (97) 99   


 


1/14/21 06:00   26   Mechanical Ventilator  70


 


1/14/21 06:00  127 27 122/92 (102) 97   


 


1/14/21 05:08 100.5       


 


1/14/21 05:00  110 26 130/78 (95) 98   


 


1/14/21 05:00   26   Mechanical Ventilator  70


 


1/14/21 04:00  126      


 


1/14/21 04:00 100.4 123 25 124/86 (99) 93   


 


1/14/21 04:00        70


 


1/14/21 04:00   26   Mechanical Ventilator  70


 


1/14/21 04:00      Mechanical Ventilator  


 


1/14/21 03:25  114 22     70


 


1/14/21 03:00  113 21 98/74 (82) 98   


 


1/14/21 03:00   26   Mechanical Ventilator  70


 


1/14/21 02:00  108 19 97/71 (80) 99   


 


1/14/21 02:00   26   Mechanical Ventilator  70


 


1/14/21 01:00  105 19 88/59 (69) 98   


 


1/14/21 01:00   26   Mechanical Ventilator  70


 


1/14/21 00:00        70


 


1/14/21 00:00   26   Mechanical Ventilator  70


 


1/14/21 00:00 98.7 106 18 124/92 (103)    


 


1/14/21 00:00      Mechanical Ventilator  


 


1/13/21 23:46  106 19     70


 


1/13/21 23:00   26   Mechanical Ventilator  70


 


1/13/21 23:00  102 13 120/94 (103) 97   


 


1/13/21 22:00  97 17 112/87 (95) 97   


 


1/13/21 22:00   26   Mechanical Ventilator  70


 


1/13/21 21:00   26   Mechanical Ventilator  70


 


1/13/21 21:00  98 17 100/82 (88) 99   


 


1/13/21 20:00      Mechanical Ventilator  


 


1/13/21 20:00   26   Mechanical Ventilator  70


 


1/13/21 20:00        70


 


1/13/21 20:00 98.0 99 19 108/82 (91) 97   


 


1/13/21 20:00  101      


 


1/13/21 19:45   26   Mechanical Ventilator  70


 


1/13/21 19:41  100 18     70


 


1/13/21 19:00  104 18 104/76 (85) 92   


 


1/13/21 19:00   22   Mechanical Ventilator  65


 


1/13/21 18:40   22   Mechanical Ventilator  65


 


1/13/21 18:00   22   Mechanical Ventilator  65


 


1/13/21 18:00  109 21 118/77 (91) 93   


 


1/13/21 17:00  110 21 116/84 (95) 92   


 


1/13/21 17:00   22   Mechanical Ventilator  65


 


1/13/21 16:00  108      


 


1/13/21 16:00        65


 


1/13/21 16:00   22   Mechanical Ventilator  65


 


1/13/21 16:00 97.0 108 19 100/70 (80) 94   


 


1/13/21 16:00      Mechanical Ventilator  


 


1/13/21 15:05  112 22     55


 


1/13/21 15:00  108 19 110/80 (90) 94   


 


1/13/21 15:00   22   Mechanical Ventilator  65


 


1/13/21 14:00  113 21 100/70 (80) 94   


 


1/13/21 14:00   22   Mechanical Ventilator  65








I&O











Intake and Output  


 


 1/13/21 1/14/21





 19:00 07:00


 


Intake Total 1070.59 ml 786 ml


 


Output Total 950 ml 1175 ml


 


Balance 120.59 ml -389 ml


 


  


 


Free Water  100 ml


 


IV Total 710.59 ml 446 ml


 


Tube Feeding 240 ml 240 ml


 


Other 120 ml 


 


Output Urine Total 950 ml 1175 ml








Dressing:  other


Wound:  other


Cardiovascular:  RSR


Respiratory:  decreased breath sounds


Abdomen:  soft, non-tender, present bowel sounds


Extremities:  no tenderness, no cyanosis





Laboratory Tests








Test


 1/13/21


21:03 1/14/21


00:02 1/14/21


04:30 1/14/21


09:54


 


POC Whole Blood Glucose Pending   Pending    


 


White Blood Count


 


 


 15.0 K/UL


(4.8-10.8)  H 





 


Red Blood Count


 


 


 5.87 M/UL


(4.70-6.10) 





 


Hemoglobin


 


 


 16.7 G/DL


(14.2-18.0) 





 


Hematocrit


 


 


 53.7 %


(42.0-52.0)  H 





 


Mean Corpuscular Volume   91 FL (80-99)   


 


Mean Corpuscular Hemoglobin


 


 


 28.5 PG


(27.0-31.0) 





 


Mean Corpuscular Hemoglobin


Concent 


 


 31.2 G/DL


(32.0-36.0)  L 





 


Red Cell Distribution Width


 


 


 12.6 %


(11.6-14.8) 





 


Platelet Count


 


 


 282 K/UL


(150-450) 





 


Mean Platelet Volume


 


 


 9.5 FL


(6.5-10.1) 





 


Neutrophils (%) (Auto)


 


 


 % (45.0-75.0)


 





 


Lymphocytes (%) (Auto)


 


 


 % (20.0-45.0)


 





 


Monocytes (%) (Auto)    % (1.0-10.0)   


 


Eosinophils (%) (Auto)    % (0.0-3.0)   


 


Basophils (%) (Auto)    % (0.0-2.0)   


 


Differential Total Cells


Counted 


 


 100  


 





 


Neutrophils % (Manual)   93 % (45-75)  H 


 


Lymphocytes % (Manual)   5 % (20-45)  L 


 


Monocytes % (Manual)   2 % (1-10)   


 


Eosinophils % (Manual)   0 % (0-3)   


 


Basophils % (Manual)   0 % (0-2)   


 


Band Neutrophils   0 % (0-8)   


 


Platelet Estimate   Adequate   


 


Platelet Morphology   Normal   


 


Polychromasia   1+   


 


Sodium Level


 


 


 146 MMOL/L


(136-145)  H 





 


Potassium Level


 


 


 3.9 MMOL/L


(3.5-5.1) 





 


Chloride Level


 


 


 105 MMOL/L


() 





 


Carbon Dioxide Level


 


 


 38 MMOL/L


(21-32)  H 





 


Anion Gap


 


 


 3 mmol/L


(5-15)  L 





 


Blood Urea Nitrogen


 


 


 42 mg/dL


(7-18)  H 





 


Creatinine


 


 


 1.0 MG/DL


(0.55-1.30) 





 


Estimat Glomerular Filtration


Rate 


 


 > 60 mL/min


(>60) 





 


Glucose Level


 


 


 90 MG/DL


() 





 


Calcium Level


 


 


 9.3 MG/DL


(8.5-10.1) 





 


Total Bilirubin


 


 


 0.8 MG/DL


(0.2-1.0) 





 


Aspartate Amino Transf


(AST/SGOT) 


 


 30 U/L (15-37)


 





 


Alanine Aminotransferase


(ALT/SGPT) 


 


 20 U/L (12-78)


 





 


Alkaline Phosphatase


 


 


 100 U/L


() 





 


Total Protein


 


 


 7.3 G/DL


(6.4-8.2) 





 


Albumin


 


 


 1.9 G/DL


(3.4-5.0)  L 





 


Globulin   5.4 g/dL   


 


Albumin/Globulin Ratio


 


 


 0.4 (1.0-2.7)


L 





 


Arterial Blood pH


 


 


 


 7.480


(7.350-7.450)


 


Arterial Blood Partial


Pressure CO2 


 


 


 49.6 mmHg


(35.0-45.0)  H


 


Arterial Blood Partial


Pressure O2 


 


 


 55.1 mmHg


(75.0-100.0)  L


 


Arterial Blood HCO3


 


 


 


 36.1 mmol/L


(22.0-26.0)  H


 


Arterial Blood Oxygen


Saturation 


 


 


 90.0 %


()  L


 


Arterial Blood Base Excess    10.6 (-2-2)  *H


 


Abelardo Test    Positive  











Plan


Problems:  


(1) Pneumonia due to COVID-19 virus


Assessment & Plan:  Interim endotracheal intubation, endotracheal tube tip in 

good position


approximately 4 cm above the tito. There are extensive bilateral infiltrates, 

which


are markedly increased from the prior study. Pleural spaces are probably clear, 

not


well demonstrated


Markedly increased and now extensive bilateral infiltrates 


cont as per pulm and iID





(2) Hypoxia


(3) Abdominal pain


Assessment & Plan:  65M abd pain, septic, covid, intubated ons upport


currently abd exam benign but limited given condition


line bo mary noted


okay for meds and feeds


nutritional optimization 


DAILY ESTIMATED NEEDS:


Needs based on Critical Care/ 72kg abw 


22-28  kcals/kg 


6386-9805  total kcals


1.2-2  g protein/kg


  g total protein 


25-30  mL/kg


2564-4378  total fluid mLs





NUTRITION DIAGNOSIS:


Swallowing difficulty R/T respiratory failure as evidenced by pt now


orally intubated, OGT in place, NPO





 


CURRENT TF:NPO 





 





ENTERAL NUTRITION RECOMMENDATIONS:


Vital AF 1.2 @ 60ml/hr x 24 hrs  to provide 1440ml, 1728kcal, 116g prot, 1167ml 

free water 





* As medically appropriate, initiate critical care and carb controlled TF 

formula of Vital AF 1.2


* Initiate @ 20ml/hr x 6hrs, advance 10ml q 4-6 hrs as tolerated to goal rate


* HOB over 30 degrees/ water flush per MD


* Does not exceed est kcal needs w/ Propofol running @ 8.083ml/hr x 24 hrs 

(provides 213 lipid kcal)





 





ADDITIONAL RECOMMENDATIONS:


* Calibrated bedscale wt 


* Monitor BGs closely w/ Decadron and TF, need for long acting insulin 


* Monitor lytes, replete as needed  


* Monitor Propofol rate, need to adjust TF rate  





(4) Acute metabolic encephalopathy











Norberto Vazquez                Jan 14, 2021 13:22

## 2021-01-14 NOTE — NUR
NURSE NOTES:

Pt repositioned.

PO care provided. 

Afebrile with ice packs on. 

No distress noted at this time.

## 2021-01-14 NOTE — NUR
NURSE NOTES:

Pt received from [facility]. Pt is sedated, opens eyes when called by name for approx 5 sec, 
RASS noted -2, bilat pupils equal and round 3mm with brisk rxn to light; pt is unable to 
follow simple commands at this time; withdraws to light pain stimuli; gag reflex active. Pt 
is ST to cardiac monitor with 2+ radial and dorsalis pedis pulses. 1+ pitting edema noted to 
right hand. cap refill less than 2 sec. Pt is orally intubated with a 7.5 ETT noted 23 cm at 
the lip with the following settings: AC 16  FiO2 70 % Peep 10. (will f/u with ABGs 
results). lung fields noted diminished upon auscultation.  left naris NGT is running 
Glucerna 1.2 at 20 cc/hr without gastric residuals. Abdomen is round and soft w/ active 
bowel sounds to all quadrants. F/C noted draining yellow urine. Skin is intact. Pt has a 
left femoral TLC with dry and intact dressing running versed gtt at 1 mg/hr, lasix gtt at 10 
mg/hr, and 1/2 NS at 25 cc/hr. RFA 18 g IV noted saline-locked. Bed in lowest position, 
alarm on, side rails up x 2, call light within reach. Will continue to monitor.


-------------------------------------------------------------------------------

Addendum: 01/14/21 at 1455 by Cady Gorman RN

-------------------------------------------------------------------------------

Amendment: Pt received from [facility]. Pt is sedated, opens eyes when called by name for 
approx 5 sec, RASS noted -2, bilat pupils equal and round 3mm with brisk rxn to light; pt is 
unable to follow simple commands at this time; withdraws to light pain stimuli; gag reflex 
active. Pt is ST to cardiac monitor with 2+ radial and dorsalis pedis pulses. 1+ pitting 
edema noted to right hand. cap refill less than 2 sec. Pt is orally intubated with a 7.5 ETT 
noted 23 cm at the lip with the following settings: AC 16  FiO2 70 % Peep 10. (will 
f/u with ABGs results). lung fields noted diminished upon auscultation.  left naris NGT is 
running Vital AF 1.2 at 20 cc/hr without gastric residuals. Abdomen is round and soft w/ 
active bowel sounds to all quadrants. F/C noted draining yellow urine. Skin is intact. Pt 
has a left femoral TLC with dry and intact dressing running versed gtt at 1 mg/hr, lasix gtt 
at 10 mg/hr, and 1/2 NS at 25 cc/hr. RFA 18 g IV noted saline-locked. Bed in lowest 
position, alarm on, side rails up x 2, call light within reach. Will continue to monitor.

## 2021-01-14 NOTE — HEMATOLOGY/ONC PROGRESS NOTE
Assessment/Plan


Assessment/Plan


Leukocytosis 2/2 covid pna


Anemia 2/2 chronic disease


Ac resp distress on ventilator


etoh abused


agitated -halodol


vent


cont iv abx and iv decadrone


pulmonary and gi on consult


dw charge nurse





Subjective


Constitutional:  Denies: no symptoms, chills, fever, malaise, weakness, other


HEENT:  Denies: no symptoms, eye pain, blurred vision, tearing, double vision, 

ear pain, ear discharge, nose pain, nose congestion, throat pain, throat 

swelling, mouth pain, mouth swelling, other


Genitourinary:  Denies: no symptoms, burning, discharge, frequency, flank pain, 

hematuria, incontinence, pain, urgency, other


Neurologic/Psychiatric:  Denies: no symptoms, anxiety, depressed, emotional 

problems, headache, numbness, paresthesia, pre-existing deficit, seizure, 

tingling, tremors, weakness, other


Hematologic/Lymphatic:  Denies: no symptoms, anemia, easy bleeding, easy 

bruising, adenopathy, other


Allergies:  


Coded Allergies:  


     No Known Allergies (Unverified , 6/11/19)


Subjective


1/10 on ventilator 60% fio2, on sediation, unresponsive, in icu


1/14 on vent, icu, wbc elev, no bleeding, unresponsive





Objective


Objective





Current Medications








 Medications


  (Trade)  Dose


 Ordered  Sig/Julisa


 Route


 PRN Reason  Start Time


 Stop Time Status Last Admin


Dose Admin


 


 Acetaminophen


  (Tylenol)  650 mg  Q6H  PRN


 ORAL


 Temp >100.5  1/3/21 04:00


 2/2/21 03:59  1/14/21 04:31





 


 Ceftriaxone


 Sodium 1 gm/


 Dextrose  55 ml @ 


 110 mls/hr  DAILY


 IVPB


   1/10/21 11:00


 1/17/21 10:59  1/13/21 09:23





 


 Chlorhexidine


 Gluconate


  (Vanessa-Hex 2%)  1 applic  DAILY@2000


 TOPIC


   1/6/21 20:00


 4/6/21 19:59  1/13/21 20:00





 


 Dextrose


  (Dextrose 50%)  25 ml  Q30M  PRN


 IV


 Hypoglycemia  1/9/21 13:30


 4/9/21 13:29   





 


 Dextrose


  (Dextrose 50%)  50 ml  Q30M  PRN


 IV


 Hypoglycemia  1/9/21 13:30


 4/9/21 13:29   





 


 Docusate Sodium


  (Colace)  100 mg  TIDPRN  PRN


 GT


 Constipation  1/12/21 01:15


 2/11/21 01:14  1/12/21 01:42





 


 Enoxaparin Sodium


  (Lovenox)  80 mg  EVERY 12  HOURS


 SUBQ


   1/2/21 21:00


 4/2/21 20:59  1/13/21 21:23





 


 Furosemide 100 mg/


 Dextrose  100 ml @ 


 10 mls/hr  Q10H


 IV


   1/7/21 11:00


 2/6/21 10:59  1/14/21 03:00





 


 Haloperidol


  (Haldol)  5 mg  Q6H  PRN


 ORAL


 Agitation  1/4/21 13:45


 2/18/21 13:44  1/4/21 22:56





 


 Haloperidol


 Lactate 5 mg/


 Dextrose  56 ml @ 


 224 mls/hr  Q6H  PRN


 IVPB


 Agitation  1/5/21 12:30


 2/19/21 12:29  1/5/21 13:20





 


 Insulin Aspart


  (NovoLOG)    Q4HR


 SUBQ


   1/11/21 05:00


 4/9/21 17:59  1/14/21 00:14





 


 Insulin Detemir


  (Levemir)  20 units  Q12HR


 SUBQ


   1/14/21 09:00


 4/12/21 08:59   





 


 Methylprednisolone


 Sodium Succinate


  (Solu-MEDROL)  10 mg  DAILY


 IVP


   1/17/21 09:00


 1/18/21 09:01   





 


 Methylprednisolone


 Sodium Succinate


  (Solu-MEDROL)  20 mg  DAILY


 IVP


   1/15/21 09:00


 1/16/21 09:01   





 


 Methylprednisolone


 Sodium Succinate


  (Solu-MEDROL)  30 mg  DAILY


 IVP


   1/13/21 09:00


 1/14/21 09:01  1/13/21 09:23





 


 Midazolam HCl  100 ml @ 0


 mls/hr  Q24H  PRN


 IV


 SEDATION  1/8/21 21:00


 1/15/21 20:59  1/13/21 18:40





 


 Quetiapine


 Fumarate


  (SEROqueL)  50 mg  Q12HR


 ORAL


   1/4/21 21:00


 2/18/21 20:59  1/13/21 21:22





 


 Sodium Chloride  1,000 ml @ 


 25 mls/hr  Q24H


 IV


   1/3/21 07:45


 2/2/21 07:44  1/13/21 05:35














Last 24 Hour Vital Signs








  Date Time  Temp Pulse Resp B/P (MAP) Pulse Ox O2 Delivery O2 Flow Rate FiO2


 


1/14/21 06:30 101.4 127 26 121/85 (97) 99   


 


1/14/21 06:00   26   Mechanical Ventilator  70


 


1/14/21 06:00  127 27 122/92 (102) 97   


 


1/14/21 05:08 100.5       


 


1/14/21 05:00  110 26 130/78 (95) 98   


 


1/14/21 05:00   26   Mechanical Ventilator  70


 


1/14/21 04:00  126      


 


1/14/21 04:00 100.4 123 25 124/86 (99) 93   


 


1/14/21 04:00        70


 


1/14/21 04:00   26   Mechanical Ventilator  70


 


1/14/21 04:00      Mechanical Ventilator  


 


1/14/21 03:25  114 22     70


 


1/14/21 03:00  113 21 98/74 (82) 98   


 


1/14/21 03:00   26   Mechanical Ventilator  70


 


1/14/21 02:00  108 19 97/71 (80) 99   


 


1/14/21 02:00   26   Mechanical Ventilator  70


 


1/14/21 01:00  105 19 88/59 (69) 98   


 


1/14/21 01:00   26   Mechanical Ventilator  70


 


1/14/21 00:00        70


 


1/14/21 00:00   26   Mechanical Ventilator  70


 


1/14/21 00:00 98.7 106 18 124/92 (103)    


 


1/14/21 00:00      Mechanical Ventilator  


 


1/13/21 23:46  106 19     70


 


1/13/21 23:00   26   Mechanical Ventilator  70


 


1/13/21 23:00  102 13 120/94 (103) 97   


 


1/13/21 22:00  97 17 112/87 (95) 97   


 


1/13/21 22:00   26   Mechanical Ventilator  70


 


1/13/21 21:00   26   Mechanical Ventilator  70


 


1/13/21 21:00  98 17 100/82 (88) 99   


 


1/13/21 20:00      Mechanical Ventilator  


 


1/13/21 20:00   26   Mechanical Ventilator  70


 


1/13/21 20:00        70


 


1/13/21 20:00 98.0 99 19 108/82 (91) 97   


 


1/13/21 20:00  101      


 


1/13/21 19:45   26   Mechanical Ventilator  70


 


1/13/21 19:41  100 18     70


 


1/13/21 19:00  104 18 104/76 (85) 92   


 


1/13/21 19:00   22   Mechanical Ventilator  65


 


1/13/21 18:40   22   Mechanical Ventilator  65


 


1/13/21 18:00   22   Mechanical Ventilator  65


 


1/13/21 18:00  109 21 118/77 (91) 93   


 


1/13/21 17:00  110 21 116/84 (95) 92   


 


1/13/21 17:00   22   Mechanical Ventilator  65


 


1/13/21 16:00  108      


 


1/13/21 16:00        65


 


1/13/21 16:00   22   Mechanical Ventilator  65


 


1/13/21 16:00 97.0 108 19 100/70 (80) 94   


 


1/13/21 16:00      Mechanical Ventilator  


 


1/13/21 15:05  112 22     55


 


1/13/21 15:00  108 19 110/80 (90) 94   


 


1/13/21 15:00   22   Mechanical Ventilator  65


 


1/13/21 14:00  113 21 100/70 (80) 94   


 


1/13/21 14:00   22   Mechanical Ventilator  65


 


1/13/21 13:00   22   Mechanical Ventilator  65


 


1/13/21 13:00  112 21 107/82 (90) 94   


 


1/13/21 12:59   22   Mechanical Ventilator  65


 


1/13/21 12:15 98.1 111 21 102/71 (81) 93   


 


1/13/21 12:00  111 21 102/71 (81) 93   


 


1/13/21 12:00   22   Mechanical Ventilator  65


 


1/13/21 12:00  111      


 


1/13/21 12:00      Mechanical Ventilator  


 


1/13/21 12:00        65


 


1/13/21 11:05  104 24     55


 


1/13/21 11:00  107 18 104/75 (85) 91   


 


1/13/21 11:00   22   Mechanical Ventilator  65


 


1/13/21 10:00   18   Mechanical Ventilator  65


 


1/13/21 10:00  107 17 103/74 (84) 91   


 


1/13/21 09:00  107 18 116/84 (95) 91   


 


1/13/21 09:00   18   Mechanical Ventilator  65


 


1/13/21 08:00 97.0 108 18 118/96 (103) 91   


 


1/13/21 08:00  108      


 


1/13/21 08:00   22   Mechanical Ventilator  65


 


1/13/21 08:00        65


 


1/13/21 08:00      Mechanical Ventilator  


 


1/13/21 07:43  98 21     55


 


1/13/21 07:00  109 18 127/94 (105) 90   


 


1/13/21 07:00   20   Mechanical Ventilator  65


 


1/13/21 06:42 97.3       


 


1/13/21 06:12   24     75


 


1/13/21 06:00   19   Mechanical Ventilator  65


 


1/13/21 06:00  108 22 130/85 (100) 92   


 


1/13/21 05:00  112 22 107/92 (97) 97   


 


1/13/21 05:00   16   Mechanical Ventilator  65


 


1/13/21 04:00        55


 


1/13/21 04:00  109      


 


1/13/21 04:00   18   Mechanical Ventilator  75


 


1/13/21 04:00 97.3 106 21 106/80 (89) 98   


 


1/13/21 04:00      Mechanical Ventilator  


 


1/13/21 03:00  119 20 114/85 (95) 100   


 


1/13/21 03:00   18   Mechanical Ventilator  75


 


1/13/21 02:33  116 22     55


 


1/13/21 02:00   20   Mechanical Ventilator  75


 


1/13/21 02:00  119 24 112/85 (94) 97   


 


1/13/21 01:00  131 25 113/91 (98) 94   


 


1/13/21 01:00   26   Mechanical Ventilator  75


 


1/13/21 00:00  136      


 


1/13/21 00:00      Mechanical Ventilator  


 


1/13/21 00:00   24   Mechanical Ventilator  75


 


1/13/21 00:00 100.0 135 27 126/83 (97) 95   


 


1/13/21 00:00        70


 


1/12/21 23:05  139 27     55


 


1/12/21 23:03 102.5       


 


1/12/21 23:00  137 28 126/81 (96) 92   


 


1/12/21 23:00   25   Mechanical Ventilator  75


 


1/12/21 22:15   29   Mechanical Ventilator  55


 


1/12/21 22:00 102.2 139 29 117/88 (98) 93   


 


1/12/21 22:00   24   Mechanical Ventilator  75


 


1/12/21 21:00  128 27 130/96 (107) 93   


 


1/12/21 21:00   25   Mechanical Ventilator  75


 


1/12/21 20:00      Mechanical Ventilator  


 


1/12/21 20:00   23   Mechanical Ventilator  55


 


1/12/21 20:00        55


 


1/12/21 20:00 101.4 120 26 120/78 (92) 98   


 


1/12/21 20:00  120      


 


1/12/21 19:28  118 24     55


 


1/12/21 19:00  119 25 120/76 (91) 99   


 


1/12/21 19:00   22   Mechanical Ventilator  55


 


1/12/21 18:00   26   Endotracheal Tube  55


 


1/12/21 18:00  118 26 119/76 (90) 98   


 


1/12/21 17:00 99.0 117 26 115/87 (96) 99   


 


1/12/21 17:00   26   Endotracheal Tube  55


 


1/12/21 16:03 99.6       


 


1/12/21 16:00  121 25 114/80 (91) 99   


 


1/12/21 16:00        55


 


1/12/21 16:00   25   Endotracheal Tube  55


 


1/12/21 16:00      Mechanical Ventilator  


 


1/12/21 16:00  117      


 


1/12/21 15:33   27     55


 


1/12/21 15:00   27   Endotracheal Tube  55


 


1/12/21 15:00  122 26 101/85 (90) 99   


 


1/12/21 14:00  122 26 121/71 (88) 100   


 


1/12/21 14:00   26   Endotracheal Tube  55


 


1/12/21 13:26  122 26     55


 


1/12/21 13:00  120 25 112/83 (93) 100   


 


1/12/21 13:00   25   Endotracheal Tube  65


 


1/12/21 12:00  120 25 111/81 (91) 100   


 


1/12/21 12:00        65


 


1/12/21 12:00   25   Endotracheal Tube  65


 


1/12/21 12:00      Mechanical Ventilator  


 


1/12/21 11:29  119      


 


1/12/21 11:00  120 25 111/80 (90) 100   


 


1/12/21 11:00   24   Endotracheal Tube  65


 


1/12/21 10:00   26   Endotracheal Tube  65


 


1/12/21 10:00  120 25 103/74 (84) 98   


 


1/12/21 09:44 99.8       


 


1/12/21 09:14   27   Mechanical Ventilator 100.0 65


 


1/12/21 09:00  122 27 111/84 (93) 99   


 


1/12/21 08:00 99.8 124 27 119/87 (98) 99   


 


1/12/21 08:00        70


 


1/12/21 08:00   23   Endotracheal Tube  70


 


1/12/21 08:00      Mechanical Ventilator  


 


1/12/21 07:38  125      


 


1/12/21 07:16  123 28     65


 


1/12/21 07:00   26   Endotracheal Tube  70


 


1/12/21 07:00  126 27 116/77 (90) 98   

















Intake and Output  


 


 1/13/21 1/14/21





 19:00 07:00


 


Intake Total 1070.59 ml 719 ml


 


Output Total 950 ml 1125 ml


 


Balance 120.59 ml -406 ml


 


  


 


Free Water  90 ml


 


IV Total 710.59 ml 409 ml


 


Tube Feeding 240 ml 220 ml


 


Other 120 ml 


 


Output Urine Total 950 ml 1125 ml











Labs








Test


 1/11/21


08:24 1/11/21


08:35 1/12/21


00:25 1/12/21


04:00


 


Arterial Blood pH


 7.448


(7.350-7.450) 


 


 





 


Arterial Blood Partial


Pressure CO2 47.2 mmHg


(35.0-45.0) 


 


 





 


Arterial Blood Partial


Pressure O2 57.5 mmHg


(75.0-100.0) 


 


 





 


Arterial Blood HCO3


 31.9 mmol/L


(22.0-26.0) 


 


 





 


Arterial Blood Oxygen


Saturation 89.7 %


() 


 


 





 


Arterial Blood Base Excess 6.7 (-2-2)    


 


Abelardo Test Positive    


 


White Blood Count


 


 7.8 K/UL


(4.8-10.8) 


 11.4 K/UL


(4.8-10.8)


 


Red Blood Count


 


 5.25 M/UL


(4.70-6.10) 


 5.31 M/UL


(4.70-6.10)


 


Hemoglobin


 


 15.6 G/DL


(14.2-18.0) 


 15.9 G/DL


(14.2-18.0)


 


Hematocrit


 


 47.9 %


(42.0-52.0) 


 51.5 %


(42.0-52.0)


 


Mean Corpuscular Volume  91 FL (80-99)   97 FL (80-99) 


 


Mean Corpuscular Hemoglobin


 


 29.6 PG


(27.0-31.0) 


 29.9 PG


(27.0-31.0)


 


Mean Corpuscular Hemoglobin


Concent 


 32.5 G/DL


(32.0-36.0) 


 30.8 G/DL


(32.0-36.0)


 


Red Cell Distribution Width


 


 13.9 %


(11.6-14.8) 


 13.6 %


(11.6-14.8)


 


Platelet Count


 


 345 K/UL


(150-450) 


 337 K/UL


(150-450)


 


Mean Platelet Volume


 


 8.6 FL


(6.5-10.1) 


 7.8 FL


(6.5-10.1)


 


Neutrophils (%) (Auto)


 


 90.3 %


(45.0-75.0) 


  % (45.0-75.0) 





 


Lymphocytes (%) (Auto)


 


 4.6 %


(20.0-45.0) 


  % (20.0-45.0) 





 


Monocytes (%) (Auto)


 


 4.6 %


(1.0-10.0) 


  % (1.0-10.0) 





 


Eosinophils (%) (Auto)


 


 0.1 %


(0.0-3.0) 


  % (0.0-3.0) 





 


Basophils (%) (Auto)


 


 0.4 %


(0.0-2.0) 


  % (0.0-2.0) 





 


Sodium Level


 


 144 MMOL/L


(136-145) 


 146 MMOL/L


(136-145)


 


Potassium Level


 


 3.6 MMOL/L


(3.5-5.1) 


 3.2 MMOL/L


(3.5-5.1)


 


Chloride Level


 


 106 MMOL/L


() 


 105 MMOL/L


()


 


Carbon Dioxide Level


 


 33 MMOL/L


(21-32) 


 35 MMOL/L


(21-32)


 


Anion Gap


 


 5 mmol/L


(5-15) 


 6 mmol/L


(5-15)


 


Blood Urea Nitrogen


 


 51 mg/dL


(7-18) 


 50 mg/dL


(7-18)


 


Creatinine


 


 1.0 MG/DL


(0.55-1.30) 


 1.3 MG/DL


(0.55-1.30)


 


Estimat Glomerular Filtration


Rate 


 > 60 mL/min


(>60) 


 55.2 mL/min


(>60)


 


Glucose Level


 


 290 MG/DL


() 


 225 MG/DL


()


 


Calcium Level


 


 8.9 MG/DL


(8.5-10.1) 


 8.3 MG/DL


(8.5-10.1)


 


Test


 1/12/21


04:28 1/12/21


07:35 1/12/21


20:43 1/13/21


01:56


 


POC Whole Blood Glucose


 209 MG/DL


() 


 


 164 MG/DL


()


 


Arterial Blood pH


 


 7.376


(7.350-7.450) 


 





 


Arterial Blood Partial


Pressure CO2 


 68.1 mmHg


(35.0-45.0) 


 





 


Arterial Blood Partial


Pressure O2 


 80.7 mmHg


(75.0-100.0) 


 





 


Arterial Blood HCO3


 


 39.0 mmol/L


(22.0-26.0) 


 





 


Arterial Blood Oxygen


Saturation 


 95.4 %


() 


 





 


Arterial Blood Base Excess  10.3 (-2-2)   


 


Abelardo Test  Positive   


 


Test


 1/13/21


04:45 1/13/21


07:43 1/13/21


21:03 1/14/21


00:02


 


White Blood Count


 18.6 K/UL


(4.8-10.8) 


 


 





 


Red Blood Count


 5.84 M/UL


(4.70-6.10) 


 


 





 


Hemoglobin


 16.6 G/DL


(14.2-18.0) 


 


 





 


Hematocrit


 53.9 %


(42.0-52.0) 


 


 





 


Mean Corpuscular Volume 92 FL (80-99)    


 


Mean Corpuscular Hemoglobin


 28.4 PG


(27.0-31.0) 


 


 





 


Mean Corpuscular Hemoglobin


Concent 30.8 G/DL


(32.0-36.0) 


 


 





 


Red Cell Distribution Width


 12.7 %


(11.6-14.8) 


 


 





 


Platelet Count


 298 K/UL


(150-450) 


 


 





 


Mean Platelet Volume


 9.1 FL


(6.5-10.1) 


 


 





 


Neutrophils (%) (Auto)  % (45.0-75.0)    


 


Lymphocytes (%) (Auto)  % (20.0-45.0)    


 


Monocytes (%) (Auto)  % (1.0-10.0)    


 


Eosinophils (%) (Auto)  % (0.0-3.0)    


 


Basophils (%) (Auto)  % (0.0-2.0)    


 


Differential Total Cells


Counted 100 


 


 


 





 


Neutrophils % (Manual) 95 % (45-75)    


 


Lymphocytes % (Manual) 2 % (20-45)    


 


Monocytes % (Manual) 3 % (1-10)    


 


Eosinophils % (Manual) 0 % (0-3)    


 


Basophils % (Manual) 0 % (0-2)    


 


Band Neutrophils 0 % (0-8)    


 


Platelet Estimate Adequate    


 


Platelet Morphology Normal    


 


Red Blood Cell Morphology Normal    


 


Sodium Level


 145 MMOL/L


(136-145) 


 


 





 


Potassium Level


 3.4 MMOL/L


(3.5-5.1) 


 


 





 


Chloride Level


 104 MMOL/L


() 


 


 





 


Carbon Dioxide Level


 36 MMOL/L


(21-32) 


 


 





 


Anion Gap


 5 mmol/L


(5-15) 


 


 





 


Blood Urea Nitrogen


 39 mg/dL


(7-18) 


 


 





 


Creatinine


 1.2 MG/DL


(0.55-1.30) 


 


 





 


Estimat Glomerular Filtration


Rate > 60 mL/min


(>60) 


 


 





 


Glucose Level


 157 MG/DL


() 


 


 





 


Calcium Level


 9.3 MG/DL


(8.5-10.1) 


 


 





 


Arterial Blood pH


 


 7.406


(7.350-7.450) 


 





 


Arterial Blood Partial


Pressure CO2 


 58.7 mmHg


(35.0-45.0) 


 





 


Arterial Blood Partial


Pressure O2 


 66.7 mmHg


(75.0-100.0) 


 





 


Arterial Blood HCO3


 


 36.0 mmol/L


(22.0-26.0) 


 





 


Arterial Blood Oxygen


Saturation 


 93.2 %


() 


 





 


Arterial Blood Base Excess  8.6 (-2-2)   


 


Abelardo Test  Positive   


 


Test


 1/14/21


04:30 


 


 





 


White Blood Count


 15.0 K/UL


(4.8-10.8) 


 


 





 


Red Blood Count


 5.87 M/UL


(4.70-6.10) 


 


 





 


Hemoglobin


 16.7 G/DL


(14.2-18.0) 


 


 





 


Hematocrit


 53.7 %


(42.0-52.0) 


 


 





 


Mean Corpuscular Volume 91 FL (80-99)    


 


Mean Corpuscular Hemoglobin


 28.5 PG


(27.0-31.0) 


 


 





 


Mean Corpuscular Hemoglobin


Concent 31.2 G/DL


(32.0-36.0) 


 


 





 


Red Cell Distribution Width


 12.6 %


(11.6-14.8) 


 


 





 


Platelet Count


 282 K/UL


(150-450) 


 


 





 


Mean Platelet Volume


 9.5 FL


(6.5-10.1) 


 


 





 


Neutrophils (%) (Auto)  % (45.0-75.0)    


 


Lymphocytes (%) (Auto)  % (20.0-45.0)    


 


Monocytes (%) (Auto)  % (1.0-10.0)    


 


Eosinophils (%) (Auto)  % (0.0-3.0)    


 


Basophils (%) (Auto)  % (0.0-2.0)    


 


Sodium Level


 146 MMOL/L


(136-145) 


 


 





 


Potassium Level


 3.9 MMOL/L


(3.5-5.1) 


 


 





 


Chloride Level


 105 MMOL/L


() 


 


 





 


Carbon Dioxide Level


 38 MMOL/L


(21-32) 


 


 





 


Anion Gap


 3 mmol/L


(5-15) 


 


 





 


Blood Urea Nitrogen


 42 mg/dL


(7-18) 


 


 





 


Creatinine


 1.0 MG/DL


(0.55-1.30) 


 


 





 


Estimat Glomerular Filtration


Rate > 60 mL/min


(>60) 


 


 





 


Glucose Level


 90 MG/DL


() 


 


 





 


Calcium Level


 9.3 MG/DL


(8.5-10.1) 


 


 





 


Total Bilirubin


 0.8 MG/DL


(0.2-1.0) 


 


 





 


Aspartate Amino Transf


(AST/SGOT) 30 U/L (15-37) 


 


 


 





 


Alanine Aminotransferase


(ALT/SGPT) 20 U/L (12-78) 


 


 


 





 


Alkaline Phosphatase


 100 U/L


() 


 


 





 


Total Protein


 7.3 G/DL


(6.4-8.2) 


 


 





 


Albumin


 1.9 G/DL


(3.4-5.0) 


 


 





 


Globulin 5.4 g/dL    


 


Albumin/Globulin Ratio 0.4 (1.0-2.7)    








Height (Feet):  5


Height (Inches):  7.00


Weight (Pounds):  198


Objective


General Appearance:  lethargic, moderate distress


Neck:  supple


Cardiovascular:  regular rhythm


Respiratory/Chest:  crackles/rales, rhonchi - bilaterally vent++


Abdomen:  non tender, soft


Extremities:  non-tender











Emmett Cook MD          Jan 14, 2021 06:44

## 2021-01-14 NOTE — NUR
NURSE NOTES:

Patient given bed bath and oral care. Labs drawn and sent with phlebotomist. Patient febrile 
100.4, Tylenol 650mg given,  ST. normotensive BP ranges.  Gag reflexes patent. Lavaged 
and suctioned. Remains on 70% FiO2. SpO2 at 96%.

## 2021-01-14 NOTE — NUR
NURSE NOTES:

Patient given oral care and repositioned. Patient remains on 70% FIo2 and tolerating well. 
Blood pressures within normal ranges, patient making good urine output. Patient does not 
really respond to stimulation. Facial grimacing noted. Versed at 1mg/hr.  Afebrile/.
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
## 2021-01-15 VITALS — DIASTOLIC BLOOD PRESSURE: 72 MMHG | SYSTOLIC BLOOD PRESSURE: 98 MMHG

## 2021-01-15 VITALS — DIASTOLIC BLOOD PRESSURE: 65 MMHG | SYSTOLIC BLOOD PRESSURE: 93 MMHG

## 2021-01-15 VITALS — SYSTOLIC BLOOD PRESSURE: 118 MMHG | DIASTOLIC BLOOD PRESSURE: 85 MMHG

## 2021-01-15 VITALS — SYSTOLIC BLOOD PRESSURE: 100 MMHG | DIASTOLIC BLOOD PRESSURE: 63 MMHG

## 2021-01-15 VITALS — DIASTOLIC BLOOD PRESSURE: 69 MMHG | SYSTOLIC BLOOD PRESSURE: 106 MMHG

## 2021-01-15 VITALS — SYSTOLIC BLOOD PRESSURE: 109 MMHG | DIASTOLIC BLOOD PRESSURE: 59 MMHG

## 2021-01-15 VITALS — DIASTOLIC BLOOD PRESSURE: 70 MMHG | SYSTOLIC BLOOD PRESSURE: 110 MMHG

## 2021-01-15 VITALS — SYSTOLIC BLOOD PRESSURE: 103 MMHG | DIASTOLIC BLOOD PRESSURE: 75 MMHG

## 2021-01-15 VITALS — DIASTOLIC BLOOD PRESSURE: 85 MMHG | SYSTOLIC BLOOD PRESSURE: 125 MMHG

## 2021-01-15 VITALS — DIASTOLIC BLOOD PRESSURE: 67 MMHG | SYSTOLIC BLOOD PRESSURE: 104 MMHG

## 2021-01-15 VITALS — DIASTOLIC BLOOD PRESSURE: 64 MMHG | SYSTOLIC BLOOD PRESSURE: 103 MMHG

## 2021-01-15 VITALS — DIASTOLIC BLOOD PRESSURE: 81 MMHG | SYSTOLIC BLOOD PRESSURE: 129 MMHG

## 2021-01-15 VITALS — SYSTOLIC BLOOD PRESSURE: 83 MMHG | DIASTOLIC BLOOD PRESSURE: 58 MMHG

## 2021-01-15 VITALS — DIASTOLIC BLOOD PRESSURE: 67 MMHG | SYSTOLIC BLOOD PRESSURE: 101 MMHG

## 2021-01-15 VITALS — SYSTOLIC BLOOD PRESSURE: 123 MMHG | DIASTOLIC BLOOD PRESSURE: 87 MMHG

## 2021-01-15 VITALS — DIASTOLIC BLOOD PRESSURE: 81 MMHG | SYSTOLIC BLOOD PRESSURE: 127 MMHG

## 2021-01-15 VITALS — SYSTOLIC BLOOD PRESSURE: 106 MMHG | DIASTOLIC BLOOD PRESSURE: 72 MMHG

## 2021-01-15 VITALS — DIASTOLIC BLOOD PRESSURE: 78 MMHG | SYSTOLIC BLOOD PRESSURE: 112 MMHG

## 2021-01-15 VITALS — DIASTOLIC BLOOD PRESSURE: 62 MMHG | SYSTOLIC BLOOD PRESSURE: 99 MMHG

## 2021-01-15 VITALS — DIASTOLIC BLOOD PRESSURE: 75 MMHG | SYSTOLIC BLOOD PRESSURE: 109 MMHG

## 2021-01-15 VITALS — DIASTOLIC BLOOD PRESSURE: 76 MMHG | SYSTOLIC BLOOD PRESSURE: 101 MMHG

## 2021-01-15 VITALS — SYSTOLIC BLOOD PRESSURE: 132 MMHG | DIASTOLIC BLOOD PRESSURE: 86 MMHG

## 2021-01-15 VITALS — SYSTOLIC BLOOD PRESSURE: 108 MMHG | DIASTOLIC BLOOD PRESSURE: 77 MMHG

## 2021-01-15 VITALS — DIASTOLIC BLOOD PRESSURE: 84 MMHG | SYSTOLIC BLOOD PRESSURE: 138 MMHG

## 2021-01-15 LAB
APPEARANCE UR: CLEAR
APTT PPP: YELLOW S
GLUCOSE UR STRIP-MCNC: NEGATIVE MG/DL
KETONES UR QL STRIP: NEGATIVE
LEUKOCYTE ESTERASE UR QL STRIP: NEGATIVE
NITRITE UR QL STRIP: NEGATIVE
PH UR STRIP: 5 [PH] (ref 4.5–8)
PROT UR QL STRIP: NEGATIVE
SP GR UR STRIP: 1.02 (ref 1–1.03)
UROBILINOGEN UR-MCNC: NORMAL MG/DL (ref 0–1)

## 2021-01-15 RX ADMIN — DEXTROSE MONOHYDRATE SCH MLS/HR: 50 INJECTION, SOLUTION INTRAVENOUS at 22:00

## 2021-01-15 RX ADMIN — PANTOPRAZOLE SODIUM SCH MG: 40 INJECTION, POWDER, FOR SOLUTION INTRAVENOUS at 08:04

## 2021-01-15 RX ADMIN — Medication PRN MLS/HR: at 23:00

## 2021-01-15 RX ADMIN — CHLORHEXIDINE GLUCONATE SCH APPLIC: 213 SOLUTION TOPICAL at 20:04

## 2021-01-15 RX ADMIN — ENOXAPARIN SODIUM SCH MG: 80 INJECTION SUBCUTANEOUS at 20:08

## 2021-01-15 RX ADMIN — INSULIN ASPART SCH UNITS: 100 INJECTION, SOLUTION INTRAVENOUS; SUBCUTANEOUS at 17:09

## 2021-01-15 RX ADMIN — METHYLPREDNISOLONE SODIUM SUCCINATE SCH MG: 40 INJECTION, POWDER, LYOPHILIZED, FOR SOLUTION INTRAMUSCULAR; INTRAVENOUS at 08:04

## 2021-01-15 RX ADMIN — INSULIN DETEMIR SCH UNITS: 100 INJECTION, SOLUTION SUBCUTANEOUS at 08:43

## 2021-01-15 RX ADMIN — ACETAMINOPHEN PRN MG: 160 SOLUTION ORAL at 17:22

## 2021-01-15 RX ADMIN — SODIUM CHLORIDE SCH MLS/HR: 0.9 INJECTION INTRAVENOUS at 01:30

## 2021-01-15 RX ADMIN — DEXTROSE MONOHYDRATE SCH MLS/HR: 50 INJECTION, SOLUTION INTRAVENOUS at 13:39

## 2021-01-15 RX ADMIN — INSULIN ASPART SCH UNITS: 100 INJECTION, SOLUTION INTRAVENOUS; SUBCUTANEOUS at 12:00

## 2021-01-15 RX ADMIN — ENOXAPARIN SODIUM SCH MG: 80 INJECTION SUBCUTANEOUS at 08:05

## 2021-01-15 RX ADMIN — Medication PRN MLS/HR: at 06:00

## 2021-01-15 RX ADMIN — INSULIN ASPART SCH UNITS: 100 INJECTION, SOLUTION INTRAVENOUS; SUBCUTANEOUS at 00:17

## 2021-01-15 RX ADMIN — INSULIN DETEMIR SCH UNITS: 100 INJECTION, SOLUTION SUBCUTANEOUS at 21:02

## 2021-01-15 RX ADMIN — INSULIN ASPART SCH UNITS: 100 INJECTION, SOLUTION INTRAVENOUS; SUBCUTANEOUS at 08:31

## 2021-01-15 RX ADMIN — INSULIN ASPART SCH UNITS: 100 INJECTION, SOLUTION INTRAVENOUS; SUBCUTANEOUS at 06:01

## 2021-01-15 NOTE — NUR
NURSE NOTES:

Received report from SUZI Rachel. Patient in bed resting, no active s/s cardiac, respiratory 
distress noticed at this time. Patient sedated with Versed 1mg/hr, 2ml/h at this time, RASS 
-2. Patient ST with HR 130s, will follow up with MD. ETT 7.5 23cm lip line AC 16  Fio2 
40%, O2 sat 94%. NGT on left nares, patent, intact, Vital running @ 55ml/h. IV on right FA 
18G, left femoral TLC patent, intact, lasix drip running @ 10mg/h, IVF 1/2 NS running @ 
25ml/h. Stern Catheter draining well to gravity at this time, Bed in lowest position, side 
rails upx3, call light within reach, bed alarm on, Will continue to monitor.

## 2021-01-15 NOTE — NUR
NURSE NOTES:

Dr. Davis at the bedside, made aware of condition, no new order received at this time, 
per MD Will order accordingly.

## 2021-01-15 NOTE — NUR
RD ASSESSMENT & RECOMMENDATIONS

SEE CARE ACTIVITY FOR COMPLETE ASSESSMENT



DAILY ESTIMATED NEEDS:

Needs based on Critical Care/ 73kg abw 

22-28  kcals/kg 

5043-5707  total kcals

1.2-2  g protein/kg

  g total protein 

20-25  mL/kg

5493-4640  total fluid mLs



NUTRITION DIAGNOSIS:

Swallowing difficulty R/T respiratory failure as evidenced by pt now

orally intubated, on OGT feeds.





CURRENT TF: Vital 1.2 goal of 55ml/hr x24 hrs  





ENTERAL NUTRITION RECOMMENDATIONS:

Vital AF 1.2 for CARB CONTROL and critical care @ goal 55ml/hr x 24 hrs  

to provide 1320ml, 1584kcal, 99g prot, 1070ml free water 



* Continue Vital AF 1.2 for carb control and critical care

* LOWER goal rate to 55ml/hr for improved BG control

  (will meet 99% est kcal and 100% est prot needs)

* HOB over 30 degrees/ water flush per MD







ADDITIONAL RECOMMENDATIONS:

* Calibrated bedscale wt 

* Monitor BGs closely w/ steroidal med and TF  

    ->  now on long acting insulin + q4 novolog 

* Monitor lytes, replete as needed  

* Increase water flushes for possible water deficits 

* Feed w/ tolerance and hemodynamic stability

## 2021-01-15 NOTE — GENERAL PROGRESS NOTE
Subjective


ROS Limited/Unobtainable:  Yes


Allergies:  


Coded Allergies:  


     No Known Allergies (Unverified , 6/11/19)


Subjective


events noted - interval notes reviewed 


intubated 


glucose values are elevated 











Item Value  Date Time


 


Bedside Blood Glucose 319 mg/dl H 1/15/21 0601


 


Bedside Blood Glucose 320 mg/dl H 1/15/21 0017


 


Bedside Blood Glucose 368 mg/dl H 1/14/21 2100


 


Bedside Blood Glucose 369 mg/dl H 1/14/21 1706


 


Bedside Blood Glucose 247 mg/dl H 1/14/21 1256











Objective





Last 24 Hour Vital Signs








  Date Time  Temp Pulse Resp B/P (MAP) Pulse Ox O2 Delivery O2 Flow Rate FiO2


 


1/15/21 05:00      Mechanical Ventilator  


 


1/15/21 04:00  137      


 


1/15/21 04:00        60


 


1/15/21 04:00      Mechanical Ventilator  


 


1/15/21 04:00  137 31 129/81 (97) 92   


 


1/15/21 04:00      Mechanical Ventilator  


 


1/15/21 03:32  134 30     60


 


1/15/21 03:00      Mechanical Ventilator  


 


1/15/21 03:00  133 29 129/81 (97) 95   


 


1/15/21 02:00  134 30 132/86 (101) 92   


 


1/15/21 02:00      Mechanical Ventilator  


 


1/15/21 01:00  132 27 127/81 (96) 94   


 


1/15/21 01:00      Mechanical Ventilator  


 


1/15/21 00:00        60


 


1/15/21 00:00  123      


 


1/15/21 00:00      Mechanical Ventilator  


 


1/15/21 00:00      Mechanical Ventilator  


 


1/15/21 00:00  123 26 138/84 (102) 94   


 


1/14/21 23:31  121 25     60


 


1/14/21 23:00      Mechanical Ventilator  


 


1/14/21 23:00  118 25 137/93 (108) 97   


 


1/14/21 22:00      Mechanical Ventilator  


 


1/14/21 22:00  117 23 127/91 (103) 98   


 


1/14/21 21:00  117 21 131/80 (97) 98   


 


1/14/21 21:00      Mechanical Ventilator  


 


1/14/21 20:00      Mechanical Ventilator  


 


1/14/21 20:00  118      


 


1/14/21 20:00 98.3 118 22 127/86 (100) 98   


 


1/14/21 20:00      Mechanical Ventilator  


 


1/14/21 20:00        60


 


1/14/21 19:30  116 25     60


 


1/14/21 19:00  116 21 122/84 (97) 100   


 


1/14/21 19:00   21   Mechanical Ventilator  60


 


1/14/21 18:00   22   Mechanical Ventilator  60


 


1/14/21 18:00  119 22 121/85 (97) 98   


 


1/14/21 17:00  118 23 129/88 (102) 97   


 


1/14/21 17:00   23   Mechanical Ventilator  60


 


1/14/21 16:00        60


 


1/14/21 16:00  111      


 


1/14/21 16:00 98.0 116 23 127/86 (100) 100   


 


1/14/21 16:00      Mechanical Ventilator  


 


1/14/21 16:00   23   Mechanical Ventilator  60


 


1/14/21 15:40  113 25     60


 


1/14/21 15:00   18   Mechanical Ventilator  60


 


1/14/21 15:00  112 22 122/86 (98) 100   


 


1/14/21 14:00   16   Mechanical Ventilator  60


 


1/14/21 14:00  111 18 125/88 (100) 100   


 


1/14/21 13:00  104 16 106/80 (89) 100   


 


1/14/21 13:00   17   Mechanical Ventilator  60


 


1/14/21 12:00 97.8 101 16 106/92 (97) 100   


 


1/14/21 12:00  112      


 


1/14/21 12:00   17   Mechanical Ventilator  60


 


1/14/21 12:00        60


 


1/14/21 12:00      Mechanical Ventilator  


 


1/14/21 11:40  102 19     60


 


1/14/21 11:00  103 16 106/78 (87) 100   


 


1/14/21 11:00   16   Mechanical Ventilator  60


 


1/14/21 10:00  109 18 110/76 (87) 99   


 


1/14/21 10:00   17   Mechanical Ventilator  60


 


1/14/21 09:00  110 18 124/99 (107) 98   


 


1/14/21 09:00   18   Mechanical Ventilator  60


 


1/14/21 08:00  118      


 


1/14/21 08:00   21   Mechanical Ventilator  60


 


1/14/21 08:00        60


 


1/14/21 08:00      Mechanical Ventilator  


 


1/14/21 08:00 98.6 120 18 125/94 (104) 69   


 


1/14/21 07:40  108 17     60


 


1/14/21 07:00  123 25 106/83 (91) 97   


 


1/14/21 07:00   23   Mechanical Ventilator  70

















Intake and Output  


 


 1/14/21 1/15/21





 19:00 07:00


 


Intake Total 1545.7 ml 850 ml


 


Output Total 1605 ml 900 ml


 


Balance -59.3 ml -50 ml


 


  


 


Free Water 40 ml 30 ml


 


IV Total 1075.7 ml 325 ml


 


Tube Feeding 430 ml 495 ml


 


Output Urine Total 1605 ml 900 ml








Laboratory Tests


1/14/21 09:54: 


Arterial Blood pH 7.480H, Arterial Blood Partial Pressure CO2 49.6H, Arterial 

Blood Partial Pressure O2 55.1L, Arterial Blood HCO3 36.1H, Arterial Blood 

Oxygen Saturation 90.0L, Arterial Blood Base Excess 10.6*H, Abelardo Test Positive


1/14/21 15:55: 


Arterial Blood pH 7.489H, Arterial Blood Partial Pressure CO2 50.9H, Arterial 

Blood Partial Pressure O2 60.7L, Arterial Blood HCO3 37.8H, Arterial Blood 

Oxygen Saturation 92.4L, Arterial Blood Base Excess 12.2*H, Abelardo Test Positive


Height (Feet):  5


Height (Inches):  7.00


Weight (Pounds):  198


Objective





Current Medications








 Medications


  (Trade)  Dose


 Ordered  Sig/Julisa


 Route


 PRN Reason  Start Time


 Stop Time Status Last Admin


Dose Admin


 


 Acetaminophen


  (Tylenol)  650 mg  Q6H  PRN


 ORAL


 Temp >100.5  1/3/21 04:00


 2/2/21 03:59  1/14/21 04:31





 


 Cefepime HCl 1 gm/


 Dextrose  55 ml @ 


 110 mls/hr  Q12H


 IVPB


   1/14/21 13:00


 1/21/21 12:59  1/15/21 01:30





 


 Chlorhexidine


 Gluconate


  (Vanessa-Hex 2%)  1 applic  DAILY@2000


 TOPIC


   1/6/21 20:00


 4/6/21 19:59  1/14/21 19:49





 


 Dextrose


  (Dextrose 50%)  25 ml  Q30M  PRN


 IV


 Hypoglycemia  1/9/21 13:30


 4/9/21 13:29   





 


 Dextrose


  (Dextrose 50%)  50 ml  Q30M  PRN


 IV


 Hypoglycemia  1/9/21 13:30


 4/9/21 13:29   





 


 Docusate Sodium


  (Colace)  100 mg  TIDPRN  PRN


 GT


 Constipation  1/12/21 01:15


 2/11/21 01:14  1/12/21 01:42





 


 Enoxaparin Sodium


  (Lovenox)  80 mg  EVERY 12  HOURS


 SUBQ


   1/2/21 21:00


 4/2/21 20:59  1/14/21 21:11





 


 Furosemide 100 mg/


 Dextrose  100 ml @ 


 10 mls/hr  Q10H


 IV


   1/7/21 11:00


 2/6/21 10:59  1/14/21 23:00





 


 Haloperidol


  (Haldol)  5 mg  Q6H  PRN


 ORAL


 Agitation  1/4/21 13:45


 2/18/21 13:44  1/4/21 22:56





 


 Haloperidol


 Lactate 5 mg/


 Dextrose  56 ml @ 


 224 mls/hr  Q6H  PRN


 IVPB


 Agitation  1/5/21 12:30


 2/19/21 12:29  1/5/21 13:20





 


 Insulin Aspart


  (NovoLOG)    Q6HR


 SUBQ


   1/14/21 12:00


 4/9/21 17:59  1/15/21 06:01





 


 Insulin Detemir


  (Levemir)  20 units  Q12HR


 SUBQ


   1/14/21 09:00


 4/12/21 08:59  1/14/21 21:00





 


 Methylprednisolone


 Sodium Succinate


  (Solu-MEDROL)  10 mg  DAILY


 IVP


   1/17/21 09:00


 1/18/21 09:01   





 


 Methylprednisolone


 Sodium Succinate


  (Solu-MEDROL)  20 mg  DAILY


 IVP


   1/15/21 09:00


 1/16/21 09:01   





 


 Midazolam HCl  100 ml @ 0


 mls/hr  Q24H  PRN


 IV


 SEDATION  1/8/21 21:00


 1/15/21 20:59  1/15/21 06:00





 


 Pantoprazole


  (Protonix)  40 mg  DAILY


 IVP


   1/14/21 09:00


 2/13/21 08:59  1/14/21 08:36





 


 Quetiapine


 Fumarate


  (SEROqueL)  50 mg  Q12HR


 ORAL


   1/4/21 21:00


 2/18/21 20:59  1/14/21 21:10





 


 Sodium Chloride  1,000 ml @ 


 25 mls/hr  Q24H


 IV


   1/3/21 07:45


 2/2/21 07:44  1/14/21 17:00





 


 Vancomycin HCl  250 ml @ 


 166.667


 mls/hr  Q12H


 IVPB


   1/14/21 22:00


 1/19/21 21:59  1/14/21 21:12





 


 Vancomycin HCl


  ([facility] pharmacy


 to dose)  1 ea  DAILY  PRN


 MISC


 Per rx protocol  1/14/21 09:15


 2/13/21 09:14   














Assessment/Plan


Problem List:  


(1) Diabetes mellitus out of control


ICD Codes:  E11.65 - Type 2 diabetes mellitus with hyperglycemia


SNOMED:  01123571, 511611922


(2) Pneumonia due to COVID-19 virus


ICD Codes:  U07.1 - COVID-19; J12.82 - Pneumonia due to coronavirus disease 2019


SNOMED:  248601229952734008


Assessment/Plan:


continue Levemir  20 units bid 


add Novolog 8 units every 6 hours 


continue Novolog sliding scale every 8 hours











Nick Mcmahon MD                 Yadiel 15, 2021 06:47

## 2021-01-15 NOTE — PULMONOLOGY PROGRESS NOTE
Subjective


ROS Limited/Unobtainable:  Yes


Interval Events:  None new reported.  On Versed drip.


Constitutional:  Reports: fever, other - T=101.4


HEENT:  Repors: no symptoms


Respiratory:  Reports: shortness of breath - better


Cardiovascular:  Reports: no symptoms


Gastrointestinal/Abdominal:  Reports: no symptoms


Allergies:  


Coded Allergies:  


     No Known Allergies (Unverified , 6/11/19)


All Systems:  reviewed and negative except above





Objective





Last 24 Hour Vital Signs








  Date Time  Temp Pulse Resp B/P (MAP) Pulse Ox O2 Delivery O2 Flow Rate FiO2


 


1/15/21 13:52        70


 


1/15/21 13:50        70


 


1/15/21 13:00   26   Mechanical Ventilator  80


 


1/15/21 13:00 99.0 126 25 106/72 (83) 98   


 


1/15/21 12:00      Mechanical Ventilator  


 


1/15/21 12:00  133 28 103/64 (77) 100   


 


1/15/21 12:00   28   Mechanical Ventilator  90


 


1/15/21 12:00        90


 


1/15/21 12:00  128      


 


1/15/21 11:00   31   Mechanical Ventilator  100


 


1/15/21 11:00 101.5 139 32 93/65 (74) 97   


 


1/15/21 10:15  144 32 109/59 (76) 87   


 


1/15/21 10:00   32   Mechanical Ventilator  100


 


1/15/21 10:00        100


 


1/15/21 10:00  144 32 83/58 (66) 90   


 


1/15/21 09:51        100


 


1/15/21 09:16        80


 


1/15/21 09:13 102.7       


 


1/15/21 09:00        80


 


1/15/21 09:00   32   Mechanical Ventilator  100


 


1/15/21 09:00  152 33 103/75 (84) 89   


 


1/15/21 08:00      Mechanical Ventilator  


 


1/15/21 08:00 103.0 153 35 118/85 (96) 91   


 


1/15/21 08:00        60


 


1/15/21 08:00   33   Mechanical Ventilator  80


 


1/15/21 08:00  147      


 


1/15/21 07:00   34   Mechanical Ventilator  60


 


1/15/21 07:00  146 35 112/78 (89) 93   


 


1/15/21 06:30 98.3       


 


1/15/21 06:00  143 34 123/87 (99) 84   


 


1/15/21 05:00  139 33 125/85 (98) 91   


 


1/15/21 05:00      Mechanical Ventilator  


 


1/15/21 04:00  137      


 


1/15/21 04:00        60


 


1/15/21 04:00      Mechanical Ventilator  


 


1/15/21 04:00  137 31 129/81 (97) 92   


 


1/15/21 04:00      Mechanical Ventilator  


 


1/15/21 03:32  134 30     60


 


1/15/21 03:00      Mechanical Ventilator  


 


1/15/21 03:00  133 29 129/81 (97) 95   


 


1/15/21 02:00  134 30 132/86 (101) 92   


 


1/15/21 02:00      Mechanical Ventilator  


 


1/15/21 01:00  132 27 127/81 (96) 94   


 


1/15/21 01:00      Mechanical Ventilator  


 


1/15/21 00:00        60


 


1/15/21 00:00  123      


 


1/15/21 00:00      Mechanical Ventilator  


 


1/15/21 00:00      Mechanical Ventilator  


 


1/15/21 00:00  123 26 138/84 (102) 94   


 


1/14/21 23:31  121 25     60


 


1/14/21 23:00      Mechanical Ventilator  


 


1/14/21 23:00  118 25 137/93 (108) 97   


 


1/14/21 22:00      Mechanical Ventilator  


 


1/14/21 22:00  117 23 127/91 (103) 98   


 


1/14/21 21:00  117 21 131/80 (97) 98   


 


1/14/21 21:00      Mechanical Ventilator  


 


1/14/21 20:00      Mechanical Ventilator  


 


1/14/21 20:00  118      


 


1/14/21 20:00 98.3 118 22 127/86 (100) 98   


 


1/14/21 20:00      Mechanical Ventilator  


 


1/14/21 20:00        60


 


1/14/21 19:30  116 25     60


 


1/14/21 19:00  116 21 122/84 (97) 100   


 


1/14/21 19:00   21   Mechanical Ventilator  60


 


1/14/21 18:00   22   Mechanical Ventilator  60


 


1/14/21 18:00  119 22 121/85 (97) 98   


 


1/14/21 17:00  118 23 129/88 (102) 97   


 


1/14/21 17:00   23   Mechanical Ventilator  60


 


1/14/21 16:00        60


 


1/14/21 16:00  111      


 


1/14/21 16:00 98.0 116 23 127/86 (100) 100   


 


1/14/21 16:00      Mechanical Ventilator  


 


1/14/21 16:00   23   Mechanical Ventilator  60


 


1/14/21 15:40  113 25     60


 


1/14/21 15:00   18   Mechanical Ventilator  60


 


1/14/21 15:00  112 22 122/86 (98) 100   

















Intake and Output  


 


 1/14/21 1/15/21





 19:00 07:00


 


Intake Total 1545.7 ml 1052 ml


 


Output Total 1605 ml 1200 ml


 


Balance -59.3 ml -148 ml


 


  


 


Free Water 40 ml 30 ml


 


IV Total 1075.7 ml 362 ml


 


Tube Feeding 430 ml 660 ml


 


Output Urine Total 1605 ml 1200 ml








Objective


On a Versed drip


General Appearance:  no acute distress


HEENT:  atraumatic


Respiratory:  decreased breath sounds


Cardiovascular:  normal rate, regular rhythm


Abdomen:  soft, non tender





Microbiology








 Date/Time


Source Procedure


Growth Status





 


 1/14/21 10:30


Sputum Gram Stain - Final Resulted


 


 1/14/21 10:30


Sputum Sputum Culture


Pending Resulted








Laboratory Tests


1/14/21 15:55: 


Arterial Blood pH 7.489H, Arterial Blood Partial Pressure CO2 50.9H, Arterial 

Blood Partial Pressure O2 60.7L, Arterial Blood HCO3 37.8H, Arterial Blood 

Oxygen Saturation 92.4L, Arterial Blood Base Excess 12.2*H, Abelardo Test Positive


1/15/21 07:58: 


Arterial Blood pH 7.333L, Arterial Blood Partial Pressure CO2 63.9*H, Arterial 

Blood Partial Pressure O2 51.3L, Arterial Blood HCO3 33.2H, Arterial Blood 

Oxygen Saturation 82.9*L, Arterial Blood Base Excess 4.9H, Abelardo Test Positive


1/15/21 13:00: 


Urine Color Yellow, Urine Appearance Clear, Urine pH 5, Urine Specific Gravity 

1.025, Urine Protein Negative, Urine Glucose (UA) Negative, Urine Ketones 

Negative, Urine Blood Negative, Urine Nitrite Negative, Urine Bilirubin 

Negative, Urine Urobilinogen Normal, Urine Leukocyte Esterase Negative





Current Medications








 Medications


  (Trade)  Dose


 Ordered  Sig/Julisa


 Route


 PRN Reason  Start Time


 Stop Time Status Last Admin


Dose Admin


 


 Acetaminophen


  (Tylenol)  650 mg  Q4H  PRN


 ORAL


 Temp >100.5  1/15/21 12:00


 2/2/21 11:59   





 


 Chlorhexidine


 Gluconate


  (Vanessa-Hex 2%)  1 applic  DAILY@2000


 TOPIC


   1/6/21 20:00


 4/6/21 19:59  1/14/21 19:49





 


 Dextrose


  (Dextrose 50%)  25 ml  Q30M  PRN


 IV


 Hypoglycemia  1/9/21 13:30


 4/9/21 13:29   





 


 Dextrose


  (Dextrose 50%)  50 ml  Q30M  PRN


 IV


 Hypoglycemia  1/9/21 13:30


 4/9/21 13:29   





 


 Docusate Sodium


  (Colace)  100 mg  TIDPRN  PRN


 GT


 Constipation  1/12/21 01:15


 2/11/21 01:14  1/12/21 01:42





 


 Enoxaparin Sodium


  (Lovenox)  80 mg  EVERY 12  HOURS


 SUBQ


   1/2/21 21:00


 4/2/21 20:59  1/15/21 08:05





 


 Furosemide 100 mg/


 Dextrose  100 ml @ 


 10 mls/hr  Q10H


 IV


   1/7/21 11:00


 2/6/21 10:59  1/15/21 09:32





 


 Haloperidol


  (Haldol)  5 mg  Q6H  PRN


 ORAL


 Agitation  1/4/21 13:45


 2/18/21 13:44  1/4/21 22:56





 


 Haloperidol


 Lactate 5 mg/


 Dextrose  56 ml @ 


 224 mls/hr  Q6H  PRN


 IVPB


 Agitation  1/5/21 12:30


 2/19/21 12:29  1/5/21 13:20





 


 Insulin Aspart


  (NovoLOG)    Q6HR


 SUBQ


   1/14/21 12:00


 4/9/21 17:59  1/15/21 12:00





 


 Insulin Aspart


  (NovoLOG)  8 units  EVERY 6  HOURS


 SUBQ


   1/15/21 07:00


 4/15/21 06:59  1/15/21 12:00





 


 Insulin Detemir


  (Levemir)  20 units  Q12HR


 SUBQ


   1/14/21 09:00


 4/12/21 08:59  1/15/21 08:43





 


 Methylprednisolone


 Sodium Succinate


  (Solu-MEDROL)  10 mg  DAILY


 IVP


   1/17/21 09:00


 1/18/21 09:01   





 


 Methylprednisolone


 Sodium Succinate


  (Solu-MEDROL)  20 mg  DAILY


 IVP


   1/15/21 09:00


 1/16/21 09:01  1/15/21 08:04





 


 Midazolam HCl  100 ml @ 0


 mls/hr  Q24H  PRN


 IV


 SEDATION  1/8/21 21:00


 1/15/21 20:59  1/15/21 06:00





 


 Pantoprazole


  (Protonix)  40 mg  DAILY


 IVP


   1/14/21 09:00


 2/13/21 08:59  1/15/21 08:04





 


 Piperacillin Sod/


 Tazobactam Sod


 3.375 gm/Sodium


 Chloride  110 ml @ 


 27.5 mls/hr  EVERY 8  HOURS


 IVPB


   1/15/21 14:00


 1/20/21 13:59  1/15/21 13:39





 


 Quetiapine


 Fumarate


  (SEROqueL)  50 mg  Q12HR


 ORAL


   1/4/21 21:00


 2/18/21 20:59  1/15/21 08:04





 


 Sodium Chloride  1,000 ml @ 


 25 mls/hr  Q24H


 IV


   1/3/21 07:45


 2/2/21 07:44  1/14/21 17:00





 


 Vancomycin HCl  250 ml @ 


 166.667


 mls/hr  Q12H


 IVPB


   1/14/21 22:00


 1/19/21 21:59  1/15/21 09:08





 


 Vancomycin HCl


  ([facility]


 to dose)  1 ea  DAILY  PRN


 MISC


 Per rx protocol  1/14/21 09:15


 2/13/21 09:14   














Assessment/Plan


Assessment/Plan


1. COVID-19 pneumonia.


   - on Decadron, azithromycin, and ceftriaxone


   - Intubated now; will continue AC mode for now


          -Currently 65% FiO2. PEEP 10


             - retaining pCO2; ph 7.33; will increase VT to 600


        


2. Diabetes mellitus.; 


3. Elevated inflammatory markers.


4. High D-dimer.


5. Hyponatremia.


6. Hyperglycemia.


   - BG control


7. Hypoxemia., secondary to #1; improved





DVT prophylaxis   On Lovenox.


Sedated





I will discontinue Lasix drip.


I will discontinue Diamox


May need pressors.











Sung Lemos MD           Yadiel 15, 2021 14:09

## 2021-01-15 NOTE — INFECTIOUS DISEASES PROG NOTE
Assessment/Plan


Assessment/Plan


antibiotics : vancomycin iv, cefepime





A


1. COVID-19 pneumonia.


on 100 % FiO2 with saturation of 97 %


s/p remdesivir


s/p ivermectin


2. New-onset diabetes.


3. respiratory failure





P


1. Continue iv vancomycin


2. d/c cefepime


3. start zosyn


4. continue solumedrol taper doses


5. Continue isolation.


6. blood culture


7. sputum culture


8. ua and culture





Subjective


ROS Limited/Unobtainable:  Yes


Allergies:  


Coded Allergies:  


     No Known Allergies (Unverified , 6/11/19)





Objective





Last 24 Hour Vital Signs








  Date Time  Temp Pulse Resp B/P (MAP) Pulse Ox O2 Delivery O2 Flow Rate FiO2


 


1/15/21 11:00  139 32 93/65 (74) 97   


 


1/15/21 10:15  144 32 109/59 (76) 87   


 


1/15/21 10:00   32   Mechanical Ventilator  100


 


1/15/21 10:00        100


 


1/15/21 10:00  144 32 83/58 (66) 90   


 


1/15/21 09:51        100


 


1/15/21 09:16        80


 


1/15/21 09:13 102.7       


 


1/15/21 09:00        80


 


1/15/21 09:00   32   Mechanical Ventilator  100


 


1/15/21 09:00  152 33 103/75 (84) 89   


 


1/15/21 08:00      Mechanical Ventilator  


 


1/15/21 08:00 103.0 153 35 118/85 (96) 91   


 


1/15/21 08:00        60


 


1/15/21 08:00   33   Mechanical Ventilator  80


 


1/15/21 08:00  147      


 


1/15/21 07:00   34   Mechanical Ventilator  60


 


1/15/21 07:00  146 35 112/78 (89) 93   


 


1/15/21 06:30 98.3       


 


1/15/21 06:00  143 34 123/87 (99) 84   


 


1/15/21 05:00  139 33 125/85 (98) 91   


 


1/15/21 05:00      Mechanical Ventilator  


 


1/15/21 04:00  137      


 


1/15/21 04:00        60


 


1/15/21 04:00      Mechanical Ventilator  


 


1/15/21 04:00  137 31 129/81 (97) 92   


 


1/15/21 04:00      Mechanical Ventilator  


 


1/15/21 03:32  134 30     60


 


1/15/21 03:00      Mechanical Ventilator  


 


1/15/21 03:00  133 29 129/81 (97) 95   


 


1/15/21 02:00  134 30 132/86 (101) 92   


 


1/15/21 02:00      Mechanical Ventilator  


 


1/15/21 01:00  132 27 127/81 (96) 94   


 


1/15/21 01:00      Mechanical Ventilator  


 


1/15/21 00:00        60


 


1/15/21 00:00  123      


 


1/15/21 00:00      Mechanical Ventilator  


 


1/15/21 00:00      Mechanical Ventilator  


 


1/15/21 00:00  123 26 138/84 (102) 94   


 


1/14/21 23:31  121 25     60


 


1/14/21 23:00      Mechanical Ventilator  


 


1/14/21 23:00  118 25 137/93 (108) 97   


 


1/14/21 22:00      Mechanical Ventilator  


 


1/14/21 22:00  117 23 127/91 (103) 98   


 


1/14/21 21:00  117 21 131/80 (97) 98   


 


1/14/21 21:00      Mechanical Ventilator  


 


1/14/21 20:00      Mechanical Ventilator  


 


1/14/21 20:00  118      


 


1/14/21 20:00 98.3 118 22 127/86 (100) 98   


 


1/14/21 20:00      Mechanical Ventilator  


 


1/14/21 20:00        60


 


1/14/21 19:30  116 25     60


 


1/14/21 19:00  116 21 122/84 (97) 100   


 


1/14/21 19:00   21   Mechanical Ventilator  60


 


1/14/21 18:00   22   Mechanical Ventilator  60


 


1/14/21 18:00  119 22 121/85 (97) 98   


 


1/14/21 17:00  118 23 129/88 (102) 97   


 


1/14/21 17:00   23   Mechanical Ventilator  60


 


1/14/21 16:00        60


 


1/14/21 16:00  111      


 


1/14/21 16:00 98.0 116 23 127/86 (100) 100   


 


1/14/21 16:00      Mechanical Ventilator  


 


1/14/21 16:00   23   Mechanical Ventilator  60


 


1/14/21 15:40  113 25     60


 


1/14/21 15:00   18   Mechanical Ventilator  60


 


1/14/21 15:00  112 22 122/86 (98) 100   


 


1/14/21 14:00   16   Mechanical Ventilator  60


 


1/14/21 14:00  111 18 125/88 (100) 100   


 


1/14/21 13:00  104 16 106/80 (89) 100   


 


1/14/21 13:00   17   Mechanical Ventilator  60


 


1/14/21 12:00 97.8 101 16 106/92 (97) 100   


 


1/14/21 12:00  112      


 


1/14/21 12:00   17   Mechanical Ventilator  60


 


1/14/21 12:00        60


 


1/14/21 12:00      Mechanical Ventilator  








Height (Feet):  5


Height (Inches):  7.00


Weight (Pounds):  198


HEENT:  other - intubated





Microbiology








 Date/Time


Source Procedure


Growth Status





 


 1/14/21 10:30


Sputum Gram Stain - Final Resulted


 


 1/14/21 10:30


Sputum Sputum Culture


Pending Resulted











Laboratory Tests








Test


 1/14/21


15:55 1/15/21


07:58


 


Arterial Blood pH


 7.489


(7.350-7.450) 7.333


(7.350-7.450)


 


Arterial Blood Partial


Pressure CO2 50.9 mmHg


(35.0-45.0)  H 63.9 mmHg


(35.0-45.0)  *H


 


Arterial Blood Partial


Pressure O2 60.7 mmHg


(75.0-100.0)  L 51.3 mmHg


(75.0-100.0)  L


 


Arterial Blood HCO3


 37.8 mmol/L


(22.0-26.0)  H 33.2 mmol/L


(22.0-26.0)  H


 


Arterial Blood Oxygen


Saturation 92.4 %


()  L 82.9 %


()  *L


 


Arterial Blood Base Excess 12.2 (-2-2)  *H 4.9 (-2-2)  H


 


Abelardo Test Positive   Positive  











Current Medications








 Medications


  (Trade)  Dose


 Ordered  Sig/Julisa


 Route


 PRN Reason  Start Time


 Stop Time Status Last Admin


Dose Admin


 


 Acetaminophen


  (Tylenol)  650 mg  Q4H  PRN


 ORAL


 Temp >100.5  1/15/21 12:00


 2/2/21 11:59   





 


 Cefepime HCl 1 gm/


 Dextrose  55 ml @ 


 110 mls/hr  Q12H


 IVPB


   1/14/21 13:00


 1/21/21 12:59  1/15/21 01:30





 


 Chlorhexidine


 Gluconate


  (Vanessa-Hex 2%)  1 applic  DAILY@2000


 TOPIC


   1/6/21 20:00


 4/6/21 19:59  1/14/21 19:49





 


 Dextrose


  (Dextrose 50%)  25 ml  Q30M  PRN


 IV


 Hypoglycemia  1/9/21 13:30


 4/9/21 13:29   





 


 Dextrose


  (Dextrose 50%)  50 ml  Q30M  PRN


 IV


 Hypoglycemia  1/9/21 13:30


 4/9/21 13:29   





 


 Docusate Sodium


  (Colace)  100 mg  TIDPRN  PRN


 GT


 Constipation  1/12/21 01:15


 2/11/21 01:14  1/12/21 01:42





 


 Enoxaparin Sodium


  (Lovenox)  80 mg  EVERY 12  HOURS


 SUBQ


   1/2/21 21:00


 4/2/21 20:59  1/15/21 08:05





 


 Furosemide 100 mg/


 Dextrose  100 ml @ 


 10 mls/hr  Q10H


 IV


   1/7/21 11:00


 2/6/21 10:59  1/15/21 09:32





 


 Haloperidol


  (Haldol)  5 mg  Q6H  PRN


 ORAL


 Agitation  1/4/21 13:45


 2/18/21 13:44  1/4/21 22:56





 


 Haloperidol


 Lactate 5 mg/


 Dextrose  56 ml @ 


 224 mls/hr  Q6H  PRN


 IVPB


 Agitation  1/5/21 12:30


 2/19/21 12:29  1/5/21 13:20





 


 Insulin Aspart


  (NovoLOG)    Q6HR


 SUBQ


   1/14/21 12:00


 4/9/21 17:59  1/15/21 06:01





 


 Insulin Aspart


  (NovoLOG)  8 units  EVERY 6  HOURS


 SUBQ


   1/15/21 07:00


 4/15/21 06:59  1/15/21 08:31





 


 Insulin Detemir


  (Levemir)  20 units  Q12HR


 SUBQ


   1/14/21 09:00


 4/12/21 08:59  1/15/21 08:43





 


 Methylprednisolone


 Sodium Succinate


  (Solu-MEDROL)  10 mg  DAILY


 IVP


   1/17/21 09:00


 1/18/21 09:01   





 


 Methylprednisolone


 Sodium Succinate


  (Solu-MEDROL)  20 mg  DAILY


 IVP


   1/15/21 09:00


 1/16/21 09:01  1/15/21 08:04





 


 Midazolam HCl  100 ml @ 0


 mls/hr  Q24H  PRN


 IV


 SEDATION  1/8/21 21:00


 1/15/21 20:59  1/15/21 06:00





 


 Pantoprazole


  (Protonix)  40 mg  DAILY


 IVP


   1/14/21 09:00


 2/13/21 08:59  1/15/21 08:04





 


 Quetiapine


 Fumarate


  (SEROqueL)  50 mg  Q12HR


 ORAL


   1/4/21 21:00


 2/18/21 20:59  1/15/21 08:04





 


 Sodium Chloride  1,000 ml @ 


 25 mls/hr  Q24H


 IV


   1/3/21 07:45


 2/2/21 07:44  1/14/21 17:00





 


 Vancomycin HCl  250 ml @ 


 166.667


 mls/hr  Q12H


 IVPB


   1/14/21 22:00


 1/19/21 21:59  1/15/21 09:08





 


 Vancomycin HCl


  ([facility] pharmacy


 to dose)  1 ea  DAILY  PRN


 MISC


 Per rx protocol  1/14/21 09:15


 2/13/21 09:14   




















Ashley Davis MD      Yadiel 15, 2021 11:54

## 2021-01-15 NOTE — SURGERY PROGRESS NOTE
Surgery Progress Note


Subjective


Procedure Performed


Left femoral central venous catheter insertion


Additional Comments


fi02 70 %


peep 10


non responsive


agitated at times





Objective





Last 24 Hour Vital Signs








  Date Time  Temp Pulse Resp B/P (MAP) Pulse Ox O2 Delivery O2 Flow Rate FiO2


 


1/15/21 15:00  121 24 108/77 (87) 84   


 


1/15/21 15:00   24   Mechanical Ventilator  70


 


1/15/21 14:00  122 24 101/76 (84) 95   


 


1/15/21 14:00   24   Mechanical Ventilator  70


 


1/15/21 13:52        70


 


1/15/21 13:50        70


 


1/15/21 13:00   26   Mechanical Ventilator  80


 


1/15/21 13:00 99.0 126 25 106/72 (83) 98   


 


1/15/21 12:00      Mechanical Ventilator  


 


1/15/21 12:00  133 28 103/64 (77) 100   


 


1/15/21 12:00   28   Mechanical Ventilator  90


 


1/15/21 12:00        90


 


1/15/21 12:00  128      


 


1/15/21 11:40  140 26     100


 


1/15/21 11:00   31   Mechanical Ventilator  100


 


1/15/21 11:00 101.5 139 32 93/65 (74) 97   


 


1/15/21 10:15  144 32 109/59 (76) 87   


 


1/15/21 10:00   32   Mechanical Ventilator  100


 


1/15/21 10:00        100


 


1/15/21 10:00  144 32 83/58 (66) 90   


 


1/15/21 09:51        100


 


1/15/21 09:16        80


 


1/15/21 09:13 102.7       


 


1/15/21 09:00        80


 


1/15/21 09:00   32   Mechanical Ventilator  100


 


1/15/21 09:00  152 33 103/75 (84) 89   


 


1/15/21 08:00      Mechanical Ventilator  


 


1/15/21 08:00 103.0 153 35 118/85 (96) 91   


 


1/15/21 08:00        60


 


1/15/21 08:00   33   Mechanical Ventilator  80


 


1/15/21 08:00  147      


 


1/15/21 07:40  153 33     60


 


1/15/21 07:00   34   Mechanical Ventilator  60


 


1/15/21 07:00  146 35 112/78 (89) 93   


 


1/15/21 06:30 98.3       


 


1/15/21 06:00  143 34 123/87 (99) 84   


 


1/15/21 05:00  139 33 125/85 (98) 91   


 


1/15/21 05:00      Mechanical Ventilator  


 


1/15/21 04:00  137      


 


1/15/21 04:00        60


 


1/15/21 04:00      Mechanical Ventilator  


 


1/15/21 04:00  137 31 129/81 (97) 92   


 


1/15/21 04:00      Mechanical Ventilator  


 


1/15/21 03:32  134 30     60


 


1/15/21 03:00      Mechanical Ventilator  


 


1/15/21 03:00  133 29 129/81 (97) 95   


 


1/15/21 02:00  134 30 132/86 (101) 92   


 


1/15/21 02:00      Mechanical Ventilator  


 


1/15/21 01:00  132 27 127/81 (96) 94   


 


1/15/21 01:00      Mechanical Ventilator  


 


1/15/21 00:00        60


 


1/15/21 00:00  123      


 


1/15/21 00:00      Mechanical Ventilator  


 


1/15/21 00:00      Mechanical Ventilator  


 


1/15/21 00:00  123 26 138/84 (102) 94   


 


1/14/21 23:31  121 25     60


 


1/14/21 23:00      Mechanical Ventilator  


 


1/14/21 23:00  118 25 137/93 (108) 97   


 


1/14/21 22:00      Mechanical Ventilator  


 


1/14/21 22:00  117 23 127/91 (103) 98   


 


1/14/21 21:00  117 21 131/80 (97) 98   


 


1/14/21 21:00      Mechanical Ventilator  


 


1/14/21 20:00      Mechanical Ventilator  


 


1/14/21 20:00  118      


 


1/14/21 20:00 98.3 118 22 127/86 (100) 98   


 


1/14/21 20:00      Mechanical Ventilator  


 


1/14/21 20:00        60


 


1/14/21 19:30  116 25     60


 


1/14/21 19:00  116 21 122/84 (97) 100   


 


1/14/21 19:00   21   Mechanical Ventilator  60


 


1/14/21 18:00   22   Mechanical Ventilator  60


 


1/14/21 18:00  119 22 121/85 (97) 98   


 


1/14/21 17:00  118 23 129/88 (102) 97   


 


1/14/21 17:00   23   Mechanical Ventilator  60








I&O











Intake and Output  


 


 1/14/21 1/15/21





 19:00 07:00


 


Intake Total 1545.7 ml 1052 ml


 


Output Total 1605 ml 1200 ml


 


Balance -59.3 ml -148 ml


 


  


 


Free Water 40 ml 30 ml


 


IV Total 1075.7 ml 362 ml


 


Tube Feeding 430 ml 660 ml


 


Output Urine Total 1605 ml 1200 ml








Dressing:  saturated


Cardiovascular:  RSR


Respiratory:  decreased breath sounds


Abdomen:  soft, non-tender, present bowel sounds


Extremities:  no tenderness, no cyanosis





Laboratory Tests








Test


 1/15/21


07:58 1/15/21


13:00


 


Arterial Blood pH


 7.333


(7.350-7.450) 





 


Arterial Blood Partial


Pressure CO2 63.9 mmHg


(35.0-45.0)  *H 





 


Arterial Blood Partial


Pressure O2 51.3 mmHg


(75.0-100.0)  L 





 


Arterial Blood HCO3


 33.2 mmol/L


(22.0-26.0)  H 





 


Arterial Blood Oxygen


Saturation 82.9 %


()  *L 





 


Arterial Blood Base Excess 4.9 (-2-2)  H 


 


Abelardo Test Positive   


 


Urine Color  Yellow  


 


Urine Appearance  Clear  


 


Urine pH  5 (4.5-8.0)  


 


Urine Specific Gravity


 


 1.025


(1.005-1.035)


 


Urine Protein


 


 Negative


(NEGATIVE)


 


Urine Glucose (UA)


 


 Negative


(NEGATIVE)


 


Urine Ketones


 


 Negative


(NEGATIVE)


 


Urine Blood


 


 Negative


(NEGATIVE)


 


Urine Nitrite


 


 Negative


(NEGATIVE)


 


Urine Bilirubin


 


 Negative


(NEGATIVE)


 


Urine Urobilinogen


 


 Normal MG/DL


(0.0-1.0)


 


Urine Leukocyte Esterase


 


 Negative


(NEGATIVE)











Plan


Problems:  


(1) Pneumonia due to COVID-19 virus


Assessment & Plan:  Interim endotracheal intubation, endotracheal tube tip in 

good position


approximately 4 cm above the tito. There are extensive bilateral infiltrates, 

which


are markedly increased from the prior study. Pleural spaces are probably clear, 

not


well demonstrated


Markedly increased and now extensive bilateral infiltrates 


cont as per pulm and iID





(2) Hypoxia


(3) Abdominal pain


Assessment & Plan:  65M abd pain, septic, covid, intubated ons upport


currently abd exam benign but limited given condition


line bo mary noted


bo for meds and feeds


nutritional optimization 


DAILY ESTIMATED NEEDS:


Needs based on Critical Care/ 72kg abw 


22-28  kcals/kg 


3845-1770  total kcals


1.2-2  g protein/kg


  g total protein 


25-30  mL/kg


3979-7473  total fluid mLs





NUTRITION DIAGNOSIS:


Swallowing difficulty R/T respiratory failure as evidenced by pt now


orally intubated, OGT in place, NPO





 


CURRENT TF:NPO 





 





ENTERAL NUTRITION RECOMMENDATIONS:


Vital AF 1.2 @ 60ml/hr x 24 hrs  to provide 1440ml, 1728kcal, 116g prot, 1167ml 

free water 





* As medically appropriate, initiate critical care and carb controlled TF 

formula of Vital AF 1.2


* Initiate @ 20ml/hr x 6hrs, advance 10ml q 4-6 hrs as tolerated to goal rate


* HOB over 30 degrees/ water flush per MD


* Does not exceed est kcal needs w/ Propofol running @ 8.083ml/hr x 24 hrs 

(provides 213 lipid kcal)





 





ADDITIONAL RECOMMENDATIONS:


* Calibrated bedscale wt 


* Monitor BGs closely w/ Decadron and TF, need for long acting insulin 


* Monitor lytes, replete as needed  


* Monitor Propofol rate, need to adjust TF rate  





(4) Acute metabolic encephalopathy











Norberto Vazquez                Yadiel 15, 2021 16:19

## 2021-01-15 NOTE — NUR
SI: Respiratory failure, COVID-PNA, ETT/Vent support

     T-99 (ax), , RR -25, Bp 106/72

     AC 16, , PEEP 10, Fio2 100%, O2 sat 98%

     WBC 15.0, BUN 42

     cxray-increasing bilateral infiltrates



IS: Solu-Medrol IV q 12 h

     Rocephin IV q 24 H

     Lovenox SQ q 12 h

     Versed GTT

     Lasix GTT

     Vancomycin IV q 12 h

     Protonix IVP QD

     



*****ICU Status****

## 2021-01-15 NOTE — NUR
NURSE NOTES:

Per Dr. Travis, Tylenol 650mg PO Q4HR PRN, Cooling blanket. Called central supplies, cooling 
blanket machine not available at this time, cooling measure applied.

## 2021-01-15 NOTE — HEMATOLOGY/ONC PROGRESS NOTE
Assessment/Plan


Assessment/Plan


Leukocytosis 2/2 covid pna wbc 15


Anemia 2/2 chronic disease


Ac resp distress on ventilator


etoh abused


agitated -halodol


vent


cont iv abx and iv decadrone


pulmonary and gi on consult


dw charge nurse





Subjective


Allergies:  


Coded Allergies:  


     No Known Allergies (Unverified , 6/11/19)


All Systems:  reviewed and negative except above


Subjective


1/10 on ventilator 60% fio2, on sediation, unresponsive, in icu


1/14 on vent, icu, wbc elev, no bleeding, unresponsive


1/15 on vent, with cash, icu, no bleeding, unresponsive





Objective


Objective





Current Medications








 Medications


  (Trade)  Dose


 Ordered  Sig/Julisa


 Route


 PRN Reason  Start Time


 Stop Time Status Last Admin


Dose Admin


 


 Acetaminophen


  (Tylenol)  650 mg  Q6H  PRN


 ORAL


 Temp >100.5  1/3/21 04:00


 2/2/21 03:59  1/14/21 04:31





 


 Cefepime HCl 1 gm/


 Dextrose  55 ml @ 


 110 mls/hr  Q12H


 IVPB


   1/14/21 13:00


 1/21/21 12:59  1/15/21 01:30





 


 Chlorhexidine


 Gluconate


  (Vanessa-Hex 2%)  1 applic  DAILY@2000


 TOPIC


   1/6/21 20:00


 4/6/21 19:59  1/14/21 19:49





 


 Dextrose


  (Dextrose 50%)  25 ml  Q30M  PRN


 IV


 Hypoglycemia  1/9/21 13:30


 4/9/21 13:29   





 


 Dextrose


  (Dextrose 50%)  50 ml  Q30M  PRN


 IV


 Hypoglycemia  1/9/21 13:30


 4/9/21 13:29   





 


 Docusate Sodium


  (Colace)  100 mg  TIDPRN  PRN


 GT


 Constipation  1/12/21 01:15


 2/11/21 01:14  1/12/21 01:42





 


 Enoxaparin Sodium


  (Lovenox)  80 mg  EVERY 12  HOURS


 SUBQ


   1/2/21 21:00


 4/2/21 20:59  1/14/21 21:11





 


 Furosemide 100 mg/


 Dextrose  100 ml @ 


 10 mls/hr  Q10H


 IV


   1/7/21 11:00


 2/6/21 10:59  1/14/21 23:00





 


 Haloperidol


  (Haldol)  5 mg  Q6H  PRN


 ORAL


 Agitation  1/4/21 13:45


 2/18/21 13:44  1/4/21 22:56





 


 Haloperidol


 Lactate 5 mg/


 Dextrose  56 ml @ 


 224 mls/hr  Q6H  PRN


 IVPB


 Agitation  1/5/21 12:30


 2/19/21 12:29  1/5/21 13:20





 


 Insulin Aspart


  (NovoLOG)    Q6HR


 SUBQ


   1/14/21 12:00


 4/9/21 17:59  1/15/21 06:01





 


 Insulin Aspart


  (NovoLOG)  8 units  EVERY 6  HOURS


 SUBQ


   1/15/21 07:00


 4/15/21 06:59   





 


 Insulin Detemir


  (Levemir)  20 units  Q12HR


 SUBQ


   1/14/21 09:00


 4/12/21 08:59  1/14/21 21:00





 


 Methylprednisolone


 Sodium Succinate


  (Solu-MEDROL)  10 mg  DAILY


 IVP


   1/17/21 09:00


 1/18/21 09:01   





 


 Methylprednisolone


 Sodium Succinate


  (Solu-MEDROL)  20 mg  DAILY


 IVP


   1/15/21 09:00


 1/16/21 09:01   





 


 Midazolam HCl  100 ml @ 0


 mls/hr  Q24H  PRN


 IV


 SEDATION  1/8/21 21:00


 1/15/21 20:59  1/15/21 06:00





 


 Pantoprazole


  (Protonix)  40 mg  DAILY


 IVP


   1/14/21 09:00


 2/13/21 08:59  1/14/21 08:36





 


 Quetiapine


 Fumarate


  (SEROqueL)  50 mg  Q12HR


 ORAL


   1/4/21 21:00


 2/18/21 20:59  1/14/21 21:10





 


 Sodium Chloride  1,000 ml @ 


 25 mls/hr  Q24H


 IV


   1/3/21 07:45


 2/2/21 07:44  1/14/21 17:00





 


 Vancomycin HCl  250 ml @ 


 166.667


 mls/hr  Q12H


 IVPB


   1/14/21 22:00


 1/19/21 21:59  1/14/21 21:12





 


 Vancomycin HCl


  (Vanco pharmacy


 to dose)  1 ea  DAILY  PRN


 MISC


 Per rx protocol  1/14/21 09:15


 2/13/21 09:14   














Last 24 Hour Vital Signs








  Date Time  Temp Pulse Resp B/P (MAP) Pulse Ox O2 Delivery O2 Flow Rate FiO2


 


1/15/21 06:30 98.3       


 


1/15/21 06:00  143 34 123/87 (99) 84   


 


1/15/21 05:00  139 33 125/85 (98) 91   


 


1/15/21 05:00      Mechanical Ventilator  


 


1/15/21 04:00  137      


 


1/15/21 04:00        60


 


1/15/21 04:00      Mechanical Ventilator  


 


1/15/21 04:00  137 31 129/81 (97) 92   


 


1/15/21 04:00      Mechanical Ventilator  


 


1/15/21 03:32  134 30     60


 


1/15/21 03:00      Mechanical Ventilator  


 


1/15/21 03:00  133 29 129/81 (97) 95   


 


1/15/21 02:00  134 30 132/86 (101) 92   


 


1/15/21 02:00      Mechanical Ventilator  


 


1/15/21 01:00  132 27 127/81 (96) 94   


 


1/15/21 01:00      Mechanical Ventilator  


 


1/15/21 00:00        60


 


1/15/21 00:00  123      


 


1/15/21 00:00      Mechanical Ventilator  


 


1/15/21 00:00      Mechanical Ventilator  


 


1/15/21 00:00  123 26 138/84 (102) 94   


 


1/14/21 23:31  121 25     60


 


1/14/21 23:00      Mechanical Ventilator  


 


1/14/21 23:00  118 25 137/93 (108) 97   


 


1/14/21 22:00      Mechanical Ventilator  


 


1/14/21 22:00  117 23 127/91 (103) 98   


 


1/14/21 21:00  117 21 131/80 (97) 98   


 


1/14/21 21:00      Mechanical Ventilator  


 


1/14/21 20:00      Mechanical Ventilator  


 


1/14/21 20:00  118      


 


1/14/21 20:00 98.3 118 22 127/86 (100) 98   


 


1/14/21 20:00      Mechanical Ventilator  


 


1/14/21 20:00        60


 


1/14/21 19:30  116 25     60


 


1/14/21 19:00  116 21 122/84 (97) 100   


 


1/14/21 19:00   21   Mechanical Ventilator  60


 


1/14/21 18:00   22   Mechanical Ventilator  60


 


1/14/21 18:00  119 22 121/85 (97) 98   


 


1/14/21 17:00  118 23 129/88 (102) 97   


 


1/14/21 17:00   23   Mechanical Ventilator  60


 


1/14/21 16:00        60


 


1/14/21 16:00  111      


 


1/14/21 16:00 98.0 116 23 127/86 (100) 100   


 


1/14/21 16:00      Mechanical Ventilator  


 


1/14/21 16:00   23   Mechanical Ventilator  60


 


1/14/21 15:40  113 25     60


 


1/14/21 15:00   18   Mechanical Ventilator  60


 


1/14/21 15:00  112 22 122/86 (98) 100   


 


1/14/21 14:00   16   Mechanical Ventilator  60


 


1/14/21 14:00  111 18 125/88 (100) 100   


 


1/14/21 13:00  104 16 106/80 (89) 100   


 


1/14/21 13:00   17   Mechanical Ventilator  60


 


1/14/21 12:00 97.8 101 16 106/92 (97) 100   


 


1/14/21 12:00  112      


 


1/14/21 12:00   17   Mechanical Ventilator  60


 


1/14/21 12:00        60


 


1/14/21 12:00      Mechanical Ventilator  


 


1/14/21 11:40  102 19     60


 


1/14/21 11:00  103 16 106/78 (87) 100   


 


1/14/21 11:00   16   Mechanical Ventilator  60


 


1/14/21 10:00  109 18 110/76 (87) 99   


 


1/14/21 10:00   17   Mechanical Ventilator  60


 


1/14/21 09:00  110 18 124/99 (107) 98   


 


1/14/21 09:00   18   Mechanical Ventilator  60


 


1/14/21 08:00  118      


 


1/14/21 08:00   21   Mechanical Ventilator  60


 


1/14/21 08:00        60


 


1/14/21 08:00      Mechanical Ventilator  


 


1/14/21 08:00 98.6 120 18 125/94 (104) 69   


 


1/14/21 07:40  108 17     60


 


1/14/21 07:00  123 25 106/83 (91) 97   


 


1/14/21 07:00   23   Mechanical Ventilator  70


 


1/14/21 06:30 101.4 127 26 121/85 (97) 99   


 


1/14/21 06:00   26   Mechanical Ventilator  70


 


1/14/21 06:00  127 27 122/92 (102) 97   


 


1/14/21 05:08 100.5       


 


1/14/21 05:00  110 26 130/78 (95) 98   


 


1/14/21 05:00   26   Mechanical Ventilator  70


 


1/14/21 04:00  126      


 


1/14/21 04:00 100.4 123 25 124/86 (99) 93   


 


1/14/21 04:00        70


 


1/14/21 04:00   26   Mechanical Ventilator  70


 


1/14/21 04:00      Mechanical Ventilator  


 


1/14/21 03:25  114 22     70


 


1/14/21 03:00  113 21 98/74 (82) 98   


 


1/14/21 03:00   26   Mechanical Ventilator  70


 


1/14/21 02:00  108 19 97/71 (80) 99   


 


1/14/21 02:00   26   Mechanical Ventilator  70


 


1/14/21 01:00  105 19 88/59 (69) 98   


 


1/14/21 01:00   26   Mechanical Ventilator  70


 


1/14/21 00:00        70


 


1/14/21 00:00   26   Mechanical Ventilator  70


 


1/14/21 00:00 98.7 106 18 124/92 (103)    


 


1/14/21 00:00      Mechanical Ventilator  


 


1/13/21 23:46  106 19     70


 


1/13/21 23:00   26   Mechanical Ventilator  70


 


1/13/21 23:00  102 13 120/94 (103) 97   


 


1/13/21 22:00  97 17 112/87 (95) 97   


 


1/13/21 22:00   26   Mechanical Ventilator  70


 


1/13/21 21:00   26   Mechanical Ventilator  70


 


1/13/21 21:00  98 17 100/82 (88) 99   


 


1/13/21 20:00      Mechanical Ventilator  


 


1/13/21 20:00   26   Mechanical Ventilator  70


 


1/13/21 20:00        70


 


1/13/21 20:00 98.0 99 19 108/82 (91) 97   


 


1/13/21 20:00  101      


 


1/13/21 19:45   26   Mechanical Ventilator  70


 


1/13/21 19:41  100 18     70


 


1/13/21 19:00  104 18 104/76 (85) 92   


 


1/13/21 19:00   22   Mechanical Ventilator  65


 


1/13/21 18:40   22   Mechanical Ventilator  65


 


1/13/21 18:00   22   Mechanical Ventilator  65


 


1/13/21 18:00  109 21 118/77 (91) 93   


 


1/13/21 17:00  110 21 116/84 (95) 92   


 


1/13/21 17:00   22   Mechanical Ventilator  65


 


1/13/21 16:00  108      


 


1/13/21 16:00        65


 


1/13/21 16:00   22   Mechanical Ventilator  65


 


1/13/21 16:00 97.0 108 19 100/70 (80) 94   


 


1/13/21 16:00      Mechanical Ventilator  


 


1/13/21 15:05  112 22     55


 


1/13/21 15:00  108 19 110/80 (90) 94   


 


1/13/21 15:00   22   Mechanical Ventilator  65


 


1/13/21 14:00  113 21 100/70 (80) 94   


 


1/13/21 14:00   22   Mechanical Ventilator  65


 


1/13/21 13:00   22   Mechanical Ventilator  65


 


1/13/21 13:00  112 21 107/82 (90) 94   


 


1/13/21 12:59   22   Mechanical Ventilator  65


 


1/13/21 12:15 98.1 111 21 102/71 (81) 93   


 


1/13/21 12:00  111 21 102/71 (81) 93   


 


1/13/21 12:00   22   Mechanical Ventilator  65


 


1/13/21 12:00  111      


 


1/13/21 12:00      Mechanical Ventilator  


 


1/13/21 12:00        65


 


1/13/21 11:05  104 24     55


 


1/13/21 11:00  107 18 104/75 (85) 91   


 


1/13/21 11:00   22   Mechanical Ventilator  65


 


1/13/21 10:00   18   Mechanical Ventilator  65


 


1/13/21 10:00  107 17 103/74 (84) 91   


 


1/13/21 09:00  107 18 116/84 (95) 91   


 


1/13/21 09:00   18   Mechanical Ventilator  65


 


1/13/21 08:00 97.0 108 18 118/96 (103) 91   


 


1/13/21 08:00  108      


 


1/13/21 08:00   22   Mechanical Ventilator  65


 


1/13/21 08:00        65


 


1/13/21 08:00      Mechanical Ventilator  


 


1/13/21 07:43  98 21     55


 


1/13/21 07:00  109 18 127/94 (105) 90   


 


1/13/21 07:00   20   Mechanical Ventilator  65

















Intake and Output  


 


 1/14/21 1/15/21





 19:00 07:00


 


Intake Total 1545.7 ml 960 ml


 


Output Total 1605 ml 1100 ml


 


Balance -59.3 ml -140 ml


 


  


 


Free Water 40 ml 30 ml


 


IV Total 1075.7 ml 325 ml


 


Tube Feeding 430 ml 605 ml


 


Output Urine Total 1605 ml 1100 ml











Labs








Test


 1/12/21


07:35 1/12/21


20:43 1/13/21


01:56 1/13/21


04:45


 


Arterial Blood pH


 7.376


(7.350-7.450) 


 


 





 


Arterial Blood Partial


Pressure CO2 68.1 mmHg


(35.0-45.0) 


 


 





 


Arterial Blood Partial


Pressure O2 80.7 mmHg


(75.0-100.0) 


 


 





 


Arterial Blood HCO3


 39.0 mmol/L


(22.0-26.0) 


 


 





 


Arterial Blood Oxygen


Saturation 95.4 %


() 


 


 





 


Arterial Blood Base Excess 10.3 (-2-2)    


 


Abelardo Test Positive    


 


POC Whole Blood Glucose


 


 


 164 MG/DL


() 





 


White Blood Count


 


 


 


 18.6 K/UL


(4.8-10.8)


 


Red Blood Count


 


 


 


 5.84 M/UL


(4.70-6.10)


 


Hemoglobin


 


 


 


 16.6 G/DL


(14.2-18.0)


 


Hematocrit


 


 


 


 53.9 %


(42.0-52.0)


 


Mean Corpuscular Volume    92 FL (80-99) 


 


Mean Corpuscular Hemoglobin


 


 


 


 28.4 PG


(27.0-31.0)


 


Mean Corpuscular Hemoglobin


Concent 


 


 


 30.8 G/DL


(32.0-36.0)


 


Red Cell Distribution Width


 


 


 


 12.7 %


(11.6-14.8)


 


Platelet Count


 


 


 


 298 K/UL


(150-450)


 


Mean Platelet Volume


 


 


 


 9.1 FL


(6.5-10.1)


 


Neutrophils (%) (Auto)     % (45.0-75.0) 


 


Lymphocytes (%) (Auto)     % (20.0-45.0) 


 


Monocytes (%) (Auto)     % (1.0-10.0) 


 


Eosinophils (%) (Auto)     % (0.0-3.0) 


 


Basophils (%) (Auto)     % (0.0-2.0) 


 


Differential Total Cells


Counted 


 


 


 100 





 


Neutrophils % (Manual)    95 % (45-75) 


 


Lymphocytes % (Manual)    2 % (20-45) 


 


Monocytes % (Manual)    3 % (1-10) 


 


Eosinophils % (Manual)    0 % (0-3) 


 


Basophils % (Manual)    0 % (0-2) 


 


Band Neutrophils    0 % (0-8) 


 


Platelet Estimate    Adequate 


 


Platelet Morphology    Normal 


 


Red Blood Cell Morphology    Normal 


 


Sodium Level


 


 


 


 145 MMOL/L


(136-145)


 


Potassium Level


 


 


 


 3.4 MMOL/L


(3.5-5.1)


 


Chloride Level


 


 


 


 104 MMOL/L


()


 


Carbon Dioxide Level


 


 


 


 36 MMOL/L


(21-32)


 


Anion Gap


 


 


 


 5 mmol/L


(5-15)


 


Blood Urea Nitrogen


 


 


 


 39 mg/dL


(7-18)


 


Creatinine


 


 


 


 1.2 MG/DL


(0.55-1.30)


 


Estimat Glomerular Filtration


Rate 


 


 


 > 60 mL/min


(>60)


 


Glucose Level


 


 


 


 157 MG/DL


()


 


Calcium Level


 


 


 


 9.3 MG/DL


(8.5-10.1)


 


Test


 1/13/21


07:43 1/13/21


13:03 1/13/21


16:32 1/13/21


21:03


 


Arterial Blood pH


 7.406


(7.350-7.450) 


 


 





 


Arterial Blood Partial


Pressure CO2 58.7 mmHg


(35.0-45.0) 


 


 





 


Arterial Blood Partial


Pressure O2 66.7 mmHg


(75.0-100.0) 


 


 





 


Arterial Blood HCO3


 36.0 mmol/L


(22.0-26.0) 


 


 





 


Arterial Blood Oxygen


Saturation 93.2 %


() 


 


 





 


Arterial Blood Base Excess 8.6 (-2-2)    


 


Abelardo Test Positive    


 


POC Whole Blood Glucose


 


 245 MG/DL


() 246 MG/DL


() 





 


Test


 1/14/21


00:02 1/14/21


04:17 1/14/21


04:30 1/14/21


09:54


 


POC Whole Blood Glucose


 


 109 MG/DL


() 


 





 


White Blood Count


 


 


 15.0 K/UL


(4.8-10.8) 





 


Red Blood Count


 


 


 5.87 M/UL


(4.70-6.10) 





 


Hemoglobin


 


 


 16.7 G/DL


(14.2-18.0) 





 


Hematocrit


 


 


 53.7 %


(42.0-52.0) 





 


Mean Corpuscular Volume   91 FL (80-99)  


 


Mean Corpuscular Hemoglobin


 


 


 28.5 PG


(27.0-31.0) 





 


Mean Corpuscular Hemoglobin


Concent 


 


 31.2 G/DL


(32.0-36.0) 





 


Red Cell Distribution Width


 


 


 12.6 %


(11.6-14.8) 





 


Platelet Count


 


 


 282 K/UL


(150-450) 





 


Mean Platelet Volume


 


 


 9.5 FL


(6.5-10.1) 





 


Neutrophils (%) (Auto)    % (45.0-75.0)  


 


Lymphocytes (%) (Auto)    % (20.0-45.0)  


 


Monocytes (%) (Auto)    % (1.0-10.0)  


 


Eosinophils (%) (Auto)    % (0.0-3.0)  


 


Basophils (%) (Auto)    % (0.0-2.0)  


 


Differential Total Cells


Counted 


 


 100 


 





 


Neutrophils % (Manual)   93 % (45-75)  


 


Lymphocytes % (Manual)   5 % (20-45)  


 


Monocytes % (Manual)   2 % (1-10)  


 


Eosinophils % (Manual)   0 % (0-3)  


 


Basophils % (Manual)   0 % (0-2)  


 


Band Neutrophils   0 % (0-8)  


 


Platelet Estimate   Adequate  


 


Platelet Morphology   Normal  


 


Polychromasia   1+  


 


Sodium Level


 


 


 146 MMOL/L


(136-145) 





 


Potassium Level


 


 


 3.9 MMOL/L


(3.5-5.1) 





 


Chloride Level


 


 


 105 MMOL/L


() 





 


Carbon Dioxide Level


 


 


 38 MMOL/L


(21-32) 





 


Anion Gap


 


 


 3 mmol/L


(5-15) 





 


Blood Urea Nitrogen


 


 


 42 mg/dL


(7-18) 





 


Creatinine


 


 


 1.0 MG/DL


(0.55-1.30) 





 


Estimat Glomerular Filtration


Rate 


 


 > 60 mL/min


(>60) 





 


Glucose Level


 


 


 90 MG/DL


() 





 


Calcium Level


 


 


 9.3 MG/DL


(8.5-10.1) 





 


Total Bilirubin


 


 


 0.8 MG/DL


(0.2-1.0) 





 


Aspartate Amino Transf


(AST/SGOT) 


 


 30 U/L (15-37) 


 





 


Alanine Aminotransferase


(ALT/SGPT) 


 


 20 U/L (12-78) 


 





 


Alkaline Phosphatase


 


 


 100 U/L


() 





 


Total Protein


 


 


 7.3 G/DL


(6.4-8.2) 





 


Albumin


 


 


 1.9 G/DL


(3.4-5.0) 





 


Globulin   5.4 g/dL  


 


Albumin/Globulin Ratio   0.4 (1.0-2.7)  


 


Arterial Blood pH


 


 


 


 7.480


(7.350-7.450)


 


Arterial Blood Partial


Pressure CO2 


 


 


 49.6 mmHg


(35.0-45.0)


 


Arterial Blood Partial


Pressure O2 


 


 


 55.1 mmHg


(75.0-100.0)


 


Arterial Blood HCO3


 


 


 


 36.1 mmol/L


(22.0-26.0)


 


Arterial Blood Oxygen


Saturation 


 


 


 90.0 %


()


 


Arterial Blood Base Excess    10.6 (-2-2) 


 


Abelardo Test    Positive 


 


Test


 1/14/21


15:55 


 


 





 


Arterial Blood pH


 7.489


(7.350-7.450) 


 


 





 


Arterial Blood Partial


Pressure CO2 50.9 mmHg


(35.0-45.0) 


 


 





 


Arterial Blood Partial


Pressure O2 60.7 mmHg


(75.0-100.0) 


 


 





 


Arterial Blood HCO3


 37.8 mmol/L


(22.0-26.0) 


 


 





 


Arterial Blood Oxygen


Saturation 92.4 %


() 


 


 





 


Arterial Blood Base Excess 12.2 (-2-2)    


 


Abelardo Test Positive    








Height (Feet):  5


Height (Inches):  7.00


Weight (Pounds):  198


Objective


General Appearance:  lethargic, moderate distress


Neck:  supple


Cardiovascular:  regular rhythm


Respiratory/Chest:  crackles/rales, rhonchi - bilaterally vent++


Abdomen:  non tender, soft


Extremities:  non-tender











Emmett Cook MD          Yadiel 15, 2021 06:57

## 2021-01-15 NOTE — NUR
NURSE HAND-OFF REPORT: 



Latest Vital Signs: Temperature 99.8 , Pulse 105 , B/P 99 /62 , Respiratory Rate 20 , O2 SAT 
96 , Mechanical Ventilator, O2 Flow Rate  .  

Vital Sign Comment: T elevated to 103, now 99.8, MD aware, change in ABX, HR 100s, BP 100s, 
TV change from 500 ml to 600ml, O2 sat 96% 



EKG Rhythm: Sinus Tachycardia

Rhythm change?: N 

MD Notified?: N -Dr. Thaddeus GUTIERREZ Response: 



Latest Singh Fall Score: 50  

Fall Risk: High Risk 

Safety Measures: Call light Within Reach, Bed Alarm Zone 1, Side Rails Side Rails x3, Bed 
position Low and Locked.

Fall Precautions: 

Yellow Socks

Yellow Gown

Door Sign

Patient Fall Education



Report given to SUZI Rachel.

## 2021-01-15 NOTE — NUR
NURSE NOTES:

urine culture, sputum culture collected, sent to lab. Lab made aware of Culture blood.

## 2021-01-15 NOTE — NUR
NURSE NOTES:

Paged Dr. Travis , made MD aware of decrease in SBP 90s, elevated HR 140s, T 103. No new 
order received at this time, Will continue to monitor.

## 2021-01-15 NOTE — NUR
NURSE NOTES:

Pt remains sedated, opens eyes, reached RASS -2.

Pt is unable to follow simple commands at this time; withdraws to light pain stimuli; gag 
reflex active. 

Pt is ST on cardiac monitor with 2+ radial and dorsalis pedis pulses, 1+ pitting edema noted 
to right hand. 

Pt is orally intubated with ETT 7.5/23cm, AC 16, , FiO2 40 %, Peep 10. 

Left naris NGT is running Vital AF 1.2 at 55mL/hr. Abdomen is round and soft w/ active bowel 
sounds to all quadrants. 

F/C noted draining yellow urine, CDI. 

Skin is intact. 

Pt has a left femoral TLC, dressing CDI, Versed gtt @1mg/hr, Lasix gtt @10mg/hr, and 1/2 NS 
@25 mL/hr. 

RFA 18 IV noted saline-locked. 

Safety measures observed and no acute distress noted. will continue to monitor.

## 2021-01-15 NOTE — GENERAL PROGRESS NOTE
Subjective


Allergies:  


Coded Allergies:  


     No Known Allergies (Unverified , 6/11/19)


Subjective


on ventilator 60% fio2


on sediation


unresponsive


in icu





Objective





Last 24 Hour Vital Signs








  Date Time  Temp Pulse Resp B/P (MAP) Pulse Ox O2 Delivery O2 Flow Rate FiO2


 


1/15/21 13:52        70


 


1/15/21 13:50        70


 


1/15/21 13:00   26   Mechanical Ventilator  80


 


1/15/21 13:00 99.0 126 25 106/72 (83) 98   


 


1/15/21 12:00      Mechanical Ventilator  


 


1/15/21 12:00  133 28 103/64 (77) 100   


 


1/15/21 12:00   28   Mechanical Ventilator  90


 


1/15/21 12:00        90


 


1/15/21 12:00  128      


 


1/15/21 11:00   31   Mechanical Ventilator  100


 


1/15/21 11:00 101.5 139 32 93/65 (74) 97   


 


1/15/21 10:15  144 32 109/59 (76) 87   


 


1/15/21 10:00   32   Mechanical Ventilator  100


 


1/15/21 10:00        100


 


1/15/21 10:00  144 32 83/58 (66) 90   


 


1/15/21 09:51        100


 


1/15/21 09:16        80


 


1/15/21 09:13 102.7       


 


1/15/21 09:00        80


 


1/15/21 09:00   32   Mechanical Ventilator  100


 


1/15/21 09:00  152 33 103/75 (84) 89   


 


1/15/21 08:00      Mechanical Ventilator  


 


1/15/21 08:00 103.0 153 35 118/85 (96) 91   


 


1/15/21 08:00        60


 


1/15/21 08:00   33   Mechanical Ventilator  80


 


1/15/21 08:00  147      


 


1/15/21 07:00   34   Mechanical Ventilator  60


 


1/15/21 07:00  146 35 112/78 (89) 93   


 


1/15/21 06:30 98.3       


 


1/15/21 06:00  143 34 123/87 (99) 84   


 


1/15/21 05:00  139 33 125/85 (98) 91   


 


1/15/21 05:00      Mechanical Ventilator  


 


1/15/21 04:00  137      


 


1/15/21 04:00        60


 


1/15/21 04:00      Mechanical Ventilator  


 


1/15/21 04:00  137 31 129/81 (97) 92   


 


1/15/21 04:00      Mechanical Ventilator  


 


1/15/21 03:32  134 30     60


 


1/15/21 03:00      Mechanical Ventilator  


 


1/15/21 03:00  133 29 129/81 (97) 95   


 


1/15/21 02:00  134 30 132/86 (101) 92   


 


1/15/21 02:00      Mechanical Ventilator  


 


1/15/21 01:00  132 27 127/81 (96) 94   


 


1/15/21 01:00      Mechanical Ventilator  


 


1/15/21 00:00        60


 


1/15/21 00:00  123      


 


1/15/21 00:00      Mechanical Ventilator  


 


1/15/21 00:00      Mechanical Ventilator  


 


1/15/21 00:00  123 26 138/84 (102) 94   


 


1/14/21 23:31  121 25     60


 


1/14/21 23:00      Mechanical Ventilator  


 


1/14/21 23:00  118 25 137/93 (108) 97   


 


1/14/21 22:00      Mechanical Ventilator  


 


1/14/21 22:00  117 23 127/91 (103) 98   


 


1/14/21 21:00  117 21 131/80 (97) 98   


 


1/14/21 21:00      Mechanical Ventilator  


 


1/14/21 20:00      Mechanical Ventilator  


 


1/14/21 20:00  118      


 


1/14/21 20:00 98.3 118 22 127/86 (100) 98   


 


1/14/21 20:00      Mechanical Ventilator  


 


1/14/21 20:00        60


 


1/14/21 19:30  116 25     60


 


1/14/21 19:00  116 21 122/84 (97) 100   


 


1/14/21 19:00   21   Mechanical Ventilator  60


 


1/14/21 18:00   22   Mechanical Ventilator  60


 


1/14/21 18:00  119 22 121/85 (97) 98   


 


1/14/21 17:00  118 23 129/88 (102) 97   


 


1/14/21 17:00   23   Mechanical Ventilator  60


 


1/14/21 16:00        60


 


1/14/21 16:00  111      


 


1/14/21 16:00 98.0 116 23 127/86 (100) 100   


 


1/14/21 16:00      Mechanical Ventilator  


 


1/14/21 16:00   23   Mechanical Ventilator  60


 


1/14/21 15:40  113 25     60


 


1/14/21 15:00   18   Mechanical Ventilator  60


 


1/14/21 15:00  112 22 122/86 (98) 100   

















Intake and Output  


 


 1/14/21 1/15/21





 19:00 07:00


 


Intake Total 1545.7 ml 1052 ml


 


Output Total 1605 ml 1200 ml


 


Balance -59.3 ml -148 ml


 


  


 


Free Water 40 ml 30 ml


 


IV Total 1075.7 ml 362 ml


 


Tube Feeding 430 ml 660 ml


 


Output Urine Total 1605 ml 1200 ml








Laboratory Tests


1/14/21 15:55: 


Arterial Blood pH 7.489H, Arterial Blood Partial Pressure CO2 50.9H, Arterial 

Blood Partial Pressure O2 60.7L, Arterial Blood HCO3 37.8H, Arterial Blood 

Oxygen Saturation 92.4L, Arterial Blood Base Excess 12.2*H, Abelardo Test Positive


1/15/21 07:58: 


Arterial Blood pH 7.333L, Arterial Blood Partial Pressure CO2 63.9*H, Arterial 

Blood Partial Pressure O2 51.3L, Arterial Blood HCO3 33.2H, Arterial Blood 

Oxygen Saturation 82.9*L, Arterial Blood Base Excess 4.9H, Abelardo Test Positive


1/15/21 13:00: 


Urine Color Yellow, Urine Appearance Clear, Urine pH 5, Urine Specific Gravity 

1.025, Urine Protein Negative, Urine Glucose (UA) Negative, Urine Ketones 

Negative, Urine Blood Negative, Urine Nitrite Negative, Urine Bilirubin 

Negative, Urine Urobilinogen Normal, Urine Leukocyte Esterase Negative


Height (Feet):  5


Height (Inches):  7.00


Weight (Pounds):  198


General Appearance:  alert


EENT:  PERRL/EOMI


Neck:  supple


Cardiovascular:  regular rhythm


Respiratory/Chest:  crackles/rales


Abdomen:  non tender, soft


Extremities:  non-tender





Assessment/Plan


Assessment/Plan:


covid pna


ac resp distress on ventilator


etoh abused


dehydration


agitated -halodol


fever susided


cont on ventilator at icu


cont iv abx and iv decadrone


pulmonary and gi on consult


dw charge nurse











Moi Travis MD             Yadiel 15, 2021 14:29

## 2021-01-15 NOTE — NUR
NURSE NOTES:

Dr. Travis at the bedside, made aware of patient's condition, T now 99.2, HR 120s, SBP 
90-105s. No new order received at this time, Will continue to monitor.

## 2021-01-15 NOTE — NUR
NURSE NOTES:

Pt received from SUZI Kumar. 

Pt is sedated, opens eyes, reached RASS -2.

Pt is unable to follow simple commands at this time; withdraws to light pain stimuli; gag 
reflex active. 

Pt is ST on cardiac monitor with 2+ radial and dorsalis pedis pulses, 1+ pitting edema noted 
to right hand. 

Pt is orally intubated with ETT 7.5/23cm, AC 16, , FiO2 70%, Peep 10. 

Left naris NGT is running Vital AF 1.2 at 55mL/hr. Abdomen is round and soft w/ active bowel 
sounds to all quadrants. 

F/C noted draining yellow urine. 

Skin is intact. 

Pt has a left femoral TLC, dressing CDI, Versed gtt @1mg/hr, DC Lasix,  and 1/2 NS @25 
mL/hr. 

RFA 18 IV noted saline-locked. 

Afebrile and VSS.

Safety measures observed and no acute distress noted. will continue to monitor.

## 2021-01-15 NOTE — NUR
NURSE NOTES:

Dr. Lemos at the bedside, made aware of result of ABG today, per MD increase TV to 600ml. 
RT made aware.

## 2021-01-16 VITALS — DIASTOLIC BLOOD PRESSURE: 81 MMHG | SYSTOLIC BLOOD PRESSURE: 106 MMHG

## 2021-01-16 VITALS — DIASTOLIC BLOOD PRESSURE: 64 MMHG | SYSTOLIC BLOOD PRESSURE: 95 MMHG

## 2021-01-16 VITALS — SYSTOLIC BLOOD PRESSURE: 102 MMHG | DIASTOLIC BLOOD PRESSURE: 70 MMHG

## 2021-01-16 VITALS — DIASTOLIC BLOOD PRESSURE: 79 MMHG | SYSTOLIC BLOOD PRESSURE: 110 MMHG

## 2021-01-16 VITALS — DIASTOLIC BLOOD PRESSURE: 87 MMHG | SYSTOLIC BLOOD PRESSURE: 127 MMHG

## 2021-01-16 VITALS — SYSTOLIC BLOOD PRESSURE: 118 MMHG | DIASTOLIC BLOOD PRESSURE: 87 MMHG

## 2021-01-16 VITALS — DIASTOLIC BLOOD PRESSURE: 87 MMHG | SYSTOLIC BLOOD PRESSURE: 123 MMHG

## 2021-01-16 VITALS — DIASTOLIC BLOOD PRESSURE: 68 MMHG | SYSTOLIC BLOOD PRESSURE: 112 MMHG

## 2021-01-16 VITALS — DIASTOLIC BLOOD PRESSURE: 91 MMHG | SYSTOLIC BLOOD PRESSURE: 126 MMHG

## 2021-01-16 VITALS — DIASTOLIC BLOOD PRESSURE: 69 MMHG | SYSTOLIC BLOOD PRESSURE: 106 MMHG

## 2021-01-16 VITALS — DIASTOLIC BLOOD PRESSURE: 67 MMHG | SYSTOLIC BLOOD PRESSURE: 106 MMHG

## 2021-01-16 VITALS — SYSTOLIC BLOOD PRESSURE: 103 MMHG | DIASTOLIC BLOOD PRESSURE: 70 MMHG

## 2021-01-16 VITALS — DIASTOLIC BLOOD PRESSURE: 80 MMHG | SYSTOLIC BLOOD PRESSURE: 107 MMHG

## 2021-01-16 VITALS — DIASTOLIC BLOOD PRESSURE: 73 MMHG | SYSTOLIC BLOOD PRESSURE: 110 MMHG

## 2021-01-16 VITALS — SYSTOLIC BLOOD PRESSURE: 91 MMHG | DIASTOLIC BLOOD PRESSURE: 67 MMHG

## 2021-01-16 VITALS — DIASTOLIC BLOOD PRESSURE: 90 MMHG | SYSTOLIC BLOOD PRESSURE: 128 MMHG

## 2021-01-16 VITALS — DIASTOLIC BLOOD PRESSURE: 77 MMHG | SYSTOLIC BLOOD PRESSURE: 114 MMHG

## 2021-01-16 VITALS — DIASTOLIC BLOOD PRESSURE: 91 MMHG | SYSTOLIC BLOOD PRESSURE: 127 MMHG

## 2021-01-16 VITALS — DIASTOLIC BLOOD PRESSURE: 79 MMHG | SYSTOLIC BLOOD PRESSURE: 114 MMHG

## 2021-01-16 VITALS — SYSTOLIC BLOOD PRESSURE: 114 MMHG | DIASTOLIC BLOOD PRESSURE: 72 MMHG

## 2021-01-16 VITALS — DIASTOLIC BLOOD PRESSURE: 90 MMHG | SYSTOLIC BLOOD PRESSURE: 119 MMHG

## 2021-01-16 VITALS — SYSTOLIC BLOOD PRESSURE: 119 MMHG | DIASTOLIC BLOOD PRESSURE: 90 MMHG

## 2021-01-16 VITALS — DIASTOLIC BLOOD PRESSURE: 86 MMHG | SYSTOLIC BLOOD PRESSURE: 115 MMHG

## 2021-01-16 VITALS — DIASTOLIC BLOOD PRESSURE: 88 MMHG | SYSTOLIC BLOOD PRESSURE: 123 MMHG

## 2021-01-16 VITALS — DIASTOLIC BLOOD PRESSURE: 85 MMHG | SYSTOLIC BLOOD PRESSURE: 118 MMHG

## 2021-01-16 VITALS — SYSTOLIC BLOOD PRESSURE: 107 MMHG | DIASTOLIC BLOOD PRESSURE: 90 MMHG

## 2021-01-16 VITALS — DIASTOLIC BLOOD PRESSURE: 68 MMHG | SYSTOLIC BLOOD PRESSURE: 109 MMHG

## 2021-01-16 VITALS — SYSTOLIC BLOOD PRESSURE: 102 MMHG | DIASTOLIC BLOOD PRESSURE: 64 MMHG

## 2021-01-16 VITALS — SYSTOLIC BLOOD PRESSURE: 115 MMHG | DIASTOLIC BLOOD PRESSURE: 71 MMHG

## 2021-01-16 VITALS — SYSTOLIC BLOOD PRESSURE: 115 MMHG | DIASTOLIC BLOOD PRESSURE: 78 MMHG

## 2021-01-16 VITALS — SYSTOLIC BLOOD PRESSURE: 109 MMHG | DIASTOLIC BLOOD PRESSURE: 74 MMHG

## 2021-01-16 VITALS — DIASTOLIC BLOOD PRESSURE: 75 MMHG | SYSTOLIC BLOOD PRESSURE: 108 MMHG

## 2021-01-16 VITALS — SYSTOLIC BLOOD PRESSURE: 121 MMHG | DIASTOLIC BLOOD PRESSURE: 88 MMHG

## 2021-01-16 VITALS — SYSTOLIC BLOOD PRESSURE: 115 MMHG | DIASTOLIC BLOOD PRESSURE: 87 MMHG

## 2021-01-16 VITALS — SYSTOLIC BLOOD PRESSURE: 114 MMHG | DIASTOLIC BLOOD PRESSURE: 71 MMHG

## 2021-01-16 VITALS — DIASTOLIC BLOOD PRESSURE: 70 MMHG | SYSTOLIC BLOOD PRESSURE: 97 MMHG

## 2021-01-16 VITALS — DIASTOLIC BLOOD PRESSURE: 76 MMHG | SYSTOLIC BLOOD PRESSURE: 115 MMHG

## 2021-01-16 VITALS — SYSTOLIC BLOOD PRESSURE: 111 MMHG | DIASTOLIC BLOOD PRESSURE: 71 MMHG

## 2021-01-16 VITALS — DIASTOLIC BLOOD PRESSURE: 74 MMHG | SYSTOLIC BLOOD PRESSURE: 108 MMHG

## 2021-01-16 VITALS — DIASTOLIC BLOOD PRESSURE: 91 MMHG | SYSTOLIC BLOOD PRESSURE: 116 MMHG

## 2021-01-16 VITALS — DIASTOLIC BLOOD PRESSURE: 74 MMHG | SYSTOLIC BLOOD PRESSURE: 115 MMHG

## 2021-01-16 VITALS — SYSTOLIC BLOOD PRESSURE: 113 MMHG | DIASTOLIC BLOOD PRESSURE: 73 MMHG

## 2021-01-16 VITALS — DIASTOLIC BLOOD PRESSURE: 71 MMHG | SYSTOLIC BLOOD PRESSURE: 101 MMHG

## 2021-01-16 VITALS — SYSTOLIC BLOOD PRESSURE: 108 MMHG | DIASTOLIC BLOOD PRESSURE: 67 MMHG

## 2021-01-16 VITALS — DIASTOLIC BLOOD PRESSURE: 70 MMHG | SYSTOLIC BLOOD PRESSURE: 105 MMHG

## 2021-01-16 VITALS — SYSTOLIC BLOOD PRESSURE: 112 MMHG | DIASTOLIC BLOOD PRESSURE: 75 MMHG

## 2021-01-16 VITALS — SYSTOLIC BLOOD PRESSURE: 113 MMHG | DIASTOLIC BLOOD PRESSURE: 89 MMHG

## 2021-01-16 VITALS — DIASTOLIC BLOOD PRESSURE: 89 MMHG | SYSTOLIC BLOOD PRESSURE: 124 MMHG

## 2021-01-16 VITALS — SYSTOLIC BLOOD PRESSURE: 101 MMHG | DIASTOLIC BLOOD PRESSURE: 70 MMHG

## 2021-01-16 VITALS — DIASTOLIC BLOOD PRESSURE: 78 MMHG | SYSTOLIC BLOOD PRESSURE: 110 MMHG

## 2021-01-16 VITALS — DIASTOLIC BLOOD PRESSURE: 72 MMHG | SYSTOLIC BLOOD PRESSURE: 109 MMHG

## 2021-01-16 VITALS — SYSTOLIC BLOOD PRESSURE: 115 MMHG | DIASTOLIC BLOOD PRESSURE: 73 MMHG

## 2021-01-16 VITALS — SYSTOLIC BLOOD PRESSURE: 106 MMHG | DIASTOLIC BLOOD PRESSURE: 68 MMHG

## 2021-01-16 LAB
ADD MANUAL DIFF: YES
ANION GAP SERPL CALC-SCNC: 3 MMOL/L (ref 5–15)
BUN SERPL-MCNC: 46 MG/DL (ref 7–18)
CALCIUM SERPL-MCNC: 8.5 MG/DL (ref 8.5–10.1)
CHLORIDE SERPL-SCNC: 109 MMOL/L (ref 98–107)
CO2 SERPL-SCNC: 35 MMOL/L (ref 21–32)
CREAT SERPL-MCNC: 0.9 MG/DL (ref 0.55–1.3)
ERYTHROCYTE [DISTWIDTH] IN BLOOD BY AUTOMATED COUNT: 12.7 % (ref 11.6–14.8)
HCT VFR BLD CALC: 44.7 % (ref 42–52)
HGB BLD-MCNC: 13.8 G/DL (ref 14.2–18)
MCV RBC AUTO: 92 FL (ref 80–99)
PLATELET # BLD: 190 K/UL (ref 150–450)
POTASSIUM SERPL-SCNC: 3.3 MMOL/L (ref 3.5–5.1)
RBC # BLD AUTO: 4.83 M/UL (ref 4.7–6.1)
SODIUM SERPL-SCNC: 147 MMOL/L (ref 136–145)
WBC # BLD AUTO: 13.9 K/UL (ref 4.8–10.8)

## 2021-01-16 RX ADMIN — Medication SCH MLS/HR: at 11:59

## 2021-01-16 RX ADMIN — ENOXAPARIN SODIUM SCH MG: 80 INJECTION SUBCUTANEOUS at 20:29

## 2021-01-16 RX ADMIN — PANTOPRAZOLE SODIUM SCH MG: 40 INJECTION, POWDER, FOR SOLUTION INTRAVENOUS at 09:42

## 2021-01-16 RX ADMIN — INSULIN ASPART SCH UNITS: 100 INJECTION, SOLUTION INTRAVENOUS; SUBCUTANEOUS at 05:24

## 2021-01-16 RX ADMIN — ENOXAPARIN SODIUM SCH MG: 80 INJECTION SUBCUTANEOUS at 09:46

## 2021-01-16 RX ADMIN — Medication SCH MLS/HR: at 23:20

## 2021-01-16 RX ADMIN — INSULIN DETEMIR SCH UNITS: 100 INJECTION, SOLUTION SUBCUTANEOUS at 21:39

## 2021-01-16 RX ADMIN — INSULIN ASPART SCH UNITS: 100 INJECTION, SOLUTION INTRAVENOUS; SUBCUTANEOUS at 05:23

## 2021-01-16 RX ADMIN — ACETAMINOPHEN PRN MG: 160 SOLUTION ORAL at 11:59

## 2021-01-16 RX ADMIN — INSULIN DETEMIR SCH UNITS: 100 INJECTION, SOLUTION SUBCUTANEOUS at 09:45

## 2021-01-16 RX ADMIN — CHLORHEXIDINE GLUCONATE SCH APPLIC: 213 SOLUTION TOPICAL at 20:28

## 2021-01-16 RX ADMIN — METHYLPREDNISOLONE SODIUM SUCCINATE SCH MG: 40 INJECTION, POWDER, LYOPHILIZED, FOR SOLUTION INTRAMUSCULAR; INTRAVENOUS at 09:42

## 2021-01-16 RX ADMIN — INSULIN ASPART SCH UNITS: 100 INJECTION, SOLUTION INTRAVENOUS; SUBCUTANEOUS at 12:25

## 2021-01-16 RX ADMIN — ACETAMINOPHEN PRN MG: 160 SOLUTION ORAL at 04:53

## 2021-01-16 RX ADMIN — DEXTROSE MONOHYDRATE SCH MLS/HR: 50 INJECTION, SOLUTION INTRAVENOUS at 14:00

## 2021-01-16 RX ADMIN — DEXTROSE MONOHYDRATE SCH MLS/HR: 50 INJECTION, SOLUTION INTRAVENOUS at 05:30

## 2021-01-16 RX ADMIN — ACETAMINOPHEN PRN MG: 160 SOLUTION ORAL at 20:31

## 2021-01-16 RX ADMIN — INSULIN ASPART SCH UNITS: 100 INJECTION, SOLUTION INTRAVENOUS; SUBCUTANEOUS at 18:24

## 2021-01-16 RX ADMIN — INSULIN ASPART SCH UNITS: 100 INJECTION, SOLUTION INTRAVENOUS; SUBCUTANEOUS at 00:00

## 2021-01-16 RX ADMIN — INSULIN ASPART SCH UNITS: 100 INJECTION, SOLUTION INTRAVENOUS; SUBCUTANEOUS at 18:23

## 2021-01-16 RX ADMIN — DEXTROSE MONOHYDRATE SCH MLS/HR: 50 INJECTION, SOLUTION INTRAVENOUS at 21:11

## 2021-01-16 NOTE — DIAGNOSTIC IMAGING REPORT
EXAM:

  XR Chest, 1 View

 

CLINICAL HISTORY:

  ABN CHST

 

TECHNIQUE:

  Frontal view of the chest.

 

COMPARISON:

  1/14/21

 

FINDINGS:

  Lungs:  Hazy opacities in bilateral lungs, not significant changed.  No 

new consolidation.

  Pleural space:  Unremarkable.  The costophrenic angles are sharp.  No 

visible pneumothorax.

  Heart:  Unremarkable.  No cardiomegaly.

  Mediastinum:  Unremarkable.

  Bones/joints:  Unremarkable.

  Tubes, lines and devices:  Endotracheal tube tip 7 cm above the tito. 

 NG tube tip in the left upper abdominal quadrant, in the region of the 

stomach.

 

IMPRESSION:     

  No significant interval change.  Hazy opacities in bilateral lungs.

## 2021-01-16 NOTE — NUR
NURSE NOTES:

LATE ENTRY:

RECEIVED REPORT FROM TAMARA ARCHER. PT IN BED SEDATED. PUPILS 3MM SLUGGISH. RESPONSIVE TO 
NAME.FEBRILE 100.7AX PT INTUBATED . ET TUBE  7.5, 23CM AT LIP. VENT SETTINGS: AC 16, , 
FI02 70% PEEP 10. SECRETIONS PINK. LUNG SOUNDS RT UPPER LOBE RHONCHI. BOWEL SOUNDS ACTIVE. 
NO BM AT THIS TIME. RT NGT, CONNECTED TO FEEDING JEVITY AT 30ML/HR. RESIDUAL 15ML. JOHANSEN 
CATH DRAINING YELLOW URINE. SECURED. RADIAL PULSES PRESENT. SACRAL SKIN TEAR. LEFT BUTTOCK. 
OPTIFOAM APPLIED. FALL AND CONTACT PRECAUTIONS IN PLACE. RT FA 18. OCCLUDED. REMOVED. LT 
FEMORAL TLC PATENT. VERSED AT 1MG/HR.1/2NS AT 25ML/HR. SIDE RAILS X2. HOB 30. BED ALARM ON. 
WILL CONTINUE TO MONITOR PT.

## 2021-01-16 NOTE — INFECTIOUS DISEASES PROG NOTE
Assessment/Plan


Assessment/Plan


A


1. COVID-19 pneumonia.


2. New-onset diabetes.


3. Hypoxic respiratory failure


4. Sepsis with fever, tachycardia & leukocytosis


5. Bacteremia with COANS, ? line infection





P


1. Finished remdesivir course


2. Continue Zosyn & Vancomycin


3. Continue solumedrol


4. Sputum culture, blood culture





Subjective


ROS Limited/Unobtainable:  Yes


Constitutional:  Reports: fever, other - [id number]


Allergies:  


Coded Allergies:  


     No Known Allergies (Unverified , 6/11/19)





Objective





Last 24 Hour Vital Signs








  Date Time  Temp Pulse Resp B/P (MAP) Pulse Ox O2 Delivery O2 Flow Rate FiO2


 


1/16/21 11:00  113 23 95/64 (74) 94   


 


1/16/21 10:45  111 20 91/67 (75) 96   


 


1/16/21 10:30  109 22 102/64 (77) 95   


 


1/16/21 10:15  109 22 109/74 (86) 96   


 


1/16/21 10:00  109 22 112/68 (83) 95   


 


1/16/21 09:00 100.7 104 19 115/76 (89)    


 


1/16/21 08:00  108 24 101/70 (80)    


 


1/16/21 08:00      Mechanical Ventilator  


 


1/16/21 08:00        70


 


1/16/21 08:00  107      


 


1/16/21 07:00   20   Mechanical Ventilator  70


 


1/16/21 07:00  107 20 105/70 (82) 96   


 


1/16/21 06:00 100.2 112 23 106/67 (80) 95   


 


1/16/21 06:00   23   Mechanical Ventilator  70


 


1/16/21 05:23 99.7       


 


1/16/21 05:00   24   Mechanical Ventilator  70


 


1/16/21 05:00 102.0 115 24 112/75 (87) 95   


 


1/16/21 04:00        70


 


1/16/21 04:00  115 23 109/68 (82) 95   


 


1/16/21 04:00   23   Mechanical Ventilator  70


 


1/16/21 04:00  115      


 


1/16/21 04:00      Mechanical Ventilator  


 


1/16/21 03:00   22   Mechanical Ventilator  70


 


1/16/21 03:00  112 22 115/71 (86) 96   


 


1/16/21 02:00  111 20 108/67 (81) 95   


 


1/16/21 02:00   20   Mechanical Ventilator  70


 


1/16/21 01:03  111 22     70


 


1/16/21 01:00 99.7 103 21 114/72 (86) 94   


 


1/16/21 01:00   21   Mechanical Ventilator  70


 


1/16/21 00:00  107      


 


1/16/21 00:00      Mechanical Ventilator  


 


1/16/21 00:00 99.7 107 21 113/73 (86) 95   


 


1/16/21 00:00   21   Mechanical Ventilator  70


 


1/16/21 00:00        70


 


1/15/21 23:30 99.7       


 


1/15/21 23:00  104 18 101/67 (78) 96   


 


1/15/21 23:00   18     70


 


1/15/21 22:00   19   Mechanical Ventilator  


 


1/15/21 22:00  102 19 100/63 (75) 95   


 


1/15/21 21:00   18   Mechanical Ventilator  70


 


1/15/21 21:00  101 18 110/70 (83) 96   


 


1/15/21 20:00   18   Mechanical Ventilator  70


 


1/15/21 20:00      Mechanical Ventilator  


 


1/15/21 20:00  102 18 106/69 (81) 96   


 


1/15/21 20:00  102      


 


1/15/21 20:00        70


 


1/15/21 19:44  102 18     70


 


1/15/21 19:00 99.8 105 19 99/62 (74) 96   


 


1/15/21 19:00   20   Mechanical Ventilator  70


 


1/15/21 18:00  106 20 104/67 (79) 97   


 


1/15/21 18:00   19   Mechanical Ventilator  70


 


1/15/21 17:52 99.8       


 


1/15/21 17:00  110 21 98/72 (81) 97   


 


1/15/21 16:00        70


 


1/15/21 16:00  115 23 109/75 (86) 97   


 


1/15/21 16:00  116      


 


1/15/21 16:00      Mechanical Ventilator  


 


1/15/21 15:00  121 24 108/77 (87) 84   


 


1/15/21 15:00  118 23     70


 


1/15/21 15:00   24   Mechanical Ventilator  70


 


1/15/21 14:00  122 24 101/76 (84) 95   


 


1/15/21 14:00   24   Mechanical Ventilator  70


 


1/15/21 13:52        70


 


1/15/21 13:50        70








Height (Feet):  5


Height (Inches):  7.00


Weight (Pounds):  198


HEENT:  other - orlally intbated


Respiratory/Chest:  other - on Ventilator, FIO2=70%


Cardiovascular:  tachycardia


Abdomen:  soft, non tender, other - NG tube


Neurologic/Psychiatric:  unresponsiveness





Microbiology








 Date/Time


Source Procedure


Growth Status





 


 1/15/21 13:00


Sputum Gram Stain - Final Resulted


 


 1/15/21 13:00


Sputum Sputum Culture


Pending Resulted





 1/14/21 10:30


Sputum Gram Stain - Final Complete


 


 1/14/21 10:30 Sputum Culture - Final


Candida Albicans


Usual Respiratory Wendy Complete


 


 1/14/21 10:30


Blood Blood Culture - Preliminary


Staphylococcus Sp Coag Neg Resulted











Laboratory Tests








Test


 1/16/21


03:15 1/16/21


08:12 1/16/21


10:00


 


White Blood Count


 13.9 K/UL


(4.8-10.8)  H 


 





 


Red Blood Count


 4.83 M/UL


(4.70-6.10) 


 





 


Hemoglobin


 13.8 G/DL


(14.2-18.0)  L 


 





 


Hematocrit


 44.7 %


(42.0-52.0) 


 





 


Mean Corpuscular Volume 92 FL (80-99)    


 


Mean Corpuscular Hemoglobin


 28.5 PG


(27.0-31.0) 


 





 


Mean Corpuscular Hemoglobin


Concent 30.8 G/DL


(32.0-36.0)  L 


 





 


Red Cell Distribution Width


 12.7 %


(11.6-14.8) 


 





 


Platelet Count


 190 K/UL


(150-450) 


 





 


Mean Platelet Volume


 10.5 FL


(6.5-10.1)  H 


 





 


Neutrophils (%) (Auto)


 % (45.0-75.0)


 


 





 


Lymphocytes (%) (Auto)


 % (20.0-45.0)


 


 





 


Monocytes (%) (Auto)  % (1.0-10.0)    


 


Eosinophils (%) (Auto)  % (0.0-3.0)    


 


Basophils (%) (Auto)  % (0.0-2.0)    


 


Differential Total Cells


Counted 100  


 


 





 


Neutrophils % (Manual) 92 % (45-75)  H  


 


Lymphocytes % (Manual) 3 % (20-45)  L  


 


Monocytes % (Manual) 5 % (1-10)    


 


Eosinophils % (Manual) 0 % (0-3)    


 


Basophils % (Manual) 0 % (0-2)    


 


Band Neutrophils 0 % (0-8)    


 


Platelet Estimate Adequate    


 


Platelet Morphology Normal    


 


Red Blood Cell Morphology Normal    


 


Sodium Level


 147 MMOL/L


(136-145)  H 


 





 


Potassium Level


 3.3 MMOL/L


(3.5-5.1)  L 


 





 


Chloride Level


 109 MMOL/L


()  H 


 





 


Carbon Dioxide Level


 35 MMOL/L


(21-32)  H 


 





 


Anion Gap


 3 mmol/L


(5-15)  L 


 





 


Blood Urea Nitrogen


 46 mg/dL


(7-18)  H 


 





 


Creatinine


 0.9 MG/DL


(0.55-1.30) 


 





 


Estimat Glomerular Filtration


Rate > 60 mL/min


(>60) 


 





 


Glucose Level


 257 MG/DL


()  H 


 





 


Calcium Level


 8.5 MG/DL


(8.5-10.1) 


 





 


Arterial Blood pH


 


 7.471


(7.350-7.450) 





 


Arterial Blood Partial


Pressure CO2 


 48.5 mmHg


(35.0-45.0)  H 





 


Arterial Blood Partial


Pressure O2 


 56.9 mmHg


(75.0-100.0)  L 





 


Arterial Blood HCO3


 


 34.6 mmol/L


(22.0-26.0)  H 





 


Arterial Blood Oxygen


Saturation 


 91.1 %


()  L 





 


Arterial Blood Base Excess  9.4 (-2-2)  *H 


 


Abelardo Test  Positive   


 


Vancomycin Level Trough


 


 


 12.5 ug/mL


(5.0-12.0)  H











Current Medications








 Medications


  (Trade)  Dose


 Ordered  Sig/Julisa


 Route


 PRN Reason  Start Time


 Stop Time Status Last Admin


Dose Admin


 


 Acetaminophen


  (Tylenol)  650 mg  Q4H  PRN


 GT


 Temp >100.5  1/15/21 17:15


 2/14/21 17:14  1/16/21 11:59





 


 Chlorhexidine


 Gluconate


  (Vanessa-Hex 2%)  1 applic  DAILY@2000


 TOPIC


   1/6/21 20:00


 4/6/21 19:59  1/15/21 20:04





 


 Dextrose


  (Dextrose 50%)  25 ml  Q30M  PRN


 IV


 Hypoglycemia  1/9/21 13:30


 4/9/21 13:29   





 


 Dextrose


  (Dextrose 50%)  50 ml  Q30M  PRN


 IV


 Hypoglycemia  1/9/21 13:30


 4/9/21 13:29   





 


 Docusate Sodium


  (Colace)  100 mg  TIDPRN  PRN


 GT


 Constipation  1/12/21 01:15


 2/11/21 01:14  1/12/21 01:42





 


 Enoxaparin Sodium


  (Lovenox)  80 mg  EVERY 12  HOURS


 SUBQ


   1/2/21 21:00


 4/2/21 20:59  1/16/21 09:46





 


 Haloperidol


  (Haldol)  5 mg  Q6H  PRN


 ORAL


 Agitation  1/4/21 13:45


 2/18/21 13:44  1/4/21 22:56





 


 Haloperidol


 Lactate 5 mg/


 Dextrose  56 ml @ 


 224 mls/hr  Q6H  PRN


 IVPB


 Agitation  1/5/21 12:30


 2/19/21 12:29  1/5/21 13:20





 


 Insulin Aspart


  (NovoLOG)    Q6HR


 SUBQ


   1/14/21 12:00


 4/9/21 17:59  1/16/21 12:25





 


 Insulin Aspart


  (NovoLOG)  14 units  EVERY 6  HOURS


 SUBQ


   1/16/21 12:00


 4/15/21 06:59  1/16/21 12:25





 


 Insulin Detemir


  (Levemir)  20 units  Q12HR


 SUBQ


   1/14/21 09:00


 4/12/21 08:59  1/16/21 09:45





 


 Methylprednisolone


 Sodium Succinate


  (Solu-MEDROL)  10 mg  DAILY


 IVP


   1/17/21 09:00


 1/18/21 09:01   





 


 Midazolam HCl  100 ml @ 2


 mls/hr  Q24H  PRN


 IV


 Agitation  1/15/21 23:00


 1/22/21 22:59  1/15/21 23:00





 


 Pantoprazole


  (Protonix)  40 mg  DAILY


 IVP


   1/14/21 09:00


 2/13/21 08:59  1/16/21 09:42





 


 Piperacillin Sod/


 Tazobactam Sod


 3.375 gm/Sodium


 Chloride  110 ml @ 


 27.5 mls/hr  EVERY 8  HOURS


 IVPB


   1/15/21 14:00


 1/20/21 13:59  1/16/21 05:30





 


 Quetiapine


 Fumarate


  (SEROqueL)  50 mg  Q12HR


 ORAL


   1/4/21 21:00


 2/18/21 20:59  1/16/21 09:42





 


 Sodium Chloride  500 ml @ 


 999 mls/hr  Q31M PRN


 IV


 For hypotension  1/15/21 18:30


 2/14/21 18:29   





 


 Sodium Chloride  1,000 ml @ 


 25 mls/hr  Q24H


 IV


   1/3/21 07:45


 2/2/21 07:44  1/15/21 17:06





 


 Vancomycin HCl  300 ml @ 


 150 mls/hr  Q12H


 IVPB


   1/16/21 11:00


 1/21/21 10:59  1/16/21 11:59





 


 Vancomycin HCl


  ([facility]


 to dose)  1 ea  DAILY  PRN


 MISC


 Per rx protocol  1/14/21 09:15


 2/13/21 09:14   




















Lamin Felix MD               Jan 16, 2021 13:15

## 2021-01-16 NOTE — NUR
NURSE NOTES:

admin tyenol, and Ice pack applied for the pt's fever. call light within reach. will 
continue to monitor  pt.

## 2021-01-16 NOTE — NUR
NURSE NOTES:

received pt from Beatriz ARCHER., pt is sedated at this time and resting on the bed. ETT 7.5 
lip line 23cm, AC 16  Fio2 70% Peep 10. OGT Vital A.F 1.2 @55ml/hr, no residual noted 
at this time. cash cath is draining is noted, no bleeding noted at this time. skin 
alternation noted, dressing site intact, clean, and patent. left femoral TLC noted, dressing 
site intact, clean, and patent. versed is running 1mg/hr and 1/2 NS @ 25ml/hr. pt is at at 
-2 RASS. noticed crepitus noted on right chest and neck. no active bleeding noted at this 
time. ABD large, soft. fever noted at 102.2. call light within reach. will continue to 
monitor pt with plan of care. bed at the lowest position, alarmed, and locked.

## 2021-01-16 NOTE — NUR
NURSE NOTES:

left voice mail to Dr. Ibanez regarding new updated result for the chest X-ray. will wait 
for call back.

## 2021-01-16 NOTE — NUR
NURSE HAND-OFF REPORT: 



Latest Vital Signs: Temperature 100.0 , Pulse 126 , B/P 127 /87 , Respiratory Rate 27 , O2 
SAT 93 , Mechanical Ventilator, O2 Flow Rate  .  

Vital Sign Comment: 



EKG Rhythm: Sinus Tachycardia

Rhythm change?: N 

MD Notified?:  -Dr. Thaddeus GUTIERREZ Response: 



Latest Singh Fall Score: 50  

Fall Risk: High Risk 

Safety Measures: Call light Within Reach, Bed Alarm Zone 1, Side Rails Side Rails x3, Bed 
position Low and Locked.

Fall Precautions: 

Door Sign



Report given to .LUISANA PARKER ENDORSD F/U CALL TO MD. REGARDING CXR RESULTS.

## 2021-01-16 NOTE — NUR
NURSE NOTES:

LATE ENTRY:

MD. ALONZO HERE TO SEE PT WAS INFORMED OF PEEP 10, FIO2 70%.  NO NEW ORDERS.

## 2021-01-16 NOTE — NUR
NURSE NOTES:

left voice mail to Anton Bernard regarding new chest X-ray result. but still noticed puffiness 
(swollen) on right neck and right chest. O2sat isat 93%. will wait for call back.

## 2021-01-16 NOTE — NUR
NURSE NOTES:

LATE ENTRY:

SHIRLEY ENGEL HERE TO SEE PT. WAS INFORMED OF PEEP 10, FI02 70%, SEDATION VERSED. AX TEMP 100.7. 
, K 3.3. VANCO READJUSTED TODAY. ORDER FOR 40MEQ KDUR ONCE. WILL CONTINUE TO LOOK INTO 
PT CHART

## 2021-01-16 NOTE — NUR
NURSE NOTES:

PT SATING 94%. HOB>30. RE ASSESSING SWELLING NOTED EARLIER. ADDITIONAL SWELLING INVOLVING 
RIGHT PECTORAL AREA. CREPITUS NOTED. WILL INFORM MD. LORENZO.  WILL CONTINUE TO MONITOR.

## 2021-01-16 NOTE — GENERAL PROGRESS NOTE
Subjective


ROS Limited/Unobtainable:  Yes


Allergies:  


Coded Allergies:  


     No Known Allergies (Unverified , 6/11/19)


Subjective


events noted - interval notes reviewed 


intubated 


glucose values are elevated 














Item Value  Date Time


 


Bedside Blood Glucose 257 mg/dl H 1/16/21 0945


 


Bedside Blood Glucose 252 mg/dl H 1/16/21 0600


 


Bedside Blood Glucose 259 mg/dl H 1/16/21 0000


 


Bedside Blood Glucose 277 mg/dl H 1/15/21 2102


 


Bedside Blood Glucose 227 mg/dl H 1/15/21 1800


 


Bedside Blood Glucose 268 mg/dl H 1/15/21 1200











Objective





Last 24 Hour Vital Signs








  Date Time  Temp Pulse Resp B/P (MAP) Pulse Ox O2 Delivery O2 Flow Rate FiO2


 


1/16/21 11:00  113 23 95/64 (74) 94   


 


1/16/21 10:45  111 20 91/67 (75) 96   


 


1/16/21 10:30  109 22 102/64 (77) 95   


 


1/16/21 10:15  109 22 109/74 (86) 96   


 


1/16/21 10:00  109 22 112/68 (83) 95   


 


1/16/21 09:00 100.7 104 19 115/76 (89)    


 


1/16/21 08:00  108 24 101/70 (80)    


 


1/16/21 08:00      Mechanical Ventilator  


 


1/16/21 08:00        70


 


1/16/21 08:00  107      


 


1/16/21 07:00   20   Mechanical Ventilator  70


 


1/16/21 07:00  107 20 105/70 (82) 96   


 


1/16/21 06:00 100.2 112 23 106/67 (80) 95   


 


1/16/21 06:00   23   Mechanical Ventilator  70


 


1/16/21 05:23 99.7       


 


1/16/21 05:00   24   Mechanical Ventilator  70


 


1/16/21 05:00 102.0 115 24 112/75 (87) 95   


 


1/16/21 04:00        70


 


1/16/21 04:00  115 23 109/68 (82) 95   


 


1/16/21 04:00   23   Mechanical Ventilator  70


 


1/16/21 04:00  115      


 


1/16/21 04:00      Mechanical Ventilator  


 


1/16/21 03:00   22   Mechanical Ventilator  70


 


1/16/21 03:00  112 22 115/71 (86) 96   


 


1/16/21 02:00  111 20 108/67 (81) 95   


 


1/16/21 02:00   20   Mechanical Ventilator  70


 


1/16/21 01:03  111 22     70


 


1/16/21 01:00 99.7 103 21 114/72 (86) 94   


 


1/16/21 01:00   21   Mechanical Ventilator  70


 


1/16/21 00:00  107      


 


1/16/21 00:00      Mechanical Ventilator  


 


1/16/21 00:00 99.7 107 21 113/73 (86) 95   


 


1/16/21 00:00   21   Mechanical Ventilator  70


 


1/16/21 00:00        70


 


1/15/21 23:30 99.7       


 


1/15/21 23:00  104 18 101/67 (78) 96   


 


1/15/21 23:00   18     70


 


1/15/21 22:00   19   Mechanical Ventilator  


 


1/15/21 22:00  102 19 100/63 (75) 95   


 


1/15/21 21:00   18   Mechanical Ventilator  70


 


1/15/21 21:00  101 18 110/70 (83) 96   


 


1/15/21 20:00   18   Mechanical Ventilator  70


 


1/15/21 20:00      Mechanical Ventilator  


 


1/15/21 20:00  102 18 106/69 (81) 96   


 


1/15/21 20:00  102      


 


1/15/21 20:00        70


 


1/15/21 19:44  102 18     70


 


1/15/21 19:00 99.8 105 19 99/62 (74) 96   


 


1/15/21 19:00   20   Mechanical Ventilator  70


 


1/15/21 18:00  106 20 104/67 (79) 97   


 


1/15/21 18:00   19   Mechanical Ventilator  70


 


1/15/21 17:52 99.8       


 


1/15/21 17:00  110 21 98/72 (81) 97   


 


1/15/21 16:00        70


 


1/15/21 16:00  115 23 109/75 (86) 97   


 


1/15/21 16:00  116      


 


1/15/21 16:00      Mechanical Ventilator  


 


1/15/21 15:00  121 24 108/77 (87) 84   


 


1/15/21 15:00  118 23     70


 


1/15/21 15:00   24   Mechanical Ventilator  70


 


1/15/21 14:00  122 24 101/76 (84) 95   


 


1/15/21 14:00   24   Mechanical Ventilator  70


 


1/15/21 13:52        70


 


1/15/21 13:50        70


 


1/15/21 13:00   26   Mechanical Ventilator  80


 


1/15/21 13:00 99.0 126 25 106/72 (83) 98   


 


1/15/21 12:00      Mechanical Ventilator  


 


1/15/21 12:00  133 28 103/64 (77) 100   


 


1/15/21 12:00   28   Mechanical Ventilator  90


 


1/15/21 12:00        90


 


1/15/21 12:00  128      

















Intake and Output  


 


 1/15/21 1/16/21





 19:00 07:00


 


Intake Total 1395.000 ml 1012 ml


 


Output Total 820 ml 840 ml


 


Balance 575.000 ml 172 ml


 


  


 


Free Water 90 ml 30 ml


 


IV Total 700.000 ml 322 ml


 


Tube Feeding 605 ml 660 ml


 


Output Urine Total 820 ml 840 ml








Laboratory Tests


1/15/21 13:00: 


Urine Color Yellow, Urine Appearance Clear, Urine pH 5, Urine Specific Gravity 

1.025, Urine Protein Negative, Urine Glucose (UA) Negative, Urine Ketones 

Negative, Urine Blood Negative, Urine Nitrite Negative, Urine Bilirubin 

Negative, Urine Urobilinogen Normal, Urine Leukocyte Esterase Negative


1/16/21 03:15: 


White Blood Count 13.9H, Red Blood Count 4.83, Hemoglobin 13.8L, Hematocrit 

44.7, Mean Corpuscular Volume 92, Mean Corpuscular Hemoglobin 28.5, Mean Corpusc

ular Hemoglobin Concent 30.8L, Red Cell Distribution Width 12.7, Platelet Count 

190, Mean Platelet Volume 10.5H, Neutrophils (%) (Auto) , Lymphocytes (%) (Auto)

, Monocytes (%) (Auto) , Eosinophils (%) (Auto) , Basophils (%) (Auto) , 

Differential Total Cells Counted 100, Neutrophils % (Manual) 92H, Lymphocytes % 

(Manual) 3L, Monocytes % (Manual) 5, Eosinophils % (Manual) 0, Basophils % 

(Manual) 0, Band Neutrophils 0, Platelet Estimate Adequate, Platelet Morphology 

Normal, Red Blood Cell Morphology Normal, Sodium Level 147H, Potassium Level 

3.3L, Chloride Level 109H, Carbon Dioxide Level 35H, Anion Gap 3L, Blood Urea 

Nitrogen 46H, Creatinine 0.9, Estimat Glomerular Filtration Rate > 60, Glucose 

Level 257H, Calcium Level 8.5


1/16/21 08:12: 


Arterial Blood pH 7.471H, Arterial Blood Partial Pressure CO2 48.5H, Arterial 

Blood Partial Pressure O2 56.9L, Arterial Blood HCO3 34.6H, Arterial Blood 

Oxygen Saturation 91.1L, Arterial Blood Base Excess 9.4*H, Abelardo Test Positive


1/16/21 10:00: Vancomycin Level Trough 12.5H


Height (Feet):  5


Height (Inches):  7.00


Weight (Pounds):  198


Objective





Current Medications








 Medications


  (Trade)  Dose


 Ordered  Sig/Julisa


 Route


 PRN Reason  Start Time


 Stop Time Status Last Admin


Dose Admin


 


 Acetaminophen


  (Tylenol)  650 mg  Q4H  PRN


 GT


 Temp >100.5  1/15/21 17:15


 2/14/21 17:14  1/16/21 04:53





 


 Chlorhexidine


 Gluconate


  (Vanessa-Hex 2%)  1 applic  DAILY@2000


 TOPIC


   1/6/21 20:00


 4/6/21 19:59  1/15/21 20:04





 


 Dextrose


  (Dextrose 50%)  25 ml  Q30M  PRN


 IV


 Hypoglycemia  1/9/21 13:30


 4/9/21 13:29   





 


 Dextrose


  (Dextrose 50%)  50 ml  Q30M  PRN


 IV


 Hypoglycemia  1/9/21 13:30


 4/9/21 13:29   





 


 Docusate Sodium


  (Colace)  100 mg  TIDPRN  PRN


 GT


 Constipation  1/12/21 01:15


 2/11/21 01:14  1/12/21 01:42





 


 Enoxaparin Sodium


  (Lovenox)  80 mg  EVERY 12  HOURS


 SUBQ


   1/2/21 21:00


 4/2/21 20:59  1/16/21 09:46





 


 Haloperidol


  (Haldol)  5 mg  Q6H  PRN


 ORAL


 Agitation  1/4/21 13:45


 2/18/21 13:44  1/4/21 22:56





 


 Haloperidol


 Lactate 5 mg/


 Dextrose  56 ml @ 


 224 mls/hr  Q6H  PRN


 IVPB


 Agitation  1/5/21 12:30


 2/19/21 12:29  1/5/21 13:20





 


 Insulin Aspart


  (NovoLOG)    Q6HR


 SUBQ


   1/14/21 12:00


 4/9/21 17:59  1/16/21 05:23





 


 Insulin Aspart


  (NovoLOG)  8 units  EVERY 6  HOURS


 SUBQ


   1/15/21 07:00


 4/15/21 06:59  1/16/21 05:24





 


 Insulin Detemir


  (Levemir)  20 units  Q12HR


 SUBQ


   1/14/21 09:00


 4/12/21 08:59  1/16/21 09:45





 


 Methylprednisolone


 Sodium Succinate


  (Solu-MEDROL)  10 mg  DAILY


 IVP


   1/17/21 09:00


 1/18/21 09:01   





 


 Midazolam HCl  100 ml @ 2


 mls/hr  Q24H  PRN


 IV


 Agitation  1/15/21 23:00


 1/22/21 22:59  1/15/21 23:00





 


 Pantoprazole


  (Protonix)  40 mg  DAILY


 IVP


   1/14/21 09:00


 2/13/21 08:59  1/16/21 09:42





 


 Piperacillin Sod/


 Tazobactam Sod


 3.375 gm/Sodium


 Chloride  110 ml @ 


 27.5 mls/hr  EVERY 8  HOURS


 IVPB


   1/15/21 14:00


 1/20/21 13:59  1/16/21 05:30





 


 Quetiapine


 Fumarate


  (SEROqueL)  50 mg  Q12HR


 ORAL


   1/4/21 21:00


 2/18/21 20:59  1/16/21 09:42





 


 Sodium Chloride  500 ml @ 


 999 mls/hr  Q31M PRN


 IV


 For hypotension  1/15/21 18:30


 2/14/21 18:29   





 


 Sodium Chloride  1,000 ml @ 


 25 mls/hr  Q24H


 IV


   1/3/21 07:45


 2/2/21 07:44  1/15/21 17:06





 


 Vancomycin HCl  300 ml @ 


 150 mls/hr  Q12H


 IVPB


   1/16/21 11:00


 1/21/21 10:59   





 


 Vancomycin HCl


  ([facility] pharmacy


 to dose)  1 ea  DAILY  PRN


 MISC


 Per rx protocol  1/14/21 09:15


 2/13/21 09:14   














Assessment/Plan


Problem List:  


(1) Diabetes mellitus out of control


ICD Codes:  E11.65 - Type 2 diabetes mellitus with hyperglycemia


SNOMED:  59593705, [id number]


(2) Pneumonia due to COVID-19 virus


ICD Codes:  U07.1 - COVID-19; J12.82 - Pneumonia due to coronavirus disease 2019


SNOMED:  [id number]


Assessment/Plan:


continue Levemir 20 units bid 


increase Novolog 8 to 14 units every 6 hours 


continue Novolog sliding scale every 6 hours











Nick Mcmahon MD                 Jan 16, 2021 11:52

## 2021-01-16 NOTE — NUR
NURSE NOTES:

LATE ENTRY:

PT ORAL CARE AND SUCTION PROVIDED. NOTED SWELLING OF LOWER FACE AND JAW UNDER ANCHOR FAST. 
WILL CONTINUE TO MONITOR.

## 2021-01-16 NOTE — DIAGNOSTIC IMAGING REPORT
EXAM:

  XR Chest, 1 View

 

CLINICAL HISTORY:

  SWELL

 

TECHNIQUE:

  Frontal view of the chest.

 

COMPARISON:

  Chest x-ray 1/16/2021 at 7:50 AM, chest x-ray 1/14/2021

 

FINDINGS:

  Lungs:  No significant interval change in diffuse bilateral airspace 

opacities, most confluent within the lung bases.

  Pleural space:  Unremarkable.  No pneumothorax.

  Heart:  Unremarkable.  No cardiomegaly.

  Mediastinum:  There is mild pneumomediastinum.

  Bones/joints:  Unremarkable.

  Soft tissues:  There is extensive subcutaneous emphysema throughout the 

chest, supraclavicular soft tissues and partially visualized neck which 

has worsened compared to the previous exam.

  Tubes, lines and devices:  And ET tube overlies the trachea and 

terminates just above the level of the clavicular heads, approximately 8 

cm above the tito.  An enteric tube courses below the level of the 

diaphragm with the tip overlying the expected region of the stomach.

 

IMPRESSION:     

1.  No significant interval change in diffuse bilateral airspace 

opacities, most confluent within the lung bases.

2.  There is pneumomediastinum with extensive subcutaneous emphysema 

throughout the chest, supraclavicular soft tissues and partially 

visualized neck which has worsened compared to the previous exam.

3.  ET tube terminates 8 cm above the tito.  Enteric tube terminates in 

the stomach.

## 2021-01-16 NOTE — NUR
NURSE NOTES:

Pt remains sedated, opens eyes, reached RASS -2.

Pt remains unable to follow simple commands at this time; withdraws to light pain stimuli; 
gag reflex active. 

Pt is SR on cardiac monitor with 2+ radial and dorsalis pedis pulses, 1+ pitting edema noted 
to right hand. 

Pt remains orally intubated with ETT 7.5/23cm, AC 16, , FiO2 70%, Peep 10. 

Left naris NGT is running Vital AF 1.2 at 55mL/hr. Abdomen is round and soft w/ active bowel 
sounds to all quadrants. 

F/C noted draining yellow urine. 

Skin is intact. 

Pt has a left femoral TLC, dressing CDI, Versed gtt @1mg/hr, DC Lasix,  and 1/2 NS @25 
mL/hr. 

RFA 18 IV noted saline-locked. 

Afebrile and VSS.

Safety measures observed and no acute distress noted. will continue to monitor.

## 2021-01-16 NOTE — NUR
NURSE NOTES:

LATE ENTRY:

CALLED AND SPOKE WITH MD. LORENZO REGARDING NOTABLE PROGRESSIVE SWELLING, POSSIBLE 
SUBCUTANEOUS EMPHYSEMA OF NECK AND RIGHT CHEST.  RECEIVED ORDER FOR STAT CHEST XRY AND CALL 
WITH RESULTS. WILL PLACE ORDER.

## 2021-01-16 NOTE — PULMONOLOGY PROGRESS NOTE
Subjective


ROS Limited/Unobtainable:  Yes


Interval Events:  None new reported.  On Versed drip.


Constitutional:  Reports: fever, other - Mg=123


HEENT:  Repors: no symptoms


Respiratory:  Reports: shortness of breath - better


Cardiovascular:  Reports: no symptoms


Gastrointestinal/Abdominal:  Reports: no symptoms


Allergies:  


Coded Allergies:  


     No Known Allergies (Unverified , 6/11/19)


All Systems:  reviewed and negative except above





Objective





Last 24 Hour Vital Signs








  Date Time  Temp Pulse Resp B/P (MAP) Pulse Ox O2 Delivery O2 Flow Rate FiO2


 


1/16/21 12:29 99.0       


 


1/16/21 11:00  113 23 95/64 (74) 94   


 


1/16/21 10:45  111 20 91/67 (75) 96   


 


1/16/21 10:30  109 22 102/64 (77) 95   


 


1/16/21 10:15  109 22 109/74 (86) 96   


 


1/16/21 10:00  109 22 112/68 (83) 95   


 


1/16/21 09:00 100.7 104 19 115/76 (89)    


 


1/16/21 08:00  108 24 101/70 (80)    


 


1/16/21 08:00      Mechanical Ventilator  


 


1/16/21 08:00        70


 


1/16/21 08:00  107      


 


1/16/21 07:00   20   Mechanical Ventilator  70


 


1/16/21 07:00  107 20 105/70 (82) 96   


 


1/16/21 06:00 100.2 112 23 106/67 (80) 95   


 


1/16/21 06:00   23   Mechanical Ventilator  70


 


1/16/21 05:23 99.7       


 


1/16/21 05:00   24   Mechanical Ventilator  70


 


1/16/21 05:00 102.0 115 24 112/75 (87) 95   


 


1/16/21 04:00        70


 


1/16/21 04:00  115 23 109/68 (82) 95   


 


1/16/21 04:00   23   Mechanical Ventilator  70


 


1/16/21 04:00  115      


 


1/16/21 04:00      Mechanical Ventilator  


 


1/16/21 03:00   22   Mechanical Ventilator  70


 


1/16/21 03:00  112 22 115/71 (86) 96   


 


1/16/21 02:00  111 20 108/67 (81) 95   


 


1/16/21 02:00   20   Mechanical Ventilator  70


 


1/16/21 01:03  111 22     70


 


1/16/21 01:00 99.7 103 21 114/72 (86) 94   


 


1/16/21 01:00   21   Mechanical Ventilator  70


 


1/16/21 00:00  107      


 


1/16/21 00:00      Mechanical Ventilator  


 


1/16/21 00:00 99.7 107 21 113/73 (86) 95   


 


1/16/21 00:00   21   Mechanical Ventilator  70


 


1/16/21 00:00        70


 


1/15/21 23:30 99.7       


 


1/15/21 23:00  104 18 101/67 (78) 96   


 


1/15/21 23:00   18     70


 


1/15/21 22:00   19   Mechanical Ventilator  


 


1/15/21 22:00  102 19 100/63 (75) 95   


 


1/15/21 21:00   18   Mechanical Ventilator  70


 


1/15/21 21:00  101 18 110/70 (83) 96   


 


1/15/21 20:00   18   Mechanical Ventilator  70


 


1/15/21 20:00      Mechanical Ventilator  


 


1/15/21 20:00  102 18 106/69 (81) 96   


 


1/15/21 20:00  102      


 


1/15/21 20:00        70


 


1/15/21 19:44  102 18     70


 


1/15/21 19:00 99.8 105 19 99/62 (74) 96   


 


1/15/21 19:00   20   Mechanical Ventilator  70


 


1/15/21 18:00  106 20 104/67 (79) 97   


 


1/15/21 18:00   19   Mechanical Ventilator  70


 


1/15/21 17:52 99.8       


 


1/15/21 17:00  110 21 98/72 (81) 97   


 


1/15/21 16:00        70


 


1/15/21 16:00  115 23 109/75 (86) 97   


 


1/15/21 16:00  116      


 


1/15/21 16:00      Mechanical Ventilator  


 


1/15/21 15:00  121 24 108/77 (87) 84   


 


1/15/21 15:00  118 23     70


 


1/15/21 15:00   24   Mechanical Ventilator  70

















Intake and Output  


 


 1/15/21 1/16/21





 19:00 07:00


 


Intake Total 1395.000 ml 1012 ml


 


Output Total 820 ml 840 ml


 


Balance 575.000 ml 172 ml


 


  


 


Free Water 90 ml 30 ml


 


IV Total 700.000 ml 322 ml


 


Tube Feeding 605 ml 660 ml


 


Output Urine Total 820 ml 840 ml








General Appearance:  no acute distress


HEENT:  atraumatic


Respiratory:  decreased breath sounds


Cardiovascular:  normal rate, regular rhythm


Abdomen:  soft, non tender





Microbiology








 Date/Time


Source Procedure


Growth Status





 


 1/15/21 13:00


Sputum Gram Stain - Final Resulted


 


 1/15/21 13:00


Sputum Sputum Culture


Pending Resulted





 1/14/21 10:30


Sputum Gram Stain - Final Complete


 


 1/14/21 10:30 Sputum Culture - Final


Candida Albicans


Usual Respiratory Wendy Complete


 


 1/14/21 10:30


Blood Blood Culture - Preliminary


Staphylococcus Sp Coag Neg Resulted








Laboratory Tests


1/16/21 03:15: 


White Blood Count 13.9H, Red Blood Count 4.83, Hemoglobin 13.8L, Hematocrit 

44.7, Mean Corpuscular Volume 92, Mean Corpuscular Hemoglobin 28.5, Mean 

Corpuscular Hemoglobin Concent 30.8L, Red Cell Distribution Width 12.7, Platelet

Count 190, Mean Platelet Volume 10.5H, Neutrophils (%) (Auto) , Lymphocytes (%) 

(Auto) , Monocytes (%) (Auto) , Eosinophils (%) (Auto) , Basophils (%) (Auto) , 

Differential Total Cells Counted 100, Neutrophils % (Manual) 92H, Lymphocytes % 

(Manual) 3L, Monocytes % (Manual) 5, Eosinophils % (Manual) 0, Basophils % 

(Manual) 0, Band Neutrophils 0, Platelet Estimate Adequate, Platelet Morphology 

Normal, Red Blood Cell Morphology Normal, Sodium Level 147H, Potassium Level 

3.3L, Chloride Level 109H, Carbon Dioxide Level 35H, Anion Gap 3L, Blood Urea 

Nitrogen 46H, Creatinine 0.9, Estimat Glomerular Filtration Rate > 60, Glucose 

Level 257H, Calcium Level 8.5


1/16/21 08:12: 


Arterial Blood pH 7.471H, Arterial Blood Partial Pressure CO2 48.5H, Arterial 

Blood Partial Pressure O2 56.9L, Arterial Blood HCO3 34.6H, Arterial Blood 

Oxygen Saturation 91.1L, Arterial Blood Base Excess 9.4*H, Abelardo Test Positive


1/16/21 10:00: Vancomycin Level Trough 12.5H





Current Medications








 Medications


  (Trade)  Dose


 Ordered  Sig/Julisa


 Route


 PRN Reason  Start Time


 Stop Time Status Last Admin


Dose Admin


 


 Acetaminophen


  (Tylenol)  650 mg  Q4H  PRN


 GT


 Temp >100.5  1/15/21 17:15


 2/14/21 17:14  1/16/21 11:59





 


 Chlorhexidine


 Gluconate


  (Vanessa-Hex 2%)  1 applic  DAILY@2000


 TOPIC


   1/6/21 20:00


 4/6/21 19:59  1/15/21 20:04





 


 Dextrose


  (Dextrose 50%)  25 ml  Q30M  PRN


 IV


 Hypoglycemia  1/9/21 13:30


 4/9/21 13:29   





 


 Dextrose


  (Dextrose 50%)  50 ml  Q30M  PRN


 IV


 Hypoglycemia  1/9/21 13:30


 4/9/21 13:29   





 


 Docusate Sodium


  (Colace)  100 mg  TIDPRN  PRN


 GT


 Constipation  1/12/21 01:15


 2/11/21 01:14  1/12/21 01:42





 


 Enoxaparin Sodium


  (Lovenox)  80 mg  EVERY 12  HOURS


 SUBQ


   1/2/21 21:00


 4/2/21 20:59  1/16/21 09:46





 


 Haloperidol


  (Haldol)  5 mg  Q6H  PRN


 ORAL


 Agitation  1/4/21 13:45


 2/18/21 13:44  1/4/21 22:56





 


 Haloperidol


 Lactate 5 mg/


 Dextrose  56 ml @ 


 224 mls/hr  Q6H  PRN


 IVPB


 Agitation  1/5/21 12:30


 2/19/21 12:29  1/5/21 13:20





 


 Insulin Aspart


  (NovoLOG)    Q6HR


 SUBQ


   1/14/21 12:00


 4/9/21 17:59  1/16/21 12:25





 


 Insulin Aspart


  (NovoLOG)  14 units  EVERY 6  HOURS


 SUBQ


   1/16/21 12:00


 4/15/21 06:59  1/16/21 12:25





 


 Insulin Detemir


  (Levemir)  20 units  Q12HR


 SUBQ


   1/14/21 09:00


 4/12/21 08:59  1/16/21 09:45





 


 Methylprednisolone


 Sodium Succinate


  (Solu-MEDROL)  10 mg  DAILY


 IVP


   1/17/21 09:00


 1/18/21 09:01   





 


 Midazolam HCl  100 ml @ 2


 mls/hr  Q24H  PRN


 IV


 Agitation  1/15/21 23:00


 1/22/21 22:59  1/15/21 23:00





 


 Pantoprazole


  (Protonix)  40 mg  DAILY


 IVP


   1/14/21 09:00


 2/13/21 08:59  1/16/21 09:42





 


 Piperacillin Sod/


 Tazobactam Sod


 3.375 gm/Sodium


 Chloride  110 ml @ 


 27.5 mls/hr  EVERY 8  HOURS


 IVPB


   1/15/21 14:00


 1/20/21 13:59  1/16/21 05:30





 


 Quetiapine


 Fumarate


  (SEROqueL)  50 mg  Q12HR


 ORAL


   1/4/21 21:00


 2/18/21 20:59  1/16/21 09:42





 


 Sodium Chloride  500 ml @ 


 999 mls/hr  Q31M PRN


 IV


 For hypotension  1/15/21 18:30


 2/14/21 18:29   





 


 Sodium Chloride  1,000 ml @ 


 25 mls/hr  Q24H


 IV


   1/3/21 07:45


 2/2/21 07:44  1/15/21 17:06





 


 Vancomycin HCl  300 ml @ 


 150 mls/hr  Q12H


 IVPB


   1/16/21 11:00


 1/21/21 10:59  1/16/21 11:59





 


 Vancomycin HCl


  ([facility] pharmacy


 to dose)  1 ea  DAILY  PRN


 MISC


 Per rx protocol  1/14/21 09:15


 2/13/21 09:14   














Assessment/Plan


Assessment/Plan


1. COVID-19 pneumonia.


   - on Decadron, azithromycin, and ceftriaxone


   - off remdesivir


   - Intubated now; will continue AC mode for now


          -Currently 70% FiO2. PEEP 10


             - ABG pH 7.471, pCO2 improved


        


2. Diabetes mellitus.; 


3. Elevated inflammatory markers.


4. High D-dimer.


5. Hyponatremia.


6. Hyperglycemia.


   - BG control


7. Hypoxemia., secondary to #1; improved





DVT prophylaxis   On Lovenox.


Sedated





We will discontinue Lasix drip.


We will discontinue Diamox


May need pressors.





The care for this patient was discussed with my supervising physician


Time spent for this case was approximately 31 minutes











Lg Miranda                 Jan 16, 2021 14:04

## 2021-01-16 NOTE — NUR
NURSE NOTES:

LATE ENTRY:

PT SATING 93%. HOB>30. RE ASSESSING SWELLING NOTED EARLIER. ADDITIONAL SWELLING OF CLAVICLE 
AND CAROTID AREA. GOWN READJUSTED, ANCHOR FAST REAPPLIED, PT REPOSITIONED. WILL CONTINUE TO 
MONITOR.

## 2021-01-16 NOTE — SURGERY PROGRESS NOTE
Surgery Progress Note


Subjective


Procedure Performed


Left femoral central venous catheter insertion


Additional Comments


ill appearing


on support


difficult weaning





Objective





Last 24 Hour Vital Signs








  Date Time  Temp Pulse Resp B/P (MAP) Pulse Ox O2 Delivery O2 Flow Rate FiO2


 


1/16/21 11:00  113 23 95/64 (74) 94   


 


1/16/21 10:45  111 20 91/67 (75) 96   


 


1/16/21 10:30  109 22 102/64 (77) 95   


 


1/16/21 10:15  109 22 109/74 (86) 96   


 


1/16/21 10:00  109 22 112/68 (83) 95   


 


1/16/21 09:00 100.7 104 19 115/76 (89)    


 


1/16/21 08:00  108 24 101/70 (80)    


 


1/16/21 08:00      Mechanical Ventilator  


 


1/16/21 08:00        70


 


1/16/21 08:00  107      


 


1/16/21 07:00   20   Mechanical Ventilator  70


 


1/16/21 07:00  107 20 105/70 (82) 96   


 


1/16/21 06:00 100.2 112 23 106/67 (80) 95   


 


1/16/21 06:00   23   Mechanical Ventilator  70


 


1/16/21 05:23 99.7       


 


1/16/21 05:00   24   Mechanical Ventilator  70


 


1/16/21 05:00 102.0 115 24 112/75 (87) 95   


 


1/16/21 04:00        70


 


1/16/21 04:00  115 23 109/68 (82) 95   


 


1/16/21 04:00   23   Mechanical Ventilator  70


 


1/16/21 04:00  115      


 


1/16/21 04:00      Mechanical Ventilator  


 


1/16/21 03:00   22   Mechanical Ventilator  70


 


1/16/21 03:00  112 22 115/71 (86) 96   


 


1/16/21 02:00  111 20 108/67 (81) 95   


 


1/16/21 02:00   20   Mechanical Ventilator  70


 


1/16/21 01:03  111 22     70


 


1/16/21 01:00 99.7 103 21 114/72 (86) 94   


 


1/16/21 01:00   21   Mechanical Ventilator  70


 


1/16/21 00:00  107      


 


1/16/21 00:00      Mechanical Ventilator  


 


1/16/21 00:00 99.7 107 21 113/73 (86) 95   


 


1/16/21 00:00   21   Mechanical Ventilator  70


 


1/16/21 00:00        70


 


1/15/21 23:30 99.7       


 


1/15/21 23:00  104 18 101/67 (78) 96   


 


1/15/21 23:00   18     70


 


1/15/21 22:00   19   Mechanical Ventilator  


 


1/15/21 22:00  102 19 100/63 (75) 95   


 


1/15/21 21:00   18   Mechanical Ventilator  70


 


1/15/21 21:00  101 18 110/70 (83) 96   


 


1/15/21 20:00   18   Mechanical Ventilator  70


 


1/15/21 20:00      Mechanical Ventilator  


 


1/15/21 20:00  102 18 106/69 (81) 96   


 


1/15/21 20:00  102      


 


1/15/21 20:00        70


 


1/15/21 19:44  102 18     70


 


1/15/21 19:00 99.8 105 19 99/62 (74) 96   


 


1/15/21 19:00   20   Mechanical Ventilator  70


 


1/15/21 18:00  106 20 104/67 (79) 97   


 


1/15/21 18:00   19   Mechanical Ventilator  70


 


1/15/21 17:52 99.8       


 


1/15/21 17:00  110 21 98/72 (81) 97   


 


1/15/21 16:00        70


 


1/15/21 16:00  115 23 109/75 (86) 97   


 


1/15/21 16:00  116      


 


1/15/21 16:00      Mechanical Ventilator  


 


1/15/21 15:00  121 24 108/77 (87) 84   


 


1/15/21 15:00  118 23     70


 


1/15/21 15:00   24   Mechanical Ventilator  70


 


1/15/21 14:00  122 24 101/76 (84) 95   


 


1/15/21 14:00   24   Mechanical Ventilator  70


 


1/15/21 13:52        70


 


1/15/21 13:50        70


 


1/15/21 13:00   26   Mechanical Ventilator  80


 


1/15/21 13:00 99.0 126 25 106/72 (83) 98   








I&O











Intake and Output  


 


 1/15/21 1/16/21





 18:59 06:59


 


Intake Total 1405.000 ml 982 ml


 


Output Total 850 ml 840 ml


 


Balance 555.000 ml 142 ml


 


  


 


Free Water 90 ml 


 


IV Total 710.000 ml 322 ml


 


Tube Feeding 605 ml 660 ml


 


Output Urine Total 850 ml 840 ml








Dressing:  other


Wound:  other


Cardiovascular:  RSR


Respiratory:  decreased breath sounds


Abdomen:  soft, non-tender, present bowel sounds


Extremities:  no tenderness, no cyanosis





Laboratory Tests








Test


 1/15/21


13:00 1/16/21


03:15 1/16/21


08:12 1/16/21


10:00


 


Urine Color Yellow     


 


Urine Appearance Clear     


 


Urine pH 5 (4.5-8.0)     


 


Urine Specific Gravity


 1.025


(1.005-1.035) 


 


 





 


Urine Protein


 Negative


(NEGATIVE) 


 


 





 


Urine Glucose (UA)


 Negative


(NEGATIVE) 


 


 





 


Urine Ketones


 Negative


(NEGATIVE) 


 


 





 


Urine Blood


 Negative


(NEGATIVE) 


 


 





 


Urine Nitrite


 Negative


(NEGATIVE) 


 


 





 


Urine Bilirubin


 Negative


(NEGATIVE) 


 


 





 


Urine Urobilinogen


 Normal MG/DL


(0.0-1.0) 


 


 





 


Urine Leukocyte Esterase


 Negative


(NEGATIVE) 


 


 





 


White Blood Count


 


 13.9 K/UL


(4.8-10.8)  H 


 





 


Red Blood Count


 


 4.83 M/UL


(4.70-6.10) 


 





 


Hemoglobin


 


 13.8 G/DL


(14.2-18.0)  L 


 





 


Hematocrit


 


 44.7 %


(42.0-52.0) 


 





 


Mean Corpuscular Volume  92 FL (80-99)    


 


Mean Corpuscular Hemoglobin


 


 28.5 PG


(27.0-31.0) 


 





 


Mean Corpuscular Hemoglobin


Concent 


 30.8 G/DL


(32.0-36.0)  L 


 





 


Red Cell Distribution Width


 


 12.7 %


(11.6-14.8) 


 





 


Platelet Count


 


 190 K/UL


(150-450) 


 





 


Mean Platelet Volume


 


 10.5 FL


(6.5-10.1)  H 


 





 


Neutrophils (%) (Auto)


 


 % (45.0-75.0)


 


 





 


Lymphocytes (%) (Auto)


 


 % (20.0-45.0)


 


 





 


Monocytes (%) (Auto)   % (1.0-10.0)    


 


Eosinophils (%) (Auto)   % (0.0-3.0)    


 


Basophils (%) (Auto)   % (0.0-2.0)    


 


Differential Total Cells


Counted 


 100  


 


 





 


Neutrophils % (Manual)  92 % (45-75)  H  


 


Lymphocytes % (Manual)  3 % (20-45)  L  


 


Monocytes % (Manual)  5 % (1-10)    


 


Eosinophils % (Manual)  0 % (0-3)    


 


Basophils % (Manual)  0 % (0-2)    


 


Band Neutrophils  0 % (0-8)    


 


Platelet Estimate  Adequate    


 


Platelet Morphology  Normal    


 


Red Blood Cell Morphology  Normal    


 


Sodium Level


 


 147 MMOL/L


(136-145)  H 


 





 


Potassium Level


 


 3.3 MMOL/L


(3.5-5.1)  L 


 





 


Chloride Level


 


 109 MMOL/L


()  H 


 





 


Carbon Dioxide Level


 


 35 MMOL/L


(21-32)  H 


 





 


Anion Gap


 


 3 mmol/L


(5-15)  L 


 





 


Blood Urea Nitrogen


 


 46 mg/dL


(7-18)  H 


 





 


Creatinine


 


 0.9 MG/DL


(0.55-1.30) 


 





 


Estimat Glomerular Filtration


Rate 


 > 60 mL/min


(>60) 


 





 


Glucose Level


 


 257 MG/DL


()  H 


 





 


Calcium Level


 


 8.5 MG/DL


(8.5-10.1) 


 





 


Arterial Blood pH


 


 


 7.471


(7.350-7.450) 





 


Arterial Blood Partial


Pressure CO2 


 


 48.5 mmHg


(35.0-45.0)  H 





 


Arterial Blood Partial


Pressure O2 


 


 56.9 mmHg


(75.0-100.0)  L 





 


Arterial Blood HCO3


 


 


 34.6 mmol/L


(22.0-26.0)  H 





 


Arterial Blood Oxygen


Saturation 


 


 91.1 %


()  L 





 


Arterial Blood Base Excess   9.4 (-2-2)  *H 


 


Abelardo Test   Positive   


 


Vancomycin Level Trough


 


 


 


 12.5 ug/mL


(5.0-12.0)  H











Plan


Problems:  


(1) Pneumonia due to COVID-19 virus


Assessment & Plan:  Interim endotracheal intubation, endotracheal tube tip in 

good position


approximately 4 cm above the tito. There are extensive bilateral infiltrates, 

which


are markedly increased from the prior study. Pleural spaces are probably clear, 

not


well demonstrated


Markedly increased and now extensive bilateral infiltrates 


cont as per pulm and iID





(2) Hypoxia


(3) Abdominal pain


Assessment & Plan:  65M abd pain, septic, covid, intubated ons upport


currently abd exam benign but limited given condition


line bo mary noted


okay for meds and feeds


nutritional optimization 


DAILY ESTIMATED NEEDS:


Needs based on Critical Care/ 72kg abw 


22-28  kcals/kg 


1839-8599  total kcals


1.2-2  g protein/kg


  g total protein 


25-30  mL/kg


2467-9525  total fluid mLs





NUTRITION DIAGNOSIS:


Swallowing difficulty R/T respiratory failure as evidenced by pt now


orally intubated, OGT in place, NPO





 


CURRENT TF:NPO 





 





ENTERAL NUTRITION RECOMMENDATIONS:


Vital AF 1.2 @ 60ml/hr x 24 hrs  to provide 1440ml, 1728kcal, 116g prot, 1167ml 

free water 





* As medically appropriate, initiate critical care and carb controlled TF 

formula of Vital AF 1.2


* Initiate @ 20ml/hr x 6hrs, advance 10ml q 4-6 hrs as tolerated to goal rate


* HOB over 30 degrees/ water flush per MD


* Does not exceed est kcal needs w/ Propofol running @ 8.083ml/hr x 24 hrs 

(provides 213 lipid kcal)





 





ADDITIONAL RECOMMENDATIONS:


* Calibrated bedscale wt 


* Monitor BGs closely w/ Decadron and TF, need for long acting insulin 


* Monitor lytes, replete as needed  


* Monitor Propofol rate, need to adjust TF rate  





(4) Acute metabolic encephalopathy











Norberto Vazquez                Jan 16, 2021 12:20
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
## 2021-01-17 VITALS — SYSTOLIC BLOOD PRESSURE: 97 MMHG | DIASTOLIC BLOOD PRESSURE: 66 MMHG

## 2021-01-17 VITALS — SYSTOLIC BLOOD PRESSURE: 114 MMHG | DIASTOLIC BLOOD PRESSURE: 75 MMHG

## 2021-01-17 VITALS — DIASTOLIC BLOOD PRESSURE: 81 MMHG | SYSTOLIC BLOOD PRESSURE: 119 MMHG

## 2021-01-17 VITALS — DIASTOLIC BLOOD PRESSURE: 70 MMHG | SYSTOLIC BLOOD PRESSURE: 102 MMHG

## 2021-01-17 VITALS — DIASTOLIC BLOOD PRESSURE: 79 MMHG | SYSTOLIC BLOOD PRESSURE: 116 MMHG

## 2021-01-17 VITALS — DIASTOLIC BLOOD PRESSURE: 79 MMHG | SYSTOLIC BLOOD PRESSURE: 96 MMHG

## 2021-01-17 VITALS — SYSTOLIC BLOOD PRESSURE: 100 MMHG | DIASTOLIC BLOOD PRESSURE: 68 MMHG

## 2021-01-17 VITALS — SYSTOLIC BLOOD PRESSURE: 131 MMHG | DIASTOLIC BLOOD PRESSURE: 83 MMHG

## 2021-01-17 VITALS — DIASTOLIC BLOOD PRESSURE: 67 MMHG | SYSTOLIC BLOOD PRESSURE: 99 MMHG

## 2021-01-17 VITALS — DIASTOLIC BLOOD PRESSURE: 94 MMHG | SYSTOLIC BLOOD PRESSURE: 112 MMHG

## 2021-01-17 VITALS — SYSTOLIC BLOOD PRESSURE: 107 MMHG | DIASTOLIC BLOOD PRESSURE: 68 MMHG

## 2021-01-17 VITALS — DIASTOLIC BLOOD PRESSURE: 82 MMHG | SYSTOLIC BLOOD PRESSURE: 135 MMHG

## 2021-01-17 VITALS — DIASTOLIC BLOOD PRESSURE: 74 MMHG | SYSTOLIC BLOOD PRESSURE: 106 MMHG

## 2021-01-17 VITALS — SYSTOLIC BLOOD PRESSURE: 120 MMHG | DIASTOLIC BLOOD PRESSURE: 85 MMHG

## 2021-01-17 VITALS — DIASTOLIC BLOOD PRESSURE: 77 MMHG | SYSTOLIC BLOOD PRESSURE: 115 MMHG

## 2021-01-17 VITALS — DIASTOLIC BLOOD PRESSURE: 67 MMHG | SYSTOLIC BLOOD PRESSURE: 104 MMHG

## 2021-01-17 VITALS — DIASTOLIC BLOOD PRESSURE: 79 MMHG | SYSTOLIC BLOOD PRESSURE: 114 MMHG

## 2021-01-17 VITALS — SYSTOLIC BLOOD PRESSURE: 127 MMHG | DIASTOLIC BLOOD PRESSURE: 84 MMHG

## 2021-01-17 VITALS — SYSTOLIC BLOOD PRESSURE: 109 MMHG | DIASTOLIC BLOOD PRESSURE: 68 MMHG

## 2021-01-17 VITALS — SYSTOLIC BLOOD PRESSURE: 114 MMHG | DIASTOLIC BLOOD PRESSURE: 78 MMHG

## 2021-01-17 VITALS — SYSTOLIC BLOOD PRESSURE: 116 MMHG | DIASTOLIC BLOOD PRESSURE: 79 MMHG

## 2021-01-17 VITALS — SYSTOLIC BLOOD PRESSURE: 112 MMHG | DIASTOLIC BLOOD PRESSURE: 79 MMHG

## 2021-01-17 VITALS — DIASTOLIC BLOOD PRESSURE: 78 MMHG | SYSTOLIC BLOOD PRESSURE: 115 MMHG

## 2021-01-17 VITALS — DIASTOLIC BLOOD PRESSURE: 79 MMHG | SYSTOLIC BLOOD PRESSURE: 122 MMHG

## 2021-01-17 VITALS — SYSTOLIC BLOOD PRESSURE: 103 MMHG | DIASTOLIC BLOOD PRESSURE: 73 MMHG

## 2021-01-17 VITALS — SYSTOLIC BLOOD PRESSURE: 103 MMHG | DIASTOLIC BLOOD PRESSURE: 67 MMHG

## 2021-01-17 VITALS — SYSTOLIC BLOOD PRESSURE: 126 MMHG | DIASTOLIC BLOOD PRESSURE: 83 MMHG

## 2021-01-17 VITALS — SYSTOLIC BLOOD PRESSURE: 107 MMHG | DIASTOLIC BLOOD PRESSURE: 80 MMHG

## 2021-01-17 VITALS — SYSTOLIC BLOOD PRESSURE: 109 MMHG | DIASTOLIC BLOOD PRESSURE: 75 MMHG

## 2021-01-17 VITALS — SYSTOLIC BLOOD PRESSURE: 119 MMHG | DIASTOLIC BLOOD PRESSURE: 77 MMHG

## 2021-01-17 VITALS — SYSTOLIC BLOOD PRESSURE: 106 MMHG | DIASTOLIC BLOOD PRESSURE: 67 MMHG

## 2021-01-17 VITALS — DIASTOLIC BLOOD PRESSURE: 77 MMHG | SYSTOLIC BLOOD PRESSURE: 116 MMHG

## 2021-01-17 VITALS — DIASTOLIC BLOOD PRESSURE: 79 MMHG | SYSTOLIC BLOOD PRESSURE: 113 MMHG

## 2021-01-17 VITALS — DIASTOLIC BLOOD PRESSURE: 79 MMHG | SYSTOLIC BLOOD PRESSURE: 115 MMHG

## 2021-01-17 VITALS — DIASTOLIC BLOOD PRESSURE: 70 MMHG | SYSTOLIC BLOOD PRESSURE: 106 MMHG

## 2021-01-17 VITALS — SYSTOLIC BLOOD PRESSURE: 97 MMHG | DIASTOLIC BLOOD PRESSURE: 64 MMHG

## 2021-01-17 VITALS — SYSTOLIC BLOOD PRESSURE: 114 MMHG | DIASTOLIC BLOOD PRESSURE: 79 MMHG

## 2021-01-17 VITALS — DIASTOLIC BLOOD PRESSURE: 89 MMHG | SYSTOLIC BLOOD PRESSURE: 115 MMHG

## 2021-01-17 VITALS — DIASTOLIC BLOOD PRESSURE: 80 MMHG | SYSTOLIC BLOOD PRESSURE: 116 MMHG

## 2021-01-17 VITALS — SYSTOLIC BLOOD PRESSURE: 127 MMHG | DIASTOLIC BLOOD PRESSURE: 81 MMHG

## 2021-01-17 VITALS — SYSTOLIC BLOOD PRESSURE: 133 MMHG | DIASTOLIC BLOOD PRESSURE: 85 MMHG

## 2021-01-17 VITALS — SYSTOLIC BLOOD PRESSURE: 100 MMHG | DIASTOLIC BLOOD PRESSURE: 67 MMHG

## 2021-01-17 VITALS — DIASTOLIC BLOOD PRESSURE: 84 MMHG | SYSTOLIC BLOOD PRESSURE: 134 MMHG

## 2021-01-17 VITALS — SYSTOLIC BLOOD PRESSURE: 116 MMHG | DIASTOLIC BLOOD PRESSURE: 82 MMHG

## 2021-01-17 VITALS — SYSTOLIC BLOOD PRESSURE: 108 MMHG | DIASTOLIC BLOOD PRESSURE: 71 MMHG

## 2021-01-17 VITALS — DIASTOLIC BLOOD PRESSURE: 72 MMHG | SYSTOLIC BLOOD PRESSURE: 100 MMHG

## 2021-01-17 VITALS — DIASTOLIC BLOOD PRESSURE: 78 MMHG | SYSTOLIC BLOOD PRESSURE: 124 MMHG

## 2021-01-17 VITALS — SYSTOLIC BLOOD PRESSURE: 111 MMHG | DIASTOLIC BLOOD PRESSURE: 81 MMHG

## 2021-01-17 VITALS — SYSTOLIC BLOOD PRESSURE: 97 MMHG | DIASTOLIC BLOOD PRESSURE: 65 MMHG

## 2021-01-17 VITALS — SYSTOLIC BLOOD PRESSURE: 114 MMHG | DIASTOLIC BLOOD PRESSURE: 80 MMHG

## 2021-01-17 VITALS — DIASTOLIC BLOOD PRESSURE: 81 MMHG | SYSTOLIC BLOOD PRESSURE: 129 MMHG

## 2021-01-17 VITALS — SYSTOLIC BLOOD PRESSURE: 96 MMHG | DIASTOLIC BLOOD PRESSURE: 70 MMHG

## 2021-01-17 VITALS — DIASTOLIC BLOOD PRESSURE: 82 MMHG | SYSTOLIC BLOOD PRESSURE: 106 MMHG

## 2021-01-17 VITALS — DIASTOLIC BLOOD PRESSURE: 79 MMHG | SYSTOLIC BLOOD PRESSURE: 112 MMHG

## 2021-01-17 VITALS — SYSTOLIC BLOOD PRESSURE: 105 MMHG | DIASTOLIC BLOOD PRESSURE: 77 MMHG

## 2021-01-17 VITALS — DIASTOLIC BLOOD PRESSURE: 78 MMHG | SYSTOLIC BLOOD PRESSURE: 122 MMHG

## 2021-01-17 VITALS — DIASTOLIC BLOOD PRESSURE: 81 MMHG | SYSTOLIC BLOOD PRESSURE: 120 MMHG

## 2021-01-17 LAB
ADD MANUAL DIFF: YES
ALBUMIN SERPL-MCNC: 1.5 G/DL (ref 3.4–5)
ALBUMIN/GLOB SERPL: 0.3 {RATIO} (ref 1–2.7)
ALP SERPL-CCNC: 85 U/L (ref 46–116)
ALT SERPL-CCNC: 16 U/L (ref 12–78)
ANION GAP SERPL CALC-SCNC: 6 MMOL/L (ref 5–15)
AST SERPL-CCNC: 24 U/L (ref 15–37)
BILIRUB SERPL-MCNC: 0.4 MG/DL (ref 0.2–1)
BUN SERPL-MCNC: 42 MG/DL (ref 7–18)
CALCIUM SERPL-MCNC: 9 MG/DL (ref 8.5–10.1)
CHLORIDE SERPL-SCNC: 113 MMOL/L (ref 98–107)
CO2 SERPL-SCNC: 32 MMOL/L (ref 21–32)
CREAT SERPL-MCNC: 1 MG/DL (ref 0.55–1.3)
ERYTHROCYTE [DISTWIDTH] IN BLOOD BY AUTOMATED COUNT: 12.9 % (ref 11.6–14.8)
GLOBULIN SER-MCNC: 4.7 G/DL
HCT VFR BLD CALC: 44.8 % (ref 42–52)
HGB BLD-MCNC: 13.8 G/DL (ref 14.2–18)
MCV RBC AUTO: 92 FL (ref 80–99)
PLATELET # BLD: 193 K/UL (ref 150–450)
POTASSIUM SERPL-SCNC: 3.5 MMOL/L (ref 3.5–5.1)
RBC # BLD AUTO: 4.86 M/UL (ref 4.7–6.1)
SODIUM SERPL-SCNC: 151 MMOL/L (ref 136–145)
WBC # BLD AUTO: 19.1 K/UL (ref 4.8–10.8)

## 2021-01-17 PROCEDURE — 0W9930Z DRAINAGE OF RIGHT PLEURAL CAVITY WITH DRAINAGE DEVICE, PERCUTANEOUS APPROACH: ICD-10-PCS

## 2021-01-17 RX ADMIN — ENOXAPARIN SODIUM SCH MG: 80 INJECTION SUBCUTANEOUS at 21:00

## 2021-01-17 RX ADMIN — METHYLPREDNISOLONE SODIUM SUCCINATE SCH MG: 40 INJECTION, POWDER, LYOPHILIZED, FOR SOLUTION INTRAMUSCULAR; INTRAVENOUS at 09:46

## 2021-01-17 RX ADMIN — DEXTROSE MONOHYDRATE SCH MLS/HR: 50 INJECTION, SOLUTION INTRAVENOUS at 13:51

## 2021-01-17 RX ADMIN — INSULIN ASPART SCH UNITS: 100 INJECTION, SOLUTION INTRAVENOUS; SUBCUTANEOUS at 05:53

## 2021-01-17 RX ADMIN — INSULIN ASPART SCH UNITS: 100 INJECTION, SOLUTION INTRAVENOUS; SUBCUTANEOUS at 12:36

## 2021-01-17 RX ADMIN — Medication PRN MLS/HR: at 13:51

## 2021-01-17 RX ADMIN — INSULIN ASPART SCH UNITS: 100 INJECTION, SOLUTION INTRAVENOUS; SUBCUTANEOUS at 17:57

## 2021-01-17 RX ADMIN — ACETAMINOPHEN PRN MG: 160 SOLUTION ORAL at 03:12

## 2021-01-17 RX ADMIN — DEXTROSE MONOHYDRATE SCH MLS/HR: 50 INJECTION, SOLUTION INTRAVENOUS at 05:16

## 2021-01-17 RX ADMIN — INSULIN DETEMIR SCH UNITS: 100 INJECTION, SOLUTION SUBCUTANEOUS at 09:49

## 2021-01-17 RX ADMIN — INSULIN DETEMIR SCH UNITS: 100 INJECTION, SOLUTION SUBCUTANEOUS at 21:58

## 2021-01-17 RX ADMIN — ENOXAPARIN SODIUM SCH MG: 80 INJECTION SUBCUTANEOUS at 09:48

## 2021-01-17 RX ADMIN — Medication SCH MLS/HR: at 23:13

## 2021-01-17 RX ADMIN — ACETAMINOPHEN PRN MG: 160 SOLUTION ORAL at 23:12

## 2021-01-17 RX ADMIN — ACETAMINOPHEN PRN MG: 160 SOLUTION ORAL at 12:38

## 2021-01-17 RX ADMIN — ACETAMINOPHEN PRN MG: 160 SOLUTION ORAL at 17:02

## 2021-01-17 RX ADMIN — CHLORHEXIDINE GLUCONATE SCH APPLIC: 213 SOLUTION TOPICAL at 20:31

## 2021-01-17 RX ADMIN — INSULIN ASPART SCH UNITS: 100 INJECTION, SOLUTION INTRAVENOUS; SUBCUTANEOUS at 00:12

## 2021-01-17 RX ADMIN — Medication SCH MLS/HR: at 12:33

## 2021-01-17 RX ADMIN — DEXTROSE MONOHYDRATE SCH MLS/HR: 50 INJECTION, SOLUTION INTRAVENOUS at 21:39

## 2021-01-17 RX ADMIN — PANTOPRAZOLE SODIUM SCH MG: 40 INJECTION, POWDER, FOR SOLUTION INTRAVENOUS at 09:46

## 2021-01-17 RX ADMIN — INSULIN ASPART SCH UNITS: 100 INJECTION, SOLUTION INTRAVENOUS; SUBCUTANEOUS at 00:13

## 2021-01-17 RX ADMIN — INSULIN ASPART SCH UNITS: 100 INJECTION, SOLUTION INTRAVENOUS; SUBCUTANEOUS at 05:54

## 2021-01-17 NOTE — NUR
NURSE NOTES:

COOLING MEASURES IN PLACED. FEVER TRENDING DOWN. CHEST TUBE STABLE AND SECURE, CONNECTED TO 
SUCTION. NO BM AT THIS TIME. PT ORAL CARE AND SUCTION PROVIDED. VERSED DRIP RUNNING AT 
4MG/HR. VS: 103, 116/57, 97%,21. SUBCUT EMPHYSEMA REMAINS PRESENT. BILATERAL NECK AND RT 
PECTORAL AREA. WILL CONTINUE TO MONITOR PT.

## 2021-01-17 NOTE — NUR
NURSE NOTES:

oral care given, oral suctioned. no active bleeding noted at this time. repositioned pt. 
call light within reach.

## 2021-01-17 NOTE — NUR
NURSE NOTES:

LATE ENTRY:

PT IN BED SEDATED, RESPONSIVE TO PAIN. COOLING MEASURES IN PLACE. PT INTUBATED . ET TUBE  
7.5, 23CM AT LIP. VENT SETTINGS: AC 16, , FI02 90% PEEP 10. SECRETIONS PINK. BOWEL 
SOUNDS ACTIVE. NO BM AT THIS TIME. RT NGT. FEEDING JEVITY AT 30ML/HR. JOHANSEN DRAINING YELLOW 
URINE. RADIAL PULSES PRESENT. AIRBORNE PRECAUTIONS IN PLACE. LT FEMORAL TLC PATENT. VERSED 
AT 4MG/HR. 1/2NS AT 25ML/HR. SIDE RAILS X2. HOB 30. BED ALARM ON. WILL CONTINUE TO MONITOR 
PT.

## 2021-01-17 NOTE — NUR
NURSE NOTES:

fever noted 100.5,  applied cooling measures (including ICE packs) and tyenol. will keep 
monitor pt.

## 2021-01-17 NOTE — DIAGNOSTIC IMAGING REPORT
EXAM:

  XR Chest, 1 View

 

CLINICAL HISTORY:

  F/U

 

TECHNIQUE:

  Frontal view of the chest.

 

COMPARISON:

  Chest radiograph on 1/16/2021

 

FINDINGS:

  Hardware: Endotracheal tube terminates in the region of the upper 

thoracic trachea, approximately 9.2 cm above the tito.  Enteric tube 

courses past the diaphragm and out of the field-of-view.

 

  Lungs/pleura: Similar patchy consolidations predominantly in the mid 

and lower lungs.  Possible trace right apical pneumothorax.  The lung 

apices are difficult to evaluate due to the confluence of ribs, clavicles,

 and artifact from the subcutaneous emphysema.

 

  Heart/mediastinum: Probable similar pneumomediastinum. No cardiomegaly.

 

  Soft tissues: Extensive subcutaneous emphysema.

 

  Bones: No acute fracture.

 

  Upper abdomen: Normal.

 

IMPRESSION:   

1.  Possible trace right apical pneumothorax.  The lung apices are 

difficult to evaluate due to the confluence of ribs, clavicles, and 

artifact from the subcutaneous emphysema.

2.  Similar patchy consolidations predominantly in the mid and lower 

lungs.

3.  Endotracheal tube terminates in the region of the upper thoracic 

trachea, approximately 9.2 cm above the tito.  Enteric tube courses 

past the diaphragm and out of the field-of-view.

 

<MYCVCSECTION>

 

Communications:

 

01/17/21 10:51 Call Doctor Regarding Pneumothorax, called Dr Lemos on 

01/17 10:54 (-08:00)

## 2021-01-17 NOTE — OPERATIVE NOTE - PDOC
Operative Note


Operative Note


Pre-op Diagnosis:


Sepsis


Covid positive


Respiratory insufficiency


Hypotension


Right-sided pneumothorax


Procedure:


Right tube thoracostomy chest tube insertion


Post-op Diagnosis:  same as pre-op


Surgeon:  Norberto Vazquez MD


Anesthesia:  local


Specimen:  none


Complications:  none


Condition:  unstable


Estimated Blood Loss:  minimal


Drains:  none


Implant(s) used?:  No


Indications for Procedure


Six 5-year-old male intensive care unit Covid intubated on support has developed

a right-sided pneumothorax and extensive subcutaneous emphysema.  Chest tube 

indicated recommended and placed emergently at the bedside.


Description of Procedure


Patient was made comfortable the bedside in supine position.  The right chest 

wall and axilla were prepped and draped in same surgical fashion.  Anatomic 

landmarks identified.  Local infiltrated.  At the level of the nipple maxillary 

line a small skin incision was made and carried down with blunt dissection to 

the intercostal space.  Gentle dissection above the rib margin into the pleural 

cavity identified with a gush of air.  A 20 Czech chest tube inserted under 

direct palpation without complication.  Tube in place tube sutured in place 

applied to Pleur-evac suction and dressings applied chest x-ray obtained tube 

appropriate with resolution of the pneumothorax











Norberto Vazquez                Jan 17, 2021 14:00

## 2021-01-17 NOTE — DIAGNOSTIC IMAGING REPORT
EXAM:

  XR Chest, 1 View

 

CLINICAL HISTORY:

  TUBE PLCMT

 

TECHNIQUE:

  Frontal view of the chest.

 

COMPARISON:

  1/17/21

 

FINDINGS:

  

Study was not sent for evaluation until approximately 7 hours post image 

acquisition.  ETT tip terminates at the level of the thoracic inlet and 

can be safely positioned more distally 2.5 cm.  NGT terminates in the 

stomach.  Interval placement of a right thoracostomy tube.  Right 

pneumothorax is not identified.  Extensive soft tissue gas extending into 

the neck is again noted.  Patchy airspace disease in both lungs without 

significant change.

## 2021-01-17 NOTE — NUR
NURSE HAND-OFF REPORT: 

Need to Follow up: morning AM labs, chest x-ray, ABG

Important : pt had 102.2 fever



Latest Vital Signs: Temperature 99.1 , Pulse 115 , B/P 122 /79 , Respiratory Rate 25 , O2 
SAT 93 , Mechanical Ventilator, O2 Flow Rate  .  

Vital Sign Comment: [stable]



EKG Rhythm: Sinus Tachycardia

Rhythm change?: N 

MD Notified?:  -Dr. Thaddeus GUTIERREZ Response: 



Latest Singh Fall Score: 50  

Fall Risk: High Risk 

Safety Measures: Call light Within Reach, Bed Alarm Zone 1, Side Rails Side Rails x3, Bed 
position Low and Locked.

Fall Precautions: 

Door Sign



Report given to [Beatriz ARCHER].

## 2021-01-17 NOTE — NUR
NURSE NOTES:

LATE ENTRY:

RECEIVED REPORT FROM LUISANA ARCHER. PT IN BED SEDATED, RESPONSIVE TO PAIN. PUPILS 3MM SLUGGISH. 
.FEBRILE 101.1.AX COOLING MEASURES IN PLACE. PT INTUBATED . ET TUBE  7.5, 23CM AT LIP. VENT 
SETTINGS: AC 16, , FI02 70% PEEP 10. SECRETIONS PINK, SMALL AMOUNT. LUNG SOUNDS 
RHONCHI. BOWEL SOUNDS ACTIVE. NO BM AT THIS TIME. RT NGT, CONNECTED TO FEEDING JEVITY AT 
30ML/HR. RESIDUAL 15ML. JOHANSEN CATH DRAINING YELLOW URINE, MINOR PENILE DISCHARGE NOTED. 
JOHANSEN CARE PROVIDED. RADIAL PULSES PRESENT. REDNESS RT HEEL. LEFT BUTTOCK SKIN TEAR. 
OPTIFOAM APPLIED. FALL AND AIRBORNE PRECAUTIONS IN PLACE. LT FEMORAL TLC PATENT. VERSED AT 
1MG/HR. 1/2NS AT 25ML/HR. SIDE RAILS X2. HOB 30. BED ALARM ON. WILL CONTINUE TO MONITOR PT.

## 2021-01-17 NOTE — NUR
NURSE HAND-OFF REPORT: 



Latest Vital Signs: Temperature 100.0 , Pulse 110 , B/P 103 /73 , Respiratory Rate 22 , O2 
SAT 97 , Mechanical Ventilator, O2 Flow Rate  .  

Vital Sign Comment: 



EKG Rhythm: Sinus Tachycardia

Rhythm change?: N 

MD Notified?:  -Dr. Thaddeus GUTIERREZ Response: 



Latest Singh Fall Score: 50  

Fall Risk: High Risk 

Safety Measures: Call light Within Reach, Bed Alarm Zone 1, Side Rails Side Rails x3, Bed 
position Low and Locked.

Fall Precautions: 

Door Sign



Report given to . LUISANA PARKER PT IN NO ACUTE DISTRESS.

## 2021-01-17 NOTE — NUR
NURSE NOTES:

repositioned pt, no active bleeding noted at this time. Chest tube site no active bleeding 
noted. call light within reach.

## 2021-01-17 NOTE — GENERAL PROGRESS NOTE
Subjective


ROS Limited/Unobtainable:  Yes


Allergies:  


Coded Allergies:  


     No Known Allergies (Unverified , 6/11/19)


Subjective


events noted - interval notes reviewed 


intubated 


glucose values improved











Item Value  Date Time


 


Bedside Blood Glucose 150 mg/dl H 1/17/21 0949


 


Bedside Blood Glucose 189 mg/dl H 1/17/21 0600


 


Bedside Blood Glucose 209 mg/dl H 1/17/21 0013


 


Bedside Blood Glucose 226 mg/dl H 1/16/21 2139


 


Bedside Blood Glucose 300 mg/dl H 1/16/21 1824











Objective





Last 24 Hour Vital Signs








  Date Time  Temp Pulse Resp B/P (MAP) Pulse Ox O2 Delivery O2 Flow Rate FiO2


 


1/17/21 10:15  113 23 120/85 (97)    


 


1/17/21 10:00  116 27 114/80 (91)    


 


1/17/21 09:45  115 26 116/79 (91)    


 


1/17/21 09:30  116 28 114/78 (90)    


 


1/17/21 09:15  116 26 119/77 (91)    


 


1/17/21 09:00  115 26 112/79 (90)    


 


1/17/21 09:00      Mechanical Ventilator  


 


1/17/21 08:45  115 25 114/79 (91)    


 


1/17/21 08:30  114 24 114/79 (91)    


 


1/17/21 08:16        90


 


1/17/21 08:15  114 24 116/79 (91)    


 


1/17/21 08:11        90


 


1/17/21 08:00      Mechanical Ventilator  


 


1/17/21 08:00  115      


 


1/17/21 08:00      Mechanical Ventilator  


 


1/17/21 08:00        70


 


1/17/21 08:00  115 24 115/77 (90)    


 


1/17/21 07:45  117 26 116/77 (90)    


 


1/17/21 07:30  116 24 115/78 (90)    


 


1/17/21 07:24  115 25     75


 


1/17/21 07:15  117 25 111/81 (91)    


 


1/17/21 07:00  115 25 122/79 (93)    


 


1/17/21 07:00   27   Mechanical Ventilator  70


 


1/17/21 06:00   23   Mechanical Ventilator  70


 


1/17/21 06:00  113      


 


1/17/21 06:00  113 23 124/78 (93)    


 


1/17/21 05:00   24   Mechanical Ventilator  70


 


1/17/21 05:00  120  120/81 (94)    


 


1/17/21 04:00      Mechanical Ventilator  


 


1/17/21 04:00   28   Mechanical Ventilator  70


 


1/17/21 04:00 99.1 123  106/74 (85)    


 


1/17/21 04:00        70


 


1/17/21 03:42 99.5       


 


1/17/21 03:08  127 21     75


 


1/17/21 03:00  128 30 112/94 (100)    


 


1/17/21 03:00   29   Mechanical Ventilator  70


 


1/17/21 02:00   29   Mechanical Ventilator  70


 


1/17/21 02:00  128 29 115/89 (98)    


 


1/17/21 01:00  127 29 119/77 (91)    


 


1/17/21 01:00   29   Mechanical Ventilator  70


 


1/17/21 00:00 100.0 126 27 107/80 (89) 93   


 


1/17/21 00:00   27   Mechanical Ventilator  70


 


1/17/21 00:00      Mechanical Ventilator  


 


1/16/21 23:54  127      


 


1/16/21 23:11  126 27     70


 


1/16/21 23:00   27   Mechanical Ventilator  70


 


1/16/21 23:00  126 27 106/81 (89) 93   


 


1/16/21 22:00   27   Mechanical Ventilator  70


 


1/16/21 22:00  127 28 107/80 (89) 92   


 


1/16/21 21:45  127 28 115/86 (96) 92   


 


1/16/21 21:30  127 28 118/87 (97) 94   


 


1/16/21 21:15  126 26 126/91 (103) 93   


 


1/16/21 21:01 100.2       


 


1/16/21 21:00  125 25 116/91 (99) 94   


 


1/16/21 21:00   26   Mechanical Ventilator  70


 


1/16/21 20:45  125 26 119/90 (100) 93   


 


1/16/21 20:30  127 26 107/90 (96) 93   


 


1/16/21 20:15  126 26 128/90 (103) 93   


 


1/16/21 20:01  125      


 


1/16/21 20:00 102.2 126 27 115/87 (96) 93   


 


1/16/21 20:00        70


 


1/16/21 20:00   26   Mechanical Ventilator  70


 


1/16/21 20:00      Mechanical Ventilator  


 


1/16/21 19:45  126 27 127/87 (100) 93   


 


1/16/21 19:30  127 26 119/90 (100) 93   


 


1/16/21 19:15  129 27 121/88 (99)    


 


1/16/21 19:11  126 29     70


 


1/16/21 19:00  128 28 127/91 (103)    


 


1/16/21 19:00   28   Mechanical Ventilator  70


 


1/16/21 18:45  128 28 123/88 (100)    


 


1/16/21 18:30  126 26 123/87 (99)    


 


1/16/21 18:15  127 28 124/89 (101)    


 


1/16/21 18:00  122 21 113/89 (97) 96   


 


1/16/21 18:00      Mechanical Ventilator  


 


1/16/21 17:45  127 27 118/85 (96) 95   


 


1/16/21 17:30  127 26 110/78 (89) 95   


 


1/16/21 17:15  126 28 114/77 (89) 95   


 


1/16/21 17:00      Mechanical Ventilator  


 


1/16/21 17:00  125 27 108/75 (86) 95   


 


1/16/21 16:15  126 28 115/78 (90) 96   


 


1/16/21 16:00 100.0 125 27 115/73 (87) 96   


 


1/16/21 16:00        70


 


1/16/21 16:00      Mechanical Ventilator  


 


1/16/21 16:00      Mechanical Ventilator  


 


1/16/21 16:00  126      


 


1/16/21 15:45  125 27 115/74 (88) 96   


 


1/16/21 15:30  126 27 109/72 (84) 96   


 


1/16/21 15:15  124 26     70


 


1/16/21 15:15  125 28 114/79 (91) 96   


 


1/16/21 15:00  125 27 110/73 (85) 96   


 


1/16/21 15:00      Mechanical Ventilator  


 


1/16/21 14:30  123 27 112/68 (83) 96   


 


1/16/21 14:15  121 24 106/68 (81) 96   


 


1/16/21 14:00  122 26 102/70 (81) 96   


 


1/16/21 14:00      Mechanical Ventilator  


 


1/16/21 13:45  122 26 103/70 (81) 96   


 


1/16/21 13:30  123 27 101/71 (81) 96   


 


1/16/21 13:15  120 26 106/69 (81) 96   


 


1/16/21 13:00      Mechanical Ventilator  


 


1/16/21 13:00  121 26 111/71 (84) 96   


 


1/16/21 12:45  117 24 110/79 (89) 96   


 


1/16/21 12:30  118 25 108/74 (85) 96   


 


1/16/21 12:29 99.0       


 


1/16/21 12:15  118 25 114/71 (85) 95   

















Intake and Output  


 


 1/16/21 1/17/21





 19:00 07:00


 


Intake Total 1460.0 ml 1151.5 ml


 


Output Total 790 ml 685 ml


 


Balance 670.0 ml 466.5 ml


 


  


 


Free Water 30 ml 30 ml


 


IV Total 725.0 ml 461.5 ml


 


Tube Feeding 660 ml 660 ml


 


Other 45 ml 


 


Output Urine Total 790 ml 685 ml








Laboratory Tests


1/17/21 06:57: 


White Blood Count 19.1H, Red Blood Count 4.86, Hemoglobin 13.8L, Hematocrit 

44.8, Mean Corpuscular Volume 92, Mean Corpuscular Hemoglobin 28.4, Mean 

Corpuscular Hemoglobin Concent 30.8L, Red Cell Distribution Width 12.9, Platelet

Count 193, Mean Platelet Volume 9.9, Neutrophils (%) (Auto) , Lymphocytes (%) 

(Auto) , Monocytes (%) (Auto) , Eosinophils (%) (Auto) , Basophils (%) (Auto) , 

Differential Total Cells Counted 100, Neutrophils % (Manual) 93H, Lymphocytes % 

(Manual) 4L, Monocytes % (Manual) 2, Eosinophils % (Manual) 1, Basophils % 

(Manual) 0, Band Neutrophils 0, Platelet Estimate Adequate, Platelet Morphology 

Normal, Red Blood Cell Morphology , Hypochromasia 1+, Sodium Level 151H, 

Potassium Level 3.5, Chloride Level 113H, Carbon Dioxide Level 32, Anion Gap 6, 

Blood Urea Nitrogen 42H, Creatinine 1.0, Estimat Glomerular Filtration Rate > 

60, Glucose Level 150#H, Calcium Level 9.0, Total Bilirubin 0.4, Aspartate Amino

Transf (AST/SGOT) 24, Alanine Aminotransferase (ALT/SGPT) 16, Alkaline 

Phosphatase 85, Total Protein 6.2L, Albumin 1.5L, Globulin 4.7, Albumin/Globulin

Ratio 0.3L


1/17/21 07:59: 


Arterial Blood pH 7.456H, Arterial Blood Partial Pressure CO2 48.8H, Arterial 

Blood Partial Pressure O2 57.3L, Arterial Blood HCO3 33.6H, Arterial Blood 

Oxygen Saturation 90.5L, Arterial Blood Base Excess 8.3H, Abelardo Test Positive


Height (Feet):  5


Height (Inches):  7.00


Weight (Pounds):  198


Objective





Current Medications








 Medications


  (Trade)  Dose


 Ordered  Sig/Julisa


 Route


 PRN Reason  Start Time


 Stop Time Status Last Admin


Dose Admin


 


 Acetaminophen


  (Tylenol)  650 mg  Q4H  PRN


 GT


 Temp >100.5  1/15/21 17:15


 2/14/21 17:14  1/17/21 03:12





 


 Chlorhexidine


 Gluconate


  (Vanessa-Hex 2%)  1 applic  DAILY@2000


 TOPIC


   1/6/21 20:00


 4/6/21 19:59  1/16/21 20:28





 


 Dextrose


  (Dextrose 50%)  25 ml  Q30M  PRN


 IV


 Hypoglycemia  1/9/21 13:30


 4/9/21 13:29   





 


 Dextrose


  (Dextrose 50%)  50 ml  Q30M  PRN


 IV


 Hypoglycemia  1/9/21 13:30


 4/9/21 13:29   





 


 Docusate Sodium


  (Colace)  100 mg  TIDPRN  PRN


 GT


 Constipation  1/12/21 01:15


 2/11/21 01:14  1/12/21 01:42





 


 Enoxaparin Sodium


  (Lovenox)  80 mg  EVERY 12  HOURS


 SUBQ


   1/2/21 21:00


 4/2/21 20:59  1/17/21 09:48





 


 Haloperidol


  (Haldol)  5 mg  Q6H  PRN


 ORAL


 Agitation  1/4/21 13:45


 2/18/21 13:44  1/4/21 22:56





 


 Haloperidol


 Lactate 5 mg/


 Dextrose  56 ml @ 


 224 mls/hr  Q6H  PRN


 IVPB


 Agitation  1/5/21 12:30


 2/19/21 12:29  1/5/21 13:20





 


 Insulin Aspart


  (NovoLOG)    Q6HR


 SUBQ


   1/14/21 12:00


 4/9/21 17:59  1/17/21 05:53





 


 Insulin Aspart


  (NovoLOG)  14 units  EVERY 6  HOURS


 SUBQ


   1/16/21 12:00


 4/15/21 06:59  1/17/21 05:54





 


 Insulin Detemir


  (Levemir)  20 units  Q12HR


 SUBQ


   1/14/21 09:00


 4/12/21 08:59  1/17/21 09:49





 


 Methylprednisolone


 Sodium Succinate


  (Solu-MEDROL)  10 mg  DAILY


 IVP


   1/17/21 09:00


 1/18/21 09:01  1/17/21 09:46





 


 Midazolam HCl  100 ml @ 2


 mls/hr  Q24H  PRN


 IV


 Agitation  1/15/21 23:00


 1/22/21 22:59  1/15/21 23:00





 


 Pantoprazole


  (Protonix)  40 mg  DAILY


 IVP


   1/14/21 09:00


 2/13/21 08:59  1/17/21 09:46





 


 Piperacillin Sod/


 Tazobactam Sod


 3.375 gm/Sodium


 Chloride  110 ml @ 


 27.5 mls/hr  EVERY 8  HOURS


 IVPB


   1/15/21 14:00


 1/20/21 13:59  1/17/21 05:16





 


 Quetiapine


 Fumarate


  (SEROqueL)  50 mg  Q12HR


 ORAL


   1/4/21 21:00


 2/18/21 20:59  1/17/21 09:46





 


 Sodium Chloride  500 ml @ 


 999 mls/hr  Q31M PRN


 IV


 For hypotension  1/15/21 18:30


 2/14/21 18:29   





 


 Sodium Chloride  1,000 ml @ 


 25 mls/hr  Q24H


 IV


   1/3/21 07:45


 2/2/21 07:44  1/15/21 17:06





 


 Vancomycin HCl  300 ml @ 


 150 mls/hr  Q12H


 IVPB


   1/16/21 11:00


 1/21/21 10:59  1/16/21 23:20





 


 Vancomycin HCl


  (Vanco pharmacy


 to dose)  1 ea  DAILY  PRN


 MISC


 Per rx protocol  1/14/21 09:15


 2/13/21 09:14   














Assessment/Plan


Problem List:  


(1) Diabetes mellitus out of control


ICD Codes:  E11.65 - Type 2 diabetes mellitus with hyperglycemia


SNOMED:  22267246, [id number]


(2) Pneumonia due to COVID-19 virus


ICD Codes:  U07.1 - COVID-19; J12.82 - Pneumonia due to coronavirus disease 2019


SNOMED:  [id number]


Assessment/Plan:


continue Levemir 20 units bid 


continue Novolog 14 units every 6 hours 


continue Novolog sliding scale every 6 hours











Nick Mcmahon MD                 Jan 17, 2021 12:13

## 2021-01-17 NOTE — SURGERY PROGRESS NOTE
Surgery Progress Note


Subjective


Procedure Performed


Left femoral central venous catheter insertion


Additional Comments


cxr noted


right ptx


right chest tube placed





Objective





Last 24 Hour Vital Signs








  Date Time  Temp Pulse Resp B/P (MAP) Pulse Ox O2 Delivery O2 Flow Rate FiO2


 


1/17/21 13:51      Mechanical Ventilator  


 


1/17/21 13:08 101.0       


 


1/17/21 13:00  111 24 135/82 (99) 97   


 


1/17/21 12:45  111 26 129/81 (97) 97   


 


1/17/21 12:30  112 26 133/85 (101) 96   


 


1/17/21 12:15  111 23 116/80 (92) 96   


 


1/17/21 12:00      Mechanical Ventilator  


 


1/17/21 12:00 102.1 115 25 119/81 (94) 95   


 


1/17/21 11:45  117 26 127/81 (96) 95   


 


1/17/21 11:38  116 25     75


 


1/17/21 11:30  117 27 134/84 (101)    


 


1/17/21 11:15  115 25 131/83 (99)    


 


1/17/21 11:00  116 27 127/84 (98)    


 


1/17/21 10:45  115 25 122/78 (93)    


 


1/17/21 10:30  115 26 126/83 (97)    


 


1/17/21 10:15  113 23 120/85 (97)    


 


1/17/21 10:00  116 27 114/80 (91)    


 


1/17/21 09:45  115 26 116/79 (91)    


 


1/17/21 09:30  116 28 114/78 (90)    


 


1/17/21 09:15  116 26 119/77 (91)    


 


1/17/21 09:00  115 26 112/79 (90)    


 


1/17/21 09:00      Mechanical Ventilator  


 


1/17/21 08:45  115 25 114/79 (91)    


 


1/17/21 08:30  114 24 114/79 (91)    


 


1/17/21 08:16        90


 


1/17/21 08:15  114 24 116/79 (91)    


 


1/17/21 08:11        90


 


1/17/21 08:00      Mechanical Ventilator  


 


1/17/21 08:00  115      


 


1/17/21 08:00      Mechanical Ventilator  


 


1/17/21 08:00        70


 


1/17/21 08:00 101.1 115 24 115/77 (90)    


 


1/17/21 07:45  117 26 116/77 (90)    


 


1/17/21 07:30  116 24 115/78 (90)    


 


1/17/21 07:24  115 25     75


 


1/17/21 07:15  117 25 111/81 (91)    


 


1/17/21 07:00  115 25 122/79 (93)    


 


1/17/21 07:00   27   Mechanical Ventilator  70


 


1/17/21 06:00   23   Mechanical Ventilator  70


 


1/17/21 06:00  113      


 


1/17/21 06:00  113 23 124/78 (93)    


 


1/17/21 05:00   24   Mechanical Ventilator  70


 


1/17/21 05:00  120  120/81 (94)    


 


1/17/21 04:00      Mechanical Ventilator  


 


1/17/21 04:00   28   Mechanical Ventilator  70


 


1/17/21 04:00 99.1 123  106/74 (85)    


 


1/17/21 04:00        70


 


1/17/21 03:42 99.5       


 


1/17/21 03:08  127 21     75


 


1/17/21 03:00  128 30 112/94 (100)    


 


1/17/21 03:00   29   Mechanical Ventilator  70


 


1/17/21 02:00   29   Mechanical Ventilator  70


 


1/17/21 02:00  128 29 115/89 (98)    


 


1/17/21 01:00  127 29 119/77 (91)    


 


1/17/21 01:00   29   Mechanical Ventilator  70


 


1/17/21 00:00 100.0 126 27 107/80 (89) 93   


 


1/17/21 00:00   27   Mechanical Ventilator  70


 


1/17/21 00:00      Mechanical Ventilator  


 


1/16/21 23:54  127      


 


1/16/21 23:11  126 27     70


 


1/16/21 23:00   27   Mechanical Ventilator  70


 


1/16/21 23:00  126 27 106/81 (89) 93   


 


1/16/21 22:00   27   Mechanical Ventilator  70


 


1/16/21 22:00  127 28 107/80 (89) 92   


 


1/16/21 21:45  127 28 115/86 (96) 92   


 


1/16/21 21:30  127 28 118/87 (97) 94   


 


1/16/21 21:15  126 26 126/91 (103) 93   


 


1/16/21 21:01 100.2       


 


1/16/21 21:00  125 25 116/91 (99) 94   


 


1/16/21 21:00   26   Mechanical Ventilator  70


 


1/16/21 20:45  125 26 119/90 (100) 93   


 


1/16/21 20:30  127 26 107/90 (96) 93   


 


1/16/21 20:15  126 26 128/90 (103) 93   


 


1/16/21 20:01  125      


 


1/16/21 20:00 102.2 126 27 115/87 (96) 93   


 


1/16/21 20:00        70


 


1/16/21 20:00   26   Mechanical Ventilator  70


 


1/16/21 20:00      Mechanical Ventilator  


 


1/16/21 19:45  126 27 127/87 (100) 93   


 


1/16/21 19:30  127 26 119/90 (100) 93   


 


1/16/21 19:15  129 27 121/88 (99)    


 


1/16/21 19:11  126 29     70


 


1/16/21 19:00  128 28 127/91 (103)    


 


1/16/21 19:00   28   Mechanical Ventilator  70


 


1/16/21 18:45  128 28 123/88 (100)    


 


1/16/21 18:30  126 26 123/87 (99)    


 


1/16/21 18:15  127 28 124/89 (101)    


 


1/16/21 18:00  122 21 113/89 (97) 96   


 


1/16/21 18:00      Mechanical Ventilator  


 


1/16/21 17:45  127 27 118/85 (96) 95   


 


1/16/21 17:30  127 26 110/78 (89) 95   


 


1/16/21 17:15  126 28 114/77 (89) 95   


 


1/16/21 17:00      Mechanical Ventilator  


 


1/16/21 17:00  125 27 108/75 (86) 95   


 


1/16/21 16:15  126 28 115/78 (90) 96   


 


1/16/21 16:00 100.0 125 27 115/73 (87) 96   


 


1/16/21 16:00        70


 


1/16/21 16:00      Mechanical Ventilator  


 


1/16/21 16:00      Mechanical Ventilator  


 


1/16/21 16:00  126      


 


1/16/21 15:45  125 27 115/74 (88) 96   


 


1/16/21 15:30  126 27 109/72 (84) 96   


 


1/16/21 15:15  124 26     70


 


1/16/21 15:15  125 28 114/79 (91) 96   


 


1/16/21 15:00  125 27 110/73 (85) 96   


 


1/16/21 15:00      Mechanical Ventilator  


 


1/16/21 14:30  123 27 112/68 (83) 96   


 


1/16/21 14:15  121 24 106/68 (81) 96   


 


1/16/21 14:00  122 26 102/70 (81) 96   


 


1/16/21 14:00      Mechanical Ventilator  








I&O











Intake and Output  


 


 1/16/21 1/17/21





 19:00 07:00


 


Intake Total 1460.0 ml 1151.5 ml


 


Output Total 790 ml 685 ml


 


Balance 670.0 ml 466.5 ml


 


  


 


Free Water 30 ml 30 ml


 


IV Total 725.0 ml 461.5 ml


 


Tube Feeding 660 ml 660 ml


 


Other 45 ml 


 


Output Urine Total 790 ml 685 ml








Dressing:  other


Wound:  other


Cardiovascular:  RSR


Respiratory:  decreased breath sounds


Abdomen:  non-tender, present bowel sounds


Extremities:  edema, no cyanosis





Laboratory Tests








Test


 1/17/21


06:57 1/17/21


07:59


 


White Blood Count


 19.1 K/UL


(4.8-10.8)  H 





 


Red Blood Count


 4.86 M/UL


(4.70-6.10) 





 


Hemoglobin


 13.8 G/DL


(14.2-18.0)  L 





 


Hematocrit


 44.8 %


(42.0-52.0) 





 


Mean Corpuscular Volume 92 FL (80-99)   


 


Mean Corpuscular Hemoglobin


 28.4 PG


(27.0-31.0) 





 


Mean Corpuscular Hemoglobin


Concent 30.8 G/DL


(32.0-36.0)  L 





 


Red Cell Distribution Width


 12.9 %


(11.6-14.8) 





 


Platelet Count


 193 K/UL


(150-450) 





 


Mean Platelet Volume


 9.9 FL


(6.5-10.1) 





 


Neutrophils (%) (Auto)


 % (45.0-75.0)


 





 


Lymphocytes (%) (Auto)


 % (20.0-45.0)


 





 


Monocytes (%) (Auto)  % (1.0-10.0)   


 


Eosinophils (%) (Auto)  % (0.0-3.0)   


 


Basophils (%) (Auto)  % (0.0-2.0)   


 


Differential Total Cells


Counted 100  


 





 


Neutrophils % (Manual) 93 % (45-75)  H 


 


Lymphocytes % (Manual) 4 % (20-45)  L 


 


Monocytes % (Manual) 2 % (1-10)   


 


Eosinophils % (Manual) 1 % (0-3)   


 


Basophils % (Manual) 0 % (0-2)   


 


Band Neutrophils 0 % (0-8)   


 


Platelet Estimate Adequate   


 


Platelet Morphology Normal   


 


Red Blood Cell Morphology    


 


Hypochromasia 1+   


 


Sodium Level


 151 MMOL/L


(136-145)  H 





 


Potassium Level


 3.5 MMOL/L


(3.5-5.1) 





 


Chloride Level


 113 MMOL/L


()  H 





 


Carbon Dioxide Level


 32 MMOL/L


(21-32) 





 


Anion Gap


 6 mmol/L


(5-15) 





 


Blood Urea Nitrogen


 42 mg/dL


(7-18)  H 





 


Creatinine


 1.0 MG/DL


(0.55-1.30) 





 


Estimat Glomerular Filtration


Rate > 60 mL/min


(>60) 





 


Glucose Level


 150 MG/DL


()  #H 





 


Calcium Level


 9.0 MG/DL


(8.5-10.1) 





 


Total Bilirubin


 0.4 MG/DL


(0.2-1.0) 





 


Aspartate Amino Transf


(AST/SGOT) 24 U/L (15-37)


 





 


Alanine Aminotransferase


(ALT/SGPT) 16 U/L (12-78)


 





 


Alkaline Phosphatase


 85 U/L


() 





 


Total Protein


 6.2 G/DL


(6.4-8.2)  L 





 


Albumin


 1.5 G/DL


(3.4-5.0)  L 





 


Globulin 4.7 g/dL   


 


Albumin/Globulin Ratio


 0.3 (1.0-2.7)


L 





 


Arterial Blood pH


 


 7.456


(7.350-7.450)


 


Arterial Blood Partial


Pressure CO2 


 48.8 mmHg


(35.0-45.0)  H


 


Arterial Blood Partial


Pressure O2 


 57.3 mmHg


(75.0-100.0)  L


 


Arterial Blood HCO3


 


 33.6 mmol/L


(22.0-26.0)  H


 


Arterial Blood Oxygen


Saturation 


 90.5 %


()  L


 


Arterial Blood Base Excess  8.3 (-2-2)  H


 


Abelardo Test  Positive  











Plan


Problems:  


(1) Pneumonia due to COVID-19 virus


Assessment & Plan:  Interim endotracheal intubation, endotracheal tube tip in 

good position


approximately 4 cm above the tito. There are extensive bilateral infiltrates, 

which


are markedly increased from the prior study. Pleural spaces are probably clear, 

not


well demonstrated


Markedly increased and now extensive bilateral infiltrates 


cont as per pulm and iID





(2) Hypoxia


(3) Abdominal pain


Assessment & Plan:  65M abd pain, septic, covid, intubated ons upport


currently abd exam benign but limited given condition


line bo mary noted


okay for meds and feeds


nutritional optimization 


DAILY ESTIMATED NEEDS:


Needs based on Critical Care/ 72kg abw 


22-28  kcals/kg 


0129-9945  total kcals


1.2-2  g protein/kg


  g total protein 


25-30  mL/kg


3610-9085  total fluid mLs





NUTRITION DIAGNOSIS:


Swallowing difficulty R/T respiratory failure as evidenced by pt now


orally intubated, OGT in place, NPO





 


CURRENT TF:NPO 





 





ENTERAL NUTRITION RECOMMENDATIONS:


Vital AF 1.2 @ 60ml/hr x 24 hrs  to provide 1440ml, 1728kcal, 116g prot, 1167ml 

free water 





* As medically appropriate, initiate critical care and carb controlled TF 

formula of Vital AF 1.2


* Initiate @ 20ml/hr x 6hrs, advance 10ml q 4-6 hrs as tolerated to goal rate


* HOB over 30 degrees/ water flush per MD


* Does not exceed est kcal needs w/ Propofol running @ 8.083ml/hr x 24 hrs 

(provides 213 lipid kcal)





 





ADDITIONAL RECOMMENDATIONS:


* Calibrated bedscale wt 


* Monitor BGs closely w/ Decadron and TF, need for long acting insulin 


* Monitor lytes, replete as needed  


* Monitor Propofol rate, need to adjust TF rate  





(4) Acute metabolic encephalopathy


(5) Pneumothorax on right


Assessment & Plan:  right ptx


chest tube placed














Norberto Vazquez                Jan 17, 2021 13:58

## 2021-01-17 NOTE — PULMONOLOGY PROGRESS NOTE
Subjective


ROS Limited/Unobtainable:  Yes


Interval Events:  Noted sub cut emphsema  On Versed drip.


Constitutional:  Reports: fever, other - [id number]


HEENT:  Repors: no symptoms


Respiratory:  Reports: shortness of breath - better


Cardiovascular:  Reports: no symptoms


Gastrointestinal/Abdominal:  Reports: no symptoms


Allergies:  


Coded Allergies:  


     No Known Allergies (Unverified , 6/11/19)


All Systems:  reviewed and negative except above





Objective





Last 24 Hour Vital Signs








  Date Time  Temp Pulse Resp B/P (MAP) Pulse Ox O2 Delivery O2 Flow Rate FiO2


 


1/17/21 10:15  113 23 120/85 (97)    


 


1/17/21 10:00  116 27 114/80 (91)    


 


1/17/21 09:45  115 26 116/79 (91)    


 


1/17/21 09:30  116 28 114/78 (90)    


 


1/17/21 09:15  116 26 119/77 (91)    


 


1/17/21 09:00  115 26 112/79 (90)    


 


1/17/21 09:00      Mechanical Ventilator  


 


1/17/21 08:45  115 25 114/79 (91)    


 


1/17/21 08:30  114 24 114/79 (91)    


 


1/17/21 08:16        90


 


1/17/21 08:15  114 24 116/79 (91)    


 


1/17/21 08:11        90


 


1/17/21 08:00      Mechanical Ventilator  


 


1/17/21 08:00  115      


 


1/17/21 08:00      Mechanical Ventilator  


 


1/17/21 08:00        70


 


1/17/21 08:00  115 24 115/77 (90)    


 


1/17/21 07:45  117 26 116/77 (90)    


 


1/17/21 07:30  116 24 115/78 (90)    


 


1/17/21 07:24  115 25     75


 


1/17/21 07:15  117 25 111/81 (91)    


 


1/17/21 07:00  115 25 122/79 (93)    


 


1/17/21 07:00   27   Mechanical Ventilator  70


 


1/17/21 06:00   23   Mechanical Ventilator  70


 


1/17/21 06:00  113      


 


1/17/21 06:00  113 23 124/78 (93)    


 


1/17/21 05:00   24   Mechanical Ventilator  70


 


1/17/21 05:00  120  120/81 (94)    


 


1/17/21 04:00      Mechanical Ventilator  


 


1/17/21 04:00   28   Mechanical Ventilator  70


 


1/17/21 04:00 99.1 123  106/74 (85)    


 


1/17/21 04:00        70


 


1/17/21 03:42 99.5       


 


1/17/21 03:08  127 21     75


 


1/17/21 03:00  128 30 112/94 (100)    


 


1/17/21 03:00   29   Mechanical Ventilator  70


 


1/17/21 02:00   29   Mechanical Ventilator  70


 


1/17/21 02:00  128 29 115/89 (98)    


 


1/17/21 01:00  127 29 119/77 (91)    


 


1/17/21 01:00   29   Mechanical Ventilator  70


 


1/17/21 00:00 100.0 126 27 107/80 (89) 93   


 


1/17/21 00:00   27   Mechanical Ventilator  70


 


1/17/21 00:00      Mechanical Ventilator  


 


1/16/21 23:54  127      


 


1/16/21 23:11  126 27     70


 


1/16/21 23:00   27   Mechanical Ventilator  70


 


1/16/21 23:00  126 27 106/81 (89) 93   


 


1/16/21 22:00   27   Mechanical Ventilator  70


 


1/16/21 22:00  127 28 107/80 (89) 92   


 


1/16/21 21:45  127 28 115/86 (96) 92   


 


1/16/21 21:30  127 28 118/87 (97) 94   


 


1/16/21 21:15  126 26 126/91 (103) 93   


 


1/16/21 21:01 100.2       


 


1/16/21 21:00  125 25 116/91 (99) 94   


 


1/16/21 21:00   26   Mechanical Ventilator  70


 


1/16/21 20:45  125 26 119/90 (100) 93   


 


1/16/21 20:30  127 26 107/90 (96) 93   


 


1/16/21 20:15  126 26 128/90 (103) 93   


 


1/16/21 20:01  125      


 


1/16/21 20:00 102.2 126 27 115/87 (96) 93   


 


1/16/21 20:00        70


 


1/16/21 20:00   26   Mechanical Ventilator  70


 


1/16/21 20:00      Mechanical Ventilator  


 


1/16/21 19:45  126 27 127/87 (100) 93   


 


1/16/21 19:30  127 26 119/90 (100) 93   


 


1/16/21 19:15  129 27 121/88 (99)    


 


1/16/21 19:11  126 29     70


 


1/16/21 19:00  128 28 127/91 (103)    


 


1/16/21 19:00   28   Mechanical Ventilator  70


 


1/16/21 18:45  128 28 123/88 (100)    


 


1/16/21 18:30  126 26 123/87 (99)    


 


1/16/21 18:15  127 28 124/89 (101)    


 


1/16/21 18:00  122 21 113/89 (97) 96   


 


1/16/21 18:00      Mechanical Ventilator  


 


1/16/21 17:45  127 27 118/85 (96) 95   


 


1/16/21 17:30  127 26 110/78 (89) 95   


 


1/16/21 17:15  126 28 114/77 (89) 95   


 


1/16/21 17:00      Mechanical Ventilator  


 


1/16/21 17:00  125 27 108/75 (86) 95   


 


1/16/21 16:15  126 28 115/78 (90) 96   


 


1/16/21 16:00 100.0 125 27 115/73 (87) 96   


 


1/16/21 16:00        70


 


1/16/21 16:00      Mechanical Ventilator  


 


1/16/21 16:00      Mechanical Ventilator  


 


1/16/21 16:00  126      


 


1/16/21 15:45  125 27 115/74 (88) 96   


 


1/16/21 15:30  126 27 109/72 (84) 96   


 


1/16/21 15:15  124 26     70


 


1/16/21 15:15  125 28 114/79 (91) 96   


 


1/16/21 15:00  125 27 110/73 (85) 96   


 


1/16/21 15:00      Mechanical Ventilator  


 


1/16/21 14:30  123 27 112/68 (83) 96   


 


1/16/21 14:15  121 24 106/68 (81) 96   


 


1/16/21 14:00  122 26 102/70 (81) 96   


 


1/16/21 14:00      Mechanical Ventilator  


 


1/16/21 13:45  122 26 103/70 (81) 96   


 


1/16/21 13:30  123 27 101/71 (81) 96   


 


1/16/21 13:15  120 26 106/69 (81) 96   


 


1/16/21 13:00      Mechanical Ventilator  


 


1/16/21 13:00  121 26 111/71 (84) 96   


 


1/16/21 12:45  117 24 110/79 (89) 96   


 


1/16/21 12:30  118 25 108/74 (85) 96   


 


1/16/21 12:29 99.0       


 


1/16/21 12:15  118 25 114/71 (85) 95   


 


1/16/21 12:00  114      


 


1/16/21 12:00      Mechanical Ventilator  


 


1/16/21 12:00        70


 


1/16/21 12:00      Mechanical Ventilator  


 


1/16/21 12:00 99.9 117 23 97/70 (79) 95   

















Intake and Output  


 


 1/16/21 1/17/21





 19:00 07:00


 


Intake Total 1460.0 ml 1151.5 ml


 


Output Total 790 ml 685 ml


 


Balance 670.0 ml 466.5 ml


 


  


 


Free Water 30 ml 30 ml


 


IV Total 725.0 ml 461.5 ml


 


Tube Feeding 660 ml 660 ml


 


Other 45 ml 


 


Output Urine Total 790 ml 685 ml








Objective


On a Versed drip


General Appearance:  no acute distress


HEENT:  atraumatic


Respiratory:  decreased breath sounds


Cardiovascular:  normal rate, regular rhythm


Abdomen:  soft, non tender





Microbiology








 Date/Time


Source Procedure


Growth Status





 


 1/15/21 13:00


Sputum Gram Stain - Final Complete


 


 1/15/21 13:00 Sputum Culture - Final


Candida Albicans


Usual Respiratory Wendy Complete


 


 1/15/21 01:50


Blood Blood Culture - Preliminary


NO GROWTH AFTER 24 HOURS Resulted








Laboratory Tests


1/17/21 06:57: 


White Blood Count 19.1H, Red Blood Count 4.86, Hemoglobin 13.8L, Hematocrit 

44.8, Mean Corpuscular Volume 92, Mean Corpuscular Hemoglobin 28.4, Mean 

Corpuscular Hemoglobin Concent 30.8L, Red Cell Distribution Width 12.9, Platelet

Count 193, Mean Platelet Volume 9.9, Neutrophils (%) (Auto) , Lymphocytes (%) 

(Auto) , Monocytes (%) (Auto) , Eosinophils (%) (Auto) , Basophils (%) (Auto) , 

Differential Total Cells Counted 100, Neutrophils % (Manual) 93H, Lymphocytes % 

(Manual) 4L, Monocytes % (Manual) 2, Eosinophils % (Manual) 1, Basophils % 

(Manual) 0, Band Neutrophils 0, Platelet Estimate Adequate, Platelet Morphology 

Normal, Red Blood Cell Morphology , Hypochromasia 1+, Sodium Level 151H, 

Potassium Level 3.5, Chloride Level 113H, Carbon Dioxide Level 32, Anion Gap 6, 

Blood Urea Nitrogen 42H, Creatinine 1.0, Estimat Glomerular Filtration Rate > 

60, Glucose Level 150#H, Calcium Level 9.0, Total Bilirubin 0.4, Aspartate Amino

Transf (AST/SGOT) 24, Alanine Aminotransferase (ALT/SGPT) 16, Alkaline 

Phosphatase 85, Total Protein 6.2L, Albumin 1.5L, Globulin 4.7, Albumin/Globulin

Ratio 0.3L


1/17/21 07:59: 


Arterial Blood pH 7.456H, Arterial Blood Partial Pressure CO2 48.8H, Arterial 

Blood Partial Pressure O2 57.3L, Arterial Blood HCO3 33.6H, Arterial Blood 

Oxygen Saturation 90.5L, Arterial Blood Base Excess 8.3H, Abelardo Test Positive





Current Medications








 Medications


  (Trade)  Dose


 Ordered  Sig/Julisa


 Route


 PRN Reason  Start Time


 Stop Time Status Last Admin


Dose Admin


 


 Acetaminophen


  (Tylenol)  650 mg  Q4H  PRN


 GT


 Temp >100.5  1/15/21 17:15


 2/14/21 17:14  1/17/21 03:12





 


 Chlorhexidine


 Gluconate


  (Vanessa-Hex 2%)  1 applic  DAILY@2000


 TOPIC


   1/6/21 20:00


 4/6/21 19:59  1/16/21 20:28





 


 Dextrose


  (Dextrose 50%)  25 ml  Q30M  PRN


 IV


 Hypoglycemia  1/9/21 13:30


 4/9/21 13:29   





 


 Dextrose


  (Dextrose 50%)  50 ml  Q30M  PRN


 IV


 Hypoglycemia  1/9/21 13:30


 4/9/21 13:29   





 


 Docusate Sodium


  (Colace)  100 mg  TIDPRN  PRN


 GT


 Constipation  1/12/21 01:15


 2/11/21 01:14  1/12/21 01:42





 


 Enoxaparin Sodium


  (Lovenox)  80 mg  EVERY 12  HOURS


 SUBQ


   1/2/21 21:00


 4/2/21 20:59  1/17/21 09:48





 


 Haloperidol


  (Haldol)  5 mg  Q6H  PRN


 ORAL


 Agitation  1/4/21 13:45


 2/18/21 13:44  1/4/21 22:56





 


 Haloperidol


 Lactate 5 mg/


 Dextrose  56 ml @ 


 224 mls/hr  Q6H  PRN


 IVPB


 Agitation  1/5/21 12:30


 2/19/21 12:29  1/5/21 13:20





 


 Insulin Aspart


  (NovoLOG)    Q6HR


 SUBQ


   1/14/21 12:00


 4/9/21 17:59  1/17/21 05:53





 


 Insulin Aspart


  (NovoLOG)  14 units  EVERY 6  HOURS


 SUBQ


   1/16/21 12:00


 4/15/21 06:59  1/17/21 05:54





 


 Insulin Detemir


  (Levemir)  20 units  Q12HR


 SUBQ


   1/14/21 09:00


 4/12/21 08:59  1/17/21 09:49





 


 Methylprednisolone


 Sodium Succinate


  (Solu-MEDROL)  10 mg  DAILY


 IVP


   1/17/21 09:00


 1/18/21 09:01  1/17/21 09:46





 


 Midazolam HCl  100 ml @ 2


 mls/hr  Q24H  PRN


 IV


 Agitation  1/15/21 23:00


 1/22/21 22:59  1/15/21 23:00





 


 Pantoprazole


  (Protonix)  40 mg  DAILY


 IVP


   1/14/21 09:00


 2/13/21 08:59  1/17/21 09:46





 


 Piperacillin Sod/


 Tazobactam Sod


 3.375 gm/Sodium


 Chloride  110 ml @ 


 27.5 mls/hr  EVERY 8  HOURS


 IVPB


   1/15/21 14:00


 1/20/21 13:59  1/17/21 05:16





 


 Quetiapine


 Fumarate


  (SEROqueL)  50 mg  Q12HR


 ORAL


   1/4/21 21:00


 2/18/21 20:59  1/17/21 09:46





 


 Sodium Chloride  500 ml @ 


 999 mls/hr  Q31M PRN


 IV


 For hypotension  1/15/21 18:30


 2/14/21 18:29   





 


 Sodium Chloride  1,000 ml @ 


 25 mls/hr  Q24H


 IV


   1/3/21 07:45


 2/2/21 07:44  1/15/21 17:06





 


 Vancomycin HCl  300 ml @ 


 150 mls/hr  Q12H


 IVPB


   1/16/21 11:00


 1/21/21 10:59  1/16/21 23:20





 


 Vancomycin HCl


  ([facility]


 to dose)  1 ea  DAILY  PRN


 MISC


 Per rx protocol  1/14/21 09:15


 2/13/21 09:14   














Assessment/Plan


Assessment/Plan


1. COVID-19 pneumonia.


   - on Decadron, azithromycin, and ceftriaxone


   - Intubated now; will continue AC mode for now


          -Currently 90% FiO2. PEEP 10


             - Has R apical PTX and subcut emphysema


             - consulted surgery for chest tube


        


2. Diabetes mellitus.; 


3. Elevated inflammatory markers.


4. High D-dimer.


5. Hyponatremia.


6. Hyperglycemia.


   - BG control


7. Hypoxemia., secondary to #1; improved





DVT prophylaxis   On Lovenox.


Sedated











Sung Lemos MD           Jan 17, 2021 11:26

## 2021-01-17 NOTE — HEMATOLOGY/ONC PROGRESS NOTE
Assessment/Plan


Assessment/Plan


Leukocytosis 2/2 covid pna wbc 15


Anemia 2/2 chronic disease


Ac resp distress on ventilator


Pneumomediastinum


etoh abused


agitated -halodol


vent





cont iv abx and iv decadrone


pulmonary and gi on consult


smear is noted


pneumomediastinum per pulm


dw charge nurse





Subjective


HEENT:  Denies: no symptoms, eye pain, blurred vision, tearing, double vision, 

ear pain, ear discharge, nose pain, nose congestion, throat pain, throat 

swelling, mouth pain, mouth swelling, other


Cardiovascular:  Denies: no symptoms, chest pain, edema, irregular heart rate, 

lightheadedness, palpitations, syncope, other


Respiratory:  Denies: no symptoms, cough, shortness of breath, SOB with 

excertion, SOB at rest, sputum, wheezing, other


Endocrine:  Denies: no symptoms, excessive sweating, flushing, intolerance to 

cold, intolerance to heat, increased hunger, increased thirst, increased urine, 

unexplained weight gain, unexplained weight loss, other


Hematologic/Lymphatic:  Denies: no symptoms, anemia, easy bleeding, easy 

bruising, adenopathy, other


Allergies:  


Coded Allergies:  


     No Known Allergies (Unverified , 6/11/19)


All Systems:  reviewed and negative except above


Subjective


1/10 on ventilator 60% fio2, on sediation, unresponsive, in icu


1/14 on vent, icu, wbc elev, no bleeding, unresponsive


1/15 on vent, with cash, icu, no bleeding, unresponsive


1/17 on vent, dw rn, no major changes, icu, nv





Objective


Objective





Current Medications








 Medications


  (Trade)  Dose


 Ordered  Sig/Julisa


 Route


 PRN Reason  Start Time


 Stop Time Status Last Admin


Dose Admin


 


 Acetaminophen


  (Tylenol)  650 mg  Q4H  PRN


 GT


 Temp >100.5  1/15/21 17:15


 2/14/21 17:14  1/17/21 03:12





 


 Chlorhexidine


 Gluconate


  (Vanessa-Hex 2%)  1 applic  DAILY@2000


 TOPIC


   1/6/21 20:00


 4/6/21 19:59  1/16/21 20:28





 


 Dextrose


  (Dextrose 50%)  25 ml  Q30M  PRN


 IV


 Hypoglycemia  1/9/21 13:30


 4/9/21 13:29   





 


 Dextrose


  (Dextrose 50%)  50 ml  Q30M  PRN


 IV


 Hypoglycemia  1/9/21 13:30


 4/9/21 13:29   





 


 Docusate Sodium


  (Colace)  100 mg  TIDPRN  PRN


 GT


 Constipation  1/12/21 01:15


 2/11/21 01:14  1/12/21 01:42





 


 Enoxaparin Sodium


  (Lovenox)  80 mg  EVERY 12  HOURS


 SUBQ


   1/2/21 21:00


 4/2/21 20:59  1/17/21 09:48





 


 Haloperidol


  (Haldol)  5 mg  Q6H  PRN


 ORAL


 Agitation  1/4/21 13:45


 2/18/21 13:44  1/4/21 22:56





 


 Haloperidol


 Lactate 5 mg/


 Dextrose  56 ml @ 


 224 mls/hr  Q6H  PRN


 IVPB


 Agitation  1/5/21 12:30


 2/19/21 12:29  1/5/21 13:20





 


 Insulin Aspart


  (NovoLOG)    Q6HR


 SUBQ


   1/14/21 12:00


 4/9/21 17:59  1/17/21 05:53





 


 Insulin Aspart


  (NovoLOG)  14 units  EVERY 6  HOURS


 SUBQ


   1/16/21 12:00


 4/15/21 06:59  1/17/21 05:54





 


 Insulin Detemir


  (Levemir)  20 units  Q12HR


 SUBQ


   1/14/21 09:00


 4/12/21 08:59  1/17/21 09:49





 


 Methylprednisolone


 Sodium Succinate


  (Solu-MEDROL)  10 mg  DAILY


 IVP


   1/17/21 09:00


 1/18/21 09:01  1/17/21 09:46





 


 Midazolam HCl  100 ml @ 2


 mls/hr  Q24H  PRN


 IV


 Agitation  1/15/21 23:00


 1/22/21 22:59  1/15/21 23:00





 


 Pantoprazole


  (Protonix)  40 mg  DAILY


 IVP


   1/14/21 09:00


 2/13/21 08:59  1/17/21 09:46





 


 Piperacillin Sod/


 Tazobactam Sod


 3.375 gm/Sodium


 Chloride  110 ml @ 


 27.5 mls/hr  EVERY 8  HOURS


 IVPB


   1/15/21 14:00


 1/20/21 13:59  1/17/21 05:16





 


 Quetiapine


 Fumarate


  (SEROqueL)  50 mg  Q12HR


 ORAL


   1/4/21 21:00


 2/18/21 20:59  1/17/21 09:46





 


 Sodium Chloride  500 ml @ 


 999 mls/hr  Q31M PRN


 IV


 For hypotension  1/15/21 18:30


 2/14/21 18:29   





 


 Sodium Chloride  1,000 ml @ 


 25 mls/hr  Q24H


 IV


   1/3/21 07:45


 2/2/21 07:44  1/15/21 17:06





 


 Vancomycin HCl  300 ml @ 


 150 mls/hr  Q12H


 IVPB


   1/16/21 11:00


 1/21/21 10:59  1/16/21 23:20





 


 Vancomycin HCl


  (Vanco pharmacy


 to dose)  1 ea  DAILY  PRN


 MISC


 Per rx protocol  1/14/21 09:15


 2/13/21 09:14   














Last 24 Hour Vital Signs








  Date Time  Temp Pulse Resp B/P (MAP) Pulse Ox O2 Delivery O2 Flow Rate FiO2


 


1/17/21 10:15  113 23 120/85 (97)    


 


1/17/21 10:00  116 27 114/80 (91)    


 


1/17/21 09:45  115 26 116/79 (91)    


 


1/17/21 09:30  116 28 114/78 (90)    


 


1/17/21 09:15  116 26 119/77 (91)    


 


1/17/21 09:00  115 26 112/79 (90)    


 


1/17/21 09:00      Mechanical Ventilator  


 


1/17/21 08:45  115 25 114/79 (91)    


 


1/17/21 08:30  114 24 114/79 (91)    


 


1/17/21 08:16        90


 


1/17/21 08:15  114 24 116/79 (91)    


 


1/17/21 08:11        90


 


1/17/21 08:00      Mechanical Ventilator  


 


1/17/21 08:00      Mechanical Ventilator  


 


1/17/21 08:00        70


 


1/17/21 08:00  115 24 115/77 (90)    


 


1/17/21 07:45  117 26 116/77 (90)    


 


1/17/21 07:30  116 24 115/78 (90)    


 


1/17/21 07:24  115 25     75


 


1/17/21 07:15  117 25 111/81 (91)    


 


1/17/21 07:00  115 25 122/79 (93)    


 


1/17/21 07:00   27   Mechanical Ventilator  70


 


1/17/21 06:00   23   Mechanical Ventilator  70


 


1/17/21 06:00  113      


 


1/17/21 06:00  113 23 124/78 (93)    


 


1/17/21 05:00   24   Mechanical Ventilator  70


 


1/17/21 05:00  120  120/81 (94)    


 


1/17/21 04:00      Mechanical Ventilator  


 


1/17/21 04:00   28   Mechanical Ventilator  70


 


1/17/21 04:00 99.1 123  106/74 (85)    


 


1/17/21 04:00        70


 


1/17/21 03:42 99.5       


 


1/17/21 03:08  127 21     75


 


1/17/21 03:00  128 30 112/94 (100)    


 


1/17/21 03:00   29   Mechanical Ventilator  70


 


1/17/21 02:00   29   Mechanical Ventilator  70


 


1/17/21 02:00  128 29 115/89 (98)    


 


1/17/21 01:00  127 29 119/77 (91)    


 


1/17/21 01:00   29   Mechanical Ventilator  70


 


1/17/21 00:00 100.0 126 27 107/80 (89) 93   


 


1/17/21 00:00   27   Mechanical Ventilator  70


 


1/17/21 00:00      Mechanical Ventilator  


 


1/16/21 23:54  127      


 


1/16/21 23:11  126 27     70


 


1/16/21 23:00   27   Mechanical Ventilator  70


 


1/16/21 23:00  126 27 106/81 (89) 93   


 


1/16/21 22:00   27   Mechanical Ventilator  70


 


1/16/21 22:00  127 28 107/80 (89) 92   


 


1/16/21 21:45  127 28 115/86 (96) 92   


 


1/16/21 21:30  127 28 118/87 (97) 94   


 


1/16/21 21:15  126 26 126/91 (103) 93   


 


1/16/21 21:01 100.2       


 


1/16/21 21:00  125 25 116/91 (99) 94   


 


1/16/21 21:00   26   Mechanical Ventilator  70


 


1/16/21 20:45  125 26 119/90 (100) 93   


 


1/16/21 20:30  127 26 107/90 (96) 93   


 


1/16/21 20:15  126 26 128/90 (103) 93   


 


1/16/21 20:01  125      


 


1/16/21 20:00 102.2 126 27 115/87 (96) 93   


 


1/16/21 20:00        70


 


1/16/21 20:00   26   Mechanical Ventilator  70


 


1/16/21 20:00      Mechanical Ventilator  


 


1/16/21 19:45  126 27 127/87 (100) 93   


 


1/16/21 19:30  127 26 119/90 (100) 93   


 


1/16/21 19:15  129 27 121/88 (99)    


 


1/16/21 19:11  126 29     70


 


1/16/21 19:00  128 28 127/91 (103)    


 


1/16/21 19:00   28   Mechanical Ventilator  70


 


1/16/21 18:45  128 28 123/88 (100)    


 


1/16/21 18:30  126 26 123/87 (99)    


 


1/16/21 18:15  127 28 124/89 (101)    


 


1/16/21 18:00  122 21 113/89 (97) 96   


 


1/16/21 18:00      Mechanical Ventilator  


 


1/16/21 17:45  127 27 118/85 (96) 95   


 


1/16/21 17:30  127 26 110/78 (89) 95   


 


1/16/21 17:15  126 28 114/77 (89) 95   


 


1/16/21 17:00      Mechanical Ventilator  


 


1/16/21 17:00  125 27 108/75 (86) 95   


 


1/16/21 16:15  126 28 115/78 (90) 96   


 


1/16/21 16:00 100.0 125 27 115/73 (87) 96   


 


1/16/21 16:00        70


 


1/16/21 16:00      Mechanical Ventilator  


 


1/16/21 16:00      Mechanical Ventilator  


 


1/16/21 16:00  126      


 


1/16/21 15:45  125 27 115/74 (88) 96   


 


1/16/21 15:30  126 27 109/72 (84) 96   


 


1/16/21 15:15  124 26     70


 


1/16/21 15:15  125 28 114/79 (91) 96   


 


1/16/21 15:00  125 27 110/73 (85) 96   


 


1/16/21 15:00      Mechanical Ventilator  


 


1/16/21 14:30  123 27 112/68 (83) 96   


 


1/16/21 14:15  121 24 106/68 (81) 96   


 


1/16/21 14:00  122 26 102/70 (81) 96   


 


1/16/21 14:00      Mechanical Ventilator  


 


1/16/21 13:45  122 26 103/70 (81) 96   


 


1/16/21 13:30  123 27 101/71 (81) 96   


 


1/16/21 13:15  120 26 106/69 (81) 96   


 


1/16/21 13:00      Mechanical Ventilator  


 


1/16/21 13:00  121 26 111/71 (84) 96   


 


1/16/21 12:45  117 24 110/79 (89) 96   


 


1/16/21 12:30  118 25 108/74 (85) 96   


 


1/16/21 12:29 99.0       


 


1/16/21 12:15  118 25 114/71 (85) 95   


 


1/16/21 12:00  114      


 


1/16/21 12:00      Mechanical Ventilator  


 


1/16/21 12:00        70


 


1/16/21 12:00      Mechanical Ventilator  


 


1/16/21 12:00 99.9 117 23 97/70 (79) 95   


 


1/16/21 11:00  113 23 95/64 (74) 94   


 


1/16/21 11:00      Mechanical Ventilator  


 


1/16/21 10:55  116 24     70


 


1/16/21 10:45  111 20 91/67 (75) 96   


 


1/16/21 10:30  109 22 102/64 (77) 95   


 


1/16/21 10:15  109 22 109/74 (86) 96   


 


1/16/21 10:00  109 22 112/68 (83) 95   


 


1/16/21 10:00      Mechanical Ventilator  


 


1/16/21 09:00 100.7 104 19 115/76 (89)    


 


1/16/21 09:00      Mechanical Ventilator  


 


1/16/21 08:00  108 24 101/70 (80)    


 


1/16/21 08:00      Mechanical Ventilator  


 


1/16/21 08:00        70


 


1/16/21 08:00      Mechanical Ventilator  


 


1/16/21 08:00  107      


 


1/16/21 07:00   20   Mechanical Ventilator  70


 


1/16/21 07:00  107 20 105/70 (82) 96   


 


1/16/21 06:55  105 20     70


 


1/16/21 06:00 100.2 112 23 106/67 (80) 95   


 


1/16/21 06:00   23   Mechanical Ventilator  70


 


1/16/21 05:23 99.7       


 


1/16/21 05:00   24   Mechanical Ventilator  70


 


1/16/21 05:00 102.0 115 24 112/75 (87) 95   


 


1/16/21 04:00        70


 


1/16/21 04:00  115 23 109/68 (82) 95   


 


1/16/21 04:00   23   Mechanical Ventilator  70


 


1/16/21 04:00  115      


 


1/16/21 04:00      Mechanical Ventilator  


 


1/16/21 03:00   22   Mechanical Ventilator  70


 


1/16/21 03:00  112 22 115/71 (86) 96   


 


1/16/21 02:00  111 20 108/67 (81) 95   


 


1/16/21 02:00   20   Mechanical Ventilator  70


 


1/16/21 01:03  111 22     70


 


1/16/21 01:00 99.7 103 21 114/72 (86) 94   


 


1/16/21 01:00   21   Mechanical Ventilator  70


 


1/16/21 00:00  107      


 


1/16/21 00:00      Mechanical Ventilator  


 


1/16/21 00:00 99.7 107 21 113/73 (86) 95   


 


1/16/21 00:00   21   Mechanical Ventilator  70


 


1/16/21 00:00        70


 


1/15/21 23:30 99.7       


 


1/15/21 23:00  104 18 101/67 (78) 96   


 


1/15/21 23:00   18     70


 


1/15/21 22:00   19   Mechanical Ventilator  


 


1/15/21 22:00  102 19 100/63 (75) 95   


 


1/15/21 21:00   18   Mechanical Ventilator  70


 


1/15/21 21:00  101 18 110/70 (83) 96   


 


1/15/21 20:00   18   Mechanical Ventilator  70


 


1/15/21 20:00      Mechanical Ventilator  


 


1/15/21 20:00  102 18 106/69 (81) 96   


 


1/15/21 20:00  102      


 


1/15/21 20:00        70


 


1/15/21 19:44  102 18     70


 


1/15/21 19:00 99.8 105 19 99/62 (74) 96   


 


1/15/21 19:00   20   Mechanical Ventilator  70


 


1/15/21 18:00  106 20 104/67 (79) 97   


 


1/15/21 18:00   19   Mechanical Ventilator  70


 


1/15/21 17:52 99.8       


 


1/15/21 17:00  110 21 98/72 (81) 97   


 


1/15/21 16:00        70


 


1/15/21 16:00  115 23 109/75 (86) 97   


 


1/15/21 16:00  116      


 


1/15/21 16:00      Mechanical Ventilator  


 


1/15/21 15:00  121 24 108/77 (87) 84   


 


1/15/21 15:00  118 23     70


 


1/15/21 15:00   24   Mechanical Ventilator  70


 


1/15/21 14:00  122 24 101/76 (84) 95   


 


1/15/21 14:00   24   Mechanical Ventilator  70


 


1/15/21 13:52        70


 


1/15/21 13:50        70


 


1/15/21 13:00   26   Mechanical Ventilator  80


 


1/15/21 13:00 99.0 126 25 106/72 (83) 98   


 


1/15/21 12:00      Mechanical Ventilator  


 


1/15/21 12:00  133 28 103/64 (77) 100   


 


1/15/21 12:00   28   Mechanical Ventilator  90


 


1/15/21 12:00        90


 


1/15/21 12:00  128      


 


1/15/21 11:40  140 26     100


 


1/15/21 11:00   31   Mechanical Ventilator  100


 


1/15/21 11:00 101.5 139 32 93/65 (74) 97   

















Intake and Output  


 


 1/16/21 1/17/21





 19:00 07:00


 


Intake Total 1460.0 ml 1151.5 ml


 


Output Total 790 ml 685 ml


 


Balance 670.0 ml 466.5 ml


 


  


 


Free Water 30 ml 30 ml


 


IV Total 725.0 ml 461.5 ml


 


Tube Feeding 660 ml 660 ml


 


Other 45 ml 


 


Output Urine Total 790 ml 685 ml











Labs








Test


 1/14/21


15:55 1/15/21


07:58 1/15/21


13:00 1/16/21


03:15


 


Arterial Blood pH


 7.489


(7.350-7.450) 7.333


(7.350-7.450) 


 





 


Arterial Blood Partial


Pressure CO2 50.9 mmHg


(35.0-45.0) 63.9 mmHg


(35.0-45.0) 


 





 


Arterial Blood Partial


Pressure O2 60.7 mmHg


(75.0-100.0) 51.3 mmHg


(75.0-100.0) 


 





 


Arterial Blood HCO3


 37.8 mmol/L


(22.0-26.0) 33.2 mmol/L


(22.0-26.0) 


 





 


Arterial Blood Oxygen


Saturation 92.4 %


() 82.9 %


() 


 





 


Arterial Blood Base Excess 12.2 (-2-2)  4.9 (-2-2)   


 


Abelardo Test Positive  Positive   


 


Urine Color   Yellow  


 


Urine Appearance   Clear  


 


Urine pH   5 (4.5-8.0)  


 


Urine Specific Gravity


 


 


 1.025


(1.005-1.035) 





 


Urine Protein


 


 


 Negative


(NEGATIVE) 





 


Urine Glucose (UA)


 


 


 Negative


(NEGATIVE) 





 


Urine Ketones


 


 


 Negative


(NEGATIVE) 





 


Urine Blood


 


 


 Negative


(NEGATIVE) 





 


Urine Nitrite


 


 


 Negative


(NEGATIVE) 





 


Urine Bilirubin


 


 


 Negative


(NEGATIVE) 





 


Urine Urobilinogen


 


 


 Normal MG/DL


(0.0-1.0) 





 


Urine Leukocyte Esterase


 


 


 Negative


(NEGATIVE) 





 


White Blood Count


 


 


 


 13.9 K/UL


(4.8-10.8)


 


Red Blood Count


 


 


 


 4.83 M/UL


(4.70-6.10)


 


Hemoglobin


 


 


 


 13.8 G/DL


(14.2-18.0)


 


Hematocrit


 


 


 


 44.7 %


(42.0-52.0)


 


Mean Corpuscular Volume    92 FL (80-99) 


 


Mean Corpuscular Hemoglobin


 


 


 


 28.5 PG


(27.0-31.0)


 


Mean Corpuscular Hemoglobin


Concent 


 


 


 30.8 G/DL


(32.0-36.0)


 


Red Cell Distribution Width


 


 


 


 12.7 %


(11.6-14.8)


 


Platelet Count


 


 


 


 190 K/UL


(150-450)


 


Mean Platelet Volume


 


 


 


 10.5 FL


(6.5-10.1)


 


Neutrophils (%) (Auto)     % (45.0-75.0) 


 


Lymphocytes (%) (Auto)     % (20.0-45.0) 


 


Monocytes (%) (Auto)     % (1.0-10.0) 


 


Eosinophils (%) (Auto)     % (0.0-3.0) 


 


Basophils (%) (Auto)     % (0.0-2.0) 


 


Differential Total Cells


Counted 


 


 


 100 





 


Neutrophils % (Manual)    92 % (45-75) 


 


Lymphocytes % (Manual)    3 % (20-45) 


 


Monocytes % (Manual)    5 % (1-10) 


 


Eosinophils % (Manual)    0 % (0-3) 


 


Basophils % (Manual)    0 % (0-2) 


 


Band Neutrophils    0 % (0-8) 


 


Platelet Estimate    Adequate 


 


Platelet Morphology    Normal 


 


Red Blood Cell Morphology    Normal 


 


Sodium Level


 


 


 


 147 MMOL/L


(136-145)


 


Potassium Level


 


 


 


 3.3 MMOL/L


(3.5-5.1)


 


Chloride Level


 


 


 


 109 MMOL/L


()


 


Carbon Dioxide Level


 


 


 


 35 MMOL/L


(21-32)


 


Anion Gap


 


 


 


 3 mmol/L


(5-15)


 


Blood Urea Nitrogen


 


 


 


 46 mg/dL


(7-18)


 


Creatinine


 


 


 


 0.9 MG/DL


(0.55-1.30)


 


Estimat Glomerular Filtration


Rate 


 


 


 > 60 mL/min


(>60)


 


Glucose Level


 


 


 


 257 MG/DL


()


 


Calcium Level


 


 


 


 8.5 MG/DL


(8.5-10.1)


 


Test


 1/16/21


08:12 1/16/21


10:00 1/17/21


06:57 1/17/21


07:59


 


Arterial Blood pH


 7.471


(7.350-7.450) 


 


 7.456


(7.350-7.450)


 


Arterial Blood Partial


Pressure CO2 48.5 mmHg


(35.0-45.0) 


 


 48.8 mmHg


(35.0-45.0)


 


Arterial Blood Partial


Pressure O2 56.9 mmHg


(75.0-100.0) 


 


 57.3 mmHg


(75.0-100.0)


 


Arterial Blood HCO3


 34.6 mmol/L


(22.0-26.0) 


 


 33.6 mmol/L


(22.0-26.0)


 


Arterial Blood Oxygen


Saturation 91.1 %


() 


 


 90.5 %


()


 


Arterial Blood Base Excess 9.4 (-2-2)    8.3 (-2-2) 


 


Abelardo Test Positive    Positive 


 


Vancomycin Level Trough


 


 12.5 ug/mL


(5.0-12.0) 


 





 


White Blood Count


 


 


 19.1 K/UL


(4.8-10.8) 





 


Red Blood Count


 


 


 4.86 M/UL


(4.70-6.10) 





 


Hemoglobin


 


 


 13.8 G/DL


(14.2-18.0) 





 


Hematocrit


 


 


 44.8 %


(42.0-52.0) 





 


Mean Corpuscular Volume   92 FL (80-99)  


 


Mean Corpuscular Hemoglobin


 


 


 28.4 PG


(27.0-31.0) 





 


Mean Corpuscular Hemoglobin


Concent 


 


 30.8 G/DL


(32.0-36.0) 





 


Red Cell Distribution Width


 


 


 12.9 %


(11.6-14.8) 





 


Platelet Count


 


 


 193 K/UL


(150-450) 





 


Mean Platelet Volume


 


 


 9.9 FL


(6.5-10.1) 





 


Neutrophils (%) (Auto)    % (45.0-75.0)  


 


Lymphocytes (%) (Auto)    % (20.0-45.0)  


 


Monocytes (%) (Auto)    % (1.0-10.0)  


 


Eosinophils (%) (Auto)    % (0.0-3.0)  


 


Basophils (%) (Auto)    % (0.0-2.0)  


 


Differential Total Cells


Counted 


 


 100 


 





 


Neutrophils % (Manual)   93 % (45-75)  


 


Lymphocytes % (Manual)   4 % (20-45)  


 


Monocytes % (Manual)   2 % (1-10)  


 


Eosinophils % (Manual)   1 % (0-3)  


 


Basophils % (Manual)   0 % (0-2)  


 


Band Neutrophils   0 % (0-8)  


 


Platelet Estimate   Adequate  


 


Platelet Morphology   Normal  


 


Red Blood Cell Morphology     


 


Hypochromasia   1+  


 


Sodium Level


 


 


 151 MMOL/L


(136-145) 





 


Potassium Level


 


 


 3.5 MMOL/L


(3.5-5.1) 





 


Chloride Level


 


 


 113 MMOL/L


() 





 


Carbon Dioxide Level


 


 


 32 MMOL/L


(21-32) 





 


Anion Gap


 


 


 6 mmol/L


(5-15) 





 


Blood Urea Nitrogen


 


 


 42 mg/dL


(7-18) 





 


Creatinine


 


 


 1.0 MG/DL


(0.55-1.30) 





 


Estimat Glomerular Filtration


Rate 


 


 > 60 mL/min


(>60) 





 


Glucose Level


 


 


 150 MG/DL


() 





 


Calcium Level


 


 


 9.0 MG/DL


(8.5-10.1) 





 


Total Bilirubin


 


 


 0.4 MG/DL


(0.2-1.0) 





 


Aspartate Amino Transf


(AST/SGOT) 


 


 24 U/L (15-37) 


 





 


Alanine Aminotransferase


(ALT/SGPT) 


 


 16 U/L (12-78) 


 





 


Alkaline Phosphatase


 


 


 85 U/L


() 





 


Total Protein


 


 


 6.2 G/DL


(6.4-8.2) 





 


Albumin


 


 


 1.5 G/DL


(3.4-5.0) 





 


Globulin   4.7 g/dL  


 


Albumin/Globulin Ratio   0.3 (1.0-2.7)  








Height (Feet):  5


Height (Inches):  7.00


Weight (Pounds):  198


Objective


General Appearance:  lethargic, moderate distress


Neck:  supple


Cardiovascular:  regular rhythm


Respiratory/Chest:  crackles/rales, rhonchi - bilaterally vent++


Abdomen:  non tender, soft


Extremities:  non-tender











Emmett Cook MD          Jan 17, 2021 10:25

## 2021-01-17 NOTE — NUR
NURSE NOTES:

MD. WELLER HERE TO SEE PT. WAS INFORMED OF PT STATUS. LABS CBC WNL. BMP PENDING THIS AM. 
NO NEW ORDERS.

## 2021-01-17 NOTE — NUR
NURSE NOTES:

pt has fever of 102.2 at this time, Tyenol administered.  Ice packs applied. call light 
within reach. will continue to monitor pt

## 2021-01-17 NOTE — DIAGNOSTIC IMAGING REPORT
EXAM:

  XR Chest, 1 View

 

CLINICAL HISTORY:

  POST-OP

 

TECHNIQUE:

  Frontal view of the chest.

 

COMPARISON:

  1/17/21

 

FINDINGS:

  

ETT terminates above the tito.  NGT extend into the stomach.  No change 

position right thoracostomy tube.  Again noted extensive soft tissue gas 

in the chest extending into the neck.  Probable small residual left 

apical pneumothorax.  Patchy bilateral airspace disease most pronounced 

in the lung bases without significant change.

## 2021-01-17 NOTE — NUR
NURSE NOTES:

received pt from Beatriz ARCHER., pt is sedated at this time and resting on the bed, able to 
arouse pt with light stimuli. ETT 7.5 lip line 23cm, AC 16  Fio2 90% Peep 10. OGT 
Vital A.F 1.2 @55ml/hr, no residual noted at this time. cash cath is draining is noted, no 
bleeding noted at this time. skin alternation noted, dressing site intact, clean, and 
patent. left femoral TLC noted, dressing site intact, clean, and patent. versed is running 
1mg/hr and 1/2 NS @ 25ml/hr. noticed crepitus noted on right chest and neck. no active 
bleeding noted at this time. ABD large, soft. call light within reach. will continue to 
monitor pt with plan of care. bed at the lowest position, , side rails x2 up, alarmed, and 
locked.

## 2021-01-17 NOTE — NUR
NURSE NOTES:

cleaned pt, took a new wound picture on sacral and right heel. Optifoam applied. provided 
new gown, new pillow cases, new chucks. no BM noted.

## 2021-01-18 VITALS — SYSTOLIC BLOOD PRESSURE: 104 MMHG | DIASTOLIC BLOOD PRESSURE: 68 MMHG

## 2021-01-18 VITALS — SYSTOLIC BLOOD PRESSURE: 105 MMHG | DIASTOLIC BLOOD PRESSURE: 80 MMHG

## 2021-01-18 VITALS — DIASTOLIC BLOOD PRESSURE: 74 MMHG | SYSTOLIC BLOOD PRESSURE: 109 MMHG

## 2021-01-18 VITALS — SYSTOLIC BLOOD PRESSURE: 102 MMHG | DIASTOLIC BLOOD PRESSURE: 62 MMHG

## 2021-01-18 VITALS — SYSTOLIC BLOOD PRESSURE: 112 MMHG | DIASTOLIC BLOOD PRESSURE: 69 MMHG

## 2021-01-18 VITALS — DIASTOLIC BLOOD PRESSURE: 77 MMHG | SYSTOLIC BLOOD PRESSURE: 115 MMHG

## 2021-01-18 VITALS — DIASTOLIC BLOOD PRESSURE: 71 MMHG | SYSTOLIC BLOOD PRESSURE: 105 MMHG

## 2021-01-18 VITALS — SYSTOLIC BLOOD PRESSURE: 105 MMHG | DIASTOLIC BLOOD PRESSURE: 70 MMHG

## 2021-01-18 VITALS — DIASTOLIC BLOOD PRESSURE: 37 MMHG | SYSTOLIC BLOOD PRESSURE: 82 MMHG

## 2021-01-18 VITALS — DIASTOLIC BLOOD PRESSURE: 60 MMHG | SYSTOLIC BLOOD PRESSURE: 106 MMHG

## 2021-01-18 VITALS — SYSTOLIC BLOOD PRESSURE: 109 MMHG | DIASTOLIC BLOOD PRESSURE: 83 MMHG

## 2021-01-18 VITALS — DIASTOLIC BLOOD PRESSURE: 76 MMHG | SYSTOLIC BLOOD PRESSURE: 103 MMHG

## 2021-01-18 VITALS — DIASTOLIC BLOOD PRESSURE: 59 MMHG | SYSTOLIC BLOOD PRESSURE: 91 MMHG

## 2021-01-18 VITALS — DIASTOLIC BLOOD PRESSURE: 59 MMHG | SYSTOLIC BLOOD PRESSURE: 101 MMHG

## 2021-01-18 VITALS — DIASTOLIC BLOOD PRESSURE: 61 MMHG | SYSTOLIC BLOOD PRESSURE: 101 MMHG

## 2021-01-18 VITALS — SYSTOLIC BLOOD PRESSURE: 108 MMHG | DIASTOLIC BLOOD PRESSURE: 66 MMHG

## 2021-01-18 VITALS — DIASTOLIC BLOOD PRESSURE: 73 MMHG | SYSTOLIC BLOOD PRESSURE: 113 MMHG

## 2021-01-18 VITALS — DIASTOLIC BLOOD PRESSURE: 91 MMHG | SYSTOLIC BLOOD PRESSURE: 108 MMHG

## 2021-01-18 VITALS — DIASTOLIC BLOOD PRESSURE: 53 MMHG | SYSTOLIC BLOOD PRESSURE: 82 MMHG

## 2021-01-18 VITALS — DIASTOLIC BLOOD PRESSURE: 70 MMHG | SYSTOLIC BLOOD PRESSURE: 114 MMHG

## 2021-01-18 VITALS — DIASTOLIC BLOOD PRESSURE: 71 MMHG | SYSTOLIC BLOOD PRESSURE: 111 MMHG

## 2021-01-18 VITALS — SYSTOLIC BLOOD PRESSURE: 100 MMHG | DIASTOLIC BLOOD PRESSURE: 71 MMHG

## 2021-01-18 VITALS — DIASTOLIC BLOOD PRESSURE: 67 MMHG | SYSTOLIC BLOOD PRESSURE: 99 MMHG

## 2021-01-18 VITALS — SYSTOLIC BLOOD PRESSURE: 103 MMHG | DIASTOLIC BLOOD PRESSURE: 65 MMHG

## 2021-01-18 VITALS — SYSTOLIC BLOOD PRESSURE: 104 MMHG | DIASTOLIC BLOOD PRESSURE: 67 MMHG

## 2021-01-18 VITALS — DIASTOLIC BLOOD PRESSURE: 77 MMHG | SYSTOLIC BLOOD PRESSURE: 107 MMHG

## 2021-01-18 VITALS — DIASTOLIC BLOOD PRESSURE: 63 MMHG | SYSTOLIC BLOOD PRESSURE: 108 MMHG

## 2021-01-18 VITALS — DIASTOLIC BLOOD PRESSURE: 59 MMHG | SYSTOLIC BLOOD PRESSURE: 107 MMHG

## 2021-01-18 VITALS — SYSTOLIC BLOOD PRESSURE: 112 MMHG | DIASTOLIC BLOOD PRESSURE: 82 MMHG

## 2021-01-18 VITALS — DIASTOLIC BLOOD PRESSURE: 52 MMHG | SYSTOLIC BLOOD PRESSURE: 77 MMHG

## 2021-01-18 VITALS — DIASTOLIC BLOOD PRESSURE: 67 MMHG | SYSTOLIC BLOOD PRESSURE: 109 MMHG

## 2021-01-18 VITALS — SYSTOLIC BLOOD PRESSURE: 90 MMHG | DIASTOLIC BLOOD PRESSURE: 56 MMHG

## 2021-01-18 VITALS — SYSTOLIC BLOOD PRESSURE: 109 MMHG | DIASTOLIC BLOOD PRESSURE: 75 MMHG

## 2021-01-18 LAB
ADD MANUAL DIFF: YES
ANION GAP SERPL CALC-SCNC: 2 MMOL/L (ref 5–15)
BUN SERPL-MCNC: 38 MG/DL (ref 7–18)
CALCIUM SERPL-MCNC: 8.5 MG/DL (ref 8.5–10.1)
CHLORIDE SERPL-SCNC: 114 MMOL/L (ref 98–107)
CO2 SERPL-SCNC: 35 MMOL/L (ref 21–32)
CREAT SERPL-MCNC: 0.8 MG/DL (ref 0.55–1.3)
ERYTHROCYTE [DISTWIDTH] IN BLOOD BY AUTOMATED COUNT: 13.2 % (ref 11.6–14.8)
HCT VFR BLD CALC: 41.2 % (ref 42–52)
HGB BLD-MCNC: 12.7 G/DL (ref 14.2–18)
MCV RBC AUTO: 94 FL (ref 80–99)
PLATELET # BLD: 172 K/UL (ref 150–450)
POTASSIUM SERPL-SCNC: 3.7 MMOL/L (ref 3.5–5.1)
RBC # BLD AUTO: 4.36 M/UL (ref 4.7–6.1)
SODIUM SERPL-SCNC: 151 MMOL/L (ref 136–145)
WBC # BLD AUTO: 15.5 K/UL (ref 4.8–10.8)

## 2021-01-18 RX ADMIN — METHYLPREDNISOLONE SODIUM SUCCINATE SCH MG: 40 INJECTION, POWDER, LYOPHILIZED, FOR SOLUTION INTRAMUSCULAR; INTRAVENOUS at 09:00

## 2021-01-18 RX ADMIN — INSULIN ASPART SCH UNITS: 100 INJECTION, SOLUTION INTRAVENOUS; SUBCUTANEOUS at 18:21

## 2021-01-18 RX ADMIN — Medication SCH MLS/HR: at 11:00

## 2021-01-18 RX ADMIN — DEXTROSE MONOHYDRATE SCH MLS/HR: 50 INJECTION, SOLUTION INTRAVENOUS at 20:29

## 2021-01-18 RX ADMIN — Medication PRN MLS/HR: at 07:32

## 2021-01-18 RX ADMIN — INSULIN ASPART SCH UNITS: 100 INJECTION, SOLUTION INTRAVENOUS; SUBCUTANEOUS at 00:54

## 2021-01-18 RX ADMIN — Medication PRN MLS/HR: at 10:18

## 2021-01-18 RX ADMIN — INSULIN ASPART SCH UNITS: 100 INJECTION, SOLUTION INTRAVENOUS; SUBCUTANEOUS at 12:17

## 2021-01-18 RX ADMIN — DEXTROSE MONOHYDRATE SCH MLS/HR: 50 INJECTION, SOLUTION INTRAVENOUS at 05:57

## 2021-01-18 RX ADMIN — INSULIN DETEMIR SCH UNITS: 100 INJECTION, SOLUTION SUBCUTANEOUS at 09:00

## 2021-01-18 RX ADMIN — CHLORHEXIDINE GLUCONATE SCH APPLIC: 213 SOLUTION TOPICAL at 20:27

## 2021-01-18 RX ADMIN — Medication PRN MLS/HR: at 11:57

## 2021-01-18 RX ADMIN — PANTOPRAZOLE SODIUM SCH MG: 40 INJECTION, POWDER, FOR SOLUTION INTRAVENOUS at 10:16

## 2021-01-18 RX ADMIN — INSULIN ASPART SCH UNITS: 100 INJECTION, SOLUTION INTRAVENOUS; SUBCUTANEOUS at 12:20

## 2021-01-18 RX ADMIN — INSULIN ASPART SCH UNITS: 100 INJECTION, SOLUTION INTRAVENOUS; SUBCUTANEOUS at 00:55

## 2021-01-18 RX ADMIN — Medication SCH MLS/HR: at 17:00

## 2021-01-18 RX ADMIN — Medication SCH MLS/HR: at 15:00

## 2021-01-18 RX ADMIN — ENOXAPARIN SODIUM SCH MG: 80 INJECTION SUBCUTANEOUS at 09:00

## 2021-01-18 RX ADMIN — ENOXAPARIN SODIUM SCH MG: 80 INJECTION SUBCUTANEOUS at 20:28

## 2021-01-18 RX ADMIN — INSULIN DETEMIR SCH UNITS: 100 INJECTION, SOLUTION SUBCUTANEOUS at 20:29

## 2021-01-18 RX ADMIN — INSULIN ASPART SCH UNITS: 100 INJECTION, SOLUTION INTRAVENOUS; SUBCUTANEOUS at 06:24

## 2021-01-18 RX ADMIN — DEXTROSE MONOHYDRATE SCH MLS/HR: 50 INJECTION, SOLUTION INTRAVENOUS at 14:00

## 2021-01-18 NOTE — NUR
NURSE HAND-OFF REPORT: 



Need to Follow up: chest x-ray, AM labs, ABG, and pt has A.Fib with RVR



Latest Vital Signs: Temperature 98.8 , Pulse 131 , B/P 109 /74 , Respiratory Rate 20 , O2 
SAT 97 , Mechanical Ventilator, O2 Flow Rate  .  

Vital Sign Comment: [stable  ]



EKG Rhythm: A. fib w/  RVR

Rhythm change?: N 

MD Notified?:  -Dr. Travis and Dr. Ariel GUTIERREZ Response: Dig 0.25 IVP once.



Latest Singh Fall Score: 50  

Fall Risk: High Risk 

Safety Measures: Call light Within Reach, Bed Alarm Zone 1, Side Rails Side Rails x3, Bed 
position Low and Locked.

Fall Precautions: 

Door Sign



Report given to [Radha Nicholson RN].

## 2021-01-18 NOTE — NUR
NURSE NOTES:

small brouwn small BM noted.

-------------------------------------------------------------------------------

Addendum: 01/18/21 at 0613 by TAMAR WOODS RN

-------------------------------------------------------------------------------

small brown BM noted.

## 2021-01-18 NOTE — NUR
NURSE NOTES:

Patient blood pressure has been stabilizing, Cardizem drip is still running on 15mg/hour. 
Patient still remains febrile, rectal temp at the moment is 101.8, cooling measures 
continuously provided, body temp decreasing, Will closely monitor.

## 2021-01-18 NOTE — GENERAL PROGRESS NOTE
Subjective


ROS Limited/Unobtainable:  Yes


Allergies:  


Coded Allergies:  


     No Known Allergies (Unverified , 6/11/19)


Subjective


events noted - interval notes reviewed 


intubated 


glucose values are trending down 











Item Value  Date Time


 


Bedside Blood Glucose 151 mg/dl H 1/18/21 0624


 


Bedside Blood Glucose 241 mg/dl H 1/18/21 0055


 


Bedside Blood Glucose 298 mg/dl H 1/17/21 2158


 


Bedside Blood Glucose 365 mg/dl H 1/17/21 1800


 


Bedside Blood Glucose 226 mg/dl H 1/17/21 1236


 


Bedside Blood Glucose 150 mg/dl H 1/17/21 0949











Objective





Last 24 Hour Vital Signs








  Date Time  Temp Pulse Resp B/P (MAP) Pulse Ox O2 Delivery O2 Flow Rate FiO2


 


1/18/21 06:21      Mechanical Ventilator  45


 


1/18/21 06:00  131 18 112/69 (83) 96   


 


1/18/21 05:21      Mechanical Ventilator  90


 


1/18/21 05:00  121 15 104/68 (80) 96   


 


1/18/21 04:45  123 16 107/77 (87) 97   


 


1/18/21 04:30  96 15 103/76 (85) 97   


 


1/18/21 04:21      Mechanical Ventilator  90


 


1/18/21 04:15  96 15 113/73 (86) 97   


 


1/18/21 04:00      Mechanical Ventilator  


 


1/18/21 04:00 98.8 97 15 109/67 (81) 97   


 


1/18/21 03:21      Mechanical Ventilator  90


 


1/18/21 03:07  98      


 


1/18/21 03:00  98 15 109/75 (86) 98   


 


1/18/21 02:50  98 20     90


 


1/18/21 02:21      Mechanical Ventilator  90


 


1/18/21 02:00  100 16 100/71 (81) 98   


 


1/18/21 01:21      Mechanical Ventilator  90


 


1/18/21 01:00 99.8 103 18 105/71 (82) 98   


 


1/18/21 00:21      Mechanical Ventilator  90


 


1/18/21 00:00 100.0 110 20 105/80 (88) 97   


 


1/18/21 00:00      Mechanical Ventilator  


 


1/17/21 23:42 99.8       


 


1/17/21 23:21      Mechanical Ventilator  90


 


1/17/21 23:06  111 19     90


 


1/17/21 23:06  111      


 


1/17/21 23:00  112 20 106/74 (85) 97   


 


1/17/21 22:21      Mechanical Ventilator  90


 


1/17/21 22:00  109 21 116/82 (93) 98   


 


1/17/21 21:21      Mechanical Ventilator  90


 


1/17/21 21:00  112 21 114/78 (90) 98   


 


1/17/21 20:45  111 21 112/79 (90) 97   


 


1/17/21 20:30  112 22 115/79 (91) 97   


 


1/17/21 20:21      Mechanical Ventilator  90


 


1/17/21 20:15  111 20 113/79 (90) 98   


 


1/17/21 20:06      Mechanical Ventilator  90


 


1/17/21 20:00 98.9 111 20 109/75 (86) 97   


 


1/17/21 20:00      Mechanical Ventilator  


 


1/17/21 19:51      Mechanical Ventilator  90


 


1/17/21 19:45  110 20 96/79 (85) 97   


 


1/17/21 19:30  110 21 106/82 (90) 97   


 


1/17/21 19:15  110 22 103/73 (83) 97   


 


1/17/21 19:11  110      


 


1/17/21 19:00  109 21 105/77 (86) 97   


 


1/17/21 18:58  110 22     90


 


1/17/21 18:51   22   Mechanical Ventilator  


 


1/17/21 18:30  108 20 108/71 (83) 97   


 


1/17/21 18:15  106 20 100/68 (79) 97   


 


1/17/21 18:00 100.0 105 19 102/70 (81) 97   


 


1/17/21 17:51   20   Mechanical Ventilator  


 


1/17/21 17:45  106 18 114/75 (88) 97   


 


1/17/21 17:32 100.5       


 


1/17/21 17:30  103 20 107/68 (81) 98   


 


1/17/21 17:15  102 19 106/70 (82) 98   


 


1/17/21 17:00 100.5 102 20 100/72 (81) 97   


 


1/17/21 16:51   19   Mechanical Ventilator  


 


1/17/21 16:30  103 21 106/67 (80) 98   


 


1/17/21 16:15  104 21 103/67 (79) 97   


 


1/17/21 16:00        90


 


1/17/21 16:00      Mechanical Ventilator  


 


1/17/21 16:00  103      


 


1/17/21 16:00 101.0 103 22 104/67 (79) 98   


 


1/17/21 15:51   23   Mechanical Ventilator  


 


1/17/21 15:45  104 23 97/66 (76) 97   


 


1/17/21 15:30  104 24 96/70 (79) 97   


 


1/17/21 15:27  103 22     90


 


1/17/21 15:15  104 23 99/67 (78) 97   


 


1/17/21 15:00  105 24 100/67 (78) 97   


 


1/17/21 14:51   23   Mechanical Ventilator  


 


1/17/21 14:45  106 25 97/64 (75) 96   


 


1/17/21 14:30  106 24 97/65 (76) 96   


 


1/17/21 14:15  106 24 100/68 (79) 96   


 


1/17/21 14:00  107 22 109/68 (82) 95   


 


1/17/21 13:51      Mechanical Ventilator  


 


1/17/21 13:08 101.0       


 


1/17/21 13:00  111 24 135/82 (99) 97   


 


1/17/21 12:45  111 26 129/81 (97) 97   


 


1/17/21 12:30  112 26 133/85 (101) 96   


 


1/17/21 12:15  111 23 116/80 (92) 96   


 


1/17/21 12:00  115      


 


1/17/21 12:00      Mechanical Ventilator  


 


1/17/21 12:00 102.1 115 25 119/81 (94) 95   


 


1/17/21 11:45  117 26 127/81 (96) 95   


 


1/17/21 11:38  116 25     90


 


1/17/21 11:30  117 27 134/84 (101)    


 


1/17/21 11:15  115 25 131/83 (99)    


 


1/17/21 11:00  116 27 127/84 (98)    


 


1/17/21 10:45  115 25 122/78 (93)    


 


1/17/21 10:30  115 26 126/83 (97)    


 


1/17/21 10:15  113 23 120/85 (97)    


 


1/17/21 10:00  116 27 114/80 (91)    


 


1/17/21 09:45  115 26 116/79 (91)    


 


1/17/21 09:30  116 28 114/78 (90)    


 


1/17/21 09:15  116 26 119/77 (91)    


 


1/17/21 09:00  115 26 112/79 (90)    


 


1/17/21 09:00      Mechanical Ventilator  


 


1/17/21 08:45  115 25 114/79 (91)    


 


1/17/21 08:30  114 24 114/79 (91)    


 


1/17/21 08:16        90


 


1/17/21 08:15  114 24 116/79 (91)    


 


1/17/21 08:11        90


 


1/17/21 08:00      Mechanical Ventilator  


 


1/17/21 08:00  115      


 


1/17/21 08:00      Mechanical Ventilator  


 


1/17/21 08:00        70


 


1/17/21 08:00 101.1 115 24 115/77 (90)    


 


1/17/21 07:45  117 26 116/77 (90)    


 


1/17/21 07:30  116 24 115/78 (90)    


 


1/17/21 07:24  115 25     75


 


1/17/21 07:15  117 25 111/81 (91)    


 


1/17/21 07:00  115 25 122/79 (93)    


 


1/17/21 07:00   27   Mechanical Ventilator  70

















Intake and Output  


 


 1/17/21 1/18/21





 19:00 07:00


 


Intake Total 1496.5 ml 1381.75 ml


 


Output Total 670 ml 1040 ml


 


Balance 826.5 ml 341.75 ml


 


  


 


Free Water 60 ml 30 ml


 


IV Total 756.5 ml 746.75 ml


 


Tube Feeding 660 ml 605 ml


 


Other 20 ml 


 


Output Urine Total 670 ml 1040 ml


 


# Bowel Movements  2








Laboratory Tests


1/17/21 06:57: 


White Blood Count 19.1H, Red Blood Count 4.86, Hemoglobin 13.8L, Hematocrit 

44.8, Mean Corpuscular Volume 92, Mean Corpuscular Hemoglobin 28.4, Mean 

Corpuscular Hemoglobin Concent 30.8L, Red Cell Distribution Width 12.9, Platelet

Count 193, Mean Platelet Volume 9.9, Neutrophils (%) (Auto) , Lymphocytes (%) 

(Auto) , Monocytes (%) (Auto) , Eosinophils (%) (Auto) , Basophils (%) (Auto) , 

Differential Total Cells Counted 100, Neutrophils % (Manual) 93H, Lymphocytes % 

(Manual) 4L, Monocytes % (Manual) 2, Eosinophils % (Manual) 1, Basophils % 

(Manual) 0, Band Neutrophils 0, Platelet Estimate Adequate, Platelet Morphology 

Normal, Red Blood Cell Morphology , Hypochromasia 1+, Sodium Level 151H, 

Potassium Level 3.5, Chloride Level 113H, Carbon Dioxide Level 32, Anion Gap 6, 

Blood Urea Nitrogen 42H, Creatinine 1.0, Estimat Glomerular Filtration Rate > 

60, Glucose Level 150#H, Calcium Level 9.0, Total Bilirubin 0.4, Aspartate Amino

Transf (AST/SGOT) 24, Alanine Aminotransferase (ALT/SGPT) 16, Alkaline 

Phosphatase 85, Total Protein 6.2L, Albumin 1.5L, Globulin 4.7, Albumin/Globulin

Ratio 0.3L


1/17/21 07:59: 


Arterial Blood pH 7.456H, Arterial Blood Partial Pressure CO2 48.8H, Arterial 

Blood Partial Pressure O2 57.3L, Arterial Blood HCO3 33.6H, Arterial Blood 

Oxygen Saturation 90.5L, Arterial Blood Base Excess 8.3H, Abelardo Test Positive


1/17/21 12:00: POC Whole Blood Glucose 226H


1/18/21 04:55: 


White Blood Count 15.5H, Red Blood Count 4.36L, Hemoglobin 12.7L, Hematocrit 

41.2L, Mean Corpuscular Volume 94, Mean Corpuscular Hemoglobin 29.0, Mean C

orpuscular Hemoglobin Concent 30.8L, Red Cell Distribution Width 13.2, Platelet 

Count 172, Mean Platelet Volume 10.3H, Neutrophils (%) (Auto) , Lymphocytes (%) 

(Auto) , Monocytes (%) (Auto) , Eosinophils (%) (Auto) , Basophils (%) (Auto) , 

Neutrophils % (Manual) [Pending], Lymphocytes % (Manual) [Pending], Platelet 

Estimate [Pending], Platelet Morphology [Pending], Sodium Level 151H, Potassium 

Level 3.7, Chloride Level 114H, Carbon Dioxide Level 35H, Anion Gap 2L, Blood 

Urea Nitrogen 38H, Creatinine 0.8, Estimat Glomerular Filtration Rate > 60, 

Glucose Level 189H, Calcium Level 8.5


Height (Feet):  5


Height (Inches):  7.00


Weight (Pounds):  198


Objective





Current Medications








 Medications


  (Trade)  Dose


 Ordered  Sig/Julisa


 Route


 PRN Reason  Start Time


 Stop Time Status Last Admin


Dose Admin


 


 Acetaminophen


  (Tylenol)  650 mg  Q4H  PRN


 GT


 Temp >100.5  1/15/21 17:15


 2/14/21 17:14  1/17/21 23:12





 


 Chlorhexidine


 Gluconate


  (Vanessa-Hex 2%)  1 applic  DAILY@2000


 TOPIC


   1/6/21 20:00


 4/6/21 19:59  1/17/21 20:31





 


 Dextrose


  (Dextrose 50%)  25 ml  Q30M  PRN


 IV


 Hypoglycemia  1/9/21 13:30


 4/9/21 13:29   





 


 Dextrose


  (Dextrose 50%)  50 ml  Q30M  PRN


 IV


 Hypoglycemia  1/9/21 13:30


 4/9/21 13:29   





 


 Digoxin


  (Lanoxin)  0.25 mg  ONCE


 IVP


   1/18/21 07:00


 1/18/21 09:00   





 


 Docusate Sodium


  (Colace)  100 mg  TIDPRN  PRN


 GT


 Constipation  1/12/21 01:15


 2/11/21 01:14  1/12/21 01:42





 


 Enoxaparin Sodium


  (Lovenox)  80 mg  EVERY 12  HOURS


 SUBQ


   1/2/21 21:00


 4/2/21 20:59  1/17/21 09:48





 


 Haloperidol


  (Haldol)  5 mg  Q6H  PRN


 ORAL


 Agitation  1/4/21 13:45


 2/18/21 13:44  1/4/21 22:56





 


 Haloperidol


 Lactate 5 mg/


 Dextrose  56 ml @ 


 224 mls/hr  Q6H  PRN


 IVPB


 Agitation  1/5/21 12:30


 2/19/21 12:29  1/5/21 13:20





 


 Insulin Aspart


  (NovoLOG)    Q6HR


 SUBQ


   1/14/21 12:00


 4/9/21 17:59  1/18/21 06:24





 


 Insulin Aspart


  (NovoLOG)  14 units  EVERY 6  HOURS


 SUBQ


   1/16/21 12:00


 4/15/21 06:59  1/18/21 06:24





 


 Insulin Detemir


  (Levemir)  20 units  Q12HR


 SUBQ


   1/14/21 09:00


 4/12/21 08:59  1/17/21 21:58





 


 Methylprednisolone


 Sodium Succinate


  (Solu-MEDROL)  10 mg  DAILY


 IVP


   1/17/21 09:00


 1/18/21 09:01  1/17/21 09:46





 


 Midazolam HCl  100 ml @ 2


 mls/hr  Q24H  PRN


 IV


 Agitation  1/15/21 23:00


 1/22/21 22:59  1/17/21 13:51





 


 Pantoprazole


  (Protonix)  40 mg  DAILY


 IVP


   1/14/21 09:00


 2/13/21 08:59  1/17/21 09:46





 


 Piperacillin Sod/


 Tazobactam Sod


 3.375 gm/Sodium


 Chloride  110 ml @ 


 27.5 mls/hr  EVERY 8  HOURS


 IVPB


   1/15/21 14:00


 1/20/21 13:59  1/18/21 05:57





 


 Quetiapine


 Fumarate


  (SEROqueL)  50 mg  Q12HR


 ORAL


   1/4/21 21:00


 2/18/21 20:59  1/17/21 20:47





 


 Sodium Chloride  500 ml @ 


 999 mls/hr  Q31M PRN


 IV


 For hypotension  1/15/21 18:30


 2/14/21 18:29   





 


 Sodium Chloride  1,000 ml @ 


 25 mls/hr  Q24H


 IV


   1/3/21 07:45


 2/2/21 07:44  1/17/21 10:30





 


 Vancomycin HCl  300 ml @ 


 150 mls/hr  Q12H


 IVPB


   1/16/21 11:00


 1/21/21 10:59  1/17/21 23:13





 


 Vancomycin HCl


  (Vanco pharmacy


 to dose)  1 ea  DAILY  PRN


 MISC


 Per rx protocol  1/14/21 09:15


 2/13/21 09:14   














Assessment/Plan


Problem List:  


(1) Diabetes mellitus out of control


ICD Codes:  E11.65 - Type 2 diabetes mellitus with hyperglycemia


SNOMED:  02682545, [id number]


(2) Pneumonia due to COVID-19 virus


ICD Codes:  U07.1 - COVID-19; J12.82 - Pneumonia due to coronavirus disease 2019


SNOMED:  [id number]


Assessment/Plan:


continue Levemir 20 units bid 


continue Novolog 14 units every 6 hours 


continue Novolog sliding scale every 6 hours











Nick Mcmahon MD                 Jan 18, 2021 06:44

## 2021-01-18 NOTE — DIAGNOSTIC IMAGING REPORT
Indication: Tortuous of breath

 

Technique: One view of the chest

 

Comparison: 1/17/2021

 

Findings: Endotracheal tube tip projects at the level of the thoracic inlet.

Orogastric tube tip projects at the level of the gastric fundus. Again demonstrated

is extensive subcutaneous emphysema, which appears somewhat worse on the left. Right

chest tube remains in place. Small right medial and apical pneumothorax are present,

slightly more conspicuous than on the previous exam, still small, somewhat difficult

to identify due to overlying subcutaneous emphysema. There is probably a tiny left

apical pneumothorax as well. Previously demonstrated pneumomediastinum has improved

considerably although still present. Bilateral infiltrates appear worse on the right.

 

Impression: Equivocally slightly increased but still small right pneumothorax.

 

Suspect tiny left apical pneumothorax

 

Improving pneumomediastinum

 

Worsening subcutaneous emphysema

 

Worsening right and stable left lung infiltrates

## 2021-01-18 NOTE — HEMATOLOGY/ONC PROGRESS NOTE
Assessment/Plan


Assessment/Plan


Leukocytosis 2/2 covid pna wbc 15


Anemia 2/2 chronic disease


Hypercoag disorer now on lovenox sq


Ac resp distress on ventilator


Pneumomediastinum


etoh abused


agitated -halodol


vent





cont iv abx and iv decadrone


pulmonary and gi on consult


smear is noted


pneumomediastinum per pulm


dw charge nurse


lovenox sq





Subjective


HEENT:  Denies: no symptoms, eye pain, blurred vision, tearing, double vision, 

ear pain, ear discharge, nose pain, nose congestion, throat pain, throat 

swelling, mouth pain, mouth swelling, other


Allergies:  


Coded Allergies:  


     No Known Allergies (Unverified , 6/11/19)


All Systems:  reviewed and negative except above


Subjective


1/10 on ventilator 60% fio2, on sediation, unresponsive, in icu


1/14 on vent, icu, wbc elev, no bleeding, unresponsive


1/15 on vent, with cash, icu, no bleeding, unresponsive


1/17 on vent, dw rn, no major changes, icu, nv


1/18 nv, labs reviewd, hr is elev, with afib, is onlovenox sq





Objective


Objective





Current Medications








 Medications


  (Trade)  Dose


 Ordered  Sig/Julisa


 Route


 PRN Reason  Start Time


 Stop Time Status Last Admin


Dose Admin


 


 Acetaminophen


  (Tylenol)  650 mg  Q4H  PRN


 GT


 Temp >100.5  1/15/21 17:15


 2/14/21 17:14  1/17/21 23:12





 


 Chlorhexidine


 Gluconate


  (Vanessa-Hex 2%)  1 applic  DAILY@2000


 TOPIC


   1/6/21 20:00


 4/6/21 19:59  1/17/21 20:31





 


 Dextrose


  (Dextrose 50%)  25 ml  Q30M  PRN


 IV


 Hypoglycemia  1/9/21 13:30


 4/9/21 13:29   





 


 Dextrose


  (Dextrose 50%)  50 ml  Q30M  PRN


 IV


 Hypoglycemia  1/9/21 13:30


 4/9/21 13:29   





 


 Digoxin


  (Lanoxin)  0.25 mg  ONCE


 IVP


   1/18/21 07:00


 1/18/21 09:00  1/18/21 06:55





 


 Docusate Sodium


  (Colace)  100 mg  TIDPRN  PRN


 GT


 Constipation  1/12/21 01:15


 2/11/21 01:14  1/12/21 01:42





 


 Enoxaparin Sodium


  (Lovenox)  80 mg  EVERY 12  HOURS


 SUBQ


   1/2/21 21:00


 4/2/21 20:59  1/17/21 09:48





 


 Haloperidol


  (Haldol)  5 mg  Q6H  PRN


 ORAL


 Agitation  1/4/21 13:45


 2/18/21 13:44  1/4/21 22:56





 


 Haloperidol


 Lactate 5 mg/


 Dextrose  56 ml @ 


 224 mls/hr  Q6H  PRN


 IVPB


 Agitation  1/5/21 12:30


 2/19/21 12:29  1/5/21 13:20





 


 Insulin Aspart


  (NovoLOG)    Q6HR


 SUBQ


   1/14/21 12:00


 4/9/21 17:59  1/18/21 06:24





 


 Insulin Aspart


  (NovoLOG)  14 units  EVERY 6  HOURS


 SUBQ


   1/16/21 12:00


 4/15/21 06:59  1/18/21 06:24





 


 Insulin Detemir


  (Levemir)  20 units  Q12HR


 SUBQ


   1/14/21 09:00


 4/12/21 08:59  1/17/21 21:58





 


 Methylprednisolone


 Sodium Succinate


  (Solu-MEDROL)  10 mg  DAILY


 IVP


   1/17/21 09:00


 1/18/21 09:01  1/17/21 09:46





 


 Midazolam HCl  100 ml @ 2


 mls/hr  Q24H  PRN


 IV


 Agitation  1/15/21 23:00


 1/22/21 22:59  1/17/21 13:51





 


 Pantoprazole


  (Protonix)  40 mg  DAILY


 IVP


   1/14/21 09:00


 2/13/21 08:59  1/17/21 09:46





 


 Piperacillin Sod/


 Tazobactam Sod


 3.375 gm/Sodium


 Chloride  110 ml @ 


 27.5 mls/hr  EVERY 8  HOURS


 IVPB


   1/15/21 14:00


 1/20/21 13:59  1/18/21 05:57





 


 Quetiapine


 Fumarate


  (SEROqueL)  50 mg  Q12HR


 ORAL


   1/4/21 21:00


 2/18/21 20:59  1/17/21 20:47





 


 Sodium Chloride  500 ml @ 


 999 mls/hr  Q31M PRN


 IV


 For hypotension  1/15/21 18:30


 2/14/21 18:29   





 


 Sodium Chloride  1,000 ml @ 


 25 mls/hr  Q24H


 IV


   1/3/21 07:45


 2/2/21 07:44  1/17/21 10:30





 


 Vancomycin HCl  300 ml @ 


 150 mls/hr  Q12H


 IVPB


   1/16/21 11:00


 1/21/21 10:59  1/17/21 23:13





 


 Vancomycin HCl


  ([facility] pharmacy


 to dose)  1 ea  DAILY  PRN


 MISC


 Per rx protocol  1/14/21 09:15


 2/13/21 09:14   














Last 24 Hour Vital Signs








  Date Time  Temp Pulse Resp B/P (MAP) Pulse Ox O2 Delivery O2 Flow Rate FiO2


 


1/18/21 06:55  129      


 


1/18/21 06:21      Mechanical Ventilator  45


 


1/18/21 06:00  131 18 112/69 (83) 96   


 


1/18/21 05:21      Mechanical Ventilator  90


 


1/18/21 05:00  121 15 104/68 (80) 96   


 


1/18/21 04:45  123 16 107/77 (87) 97   


 


1/18/21 04:30  96 15 103/76 (85) 97   


 


1/18/21 04:21      Mechanical Ventilator  90


 


1/18/21 04:15  96 15 113/73 (86) 97   


 


1/18/21 04:00      Mechanical Ventilator  


 


1/18/21 04:00 98.8 97 15 109/67 (81) 97   


 


1/18/21 03:21      Mechanical Ventilator  90


 


1/18/21 03:07  98      


 


1/18/21 03:00  98 15 109/75 (86) 98   


 


1/18/21 02:50  98 20     90


 


1/18/21 02:21      Mechanical Ventilator  90


 


1/18/21 02:00  100 16 100/71 (81) 98   


 


1/18/21 01:21      Mechanical Ventilator  90


 


1/18/21 01:00 99.8 103 18 105/71 (82) 98   


 


1/18/21 00:21      Mechanical Ventilator  90


 


1/18/21 00:00 100.0 110 20 105/80 (88) 97   


 


1/18/21 00:00      Mechanical Ventilator  


 


1/17/21 23:42 99.8       


 


1/17/21 23:21      Mechanical Ventilator  90


 


1/17/21 23:06  111 19     90


 


1/17/21 23:06  111      


 


1/17/21 23:00  112 20 106/74 (85) 97   


 


1/17/21 22:21      Mechanical Ventilator  90


 


1/17/21 22:00  109 21 116/82 (93) 98   


 


1/17/21 21:21      Mechanical Ventilator  90


 


1/17/21 21:00  112 21 114/78 (90) 98   


 


1/17/21 20:45  111 21 112/79 (90) 97   


 


1/17/21 20:30  112 22 115/79 (91) 97   


 


1/17/21 20:21      Mechanical Ventilator  90


 


1/17/21 20:15  111 20 113/79 (90) 98   


 


1/17/21 20:06      Mechanical Ventilator  90


 


1/17/21 20:00 98.9 111 20 109/75 (86) 97   


 


1/17/21 20:00      Mechanical Ventilator  


 


1/17/21 19:51      Mechanical Ventilator  90


 


1/17/21 19:45  110 20 96/79 (85) 97   


 


1/17/21 19:30  110 21 106/82 (90) 97   


 


1/17/21 19:15  110 22 103/73 (83) 97   


 


1/17/21 19:11  110      


 


1/17/21 19:00  109 21 105/77 (86) 97   


 


1/17/21 18:58  110 22     90


 


1/17/21 18:51   22   Mechanical Ventilator  


 


1/17/21 18:30  108 20 108/71 (83) 97   


 


1/17/21 18:15  106 20 100/68 (79) 97   


 


1/17/21 18:00 100.0 105 19 102/70 (81) 97   


 


1/17/21 17:51   20   Mechanical Ventilator  


 


1/17/21 17:45  106 18 114/75 (88) 97   


 


1/17/21 17:32 100.5       


 


1/17/21 17:30  103 20 107/68 (81) 98   


 


1/17/21 17:15  102 19 106/70 (82) 98   


 


1/17/21 17:00 100.5 102 20 100/72 (81) 97   


 


1/17/21 16:51   19   Mechanical Ventilator  


 


1/17/21 16:30  103 21 106/67 (80) 98   


 


1/17/21 16:15  104 21 103/67 (79) 97   


 


1/17/21 16:00        90


 


1/17/21 16:00      Mechanical Ventilator  


 


1/17/21 16:00  103      


 


1/17/21 16:00 101.0 103 22 104/67 (79) 98   


 


1/17/21 15:51   23   Mechanical Ventilator  


 


1/17/21 15:45  104 23 97/66 (76) 97   


 


1/17/21 15:30  104 24 96/70 (79) 97   


 


1/17/21 15:27  103 22     90


 


1/17/21 15:15  104 23 99/67 (78) 97   


 


1/17/21 15:00  105 24 100/67 (78) 97   


 


1/17/21 14:51   23   Mechanical Ventilator  


 


1/17/21 14:45  106 25 97/64 (75) 96   


 


1/17/21 14:30  106 24 97/65 (76) 96   


 


1/17/21 14:15  106 24 100/68 (79) 96   


 


1/17/21 14:00  107 22 109/68 (82) 95   


 


1/17/21 13:51      Mechanical Ventilator  


 


1/17/21 13:08 101.0       


 


1/17/21 13:00  111 24 135/82 (99) 97   


 


1/17/21 12:45  111 26 129/81 (97) 97   


 


1/17/21 12:30  112 26 133/85 (101) 96   


 


1/17/21 12:15  111 23 116/80 (92) 96   


 


1/17/21 12:00  115      


 


1/17/21 12:00      Mechanical Ventilator  


 


1/17/21 12:00 102.1 115 25 119/81 (94) 95   


 


1/17/21 11:45  117 26 127/81 (96) 95   


 


1/17/21 11:38  116 25     90


 


1/17/21 11:30  117 27 134/84 (101)    


 


1/17/21 11:15  115 25 131/83 (99)    


 


1/17/21 11:00  116 27 127/84 (98)    


 


1/17/21 10:45  115 25 122/78 (93)    


 


1/17/21 10:30  115 26 126/83 (97)    


 


1/17/21 10:15  113 23 120/85 (97)    


 


1/17/21 10:00  116 27 114/80 (91)    


 


1/17/21 09:45  115 26 116/79 (91)    


 


1/17/21 09:30  116 28 114/78 (90)    


 


1/17/21 09:15  116 26 119/77 (91)    


 


1/17/21 09:00  115 26 112/79 (90)    


 


1/17/21 09:00      Mechanical Ventilator  


 


1/17/21 08:45  115 25 114/79 (91)    


 


1/17/21 08:30  114 24 114/79 (91)    


 


1/17/21 08:16        90


 


1/17/21 08:15  114 24 116/79 (91)    


 


1/17/21 08:11        90


 


1/17/21 08:00      Mechanical Ventilator  


 


1/17/21 08:00  115      


 


1/17/21 08:00      Mechanical Ventilator  


 


1/17/21 08:00        70


 


1/17/21 08:00 101.1 115 24 115/77 (90)    


 


1/17/21 07:45  117 26 116/77 (90)    


 


1/17/21 07:30  116 24 115/78 (90)    


 


1/17/21 07:24  115 25     75


 


1/17/21 07:15  117 25 111/81 (91)    


 


1/17/21 07:00  115 25 122/79 (93)    


 


1/17/21 07:00   27   Mechanical Ventilator  70


 


1/17/21 06:00   23   Mechanical Ventilator  70


 


1/17/21 06:00  113      


 


1/17/21 06:00  113 23 124/78 (93)    


 


1/17/21 05:00   24   Mechanical Ventilator  70


 


1/17/21 05:00  120  120/81 (94)    


 


1/17/21 04:00      Mechanical Ventilator  


 


1/17/21 04:00   28   Mechanical Ventilator  70


 


1/17/21 04:00 99.1 123  106/74 (85)    


 


1/17/21 04:00        70


 


1/17/21 03:42 99.5       


 


1/17/21 03:08  127 21     75


 


1/17/21 03:00  128 30 112/94 (100)    


 


1/17/21 03:00   29   Mechanical Ventilator  70


 


1/17/21 02:00   29   Mechanical Ventilator  70


 


1/17/21 02:00  128 29 115/89 (98)    


 


1/17/21 01:00  127 29 119/77 (91)    


 


1/17/21 01:00   29   Mechanical Ventilator  70


 


1/17/21 00:00 100.0 126 27 107/80 (89) 93   


 


1/17/21 00:00   27   Mechanical Ventilator  70


 


1/17/21 00:00      Mechanical Ventilator  


 


1/16/21 23:54  127      


 


1/16/21 23:11  126 27     70


 


1/16/21 23:00   27   Mechanical Ventilator  70


 


1/16/21 23:00  126 27 106/81 (89) 93   


 


1/16/21 22:00   27   Mechanical Ventilator  70


 


1/16/21 22:00  127 28 107/80 (89) 92   


 


1/16/21 21:45  127 28 115/86 (96) 92   


 


1/16/21 21:30  127 28 118/87 (97) 94   


 


1/16/21 21:15  126 26 126/91 (103) 93   


 


1/16/21 21:01 100.2       


 


1/16/21 21:00  125 25 116/91 (99) 94   


 


1/16/21 21:00   26   Mechanical Ventilator  70


 


1/16/21 20:45  125 26 119/90 (100) 93   


 


1/16/21 20:30  127 26 107/90 (96) 93   


 


1/16/21 20:15  126 26 128/90 (103) 93   


 


1/16/21 20:01  125      


 


1/16/21 20:00 102.2 126 27 115/87 (96) 93   


 


1/16/21 20:00        70


 


1/16/21 20:00   26   Mechanical Ventilator  70


 


1/16/21 20:00      Mechanical Ventilator  


 


1/16/21 19:45  126 27 127/87 (100) 93   


 


1/16/21 19:30  127 26 119/90 (100) 93   


 


1/16/21 19:15  129 27 121/88 (99)    


 


1/16/21 19:11  126 29     70


 


1/16/21 19:00  128 28 127/91 (103)    


 


1/16/21 19:00   28   Mechanical Ventilator  70


 


1/16/21 18:45  128 28 123/88 (100)    


 


1/16/21 18:30  126 26 123/87 (99)    


 


1/16/21 18:15  127 28 124/89 (101)    


 


1/16/21 18:00  122 21 113/89 (97) 96   


 


1/16/21 18:00      Mechanical Ventilator  


 


1/16/21 17:45  127 27 118/85 (96) 95   


 


1/16/21 17:30  127 26 110/78 (89) 95   


 


1/16/21 17:15  126 28 114/77 (89) 95   


 


1/16/21 17:00      Mechanical Ventilator  


 


1/16/21 17:00  125 27 108/75 (86) 95   


 


1/16/21 16:15  126 28 115/78 (90) 96   


 


1/16/21 16:00 100.0 125 27 115/73 (87) 96   


 


1/16/21 16:00        70


 


1/16/21 16:00      Mechanical Ventilator  


 


1/16/21 16:00      Mechanical Ventilator  


 


1/16/21 16:00  126      


 


1/16/21 15:45  125 27 115/74 (88) 96   


 


1/16/21 15:30  126 27 109/72 (84) 96   


 


1/16/21 15:15  124 26     70


 


1/16/21 15:15  125 28 114/79 (91) 96   


 


1/16/21 15:00  125 27 110/73 (85) 96   


 


1/16/21 15:00      Mechanical Ventilator  


 


1/16/21 14:30  123 27 112/68 (83) 96   


 


1/16/21 14:15  121 24 106/68 (81) 96   


 


1/16/21 14:00  122 26 102/70 (81) 96   


 


1/16/21 14:00      Mechanical Ventilator  


 


1/16/21 13:45  122 26 103/70 (81) 96   


 


1/16/21 13:30  123 27 101/71 (81) 96   


 


1/16/21 13:15  120 26 106/69 (81) 96   


 


1/16/21 13:00      Mechanical Ventilator  


 


1/16/21 13:00  121 26 111/71 (84) 96   


 


1/16/21 12:45  117 24 110/79 (89) 96   


 


1/16/21 12:30  118 25 108/74 (85) 96   


 


1/16/21 12:29 99.0       


 


1/16/21 12:15  118 25 114/71 (85) 95   


 


1/16/21 12:00  114      


 


1/16/21 12:00      Mechanical Ventilator  


 


1/16/21 12:00        70


 


1/16/21 12:00      Mechanical Ventilator  


 


1/16/21 12:00 99.9 117 23 97/70 (79) 95   


 


1/16/21 11:00  113 23 95/64 (74) 94   


 


1/16/21 11:00      Mechanical Ventilator  


 


1/16/21 10:55  116 24     70


 


1/16/21 10:45  111 20 91/67 (75) 96   


 


1/16/21 10:30  109 22 102/64 (77) 95   


 


1/16/21 10:15  109 22 109/74 (86) 96   


 


1/16/21 10:00  109 22 112/68 (83) 95   


 


1/16/21 10:00      Mechanical Ventilator  


 


1/16/21 09:00 100.7 104 19 115/76 (89)    


 


1/16/21 09:00      Mechanical Ventilator  


 


1/16/21 08:00  108 24 101/70 (80)    


 


1/16/21 08:00      Mechanical Ventilator  


 


1/16/21 08:00        70


 


1/16/21 08:00      Mechanical Ventilator  


 


1/16/21 08:00  107      

















Intake and Output  


 


 1/17/21 1/18/21





 19:00 07:00


 


Intake Total 1496.5 ml 1381.75 ml


 


Output Total 670 ml 1040 ml


 


Balance 826.5 ml 341.75 ml


 


  


 


Free Water 60 ml 30 ml


 


IV Total 756.5 ml 746.75 ml


 


Tube Feeding 660 ml 605 ml


 


Other 20 ml 


 


Output Urine Total 670 ml 1040 ml


 


# Bowel Movements  2











Labs








Test


 1/15/21


07:58 1/15/21


13:00 1/16/21


03:15 1/16/21


08:12


 


Arterial Blood pH


 7.333


(7.350-7.450) 


 


 7.471


(7.350-7.450)


 


Arterial Blood Partial


Pressure CO2 63.9 mmHg


(35.0-45.0) 


 


 48.5 mmHg


(35.0-45.0)


 


Arterial Blood Partial


Pressure O2 51.3 mmHg


(75.0-100.0) 


 


 56.9 mmHg


(75.0-100.0)


 


Arterial Blood HCO3


 33.2 mmol/L


(22.0-26.0) 


 


 34.6 mmol/L


(22.0-26.0)


 


Arterial Blood Oxygen


Saturation 82.9 %


() 


 


 91.1 %


()


 


Arterial Blood Base Excess 4.9 (-2-2)    9.4 (-2-2) 


 


Abelardo Test Positive    Positive 


 


Urine Color  Yellow   


 


Urine Appearance  Clear   


 


Urine pH  5 (4.5-8.0)   


 


Urine Specific Gravity


 


 1.025


(1.005-1.035) 


 





 


Urine Protein


 


 Negative


(NEGATIVE) 


 





 


Urine Glucose (UA)


 


 Negative


(NEGATIVE) 


 





 


Urine Ketones


 


 Negative


(NEGATIVE) 


 





 


Urine Blood


 


 Negative


(NEGATIVE) 


 





 


Urine Nitrite


 


 Negative


(NEGATIVE) 


 





 


Urine Bilirubin


 


 Negative


(NEGATIVE) 


 





 


Urine Urobilinogen


 


 Normal MG/DL


(0.0-1.0) 


 





 


Urine Leukocyte Esterase


 


 Negative


(NEGATIVE) 


 





 


White Blood Count


 


 


 13.9 K/UL


(4.8-10.8) 





 


Red Blood Count


 


 


 4.83 M/UL


(4.70-6.10) 





 


Hemoglobin


 


 


 13.8 G/DL


(14.2-18.0) 





 


Hematocrit


 


 


 44.7 %


(42.0-52.0) 





 


Mean Corpuscular Volume   92 FL (80-99)  


 


Mean Corpuscular Hemoglobin


 


 


 28.5 PG


(27.0-31.0) 





 


Mean Corpuscular Hemoglobin


Concent 


 


 30.8 G/DL


(32.0-36.0) 





 


Red Cell Distribution Width


 


 


 12.7 %


(11.6-14.8) 





 


Platelet Count


 


 


 190 K/UL


(150-450) 





 


Mean Platelet Volume


 


 


 10.5 FL


(6.5-10.1) 





 


Neutrophils (%) (Auto)    % (45.0-75.0)  


 


Lymphocytes (%) (Auto)    % (20.0-45.0)  


 


Monocytes (%) (Auto)    % (1.0-10.0)  


 


Eosinophils (%) (Auto)    % (0.0-3.0)  


 


Basophils (%) (Auto)    % (0.0-2.0)  


 


Differential Total Cells


Counted 


 


 100 


 





 


Neutrophils % (Manual)   92 % (45-75)  


 


Lymphocytes % (Manual)   3 % (20-45)  


 


Monocytes % (Manual)   5 % (1-10)  


 


Eosinophils % (Manual)   0 % (0-3)  


 


Basophils % (Manual)   0 % (0-2)  


 


Band Neutrophils   0 % (0-8)  


 


Platelet Estimate   Adequate  


 


Platelet Morphology   Normal  


 


Red Blood Cell Morphology   Normal  


 


Sodium Level


 


 


 147 MMOL/L


(136-145) 





 


Potassium Level


 


 


 3.3 MMOL/L


(3.5-5.1) 





 


Chloride Level


 


 


 109 MMOL/L


() 





 


Carbon Dioxide Level


 


 


 35 MMOL/L


(21-32) 





 


Anion Gap


 


 


 3 mmol/L


(5-15) 





 


Blood Urea Nitrogen


 


 


 46 mg/dL


(7-18) 





 


Creatinine


 


 


 0.9 MG/DL


(0.55-1.30) 





 


Estimat Glomerular Filtration


Rate 


 


 > 60 mL/min


(>60) 





 


Glucose Level


 


 


 257 MG/DL


() 





 


Calcium Level


 


 


 8.5 MG/DL


(8.5-10.1) 





 


Test


 1/16/21


10:00 1/17/21


06:57 1/17/21


07:59 1/17/21


12:00


 


Vancomycin Level Trough


 12.5 ug/mL


(5.0-12.0) 


 


 





 


White Blood Count


 


 19.1 K/UL


(4.8-10.8) 


 





 


Red Blood Count


 


 4.86 M/UL


(4.70-6.10) 


 





 


Hemoglobin


 


 13.8 G/DL


(14.2-18.0) 


 





 


Hematocrit


 


 44.8 %


(42.0-52.0) 


 





 


Mean Corpuscular Volume  92 FL (80-99)   


 


Mean Corpuscular Hemoglobin


 


 28.4 PG


(27.0-31.0) 


 





 


Mean Corpuscular Hemoglobin


Concent 


 30.8 G/DL


(32.0-36.0) 


 





 


Red Cell Distribution Width


 


 12.9 %


(11.6-14.8) 


 





 


Platelet Count


 


 193 K/UL


(150-450) 


 





 


Mean Platelet Volume


 


 9.9 FL


(6.5-10.1) 


 





 


Neutrophils (%) (Auto)   % (45.0-75.0)   


 


Lymphocytes (%) (Auto)   % (20.0-45.0)   


 


Monocytes (%) (Auto)   % (1.0-10.0)   


 


Eosinophils (%) (Auto)   % (0.0-3.0)   


 


Basophils (%) (Auto)   % (0.0-2.0)   


 


Differential Total Cells


Counted 


 100 


 


 





 


Neutrophils % (Manual)  93 % (45-75)   


 


Lymphocytes % (Manual)  4 % (20-45)   


 


Monocytes % (Manual)  2 % (1-10)   


 


Eosinophils % (Manual)  1 % (0-3)   


 


Basophils % (Manual)  0 % (0-2)   


 


Band Neutrophils  0 % (0-8)   


 


Platelet Estimate  Adequate   


 


Platelet Morphology  Normal   


 


Red Blood Cell Morphology     


 


Hypochromasia  1+   


 


Sodium Level


 


 151 MMOL/L


(136-145) 


 





 


Potassium Level


 


 3.5 MMOL/L


(3.5-5.1) 


 





 


Chloride Level


 


 113 MMOL/L


() 


 





 


Carbon Dioxide Level


 


 32 MMOL/L


(21-32) 


 





 


Anion Gap


 


 6 mmol/L


(5-15) 


 





 


Blood Urea Nitrogen


 


 42 mg/dL


(7-18) 


 





 


Creatinine


 


 1.0 MG/DL


(0.55-1.30) 


 





 


Estimat Glomerular Filtration


Rate 


 > 60 mL/min


(>60) 


 





 


Glucose Level


 


 150 MG/DL


() 


 





 


Calcium Level


 


 9.0 MG/DL


(8.5-10.1) 


 





 


Total Bilirubin


 


 0.4 MG/DL


(0.2-1.0) 


 





 


Aspartate Amino Transf


(AST/SGOT) 


 24 U/L (15-37) 


 


 





 


Alanine Aminotransferase


(ALT/SGPT) 


 16 U/L (12-78) 


 


 





 


Alkaline Phosphatase


 


 85 U/L


() 


 





 


Total Protein


 


 6.2 G/DL


(6.4-8.2) 


 





 


Albumin


 


 1.5 G/DL


(3.4-5.0) 


 





 


Globulin  4.7 g/dL   


 


Albumin/Globulin Ratio  0.3 (1.0-2.7)   


 


Arterial Blood pH


 


 


 7.456


(7.350-7.450) 





 


Arterial Blood Partial


Pressure CO2 


 


 48.8 mmHg


(35.0-45.0) 





 


Arterial Blood Partial


Pressure O2 


 


 57.3 mmHg


(75.0-100.0) 





 


Arterial Blood HCO3


 


 


 33.6 mmol/L


(22.0-26.0) 





 


Arterial Blood Oxygen


Saturation 


 


 90.5 %


() 





 


Arterial Blood Base Excess   8.3 (-2-2)  


 


Abelardo Test   Positive  


 


POC Whole Blood Glucose


 


 


 


 226 MG/DL


()


 


Test


 1/18/21


04:55 


 


 





 


White Blood Count


 15.5 K/UL


(4.8-10.8) 


 


 





 


Red Blood Count


 4.36 M/UL


(4.70-6.10) 


 


 





 


Hemoglobin


 12.7 G/DL


(14.2-18.0) 


 


 





 


Hematocrit


 41.2 %


(42.0-52.0) 


 


 





 


Mean Corpuscular Volume 94 FL (80-99)    


 


Mean Corpuscular Hemoglobin


 29.0 PG


(27.0-31.0) 


 


 





 


Mean Corpuscular Hemoglobin


Concent 30.8 G/DL


(32.0-36.0) 


 


 





 


Red Cell Distribution Width


 13.2 %


(11.6-14.8) 


 


 





 


Platelet Count


 172 K/UL


(150-450) 


 


 





 


Mean Platelet Volume


 10.3 FL


(6.5-10.1) 


 


 





 


Neutrophils (%) (Auto)  % (45.0-75.0)    


 


Lymphocytes (%) (Auto)  % (20.0-45.0)    


 


Monocytes (%) (Auto)  % (1.0-10.0)    


 


Eosinophils (%) (Auto)  % (0.0-3.0)    


 


Basophils (%) (Auto)  % (0.0-2.0)    


 


Sodium Level


 151 MMOL/L


(136-145) 


 


 





 


Potassium Level


 3.7 MMOL/L


(3.5-5.1) 


 


 





 


Chloride Level


 114 MMOL/L


() 


 


 





 


Carbon Dioxide Level


 35 MMOL/L


(21-32) 


 


 





 


Anion Gap


 2 mmol/L


(5-15) 


 


 





 


Blood Urea Nitrogen


 38 mg/dL


(7-18) 


 


 





 


Creatinine


 0.8 MG/DL


(0.55-1.30) 


 


 





 


Estimat Glomerular Filtration


Rate > 60 mL/min


(>60) 


 


 





 


Glucose Level


 189 MG/DL


() 


 


 





 


Calcium Level


 8.5 MG/DL


(8.5-10.1) 


 


 











Height (Feet):  5


Height (Inches):  7.00


Weight (Pounds):  198


Objective


General Appearance:  lethargic, moderate distress


Neck:  supple


Cardiovascular:  regular rhythm


Respiratory/Chest:  crackles/rales, rhonchi - bilaterally vent++


Abdomen:  non tender, soft


Extremities:  non-tender











Emmett Cook MD          Jan 18, 2021 07:09

## 2021-01-18 NOTE — NUR
NURSE NOTES:

Report received from RN. Patient is AO X 0, obtunded. With NGT at left nares, running Vital 
AF @ 55 cc/hour. Cardiac monitor is in place, shows Afib with HR of 150's. Patient is 
intubated, ET size of 7.5, 23cm on the lip, AC mode, , PEEP of 10 and FI02 of 100%. 
Hypoactive gag reflexes noted. With Stern catheter in place, drained via gravity, with dark 
myrna color output. Accucheck check q6. With triple lumen femoral catheter running Versed 
2mg/hr, Cardizem @ 10mg and D5W @ 50cc/hour. On covid isolation, precaution will 
continuously maintained and observed. Safety measures are in place, bed in lowest and locked 
position, side rails up x 2, will continue plan of care.

## 2021-01-18 NOTE — NUR
NURSE NOTES:WOUND CARE NOTES:Pt deconditioned and presented with an Unstageable Sacral 
Pressure Injury (L)9cm x (W)11cm.Base of wound has 80% soft necrotic cap,25% beefy red with 
maceration along portion of wound that is prisca. Borders of wound are irregular. 
Non-Blanchable erythema without induration periwound. No odor noted.

DTPI L scapula(L)13.1cm x (W)1.7cm. Base of wound is wound is Maroon and indurated. No 
additional skin breakdown noted to surrounding areas of Pressure Injury.

Scrotum is erythematous. 

R Heel is Boggy with on-Blanchable erythema(L)6cm x (W)5cm.

L Heel is boggy with Non-Blanchable erythema(L)6.5cm x (W)4cm.



Tx.Plan:Apply Cavilon Skin Barrier to L Scapula. Cover with Optifoam drsg. Change every 7 
days and prn.

           Cleanse Sacral Pressure Injury with Saline. Apply Therahoney. Apply Moisture 
Barrier Paste periwound. Cover with

           Optifoam drsg. Change Daily and prn.

           Apply Moisture Barrier Paste to Scrotum Twice Daily and prn.

           Apply Cavilon Skin Barrier to Both heels. Cover each Heel with Optifoam drsg. 
Change every 7 days and prn.

           Reposition at least every 2hours or as tolerated.

           Off-load heels with Pillow.

           APM/BRIAN Mattress overlay.

## 2021-01-18 NOTE — NUR
NURSE NOTES:

left message to Dr. Travis regarding A.fib RVR, -146. /68 O2sat is now at 95%. 
will wait for call back.

## 2021-01-18 NOTE — NUR
NURSE NOTES:

left voice mail to Dr. George regarding pt's new A.fib RVR . Hr 125-140s and O2sat is at 
96%.  /65. will wait for call back.

## 2021-01-18 NOTE — GENERAL PROGRESS NOTE
Subjective


Allergies:  


Coded Allergies:  


     No Known Allergies (Unverified , 6/11/19)


Subjective


on ventilator 60% fio2


on sediation


unresponsive


in icu


rt chest tube s placed due to pneumothorex


rec afib with rvr





Objective





Last 24 Hour Vital Signs








  Date Time  Temp Pulse Resp B/P (MAP) Pulse Ox O2 Delivery O2 Flow Rate FiO2


 


1/18/21 15:00  136 26 107/59 (75) 96   


 


1/18/21 14:00  141 26 114/70 (85) 95   


 


1/18/21 14:00   27   Mechanical Ventilator  100


 


1/18/21 13:23  144 27     90


 


1/18/21 13:00  151 27 111/71 (84) 95   


 


1/18/21 13:00  134      


 


1/18/21 13:00   29   Mechanical Ventilator  90


 


1/18/21 12:00        90


 


1/18/21 12:00  125      


 


1/18/21 12:00      Mechanical Ventilator  


 


1/18/21 12:00   27     100


 


1/18/21 12:00  158 28 105/70 (82) 95   


 


1/18/21 11:57      Mechanical Ventilator  100


 


1/18/21 11:00  145 24 108/63 (78) 96   


 


1/18/21 10:18   23   Mechanical Ventilator 100.0 


 


1/18/21 10:00  141 24 104/67 (79) 94   


 


1/18/21 09:00 97.8 141 23 102/62 (75) 95   


 


1/18/21 09:00        100


 


1/18/21 08:00  138 22 101/61 (74) 95   


 


1/18/21 08:00      Mechanical Ventilator  


 


1/18/21 08:00        90


 


1/18/21 08:00   24   Mechanical Ventilator 100.0 


 


1/18/21 08:00 99.8       


 


1/18/21 08:00  138      


 


1/18/21 07:47  131 20     90


 


1/18/21 07:32   20   Mechanical Ventilator  90


 


1/18/21 07:21   20   Mechanical Ventilator  90


 


1/18/21 07:00  130 19 109/74 (86) 97   


 


1/18/21 06:55  129      


 


1/18/21 06:21   19   Mechanical Ventilator  45


 


1/18/21 06:00  131 18 112/69 (83) 96   


 


1/18/21 05:21   16   Mechanical Ventilator  90


 


1/18/21 05:00  121 15 104/68 (80) 96   


 


1/18/21 04:45  123 16 107/77 (87) 97   


 


1/18/21 04:30  96 15 103/76 (85) 97   


 


1/18/21 04:21   15   Mechanical Ventilator  90


 


1/18/21 04:15  96 15 113/73 (86) 97   


 


1/18/21 04:00      Mechanical Ventilator  


 


1/18/21 04:00        90


 


1/18/21 04:00 98.8 97 15 109/67 (81) 97   


 


1/18/21 03:21   16   Mechanical Ventilator  90


 


1/18/21 03:07  98      


 


1/18/21 03:00  98 15 109/75 (86) 98   


 


1/18/21 02:50  98 20     90


 


1/18/21 02:21   16   Mechanical Ventilator  90


 


1/18/21 02:00  100 16 100/71 (81) 98   


 


1/18/21 01:21   16   Mechanical Ventilator  90


 


1/18/21 01:00 99.8 103 18 105/71 (82) 98   


 


1/18/21 00:21   21   Mechanical Ventilator  90


 


1/18/21 00:00 100.0 110 20 105/80 (88) 97   


 


1/18/21 00:00        90


 


1/18/21 00:00      Mechanical Ventilator  


 


1/17/21 23:42 99.8       


 


1/17/21 23:21   19   Mechanical Ventilator  90


 


1/17/21 23:06  111 19     90


 


1/17/21 23:06  111      


 


1/17/21 23:00  112 20 106/74 (85) 97   


 


1/17/21 22:21   20   Mechanical Ventilator  90


 


1/17/21 22:00  109 21 116/82 (93) 98   


 


1/17/21 21:21   21   Mechanical Ventilator  90


 


1/17/21 21:00  112 21 114/78 (90) 98   


 


1/17/21 20:45  111 21 112/79 (90) 97   


 


1/17/21 20:30  112 22 115/79 (91) 97   


 


1/17/21 20:21   22   Mechanical Ventilator  90


 


1/17/21 20:15  111 20 113/79 (90) 98   


 


1/17/21 20:06   21   Mechanical Ventilator  90


 


1/17/21 20:00 98.9 111 20 109/75 (86) 97   


 


1/17/21 20:00        90


 


1/17/21 20:00      Mechanical Ventilator  


 


1/17/21 19:51   21   Mechanical Ventilator  90


 


1/17/21 19:45  110 20 96/79 (85) 97   


 


1/17/21 19:30  110 21 106/82 (90) 97   


 


1/17/21 19:15  110 22 103/73 (83) 97   


 


1/17/21 19:11  110      


 


1/17/21 19:00  109 21 105/77 (86) 97   


 


1/17/21 18:58  110 22     90


 


1/17/21 18:51   22   Mechanical Ventilator  


 


1/17/21 18:30  108 20 108/71 (83) 97   


 


1/17/21 18:15  106 20 100/68 (79) 97   


 


1/17/21 18:00 100.0 105 19 102/70 (81) 97   


 


1/17/21 17:51   20   Mechanical Ventilator  


 


1/17/21 17:45  106 18 114/75 (88) 97   


 


1/17/21 17:32 100.5       


 


1/17/21 17:30  103 20 107/68 (81) 98   


 


1/17/21 17:15  102 19 106/70 (82) 98   


 


1/17/21 17:00 100.5 102 20 100/72 (81) 97   


 


1/17/21 16:51   19   Mechanical Ventilator  


 


1/17/21 16:30  103 21 106/67 (80) 98   


 


1/17/21 16:15  104 21 103/67 (79) 97   


 


1/17/21 16:00        90


 


1/17/21 16:00      Mechanical Ventilator  


 


1/17/21 16:00  103      


 


1/17/21 16:00 101.0 103 22 104/67 (79) 98   


 


1/17/21 15:51   23   Mechanical Ventilator  

















Intake and Output  


 


 1/17/21 1/18/21





 19:00 07:00


 


Intake Total 1496.5 ml 1489.25 ml


 


Output Total 670 ml 1140 ml


 


Balance 826.5 ml 349.25 ml


 


  


 


Free Water 60 ml 30 ml


 


IV Total 756.5 ml 799.25 ml


 


Tube Feeding 660 ml 660 ml


 


Other 20 ml 


 


Output Urine Total 670 ml 1140 ml


 


# Bowel Movements  2








Laboratory Tests


1/18/21 04:55: 


White Blood Count 15.5H, Red Blood Count 4.36L, Hemoglobin 12.7L, Hematocrit 

41.2L, Mean Corpuscular Volume 94, Mean Corpuscular Hemoglobin 29.0, Mean 

Corpuscular Hemoglobin Concent 30.8L, Red Cell Distribution Width 13.2, Platelet

Count 172, Mean Platelet Volume 10.3H, Neutrophils (%) (Auto) , Lymphocytes (%) 

(Auto) , Monocytes (%) (Auto) , Eosinophils (%) (Auto) , Basophils (%) (Auto) , 

Differential Total Cells Counted 100, Neutrophils % (Manual) 85H, Lymphocytes % 

(Manual) 7L, Monocytes % (Manual) 4, Eosinophils % (Manual) 4H, Basophils % 

(Manual) 0, Band Neutrophils 0, Platelet Estimate Adequate, Platelet Morphology 

Normal, Hypochromasia 1+, Sodium Level 151H, Potassium Level 3.7, Chloride Level

114H, Carbon Dioxide Level 35H, Anion Gap 2L, Blood Urea Nitrogen 38H, 

Creatinine 0.8, Estimat Glomerular Filtration Rate > 60, Glucose Level 189H, 

Calcium Level 8.5


1/18/21 07:48: 


Arterial Blood pH 7.416, Arterial Blood Partial Pressure CO2 53.3H, Arterial 

Blood Partial Pressure O2 66.9L, Arterial Blood HCO3 33.5H, Arterial Blood 

Oxygen Saturation 93.5L, Arterial Blood Base Excess 7.4H, Abelardo Test Positive


1/18/21 11:00: Vancomycin Level Trough 13.8H


Height (Feet):  5


Height (Inches):  7.00


Weight (Pounds):  198


General Appearance:  lethargic


Neck:  supple


Cardiovascular:  arrhythmia


Respiratory/Chest:  crackles/rales


Abdomen:  non tender, soft, no organomegaly


Extremities:  non-tender





Assessment/Plan


Assessment/Plan:


afib with rvr on digoxine , add amiodarone , cardiology on case


covid pna


ac resp distress on ventilator


etoh abused


dehydration


agitated -halodol


fever susided


cont on ventilator at icu


cont iv abx and iv decadrone


pulmonary and gi on consult


dw charge nurse


poor prognosis 


wd with son











Moi Travis MD             Jan 18, 2021 15:50

## 2021-01-18 NOTE — CARDIAC ELECTROPHYSIOLOGY PN
Subjective


Subjective


[id number]





Objective





Last 24 Hour Vital Signs








  Date Time  Temp Pulse Resp B/P (MAP) Pulse Ox O2 Delivery O2 Flow Rate FiO2


 


1/18/21 12:00        90


 


1/18/21 12:00  125      


 


1/18/21 12:00      Mechanical Ventilator  


 


1/18/21 12:00   27     100


 


1/18/21 12:00  158 28 105/70 (82) 95   


 


1/18/21 11:57      Mechanical Ventilator  100


 


1/18/21 11:00  145 24 108/63 (78) 96   


 


1/18/21 10:18   23   Mechanical Ventilator 100.0 


 


1/18/21 10:00  141 24 104/67 (79) 94   


 


1/18/21 09:00 97.8 141 23 102/62 (75) 95   


 


1/18/21 09:00        100


 


1/18/21 08:00  138 22 101/61 (74) 95   


 


1/18/21 08:00      Mechanical Ventilator  


 


1/18/21 08:00        90


 


1/18/21 08:00   24   Mechanical Ventilator 100.0 


 


1/18/21 08:00 99.8       


 


1/18/21 08:00  138      


 


1/18/21 07:47  131 20     90


 


1/18/21 07:32   20   Mechanical Ventilator  90


 


1/18/21 07:21   20   Mechanical Ventilator  90


 


1/18/21 07:00  130 19 109/74 (86) 97   


 


1/18/21 06:55  129      


 


1/18/21 06:21   19   Mechanical Ventilator  45


 


1/18/21 06:00  131 18 112/69 (83) 96   


 


1/18/21 05:21   16   Mechanical Ventilator  90


 


1/18/21 05:00  121 15 104/68 (80) 96   


 


1/18/21 04:45  123 16 107/77 (87) 97   


 


1/18/21 04:30  96 15 103/76 (85) 97   


 


1/18/21 04:21   15   Mechanical Ventilator  90


 


1/18/21 04:15  96 15 113/73 (86) 97   


 


1/18/21 04:00      Mechanical Ventilator  


 


1/18/21 04:00        90


 


1/18/21 04:00 98.8 97 15 109/67 (81) 97   


 


1/18/21 03:21   16   Mechanical Ventilator  90


 


1/18/21 03:07  98      


 


1/18/21 03:00  98 15 109/75 (86) 98   


 


1/18/21 02:50  98 20     90


 


1/18/21 02:21   16   Mechanical Ventilator  90


 


1/18/21 02:00  100 16 100/71 (81) 98   


 


1/18/21 01:21   16   Mechanical Ventilator  90


 


1/18/21 01:00 99.8 103 18 105/71 (82) 98   


 


1/18/21 00:21   21   Mechanical Ventilator  90


 


1/18/21 00:00 100.0 110 20 105/80 (88) 97   


 


1/18/21 00:00        90


 


1/18/21 00:00      Mechanical Ventilator  


 


1/17/21 23:42 99.8       


 


1/17/21 23:21   19   Mechanical Ventilator  90


 


1/17/21 23:06  111 19     90


 


1/17/21 23:06  111      


 


1/17/21 23:00  112 20 106/74 (85) 97   


 


1/17/21 22:21   20   Mechanical Ventilator  90


 


1/17/21 22:00  109 21 116/82 (93) 98   


 


1/17/21 21:21   21   Mechanical Ventilator  90


 


1/17/21 21:00  112 21 114/78 (90) 98   


 


1/17/21 20:45  111 21 112/79 (90) 97   


 


1/17/21 20:30  112 22 115/79 (91) 97   


 


1/17/21 20:21   22   Mechanical Ventilator  90


 


1/17/21 20:15  111 20 113/79 (90) 98   


 


1/17/21 20:06   21   Mechanical Ventilator  90


 


1/17/21 20:00 98.9 111 20 109/75 (86) 97   


 


1/17/21 20:00        90


 


1/17/21 20:00      Mechanical Ventilator  


 


1/17/21 19:51   21   Mechanical Ventilator  90


 


1/17/21 19:45  110 20 96/79 (85) 97   


 


1/17/21 19:30  110 21 106/82 (90) 97   


 


1/17/21 19:15  110 22 103/73 (83) 97   


 


1/17/21 19:11  110      


 


1/17/21 19:00  109 21 105/77 (86) 97   


 


1/17/21 18:58  110 22     90


 


1/17/21 18:51   22   Mechanical Ventilator  


 


1/17/21 18:30  108 20 108/71 (83) 97   


 


1/17/21 18:15  106 20 100/68 (79) 97   


 


1/17/21 18:00 100.0 105 19 102/70 (81) 97   


 


1/17/21 17:51   20   Mechanical Ventilator  


 


1/17/21 17:45  106 18 114/75 (88) 97   


 


1/17/21 17:32 100.5       


 


1/17/21 17:30  103 20 107/68 (81) 98   


 


1/17/21 17:15  102 19 106/70 (82) 98   


 


1/17/21 17:00 100.5 102 20 100/72 (81) 97   


 


1/17/21 16:51   19   Mechanical Ventilator  


 


1/17/21 16:30  103 21 106/67 (80) 98   


 


1/17/21 16:15  104 21 103/67 (79) 97   


 


1/17/21 16:00        90


 


1/17/21 16:00      Mechanical Ventilator  


 


1/17/21 16:00  103      


 


1/17/21 16:00 101.0 103 22 104/67 (79) 98   


 


1/17/21 15:51   23   Mechanical Ventilator  


 


1/17/21 15:45  104 23 97/66 (76) 97   


 


1/17/21 15:30  104 24 96/70 (79) 97   


 


1/17/21 15:27  103 22     90


 


1/17/21 15:15  104 23 99/67 (78) 97   


 


1/17/21 15:00  105 24 100/67 (78) 97   


 


1/17/21 14:51   23   Mechanical Ventilator  


 


1/17/21 14:45  106 25 97/64 (75) 96   


 


1/17/21 14:30  106 24 97/65 (76) 96   

















Intake and Output  


 


 1/17/21 1/18/21





 19:00 07:00


 


Intake Total 1496.5 ml 1489.25 ml


 


Output Total 670 ml 1140 ml


 


Balance 826.5 ml 349.25 ml


 


  


 


Free Water 60 ml 30 ml


 


IV Total 756.5 ml 799.25 ml


 


Tube Feeding 660 ml 660 ml


 


Other 20 ml 


 


Output Urine Total 670 ml 1140 ml


 


# Bowel Movements  2











Laboratory Tests








Test


 1/18/21


04:55 1/18/21


07:48 1/18/21


11:00


 


White Blood Count


 15.5 K/UL


(4.8-10.8)  H 


 





 


Red Blood Count


 4.36 M/UL


(4.70-6.10)  L 


 





 


Hemoglobin


 12.7 G/DL


(14.2-18.0)  L 


 





 


Hematocrit


 41.2 %


(42.0-52.0)  L 


 





 


Mean Corpuscular Volume 94 FL (80-99)    


 


Mean Corpuscular Hemoglobin


 29.0 PG


(27.0-31.0) 


 





 


Mean Corpuscular Hemoglobin


Concent 30.8 G/DL


(32.0-36.0)  L 


 





 


Red Cell Distribution Width


 13.2 %


(11.6-14.8) 


 





 


Platelet Count


 172 K/UL


(150-450) 


 





 


Mean Platelet Volume


 10.3 FL


(6.5-10.1)  H 


 





 


Neutrophils (%) (Auto)


 % (45.0-75.0)


 


 





 


Lymphocytes (%) (Auto)


 % (20.0-45.0)


 


 





 


Monocytes (%) (Auto)  % (1.0-10.0)    


 


Eosinophils (%) (Auto)  % (0.0-3.0)    


 


Basophils (%) (Auto)  % (0.0-2.0)    


 


Differential Total Cells


Counted 100  


 


 





 


Neutrophils % (Manual) 85 % (45-75)  H  


 


Lymphocytes % (Manual) 7 % (20-45)  L  


 


Monocytes % (Manual) 4 % (1-10)    


 


Eosinophils % (Manual) 4 % (0-3)  H  


 


Basophils % (Manual) 0 % (0-2)    


 


Band Neutrophils 0 % (0-8)    


 


Platelet Estimate Adequate    


 


Platelet Morphology Normal    


 


Hypochromasia 1+    


 


Sodium Level


 151 MMOL/L


(136-145)  H 


 





 


Potassium Level


 3.7 MMOL/L


(3.5-5.1) 


 





 


Chloride Level


 114 MMOL/L


()  H 


 





 


Carbon Dioxide Level


 35 MMOL/L


(21-32)  H 


 





 


Anion Gap


 2 mmol/L


(5-15)  L 


 





 


Blood Urea Nitrogen


 38 mg/dL


(7-18)  H 


 





 


Creatinine


 0.8 MG/DL


(0.55-1.30) 


 





 


Estimat Glomerular Filtration


Rate > 60 mL/min


(>60) 


 





 


Glucose Level


 189 MG/DL


()  H 


 





 


Calcium Level


 8.5 MG/DL


(8.5-10.1) 


 





 


Arterial Blood pH


 


 7.416


(7.350-7.450) 





 


Arterial Blood Partial


Pressure CO2 


 53.3 mmHg


(35.0-45.0)  H 





 


Arterial Blood Partial


Pressure O2 


 66.9 mmHg


(75.0-100.0)  L 





 


Arterial Blood HCO3


 


 33.5 mmol/L


(22.0-26.0)  H 





 


Arterial Blood Oxygen


Saturation 


 93.5 %


()  L 





 


Arterial Blood Base Excess  7.4 (-2-2)  H 


 


Abelardo Test  Positive   


 


Vancomycin Level Trough


 


 


 13.8 ug/mL


(5.0-12.0)  H

















Jake George MD               Jan 18, 2021 14:20

## 2021-01-18 NOTE — CONSULTATION
DATE OF CONSULTATION:  01/18/2021

CARDIOLOGY CONSULTATION



CONSULTING PHYSICIAN:  Jake George MD.



REFERRING PHYSICIAN:  Moi Travis MD.



REASON FOR CONSULTATION:  Atrial fibrillation with rapid ventricular

response.



HISTORY OF PRESENT ILLNESS:  The patient is a 65-year-old gentleman, with

no past medical history, presented to the emergency room for increasing

shortness of breath on January 2, 2021.  The patient had a complicated

hospital course and eventually was intubated.  The patient also had a

pneumothorax and underwent right-sided chest tube placement.  Yesterday,

the patient developed atrial fibrillation with rapid ventricular response

with heart rate up to 160s and a Cardiology consultation was requested for

further evaluation.  Per my order, the patient received digoxin 0.25 mg IV

once, but the heart rate is still in 140s.  Per my evaluation, the patient

is an unresponsive on the ventilator intensive care unit, is unable to

provide any information.



REVIEW OF SYSTEMS:  Cannot be obtained.



PAST MEDICAL HISTORY:  As mentioned above.



FAMILY HISTORY:  Noncontributory.



SOCIAL HISTORY:  He does not have history of drug use or alcohol.



PHYSICAL EXAMINATION:

VITAL SIGNS:  Blood pressure 104/70, pulse was up to 160, respirations

18.

HEAD AND NECK:  Orally intubated.

LUNGS:  Have decreased breath sounds with the chest tube on the right side

and subcutaneous emphysema.

CARDIOVASCULAR:  Shows irregularly irregular and tachycardic.  S1, S2 with

no gallop.

ABDOMEN:  Soft.

EXTREMITIES:  A 1+ pitting edema.



LABORATORY AND DIAGNOSTIC DATA:  His labs show white count of 15.5,

hemoglobin 12.7, hematocrit of 41.2, and platelet count is 172,000.

Sodium _____, potassium 3.7, BUN of 30, creatinine 0.8, and glucose of

189.  Vancomycin 13.8.



ASSESSMENT AND PLAN:

1. Atrial fibrillation with rapid ventricular response.  Blood pressure

is borderline.  The patient received already 0.25 mg IV digoxin.  I have

given him extra 0.25 mg IV digoxin.  I will start the patient on digoxin

0.25 mg p.o. daily.  We will check a digoxin level also.  Also start the

patient on Cardizem drip at 5 mg/hour to control the ventricular response.

As the patient becomes hypotensive, we may need to add Levophed to his

medical regimen.

2. Hypernatremia with sodium 151 with azotemia, BUN of 30, creatinine

0.8.  I will give the patient half NS of 500 mL bolus, so hopefully that

would allow us to keep the patient on Cardizem drip for better rate

control.

3. Right pneumothorax, status post chest tube with subcutaneous

emphysema.

4. COVID-19 pneumonia, 100% FiO2 and PEEP of 10, on remdesivir and

Solu-Medrol per ID.

5. Diabetes.

6. Hypernatremia and azotemia.  The patient is on D5W of 50 mL an

hour.



Thank you very much for allowing me to participate in the care of this

patient.  Please do not hesitate to contact me for any questions regarding

my evaluation.  The case was discussed with the ICU charge nurse as

well.









  ______________________________________________

  Jake George M.D.





DR:  INDIO

D:  01/18/2021 14:23

T:  01/18/2021 14:48

JOB#:  [id number]

CC:

## 2021-01-18 NOTE — PULMONOLOGY PROGRESS NOTE
Subjective


ROS Limited/Unobtainable:  Yes


Interval Events:  Noted sub cut emphsema  On Versed drip. R CT placed


Constitutional:  Reports: fever, other - [id number]


HEENT:  Repors: no symptoms


Respiratory:  Reports: shortness of breath - better


Cardiovascular:  Reports: no symptoms


Gastrointestinal/Abdominal:  Reports: no symptoms


Allergies:  


Coded Allergies:  


     No Known Allergies (Unverified , 6/11/19)


All Systems:  reviewed and negative except above





Objective





Last 24 Hour Vital Signs








  Date Time  Temp Pulse Resp B/P (MAP) Pulse Ox O2 Delivery O2 Flow Rate FiO2


 


1/18/21 07:47  131 20     90


 


1/18/21 07:32   20   Mechanical Ventilator  90


 


1/18/21 07:21   20   Mechanical Ventilator  90


 


1/18/21 07:00  130 19 109/74 (86) 97   


 


1/18/21 06:55  129      


 


1/18/21 06:21   19   Mechanical Ventilator  45


 


1/18/21 06:00  131 18 112/69 (83) 96   


 


1/18/21 05:21   16   Mechanical Ventilator  90


 


1/18/21 05:00  121 15 104/68 (80) 96   


 


1/18/21 04:45  123 16 107/77 (87) 97   


 


1/18/21 04:30  96 15 103/76 (85) 97   


 


1/18/21 04:21   15   Mechanical Ventilator  90


 


1/18/21 04:15  96 15 113/73 (86) 97   


 


1/18/21 04:00      Mechanical Ventilator  


 


1/18/21 04:00        90


 


1/18/21 04:00 98.8 97 15 109/67 (81) 97   


 


1/18/21 03:21   16   Mechanical Ventilator  90


 


1/18/21 03:07  98      


 


1/18/21 03:00  98 15 109/75 (86) 98   


 


1/18/21 02:50  98 20     90


 


1/18/21 02:21   16   Mechanical Ventilator  90


 


1/18/21 02:00  100 16 100/71 (81) 98   


 


1/18/21 01:21   16   Mechanical Ventilator  90


 


1/18/21 01:00 99.8 103 18 105/71 (82) 98   


 


1/18/21 00:21   21   Mechanical Ventilator  90


 


1/18/21 00:00 100.0 110 20 105/80 (88) 97   


 


1/18/21 00:00        90


 


1/18/21 00:00      Mechanical Ventilator  


 


1/17/21 23:42 99.8       


 


1/17/21 23:21   19   Mechanical Ventilator  90


 


1/17/21 23:06  111 19     90


 


1/17/21 23:06  111      


 


1/17/21 23:00  112 20 106/74 (85) 97   


 


1/17/21 22:21   20   Mechanical Ventilator  90


 


1/17/21 22:00  109 21 116/82 (93) 98   


 


1/17/21 21:21   21   Mechanical Ventilator  90


 


1/17/21 21:00  112 21 114/78 (90) 98   


 


1/17/21 20:45  111 21 112/79 (90) 97   


 


1/17/21 20:30  112 22 115/79 (91) 97   


 


1/17/21 20:21   22   Mechanical Ventilator  90


 


1/17/21 20:15  111 20 113/79 (90) 98   


 


1/17/21 20:06   21   Mechanical Ventilator  90


 


1/17/21 20:00 98.9 111 20 109/75 (86) 97   


 


1/17/21 20:00        90


 


1/17/21 20:00      Mechanical Ventilator  


 


1/17/21 19:51   21   Mechanical Ventilator  90


 


1/17/21 19:45  110 20 96/79 (85) 97   


 


1/17/21 19:30  110 21 106/82 (90) 97   


 


1/17/21 19:15  110 22 103/73 (83) 97   


 


1/17/21 19:11  110      


 


1/17/21 19:00  109 21 105/77 (86) 97   


 


1/17/21 18:58  110 22     90


 


1/17/21 18:51   22   Mechanical Ventilator  


 


1/17/21 18:30  108 20 108/71 (83) 97   


 


1/17/21 18:15  106 20 100/68 (79) 97   


 


1/17/21 18:00 100.0 105 19 102/70 (81) 97   


 


1/17/21 17:51   20   Mechanical Ventilator  


 


1/17/21 17:45  106 18 114/75 (88) 97   


 


1/17/21 17:32 100.5       


 


1/17/21 17:30  103 20 107/68 (81) 98   


 


1/17/21 17:15  102 19 106/70 (82) 98   


 


1/17/21 17:00 100.5 102 20 100/72 (81) 97   


 


1/17/21 16:51   19   Mechanical Ventilator  


 


1/17/21 16:30  103 21 106/67 (80) 98   


 


1/17/21 16:15  104 21 103/67 (79) 97   


 


1/17/21 16:00        90


 


1/17/21 16:00      Mechanical Ventilator  


 


1/17/21 16:00  103      


 


1/17/21 16:00 101.0 103 22 104/67 (79) 98   


 


1/17/21 15:51   23   Mechanical Ventilator  


 


1/17/21 15:45  104 23 97/66 (76) 97   


 


1/17/21 15:30  104 24 96/70 (79) 97   


 


1/17/21 15:27  103 22     90


 


1/17/21 15:15  104 23 99/67 (78) 97   


 


1/17/21 15:00  105 24 100/67 (78) 97   


 


1/17/21 14:51   23   Mechanical Ventilator  


 


1/17/21 14:45  106 25 97/64 (75) 96   


 


1/17/21 14:30  106 24 97/65 (76) 96   


 


1/17/21 14:15  106 24 100/68 (79) 96   


 


1/17/21 14:00  107 22 109/68 (82) 95   


 


1/17/21 13:51      Mechanical Ventilator  


 


1/17/21 13:08 101.0       


 


1/17/21 13:00  111 24 135/82 (99) 97   


 


1/17/21 12:45  111 26 129/81 (97) 97   


 


1/17/21 12:30  112 26 133/85 (101) 96   


 


1/17/21 12:15  111 23 116/80 (92) 96   


 


1/17/21 12:00  115      


 


1/17/21 12:00      Mechanical Ventilator  


 


1/17/21 12:00 102.1 115 25 119/81 (94) 95   


 


1/17/21 11:45  117 26 127/81 (96) 95   


 


1/17/21 11:38  116 25     90


 


1/17/21 11:30  117 27 134/84 (101)    


 


1/17/21 11:15  115 25 131/83 (99)    


 


1/17/21 11:00  116 27 127/84 (98)    


 


1/17/21 10:45  115 25 122/78 (93)    


 


1/17/21 10:30  115 26 126/83 (97)    


 


1/17/21 10:15  113 23 120/85 (97)    


 


1/17/21 10:00  116 27 114/80 (91)    

















Intake and Output  


 


 1/17/21 1/18/21





 19:00 07:00


 


Intake Total 1496.5 ml 1489.25 ml


 


Output Total 670 ml 1140 ml


 


Balance 826.5 ml 349.25 ml


 


  


 


Free Water 60 ml 30 ml


 


IV Total 756.5 ml 799.25 ml


 


Tube Feeding 660 ml 660 ml


 


Other 20 ml 


 


Output Urine Total 670 ml 1140 ml


 


# Bowel Movements  2








Objective


On a Versed drip


General Appearance:  no acute distress


HEENT:  atraumatic


Respiratory:  lungs clear, decreased breath sounds


Cardiovascular:  normal rate, regular rhythm


Abdomen:  soft, non tender, no organomegaly





Microbiology








 Date/Time


Source Procedure


Growth Status





 


 1/15/21 13:00


Sputum Gram Stain - Final Complete


 


 1/15/21 13:00 Sputum Culture - Final


Candida Albicans


Usual Respiratory Wendy Complete








Laboratory Tests


1/17/21 12:00: POC Whole Blood Glucose 226H


1/18/21 04:55: 


White Blood Count 15.5H, Red Blood Count 4.36L, Hemoglobin 12.7L, Hematocrit 

41.2L, Mean Corpuscular Volume 94, Mean Corpuscular Hemoglobin 29.0, Mean Bouchra

uscular Hemoglobin Concent 30.8L, Red Cell Distribution Width 13.2, Platelet 

Count 172, Mean Platelet Volume 10.3H, Neutrophils (%) (Auto) , Lymphocytes (%) 

(Auto) , Monocytes (%) (Auto) , Eosinophils (%) (Auto) , Basophils (%) (Auto) , 

Differential Total Cells Counted 100, Neutrophils % (Manual) 85H, Lymphocytes % 

(Manual) 7L, Monocytes % (Manual) 4, Eosinophils % (Manual) 4H, Basophils % 

(Manual) 0, Band Neutrophils 0, Platelet Estimate Adequate, Platelet Morphology 

Normal, Hypochromasia 1+, Sodium Level 151H, Potassium Level 3.7, Chloride Level

114H, Carbon Dioxide Level 35H, Anion Gap 2L, Blood Urea Nitrogen 38H, 

Creatinine 0.8, Estimat Glomerular Filtration Rate > 60, Glucose Level 189H, 

Calcium Level 8.5


1/18/21 07:48: 


Arterial Blood pH 7.416, Arterial Blood Partial Pressure CO2 53.3H, Arterial 

Blood Partial Pressure O2 66.9L, Arterial Blood HCO3 33.5H, Arterial Blood 

Oxygen Saturation 93.5L, Arterial Blood Base Excess 7.4H, Abelardo Test Positive





Current Medications








 Medications


  (Trade)  Dose


 Ordered  Sig/Julisa


 Route


 PRN Reason  Start Time


 Stop Time Status Last Admin


Dose Admin


 


 Acetaminophen


  (Tylenol)  650 mg  Q4H  PRN


 GT


 Temp >100.5  1/15/21 17:15


 2/14/21 17:14  1/17/21 23:12





 


 Chlorhexidine


 Gluconate


  (Vanessa-Hex 2%)  1 applic  DAILY@2000


 TOPIC


   1/6/21 20:00


 4/6/21 19:59  1/17/21 20:31





 


 Dextrose


  (Dextrose 50%)  25 ml  Q30M  PRN


 IV


 Hypoglycemia  1/9/21 13:30


 4/9/21 13:29   





 


 Dextrose


  (Dextrose 50%)  50 ml  Q30M  PRN


 IV


 Hypoglycemia  1/9/21 13:30


 4/9/21 13:29   





 


 Docusate Sodium


  (Colace)  100 mg  TIDPRN  PRN


 GT


 Constipation  1/12/21 01:15


 2/11/21 01:14  1/12/21 01:42





 


 Enoxaparin Sodium


  (Lovenox)  80 mg  EVERY 12  HOURS


 SUBQ


   1/2/21 21:00


 4/2/21 20:59  1/17/21 09:48





 


 Haloperidol


  (Haldol)  5 mg  Q6H  PRN


 ORAL


 Agitation  1/4/21 13:45


 2/18/21 13:44  1/4/21 22:56





 


 Haloperidol


 Lactate 5 mg/


 Dextrose  56 ml @ 


 224 mls/hr  Q6H  PRN


 IVPB


 Agitation  1/5/21 12:30


 2/19/21 12:29  1/5/21 13:20





 


 Insulin Aspart


  (NovoLOG)    Q6HR


 SUBQ


   1/14/21 12:00


 4/9/21 17:59  1/18/21 06:24





 


 Insulin Aspart


  (NovoLOG)  14 units  EVERY 6  HOURS


 SUBQ


   1/16/21 12:00


 4/15/21 06:59  1/18/21 06:24





 


 Insulin Detemir


  (Levemir)  20 units  Q12HR


 SUBQ


   1/14/21 09:00


 4/12/21 08:59  1/17/21 21:58





 


 Midazolam HCl  100 ml @ 2


 mls/hr  Q24H  PRN


 IV


 Agitation  1/15/21 23:00


 1/22/21 22:59  1/18/21 07:32





 


 Pantoprazole


  (Protonix)  40 mg  DAILY


 IVP


   1/14/21 09:00


 2/13/21 08:59  1/17/21 09:46





 


 Piperacillin Sod/


 Tazobactam Sod


 3.375 gm/Sodium


 Chloride  110 ml @ 


 27.5 mls/hr  EVERY 8  HOURS


 IVPB


   1/15/21 14:00


 1/20/21 13:59  1/18/21 05:57





 


 Quetiapine


 Fumarate


  (SEROqueL)  50 mg  Q12HR


 ORAL


   1/4/21 21:00


 2/18/21 20:59  1/17/21 20:47





 


 Sodium Chloride  500 ml @ 


 999 mls/hr  Q31M PRN


 IV


 For hypotension  1/15/21 18:30


 2/14/21 18:29   





 


 Sodium Chloride  1,000 ml @ 


 25 mls/hr  Q24H


 IV


   1/3/21 07:45


 2/2/21 07:44  1/17/21 10:30





 


 Vancomycin HCl  300 ml @ 


 150 mls/hr  Q12H


 IVPB


   1/16/21 11:00


 1/21/21 10:59  1/17/21 23:13





 


 Vancomycin HCl


  ([facility] pharmacy


 to dose)  1 ea  DAILY  PRN


 MISC


 Per rx protocol  1/14/21 09:15


 2/13/21 09:14   














Assessment/Plan


Assessment/Plan


1. COVID-19 pneumonia.


   - on Decadron, azithromycin, and ceftriaxone


   - Intubated now; will continue AC mode for now


          -Currently 90% FiO2. PEEP 10


             - Has R apical PTX and subcut emphysema


             - S/p R CT placement


2. Diabetes mellitus.; 


3. Elevated inflammatory markers.


4. High D-dimer. On full dose Lovenox


5. Hyper natremia; will initiate D5W


6. Hyperglycemia.


   - BG control


7. Hypoxemia., secondary to #1; improved





DVT prophylaxis   On Lovenox.


Sedated











Sung Lemos MD           Jan 18, 2021 09:54

## 2021-01-18 NOTE — NUR
NURSE NOTES:

Blood pressure re-checked was 82/53. Will continue the Levophed drip for the meantime.

## 2021-01-18 NOTE — NUR
NURSE NOTES:

Patient's blood pressure drops down to 77/52. Started Levophed drip 4mg in 250 ml, regulated 
to 4mcg/hr. Will re-check after 15 minutes. Patient still remains in Afib with RVR. Cardizem 
drip runs @ 15 mg/hour with HR of 150's-160"s at this time.

## 2021-01-18 NOTE — NUR
NURSE NOTES:

Digoxin 0.25mg IVP once for A.Fib with RVR.  strips before and after Digoxin 0.25mg attached 
in pt's chart.

## 2021-01-18 NOTE — NUR
NURSE NOTES:

cleaned pt, oral care given, oral suction given. provided new gown, new  cooling measures, 
and new pillow caseses provided. will continue to monitor pt. call light within reach.

## 2021-01-18 NOTE — SURGERY PROGRESS NOTE
Surgery Progress Note


Subjective


Procedure Performed


Right tube thoracostomy chest tube insertion


Additional Comments


chest tube functional


still with persistent ptx


air leak





Objective





Last 24 Hour Vital Signs








  Date Time  Temp Pulse Resp B/P (MAP) Pulse Ox O2 Delivery O2 Flow Rate FiO2


 


1/18/21 10:18   23   Mechanical Ventilator 100.0 


 


1/18/21 07:47  131 20     90


 


1/18/21 07:32   20   Mechanical Ventilator  90


 


1/18/21 07:21   20   Mechanical Ventilator  90


 


1/18/21 07:00  130 19 109/74 (86) 97   


 


1/18/21 06:55  129      


 


1/18/21 06:21   19   Mechanical Ventilator  45


 


1/18/21 06:00  131 18 112/69 (83) 96   


 


1/18/21 05:21   16   Mechanical Ventilator  90


 


1/18/21 05:00  121 15 104/68 (80) 96   


 


1/18/21 04:45  123 16 107/77 (87) 97   


 


1/18/21 04:30  96 15 103/76 (85) 97   


 


1/18/21 04:21   15   Mechanical Ventilator  90


 


1/18/21 04:15  96 15 113/73 (86) 97   


 


1/18/21 04:00      Mechanical Ventilator  


 


1/18/21 04:00        90


 


1/18/21 04:00 98.8 97 15 109/67 (81) 97   


 


1/18/21 03:21   16   Mechanical Ventilator  90


 


1/18/21 03:07  98      


 


1/18/21 03:00  98 15 109/75 (86) 98   


 


1/18/21 02:50  98 20     90


 


1/18/21 02:21   16   Mechanical Ventilator  90


 


1/18/21 02:00  100 16 100/71 (81) 98   


 


1/18/21 01:21   16   Mechanical Ventilator  90


 


1/18/21 01:00 99.8 103 18 105/71 (82) 98   


 


1/18/21 00:21   21   Mechanical Ventilator  90


 


1/18/21 00:00 100.0 110 20 105/80 (88) 97   


 


1/18/21 00:00        90


 


1/18/21 00:00      Mechanical Ventilator  


 


1/17/21 23:42 99.8       


 


1/17/21 23:21   19   Mechanical Ventilator  90


 


1/17/21 23:06  111 19     90


 


1/17/21 23:06  111      


 


1/17/21 23:00  112 20 106/74 (85) 97   


 


1/17/21 22:21   20   Mechanical Ventilator  90


 


1/17/21 22:00  109 21 116/82 (93) 98   


 


1/17/21 21:21   21   Mechanical Ventilator  90


 


1/17/21 21:00  112 21 114/78 (90) 98   


 


1/17/21 20:45  111 21 112/79 (90) 97   


 


1/17/21 20:30  112 22 115/79 (91) 97   


 


1/17/21 20:21   22   Mechanical Ventilator  90


 


1/17/21 20:15  111 20 113/79 (90) 98   


 


1/17/21 20:06   21   Mechanical Ventilator  90


 


1/17/21 20:00 98.9 111 20 109/75 (86) 97   


 


1/17/21 20:00        90


 


1/17/21 20:00      Mechanical Ventilator  


 


1/17/21 19:51   21   Mechanical Ventilator  90


 


1/17/21 19:45  110 20 96/79 (85) 97   


 


1/17/21 19:30  110 21 106/82 (90) 97   


 


1/17/21 19:15  110 22 103/73 (83) 97   


 


1/17/21 19:11  110      


 


1/17/21 19:00  109 21 105/77 (86) 97   


 


1/17/21 18:58  110 22     90


 


1/17/21 18:51   22   Mechanical Ventilator  


 


1/17/21 18:30  108 20 108/71 (83) 97   


 


1/17/21 18:15  106 20 100/68 (79) 97   


 


1/17/21 18:00 100.0 105 19 102/70 (81) 97   


 


1/17/21 17:51   20   Mechanical Ventilator  


 


1/17/21 17:45  106 18 114/75 (88) 97   


 


1/17/21 17:32 100.5       


 


1/17/21 17:30  103 20 107/68 (81) 98   


 


1/17/21 17:15  102 19 106/70 (82) 98   


 


1/17/21 17:00 100.5 102 20 100/72 (81) 97   


 


1/17/21 16:51   19   Mechanical Ventilator  


 


1/17/21 16:30  103 21 106/67 (80) 98   


 


1/17/21 16:15  104 21 103/67 (79) 97   


 


1/17/21 16:00        90


 


1/17/21 16:00      Mechanical Ventilator  


 


1/17/21 16:00  103      


 


1/17/21 16:00 101.0 103 22 104/67 (79) 98   


 


1/17/21 15:51   23   Mechanical Ventilator  


 


1/17/21 15:45  104 23 97/66 (76) 97   


 


1/17/21 15:30  104 24 96/70 (79) 97   


 


1/17/21 15:27  103 22     90


 


1/17/21 15:15  104 23 99/67 (78) 97   


 


1/17/21 15:00  105 24 100/67 (78) 97   


 


1/17/21 14:51   23   Mechanical Ventilator  


 


1/17/21 14:45  106 25 97/64 (75) 96   


 


1/17/21 14:30  106 24 97/65 (76) 96   


 


1/17/21 14:15  106 24 100/68 (79) 96   


 


1/17/21 14:00  107 22 109/68 (82) 95   


 


1/17/21 13:51      Mechanical Ventilator  


 


1/17/21 13:08 101.0       


 


1/17/21 13:00  111 24 135/82 (99) 97   


 


1/17/21 12:45  111 26 129/81 (97) 97   


 


1/17/21 12:30  112 26 133/85 (101) 96   


 


1/17/21 12:15  111 23 116/80 (92) 96   


 


1/17/21 12:00  115      


 


1/17/21 12:00      Mechanical Ventilator  


 


1/17/21 12:00 102.1 115 25 119/81 (94) 95   


 


1/17/21 11:45  117 26 127/81 (96) 95   


 


1/17/21 11:38  116 25     90








I&O











Intake and Output  


 


 1/17/21 1/18/21





 19:00 07:00


 


Intake Total 1496.5 ml 1489.25 ml


 


Output Total 670 ml 1140 ml


 


Balance 826.5 ml 349.25 ml


 


  


 


Free Water 60 ml 30 ml


 


IV Total 756.5 ml 799.25 ml


 


Tube Feeding 660 ml 660 ml


 


Other 20 ml 


 


Output Urine Total 670 ml 1140 ml


 


# Bowel Movements  2








Dressing:  saturated


Cardiovascular:  RSR


Respiratory:  decreased breath sounds


Abdomen:  soft, non-tender, non-distended, decreased bowel sounds


Extremities:  edema, no tenderness, no cyanosis





Laboratory Tests








Test


 1/17/21


12:00 1/18/21


04:55 1/18/21


07:48


 


POC Whole Blood Glucose


 226 MG/DL


()  H 


 





 


White Blood Count


 


 15.5 K/UL


(4.8-10.8)  H 





 


Red Blood Count


 


 4.36 M/UL


(4.70-6.10)  L 





 


Hemoglobin


 


 12.7 G/DL


(14.2-18.0)  L 





 


Hematocrit


 


 41.2 %


(42.0-52.0)  L 





 


Mean Corpuscular Volume  94 FL (80-99)   


 


Mean Corpuscular Hemoglobin


 


 29.0 PG


(27.0-31.0) 





 


Mean Corpuscular Hemoglobin


Concent 


 30.8 G/DL


(32.0-36.0)  L 





 


Red Cell Distribution Width


 


 13.2 %


(11.6-14.8) 





 


Platelet Count


 


 172 K/UL


(150-450) 





 


Mean Platelet Volume


 


 10.3 FL


(6.5-10.1)  H 





 


Neutrophils (%) (Auto)


 


 % (45.0-75.0)


 





 


Lymphocytes (%) (Auto)


 


 % (20.0-45.0)


 





 


Monocytes (%) (Auto)   % (1.0-10.0)   


 


Eosinophils (%) (Auto)   % (0.0-3.0)   


 


Basophils (%) (Auto)   % (0.0-2.0)   


 


Differential Total Cells


Counted 


 100  


 





 


Neutrophils % (Manual)  85 % (45-75)  H 


 


Lymphocytes % (Manual)  7 % (20-45)  L 


 


Monocytes % (Manual)  4 % (1-10)   


 


Eosinophils % (Manual)  4 % (0-3)  H 


 


Basophils % (Manual)  0 % (0-2)   


 


Band Neutrophils  0 % (0-8)   


 


Platelet Estimate  Adequate   


 


Platelet Morphology  Normal   


 


Hypochromasia  1+   


 


Sodium Level


 


 151 MMOL/L


(136-145)  H 





 


Potassium Level


 


 3.7 MMOL/L


(3.5-5.1) 





 


Chloride Level


 


 114 MMOL/L


()  H 





 


Carbon Dioxide Level


 


 35 MMOL/L


(21-32)  H 





 


Anion Gap


 


 2 mmol/L


(5-15)  L 





 


Blood Urea Nitrogen


 


 38 mg/dL


(7-18)  H 





 


Creatinine


 


 0.8 MG/DL


(0.55-1.30) 





 


Estimat Glomerular Filtration


Rate 


 > 60 mL/min


(>60) 





 


Glucose Level


 


 189 MG/DL


()  H 





 


Calcium Level


 


 8.5 MG/DL


(8.5-10.1) 





 


Arterial Blood pH


 


 


 7.416


(7.350-7.450)


 


Arterial Blood Partial


Pressure CO2 


 


 53.3 mmHg


(35.0-45.0)  H


 


Arterial Blood Partial


Pressure O2 


 


 66.9 mmHg


(75.0-100.0)  L


 


Arterial Blood HCO3


 


 


 33.5 mmol/L


(22.0-26.0)  H


 


Arterial Blood Oxygen


Saturation 


 


 93.5 %


()  L


 


Arterial Blood Base Excess   7.4 (-2-2)  H


 


Abelardo Test   Positive  











Plan


Problems:  


(1) Pneumonia due to COVID-19 virus


Assessment & Plan:  Interim endotracheal intubation, endotracheal tube tip in 

good position


approximately 4 cm above the tito. There are extensive bilateral infiltrates, 

which


are markedly increased from the prior study. Pleural spaces are probably clear, 

not


well demonstrated


Markedly increased and now extensive bilateral infiltrates 


cont as per pulm and iID





(2) Hypoxia


Assessment & Plan:  patient developing DTI. in current condition, critically ill

and declining potential inevitable decline 


all care precautions taken and will cont to monitor and assist with improvement 





(3) Abdominal pain


Assessment & Plan:  65M abd pain, septic, covid, intubated ons upport


currently abd exam benign but limited given condition


line bo mary noted


okay for meds and feeds


nutritional optimization 


DAILY ESTIMATED NEEDS:


Needs based on Critical Care/ 73kg abw 


22-28  kcals/kg 


7389-8022  total kcals


1.2-2  g protein/kg


  g total protein 


25-30  mL/kg


3607-6909  total fluid mLs





NUTRITION DIAGNOSIS:


Swallowing difficulty R/T respiratory failure as evidenced by pt now


orally intubated, OGT in place, NPO





 


CURRENT TF:NPO 





 





ENTERAL NUTRITION RECOMMENDATIONS:


Vital AF 1.2 @ 60ml/hr x 24 hrs  to provide 1440ml, 1728kcal, 116g prot, 1167ml 

free water 





* As medically appropriate, initiate critical care and carb controlled TF 

formula of Vital AF 1.2


* Initiate @ 20ml/hr x 6hrs, advance 10ml q 4-6 hrs as tolerated to goal rate


* HOB over 30 degrees/ water flush per MD


* Does not exceed est kcal needs w/ Propofol running @ 8.083ml/hr x 24 hrs 

(provides 213 lipid kcal)





 





ADDITIONAL RECOMMENDATIONS:


* Calibrated bedscale wt 


* Monitor BGs closely w/ Decadron and TF, need for long acting insulin 


* Monitor lytes, replete as needed  


* Monitor Propofol rate, need to adjust TF rate  





(4) Acute metabolic encephalopathy


(5) Pneumothorax on right


Assessment & Plan:  right ptx


chest tube placed


Endotracheal tube tip projects at the level of the thoracic inlet.


Orogastric tube tip projects at the level of the gastric fundus. Again 

demonstrated


is extensive subcutaneous emphysema, which appears somewhat worse on the left. 

Right


chest tube remains in place. Small right medial and apical pneumothorax are 

present,


slightly more conspicuous than on the previous exam, still small, somewhat 

difficult


to identify due to overlying subcutaneous emphysema. There is probably a tiny 

left


apical pneumothorax as well. Previously demonstrated pneumomediastinum has 

improved


considerably although still present. Bilateral infiltrates appear worse on the 

right.


 


Impression: Equivocally slightly increased but still small right pneumothorax.


 


Suspect tiny left apical pneumothorax


 


Improving pneumomediastinum


 


Worsening subcutaneous emphysema


 


Worsening right and stable left lung infiltrates 














Norberto Vazquez                Jan 18, 2021 11:31

## 2021-01-18 NOTE — NUR
SI: Respiratory failure, COVID-PNA, ETT/Vent support, Chest tube

     T-97.8 (ax), , RR -27, Bp 108/63

     AC 16, , PEEP 10, Fio2 100%, O2 sat 96%

     WBC 15.5, BUN 38, Na+ 151

     cxray worsening R, stable L lung infiltrates, small rt pneumothorax



IS: Lovenox SQ q 12 h

     Versed GTT

     Vancomycin IV q 12 h

     Zosyn IV q 8 h

     Protonix IVP QD

     D5 IV @50ccHr

     Chest tube inserted 1-17-21 (suction)

     



*****ICU Status****

## 2021-01-18 NOTE — INFECTIOUS DISEASES PROG NOTE
Assessment/Plan


Assessment/Plan


antibiotics : vancomycin iv, zosyn





A


1. COVID-19 pneumonia.


on 100 % FiO2 with saturation of 97 %


s/p remdesivir


s/p ivermectin


s/p solumedrol


2. New-onset diabetes.


3. respiratory failure





P


1. Continue iv vancomycin, zosyn


2. Continue isolation.





Subjective


ROS Limited/Unobtainable:  Yes


Allergies:  


Coded Allergies:  


     No Known Allergies (Unverified , 6/11/19)





Objective





Last 24 Hour Vital Signs








  Date Time  Temp Pulse Resp B/P (MAP) Pulse Ox O2 Delivery O2 Flow Rate FiO2


 


1/18/21 11:00  145 24 108/63 (78) 96   


 


1/18/21 10:18   23   Mechanical Ventilator 100.0 


 


1/18/21 10:00  141 24 104/67 (79) 94   


 


1/18/21 09:00 97.8 141 23 102/62 (75) 95   


 


1/18/21 08:00  138 22 101/61 (74) 95   


 


1/18/21 08:00      Mechanical Ventilator  


 


1/18/21 08:00        90


 


1/18/21 08:00  138      


 


1/18/21 07:47  131 20     90


 


1/18/21 07:32   20   Mechanical Ventilator  90


 


1/18/21 07:21   20   Mechanical Ventilator  90


 


1/18/21 07:00  130 19 109/74 (86) 97   


 


1/18/21 06:55  129      


 


1/18/21 06:21   19   Mechanical Ventilator  45


 


1/18/21 06:00  131 18 112/69 (83) 96   


 


1/18/21 05:21   16   Mechanical Ventilator  90


 


1/18/21 05:00  121 15 104/68 (80) 96   


 


1/18/21 04:45  123 16 107/77 (87) 97   


 


1/18/21 04:30  96 15 103/76 (85) 97   


 


1/18/21 04:21   15   Mechanical Ventilator  90


 


1/18/21 04:15  96 15 113/73 (86) 97   


 


1/18/21 04:00      Mechanical Ventilator  


 


1/18/21 04:00        90


 


1/18/21 04:00 98.8 97 15 109/67 (81) 97   


 


1/18/21 03:21   16   Mechanical Ventilator  90


 


1/18/21 03:07  98      


 


1/18/21 03:00  98 15 109/75 (86) 98   


 


1/18/21 02:50  98 20     90


 


1/18/21 02:21   16   Mechanical Ventilator  90


 


1/18/21 02:00  100 16 100/71 (81) 98   


 


1/18/21 01:21   16   Mechanical Ventilator  90


 


1/18/21 01:00 99.8 103 18 105/71 (82) 98   


 


1/18/21 00:21   21   Mechanical Ventilator  90


 


1/18/21 00:00 100.0 110 20 105/80 (88) 97   


 


1/18/21 00:00        90


 


1/18/21 00:00      Mechanical Ventilator  


 


1/17/21 23:42 99.8       


 


1/17/21 23:21   19   Mechanical Ventilator  90


 


1/17/21 23:06  111 19     90


 


1/17/21 23:06  111      


 


1/17/21 23:00  112 20 106/74 (85) 97   


 


1/17/21 22:21   20   Mechanical Ventilator  90


 


1/17/21 22:00  109 21 116/82 (93) 98   


 


1/17/21 21:21   21   Mechanical Ventilator  90


 


1/17/21 21:00  112 21 114/78 (90) 98   


 


1/17/21 20:45  111 21 112/79 (90) 97   


 


1/17/21 20:30  112 22 115/79 (91) 97   


 


1/17/21 20:21   22   Mechanical Ventilator  90


 


1/17/21 20:15  111 20 113/79 (90) 98   


 


1/17/21 20:06   21   Mechanical Ventilator  90


 


1/17/21 20:00 98.9 111 20 109/75 (86) 97   


 


1/17/21 20:00        90


 


1/17/21 20:00      Mechanical Ventilator  


 


1/17/21 19:51   21   Mechanical Ventilator  90


 


1/17/21 19:45  110 20 96/79 (85) 97   


 


1/17/21 19:30  110 21 106/82 (90) 97   


 


1/17/21 19:15  110 22 103/73 (83) 97   


 


1/17/21 19:11  110      


 


1/17/21 19:00  109 21 105/77 (86) 97   


 


1/17/21 18:58  110 22     90


 


1/17/21 18:51   22   Mechanical Ventilator  


 


1/17/21 18:30  108 20 108/71 (83) 97   


 


1/17/21 18:15  106 20 100/68 (79) 97   


 


1/17/21 18:00 100.0 105 19 102/70 (81) 97   


 


1/17/21 17:51   20   Mechanical Ventilator  


 


1/17/21 17:45  106 18 114/75 (88) 97   


 


1/17/21 17:32 100.5       


 


1/17/21 17:30  103 20 107/68 (81) 98   


 


1/17/21 17:15  102 19 106/70 (82) 98   


 


1/17/21 17:00 100.5 102 20 100/72 (81) 97   


 


1/17/21 16:51   19   Mechanical Ventilator  


 


1/17/21 16:30  103 21 106/67 (80) 98   


 


1/17/21 16:15  104 21 103/67 (79) 97   


 


1/17/21 16:00        90


 


1/17/21 16:00      Mechanical Ventilator  


 


1/17/21 16:00  103      


 


1/17/21 16:00 101.0 103 22 104/67 (79) 98   


 


1/17/21 15:51   23   Mechanical Ventilator  


 


1/17/21 15:45  104 23 97/66 (76) 97   


 


1/17/21 15:30  104 24 96/70 (79) 97   


 


1/17/21 15:27  103 22     90


 


1/17/21 15:15  104 23 99/67 (78) 97   


 


1/17/21 15:00  105 24 100/67 (78) 97   


 


1/17/21 14:51   23   Mechanical Ventilator  


 


1/17/21 14:45  106 25 97/64 (75) 96   


 


1/17/21 14:30  106 24 97/65 (76) 96   


 


1/17/21 14:15  106 24 100/68 (79) 96   


 


1/17/21 14:00  107 22 109/68 (82) 95   


 


1/17/21 13:51      Mechanical Ventilator  


 


1/17/21 13:08 101.0       


 


1/17/21 13:00  111 24 135/82 (99) 97   


 


1/17/21 12:45  111 26 129/81 (97) 97   


 


1/17/21 12:30  112 26 133/85 (101) 96   


 


1/17/21 12:15  111 23 116/80 (92) 96   


 


1/17/21 12:00  115      


 


1/17/21 12:00      Mechanical Ventilator  


 


1/17/21 12:00 102.1 115 25 119/81 (94) 95   








Height (Feet):  5


Height (Inches):  7.00


Weight (Pounds):  198





Microbiology








 Date/Time


Source Procedure


Growth Status





 


 1/15/21 13:00


Sputum Gram Stain - Final Complete


 


 1/15/21 13:00 Sputum Culture - Final


Candida Albicans


Usual Respiratory Wendy Complete











Laboratory Tests








Test


 1/17/21


12:00 1/18/21


04:55 1/18/21


07:48


 


POC Whole Blood Glucose


 226 MG/DL


()  H 


 





 


White Blood Count


 


 15.5 K/UL


(4.8-10.8)  H 





 


Red Blood Count


 


 4.36 M/UL


(4.70-6.10)  L 





 


Hemoglobin


 


 12.7 G/DL


(14.2-18.0)  L 





 


Hematocrit


 


 41.2 %


(42.0-52.0)  L 





 


Mean Corpuscular Volume  94 FL (80-99)   


 


Mean Corpuscular Hemoglobin


 


 29.0 PG


(27.0-31.0) 





 


Mean Corpuscular Hemoglobin


Concent 


 30.8 G/DL


(32.0-36.0)  L 





 


Red Cell Distribution Width


 


 13.2 %


(11.6-14.8) 





 


Platelet Count


 


 172 K/UL


(150-450) 





 


Mean Platelet Volume


 


 10.3 FL


(6.5-10.1)  H 





 


Neutrophils (%) (Auto)


 


 % (45.0-75.0)


 





 


Lymphocytes (%) (Auto)


 


 % (20.0-45.0)


 





 


Monocytes (%) (Auto)   % (1.0-10.0)   


 


Eosinophils (%) (Auto)   % (0.0-3.0)   


 


Basophils (%) (Auto)   % (0.0-2.0)   


 


Differential Total Cells


Counted 


 100  


 





 


Neutrophils % (Manual)  85 % (45-75)  H 


 


Lymphocytes % (Manual)  7 % (20-45)  L 


 


Monocytes % (Manual)  4 % (1-10)   


 


Eosinophils % (Manual)  4 % (0-3)  H 


 


Basophils % (Manual)  0 % (0-2)   


 


Band Neutrophils  0 % (0-8)   


 


Platelet Estimate  Adequate   


 


Platelet Morphology  Normal   


 


Hypochromasia  1+   


 


Sodium Level


 


 151 MMOL/L


(136-145)  H 





 


Potassium Level


 


 3.7 MMOL/L


(3.5-5.1) 





 


Chloride Level


 


 114 MMOL/L


()  H 





 


Carbon Dioxide Level


 


 35 MMOL/L


(21-32)  H 





 


Anion Gap


 


 2 mmol/L


(5-15)  L 





 


Blood Urea Nitrogen


 


 38 mg/dL


(7-18)  H 





 


Creatinine


 


 0.8 MG/DL


(0.55-1.30) 





 


Estimat Glomerular Filtration


Rate 


 > 60 mL/min


(>60) 





 


Glucose Level


 


 189 MG/DL


()  H 





 


Calcium Level


 


 8.5 MG/DL


(8.5-10.1) 





 


Arterial Blood pH


 


 


 7.416


(7.350-7.450)


 


Arterial Blood Partial


Pressure CO2 


 


 53.3 mmHg


(35.0-45.0)  H


 


Arterial Blood Partial


Pressure O2 


 


 66.9 mmHg


(75.0-100.0)  L


 


Arterial Blood HCO3


 


 


 33.5 mmol/L


(22.0-26.0)  H


 


Arterial Blood Oxygen


Saturation 


 


 93.5 %


()  L


 


Arterial Blood Base Excess   7.4 (-2-2)  H


 


Abelardo Test   Positive  











Current Medications








 Medications


  (Trade)  Dose


 Ordered  Sig/Julisa


 Route


 PRN Reason  Start Time


 Stop Time Status Last Admin


Dose Admin


 


 Acetaminophen


  (Tylenol)  650 mg  Q4H  PRN


 GT


 Temp >100.5  1/15/21 17:15


 2/14/21 17:14  1/17/21 23:12





 


 Chlorhexidine


 Gluconate


  (Vanessa-Hex 2%)  1 applic  DAILY@2000


 TOPIC


   1/6/21 20:00


 4/6/21 19:59  1/17/21 20:31





 


 Dextrose  1,000 ml @ 


 50 mls/hr  Q20H


 IV


   1/18/21 10:00


 2/17/21 09:59  1/18/21 10:17





 


 Dextrose


  (Dextrose 50%)  25 ml  Q30M  PRN


 IV


 Hypoglycemia  1/9/21 13:30


 4/9/21 13:29   





 


 Dextrose


  (Dextrose 50%)  50 ml  Q30M  PRN


 IV


 Hypoglycemia  1/9/21 13:30


 4/9/21 13:29   





 


 Docusate Sodium


  (Colace)  100 mg  TIDPRN  PRN


 GT


 Constipation  1/12/21 01:15


 2/11/21 01:14  1/12/21 01:42





 


 Enoxaparin Sodium


  (Lovenox)  80 mg  EVERY 12  HOURS


 SUBQ


   1/2/21 21:00


 4/2/21 20:59  1/18/21 09:00





 


 Haloperidol


 Lactate 5 mg/


 Dextrose  56 ml @ 


 224 mls/hr  Q6H  PRN


 IVPB


 Agitation  1/5/21 12:30


 2/19/21 12:29  1/5/21 13:20





 


 Insulin Aspart


  (NovoLOG)    Q6HR


 SUBQ


   1/14/21 12:00


 4/9/21 17:59  1/18/21 06:24





 


 Insulin Aspart


  (NovoLOG)  14 units  EVERY 6  HOURS


 SUBQ


   1/16/21 12:00


 4/15/21 06:59  1/18/21 06:24





 


 Insulin Detemir


  (Levemir)  20 units  Q12HR


 SUBQ


   1/14/21 09:00


 4/12/21 08:59  1/18/21 09:00





 


 Midazolam HCl  100 ml @ 2


 mls/hr  Q24H  PRN


 IV


 Agitation  1/15/21 23:00


 1/22/21 22:59  1/18/21 10:18





 


 Pantoprazole


  (Protonix)  40 mg  DAILY


 IVP


   1/14/21 09:00


 2/13/21 08:59  1/18/21 10:16





 


 Piperacillin Sod/


 Tazobactam Sod


 3.375 gm/Sodium


 Chloride  110 ml @ 


 27.5 mls/hr  EVERY 8  HOURS


 IVPB


   1/15/21 14:00


 1/20/21 13:59  1/18/21 05:57





 


 Quetiapine


 Fumarate


  (SEROqueL)  50 mg  Q12HR


 ORAL


   1/4/21 21:00


 2/18/21 20:59  1/18/21 10:15





 


 Sodium Chloride  500 ml @ 


 999 mls/hr  Q31M PRN


 IV


 For hypotension  1/15/21 18:30


 2/14/21 18:29   





 


 Vancomycin HCl  300 ml @ 


 150 mls/hr  Q12H


 IVPB


   1/16/21 11:00


 1/21/21 10:59  1/17/21 23:13





 


 Vancomycin HCl


  ([facility]


 to dose)  1 ea  DAILY  PRN


 MISC


 Per rx protocol  1/14/21 09:15


 2/13/21 09:14   




















Ashley Davis MD      Jan 18, 2021 11:50

## 2021-01-19 VITALS — SYSTOLIC BLOOD PRESSURE: 110 MMHG | DIASTOLIC BLOOD PRESSURE: 72 MMHG

## 2021-01-19 VITALS — DIASTOLIC BLOOD PRESSURE: 65 MMHG | SYSTOLIC BLOOD PRESSURE: 115 MMHG

## 2021-01-19 VITALS — DIASTOLIC BLOOD PRESSURE: 67 MMHG | SYSTOLIC BLOOD PRESSURE: 103 MMHG

## 2021-01-19 VITALS — SYSTOLIC BLOOD PRESSURE: 115 MMHG | DIASTOLIC BLOOD PRESSURE: 69 MMHG

## 2021-01-19 VITALS — SYSTOLIC BLOOD PRESSURE: 117 MMHG | DIASTOLIC BLOOD PRESSURE: 71 MMHG

## 2021-01-19 VITALS — SYSTOLIC BLOOD PRESSURE: 115 MMHG | DIASTOLIC BLOOD PRESSURE: 63 MMHG

## 2021-01-19 VITALS — DIASTOLIC BLOOD PRESSURE: 74 MMHG | SYSTOLIC BLOOD PRESSURE: 123 MMHG

## 2021-01-19 VITALS — DIASTOLIC BLOOD PRESSURE: 64 MMHG | SYSTOLIC BLOOD PRESSURE: 105 MMHG

## 2021-01-19 VITALS — DIASTOLIC BLOOD PRESSURE: 78 MMHG | SYSTOLIC BLOOD PRESSURE: 119 MMHG

## 2021-01-19 VITALS — SYSTOLIC BLOOD PRESSURE: 105 MMHG | DIASTOLIC BLOOD PRESSURE: 66 MMHG

## 2021-01-19 VITALS — DIASTOLIC BLOOD PRESSURE: 64 MMHG | SYSTOLIC BLOOD PRESSURE: 113 MMHG

## 2021-01-19 VITALS — SYSTOLIC BLOOD PRESSURE: 105 MMHG | DIASTOLIC BLOOD PRESSURE: 65 MMHG

## 2021-01-19 VITALS — SYSTOLIC BLOOD PRESSURE: 106 MMHG | DIASTOLIC BLOOD PRESSURE: 71 MMHG

## 2021-01-19 VITALS — SYSTOLIC BLOOD PRESSURE: 110 MMHG | DIASTOLIC BLOOD PRESSURE: 66 MMHG

## 2021-01-19 VITALS — SYSTOLIC BLOOD PRESSURE: 123 MMHG | DIASTOLIC BLOOD PRESSURE: 80 MMHG

## 2021-01-19 VITALS — SYSTOLIC BLOOD PRESSURE: 108 MMHG | DIASTOLIC BLOOD PRESSURE: 62 MMHG

## 2021-01-19 VITALS — DIASTOLIC BLOOD PRESSURE: 65 MMHG | SYSTOLIC BLOOD PRESSURE: 101 MMHG

## 2021-01-19 VITALS — SYSTOLIC BLOOD PRESSURE: 114 MMHG | DIASTOLIC BLOOD PRESSURE: 71 MMHG

## 2021-01-19 VITALS — DIASTOLIC BLOOD PRESSURE: 68 MMHG | SYSTOLIC BLOOD PRESSURE: 107 MMHG

## 2021-01-19 VITALS — DIASTOLIC BLOOD PRESSURE: 69 MMHG | SYSTOLIC BLOOD PRESSURE: 103 MMHG

## 2021-01-19 VITALS — SYSTOLIC BLOOD PRESSURE: 112 MMHG | DIASTOLIC BLOOD PRESSURE: 70 MMHG

## 2021-01-19 VITALS — DIASTOLIC BLOOD PRESSURE: 73 MMHG | SYSTOLIC BLOOD PRESSURE: 103 MMHG

## 2021-01-19 VITALS — SYSTOLIC BLOOD PRESSURE: 106 MMHG | DIASTOLIC BLOOD PRESSURE: 65 MMHG

## 2021-01-19 VITALS — SYSTOLIC BLOOD PRESSURE: 115 MMHG | DIASTOLIC BLOOD PRESSURE: 74 MMHG

## 2021-01-19 VITALS — DIASTOLIC BLOOD PRESSURE: 75 MMHG | SYSTOLIC BLOOD PRESSURE: 128 MMHG

## 2021-01-19 VITALS — SYSTOLIC BLOOD PRESSURE: 97 MMHG | DIASTOLIC BLOOD PRESSURE: 66 MMHG

## 2021-01-19 VITALS — DIASTOLIC BLOOD PRESSURE: 67 MMHG | SYSTOLIC BLOOD PRESSURE: 102 MMHG

## 2021-01-19 LAB
ADD MANUAL DIFF: YES
ANION GAP SERPL CALC-SCNC: 9 MMOL/L (ref 5–15)
BUN SERPL-MCNC: 38 MG/DL (ref 7–18)
CALCIUM SERPL-MCNC: 8.4 MG/DL (ref 8.5–10.1)
CHLORIDE SERPL-SCNC: 112 MMOL/L (ref 98–107)
CO2 SERPL-SCNC: 28 MMOL/L (ref 21–32)
CREAT SERPL-MCNC: 0.8 MG/DL (ref 0.55–1.3)
ERYTHROCYTE [DISTWIDTH] IN BLOOD BY AUTOMATED COUNT: 12.9 % (ref 11.6–14.8)
HCT VFR BLD CALC: 42.1 % (ref 42–52)
HGB BLD-MCNC: 12.9 G/DL (ref 14.2–18)
MCV RBC AUTO: 95 FL (ref 80–99)
PLATELET # BLD: 195 K/UL (ref 150–450)
POTASSIUM SERPL-SCNC: 3.4 MMOL/L (ref 3.5–5.1)
RBC # BLD AUTO: 4.43 M/UL (ref 4.7–6.1)
SODIUM SERPL-SCNC: 149 MMOL/L (ref 136–145)
WBC # BLD AUTO: 18.5 K/UL (ref 4.8–10.8)

## 2021-01-19 RX ADMIN — INSULIN ASPART SCH UNITS: 100 INJECTION, SOLUTION INTRAVENOUS; SUBCUTANEOUS at 12:57

## 2021-01-19 RX ADMIN — INSULIN ASPART SCH UNITS: 100 INJECTION, SOLUTION INTRAVENOUS; SUBCUTANEOUS at 12:56

## 2021-01-19 RX ADMIN — ACETAMINOPHEN PRN MG: 160 SOLUTION ORAL at 18:12

## 2021-01-19 RX ADMIN — CHLORHEXIDINE GLUCONATE SCH APPLIC: 213 SOLUTION TOPICAL at 20:35

## 2021-01-19 RX ADMIN — PANTOPRAZOLE SODIUM SCH MG: 40 INJECTION, POWDER, FOR SOLUTION INTRAVENOUS at 09:57

## 2021-01-19 RX ADMIN — INSULIN ASPART SCH UNITS: 100 INJECTION, SOLUTION INTRAVENOUS; SUBCUTANEOUS at 05:39

## 2021-01-19 RX ADMIN — DEXTROSE MONOHYDRATE SCH MLS/HR: 50 INJECTION, SOLUTION INTRAVENOUS at 05:33

## 2021-01-19 RX ADMIN — ENOXAPARIN SODIUM SCH MG: 80 INJECTION SUBCUTANEOUS at 09:57

## 2021-01-19 RX ADMIN — INSULIN DETEMIR SCH UNITS: 100 INJECTION, SOLUTION SUBCUTANEOUS at 21:00

## 2021-01-19 RX ADMIN — INSULIN ASPART SCH UNITS: 100 INJECTION, SOLUTION INTRAVENOUS; SUBCUTANEOUS at 00:20

## 2021-01-19 RX ADMIN — DEXTROSE MONOHYDRATE SCH MLS/HR: 50 INJECTION, SOLUTION INTRAVENOUS at 13:45

## 2021-01-19 RX ADMIN — Medication SCH MLS/HR: at 10:56

## 2021-01-19 RX ADMIN — DILTIAZEM HYDROCHLORIDE SCH MG: 60 CAPSULE, EXTENDED RELEASE ORAL at 22:42

## 2021-01-19 RX ADMIN — DEXTROSE MONOHYDRATE SCH MLS/HR: 50 INJECTION, SOLUTION INTRAVENOUS at 21:24

## 2021-01-19 RX ADMIN — INSULIN ASPART SCH UNITS: 100 INJECTION, SOLUTION INTRAVENOUS; SUBCUTANEOUS at 00:19

## 2021-01-19 RX ADMIN — INSULIN ASPART SCH UNITS: 100 INJECTION, SOLUTION INTRAVENOUS; SUBCUTANEOUS at 18:30

## 2021-01-19 RX ADMIN — AMIODARONE HYDROCHLORIDE SCH MG: 200 TABLET ORAL at 21:25

## 2021-01-19 RX ADMIN — DILTIAZEM HYDROCHLORIDE SCH MG: 60 CAPSULE, EXTENDED RELEASE ORAL at 13:46

## 2021-01-19 RX ADMIN — INSULIN ASPART SCH UNITS: 100 INJECTION, SOLUTION INTRAVENOUS; SUBCUTANEOUS at 23:15

## 2021-01-19 RX ADMIN — Medication SCH MLS/HR: at 22:43

## 2021-01-19 RX ADMIN — INSULIN DETEMIR SCH UNITS: 100 INJECTION, SOLUTION SUBCUTANEOUS at 10:15

## 2021-01-19 RX ADMIN — Medication SCH MLS/HR: at 05:59

## 2021-01-19 RX ADMIN — INSULIN ASPART SCH UNITS: 100 INJECTION, SOLUTION INTRAVENOUS; SUBCUTANEOUS at 05:38

## 2021-01-19 RX ADMIN — ENOXAPARIN SODIUM SCH MG: 80 INJECTION SUBCUTANEOUS at 21:00

## 2021-01-19 NOTE — DIAGNOSTIC IMAGING REPORT
Indication: Cough

 

Technique: One view of the chest

 

Comparison: 1/18/2021

 

Findings: Right chest tube remains. There is still a small apical pneumothorax. Tiny

left apical pneumothorax persists, unchanged. There is decreased pneumomediastinum.

Subcutaneous gas has decreased as well. Bilateral infiltrates are unchanged.

Endotracheal tube remains at the level of the thoracic inlet. Orogastric tube is

again demonstrated, tip not well-visualized but probably stable in position

 

Impression: Stable tiny bilateral apical pneumothoraces

 

Unchanged bilateral infiltrates

 

Decreased pneumomediastinum and subcutaneous emphysema

## 2021-01-19 NOTE — NUR
NURSE HAND-OFF REPORT: 



Latest Vital Signs: Temperature 98.8 , Pulse 90 , B/P 117 /71 , Respiratory Rate 18 , O2 SAT 
97 , Mechanical Ventilator, O2 Flow Rate  .  

Vital Sign Comment: Stable, no further episode of hypotension. Still on levophed 2mcg/min.



EKG Rhythm: Sinus Rhythm

Rhythm change?: N 

MD Notified?: N Lito Travis MD Response: 



Latest Singh Fall Score: 50  

Fall Risk: High Risk 

Safety Measures: Call light Within Reach, Bed Alarm Zone 1, Side Rails Side Rails x3, Bed 
position Low and Locked.

Fall Precautions: 

Door Sign



Report given to SUZI Byrne.

## 2021-01-19 NOTE — NUR
NURSE NOTES:

received pt from Saba ARCHER., pt is sedated at this time, able to move head slightly. pt is 
ST at this time. no NGT noted. will try to insert new NGT as soon as possible. no feeding is 
running at this time. cash cath is draining well with gravity, no hematuria noted. skin 
alternation noted, dressing site intact, clean, and patent. right chest tube noted, with 
suction, no leakage noted. left femoral TLC noted dressing site intact, clean, and patent. 
versed 0.5mg/hr and D5W is running at 50ml/hr. bilateral soft wrist restrain noted, skin 
intact, pulse noted.  call light within reach. will continue to monitor pt with plan of 
care. bed at the lowest positioned, alarmed, and locked. will continue to monitor pt with 
plan of care.

## 2021-01-19 NOTE — PULMONOLOGY PROGRESS NOTE
Subjective


ROS Limited/Unobtainable:  Yes


Interval Events:  Noted sub cut emphsema  On Versed drip. R CT placed


Constitutional:  Reports: fever, other - Fu=815


HEENT:  Repors: no symptoms


Respiratory:  Reports: shortness of breath - better


Cardiovascular:  Reports: no symptoms


Gastrointestinal/Abdominal:  Reports: no symptoms


Allergies:  


Coded Allergies:  


     No Known Allergies (Unverified , 6/11/19)


All Systems:  reviewed and negative except above





Objective





Last 24 Hour Vital Signs








  Date Time  Temp Pulse Resp B/P (MAP) Pulse Ox O2 Delivery O2 Flow Rate FiO2


 


1/19/21 07:00  90 18 117/71 (86) 97   


 


1/19/21 07:00    120/75    


 


1/19/21 07:00   18   Mechanical Ventilator  100


 


1/19/21 06:45  90 18 128/75 (92) 96   


 


1/19/21 06:30  89 19 123/80 (94) 97   


 


1/19/21 06:00    115/62    


 


1/19/21 06:00   24   Mechanical Ventilator  100


 


1/19/21 06:00  91 18 115/74 (88) 95   


 


1/19/21 05:45   24   Mechanical Ventilator  100


 


1/19/21 05:00  91 18 110/66 (81) 97   


 


1/19/21 05:00    110/60    


 


1/19/21 04:00      Mechanical Ventilator  


 


1/19/21 04:00        100


 


1/19/21 04:00    115/69    


 


1/19/21 04:00 98.8 91 18 115/69 (84) 98   


 


1/19/21 03:56  92      


 


1/19/21 03:27  95 17     100


 


1/19/21 03:00  99 20 112/70 (84) 94   


 


1/19/21 03:00    112/70    


 


1/19/21 02:00    119/78    


 


1/19/21 02:00  101 21 119/78 (92) 91   


 


1/19/21 01:00  94 23 123/74 (90) 97   


 


1/19/21 01:00    123/74    


 


1/19/21 00:30 100.5 94 22 114/71 (85) 98   


 


1/19/21 00:00      Mechanical Ventilator  


 


1/19/21 00:00    110/72    


 


1/19/21 00:00  97 24 110/72 (85) 98   


 


1/19/21 00:00        100


 


1/18/21 23:48  97      


 


1/18/21 23:38  100 24     100


 


1/18/21 23:00  103 25 106/60 (75) 97   


 


1/18/21 23:00    106/60    


 


1/18/21 22:30 102.7 108 27 101/59 (73) 97   


 


1/18/21 22:00    90/56    


 


1/18/21 22:00  160 25 90/56 (67) 93   


 


1/18/21 21:45  162 29 82/53 (63) 83   


 


1/18/21 21:39    77/52    


 


1/18/21 21:30  159 31 77/52 (60) 87   


 


1/18/21 21:00  158 26 82/37 (52) 91   


 


1/18/21 20:35  158 34 91/59 (70) 88   


 


1/18/21 20:30  164 31 99/67 (78) 91   


 


1/18/21 20:00      Mechanical Ventilator  


 


1/18/21 20:00        100


 


1/18/21 20:00 105.7 163 30 108/91 (97) 92   


 


1/18/21 19:29  156      


 


1/18/21 19:15  155 29     100


 


1/18/21 19:00   28   Mechanical Ventilator  90


 


1/18/21 19:00  156 25 115/77 (90) 86   


 


1/18/21 18:00   25   Mechanical Ventilator  90


 


1/18/21 18:00  164 26 103/65 (78) 81   


 


1/18/21 17:30  156 31 108/66 (80) 91   


 


1/18/21 17:00  151 27 109/83 (92) 96   


 


1/18/21 17:00   25   Mechanical Ventilator  100


 


1/18/21 16:00  131      


 


1/18/21 16:00        90


 


1/18/21 16:00 98.2 131 30 112/82 (92) 96   


 


1/18/21 16:00   29   Mechanical Ventilator  90


 


1/18/21 16:00      Mechanical Ventilator  


 


1/18/21 15:00  136 26 107/59 (75) 96   


 


1/18/21 15:00   2   Mechanical Ventilator  100


 


1/18/21 14:00  141 26 114/70 (85) 95   


 


1/18/21 14:00   27   Mechanical Ventilator  100


 


1/18/21 13:23  144 27     90


 


1/18/21 13:15    122/77    


 


1/18/21 13:00  151 27 111/71 (84) 95   


 


1/18/21 13:00  134      


 


1/18/21 13:00   29   Mechanical Ventilator  90


 


1/18/21 12:00        90


 


1/18/21 12:00  125      


 


1/18/21 12:00      Mechanical Ventilator  


 


1/18/21 12:00   27     100


 


1/18/21 12:00  158 28 105/70 (82) 95   


 


1/18/21 11:57      Mechanical Ventilator  100


 


1/18/21 11:00  145 24 108/63 (78) 96   


 


1/18/21 10:18   23   Mechanical Ventilator 100.0 

















Intake and Output  


 


 1/18/21 1/19/21





 19:00 07:00


 


Intake Total 1657.5 ml 1586.75 ml


 


Output Total 1810 ml 1515 ml


 


Balance -152.5 ml 71.75 ml


 


  


 


Free Water 50 ml 30 ml


 


IV Total 947.5 ml 791.75 ml


 


Tube Feeding 660 ml 715 ml


 


Other  50 ml


 


Output Urine Total 1810 ml 1515 ml


 


# Bowel Movements  1








Objective


On a Versed drip


General Appearance:  no acute distress


HEENT:  atraumatic


Respiratory:  lungs clear, decreased breath sounds


Cardiovascular:  normal rate, regular rhythm


Abdomen:  soft, non tender, no organomegaly


Laboratory Tests


1/18/21 11:00: Vancomycin Level Trough 13.8H


1/18/21 20:24: POC Whole Blood Glucose 241H


1/19/21 00:12: POC Whole Blood Glucose 361H


1/19/21 04:30: 


White Blood Count 18.5H, Red Blood Count 4.43L, Hemoglobin 12.9L, Hematocrit 

42.1, Mean Corpuscular Volume 95, Mean Corpuscular Hemoglobin 29.2, Mean Cor

puscular Hemoglobin Concent 30.8L, Red Cell Distribution Width 12.9, Platelet 

Count 195, Mean Platelet Volume 10.0, Neutrophils (%) (Auto) , Lymphocytes (%) 

(Auto) , Monocytes (%) (Auto) , Eosinophils (%) (Auto) , Basophils (%) (Auto) , 

Neutrophils % (Manual) [Pending], Lymphocytes % (Manual) [Pending], Platelet 

Estimate [Pending], Platelet Morphology [Pending], Sodium Level 149H, Potassium 

Level 3.4L, Chloride Level 112H, Carbon Dioxide Level 28, Anion Gap 9, Blood 

Urea Nitrogen 38H, Creatinine 0.8, Estimat Glomerular Filtration Rate > 60, 

Glucose Level 236H, Calcium Level 8.4L, Troponin I 0.056, Digoxin Level 0.6


1/19/21 08:23: 


Arterial Blood pH 7.410, Arterial Blood Partial Pressure CO2 53.2H, Arterial 

Blood Partial Pressure O2 67.3L, Arterial Blood HCO3 33.0H, Arterial Blood Ox

ygen Saturation 93.3L, Arterial Blood Base Excess 6.9H, Abelardo Test Positive





Current Medications








 Medications


  (Trade)  Dose


 Ordered  Sig/Julisa


 Route


 PRN Reason  Start Time


 Stop Time Status Last Admin


Dose Admin


 


 Acetaminophen


  (Tylenol)  650 mg  Q4H  PRN


 GT


 Temp >100.5  1/15/21 17:15


 2/14/21 17:14  1/17/21 23:12





 


 Amiodarone HCl


  (Cordarone)  400 mg  EVERY 12  HOURS


 NG


   1/19/21 21:00


 4/19/21 20:59   





 


 Chlorhexidine


 Gluconate


  (Vanessa-Hex 2%)  1 applic  DAILY@2000


 TOPIC


   1/6/21 20:00


 4/6/21 19:59  1/18/21 20:27





 


 Dextrose  1,000 ml @ 


 50 mls/hr  Q20H


 IV


   1/18/21 10:00


 2/17/21 09:59  1/19/21 06:01





 


 Dextrose


  (Dextrose 50%)  25 ml  Q30M  PRN


 IV


 Hypoglycemia  1/9/21 13:30


 4/9/21 13:29   





 


 Dextrose


  (Dextrose 50%)  50 ml  Q30M  PRN


 IV


 Hypoglycemia  1/9/21 13:30


 4/9/21 13:29   





 


 Digoxin


  (Lanoxin)  0.25 mg  DAILY


 NG


   1/20/21 09:00


 4/20/21 08:59   





 


 Diltiazem HCl


  (Cardizem Tab)  30 mg  EVERY 8  HOURS


 NG


   1/19/21 14:00


 2/18/21 13:59   





 


 Docusate Sodium


  (Colace)  100 mg  TIDPRN  PRN


 GT


 Constipation  1/12/21 01:15


 2/11/21 01:14  1/12/21 01:42





 


 Enoxaparin Sodium


  (Lovenox)  80 mg  EVERY 12  HOURS


 SUBQ


   1/2/21 21:00


 4/2/21 20:59  1/19/21 09:57





 


 Insulin Aspart


  (NovoLOG)    Q6HR


 SUBQ


   1/14/21 12:00


 4/9/21 17:59  1/19/21 05:38





 


 Insulin Aspart


  (NovoLOG)  14 units  EVERY 6  HOURS


 SUBQ


   1/16/21 12:00


 4/15/21 06:59  1/19/21 05:39





 


 Insulin Detemir


  (Levemir)  20 units  Q12HR


 SUBQ


   1/14/21 09:00


 4/12/21 08:59  1/18/21 20:29





 


 Midazolam HCl  100 ml @ 2


 mls/hr  Q24H  PRN


 IV


 Agitation  1/15/21 23:00


 1/22/21 22:59  1/18/21 11:57





 


 Norepinephrine


 Bitartrate 4 mg/


 Dextrose  250 ml @ 0


 mls/hr  Q24H


 IV


   1/18/21 13:15


 1/21/21 13:14  1/18/21 21:39





 


 Pantoprazole


  (Protonix)  40 mg  DAILY


 IVP


   1/14/21 09:00


 2/13/21 08:59  1/19/21 09:57





 


 Piperacillin Sod/


 Tazobactam Sod


 3.375 gm/Sodium


 Chloride  110 ml @ 


 27.5 mls/hr  EVERY 8  HOURS


 IVPB


   1/15/21 14:00


 1/24/21 13:59  1/19/21 05:33





 


 Quetiapine


 Fumarate


  (SEROqueL)  50 mg  Q12HR


 ORAL


   1/4/21 21:00


 2/18/21 20:59  1/18/21 20:27





 


 Sodium Chloride  500 ml @ 


 999 mls/hr  Q31M PRN


 IV


 For hypotension  1/15/21 18:30


 2/14/21 18:29   





 


 Vancomycin HCl  300 ml @ 


 150 mls/hr  Q12H


 IVPB


   1/16/21 11:00


 1/21/21 10:59  1/18/21 15:00





 


 Vancomycin HCl


  ([facility]


 to dose)  1 ea  DAILY  PRN


 MISC


 Per rx protocol  1/14/21 09:15


 2/13/21 09:14   














Assessment/Plan


Assessment/Plan


1. COVID-19 pneumonia.


   - on Decadron, azithromycin, and ceftriaxone


   - Intubated now; will continue AC mode for now


          -Currently 90% FiO2. PEEP 10


             - Has R apical PTX and subcut emphysema


             - S/p R CT placement


2. Diabetes mellitus.; 


3. Elevated inflammatory markers.


4. High D-dimer. On full dose Lovenox


5. Hyper natremia; will initiate D5W


6. Hyperglycemia.


   - BG control


7. Hypoxemia., secondary to #1; improved





DVT prophylaxis   On Lovenox.


Sedated











Sung Lemos MD           Jan 19, 2021 10:04

## 2021-01-19 NOTE — NUR
NURSE NOTES:



Received bedside report from SUZI Dave. Pt's VSS, no signs of distress noted. Pt AAOX0, 
lethargic, able to follow yes or no command but mumbles. Pt NSR on the cardiac monitor, HR 
80-90 bpm. Pt intubated, ETT 7.5, 23 cm at lip line, with the following vent settings: A/C 
rate 16, T/V 600, 100% FiO2, Peep 10, O2 sat between %. Pt has NGT through left nare 
with Vital A.F 1.2 running at 55mL/hr. Pt has cash catheter, draining well, clear yellow 
urine noted. Pt has right chest tube, functioning well, suction noted, no signs of 
complication or bleeding noted from site. Skin alterations noted in chart and covered with 
optifoam. Pt has Left femoral TLC with the following drips running: Cardizem at 5mg, Versed 
0.5mg, Levophed at 2mcg, and IVF at 50mL. Central line dressing clean dry and intact, no 
signs of infection or bleeding noted from site. Pt has bilateral soft wrist restraints, no 
signs of injury noted from extremities. HOB at 30 degrees. Bed rails up, bed locked and in 
lowest position. Safety precautions maintained. Will continue to monitor.

## 2021-01-19 NOTE — NUR
NURSE NOTES:

pt is resting on the bed, no active bleeding noted at this time. chest tube is working 
properly. call light within rech. oral suctioned.

## 2021-01-19 NOTE — NUR
NURSE NOTES:

Noted that patient is able to open his eyes, moves his hands in a very minimal movement. 
Will place a bilateral soft wrist restraints to prevent pulling or for patient's safety. 
Will re-assess if needed a sedation.

## 2021-01-19 NOTE — NUR
NURSE NOTES:

inserted new left NGT for the pt. pt tolerated well without difficulties. replacement 
confirmed with auscultation. will wait for the X-ray to be final confirmation. no active 
bleeding noted.

## 2021-01-19 NOTE — NUR
NURSE NOTES:



MD Travis at bedside to assess pt. Made MD aware of pt's K+ 3.4, received order for K-dur 20 
mEq PO ONCE, will put in order. MD aware of pt's -115, VS remain stable, afebrile, no 
signs of distress noted. Will continue to monitor.

## 2021-01-19 NOTE — NUR
NURSE NOTES:



Pt's temperature re-checked, 99.9, cooling measures started, ice packs applied. Other VSS, 
no signs of distress noted. Safety precautions maintained. Will continue to monitor.

## 2021-01-19 NOTE — CARDIAC ELECTROPHYSIOLOGY PN
Assessment/Plan


Assessment/Plan


1. Atrial fibrillation with rapid ventricular response.   The patient received 

already 0.5 mg IV digoxin.  


On digoxin 0.25 mg p.o. daily.  Digoxin level 0.6. DC Cardizem drip 


Add  Amiodarone 400 po q12  and Cardizem 30 po q 8 hr. IN SR 





2. Hypernatremia with sodium 151 with azotemia, BUN of 30, creatinine


0.8.   On iv fluids





3. Right pneumothorax, status post chest tube with subcutaneous


emphysema.





4. COVID-19 pneumonia, 100% FiO2 and PEEP of 10, on remdesivir and


Solu-Medrol per ID.


5. Diabetes.


6. Hypotension due to dehydration and sepsis.On Levophed and iv fluid.


7.  Hypernatremia and azotemia.  The patient is on D5W of 50 mL an hour.





Subjective


Subjective


In ICU on 100% Fio2 and PEEP 10. Started on Levo as BP was in 70s. Right chest 

tube had 25 cc drainage. Still on Cardizem drip but converted to SR





Objective





Last 24 Hour Vital Signs








  Date Time  Temp Pulse Resp B/P (MAP) Pulse Ox O2 Delivery O2 Flow Rate FiO2


 


1/19/21 07:00  90 18 117/71 (86) 97   


 


1/19/21 07:00    120/75    


 


1/19/21 07:00   18   Mechanical Ventilator  100


 


1/19/21 06:45  90 18 128/75 (92) 96   


 


1/19/21 06:30  89 19 123/80 (94) 97   


 


1/19/21 06:00    115/62    


 


1/19/21 06:00   24   Mechanical Ventilator  100


 


1/19/21 06:00  91 18 115/74 (88) 95   


 


1/19/21 05:45   24   Mechanical Ventilator  100


 


1/19/21 05:00  91 18 110/66 (81) 97   


 


1/19/21 05:00    110/60    


 


1/19/21 04:00      Mechanical Ventilator  


 


1/19/21 04:00        100


 


1/19/21 04:00    115/69    


 


1/19/21 04:00 98.8 91 18 115/69 (84) 98   


 


1/19/21 03:56  92      


 


1/19/21 03:27  95 17     100


 


1/19/21 03:00  99 20 112/70 (84) 94   


 


1/19/21 03:00    112/70    


 


1/19/21 02:00    119/78    


 


1/19/21 02:00  101 21 119/78 (92) 91   


 


1/19/21 01:00  94 23 123/74 (90) 97   


 


1/19/21 01:00    123/74    


 


1/19/21 00:30 100.5 94 22 114/71 (85) 98   


 


1/19/21 00:00      Mechanical Ventilator  


 


1/19/21 00:00    110/72    


 


1/19/21 00:00  97 24 110/72 (85) 98   


 


1/19/21 00:00        100


 


1/18/21 23:48  97      


 


1/18/21 23:38  100 24     100


 


1/18/21 23:00  103 25 106/60 (75) 97   


 


1/18/21 23:00    106/60    


 


1/18/21 22:30 102.7 108 27 101/59 (73) 97   


 


1/18/21 22:00    90/56    


 


1/18/21 22:00  160 25 90/56 (67) 93   


 


1/18/21 21:45  162 29 82/53 (63) 83   


 


1/18/21 21:39    77/52    


 


1/18/21 21:30  159 31 77/52 (60) 87   


 


1/18/21 21:00  158 26 82/37 (52) 91   


 


1/18/21 20:35  158 34 91/59 (70) 88   


 


1/18/21 20:30  164 31 99/67 (78) 91   


 


1/18/21 20:00      Mechanical Ventilator  


 


1/18/21 20:00        100


 


1/18/21 20:00 105.7 163 30 108/91 (97) 92   


 


1/18/21 19:29  156      


 


1/18/21 19:15  155 29     100


 


1/18/21 19:00   28   Mechanical Ventilator  90


 


1/18/21 19:00  156 25 115/77 (90) 86   


 


1/18/21 18:00   25   Mechanical Ventilator  90


 


1/18/21 18:00  164 26 103/65 (78) 81   


 


1/18/21 17:30  156 31 108/66 (80) 91   


 


1/18/21 17:00  151 27 109/83 (92) 96   


 


1/18/21 17:00   25   Mechanical Ventilator  100


 


1/18/21 16:00  131      


 


1/18/21 16:00        90


 


1/18/21 16:00 98.2 131 30 112/82 (92) 96   


 


1/18/21 16:00   29   Mechanical Ventilator  90


 


1/18/21 16:00      Mechanical Ventilator  


 


1/18/21 15:00  136 26 107/59 (75) 96   


 


1/18/21 15:00   2   Mechanical Ventilator  100


 


1/18/21 14:00  141 26 114/70 (85) 95   


 


1/18/21 14:00   27   Mechanical Ventilator  100


 


1/18/21 13:23  144 27     90


 


1/18/21 13:15    122/77    


 


1/18/21 13:00  151 27 111/71 (84) 95   


 


1/18/21 13:00  134      


 


1/18/21 13:00   29   Mechanical Ventilator  90


 


1/18/21 12:00        90


 


1/18/21 12:00  125      


 


1/18/21 12:00      Mechanical Ventilator  


 


1/18/21 12:00   27     100


 


1/18/21 12:00  158 28 105/70 (82) 95   


 


1/18/21 11:57      Mechanical Ventilator  100

















Intake and Output  


 


 1/18/21 1/19/21





 19:00 07:00


 


Intake Total 1657.5 ml 1586.75 ml


 


Output Total 1810 ml 1515 ml


 


Balance -152.5 ml 71.75 ml


 


  


 


Free Water 50 ml 30 ml


 


IV Total 947.5 ml 791.75 ml


 


Tube Feeding 660 ml 715 ml


 


Other  50 ml


 


Output Urine Total 1810 ml 1515 ml


 


# Bowel Movements  1











Laboratory Tests








Test


 1/18/21


20:24 1/19/21


00:12 1/19/21


04:30 1/19/21


08:23


 


POC Whole Blood Glucose


 241 MG/DL


()  H 361 MG/DL


()  H 


 





 


White Blood Count


 


 


 18.5 K/UL


(4.8-10.8)  H 





 


Red Blood Count


 


 


 4.43 M/UL


(4.70-6.10)  L 





 


Hemoglobin


 


 


 12.9 G/DL


(14.2-18.0)  L 





 


Hematocrit


 


 


 42.1 %


(42.0-52.0) 





 


Mean Corpuscular Volume   95 FL (80-99)   


 


Mean Corpuscular Hemoglobin


 


 


 29.2 PG


(27.0-31.0) 





 


Mean Corpuscular Hemoglobin


Concent 


 


 30.8 G/DL


(32.0-36.0)  L 





 


Red Cell Distribution Width


 


 


 12.9 %


(11.6-14.8) 





 


Platelet Count


 


 


 195 K/UL


(150-450) 





 


Mean Platelet Volume


 


 


 10.0 FL


(6.5-10.1) 





 


Neutrophils (%) (Auto)


 


 


 % (45.0-75.0)


 





 


Lymphocytes (%) (Auto)


 


 


 % (20.0-45.0)


 





 


Monocytes (%) (Auto)    % (1.0-10.0)   


 


Eosinophils (%) (Auto)    % (0.0-3.0)   


 


Basophils (%) (Auto)    % (0.0-2.0)   


 


Differential Total Cells


Counted 


 


 100  


 





 


Neutrophils % (Manual)   92 % (45-75)  H 


 


Lymphocytes % (Manual)   6 % (20-45)  L 


 


Monocytes % (Manual)   2 % (1-10)   


 


Eosinophils % (Manual)   0 % (0-3)   


 


Basophils % (Manual)   0 % (0-2)   


 


Band Neutrophils   0 % (0-8)   


 


Platelet Estimate   Adequate   


 


Platelet Morphology   Normal   


 


Red Blood Cell Morphology   Normal   


 


Sodium Level


 


 


 149 MMOL/L


(136-145)  H 





 


Potassium Level


 


 


 3.4 MMOL/L


(3.5-5.1)  L 





 


Chloride Level


 


 


 112 MMOL/L


()  H 





 


Carbon Dioxide Level


 


 


 28 MMOL/L


(21-32) 





 


Anion Gap


 


 


 9 mmol/L


(5-15) 





 


Blood Urea Nitrogen


 


 


 38 mg/dL


(7-18)  H 





 


Creatinine


 


 


 0.8 MG/DL


(0.55-1.30) 





 


Estimat Glomerular Filtration


Rate 


 


 > 60 mL/min


(>60) 





 


Glucose Level


 


 


 236 MG/DL


()  H 





 


Calcium Level


 


 


 8.4 MG/DL


(8.5-10.1)  L 





 


Troponin I


 


 


 0.056 ng/mL


(0.000-0.056) 





 


Digoxin Level


 


 


 0.6 NG/ML


(0.5-2.0) 





 


Arterial Blood pH


 


 


 


 7.410


(7.350-7.450)


 


Arterial Blood Partial


Pressure CO2 


 


 


 53.2 mmHg


(35.0-45.0)  H


 


Arterial Blood Partial


Pressure O2 


 


 


 67.3 mmHg


(75.0-100.0)  L


 


Arterial Blood HCO3


 


 


 


 33.0 mmol/L


(22.0-26.0)  H


 


Arterial Blood Oxygen


Saturation 


 


 


 93.3 %


()  L


 


Arterial Blood Base Excess    6.9 (-2-2)  H


 


Abelardo Test    Positive  








Objective


HEENT: No JVD. Orally intubated


LUNGS:  Have decreased breath sounds with the chest tube on the right side


and subcutaneous emphysema.


CARDIOVASCULAR:  Regular S1, S2 with


no gallop.


ABDOMEN:  Soft.


EXTREMITIES:  A 1+ pitting edema.











Jake George MD               Jan 19, 2021 11:07

## 2021-01-19 NOTE — NUR
NURSE NOTES:



BG checked, 198. Scheduled Levemir given per MD order, pt tolerated well. Will continue to 
monitor.

## 2021-01-19 NOTE — NUR
NURSE NOTES:

Lovenox 80mg held due to possible procedure PICC replacement tomorrow. call light within 
reach.

## 2021-01-19 NOTE — NUR
NURSE HAND-OFF REPORT: 



Latest Vital Signs: Temperature  , Pulse 119 , B/P 106 /65 , Respiratory Rate 26 , O2 SAT 90 
, Mechanical Ventilator, 100% FiO2.  

Vital Sign Comment: 



EKG Rhythm: Sinus Tachycardia

Rhythm change?: N 

MD Notified?: 

MD Response: 



Latest Singh Fall Score: 50  

Fall Risk: High Risk 

Safety Measures: Call light Within Reach, Bed Alarm Zone 1, Side Rails Side Rails x3, Bed 
position Low and Locked.

Fall Precautions: 

Door Sign



Pt's Right Chest tube functioning well, suction noted, 20mL serousanguinous drainage noted 
for AM shift. Pt has pending abdominal XR, radiology at bedside, endorsed to night shift RN. 
Report given to Tara Keen RN for continuity of care. Pt stable..

## 2021-01-19 NOTE — SURGERY PROGRESS NOTE
Surgery Progress Note


Subjective


Procedure Performed


Right tube thoracostomy chest tube insertion


Additional Comments


cxr noted 


tube okay


pleuravac changed 


Right chest tube remains. There is still a small apical pneumothorax. Tiny


left apical pneumothorax persists, unchanged. There is decreased 

pneumomediastinum.


Subcutaneous gas has decreased as well. Bilateral infiltrates are unchanged.


Endotracheal tube remains at the level of the thoracic inlet. Orogastric tube is


again demonstrated, tip not well-visualized but probably stable in position


 


Impression: Stable tiny bilateral apical pneumothoraces


 


Unchanged bilateral infiltrates


 


Decreased pneumomediastinum and subcutaneous emphysema





Objective





Last 24 Hour Vital Signs








  Date Time  Temp Pulse Resp B/P (MAP) Pulse Ox O2 Delivery O2 Flow Rate FiO2


 


1/19/21 07:00  90 18 117/71 (86) 97   


 


1/19/21 07:00    120/75    


 


1/19/21 07:00   18   Mechanical Ventilator  100


 


1/19/21 06:45  90 18 128/75 (92) 96   


 


1/19/21 06:30  89 19 123/80 (94) 97   


 


1/19/21 06:00    115/62    


 


1/19/21 06:00   24   Mechanical Ventilator  100


 


1/19/21 06:00  91 18 115/74 (88) 95   


 


1/19/21 05:45   24   Mechanical Ventilator  100


 


1/19/21 05:00  91 18 110/66 (81) 97   


 


1/19/21 05:00    110/60    


 


1/19/21 04:00      Mechanical Ventilator  


 


1/19/21 04:00        100


 


1/19/21 04:00    115/69    


 


1/19/21 04:00 98.8 91 18 115/69 (84) 98   


 


1/19/21 03:56  92      


 


1/19/21 03:27  95 17     100


 


1/19/21 03:00  99 20 112/70 (84) 94   


 


1/19/21 03:00    112/70    


 


1/19/21 02:00    119/78    


 


1/19/21 02:00  101 21 119/78 (92) 91   


 


1/19/21 01:00  94 23 123/74 (90) 97   


 


1/19/21 01:00    123/74    


 


1/19/21 00:30 100.5 94 22 114/71 (85) 98   


 


1/19/21 00:00      Mechanical Ventilator  


 


1/19/21 00:00    110/72    


 


1/19/21 00:00  97 24 110/72 (85) 98   


 


1/19/21 00:00        100


 


1/18/21 23:48  97      


 


1/18/21 23:38  100 24     100


 


1/18/21 23:00  103 25 106/60 (75) 97   


 


1/18/21 23:00    106/60    


 


1/18/21 22:30 102.7 108 27 101/59 (73) 97   


 


1/18/21 22:00    90/56    


 


1/18/21 22:00  160 25 90/56 (67) 93   


 


1/18/21 21:45  162 29 82/53 (63) 83   


 


1/18/21 21:39    77/52    


 


1/18/21 21:30  159 31 77/52 (60) 87   


 


1/18/21 21:00  158 26 82/37 (52) 91   


 


1/18/21 20:35  158 34 91/59 (70) 88   


 


1/18/21 20:30  164 31 99/67 (78) 91   


 


1/18/21 20:00      Mechanical Ventilator  


 


1/18/21 20:00        100


 


1/18/21 20:00 105.7 163 30 108/91 (97) 92   


 


1/18/21 19:29  156      


 


1/18/21 19:15  155 29     100


 


1/18/21 19:00   28   Mechanical Ventilator  90


 


1/18/21 19:00  156 25 115/77 (90) 86   


 


1/18/21 18:00   25   Mechanical Ventilator  90


 


1/18/21 18:00  164 26 103/65 (78) 81   


 


1/18/21 17:30  156 31 108/66 (80) 91   


 


1/18/21 17:00  151 27 109/83 (92) 96   


 


1/18/21 17:00   25   Mechanical Ventilator  100


 


1/18/21 16:00  131      


 


1/18/21 16:00        90


 


1/18/21 16:00 98.2 131 30 112/82 (92) 96   


 


1/18/21 16:00   29   Mechanical Ventilator  90


 


1/18/21 16:00      Mechanical Ventilator  


 


1/18/21 15:00  136 26 107/59 (75) 96   


 


1/18/21 15:00   2   Mechanical Ventilator  100


 


1/18/21 14:00  141 26 114/70 (85) 95   


 


1/18/21 14:00   27   Mechanical Ventilator  100


 


1/18/21 13:23  144 27     90


 


1/18/21 13:15    122/77    


 


1/18/21 13:00  151 27 111/71 (84) 95   


 


1/18/21 13:00  134      


 


1/18/21 13:00   29   Mechanical Ventilator  90


 


1/18/21 12:00        90


 


1/18/21 12:00  125      


 


1/18/21 12:00      Mechanical Ventilator  


 


1/18/21 12:00   27     100


 


1/18/21 12:00  158 28 105/70 (82) 95   


 


1/18/21 11:57      Mechanical Ventilator  100








I&O











Intake and Output  


 


 1/18/21 1/19/21





 19:00 07:00


 


Intake Total 1657.5 ml 1586.75 ml


 


Output Total 1810 ml 1515 ml


 


Balance -152.5 ml 71.75 ml


 


  


 


Free Water 50 ml 30 ml


 


IV Total 947.5 ml 791.75 ml


 


Tube Feeding 660 ml 715 ml


 


Other  50 ml


 


Output Urine Total 1810 ml 1515 ml


 


# Bowel Movements  1








Dressing:  dry


Wound:  clean


Drains:  other


Cardiovascular:  RSR


Respiratory:  decreased breath sounds


Abdomen:  soft, non-tender, present bowel sounds, non-distended


Extremities:  no tenderness, no cyanosis





Laboratory Tests








Test


 1/18/21


20:24 1/19/21


00:12 1/19/21


04:30 1/19/21


08:23


 


POC Whole Blood Glucose


 241 MG/DL


()  H 361 MG/DL


()  H 


 





 


White Blood Count


 


 


 18.5 K/UL


(4.8-10.8)  H 





 


Red Blood Count


 


 


 4.43 M/UL


(4.70-6.10)  L 





 


Hemoglobin


 


 


 12.9 G/DL


(14.2-18.0)  L 





 


Hematocrit


 


 


 42.1 %


(42.0-52.0) 





 


Mean Corpuscular Volume   95 FL (80-99)   


 


Mean Corpuscular Hemoglobin


 


 


 29.2 PG


(27.0-31.0) 





 


Mean Corpuscular Hemoglobin


Concent 


 


 30.8 G/DL


(32.0-36.0)  L 





 


Red Cell Distribution Width


 


 


 12.9 %


(11.6-14.8) 





 


Platelet Count


 


 


 195 K/UL


(150-450) 





 


Mean Platelet Volume


 


 


 10.0 FL


(6.5-10.1) 





 


Neutrophils (%) (Auto)


 


 


 % (45.0-75.0)


 





 


Lymphocytes (%) (Auto)


 


 


 % (20.0-45.0)


 





 


Monocytes (%) (Auto)    % (1.0-10.0)   


 


Eosinophils (%) (Auto)    % (0.0-3.0)   


 


Basophils (%) (Auto)    % (0.0-2.0)   


 


Differential Total Cells


Counted 


 


 100  


 





 


Neutrophils % (Manual)   92 % (45-75)  H 


 


Lymphocytes % (Manual)   6 % (20-45)  L 


 


Monocytes % (Manual)   2 % (1-10)   


 


Eosinophils % (Manual)   0 % (0-3)   


 


Basophils % (Manual)   0 % (0-2)   


 


Band Neutrophils   0 % (0-8)   


 


Platelet Estimate   Adequate   


 


Platelet Morphology   Normal   


 


Red Blood Cell Morphology   Normal   


 


Sodium Level


 


 


 149 MMOL/L


(136-145)  H 





 


Potassium Level


 


 


 3.4 MMOL/L


(3.5-5.1)  L 





 


Chloride Level


 


 


 112 MMOL/L


()  H 





 


Carbon Dioxide Level


 


 


 28 MMOL/L


(21-32) 





 


Anion Gap


 


 


 9 mmol/L


(5-15) 





 


Blood Urea Nitrogen


 


 


 38 mg/dL


(7-18)  H 





 


Creatinine


 


 


 0.8 MG/DL


(0.55-1.30) 





 


Estimat Glomerular Filtration


Rate 


 


 > 60 mL/min


(>60) 





 


Glucose Level


 


 


 236 MG/DL


()  H 





 


Calcium Level


 


 


 8.4 MG/DL


(8.5-10.1)  L 





 


Troponin I


 


 


 0.056 ng/mL


(0.000-0.056) 





 


Digoxin Level


 


 


 0.6 NG/ML


(0.5-2.0) 





 


Arterial Blood pH


 


 


 


 7.410


(7.350-7.450)


 


Arterial Blood Partial


Pressure CO2 


 


 


 53.2 mmHg


(35.0-45.0)  H


 


Arterial Blood Partial


Pressure O2 


 


 


 67.3 mmHg


(75.0-100.0)  L


 


Arterial Blood HCO3


 


 


 


 33.0 mmol/L


(22.0-26.0)  H


 


Arterial Blood Oxygen


Saturation 


 


 


 93.3 %


()  L


 


Arterial Blood Base Excess    6.9 (-2-2)  H


 


Abelardo Test    Positive  











Plan


Problems:  


(1) Pneumonia due to COVID-19 virus


Assessment & Plan:  Interim endotracheal intubation, endotracheal tube tip in 

good position


approximately 4 cm above the tito. There are extensive bilateral infiltrates, 

which


are markedly increased from the prior study. Pleural spaces are probably clear, 

not


well demonstrated


Markedly increased and now extensive bilateral infiltrates 


cont as per pulm and iID





(2) Hypoxia


Assessment & Plan:  patient developing DTI. in current condition, critically ill

and declining potential inevitable decline 


all care precautions taken and will cont to monitor and assist with improvement 





(3) Abdominal pain


Assessment & Plan:  65M abd pain, septic, covid, intubated ons upport


currently abd exam benign but limited given condition


line bo mary noted


okay for meds and feeds


nutritional optimization 


DAILY ESTIMATED NEEDS:


Needs based on Critical Care/ 73kg abw 


22-28  kcals/kg 


4503-6081  total kcals


1.2-2  g protein/kg


  g total protein 


25-30  mL/kg


4649-8444  total fluid mLs





NUTRITION DIAGNOSIS:


Swallowing difficulty R/T respiratory failure as evidenced by pt now


orally intubated, OGT in place, NPO





 


CURRENT TF:NPO 





 





ENTERAL NUTRITION RECOMMENDATIONS:


Vital AF 1.2 @ 60ml/hr x 24 hrs  to provide 1440ml, 1728kcal, 116g prot, 1167ml 

free water 





* As medically appropriate, initiate critical care and carb controlled TF 

formula of Vital AF 1.2


* Initiate @ 20ml/hr x 6hrs, advance 10ml q 4-6 hrs as tolerated to goal rate


* HOB over 30 degrees/ water flush per MD


* Does not exceed est kcal needs w/ Propofol running @ 8.083ml/hr x 24 hrs 

(provides 213 lipid kcal)





 





ADDITIONAL RECOMMENDATIONS:


* Calibrated bedscale wt 


* Monitor BGs closely w/ Decadron and TF, need for long acting insulin 


* Monitor lytes, replete as needed  


* Monitor Propofol rate, need to adjust TF rate  





(4) Acute metabolic encephalopathy


(5) Pneumothorax on right


Assessment & Plan:  right ptx


chest tube placed


Endotracheal tube tip projects at the level of the thoracic inlet.


Orogastric tube tip projects at the level of the gastric fundus. Again 

demonstrated


is extensive subcutaneous emphysema, which appears somewhat worse on the left. 

Right


chest tube remains in place. Small right medial and apical pneumothorax are 

present,


slightly more conspicuous than on the previous exam, still small, somewhat 

difficult


to identify due to overlying subcutaneous emphysema. There is probably a tiny 

left


apical pneumothorax as well. Previously demonstrated pneumomediastinum has 

improved


considerably although still present. Bilateral infiltrates appear worse on the 

right.


 


Impression: Equivocally slightly increased but still small right pneumothorax.


 


Suspect tiny left apical pneumothorax


 


Improving pneumomediastinum


 


Worsening subcutaneous emphysema


 


Worsening right and stable left lung infiltrates 














Norberto Vazquez                Jan 19, 2021 11:17

## 2021-01-19 NOTE — NUR
RD ASSESSMENT & RECOMMENDATIONS

SEE CARE ACTIVITY FOR COMPLETE ASSESSMENT



DAILY ESTIMATED NEEDS:

Needs based on Critical Care/ 73kg abw 

22-28  kcals/kg 

0617-2133  total kcals

1.2-2  g protein/kg

  g total protein 

20-25  mL/kg

1337-8068  total fluid mLs



NUTRITION DIAGNOSIS:

Swallowing difficulty R/T respiratory failure as evidenced by pt now

orally intubated, on OGT feeds.



 



CURRENT TF: Vital 1.2 goal of 55ml/hr x24 hrs  



 



ENTERAL NUTRITION RECOMMENDATIONS:

Vital AF 1.2 for CARB CONTROL and critical care @ goal 55ml/hr x 24 hrs  to provide 1320ml, 
1584kcal, 99g prot, 1070ml free water 



* Continue Vital AF 1.2 for carb control and critical care

* Maintain lowered goal rate of 55ml/hr for improved BG control

   (will meet 99% est kcal and 100% est prot needs)

* HOB over 30 degrees/ water flush per MD



 





ADDITIONAL RECOMMENDATIONS:

* Calibrated bedscale wt 

* Monitor BGs closely w/ steroidal med and TF  

    ->  now on long acting insulin + q4 novolog + NISS 

* DC D5 IVF for improved BG control 

 -> Increase water flushes instead for water deficit 

* Monitor lytes, replete as needed 

.

## 2021-01-19 NOTE — NUR
NURSE NOTES:

Patient is on slightly sedated, on RASS -2. Will maintained Versed 0.5mg and bilateral soft 
wrist restraints for now.

## 2021-01-19 NOTE — NUR
NURSE NOTES:



Pt's VS remain stable, afebrile. No signs of distress noted. Safety precautions maintained. 
Will continue to monitor.

## 2021-01-19 NOTE — NUR
NURSE NOTES:

Patient's HR has been slowly decreasing with HR of 90's, temperature of 100.5. Cardiac wise, 
shows sinus rhythm. Decrease Cardizem drip to 10mg/hr.

## 2021-01-19 NOTE — NUR
03/07/2019  Thi Matthews is a 50 y.o., male.    Anesthesia Evaluation    I have reviewed the Patient Summary Reports.    I have reviewed the Nursing Notes.   I have reviewed the Medications.     Review of Systems  Anesthesia Hx:  No problems with previous Anesthesia    Cardiovascular:   Hypertension, well controlled        Physical Exam  General:  Well nourished    Airway/Jaw/Neck:  Airway Findings: Mouth Opening: Normal Tongue: Normal  General Airway Assessment: Adult  Mallampati: II  TM Distance: Normal, at least 6 cm      Dental:  Dental Findings: In tact   Chest/Lungs:  Chest/Lungs Findings: Normal Respiratory Rate     Heart/Vascular:  Heart Findings: Rate: Normal  Rhythm: Regular Rhythm             Anesthesia Plan  Type of Anesthesia, risks & benefits discussed:  Anesthesia Type:  MAC  Patient's Preference:   Intra-op Monitoring Plan:   Intra-op Monitoring Plan Comments:   Post Op Pain Control Plan:   Post Op Pain Control Plan Comments:   Induction:   IV  Beta Blocker:  Patient is on a Beta-Blocker and has received one dose within the past 24 hours (No further documentation required).       Informed Consent:  Anesthesia consent signed with patient.  ASA Score: 2     Day of Surgery Review of History & Physical: I have interviewed and examined the patient. I have reviewed the patient's H&P dated:  There are no significant changes.          Ready For Surgery From Anesthesia Perspective.        NURSE NOTES:

Bed bath done, cleansed and changed the dressing on the sacral area, repositioned on his 
left side. No further hypotension nor fever noted. HR of low 103 at this time. Changed chest 
tube dressing, no active bleeding nor hematoma on the incision site.

## 2021-01-19 NOTE — GENERAL PROGRESS NOTE
Subjective


Allergies:  


Coded Allergies:  


     No Known Allergies (Unverified , 6/11/19)


Subjective


on ventilator 60% fio2


on sediation


unresponsive


in icu


rt chest tube s placed due to pneumothorex


afib is controlled





Objective





Last 24 Hour Vital Signs








  Date Time  Temp Pulse Resp B/P (MAP) Pulse Ox O2 Delivery O2 Flow Rate FiO2


 


1/19/21 15:00   22   Mechanical Ventilator  100


 


1/19/21 15:00  109 24 103/69 (80) 96   


 


1/19/21 14:00  114 18 105/65 (78) 96   


 


1/19/21 14:00   21   Mechanical Ventilator  100


 


1/19/21 13:46  109  106/65    


 


1/19/21 13:00  105 26 105/66 (79) 96   


 


1/19/21 13:00   18   Mechanical Ventilator  100


 


1/19/21 12:05 99.8       


 


1/19/21 12:00  101 24 105/64 (78) 96   


 


1/19/21 12:00  100      


 


1/19/21 12:00        100


 


1/19/21 12:00      Mechanical Ventilator  


 


1/19/21 12:00   19   Mechanical Ventilator  100


 


1/19/21 11:00  100 23 108/62 (77) 96   


 


1/19/21 11:00   22   Mechanical Ventilator  100


 


1/19/21 10:00    108/63    


 


1/19/21 10:00   20   Mechanical Ventilator  100


 


1/19/21 10:00  95 22 115/63 (80) 96   


 


1/19/21 09:00  92 20 115/65 (82) 97   


 


1/19/21 09:00    109/63    


 


1/19/21 09:00   19   Mechanical Ventilator  100


 


1/19/21 08:02 99.7       


 


1/19/21 08:00        100


 


1/19/21 08:00      Mechanical Ventilator  


 


1/19/21 08:00  89 17 113/64 (80) 97   


 


1/19/21 08:00    102/67    


 


1/19/21 08:00   17   Mechanical Ventilator  100


 


1/19/21 08:00  89      


 


1/19/21 07:40  89 20     100


 


1/19/21 07:00  90 18 117/71 (86) 97   


 


1/19/21 07:00    120/75    


 


1/19/21 07:00   18   Mechanical Ventilator  100


 


1/19/21 06:45  90 18 128/75 (92) 96   


 


1/19/21 06:30  89 19 123/80 (94) 97   


 


1/19/21 06:00    115/62    


 


1/19/21 06:00   24   Mechanical Ventilator  100


 


1/19/21 06:00  91 18 115/74 (88) 95   


 


1/19/21 05:45   24   Mechanical Ventilator  100


 


1/19/21 05:00  91 18 110/66 (81) 97   


 


1/19/21 05:00    110/60    


 


1/19/21 04:00      Mechanical Ventilator  


 


1/19/21 04:00        100


 


1/19/21 04:00    115/69    


 


1/19/21 04:00 98.8 91 18 115/69 (84) 98   


 


1/19/21 03:56  92      


 


1/19/21 03:27  95 17     100


 


1/19/21 03:00  99 20 112/70 (84) 94   


 


1/19/21 03:00    112/70    


 


1/19/21 02:00    119/78    


 


1/19/21 02:00  101 21 119/78 (92) 91   


 


1/19/21 01:00  94 23 123/74 (90) 97   


 


1/19/21 01:00    123/74    


 


1/19/21 00:30 100.5 94 22 114/71 (85) 98   


 


1/19/21 00:00      Mechanical Ventilator  


 


1/19/21 00:00    110/72    


 


1/19/21 00:00  97 24 110/72 (85) 98   


 


1/19/21 00:00        100


 


1/18/21 23:48  97      


 


1/18/21 23:38  100 24     100


 


1/18/21 23:00  103 25 106/60 (75) 97   


 


1/18/21 23:00    106/60    


 


1/18/21 22:30 102.7 108 27 101/59 (73) 97   


 


1/18/21 22:00    90/56    


 


1/18/21 22:00  160 25 90/56 (67) 93   


 


1/18/21 21:45  162 29 82/53 (63) 83   


 


1/18/21 21:39    77/52    


 


1/18/21 21:30  159 31 77/52 (60) 87   


 


1/18/21 21:00  158 26 82/37 (52) 91   


 


1/18/21 20:35  158 34 91/59 (70) 88   


 


1/18/21 20:30  164 31 99/67 (78) 91   


 


1/18/21 20:00      Mechanical Ventilator  


 


1/18/21 20:00        100


 


1/18/21 20:00 105.7 163 30 108/91 (97) 92   


 


1/18/21 19:29  156      


 


1/18/21 19:15  155 29     100


 


1/18/21 19:00   28   Mechanical Ventilator  90


 


1/18/21 19:00  156 25 115/77 (90) 86   


 


1/18/21 18:00   25   Mechanical Ventilator  90


 


1/18/21 18:00  164 26 103/65 (78) 81   


 


1/18/21 17:30  156 31 108/66 (80) 91   


 


1/18/21 17:00  151 27 109/83 (92) 96   


 


1/18/21 17:00   25   Mechanical Ventilator  100

















Intake and Output  


 


 1/18/21 1/19/21





 19:00 07:00


 


Intake Total 1657.5 ml 1586.75 ml


 


Output Total 1810 ml 1515 ml


 


Balance -152.5 ml 71.75 ml


 


  


 


Free Water 50 ml 30 ml


 


IV Total 947.5 ml 791.75 ml


 


Tube Feeding 660 ml 715 ml


 


Other  50 ml


 


Output Urine Total 1810 ml 1515 ml


 


# Bowel Movements  1








Laboratory Tests


1/18/21 20:24: POC Whole Blood Glucose 241H


1/19/21 00:12: POC Whole Blood Glucose 361H


1/19/21 04:30: 


White Blood Count 18.5H, Red Blood Count 4.43L, Hemoglobin 12.9L, Hematocrit 

42.1, Mean Corpuscular Volume 95, Mean Corpuscular Hemoglobin 29.2, Mean 

Corpuscular Hemoglobin Concent 30.8L, Red Cell Distribution Width 12.9, Platelet

Count 195, Mean Platelet Volume 10.0, Neutrophils (%) (Auto) , Lymphocytes (%) 

(Auto) , Monocytes (%) (Auto) , Eosinophils (%) (Auto) , Basophils (%) (Auto) , 

Differential Total Cells Counted 100, Neutrophils % (Manual) 92H, Lymphocytes % 

(Manual) 6L, Monocytes % (Manual) 2, Eosinophils % (Manual) 0, Basophils % 

(Manual) 0, Band Neutrophils 0, Platelet Estimate Adequate, Platelet Morphology 

Normal, Red Blood Cell Morphology Normal, Sodium Level 149H, Potassium Level 

3.4L, Chloride Level 112H, Carbon Dioxide Level 28, Anion Gap 9, Blood Urea 

Nitrogen 38H, Creatinine 0.8, Estimat Glomerular Filtration Rate > 60, Glucose 

Level 236H, Calcium Level 8.4L, Troponin I 0.056, Digoxin Level 0.6


1/19/21 08:23: 


Arterial Blood pH 7.410, Arterial Blood Partial Pressure CO2 53.2H, Arterial 

Blood Partial Pressure O2 67.3L, Arterial Blood HCO3 33.0H, Arterial Blood 

Oxygen Saturation 93.3L, Arterial Blood Base Excess 6.9H, Abelardo Test Positive


Height (Feet):  5


Height (Inches):  7.00


Weight (Pounds):  198


General Appearance:  lethargic


Neck:  supple


Cardiovascular:  tachycardia


Respiratory/Chest:  rhonchi - bilaterally


Abdomen:  non tender, soft


Extremities:  non-tender





Assessment/Plan


Assessment/Plan:


afib with rvr on digoxine , add amiodarone , cardiology on case


covid pna


ac resp distress on ventilator


etoh abused


dehydration


agitated -halodol


fever susided


cont on ventilator at icu


cont iv abx and iv decadrone


pulmonary and gi on consult


dw charge nurse


poor prognosis 


wd with son











Moi Travis MD             Jan 19, 2021 16:26

## 2021-01-19 NOTE — GENERAL PROGRESS NOTE
Subjective


ROS Limited/Unobtainable:  Yes


Allergies:  


Coded Allergies:  


     No Known Allergies (Unverified , 6/11/19)


Subjective


events noted - interval notes reviewed 


intubated 


glucose values are trending down 











Item Value  Date Time


 


Bedside Blood Glucose 197 mg/dl H 1/19/21 0600


 


Bedside Blood Glucose 341 mg/dl H 1/19/21 0020


 


Bedside Blood Glucose 192 mg/dl H 1/18/21 1821


 


Bedside Blood Glucose 178 mg/dl H 1/18/21 1220











Objective





Last 24 Hour Vital Signs








  Date Time  Temp Pulse Resp B/P (MAP) Pulse Ox O2 Delivery O2 Flow Rate FiO2


 


1/19/21 07:00  90 18 117/71 (86) 97   


 


1/19/21 07:00    120/75    


 


1/19/21 07:00   18   Mechanical Ventilator  100


 


1/19/21 06:45  90 18 128/75 (92) 96   


 


1/19/21 06:30  89 19 123/80 (94) 97   


 


1/19/21 06:00    115/62    


 


1/19/21 06:00   24   Mechanical Ventilator  100


 


1/19/21 06:00  91 18 115/74 (88) 95   


 


1/19/21 05:45   24   Mechanical Ventilator  100


 


1/19/21 05:00  91 18 110/66 (81) 97   


 


1/19/21 05:00    110/60    


 


1/19/21 04:00      Mechanical Ventilator  


 


1/19/21 04:00        100


 


1/19/21 04:00    115/69    


 


1/19/21 04:00 98.8 91 18 115/69 (84) 98   


 


1/19/21 03:56  92      


 


1/19/21 03:27  95 17     100


 


1/19/21 03:00  99 20 112/70 (84) 94   


 


1/19/21 03:00    112/70    


 


1/19/21 02:00    119/78    


 


1/19/21 02:00  101 21 119/78 (92) 91   


 


1/19/21 01:00  94 23 123/74 (90) 97   


 


1/19/21 01:00    123/74    


 


1/19/21 00:30 100.5 94 22 114/71 (85) 98   


 


1/19/21 00:00      Mechanical Ventilator  


 


1/19/21 00:00    110/72    


 


1/19/21 00:00  97 24 110/72 (85) 98   


 


1/19/21 00:00        100


 


1/18/21 23:48  97      


 


1/18/21 23:38  100 24     100


 


1/18/21 23:00  103 25 106/60 (75) 97   


 


1/18/21 23:00    106/60    


 


1/18/21 22:30 102.7 108 27 101/59 (73) 97   


 


1/18/21 22:00    90/56    


 


1/18/21 22:00  160 25 90/56 (67) 93   


 


1/18/21 21:45  162 29 82/53 (63) 83   


 


1/18/21 21:39    77/52    


 


1/18/21 21:30  159 31 77/52 (60) 87   


 


1/18/21 21:00  158 26 82/37 (52) 91   


 


1/18/21 20:35  158 34 91/59 (70) 88   


 


1/18/21 20:30  164 31 99/67 (78) 91   


 


1/18/21 20:00      Mechanical Ventilator  


 


1/18/21 20:00        100


 


1/18/21 20:00 105.7 163 30 108/91 (97) 92   


 


1/18/21 19:29  156      


 


1/18/21 19:15  155 29     100


 


1/18/21 19:00   28   Mechanical Ventilator  90


 


1/18/21 19:00  156 25 115/77 (90) 86   


 


1/18/21 18:00   25   Mechanical Ventilator  90


 


1/18/21 18:00  164 26 103/65 (78) 81   


 


1/18/21 17:30  156 31 108/66 (80) 91   


 


1/18/21 17:00  151 27 109/83 (92) 96   


 


1/18/21 17:00   25   Mechanical Ventilator  100


 


1/18/21 16:00  131      


 


1/18/21 16:00        90


 


1/18/21 16:00 98.2 131 30 112/82 (92) 96   


 


1/18/21 16:00   29   Mechanical Ventilator  90


 


1/18/21 16:00      Mechanical Ventilator  


 


1/18/21 15:00  136 26 107/59 (75) 96   


 


1/18/21 15:00   2   Mechanical Ventilator  100


 


1/18/21 14:00  141 26 114/70 (85) 95   


 


1/18/21 14:00   27   Mechanical Ventilator  100


 


1/18/21 13:23  144 27     90


 


1/18/21 13:15    122/77    


 


1/18/21 13:00  151 27 111/71 (84) 95   


 


1/18/21 13:00  134      


 


1/18/21 13:00   29   Mechanical Ventilator  90


 


1/18/21 12:00        90


 


1/18/21 12:00  125      


 


1/18/21 12:00      Mechanical Ventilator  


 


1/18/21 12:00   27     100


 


1/18/21 12:00  158 28 105/70 (82) 95   


 


1/18/21 11:57      Mechanical Ventilator  100


 


1/18/21 11:00  145 24 108/63 (78) 96   


 


1/18/21 10:18   23   Mechanical Ventilator 100.0 


 


1/18/21 10:00  141 24 104/67 (79) 94   


 


1/18/21 09:00 97.8 141 23 102/62 (75) 95   


 


1/18/21 09:00        100

















Intake and Output  


 


 1/18/21 1/19/21





 19:00 07:00


 


Intake Total 1657.5 ml 1586.75 ml


 


Output Total 1810 ml 1515 ml


 


Balance -152.5 ml 71.75 ml


 


  


 


Free Water 50 ml 30 ml


 


IV Total 947.5 ml 791.75 ml


 


Tube Feeding 660 ml 715 ml


 


Other  50 ml


 


Output Urine Total 1810 ml 1515 ml


 


# Bowel Movements  1








Laboratory Tests


1/18/21 11:00: Vancomycin Level Trough 13.8H


1/18/21 20:24: POC Whole Blood Glucose 241H


1/19/21 00:12: POC Whole Blood Glucose 361H


1/19/21 04:30: 


White Blood Count 18.5H, Red Blood Count 4.43L, Hemoglobin 12.9L, Hematocrit 

42.1, Mean Corpuscular Volume 95, Mean Corpuscular Hemoglobin 29.2, Mean 

Corpuscular Hemoglobin Concent 30.8L, Red Cell Distribution Width 12.9, Platelet

Count 195, Mean Platelet Volume 10.0, Neutrophils (%) (Auto) , Lymphocytes (%) 

(Auto) , Monocytes (%) (Auto) , Eosinophils (%) (Auto) , Basophils (%) (Auto) , 

Neutrophils % (Manual) [Pending], Lymphocytes % (Manual) [Pending], Platelet 

Estimate [Pending], Platelet Morphology [Pending], Sodium Level [Pending], 

Potassium Level [Pending], Chloride Level [Pending], Carbon Dioxide Level 

[Pending], Blood Urea Nitrogen [Pending], Creatinine [Pending], Estimat 

Glomerular Filtration Rate [Pending], Glucose Level [Pending], Calcium Level 

[Pending], Troponin I [Pending], Digoxin Level [Pending]


Height (Feet):  5


Height (Inches):  7.00


Weight (Pounds):  198


Objective





Current Medications








 Medications


  (Trade)  Dose


 Ordered  Sig/Julisa


 Route


 PRN Reason  Start Time


 Stop Time Status Last Admin


Dose Admin


 


 Acetaminophen


  (Tylenol)  650 mg  Q4H  PRN


 GT


 Temp >100.5  1/15/21 17:15


 2/14/21 17:14  1/17/21 23:12





 


 Chlorhexidine


 Gluconate


  (Vanessa-Hex 2%)  1 applic  DAILY@2000


 TOPIC


   1/6/21 20:00


 4/6/21 19:59  1/18/21 20:27





 


 Dextrose  1,000 ml @ 


 50 mls/hr  Q20H


 IV


   1/18/21 10:00


 2/17/21 09:59  1/19/21 06:01





 


 Dextrose


  (Dextrose 50%)  25 ml  Q30M  PRN


 IV


 Hypoglycemia  1/9/21 13:30


 4/9/21 13:29   





 


 Dextrose


  (Dextrose 50%)  50 ml  Q30M  PRN


 IV


 Hypoglycemia  1/9/21 13:30


 4/9/21 13:29   





 


 Diltiazem HCl  125 ml @ 0


 mls/hr  Q24H


 IVPB


   1/18/21 13:15


 1/19/21 13:14  1/19/21 05:59





 


 Docusate Sodium


  (Colace)  100 mg  TIDPRN  PRN


 GT


 Constipation  1/12/21 01:15


 2/11/21 01:14  1/12/21 01:42





 


 Enoxaparin Sodium


  (Lovenox)  80 mg  EVERY 12  HOURS


 SUBQ


   1/2/21 21:00


 4/2/21 20:59  1/18/21 20:28





 


 Insulin Aspart


  (NovoLOG)    Q6HR


 SUBQ


   1/14/21 12:00


 4/9/21 17:59  1/19/21 05:38





 


 Insulin Aspart


  (NovoLOG)  14 units  EVERY 6  HOURS


 SUBQ


   1/16/21 12:00


 4/15/21 06:59  1/19/21 05:39





 


 Insulin Detemir


  (Levemir)  20 units  Q12HR


 SUBQ


   1/14/21 09:00


 4/12/21 08:59  1/18/21 20:29





 


 Midazolam HCl  100 ml @ 2


 mls/hr  Q24H  PRN


 IV


 Agitation  1/15/21 23:00


 1/22/21 22:59  1/18/21 11:57





 


 Norepinephrine


 Bitartrate 4 mg/


 Dextrose  250 ml @ 0


 mls/hr  Q24H


 IV


   1/18/21 13:15


 1/21/21 13:14  1/18/21 21:39





 


 Pantoprazole


  (Protonix)  40 mg  DAILY


 IVP


   1/14/21 09:00


 2/13/21 08:59  1/18/21 10:16





 


 Piperacillin Sod/


 Tazobactam Sod


 3.375 gm/Sodium


 Chloride  110 ml @ 


 27.5 mls/hr  EVERY 8  HOURS


 IVPB


   1/15/21 14:00


 1/20/21 13:59  1/19/21 05:33





 


 Quetiapine


 Fumarate


  (SEROqueL)  50 mg  Q12HR


 ORAL


   1/4/21 21:00


 2/18/21 20:59  1/18/21 20:27





 


 Sodium Chloride  500 ml @ 


 999 mls/hr  Q31M PRN


 IV


 For hypotension  1/15/21 18:30


 2/14/21 18:29   





 


 Vancomycin HCl  300 ml @ 


 150 mls/hr  Q12H


 IVPB


   1/16/21 11:00


 1/21/21 10:59  1/18/21 15:00





 


 Vancomycin HCl


  (Vanco pharmacy


 to dose)  1 ea  DAILY  PRN


 MISC


 Per rx protocol  1/14/21 09:15


 2/13/21 09:14   














Assessment/Plan


Problem List:  


(1) Diabetes mellitus out of control


ICD Codes:  E11.65 - Type 2 diabetes mellitus with hyperglycemia


SNOMED:  82191452, [id number]


(2) Pneumonia due to COVID-19 virus


ICD Codes:  U07.1 - COVID-19; J12.82 - Pneumonia due to coronavirus disease 2019


SNOMED:  [id number]


Assessment/Plan:


continue Levemir 20 units bid 


continue Novolog 14 units every 6 hours 


continue Novolog sliding scale every 6 hours











Nick Mcmahon MD                 Jan 19, 2021 08:05

## 2021-01-19 NOTE — DIAGNOSTIC IMAGING REPORT
EXAM:

  XR Abdomen, 1 View

 

CLINICAL HISTORY:

  NGT

 

TECHNIQUE:

  Frontal supine view of the abdomen/pelvis.

 

COMPARISON:

  No relevant prior studies available.

 

FINDINGS:

  

NGT terminates in the stomach.  Extensive chest wall gas present.

## 2021-01-19 NOTE — NUR
NURSE NOTES:

received pt from Saba ARCHER., pt is sedated at this time, able to move head slightly. pt is 
ST at this time. no NGT noted. will try to insert new NGT as soon as possible. no feeding is 
running at this time. cash cath is draining well with gravity, no hematuria noted. skin 
alternation noted, dressing site intact, clean, and patent. right chest tube noted, with 
suction, no leakage noted. left femoral TLC noted dressing site intact, clean, and patent. 
versed 0.5mg/hr and D5W is running at 50ml/hr. bilateral soft wrist restrain noted, skin 
intact, pulse noted.  call light within reach. will continue to monitor pt with plan of 
care. bed at the lowest positioned, alarmed, and locked. will continue to monitor pt with 
plan of care. 

-------------------------------------------------------------------------------

Addendum: 01/19/21 at 4641 by TAMAR WOODS RN

-------------------------------------------------------------------------------

wrong time

## 2021-01-19 NOTE — INFECTIOUS DISEASES PROG NOTE
Assessment/Plan


Assessment/Plan


A


1. COVID-19 pneumonia.


2. New-onset diabetes.


3. Hypoxic respiratory failure


4. Sepsis with fever, tachycardia & leukocytosis


5. Bacteremia with COANS, ? line infection


6. Atrial fibrillation





P


1. Finished remdesivir course


2. Continue Zosyn & Vancomycin


3. Continue solumedrol


4. PICC line placement & removal of femoral line





Subjective


ROS Limited/Unobtainable:  Yes


Constitutional:  Reports: fever, other - last night Uk=[id number]


Allergies:  


Coded Allergies:  


     No Known Allergies (Unverified , 6/11/19)





Objective





Last 24 Hour Vital Signs








  Date Time  Temp Pulse Resp B/P (MAP) Pulse Ox O2 Delivery O2 Flow Rate FiO2


 


1/19/21 07:00  90 18 117/71 (86) 97   


 


1/19/21 07:00    120/75    


 


1/19/21 07:00   18   Mechanical Ventilator  100


 


1/19/21 06:45  90 18 128/75 (92) 96   


 


1/19/21 06:30  89 19 123/80 (94) 97   


 


1/19/21 06:00    115/62    


 


1/19/21 06:00   24   Mechanical Ventilator  100


 


1/19/21 06:00  91 18 115/74 (88) 95   


 


1/19/21 05:45   24   Mechanical Ventilator  100


 


1/19/21 05:00  91 18 110/66 (81) 97   


 


1/19/21 05:00    110/60    


 


1/19/21 04:00      Mechanical Ventilator  


 


1/19/21 04:00        100


 


1/19/21 04:00    115/69    


 


1/19/21 04:00 98.8 91 18 115/69 (84) 98   


 


1/19/21 03:56  92      


 


1/19/21 03:27  95 17     100


 


1/19/21 03:00  99 20 112/70 (84) 94   


 


1/19/21 03:00    112/70    


 


1/19/21 02:00    119/78    


 


1/19/21 02:00  101 21 119/78 (92) 91   


 


1/19/21 01:00  94 23 123/74 (90) 97   


 


1/19/21 01:00    123/74    


 


1/19/21 00:30 100.5 94 22 114/71 (85) 98   


 


1/19/21 00:00      Mechanical Ventilator  


 


1/19/21 00:00    110/72    


 


1/19/21 00:00  97 24 110/72 (85) 98   


 


1/19/21 00:00        100


 


1/18/21 23:48  97      


 


1/18/21 23:38  100 24     100


 


1/18/21 23:00  103 25 106/60 (75) 97   


 


1/18/21 23:00    106/60    


 


1/18/21 22:30 102.7 108 27 101/59 (73) 97   


 


1/18/21 22:00    90/56    


 


1/18/21 22:00  160 25 90/56 (67) 93   


 


1/18/21 21:45  162 29 82/53 (63) 83   


 


1/18/21 21:39    77/52    


 


1/18/21 21:30  159 31 77/52 (60) 87   


 


1/18/21 21:00  158 26 82/37 (52) 91   


 


1/18/21 20:35  158 34 91/59 (70) 88   


 


1/18/21 20:30  164 31 99/67 (78) 91   


 


1/18/21 20:00      Mechanical Ventilator  


 


1/18/21 20:00        100


 


1/18/21 20:00 105.7 163 30 108/91 (97) 92   


 


1/18/21 19:29  156      


 


1/18/21 19:15  155 29     100


 


1/18/21 19:00   28   Mechanical Ventilator  90


 


1/18/21 19:00  156 25 115/77 (90) 86   


 


1/18/21 18:00   25   Mechanical Ventilator  90


 


1/18/21 18:00  164 26 103/65 (78) 81   


 


1/18/21 17:30  156 31 108/66 (80) 91   


 


1/18/21 17:00  151 27 109/83 (92) 96   


 


1/18/21 17:00   25   Mechanical Ventilator  100


 


1/18/21 16:00  131      


 


1/18/21 16:00        90


 


1/18/21 16:00 98.2 131 30 112/82 (92) 96   


 


1/18/21 16:00   29   Mechanical Ventilator  90


 


1/18/21 16:00      Mechanical Ventilator  


 


1/18/21 15:00  136 26 107/59 (75) 96   


 


1/18/21 15:00   2   Mechanical Ventilator  100


 


1/18/21 14:00  141 26 114/70 (85) 95   


 


1/18/21 14:00   27   Mechanical Ventilator  100


 


1/18/21 13:23  144 27     90


 


1/18/21 13:15    122/77    


 


1/18/21 13:00  151 27 111/71 (84) 95   


 


1/18/21 13:00  134      


 


1/18/21 13:00   29   Mechanical Ventilator  90


 


1/18/21 12:00        90


 


1/18/21 12:00  125      


 


1/18/21 12:00      Mechanical Ventilator  


 


1/18/21 12:00   27     100


 


1/18/21 12:00  158 28 105/70 (82) 95   


 


1/18/21 11:57      Mechanical Ventilator  100


 


1/18/21 11:00  145 24 108/63 (78) 96   








Height (Feet):  5


Height (Inches):  7.00


Weight (Pounds):  198


HEENT:  other - orally intubated


Respiratory/Chest:  other - on ventilator, [id number]%, R chest tube


Abdomen:  soft, non tender, other - OG tube


Neurologic/Psychiatric:  other - sedated





Laboratory Tests








Test


 1/18/21


11:00 1/18/21


20:24 1/19/21


00:12 1/19/21


04:30


 


Vancomycin Level Trough


 13.8 ug/mL


(5.0-12.0)  H 


 


 





 


POC Whole Blood Glucose


 


 241 MG/DL


()  H 361 MG/DL


()  H 





 


White Blood Count


 


 


 


 18.5 K/UL


(4.8-10.8)  H


 


Red Blood Count


 


 


 


 4.43 M/UL


(4.70-6.10)  L


 


Hemoglobin


 


 


 


 12.9 G/DL


(14.2-18.0)  L


 


Hematocrit


 


 


 


 42.1 %


(42.0-52.0)


 


Mean Corpuscular Volume    95 FL (80-99)  


 


Mean Corpuscular Hemoglobin


 


 


 


 29.2 PG


(27.0-31.0)


 


Mean Corpuscular Hemoglobin


Concent 


 


 


 30.8 G/DL


(32.0-36.0)  L


 


Red Cell Distribution Width


 


 


 


 12.9 %


(11.6-14.8)


 


Platelet Count


 


 


 


 195 K/UL


(150-450)


 


Mean Platelet Volume


 


 


 


 10.0 FL


(6.5-10.1)


 


Neutrophils (%) (Auto)


 


 


 


 % (45.0-75.0)





 


Lymphocytes (%) (Auto)


 


 


 


 % (20.0-45.0)





 


Monocytes (%) (Auto)     % (1.0-10.0)  


 


Eosinophils (%) (Auto)     % (0.0-3.0)  


 


Basophils (%) (Auto)     % (0.0-2.0)  


 


Differential Total Cells


Counted 


 


 


 100  





 


Neutrophils % (Manual)    92 % (45-75)  H


 


Lymphocytes % (Manual)    6 % (20-45)  L


 


Monocytes % (Manual)    2 % (1-10)  


 


Eosinophils % (Manual)    0 % (0-3)  


 


Basophils % (Manual)    0 % (0-2)  


 


Band Neutrophils    0 % (0-8)  


 


Platelet Estimate    Adequate  


 


Platelet Morphology    Normal  


 


Red Blood Cell Morphology    Normal  


 


Sodium Level


 


 


 


 149 MMOL/L


(136-145)  H


 


Potassium Level


 


 


 


 3.4 MMOL/L


(3.5-5.1)  L


 


Chloride Level


 


 


 


 112 MMOL/L


()  H


 


Carbon Dioxide Level


 


 


 


 28 MMOL/L


(21-32)


 


Anion Gap


 


 


 


 9 mmol/L


(5-15)


 


Blood Urea Nitrogen


 


 


 


 38 mg/dL


(7-18)  H


 


Creatinine


 


 


 


 0.8 MG/DL


(0.55-1.30)


 


Estimat Glomerular Filtration


Rate 


 


 


 > 60 mL/min


(>60)


 


Glucose Level


 


 


 


 236 MG/DL


()  H


 


Calcium Level


 


 


 


 8.4 MG/DL


(8.5-10.1)  L


 


Troponin I


 


 


 


 0.056 ng/mL


(0.000-0.056)


 


Digoxin Level


 


 


 


 0.6 NG/ML


(0.5-2.0)


 


Test


 1/19/21


08:23 


 


 





 


Arterial Blood pH


 7.410


(7.350-7.450) 


 


 





 


Arterial Blood Partial


Pressure CO2 53.2 mmHg


(35.0-45.0)  H 


 


 





 


Arterial Blood Partial


Pressure O2 67.3 mmHg


(75.0-100.0)  L 


 


 





 


Arterial Blood HCO3


 33.0 mmol/L


(22.0-26.0)  H 


 


 





 


Arterial Blood Oxygen


Saturation 93.3 %


()  L 


 


 





 


Arterial Blood Base Excess 6.9 (-2-2)  H   


 


Abelardo Test Positive     











Current Medications








 Medications


  (Trade)  Dose


 Ordered  Sig/Julisa


 Route


 PRN Reason  Start Time


 Stop Time Status Last Admin


Dose Admin


 


 Acetaminophen


  (Tylenol)  650 mg  Q4H  PRN


 GT


 Temp >100.5  1/15/21 17:15


 2/14/21 17:14  1/17/21 23:12





 


 Amiodarone HCl


  (Cordarone)  400 mg  EVERY 12  HOURS


 NG


   1/19/21 21:00


 4/19/21 20:59   





 


 Chlorhexidine


 Gluconate


  (Vanessa-Hex 2%)  1 applic  DAILY@2000


 TOPIC


   1/6/21 20:00


 4/6/21 19:59  1/18/21 20:27





 


 Dextrose  1,000 ml @ 


 50 mls/hr  Q20H


 IV


   1/18/21 10:00


 2/17/21 09:59  1/19/21 06:01





 


 Dextrose


  (Dextrose 50%)  25 ml  Q30M  PRN


 IV


 Hypoglycemia  1/9/21 13:30


 4/9/21 13:29   





 


 Dextrose


  (Dextrose 50%)  50 ml  Q30M  PRN


 IV


 Hypoglycemia  1/9/21 13:30


 4/9/21 13:29   





 


 Digoxin


  (Lanoxin)  0.25 mg  DAILY


 NG


   1/20/21 09:00


 4/20/21 08:59   





 


 Diltiazem HCl


  (Cardizem Tab)  30 mg  EVERY 8  HOURS


 NG


   1/19/21 14:00


 2/18/21 13:59   





 


 Docusate Sodium


  (Colace)  100 mg  TIDPRN  PRN


 GT


 Constipation  1/12/21 01:15


 2/11/21 01:14  1/12/21 01:42





 


 Enoxaparin Sodium


  (Lovenox)  80 mg  EVERY 12  HOURS


 SUBQ


   1/2/21 21:00


 4/2/21 20:59  1/19/21 09:57





 


 Insulin Aspart


  (NovoLOG)    Q6HR


 SUBQ


   1/14/21 12:00


 4/9/21 17:59  1/19/21 05:38





 


 Insulin Aspart


  (NovoLOG)  14 units  EVERY 6  HOURS


 SUBQ


   1/16/21 12:00


 4/15/21 06:59  1/19/21 05:39





 


 Insulin Detemir


  (Levemir)  20 units  Q12HR


 SUBQ


   1/14/21 09:00


 4/12/21 08:59  1/19/21 10:15





 


 Midazolam HCl  100 ml @ 2


 mls/hr  Q24H  PRN


 IV


 Agitation  1/15/21 23:00


 1/22/21 22:59  1/18/21 11:57





 


 Norepinephrine


 Bitartrate 4 mg/


 Dextrose  250 ml @ 0


 mls/hr  Q24H


 IV


   1/18/21 13:15


 1/21/21 13:14  1/18/21 21:39





 


 Pantoprazole


  (Protonix)  40 mg  DAILY


 IVP


   1/14/21 09:00


 2/13/21 08:59  1/19/21 09:57





 


 Piperacillin Sod/


 Tazobactam Sod


 3.375 gm/Sodium


 Chloride  110 ml @ 


 27.5 mls/hr  EVERY 8  HOURS


 IVPB


   1/15/21 14:00


 1/24/21 13:59  1/19/21 05:33





 


 Quetiapine


 Fumarate


  (SEROqueL)  50 mg  Q12HR


 ORAL


   1/4/21 21:00


 2/18/21 20:59  1/18/21 20:27





 


 Sodium Chloride  500 ml @ 


 999 mls/hr  Q31M PRN


 IV


 For hypotension  1/15/21 18:30


 2/14/21 18:29   





 


 Vancomycin HCl  300 ml @ 


 150 mls/hr  Q12H


 IVPB


   1/16/21 11:00


 1/21/21 10:59  1/19/21 10:56





 


 Vancomycin HCl


  ([facility]


 to dose)  1 ea  DAILY  PRN


 MISC


 Per rx protocol  1/14/21 09:15


 2/13/21 09:14   




















Lamin Felix MD               Jan 19, 2021 11:06

## 2021-01-19 NOTE — NUR
NURSE NOTES:



Pt's BG checked, 123. Temperature checked, 100.5. Unable to give PRN tylenol and scheduled 
K-dur due to pt's NGT is clogged. Scheduled novolog held as well due to tube feeding held at 
this time. Pt's NGT re-inserted through right nare, tube auscultated, pt's O2 sat remained 
in the low 90% during NGT insertion. Stat Abdominal XR ordered to check for placement. Will 
endorse to night shift RN.

## 2021-01-19 NOTE — HEMATOLOGY/ONC PROGRESS NOTE
Assessment/Plan


Assessment/Plan


Leukocytosis 2/2 covid pna wbc 15


Anemia 2/2 chronic disease


Hypercoag disorer now on lovenox sq


Ac resp distress on ventilator


Pneumomediastinum


etoh abused


agitated -halodol


vent





cont iv abx and iv decadrone


pulmonary and gi on consult


smear is noted


pneumomediastinum per pulm


dw charge nurse


lovenox sq





Subjective


Allergies:  


Coded Allergies:  


     No Known Allergies (Unverified , 6/11/19)


All Systems:  reviewed and negative except above


Subjective


1/10 on ventilator 60% fio2, on sediation, unresponsive, in icu


1/14 on vent, icu, wbc elev, no bleeding, unresponsive


1/15 on vent, with cash, icu, no bleeding, unresponsive


1/17 on vent, dw rn, no major changes, icu, nv


1/18 nv, labs reviewd, hr is elev, with afib, is onlovenox sq


1/19 remains on vent, sedated with wrist restraints, icu





Objective


Objective





Current Medications








 Medications


  (Trade)  Dose


 Ordered  Sig/Julisa


 Route


 PRN Reason  Start Time


 Stop Time Status Last Admin


Dose Admin


 


 Acetaminophen


  (Tylenol)  650 mg  Q4H  PRN


 GT


 Temp >100.5  1/15/21 17:15


 2/14/21 17:14  1/17/21 23:12





 


 Amiodarone HCl


  (Cordarone)  400 mg  EVERY 12  HOURS


 NG


   1/19/21 21:00


 4/19/21 20:59   





 


 Chlorhexidine


 Gluconate


  (Vanessa-Hex 2%)  1 applic  DAILY@2000


 TOPIC


   1/6/21 20:00


 4/6/21 19:59  1/18/21 20:27





 


 Dextrose  1,000 ml @ 


 50 mls/hr  Q20H


 IV


   1/18/21 10:00


 2/17/21 09:59  1/19/21 06:01





 


 Dextrose


  (Dextrose 50%)  25 ml  Q30M  PRN


 IV


 Hypoglycemia  1/9/21 13:30


 4/9/21 13:29   





 


 Dextrose


  (Dextrose 50%)  50 ml  Q30M  PRN


 IV


 Hypoglycemia  1/9/21 13:30


 4/9/21 13:29   





 


 Digoxin


  (Lanoxin)  0.25 mg  DAILY


 NG


   1/20/21 09:00


 4/20/21 08:59   





 


 Diltiazem HCl


  (Cardizem Tab)  30 mg  EVERY 8  HOURS


 NG


   1/19/21 14:00


 2/18/21 13:59   





 


 Docusate Sodium


  (Colace)  100 mg  TIDPRN  PRN


 GT


 Constipation  1/12/21 01:15


 2/11/21 01:14  1/12/21 01:42





 


 Enoxaparin Sodium


  (Lovenox)  80 mg  EVERY 12  HOURS


 SUBQ


   1/2/21 21:00


 4/2/21 20:59  1/19/21 09:57





 


 Insulin Aspart


  (NovoLOG)    Q6HR


 SUBQ


   1/14/21 12:00


 4/9/21 17:59  1/19/21 05:38





 


 Insulin Aspart


  (NovoLOG)  14 units  EVERY 6  HOURS


 SUBQ


   1/16/21 12:00


 4/15/21 06:59  1/19/21 05:39





 


 Insulin Detemir


  (Levemir)  20 units  Q12HR


 SUBQ


   1/14/21 09:00


 4/12/21 08:59  1/18/21 20:29





 


 Midazolam HCl  100 ml @ 2


 mls/hr  Q24H  PRN


 IV


 Agitation  1/15/21 23:00


 1/22/21 22:59  1/18/21 11:57





 


 Norepinephrine


 Bitartrate 4 mg/


 Dextrose  250 ml @ 0


 mls/hr  Q24H


 IV


   1/18/21 13:15


 1/21/21 13:14  1/18/21 21:39





 


 Pantoprazole


  (Protonix)  40 mg  DAILY


 IVP


   1/14/21 09:00


 2/13/21 08:59  1/19/21 09:57





 


 Piperacillin Sod/


 Tazobactam Sod


 3.375 gm/Sodium


 Chloride  110 ml @ 


 27.5 mls/hr  EVERY 8  HOURS


 IVPB


   1/15/21 14:00


 1/24/21 13:59  1/19/21 05:33





 


 Quetiapine


 Fumarate


  (SEROqueL)  50 mg  Q12HR


 ORAL


   1/4/21 21:00


 2/18/21 20:59  1/18/21 20:27





 


 Sodium Chloride  500 ml @ 


 999 mls/hr  Q31M PRN


 IV


 For hypotension  1/15/21 18:30


 2/14/21 18:29   





 


 Vancomycin HCl  300 ml @ 


 150 mls/hr  Q12H


 IVPB


   1/16/21 11:00


 1/21/21 10:59  1/18/21 15:00





 


 Vancomycin HCl


  ([facility] pharmacy


 to dose)  1 ea  DAILY  PRN


 MISC


 Per rx protocol  1/14/21 09:15


 2/13/21 09:14   














Last 24 Hour Vital Signs








  Date Time  Temp Pulse Resp B/P (MAP) Pulse Ox O2 Delivery O2 Flow Rate FiO2


 


1/19/21 07:00  90 18 117/71 (86) 97   


 


1/19/21 07:00    120/75    


 


1/19/21 07:00   18   Mechanical Ventilator  100


 


1/19/21 06:45  90 18 128/75 (92) 96   


 


1/19/21 06:30  89 19 123/80 (94) 97   


 


1/19/21 06:00    115/62    


 


1/19/21 06:00   24   Mechanical Ventilator  100


 


1/19/21 06:00  91 18 115/74 (88) 95   


 


1/19/21 05:45   24   Mechanical Ventilator  100


 


1/19/21 05:00  91 18 110/66 (81) 97   


 


1/19/21 05:00    110/60    


 


1/19/21 04:00      Mechanical Ventilator  


 


1/19/21 04:00        100


 


1/19/21 04:00    115/69    


 


1/19/21 04:00 98.8 91 18 115/69 (84) 98   


 


1/19/21 03:56  92      


 


1/19/21 03:27  95 17     100


 


1/19/21 03:00  99 20 112/70 (84) 94   


 


1/19/21 03:00    112/70    


 


1/19/21 02:00    119/78    


 


1/19/21 02:00  101 21 119/78 (92) 91   


 


1/19/21 01:00  94 23 123/74 (90) 97   


 


1/19/21 01:00    123/74    


 


1/19/21 00:30 100.5 94 22 114/71 (85) 98   


 


1/19/21 00:00      Mechanical Ventilator  


 


1/19/21 00:00    110/72    


 


1/19/21 00:00  97 24 110/72 (85) 98   


 


1/19/21 00:00        100


 


1/18/21 23:48  97      


 


1/18/21 23:38  100 24     100


 


1/18/21 23:00  103 25 106/60 (75) 97   


 


1/18/21 23:00    106/60    


 


1/18/21 22:30 102.7 108 27 101/59 (73) 97   


 


1/18/21 22:00    90/56    


 


1/18/21 22:00  160 25 90/56 (67) 93   


 


1/18/21 21:45  162 29 82/53 (63) 83   


 


1/18/21 21:39    77/52    


 


1/18/21 21:30  159 31 77/52 (60) 87   


 


1/18/21 21:00  158 26 82/37 (52) 91   


 


1/18/21 20:35  158 34 91/59 (70) 88   


 


1/18/21 20:30  164 31 99/67 (78) 91   


 


1/18/21 20:00      Mechanical Ventilator  


 


1/18/21 20:00        100


 


1/18/21 20:00 105.7 163 30 108/91 (97) 92   


 


1/18/21 19:29  156      


 


1/18/21 19:15  155 29     100


 


1/18/21 19:00   28   Mechanical Ventilator  90


 


1/18/21 19:00  156 25 115/77 (90) 86   


 


1/18/21 18:00   25   Mechanical Ventilator  90


 


1/18/21 18:00  164 26 103/65 (78) 81   


 


1/18/21 17:30  156 31 108/66 (80) 91   


 


1/18/21 17:00  151 27 109/83 (92) 96   


 


1/18/21 17:00   25   Mechanical Ventilator  100


 


1/18/21 16:00  131      


 


1/18/21 16:00        90


 


1/18/21 16:00 98.2 131 30 112/82 (92) 96   


 


1/18/21 16:00   29   Mechanical Ventilator  90


 


1/18/21 16:00      Mechanical Ventilator  


 


1/18/21 15:00  136 26 107/59 (75) 96   


 


1/18/21 15:00   2   Mechanical Ventilator  100


 


1/18/21 14:00  141 26 114/70 (85) 95   


 


1/18/21 14:00   27   Mechanical Ventilator  100


 


1/18/21 13:23  144 27     90


 


1/18/21 13:15    122/77    


 


1/18/21 13:00  151 27 111/71 (84) 95   


 


1/18/21 13:00  134      


 


1/18/21 13:00   29   Mechanical Ventilator  90


 


1/18/21 12:00        90


 


1/18/21 12:00  125      


 


1/18/21 12:00      Mechanical Ventilator  


 


1/18/21 12:00   27     100


 


1/18/21 12:00  158 28 105/70 (82) 95   


 


1/18/21 11:57      Mechanical Ventilator  100


 


1/18/21 11:00  145 24 108/63 (78) 96   


 


1/18/21 10:18   23   Mechanical Ventilator 100.0 


 


1/18/21 10:00  141 24 104/67 (79) 94   


 


1/18/21 09:00 97.8 141 23 102/62 (75) 95   


 


1/18/21 09:00        100


 


1/18/21 08:00  138 22 101/61 (74) 95   


 


1/18/21 08:00      Mechanical Ventilator  


 


1/18/21 08:00        90


 


1/18/21 08:00   24   Mechanical Ventilator 100.0 


 


1/18/21 08:00 99.8       


 


1/18/21 08:00  138      


 


1/18/21 07:47  131 20     90


 


1/18/21 07:32   20   Mechanical Ventilator  90


 


1/18/21 07:21   20   Mechanical Ventilator  90


 


1/18/21 07:00  130 19 109/74 (86) 97   


 


1/18/21 06:55  129      


 


1/18/21 06:21   19   Mechanical Ventilator  45


 


1/18/21 06:00  131 18 112/69 (83) 96   


 


1/18/21 05:21   16   Mechanical Ventilator  90


 


1/18/21 05:00  121 15 104/68 (80) 96   


 


1/18/21 04:45  123 16 107/77 (87) 97   


 


1/18/21 04:30  96 15 103/76 (85) 97   


 


1/18/21 04:21   15   Mechanical Ventilator  90


 


1/18/21 04:15  96 15 113/73 (86) 97   


 


1/18/21 04:00      Mechanical Ventilator  


 


1/18/21 04:00        90


 


1/18/21 04:00 98.8 97 15 109/67 (81) 97   


 


1/18/21 03:21   16   Mechanical Ventilator  90


 


1/18/21 03:07  98      


 


1/18/21 03:00  98 15 109/75 (86) 98   


 


1/18/21 02:50  98 20     90


 


1/18/21 02:21   16   Mechanical Ventilator  90


 


1/18/21 02:00  100 16 100/71 (81) 98   


 


1/18/21 01:21   16   Mechanical Ventilator  90


 


1/18/21 01:00 99.8 103 18 105/71 (82) 98   


 


1/18/21 00:21   21   Mechanical Ventilator  90


 


1/18/21 00:00 100.0 110 20 105/80 (88) 97   


 


1/18/21 00:00        90


 


1/18/21 00:00      Mechanical Ventilator  


 


1/17/21 23:42 99.8       


 


1/17/21 23:21   19   Mechanical Ventilator  90


 


1/17/21 23:06  111 19     90


 


1/17/21 23:06  111      


 


1/17/21 23:00  112 20 106/74 (85) 97   


 


1/17/21 22:21   20   Mechanical Ventilator  90


 


1/17/21 22:00  109 21 116/82 (93) 98   


 


1/17/21 21:21   21   Mechanical Ventilator  90


 


1/17/21 21:00  112 21 114/78 (90) 98   


 


1/17/21 20:45  111 21 112/79 (90) 97   


 


1/17/21 20:30  112 22 115/79 (91) 97   


 


1/17/21 20:21   22   Mechanical Ventilator  90


 


1/17/21 20:15  111 20 113/79 (90) 98   


 


1/17/21 20:06   21   Mechanical Ventilator  90


 


1/17/21 20:00 98.9 111 20 109/75 (86) 97   


 


1/17/21 20:00        90


 


1/17/21 20:00      Mechanical Ventilator  


 


1/17/21 19:51   21   Mechanical Ventilator  90


 


1/17/21 19:45  110 20 96/79 (85) 97   


 


1/17/21 19:30  110 21 106/82 (90) 97   


 


1/17/21 19:15  110 22 103/73 (83) 97   


 


1/17/21 19:11  110      


 


1/17/21 19:00  109 21 105/77 (86) 97   


 


1/17/21 18:58  110 22     90


 


1/17/21 18:51   22   Mechanical Ventilator  


 


1/17/21 18:30  108 20 108/71 (83) 97   


 


1/17/21 18:15  106 20 100/68 (79) 97   


 


1/17/21 18:00 100.0 105 19 102/70 (81) 97   


 


1/17/21 17:51   20   Mechanical Ventilator  


 


1/17/21 17:45  106 18 114/75 (88) 97   


 


1/17/21 17:32 100.5       


 


1/17/21 17:30  103 20 107/68 (81) 98   


 


1/17/21 17:15  102 19 106/70 (82) 98   


 


1/17/21 17:00 100.5 102 20 100/72 (81) 97   


 


1/17/21 16:51   19   Mechanical Ventilator  


 


1/17/21 16:30  103 21 106/67 (80) 98   


 


1/17/21 16:15  104 21 103/67 (79) 97   


 


1/17/21 16:00        90


 


1/17/21 16:00      Mechanical Ventilator  


 


1/17/21 16:00  103      


 


1/17/21 16:00 101.0 103 22 104/67 (79) 98   


 


1/17/21 15:51   23   Mechanical Ventilator  


 


1/17/21 15:45  104 23 97/66 (76) 97   


 


1/17/21 15:30  104 24 96/70 (79) 97   


 


1/17/21 15:27  103 22     90


 


1/17/21 15:15  104 23 99/67 (78) 97   


 


1/17/21 15:00  105 24 100/67 (78) 97   


 


1/17/21 14:51   23   Mechanical Ventilator  


 


1/17/21 14:45  106 25 97/64 (75) 96   


 


1/17/21 14:30  106 24 97/65 (76) 96   


 


1/17/21 14:15  106 24 100/68 (79) 96   


 


1/17/21 14:00  107 22 109/68 (82) 95   


 


1/17/21 13:51      Mechanical Ventilator  


 


1/17/21 13:08 101.0       


 


1/17/21 13:00  111 24 135/82 (99) 97   


 


1/17/21 12:45  111 26 129/81 (97) 97   


 


1/17/21 12:30  112 26 133/85 (101) 96   


 


1/17/21 12:15  111 23 116/80 (92) 96   


 


1/17/21 12:00  115      


 


1/17/21 12:00      Mechanical Ventilator  


 


1/17/21 12:00 102.1 115 25 119/81 (94) 95   


 


1/17/21 11:45  117 26 127/81 (96) 95   


 


1/17/21 11:38  116 25     90


 


1/17/21 11:30  117 27 134/84 (101)    


 


1/17/21 11:15  115 25 131/83 (99)    


 


1/17/21 11:00  116 27 127/84 (98)    


 


1/17/21 10:45  115 25 122/78 (93)    


 


1/17/21 10:30  115 26 126/83 (97)    


 


1/17/21 10:15  113 23 120/85 (97)    

















Intake and Output  


 


 1/18/21 1/19/21





 19:00 07:00


 


Intake Total 1657.5 ml 1586.75 ml


 


Output Total 1810 ml 1515 ml


 


Balance -152.5 ml 71.75 ml


 


  


 


Free Water 50 ml 30 ml


 


IV Total 947.5 ml 791.75 ml


 


Tube Feeding 660 ml 715 ml


 


Other  50 ml


 


Output Urine Total 1810 ml 1515 ml


 


# Bowel Movements  1











Labs








Test


 1/17/21


06:57 1/17/21


07:59 1/17/21


12:00 1/18/21


04:55


 


White Blood Count


 19.1 K/UL


(4.8-10.8) 


 


 15.5 K/UL


(4.8-10.8)


 


Red Blood Count


 4.86 M/UL


(4.70-6.10) 


 


 4.36 M/UL


(4.70-6.10)


 


Hemoglobin


 13.8 G/DL


(14.2-18.0) 


 


 12.7 G/DL


(14.2-18.0)


 


Hematocrit


 44.8 %


(42.0-52.0) 


 


 41.2 %


(42.0-52.0)


 


Mean Corpuscular Volume 92 FL (80-99)    94 FL (80-99) 


 


Mean Corpuscular Hemoglobin


 28.4 PG


(27.0-31.0) 


 


 29.0 PG


(27.0-31.0)


 


Mean Corpuscular Hemoglobin


Concent 30.8 G/DL


(32.0-36.0) 


 


 30.8 G/DL


(32.0-36.0)


 


Red Cell Distribution Width


 12.9 %


(11.6-14.8) 


 


 13.2 %


(11.6-14.8)


 


Platelet Count


 193 K/UL


(150-450) 


 


 172 K/UL


(150-450)


 


Mean Platelet Volume


 9.9 FL


(6.5-10.1) 


 


 10.3 FL


(6.5-10.1)


 


Neutrophils (%) (Auto)  % (45.0-75.0)     % (45.0-75.0) 


 


Lymphocytes (%) (Auto)  % (20.0-45.0)     % (20.0-45.0) 


 


Monocytes (%) (Auto)  % (1.0-10.0)     % (1.0-10.0) 


 


Eosinophils (%) (Auto)  % (0.0-3.0)     % (0.0-3.0) 


 


Basophils (%) (Auto)  % (0.0-2.0)     % (0.0-2.0) 


 


Differential Total Cells


Counted 100 


 


 


 100 





 


Neutrophils % (Manual) 93 % (45-75)    85 % (45-75) 


 


Lymphocytes % (Manual) 4 % (20-45)    7 % (20-45) 


 


Monocytes % (Manual) 2 % (1-10)    4 % (1-10) 


 


Eosinophils % (Manual) 1 % (0-3)    4 % (0-3) 


 


Basophils % (Manual) 0 % (0-2)    0 % (0-2) 


 


Band Neutrophils 0 % (0-8)    0 % (0-8) 


 


Platelet Estimate Adequate    Adequate 


 


Platelet Morphology Normal    Normal 


 


Red Blood Cell Morphology     


 


Hypochromasia 1+    1+ 


 


Sodium Level


 151 MMOL/L


(136-145) 


 


 151 MMOL/L


(136-145)


 


Potassium Level


 3.5 MMOL/L


(3.5-5.1) 


 


 3.7 MMOL/L


(3.5-5.1)


 


Chloride Level


 113 MMOL/L


() 


 


 114 MMOL/L


()


 


Carbon Dioxide Level


 32 MMOL/L


(21-32) 


 


 35 MMOL/L


(21-32)


 


Anion Gap


 6 mmol/L


(5-15) 


 


 2 mmol/L


(5-15)


 


Blood Urea Nitrogen


 42 mg/dL


(7-18) 


 


 38 mg/dL


(7-18)


 


Creatinine


 1.0 MG/DL


(0.55-1.30) 


 


 0.8 MG/DL


(0.55-1.30)


 


Estimat Glomerular Filtration


Rate > 60 mL/min


(>60) 


 


 > 60 mL/min


(>60)


 


Glucose Level


 150 MG/DL


() 


 


 189 MG/DL


()


 


Calcium Level


 9.0 MG/DL


(8.5-10.1) 


 


 8.5 MG/DL


(8.5-10.1)


 


Total Bilirubin


 0.4 MG/DL


(0.2-1.0) 


 


 





 


Aspartate Amino Transf


(AST/SGOT) 24 U/L (15-37) 


 


 


 





 


Alanine Aminotransferase


(ALT/SGPT) 16 U/L (12-78) 


 


 


 





 


Alkaline Phosphatase


 85 U/L


() 


 


 





 


Total Protein


 6.2 G/DL


(6.4-8.2) 


 


 





 


Albumin


 1.5 G/DL


(3.4-5.0) 


 


 





 


Globulin 4.7 g/dL    


 


Albumin/Globulin Ratio 0.3 (1.0-2.7)    


 


Arterial Blood pH


 


 7.456


(7.350-7.450) 


 





 


Arterial Blood Partial


Pressure CO2 


 48.8 mmHg


(35.0-45.0) 


 





 


Arterial Blood Partial


Pressure O2 


 57.3 mmHg


(75.0-100.0) 


 





 


Arterial Blood HCO3


 


 33.6 mmol/L


(22.0-26.0) 


 





 


Arterial Blood Oxygen


Saturation 


 90.5 %


() 


 





 


Arterial Blood Base Excess  8.3 (-2-2)   


 


Abelardo Test  Positive   


 


POC Whole Blood Glucose


 


 


 226 MG/DL


() 





 


Test


 1/18/21


07:48 1/18/21


11:00 1/18/21


20:24 1/19/21


00:12


 


Arterial Blood pH


 7.416


(7.350-7.450) 


 


 





 


Arterial Blood Partial


Pressure CO2 53.3 mmHg


(35.0-45.0) 


 


 





 


Arterial Blood Partial


Pressure O2 66.9 mmHg


(75.0-100.0) 


 


 





 


Arterial Blood HCO3


 33.5 mmol/L


(22.0-26.0) 


 


 





 


Arterial Blood Oxygen


Saturation 93.5 %


() 


 


 





 


Arterial Blood Base Excess 7.4 (-2-2)    


 


Abelardo Test Positive    


 


Vancomycin Level Trough


 


 13.8 ug/mL


(5.0-12.0) 


 





 


POC Whole Blood Glucose


 


 


 241 MG/DL


() 361 MG/DL


()


 


Test


 1/19/21


04:30 1/19/21


08:23 


 





 


White Blood Count


 18.5 K/UL


(4.8-10.8) 


 


 





 


Red Blood Count


 4.43 M/UL


(4.70-6.10) 


 


 





 


Hemoglobin


 12.9 G/DL


(14.2-18.0) 


 


 





 


Hematocrit


 42.1 %


(42.0-52.0) 


 


 





 


Mean Corpuscular Volume 95 FL (80-99)    


 


Mean Corpuscular Hemoglobin


 29.2 PG


(27.0-31.0) 


 


 





 


Mean Corpuscular Hemoglobin


Concent 30.8 G/DL


(32.0-36.0) 


 


 





 


Red Cell Distribution Width


 12.9 %


(11.6-14.8) 


 


 





 


Platelet Count


 195 K/UL


(150-450) 


 


 





 


Mean Platelet Volume


 10.0 FL


(6.5-10.1) 


 


 





 


Neutrophils (%) (Auto)  % (45.0-75.0)    


 


Lymphocytes (%) (Auto)  % (20.0-45.0)    


 


Monocytes (%) (Auto)  % (1.0-10.0)    


 


Eosinophils (%) (Auto)  % (0.0-3.0)    


 


Basophils (%) (Auto)  % (0.0-2.0)    


 


Sodium Level


 149 MMOL/L


(136-145) 


 


 





 


Potassium Level


 3.4 MMOL/L


(3.5-5.1) 


 


 





 


Chloride Level


 112 MMOL/L


() 


 


 





 


Carbon Dioxide Level


 28 MMOL/L


(21-32) 


 


 





 


Anion Gap


 9 mmol/L


(5-15) 


 


 





 


Blood Urea Nitrogen


 38 mg/dL


(7-18) 


 


 





 


Creatinine


 0.8 MG/DL


(0.55-1.30) 


 


 





 


Estimat Glomerular Filtration


Rate > 60 mL/min


(>60) 


 


 





 


Glucose Level


 236 MG/DL


() 


 


 





 


Calcium Level


 8.4 MG/DL


(8.5-10.1) 


 


 





 


Troponin I


 0.056 ng/mL


(0.000-0.056) 


 


 





 


Digoxin Level


 0.6 NG/ML


(0.5-2.0) 


 


 





 


Arterial Blood pH


 


 7.410


(7.350-7.450) 


 





 


Arterial Blood Partial


Pressure CO2 


 53.2 mmHg


(35.0-45.0) 


 





 


Arterial Blood Partial


Pressure O2 


 67.3 mmHg


(75.0-100.0) 


 





 


Arterial Blood HCO3


 


 33.0 mmol/L


(22.0-26.0) 


 





 


Arterial Blood Oxygen


Saturation 


 93.3 %


() 


 





 


Arterial Blood Base Excess  6.9 (-2-2)   


 


Abelardo Test  Positive   








Height (Feet):  5


Height (Inches):  7.00


Weight (Pounds):  198


Objective


General Appearance:  lethargic, moderate distress


Neck:  supple


Cardiovascular:  regular rhythm


Respiratory/Chest:  crackles/rales, rhonchi - bilaterally vent++


Abdomen:  non tender, soft


Extremities:  non-tender











Kleynberg,Emmett L. MD          Jan 19, 2021 10:05

## 2021-01-19 NOTE — NUR
NURSE NOTES:



MD Vazquez at bedside to change pt's Pleura-vac. Pt's VSS, no signs of distress noted. No 
signs of bleeding or complication noted from right chest tube site. Will continue to 
monitor.

## 2021-01-19 NOTE — NUR
NURSE NOTES:

Patient RAAS is -1, drowsy, bilateral soft wrist restraints already in place. Will restart 
the versed 0.5 mg, 1ml/hour. Will continue to monitor.

## 2021-01-19 NOTE — NUR
NURSE NOTES:



Pt's tube feeding residual checked, no residual noted. Scheduled seroquel held due to pt is 
already lethargic and on sedation, other medications given per MD order, pt tolerated well. 
MD George at bedside to assess pt. Received new order for the following medications: digoxin 
0.25mg NGT daily, amiodarone 400mg NGT Q12H, and cardizem 30mg NGT Q8H, will put in order. 
Also received order from MD George to discontinue cardizem drip and titrate levophed drip to 
keep SBP above 90. Will put in all orders.

## 2021-01-20 VITALS — DIASTOLIC BLOOD PRESSURE: 47 MMHG | SYSTOLIC BLOOD PRESSURE: 82 MMHG

## 2021-01-20 VITALS — SYSTOLIC BLOOD PRESSURE: 91 MMHG | DIASTOLIC BLOOD PRESSURE: 56 MMHG

## 2021-01-20 VITALS — SYSTOLIC BLOOD PRESSURE: 102 MMHG | DIASTOLIC BLOOD PRESSURE: 68 MMHG

## 2021-01-20 VITALS — DIASTOLIC BLOOD PRESSURE: 56 MMHG | SYSTOLIC BLOOD PRESSURE: 100 MMHG

## 2021-01-20 VITALS — DIASTOLIC BLOOD PRESSURE: 72 MMHG | SYSTOLIC BLOOD PRESSURE: 115 MMHG

## 2021-01-20 VITALS — SYSTOLIC BLOOD PRESSURE: 91 MMHG | DIASTOLIC BLOOD PRESSURE: 58 MMHG

## 2021-01-20 VITALS — DIASTOLIC BLOOD PRESSURE: 62 MMHG | SYSTOLIC BLOOD PRESSURE: 98 MMHG

## 2021-01-20 VITALS — SYSTOLIC BLOOD PRESSURE: 98 MMHG | DIASTOLIC BLOOD PRESSURE: 57 MMHG

## 2021-01-20 VITALS — DIASTOLIC BLOOD PRESSURE: 57 MMHG | SYSTOLIC BLOOD PRESSURE: 105 MMHG

## 2021-01-20 VITALS — DIASTOLIC BLOOD PRESSURE: 58 MMHG | SYSTOLIC BLOOD PRESSURE: 92 MMHG

## 2021-01-20 VITALS — DIASTOLIC BLOOD PRESSURE: 54 MMHG | SYSTOLIC BLOOD PRESSURE: 95 MMHG

## 2021-01-20 VITALS — DIASTOLIC BLOOD PRESSURE: 69 MMHG | SYSTOLIC BLOOD PRESSURE: 111 MMHG

## 2021-01-20 VITALS — DIASTOLIC BLOOD PRESSURE: 58 MMHG | SYSTOLIC BLOOD PRESSURE: 94 MMHG

## 2021-01-20 VITALS — SYSTOLIC BLOOD PRESSURE: 98 MMHG | DIASTOLIC BLOOD PRESSURE: 62 MMHG

## 2021-01-20 VITALS — SYSTOLIC BLOOD PRESSURE: 93 MMHG | DIASTOLIC BLOOD PRESSURE: 60 MMHG

## 2021-01-20 VITALS — SYSTOLIC BLOOD PRESSURE: 99 MMHG | DIASTOLIC BLOOD PRESSURE: 61 MMHG

## 2021-01-20 VITALS — SYSTOLIC BLOOD PRESSURE: 104 MMHG | DIASTOLIC BLOOD PRESSURE: 66 MMHG

## 2021-01-20 VITALS — SYSTOLIC BLOOD PRESSURE: 103 MMHG | DIASTOLIC BLOOD PRESSURE: 62 MMHG

## 2021-01-20 VITALS — SYSTOLIC BLOOD PRESSURE: 100 MMHG | DIASTOLIC BLOOD PRESSURE: 65 MMHG

## 2021-01-20 VITALS — DIASTOLIC BLOOD PRESSURE: 57 MMHG | SYSTOLIC BLOOD PRESSURE: 99 MMHG

## 2021-01-20 VITALS — DIASTOLIC BLOOD PRESSURE: 60 MMHG | SYSTOLIC BLOOD PRESSURE: 98 MMHG

## 2021-01-20 VITALS — SYSTOLIC BLOOD PRESSURE: 101 MMHG | DIASTOLIC BLOOD PRESSURE: 65 MMHG

## 2021-01-20 VITALS — DIASTOLIC BLOOD PRESSURE: 67 MMHG | SYSTOLIC BLOOD PRESSURE: 86 MMHG

## 2021-01-20 VITALS — SYSTOLIC BLOOD PRESSURE: 114 MMHG | DIASTOLIC BLOOD PRESSURE: 69 MMHG

## 2021-01-20 VITALS — SYSTOLIC BLOOD PRESSURE: 107 MMHG | DIASTOLIC BLOOD PRESSURE: 62 MMHG

## 2021-01-20 VITALS — SYSTOLIC BLOOD PRESSURE: 92 MMHG | DIASTOLIC BLOOD PRESSURE: 63 MMHG

## 2021-01-20 VITALS — DIASTOLIC BLOOD PRESSURE: 66 MMHG | SYSTOLIC BLOOD PRESSURE: 102 MMHG

## 2021-01-20 VITALS — DIASTOLIC BLOOD PRESSURE: 61 MMHG | SYSTOLIC BLOOD PRESSURE: 95 MMHG

## 2021-01-20 VITALS — DIASTOLIC BLOOD PRESSURE: 58 MMHG | SYSTOLIC BLOOD PRESSURE: 90 MMHG

## 2021-01-20 VITALS — SYSTOLIC BLOOD PRESSURE: 104 MMHG | DIASTOLIC BLOOD PRESSURE: 69 MMHG

## 2021-01-20 VITALS — SYSTOLIC BLOOD PRESSURE: 86 MMHG | DIASTOLIC BLOOD PRESSURE: 58 MMHG

## 2021-01-20 VITALS — SYSTOLIC BLOOD PRESSURE: 88 MMHG | DIASTOLIC BLOOD PRESSURE: 60 MMHG

## 2021-01-20 VITALS — DIASTOLIC BLOOD PRESSURE: 73 MMHG | SYSTOLIC BLOOD PRESSURE: 103 MMHG

## 2021-01-20 VITALS — DIASTOLIC BLOOD PRESSURE: 67 MMHG | SYSTOLIC BLOOD PRESSURE: 117 MMHG

## 2021-01-20 LAB
ADD MANUAL DIFF: YES
ALBUMIN SERPL-MCNC: 1.3 G/DL (ref 3.4–5)
ALBUMIN/GLOB SERPL: 0.3 {RATIO} (ref 1–2.7)
ALP SERPL-CCNC: 76 U/L (ref 46–116)
ALT SERPL-CCNC: 17 U/L (ref 12–78)
ANION GAP SERPL CALC-SCNC: 3 MMOL/L (ref 5–15)
AST SERPL-CCNC: 23 U/L (ref 15–37)
BILIRUB SERPL-MCNC: 0.5 MG/DL (ref 0.2–1)
BUN SERPL-MCNC: 33 MG/DL (ref 7–18)
CALCIUM SERPL-MCNC: 8.3 MG/DL (ref 8.5–10.1)
CHLORIDE SERPL-SCNC: 113 MMOL/L (ref 98–107)
CO2 SERPL-SCNC: 32 MMOL/L (ref 21–32)
CREAT SERPL-MCNC: 0.8 MG/DL (ref 0.55–1.3)
ERYTHROCYTE [DISTWIDTH] IN BLOOD BY AUTOMATED COUNT: 13.2 % (ref 11.6–14.8)
GLOBULIN SER-MCNC: 4.1 G/DL
HCT VFR BLD CALC: 37.9 % (ref 42–52)
HGB BLD-MCNC: 11.8 G/DL (ref 14.2–18)
MCV RBC AUTO: 94 FL (ref 80–99)
PLATELET # BLD: 156 K/UL (ref 150–450)
POTASSIUM SERPL-SCNC: 4 MMOL/L (ref 3.5–5.1)
RBC # BLD AUTO: 4.04 M/UL (ref 4.7–6.1)
SODIUM SERPL-SCNC: 148 MMOL/L (ref 136–145)
WBC # BLD AUTO: 15.7 K/UL (ref 4.8–10.8)

## 2021-01-20 PROCEDURE — 02HV33Z INSERTION OF INFUSION DEVICE INTO SUPERIOR VENA CAVA, PERCUTANEOUS APPROACH: ICD-10-PCS

## 2021-01-20 PROCEDURE — B548ZZA ULTRASONOGRAPHY OF SUPERIOR VENA CAVA, GUIDANCE: ICD-10-PCS

## 2021-01-20 RX ADMIN — INSULIN ASPART SCH UNITS: 100 INJECTION, SOLUTION INTRAVENOUS; SUBCUTANEOUS at 18:28

## 2021-01-20 RX ADMIN — DILTIAZEM HYDROCHLORIDE SCH MG: 60 CAPSULE, EXTENDED RELEASE ORAL at 14:21

## 2021-01-20 RX ADMIN — INSULIN ASPART SCH UNITS: 100 INJECTION, SOLUTION INTRAVENOUS; SUBCUTANEOUS at 06:00

## 2021-01-20 RX ADMIN — INSULIN DETEMIR SCH UNITS: 100 INJECTION, SOLUTION SUBCUTANEOUS at 21:00

## 2021-01-20 RX ADMIN — ACETAMINOPHEN PRN MG: 160 SOLUTION ORAL at 00:29

## 2021-01-20 RX ADMIN — Medication SCH MLS/HR: at 22:34

## 2021-01-20 RX ADMIN — DEXTROSE MONOHYDRATE SCH MLS/HR: 50 INJECTION, SOLUTION INTRAVENOUS at 06:16

## 2021-01-20 RX ADMIN — DILTIAZEM HYDROCHLORIDE SCH MG: 60 CAPSULE, EXTENDED RELEASE ORAL at 06:00

## 2021-01-20 RX ADMIN — AMIODARONE HYDROCHLORIDE SCH MG: 200 TABLET ORAL at 20:49

## 2021-01-20 RX ADMIN — INSULIN ASPART SCH UNITS: 100 INJECTION, SOLUTION INTRAVENOUS; SUBCUTANEOUS at 18:00

## 2021-01-20 RX ADMIN — Medication SCH MLS/HR: at 11:23

## 2021-01-20 RX ADMIN — DILTIAZEM HYDROCHLORIDE SCH MG: 60 CAPSULE, EXTENDED RELEASE ORAL at 22:00

## 2021-01-20 RX ADMIN — ACETAMINOPHEN PRN MG: 160 SOLUTION ORAL at 22:35

## 2021-01-20 RX ADMIN — DEXTROSE MONOHYDRATE SCH MLS/HR: 50 INJECTION, SOLUTION INTRAVENOUS at 22:00

## 2021-01-20 RX ADMIN — INSULIN ASPART SCH UNITS: 100 INJECTION, SOLUTION INTRAVENOUS; SUBCUTANEOUS at 12:00

## 2021-01-20 RX ADMIN — CHLORHEXIDINE GLUCONATE SCH APPLIC: 213 SOLUTION TOPICAL at 20:48

## 2021-01-20 RX ADMIN — ENOXAPARIN SODIUM SCH MG: 80 INJECTION SUBCUTANEOUS at 09:00

## 2021-01-20 RX ADMIN — ACETAMINOPHEN PRN MG: 160 SOLUTION ORAL at 14:21

## 2021-01-20 RX ADMIN — PANTOPRAZOLE SODIUM SCH MG: 40 INJECTION, POWDER, FOR SOLUTION INTRAVENOUS at 09:11

## 2021-01-20 RX ADMIN — DEXTROSE MONOHYDRATE SCH MLS/HR: 50 INJECTION, SOLUTION INTRAVENOUS at 14:20

## 2021-01-20 RX ADMIN — INSULIN ASPART SCH UNITS: 100 INJECTION, SOLUTION INTRAVENOUS; SUBCUTANEOUS at 12:28

## 2021-01-20 RX ADMIN — INSULIN DETEMIR SCH UNITS: 100 INJECTION, SOLUTION SUBCUTANEOUS at 09:12

## 2021-01-20 RX ADMIN — AMIODARONE HYDROCHLORIDE SCH MG: 200 TABLET ORAL at 09:11

## 2021-01-20 RX ADMIN — ENOXAPARIN SODIUM SCH MG: 80 INJECTION SUBCUTANEOUS at 20:50

## 2021-01-20 NOTE — NUR
NURSE HAND-OFF REPORT: 



Latest Vital Signs: Temperature 96.6 , Pulse 100 , B/P 103 /62 , Respiratory Rate 24 , O2 
SAT 94 , Mechanical Ventilator, O2 Flow Rate  .  

Vital Sign Comment: 



EKG Rhythm: Sinus Rhythm

Rhythm change?: N 

MD Notified?: N -Dr. Thaddeus GUTIERREZ Response: 



Latest Singh Fall Score: 50  

Fall Risk: High Risk 

Safety Measures: Call light Within Reach, Bed Alarm Zone 1, Side Rails Side Rails x3, Bed 
position Low and Locked.

Fall Precautions: 

Door Sign



Report given to SUZI Sanders.

## 2021-01-20 NOTE — NUR
NURSE NOTES:

cleaned pt, oral care given. brown BM noted. call light within reach. no active bleeding.

## 2021-01-20 NOTE — SURGERY PROGRESS NOTE
Surgery Progress Note


Subjective


Procedure Performed


Right tube thoracostomy chest tube insertion


Additional Comments


chest tube okay.  no leak


decreased subq and pneumomedia


leukocytosis decrease


febrile





Objective





Last 24 Hour Vital Signs








  Date Time  Temp Pulse Resp B/P (MAP) Pulse Ox O2 Delivery O2 Flow Rate FiO2


 


1/20/21 08:00      Mechanical Ventilator  


 


1/20/21 08:00  109      


 


1/20/21 08:00        100


 


1/20/21 07:50 100.2 109 21 114/69 (84) 98   


 


1/20/21 07:00    117/67    


 


1/20/21 07:00   26   Mechanical Ventilator  100


 


1/20/21 07:00  110 25 117/67 (84) 97   


 


1/20/21 06:00    104/69    


 


1/20/21 06:00   19   Mechanical Ventilator  100


 


1/20/21 06:00  110 20 104/69 (81) 98   


 


1/20/21 05:00  110 19 115/72 (86) 98   


 


1/20/21 05:00    115/69    


 


1/20/21 05:00   20   Mechanical Ventilator  100


 


1/20/21 04:37  109      


 


1/20/21 04:00        100


 


1/20/21 04:00 99.0 110 21 86/67 (73) 98   


 


1/20/21 04:00    105/69    


 


1/20/21 04:00   21   Mechanical Ventilator  100


 


1/20/21 04:00      Mechanical Ventilator  


 


1/20/21 03:45  110 22 100/65 (77) 98   


 


1/20/21 03:30  110 22 102/66 (78) 98   


 


1/20/21 03:30  104 21     100


 


1/20/21 03:00    98/62    


 


1/20/21 03:00   21   Mechanical Ventilator  100


 


1/20/21 03:00  109 22 98/62 (74) 98   


 


1/20/21 02:45  109 23 95/61 (72) 98   


 


1/20/21 02:30  110 22 90/58 (69) 98   


 


1/20/21 02:20    90/56    


 


1/20/21 02:15    88/60    


 


1/20/21 02:15  110 23 88/60 (69) 98   


 


1/20/21 02:00    92/63    


 


1/20/21 02:00   22   Mechanical Ventilator  100


 


1/20/21 02:00  112 22 92/63 (73) 99   


 


1/20/21 01:55  114 23 91/56 (68) 98   


 


1/20/21 01:52    85/55    


 


1/20/21 01:47    82/47    


 


1/20/21 01:45  116 24 82/47 (59) 98   


 


1/20/21 01:00  123 23 93/60 (71) 97   


 


1/20/21 01:00   23   Mechanical Ventilator  100


 


1/20/21 00:59 99.4       


 


1/20/21 00:00 100.5 127 24 92/58 (69) 96   


 


1/20/21 00:00   24   Mechanical Ventilator  100


 


1/20/21 00:00      Mechanical Ventilator  


 


1/19/21 23:43  131      


 


1/19/21 23:29  128 24     100


 


1/19/21 23:00  130 26 103/67 (79) 94   


 


1/19/21 23:00   24   Mechanical Ventilator  100


 


1/19/21 22:42  127  112/70    


 


1/19/21 22:00   25   Mechanical Ventilator  100


 


1/19/21 22:00  127 26 106/71 (83) 92   


 


1/19/21 21:00  125 26 103/73 (83) 94   


 


1/19/21 21:00        100


 


1/19/21 21:00   26   Mechanical Ventilator  100


 


1/19/21 20:00   25   Mechanical Ventilator  100


 


1/19/21 20:00 100.0 125 24 97/66 (76) 90   


 


1/19/21 20:00      Mechanical Ventilator  


 


1/19/21 19:28  119 26     100


 


1/19/21 19:17  120      


 


1/19/21 19:00  121 29 106/65 (79) 90   


 


1/19/21 19:00   24   Mechanical Ventilator  100


 


1/19/21 18:00  118 29 102/67 (79) 91   


 


1/19/21 18:00   21   Mechanical Ventilator  100


 


1/19/21 17:00  111 23 101/65 (77) 94   


 


1/19/21 17:00   23   Mechanical Ventilator  100


 


1/19/21 16:05 99.7       


 


1/19/21 16:00  110      


 


1/19/21 16:00  110 25 107/68 (81) 99   


 


1/19/21 16:00   20   Mechanical Ventilator  100


 


1/19/21 16:00      Mechanical Ventilator  


 


1/19/21 16:00        100


 


1/19/21 15:40  107 24     100


 


1/19/21 15:00   22   Mechanical Ventilator  100


 


1/19/21 15:00  109 24 103/69 (80) 96   


 


1/19/21 14:00  114 18 105/65 (78) 96   


 


1/19/21 14:00   21   Mechanical Ventilator  100


 


1/19/21 13:46  109  106/65    


 


1/19/21 13:00  105 26 105/66 (79) 96   


 


1/19/21 13:00   18   Mechanical Ventilator  100


 


1/19/21 12:05 99.8       


 


1/19/21 12:00  101 24 105/64 (78) 96   


 


1/19/21 12:00  100      


 


1/19/21 12:00        100


 


1/19/21 12:00      Mechanical Ventilator  


 


1/19/21 12:00   19   Mechanical Ventilator  100


 


1/19/21 11:40  100 24     100


 


1/19/21 11:00  100 23 108/62 (77) 96   


 


1/19/21 11:00   22   Mechanical Ventilator  100


 


1/19/21 10:00    108/63    


 


1/19/21 10:00   20   Mechanical Ventilator  100


 


1/19/21 10:00  95 22 115/63 (80) 96   








I&O











Intake and Output  


 


 1/19/21 1/20/21





 19:00 07:00


 


Intake Total 1728.25 ml 1296.73434 ml


 


Output Total 590 ml 680 ml


 


Balance 1138.25 ml 616.05630 ml


 


  


 


Free Water 50 ml 


 


IV Total 1128.25 ml 1076.51174 ml


 


Tube Feeding 550 ml 220 ml


 


Output Urine Total 570 ml 680 ml


 


Chest Tube Drainage Total 20 ml 


 


# Bowel Movements  1








Dressing:  saturated


Wound:  other


Drains:  other


Cardiovascular:  RSR


Respiratory:  decreased breath sounds, other


Abdomen:  soft, non-tender, present bowel sounds, non-distended


Extremities:  no edema, no tenderness, no cyanosis





Laboratory Tests








Test


 1/20/21


07:04 1/20/21


08:06


 


White Blood Count


 15.7 K/UL


(4.8-10.8)  H 





 


Red Blood Count


 4.04 M/UL


(4.70-6.10)  L 





 


Hemoglobin


 11.8 G/DL


(14.2-18.0)  L 





 


Hematocrit


 37.9 %


(42.0-52.0)  L 





 


Mean Corpuscular Volume 94 FL (80-99)   


 


Mean Corpuscular Hemoglobin


 29.3 PG


(27.0-31.0) 





 


Mean Corpuscular Hemoglobin


Concent 31.2 G/DL


(32.0-36.0)  L 





 


Red Cell Distribution Width


 13.2 %


(11.6-14.8) 





 


Platelet Count


 156 K/UL


(150-450) 





 


Mean Platelet Volume


 10.8 FL


(6.5-10.1)  H 





 


Neutrophils (%) (Auto)


 % (45.0-75.0)


 





 


Lymphocytes (%) (Auto)


 % (20.0-45.0)


 





 


Monocytes (%) (Auto)  % (1.0-10.0)   


 


Eosinophils (%) (Auto)  % (0.0-3.0)   


 


Basophils (%) (Auto)  % (0.0-2.0)   


 


Neutrophils % (Manual) Pending   


 


Lymphocytes % (Manual) Pending   


 


Platelet Estimate Pending   


 


Platelet Morphology Pending   


 


Sodium Level


 148 MMOL/L


(136-145)  H 





 


Potassium Level


 4.0 MMOL/L


(3.5-5.1) 





 


Chloride Level


 113 MMOL/L


()  H 





 


Carbon Dioxide Level


 32 MMOL/L


(21-32) 





 


Anion Gap


 3 mmol/L


(5-15)  L 





 


Blood Urea Nitrogen


 33 mg/dL


(7-18)  H 





 


Creatinine


 0.8 MG/DL


(0.55-1.30) 





 


Estimat Glomerular Filtration


Rate > 60 mL/min


(>60) 





 


Glucose Level


 152 MG/DL


()  H 





 


Calcium Level


 8.3 MG/DL


(8.5-10.1)  L 





 


Total Bilirubin


 0.5 MG/DL


(0.2-1.0) 





 


Aspartate Amino Transf


(AST/SGOT) 23 U/L (15-37)


 





 


Alanine Aminotransferase


(ALT/SGPT) 17 U/L (12-78)


 





 


Alkaline Phosphatase


 76 U/L


() 





 


Total Protein


 5.4 G/DL


(6.4-8.2)  L 





 


Albumin


 1.3 G/DL


(3.4-5.0)  L 





 


Globulin 4.1 g/dL   


 


Albumin/Globulin Ratio


 0.3 (1.0-2.7)


L 





 


Arterial Blood pH


 


 7.351


(7.350-7.450)


 


Arterial Blood Partial


Pressure CO2 


 56.6 mmHg


(35.0-45.0)  *H


 


Arterial Blood Partial


Pressure O2 


 94.1 mmHg


(75.0-100.0)


 


Arterial Blood HCO3


 


 30.6 mmol/L


(22.0-26.0)  H


 


Arterial Blood Oxygen


Saturation 


 96.6 %


()


 


Arterial Blood Base Excess  3.8 (-2-2)  H


 


Abelardo Test  Positive  











Plan


Problems:  


(1) Pneumonia due to COVID-19 virus


Assessment & Plan:  Interim endotracheal intubation, endotracheal tube tip in 

good position


approximately 4 cm above the tito. There are extensive bilateral infiltrates, 

which


are markedly increased from the prior study. Pleural spaces are probably clear, 

not


well demonstrated


Markedly increased and now extensive bilateral infiltrates 


cont as per pulm and iID





(2) Hypoxia


Assessment & Plan:  patient developing DTI. in current condition, critically ill

and declining potential inevitable decline 


all care precautions taken and will cont to monitor and assist with improvement 





(3) Abdominal pain


Assessment & Plan:  65M abd pain, septic, covid, intubated ons upport


currently abd exam benign but limited given condition


line bo mary noted


okay for meds and feeds


nutritional optimization 


DAILY ESTIMATED NEEDS:


Needs based on Critical Care/ 73kg abw 


22-28  kcals/kg 


3990-6673  total kcals


1.2-2  g protein/kg


  g total protein 


25-30  mL/kg


1097-3950  total fluid mLs





NUTRITION DIAGNOSIS:


Swallowing difficulty R/T respiratory failure as evidenced by pt now


orally intubated, OGT in place, NPO





 


CURRENT TF:NPO 





 





ENTERAL NUTRITION RECOMMENDATIONS:


Vital AF 1.2 @ 60ml/hr x 24 hrs  to provide 1440ml, 1728kcal, 116g prot, 1167ml 

free water 





* As medically appropriate, initiate critical care and carb controlled TF 

formula of Vital AF 1.2


* Initiate @ 20ml/hr x 6hrs, advance 10ml q 4-6 hrs as tolerated to goal rate


* HOB over 30 degrees/ water flush per MD


* Does not exceed est kcal needs w/ Propofol running @ 8.083ml/hr x 24 hrs 

(provides 213 lipid kcal)





 





ADDITIONAL RECOMMENDATIONS:


* Calibrated bedscale wt 


* Monitor BGs closely w/ Decadron and TF, need for long acting insulin 


* Monitor lytes, replete as needed  


* Monitor Propofol rate, need to adjust TF rate  





(4) Acute metabolic encephalopathy


(5) Pneumothorax on right


Assessment & Plan:  right ptx


chest tube placed


decreased air


stable ptx


cont tube to suction


Endotracheal tube tip projects at the level of the thoracic inlet.


Orogastric tube tip projects at the level of the gastric fundus. Again 

demonstrated


is extensive subcutaneous emphysema, which appears somewhat worse on the left. 

Right


chest tube remains in place. Small right medial and apical pneumothorax are 

present,


slightly more conspicuous than on the previous exam, still small, somewhat 

difficult


to identify due to overlying subcutaneous emphysema. There is probably a tiny 

left


apical pneumothorax as well. Previously demonstrated pneumomediastinum has 

improved


considerably although still present. Bilateral infiltrates appear worse on the 

right.


 


Impression: Equivocally slightly increased but still small right pneumothorax.


 


Suspect tiny left apical pneumothorax


 


Improving pneumomediastinum


 


Worsening subcutaneous emphysema


 


Worsening right and stable left lung infiltrates 





 Right chest tube remains. There is still a small apical pneumothorax. Tiny


left apical pneumothorax persists, unchanged. There is decreased 

pneumomediastinum.


Subcutaneous gas has decreased as well. Bilateral infiltrates are unchanged.


Endotracheal tube remains at the level of the thoracic inlet. Orogastric tube is


again demonstrated, tip not well-visualized but probably stable in position


 


Impression: Stable tiny bilateral apical pneumothoraces


 


Unchanged bilateral infiltrates


 


Decreased pneumomediastinum and subcutaneous emphysema














Norberto Vazquez                Jan 20, 2021 09:14

## 2021-01-20 NOTE — HEMATOLOGY/ONC PROGRESS NOTE
Assessment/Plan


Assessment/Plan


Leukocytosis 2/2 covid pna wbc 15


Anemia 2/2 chronic disease


Hypercoag disorer now on lovenox sq


Ac resp distress on ventilator


Pneumomediastinum


etoh abused


agitated -halodol


vent





Ngt


cont iv abx and iv decadrone


pulmonary and gi on consult


smear is noted


pneumomediastinum per pulm


dw charge nurse


lovenox sq





Subjective


Constitutional:  Denies: no symptoms, chills, fever, malaise, weakness, other


Respiratory:  Denies: no symptoms, cough, shortness of breath, SOB with 

excertion, SOB at rest, sputum, wheezing, other


Genitourinary:  Denies: no symptoms, burning, discharge, frequency, flank pain, 

hematuria, incontinence, pain, urgency, other


Neurologic/Psychiatric:  Denies: no symptoms, anxiety, depressed, emotional 

problems, headache, numbness, paresthesia, pre-existing deficit, seizure, 

tingling, tremors, weakness, other


Hematologic/Lymphatic:  Denies: no symptoms, anemia, easy bleeding, easy 

bruising, adenopathy, other


Allergies:  


Coded Allergies:  


     No Known Allergies (Unverified , 6/11/19)


Subjective


1/10 on ventilator 60% fio2, on sediation, unresponsive, in icu


1/14 on vent, icu, wbc elev, no bleeding, unresponsive


1/15 on vent, with cash, icu, no bleeding, unresponsive


1/17 on vent, dw rn, no major changes, icu, nv


1/18 nv, labs reviewd, hr is elev, with afib, is onlovenox sq


1/19 remains on vent, sedated with wrist restraints, icu


1/20 on vent, with hematuria, ngt, in icu, labs reviewed





Objective


Objective





Current Medications








 Medications


  (Trade)  Dose


 Ordered  Sig/Julisa


 Route


 PRN Reason  Start Time


 Stop Time Status Last Admin


Dose Admin


 


 Acetaminophen


  (Tylenol)  650 mg  Q4H  PRN


 GT


 Temp >100.5  1/15/21 17:15


 2/14/21 17:14  1/20/21 00:29





 


 Amiodarone HCl


  (Cordarone)  400 mg  EVERY 12  HOURS


 NG


   1/19/21 21:00


 4/19/21 20:59  1/19/21 21:25





 


 Chlorhexidine


 Gluconate


  (Vanessa-Hex 2%)  1 applic  DAILY@2000


 TOPIC


   1/19/21 20:00


 4/19/21 19:59  1/19/21 20:35





 


 Dextrose  1,000 ml @ 


 50 mls/hr  Q20H


 IV


   1/18/21 10:00


 2/17/21 09:59  1/20/21 02:26





 


 Dextrose


  (Dextrose 50%)  25 ml  Q30M  PRN


 IV


 Hypoglycemia  1/9/21 13:30


 4/9/21 13:29   





 


 Dextrose


  (Dextrose 50%)  50 ml  Q30M  PRN


 IV


 Hypoglycemia  1/9/21 13:30


 4/9/21 13:29   





 


 Digoxin


  (Lanoxin)  0.25 mg  DAILY


 NG


   1/20/21 09:00


 4/20/21 08:59   





 


 Diltiazem HCl


  (Cardizem Tab)  30 mg  EVERY 8  HOURS


 NG


   1/19/21 14:00


 2/18/21 13:59  1/19/21 22:42





 


 Docusate Sodium


  (Colace)  100 mg  TIDPRN  PRN


 GT


 Constipation  1/12/21 01:15


 2/11/21 01:14  1/12/21 01:42





 


 Enoxaparin Sodium


  (Lovenox)  80 mg  EVERY 12  HOURS


 SUBQ


   1/2/21 21:00


 4/2/21 20:59  1/19/21 09:57





 


 Heparin Sodium/


 Sodium Chloride


  (Heparin 1000


 units/500ml


 Premix)  1,000 unit  ONCE  PRN


 IV


 PICC  1/19/21 11:10


 1/20/21 23:59   





 


 Insulin Aspart


  (NovoLOG)    Q6HR


 SUBQ


   1/14/21 12:00


 4/9/21 17:59  1/19/21 12:57





 


 Insulin Aspart


  (NovoLOG)  14 units  EVERY 6  HOURS


 SUBQ


   1/16/21 12:00


 4/15/21 06:59  1/19/21 12:56





 


 Insulin Detemir


  (Levemir)  20 units  Q12HR


 SUBQ


   1/14/21 09:00


 4/12/21 08:59  1/19/21 10:15





 


 Lidocaine HCl


  (Xylocaine 1%


 30ml)  30 ml  ONCE  PRN


 INJ


 PICC  1/19/21 11:10


 1/20/21 23:59   





 


 Midazolam HCl  100 ml @ 2


 mls/hr  Q24H  PRN


 IV


 Agitation  1/15/21 23:00


 1/22/21 22:59  1/18/21 11:57





 


 Norepinephrine


 Bitartrate 4 mg/


 Dextrose  250 ml @ 0


 mls/hr  Q24H


 IV


   1/18/21 13:15


 1/21/21 13:14  1/20/21 01:47





 


 Pantoprazole


  (Protonix)  40 mg  DAILY


 IVP


   1/14/21 09:00


 2/13/21 08:59  1/19/21 09:57





 


 Piperacillin Sod/


 Tazobactam Sod


 3.375 gm/Sodium


 Chloride  110 ml @ 


 27.5 mls/hr  EVERY 8  HOURS


 IVPB


   1/15/21 14:00


 1/24/21 13:59  1/20/21 06:16





 


 Quetiapine


 Fumarate


  (SEROqueL)  50 mg  Q12HR


 ORAL


   1/4/21 21:00


 2/18/21 20:59  1/19/21 20:36





 


 Sodium Chloride  500 ml @ 


 999 mls/hr  Q31M PRN


 IV


 For hypotension  1/15/21 18:30


 2/14/21 18:29   





 


 Vancomycin HCl  300 ml @ 


 150 mls/hr  Q12H


 IVPB


   1/16/21 11:00


 1/21/21 10:59  1/19/21 22:43





 


 Vancomycin HCl


  (Vanco pharmacy


 to dose)  1 ea  DAILY  PRN


 MISC


 Per rx protocol  1/14/21 09:15


 2/13/21 09:14   














Last 24 Hour Vital Signs








  Date Time  Temp Pulse Resp B/P (MAP) Pulse Ox O2 Delivery O2 Flow Rate FiO2


 


1/20/21 07:00    117/67    


 


1/20/21 07:00   26   Mechanical Ventilator  100


 


1/20/21 07:00  110 25 117/67 (84) 97   


 


1/20/21 06:00    104/69    


 


1/20/21 06:00   19   Mechanical Ventilator  100


 


1/20/21 06:00  110 20 104/69 (81) 98   


 


1/20/21 05:00  110 19 115/72 (86) 98   


 


1/20/21 05:00    115/69    


 


1/20/21 05:00   20   Mechanical Ventilator  100


 


1/20/21 04:00        100


 


1/20/21 04:00 99.0 110 21 86/67 (73) 98   


 


1/20/21 04:00    105/69    


 


1/20/21 04:00   21   Mechanical Ventilator  100


 


1/20/21 04:00      Mechanical Ventilator  


 


1/20/21 03:45  110 22 100/65 (77) 98   


 


1/20/21 03:30  110 22 102/66 (78) 98   


 


1/20/21 03:30  104 21     100


 


1/20/21 03:00    98/62    


 


1/20/21 03:00   21   Mechanical Ventilator  100


 


1/20/21 03:00  109 22 98/62 (74) 98   


 


1/20/21 02:45  109 23 95/61 (72) 98   


 


1/20/21 02:30  110 22 90/58 (69) 98   


 


1/20/21 02:20    90/56    


 


1/20/21 02:15    88/60    


 


1/20/21 02:15  110 23 88/60 (69) 98   


 


1/20/21 02:00    92/63    


 


1/20/21 02:00   22   Mechanical Ventilator  100


 


1/20/21 02:00  112 22 92/63 (73) 99   


 


1/20/21 01:55  114 23 91/56 (68) 98   


 


1/20/21 01:52    92/55    


 


1/20/21 01:47    82/47    


 


1/20/21 01:45  116 24 82/47 (59) 98   


 


1/20/21 01:00  123 23 93/60 (71) 97   


 


1/20/21 01:00   23   Mechanical Ventilator  100


 


1/20/21 00:59 99.4       


 


1/20/21 00:00 100.5 127 24 92/58 (69) 96   


 


1/20/21 00:00   24   Mechanical Ventilator  100


 


1/20/21 00:00      Mechanical Ventilator  


 


1/19/21 23:29  128 24     100


 


1/19/21 23:00  130 26 103/67 (79) 94   


 


1/19/21 23:00   24   Mechanical Ventilator  100


 


1/19/21 22:42  127  112/70    


 


1/19/21 22:00   25   Mechanical Ventilator  100


 


1/19/21 22:00  127 26 106/71 (83) 92   


 


1/19/21 21:00  125 26 103/73 (83) 94   


 


1/19/21 21:00        100


 


1/19/21 21:00   26   Mechanical Ventilator  100


 


1/19/21 20:00   25   Mechanical Ventilator  100


 


1/19/21 20:00 100.0 125 24 97/66 (76) 90   


 


1/19/21 20:00      Mechanical Ventilator  


 


1/19/21 19:28  119 26     100


 


1/19/21 19:00  121 29 106/65 (79) 90   


 


1/19/21 19:00   24   Mechanical Ventilator  100


 


1/19/21 18:00  118 29 102/67 (79) 91   


 


1/19/21 18:00   21   Mechanical Ventilator  100


 


1/19/21 17:00  111 23 101/65 (77) 94   


 


1/19/21 17:00   23   Mechanical Ventilator  100


 


1/19/21 16:05 99.7       


 


1/19/21 16:00  110      


 


1/19/21 16:00  110 25 107/68 (81) 99   


 


1/19/21 16:00   20   Mechanical Ventilator  100


 


1/19/21 16:00      Mechanical Ventilator  


 


1/19/21 16:00        100


 


1/19/21 15:40  107 24     100


 


1/19/21 15:00   22   Mechanical Ventilator  100


 


1/19/21 15:00  109 24 103/69 (80) 96   


 


1/19/21 14:00  114 18 105/65 (78) 96   


 


1/19/21 14:00   21   Mechanical Ventilator  100


 


1/19/21 13:46  109  106/65    


 


1/19/21 13:00  105 26 105/66 (79) 96   


 


1/19/21 13:00   18   Mechanical Ventilator  100


 


1/19/21 12:05 99.8       


 


1/19/21 12:00  101 24 105/64 (78) 96   


 


1/19/21 12:00  100      


 


1/19/21 12:00        100


 


1/19/21 12:00      Mechanical Ventilator  


 


1/19/21 12:00   19   Mechanical Ventilator  100


 


1/19/21 11:40  100 24     100


 


1/19/21 11:00  100 23 108/62 (77) 96   


 


1/19/21 11:00   22   Mechanical Ventilator  100


 


1/19/21 10:00    108/63    


 


1/19/21 10:00   20   Mechanical Ventilator  100


 


1/19/21 10:00  95 22 115/63 (80) 96   


 


1/19/21 09:00  92 20 115/65 (82) 97   


 


1/19/21 09:00    109/63    


 


1/19/21 09:00   19   Mechanical Ventilator  100


 


1/19/21 08:02 99.7       


 


1/19/21 08:00        100


 


1/19/21 08:00      Mechanical Ventilator  


 


1/19/21 08:00  89 17 113/64 (80) 97   


 


1/19/21 08:00    102/67    


 


1/19/21 08:00   17   Mechanical Ventilator  100


 


1/19/21 08:00  89      


 


1/19/21 07:40  89 20     100


 


1/19/21 07:00  90 18 117/71 (86) 97   


 


1/19/21 07:00    120/75    


 


1/19/21 07:00   18   Mechanical Ventilator  100


 


1/19/21 06:45  90 18 128/75 (92) 96   


 


1/19/21 06:30  89 19 123/80 (94) 97   


 


1/19/21 06:00    115/62    


 


1/19/21 06:00   24   Mechanical Ventilator  100


 


1/19/21 06:00  91 18 115/74 (88) 95   


 


1/19/21 05:45   24   Mechanical Ventilator  100


 


1/19/21 05:00  91 18 110/66 (81) 97   


 


1/19/21 05:00    110/60    


 


1/19/21 04:00      Mechanical Ventilator  


 


1/19/21 04:00        100


 


1/19/21 04:00    115/69    


 


1/19/21 04:00 98.8 91 18 115/69 (84) 98   


 


1/19/21 03:56  92      


 


1/19/21 03:27  95 17     100


 


1/19/21 03:00  99 20 112/70 (84) 94   


 


1/19/21 03:00    112/70    


 


1/19/21 02:00    119/78    


 


1/19/21 02:00  101 21 119/78 (92) 91   


 


1/19/21 01:00  94 23 123/74 (90) 97   


 


1/19/21 01:00    123/74    


 


1/19/21 00:30 100.5 94 22 114/71 (85) 98   


 


1/19/21 00:00      Mechanical Ventilator  


 


1/19/21 00:00    110/72    


 


1/19/21 00:00  97 24 110/72 (85) 98   


 


1/19/21 00:00        100


 


1/18/21 23:48  97      


 


1/18/21 23:38  100 24     100


 


1/18/21 23:00  103 25 106/60 (75) 97   


 


1/18/21 23:00    106/60    


 


1/18/21 22:30 102.7 108 27 101/59 (73) 97   


 


1/18/21 22:00    90/56    


 


1/18/21 22:00  160 25 90/56 (67) 93   


 


1/18/21 21:45  162 29 82/53 (63) 83   


 


1/18/21 21:39    77/52    


 


1/18/21 21:30  159 31 77/52 (60) 87   


 


1/18/21 21:00  158 26 82/37 (52) 91   


 


1/18/21 20:35  158 34 91/59 (70) 88   


 


1/18/21 20:30  164 31 99/67 (78) 91   


 


1/18/21 20:00      Mechanical Ventilator  


 


1/18/21 20:00        100


 


1/18/21 20:00 105.7 163 30 108/91 (97) 92   


 


1/18/21 19:29  156      


 


1/18/21 19:15  155 29     100


 


1/18/21 19:00   28   Mechanical Ventilator  90


 


1/18/21 19:00  156 25 115/77 (90) 86   


 


1/18/21 18:00   25   Mechanical Ventilator  90


 


1/18/21 18:00  164 26 103/65 (78) 81   


 


1/18/21 17:30  156 31 108/66 (80) 91   


 


1/18/21 17:00  151 27 109/83 (92) 96   


 


1/18/21 17:00   25   Mechanical Ventilator  100


 


1/18/21 16:00  131      


 


1/18/21 16:00        90


 


1/18/21 16:00 98.2 131 30 112/82 (92) 96   


 


1/18/21 16:00   29   Mechanical Ventilator  90


 


1/18/21 16:00      Mechanical Ventilator  


 


1/18/21 15:00  136 26 107/59 (75) 96   


 


1/18/21 15:00   2   Mechanical Ventilator  100


 


1/18/21 14:00  141 26 114/70 (85) 95   


 


1/18/21 14:00   27   Mechanical Ventilator  100


 


1/18/21 13:23  144 27     90


 


1/18/21 13:15    122/77    


 


1/18/21 13:00  151 27 111/71 (84) 95   


 


1/18/21 13:00  134      


 


1/18/21 13:00   29   Mechanical Ventilator  90


 


1/18/21 12:00        90


 


1/18/21 12:00  125      


 


1/18/21 12:00      Mechanical Ventilator  


 


1/18/21 12:00   27     100


 


1/18/21 12:00  158 28 105/70 (82) 95   


 


1/18/21 11:57      Mechanical Ventilator  100


 


1/18/21 11:00  145 24 108/63 (78) 96   


 


1/18/21 10:18   23   Mechanical Ventilator 100.0 


 


1/18/21 10:00  141 24 104/67 (79) 94   


 


1/18/21 09:00 97.8 141 23 102/62 (75) 95   


 


1/18/21 09:00        100


 


1/18/21 08:00  138 22 101/61 (74) 95   


 


1/18/21 08:00      Mechanical Ventilator  


 


1/18/21 08:00        90


 


1/18/21 08:00   24   Mechanical Ventilator 100.0 


 


1/18/21 08:00 99.8       


 


1/18/21 08:00  138      


 


1/18/21 07:47  131 20     90

















Intake and Output  


 


 1/19/21 1/20/21





 19:00 07:00


 


Intake Total 1728.25 ml 1286.17483 ml


 


Output Total 590 ml 680 ml


 


Balance 1138.25 ml 606.35519 ml


 


  


 


Free Water 50 ml 


 


IV Total 1128.25 ml 1066.78120 ml


 


Tube Feeding 550 ml 220 ml


 


Output Urine Total 570 ml 680 ml


 


Chest Tube Drainage Total 20 ml 


 


# Bowel Movements  1











Labs








Test


 1/17/21


07:59 1/17/21


12:00 1/18/21


04:55 1/18/21


07:48


 


Arterial Blood pH


 7.456


(7.350-7.450) 


 


 7.416


(7.350-7.450)


 


Arterial Blood Partial


Pressure CO2 48.8 mmHg


(35.0-45.0) 


 


 53.3 mmHg


(35.0-45.0)


 


Arterial Blood Partial


Pressure O2 57.3 mmHg


(75.0-100.0) 


 


 66.9 mmHg


(75.0-100.0)


 


Arterial Blood HCO3


 33.6 mmol/L


(22.0-26.0) 


 


 33.5 mmol/L


(22.0-26.0)


 


Arterial Blood Oxygen


Saturation 90.5 %


() 


 


 93.5 %


()


 


Arterial Blood Base Excess 8.3 (-2-2)    7.4 (-2-2) 


 


Abelardo Test Positive    Positive 


 


POC Whole Blood Glucose


 


 226 MG/DL


() 


 





 


White Blood Count


 


 


 15.5 K/UL


(4.8-10.8) 





 


Red Blood Count


 


 


 4.36 M/UL


(4.70-6.10) 





 


Hemoglobin


 


 


 12.7 G/DL


(14.2-18.0) 





 


Hematocrit


 


 


 41.2 %


(42.0-52.0) 





 


Mean Corpuscular Volume   94 FL (80-99)  


 


Mean Corpuscular Hemoglobin


 


 


 29.0 PG


(27.0-31.0) 





 


Mean Corpuscular Hemoglobin


Concent 


 


 30.8 G/DL


(32.0-36.0) 





 


Red Cell Distribution Width


 


 


 13.2 %


(11.6-14.8) 





 


Platelet Count


 


 


 172 K/UL


(150-450) 





 


Mean Platelet Volume


 


 


 10.3 FL


(6.5-10.1) 





 


Neutrophils (%) (Auto)    % (45.0-75.0)  


 


Lymphocytes (%) (Auto)    % (20.0-45.0)  


 


Monocytes (%) (Auto)    % (1.0-10.0)  


 


Eosinophils (%) (Auto)    % (0.0-3.0)  


 


Basophils (%) (Auto)    % (0.0-2.0)  


 


Differential Total Cells


Counted 


 


 100 


 





 


Neutrophils % (Manual)   85 % (45-75)  


 


Lymphocytes % (Manual)   7 % (20-45)  


 


Monocytes % (Manual)   4 % (1-10)  


 


Eosinophils % (Manual)   4 % (0-3)  


 


Basophils % (Manual)   0 % (0-2)  


 


Band Neutrophils   0 % (0-8)  


 


Platelet Estimate   Adequate  


 


Platelet Morphology   Normal  


 


Hypochromasia   1+  


 


Sodium Level


 


 


 151 MMOL/L


(136-145) 





 


Potassium Level


 


 


 3.7 MMOL/L


(3.5-5.1) 





 


Chloride Level


 


 


 114 MMOL/L


() 





 


Carbon Dioxide Level


 


 


 35 MMOL/L


(21-32) 





 


Anion Gap


 


 


 2 mmol/L


(5-15) 





 


Blood Urea Nitrogen


 


 


 38 mg/dL


(7-18) 





 


Creatinine


 


 


 0.8 MG/DL


(0.55-1.30) 





 


Estimat Glomerular Filtration


Rate 


 


 > 60 mL/min


(>60) 





 


Glucose Level


 


 


 189 MG/DL


() 





 


Calcium Level


 


 


 8.5 MG/DL


(8.5-10.1) 





 


Test


 1/18/21


11:00 1/18/21


20:24 1/19/21


00:12 1/19/21


04:30


 


Vancomycin Level Trough


 13.8 ug/mL


(5.0-12.0) 


 


 





 


POC Whole Blood Glucose


 


 241 MG/DL


() 361 MG/DL


() 





 


White Blood Count


 


 


 


 18.5 K/UL


(4.8-10.8)


 


Red Blood Count


 


 


 


 4.43 M/UL


(4.70-6.10)


 


Hemoglobin


 


 


 


 12.9 G/DL


(14.2-18.0)


 


Hematocrit


 


 


 


 42.1 %


(42.0-52.0)


 


Mean Corpuscular Volume    95 FL (80-99) 


 


Mean Corpuscular Hemoglobin


 


 


 


 29.2 PG


(27.0-31.0)


 


Mean Corpuscular Hemoglobin


Concent 


 


 


 30.8 G/DL


(32.0-36.0)


 


Red Cell Distribution Width


 


 


 


 12.9 %


(11.6-14.8)


 


Platelet Count


 


 


 


 195 K/UL


(150-450)


 


Mean Platelet Volume


 


 


 


 10.0 FL


(6.5-10.1)


 


Neutrophils (%) (Auto)     % (45.0-75.0) 


 


Lymphocytes (%) (Auto)     % (20.0-45.0) 


 


Monocytes (%) (Auto)     % (1.0-10.0) 


 


Eosinophils (%) (Auto)     % (0.0-3.0) 


 


Basophils (%) (Auto)     % (0.0-2.0) 


 


Differential Total Cells


Counted 


 


 


 100 





 


Neutrophils % (Manual)    92 % (45-75) 


 


Lymphocytes % (Manual)    6 % (20-45) 


 


Monocytes % (Manual)    2 % (1-10) 


 


Eosinophils % (Manual)    0 % (0-3) 


 


Basophils % (Manual)    0 % (0-2) 


 


Band Neutrophils    0 % (0-8) 


 


Platelet Estimate    Adequate 


 


Platelet Morphology    Normal 


 


Red Blood Cell Morphology    Normal 


 


Sodium Level


 


 


 


 149 MMOL/L


(136-145)


 


Potassium Level


 


 


 


 3.4 MMOL/L


(3.5-5.1)


 


Chloride Level


 


 


 


 112 MMOL/L


()


 


Carbon Dioxide Level


 


 


 


 28 MMOL/L


(21-32)


 


Anion Gap


 


 


 


 9 mmol/L


(5-15)


 


Blood Urea Nitrogen


 


 


 


 38 mg/dL


(7-18)


 


Creatinine


 


 


 


 0.8 MG/DL


(0.55-1.30)


 


Estimat Glomerular Filtration


Rate 


 


 


 > 60 mL/min


(>60)


 


Glucose Level


 


 


 


 236 MG/DL


()


 


Calcium Level


 


 


 


 8.4 MG/DL


(8.5-10.1)


 


Troponin I


 


 


 


 0.056 ng/mL


(0.000-0.056)


 


Digoxin Level


 


 


 


 0.6 NG/ML


(0.5-2.0)


 


Test


 1/19/21


08:23 1/20/21


07:04 


 





 


Arterial Blood pH


 7.410


(7.350-7.450) 


 


 





 


Arterial Blood Partial


Pressure CO2 53.2 mmHg


(35.0-45.0) 


 


 





 


Arterial Blood Partial


Pressure O2 67.3 mmHg


(75.0-100.0) 


 


 





 


Arterial Blood HCO3


 33.0 mmol/L


(22.0-26.0) 


 


 





 


Arterial Blood Oxygen


Saturation 93.3 %


() 


 


 





 


Arterial Blood Base Excess 6.9 (-2-2)    


 


Abelardo Test Positive    


 


White Blood Count


 


 15.7 K/UL


(4.8-10.8) 


 





 


Red Blood Count


 


 4.04 M/UL


(4.70-6.10) 


 





 


Hemoglobin


 


 11.8 G/DL


(14.2-18.0) 


 





 


Hematocrit


 


 37.9 %


(42.0-52.0) 


 





 


Mean Corpuscular Volume  94 FL (80-99)   


 


Mean Corpuscular Hemoglobin


 


 29.3 PG


(27.0-31.0) 


 





 


Mean Corpuscular Hemoglobin


Concent 


 31.2 G/DL


(32.0-36.0) 


 





 


Red Cell Distribution Width


 


 13.2 %


(11.6-14.8) 


 





 


Platelet Count


 


 156 K/UL


(150-450) 


 





 


Mean Platelet Volume


 


 10.8 FL


(6.5-10.1) 


 





 


Neutrophils (%) (Auto)   % (45.0-75.0)   


 


Lymphocytes (%) (Auto)   % (20.0-45.0)   


 


Monocytes (%) (Auto)   % (1.0-10.0)   


 


Eosinophils (%) (Auto)   % (0.0-3.0)   


 


Basophils (%) (Auto)   % (0.0-2.0)   








Height (Feet):  5


Height (Inches):  7.00


Weight (Pounds):  198


Objective


General Appearance:  lethargic, moderate distress


Neck:  supple


Cardiovascular:  regular rhythm


Respiratory/Chest:  crackles/rales, rhonchi - bilaterally vent++


Abdomen:  non tender, soft


Extremities:  non-tender











Emmett Cook MD          Jan 20, 2021 07:44

## 2021-01-20 NOTE — PULMONOLOGY PROGRESS NOTE
Subjective


ROS Limited/Unobtainable:  Yes


Interval Events:  Noted sub cut emphsema  On Versed drip. R CT placed


Constitutional:  Reports: fever, other - last night Fa=794.7


HEENT:  Repors: no symptoms


Respiratory:  Reports: shortness of breath - better


Cardiovascular:  Reports: no symptoms


Gastrointestinal/Abdominal:  Reports: no symptoms


Allergies:  


Coded Allergies:  


     No Known Allergies (Unverified , 6/11/19)


All Systems:  reviewed and negative except above





Objective





Last 24 Hour Vital Signs








  Date Time  Temp Pulse Resp B/P (MAP) Pulse Ox O2 Delivery O2 Flow Rate FiO2


 


1/20/21 19:46    103/62    


 


1/20/21 19:20  100 24     70


 


1/20/21 19:00    103/62    


 


1/20/21 19:00   23   Mechanical Ventilator  70


 


1/20/21 18:52  102 23 103/62 (76) 94   


 


1/20/21 18:00  102 23 107/62 (77) 93   


 


1/20/21 18:00    107/62    


 


1/20/21 18:00   23   Mechanical Ventilator  70


 


1/20/21 17:00 96.6 94 15 95/54 (68) 98   


 


1/20/21 17:00    102/63    


 


1/20/21 17:00   23   Mechanical Ventilator  70


 


1/20/21 16:00  96      


 


1/20/21 16:00    100/56    


 


1/20/21 16:00   24   Mechanical Ventilator  70


 


1/20/21 16:00        70


 


1/20/21 16:00  91 23 100/56 (71) 97   


 


1/20/21 15:58      Mechanical Ventilator  


 


1/20/21 15:29  97 21     70


 


1/20/21 15:00    98/61    


 


1/20/21 15:00   23   Mechanical Ventilator  70


 


1/20/21 15:00 100.9 99 17 102/68 (79) 97   


 


1/20/21 14:51 100.9       


 


1/20/21 14:21  103  101/65    


 


1/20/21 14:00  103 24 101/65 (77) 97   


 


1/20/21 14:00    104/63    


 


1/20/21 14:00   24   Mechanical Ventilator  70


 


1/20/21 13:00    110/59    


 


1/20/21 13:00   22   Mechanical Ventilator  70


 


1/20/21 13:00 100.6 110 23 104/66 (79) 97   


 


1/20/21 12:00  110 20 103/73 (83) 96   


 


1/20/21 12:00      Mechanical Ventilator  


 


1/20/21 12:00    103/73    


 


1/20/21 12:00   21   Mechanical Ventilator  70


 


1/20/21 12:00  111      


 


1/20/21 11:38  111 21     70


 


1/20/21 11:20        70


 


1/20/21 11:00  111 21 99/57 (71) 97   


 


1/20/21 11:00    102/61    


 


1/20/21 11:00   21   Mechanical Ventilator  70


 


1/20/21 10:00    98/62    


 


1/20/21 10:00   20   Mechanical Ventilator  100


 


1/20/21 10:00  110 21 98/62 (74) 97   


 


1/20/21 09:10  109      


 


1/20/21 09:00    112/68    


 


1/20/21 09:00   20   Mechanical Ventilator  100


 


1/20/21 09:00 99.9 113 20 111/69 (83) 97   


 


1/20/21 08:15        80


 


1/20/21 08:00      Mechanical Ventilator  


 


1/20/21 08:00    108/69    


 


1/20/21 08:00   20   Mechanical Ventilator  100


 


1/20/21 08:00  109      


 


1/20/21 08:00        100


 


1/20/21 07:50 100.2 109 21 114/69 (84) 98   


 


1/20/21 07:38  110 21     100


 


1/20/21 07:00    117/67    


 


1/20/21 07:00   26   Mechanical Ventilator  100


 


1/20/21 07:00  110 25 117/67 (84) 97   


 


1/20/21 06:00    104/69    


 


1/20/21 06:00   19   Mechanical Ventilator  100


 


1/20/21 06:00  110 20 104/69 (81) 98   


 


1/20/21 05:00  110 19 115/72 (86) 98   


 


1/20/21 05:00    115/69    


 


1/20/21 05:00   20   Mechanical Ventilator  100


 


1/20/21 04:37  109      


 


1/20/21 04:00        100


 


1/20/21 04:00 99.0 110 21 86/67 (73) 98   


 


1/20/21 04:00    105/69    


 


1/20/21 04:00   21   Mechanical Ventilator  100


 


1/20/21 04:00      Mechanical Ventilator  


 


1/20/21 03:45  110 22 100/65 (77) 98   


 


1/20/21 03:30  110 22 102/66 (78) 98   


 


1/20/21 03:30  104 21     100


 


1/20/21 03:00    98/62    


 


1/20/21 03:00   21   Mechanical Ventilator  100


 


1/20/21 03:00  109 22 98/62 (74) 98   


 


1/20/21 02:45  109 23 95/61 (72) 98   


 


1/20/21 02:30  110 22 90/58 (69) 98   


 


1/20/21 02:20    90/56    


 


1/20/21 02:15    88/60    


 


1/20/21 02:15  110 23 88/60 (69) 98   


 


1/20/21 02:00    92/63    


 


1/20/21 02:00   22   Mechanical Ventilator  100


 


1/20/21 02:00  112 22 92/63 (73) 99   


 


1/20/21 01:55  114 23 91/56 (68) 98   


 


1/20/21 01:52    85/55    


 


1/20/21 01:47    82/47    


 


1/20/21 01:45  116 24 82/47 (59) 98   


 


1/20/21 01:00  123 23 93/60 (71) 97   


 


1/20/21 01:00   23   Mechanical Ventilator  100


 


1/20/21 00:59 99.4       


 


1/20/21 00:00 100.5 127 24 92/58 (69) 96   


 


1/20/21 00:00   24   Mechanical Ventilator  100


 


1/20/21 00:00      Mechanical Ventilator  


 


1/19/21 23:43  131      


 


1/19/21 23:29  128 24     100


 


1/19/21 23:00  130 26 103/67 (79) 94   


 


1/19/21 23:00   24   Mechanical Ventilator  100


 


1/19/21 22:42  127  112/70    

















Intake and Output  


 


 1/19/21 1/20/21





 19:00 07:00


 


Intake Total 1728.25 ml 1296.07846 ml


 


Output Total 590 ml 680 ml


 


Balance 1138.25 ml 616.72545 ml


 


  


 


Free Water 50 ml 


 


IV Total 1128.25 ml 1076.52439 ml


 


Tube Feeding 550 ml 220 ml


 


Output Urine Total 570 ml 680 ml


 


Chest Tube Drainage Total 20 ml 


 


# Bowel Movements  1








Objective


On a Versed drip


General Appearance:  no acute distress


HEENT:  atraumatic


Respiratory:  lungs clear, decreased breath sounds


Cardiovascular:  normal rate, regular rhythm


Abdomen:  soft, non tender, no organomegaly


Laboratory Tests


1/20/21 07:04: 


White Blood Count 15.7H, Red Blood Count 4.04L, Hemoglobin 11.8L, Hematocrit 

37.9L, Mean Corpuscular Volume 94, Mean Corpuscular Hemoglobin 29.3, Mean 

Corpuscular Hemoglobin Concent 31.2L, Red Cell Distribution Width 13.2, Platelet

Count 156, Mean Platelet Volume 10.8H, Neutrophils (%) (Auto) , Lymphocytes (%) 

(Auto) , Monocytes (%) (Auto) , Eosinophils (%) (Auto) , Basophils (%) (Auto) , 

Differential Total Cells Counted 100, Neutrophils % (Manual) 94H, Lymphocytes % 

(Manual) 4L, Monocytes % (Manual) 1, Eosinophils % (Manual) 1, Basophils % 

(Manual) 0, Band Neutrophils 0, Platelet Estimate Adequate, Platelet Morphology 

Normal, Hypochromasia 1+, Sodium Level 148H, Potassium Level 4.0, Chloride Level

113H, Carbon Dioxide Level 32, Anion Gap 3L, Blood Urea Nitrogen 33H, Creatinine

0.8, Estimat Glomerular Filtration Rate > 60, Glucose Level 152H, Calcium Level 

8.3L, Total Bilirubin 0.5, Aspartate Amino Transf (AST/SGOT) 23, Alanine 

Aminotransferase (ALT/SGPT) 17, Alkaline Phosphatase 76, Total Protein 5.4L, 

Albumin 1.3L, Globulin 4.1, Albumin/Globulin Ratio 0.3L


1/20/21 08:06: 


Arterial Blood pH 7.351, Arterial Blood Partial Pressure CO2 56.6*H, Arterial 

Blood Partial Pressure O2 94.1, Arterial Blood HCO3 30.6H, Arterial Blood Oxygen

Saturation 96.6, Arterial Blood Base Excess 3.8H, Abelardo Test Positive





Current Medications








 Medications


  (Trade)  Dose


 Ordered  Sig/Julisa


 Route


 PRN Reason  Start Time


 Stop Time Status Last Admin


Dose Admin


 


 Acetaminophen


  (Tylenol)  650 mg  Q4H  PRN


 GT


 Temp >100.5  1/15/21 17:15


 2/14/21 17:14  1/20/21 14:21





 


 Amiodarone HCl


  (Cordarone)  400 mg  EVERY 12  HOURS


 NG


   1/19/21 21:00


 4/19/21 20:59  1/20/21 20:49





 


 Chlorhexidine


 Gluconate


  (Vanessa-Hex 2%)  1 applic  DAILY@2000


 TOPIC


   1/19/21 20:00


 4/19/21 19:59  1/20/21 20:48





 


 Dextrose  1,000 ml @ 


 50 mls/hr  Q20H


 IV


   1/18/21 10:00


 2/17/21 09:59  1/20/21 02:26





 


 Dextrose


  (Dextrose 50%)  25 ml  Q30M  PRN


 IV


 Hypoglycemia  1/9/21 13:30


 4/9/21 13:29   





 


 Dextrose


  (Dextrose 50%)  50 ml  Q30M  PRN


 IV


 Hypoglycemia  1/9/21 13:30


 4/9/21 13:29   





 


 Digoxin


  (Lanoxin)  0.125 mg  DAILY


 NG


   1/21/21 09:00


 4/21/21 08:59   





 


 Diltiazem HCl


  (Cardizem Tab)  30 mg  EVERY 8  HOURS


 NG


   1/19/21 14:00


 2/18/21 13:59  1/20/21 14:21





 


 Docusate Sodium


  (Colace)  100 mg  TIDPRN  PRN


 GT


 Constipation  1/12/21 01:15


 2/11/21 01:14  1/12/21 01:42





 


 Enoxaparin Sodium


  (Lovenox)  80 mg  EVERY 12  HOURS


 SUBQ


   1/2/21 21:00


 4/2/21 20:59  1/20/21 20:50





 


 Heparin Sodium/


 Sodium Chloride


  (Heparin 1000


 units/500ml


 Premix)  1,000 unit  ONCE  PRN


 IV


 PICC  1/19/21 11:10


 1/20/21 23:59   





 


 Insulin Aspart


  (NovoLOG)    Q6HR


 SUBQ


   1/14/21 12:00


 4/9/21 17:59  1/19/21 12:57





 


 Insulin Aspart


  (NovoLOG)  14 units  EVERY 6  HOURS


 SUBQ


   1/16/21 12:00


 4/15/21 06:59  1/20/21 18:28





 


 Insulin Detemir


  (Levemir)  20 units  Q12HR


 SUBQ


   1/14/21 09:00


 4/12/21 08:59  1/20/21 09:12





 


 Lidocaine HCl


  (Xylocaine 1%


 30ml)  30 ml  ONCE  PRN


 INJ


 PICC  1/19/21 11:10


 1/20/21 23:59   





 


 Midazolam HCl  100 ml @ 2


 mls/hr  Q24H  PRN


 IV


 Agitation  1/15/21 23:00


 1/22/21 22:59  1/18/21 11:57





 


 Norepinephrine


 Bitartrate 4 mg/


 Dextrose  250 ml @ 0


 mls/hr  Q24H


 IV


   1/18/21 13:15


 1/21/21 13:14  1/20/21 19:46





 


 Pantoprazole


  (Protonix)  40 mg  DAILY


 IVP


   1/14/21 09:00


 2/13/21 08:59  1/20/21 09:11





 


 Piperacillin Sod/


 Tazobactam Sod


 3.375 gm/Sodium


 Chloride  110 ml @ 


 27.5 mls/hr  EVERY 8  HOURS


 IVPB


   1/15/21 14:00


 1/24/21 13:59  1/20/21 14:20





 


 Quetiapine


 Fumarate


  (SEROqueL)  50 mg  Q12HR


 ORAL


   1/4/21 21:00


 2/18/21 20:59  1/20/21 20:48





 


 Sodium Chloride  500 ml @ 


 999 mls/hr  Q31M PRN


 IV


 For hypotension  1/15/21 18:30


 2/14/21 18:29   





 


 Vancomycin HCl  300 ml @ 


 150 mls/hr  Q12H


 IVPB


   1/16/21 11:00


 1/25/21 10:59  1/20/21 11:23





 


 Vancomycin HCl


  ([facility]


 to dose)  1 ea  DAILY  PRN


 MISC


 Per rx protocol  1/14/21 09:15


 2/13/21 09:14   














Assessment/Plan


Assessment/Plan


1. COVID-19 pneumonia.


   - on Decadron, azithromycin, and ceftriaxone


   - Intubated now; will continue AC mode for now


          -Currently 70% FiO2. PEEP 10


             - Has R apical PTX and subcut emphysema


             - S/p R CT placement


2. Diabetes mellitus.; 


3. Elevated inflammatory markers.


4. High D-dimer. On full dose Lovenox


5. Hyper natremia; will initiate D5W


6. Hyperglycemia.


   - BG control


7. Hypoxemia., secondary to #1; improved





DVT prophylaxis   On Lovenox.


Sedated











Sung Lemos MD           Jan 20, 2021 22:22

## 2021-01-20 NOTE — INFECTIOUS DISEASES PROG NOTE
Assessment/Plan


Assessment/Plan


antibiotics : vancomycin iv, zosyn





A


1. COVID-19 pneumonia.


on 70 % FiO2 with saturation of 97 %


s/p remdesivir


s/p ivermectin


s/p solumedrol


2. New-onset diabetes.


3. respiratory failure





P


1. Continue iv vancomycin, zosyn


2. Continue isolation.





Subjective


ROS Limited/Unobtainable:  Yes


Allergies:  


Coded Allergies:  


     No Known Allergies (Unverified , 6/11/19)





Objective





Last 24 Hour Vital Signs








  Date Time  Temp Pulse Resp B/P (MAP) Pulse Ox O2 Delivery O2 Flow Rate FiO2


 


1/20/21 11:20        70


 


1/20/21 11:00  111 21 99/57 (71) 97   


 


1/20/21 10:00    98/62    


 


1/20/21 10:00   20   Mechanical Ventilator  100


 


1/20/21 10:00  110 21 98/62 (74) 97   


 


1/20/21 09:10  109      


 


1/20/21 09:00    112/68    


 


1/20/21 09:00   20   Mechanical Ventilator  100


 


1/20/21 09:00 99.9 113 20 111/69 (83) 97   


 


1/20/21 08:15        80


 


1/20/21 08:00      Mechanical Ventilator  


 


1/20/21 08:00    108/69    


 


1/20/21 08:00   20   Mechanical Ventilator  100


 


1/20/21 08:00  109      


 


1/20/21 08:00        100


 


1/20/21 07:50 100.2 109 21 114/69 (84) 98   


 


1/20/21 07:38  110 21     100


 


1/20/21 07:00    117/67    


 


1/20/21 07:00   26   Mechanical Ventilator  100


 


1/20/21 07:00  110 25 117/67 (84) 97   


 


1/20/21 06:00    104/69    


 


1/20/21 06:00   19   Mechanical Ventilator  100


 


1/20/21 06:00  110 20 104/69 (81) 98   


 


1/20/21 05:00  110 19 115/72 (86) 98   


 


1/20/21 05:00    115/69    


 


1/20/21 05:00   20   Mechanical Ventilator  100


 


1/20/21 04:37  109      


 


1/20/21 04:00        100


 


1/20/21 04:00 99.0 110 21 86/67 (73) 98   


 


1/20/21 04:00    105/69    


 


1/20/21 04:00   21   Mechanical Ventilator  100


 


1/20/21 04:00      Mechanical Ventilator  


 


1/20/21 03:45  110 22 100/65 (77) 98   


 


1/20/21 03:30  110 22 102/66 (78) 98   


 


1/20/21 03:30  104 21     100


 


1/20/21 03:00    98/62    


 


1/20/21 03:00   21   Mechanical Ventilator  100


 


1/20/21 03:00  109 22 98/62 (74) 98   


 


1/20/21 02:45  109 23 95/61 (72) 98   


 


1/20/21 02:30  110 22 90/58 (69) 98   


 


1/20/21 02:20    90/56    


 


1/20/21 02:15    88/60    


 


1/20/21 02:15  110 23 88/60 (69) 98   


 


1/20/21 02:00    92/63    


 


1/20/21 02:00   22   Mechanical Ventilator  100


 


1/20/21 02:00  112 22 92/63 (73) 99   


 


1/20/21 01:55  114 23 91/56 (68) 98   


 


1/20/21 01:52    85/55    


 


1/20/21 01:47    82/47    


 


1/20/21 01:45  116 24 82/47 (59) 98   


 


1/20/21 01:00  123 23 93/60 (71) 97   


 


1/20/21 01:00   23   Mechanical Ventilator  100


 


1/20/21 00:59 99.4       


 


1/20/21 00:00 100.5 127 24 92/58 (69) 96   


 


1/20/21 00:00   24   Mechanical Ventilator  100


 


1/20/21 00:00      Mechanical Ventilator  


 


1/19/21 23:43  131      


 


1/19/21 23:29  128 24     100


 


1/19/21 23:00  130 26 103/67 (79) 94   


 


1/19/21 23:00   24   Mechanical Ventilator  100


 


1/19/21 22:42  127  112/70    


 


1/19/21 22:00   25   Mechanical Ventilator  100


 


1/19/21 22:00  127 26 106/71 (83) 92   


 


1/19/21 21:00  125 26 103/73 (83) 94   


 


1/19/21 21:00        100


 


1/19/21 21:00   26   Mechanical Ventilator  100


 


1/19/21 20:00   25   Mechanical Ventilator  100


 


1/19/21 20:00 100.0 125 24 97/66 (76) 90   


 


1/19/21 20:00      Mechanical Ventilator  


 


1/19/21 19:28  119 26     100


 


1/19/21 19:17  120      


 


1/19/21 19:00  121 29 106/65 (79) 90   


 


1/19/21 19:00   24   Mechanical Ventilator  100


 


1/19/21 18:00  118 29 102/67 (79) 91   


 


1/19/21 18:00   21   Mechanical Ventilator  100


 


1/19/21 17:00  111 23 101/65 (77) 94   


 


1/19/21 17:00   23   Mechanical Ventilator  100


 


1/19/21 16:05 99.7       


 


1/19/21 16:00  110      


 


1/19/21 16:00  110 25 107/68 (81) 99   


 


1/19/21 16:00   20   Mechanical Ventilator  100


 


1/19/21 16:00      Mechanical Ventilator  


 


1/19/21 16:00        100


 


1/19/21 15:40  107 24     100


 


1/19/21 15:00   22   Mechanical Ventilator  100


 


1/19/21 15:00  109 24 103/69 (80) 96   


 


1/19/21 14:00  114 18 105/65 (78) 96   


 


1/19/21 14:00   21   Mechanical Ventilator  100


 


1/19/21 13:46  109  106/65    


 


1/19/21 13:00  105 26 105/66 (79) 96   


 


1/19/21 13:00   18   Mechanical Ventilator  100


 


1/19/21 12:05 99.8       








Height (Feet):  5


Height (Inches):  7.00


Weight (Pounds):  198


HEENT:  other - intubated





Laboratory Tests








Test


 1/20/21


07:04 1/20/21


08:06


 


White Blood Count


 15.7 K/UL


(4.8-10.8)  H 





 


Red Blood Count


 4.04 M/UL


(4.70-6.10)  L 





 


Hemoglobin


 11.8 G/DL


(14.2-18.0)  L 





 


Hematocrit


 37.9 %


(42.0-52.0)  L 





 


Mean Corpuscular Volume 94 FL (80-99)   


 


Mean Corpuscular Hemoglobin


 29.3 PG


(27.0-31.0) 





 


Mean Corpuscular Hemoglobin


Concent 31.2 G/DL


(32.0-36.0)  L 





 


Red Cell Distribution Width


 13.2 %


(11.6-14.8) 





 


Platelet Count


 156 K/UL


(150-450) 





 


Mean Platelet Volume


 10.8 FL


(6.5-10.1)  H 





 


Neutrophils (%) (Auto)


 % (45.0-75.0)


 





 


Lymphocytes (%) (Auto)


 % (20.0-45.0)


 





 


Monocytes (%) (Auto)  % (1.0-10.0)   


 


Eosinophils (%) (Auto)  % (0.0-3.0)   


 


Basophils (%) (Auto)  % (0.0-2.0)   


 


Differential Total Cells


Counted 100  


 





 


Neutrophils % (Manual) 94 % (45-75)  H 


 


Lymphocytes % (Manual) 4 % (20-45)  L 


 


Monocytes % (Manual) 1 % (1-10)   


 


Eosinophils % (Manual) 1 % (0-3)   


 


Basophils % (Manual) 0 % (0-2)   


 


Band Neutrophils 0 % (0-8)   


 


Platelet Estimate Adequate   


 


Platelet Morphology Normal   


 


Hypochromasia 1+   


 


Sodium Level


 148 MMOL/L


(136-145)  H 





 


Potassium Level


 4.0 MMOL/L


(3.5-5.1) 





 


Chloride Level


 113 MMOL/L


()  H 





 


Carbon Dioxide Level


 32 MMOL/L


(21-32) 





 


Anion Gap


 3 mmol/L


(5-15)  L 





 


Blood Urea Nitrogen


 33 mg/dL


(7-18)  H 





 


Creatinine


 0.8 MG/DL


(0.55-1.30) 





 


Estimat Glomerular Filtration


Rate > 60 mL/min


(>60) 





 


Glucose Level


 152 MG/DL


()  H 





 


Calcium Level


 8.3 MG/DL


(8.5-10.1)  L 





 


Total Bilirubin


 0.5 MG/DL


(0.2-1.0) 





 


Aspartate Amino Transf


(AST/SGOT) 23 U/L (15-37)


 





 


Alanine Aminotransferase


(ALT/SGPT) 17 U/L (12-78)


 





 


Alkaline Phosphatase


 76 U/L


() 





 


Total Protein


 5.4 G/DL


(6.4-8.2)  L 





 


Albumin


 1.3 G/DL


(3.4-5.0)  L 





 


Globulin 4.1 g/dL   


 


Albumin/Globulin Ratio


 0.3 (1.0-2.7)


L 





 


Arterial Blood pH


 


 7.351


(7.350-7.450)


 


Arterial Blood Partial


Pressure CO2 


 56.6 mmHg


(35.0-45.0)  *H


 


Arterial Blood Partial


Pressure O2 


 94.1 mmHg


(75.0-100.0)


 


Arterial Blood HCO3


 


 30.6 mmol/L


(22.0-26.0)  H


 


Arterial Blood Oxygen


Saturation 


 96.6 %


()


 


Arterial Blood Base Excess  3.8 (-2-2)  H


 


Abelardo Test  Positive  











Current Medications








 Medications


  (Trade)  Dose


 Ordered  Sig/Julisa


 Route


 PRN Reason  Start Time


 Stop Time Status Last Admin


Dose Admin


 


 Acetaminophen


  (Tylenol)  650 mg  Q4H  PRN


 GT


 Temp >100.5  1/15/21 17:15


 2/14/21 17:14  1/20/21 00:29





 


 Amiodarone HCl


  (Cordarone)  400 mg  EVERY 12  HOURS


 NG


   1/19/21 21:00


 4/19/21 20:59  1/20/21 09:11





 


 Chlorhexidine


 Gluconate


  (Vanessa-Hex 2%)  1 applic  DAILY@2000


 TOPIC


   1/19/21 20:00


 4/19/21 19:59  1/19/21 20:35





 


 Dextrose  1,000 ml @ 


 50 mls/hr  Q20H


 IV


   1/18/21 10:00


 2/17/21 09:59  1/20/21 02:26





 


 Dextrose


  (Dextrose 50%)  25 ml  Q30M  PRN


 IV


 Hypoglycemia  1/9/21 13:30


 4/9/21 13:29   





 


 Dextrose


  (Dextrose 50%)  50 ml  Q30M  PRN


 IV


 Hypoglycemia  1/9/21 13:30


 4/9/21 13:29   





 


 Digoxin


  (Lanoxin)  0.25 mg  DAILY


 NG


   1/20/21 09:00


 4/20/21 08:59  1/20/21 09:10





 


 Diltiazem HCl


  (Cardizem Tab)  30 mg  EVERY 8  HOURS


 NG


   1/19/21 14:00


 2/18/21 13:59  1/19/21 22:42





 


 Docusate Sodium


  (Colace)  100 mg  TIDPRN  PRN


 GT


 Constipation  1/12/21 01:15


 2/11/21 01:14  1/12/21 01:42





 


 Enoxaparin Sodium


  (Lovenox)  80 mg  EVERY 12  HOURS


 SUBQ


   1/2/21 21:00


 4/2/21 20:59  1/19/21 09:57





 


 Heparin Sodium/


 Sodium Chloride


  (Heparin 1000


 units/500ml


 Premix)  1,000 unit  ONCE  PRN


 IV


 PICC  1/19/21 11:10


 1/20/21 23:59   





 


 Insulin Aspart


  (NovoLOG)    Q6HR


 SUBQ


   1/14/21 12:00


 4/9/21 17:59  1/19/21 12:57





 


 Insulin Aspart


  (NovoLOG)  14 units  EVERY 6  HOURS


 SUBQ


   1/16/21 12:00


 4/15/21 06:59  1/19/21 12:56





 


 Insulin Detemir


  (Levemir)  20 units  Q12HR


 SUBQ


   1/14/21 09:00


 4/12/21 08:59  1/20/21 09:12





 


 Lidocaine HCl


  (Xylocaine 1%


 30ml)  30 ml  ONCE  PRN


 INJ


 PICC  1/19/21 11:10


 1/20/21 23:59   





 


 Midazolam HCl  100 ml @ 2


 mls/hr  Q24H  PRN


 IV


 Agitation  1/15/21 23:00


 1/22/21 22:59  1/18/21 11:57





 


 Norepinephrine


 Bitartrate 4 mg/


 Dextrose  250 ml @ 0


 mls/hr  Q24H


 IV


   1/18/21 13:15


 1/21/21 13:14  1/20/21 01:47





 


 Pantoprazole


  (Protonix)  40 mg  DAILY


 IVP


   1/14/21 09:00


 2/13/21 08:59  1/20/21 09:11





 


 Piperacillin Sod/


 Tazobactam Sod


 3.375 gm/Sodium


 Chloride  110 ml @ 


 27.5 mls/hr  EVERY 8  HOURS


 IVPB


   1/15/21 14:00


 1/24/21 13:59  1/20/21 06:16





 


 Quetiapine


 Fumarate


  (SEROqueL)  50 mg  Q12HR


 ORAL


   1/4/21 21:00


 2/18/21 20:59  1/20/21 09:10





 


 Sodium Chloride  500 ml @ 


 999 mls/hr  Q31M PRN


 IV


 For hypotension  1/15/21 18:30


 2/14/21 18:29   





 


 Vancomycin HCl  300 ml @ 


 150 mls/hr  Q12H


 IVPB


   1/16/21 11:00


 1/25/21 10:59  1/20/21 11:23





 


 Vancomycin HCl


  ([facility]


 to dose)  1 ea  DAILY  PRN


 MISC


 Per rx protocol  1/14/21 09:15


 2/13/21 09:14   




















Ashley Davis MD      Jan 20, 2021 12:03

## 2021-01-20 NOTE — NUR
NURSE NOTES:New Levophed hung as previous bag empty, medication remaining in IV tubing only. 
Bag empty.

## 2021-01-20 NOTE — NUR
NURSE NOTES:

pt has fever, applied ice packs, and administered Tyenol. will continue to monitor pt.

## 2021-01-20 NOTE — DIAGNOSTIC IMAGING REPORT
Indication: Post intubation

 

Technique: One view of the chest

 

Comparison: Exams earlier

 

Findings: Interim repositioning of endotracheal tube, now advanced and tip in good

position approximately 6 cm above the tito. Stable satisfactory position of

orogastric tube. Right chest tube remains. Small bilateral pneumothoraces, bilateral

infiltrates, subcutaneous emphysema are unchanged. Interim placement left arm PICC

which is well-positioned.

 

Impression: Improved and now satisfactory position of endotracheal tube.

 

Satisfactory PICC position

 

Other stable findings as noted

## 2021-01-20 NOTE — DIAGNOSTIC IMAGING REPORT
Indication: Cough

 

Technique: One view of the chest

 

Comparison: 1/19/2021

 

Findings: Tiny right apical pneumothorax is unchanged. Right chest tube remains.

Small amount of pneumomediastinum persists. Subcutaneous emphysema has improved, but

still considerable. Bilateral infiltrates are unchanged. Endotracheal tube tip

projects just above the thoracic inlet, has retracted since the prior study. Stable

satisfactory position of orogastric tube

 

Impression: High position of endotracheal tube. Advancement recommended. This

critical value finding was discussed by phone with charge nurse Radha in the ICU at

the time of interpretation

 

Decreased but still extensive subcutaneous emphysema

 

Other stable findings as described

## 2021-01-20 NOTE — NUR
NURSE HAND-OFF REPORT: 



Need to follow up: fever, morning lab, X-ray, ABG

Latest Vital Signs: Temperature 99.0 , Pulse 110 , B/P 117 /67 , Respiratory Rate 26 , O2 
SAT 97 , Mechanical Ventilator, O2 Flow Rate  .  

Vital Sign Comment: [stable]



EKG Rhythm: Sinus Tachycardia

Rhythm change?: N 

MD Notified?: N -Dr. Thaddeus GUTIERREZ Response: 



Latest Singh Fall Score: 50  

Fall Risk: High Risk 

Safety Measures: Call light Within Reach, Bed Alarm Zone 1, Side Rails Side Rails x3, Bed 
position Low and Locked.

Fall Precautions: 

Door Sign



Report given to [Wallace ARCHER].

## 2021-01-20 NOTE — DIAGNOSTIC IMAGING REPORT
Indications: Needs long-term IV access

 

Technique: Procedure performed at bedside. Procedural timeout performed. Ultrasound

confirms patent compressible left basilic vein. Total sterile technique, including

sterile probe cover and sterile gel, sterile gloves, hand hygiene, hat, mask,,

sterile gown, large sterile drape, and preparation with 2% chlorhexidine utilized.

Local anesthesia with 1% lidocaine. Under real-time ultrasound guidance and real-time

visualization of the needle entering the vein, puncture basilic vein using 21-gauge

needle, passage 0.018 guidewire, exchange for 4 Samoan peel-away sheath. 4 Samoan

Bard dual-lumen power PICC cut to 49 cm. It was inserted through the peel-away

sheath. Peel-away sheath and guidewire removed. Catheter fixed to the skin. Both

catheter ports aspirated and flushed. Patient tolerated procedure well, without

immediate complication. Followup chest x-ray obtained, documents catheter tip

position at the cavoatrial junction

 

Impression: Successful bedside placement of left arm PICC under sonographic guidance,

as described above.

## 2021-01-20 NOTE — NUR
NURSE NOTES:

Left Femoral central line removed. Patient noted to have temperature of 102.2F (rectally). 
Tylenol 650mg via NGT given and Cooling blanket placed under patient. Minimal feed residuals 
of around 20ml. Oral care performed. Patient sedated RASS -2. Glucose noted to be 113. Oral 
care performed and repositioned patient.

## 2021-01-20 NOTE — CARDIAC ELECTROPHYSIOLOGY PN
Assessment/Plan


Assessment/Plan


1. Atrial fibrillation with rapid ventricular response.   The patient received 

already 0.5 mg IV digoxin.  


On digoxin 0.25 mg p.o. daily,  Amiodarone 400 po q12  and Cardizem 30 po q 8 

hr. IN SR 


  Digoxin level 0.6.  Decrease Dig to 0.125 daily





2. Hypernatremia with sodium 151 with azotemia, BUN of 30, creatinine


0.8.   On iv fluids





3. Right pneumothorax, status post chest tube with subcutaneous


emphysema.





4. COVID-19 pneumonia, 100% FiO2 and PEEP of 10, on remdesivir and


Solu-Medrol per ID.


5. Diabetes.


6. Hypotension due to dehydration and sepsis.On Levophed and iv fluid.


7.  Hypernatremia and azotemia.  The patient is on D5W of 50 mL an hour.





Subjective


Subjective


In ICU on 70% Fio2 and PEEP 10. Off  Levo today. Right chest tube had minimal 

drainage. On Dig and Amiodarone





Objective





Last 24 Hour Vital Signs








  Date Time  Temp Pulse Resp B/P (MAP) Pulse Ox O2 Delivery O2 Flow Rate FiO2


 


1/20/21 14:21  103  101/65    


 


1/20/21 14:00  103 24 101/65 (77) 97   


 


1/20/21 13:00    110/59    


 


1/20/21 13:00   22   Mechanical Ventilator  70


 


1/20/21 13:00 100.6 110 23 104/66 (79) 97   


 


1/20/21 12:00  110 20 103/73 (83) 96   


 


1/20/21 12:00      Mechanical Ventilator  


 


1/20/21 12:00    103/73    


 


1/20/21 12:00   21   Mechanical Ventilator  70


 


1/20/21 12:00  111      


 


1/20/21 11:38  111 21     70


 


1/20/21 11:20        70


 


1/20/21 11:00  111 21 99/57 (71) 97   


 


1/20/21 11:00    102/61    


 


1/20/21 11:00   21   Mechanical Ventilator  70


 


1/20/21 10:00    98/62    


 


1/20/21 10:00   20   Mechanical Ventilator  100


 


1/20/21 10:00  110 21 98/62 (74) 97   


 


1/20/21 09:10  109      


 


1/20/21 09:00    112/68    


 


1/20/21 09:00   20   Mechanical Ventilator  100


 


1/20/21 09:00 99.9 113 20 111/69 (83) 97   


 


1/20/21 08:15        80


 


1/20/21 08:00      Mechanical Ventilator  


 


1/20/21 08:00    108/69    


 


1/20/21 08:00   20   Mechanical Ventilator  100


 


1/20/21 08:00  109      


 


1/20/21 08:00        100


 


1/20/21 07:50 100.2 109 21 114/69 (84) 98   


 


1/20/21 07:38  110 21     100


 


1/20/21 07:00    117/67    


 


1/20/21 07:00   26   Mechanical Ventilator  100


 


1/20/21 07:00  110 25 117/67 (84) 97   


 


1/20/21 06:00    104/69    


 


1/20/21 06:00   19   Mechanical Ventilator  100


 


1/20/21 06:00  110 20 104/69 (81) 98   


 


1/20/21 05:00  110 19 115/72 (86) 98   


 


1/20/21 05:00    115/69    


 


1/20/21 05:00   20   Mechanical Ventilator  100


 


1/20/21 04:37  109      


 


1/20/21 04:00        100


 


1/20/21 04:00 99.0 110 21 86/67 (73) 98   


 


1/20/21 04:00    105/69    


 


1/20/21 04:00   21   Mechanical Ventilator  100


 


1/20/21 04:00      Mechanical Ventilator  


 


1/20/21 03:45  110 22 100/65 (77) 98   


 


1/20/21 03:30  110 22 102/66 (78) 98   


 


1/20/21 03:30  104 21     100


 


1/20/21 03:00    98/62    


 


1/20/21 03:00   21   Mechanical Ventilator  100


 


1/20/21 03:00  109 22 98/62 (74) 98   


 


1/20/21 02:45  109 23 95/61 (72) 98   


 


1/20/21 02:30  110 22 90/58 (69) 98   


 


1/20/21 02:20    90/56    


 


1/20/21 02:15    88/60    


 


1/20/21 02:15  110 23 88/60 (69) 98   


 


1/20/21 02:00    92/63    


 


1/20/21 02:00   22   Mechanical Ventilator  100


 


1/20/21 02:00  112 22 92/63 (73) 99   


 


1/20/21 01:55  114 23 91/56 (68) 98   


 


1/20/21 01:52    85/55    


 


1/20/21 01:47    82/47    


 


1/20/21 01:45  116 24 82/47 (59) 98   


 


1/20/21 01:00  123 23 93/60 (71) 97   


 


1/20/21 01:00   23   Mechanical Ventilator  100


 


1/20/21 00:59 99.4       


 


1/20/21 00:00 100.5 127 24 92/58 (69) 96   


 


1/20/21 00:00   24   Mechanical Ventilator  100


 


1/20/21 00:00      Mechanical Ventilator  


 


1/19/21 23:43  131      


 


1/19/21 23:29  128 24     100


 


1/19/21 23:00  130 26 103/67 (79) 94   


 


1/19/21 23:00   24   Mechanical Ventilator  100


 


1/19/21 22:42  127  112/70    


 


1/19/21 22:00   25   Mechanical Ventilator  100


 


1/19/21 22:00  127 26 106/71 (83) 92   


 


1/19/21 21:00  125 26 103/73 (83) 94   


 


1/19/21 21:00        100


 


1/19/21 21:00   26   Mechanical Ventilator  100


 


1/19/21 20:00   25   Mechanical Ventilator  100


 


1/19/21 20:00 100.0 125 24 97/66 (76) 90   


 


1/19/21 20:00      Mechanical Ventilator  


 


1/19/21 19:28  119 26     100


 


1/19/21 19:17  120      


 


1/19/21 19:00  121 29 106/65 (79) 90   


 


1/19/21 19:00   24   Mechanical Ventilator  100


 


1/19/21 18:00  118 29 102/67 (79) 91   


 


1/19/21 18:00   21   Mechanical Ventilator  100


 


1/19/21 17:00  111 23 101/65 (77) 94   


 


1/19/21 17:00   23   Mechanical Ventilator  100


 


1/19/21 16:05 99.7       


 


1/19/21 16:00  110      


 


1/19/21 16:00  110 25 107/68 (81) 99   


 


1/19/21 16:00   20   Mechanical Ventilator  100


 


1/19/21 16:00      Mechanical Ventilator  


 


1/19/21 16:00        100


 


1/19/21 15:40  107 24     100

















Intake and Output  


 


 1/19/21 1/20/21





 19:00 07:00


 


Intake Total 1728.25 ml 1296.45758 ml


 


Output Total 590 ml 680 ml


 


Balance 1138.25 ml 616.62346 ml


 


  


 


Free Water 50 ml 


 


IV Total 1128.25 ml 1076.06292 ml


 


Tube Feeding 550 ml 220 ml


 


Output Urine Total 570 ml 680 ml


 


Chest Tube Drainage Total 20 ml 


 


# Bowel Movements  1











Laboratory Tests








Test


 1/20/21


07:04 1/20/21


08:06


 


White Blood Count


 15.7 K/UL


(4.8-10.8)  H 





 


Red Blood Count


 4.04 M/UL


(4.70-6.10)  L 





 


Hemoglobin


 11.8 G/DL


(14.2-18.0)  L 





 


Hematocrit


 37.9 %


(42.0-52.0)  L 





 


Mean Corpuscular Volume 94 FL (80-99)   


 


Mean Corpuscular Hemoglobin


 29.3 PG


(27.0-31.0) 





 


Mean Corpuscular Hemoglobin


Concent 31.2 G/DL


(32.0-36.0)  L 





 


Red Cell Distribution Width


 13.2 %


(11.6-14.8) 





 


Platelet Count


 156 K/UL


(150-450) 





 


Mean Platelet Volume


 10.8 FL


(6.5-10.1)  H 





 


Neutrophils (%) (Auto)


 % (45.0-75.0)


 





 


Lymphocytes (%) (Auto)


 % (20.0-45.0)


 





 


Monocytes (%) (Auto)  % (1.0-10.0)   


 


Eosinophils (%) (Auto)  % (0.0-3.0)   


 


Basophils (%) (Auto)  % (0.0-2.0)   


 


Differential Total Cells


Counted 100  


 





 


Neutrophils % (Manual) 94 % (45-75)  H 


 


Lymphocytes % (Manual) 4 % (20-45)  L 


 


Monocytes % (Manual) 1 % (1-10)   


 


Eosinophils % (Manual) 1 % (0-3)   


 


Basophils % (Manual) 0 % (0-2)   


 


Band Neutrophils 0 % (0-8)   


 


Platelet Estimate Adequate   


 


Platelet Morphology Normal   


 


Hypochromasia 1+   


 


Sodium Level


 148 MMOL/L


(136-145)  H 





 


Potassium Level


 4.0 MMOL/L


(3.5-5.1) 





 


Chloride Level


 113 MMOL/L


()  H 





 


Carbon Dioxide Level


 32 MMOL/L


(21-32) 





 


Anion Gap


 3 mmol/L


(5-15)  L 





 


Blood Urea Nitrogen


 33 mg/dL


(7-18)  H 





 


Creatinine


 0.8 MG/DL


(0.55-1.30) 





 


Estimat Glomerular Filtration


Rate > 60 mL/min


(>60) 





 


Glucose Level


 152 MG/DL


()  H 





 


Calcium Level


 8.3 MG/DL


(8.5-10.1)  L 





 


Total Bilirubin


 0.5 MG/DL


(0.2-1.0) 





 


Aspartate Amino Transf


(AST/SGOT) 23 U/L (15-37)


 





 


Alanine Aminotransferase


(ALT/SGPT) 17 U/L (12-78)


 





 


Alkaline Phosphatase


 76 U/L


() 





 


Total Protein


 5.4 G/DL


(6.4-8.2)  L 





 


Albumin


 1.3 G/DL


(3.4-5.0)  L 





 


Globulin 4.1 g/dL   


 


Albumin/Globulin Ratio


 0.3 (1.0-2.7)


L 





 


Arterial Blood pH


 


 7.351


(7.350-7.450)


 


Arterial Blood Partial


Pressure CO2 


 56.6 mmHg


(35.0-45.0)  *H


 


Arterial Blood Partial


Pressure O2 


 94.1 mmHg


(75.0-100.0)


 


Arterial Blood HCO3


 


 30.6 mmol/L


(22.0-26.0)  H


 


Arterial Blood Oxygen


Saturation 


 96.6 %


()


 


Arterial Blood Base Excess  3.8 (-2-2)  H


 


Abelardo Test  Positive  








Objective


HEENT: No JVD. Orally intubated


LUNGS:  Have decreased breath sounds with the chest tube on the right side


and subcutaneous emphysema.


CARDIOVASCULAR:  Regular S1, S2 with


no gallop.


ABDOMEN:  Soft.


EXTREMITIES:  A 1+ pitting edema.











Jake George MD               Jan 20, 2021 15:10

## 2021-01-20 NOTE — NUR
NURSE NOTES:Non-administered sliding scale insulin as patient has 14 units novolog scheduled 
at noon, patient is currently receiving 20ML/Hr of G tube feeding, with goal of 55ML/HR, due 
to low feeding rate, will not give sliding scale at this time.

## 2021-01-20 NOTE — NUR
SI: Respiratory failure, COVID-PNA, ETT/Vent support, Chest tube

     T-100.6 (ax), , RR -23, /65

     AC 16, , PEEP 10, Fio2 70%, O2 sat 97%

     WBC 15.7, BUN 33, Na+ 148

     cxray bilateral infiltrates unchanged



IS: Lovenox SQ q 12 h

     Versed GTT

     Vancomycin IV q 12 h

     Zosyn IV q 8 h

     Protonix IVP QD

     D5 IV @50ccHr

     Lanoxin IV

     Chest tube inserted 1-17-21 (suction)

     



*****ICU Status****

## 2021-01-20 NOTE — BRIEF OPERATIVE NOTE
Immediate Post Operative Note


Operative Note


Pre-op Diagnosis:


needs IV access


Procedure:


PICC


Post-op Diagnosis:  same as pre-op


Surgeon:  CASH Mueller


Anesthesia:  local


Specimen:  none


Complications:  none


Fluids:  none


Implant(s) used?:  No











Jd Mueller MD                Jan 20, 2021 14:25

## 2021-01-20 NOTE — NUR
NURSE NOTES:

received in no acute distress, cardiac monitor shows nsr with hr on the 90's/min, remains 
orally intubated with ac 16 tv 600 fio2 70% peep 10, o2 sat 95-96%, ngt intact and patent 
with vital af feeding, with minimal residual, with chest tube to right chest to pleurevac, 
clamp at bedside, cash cath intact and patent with adequate light myrna urine, 
repositioned, on levophed at 4mcg/min with sbp above 90, versed drip at 0.5mg/hr and 
maintaning Rass-2, new picc line to ian intact and patent, site asymptomatic, right femoral 
tlc still intact but will dc

## 2021-01-20 NOTE — GENERAL PROGRESS NOTE
Subjective


Allergies:  


Coded Allergies:  


     No Known Allergies (Unverified , 6/11/19)


Subjective


on ventilator 60% fio2


on sediation


unresponsive


in icu


rt chest tube s placed due to pneumothorex


afib is controlled





Objective





Last 24 Hour Vital Signs








  Date Time  Temp Pulse Resp B/P (MAP) Pulse Ox O2 Delivery O2 Flow Rate FiO2


 


1/20/21 15:58      Mechanical Ventilator  


 


1/20/21 15:29  97 21     70


 


1/20/21 15:00 100.9 99 17 102/68 (79) 97   


 


1/20/21 14:51 100.9       


 


1/20/21 14:21  103  101/65    


 


1/20/21 14:00  103 24 101/65 (77) 97   


 


1/20/21 13:00    110/59    


 


1/20/21 13:00   22   Mechanical Ventilator  70


 


1/20/21 13:00 100.6 110 23 104/66 (79) 97   


 


1/20/21 12:00  110 20 103/73 (83) 96   


 


1/20/21 12:00      Mechanical Ventilator  


 


1/20/21 12:00    103/73    


 


1/20/21 12:00   21   Mechanical Ventilator  70


 


1/20/21 12:00  111      


 


1/20/21 11:38  111 21     70


 


1/20/21 11:20        70


 


1/20/21 11:00  111 21 99/57 (71) 97   


 


1/20/21 11:00    102/61    


 


1/20/21 11:00   21   Mechanical Ventilator  70


 


1/20/21 10:00    98/62    


 


1/20/21 10:00   20   Mechanical Ventilator  100


 


1/20/21 10:00  110 21 98/62 (74) 97   


 


1/20/21 09:10  109      


 


1/20/21 09:00    112/68    


 


1/20/21 09:00   20   Mechanical Ventilator  100


 


1/20/21 09:00 99.9 113 20 111/69 (83) 97   


 


1/20/21 08:15        80


 


1/20/21 08:00      Mechanical Ventilator  


 


1/20/21 08:00    108/69    


 


1/20/21 08:00   20   Mechanical Ventilator  100


 


1/20/21 08:00  109      


 


1/20/21 08:00        100


 


1/20/21 07:50 100.2 109 21 114/69 (84) 98   


 


1/20/21 07:38  110 21     100


 


1/20/21 07:00    117/67    


 


1/20/21 07:00   26   Mechanical Ventilator  100


 


1/20/21 07:00  110 25 117/67 (84) 97   


 


1/20/21 06:00    104/69    


 


1/20/21 06:00   19   Mechanical Ventilator  100


 


1/20/21 06:00  110 20 104/69 (81) 98   


 


1/20/21 05:00  110 19 115/72 (86) 98   


 


1/20/21 05:00    115/69    


 


1/20/21 05:00   20   Mechanical Ventilator  100


 


1/20/21 04:37  109      


 


1/20/21 04:00        100


 


1/20/21 04:00 99.0 110 21 86/67 (73) 98   


 


1/20/21 04:00    105/69    


 


1/20/21 04:00   21   Mechanical Ventilator  100


 


1/20/21 04:00      Mechanical Ventilator  


 


1/20/21 03:45  110 22 100/65 (77) 98   


 


1/20/21 03:30  110 22 102/66 (78) 98   


 


1/20/21 03:30  104 21     100


 


1/20/21 03:00    98/62    


 


1/20/21 03:00   21   Mechanical Ventilator  100


 


1/20/21 03:00  109 22 98/62 (74) 98   


 


1/20/21 02:45  109 23 95/61 (72) 98   


 


1/20/21 02:30  110 22 90/58 (69) 98   


 


1/20/21 02:20    90/56    


 


1/20/21 02:15    88/60    


 


1/20/21 02:15  110 23 88/60 (69) 98   


 


1/20/21 02:00    92/63    


 


1/20/21 02:00   22   Mechanical Ventilator  100


 


1/20/21 02:00  112 22 92/63 (73) 99   


 


1/20/21 01:55  114 23 91/56 (68) 98   


 


1/20/21 01:52    85/55    


 


1/20/21 01:47    82/47    


 


1/20/21 01:45  116 24 82/47 (59) 98   


 


1/20/21 01:00  123 23 93/60 (71) 97   


 


1/20/21 01:00   23   Mechanical Ventilator  100


 


1/20/21 00:59 99.4       


 


1/20/21 00:00 100.5 127 24 92/58 (69) 96   


 


1/20/21 00:00   24   Mechanical Ventilator  100


 


1/20/21 00:00      Mechanical Ventilator  


 


1/19/21 23:43  131      


 


1/19/21 23:29  128 24     100


 


1/19/21 23:00  130 26 103/67 (79) 94   


 


1/19/21 23:00   24   Mechanical Ventilator  100


 


1/19/21 22:42  127  112/70    


 


1/19/21 22:00   25   Mechanical Ventilator  100


 


1/19/21 22:00  127 26 106/71 (83) 92   


 


1/19/21 21:00  125 26 103/73 (83) 94   


 


1/19/21 21:00        100


 


1/19/21 21:00   26   Mechanical Ventilator  100


 


1/19/21 20:00   25   Mechanical Ventilator  100


 


1/19/21 20:00 100.0 125 24 97/66 (76) 90   


 


1/19/21 20:00      Mechanical Ventilator  


 


1/19/21 19:28  119 26     100


 


1/19/21 19:17  120      


 


1/19/21 19:00  121 29 106/65 (79) 90   


 


1/19/21 19:00   24   Mechanical Ventilator  100


 


1/19/21 18:00  118 29 102/67 (79) 91   


 


1/19/21 18:00   21   Mechanical Ventilator  100


 


1/19/21 17:00  111 23 101/65 (77) 94   


 


1/19/21 17:00   23   Mechanical Ventilator  100

















Intake and Output  


 


 1/19/21 1/20/21





 19:00 07:00


 


Intake Total 1728.25 ml 1296.64468 ml


 


Output Total 590 ml 680 ml


 


Balance 1138.25 ml 616.75736 ml


 


  


 


Free Water 50 ml 


 


IV Total 1128.25 ml 1076.46280 ml


 


Tube Feeding 550 ml 220 ml


 


Output Urine Total 570 ml 680 ml


 


Chest Tube Drainage Total 20 ml 


 


# Bowel Movements  1








Laboratory Tests


1/20/21 07:04: 


White Blood Count 15.7H, Red Blood Count 4.04L, Hemoglobin 11.8L, Hematocrit 

37.9L, Mean Corpuscular Volume 94, Mean Corpuscular Hemoglobin 29.3, Mean 

Corpuscular Hemoglobin Concent 31.2L, Red Cell Distribution Width 13.2, Platelet

Count 156, Mean Platelet Volume 10.8H, Neutrophils (%) (Auto) , Lymphocytes (%) 

(Auto) , Monocytes (%) (Auto) , Eosinophils (%) (Auto) , Basophils (%) (Auto) , 

Differential Total Cells Counted 100, Neutrophils % (Manual) 94H, Lymphocytes % 

(Manual) 4L, Monocytes % (Manual) 1, Eosinophils % (Manual) 1, Basophils % (M

anual) 0, Band Neutrophils 0, Platelet Estimate Adequate, Platelet Morphology 

Normal, Hypochromasia 1+, Sodium Level 148H, Potassium Level 4.0, Chloride Level

113H, Carbon Dioxide Level 32, Anion Gap 3L, Blood Urea Nitrogen 33H, Creatinine

0.8, Estimat Glomerular Filtration Rate > 60, Glucose Level 152H, Calcium Level 

8.3L, Total Bilirubin 0.5, Aspartate Amino Transf (AST/SGOT) 23, Alanine 

Aminotransferase (ALT/SGPT) 17, Alkaline Phosphatase 76, Total Protein 5.4L, 

Albumin 1.3L, Globulin 4.1, Albumin/Globulin Ratio 0.3L


1/20/21 08:06: 


Arterial Blood pH 7.351, Arterial Blood Partial Pressure CO2 56.6*H, Arterial 

Blood Partial Pressure O2 94.1, Arterial Blood HCO3 30.6H, Arterial Blood Oxygen

Saturation 96.6, Arterial Blood Base Excess 3.8H, Abelardo Test Positive


Height (Feet):  5


Height (Inches):  7.00


Weight (Pounds):  198


Neck:  supple


Cardiovascular:  regular rhythm


Respiratory/Chest:  crackles/rales


Abdomen:  soft


Extremities:  non-tender





Assessment/Plan


Assessment/Plan:


afib with rvr on digoxine , add amiodarone , cardiology on case


covid pna


ac resp distress on ventilator


etoh abused


dehydration


agitated -halodol


fever susided


cont on ventilator at icu


cont iv abx and iv decadrone


pulmonary and gi on consult


dw charge nurse


poor prognosis 


wd with Moi Da Silva MD             Jan 20, 2021 16:11

## 2021-01-20 NOTE — GENERAL PROGRESS NOTE
Subjective


ROS Limited/Unobtainable:  Yes


Allergies:  


Coded Allergies:  


     No Known Allergies (Unverified , 6/11/19)


Subjective


events noted - interval notes reviewed 


glucose values are stable 











Item Value  Date Time


 


Bedside Blood Glucose 158 mg/dl H 1/20/21 0600


 


Bedside Blood Glucose 102 mg/dl 1/20/21 0000


 


Bedside Blood Glucose 91 mg/dl 1/19/21 2100


 


Bedside Blood Glucose 123 mg/dl H 1/19/21 1830


 


Bedside Blood Glucose 199 mg/dl H 1/19/21 1258


 


Bedside Blood Glucose 198 mg/dl H 1/19/21 1015


 


Bedside Blood Glucose 197 mg/dl H 1/19/21 0600











Objective





Last 24 Hour Vital Signs








  Date Time  Temp Pulse Resp B/P (MAP) Pulse Ox O2 Delivery O2 Flow Rate FiO2


 


1/20/21 05:00   20   Mechanical Ventilator  100


 


1/20/21 04:00        100


 


1/20/21 04:00 99.0 110 21 86/67 (73) 98   


 


1/20/21 04:00   21   Mechanical Ventilator  100


 


1/20/21 04:00      Mechanical Ventilator  


 


1/20/21 03:45  110 22 100/65 (77) 98   


 


1/20/21 03:30  110 22 102/66 (78) 98   


 


1/20/21 03:30  104 21     100


 


1/20/21 03:00   21   Mechanical Ventilator  100


 


1/20/21 03:00  109 22 98/62 (74) 98   


 


1/20/21 02:45  109 23 95/61 (72) 98   


 


1/20/21 02:30  110 22 90/58 (69) 98   


 


1/20/21 02:15  110 23 88/60 (69) 98   


 


1/20/21 02:00   22   Mechanical Ventilator  100


 


1/20/21 02:00  112 22 92/63 (73) 99   


 


1/20/21 01:55  114 23 91/56 (68) 98   


 


1/20/21 01:47    82/47    


 


1/20/21 01:45  116 24 82/47 (59) 98   


 


1/20/21 01:00  123 23 93/60 (71) 97   


 


1/20/21 01:00   23   Mechanical Ventilator  100


 


1/20/21 00:59 99.4       


 


1/20/21 00:00 100.5 127 24 92/58 (69) 96   


 


1/20/21 00:00   24   Mechanical Ventilator  100


 


1/20/21 00:00      Mechanical Ventilator  


 


1/19/21 23:29  128 24     100


 


1/19/21 23:00  130 26 103/67 (79) 94   


 


1/19/21 23:00   24   Mechanical Ventilator  100


 


1/19/21 22:42  127  112/70    


 


1/19/21 22:00   25   Mechanical Ventilator  100


 


1/19/21 22:00  127 26 106/71 (83) 92   


 


1/19/21 21:00  125 26 103/73 (83) 94   


 


1/19/21 21:00        100


 


1/19/21 21:00   26   Mechanical Ventilator  100


 


1/19/21 20:00   25   Mechanical Ventilator  100


 


1/19/21 20:00 100.0 125 24 97/66 (76) 90   


 


1/19/21 20:00      Mechanical Ventilator  


 


1/19/21 19:28  119 26     100


 


1/19/21 19:00  121 29 106/65 (79) 90   


 


1/19/21 19:00   24   Mechanical Ventilator  100


 


1/19/21 18:00  118 29 102/67 (79) 91   


 


1/19/21 18:00   21   Mechanical Ventilator  100


 


1/19/21 17:00  111 23 101/65 (77) 94   


 


1/19/21 17:00   23   Mechanical Ventilator  100


 


1/19/21 16:05 99.7       


 


1/19/21 16:00  110      


 


1/19/21 16:00  110 25 107/68 (81) 99   


 


1/19/21 16:00   20   Mechanical Ventilator  100


 


1/19/21 16:00      Mechanical Ventilator  


 


1/19/21 16:00        100


 


1/19/21 15:40  107 24     100


 


1/19/21 15:00   22   Mechanical Ventilator  100


 


1/19/21 15:00  109 24 103/69 (80) 96   


 


1/19/21 14:00  114 18 105/65 (78) 96   


 


1/19/21 14:00   21   Mechanical Ventilator  100


 


1/19/21 13:46  109  106/65    


 


1/19/21 13:00  105 26 105/66 (79) 96   


 


1/19/21 13:00   18   Mechanical Ventilator  100


 


1/19/21 12:05 99.8       


 


1/19/21 12:00  101 24 105/64 (78) 96   


 


1/19/21 12:00  100      


 


1/19/21 12:00        100


 


1/19/21 12:00      Mechanical Ventilator  


 


1/19/21 12:00   19   Mechanical Ventilator  100


 


1/19/21 11:40  100 24     100


 


1/19/21 11:00  100 23 108/62 (77) 96   


 


1/19/21 11:00   22   Mechanical Ventilator  100


 


1/19/21 10:00    108/63    


 


1/19/21 10:00   20   Mechanical Ventilator  100


 


1/19/21 10:00  95 22 115/63 (80) 96   


 


1/19/21 09:00  92 20 115/65 (82) 97   


 


1/19/21 09:00    109/63    


 


1/19/21 09:00   19   Mechanical Ventilator  100


 


1/19/21 08:02 99.7       


 


1/19/21 08:00        100


 


1/19/21 08:00      Mechanical Ventilator  


 


1/19/21 08:00  89 17 113/64 (80) 97   


 


1/19/21 08:00    102/67    


 


1/19/21 08:00   17   Mechanical Ventilator  100


 


1/19/21 08:00  89      


 


1/19/21 07:40  89 20     100


 


1/19/21 07:00  90 18 117/71 (86) 97   


 


1/19/21 07:00    120/75    


 


1/19/21 07:00   18   Mechanical Ventilator  100


 


1/19/21 06:45  90 18 128/75 (92) 96   

















Intake and Output  


 


 1/19/21 1/20/21





 19:00 07:00


 


Intake Total 1728.25 ml 230 ml


 


Output Total 590 ml 560 ml


 


Balance 1138.25 ml -330 ml


 


  


 


Free Water 50 ml 


 


IV Total 1128.25 ml 10 ml


 


Tube Feeding 550 ml 220 ml


 


Output Urine Total 570 ml 560 ml


 


Chest Tube Drainage Total 20 ml 


 


# Bowel Movements  1








Laboratory Tests


1/19/21 08:23: 


Arterial Blood pH 7.410, Arterial Blood Partial Pressure CO2 53.2H, Arterial 

Blood Partial Pressure O2 67.3L, Arterial Blood HCO3 33.0H, Arterial Blood 

Oxygen Saturation 93.3L, Arterial Blood Base Excess 6.9H, Abelardo Test Positive


Height (Feet):  5


Height (Inches):  7.00


Weight (Pounds):  198


Objective





Current Medications








 Medications


  (Trade)  Dose


 Ordered  Sig/Julisa


 Route


 PRN Reason  Start Time


 Stop Time Status Last Admin


Dose Admin


 


 Acetaminophen


  (Tylenol)  650 mg  Q4H  PRN


 GT


 Temp >100.5  1/15/21 17:15


 2/14/21 17:14  1/20/21 00:29





 


 Amiodarone HCl


  (Cordarone)  400 mg  EVERY 12  HOURS


 NG


   1/19/21 21:00


 4/19/21 20:59  1/19/21 21:25





 


 Chlorhexidine


 Gluconate


  (Vanessa-Hex 2%)  1 applic  DAILY@2000


 TOPIC


   1/19/21 20:00


 4/19/21 19:59  1/19/21 20:35





 


 Dextrose  1,000 ml @ 


 50 mls/hr  Q20H


 IV


   1/18/21 10:00


 2/17/21 09:59  1/20/21 02:26





 


 Dextrose


  (Dextrose 50%)  25 ml  Q30M  PRN


 IV


 Hypoglycemia  1/9/21 13:30


 4/9/21 13:29   





 


 Dextrose


  (Dextrose 50%)  50 ml  Q30M  PRN


 IV


 Hypoglycemia  1/9/21 13:30


 4/9/21 13:29   





 


 Digoxin


  (Lanoxin)  0.25 mg  DAILY


 NG


   1/20/21 09:00


 4/20/21 08:59   





 


 Diltiazem HCl


  (Cardizem Tab)  30 mg  EVERY 8  HOURS


 NG


   1/19/21 14:00


 2/18/21 13:59  1/19/21 22:42





 


 Docusate Sodium


  (Colace)  100 mg  TIDPRN  PRN


 GT


 Constipation  1/12/21 01:15


 2/11/21 01:14  1/12/21 01:42





 


 Enoxaparin Sodium


  (Lovenox)  80 mg  EVERY 12  HOURS


 SUBQ


   1/2/21 21:00


 4/2/21 20:59  1/19/21 09:57





 


 Heparin Sodium/


 Sodium Chloride


  (Heparin 1000


 units/500ml


 Premix)  1,000 unit  ONCE  PRN


 IV


 PICC  1/19/21 11:10


 1/20/21 23:59   





 


 Insulin Aspart


  (NovoLOG)    Q6HR


 SUBQ


   1/14/21 12:00


 4/9/21 17:59  1/19/21 12:57





 


 Insulin Aspart


  (NovoLOG)  14 units  EVERY 6  HOURS


 SUBQ


   1/16/21 12:00


 4/15/21 06:59  1/19/21 12:56





 


 Insulin Detemir


  (Levemir)  20 units  Q12HR


 SUBQ


   1/14/21 09:00


 4/12/21 08:59  1/19/21 10:15





 


 Lidocaine HCl


  (Xylocaine 1%


 30ml)  30 ml  ONCE  PRN


 INJ


 PICC  1/19/21 11:10


 1/20/21 23:59   





 


 Midazolam HCl  100 ml @ 2


 mls/hr  Q24H  PRN


 IV


 Agitation  1/15/21 23:00


 1/22/21 22:59  1/18/21 11:57





 


 Norepinephrine


 Bitartrate 4 mg/


 Dextrose  250 ml @ 0


 mls/hr  Q24H


 IV


   1/18/21 13:15


 1/21/21 13:14  1/20/21 01:47





 


 Pantoprazole


  (Protonix)  40 mg  DAILY


 IVP


   1/14/21 09:00


 2/13/21 08:59  1/19/21 09:57





 


 Piperacillin Sod/


 Tazobactam Sod


 3.375 gm/Sodium


 Chloride  110 ml @ 


 27.5 mls/hr  EVERY 8  HOURS


 IVPB


   1/15/21 14:00


 1/24/21 13:59  1/20/21 06:16





 


 Quetiapine


 Fumarate


  (SEROqueL)  50 mg  Q12HR


 ORAL


   1/4/21 21:00


 2/18/21 20:59  1/19/21 20:36





 


 Sodium Chloride  500 ml @ 


 999 mls/hr  Q31M PRN


 IV


 For hypotension  1/15/21 18:30


 2/14/21 18:29   





 


 Vancomycin HCl  300 ml @ 


 150 mls/hr  Q12H


 IVPB


   1/16/21 11:00


 1/21/21 10:59  1/19/21 22:43





 


 Vancomycin HCl


  (Vanco pharmacy


 to dose)  1 ea  DAILY  PRN


 MISC


 Per rx protocol  1/14/21 09:15


 2/13/21 09:14   














Assessment/Plan


Problem List:  


(1) Diabetes mellitus out of control


ICD Codes:  E11.65 - Type 2 diabetes mellitus with hyperglycemia


SNOMED:  39797074, [id number]


(2) Pneumonia due to COVID-19 virus


ICD Codes:  U07.1 - COVID-19; J12.82 - Pneumonia due to coronavirus disease 2019


SNOMED:  [id number]


Assessment/Plan:


hold scheduled insulin while TF is on hold 


continue Novolog sliding scale every 6 hours











Nick Mcmahon MD                 Jan 20, 2021 06:35

## 2021-01-20 NOTE — NUR
NURSE NOTES:

SBAR received from Marilyn ARCHER. Patient is orally intubated 7.5/26cm at lower lip; Ac 
16/600tv/70% FiO2, 10+ peep. L nare NGT Vitals at 55ml/hr. Stern catheter noted with 
sediments. L femoral TLC catheter and MAYO PICC (newly inserted today). Versed infusing at 
0.5mg/hr, D5W@50ml/hr, Levophed 4mg/250ml bag gtt at 4mcg/min. There is a right/lateral 
upper chest tube connected to low continuos suction d/t PTX/Sub. emphysema , dressing is dry 
and intact, patent tubing. CXR, labs, notes reviewed. P200 mattress noted, isolation 
precautions observed, skin precautions observed.  HR NSR, BP within normotensive ranges 
while on pressors. Will continue to monitor.

## 2021-01-20 NOTE — NUR
NURSE NOTES:Handoff received from Tara Garzon, patient received sedated, moving slightly to 
noise, patient has ETT 7.5, 23cm at the lip line connected to vent with settings: AC16, 
, FI02 100% and PEEP 10, tolerating well with 98%02 saturation. Patient also has NG 
tube in the L nare at 71cm, no feed running at this time, will start feeding. Patient is 
connected to cardiac monitor running ST at 109. Patient also has Stern catheter patent and 
draining to gravity. Chest tube also noted connected to R side with 40ML residual amount of 
serosanguineous drainage, connected to suction, no leaks noted. Skin issues noted. Patient 
also has L femoral triple lumen catheter, clean dry and intact running Versed at 0.5MG/HR 
and Levophed running at 4MCG/Min, D5W also running at 50ML/HR. Patient is also in bilateral 
soft wrist restraints to prevent pulling at lines. Will follow plan of care.

## 2021-01-21 VITALS — SYSTOLIC BLOOD PRESSURE: 90 MMHG | DIASTOLIC BLOOD PRESSURE: 38 MMHG

## 2021-01-21 VITALS — DIASTOLIC BLOOD PRESSURE: 59 MMHG | SYSTOLIC BLOOD PRESSURE: 116 MMHG

## 2021-01-21 VITALS — DIASTOLIC BLOOD PRESSURE: 60 MMHG | SYSTOLIC BLOOD PRESSURE: 97 MMHG

## 2021-01-21 VITALS — DIASTOLIC BLOOD PRESSURE: 76 MMHG | SYSTOLIC BLOOD PRESSURE: 135 MMHG

## 2021-01-21 VITALS — SYSTOLIC BLOOD PRESSURE: 98 MMHG | DIASTOLIC BLOOD PRESSURE: 52 MMHG

## 2021-01-21 VITALS — SYSTOLIC BLOOD PRESSURE: 60 MMHG | DIASTOLIC BLOOD PRESSURE: 36 MMHG

## 2021-01-21 VITALS — DIASTOLIC BLOOD PRESSURE: 63 MMHG | SYSTOLIC BLOOD PRESSURE: 112 MMHG

## 2021-01-21 VITALS — DIASTOLIC BLOOD PRESSURE: 78 MMHG | SYSTOLIC BLOOD PRESSURE: 105 MMHG

## 2021-01-21 VITALS — SYSTOLIC BLOOD PRESSURE: 105 MMHG | DIASTOLIC BLOOD PRESSURE: 60 MMHG

## 2021-01-21 VITALS — DIASTOLIC BLOOD PRESSURE: 71 MMHG | SYSTOLIC BLOOD PRESSURE: 104 MMHG

## 2021-01-21 VITALS — SYSTOLIC BLOOD PRESSURE: 101 MMHG | DIASTOLIC BLOOD PRESSURE: 56 MMHG

## 2021-01-21 VITALS — SYSTOLIC BLOOD PRESSURE: 93 MMHG | DIASTOLIC BLOOD PRESSURE: 53 MMHG

## 2021-01-21 VITALS — DIASTOLIC BLOOD PRESSURE: 54 MMHG | SYSTOLIC BLOOD PRESSURE: 91 MMHG

## 2021-01-21 VITALS — DIASTOLIC BLOOD PRESSURE: 86 MMHG | SYSTOLIC BLOOD PRESSURE: 105 MMHG

## 2021-01-21 VITALS — DIASTOLIC BLOOD PRESSURE: 57 MMHG | SYSTOLIC BLOOD PRESSURE: 81 MMHG

## 2021-01-21 VITALS — SYSTOLIC BLOOD PRESSURE: 152 MMHG | DIASTOLIC BLOOD PRESSURE: 68 MMHG

## 2021-01-21 VITALS — SYSTOLIC BLOOD PRESSURE: 103 MMHG | DIASTOLIC BLOOD PRESSURE: 65 MMHG

## 2021-01-21 VITALS — DIASTOLIC BLOOD PRESSURE: 67 MMHG | SYSTOLIC BLOOD PRESSURE: 132 MMHG

## 2021-01-21 VITALS — DIASTOLIC BLOOD PRESSURE: 50 MMHG | SYSTOLIC BLOOD PRESSURE: 81 MMHG

## 2021-01-21 VITALS — SYSTOLIC BLOOD PRESSURE: 98 MMHG | DIASTOLIC BLOOD PRESSURE: 54 MMHG

## 2021-01-21 VITALS — SYSTOLIC BLOOD PRESSURE: 114 MMHG | DIASTOLIC BLOOD PRESSURE: 57 MMHG

## 2021-01-21 VITALS — DIASTOLIC BLOOD PRESSURE: 61 MMHG | SYSTOLIC BLOOD PRESSURE: 102 MMHG

## 2021-01-21 VITALS — DIASTOLIC BLOOD PRESSURE: 62 MMHG | SYSTOLIC BLOOD PRESSURE: 102 MMHG

## 2021-01-21 VITALS — DIASTOLIC BLOOD PRESSURE: 51 MMHG | SYSTOLIC BLOOD PRESSURE: 74 MMHG

## 2021-01-21 VITALS — DIASTOLIC BLOOD PRESSURE: 71 MMHG | SYSTOLIC BLOOD PRESSURE: 132 MMHG

## 2021-01-21 VITALS — SYSTOLIC BLOOD PRESSURE: 99 MMHG | DIASTOLIC BLOOD PRESSURE: 58 MMHG

## 2021-01-21 VITALS — SYSTOLIC BLOOD PRESSURE: 102 MMHG | DIASTOLIC BLOOD PRESSURE: 61 MMHG

## 2021-01-21 VITALS — DIASTOLIC BLOOD PRESSURE: 54 MMHG | SYSTOLIC BLOOD PRESSURE: 97 MMHG

## 2021-01-21 VITALS — SYSTOLIC BLOOD PRESSURE: 114 MMHG | DIASTOLIC BLOOD PRESSURE: 67 MMHG

## 2021-01-21 VITALS — DIASTOLIC BLOOD PRESSURE: 72 MMHG | SYSTOLIC BLOOD PRESSURE: 124 MMHG

## 2021-01-21 VITALS — DIASTOLIC BLOOD PRESSURE: 64 MMHG | SYSTOLIC BLOOD PRESSURE: 119 MMHG

## 2021-01-21 VITALS — DIASTOLIC BLOOD PRESSURE: 70 MMHG | SYSTOLIC BLOOD PRESSURE: 115 MMHG

## 2021-01-21 VITALS — DIASTOLIC BLOOD PRESSURE: 62 MMHG | SYSTOLIC BLOOD PRESSURE: 101 MMHG

## 2021-01-21 LAB
ADD MANUAL DIFF: YES
ALBUMIN SERPL-MCNC: 1.3 G/DL (ref 3.4–5)
ALBUMIN/GLOB SERPL: 0.3 {RATIO} (ref 1–2.7)
ALP SERPL-CCNC: 88 U/L (ref 46–116)
ALT SERPL-CCNC: 21 U/L (ref 12–78)
ANION GAP SERPL CALC-SCNC: 5 MMOL/L (ref 5–15)
AST SERPL-CCNC: 30 U/L (ref 15–37)
BILIRUB SERPL-MCNC: 0.4 MG/DL (ref 0.2–1)
BUN SERPL-MCNC: 27 MG/DL (ref 7–18)
CALCIUM SERPL-MCNC: 8.4 MG/DL (ref 8.5–10.1)
CHLORIDE SERPL-SCNC: 111 MMOL/L (ref 98–107)
CO2 SERPL-SCNC: 30 MMOL/L (ref 21–32)
CREAT SERPL-MCNC: 0.5 MG/DL (ref 0.55–1.3)
ERYTHROCYTE [DISTWIDTH] IN BLOOD BY AUTOMATED COUNT: 13.1 % (ref 11.6–14.8)
GLOBULIN SER-MCNC: 4.1 G/DL
HCT VFR BLD CALC: 36.6 % (ref 42–52)
HGB BLD-MCNC: 11.4 G/DL (ref 14.2–18)
MCV RBC AUTO: 94 FL (ref 80–99)
PLATELET # BLD: 161 K/UL (ref 150–450)
POTASSIUM SERPL-SCNC: 3.2 MMOL/L (ref 3.5–5.1)
RBC # BLD AUTO: 3.89 M/UL (ref 4.7–6.1)
SODIUM SERPL-SCNC: 146 MMOL/L (ref 136–145)
WBC # BLD AUTO: 14.8 K/UL (ref 4.8–10.8)

## 2021-01-21 RX ADMIN — PANTOPRAZOLE SODIUM SCH MG: 40 INJECTION, POWDER, FOR SOLUTION INTRAVENOUS at 08:37

## 2021-01-21 RX ADMIN — DEXTROSE MONOHYDRATE SCH MLS/HR: 50 INJECTION, SOLUTION INTRAVENOUS at 05:07

## 2021-01-21 RX ADMIN — ENOXAPARIN SODIUM SCH MG: 80 INJECTION SUBCUTANEOUS at 20:44

## 2021-01-21 RX ADMIN — Medication PRN MLS/HR: at 05:48

## 2021-01-21 RX ADMIN — Medication SCH MLS/HR: at 23:06

## 2021-01-21 RX ADMIN — DILTIAZEM HYDROCHLORIDE SCH MG: 60 CAPSULE, EXTENDED RELEASE ORAL at 21:11

## 2021-01-21 RX ADMIN — INSULIN ASPART SCH UNITS: 100 INJECTION, SOLUTION INTRAVENOUS; SUBCUTANEOUS at 12:00

## 2021-01-21 RX ADMIN — ENOXAPARIN SODIUM SCH MG: 80 INJECTION SUBCUTANEOUS at 08:39

## 2021-01-21 RX ADMIN — INSULIN ASPART SCH UNITS: 100 INJECTION, SOLUTION INTRAVENOUS; SUBCUTANEOUS at 05:35

## 2021-01-21 RX ADMIN — INSULIN DETEMIR SCH UNITS: 100 INJECTION, SOLUTION SUBCUTANEOUS at 08:38

## 2021-01-21 RX ADMIN — AMIODARONE HYDROCHLORIDE SCH MG: 200 TABLET ORAL at 08:37

## 2021-01-21 RX ADMIN — CHLORHEXIDINE GLUCONATE SCH APPLIC: 213 SOLUTION TOPICAL at 20:43

## 2021-01-21 RX ADMIN — INSULIN ASPART SCH UNITS: 100 INJECTION, SOLUTION INTRAVENOUS; SUBCUTANEOUS at 18:00

## 2021-01-21 RX ADMIN — DILTIAZEM HYDROCHLORIDE SCH MG: 60 CAPSULE, EXTENDED RELEASE ORAL at 05:34

## 2021-01-21 RX ADMIN — INSULIN ASPART SCH UNITS: 100 INJECTION, SOLUTION INTRAVENOUS; SUBCUTANEOUS at 00:00

## 2021-01-21 RX ADMIN — DILTIAZEM HYDROCHLORIDE SCH MG: 60 CAPSULE, EXTENDED RELEASE ORAL at 13:50

## 2021-01-21 RX ADMIN — DEXTROSE MONOHYDRATE SCH MLS/HR: 50 INJECTION, SOLUTION INTRAVENOUS at 14:01

## 2021-01-21 RX ADMIN — INSULIN DETEMIR SCH UNITS: 100 INJECTION, SOLUTION SUBCUTANEOUS at 21:11

## 2021-01-21 RX ADMIN — AMIODARONE HYDROCHLORIDE SCH MG: 200 TABLET ORAL at 20:44

## 2021-01-21 RX ADMIN — DIGOXIN SCH MG: 0.12 TABLET ORAL at 08:37

## 2021-01-21 RX ADMIN — Medication SCH MLS/HR: at 10:12

## 2021-01-21 RX ADMIN — DEXTROSE MONOHYDRATE SCH MLS/HR: 50 INJECTION, SOLUTION INTRAVENOUS at 21:11

## 2021-01-21 NOTE — DIAGNOSTIC IMAGING REPORT
Procedure: XRAY Chest 1v

Reason for study: Shortness of breath.

 

Comparison films:  1/20/2021.

 

FINDINGS:

 

Radiograph is underpenetrated. Endotracheal tube and NG tube remain in place.

Subcutaneous emphysema of the left chest and lower neck region again noted. Bilateral

alveolar infiltrates demonstrated. Given the difference in technique, there is likely

no significant change. Cardiac and mediastinal silhouette are within normal limits.

CP angles are sharp.  The bony thorax appear unremarkable.

 

IMPRESSION:  

 

No significant change given difference in technique.

## 2021-01-21 NOTE — HEMATOLOGY/ONC PROGRESS NOTE
Assessment/Plan


Assessment/Plan


Leukocytosis 2/2 covid pna wbc 15->14


Anemia 2/2 chronic disease, hgb 11


Hypercoag disorer now on lovenox sq


Ac resp distress on ventilator


Pneumomediastinum


etoh abused


agitated -halodol


vent





Ngt


cont iv abx and iv decadrone


pulmonary and gi on consult


smear is noted


pneumomediastinum per pulm


dw charge nurse


lovenox sq





Subjective


Allergies:  


Coded Allergies:  


     No Known Allergies (Unverified , 6/11/19)


All Systems:  reviewed and negative except above


Subjective


1/10 on ventilator 60% fio2, on sediation, unresponsive, in icu


1/14 on vent, icu, wbc elev, no bleeding, unresponsive


1/15 on vent, with cash, icu, no bleeding, unresponsive


1/17 on vent, dw rn, no major changes, icu, nv


1/18 nv, labs reviewd, hr is elev, with afib, is onlovenox sq


1/19 remains on vent, sedated with wrist restraints, icu


1/20 on vent, with hematuria, ngt, in icu, labs reviewed


1/21 remains on vent, settings have been adjusted as per icu care





Objective


Objective





Current Medications








 Medications


  (Trade)  Dose


 Ordered  Sig/Julisa


 Route


 PRN Reason  Start Time


 Stop Time Status Last Admin


Dose Admin


 


 Acetaminophen


  (Tylenol)  650 mg  Q4H  PRN


 GT


 Temp >100.5  1/15/21 17:15


 2/14/21 17:14  1/20/21 22:35





 


 Amiodarone HCl


  (Cordarone)  400 mg  EVERY 12  HOURS


 NG


   1/19/21 21:00


 4/19/21 20:59  1/21/21 08:37





 


 Chlorhexidine


 Gluconate


  (Vanessa-Hex 2%)  1 applic  DAILY@2000


 TOPIC


   1/19/21 20:00


 4/19/21 19:59  1/20/21 20:48





 


 Dextrose  1,000 ml @ 


 50 mls/hr  Q20H


 IV


   1/18/21 10:00


 2/17/21 09:59  1/20/21 22:00





 


 Dextrose


  (Dextrose 50%)  25 ml  Q30M  PRN


 IV


 Hypoglycemia  1/9/21 13:30


 4/9/21 13:29   





 


 Dextrose


  (Dextrose 50%)  50 ml  Q30M  PRN


 IV


 Hypoglycemia  1/9/21 13:30


 4/9/21 13:29   





 


 Digoxin


  (Lanoxin)  0.125 mg  DAILY


 NG


   1/21/21 09:00


 4/21/21 08:59  1/21/21 08:37





 


 Diltiazem HCl


  (Cardizem Tab)  30 mg  EVERY 8  HOURS


 NG


   1/19/21 14:00


 2/18/21 13:59  1/20/21 22:00





 


 Docusate Sodium


  (Colace)  100 mg  TIDPRN  PRN


 GT


 Constipation  1/12/21 01:15


 2/11/21 01:14  1/12/21 01:42





 


 Enoxaparin Sodium


  (Lovenox)  80 mg  EVERY 12  HOURS


 SUBQ


   1/2/21 21:00


 4/2/21 20:59  1/21/21 08:39





 


 Insulin Aspart


  (NovoLOG)    Q6HR


 SUBQ


   1/14/21 12:00


 4/9/21 17:59  1/21/21 05:35





 


 Insulin Aspart


  (NovoLOG)  9 units  EVERY 6  HOURS


 SUBQ


   1/21/21 12:00


 4/15/21 06:59   





 


 Insulin Detemir


  (Levemir)  20 units  Q12HR


 SUBQ


   1/14/21 09:00


 4/12/21 08:59  1/21/21 08:38





 


 Midazolam HCl  100 ml @ 2


 mls/hr  Q24H  PRN


 IV


 Agitation  1/15/21 23:00


 1/22/21 22:59  1/21/21 05:48





 


 Norepinephrine


 Bitartrate 4 mg/


 Dextrose  250 ml @ 0


 mls/hr  Q24H


 IV


   1/18/21 13:15


 1/21/21 13:14  1/20/21 19:46





 


 Pantoprazole


  (Protonix)  40 mg  DAILY


 IVP


   1/14/21 09:00


 2/13/21 08:59  1/21/21 08:37





 


 Piperacillin Sod/


 Tazobactam Sod


 3.375 gm/Sodium


 Chloride  110 ml @ 


 27.5 mls/hr  EVERY 8  HOURS


 IVPB


   1/15/21 14:00


 1/24/21 13:59  1/21/21 05:07





 


 Quetiapine


 Fumarate


  (SEROqueL)  50 mg  Q12HR


 ORAL


   1/4/21 21:00


 2/18/21 20:59  1/21/21 08:37





 


 Sodium Chloride  500 ml @ 


 999 mls/hr  Q31M PRN


 IV


 For hypotension  1/15/21 18:30


 2/14/21 18:29   





 


 Vancomycin HCl  300 ml @ 


 150 mls/hr  Q12H


 IVPB


   1/16/21 11:00


 1/25/21 10:59  1/21/21 10:12





 


 Vancomycin HCl


  (Vanco pharmacy


 to dose)  1 ea  DAILY  PRN


 MISC


 Per rx protocol  1/14/21 09:15


 2/13/21 09:14   














Last 24 Hour Vital Signs








  Date Time  Temp Pulse Resp B/P (MAP) Pulse Ox O2 Delivery O2 Flow Rate FiO2


 


1/21/21 10:32  121 24     60


 


1/21/21 08:37  107      


 


1/21/21 08:00        100


 


1/21/21 08:00      Mechanical Ventilator  


 


1/21/21 07:52  101      


 


1/21/21 07:17  111 35     60


 


1/21/21 07:00  87 26 98/52 (67) 93   


 


1/21/21 07:00   26   Mechanical Ventilator  100


 


1/21/21 06:15  94 22 114/57 (76) 90   


 


1/21/21 06:00  93 21 97/60 (72) 91   


 


1/21/21 06:00   29   Mechanical Ventilator  100


 


1/21/21 05:48   28     100


 


1/21/21 05:34  85  89/51    


 


1/21/21 05:30  92 20 105/60 (75) 93   


 


1/21/21 05:00   28   Mechanical Ventilator  100


 


1/21/21 05:00  89 19 97/54 (68) 95   


 


1/21/21 04:45  90 24 102/61 (75) 95   


 


1/21/21 04:30  95 18 105/86 (92) 98   


 


1/21/21 04:15  92 21 105/78 (87) 95   


 


1/21/21 04:00        100


 


1/21/21 04:00   33   Mechanical Ventilator  100


 


1/21/21 04:00      Mechanical Ventilator  


 


1/21/21 04:00 96.6 95 26 135/76 (95) 81   


 


1/21/21 04:00  90      


 


1/21/21 03:30  88 21 132/71 (91) 91   


 


1/21/21 03:00    128/77    


 


1/21/21 03:00  86 17 124/72 (89) 93   


 


1/21/21 02:32  85 22     60


 


1/21/21 02:30  88 20 115/70 (85) 94   


 


1/21/21 02:00  88 16 103/65 (78) 96   


 


1/21/21 02:00    119/77    


 


1/21/21 01:30  84 20 114/67 (83) 92   


 


1/21/21 01:00  83 16 101/62 (75) 96   


 


1/21/21 00:30  84 17 102/61 (75) 97   


 


1/21/21 00:00      Mechanical Ventilator  


 


1/21/21 00:00  89      


 


1/21/21 00:00        60


 


1/21/21 00:00    117/78    


 


1/21/21 00:00   26   Mechanical Ventilator  60


 


1/21/21 00:00 100.6 88 18 99/58 (72) 95   


 


1/20/21 23:30  92 20 92/58 (69) 95   


 


1/20/21 23:13 102.0       


 


1/20/21 23:00    98/61    


 


1/20/21 23:00   26   Mechanical Ventilator  70


 


1/20/21 23:00  92 20 98/57 (71) 96   


 


1/20/21 22:31  91 19     60


 


1/20/21 22:30 102.2 98 19 99/61 (74) 97   


 


1/20/21 22:00  97  98/61    


 


1/20/21 22:00    92/51    


 


1/20/21 22:00   26   Mechanical Ventilator  70


 


1/20/21 22:00  98 19 98/60 (73) 97   


 


1/20/21 21:30  100 22 94/58 (70) 97   


 


1/20/21 21:00    105/57    


 


1/20/21 21:00   20   Mechanical Ventilator  70


 


1/20/21 21:00  97 20 105/57 (73) 96   


 


1/20/21 20:30  99 24 98/62 (74) 96   


 


1/20/21 20:00  99      


 


1/20/21 20:00      Mechanical Ventilator  


 


1/20/21 20:00        70


 


1/20/21 20:00  95 20 91/58 (69) 97   


 


1/20/21 20:00    91/58    


 


1/20/21 20:00   22   Mechanical Ventilator  70


 


1/20/21 19:46    103/62    


 


1/20/21 19:30 98.4 98 22 98/62 (74) 97   


 


1/20/21 19:20  100 24     70


 


1/20/21 19:00    103/62    


 


1/20/21 19:00   23   Mechanical Ventilator  70


 


1/20/21 18:52  102 23 103/62 (76) 94   


 


1/20/21 18:00  102 23 107/62 (77) 93   


 


1/20/21 18:00    107/62    


 


1/20/21 18:00   23   Mechanical Ventilator  70


 


1/20/21 17:00 96.6 94 15 95/54 (68) 98   


 


1/20/21 17:00    102/63    


 


1/20/21 17:00   23   Mechanical Ventilator  70


 


1/20/21 16:00  96      


 


1/20/21 16:00    100/56    


 


1/20/21 16:00   24   Mechanical Ventilator  70


 


1/20/21 16:00        70


 


1/20/21 16:00  91 23 100/56 (71) 97   


 


1/20/21 15:58      Mechanical Ventilator  


 


1/20/21 15:29  97 21     70


 


1/20/21 15:00    98/61    


 


1/20/21 15:00   23   Mechanical Ventilator  70


 


1/20/21 15:00 100.9 99 17 102/68 (79) 97   


 


1/20/21 14:51 100.9       


 


1/20/21 14:21  103  101/65    


 


1/20/21 14:00  103 24 101/65 (77) 97   


 


1/20/21 14:00    104/63    


 


1/20/21 14:00   24   Mechanical Ventilator  70


 


1/20/21 13:00    110/59    


 


1/20/21 13:00   22   Mechanical Ventilator  70


 


1/20/21 13:00 100.6 110 23 104/66 (79) 97   


 


1/20/21 12:00  110 20 103/73 (83) 96   


 


1/20/21 12:00      Mechanical Ventilator  


 


1/20/21 12:00    103/73    


 


1/20/21 12:00   21   Mechanical Ventilator  70


 


1/20/21 12:00  111      


 


1/20/21 11:38  111 21     70


 


1/20/21 11:20        70


 


1/20/21 11:00  111 21 99/57 (71) 97   


 


1/20/21 11:00    102/61    


 


1/20/21 11:00   21   Mechanical Ventilator  70


 


1/20/21 10:00    98/62    


 


1/20/21 10:00   20   Mechanical Ventilator  100


 


1/20/21 10:00  110 21 98/62 (74) 97   


 


1/20/21 09:10  109      


 


1/20/21 09:00    112/68    


 


1/20/21 09:00   20   Mechanical Ventilator  100


 


1/20/21 09:00 99.9 113 20 111/69 (83) 97   


 


1/20/21 08:15        80


 


1/20/21 08:00      Mechanical Ventilator  


 


1/20/21 08:00    108/69    


 


1/20/21 08:00   20   Mechanical Ventilator  100


 


1/20/21 08:00  109      


 


1/20/21 08:00        100


 


1/20/21 07:50 100.2 109 21 114/69 (84) 98   


 


1/20/21 07:38  110 21     100


 


1/20/21 07:00    117/67    


 


1/20/21 07:00   26   Mechanical Ventilator  100


 


1/20/21 07:00  110 25 117/67 (84) 97   


 


1/20/21 06:00    104/69    


 


1/20/21 06:00   19   Mechanical Ventilator  100


 


1/20/21 06:00  110 20 104/69 (81) 98   


 


1/20/21 05:00  110 19 115/72 (86) 98   


 


1/20/21 05:00    115/69    


 


1/20/21 05:00   20   Mechanical Ventilator  100


 


1/20/21 04:37  109      


 


1/20/21 04:00        100


 


1/20/21 04:00 99.0 110 21 86/67 (73) 98   


 


1/20/21 04:00    105/69    


 


1/20/21 04:00   21   Mechanical Ventilator  100


 


1/20/21 04:00      Mechanical Ventilator  


 


1/20/21 03:45  110 22 100/65 (77) 98   


 


1/20/21 03:30  110 22 102/66 (78) 98   


 


1/20/21 03:30  104 21     100


 


1/20/21 03:00    98/62    


 


1/20/21 03:00   21   Mechanical Ventilator  100


 


1/20/21 03:00  109 22 98/62 (74) 98   


 


1/20/21 02:45  109 23 95/61 (72) 98   


 


1/20/21 02:30  110 22 90/58 (69) 98   


 


1/20/21 02:20    90/56    


 


1/20/21 02:15    88/60    


 


1/20/21 02:15  110 23 88/60 (69) 98   


 


1/20/21 02:00    92/63    


 


1/20/21 02:00   22   Mechanical Ventilator  100


 


1/20/21 02:00  112 22 92/63 (73) 99   


 


1/20/21 01:55  114 23 91/56 (68) 98   


 


1/20/21 01:52    85/55    


 


1/20/21 01:47    82/47    


 


1/20/21 01:45  116 24 82/47 (59) 98   


 


1/20/21 01:00  123 23 93/60 (71) 97   


 


1/20/21 01:00   23   Mechanical Ventilator  100


 


1/20/21 00:59 99.4       


 


1/20/21 00:00 100.5 127 24 92/58 (69) 96   


 


1/20/21 00:00   24   Mechanical Ventilator  100


 


1/20/21 00:00      Mechanical Ventilator  


 


1/19/21 23:43  131      


 


1/19/21 23:29  128 24     100


 


1/19/21 23:00  130 26 103/67 (79) 94   


 


1/19/21 23:00   24   Mechanical Ventilator  100


 


1/19/21 22:42  127  112/70    


 


1/19/21 22:00   25   Mechanical Ventilator  100


 


1/19/21 22:00  127 26 106/71 (83) 92   


 


1/19/21 21:00  125 26 103/73 (83) 94   


 


1/19/21 21:00        100


 


1/19/21 21:00   26   Mechanical Ventilator  100


 


1/19/21 20:00   25   Mechanical Ventilator  100


 


1/19/21 20:00 100.0 125 24 97/66 (76) 90   


 


1/19/21 20:00      Mechanical Ventilator  


 


1/19/21 19:28  119 26     100


 


1/19/21 19:17  120      


 


1/19/21 19:00  121 29 106/65 (79) 90   


 


1/19/21 19:00   24   Mechanical Ventilator  100


 


1/19/21 18:00  118 29 102/67 (79) 91   


 


1/19/21 18:00   21   Mechanical Ventilator  100


 


1/19/21 17:00  111 23 101/65 (77) 94   


 


1/19/21 17:00   23   Mechanical Ventilator  100


 


1/19/21 16:05 99.7       


 


1/19/21 16:00  110      


 


1/19/21 16:00  110 25 107/68 (81) 99   


 


1/19/21 16:00   20   Mechanical Ventilator  100


 


1/19/21 16:00      Mechanical Ventilator  


 


1/19/21 16:00        100


 


1/19/21 15:40  107 24     100


 


1/19/21 15:00   22   Mechanical Ventilator  100


 


1/19/21 15:00  109 24 103/69 (80) 96   


 


1/19/21 14:00  114 18 105/65 (78) 96   


 


1/19/21 14:00   21   Mechanical Ventilator  100


 


1/19/21 13:46  109  106/65    


 


1/19/21 13:00  105 26 105/66 (79) 96   


 


1/19/21 13:00   18   Mechanical Ventilator  100


 


1/19/21 12:05 99.8       


 


1/19/21 12:00  101 24 105/64 (78) 96   


 


1/19/21 12:00  100      


 


1/19/21 12:00        100


 


1/19/21 12:00      Mechanical Ventilator  


 


1/19/21 12:00   19   Mechanical Ventilator  100


 


1/19/21 11:40  100 24     100

















Intake and Output  


 


 1/20/21 1/21/21





 19:00 07:00


 


Intake Total 1174.5 ml 1894.0 ml


 


Output Total 620 ml 765 ml


 


Balance 554.5 ml 1129.0 ml


 


  


 


Free Water  120 ml


 


IV Total 869.5 ml 1144.0 ml


 


Tube Feeding 305 ml 630 ml


 


Output Urine Total 620 ml 665 ml


 


Stool Total  100 ml











Labs








Test


 1/18/21


20:24 1/19/21


00:12 1/19/21


04:30 1/19/21


05:35


 


POC Whole Blood Glucose


 241 MG/DL


() 361 MG/DL


() 


 





 


White Blood Count


 


 


 18.5 K/UL


(4.8-10.8) 





 


Red Blood Count


 


 


 4.43 M/UL


(4.70-6.10) 





 


Hemoglobin


 


 


 12.9 G/DL


(14.2-18.0) 





 


Hematocrit


 


 


 42.1 %


(42.0-52.0) 





 


Mean Corpuscular Volume   95 FL (80-99)  


 


Mean Corpuscular Hemoglobin


 


 


 29.2 PG


(27.0-31.0) 





 


Mean Corpuscular Hemoglobin


Concent 


 


 30.8 G/DL


(32.0-36.0) 





 


Red Cell Distribution Width


 


 


 12.9 %


(11.6-14.8) 





 


Platelet Count


 


 


 195 K/UL


(150-450) 





 


Mean Platelet Volume


 


 


 10.0 FL


(6.5-10.1) 





 


Neutrophils (%) (Auto)    % (45.0-75.0)  


 


Lymphocytes (%) (Auto)    % (20.0-45.0)  


 


Monocytes (%) (Auto)    % (1.0-10.0)  


 


Eosinophils (%) (Auto)    % (0.0-3.0)  


 


Basophils (%) (Auto)    % (0.0-2.0)  


 


Differential Total Cells


Counted 


 


 100 


 





 


Neutrophils % (Manual)   92 % (45-75)  


 


Lymphocytes % (Manual)   6 % (20-45)  


 


Monocytes % (Manual)   2 % (1-10)  


 


Eosinophils % (Manual)   0 % (0-3)  


 


Basophils % (Manual)   0 % (0-2)  


 


Band Neutrophils   0 % (0-8)  


 


Platelet Estimate   Adequate  


 


Platelet Morphology   Normal  


 


Red Blood Cell Morphology   Normal  


 


Sodium Level


 


 


 149 MMOL/L


(136-145) 





 


Potassium Level


 


 


 3.4 MMOL/L


(3.5-5.1) 





 


Chloride Level


 


 


 112 MMOL/L


() 





 


Carbon Dioxide Level


 


 


 28 MMOL/L


(21-32) 





 


Anion Gap


 


 


 9 mmol/L


(5-15) 





 


Blood Urea Nitrogen


 


 


 38 mg/dL


(7-18) 





 


Creatinine


 


 


 0.8 MG/DL


(0.55-1.30) 





 


Estimat Glomerular Filtration


Rate 


 


 > 60 mL/min


(>60) 





 


Glucose Level


 


 


 236 MG/DL


() 





 


Calcium Level


 


 


 8.4 MG/DL


(8.5-10.1) 





 


Troponin I


 


 


 0.056 ng/mL


(0.000-0.056) 





 


Digoxin Level


 


 


 0.6 NG/ML


(0.5-2.0) 





 


Test


 1/19/21


08:23 1/20/21


07:04 1/20/21


08:06 1/21/21


04:00


 


Arterial Blood pH


 7.410


(7.350-7.450) 


 7.351


(7.350-7.450) 





 


Arterial Blood Partial


Pressure CO2 53.2 mmHg


(35.0-45.0) 


 56.6 mmHg


(35.0-45.0) 





 


Arterial Blood Partial


Pressure O2 67.3 mmHg


(75.0-100.0) 


 94.1 mmHg


(75.0-100.0) 





 


Arterial Blood HCO3


 33.0 mmol/L


(22.0-26.0) 


 30.6 mmol/L


(22.0-26.0) 





 


Arterial Blood Oxygen


Saturation 93.3 %


() 


 96.6 %


() 





 


Arterial Blood Base Excess 6.9 (-2-2)   3.8 (-2-2)  


 


Abelardo Test Positive   Positive  


 


White Blood Count


 


 15.7 K/UL


(4.8-10.8) 


 14.8 K/UL


(4.8-10.8)


 


Red Blood Count


 


 4.04 M/UL


(4.70-6.10) 


 3.89 M/UL


(4.70-6.10)


 


Hemoglobin


 


 11.8 G/DL


(14.2-18.0) 


 11.4 G/DL


(14.2-18.0)


 


Hematocrit


 


 37.9 %


(42.0-52.0) 


 36.6 %


(42.0-52.0)


 


Mean Corpuscular Volume  94 FL (80-99)   94 FL (80-99) 


 


Mean Corpuscular Hemoglobin


 


 29.3 PG


(27.0-31.0) 


 29.2 PG


(27.0-31.0)


 


Mean Corpuscular Hemoglobin


Concent 


 31.2 G/DL


(32.0-36.0) 


 31.1 G/DL


(32.0-36.0)


 


Red Cell Distribution Width


 


 13.2 %


(11.6-14.8) 


 13.1 %


(11.6-14.8)


 


Platelet Count


 


 156 K/UL


(150-450) 


 161 K/UL


(150-450)


 


Mean Platelet Volume


 


 10.8 FL


(6.5-10.1) 


 11.9 FL


(6.5-10.1)


 


Neutrophils (%) (Auto)   % (45.0-75.0)    % (45.0-75.0) 


 


Lymphocytes (%) (Auto)   % (20.0-45.0)    % (20.0-45.0) 


 


Monocytes (%) (Auto)   % (1.0-10.0)    % (1.0-10.0) 


 


Eosinophils (%) (Auto)   % (0.0-3.0)    % (0.0-3.0) 


 


Basophils (%) (Auto)   % (0.0-2.0)    % (0.0-2.0) 


 


Differential Total Cells


Counted 


 100 


 


 100 





 


Neutrophils % (Manual)  94 % (45-75)   88 % (45-75) 


 


Lymphocytes % (Manual)  4 % (20-45)   6 % (20-45) 


 


Monocytes % (Manual)  1 % (1-10)   0 % (1-10) 


 


Eosinophils % (Manual)  1 % (0-3)   4 % (0-3) 


 


Basophils % (Manual)  0 % (0-2)   0 % (0-2) 


 


Band Neutrophils  0 % (0-8)   2 % (0-8) 


 


Platelet Estimate  Adequate   Adequate 


 


Platelet Morphology  Normal   Normal 


 


Hypochromasia  1+   1+ 


 


Sodium Level


 


 148 MMOL/L


(136-145) 


 146 MMOL/L


(136-145)


 


Potassium Level


 


 4.0 MMOL/L


(3.5-5.1) 


 3.2 MMOL/L


(3.5-5.1)


 


Chloride Level


 


 113 MMOL/L


() 


 111 MMOL/L


()


 


Carbon Dioxide Level


 


 32 MMOL/L


(21-32) 


 30 MMOL/L


(21-32)


 


Anion Gap


 


 3 mmol/L


(5-15) 


 5 mmol/L


(5-15)


 


Blood Urea Nitrogen


 


 33 mg/dL


(7-18) 


 27 mg/dL


(7-18)


 


Creatinine


 


 0.8 MG/DL


(0.55-1.30) 


 0.5 MG/DL


(0.55-1.30)


 


Estimat Glomerular Filtration


Rate 


 > 60 mL/min


(>60) 


 > 60 mL/min


(>60)


 


Glucose Level


 


 152 MG/DL


() 


 128 MG/DL


()


 


Calcium Level


 


 8.3 MG/DL


(8.5-10.1) 


 8.4 MG/DL


(8.5-10.1)


 


Total Bilirubin


 


 0.5 MG/DL


(0.2-1.0) 


 0.4 MG/DL


(0.2-1.0)


 


Aspartate Amino Transf


(AST/SGOT) 


 23 U/L (15-37) 


 


 30 U/L (15-37) 





 


Alanine Aminotransferase


(ALT/SGPT) 


 17 U/L (12-78) 


 


 21 U/L (12-78) 





 


Alkaline Phosphatase


 


 76 U/L


() 


 88 U/L


()


 


Total Protein


 


 5.4 G/DL


(6.4-8.2) 


 5.4 G/DL


(6.4-8.2)


 


Albumin


 


 1.3 G/DL


(3.4-5.0) 


 1.3 G/DL


(3.4-5.0)


 


Globulin  4.1 g/dL   4.1 g/dL 


 


Albumin/Globulin Ratio  0.3 (1.0-2.7)   0.3 (1.0-2.7) 


 


Red Blood Cell Morphology    Normal 


 


Test


 1/21/21


08:53 


 


 





 


Arterial Blood pH


 7.343


(7.350-7.450) 


 


 





 


Arterial Blood Partial


Pressure CO2 62.6 mmHg


(35.0-45.0) 


 


 





 


Arterial Blood Partial


Pressure O2 37.5 mmHg


(75.0-100.0) 


 


 





 


Arterial Blood HCO3


 33.2 mmol/L


(22.0-26.0) 


 


 





 


Arterial Blood Oxygen


Saturation 5.7 % () 


 


 


 





 


Arterial Blood Base Excess 5.7 (-2-2)    


 


Abelardo Test Positive    








Height (Feet):  5


Height (Inches):  7.00


Weight (Pounds):  198


Objective


General Appearance:  lethargic, moderate distress


Neck:  supple


Cardiovascular:  regular rhythm


Respiratory/Chest:  crackles/rales, rhonchi - bilaterally vent++


Abdomen:  non tender, soft


Extremities:  non-tender











Emmett Cook MD          Jan 21, 2021 11:02

## 2021-01-21 NOTE — GENERAL PROGRESS NOTE
Subjective


ROS Limited/Unobtainable:  Yes


Allergies:  


Coded Allergies:  


     No Known Allergies (Unverified , 6/11/19)


Subjective


events noted - interval notes reviewed 


glucose values are stable - insulin requirements has diminished 











Item Value  Date Time


 


Bedside Blood Glucose 144 mg/dl H 1/21/21 0600


 


Bedside Blood Glucose 109 mg/dl 1/21/21 0000


 


Bedside Blood Glucose 113 mg/dl 1/20/21 2100


 


Bedside Blood Glucose 121 mg/dl H 1/20/21 1828


 


Bedside Blood Glucose 148 mg/dl H 1/20/21 1228


 


Bedside Blood Glucose 158 mg/dl H 1/20/21 0912


 


Bedside Blood Glucose 158 mg/dl H 1/20/21 0600











Objective





Last 24 Hour Vital Signs








  Date Time  Temp Pulse Resp B/P (MAP) Pulse Ox O2 Delivery O2 Flow Rate FiO2


 


1/21/21 06:15  94 22 114/57 (76) 90   


 


1/21/21 06:00  93 21 97/60 (72) 91   


 


1/21/21 06:00   29   Mechanical Ventilator  100


 


1/21/21 05:48   28     100


 


1/21/21 05:34  85  89/51    


 


1/21/21 05:30  92 20 105/60 (75) 93   


 


1/21/21 05:00   28   Mechanical Ventilator  100


 


1/21/21 05:00  89 19 97/54 (68) 95   


 


1/21/21 04:45  90 24 102/61 (75) 95   


 


1/21/21 04:30  95 18 105/86 (92) 98   


 


1/21/21 04:15  92 21 105/78 (87) 95   


 


1/21/21 04:00        100


 


1/21/21 04:00   33   Mechanical Ventilator  100


 


1/21/21 04:00      Mechanical Ventilator  


 


1/21/21 04:00 96.6 95 26 135/76 (95) 81   


 


1/21/21 04:00  90      


 


1/21/21 03:30  88 21 132/71 (91) 91   


 


1/21/21 03:00    128/77    


 


1/21/21 03:00  86 17 124/72 (89) 93   


 


1/21/21 02:32  85 22     60


 


1/21/21 02:30  88 20 115/70 (85) 94   


 


1/21/21 02:00  88 16 103/65 (78) 96   


 


1/21/21 02:00    119/77    


 


1/21/21 01:30  84 20 114/67 (83) 92   


 


1/21/21 01:00  83 16 101/62 (75) 96   


 


1/21/21 00:30  84 17 102/61 (75) 97   


 


1/21/21 00:00      Mechanical Ventilator  


 


1/21/21 00:00  89      


 


1/21/21 00:00        60


 


1/21/21 00:00    117/78    


 


1/21/21 00:00   26   Mechanical Ventilator  60


 


1/21/21 00:00 100.6 88 18 99/58 (72) 95   


 


1/20/21 23:30  92 20 92/58 (69) 95   


 


1/20/21 23:13 102.0       


 


1/20/21 23:00    98/61    


 


1/20/21 23:00   26   Mechanical Ventilator  70


 


1/20/21 23:00  92 20 98/57 (71) 96   


 


1/20/21 22:31  91 19     60


 


1/20/21 22:30 102.2 98 19 99/61 (74) 97   


 


1/20/21 22:00  97  98/61    


 


1/20/21 22:00    92/51    


 


1/20/21 22:00   26   Mechanical Ventilator  70


 


1/20/21 22:00  98 19 98/60 (73) 97   


 


1/20/21 21:30  100 22 94/58 (70) 97   


 


1/20/21 21:00    105/57    


 


1/20/21 21:00   20   Mechanical Ventilator  70


 


1/20/21 21:00  97 20 105/57 (73) 96   


 


1/20/21 20:30  99 24 98/62 (74) 96   


 


1/20/21 20:00  99      


 


1/20/21 20:00      Mechanical Ventilator  


 


1/20/21 20:00        70


 


1/20/21 20:00  95 20 91/58 (69) 97   


 


1/20/21 20:00    91/58    


 


1/20/21 20:00   22   Mechanical Ventilator  70


 


1/20/21 19:46    103/62    


 


1/20/21 19:30 98.4 98 22 98/62 (74) 97   


 


1/20/21 19:20  100 24     70


 


1/20/21 19:00    103/62    


 


1/20/21 19:00   23   Mechanical Ventilator  70


 


1/20/21 18:52  102 23 103/62 (76) 94   


 


1/20/21 18:00  102 23 107/62 (77) 93   


 


1/20/21 18:00    107/62    


 


1/20/21 18:00   23   Mechanical Ventilator  70


 


1/20/21 17:00 96.6 94 15 95/54 (68) 98   


 


1/20/21 17:00    102/63    


 


1/20/21 17:00   23   Mechanical Ventilator  70


 


1/20/21 16:00  96      


 


1/20/21 16:00    100/56    


 


1/20/21 16:00   24   Mechanical Ventilator  70


 


1/20/21 16:00        70


 


1/20/21 16:00  91 23 100/56 (71) 97   


 


1/20/21 15:58      Mechanical Ventilator  


 


1/20/21 15:29  97 21     70


 


1/20/21 15:00    98/61    


 


1/20/21 15:00   23   Mechanical Ventilator  70


 


1/20/21 15:00 100.9 99 17 102/68 (79) 97   


 


1/20/21 14:51 100.9       


 


1/20/21 14:21  103  101/65    


 


1/20/21 14:00  103 24 101/65 (77) 97   


 


1/20/21 14:00    104/63    


 


1/20/21 14:00   24   Mechanical Ventilator  70


 


1/20/21 13:00    110/59    


 


1/20/21 13:00   22   Mechanical Ventilator  70


 


1/20/21 13:00 100.6 110 23 104/66 (79) 97   


 


1/20/21 12:00  110 20 103/73 (83) 96   


 


1/20/21 12:00      Mechanical Ventilator  


 


1/20/21 12:00    103/73    


 


1/20/21 12:00   21   Mechanical Ventilator  70


 


1/20/21 12:00  111      


 


1/20/21 11:38  111 21     70


 


1/20/21 11:20        70


 


1/20/21 11:00  111 21 99/57 (71) 97   


 


1/20/21 11:00    102/61    


 


1/20/21 11:00   21   Mechanical Ventilator  70


 


1/20/21 10:00    98/62    


 


1/20/21 10:00   20   Mechanical Ventilator  100


 


1/20/21 10:00  110 21 98/62 (74) 97   


 


1/20/21 09:10  109      


 


1/20/21 09:00    112/68    


 


1/20/21 09:00   20   Mechanical Ventilator  100


 


1/20/21 09:00 99.9 113 20 111/69 (83) 97   


 


1/20/21 08:15        80


 


1/20/21 08:00      Mechanical Ventilator  


 


1/20/21 08:00    108/69    


 


1/20/21 08:00   20   Mechanical Ventilator  100


 


1/20/21 08:00  109      


 


1/20/21 08:00        100


 


1/20/21 07:50 100.2 109 21 114/69 (84) 98   


 


1/20/21 07:38  110 21     100


 


1/20/21 07:00    117/67    


 


1/20/21 07:00   26   Mechanical Ventilator  100


 


1/20/21 07:00  110 25 117/67 (84) 97   

















Intake and Output  


 


 1/20/21 1/21/21





 19:00 07:00


 


Intake Total 1174.5 ml 1759.5 ml


 


Output Total 620 ml 715 ml


 


Balance 554.5 ml 1044.5 ml


 


  


 


Free Water  120 ml


 


IV Total 869.5 ml 1064.5 ml


 


Tube Feeding 305 ml 575 ml


 


Output Urine Total 620 ml 615 ml


 


Stool Total  100 ml








Laboratory Tests


1/20/21 07:04: 


White Blood Count 15.7H, Red Blood Count 4.04L, Hemoglobin 11.8L, Hematocrit 

37.9L, Mean Corpuscular Volume 94, Mean Corpuscular Hemoglobin 29.3, Mean 

Corpuscular Hemoglobin Concent 31.2L, Red Cell Distribution Width 13.2, Platelet

Count 156, Mean Platelet Volume 10.8H, Neutrophils (%) (Auto) , Lymphocytes (%) 

(Auto) , Monocytes (%) (Auto) , Eosinophils (%) (Auto) , Basophils (%) (Auto) , 

Differential Total Cells Counted 100, Neutrophils % (Manual) 94H, Lymphocytes % 

(Manual) 4L, Monocytes % (Manual) 1, Eosinophils % (Manual) 1, Basophils % 

(Manual) 0, Band Neutrophils 0, Platelet Estimate Adequate, Platelet Morphology 

Normal, Hypochromasia 1+, Sodium Level 148H, Potassium Level 4.0, Chloride Level

113H, Carbon Dioxide Level 32, Anion Gap 3L, Blood Urea Nitrogen 33H, Creatinine

0.8, Estimat Glomerular Filtration Rate > 60, Glucose Level 152H, Calcium Level 

8.3L, Total Bilirubin 0.5, Aspartate Amino Transf (AST/SGOT) 23, Alanine 

Aminotransferase (ALT/SGPT) 17, Alkaline Phosphatase 76, Total Protein 5.4L, 

Albumin 1.3L, Globulin 4.1, Albumin/Globulin Ratio 0.3L


1/20/21 08:06: 


Arterial Blood pH 7.351, Arterial Blood Partial Pressure CO2 56.6*H, Arterial 

Blood Partial Pressure O2 94.1, Arterial Blood HCO3 30.6H, Arterial Blood Oxygen

Saturation 96.6, Arterial Blood Base Excess 3.8H, Abelardo Test Positive


1/21/21 04:00: 


White Blood Count 14.8H, Red Blood Count 3.89L, Hemoglobin 11.4L, Hematocrit 

36.6L, Mean Corpuscular Volume 94, Mean Corpuscular Hemoglobin 29.2, Mean 

Corpuscular Hemoglobin Concent 31.1L, Red Cell Distribution Width 13.1, Platelet

Count 161, Mean Platelet Volume 11.9H, Neutrophils (%) (Auto) , Lymphocytes (%) 

(Auto) , Monocytes (%) (Auto) , Eosinophils (%) (Auto) , Basophils (%) (Auto) , 

Neutrophils % (Manual) [Pending], Lymphocytes % (Manual) [Pending], Platelet Es

timate [Pending], Platelet Morphology [Pending], Sodium Level 146H, Potassium 

Level 3.2L, Chloride Level 111H, Carbon Dioxide Level 30, Anion Gap 5, Blood 

Urea Nitrogen 27H, Creatinine 0.5L, Estimat Glomerular Filtration Rate > 60, 

Glucose Level 128H, Calcium Level 8.4L, Total Bilirubin 0.4, Aspartate Amino 

Transf (AST/SGOT) 30, Alanine Aminotransferase (ALT/SGPT) 21, Alkaline 

Phosphatase 88, Total Protein 5.4L, Albumin 1.3L, Globulin 4.1, Albumin/Globulin

 Ratio 0.3L


Height (Feet):  5


Height (Inches):  7.00


Weight (Pounds):  198


Objective





Current Medications








 Medications


  (Trade)  Dose


 Ordered  Sig/Julisa


 Route


 PRN Reason  Start Time


 Stop Time Status Last Admin


Dose Admin


 


 Acetaminophen


  (Tylenol)  650 mg  Q4H  PRN


 GT


 Temp >100.5  1/15/21 17:15


 2/14/21 17:14  1/20/21 22:35





 


 Amiodarone HCl


  (Cordarone)  400 mg  EVERY 12  HOURS


 NG


   1/19/21 21:00


 4/19/21 20:59  1/20/21 20:49





 


 Chlorhexidine


 Gluconate


  (Vanessa-Hex 2%)  1 applic  DAILY@2000


 TOPIC


   1/19/21 20:00


 4/19/21 19:59  1/20/21 20:48





 


 Dextrose  1,000 ml @ 


 50 mls/hr  Q20H


 IV


   1/18/21 10:00


 2/17/21 09:59  1/20/21 22:00





 


 Dextrose


  (Dextrose 50%)  25 ml  Q30M  PRN


 IV


 Hypoglycemia  1/9/21 13:30


 4/9/21 13:29   





 


 Dextrose


  (Dextrose 50%)  50 ml  Q30M  PRN


 IV


 Hypoglycemia  1/9/21 13:30


 4/9/21 13:29   





 


 Digoxin


  (Lanoxin)  0.125 mg  DAILY


 NG


   1/21/21 09:00


 4/21/21 08:59   





 


 Diltiazem HCl


  (Cardizem Tab)  30 mg  EVERY 8  HOURS


 NG


   1/19/21 14:00


 2/18/21 13:59  1/20/21 22:00





 


 Docusate Sodium


  (Colace)  100 mg  TIDPRN  PRN


 GT


 Constipation  1/12/21 01:15


 2/11/21 01:14  1/12/21 01:42





 


 Enoxaparin Sodium


  (Lovenox)  80 mg  EVERY 12  HOURS


 SUBQ


   1/2/21 21:00


 4/2/21 20:59  1/20/21 20:50





 


 Insulin Aspart


  (NovoLOG)    Q6HR


 SUBQ


   1/14/21 12:00


 4/9/21 17:59  1/21/21 05:35





 


 Insulin Aspart


  (NovoLOG)  14 units  EVERY 6  HOURS


 SUBQ


   1/16/21 12:00


 4/15/21 06:59  1/21/21 05:35





 


 Insulin Detemir


  (Levemir)  20 units  Q12HR


 SUBQ


   1/14/21 09:00


 4/12/21 08:59  1/20/21 21:00





 


 Midazolam HCl  100 ml @ 2


 mls/hr  Q24H  PRN


 IV


 Agitation  1/15/21 23:00


 1/22/21 22:59  1/21/21 05:48





 


 Norepinephrine


 Bitartrate 4 mg/


 Dextrose  250 ml @ 0


 mls/hr  Q24H


 IV


   1/18/21 13:15


 1/21/21 13:14  1/20/21 19:46





 


 Pantoprazole


  (Protonix)  40 mg  DAILY


 IVP


   1/14/21 09:00


 2/13/21 08:59  1/20/21 09:11





 


 Piperacillin Sod/


 Tazobactam Sod


 3.375 gm/Sodium


 Chloride  110 ml @ 


 27.5 mls/hr  EVERY 8  HOURS


 IVPB


   1/15/21 14:00


 1/24/21 13:59  1/21/21 05:07





 


 Quetiapine


 Fumarate


  (SEROqueL)  50 mg  Q12HR


 ORAL


   1/4/21 21:00


 2/18/21 20:59  1/20/21 20:48





 


 Sodium Chloride  500 ml @ 


 999 mls/hr  Q31M PRN


 IV


 For hypotension  1/15/21 18:30


 2/14/21 18:29   





 


 Vancomycin HCl  300 ml @ 


 150 mls/hr  Q12H


 IVPB


   1/16/21 11:00


 1/25/21 10:59  1/20/21 22:34





 


 Vancomycin HCl


  (Vanco pharmacy


 to dose)  1 ea  DAILY  PRN


 MISC


 Per rx protocol  1/14/21 09:15


 2/13/21 09:14   














Assessment/Plan


Problem List:  


(1) Diabetes mellitus out of control


ICD Codes:  E11.65 - Type 2 diabetes mellitus with hyperglycemia


SNOMED:  12764602, [id number]


(2) Pneumonia due to COVID-19 virus


ICD Codes:  U07.1 - COVID-19; J12.82 - Pneumonia due to coronavirus disease 2019


SNOMED:  [id number]


Assessment/Plan:


reduce Novolog 14 to 9 units every6 hold - hold wwhile TF is on hold 


continue Novolog sliding scale every 6 hours











Nick Mcmahon MD                 Jan 21, 2021 06:46

## 2021-01-21 NOTE — NUR
NURSE NOTES:

Novolog (parameter) non administered because patient's BS is within limit, and Novolog meal 
non-administered because patient's feeding has been on hold.

## 2021-01-21 NOTE — GENERAL PROGRESS NOTE
Subjective


Allergies:  


Coded Allergies:  


     No Known Allergies (Unverified , 6/11/19)


Subjective


on ventilator 60% fio2


on sediation


unresponsive


in icu


rt chest tube s placed due to pneumothorex


afib is controlled


hypotensuion on tndelberg position





Objective





Last 24 Hour Vital Signs








  Date Time  Temp Pulse Resp B/P (MAP) Pulse Ox O2 Delivery O2 Flow Rate FiO2


 


1/21/21 15:00  119 30 101/56 (71) 92   


 


1/21/21 15:00    105/63    


 


1/21/21 14:55  101      


 


1/21/21 14:00  114 24 60/36 (44) 87   


 


1/21/21 13:50  115  89/55    


 


1/21/21 13:00  117 24 74/51 (59) 88   


 


1/21/21 12:00  123 22 81/50 (60) 86   


 


1/21/21 12:00        100


 


1/21/21 12:00    81/50    


 


1/21/21 12:00   22   Mechanical Ventilator  100


 


1/21/21 12:00      Mechanical Ventilator  


 


1/21/21 11:30    78/47    


 


1/21/21 11:00  124 17 90/38 (55) 83   


 


1/21/21 11:00   17   Endotracheal Tube  100


 


1/21/21 10:32  121 24     60


 


1/21/21 10:30   22   Endotracheal Tube  100


 


1/21/21 10:00  122 22 81/57 (65) 81   


 


1/21/21 10:00   22   Endotracheal Tube  100


 


1/21/21 09:00   24   Endotracheal Tube  100


 


1/21/21 09:00  112 24 102/62 (75) 84   


 


1/21/21 08:37  107      


 


1/21/21 08:00        100


 


1/21/21 08:00   31   Endotracheal Tube  100


 


1/21/21 08:00 98.6 103 31 104/71 (82) 88   


 


1/21/21 08:00      Mechanical Ventilator  


 


1/21/21 07:52  101      


 


1/21/21 07:17  111 35     60


 


1/21/21 07:00  87 26 98/52 (67) 93   


 


1/21/21 07:00   26   Mechanical Ventilator  100


 


1/21/21 06:15  94 22 114/57 (76) 90   


 


1/21/21 06:00  93 21 97/60 (72) 91   


 


1/21/21 06:00   29   Mechanical Ventilator  100


 


1/21/21 05:48   28     100


 


1/21/21 05:34  85  89/51    


 


1/21/21 05:30  92 20 105/60 (75) 93   


 


1/21/21 05:00   28   Mechanical Ventilator  100


 


1/21/21 05:00  89 19 97/54 (68) 95   


 


1/21/21 04:45  90 24 102/61 (75) 95   


 


1/21/21 04:30  95 18 105/86 (92) 98   


 


1/21/21 04:15  92 21 105/78 (87) 95   


 


1/21/21 04:00        100


 


1/21/21 04:00   33   Mechanical Ventilator  100


 


1/21/21 04:00      Mechanical Ventilator  


 


1/21/21 04:00 96.6 95 26 135/76 (95) 81   


 


1/21/21 04:00  90      


 


1/21/21 03:30  88 21 132/71 (91) 91   


 


1/21/21 03:00    128/77    


 


1/21/21 03:00  86 17 124/72 (89) 93   


 


1/21/21 02:32  85 22     60


 


1/21/21 02:30  88 20 115/70 (85) 94   


 


1/21/21 02:00  88 16 103/65 (78) 96   


 


1/21/21 02:00    119/77    


 


1/21/21 01:30  84 20 114/67 (83) 92   


 


1/21/21 01:00  83 16 101/62 (75) 96   


 


1/21/21 00:30  84 17 102/61 (75) 97   


 


1/21/21 00:00      Mechanical Ventilator  


 


1/21/21 00:00  89      


 


1/21/21 00:00        60


 


1/21/21 00:00    117/78    


 


1/21/21 00:00   26   Mechanical Ventilator  60


 


1/21/21 00:00 100.6 88 18 99/58 (72) 95   


 


1/20/21 23:30  92 20 92/58 (69) 95   


 


1/20/21 23:13 102.0       


 


1/20/21 23:00    98/61    


 


1/20/21 23:00   26   Mechanical Ventilator  70


 


1/20/21 23:00  92 20 98/57 (71) 96   


 


1/20/21 22:31  91 19     60


 


1/20/21 22:30 102.2 98 19 99/61 (74) 97   


 


1/20/21 22:00  97  98/61    


 


1/20/21 22:00    92/51    


 


1/20/21 22:00   26   Mechanical Ventilator  70


 


1/20/21 22:00  98 19 98/60 (73) 97   


 


1/20/21 21:30  100 22 94/58 (70) 97   


 


1/20/21 21:00    105/57    


 


1/20/21 21:00   20   Mechanical Ventilator  70


 


1/20/21 21:00  97 20 105/57 (73) 96   


 


1/20/21 20:30  99 24 98/62 (74) 96   


 


1/20/21 20:00  99      


 


1/20/21 20:00      Mechanical Ventilator  


 


1/20/21 20:00        70


 


1/20/21 20:00  95 20 91/58 (69) 97   


 


1/20/21 20:00    91/58    


 


1/20/21 20:00   22   Mechanical Ventilator  70


 


1/20/21 19:46    103/62    


 


1/20/21 19:30 98.4 98 22 98/62 (74) 97   


 


1/20/21 19:20  100 24     70


 


1/20/21 19:00    103/62    


 


1/20/21 19:00   23   Mechanical Ventilator  70


 


1/20/21 18:52  102 23 103/62 (76) 94   


 


1/20/21 18:00  102 23 107/62 (77) 93   


 


1/20/21 18:00    107/62    


 


1/20/21 18:00   23   Mechanical Ventilator  70

















Intake and Output  


 


 1/20/21 1/21/21





 19:00 07:00


 


Intake Total 1174.5 ml 1894.0 ml


 


Output Total 620 ml 765 ml


 


Balance 554.5 ml 1129.0 ml


 


  


 


Free Water  120 ml


 


IV Total 869.5 ml 1144.0 ml


 


Tube Feeding 305 ml 630 ml


 


Output Urine Total 620 ml 665 ml


 


Stool Total  100 ml








Laboratory Tests


1/21/21 04:00: 


White Blood Count 14.8H, Red Blood Count 3.89L, Hemoglobin 11.4L, Hematocrit 

36.6L, Mean Corpuscular Volume 94, Mean Corpuscular Hemoglobin 29.2, Mean C

orpuscular Hemoglobin Concent 31.1L, Red Cell Distribution Width 13.1, Platelet 

Count 161, Mean Platelet Volume 11.9H, Neutrophils (%) (Auto) , Lymphocytes (%) 

(Auto) , Monocytes (%) (Auto) , Eosinophils (%) (Auto) , Basophils (%) (Auto) , 

Differential Total Cells Counted 100, Neutrophils % (Manual) 88H, Lymphocytes % 

(Manual) 6L, Monocytes % (Manual) 0L, Eosinophils % (Manual) 4H, Basophils % 

(Manual) 0, Band Neutrophils 2, Platelet Estimate Adequate, Platelet Morphology 

Normal, Red Blood Cell Morphology Normal, Hypochromasia 1+, Sodium Level 146H, 

Potassium Level 3.2L, Chloride Level 111H, Carbon Dioxide Level 30, Anion Gap 5,

Blood Urea Nitrogen 27H, Creatinine 0.5L, Estimat Glomerular Filtration Rate > 

60, Glucose Level 128H, Calcium Level 8.4L, Total Bilirubin 0.4, Aspartate Amino

Transf (AST/SGOT) 30, Alanine Aminotransferase (ALT/SGPT) 21, Alkaline 

Phosphatase 88, Total Protein 5.4L, Albumin 1.3L, Globulin 4.1, Albumin/Globulin

Ratio 0.3L


1/21/21 08:53: 


Arterial Blood pH 7.343L, Arterial Blood Partial Pressure CO2 62.6*H, Arterial 

Blood Partial Pressure O2 37.5*L, Arterial Blood HCO3 33.2H, Arterial Blood 

Oxygen Saturation 5.7*L, Arterial Blood Base Excess 5.7H, Abelardo Test Positive


Height (Feet):  5


Height (Inches):  7.00


Weight (Pounds):  198


General Appearance:  lethargic


Neck:  supple


Cardiovascular:  regular rhythm


Respiratory/Chest:  normal breath sounds, rhonchi - bilaterally


Abdomen:  non tender, soft


Edema:  trace edema





Assessment/Plan


Assessment/Plan:


hypotension


afib with rvr on digoxine , add amiodarone , cardiology on case


covid pna


ac resp distress on ventilator


etoh abused


dehydration


agitated -halodol


fever susided


cont on ventilator at icu


cont iv abx and iv decadrone


pulmonary and gi on consult


dw charge nurse


poor prognosis 


try to call the son- no answer











Moi Travis MD             Jan 21, 2021 17:07

## 2021-01-21 NOTE — NUR
NURSE NOTES:

Levophed down to 16mcgs, will titrate down as BP can hold up. Blood drawn peripherally and 
sent to lab for vanco trough.

## 2021-01-21 NOTE — SURGERY PROGRESS NOTE
Surgery Progress Note


Subjective


Procedure Performed


Right tube thoracostomy chest tube insertion


Additional Comments


worse


declining


ill appearing


labs noted


exam unchanged


tube stable





Objective





Last 24 Hour Vital Signs








  Date Time  Temp Pulse Resp B/P (MAP) Pulse Ox O2 Delivery O2 Flow Rate FiO2


 


1/21/21 17:28    90/54    


 


1/21/21 17:00    152/68    


 


1/21/21 16:00    91/54    


 


1/21/21 15:37  119 26     60


 


1/21/21 15:00  119 30 101/56 (71) 92   


 


1/21/21 15:00    105/63    


 


1/21/21 14:55  101      


 


1/21/21 14:00   24   Mechanical Ventilator  100


 


1/21/21 14:00  114 24 60/36 (44) 87   


 


1/21/21 13:50  115  89/55    


 


1/21/21 13:00  117 24 74/51 (59) 88   


 


1/21/21 13:00   24   Endotracheal Tube  100


 


1/21/21 12:00  123 22 81/50 (60) 86   


 


1/21/21 12:00        100


 


1/21/21 12:00    81/50    


 


1/21/21 12:00   22   Mechanical Ventilator  100


 


1/21/21 12:00      Mechanical Ventilator  


 


1/21/21 11:30    78/47    


 


1/21/21 11:00  124 17 90/38 (55) 83   


 


1/21/21 11:00   17   Endotracheal Tube  100


 


1/21/21 10:32  121 24     60


 


1/21/21 10:30   22   Endotracheal Tube  100


 


1/21/21 10:00  122 22 81/57 (65) 81   


 


1/21/21 10:00   22   Endotracheal Tube  100


 


1/21/21 09:00   24   Endotracheal Tube  100


 


1/21/21 09:00  112 24 102/62 (75) 84   


 


1/21/21 08:37  107      


 


1/21/21 08:00        100


 


1/21/21 08:00   31   Endotracheal Tube  100


 


1/21/21 08:00 98.6 103 31 104/71 (82) 88   


 


1/21/21 08:00      Mechanical Ventilator  


 


1/21/21 07:52  101      


 


1/21/21 07:17  111 35     60


 


1/21/21 07:00  87 26 98/52 (67) 93   


 


1/21/21 07:00   26   Mechanical Ventilator  100


 


1/21/21 06:15  94 22 114/57 (76) 90   


 


1/21/21 06:00  93 21 97/60 (72) 91   


 


1/21/21 06:00   29   Mechanical Ventilator  100


 


1/21/21 05:48   28     100


 


1/21/21 05:34  85  89/51    


 


1/21/21 05:30  92 20 105/60 (75) 93   


 


1/21/21 05:00   28   Mechanical Ventilator  100


 


1/21/21 05:00  89 19 97/54 (68) 95   


 


1/21/21 04:45  90 24 102/61 (75) 95   


 


1/21/21 04:30  95 18 105/86 (92) 98   


 


1/21/21 04:15  92 21 105/78 (87) 95   


 


1/21/21 04:00        100


 


1/21/21 04:00   33   Mechanical Ventilator  100


 


1/21/21 04:00      Mechanical Ventilator  


 


1/21/21 04:00 96.6 95 26 135/76 (95) 81   


 


1/21/21 04:00  90      


 


1/21/21 03:30  88 21 132/71 (91) 91   


 


1/21/21 03:00    128/77    


 


1/21/21 03:00  86 17 124/72 (89) 93   


 


1/21/21 02:32  85 22     60


 


1/21/21 02:30  88 20 115/70 (85) 94   


 


1/21/21 02:00  88 16 103/65 (78) 96   


 


1/21/21 02:00    119/77    


 


1/21/21 01:30  84 20 114/67 (83) 92   


 


1/21/21 01:00  83 16 101/62 (75) 96   


 


1/21/21 00:30  84 17 102/61 (75) 97   


 


1/21/21 00:00      Mechanical Ventilator  


 


1/21/21 00:00  89      


 


1/21/21 00:00        60


 


1/21/21 00:00    117/78    


 


1/21/21 00:00   26   Mechanical Ventilator  60


 


1/21/21 00:00 100.6 88 18 99/58 (72) 95   


 


1/20/21 23:30  92 20 92/58 (69) 95   


 


1/20/21 23:13 102.0       


 


1/20/21 23:00    98/61    


 


1/20/21 23:00   26   Mechanical Ventilator  70


 


1/20/21 23:00  92 20 98/57 (71) 96   


 


1/20/21 22:31  91 19     60


 


1/20/21 22:30 102.2 98 19 99/61 (74) 97   


 


1/20/21 22:00  97  98/61    


 


1/20/21 22:00    92/51    


 


1/20/21 22:00   26   Mechanical Ventilator  70


 


1/20/21 22:00  98 19 98/60 (73) 97   


 


1/20/21 21:30  100 22 94/58 (70) 97   


 


1/20/21 21:00    105/57    


 


1/20/21 21:00   20   Mechanical Ventilator  70


 


1/20/21 21:00  97 20 105/57 (73) 96   


 


1/20/21 20:30  99 24 98/62 (74) 96   


 


1/20/21 20:00  99      


 


1/20/21 20:00      Mechanical Ventilator  


 


1/20/21 20:00        70


 


1/20/21 20:00  95 20 91/58 (69) 97   


 


1/20/21 20:00    91/58    


 


1/20/21 20:00   22   Mechanical Ventilator  70


 


1/20/21 19:46    103/62    


 


1/20/21 19:30 98.4 98 22 98/62 (74) 97   


 


1/20/21 19:20  100 24     70


 


1/20/21 19:00    103/62    


 


1/20/21 19:00   23   Mechanical Ventilator  70


 


1/20/21 18:52  102 23 103/62 (76) 94   








I&O











Intake and Output  


 


 1/20/21 1/21/21





 19:00 07:00


 


Intake Total 1174.5 ml 1894.0 ml


 


Output Total 620 ml 765 ml


 


Balance 554.5 ml 1129.0 ml


 


  


 


Free Water  120 ml


 


IV Total 869.5 ml 1144.0 ml


 


Tube Feeding 305 ml 630 ml


 


Output Urine Total 620 ml 665 ml


 


Stool Total  100 ml








Dressing:  saturated


Cardiovascular:  RSR


Respiratory:  decreased breath sounds


Abdomen:  decreased bowel sounds


Extremities:  edema, no cyanosis





Laboratory Tests








Test


 1/21/21


04:00 1/21/21


08:18 1/21/21


08:53 1/21/21


17:22


 


White Blood Count


 14.8 K/UL


(4.8-10.8)  H 


 


 





 


Red Blood Count


 3.89 M/UL


(4.70-6.10)  L 


 


 





 


Hemoglobin


 11.4 G/DL


(14.2-18.0)  L 


 


 





 


Hematocrit


 36.6 %


(42.0-52.0)  L 


 


 





 


Mean Corpuscular Volume 94 FL (80-99)     


 


Mean Corpuscular Hemoglobin


 29.2 PG


(27.0-31.0) 


 


 





 


Mean Corpuscular Hemoglobin


Concent 31.1 G/DL


(32.0-36.0)  L 


 


 





 


Red Cell Distribution Width


 13.1 %


(11.6-14.8) 


 


 





 


Platelet Count


 161 K/UL


(150-450) 


 


 





 


Mean Platelet Volume


 11.9 FL


(6.5-10.1)  H 


 


 





 


Neutrophils (%) (Auto)


 % (45.0-75.0)


 


 


 





 


Lymphocytes (%) (Auto)


 % (20.0-45.0)


 


 


 





 


Monocytes (%) (Auto)  % (1.0-10.0)     


 


Eosinophils (%) (Auto)  % (0.0-3.0)     


 


Basophils (%) (Auto)  % (0.0-2.0)     


 


Differential Total Cells


Counted 100  


 


 


 





 


Neutrophils % (Manual) 88 % (45-75)  H   


 


Lymphocytes % (Manual) 6 % (20-45)  L   


 


Monocytes % (Manual) 0 % (1-10)  L   


 


Eosinophils % (Manual) 4 % (0-3)  H   


 


Basophils % (Manual) 0 % (0-2)     


 


Band Neutrophils 2 % (0-8)     


 


Platelet Estimate Adequate     


 


Platelet Morphology Normal     


 


Red Blood Cell Morphology Normal     


 


Hypochromasia 1+     


 


Sodium Level


 146 MMOL/L


(136-145)  H 


 


 





 


Potassium Level


 3.2 MMOL/L


(3.5-5.1)  L 


 


 





 


Chloride Level


 111 MMOL/L


()  H 


 


 





 


Carbon Dioxide Level


 30 MMOL/L


(21-32) 


 


 





 


Anion Gap


 5 mmol/L


(5-15) 


 


 





 


Blood Urea Nitrogen


 27 mg/dL


(7-18)  H 


 


 





 


Creatinine


 0.5 MG/DL


(0.55-1.30)  L 


 


 





 


Estimat Glomerular Filtration


Rate > 60 mL/min


(>60) 


 


 





 


Glucose Level


 128 MG/DL


()  H 


 


 





 


Calcium Level


 8.4 MG/DL


(8.5-10.1)  L 


 


 





 


Total Bilirubin


 0.4 MG/DL


(0.2-1.0) 


 


 





 


Aspartate Amino Transf


(AST/SGOT) 30 U/L (15-37)


 


 


 





 


Alanine Aminotransferase


(ALT/SGPT) 21 U/L (12-78)


 


 


 





 


Alkaline Phosphatase


 88 U/L


() 


 


 





 


Total Protein


 5.4 G/DL


(6.4-8.2)  L 


 


 





 


Albumin


 1.3 G/DL


(3.4-5.0)  L 


 


 





 


Globulin 4.1 g/dL     


 


Albumin/Globulin Ratio


 0.3 (1.0-2.7)


L 


 


 





 


POC Whole Blood Glucose  Pending    Pending  


 


Arterial Blood pH


 


 


 7.343


(7.350-7.450) 





 


Arterial Blood Partial


Pressure CO2 


 


 62.6 mmHg


(35.0-45.0)  *H 





 


Arterial Blood Partial


Pressure O2 


 


 37.5 mmHg


(75.0-100.0) 





 


Arterial Blood HCO3


 


 


 33.2 mmol/L


(22.0-26.0)  H 





 


Arterial Blood Oxygen


Saturation 


 


 5.7 % ()


*L 





 


Arterial Blood Base Excess   5.7 (-2-2)  H 


 


Abelardo Test   Positive   











Plan


Problems:  


(1) Pneumonia due to COVID-19 virus


Assessment & Plan:  Interim endotracheal intubation, endotracheal tube tip in 

good position


approximately 4 cm above the tito. There are extensive bilateral infiltrates, 

which


are markedly increased from the prior study. Pleural spaces are probably clear, 

not


well demonstrated


Markedly increased and now extensive bilateral infiltrates 


cont as per pulm and iID





(2) Hypoxia


Assessment & Plan:  patient developing DTI. in current condition, critically ill

and declining potential inevitable decline 


all care precautions taken and will cont to monitor and assist with improvement 





(3) Abdominal pain


Assessment & Plan:  65M abd pain, septic, covid, intubated ons upport


currently abd exam benign but limited given condition


line bo mary noted


okay for meds and feeds


nutritional optimization 


DAILY ESTIMATED NEEDS:


Needs based on Critical Care/ 73kg abw 


22-28  kcals/kg 


4282-7947  total kcals


1.2-2  g protein/kg


  g total protein 


25-30  mL/kg


8386-5524  total fluid mLs





NUTRITION DIAGNOSIS:


Swallowing difficulty R/T respiratory failure as evidenced by pt now


orally intubated, OGT in place, NPO





 


CURRENT TF:NPO 





 





ENTERAL NUTRITION RECOMMENDATIONS:


Vital AF 1.2 @ 60ml/hr x 24 hrs  to provide 1440ml, 1728kcal, 116g prot, 1167ml 

free water 





* As medically appropriate, initiate critical care and carb controlled TF 

formula of Vital AF 1.2


* Initiate @ 20ml/hr x 6hrs, advance 10ml q 4-6 hrs as tolerated to goal rate


* HOB over 30 degrees/ water flush per MD


* Does not exceed est kcal needs w/ Propofol running @ 8.083ml/hr x 24 hrs 

(provides 213 lipid kcal)





 





ADDITIONAL RECOMMENDATIONS:


* Calibrated bedscale wt 


* Monitor BGs closely w/ Decadron and TF, need for long acting insulin 


* Monitor lytes, replete as needed  


* Monitor Propofol rate, need to adjust TF rate  





(4) Acute metabolic encephalopathy


(5) Pneumothorax on right


Assessment & Plan:  right ptx


chest tube placed


decreased air


stable ptx


cont tube to suction


Endotracheal tube tip projects at the level of the thoracic inlet.


Orogastric tube tip projects at the level of the gastric fundus. Again demonst

rated


is extensive subcutaneous emphysema, which appears somewhat worse on the left. 

Right


chest tube remains in place. Small right medial and apical pneumothorax are 

present,


slightly more conspicuous than on the previous exam, still small, somewhat diffi

cult


to identify due to overlying subcutaneous emphysema. There is probably a tiny 

left


apical pneumothorax as well. Previously demonstrated pneumomediastinum has 

improved


considerably although still present. Bilateral infiltrates appear worse on the 

right.


 


Impression: Equivocally slightly increased but still small right pneumothorax.


 


Suspect tiny left apical pneumothorax


 


Improving pneumomediastinum


 


Worsening subcutaneous emphysema


 


Worsening right and stable left lung infiltrates 





 Right chest tube remains. There is still a small apical pneumothorax. Tiny


left apical pneumothorax persists, unchanged. There is decreased pn

eumomediastinum.


Subcutaneous gas has decreased as well. Bilateral infiltrates are unchanged.


Endotracheal tube remains at the level of the thoracic inlet. Orogastric tube is


again demonstrated, tip not well-visualized but probably stable in position


 


Impression: Stable tiny bilateral apical pneumothoraces


 


Unchanged bilateral infiltrates


 


Decreased pneumomediastinum and subcutaneous emphysema














Norberto Vazquez                Jan 21, 2021 18:26

## 2021-01-21 NOTE — NUR
NURSE NOTES:

levophed increased to 30mcg/min (max). patient is still hypotensive 105/63. will continue to 
monitor.

## 2021-01-21 NOTE — DIAGNOSTIC IMAGING REPORT
EXAM:

  XR Abdomen, 2 Views

 

CLINICAL HISTORY:

  NGT

 

TECHNIQUE:

  Frontal view of the abdomen/pelvis with upright view of the abdomen.

 

COMPARISON:

  01/19/21

 

FINDINGS:

  Limitations:  The right lateral abdomen is excluded from the field-of-

view.

  Lower thorax:  Esophagogastric tube tip projects over the gastric 

antrum.

  Intraperitoneal space:  No free air.

  Gastrointestinal tract: Nonspecific bowel gas pattern.

  Bones/joints:  No significant abnormality.

  Soft tissues:  Suspected gas in the anterior abdominal wall.

 

IMPRESSION:     

1.  Esophagogastric tube tip projects over the gastric antrum.

2.  Suspected gas in the anterior abdominal wall.

## 2021-01-21 NOTE — NUR
NURSE NOTES:

12 pm Novolog (parameter and meal) were not administered. Patient's BS within normal range 
and patient's feeding is currently on hold -patient on Trendelenburg position for 
hypotension.

## 2021-01-21 NOTE — INFECTIOUS DISEASES PROG NOTE
Assessment/Plan


Assessment/Plan


A


1. COVID-19 pneumonia.


2. New-onset diabetes.


3. Hypoxic respiratory failure


4. Sepsis with fever, tachycardia & leukocytosis


5. Bacteremia with COANS, ? line infection


6. Atrial fibrillation





P


1. Finished remdesivir course


2. Continue Zosyn & Vancomycin





Subjective


ROS Limited/Unobtainable:  Yes


Cardiovascular:  Reports: other - was hypotensiivelast night


Allergies:  


Coded Allergies:  


     No Known Allergies (Unverified , 6/11/19)





Objective





Last 24 Hour Vital Signs








  Date Time  Temp Pulse Resp B/P (MAP) Pulse Ox O2 Delivery O2 Flow Rate FiO2


 


1/21/21 08:37  107      


 


1/21/21 08:00        100


 


1/21/21 08:00      Mechanical Ventilator  


 


1/21/21 07:52  101      


 


1/21/21 07:00  87 26 98/52 (67) 93   


 


1/21/21 07:00   26   Mechanical Ventilator  100


 


1/21/21 06:15  94 22 114/57 (76) 90   


 


1/21/21 06:00  93 21 97/60 (72) 91   


 


1/21/21 06:00   29   Mechanical Ventilator  100


 


1/21/21 05:48   28     100


 


1/21/21 05:34  85  89/51    


 


1/21/21 05:30  92 20 105/60 (75) 93   


 


1/21/21 05:00   28   Mechanical Ventilator  100


 


1/21/21 05:00  89 19 97/54 (68) 95   


 


1/21/21 04:45  90 24 102/61 (75) 95   


 


1/21/21 04:30  95 18 105/86 (92) 98   


 


1/21/21 04:15  92 21 105/78 (87) 95   


 


1/21/21 04:00        100


 


1/21/21 04:00   33   Mechanical Ventilator  100


 


1/21/21 04:00      Mechanical Ventilator  


 


1/21/21 04:00 96.6 95 26 135/76 (95) 81   


 


1/21/21 04:00  90      


 


1/21/21 03:30  88 21 132/71 (91) 91   


 


1/21/21 03:00    128/77    


 


1/21/21 03:00  86 17 124/72 (89) 93   


 


1/21/21 02:32  85 22     60


 


1/21/21 02:30  88 20 115/70 (85) 94   


 


1/21/21 02:00  88 16 103/65 (78) 96   


 


1/21/21 02:00    119/77    


 


1/21/21 01:30  84 20 114/67 (83) 92   


 


1/21/21 01:00  83 16 101/62 (75) 96   


 


1/21/21 00:30  84 17 102/61 (75) 97   


 


1/21/21 00:00      Mechanical Ventilator  


 


1/21/21 00:00  89      


 


1/21/21 00:00        60


 


1/21/21 00:00    117/78    


 


1/21/21 00:00   26   Mechanical Ventilator  60


 


1/21/21 00:00 100.6 88 18 99/58 (72) 95   


 


1/20/21 23:30  92 20 92/58 (69) 95   


 


1/20/21 23:13 102.0       


 


1/20/21 23:00    98/61    


 


1/20/21 23:00   26   Mechanical Ventilator  70


 


1/20/21 23:00  92 20 98/57 (71) 96   


 


1/20/21 22:31  91 19     60


 


1/20/21 22:30 102.2 98 19 99/61 (74) 97   


 


1/20/21 22:00  97  98/61    


 


1/20/21 22:00    92/51    


 


1/20/21 22:00   26   Mechanical Ventilator  70


 


1/20/21 22:00  98 19 98/60 (73) 97   


 


1/20/21 21:30  100 22 94/58 (70) 97   


 


1/20/21 21:00    105/57    


 


1/20/21 21:00   20   Mechanical Ventilator  70


 


1/20/21 21:00  97 20 105/57 (73) 96   


 


1/20/21 20:30  99 24 98/62 (74) 96   


 


1/20/21 20:00  99      


 


1/20/21 20:00      Mechanical Ventilator  


 


1/20/21 20:00        70


 


1/20/21 20:00  95 20 91/58 (69) 97   


 


1/20/21 20:00    91/58    


 


1/20/21 20:00   22   Mechanical Ventilator  70


 


1/20/21 19:46    103/62    


 


1/20/21 19:30 98.4 98 22 98/62 (74) 97   


 


1/20/21 19:20  100 24     70


 


1/20/21 19:00    103/62    


 


1/20/21 19:00   23   Mechanical Ventilator  70


 


1/20/21 18:52  102 23 103/62 (76) 94   


 


1/20/21 18:00  102 23 107/62 (77) 93   


 


1/20/21 18:00    107/62    


 


1/20/21 18:00   23   Mechanical Ventilator  70


 


1/20/21 17:00 96.6 94 15 95/54 (68) 98   


 


1/20/21 17:00    102/63    


 


1/20/21 17:00   23   Mechanical Ventilator  70


 


1/20/21 16:00  96      


 


1/20/21 16:00    100/56    


 


1/20/21 16:00   24   Mechanical Ventilator  70


 


1/20/21 16:00        70


 


1/20/21 16:00  91 23 100/56 (71) 97   


 


1/20/21 15:58      Mechanical Ventilator  


 


1/20/21 15:29  97 21     70


 


1/20/21 15:00    98/61    


 


1/20/21 15:00   23   Mechanical Ventilator  70


 


1/20/21 15:00 100.9 99 17 102/68 (79) 97   


 


1/20/21 14:51 100.9       


 


1/20/21 14:21  103  101/65    


 


1/20/21 14:00  103 24 101/65 (77) 97   


 


1/20/21 14:00    104/63    


 


1/20/21 14:00   24   Mechanical Ventilator  70


 


1/20/21 13:00    110/59    


 


1/20/21 13:00   22   Mechanical Ventilator  70


 


1/20/21 13:00 100.6 110 23 104/66 (79) 97   


 


1/20/21 12:00  110 20 103/73 (83) 96   


 


1/20/21 12:00      Mechanical Ventilator  


 


1/20/21 12:00    103/73    


 


1/20/21 12:00   21   Mechanical Ventilator  70


 


1/20/21 12:00  111      


 


1/20/21 11:38  111 21     70


 


1/20/21 11:20        70


 


1/20/21 11:00  111 21 99/57 (71) 97   


 


1/20/21 11:00    102/61    


 


1/20/21 11:00   21   Mechanical Ventilator  70








Height (Feet):  5


Height (Inches):  7.00


Weight (Pounds):  198


HEENT:  other - orally intibated


Respiratory/Chest:  other - on ventilator, SEE7=905%, R chest tube


Cardiovascular:  tachycardia, other - PICC line


Abdomen:  soft, non tender, other - NG tube


Neurologic/Psychiatric:  other - sedated





Laboratory Tests








Test


 1/21/21


04:00 1/21/21


08:53


 


White Blood Count


 14.8 K/UL


(4.8-10.8)  H 





 


Red Blood Count


 3.89 M/UL


(4.70-6.10)  L 





 


Hemoglobin


 11.4 G/DL


(14.2-18.0)  L 





 


Hematocrit


 36.6 %


(42.0-52.0)  L 





 


Mean Corpuscular Volume 94 FL (80-99)   


 


Mean Corpuscular Hemoglobin


 29.2 PG


(27.0-31.0) 





 


Mean Corpuscular Hemoglobin


Concent 31.1 G/DL


(32.0-36.0)  L 





 


Red Cell Distribution Width


 13.1 %


(11.6-14.8) 





 


Platelet Count


 161 K/UL


(150-450) 





 


Mean Platelet Volume


 11.9 FL


(6.5-10.1)  H 





 


Neutrophils (%) (Auto)


 % (45.0-75.0)


 





 


Lymphocytes (%) (Auto)


 % (20.0-45.0)


 





 


Monocytes (%) (Auto)  % (1.0-10.0)   


 


Eosinophils (%) (Auto)  % (0.0-3.0)   


 


Basophils (%) (Auto)  % (0.0-2.0)   


 


Differential Total Cells


Counted 100  


 





 


Neutrophils % (Manual) 88 % (45-75)  H 


 


Lymphocytes % (Manual) 6 % (20-45)  L 


 


Monocytes % (Manual) 0 % (1-10)  L 


 


Eosinophils % (Manual) 4 % (0-3)  H 


 


Basophils % (Manual) 0 % (0-2)   


 


Band Neutrophils 2 % (0-8)   


 


Platelet Estimate Adequate   


 


Platelet Morphology Pending   


 


Red Blood Cell Morphology Normal   


 


Hypochromasia 1+   


 


Sodium Level


 146 MMOL/L


(136-145)  H 





 


Potassium Level


 3.2 MMOL/L


(3.5-5.1)  L 





 


Chloride Level


 111 MMOL/L


()  H 





 


Carbon Dioxide Level


 30 MMOL/L


(21-32) 





 


Anion Gap


 5 mmol/L


(5-15) 





 


Blood Urea Nitrogen


 27 mg/dL


(7-18)  H 





 


Creatinine


 0.5 MG/DL


(0.55-1.30)  L 





 


Estimat Glomerular Filtration


Rate > 60 mL/min


(>60) 





 


Glucose Level


 128 MG/DL


()  H 





 


Calcium Level


 8.4 MG/DL


(8.5-10.1)  L 





 


Total Bilirubin


 0.4 MG/DL


(0.2-1.0) 





 


Aspartate Amino Transf


(AST/SGOT) 30 U/L (15-37)


 





 


Alanine Aminotransferase


(ALT/SGPT) 21 U/L (12-78)


 





 


Alkaline Phosphatase


 88 U/L


() 





 


Total Protein


 5.4 G/DL


(6.4-8.2)  L 





 


Albumin


 1.3 G/DL


(3.4-5.0)  L 





 


Globulin 4.1 g/dL   


 


Albumin/Globulin Ratio


 0.3 (1.0-2.7)


L 





 


Arterial Blood pH


 


 7.343


(7.350-7.450)


 


Arterial Blood Partial


Pressure CO2 


 62.6 mmHg


(35.0-45.0)  *H


 


Arterial Blood Partial


Pressure O2 


 37.5 mmHg


(75.0-100.0)


 


Arterial Blood HCO3


 


 33.2 mmol/L


(22.0-26.0)  H


 


Arterial Blood Oxygen


Saturation 


 5.7 % ()


*L


 


Arterial Blood Base Excess  5.7 (-2-2)  H


 


Abelardo Test  Positive  











Current Medications








 Medications


  (Trade)  Dose


 Ordered  Sig/Julisa


 Route


 PRN Reason  Start Time


 Stop Time Status Last Admin


Dose Admin


 


 Acetaminophen


  (Tylenol)  650 mg  Q4H  PRN


 GT


 Temp >100.5  1/15/21 17:15


 2/14/21 17:14  1/20/21 22:35





 


 Amiodarone HCl


  (Cordarone)  400 mg  EVERY 12  HOURS


 NG


   1/19/21 21:00


 4/19/21 20:59  1/21/21 08:37





 


 Chlorhexidine


 Gluconate


  (Vanessa-Hex 2%)  1 applic  DAILY@2000


 TOPIC


   1/19/21 20:00


 4/19/21 19:59  1/20/21 20:48





 


 Dextrose  1,000 ml @ 


 50 mls/hr  Q20H


 IV


   1/18/21 10:00


 2/17/21 09:59  1/20/21 22:00





 


 Dextrose


  (Dextrose 50%)  25 ml  Q30M  PRN


 IV


 Hypoglycemia  1/9/21 13:30


 4/9/21 13:29   





 


 Dextrose


  (Dextrose 50%)  50 ml  Q30M  PRN


 IV


 Hypoglycemia  1/9/21 13:30


 4/9/21 13:29   





 


 Digoxin


  (Lanoxin)  0.125 mg  DAILY


 NG


   1/21/21 09:00


 4/21/21 08:59  1/21/21 08:37





 


 Diltiazem HCl


  (Cardizem Tab)  30 mg  EVERY 8  HOURS


 NG


   1/19/21 14:00


 2/18/21 13:59  1/20/21 22:00





 


 Docusate Sodium


  (Colace)  100 mg  TIDPRN  PRN


 GT


 Constipation  1/12/21 01:15


 2/11/21 01:14  1/12/21 01:42





 


 Enoxaparin Sodium


  (Lovenox)  80 mg  EVERY 12  HOURS


 SUBQ


   1/2/21 21:00


 4/2/21 20:59  1/21/21 08:39





 


 Insulin Aspart


  (NovoLOG)    Q6HR


 SUBQ


   1/14/21 12:00


 4/9/21 17:59  1/21/21 05:35





 


 Insulin Aspart


  (NovoLOG)  9 units  EVERY 6  HOURS


 SUBQ


   1/21/21 12:00


 4/15/21 06:59   





 


 Insulin Detemir


  (Levemir)  20 units  Q12HR


 SUBQ


   1/14/21 09:00


 4/12/21 08:59  1/21/21 08:38





 


 Midazolam HCl  100 ml @ 2


 mls/hr  Q24H  PRN


 IV


 Agitation  1/15/21 23:00


 1/22/21 22:59  1/21/21 05:48





 


 Norepinephrine


 Bitartrate 4 mg/


 Dextrose  250 ml @ 0


 mls/hr  Q24H


 IV


   1/18/21 13:15


 1/21/21 13:14  1/20/21 19:46





 


 Pantoprazole


  (Protonix)  40 mg  DAILY


 IVP


   1/14/21 09:00


 2/13/21 08:59  1/21/21 08:37





 


 Piperacillin Sod/


 Tazobactam Sod


 3.375 gm/Sodium


 Chloride  110 ml @ 


 27.5 mls/hr  EVERY 8  HOURS


 IVPB


   1/15/21 14:00


 1/24/21 13:59  1/21/21 05:07





 


 Quetiapine


 Fumarate


  (SEROqueL)  50 mg  Q12HR


 ORAL


   1/4/21 21:00


 2/18/21 20:59  1/21/21 08:37





 


 Sodium Chloride  500 ml @ 


 999 mls/hr  Q31M PRN


 IV


 For hypotension  1/15/21 18:30


 2/14/21 18:29   





 


 Vancomycin HCl  300 ml @ 


 150 mls/hr  Q12H


 IVPB


   1/16/21 11:00


 1/25/21 10:59  1/21/21 10:12





 


 Vancomycin HCl


  ([facility] pharmacy


 to dose)  1 ea  DAILY  PRN


 MISC


 Per rx protocol  1/14/21 09:15


 2/13/21 09:14   




















Lamin Felix MD               Jan 21, 2021 10:21

## 2021-01-21 NOTE — PULMONOLOGY PROGRESS NOTE
Subjective


ROS Limited/Unobtainable:  Yes


Interval Events:  Noted sub cut emphsema  On Versed drip. R CT placed


Constitutional:  Reports: fever, other - last night Ri=625.7


HEENT:  Repors: no symptoms


Respiratory:  Reports: shortness of breath - better


Cardiovascular:  Reports: no symptoms


Gastrointestinal/Abdominal:  Reports: no symptoms


Allergies:  


Coded Allergies:  


     No Known Allergies (Unverified , 6/11/19)


All Systems:  reviewed and negative except above





Objective





Last 24 Hour Vital Signs








  Date Time  Temp Pulse Resp B/P (MAP) Pulse Ox O2 Delivery O2 Flow Rate FiO2


 


1/21/21 11:00  124 17 90/38 (55) 83   


 


1/21/21 11:00   17   Endotracheal Tube  100


 


1/21/21 10:32  121 24     60


 


1/21/21 10:30   22   Endotracheal Tube  100


 


1/21/21 10:00  122 22 81/57 (65) 81   


 


1/21/21 10:00   22   Endotracheal Tube  100


 


1/21/21 09:00   24   Endotracheal Tube  100


 


1/21/21 09:00  112 24 102/62 (75) 84   


 


1/21/21 08:37  107      


 


1/21/21 08:00        100


 


1/21/21 08:00   31   Endotracheal Tube  100


 


1/21/21 08:00 98.6 103 31 104/71 (82) 88   


 


1/21/21 08:00      Mechanical Ventilator  


 


1/21/21 07:52  101      


 


1/21/21 07:17  111 35     60


 


1/21/21 07:00  87 26 98/52 (67) 93   


 


1/21/21 07:00   26   Mechanical Ventilator  100


 


1/21/21 06:15  94 22 114/57 (76) 90   


 


1/21/21 06:00  93 21 97/60 (72) 91   


 


1/21/21 06:00   29   Mechanical Ventilator  100


 


1/21/21 05:48   28     100


 


1/21/21 05:34  85  89/51    


 


1/21/21 05:30  92 20 105/60 (75) 93   


 


1/21/21 05:00   28   Mechanical Ventilator  100


 


1/21/21 05:00  89 19 97/54 (68) 95   


 


1/21/21 04:45  90 24 102/61 (75) 95   


 


1/21/21 04:30  95 18 105/86 (92) 98   


 


1/21/21 04:15  92 21 105/78 (87) 95   


 


1/21/21 04:00        100


 


1/21/21 04:00   33   Mechanical Ventilator  100


 


1/21/21 04:00      Mechanical Ventilator  


 


1/21/21 04:00 96.6 95 26 135/76 (95) 81   


 


1/21/21 04:00  90      


 


1/21/21 03:30  88 21 132/71 (91) 91   


 


1/21/21 03:00    128/77    


 


1/21/21 03:00  86 17 124/72 (89) 93   


 


1/21/21 02:32  85 22     60


 


1/21/21 02:30  88 20 115/70 (85) 94   


 


1/21/21 02:00  88 16 103/65 (78) 96   


 


1/21/21 02:00    119/77    


 


1/21/21 01:30  84 20 114/67 (83) 92   


 


1/21/21 01:00  83 16 101/62 (75) 96   


 


1/21/21 00:30  84 17 102/61 (75) 97   


 


1/21/21 00:00      Mechanical Ventilator  


 


1/21/21 00:00  89      


 


1/21/21 00:00        60


 


1/21/21 00:00    117/78    


 


1/21/21 00:00   26   Mechanical Ventilator  60


 


1/21/21 00:00 100.6 88 18 99/58 (72) 95   


 


1/20/21 23:30  92 20 92/58 (69) 95   


 


1/20/21 23:13 102.0       


 


1/20/21 23:00    98/61    


 


1/20/21 23:00   26   Mechanical Ventilator  70


 


1/20/21 23:00  92 20 98/57 (71) 96   


 


1/20/21 22:31  91 19     60


 


1/20/21 22:30 102.2 98 19 99/61 (74) 97   


 


1/20/21 22:00  97  98/61    


 


1/20/21 22:00    92/51    


 


1/20/21 22:00   26   Mechanical Ventilator  70


 


1/20/21 22:00  98 19 98/60 (73) 97   


 


1/20/21 21:30  100 22 94/58 (70) 97   


 


1/20/21 21:00    105/57    


 


1/20/21 21:00   20   Mechanical Ventilator  70


 


1/20/21 21:00  97 20 105/57 (73) 96   


 


1/20/21 20:30  99 24 98/62 (74) 96   


 


1/20/21 20:00  99      


 


1/20/21 20:00      Mechanical Ventilator  


 


1/20/21 20:00        70


 


1/20/21 20:00  95 20 91/58 (69) 97   


 


1/20/21 20:00    91/58    


 


1/20/21 20:00   22   Mechanical Ventilator  70


 


1/20/21 19:46    103/62    


 


1/20/21 19:30 98.4 98 22 98/62 (74) 97   


 


1/20/21 19:20  100 24     70


 


1/20/21 19:00    103/62    


 


1/20/21 19:00   23   Mechanical Ventilator  70


 


1/20/21 18:52  102 23 103/62 (76) 94   


 


1/20/21 18:00  102 23 107/62 (77) 93   


 


1/20/21 18:00    107/62    


 


1/20/21 18:00   23   Mechanical Ventilator  70


 


1/20/21 17:00 96.6 94 15 95/54 (68) 98   


 


1/20/21 17:00    102/63    


 


1/20/21 17:00   23   Mechanical Ventilator  70


 


1/20/21 16:00  96      


 


1/20/21 16:00    100/56    


 


1/20/21 16:00   24   Mechanical Ventilator  70


 


1/20/21 16:00        70


 


1/20/21 16:00  91 23 100/56 (71) 97   


 


1/20/21 15:58      Mechanical Ventilator  


 


1/20/21 15:29  97 21     70


 


1/20/21 15:00    98/61    


 


1/20/21 15:00   23   Mechanical Ventilator  70


 


1/20/21 15:00 100.9 99 17 102/68 (79) 97   


 


1/20/21 14:51 100.9       


 


1/20/21 14:21  103  101/65    


 


1/20/21 14:00  103 24 101/65 (77) 97   


 


1/20/21 14:00    104/63    


 


1/20/21 14:00   24   Mechanical Ventilator  70


 


1/20/21 13:00    110/59    


 


1/20/21 13:00   22   Mechanical Ventilator  70


 


1/20/21 13:00 100.6 110 23 104/66 (79) 97   


 


1/20/21 12:00  110 20 103/73 (83) 96   


 


1/20/21 12:00      Mechanical Ventilator  


 


1/20/21 12:00    103/73    


 


1/20/21 12:00   21   Mechanical Ventilator  70


 


1/20/21 12:00  111      

















Intake and Output  


 


 1/20/21 1/21/21





 19:00 07:00


 


Intake Total 1174.5 ml 1894.0 ml


 


Output Total 620 ml 765 ml


 


Balance 554.5 ml 1129.0 ml


 


  


 


Free Water  120 ml


 


IV Total 869.5 ml 1144.0 ml


 


Tube Feeding 305 ml 630 ml


 


Output Urine Total 620 ml 665 ml


 


Stool Total  100 ml








Objective


On a Versed drip


General Appearance:  no acute distress


HEENT:  atraumatic


Respiratory:  lungs clear, decreased breath sounds


Cardiovascular:  normal rate, regular rhythm


Abdomen:  soft, non tender, no organomegaly


Laboratory Tests


1/21/21 04:00: 


White Blood Count 14.8H, Red Blood Count 3.89L, Hemoglobin 11.4L, Hematocrit 

36.6L, Mean Corpuscular Volume 94, Mean Corpuscular Hemoglobin 29.2, Mean 

Corpuscular Hemoglobin Concent 31.1L, Red Cell Distribution Width 13.1, Platelet

Count 161, Mean Platelet Volume 11.9H, Neutrophils (%) (Auto) , Lymphocytes (%) 

(Auto) , Monocytes (%) (Auto) , Eosinophils (%) (Auto) , Basophils (%) (Auto) , 

Differential Total Cells Counted 100, Neutrophils % (Manual) 88H, Lymphocytes % 

(Manual) 6L, Monocytes % (Manual) 0L, Eosinophils % (Manual) 4H, Basophils % 

(Manual) 0, Band Neutrophils 2, Platelet Estimate Adequate, Platelet Morphology 

Normal, Red Blood Cell Morphology Normal, Hypochromasia 1+, Sodium Level 146H, 

Potassium Level 3.2L, Chloride Level 111H, Carbon Dioxide Level 30, Anion Gap 5,

Blood Urea Nitrogen 27H, Creatinine 0.5L, Estimat Glomerular Filtration Rate > 

60, Glucose Level 128H, Calcium Level 8.4L, Total Bilirubin 0.4, Aspartate Amino

Transf (AST/SGOT) 30, Alanine Aminotransferase (ALT/SGPT) 21, Alkaline 

Phosphatase 88, Total Protein 5.4L, Albumin 1.3L, Globulin 4.1, Albumin/Globulin

Ratio 0.3L


1/21/21 08:53: 


Arterial Blood pH 7.343L, Arterial Blood Partial Pressure CO2 62.6*H, Arterial 

Blood Partial Pressure O2 37.5*L, Arterial Blood HCO3 33.2H, Arterial Blood 

Oxygen Saturation 5.7*L, Arterial Blood Base Excess 5.7H, Abelardo Test Positive





Current Medications








 Medications


  (Trade)  Dose


 Ordered  Sig/Julisa


 Route


 PRN Reason  Start Time


 Stop Time Status Last Admin


Dose Admin


 


 Acetaminophen


  (Tylenol)  650 mg  Q4H  PRN


 GT


 Temp >100.5  1/15/21 17:15


 2/14/21 17:14  1/20/21 22:35





 


 Amiodarone HCl


  (Cordarone)  400 mg  EVERY 12  HOURS


 NG


   1/19/21 21:00


 4/19/21 20:59  1/21/21 08:37





 


 Chlorhexidine


 Gluconate


  (Vanessa-Hex 2%)  1 applic  DAILY@2000


 TOPIC


   1/19/21 20:00


 4/19/21 19:59  1/20/21 20:48





 


 Dextrose  1,000 ml @ 


 50 mls/hr  Q20H


 IV


   1/18/21 10:00


 2/17/21 09:59  1/20/21 22:00





 


 Dextrose


  (Dextrose 50%)  25 ml  Q30M  PRN


 IV


 Hypoglycemia  1/9/21 13:30


 4/9/21 13:29   





 


 Dextrose


  (Dextrose 50%)  50 ml  Q30M  PRN


 IV


 Hypoglycemia  1/9/21 13:30


 4/9/21 13:29   





 


 Digoxin


  (Lanoxin)  0.125 mg  DAILY


 NG


   1/21/21 09:00


 4/21/21 08:59  1/21/21 08:37





 


 Diltiazem HCl


  (Cardizem Tab)  30 mg  EVERY 8  HOURS


 NG


   1/19/21 14:00


 2/18/21 13:59  1/20/21 22:00





 


 Docusate Sodium


  (Colace)  100 mg  TIDPRN  PRN


 GT


 Constipation  1/12/21 01:15


 2/11/21 01:14  1/12/21 01:42





 


 Enoxaparin Sodium


  (Lovenox)  80 mg  EVERY 12  HOURS


 SUBQ


   1/2/21 21:00


 4/2/21 20:59  1/21/21 08:39





 


 Insulin Aspart


  (NovoLOG)    Q6HR


 SUBQ


   1/14/21 12:00


 4/9/21 17:59  1/21/21 05:35





 


 Insulin Aspart


  (NovoLOG)  9 units  EVERY 6  HOURS


 SUBQ


   1/21/21 12:00


 4/15/21 06:59   





 


 Insulin Detemir


  (Levemir)  20 units  Q12HR


 SUBQ


   1/14/21 09:00


 4/12/21 08:59  1/21/21 08:38





 


 Midazolam HCl  100 ml @ 2


 mls/hr  Q24H  PRN


 IV


 Agitation  1/15/21 23:00


 1/22/21 22:59  1/21/21 05:48





 


 Norepinephrine


 Bitartrate 4 mg/


 Dextrose  250 ml @ 0


 mls/hr  Q24H


 IV


   1/18/21 13:15


 1/21/21 13:14  1/20/21 19:46





 


 Pantoprazole


  (Protonix)  40 mg  DAILY


 IVP


   1/14/21 09:00


 2/13/21 08:59  1/21/21 08:37





 


 Piperacillin Sod/


 Tazobactam Sod


 3.375 gm/Sodium


 Chloride  110 ml @ 


 27.5 mls/hr  EVERY 8  HOURS


 IVPB


   1/15/21 14:00


 1/24/21 13:59  1/21/21 05:07





 


 Quetiapine


 Fumarate


  (SEROqueL)  50 mg  Q12HR


 ORAL


   1/4/21 21:00


 2/18/21 20:59  1/21/21 08:37





 


 Sodium Chloride  500 ml @ 


 999 mls/hr  Q31M PRN


 IV


 For hypotension  1/15/21 18:30


 2/14/21 18:29   





 


 Vancomycin HCl  300 ml @ 


 150 mls/hr  Q12H


 IVPB


   1/16/21 11:00


 1/25/21 10:59  1/21/21 10:12





 


 Vancomycin HCl


  ([facility] pharmacy


 to dose)  1 ea  DAILY  PRN


 MISC


 Per rx protocol  1/14/21 09:15


 2/13/21 09:14   














Assessment/Plan


Assessment/Plan


1. COVID-19 pneumonia.


   - on Decadron, azithromycin, and ceftriaxone


   - Intubated now; will continue AC mode for now


          -Currently 100% FiO2. PEEP 10; ABG 7.34/62/37; will increase PEEP to 

14


             - Has R apical PTX and subcut emphysema


             - S/p R CT placement


2. Diabetes mellitus.; 


3. Elevated inflammatory markers.


4. High D-dimer. On full dose Lovenox


5. Hyper natremia; will initiate D5W


6. Hyperglycemia.


   - BG control


7. Hypoxemia., secondary to #1; improved





DVT prophylaxis   On Lovenox.


Sedated; advised RN to increase sedation











Sung Lemos MD           Jan 21, 2021 11:40

## 2021-01-21 NOTE — NUR
NURSE NOTES:

Morning medication successfully administered. BS check was done before Levemir 
administration (158), 20 units given (per eMAR). Vital signs remain stable aside from O2 
saturation on the 80s. ABG taken, will update the doctor. Oral care done. Will continue to 
monitor patient. Will continue plan of care.

## 2021-01-21 NOTE — NUR
NURSE NOTES:

Glucose check noted to be 109, scheduled and sliding scale dose held. Earlier glucose check 
was 113, thus trending down. Patients is also now at 60% FiO2 and SpO2 maintaining above 
95%. Cooling measures remain ongoing, current temperature is 100.6F (rectal). Patient 
remains sedated at 0.5mg/hr. HR is 87 NSR, pulses present, chest tube remains patent and 
dressing intact. Repositioned patient and oral care performed. Patient opens eyes to 
stimuli. Remains on Levo at 4mcg/min 117/78, will slowly taper down accordingly.

## 2021-01-21 NOTE — NUR
NURSE HAND-OFF REPORT: 



Latest Vital Signs: Temperature 102.0 , Pulse 105 , B/P 98 /54 , Respiratory Rate 21 , O2 
 , Mechanical Ventilator, O2 Flow Rate  .  

Vital Sign Comment: stable



EKG Rhythm: Sinus Tachycardia

Rhythm change?: N 

MD Notified?: N -Dr. Thaddeus GUTIERREZ Response: 



Latest Singh Fall Score: 50  

Fall Risk: High Risk 

Safety Measures: Call light Within Reach, Bed Alarm Zone 1, Side Rails Side Rails x3, Bed 
position Low and Locked.

Fall Precautions: 

Door Sign



Report given to SUZI Levin.

## 2021-01-21 NOTE — CARDIAC ELECTROPHYSIOLOGY PN
Assessment/Plan


Assessment/Plan


1. Atrial fibrillation with rapid ventricular response.   The patient received 

already 0.5 mg IV digoxin.  


 On digoxin 0.125 mg p.o. daily,  Amiodarone 400 po q12  and Cardizem 30 po q 8 

hr. IN SR 


  Digoxin level 0.6.   





2. Hypernatremia with sodium 151 with azotemia, BUN of 30, creatinine


0.8.   On iv fluids





3. Right pneumothorax, status post chest tube with subcutaneous


emphysema.





4. COVID-19 pneumonia, 100% FiO2 and PEEP of 10, on remdesivir and


Solu-Medrol per ID.


5. Diabetes.


6. Hypotension due to dehydration and sepsis.On   iv fluid.


7.  Hypernatremia and azotemia.  The patient is on D5W of 50 mL an hour.





Subjective


Subjective


In ICU on 100% Fio2 and PEEP 10 and still saturating less than 90%. Off  Levo 

this Am even though was on 4 Mcg overnight. Right chest tube had minimal 

drainage. On Dig and Amiodarone





Objective





Last 24 Hour Vital Signs








  Date Time  Temp Pulse Resp B/P (MAP) Pulse Ox O2 Delivery O2 Flow Rate FiO2


 


1/21/21 08:37  107      


 


1/21/21 08:00        100


 


1/21/21 08:00      Mechanical Ventilator  


 


1/21/21 07:52  101      


 


1/21/21 07:17  111 35     60


 


1/21/21 07:00  87 26 98/52 (67) 93   


 


1/21/21 07:00   26   Mechanical Ventilator  100


 


1/21/21 06:15  94 22 114/57 (76) 90   


 


1/21/21 06:00  93 21 97/60 (72) 91   


 


1/21/21 06:00   29   Mechanical Ventilator  100


 


1/21/21 05:48   28     100


 


1/21/21 05:34  85  89/51    


 


1/21/21 05:30  92 20 105/60 (75) 93   


 


1/21/21 05:00   28   Mechanical Ventilator  100


 


1/21/21 05:00  89 19 97/54 (68) 95   


 


1/21/21 04:45  90 24 102/61 (75) 95   


 


1/21/21 04:30  95 18 105/86 (92) 98   


 


1/21/21 04:15  92 21 105/78 (87) 95   


 


1/21/21 04:00        100


 


1/21/21 04:00   33   Mechanical Ventilator  100


 


1/21/21 04:00      Mechanical Ventilator  


 


1/21/21 04:00 96.6 95 26 135/76 (95) 81   


 


1/21/21 04:00  90      


 


1/21/21 03:30  88 21 132/71 (91) 91   


 


1/21/21 03:00    128/77    


 


1/21/21 03:00  86 17 124/72 (89) 93   


 


1/21/21 02:32  85 22     60


 


1/21/21 02:30  88 20 115/70 (85) 94   


 


1/21/21 02:00  88 16 103/65 (78) 96   


 


1/21/21 02:00    119/77    


 


1/21/21 01:30  84 20 114/67 (83) 92   


 


1/21/21 01:00  83 16 101/62 (75) 96   


 


1/21/21 00:30  84 17 102/61 (75) 97   


 


1/21/21 00:00      Mechanical Ventilator  


 


1/21/21 00:00  89      


 


1/21/21 00:00        60


 


1/21/21 00:00    117/78    


 


1/21/21 00:00   26   Mechanical Ventilator  60


 


1/21/21 00:00 100.6 88 18 99/58 (72) 95   


 


1/20/21 23:30  92 20 92/58 (69) 95   


 


1/20/21 23:13 102.0       


 


1/20/21 23:00    98/61    


 


1/20/21 23:00   26   Mechanical Ventilator  70


 


1/20/21 23:00  92 20 98/57 (71) 96   


 


1/20/21 22:31  91 19     60


 


1/20/21 22:30 102.2 98 19 99/61 (74) 97   


 


1/20/21 22:00  97  98/61    


 


1/20/21 22:00    92/51    


 


1/20/21 22:00   26   Mechanical Ventilator  70


 


1/20/21 22:00  98 19 98/60 (73) 97   


 


1/20/21 21:30  100 22 94/58 (70) 97   


 


1/20/21 21:00    105/57    


 


1/20/21 21:00   20   Mechanical Ventilator  70


 


1/20/21 21:00  97 20 105/57 (73) 96   


 


1/20/21 20:30  99 24 98/62 (74) 96   


 


1/20/21 20:00  99      


 


1/20/21 20:00      Mechanical Ventilator  


 


1/20/21 20:00        70


 


1/20/21 20:00  95 20 91/58 (69) 97   


 


1/20/21 20:00    91/58    


 


1/20/21 20:00   22   Mechanical Ventilator  70


 


1/20/21 19:46    103/62    


 


1/20/21 19:30 98.4 98 22 98/62 (74) 97   


 


1/20/21 19:20  100 24     70


 


1/20/21 19:00    103/62    


 


1/20/21 19:00   23   Mechanical Ventilator  70


 


1/20/21 18:52  102 23 103/62 (76) 94   


 


1/20/21 18:00  102 23 107/62 (77) 93   


 


1/20/21 18:00    107/62    


 


1/20/21 18:00   23   Mechanical Ventilator  70


 


1/20/21 17:00 96.6 94 15 95/54 (68) 98   


 


1/20/21 17:00    102/63    


 


1/20/21 17:00   23   Mechanical Ventilator  70


 


1/20/21 16:00  96      


 


1/20/21 16:00    100/56    


 


1/20/21 16:00   24   Mechanical Ventilator  70


 


1/20/21 16:00        70


 


1/20/21 16:00  91 23 100/56 (71) 97   


 


1/20/21 15:58      Mechanical Ventilator  


 


1/20/21 15:29  97 21     70


 


1/20/21 15:00    98/61    


 


1/20/21 15:00   23   Mechanical Ventilator  70


 


1/20/21 15:00 100.9 99 17 102/68 (79) 97   


 


1/20/21 14:51 100.9       


 


1/20/21 14:21  103  101/65    


 


1/20/21 14:00  103 24 101/65 (77) 97   


 


1/20/21 14:00    104/63    


 


1/20/21 14:00   24   Mechanical Ventilator  70


 


1/20/21 13:00    110/59    


 


1/20/21 13:00   22   Mechanical Ventilator  70


 


1/20/21 13:00 100.6 110 23 104/66 (79) 97   


 


1/20/21 12:00  110 20 103/73 (83) 96   


 


1/20/21 12:00      Mechanical Ventilator  


 


1/20/21 12:00    103/73    


 


1/20/21 12:00   21   Mechanical Ventilator  70


 


1/20/21 12:00  111      


 


1/20/21 11:38  111 21     70


 


1/20/21 11:20        70


 


1/20/21 11:00  111 21 99/57 (71) 97   


 


1/20/21 11:00    102/61    


 


1/20/21 11:00   21   Mechanical Ventilator  70

















Intake and Output  


 


 1/20/21 1/21/21





 19:00 07:00


 


Intake Total 1174.5 ml 1894.0 ml


 


Output Total 620 ml 765 ml


 


Balance 554.5 ml 1129.0 ml


 


  


 


Free Water  120 ml


 


IV Total 869.5 ml 1144.0 ml


 


Tube Feeding 305 ml 630 ml


 


Output Urine Total 620 ml 665 ml


 


Stool Total  100 ml











Laboratory Tests








Test


 1/21/21


04:00 1/21/21


08:53


 


White Blood Count


 14.8 K/UL


(4.8-10.8)  H 





 


Red Blood Count


 3.89 M/UL


(4.70-6.10)  L 





 


Hemoglobin


 11.4 G/DL


(14.2-18.0)  L 





 


Hematocrit


 36.6 %


(42.0-52.0)  L 





 


Mean Corpuscular Volume 94 FL (80-99)   


 


Mean Corpuscular Hemoglobin


 29.2 PG


(27.0-31.0) 





 


Mean Corpuscular Hemoglobin


Concent 31.1 G/DL


(32.0-36.0)  L 





 


Red Cell Distribution Width


 13.1 %


(11.6-14.8) 





 


Platelet Count


 161 K/UL


(150-450) 





 


Mean Platelet Volume


 11.9 FL


(6.5-10.1)  H 





 


Neutrophils (%) (Auto)


 % (45.0-75.0)


 





 


Lymphocytes (%) (Auto)


 % (20.0-45.0)


 





 


Monocytes (%) (Auto)  % (1.0-10.0)   


 


Eosinophils (%) (Auto)  % (0.0-3.0)   


 


Basophils (%) (Auto)  % (0.0-2.0)   


 


Differential Total Cells


Counted 100  


 





 


Neutrophils % (Manual) 88 % (45-75)  H 


 


Lymphocytes % (Manual) 6 % (20-45)  L 


 


Monocytes % (Manual) 0 % (1-10)  L 


 


Eosinophils % (Manual) 4 % (0-3)  H 


 


Basophils % (Manual) 0 % (0-2)   


 


Band Neutrophils 2 % (0-8)   


 


Platelet Estimate Adequate   


 


Platelet Morphology Normal   


 


Red Blood Cell Morphology Normal   


 


Hypochromasia 1+   


 


Sodium Level


 146 MMOL/L


(136-145)  H 





 


Potassium Level


 3.2 MMOL/L


(3.5-5.1)  L 





 


Chloride Level


 111 MMOL/L


()  H 





 


Carbon Dioxide Level


 30 MMOL/L


(21-32) 





 


Anion Gap


 5 mmol/L


(5-15) 





 


Blood Urea Nitrogen


 27 mg/dL


(7-18)  H 





 


Creatinine


 0.5 MG/DL


(0.55-1.30)  L 





 


Estimat Glomerular Filtration


Rate > 60 mL/min


(>60) 





 


Glucose Level


 128 MG/DL


()  H 





 


Calcium Level


 8.4 MG/DL


(8.5-10.1)  L 





 


Total Bilirubin


 0.4 MG/DL


(0.2-1.0) 





 


Aspartate Amino Transf


(AST/SGOT) 30 U/L (15-37)


 





 


Alanine Aminotransferase


(ALT/SGPT) 21 U/L (12-78)


 





 


Alkaline Phosphatase


 88 U/L


() 





 


Total Protein


 5.4 G/DL


(6.4-8.2)  L 





 


Albumin


 1.3 G/DL


(3.4-5.0)  L 





 


Globulin 4.1 g/dL   


 


Albumin/Globulin Ratio


 0.3 (1.0-2.7)


L 





 


Arterial Blood pH


 


 7.343


(7.350-7.450)


 


Arterial Blood Partial


Pressure CO2 


 62.6 mmHg


(35.0-45.0)  *H


 


Arterial Blood Partial


Pressure O2 


 37.5 mmHg


(75.0-100.0)


 


Arterial Blood HCO3


 


 33.2 mmol/L


(22.0-26.0)  H


 


Arterial Blood Oxygen


Saturation 


 5.7 % ()


*L


 


Arterial Blood Base Excess  5.7 (-2-2)  H


 


Abelardo Test  Positive  








Objective


HEENT: No JVD. Orally intubated


LUNGS:  Have decreased breath sounds with the chest tube on the right side


and subcutaneous emphysema.


CARDIOVASCULAR:  Regular S1, S2 with


no gallop.


ABDOMEN:  Soft.


EXTREMITIES:  A 1+ pitting edema.











Jake George MD               Jan 21, 2021 10:34

## 2021-01-21 NOTE — NUR
NURSE NOTES:

Patient in bed sedated. no s/s of acute distress noted. Turned and repositioned in bed. MAYO 
PICC intact infusing D5W at 50cc/hr, Versed gtt at 1mg/hr RASS score of -2, Levophed off 
114/57 HR is 89 NSR.  No s/s of hypo/hyperglycemia. Covid +19, airborne precaution and 
contact isolation maintained and observed. bed alarm on. bed locked and in low position. 
bilateral wrist restraint checked. call light within easy reach. will continue plan of care.

## 2021-01-21 NOTE — NUR
NURSE NOTES:

Patient received from SUZI Dave. Patient is on the bed, no signs of grimacing and distress 
noted. Orally intubated ETT 7.5, 23 cm on the lip, with settings of AC 16, Tv 600, FiO2 
100%, PEEP 10, patient has saturation in the 80s. Ordered ABG and will let the doctor know. 
Patient has a R lat side chest tube, no kink and signs of leaking. Patient has an NGT on the 
R nares, patent, intact, running vital AF at 55 mL/hr, no residual. On rectal tube and cash 
catheter, patent and intact. MAYO PICC line inserted on 1/20 running D5W at 50 mL/hr. Patient 
is on versed running 1 mg/hr with RASS of -2. HOB elevated, bed on lowest position, side 
rails up, locked, call light within reach. Will continue to monitor. Will continue plan of 
care.

## 2021-01-21 NOTE — NUR
NURSE NOTES:

Patient is obtunded, slightly response to light pain. Intubated; ETT 7.5 to 23cm to the 
lipline to vent with settings of AC:16, TV:600, FiO2:100%, PEEP:14. Right chest tube intact 
and connected to portable suction. NPO at the moment, patient is in trendelenburg position 
for hypotension. Levophed running @ 20mcgs, D5W @ 50ml via left upper arm PICC line. Versed 
is held. Rectal tube and cash catheter in place and draining. Cooling blanket on, Febrile @ 
100.1F rectal. BSW restraints on for safety and prevent from pulling lines and tubes. Bed 
low, locked and alarm is on.

## 2021-01-21 NOTE — NUR
NURSE NOTES:

Tapering down pressors, suctioned and repositioned, blood pressures getting better. HR is 88 
NSR, pulses present. Chest tube dressing changed. Lines remains patent. NAD at this time. 
Versed held for the mean time.

## 2021-01-21 NOTE — NUR
NURSE NOTES:

levophed on 20 mcg/min. patient is still hypotensive 92/56. will continue plan of care.

## 2021-01-21 NOTE — NUR
NURSE NOTES:

updated Dr. Lemos about patient's ABG. Doctor ordered patient's PEEP to be increased to 14 
and increase versed to 2mg/min.

## 2021-01-21 NOTE — NUR
NURSE NOTES:

Bed bath given, afebrile, rectal tube inserted d/t liquid stool. Repositioned and suctioned. 
Levophed off. Sedation ongoing. Will continue to monitor

## 2021-01-22 VITALS — SYSTOLIC BLOOD PRESSURE: 91 MMHG | DIASTOLIC BLOOD PRESSURE: 53 MMHG

## 2021-01-22 VITALS — SYSTOLIC BLOOD PRESSURE: 109 MMHG | DIASTOLIC BLOOD PRESSURE: 67 MMHG

## 2021-01-22 VITALS — DIASTOLIC BLOOD PRESSURE: 63 MMHG | SYSTOLIC BLOOD PRESSURE: 117 MMHG

## 2021-01-22 VITALS — DIASTOLIC BLOOD PRESSURE: 58 MMHG | SYSTOLIC BLOOD PRESSURE: 86 MMHG

## 2021-01-22 VITALS — SYSTOLIC BLOOD PRESSURE: 118 MMHG | DIASTOLIC BLOOD PRESSURE: 68 MMHG

## 2021-01-22 VITALS — SYSTOLIC BLOOD PRESSURE: 100 MMHG | DIASTOLIC BLOOD PRESSURE: 76 MMHG

## 2021-01-22 VITALS — SYSTOLIC BLOOD PRESSURE: 111 MMHG | DIASTOLIC BLOOD PRESSURE: 57 MMHG

## 2021-01-22 VITALS — DIASTOLIC BLOOD PRESSURE: 64 MMHG | SYSTOLIC BLOOD PRESSURE: 100 MMHG

## 2021-01-22 VITALS — SYSTOLIC BLOOD PRESSURE: 91 MMHG | DIASTOLIC BLOOD PRESSURE: 62 MMHG

## 2021-01-22 VITALS — DIASTOLIC BLOOD PRESSURE: 72 MMHG | SYSTOLIC BLOOD PRESSURE: 117 MMHG

## 2021-01-22 VITALS — SYSTOLIC BLOOD PRESSURE: 98 MMHG | DIASTOLIC BLOOD PRESSURE: 58 MMHG

## 2021-01-22 VITALS — SYSTOLIC BLOOD PRESSURE: 106 MMHG | DIASTOLIC BLOOD PRESSURE: 68 MMHG

## 2021-01-22 VITALS — DIASTOLIC BLOOD PRESSURE: 72 MMHG | SYSTOLIC BLOOD PRESSURE: 103 MMHG

## 2021-01-22 VITALS — SYSTOLIC BLOOD PRESSURE: 105 MMHG | DIASTOLIC BLOOD PRESSURE: 61 MMHG

## 2021-01-22 VITALS — SYSTOLIC BLOOD PRESSURE: 102 MMHG | DIASTOLIC BLOOD PRESSURE: 66 MMHG

## 2021-01-22 VITALS — DIASTOLIC BLOOD PRESSURE: 70 MMHG | SYSTOLIC BLOOD PRESSURE: 104 MMHG

## 2021-01-22 VITALS — SYSTOLIC BLOOD PRESSURE: 109 MMHG | DIASTOLIC BLOOD PRESSURE: 70 MMHG

## 2021-01-22 VITALS — DIASTOLIC BLOOD PRESSURE: 56 MMHG | SYSTOLIC BLOOD PRESSURE: 106 MMHG

## 2021-01-22 VITALS — SYSTOLIC BLOOD PRESSURE: 110 MMHG | DIASTOLIC BLOOD PRESSURE: 58 MMHG

## 2021-01-22 VITALS — DIASTOLIC BLOOD PRESSURE: 52 MMHG | SYSTOLIC BLOOD PRESSURE: 96 MMHG

## 2021-01-22 VITALS — DIASTOLIC BLOOD PRESSURE: 59 MMHG | SYSTOLIC BLOOD PRESSURE: 107 MMHG

## 2021-01-22 VITALS — DIASTOLIC BLOOD PRESSURE: 61 MMHG | SYSTOLIC BLOOD PRESSURE: 101 MMHG

## 2021-01-22 VITALS — DIASTOLIC BLOOD PRESSURE: 55 MMHG | SYSTOLIC BLOOD PRESSURE: 97 MMHG

## 2021-01-22 VITALS — SYSTOLIC BLOOD PRESSURE: 123 MMHG | DIASTOLIC BLOOD PRESSURE: 66 MMHG

## 2021-01-22 VITALS — DIASTOLIC BLOOD PRESSURE: 50 MMHG | SYSTOLIC BLOOD PRESSURE: 90 MMHG

## 2021-01-22 VITALS — SYSTOLIC BLOOD PRESSURE: 95 MMHG | DIASTOLIC BLOOD PRESSURE: 67 MMHG

## 2021-01-22 VITALS — SYSTOLIC BLOOD PRESSURE: 93 MMHG | DIASTOLIC BLOOD PRESSURE: 54 MMHG

## 2021-01-22 LAB
ADD MANUAL DIFF: YES
ALBUMIN SERPL-MCNC: 1.1 G/DL (ref 3.4–5)
ALBUMIN/GLOB SERPL: 0.3 {RATIO} (ref 1–2.7)
ALP SERPL-CCNC: 117 U/L (ref 46–116)
ALT SERPL-CCNC: 25 U/L (ref 12–78)
ANION GAP SERPL CALC-SCNC: 7 MMOL/L (ref 5–15)
AST SERPL-CCNC: 30 U/L (ref 15–37)
BILIRUB SERPL-MCNC: 0.4 MG/DL (ref 0.2–1)
BUN SERPL-MCNC: 32 MG/DL (ref 7–18)
CALCIUM SERPL-MCNC: 8.1 MG/DL (ref 8.5–10.1)
CHLORIDE SERPL-SCNC: 111 MMOL/L (ref 98–107)
CO2 SERPL-SCNC: 29 MMOL/L (ref 21–32)
CREAT SERPL-MCNC: 0.8 MG/DL (ref 0.55–1.3)
ERYTHROCYTE [DISTWIDTH] IN BLOOD BY AUTOMATED COUNT: 12.9 % (ref 11.6–14.8)
GLOBULIN SER-MCNC: 3.9 G/DL
HCT VFR BLD CALC: 33.5 % (ref 42–52)
HGB BLD-MCNC: 10.4 G/DL (ref 14.2–18)
MCV RBC AUTO: 93 FL (ref 80–99)
PLATELET # BLD: 145 K/UL (ref 150–450)
POTASSIUM SERPL-SCNC: 3.6 MMOL/L (ref 3.5–5.1)
RBC # BLD AUTO: 3.62 M/UL (ref 4.7–6.1)
SODIUM SERPL-SCNC: 147 MMOL/L (ref 136–145)
WBC # BLD AUTO: 17.9 K/UL (ref 4.8–10.8)

## 2021-01-22 RX ADMIN — INSULIN ASPART SCH UNITS: 100 INJECTION, SOLUTION INTRAVENOUS; SUBCUTANEOUS at 18:00

## 2021-01-22 RX ADMIN — AMIODARONE HYDROCHLORIDE SCH MG: 200 TABLET ORAL at 20:37

## 2021-01-22 RX ADMIN — DILTIAZEM HYDROCHLORIDE SCH MG: 60 CAPSULE, EXTENDED RELEASE ORAL at 14:19

## 2021-01-22 RX ADMIN — SODIUM CHLORIDE PRN MLS/HR: 900 INJECTION, SOLUTION INTRAVENOUS at 01:31

## 2021-01-22 RX ADMIN — DILTIAZEM HYDROCHLORIDE SCH MG: 60 CAPSULE, EXTENDED RELEASE ORAL at 06:00

## 2021-01-22 RX ADMIN — AMIODARONE HYDROCHLORIDE SCH MG: 200 TABLET ORAL at 08:57

## 2021-01-22 RX ADMIN — ACETAMINOPHEN PRN MG: 160 SOLUTION ORAL at 11:05

## 2021-01-22 RX ADMIN — Medication PRN MLS/HR: at 17:14

## 2021-01-22 RX ADMIN — INSULIN ASPART SCH UNITS: 100 INJECTION, SOLUTION INTRAVENOUS; SUBCUTANEOUS at 06:00

## 2021-01-22 RX ADMIN — ENOXAPARIN SODIUM SCH MG: 80 INJECTION SUBCUTANEOUS at 20:37

## 2021-01-22 RX ADMIN — DILTIAZEM HYDROCHLORIDE SCH MG: 60 CAPSULE, EXTENDED RELEASE ORAL at 20:36

## 2021-01-22 RX ADMIN — CHLORHEXIDINE GLUCONATE SCH APPLIC: 213 SOLUTION TOPICAL at 20:33

## 2021-01-22 RX ADMIN — INSULIN ASPART SCH UNITS: 100 INJECTION, SOLUTION INTRAVENOUS; SUBCUTANEOUS at 12:00

## 2021-01-22 RX ADMIN — DEXTROSE MONOHYDRATE SCH MLS/HR: 50 INJECTION, SOLUTION INTRAVENOUS at 21:37

## 2021-01-22 RX ADMIN — Medication SCH MLS/HR: at 22:49

## 2021-01-22 RX ADMIN — PANTOPRAZOLE SODIUM SCH MG: 40 INJECTION, POWDER, FOR SOLUTION INTRAVENOUS at 08:57

## 2021-01-22 RX ADMIN — SODIUM CHLORIDE PRN MLS/HR: 900 INJECTION, SOLUTION INTRAVENOUS at 19:31

## 2021-01-22 RX ADMIN — ENOXAPARIN SODIUM SCH MG: 80 INJECTION SUBCUTANEOUS at 08:58

## 2021-01-22 RX ADMIN — DIGOXIN SCH MG: 0.12 TABLET ORAL at 08:57

## 2021-01-22 RX ADMIN — DEXTROSE MONOHYDRATE SCH MLS/HR: 50 INJECTION, SOLUTION INTRAVENOUS at 06:09

## 2021-01-22 RX ADMIN — INSULIN ASPART SCH UNITS: 100 INJECTION, SOLUTION INTRAVENOUS; SUBCUTANEOUS at 00:00

## 2021-01-22 RX ADMIN — Medication SCH MLS/HR: at 11:05

## 2021-01-22 RX ADMIN — INSULIN ASPART SCH UNITS: 100 INJECTION, SOLUTION INTRAVENOUS; SUBCUTANEOUS at 18:53

## 2021-01-22 RX ADMIN — INSULIN DETEMIR SCH UNITS: 100 INJECTION, SOLUTION SUBCUTANEOUS at 21:00

## 2021-01-22 RX ADMIN — INSULIN DETEMIR SCH UNITS: 100 INJECTION, SOLUTION SUBCUTANEOUS at 09:00

## 2021-01-22 RX ADMIN — DEXTROSE MONOHYDRATE SCH MLS/HR: 50 INJECTION, SOLUTION INTRAVENOUS at 13:31

## 2021-01-22 NOTE — NUR
NURSE HAND-OFF REPORT: 



Latest Vital Signs: Temperature 97.1 , Pulse 118 , B/P 87 /64 , Respiratory Rate 24 , O2 SAT 
83 , Mechanical Ventilator, O2 Flow Rate  .  

Vital Sign Comment: stable



EKG Rhythm: Sinus Tachycardia

Rhythm change?: N 

MD Notified?: N -Dr. Thaddeus GUTIERREZ Response: 



Latest Singh Fall Score: 50  

Fall Risk: High Risk 

Safety Measures: Call light Within Reach, Bed Alarm Zone 1, Side Rails Side Rails x3, Bed 
position Low and Locked.

Fall Precautions: 

Door Sign



Report given to SUZI Hines.

## 2021-01-22 NOTE — NUR
NURSE NOTES:

Patient's BP went down on  80s (SBP). Patient positioned trendelenburg, feeding on hold. 
Levophed increased to 8mcgs.

## 2021-01-22 NOTE — NUR
NURSE NOTES:

received report from gilmer galloway  pt orally intubated -vent  o2 sat 80%-9O% SEDATED WITH VERSED 
MG/HR WITH -2RASS  ON LEVO DRIP AT MCG/MIN WITH BP 85/40 -93/60   TOLERATING TUBE FEEDING  
NO RESIDUAL CHEST-TUBE SUCTION DRESSING DRY AND INTACT IV  INFUSING LT UA SITE GOOD DRESSING 
DRY AND INTACT  URINARY  OUTPUT GOOD TION AND SUCTION

## 2021-01-22 NOTE — NUR
NURSE NOTES:

increased versed to 5mg now because patient reamins -1 RAAS score. will continue to monitor.

## 2021-01-22 NOTE — NUR
NURSE NOTES:

Morning medication administered. Levemir non-administered because patient's BS is 98. 
Lovenox non-administered because platelets are low. Levophed still running at 6mcgs. Oral 
care done. Will continue to monitor.

## 2021-01-22 NOTE — NUR
NURSE NOTES:

Bed bath given, linens changed, turned and repositioned, oral care and suctioning provided. 
Patient afebrile. Ari @ 8gs.

## 2021-01-22 NOTE — SURGERY PROGRESS NOTE
Surgery Progress Note


Subjective


Procedure Performed


Right tube thoracostomy chest tube insertion


Additional Comments


ill appearing


labs noted


on support


chest tube stable





Objective





Last 24 Hour Vital Signs








  Date Time  Temp Pulse Resp B/P (MAP) Pulse Ox O2 Delivery O2 Flow Rate FiO2


 


1/22/21 10:00  103 19 91/53 (66) 99   


 


1/22/21 09:00  98 21 105/61 (76) 99   


 


1/22/21 09:00  98 21 105/61 (76) 99   


 


1/22/21 08:57  99      


 


1/22/21 08:00 100.2 96 19 111/57 (75) 100   


 


1/22/21 08:00        100


 


1/22/21 08:00      Mechanical Ventilator  


 


1/22/21 07:46  94      


 


1/22/21 07:00  91 16 98/58 (71) 100   


 


1/22/21 07:00    98/58    


 


1/22/21 06:00  91 16 93/54 (67) 100   


 


1/22/21 06:00    93/54    


 


1/22/21 06:00  93  88/49    


 


1/22/21 05:00    101/61    


 


1/22/21 05:00  89 17 101/61 (74) 100   


 


1/22/21 04:00 97.1 87 17 97/55 (69) 100   


 


1/22/21 04:00    97/55    


 


1/22/21 04:00        100


 


1/22/21 04:00      Mechanical Ventilator  


 


1/22/21 03:03  96      


 


1/22/21 03:00    109/70    


 


1/22/21 03:00  89 20 109/70 (83) 100   


 


1/22/21 02:15    102/71    


 


1/22/21 02:00  91 16 123/66 (85) 100   


 


1/22/21 02:00    121/70    


 


1/22/21 02:00    102/71    


 


1/22/21 01:31    113/62    


 


1/22/21 01:30    121/70    


 


1/22/21 01:00  94 20 96/52 (67) 100   


 


1/22/21 01:00    93/56    


 


1/22/21 00:00        100


 


1/22/21 00:00    106/63    


 


1/22/21 00:00 97.8 94 29 110/58 (75) 100   


 


1/22/21 00:00      Mechanical Ventilator  


 


1/21/21 23:50  104 22     60


 


1/21/21 23:05  96      


 


1/21/21 23:00  96 19 112/63 (79)    


 


1/21/21 23:00    109/56    


 


1/21/21 22:30    139/67    


 


1/21/21 22:00  100 21 116/59 (78) 99   


 


1/21/21 22:00    109/63    


 


1/21/21 21:11  99  74/40    


 


1/21/21 21:00    105/58    


 


1/21/21 21:00  98 19 132/67 (88) 100   


 


1/21/21 20:45    132/67    


 


1/21/21 20:30    116/63    


 


1/21/21 20:00      Mechanical Ventilator  


 


1/21/21 20:00    116/63    


 


1/21/21 20:00        100


 


1/21/21 20:00 100.1 102 21 119/64 (82) 100   


 


1/21/21 19:30  109 21     60


 


1/21/21 19:19  104      


 


1/21/21 19:00    98/54    


 


1/21/21 19:00  105 21 98/54 (69) 100   


 


1/21/21 18:00    93/53    


 


1/21/21 18:00  112 25 93/53 (66) 97   


 


1/21/21 17:28    90/54    


 


1/21/21 17:00    152/68    


 


1/21/21 17:00  117 26 152/68 (96) 96   


 


1/21/21 16:00        100


 


1/21/21 16:00    91/54    


 


1/21/21 16:00      Mechanical Ventilator  


 


1/21/21 16:00 102.0 122 27 91/54 (66) 87   


 


1/21/21 15:37  119 26     60


 


1/21/21 15:30  121      


 


1/21/21 15:00  119 30 101/56 (71) 92   


 


1/21/21 15:00    105/63    


 


1/21/21 14:55  101      


 


1/21/21 14:00   24   Mechanical Ventilator  100


 


1/21/21 14:00  114 24 60/36 (44) 87   


 


1/21/21 13:50  115  89/55    


 


1/21/21 13:00  117 24 74/51 (59) 88   


 


1/21/21 13:00   24   Endotracheal Tube  100


 


1/21/21 12:00  123 22 81/50 (60) 86   


 


1/21/21 12:00        100


 


1/21/21 12:00    81/50    


 


1/21/21 12:00   22   Mechanical Ventilator  100


 


1/21/21 12:00      Mechanical Ventilator  


 


1/21/21 11:30    78/47    








I&O











Intake and Output  


 


 1/21/21 1/22/21





 19:00 07:00


 


Intake Total 1754.9 ml 1671.81 ml


 


Output Total 585 ml 1380 ml


 


Balance 1169.9 ml 291.81 ml


 


  


 


Free Water 200 ml 60 ml


 


IV Total 1334.9 ml 1491.81 ml


 


Tube Feeding 220 ml 120 ml


 


Output Urine Total 585 ml 980 ml


 


Stool Total  400 ml








Dressing:  saturated


Drains:  other


Cardiovascular:  RSR


Respiratory:  decreased breath sounds


Abdomen:  soft, non-tender, present bowel sounds, non-distended


Extremities:  edema, no tenderness, no cyanosis





Laboratory Tests








Test


 1/21/21


17:22 1/21/21


22:00 1/21/21


22:41 1/22/21


04:00


 


POC Whole Blood Glucose


 Pending  


 


 96 MG/DL


() 





 


Vancomycin Level Trough


 


 19.2 ug/mL


(5.0-12.0)  H 


 





 


White Blood Count


 


 


 


 17.9 K/UL


(4.8-10.8)  H


 


Red Blood Count


 


 


 


 3.62 M/UL


(4.70-6.10)  L


 


Hemoglobin


 


 


 


 10.4 G/DL


(14.2-18.0)  L


 


Hematocrit


 


 


 


 33.5 %


(42.0-52.0)  L


 


Mean Corpuscular Volume    93 FL (80-99)  


 


Mean Corpuscular Hemoglobin


 


 


 


 28.9 PG


(27.0-31.0)


 


Mean Corpuscular Hemoglobin


Concent 


 


 


 31.1 G/DL


(32.0-36.0)  L


 


Red Cell Distribution Width


 


 


 


 12.9 %


(11.6-14.8)


 


Platelet Count


 


 


 


 145 K/UL


(150-450)  L


 


Mean Platelet Volume


 


 


 


 10.9 FL


(6.5-10.1)  H


 


Neutrophils (%) (Auto)


 


 


 


 % (45.0-75.0)





 


Lymphocytes (%) (Auto)


 


 


 


 % (20.0-45.0)





 


Monocytes (%) (Auto)     % (1.0-10.0)  


 


Eosinophils (%) (Auto)     % (0.0-3.0)  


 


Basophils (%) (Auto)     % (0.0-2.0)  


 


Differential Total Cells


Counted 


 


 


 100  





 


Neutrophils % (Manual)    85 % (45-75)  H


 


Lymphocytes % (Manual)    3 % (20-45)  L


 


Monocytes % (Manual)    1 % (1-10)  


 


Eosinophils % (Manual)    1 % (0-3)  


 


Basophils % (Manual)    0 % (0-2)  


 


Band Neutrophils    10 % (0-8)  H


 


Platelet Estimate    Decreased  L


 


Platelet Morphology    Normal  


 


Hypochromasia    1+  


 


Sodium Level


 


 


 


 147 MMOL/L


(136-145)  H


 


Potassium Level


 


 


 


 3.6 MMOL/L


(3.5-5.1)


 


Chloride Level


 


 


 


 111 MMOL/L


()  H


 


Carbon Dioxide Level


 


 


 


 29 MMOL/L


(21-32)


 


Anion Gap


 


 


 


 7 mmol/L


(5-15)


 


Blood Urea Nitrogen


 


 


 


 32 mg/dL


(7-18)  H


 


Creatinine


 


 


 


 0.8 MG/DL


(0.55-1.30)  #


 


Estimat Glomerular Filtration


Rate 


 


 


 > 60 mL/min


(>60)


 


Glucose Level


 


 


 


 34 MG/DL


()  *L


 


Calcium Level


 


 


 


 8.1 MG/DL


(8.5-10.1)  L


 


Total Bilirubin


 


 


 


 0.4 MG/DL


(0.2-1.0)


 


Aspartate Amino Transf


(AST/SGOT) 


 


 


 30 U/L (15-37)





 


Alanine Aminotransferase


(ALT/SGPT) 


 


 


 25 U/L (12-78)





 


Alkaline Phosphatase


 


 


 


 117 U/L


()  H


 


Total Protein


 


 


 


 5.0 G/DL


(6.4-8.2)  L


 


Albumin


 


 


 


 1.1 G/DL


(3.4-5.0)  L


 


Globulin    3.9 g/dL  


 


Albumin/Globulin Ratio


 


 


 


 0.3 (1.0-2.7)


L











Plan


Problems:  


(1) Pneumonia due to COVID-19 virus


Assessment & Plan:  Interim endotracheal intubation, endotracheal tube tip in 

good position


approximately 4 cm above the tito. There are extensive bilateral infiltrates, 

which


are markedly increased from the prior study. Pleural spaces are probably clear, 

not


well demonstrated


Markedly increased and now extensive bilateral infiltrates 


cont as per pulm and iID





(2) Hypoxia


Assessment & Plan:  patient developing DTI. in current condition, critically ill

and declining potential inevitable decline 


all care precautions taken and will cont to monitor and assist with improvement 





(3) Abdominal pain


Assessment & Plan:  65M abd pain, septic, covid, intubated ons upport


currently abd exam benign but limited given condition


line bo mary noted


okay for meds and feeds


nutritional optimization 


DAILY ESTIMATED NEEDS:


Needs based on Critical Care/ 73kg abw 


22-28  kcals/kg 


4146-3038  total kcals


1.2-2  g protein/kg


  g total protein 


25-30  mL/kg


7843-1516  total fluid mLs





NUTRITION DIAGNOSIS:


Swallowing difficulty R/T respiratory failure as evidenced by pt now


orally intubated, OGT in place, NPO





 


CURRENT TF:NPO 





 





ENTERAL NUTRITION RECOMMENDATIONS:


Vital AF 1.2 @ 60ml/hr x 24 hrs  to provide 1440ml, 1728kcal, 116g prot, 1167ml 

free water 





* As medically appropriate, initiate critical care and carb controlled TF 

formula of Vital AF 1.2


* Initiate @ 20ml/hr x 6hrs, advance 10ml q 4-6 hrs as tolerated to goal rate


* HOB over 30 degrees/ water flush per MD


* Does not exceed est kcal needs w/ Propofol running @ 8.083ml/hr x 24 hrs 

(provides 213 lipid kcal)





 





ADDITIONAL RECOMMENDATIONS:


* Calibrated bedscale wt 


* Monitor BGs closely w/ Decadron and TF, need for long acting insulin 


* Monitor lytes, replete as needed  


* Monitor Propofol rate, need to adjust TF rate  





(4) Acute metabolic encephalopathy


(5) Pneumothorax on right


Assessment & Plan:  right ptx


chest tube placed


decreased air


stable ptx


cont tube to suction


Endotracheal tube tip projects at the level of the thoracic inlet.


Orogastric tube tip projects at the level of the gastric fundus. Again 

demonstrated


is extensive subcutaneous emphysema, which appears somewhat worse on the left. 

Right


chest tube remains in place. Small right medial and apical pneumothorax are 

present,


slightly more conspicuous than on the previous exam, still small, somewhat 

difficult


to identify due to overlying subcutaneous emphysema. There is probably a tiny 

left


apical pneumothorax as well. Previously demonstrated pneumomediastinum has 

improved


considerably although still present. Bilateral infiltrates appear worse on the 

right.


 


Impression: Equivocally slightly increased but still small right pneumothorax.


 


Suspect tiny left apical pneumothorax


 


Improving pneumomediastinum


 


Worsening subcutaneous emphysema


 


Worsening right and stable left lung infiltrates 





 Right chest tube remains. There is still a small apical pneumothorax. Tiny


left apical pneumothorax persists, unchanged. There is decreased 

pneumomediastinum.


Subcutaneous gas has decreased as well. Bilateral infiltrates are unchanged.


Endotracheal tube remains at the level of the thoracic inlet. Orogastric tube is


again demonstrated, tip not well-visualized but probably stable in position


 


Impression: Stable tiny bilateral apical pneumothoraces


 


Unchanged bilateral infiltrates


 


Decreased pneumomediastinum and subcutaneous emphysema














Norberto Vazquez                Jan 22, 2021 11:04

## 2021-01-22 NOTE — PULMONOLOGY PROGRESS NOTE
Subjective


ROS Limited/Unobtainable:  Yes


Interval Events:  Noted sub cut emphsema  On Versed drip. R CT placed


Constitutional:  Reports: fever, other - last night Nz=294.7


HEENT:  Repors: no symptoms


Respiratory:  Reports: shortness of breath - better


Cardiovascular:  Reports: no symptoms


Gastrointestinal/Abdominal:  Reports: no symptoms


Allergies:  


Coded Allergies:  


     No Known Allergies (Unverified , 6/11/19)


All Systems:  reviewed and negative except above





Objective





Last 24 Hour Vital Signs








  Date Time  Temp Pulse Resp B/P (MAP) Pulse Ox O2 Delivery O2 Flow Rate FiO2


 


1/22/21 10:00  103 19 91/53 (66) 99   


 


1/22/21 09:00  98 21 105/61 (76) 99   


 


1/22/21 09:00  98 21 105/61 (76) 99   


 


1/22/21 08:57  99      


 


1/22/21 08:00 100.2 96 19 111/57 (75) 100   


 


1/22/21 08:00        100


 


1/22/21 08:00      Mechanical Ventilator  


 


1/22/21 07:46  94      


 


1/22/21 07:00  91 16 98/58 (71) 100   


 


1/22/21 07:00    98/58    


 


1/22/21 06:00  91 16 93/54 (67) 100   


 


1/22/21 06:00    93/54    


 


1/22/21 06:00  93  88/49    


 


1/22/21 05:00    101/61    


 


1/22/21 05:00  89 17 101/61 (74) 100   


 


1/22/21 04:00 97.1 87 17 97/55 (69) 100   


 


1/22/21 04:00    97/55    


 


1/22/21 04:00        100


 


1/22/21 04:00      Mechanical Ventilator  


 


1/22/21 03:03  96      


 


1/22/21 03:00    109/70    


 


1/22/21 03:00  89 20 109/70 (83) 100   


 


1/22/21 02:15    102/71    


 


1/22/21 02:00  91 16 123/66 (85) 100   


 


1/22/21 02:00    121/70    


 


1/22/21 02:00    102/71    


 


1/22/21 01:31    113/62    


 


1/22/21 01:30    121/70    


 


1/22/21 01:00  94 20 96/52 (67) 100   


 


1/22/21 01:00    93/56    


 


1/22/21 00:00        100


 


1/22/21 00:00    106/63    


 


1/22/21 00:00 97.8 94 29 110/58 (75) 100   


 


1/22/21 00:00      Mechanical Ventilator  


 


1/21/21 23:50  104 22     60


 


1/21/21 23:05  96      


 


1/21/21 23:00  96 19 112/63 (79)    


 


1/21/21 23:00    109/56    


 


1/21/21 22:30    139/67    


 


1/21/21 22:00  100 21 116/59 (78) 99   


 


1/21/21 22:00    109/63    


 


1/21/21 21:11  99  74/40    


 


1/21/21 21:00    105/58    


 


1/21/21 21:00  98 19 132/67 (88) 100   


 


1/21/21 20:45    132/67    


 


1/21/21 20:30    116/63    


 


1/21/21 20:00      Mechanical Ventilator  


 


1/21/21 20:00    116/63    


 


1/21/21 20:00        100


 


1/21/21 20:00 100.1 102 21 119/64 (82) 100   


 


1/21/21 19:30  109 21     60


 


1/21/21 19:19  104      


 


1/21/21 19:00    98/54    


 


1/21/21 19:00  105 21 98/54 (69) 100   


 


1/21/21 18:00    93/53    


 


1/21/21 18:00  112 25 93/53 (66) 97   


 


1/21/21 17:28    90/54    


 


1/21/21 17:00    152/68    


 


1/21/21 17:00  117 26 152/68 (96) 96   


 


1/21/21 16:00        100


 


1/21/21 16:00    91/54    


 


1/21/21 16:00      Mechanical Ventilator  


 


1/21/21 16:00 102.0 122 27 91/54 (66) 87   


 


1/21/21 15:37  119 26     60


 


1/21/21 15:30  121      


 


1/21/21 15:00  119 30 101/56 (71) 92   


 


1/21/21 15:00    105/63    


 


1/21/21 14:55  101      


 


1/21/21 14:00   24   Mechanical Ventilator  100


 


1/21/21 14:00  114 24 60/36 (44) 87   


 


1/21/21 13:50  115  89/55    


 


1/21/21 13:00  117 24 74/51 (59) 88   


 


1/21/21 13:00   24   Endotracheal Tube  100


 


1/21/21 12:00  123 22 81/50 (60) 86   


 


1/21/21 12:00        100


 


1/21/21 12:00    81/50    


 


1/21/21 12:00   22   Mechanical Ventilator  100


 


1/21/21 12:00      Mechanical Ventilator  


 


1/21/21 11:30    78/47    

















Intake and Output  


 


 1/21/21 1/22/21





 19:00 07:00


 


Intake Total 1754.9 ml 1671.81 ml


 


Output Total 585 ml 1380 ml


 


Balance 1169.9 ml 291.81 ml


 


  


 


Free Water 200 ml 60 ml


 


IV Total 1334.9 ml 1491.81 ml


 


Tube Feeding 220 ml 120 ml


 


Output Urine Total 585 ml 980 ml


 


Stool Total  400 ml








Objective


On a Versed drip


General Appearance:  no acute distress


HEENT:  atraumatic


Respiratory:  lungs clear, decreased breath sounds


Cardiovascular:  normal rate, regular rhythm


Abdomen:  soft, non tender, no organomegaly


Laboratory Tests


1/21/21 17:22: POC Whole Blood Glucose [Pending]


1/21/21 22:00: Vancomycin Level Trough 19.2H


1/21/21 22:41: POC Whole Blood Glucose 96


1/22/21 04:00: 


White Blood Count 17.9H, Red Blood Count 3.62L, Hemoglobin 10.4L, Hematocrit 

33.5L, Mean Corpuscular Volume 93, Mean Corpuscular Hemoglobin 28.9, Mean 

Corpuscular Hemoglobin Concent 31.1L, Red Cell Distribution Width 12.9, Platelet

 Count 145L, Mean Platelet Volume 10.9H, Neutrophils (%) (Auto) , Lymphocytes 

(%) (Auto) , Monocytes (%) (Auto) , Eosinophils (%) (Auto) , Basophils (%) 

(Auto) , Differential Total Cells Counted 100, Neutrophils % (Manual) 85H, 

Lymphocytes % (Manual) 3L, Monocytes % (Manual) 1, Eosinophils % (Manual) 1, 

Basophils % (Manual) 0, Band Neutrophils 10H, Platelet Estimate DecreasedL, 

Platelet Morphology Normal, Hypochromasia 1+, Sodium Level 147H, Potassium Level

 3.6, Chloride Level 111H, Carbon Dioxide Level 29, Anion Gap 7, Blood Urea 

Nitrogen 32H, Creatinine 0.8#, Estimat Glomerular Filtration Rate > 60, Glucose 

Level 34*L, Calcium Level 8.1L, Total Bilirubin 0.4, Aspartate Amino Transf 

(AST/SGOT) 30, Alanine Aminotransferase (ALT/SGPT) 25, Alkaline Phosphatase 117H

, Total Protein 5.0L, Albumin 1.1L, Globulin 3.9, Albumin/Globulin Ratio 0.3L





Current Medications








 Medications


  (Trade)  Dose


 Ordered  Sig/Julisa


 Route


 PRN Reason  Start Time


 Stop Time Status Last Admin


Dose Admin


 


 Acetaminophen


  (Tylenol)  650 mg  Q4H  PRN


 GT


 Temp >100.5  1/15/21 17:15


 2/14/21 17:14  1/22/21 11:05





 


 Amiodarone HCl


  (Cordarone)  400 mg  EVERY 12  HOURS


 NG


   1/19/21 21:00


 4/19/21 20:59  1/22/21 08:57





 


 Chlorhexidine


 Gluconate


  (Vanessa-Hex 2%)  1 applic  DAILY@2000


 TOPIC


   1/19/21 20:00


 4/19/21 19:59  1/21/21 20:43





 


 Dextrose  1,000 ml @ 


 50 mls/hr  Q20H


 IV


   1/18/21 10:00


 2/17/21 09:59  1/21/21 18:00





 


 Dextrose


  (Dextrose 50%)  25 ml  Q30M  PRN


 IV


 Hypoglycemia  1/9/21 13:30


 4/9/21 13:29   





 


 Dextrose


  (Dextrose 50%)  50 ml  Q30M  PRN


 IV


 Hypoglycemia  1/9/21 13:30


 4/9/21 13:29   





 


 Digoxin


  (Lanoxin)  0.125 mg  DAILY


 NG


   1/21/21 09:00


 4/21/21 08:59  1/22/21 08:57





 


 Diltiazem HCl


  (Cardizem Tab)  30 mg  EVERY 8  HOURS


 NG


   1/19/21 14:00


 2/18/21 13:59  1/20/21 22:00





 


 Docusate Sodium


  (Colace)  100 mg  TIDPRN  PRN


 GT


 Constipation  1/12/21 01:15


 2/11/21 01:14  1/12/21 01:42





 


 Enoxaparin Sodium


  (Lovenox)  80 mg  EVERY 12  HOURS


 SUBQ


   1/2/21 21:00


 4/2/21 20:59  1/21/21 20:44





 


 Insulin Aspart


  (NovoLOG)    Q6HR


 SUBQ


   1/14/21 12:00


 4/9/21 17:59  1/21/21 05:35





 


 Insulin Aspart


  (NovoLOG)  9 units  EVERY 6  HOURS


 SUBQ


   1/21/21 12:00


 4/15/21 06:59   





 


 Insulin Detemir


  (Levemir)  20 units  Q12HR


 SUBQ


   1/14/21 09:00


 4/12/21 08:59  1/21/21 21:11





 


 Midazolam HCl  100 ml @ 2


 mls/hr  Q24H  PRN


 IV


 Agitation  1/15/21 23:00


 1/22/21 22:59  1/21/21 05:48





 


 Norepinephrine


 Bitartrate 8 mg/


 Sodium Chloride  500 ml @ 0


 mls/hr  Q24H  PRN


 IV


 For hypotension  1/21/21 22:30


 1/24/21 22:29  1/22/21 01:31





 


 Pantoprazole


  (Protonix)  40 mg  DAILY


 IVP


   1/14/21 09:00


 2/13/21 08:59  1/22/21 08:57





 


 Piperacillin Sod/


 Tazobactam Sod


 3.375 gm/Sodium


 Chloride  110 ml @ 


 27.5 mls/hr  EVERY 8  HOURS


 IVPB


   1/15/21 14:00


 1/24/21 13:59  1/22/21 06:09





 


 Quetiapine


 Fumarate


  (SEROqueL)  50 mg  Q12HR


 ORAL


   1/4/21 21:00


 2/18/21 20:59  1/22/21 08:57





 


 Sodium Chloride  500 ml @ 


 999 mls/hr  Q31M PRN


 IV


 For hypotension  1/15/21 18:30


 2/14/21 18:29   





 


 Vancomycin HCl  300 ml @ 


 150 mls/hr  Q12H


 IVPB


   1/16/21 11:00


 1/25/21 10:59  1/22/21 11:05





 


 Vancomycin HCl


  (Vanco pharmacy


 to dose)  1 ea  DAILY  PRN


 MISC


 Per rx protocol  1/14/21 09:15


 2/13/21 09:14   














Assessment/Plan


Assessment/Plan


1. COVID-19 pneumonia.


   - on Decadron, azithromycin, and ceftriaxone


   - Intubated now; will continue AC mode for now


          -Currently 100% FiO2. PEEP 10; ABG 7.34/62/37; will increase PEEP to 

14


             - Has R apical PTX and subcut emphysema


             - S/p R CT placement


2. Diabetes mellitus.; 


3. Elevated inflammatory markers.


4. High D-dimer. On full dose Lovenox


5. Hyper natremia; will initiate D5W


6. Hyperglycemia.


   - BG control


7. Hypoxemia., secondary to #1; improved





DVT prophylaxis   On Lovenox.


Sedated; advised RN to increase sedation








Hypotensive this AM


Placed in Trendelenburg


Will decrease PEEP to 12











Sung Lemos MD           Jan 22, 2021 11:21

## 2021-01-22 NOTE — GENERAL PROGRESS NOTE
Subjective


ROS Limited/Unobtainable:  Yes


Allergies:  


Coded Allergies:  


     No Known Allergies (Unverified , 6/11/19)


Subjective


events noted - interval notes reviewed 


glucose values are low 











Item Value  Date Time


 


Bedside Blood Glucose 90 mg/dl 1/22/21 0600


 


Bedside Blood Glucose 96 mg/dl 1/22/21 0000


 


Bedside Blood Glucose 110 mg/dl 1/21/21 2111


 


Bedside Blood Glucose 107 mg/dl 1/21/21 1207


 


Bedside Blood Glucose 158 mg/dl H 1/21/21 0838











Objective





Last 24 Hour Vital Signs








  Date Time  Temp Pulse Resp B/P (MAP) Pulse Ox O2 Delivery O2 Flow Rate FiO2


 


1/22/21 06:00  91 16 93/54 (67) 100   


 


1/22/21 06:00    93/54    


 


1/22/21 06:00  93  88/49    


 


1/22/21 05:00    101/61    


 


1/22/21 05:00  89 17 101/61 (74) 100   


 


1/22/21 04:00 97.1 87 17 97/55 (69) 100   


 


1/22/21 04:00    97/55    


 


1/22/21 04:00        100


 


1/22/21 04:00      Mechanical Ventilator  


 


1/22/21 03:03  96      


 


1/22/21 03:00    109/70    


 


1/22/21 03:00  89 20 109/70 (83) 100   


 


1/22/21 02:15    102/71    


 


1/22/21 02:00  91 16 123/66 (85) 100   


 


1/22/21 02:00    121/70    


 


1/22/21 02:00    102/71    


 


1/22/21 01:31    113/62    


 


1/22/21 01:30    121/70    


 


1/22/21 01:00  94 20 96/52 (67) 100   


 


1/22/21 01:00    93/56    


 


1/22/21 00:00        100


 


1/22/21 00:00    106/63    


 


1/22/21 00:00 97.8 94 29 110/58 (75) 100   


 


1/22/21 00:00      Mechanical Ventilator  


 


1/21/21 23:50  104 22     60


 


1/21/21 23:05  96      


 


1/21/21 23:00  96 19 112/63 (79)    


 


1/21/21 23:00    109/56    


 


1/21/21 22:30    139/67    


 


1/21/21 22:00  100 21 116/59 (78) 99   


 


1/21/21 22:00    109/63    


 


1/21/21 21:11  99  74/40    


 


1/21/21 21:00    105/58    


 


1/21/21 21:00  98 19 132/67 (88) 100   


 


1/21/21 20:45    132/67    


 


1/21/21 20:30    116/63    


 


1/21/21 20:00      Mechanical Ventilator  


 


1/21/21 20:00    116/63    


 


1/21/21 20:00        100


 


1/21/21 20:00 100.1 102 21 119/64 (82) 100   


 


1/21/21 19:30  109 21     60


 


1/21/21 19:19  104      


 


1/21/21 19:00    98/54    


 


1/21/21 19:00  105 21 98/54 (69) 100   


 


1/21/21 18:00    93/53    


 


1/21/21 18:00  112 25 93/53 (66) 97   


 


1/21/21 17:28    90/54    


 


1/21/21 17:00    152/68    


 


1/21/21 17:00  117 26 152/68 (96) 96   


 


1/21/21 16:00        100


 


1/21/21 16:00    91/54    


 


1/21/21 16:00      Mechanical Ventilator  


 


1/21/21 16:00 102.0 122 27 91/54 (66) 87   


 


1/21/21 15:37  119 26     60


 


1/21/21 15:30  121      


 


1/21/21 15:00  119 30 101/56 (71) 92   


 


1/21/21 15:00    105/63    


 


1/21/21 14:55  101      


 


1/21/21 14:00   24   Mechanical Ventilator  100


 


1/21/21 14:00  114 24 60/36 (44) 87   


 


1/21/21 13:50  115  89/55    


 


1/21/21 13:00  117 24 74/51 (59) 88   


 


1/21/21 13:00   24   Endotracheal Tube  100


 


1/21/21 12:00  123 22 81/50 (60) 86   


 


1/21/21 12:00        100


 


1/21/21 12:00    81/50    


 


1/21/21 12:00   22   Mechanical Ventilator  100


 


1/21/21 12:00      Mechanical Ventilator  


 


1/21/21 11:30    78/47    


 


1/21/21 11:00  124 17 90/38 (55) 83   


 


1/21/21 11:00   17   Endotracheal Tube  100


 


1/21/21 10:32  121 24     60


 


1/21/21 10:30   22   Endotracheal Tube  100


 


1/21/21 10:00  122 22 81/57 (65) 81   


 


1/21/21 10:00   22   Endotracheal Tube  100


 


1/21/21 09:00   24   Endotracheal Tube  100


 


1/21/21 09:00  112 24 102/62 (75) 84   


 


1/21/21 08:37  107      


 


1/21/21 08:00        100


 


1/21/21 08:00   31   Endotracheal Tube  100


 


1/21/21 08:00 98.6 103 31 104/71 (82) 88   


 


1/21/21 08:00      Mechanical Ventilator  


 


1/21/21 07:52  101      


 


1/21/21 07:17  111 35     60


 


1/21/21 07:00  87 26 98/52 (67) 93   


 


1/21/21 07:00   26   Mechanical Ventilator  100

















Intake and Output  


 


 1/21/21 1/22/21





 19:00 07:00


 


Intake Total 1754.9 ml 1545.85 ml


 


Output Total 585 ml 1300 ml


 


Balance 1169.9 ml 245.85 ml


 


  


 


Free Water 200 ml 60 ml


 


IV Total 1334.9 ml 1395.85 ml


 


Tube Feeding 220 ml 90 ml


 


Output Urine Total 585 ml 900 ml


 


Stool Total  400 ml








Laboratory Tests


1/21/21 08:18: POC Whole Blood Glucose [Pending]


1/21/21 08:53: 


Arterial Blood pH 7.343L, Arterial Blood Partial Pressure CO2 62.6*H, Arterial 

Blood Partial Pressure O2 37.5*L, Arterial Blood HCO3 33.2H, Arterial Blood 

Oxygen Saturation 5.7*L, Arterial Blood Base Excess 5.7H, Abelardo Test Positive


1/21/21 17:22: POC Whole Blood Glucose [Pending]


1/21/21 22:00: Vancomycin Level Trough 19.2H


1/21/21 22:41: POC Whole Blood Glucose 96


1/22/21 04:00: 


White Blood Count 17.9H, Red Blood Count 3.62L, Hemoglobin 10.4L, Hematocrit 

33.5L, Mean Corpuscular Volume 93, Mean Corpuscular Hemoglobin 28.9, Mean 

Corpuscular Hemoglobin Concent 31.1L, Red Cell Distribution Width 12.9, Platelet

 Count 145L, Mean Platelet Volume 10.9H, Neutrophils (%) (Auto) , Lymphocytes 

(%) (Auto) , Monocytes (%) (Auto) , Eosinophils (%) (Auto) , Basophils (%) 

(Auto) , Neutrophils % (Manual) [Pending], Lymphocytes % (Manual) [Pending], 

Platelet Estimate [Pending], Platelet Morphology [Pending], Sodium Level 147H, 

Potassium Level 3.6, Chloride Level 111H, Carbon Dioxide Level 29, Anion Gap 7, 

Blood Urea Nitrogen 32H, Creatinine 0.8#, Estimat Glomerular Filtration Rate > 

60, Glucose Level 34*L, Calcium Level 8.1L, Total Bilirubin 0.4, Aspartate Amino

 Transf (AST/SGOT) 30, Alanine Aminotransferase (ALT/SGPT) 25, Alkaline 

Phosphatase 117H, Total Protein 5.0L, Albumin 1.1L, Globulin 3.9, Albumin/Glob

ulin Ratio 0.3L


Height (Feet):  5


Height (Inches):  7.00


Weight (Pounds):  198


Objective





Current Medications








 Medications


  (Trade)  Dose


 Ordered  Sig/Julisa


 Route


 PRN Reason  Start Time


 Stop Time Status Last Admin


Dose Admin


 


 Acetaminophen


  (Tylenol)  650 mg  Q4H  PRN


 GT


 Temp >100.5  1/15/21 17:15


 2/14/21 17:14  1/20/21 22:35





 


 Amiodarone HCl


  (Cordarone)  400 mg  EVERY 12  HOURS


 NG


   1/19/21 21:00


 4/19/21 20:59  1/21/21 20:44





 


 Chlorhexidine


 Gluconate


  (Vanessa-Hex 2%)  1 applic  DAILY@2000


 TOPIC


   1/19/21 20:00


 4/19/21 19:59  1/21/21 20:43





 


 Dextrose  1,000 ml @ 


 50 mls/hr  Q20H


 IV


   1/18/21 10:00


 2/17/21 09:59  1/21/21 18:00





 


 Dextrose


  (Dextrose 50%)  25 ml  Q30M  PRN


 IV


 Hypoglycemia  1/9/21 13:30


 4/9/21 13:29   





 


 Dextrose


  (Dextrose 50%)  50 ml  Q30M  PRN


 IV


 Hypoglycemia  1/9/21 13:30


 4/9/21 13:29   





 


 Digoxin


  (Lanoxin)  0.125 mg  DAILY


 NG


   1/21/21 09:00


 4/21/21 08:59  1/21/21 08:37





 


 Diltiazem HCl


  (Cardizem Tab)  30 mg  EVERY 8  HOURS


 NG


   1/19/21 14:00


 2/18/21 13:59  1/20/21 22:00





 


 Docusate Sodium


  (Colace)  100 mg  TIDPRN  PRN


 GT


 Constipation  1/12/21 01:15


 2/11/21 01:14  1/12/21 01:42





 


 Enoxaparin Sodium


  (Lovenox)  80 mg  EVERY 12  HOURS


 SUBQ


   1/2/21 21:00


 4/2/21 20:59  1/21/21 20:44





 


 Insulin Aspart


  (NovoLOG)    Q6HR


 SUBQ


   1/14/21 12:00


 4/9/21 17:59  1/21/21 05:35





 


 Insulin Aspart


  (NovoLOG)  9 units  EVERY 6  HOURS


 SUBQ


   1/21/21 12:00


 4/15/21 06:59   





 


 Insulin Detemir


  (Levemir)  20 units  Q12HR


 SUBQ


   1/14/21 09:00


 4/12/21 08:59  1/21/21 21:11





 


 Midazolam HCl  100 ml @ 2


 mls/hr  Q24H  PRN


 IV


 Agitation  1/15/21 23:00


 1/22/21 22:59  1/21/21 05:48





 


 Norepinephrine


 Bitartrate 8 mg/


 Sodium Chloride  500 ml @ 0


 mls/hr  Q24H  PRN


 IV


 For hypotension  1/21/21 22:30


 1/24/21 22:29  1/22/21 01:31





 


 Pantoprazole


  (Protonix)  40 mg  DAILY


 IVP


   1/14/21 09:00


 2/13/21 08:59  1/21/21 08:37





 


 Piperacillin Sod/


 Tazobactam Sod


 3.375 gm/Sodium


 Chloride  110 ml @ 


 27.5 mls/hr  EVERY 8  HOURS


 IVPB


   1/15/21 14:00


 1/24/21 13:59  1/22/21 06:09





 


 Quetiapine


 Fumarate


  (SEROqueL)  50 mg  Q12HR


 ORAL


   1/4/21 21:00


 2/18/21 20:59  1/21/21 08:37





 


 Sodium Chloride  500 ml @ 


 999 mls/hr  Q31M PRN


 IV


 For hypotension  1/15/21 18:30


 2/14/21 18:29   





 


 Vancomycin HCl  300 ml @ 


 150 mls/hr  Q12H


 IVPB


   1/16/21 11:00


 1/25/21 10:59  1/21/21 23:06





 


 Vancomycin HCl


  ([facility] pharmacy


 to dose)  1 ea  DAILY  PRN


 MISC


 Per rx protocol  1/14/21 09:15


 2/13/21 09:14   














Assessment/Plan


Problem List:  


(1) Diabetes mellitus out of control


ICD Codes:  E11.65 - Type 2 diabetes mellitus with hyperglycemia


SNOMED:  20745423, [id number]


(2) Pneumonia due to COVID-19 virus


ICD Codes:  U07.1 - COVID-19; J12.82 - Pneumonia due to coronavirus disease 2019


SNOMED:  911406296474802208


Assessment/Plan:


DC Novolog 9 units every 6 hours 


continue Novolog sliding scale every 6 hours 


hypoglycemia protocol in order











Nick Mcmahon MD                 Jan 22, 2021 06:29

## 2021-01-22 NOTE — CARDIAC ELECTROPHYSIOLOGY PN
Assessment/Plan


Assessment/Plan


1. Atrial fibrillation with rapid ventricular response.      


 On digoxin 0.125 mg p.o. daily,  Amiodarone 400 po q12  and Cardizem 30 po q 8 

hr. IN SR 


  Digoxin level 0.6.   





2. Hypernatremia with sodium 151 with azotemia, BUN of 30, creatinine 0.8.   On 

iv fluids





3. Right pneumothorax, status post chest tube with subcutaneous emphysema.





4. COVID-19 pneumonia, 100% FiO2 and PEEP of 10, on remdesivir and Solu-Medrol 

per ID.


5. Diabetes.


6. Hypotension due to dehydration and sepsis.On   iv fluid and LEvophed 8 Mcg


7.  Hypernatremia and azotemia.  The patient is on D5W of 50 mL an hour.





Subjective


Subjective


In ICU on 100% Fio2 and PEEP 10 and still saturating less than 90%. 


Back on  Levo 8 Mcg overnight. Right chest tube had minimal drainage. On Dig and

Amiodarone





Objective





Last 24 Hour Vital Signs








  Date Time  Temp Pulse Resp B/P (MAP) Pulse Ox O2 Delivery O2 Flow Rate FiO2


 


1/22/21 14:19  94  129/74    


 


1/22/21 13:00  84 17 102/66 (78) 99   


 


1/22/21 12:00      Mechanical Ventilator  


 


1/22/21 12:00  96      


 


1/22/21 12:00        100


 


1/22/21 12:00 100.8 108 30 109/67 (81) 100   


 


1/22/21 11:59  108      


 


1/22/21 11:53 99.0       


 


1/22/21 11:00  103 18 107/59 (75) 99   


 


1/22/21 11:00    107/59    


 


1/22/21 10:00  103 19 91/53 (66) 99   


 


1/22/21 10:00    95/55    


 


1/22/21 09:00  98 21 105/61 (76) 99   


 


1/22/21 09:00  98 21 105/61 (76) 99   


 


1/22/21 09:00    107/59    


 


1/22/21 08:57  99      


 


1/22/21 08:00 100.2 96 19 111/57 (75) 100   


 


1/22/21 08:00        100


 


1/22/21 08:00      Mechanical Ventilator  


 


1/22/21 08:00    111/57    


 


1/22/21 07:46  94      


 


1/22/21 07:00  91 16 98/58 (71) 100   


 


1/22/21 07:00    98/58    


 


1/22/21 06:00  91 16 93/54 (67) 100   


 


1/22/21 06:00    93/54    


 


1/22/21 06:00  93  88/49    


 


1/22/21 05:00    101/61    


 


1/22/21 05:00  89 17 101/61 (74) 100   


 


1/22/21 04:00 97.1 87 17 97/55 (69) 100   


 


1/22/21 04:00    97/55    


 


1/22/21 04:00        100


 


1/22/21 04:00      Mechanical Ventilator  


 


1/22/21 03:03  96      


 


1/22/21 03:00    109/70    


 


1/22/21 03:00  89 20 109/70 (83) 100   


 


1/22/21 02:15    102/71    


 


1/22/21 02:00  91 16 123/66 (85) 100   


 


1/22/21 02:00    121/70    


 


1/22/21 02:00    102/71    


 


1/22/21 01:31    113/62    


 


1/22/21 01:30    121/70    


 


1/22/21 01:00  94 20 96/52 (67) 100   


 


1/22/21 01:00    93/56    


 


1/22/21 00:00        100


 


1/22/21 00:00    106/63    


 


1/22/21 00:00 97.8 94 29 110/58 (75) 100   


 


1/22/21 00:00      Mechanical Ventilator  


 


1/21/21 23:50  104 22     60


 


1/21/21 23:05  96      


 


1/21/21 23:00  96 19 112/63 (79)    


 


1/21/21 23:00    109/56    


 


1/21/21 22:30    139/67    


 


1/21/21 22:00  100 21 116/59 (78) 99   


 


1/21/21 22:00    109/63    


 


1/21/21 21:11  99  74/40    


 


1/21/21 21:00    105/58    


 


1/21/21 21:00  98 19 132/67 (88) 100   


 


1/21/21 20:45    132/67    


 


1/21/21 20:30    116/63    


 


1/21/21 20:00      Mechanical Ventilator  


 


1/21/21 20:00    116/63    


 


1/21/21 20:00        100


 


1/21/21 20:00 100.1 102 21 119/64 (82) 100   


 


1/21/21 19:30  109 21     60


 


1/21/21 19:19  104      


 


1/21/21 19:00    98/54    


 


1/21/21 19:00  105 21 98/54 (69) 100   


 


1/21/21 18:00    93/53    


 


1/21/21 18:00  112 25 93/53 (66) 97   


 


1/21/21 17:28    90/54    


 


1/21/21 17:00    152/68    


 


1/21/21 17:00  117 26 152/68 (96) 96   


 


1/21/21 16:00        100


 


1/21/21 16:00    91/54    


 


1/21/21 16:00      Mechanical Ventilator  


 


1/21/21 16:00 102.0 122 27 91/54 (66) 87   


 


1/21/21 15:37  119 26     60


 


1/21/21 15:30  121      


 


1/21/21 15:00  119 30 101/56 (71) 92   


 


1/21/21 15:00    105/63    


 


1/21/21 14:55  101      

















Intake and Output  


 


 1/21/21 1/22/21





 19:00 07:00


 


Intake Total 1754.9 ml 1671.81 ml


 


Output Total 585 ml 1380 ml


 


Balance 1169.9 ml 291.81 ml


 


  


 


Free Water 200 ml 60 ml


 


IV Total 1334.9 ml 1491.81 ml


 


Tube Feeding 220 ml 120 ml


 


Output Urine Total 585 ml 980 ml


 


Stool Total  400 ml











Laboratory Tests








Test


 1/21/21


17:22 1/21/21


22:00 1/21/21


22:41 1/22/21


04:00


 


POC Whole Blood Glucose


 Pending  


 


 96 MG/DL


() 





 


Vancomycin Level Trough


 


 19.2 ug/mL


(5.0-12.0)  H 


 





 


White Blood Count


 


 


 


 17.9 K/UL


(4.8-10.8)  H


 


Red Blood Count


 


 


 


 3.62 M/UL


(4.70-6.10)  L


 


Hemoglobin


 


 


 


 10.4 G/DL


(14.2-18.0)  L


 


Hematocrit


 


 


 


 33.5 %


(42.0-52.0)  L


 


Mean Corpuscular Volume    93 FL (80-99)  


 


Mean Corpuscular Hemoglobin


 


 


 


 28.9 PG


(27.0-31.0)


 


Mean Corpuscular Hemoglobin


Concent 


 


 


 31.1 G/DL


(32.0-36.0)  L


 


Red Cell Distribution Width


 


 


 


 12.9 %


(11.6-14.8)


 


Platelet Count


 


 


 


 145 K/UL


(150-450)  L


 


Mean Platelet Volume


 


 


 


 10.9 FL


(6.5-10.1)  H


 


Neutrophils (%) (Auto)


 


 


 


 % (45.0-75.0)





 


Lymphocytes (%) (Auto)


 


 


 


 % (20.0-45.0)





 


Monocytes (%) (Auto)     % (1.0-10.0)  


 


Eosinophils (%) (Auto)     % (0.0-3.0)  


 


Basophils (%) (Auto)     % (0.0-2.0)  


 


Differential Total Cells


Counted 


 


 


 100  





 


Neutrophils % (Manual)    85 % (45-75)  H


 


Lymphocytes % (Manual)    3 % (20-45)  L


 


Monocytes % (Manual)    1 % (1-10)  


 


Eosinophils % (Manual)    1 % (0-3)  


 


Basophils % (Manual)    0 % (0-2)  


 


Band Neutrophils    10 % (0-8)  H


 


Platelet Estimate    Decreased  L


 


Platelet Morphology    Normal  


 


Hypochromasia    1+  


 


Sodium Level


 


 


 


 147 MMOL/L


(136-145)  H


 


Potassium Level


 


 


 


 3.6 MMOL/L


(3.5-5.1)


 


Chloride Level


 


 


 


 111 MMOL/L


()  H


 


Carbon Dioxide Level


 


 


 


 29 MMOL/L


(21-32)


 


Anion Gap


 


 


 


 7 mmol/L


(5-15)


 


Blood Urea Nitrogen


 


 


 


 32 mg/dL


(7-18)  H


 


Creatinine


 


 


 


 0.8 MG/DL


(0.55-1.30)  #


 


Estimat Glomerular Filtration


Rate 


 


 


 > 60 mL/min


(>60)


 


Glucose Level


 


 


 


 34 MG/DL


()  *L


 


Calcium Level


 


 


 


 8.1 MG/DL


(8.5-10.1)  L


 


Total Bilirubin


 


 


 


 0.4 MG/DL


(0.2-1.0)


 


Aspartate Amino Transf


(AST/SGOT) 


 


 


 30 U/L (15-37)





 


Alanine Aminotransferase


(ALT/SGPT) 


 


 


 25 U/L (12-78)





 


Alkaline Phosphatase


 


 


 


 117 U/L


()  H


 


Total Protein


 


 


 


 5.0 G/DL


(6.4-8.2)  L


 


Albumin


 


 


 


 1.1 G/DL


(3.4-5.0)  L


 


Globulin    3.9 g/dL  


 


Albumin/Globulin Ratio


 


 


 


 0.3 (1.0-2.7)


L


 


Test


 1/22/21


10:06 


 


 





 


Arterial Blood pH


 7.309


(7.350-7.450) 


 


 





 


Arterial Blood Partial


Pressure CO2 59.7 mmHg


(35.0-45.0)  *H 


 


 





 


Arterial Blood Partial


Pressure O2 115.3 mmHg


(75.0-100.0)  H 


 


 





 


Arterial Blood HCO3


 29.3 mmol/L


(22.0-26.0)  H 


 


 





 


Arterial Blood Oxygen


Saturation 97.6 %


() 


 


 





 


Arterial Blood Base Excess 2.1 (-2-2)  H   


 


Abelardo Test Positive     








Objective


HEENT: No JVD. Orally intubated


LUNGS:  Have decreased breath sounds with the chest tube on the right side


and subcutaneous emphysema.


CARDIOVASCULAR:  Regular S1, S2 with


no gallop.


ABDOMEN:  Soft.


EXTREMITIES:  A 1+ pitting edema.











Jake George MD               Jan 22, 2021 14:53

## 2021-01-22 NOTE — NUR
NURSE HAND-OFF REPORT: 



Latest Vital Signs: Temperature 97.1 , Pulse 91 , B/P 98 /58 , Respiratory Rate 16 , O2 SAT 
100 , Mechanical Ventilator, O2 Flow Rate  .  

Vital Sign Comment: Stable on 6mcgs levo



EKG Rhythm: Sinus Rhythm

Rhythm change?: N 

MD Notified?: N -Dr. Thaddeus GUTIERREZ Response: 



Latest Singh Fall Score: 50  

Fall Risk: High Risk 

Safety Measures: Call light Within Reach, Bed Alarm Zone 1, Side Rails Side Rails x3, Bed 
position Low and Locked.

Fall Precautions: 

Door Sign



Report given to .

## 2021-01-22 NOTE — NUR
NURSE NOTES:

Unable to tolerate levo @ 4mcg. SBP dropped to 88. Back @ 6mcgs. Versed 71ml wasted with 
witness in room due to covid isolation and co-signed in pyxis. No markings on chest tube 
collection chamber; marked at end of shift 0600 @ 120.

## 2021-01-22 NOTE — NUR
NURSE NOTES:

Feeding back on @ 15ml/hr, will increase to goal of 55ml/hr as tolerated, HOB >30degree.

## 2021-01-22 NOTE — GENERAL PROGRESS NOTE
Subjective


Allergies:  


Coded Allergies:  


     No Known Allergies (Unverified , 6/11/19)


Subjective


on ventilator 60% fio2


on sediation


unresponsive


in icu


rt chest tube s placed due to pneumothorex


afib is controlled


hypotensuion on tndelberg position





Objective





Last 24 Hour Vital Signs








  Date Time  Temp Pulse Resp B/P (MAP) Pulse Ox O2 Delivery O2 Flow Rate FiO2


 


1/22/21 10:00  103 19 91/53 (66) 99   


 


1/22/21 09:00  98 21 105/61 (76) 99   


 


1/22/21 09:00  98 21 105/61 (76) 99   


 


1/22/21 08:57  99      


 


1/22/21 08:00 100.2 96 19 111/57 (75) 100   


 


1/22/21 08:00        100


 


1/22/21 08:00      Mechanical Ventilator  


 


1/22/21 07:46  94      


 


1/22/21 07:00  91 16 98/58 (71) 100   


 


1/22/21 07:00    98/58    


 


1/22/21 06:00  91 16 93/54 (67) 100   


 


1/22/21 06:00    93/54    


 


1/22/21 06:00  93  88/49    


 


1/22/21 05:00    101/61    


 


1/22/21 05:00  89 17 101/61 (74) 100   


 


1/22/21 04:00 97.1 87 17 97/55 (69) 100   


 


1/22/21 04:00    97/55    


 


1/22/21 04:00        100


 


1/22/21 04:00      Mechanical Ventilator  


 


1/22/21 03:03  96      


 


1/22/21 03:00    109/70    


 


1/22/21 03:00  89 20 109/70 (83) 100   


 


1/22/21 02:15    102/71    


 


1/22/21 02:00  91 16 123/66 (85) 100   


 


1/22/21 02:00    121/70    


 


1/22/21 02:00    102/71    


 


1/22/21 01:31    113/62    


 


1/22/21 01:30    121/70    


 


1/22/21 01:00  94 20 96/52 (67) 100   


 


1/22/21 01:00    93/56    


 


1/22/21 00:00        100


 


1/22/21 00:00    106/63    


 


1/22/21 00:00 97.8 94 29 110/58 (75) 100   


 


1/22/21 00:00      Mechanical Ventilator  


 


1/21/21 23:50  104 22     60


 


1/21/21 23:05  96      


 


1/21/21 23:00  96 19 112/63 (79)    


 


1/21/21 23:00    109/56    


 


1/21/21 22:30    139/67    


 


1/21/21 22:00  100 21 116/59 (78) 99   


 


1/21/21 22:00    109/63    


 


1/21/21 21:11  99  74/40    


 


1/21/21 21:00    105/58    


 


1/21/21 21:00  98 19 132/67 (88) 100   


 


1/21/21 20:45    132/67    


 


1/21/21 20:30    116/63    


 


1/21/21 20:00      Mechanical Ventilator  


 


1/21/21 20:00    116/63    


 


1/21/21 20:00        100


 


1/21/21 20:00 100.1 102 21 119/64 (82) 100   


 


1/21/21 19:30  109 21     60


 


1/21/21 19:19  104      


 


1/21/21 19:00    98/54    


 


1/21/21 19:00  105 21 98/54 (69) 100   


 


1/21/21 18:00    93/53    


 


1/21/21 18:00  112 25 93/53 (66) 97   


 


1/21/21 17:28    90/54    


 


1/21/21 17:00    152/68    


 


1/21/21 17:00  117 26 152/68 (96) 96   


 


1/21/21 16:00        100


 


1/21/21 16:00    91/54    


 


1/21/21 16:00      Mechanical Ventilator  


 


1/21/21 16:00 102.0 122 27 91/54 (66) 87   


 


1/21/21 15:37  119 26     60


 


1/21/21 15:30  121      


 


1/21/21 15:00  119 30 101/56 (71) 92   


 


1/21/21 15:00    105/63    


 


1/21/21 14:55  101      


 


1/21/21 14:00   24   Mechanical Ventilator  100


 


1/21/21 14:00  114 24 60/36 (44) 87   


 


1/21/21 13:50  115  89/55    


 


1/21/21 13:00  117 24 74/51 (59) 88   


 


1/21/21 13:00   24   Endotracheal Tube  100


 


1/21/21 12:00  123 22 81/50 (60) 86   


 


1/21/21 12:00        100


 


1/21/21 12:00    81/50    


 


1/21/21 12:00   22   Mechanical Ventilator  100


 


1/21/21 12:00      Mechanical Ventilator  


 


1/21/21 11:30    78/47    

















Intake and Output  


 


 1/21/21 1/22/21





 19:00 07:00


 


Intake Total 1754.9 ml 1671.81 ml


 


Output Total 585 ml 1380 ml


 


Balance 1169.9 ml 291.81 ml


 


  


 


Free Water 200 ml 60 ml


 


IV Total 1334.9 ml 1491.81 ml


 


Tube Feeding 220 ml 120 ml


 


Output Urine Total 585 ml 980 ml


 


Stool Total  400 ml








Laboratory Tests


1/21/21 17:22: POC Whole Blood Glucose [Pending]


1/21/21 22:00: Vancomycin Level Trough 19.2H


1/21/21 22:41: POC Whole Blood Glucose 96


1/22/21 04:00: 


White Blood Count 17.9H, Red Blood Count 3.62L, Hemoglobin 10.4L, Hematocrit 

33.5L, Mean Corpuscular Volume 93, Mean Corpuscular Hemoglobin 28.9, Mean 

Corpuscular Hemoglobin Concent 31.1L, Red Cell Distribution Width 12.9, Platelet

 Count 145L, Mean Platelet Volume 10.9H, Neutrophils (%) (Auto) , Lymphocytes 

(%) (Auto) , Monocytes (%) (Auto) , Eosinophils (%) (Auto) , Basophils (%) 

(Auto) , Differential Total Cells Counted 100, Neutrophils % (Manual) 85H, 

Lymphocytes % (Manual) 3L, Monocytes % (Manual) 1, Eosinophils % (Manual) 1, 

Basophils % (Manual) 0, Band Neutrophils 10H, Platelet Estimate DecreasedL, 

Platelet Morphology Normal, Hypochromasia 1+, Sodium Level 147H, Potassium Level

 3.6, Chloride Level 111H, Carbon Dioxide Level 29, Anion Gap 7, Blood Urea 

Nitrogen 32H, Creatinine 0.8#, Estimat Glomerular Filtration Rate > 60, Glucose 

Level 34*L, Calcium Level 8.1L, Total Bilirubin 0.4, Aspartate Amino Transf 

(AST/SGOT) 30, Alanine Aminotransferase (ALT/SGPT) 25, Alkaline Phosphatase 117H

, Total Protein 5.0L, Albumin 1.1L, Globulin 3.9, Albumin/Globulin Ratio 0.3L


Height (Feet):  5


Height (Inches):  7.00


Weight (Pounds):  198


Neck:  supple


Cardiovascular:  tachycardia


Respiratory/Chest:  rhonchi - bilaterally


Abdomen:  non tender, soft


Extremities:  non-tender





Assessment/Plan


Assessment/Plan:


hypotension on levophed drip


afib with rvr on digoxine , add amiodarone , cardiology on case


covid pna


ac resp distress on ventilator


etoh abused


dehydration


agitated -halodol


fever susided


cont on ventilator at icu


cont iv abx and iv decadrone


pulmonary and gi on consult


dw charge nurse


poor prognosis 


dw son-











Moi Travis MD             Jan 22, 2021 11:13

## 2021-01-22 NOTE — NUR
NURSE NOTES:

Dr. Lemos came to bedside says that if patient remains hypotensive, decrease to PEEP to 
12. Will order vent settings and will update RT. Patient is saturating 99 to 100%. Doctor 
says that if patient saturates at least 94 is fine.

## 2021-01-22 NOTE — NUR
NURSE NOTES:

patient is waking up. not on any sedation. started versed at 1 mg. increased levo to 8mcgs. 
patient remains hypotensive. will continue to monitor.

## 2021-01-22 NOTE — NUR
NURSE NOTES:

BS 91. Novolog with parameters and meals not administered because BS is WNL and patient's 
feeding on hold -patient on trendelenburg.

## 2021-01-22 NOTE — NUR
NURSE NOTES:

Patient on bed, no signs of grimacing and distress noted. Orally intubated ETT 7.5 and 23 cm 
lip line with settings of AC 16, tV 600, FiO2 100%, PEEP 14, tolerating well with high 90s 
to 100% saturation. Chest tube on R lateral side, patent and intact, no kinks. On feeding 
vital AF running 30 mL/hr (goal 55, will increase) via OGT, patent, and intact. On rectal 
tube and cash catheter, patent and intact. Patient has a L UA PICC line running D5W at 50 
mL/hr. Patient is currently on Levophed currently running at 6 mcgs. HOB elevated, bed is on 
lowest position, locked, side rails up (x2). Will continue to monitor.Will continue plan of 
care.

## 2021-01-22 NOTE — INFECTIOUS DISEASES PROG NOTE
Assessment/Plan


Assessment/Plan


antibiotics : vancomycin iv, zosyn





A


1. COVID-19 pneumonia.


on 100 % FiO2 with saturation of 99 %


s/p remdesivir


s/p ivermectin


s/p solumedrol


2. New-onset diabetes.


3. respiratory failure





P


1. Continue iv vancomycin, zosyn


2. Continue isolation.





Subjective


ROS Limited/Unobtainable:  Yes


Allergies:  


Coded Allergies:  


     No Known Allergies (Unverified , 6/11/19)





Objective





Last 24 Hour Vital Signs








  Date Time  Temp Pulse Resp B/P (MAP) Pulse Ox O2 Delivery O2 Flow Rate FiO2


 


1/22/21 11:53 99.0       


 


1/22/21 11:00    107/59    


 


1/22/21 10:00  103 19 91/53 (66) 99   


 


1/22/21 10:00    95/55    


 


1/22/21 09:00  98 21 105/61 (76) 99   


 


1/22/21 09:00  98 21 105/61 (76) 99   


 


1/22/21 09:00    107/59    


 


1/22/21 08:57  99      


 


1/22/21 08:00 100.2 96 19 111/57 (75) 100   


 


1/22/21 08:00        100


 


1/22/21 08:00      Mechanical Ventilator  


 


1/22/21 08:00    111/57    


 


1/22/21 07:46  94      


 


1/22/21 07:00  91 16 98/58 (71) 100   


 


1/22/21 07:00    98/58    


 


1/22/21 06:00  91 16 93/54 (67) 100   


 


1/22/21 06:00    93/54    


 


1/22/21 06:00  93  88/49    


 


1/22/21 05:00    101/61    


 


1/22/21 05:00  89 17 101/61 (74) 100   


 


1/22/21 04:00 97.1 87 17 97/55 (69) 100   


 


1/22/21 04:00    97/55    


 


1/22/21 04:00        100


 


1/22/21 04:00      Mechanical Ventilator  


 


1/22/21 03:03  96      


 


1/22/21 03:00    109/70    


 


1/22/21 03:00  89 20 109/70 (83) 100   


 


1/22/21 02:15    102/71    


 


1/22/21 02:00  91 16 123/66 (85) 100   


 


1/22/21 02:00    121/70    


 


1/22/21 02:00    102/71    


 


1/22/21 01:31    113/62    


 


1/22/21 01:30    121/70    


 


1/22/21 01:00  94 20 96/52 (67) 100   


 


1/22/21 01:00    93/56    


 


1/22/21 00:00        100


 


1/22/21 00:00    106/63    


 


1/22/21 00:00 97.8 94 29 110/58 (75) 100   


 


1/22/21 00:00      Mechanical Ventilator  


 


1/21/21 23:50  104 22     60


 


1/21/21 23:05  96      


 


1/21/21 23:00  96 19 112/63 (79)    


 


1/21/21 23:00    109/56    


 


1/21/21 22:30    139/67    


 


1/21/21 22:00  100 21 116/59 (78) 99   


 


1/21/21 22:00    109/63    


 


1/21/21 21:11  99  74/40    


 


1/21/21 21:00    105/58    


 


1/21/21 21:00  98 19 132/67 (88) 100   


 


1/21/21 20:45    132/67    


 


1/21/21 20:30    116/63    


 


1/21/21 20:00      Mechanical Ventilator  


 


1/21/21 20:00    116/63    


 


1/21/21 20:00        100


 


1/21/21 20:00 100.1 102 21 119/64 (82) 100   


 


1/21/21 19:30  109 21     60


 


1/21/21 19:19  104      


 


1/21/21 19:00    98/54    


 


1/21/21 19:00  105 21 98/54 (69) 100   


 


1/21/21 18:00    93/53    


 


1/21/21 18:00  112 25 93/53 (66) 97   


 


1/21/21 17:28    90/54    


 


1/21/21 17:00    152/68    


 


1/21/21 17:00  117 26 152/68 (96) 96   


 


1/21/21 16:00        100


 


1/21/21 16:00    91/54    


 


1/21/21 16:00      Mechanical Ventilator  


 


1/21/21 16:00 102.0 122 27 91/54 (66) 87   


 


1/21/21 15:37  119 26     60


 


1/21/21 15:30  121      


 


1/21/21 15:00  119 30 101/56 (71) 92   


 


1/21/21 15:00    105/63    


 


1/21/21 14:55  101      


 


1/21/21 14:00   24   Mechanical Ventilator  100


 


1/21/21 14:00  114 24 60/36 (44) 87   


 


1/21/21 13:50  115  89/55    


 


1/21/21 13:00  117 24 74/51 (59) 88   


 


1/21/21 13:00   24   Endotracheal Tube  100








Height (Feet):  5


Height (Inches):  7.00


Weight (Pounds):  198


HEENT:  other - intubated





Laboratory Tests








Test


 1/21/21


17:22 1/21/21


22:00 1/21/21


22:41 1/22/21


04:00


 


POC Whole Blood Glucose


 Pending  


 


 96 MG/DL


() 





 


Vancomycin Level Trough


 


 19.2 ug/mL


(5.0-12.0)  H 


 





 


White Blood Count


 


 


 


 17.9 K/UL


(4.8-10.8)  H


 


Red Blood Count


 


 


 


 3.62 M/UL


(4.70-6.10)  L


 


Hemoglobin


 


 


 


 10.4 G/DL


(14.2-18.0)  L


 


Hematocrit


 


 


 


 33.5 %


(42.0-52.0)  L


 


Mean Corpuscular Volume    93 FL (80-99)  


 


Mean Corpuscular Hemoglobin


 


 


 


 28.9 PG


(27.0-31.0)


 


Mean Corpuscular Hemoglobin


Concent 


 


 


 31.1 G/DL


(32.0-36.0)  L


 


Red Cell Distribution Width


 


 


 


 12.9 %


(11.6-14.8)


 


Platelet Count


 


 


 


 145 K/UL


(150-450)  L


 


Mean Platelet Volume


 


 


 


 10.9 FL


(6.5-10.1)  H


 


Neutrophils (%) (Auto)


 


 


 


 % (45.0-75.0)





 


Lymphocytes (%) (Auto)


 


 


 


 % (20.0-45.0)





 


Monocytes (%) (Auto)     % (1.0-10.0)  


 


Eosinophils (%) (Auto)     % (0.0-3.0)  


 


Basophils (%) (Auto)     % (0.0-2.0)  


 


Differential Total Cells


Counted 


 


 


 100  





 


Neutrophils % (Manual)    85 % (45-75)  H


 


Lymphocytes % (Manual)    3 % (20-45)  L


 


Monocytes % (Manual)    1 % (1-10)  


 


Eosinophils % (Manual)    1 % (0-3)  


 


Basophils % (Manual)    0 % (0-2)  


 


Band Neutrophils    10 % (0-8)  H


 


Platelet Estimate    Decreased  L


 


Platelet Morphology    Normal  


 


Hypochromasia    1+  


 


Sodium Level


 


 


 


 147 MMOL/L


(136-145)  H


 


Potassium Level


 


 


 


 3.6 MMOL/L


(3.5-5.1)


 


Chloride Level


 


 


 


 111 MMOL/L


()  H


 


Carbon Dioxide Level


 


 


 


 29 MMOL/L


(21-32)


 


Anion Gap


 


 


 


 7 mmol/L


(5-15)


 


Blood Urea Nitrogen


 


 


 


 32 mg/dL


(7-18)  H


 


Creatinine


 


 


 


 0.8 MG/DL


(0.55-1.30)  #


 


Estimat Glomerular Filtration


Rate 


 


 


 > 60 mL/min


(>60)


 


Glucose Level


 


 


 


 34 MG/DL


()  *L


 


Calcium Level


 


 


 


 8.1 MG/DL


(8.5-10.1)  L


 


Total Bilirubin


 


 


 


 0.4 MG/DL


(0.2-1.0)


 


Aspartate Amino Transf


(AST/SGOT) 


 


 


 30 U/L (15-37)





 


Alanine Aminotransferase


(ALT/SGPT) 


 


 


 25 U/L (12-78)





 


Alkaline Phosphatase


 


 


 


 117 U/L


()  H


 


Total Protein


 


 


 


 5.0 G/DL


(6.4-8.2)  L


 


Albumin


 


 


 


 1.1 G/DL


(3.4-5.0)  L


 


Globulin    3.9 g/dL  


 


Albumin/Globulin Ratio


 


 


 


 0.3 (1.0-2.7)


L


 


Test


 1/22/21


10:06 


 


 





 


Arterial Blood pH


 7.309


(7.350-7.450) 


 


 





 


Arterial Blood Partial


Pressure CO2 59.7 mmHg


(35.0-45.0)  *H 


 


 





 


Arterial Blood Partial


Pressure O2 115.3 mmHg


(75.0-100.0)  H 


 


 





 


Arterial Blood HCO3


 29.3 mmol/L


(22.0-26.0)  H 


 


 





 


Arterial Blood Oxygen


Saturation 97.6 %


() 


 


 





 


Arterial Blood Base Excess 2.1 (-2-2)  H   


 


Abelardo Test Positive     











Current Medications








 Medications


  (Trade)  Dose


 Ordered  Sig/Julisa


 Route


 PRN Reason  Start Time


 Stop Time Status Last Admin


Dose Admin


 


 Acetaminophen


  (Tylenol)  650 mg  Q4H  PRN


 GT


 Temp >100.5  1/15/21 17:15


 2/14/21 17:14  1/22/21 11:05





 


 Amiodarone HCl


  (Cordarone)  400 mg  EVERY 12  HOURS


 NG


   1/19/21 21:00


 4/19/21 20:59  1/22/21 08:57





 


 Chlorhexidine


 Gluconate


  (Vanessa-Hex 2%)  1 applic  DAILY@2000


 TOPIC


   1/19/21 20:00


 4/19/21 19:59  1/21/21 20:43





 


 Dextrose  1,000 ml @ 


 50 mls/hr  Q20H


 IV


   1/18/21 10:00


 2/17/21 09:59  1/21/21 18:00





 


 Dextrose


  (Dextrose 50%)  25 ml  Q30M  PRN


 IV


 Hypoglycemia  1/9/21 13:30


 4/9/21 13:29   





 


 Dextrose


  (Dextrose 50%)  50 ml  Q30M  PRN


 IV


 Hypoglycemia  1/9/21 13:30


 4/9/21 13:29   





 


 Digoxin


  (Lanoxin)  0.125 mg  DAILY


 NG


   1/21/21 09:00


 4/21/21 08:59  1/22/21 08:57





 


 Diltiazem HCl


  (Cardizem Tab)  30 mg  EVERY 8  HOURS


 NG


   1/19/21 14:00


 2/18/21 13:59  1/20/21 22:00





 


 Docusate Sodium


  (Colace)  100 mg  TIDPRN  PRN


 GT


 Constipation  1/12/21 01:15


 2/11/21 01:14  1/12/21 01:42





 


 Enoxaparin Sodium


  (Lovenox)  80 mg  EVERY 12  HOURS


 SUBQ


   1/2/21 21:00


 4/2/21 20:59  1/21/21 20:44





 


 Insulin Aspart


  (NovoLOG)    Q6HR


 SUBQ


   1/14/21 12:00


 4/9/21 17:59  1/21/21 05:35





 


 Insulin Aspart


  (NovoLOG)  9 units  EVERY 6  HOURS


 SUBQ


   1/21/21 12:00


 4/15/21 06:59   





 


 Insulin Detemir


  (Levemir)  20 units  Q12HR


 SUBQ


   1/14/21 09:00


 4/12/21 08:59  1/21/21 21:11





 


 Midazolam HCl  100 ml @ 2


 mls/hr  Q24H  PRN


 IV


 Agitation  1/15/21 23:00


 1/22/21 22:59  1/21/21 05:48





 


 Norepinephrine


 Bitartrate 8 mg/


 Sodium Chloride  500 ml @ 0


 mls/hr  Q24H  PRN


 IV


 For hypotension  1/21/21 22:30


 1/24/21 22:29  1/22/21 01:31





 


 Pantoprazole


  (Protonix)  40 mg  DAILY


 IVP


   1/14/21 09:00


 2/13/21 08:59  1/22/21 08:57





 


 Piperacillin Sod/


 Tazobactam Sod


 3.375 gm/Sodium


 Chloride  110 ml @ 


 27.5 mls/hr  EVERY 8  HOURS


 IVPB


   1/15/21 14:00


 1/24/21 13:59  1/22/21 06:09





 


 Quetiapine


 Fumarate


  (SEROqueL)  50 mg  Q12HR


 ORAL


   1/4/21 21:00


 2/18/21 20:59  1/22/21 08:57





 


 Sodium Chloride  500 ml @ 


 999 mls/hr  Q31M PRN


 IV


 For hypotension  1/15/21 18:30


 2/14/21 18:29   





 


 Vancomycin HCl  300 ml @ 


 150 mls/hr  Q12H


 IVPB


   1/16/21 11:00


 1/25/21 10:59  1/22/21 11:05





 


 Vancomycin HCl


  ([facility]


 to dose)  1 ea  DAILY  PRN


 MISC


 Per rx protocol  1/14/21 09:15


 2/13/21 09:14   




















Ashley Davis MD      Jan 22, 2021 12:23

## 2021-01-22 NOTE — DIAGNOSTIC IMAGING REPORT
Indication: Shortness of breath

 

Technique: XRAY Chest 1v

 

Comparison: 1/21/2021

 

Findings: Extensive bilateral infiltrates are again seen. Endotracheal tube, enteric

tube, left arm PICC line and right chest tube remaining in place. No appreciable

pneumothorax. No significant pleural effusion. There is some subcutaneous emphysema

which is overall stable compared to the prior exam. Heart size and osseous structures

stable.

 

Impression: 

Extensive bilateral infiltrates overall not significantly changed compared to one day

prior.

 

Support lines/tubes remain in place.

## 2021-01-22 NOTE — NUR
SI: Respiratory failure, COVID-PNA, ETT/Vent support, Chest tube

     T-97.1 (ax), HR 91, RR 14, /68

     AC 16, , PEEP 12, Fio2 100%, O2 sat 97%

     WBC 17.9, BUN 32, Na+ 147, Glucose 34

     cxray bilateral infiltrates unchanged



IS: Lovenox SQ q 12 h

     Levophed GTT

     Versed GTT

     Vancomycin IV q 12 h

     Zosyn IV q 8 h

     Protonix IVP QD

     D5 IV @50ccHr

     Levemir SQ q 12 h

     Lanoxin NG QD

     

     



*****ICU Status****

## 2021-01-22 NOTE — HEMATOLOGY/ONC PROGRESS NOTE
Assessment/Plan


Assessment/Plan


Leukocytosis 2/2 covid pna wbc 15->14-->17


Anemia 2/2 chronic disease, hgb 11-->10.4


Hypercoag disorer now on lovenox sq


Ac resp distress on ventilator


Pneumomediastinum


etoh abused


agitated -halodol


vent





Ngt


cont iv abx and iv decadrone


pulmonary and gi on consult


smear is noted


pneumomediastinum per pulm


dw charge nurse


lovenox sq





Subjective


Allergies:  


Coded Allergies:  


     No Known Allergies (Unverified , 6/11/19)


All Systems:  reviewed and negative except above


Subjective


1/10 on ventilator 60% fio2, on sediation, unresponsive, in icu


1/14 on vent, icu, wbc elev, no bleeding, unresponsive


1/15 on vent, with cash, icu, no bleeding, unresponsive


1/17 on vent, dw rn, no major changes, icu, nv


1/18 nv, labs reviewd, hr is elev, with afib, is onlovenox sq


1/19 remains on vent, sedated with wrist restraints, icu


1/20 on vent, with hematuria, ngt, in icu, labs reviewed


1/21 remains on vent, settings have been adjusted as per icu care


1/22 remains on vent, icu, on levo 6mcg, off versed, labs noted, elmer rn





Objective


Objective





Current Medications








 Medications


  (Trade)  Dose


 Ordered  Sig/Julisa


 Route


 PRN Reason  Start Time


 Stop Time Status Last Admin


Dose Admin


 


 Acetaminophen


  (Tylenol)  650 mg  Q4H  PRN


 GT


 Temp >100.5  1/15/21 17:15


 2/14/21 17:14  1/20/21 22:35





 


 Amiodarone HCl


  (Cordarone)  400 mg  EVERY 12  HOURS


 NG


   1/19/21 21:00


 4/19/21 20:59  1/21/21 20:44





 


 Chlorhexidine


 Gluconate


  (Vanessa-Hex 2%)  1 applic  DAILY@2000


 TOPIC


   1/19/21 20:00


 4/19/21 19:59  1/21/21 20:43





 


 Dextrose  1,000 ml @ 


 50 mls/hr  Q20H


 IV


   1/18/21 10:00


 2/17/21 09:59  1/21/21 18:00





 


 Dextrose


  (Dextrose 50%)  25 ml  Q30M  PRN


 IV


 Hypoglycemia  1/9/21 13:30


 4/9/21 13:29   





 


 Dextrose


  (Dextrose 50%)  50 ml  Q30M  PRN


 IV


 Hypoglycemia  1/9/21 13:30


 4/9/21 13:29   





 


 Digoxin


  (Lanoxin)  0.125 mg  DAILY


 NG


   1/21/21 09:00


 4/21/21 08:59  1/21/21 08:37





 


 Diltiazem HCl


  (Cardizem Tab)  30 mg  EVERY 8  HOURS


 NG


   1/19/21 14:00


 2/18/21 13:59  1/20/21 22:00





 


 Docusate Sodium


  (Colace)  100 mg  TIDPRN  PRN


 GT


 Constipation  1/12/21 01:15


 2/11/21 01:14  1/12/21 01:42





 


 Enoxaparin Sodium


  (Lovenox)  80 mg  EVERY 12  HOURS


 SUBQ


   1/2/21 21:00


 4/2/21 20:59  1/21/21 20:44





 


 Insulin Aspart


  (NovoLOG)    Q6HR


 SUBQ


   1/14/21 12:00


 4/9/21 17:59  1/21/21 05:35





 


 Insulin Aspart


  (NovoLOG)  9 units  EVERY 6  HOURS


 SUBQ


   1/21/21 12:00


 4/15/21 06:59   





 


 Insulin Detemir


  (Levemir)  20 units  Q12HR


 SUBQ


   1/14/21 09:00


 4/12/21 08:59  1/21/21 21:11





 


 Midazolam HCl  100 ml @ 2


 mls/hr  Q24H  PRN


 IV


 Agitation  1/15/21 23:00


 1/22/21 22:59  1/21/21 05:48





 


 Norepinephrine


 Bitartrate 8 mg/


 Sodium Chloride  500 ml @ 0


 mls/hr  Q24H  PRN


 IV


 For hypotension  1/21/21 22:30


 1/24/21 22:29  1/22/21 01:31





 


 Pantoprazole


  (Protonix)  40 mg  DAILY


 IVP


   1/14/21 09:00


 2/13/21 08:59  1/21/21 08:37





 


 Piperacillin Sod/


 Tazobactam Sod


 3.375 gm/Sodium


 Chloride  110 ml @ 


 27.5 mls/hr  EVERY 8  HOURS


 IVPB


   1/15/21 14:00


 1/24/21 13:59  1/22/21 06:09





 


 Quetiapine


 Fumarate


  (SEROqueL)  50 mg  Q12HR


 ORAL


   1/4/21 21:00


 2/18/21 20:59  1/21/21 08:37





 


 Sodium Chloride  500 ml @ 


 999 mls/hr  Q31M PRN


 IV


 For hypotension  1/15/21 18:30


 2/14/21 18:29   





 


 Vancomycin HCl  300 ml @ 


 150 mls/hr  Q12H


 IVPB


   1/16/21 11:00


 1/25/21 10:59  1/21/21 23:06





 


 Vancomycin HCl


  ([facility] pharmacy


 to dose)  1 ea  DAILY  PRN


 MISC


 Per rx protocol  1/14/21 09:15


 2/13/21 09:14   














Last 24 Hour Vital Signs








  Date Time  Temp Pulse Resp B/P (MAP) Pulse Ox O2 Delivery O2 Flow Rate FiO2


 


1/22/21 07:00  91 16 98/58 (71) 100   


 


1/22/21 07:00    98/58    


 


1/22/21 06:00  91 16 93/54 (67) 100   


 


1/22/21 06:00    93/54    


 


1/22/21 06:00  93  88/49    


 


1/22/21 05:00    101/61    


 


1/22/21 05:00  89 17 101/61 (74) 100   


 


1/22/21 04:00 97.1 87 17 97/55 (69) 100   


 


1/22/21 04:00    97/55    


 


1/22/21 04:00        100


 


1/22/21 04:00      Mechanical Ventilator  


 


1/22/21 03:03  96      


 


1/22/21 03:00    109/70    


 


1/22/21 03:00  89 20 109/70 (83) 100   


 


1/22/21 02:15    102/71    


 


1/22/21 02:00  91 16 123/66 (85) 100   


 


1/22/21 02:00    121/70    


 


1/22/21 02:00    102/71    


 


1/22/21 01:31    113/62    


 


1/22/21 01:30    121/70    


 


1/22/21 01:00  94 20 96/52 (67) 100   


 


1/22/21 01:00    93/56    


 


1/22/21 00:00        100


 


1/22/21 00:00    106/63    


 


1/22/21 00:00 97.8 94 29 110/58 (75) 100   


 


1/22/21 00:00      Mechanical Ventilator  


 


1/21/21 23:50  104 22     60


 


1/21/21 23:05  96      


 


1/21/21 23:00  96 19 112/63 (79)    


 


1/21/21 23:00    109/56    


 


1/21/21 22:30    139/67    


 


1/21/21 22:00  100 21 116/59 (78) 99   


 


1/21/21 22:00    109/63    


 


1/21/21 21:11  99  74/40    


 


1/21/21 21:00    105/58    


 


1/21/21 21:00  98 19 132/67 (88) 100   


 


1/21/21 20:45    132/67    


 


1/21/21 20:30    116/63    


 


1/21/21 20:00      Mechanical Ventilator  


 


1/21/21 20:00    116/63    


 


1/21/21 20:00        100


 


1/21/21 20:00 100.1 102 21 119/64 (82) 100   


 


1/21/21 19:30  109 21     60


 


1/21/21 19:19  104      


 


1/21/21 19:00    98/54    


 


1/21/21 19:00  105 21 98/54 (69) 100   


 


1/21/21 18:00    93/53    


 


1/21/21 18:00  112 25 93/53 (66) 97   


 


1/21/21 17:28    90/54    


 


1/21/21 17:00    152/68    


 


1/21/21 17:00  117 26 152/68 (96) 96   


 


1/21/21 16:00        100


 


1/21/21 16:00    91/54    


 


1/21/21 16:00      Mechanical Ventilator  


 


1/21/21 16:00 102.0 122 27 91/54 (66) 87   


 


1/21/21 15:37  119 26     60


 


1/21/21 15:30  121      


 


1/21/21 15:00  119 30 101/56 (71) 92   


 


1/21/21 15:00    105/63    


 


1/21/21 14:55  101      


 


1/21/21 14:00   24   Mechanical Ventilator  100


 


1/21/21 14:00  114 24 60/36 (44) 87   


 


1/21/21 13:50  115  89/55    


 


1/21/21 13:00  117 24 74/51 (59) 88   


 


1/21/21 13:00   24   Endotracheal Tube  100


 


1/21/21 12:00  123 22 81/50 (60) 86   


 


1/21/21 12:00        100


 


1/21/21 12:00    81/50    


 


1/21/21 12:00   22   Mechanical Ventilator  100


 


1/21/21 12:00      Mechanical Ventilator  


 


1/21/21 11:30    78/47    


 


1/21/21 11:00  124 17 90/38 (55) 83   


 


1/21/21 11:00   17   Endotracheal Tube  100


 


1/21/21 10:32  121 24     60


 


1/21/21 10:30   22   Endotracheal Tube  100


 


1/21/21 10:00  122 22 81/57 (65) 81   


 


1/21/21 10:00   22   Endotracheal Tube  100


 


1/21/21 09:00   24   Endotracheal Tube  100


 


1/21/21 09:00  112 24 102/62 (75) 84   


 


1/21/21 08:37  107      


 


1/21/21 08:00        100


 


1/21/21 08:00   31   Endotracheal Tube  100


 


1/21/21 08:00 98.6 103 31 104/71 (82) 88   


 


1/21/21 08:00      Mechanical Ventilator  


 


1/21/21 07:52  101      


 


1/21/21 07:17  111 35     60


 


1/21/21 07:00  87 26 98/52 (67) 93   


 


1/21/21 07:00   26   Mechanical Ventilator  100


 


1/21/21 06:15  94 22 114/57 (76) 90   


 


1/21/21 06:00  93 21 97/60 (72) 91   


 


1/21/21 06:00   29   Mechanical Ventilator  100


 


1/21/21 05:48   28     100


 


1/21/21 05:34  85  89/51    


 


1/21/21 05:30  92 20 105/60 (75) 93   


 


1/21/21 05:00   28   Mechanical Ventilator  100


 


1/21/21 05:00  89 19 97/54 (68) 95   


 


1/21/21 04:45  90 24 102/61 (75) 95   


 


1/21/21 04:30  95 18 105/86 (92) 98   


 


1/21/21 04:15  92 21 105/78 (87) 95   


 


1/21/21 04:00        100


 


1/21/21 04:00   33   Mechanical Ventilator  100


 


1/21/21 04:00      Mechanical Ventilator  


 


1/21/21 04:00 96.6 95 26 135/76 (95) 81   


 


1/21/21 04:00  90      


 


1/21/21 03:30  88 21 132/71 (91) 91   


 


1/21/21 03:00    128/77    


 


1/21/21 03:00  86 17 124/72 (89) 93   


 


1/21/21 02:32  85 22     60


 


1/21/21 02:30  88 20 115/70 (85) 94   


 


1/21/21 02:00  88 16 103/65 (78) 96   


 


1/21/21 02:00    119/77    


 


1/21/21 01:30  84 20 114/67 (83) 92   


 


1/21/21 01:00  83 16 101/62 (75) 96   


 


1/21/21 00:30  84 17 102/61 (75) 97   


 


1/21/21 00:00      Mechanical Ventilator  


 


1/21/21 00:00  89      


 


1/21/21 00:00        60


 


1/21/21 00:00    117/78    


 


1/21/21 00:00   26   Mechanical Ventilator  60


 


1/21/21 00:00 100.6 88 18 99/58 (72) 95   


 


1/20/21 23:30  92 20 92/58 (69) 95   


 


1/20/21 23:13 102.0       


 


1/20/21 23:00    98/61    


 


1/20/21 23:00   26   Mechanical Ventilator  70


 


1/20/21 23:00  92 20 98/57 (71) 96   


 


1/20/21 22:31  91 19     60


 


1/20/21 22:30 102.2 98 19 99/61 (74) 97   


 


1/20/21 22:00  97  98/61    


 


1/20/21 22:00    92/51    


 


1/20/21 22:00   26   Mechanical Ventilator  70


 


1/20/21 22:00  98 19 98/60 (73) 97   


 


1/20/21 21:30  100 22 94/58 (70) 97   


 


1/20/21 21:00    105/57    


 


1/20/21 21:00   20   Mechanical Ventilator  70


 


1/20/21 21:00  97 20 105/57 (73) 96   


 


1/20/21 20:30  99 24 98/62 (74) 96   


 


1/20/21 20:00  99      


 


1/20/21 20:00      Mechanical Ventilator  


 


1/20/21 20:00        70


 


1/20/21 20:00  95 20 91/58 (69) 97   


 


1/20/21 20:00    91/58    


 


1/20/21 20:00   22   Mechanical Ventilator  70


 


1/20/21 19:46    103/62    


 


1/20/21 19:30 98.4 98 22 98/62 (74) 97   


 


1/20/21 19:20  100 24     70


 


1/20/21 19:00    103/62    


 


1/20/21 19:00   23   Mechanical Ventilator  70


 


1/20/21 18:52  102 23 103/62 (76) 94   


 


1/20/21 18:00  102 23 107/62 (77) 93   


 


1/20/21 18:00    107/62    


 


1/20/21 18:00   23   Mechanical Ventilator  70


 


1/20/21 17:00 96.6 94 15 95/54 (68) 98   


 


1/20/21 17:00    102/63    


 


1/20/21 17:00   23   Mechanical Ventilator  70


 


1/20/21 16:00  96      


 


1/20/21 16:00    100/56    


 


1/20/21 16:00   24   Mechanical Ventilator  70


 


1/20/21 16:00        70


 


1/20/21 16:00  91 23 100/56 (71) 97   


 


1/20/21 15:58      Mechanical Ventilator  


 


1/20/21 15:29  97 21     70


 


1/20/21 15:00    98/61    


 


1/20/21 15:00   23   Mechanical Ventilator  70


 


1/20/21 15:00 100.9 99 17 102/68 (79) 97   


 


1/20/21 14:51 100.9       


 


1/20/21 14:21  103  101/65    


 


1/20/21 14:00  103 24 101/65 (77) 97   


 


1/20/21 14:00    104/63    


 


1/20/21 14:00   24   Mechanical Ventilator  70


 


1/20/21 13:00    110/59    


 


1/20/21 13:00   22   Mechanical Ventilator  70


 


1/20/21 13:00 100.6 110 23 104/66 (79) 97   


 


1/20/21 12:00  110 20 103/73 (83) 96   


 


1/20/21 12:00      Mechanical Ventilator  


 


1/20/21 12:00    103/73    


 


1/20/21 12:00   21   Mechanical Ventilator  70


 


1/20/21 12:00  111      


 


1/20/21 11:38  111 21     70


 


1/20/21 11:20        70


 


1/20/21 11:00  111 21 99/57 (71) 97   


 


1/20/21 11:00    102/61    


 


1/20/21 11:00   21   Mechanical Ventilator  70


 


1/20/21 10:00    98/62    


 


1/20/21 10:00   20   Mechanical Ventilator  100


 


1/20/21 10:00  110 21 98/62 (74) 97   


 


1/20/21 09:10  109      


 


1/20/21 09:00    112/68    


 


1/20/21 09:00   20   Mechanical Ventilator  100


 


1/20/21 09:00 99.9 113 20 111/69 (83) 97   


 


1/20/21 08:15        80


 


1/20/21 08:00      Mechanical Ventilator  


 


1/20/21 08:00    108/69    


 


1/20/21 08:00   20   Mechanical Ventilator  100


 


1/20/21 08:00  109      


 


1/20/21 08:00        100


 


1/20/21 07:50 100.2 109 21 114/69 (84) 98   

















Intake and Output  


 


 1/21/21 1/22/21





 19:00 07:00


 


Intake Total 1754.9 ml 1671.81 ml


 


Output Total 585 ml 1380 ml


 


Balance 1169.9 ml 291.81 ml


 


  


 


Free Water 200 ml 60 ml


 


IV Total 1334.9 ml 1491.81 ml


 


Tube Feeding 220 ml 120 ml


 


Output Urine Total 585 ml 980 ml


 


Stool Total  400 ml











Labs








Test


 1/19/21


08:23 1/20/21


07:04 1/20/21


08:06 1/20/21


22:16


 


Arterial Blood pH


 7.410


(7.350-7.450) 


 7.351


(7.350-7.450) 





 


Arterial Blood Partial


Pressure CO2 53.2 mmHg


(35.0-45.0) 


 56.6 mmHg


(35.0-45.0) 





 


Arterial Blood Partial


Pressure O2 67.3 mmHg


(75.0-100.0) 


 94.1 mmHg


(75.0-100.0) 





 


Arterial Blood HCO3


 33.0 mmol/L


(22.0-26.0) 


 30.6 mmol/L


(22.0-26.0) 





 


Arterial Blood Oxygen


Saturation 93.3 %


() 


 96.6 %


() 





 


Arterial Blood Base Excess 6.9 (-2-2)   3.8 (-2-2)  


 


Abelardo Test Positive   Positive  


 


White Blood Count


 


 15.7 K/UL


(4.8-10.8) 


 





 


Red Blood Count


 


 4.04 M/UL


(4.70-6.10) 


 





 


Hemoglobin


 


 11.8 G/DL


(14.2-18.0) 


 





 


Hematocrit


 


 37.9 %


(42.0-52.0) 


 





 


Mean Corpuscular Volume  94 FL (80-99)   


 


Mean Corpuscular Hemoglobin


 


 29.3 PG


(27.0-31.0) 


 





 


Mean Corpuscular Hemoglobin


Concent 


 31.2 G/DL


(32.0-36.0) 


 





 


Red Cell Distribution Width


 


 13.2 %


(11.6-14.8) 


 





 


Platelet Count


 


 156 K/UL


(150-450) 


 





 


Mean Platelet Volume


 


 10.8 FL


(6.5-10.1) 


 





 


Neutrophils (%) (Auto)   % (45.0-75.0)   


 


Lymphocytes (%) (Auto)   % (20.0-45.0)   


 


Monocytes (%) (Auto)   % (1.0-10.0)   


 


Eosinophils (%) (Auto)   % (0.0-3.0)   


 


Basophils (%) (Auto)   % (0.0-2.0)   


 


Differential Total Cells


Counted 


 100 


 


 





 


Neutrophils % (Manual)  94 % (45-75)   


 


Lymphocytes % (Manual)  4 % (20-45)   


 


Monocytes % (Manual)  1 % (1-10)   


 


Eosinophils % (Manual)  1 % (0-3)   


 


Basophils % (Manual)  0 % (0-2)   


 


Band Neutrophils  0 % (0-8)   


 


Platelet Estimate  Adequate   


 


Platelet Morphology  Normal   


 


Hypochromasia  1+   


 


Sodium Level


 


 148 MMOL/L


(136-145) 


 





 


Potassium Level


 


 4.0 MMOL/L


(3.5-5.1) 


 





 


Chloride Level


 


 113 MMOL/L


() 


 





 


Carbon Dioxide Level


 


 32 MMOL/L


(21-32) 


 





 


Anion Gap


 


 3 mmol/L


(5-15) 


 





 


Blood Urea Nitrogen


 


 33 mg/dL


(7-18) 


 





 


Creatinine


 


 0.8 MG/DL


(0.55-1.30) 


 





 


Estimat Glomerular Filtration


Rate 


 > 60 mL/min


(>60) 


 





 


Glucose Level


 


 152 MG/DL


() 


 





 


Calcium Level


 


 8.3 MG/DL


(8.5-10.1) 


 





 


Total Bilirubin


 


 0.5 MG/DL


(0.2-1.0) 


 





 


Aspartate Amino Transf


(AST/SGOT) 


 23 U/L (15-37) 


 


 





 


Alanine Aminotransferase


(ALT/SGPT) 


 17 U/L (12-78) 


 


 





 


Alkaline Phosphatase


 


 76 U/L


() 


 





 


Total Protein


 


 5.4 G/DL


(6.4-8.2) 


 





 


Albumin


 


 1.3 G/DL


(3.4-5.0) 


 





 


Globulin  4.1 g/dL   


 


Albumin/Globulin Ratio  0.3 (1.0-2.7)   


 


Test


 1/21/21


00:07 1/21/21


04:00 1/21/21


05:10 1/21/21


08:18


 


White Blood Count


 


 14.8 K/UL


(4.8-10.8) 


 





 


Red Blood Count


 


 3.89 M/UL


(4.70-6.10) 


 





 


Hemoglobin


 


 11.4 G/DL


(14.2-18.0) 


 





 


Hematocrit


 


 36.6 %


(42.0-52.0) 


 





 


Mean Corpuscular Volume  94 FL (80-99)   


 


Mean Corpuscular Hemoglobin


 


 29.2 PG


(27.0-31.0) 


 





 


Mean Corpuscular Hemoglobin


Concent 


 31.1 G/DL


(32.0-36.0) 


 





 


Red Cell Distribution Width


 


 13.1 %


(11.6-14.8) 


 





 


Platelet Count


 


 161 K/UL


(150-450) 


 





 


Mean Platelet Volume


 


 11.9 FL


(6.5-10.1) 


 





 


Neutrophils (%) (Auto)   % (45.0-75.0)   


 


Lymphocytes (%) (Auto)   % (20.0-45.0)   


 


Monocytes (%) (Auto)   % (1.0-10.0)   


 


Eosinophils (%) (Auto)   % (0.0-3.0)   


 


Basophils (%) (Auto)   % (0.0-2.0)   


 


Differential Total Cells


Counted 


 100 


 


 





 


Neutrophils % (Manual)  88 % (45-75)   


 


Lymphocytes % (Manual)  6 % (20-45)   


 


Monocytes % (Manual)  0 % (1-10)   


 


Eosinophils % (Manual)  4 % (0-3)   


 


Basophils % (Manual)  0 % (0-2)   


 


Band Neutrophils  2 % (0-8)   


 


Platelet Estimate  Adequate   


 


Platelet Morphology  Normal   


 


Red Blood Cell Morphology  Normal   


 


Hypochromasia  1+   


 


Sodium Level


 


 146 MMOL/L


(136-145) 


 





 


Potassium Level


 


 3.2 MMOL/L


(3.5-5.1) 


 





 


Chloride Level


 


 111 MMOL/L


() 


 





 


Carbon Dioxide Level


 


 30 MMOL/L


(21-32) 


 





 


Anion Gap


 


 5 mmol/L


(5-15) 


 





 


Blood Urea Nitrogen


 


 27 mg/dL


(7-18) 


 





 


Creatinine


 


 0.5 MG/DL


(0.55-1.30) 


 





 


Estimat Glomerular Filtration


Rate 


 > 60 mL/min


(>60) 


 





 


Glucose Level


 


 128 MG/DL


() 


 





 


Calcium Level


 


 8.4 MG/DL


(8.5-10.1) 


 





 


Total Bilirubin


 


 0.4 MG/DL


(0.2-1.0) 


 





 


Aspartate Amino Transf


(AST/SGOT) 


 30 U/L (15-37) 


 


 





 


Alanine Aminotransferase


(ALT/SGPT) 


 21 U/L (12-78) 


 


 





 


Alkaline Phosphatase


 


 88 U/L


() 


 





 


Total Protein


 


 5.4 G/DL


(6.4-8.2) 


 





 


Albumin


 


 1.3 G/DL


(3.4-5.0) 


 





 


Globulin  4.1 g/dL   


 


Albumin/Globulin Ratio  0.3 (1.0-2.7)   


 


Test


 1/21/21


08:53 1/21/21


17:22 1/21/21


22:00 1/21/21


22:41


 


Arterial Blood pH


 7.343


(7.350-7.450) 


 


 





 


Arterial Blood Partial


Pressure CO2 62.6 mmHg


(35.0-45.0) 


 


 





 


Arterial Blood Partial


Pressure O2 37.5 mmHg


(75.0-100.0) 


 


 





 


Arterial Blood HCO3


 33.2 mmol/L


(22.0-26.0) 


 


 





 


Arterial Blood Oxygen


Saturation 5.7 % () 


 


 


 





 


Arterial Blood Base Excess 5.7 (-2-2)    


 


Abelardo Test Positive    


 


Vancomycin Level Trough


 


 


 19.2 ug/mL


(5.0-12.0) 





 


POC Whole Blood Glucose


 


 


 


 96 MG/DL


()


 


Test


 1/22/21


04:00 


 


 





 


White Blood Count


 17.9 K/UL


(4.8-10.8) 


 


 





 


Red Blood Count


 3.62 M/UL


(4.70-6.10) 


 


 





 


Hemoglobin


 10.4 G/DL


(14.2-18.0) 


 


 





 


Hematocrit


 33.5 %


(42.0-52.0) 


 


 





 


Mean Corpuscular Volume 93 FL (80-99)    


 


Mean Corpuscular Hemoglobin


 28.9 PG


(27.0-31.0) 


 


 





 


Mean Corpuscular Hemoglobin


Concent 31.1 G/DL


(32.0-36.0) 


 


 





 


Red Cell Distribution Width


 12.9 %


(11.6-14.8) 


 


 





 


Platelet Count


 145 K/UL


(150-450) 


 


 





 


Mean Platelet Volume


 10.9 FL


(6.5-10.1) 


 


 





 


Neutrophils (%) (Auto)  % (45.0-75.0)    


 


Lymphocytes (%) (Auto)  % (20.0-45.0)    


 


Monocytes (%) (Auto)  % (1.0-10.0)    


 


Eosinophils (%) (Auto)  % (0.0-3.0)    


 


Basophils (%) (Auto)  % (0.0-2.0)    


 


Sodium Level


 147 MMOL/L


(136-145) 


 


 





 


Potassium Level


 3.6 MMOL/L


(3.5-5.1) 


 


 





 


Chloride Level


 111 MMOL/L


() 


 


 





 


Carbon Dioxide Level


 29 MMOL/L


(21-32) 


 


 





 


Anion Gap


 7 mmol/L


(5-15) 


 


 





 


Blood Urea Nitrogen


 32 mg/dL


(7-18) 


 


 





 


Creatinine


 0.8 MG/DL


(0.55-1.30) 


 


 





 


Estimat Glomerular Filtration


Rate > 60 mL/min


(>60) 


 


 





 


Glucose Level


 34 MG/DL


() 


 


 





 


Calcium Level


 8.1 MG/DL


(8.5-10.1) 


 


 





 


Total Bilirubin


 0.4 MG/DL


(0.2-1.0) 


 


 





 


Aspartate Amino Transf


(AST/SGOT) 30 U/L (15-37) 


 


 


 





 


Alanine Aminotransferase


(ALT/SGPT) 25 U/L (12-78) 


 


 


 





 


Alkaline Phosphatase


 117 U/L


() 


 


 





 


Total Protein


 5.0 G/DL


(6.4-8.2) 


 


 





 


Albumin


 1.1 G/DL


(3.4-5.0) 


 


 





 


Globulin 3.9 g/dL    


 


Albumin/Globulin Ratio 0.3 (1.0-2.7)    








Height (Feet):  5


Height (Inches):  7.00


Weight (Pounds):  198


Objective


General Appearance:  lethargic, moderate distress


Neck:  supple


Cardiovascular:  regular rhythm


Respiratory/Chest:  crackles/rales, rhonchi - bilaterally vent++


Abdomen:  non tender, soft


Extremities:  non-tender











Emmett Cook MD          Jan 22, 2021 07:40

## 2021-01-23 VITALS — SYSTOLIC BLOOD PRESSURE: 105 MMHG | DIASTOLIC BLOOD PRESSURE: 54 MMHG

## 2021-01-23 VITALS — SYSTOLIC BLOOD PRESSURE: 153 MMHG | DIASTOLIC BLOOD PRESSURE: 76 MMHG

## 2021-01-23 VITALS — SYSTOLIC BLOOD PRESSURE: 132 MMHG | DIASTOLIC BLOOD PRESSURE: 69 MMHG

## 2021-01-23 VITALS — DIASTOLIC BLOOD PRESSURE: 43 MMHG | SYSTOLIC BLOOD PRESSURE: 74 MMHG

## 2021-01-23 VITALS — DIASTOLIC BLOOD PRESSURE: 59 MMHG | SYSTOLIC BLOOD PRESSURE: 110 MMHG

## 2021-01-23 VITALS — DIASTOLIC BLOOD PRESSURE: 60 MMHG | SYSTOLIC BLOOD PRESSURE: 111 MMHG

## 2021-01-23 VITALS — DIASTOLIC BLOOD PRESSURE: 59 MMHG | SYSTOLIC BLOOD PRESSURE: 107 MMHG

## 2021-01-23 VITALS — SYSTOLIC BLOOD PRESSURE: 104 MMHG | DIASTOLIC BLOOD PRESSURE: 51 MMHG

## 2021-01-23 VITALS — SYSTOLIC BLOOD PRESSURE: 101 MMHG | DIASTOLIC BLOOD PRESSURE: 54 MMHG

## 2021-01-23 VITALS — SYSTOLIC BLOOD PRESSURE: 103 MMHG | DIASTOLIC BLOOD PRESSURE: 56 MMHG

## 2021-01-23 VITALS — SYSTOLIC BLOOD PRESSURE: 109 MMHG | DIASTOLIC BLOOD PRESSURE: 60 MMHG

## 2021-01-23 VITALS — DIASTOLIC BLOOD PRESSURE: 67 MMHG | SYSTOLIC BLOOD PRESSURE: 107 MMHG

## 2021-01-23 VITALS — DIASTOLIC BLOOD PRESSURE: 55 MMHG | SYSTOLIC BLOOD PRESSURE: 109 MMHG

## 2021-01-23 VITALS — SYSTOLIC BLOOD PRESSURE: 122 MMHG | DIASTOLIC BLOOD PRESSURE: 63 MMHG

## 2021-01-23 VITALS — DIASTOLIC BLOOD PRESSURE: 53 MMHG | SYSTOLIC BLOOD PRESSURE: 91 MMHG

## 2021-01-23 VITALS — SYSTOLIC BLOOD PRESSURE: 134 MMHG | DIASTOLIC BLOOD PRESSURE: 69 MMHG

## 2021-01-23 VITALS — DIASTOLIC BLOOD PRESSURE: 57 MMHG | SYSTOLIC BLOOD PRESSURE: 89 MMHG

## 2021-01-23 VITALS — SYSTOLIC BLOOD PRESSURE: 153 MMHG | DIASTOLIC BLOOD PRESSURE: 78 MMHG

## 2021-01-23 VITALS — SYSTOLIC BLOOD PRESSURE: 100 MMHG | DIASTOLIC BLOOD PRESSURE: 70 MMHG

## 2021-01-23 VITALS — DIASTOLIC BLOOD PRESSURE: 73 MMHG | SYSTOLIC BLOOD PRESSURE: 129 MMHG

## 2021-01-23 VITALS — SYSTOLIC BLOOD PRESSURE: 102 MMHG | DIASTOLIC BLOOD PRESSURE: 56 MMHG

## 2021-01-23 VITALS — SYSTOLIC BLOOD PRESSURE: 110 MMHG | DIASTOLIC BLOOD PRESSURE: 58 MMHG

## 2021-01-23 VITALS — SYSTOLIC BLOOD PRESSURE: 129 MMHG | DIASTOLIC BLOOD PRESSURE: 66 MMHG

## 2021-01-23 VITALS — SYSTOLIC BLOOD PRESSURE: 107 MMHG | DIASTOLIC BLOOD PRESSURE: 59 MMHG

## 2021-01-23 VITALS — SYSTOLIC BLOOD PRESSURE: 105 MMHG | DIASTOLIC BLOOD PRESSURE: 57 MMHG

## 2021-01-23 VITALS — SYSTOLIC BLOOD PRESSURE: 119 MMHG | DIASTOLIC BLOOD PRESSURE: 77 MMHG

## 2021-01-23 VITALS — DIASTOLIC BLOOD PRESSURE: 61 MMHG | SYSTOLIC BLOOD PRESSURE: 110 MMHG

## 2021-01-23 VITALS — DIASTOLIC BLOOD PRESSURE: 58 MMHG | SYSTOLIC BLOOD PRESSURE: 101 MMHG

## 2021-01-23 VITALS — SYSTOLIC BLOOD PRESSURE: 134 MMHG | DIASTOLIC BLOOD PRESSURE: 75 MMHG

## 2021-01-23 VITALS — DIASTOLIC BLOOD PRESSURE: 64 MMHG | SYSTOLIC BLOOD PRESSURE: 123 MMHG

## 2021-01-23 VITALS — DIASTOLIC BLOOD PRESSURE: 70 MMHG | SYSTOLIC BLOOD PRESSURE: 94 MMHG

## 2021-01-23 VITALS — DIASTOLIC BLOOD PRESSURE: 66 MMHG | SYSTOLIC BLOOD PRESSURE: 112 MMHG

## 2021-01-23 VITALS — SYSTOLIC BLOOD PRESSURE: 108 MMHG | DIASTOLIC BLOOD PRESSURE: 58 MMHG

## 2021-01-23 VITALS — DIASTOLIC BLOOD PRESSURE: 59 MMHG | SYSTOLIC BLOOD PRESSURE: 108 MMHG

## 2021-01-23 VITALS — SYSTOLIC BLOOD PRESSURE: 139 MMHG | DIASTOLIC BLOOD PRESSURE: 71 MMHG

## 2021-01-23 VITALS — DIASTOLIC BLOOD PRESSURE: 73 MMHG | SYSTOLIC BLOOD PRESSURE: 101 MMHG

## 2021-01-23 VITALS — SYSTOLIC BLOOD PRESSURE: 119 MMHG | DIASTOLIC BLOOD PRESSURE: 69 MMHG

## 2021-01-23 VITALS — SYSTOLIC BLOOD PRESSURE: 103 MMHG | DIASTOLIC BLOOD PRESSURE: 75 MMHG

## 2021-01-23 VITALS — SYSTOLIC BLOOD PRESSURE: 73 MMHG | DIASTOLIC BLOOD PRESSURE: 49 MMHG

## 2021-01-23 VITALS — DIASTOLIC BLOOD PRESSURE: 53 MMHG | SYSTOLIC BLOOD PRESSURE: 95 MMHG

## 2021-01-23 VITALS — SYSTOLIC BLOOD PRESSURE: 107 MMHG | DIASTOLIC BLOOD PRESSURE: 67 MMHG

## 2021-01-23 VITALS — DIASTOLIC BLOOD PRESSURE: 59 MMHG | SYSTOLIC BLOOD PRESSURE: 100 MMHG

## 2021-01-23 LAB
ADD MANUAL DIFF: YES
ANION GAP SERPL CALC-SCNC: 7 MMOL/L (ref 5–15)
BUN SERPL-MCNC: 31 MG/DL (ref 7–18)
CALCIUM SERPL-MCNC: 8.2 MG/DL (ref 8.5–10.1)
CHLORIDE SERPL-SCNC: 109 MMOL/L (ref 98–107)
CO2 SERPL-SCNC: 27 MMOL/L (ref 21–32)
CREAT SERPL-MCNC: 1 MG/DL (ref 0.55–1.3)
ERYTHROCYTE [DISTWIDTH] IN BLOOD BY AUTOMATED COUNT: 13.2 % (ref 11.6–14.8)
HCT VFR BLD CALC: 34.4 % (ref 42–52)
HGB BLD-MCNC: 10.9 G/DL (ref 14.2–18)
MCV RBC AUTO: 91 FL (ref 80–99)
PLATELET # BLD: 134 K/UL (ref 150–450)
POTASSIUM SERPL-SCNC: 3.3 MMOL/L (ref 3.5–5.1)
RBC # BLD AUTO: 3.78 M/UL (ref 4.7–6.1)
SODIUM SERPL-SCNC: 143 MMOL/L (ref 136–145)
WBC # BLD AUTO: 5.9 K/UL (ref 4.8–10.8)

## 2021-01-23 RX ADMIN — INSULIN ASPART SCH UNITS: 100 INJECTION, SOLUTION INTRAVENOUS; SUBCUTANEOUS at 18:00

## 2021-01-23 RX ADMIN — SODIUM CHLORIDE PRN MLS/HR: 900 INJECTION, SOLUTION INTRAVENOUS at 23:29

## 2021-01-23 RX ADMIN — INSULIN ASPART SCH UNITS: 100 INJECTION, SOLUTION INTRAVENOUS; SUBCUTANEOUS at 00:00

## 2021-01-23 RX ADMIN — SODIUM CHLORIDE PRN MLS/HR: 900 INJECTION, SOLUTION INTRAVENOUS at 19:26

## 2021-01-23 RX ADMIN — DIGOXIN SCH MG: 0.12 TABLET ORAL at 08:55

## 2021-01-23 RX ADMIN — INSULIN ASPART SCH UNITS: 100 INJECTION, SOLUTION INTRAVENOUS; SUBCUTANEOUS at 06:08

## 2021-01-23 RX ADMIN — INSULIN ASPART SCH UNITS: 100 INJECTION, SOLUTION INTRAVENOUS; SUBCUTANEOUS at 12:00

## 2021-01-23 RX ADMIN — DILTIAZEM HYDROCHLORIDE SCH MG: 60 CAPSULE, EXTENDED RELEASE ORAL at 05:38

## 2021-01-23 RX ADMIN — INSULIN DETEMIR SCH UNITS: 100 INJECTION, SOLUTION SUBCUTANEOUS at 20:42

## 2021-01-23 RX ADMIN — AMIODARONE HYDROCHLORIDE SCH MG: 200 TABLET ORAL at 20:42

## 2021-01-23 RX ADMIN — Medication SCH MLS/HR: at 23:04

## 2021-01-23 RX ADMIN — SODIUM CHLORIDE PRN MLS/HR: 900 INJECTION, SOLUTION INTRAVENOUS at 09:43

## 2021-01-23 RX ADMIN — DEXTROSE MONOHYDRATE SCH MLS/HR: 50 INJECTION, SOLUTION INTRAVENOUS at 05:36

## 2021-01-23 RX ADMIN — AMIODARONE HYDROCHLORIDE SCH MG: 200 TABLET ORAL at 08:54

## 2021-01-23 RX ADMIN — INSULIN ASPART SCH UNITS: 100 INJECTION, SOLUTION INTRAVENOUS; SUBCUTANEOUS at 23:28

## 2021-01-23 RX ADMIN — DEXTROSE MONOHYDRATE SCH MLS/HR: 50 INJECTION, SOLUTION INTRAVENOUS at 20:43

## 2021-01-23 RX ADMIN — PANTOPRAZOLE SODIUM SCH MG: 40 INJECTION, POWDER, FOR SOLUTION INTRAVENOUS at 08:54

## 2021-01-23 RX ADMIN — CHLORHEXIDINE GLUCONATE SCH APPLIC: 213 SOLUTION TOPICAL at 20:00

## 2021-01-23 RX ADMIN — ENOXAPARIN SODIUM SCH MG: 80 INJECTION SUBCUTANEOUS at 08:53

## 2021-01-23 RX ADMIN — INSULIN DETEMIR SCH UNITS: 100 INJECTION, SOLUTION SUBCUTANEOUS at 09:00

## 2021-01-23 RX ADMIN — DEXTROSE MONOHYDRATE SCH MLS/HR: 50 INJECTION, SOLUTION INTRAVENOUS at 14:25

## 2021-01-23 RX ADMIN — Medication SCH MLS/HR: at 11:17

## 2021-01-23 RX ADMIN — ENOXAPARIN SODIUM SCH MG: 80 INJECTION SUBCUTANEOUS at 20:43

## 2021-01-23 RX ADMIN — SODIUM CHLORIDE PRN MLS/HR: 900 INJECTION, SOLUTION INTRAVENOUS at 01:43

## 2021-01-23 RX ADMIN — INSULIN ASPART SCH UNITS: 100 INJECTION, SOLUTION INTRAVENOUS; SUBCUTANEOUS at 06:00

## 2021-01-23 NOTE — CARDIAC ELECTROPHYSIOLOGY PN
Assessment/Plan


Assessment/Plan


1. Atrial fibrillation with rapid ventricular response.      


    On digoxin 0.125 mg p.o. daily and Amiodarone 400 po q12 


     DC Cardizem in view of hypotension in need of high dose of Levophed.


      In SR. Digoxin level 0.6.   





2. Hypernatremia with sodium 151 with azotemia, BUN of 30, creatinine 0.8.   On 

iv fluids





3. Right pneumothorax, status post chest tube with subcutaneous emphysema.





4. COVID-19 pneumonia, 100% FiO2 and PEEP of 12, on remdesivir and Solu-Medrol 

per ID.


5. Diabetes.


6. Hypotension due to dehydration and sepsis. On iv fluid and Levophed 30 Mcg


7. Hypernatremia and azotemia.  The patient is on D5W of 50 mL an hour.





GERALDINE RN





Subjective


Subjective


In ICU on 100% Fio2 and PEEP 12 and Levo increased to 30 Mcg 


 Right chest tube had minimal drainage. On Dig and Amiodarone





Objective





Last 24 Hour Vital Signs








  Date Time  Temp Pulse Resp B/P (MAP) Pulse Ox O2 Delivery O2 Flow Rate FiO2


 


1/23/21 10:00  98 17 95/53 (67) 95   


 


1/23/21 09:43    66/38    


 


1/23/21 09:00  100 20 139/71 (93) 95   


 


1/23/21 08:55  100      


 


1/23/21 08:00        100


 


1/23/21 08:00  99 21 129/73 (91) 94   


 


1/23/21 08:00      Mechanical Ventilator  


 


1/23/21 07:00  96 20 119/77 (91) 94   


 


1/23/21 06:00    99/76    


 


1/23/21 06:00  105 21 103/75 (84) 86   


 


1/23/21 05:38  110  107/57    


 


1/23/21 05:30  107 25 107/67 (80) 82   


 


1/23/21 05:00    100/75    


 


1/23/21 05:00   20   Mechanical Ventilator  100


 


1/23/21 05:00  105 24 101/73 (82) 0   


 


1/23/21 04:30  107 24 100/70 (80) 66   


 


1/23/21 04:00 97.6 105 20 107/67 (80) 73   


 


1/23/21 04:00      Mechanical Ventilator  


 


1/23/21 04:00    100/79    


 


1/23/21 04:00   28   Mechanical Ventilator  100


 


1/23/21 04:00  104      


 


1/23/21 04:00        100


 


1/23/21 03:30  104 24 74/43 (53) 68   


 


1/23/21 03:15  106 22     60


 


1/23/21 03:00    109/74    


 


1/23/21 03:00   26   Mechanical Ventilator  100


 


1/23/21 03:00  106 21 112/66 (81) 50   


 


1/23/21 02:30  104 24 102/56 (71) 47   


 


1/23/21 02:15  104 22 104/51 (68) 42   


 


1/23/21 02:00  104 19 110/59 (76) 53   


 


1/23/21 02:00    106/50    


 


1/23/21 02:00   28   Mechanical Ventilator  100


 


1/23/21 01:45  105 21 100/59 (73) 34   


 


1/23/21 01:43    101/50    


 


1/23/21 01:30  105 22 101/54 (70)    


 


1/23/21 01:15  105 18 101/58 (72) 51   


 


1/23/21 01:00    108/50    


 


1/23/21 01:00    101/58    


 


1/23/21 01:00   26   Mechanical Ventilator  100


 


1/23/21 01:00  105 19 105/54 (71) 54   


 


1/23/21 00:45  107 20 109/55 (73)    


 


1/23/21 00:30  107 19 107/59 (75) 53   


 


1/23/21 00:15  99 18 89/57 (68) 75   


 


1/23/21 00:00  101 13 73/49 (57) 56   


 


1/23/21 00:00 97.0 101 13 73/49 (57) 56   


 


1/23/21 00:00      Mechanical Ventilator  


 


1/23/21 00:00    89/48    


 


1/23/21 00:00   30   Mechanical Ventilator  100


 


1/23/21 00:00        100


 


1/23/21 00:00  109      


 


1/22/21 23:39  102 22     60


 


1/22/21 23:00    83/58    


 


1/22/21 23:00   23   Mechanical Ventilator  100


 


1/22/21 23:00  112 18 86/58 (67)    


 


1/22/21 22:30  111 21 106/56 (73)    


 


1/22/21 22:00    91/62    


 


1/22/21 22:00   26   Mechanical Ventilator  100


 


1/22/21 22:00  121 20 91/62 (72) 76   


 


1/22/21 21:30  125 21 117/63 (81) 72   


 


1/22/21 21:15   30   Mechanical Ventilator  100


 


1/22/21 21:00    100/64    


 


1/22/21 21:00  129 40 100/64 (76) 85   


 


1/22/21 20:36  84  94/45    


 


1/22/21 20:30  123 34 95/67 (76) 84   


 


1/22/21 20:15   31   Mechanical Ventilator  100


 


1/22/21 20:00        100


 


1/22/21 20:00  123 28 103/72 (82) 83   


 


1/22/21 20:00      Mechanical Ventilator  


 


1/22/21 20:00    103/72    


 


1/22/21 20:00  122      


 


1/22/21 19:31    87/64    


 


1/22/21 19:30  118 27     60


 


1/22/21 19:15   24   Mechanical Ventilator  100


 


1/22/21 19:00    106/68    


 


1/22/21 19:00  118 24 106/68 (81) 83   


 


1/22/21 18:15   21   Mechanical Ventilator  100


 


1/22/21 18:00  115 23 90/50 (63) 79   


 


1/22/21 18:00    91/70    


 


1/22/21 18:00   20   Mechanical Ventilator  100


 


1/22/21 17:45   20   Mechanical Ventilator  100


 


1/22/21 17:30   21   Mechanical Ventilator  100


 


1/22/21 17:14   21   Endotracheal Tube  100


 


1/22/21 17:00  105 20 100/76 (84) 79   


 


1/22/21 17:00    100/76    


 


1/22/21 16:00  94 17 104/70 (81) 91   


 


1/22/21 16:00  118      


 


1/22/21 16:00    107/63    


 


1/22/21 16:00        100


 


1/22/21 16:00      Mechanical Ventilator  


 


1/22/21 15:40  110 21     60


 


1/22/21 15:00 97.1 91 14 118/68 (85) 97   


 


1/22/21 15:00    109/69    


 


1/22/21 14:19  94  129/74    


 


1/22/21 14:00    108/66    


 


1/22/21 14:00  90 14 117/72 (87) 99   


 


1/22/21 13:00  84 17 102/66 (78) 99   


 


1/22/21 13:00    102/64    


 


1/22/21 12:00      Mechanical Ventilator  


 


1/22/21 12:00  96      


 


1/22/21 12:00        100


 


1/22/21 12:00    109/67    


 


1/22/21 12:00 100.8 108 30 109/67 (81) 100   


 


1/22/21 11:59  108      


 


1/22/21 11:53 99.0       


 


1/22/21 11:40  107 19     60


 


1/22/21 11:00  103 18 107/59 (75) 99   


 


1/22/21 11:00    107/59    

















Intake and Output  


 


 1/22/21 1/23/21





 19:00 07:00


 


Intake Total 1600.0 ml 2305.54 ml


 


Output Total 840 ml 390 ml


 


Balance 760.0 ml 1915.54 ml


 


  


 


Free Water 100 ml 


 


IV Total 1030.0 ml 2085.54 ml


 


Tube Feeding 470 ml 220 ml


 


Output Urine Total 780 ml 390 ml


 


Chest Tube Drainage Total 60 ml 











Laboratory Tests








Test


 1/23/21


05:45 1/23/21


08:15


 


White Blood Count


 5.9 K/UL


(4.8-10.8)  # 





 


Red Blood Count


 3.78 M/UL


(4.70-6.10)  L 





 


Hemoglobin


 10.9 G/DL


(14.2-18.0)  L 





 


Hematocrit


 34.4 %


(42.0-52.0)  L 





 


Mean Corpuscular Volume 91 FL (80-99)   


 


Mean Corpuscular Hemoglobin


 28.9 PG


(27.0-31.0) 





 


Mean Corpuscular Hemoglobin


Concent 31.8 G/DL


(32.0-36.0)  L 





 


Red Cell Distribution Width


 13.2 %


(11.6-14.8) 





 


Platelet Count


 134 K/UL


(150-450)  L 





 


Mean Platelet Volume


 12.9 FL


(6.5-10.1)  H 





 


Neutrophils (%) (Auto)


 % (45.0-75.0)


 





 


Lymphocytes (%) (Auto)


 % (20.0-45.0)


 





 


Monocytes (%) (Auto)  % (1.0-10.0)   


 


Eosinophils (%) (Auto)  % (0.0-3.0)   


 


Basophils (%) (Auto)  % (0.0-2.0)   


 


Differential Total Cells


Counted 100  


 





 


Neutrophils % (Manual) 81 % (45-75)  H 


 


Lymphocytes % (Manual) 4 % (20-45)  L 


 


Monocytes % (Manual) 2 % (1-10)   


 


Eosinophils % (Manual) 0 % (0-3)   


 


Basophils % (Manual) 0 % (0-2)   


 


Band Neutrophils 13 % (0-8)  H 


 


Platelet Estimate Decreased  L 


 


Platelet Morphology Normal   


 


Hypochromasia 1+   


 


Anisocytosis 1+   


 


Sodium Level


 143 MMOL/L


(136-145) 





 


Potassium Level


 3.3 MMOL/L


(3.5-5.1)  L 





 


Chloride Level


 109 MMOL/L


()  H 





 


Carbon Dioxide Level


 27 MMOL/L


(21-32) 





 


Anion Gap


 7 mmol/L


(5-15) 





 


Blood Urea Nitrogen


 31 mg/dL


(7-18)  H 





 


Creatinine


 1.0 MG/DL


(0.55-1.30) 





 


Estimat Glomerular Filtration


Rate > 60 mL/min


(>60) 





 


Glucose Level


 155 MG/DL


()  #H 





 


Calcium Level


 8.2 MG/DL


(8.5-10.1)  L 





 


Arterial Blood pH


 


 7.249


(7.350-7.450)


 


Arterial Blood Partial


Pressure CO2 


 61.1 mmHg


(35.0-45.0)  *H


 


Arterial Blood Partial


Pressure O2 


 46.8 mmHg


(75.0-100.0)


 


Arterial Blood HCO3


 


 26.1 mmol/L


(22.0-26.0)  H


 


Arterial Blood Oxygen


Saturation 


 81.3 %


()  *L


 


Arterial Blood Base Excess  -1.9 (-2-2)  


 


Abelardo Test  Positive  








Objective


HEENT: No JVD. Orally intubated


LUNGS:  Have decreased breath sounds with the chest tube on the right side


and subcutaneous emphysema.


CARDIOVASCULAR:  Regular S1, S2 with


no gallop.


ABDOMEN:  Soft.


EXTREMITIES:  A 1+ pitting edema.











Jake George MD               Jan 23, 2021 10:56

## 2021-01-23 NOTE — SURGERY PROGRESS NOTE
Surgery Progress Note


Subjective


Procedure Performed


Right tube thoracostomy chest tube insertion


Additional Comments


ill appearing


no n/v


labs noted


exam unchanged


ct stable3





Objective





Last 24 Hour Vital Signs








  Date Time  Temp Pulse Resp B/P (MAP) Pulse Ox O2 Delivery O2 Flow Rate FiO2


 


1/23/21 17:00  100 20 122/63 (82) 80   


 


1/23/21 16:00 97.2 99 19 123/64 (83) 90   


 


1/23/21 16:00  134      


 


1/23/21 15:16  100 18     60


 


1/23/21 15:00  97 18 108/59 (75) 84   


 


1/23/21 14:00  94 30 153/76 (101) 83   


 


1/23/21 13:00  93 18 134/69 (90) 89   


 


1/23/21 12:00  88      


 


1/23/21 12:00 96.2 91 15 153/78 (103) 75   


 


1/23/21 12:00      Mechanical Ventilator  


 


1/23/21 12:00        100


 


1/23/21 11:00  98 16     60


 


1/23/21 11:00  99 15 119/69 (86) 89   


 


1/23/21 10:00  98 17 95/53 (67) 95   


 


1/23/21 09:43    66/38    


 


1/23/21 09:00  100 20 139/71 (93) 95   


 


1/23/21 08:55  100      


 


1/23/21 08:00        100


 


1/23/21 08:00  99 21 129/73 (91) 94   


 


1/23/21 08:00      Mechanical Ventilator  


 


1/23/21 07:10  92 24     60


 


1/23/21 07:00  96 20 119/77 (91) 94   


 


1/23/21 06:00    99/76    


 


1/23/21 06:00  105 21 103/75 (84) 86   


 


1/23/21 05:38  110  107/57    


 


1/23/21 05:30  107 25 107/67 (80) 82   


 


1/23/21 05:00    100/75    


 


1/23/21 05:00   20   Mechanical Ventilator  100


 


1/23/21 05:00  105 24 101/73 (82) 0   


 


1/23/21 04:30  107 24 100/70 (80) 66   


 


1/23/21 04:00 97.6 105 20 107/67 (80) 73   


 


1/23/21 04:00      Mechanical Ventilator  


 


1/23/21 04:00    100/79    


 


1/23/21 04:00   28   Mechanical Ventilator  100


 


1/23/21 04:00  104      


 


1/23/21 04:00        100


 


1/23/21 03:30  104 24 74/43 (53) 68   


 


1/23/21 03:15  106 22     60


 


1/23/21 03:00    109/74    


 


1/23/21 03:00   26   Mechanical Ventilator  100


 


1/23/21 03:00  106 21 112/66 (81) 50   


 


1/23/21 02:30  104 24 102/56 (71) 47   


 


1/23/21 02:15  104 22 104/51 (68) 42   


 


1/23/21 02:00  104 19 110/59 (76) 53   


 


1/23/21 02:00    106/50    


 


1/23/21 02:00   28   Mechanical Ventilator  100


 


1/23/21 01:45  105 21 100/59 (73) 34   


 


1/23/21 01:43    101/50    


 


1/23/21 01:30  105 22 101/54 (70)    


 


1/23/21 01:15  105 18 101/58 (72) 51   


 


1/23/21 01:00    108/50    


 


1/23/21 01:00    101/58    


 


1/23/21 01:00   26   Mechanical Ventilator  100


 


1/23/21 01:00  105 19 105/54 (71) 54   


 


1/23/21 00:45  107 20 109/55 (73)    


 


1/23/21 00:30  107 19 107/59 (75) 53   


 


1/23/21 00:15  99 18 89/57 (68) 75   


 


1/23/21 00:00  101 13 73/49 (57) 56   


 


1/23/21 00:00 97.0 101 13 73/49 (57) 56   


 


1/23/21 00:00      Mechanical Ventilator  


 


1/23/21 00:00    89/48    


 


1/23/21 00:00   30   Mechanical Ventilator  100


 


1/23/21 00:00        100


 


1/23/21 00:00  109      


 


1/22/21 23:39  102 22     60


 


1/22/21 23:00    83/58    


 


1/22/21 23:00   23   Mechanical Ventilator  100


 


1/22/21 23:00  112 18 86/58 (67)    


 


1/22/21 22:30  111 21 106/56 (73)    


 


1/22/21 22:00    91/62    


 


1/22/21 22:00   26   Mechanical Ventilator  100


 


1/22/21 22:00  121 20 91/62 (72) 76   


 


1/22/21 21:30  125 21 117/63 (81) 72   


 


1/22/21 21:15   30   Mechanical Ventilator  100


 


1/22/21 21:00    100/64    


 


1/22/21 21:00  129 40 100/64 (76) 85   


 


1/22/21 20:36  84  94/45    


 


1/22/21 20:30  123 34 95/67 (76) 84   


 


1/22/21 20:15   31   Mechanical Ventilator  100


 


1/22/21 20:00        100


 


1/22/21 20:00  123 28 103/72 (82) 83   


 


1/22/21 20:00      Mechanical Ventilator  


 


1/22/21 20:00    103/72    


 


1/22/21 20:00  122      


 


1/22/21 19:31    87/64    


 


1/22/21 19:30  118 27     60


 


1/22/21 19:15   24   Mechanical Ventilator  100


 


1/22/21 19:00    106/68    


 


1/22/21 19:00  118 24 106/68 (81) 83   


 


1/22/21 18:15   21   Mechanical Ventilator  100


 


1/22/21 18:00  115 23 90/50 (63) 79   


 


1/22/21 18:00    91/70    


 


1/22/21 18:00   20   Mechanical Ventilator  100


 


1/22/21 17:45   20   Mechanical Ventilator  100


 


1/22/21 17:30   21   Mechanical Ventilator  100








I&O











Intake and Output  


 


 1/22/21 1/23/21





 19:00 07:00


 


Intake Total 1600.0 ml 2305.54 ml


 


Output Total 840 ml 390 ml


 


Balance 760.0 ml 1915.54 ml


 


  


 


Free Water 100 ml 


 


IV Total 1030.0 ml 2085.54 ml


 


Tube Feeding 470 ml 220 ml


 


Output Urine Total 780 ml 390 ml


 


Chest Tube Drainage Total 60 ml 








Dressing:  saturated


Cardiovascular:  RSR


Respiratory:  decreased breath sounds


Abdomen:  soft, non-tender, non-distended, decreased bowel sounds


Extremities:  edema, no cyanosis





Laboratory Tests








Test


 1/23/21


05:45 1/23/21


08:15


 


White Blood Count


 5.9 K/UL


(4.8-10.8)  # 





 


Red Blood Count


 3.78 M/UL


(4.70-6.10)  L 





 


Hemoglobin


 10.9 G/DL


(14.2-18.0)  L 





 


Hematocrit


 34.4 %


(42.0-52.0)  L 





 


Mean Corpuscular Volume 91 FL (80-99)   


 


Mean Corpuscular Hemoglobin


 28.9 PG


(27.0-31.0) 





 


Mean Corpuscular Hemoglobin


Concent 31.8 G/DL


(32.0-36.0)  L 





 


Red Cell Distribution Width


 13.2 %


(11.6-14.8) 





 


Platelet Count


 134 K/UL


(150-450)  L 





 


Mean Platelet Volume


 12.9 FL


(6.5-10.1)  H 





 


Neutrophils (%) (Auto)


 % (45.0-75.0)


 





 


Lymphocytes (%) (Auto)


 % (20.0-45.0)


 





 


Monocytes (%) (Auto)  % (1.0-10.0)   


 


Eosinophils (%) (Auto)  % (0.0-3.0)   


 


Basophils (%) (Auto)  % (0.0-2.0)   


 


Differential Total Cells


Counted 100  


 





 


Neutrophils % (Manual) 81 % (45-75)  H 


 


Lymphocytes % (Manual) 4 % (20-45)  L 


 


Monocytes % (Manual) 2 % (1-10)   


 


Eosinophils % (Manual) 0 % (0-3)   


 


Basophils % (Manual) 0 % (0-2)   


 


Band Neutrophils 13 % (0-8)  H 


 


Platelet Estimate Decreased  L 


 


Platelet Morphology Normal   


 


Hypochromasia 1+   


 


Anisocytosis 1+   


 


Sodium Level


 143 MMOL/L


(136-145) 





 


Potassium Level


 3.3 MMOL/L


(3.5-5.1)  L 





 


Chloride Level


 109 MMOL/L


()  H 





 


Carbon Dioxide Level


 27 MMOL/L


(21-32) 





 


Anion Gap


 7 mmol/L


(5-15) 





 


Blood Urea Nitrogen


 31 mg/dL


(7-18)  H 





 


Creatinine


 1.0 MG/DL


(0.55-1.30) 





 


Estimat Glomerular Filtration


Rate > 60 mL/min


(>60) 





 


Glucose Level


 155 MG/DL


()  #H 





 


Calcium Level


 8.2 MG/DL


(8.5-10.1)  L 





 


Arterial Blood pH


 


 7.249


(7.350-7.450)


 


Arterial Blood Partial


Pressure CO2 


 61.1 mmHg


(35.0-45.0)  *H


 


Arterial Blood Partial


Pressure O2 


 46.8 mmHg


(75.0-100.0)


 


Arterial Blood HCO3


 


 26.1 mmol/L


(22.0-26.0)  H


 


Arterial Blood Oxygen


Saturation 


 81.3 %


()  *L


 


Arterial Blood Base Excess  -1.9 (-2-2)  


 


Abelardo Test  Positive  











Plan


Problems:  


(1) Pneumonia due to COVID-19 virus


Assessment & Plan:  Interim endotracheal intubation, endotracheal tube tip in 

good position


approximately 4 cm above the tito. There are extensive bilateral infiltrates, 

which


are markedly increased from the prior study. Pleural spaces are probably clear, 

not


well demonstrated


Markedly increased and now extensive bilateral infiltrates 


cont as per pulm and iID





(2) Hypoxia


Assessment & Plan:  patient developing DTI. in current condition, critically ill

and declining potential inevitable decline 


all care precautions taken and will cont to monitor and assist with improvement 





(3) Abdominal pain


Assessment & Plan:  65M abd pain, septic, covid, intubated ons upport


currently abd exam benign but limited given condition


line okfaith mary noted


okay for meds and feeds


nutritional optimization 


DAILY ESTIMATED NEEDS:


Needs based on Critical Care/ 72kg abw 


22-28  kcals/kg 


9529-3339  total kcals


1.2-2  g protein/kg


  g total protein 


25-30  mL/kg


1624-7289  total fluid mLs





NUTRITION DIAGNOSIS:


Swallowing difficulty R/T respiratory failure as evidenced by pt now


orally intubated, OGT in place, NPO





 


CURRENT TF:NPO 





 





ENTERAL NUTRITION RECOMMENDATIONS:


Vital AF 1.2 @ 60ml/hr x 24 hrs  to provide 1440ml, 1728kcal, 116g prot, 1167ml 

free water 





* As medically appropriate, initiate critical care and carb controlled TF 

formula of Vital AF 1.2


* Initiate @ 20ml/hr x 6hrs, advance 10ml q 4-6 hrs as tolerated to goal rate


* HOB over 30 degrees/ water flush per MD


* Does not exceed est kcal needs w/ Propofol running @ 8.083ml/hr x 24 hrs 

(provides 213 lipid kcal)





 





ADDITIONAL RECOMMENDATIONS:


* Calibrated bedscale wt 


* Monitor BGs closely w/ Decadron and TF, need for long acting insulin 


* Monitor lytes, replete as needed  


* Monitor Propofol rate, need to adjust TF rate  





(4) Acute metabolic encephalopathy


(5) Pneumothorax on right


Assessment & Plan:  right ptx


chest tube placed


decreased air


stable ptx


cont tube to suction


Endotracheal tube tip projects at the level of the thoracic inlet.


Orogastric tube tip projects at the level of the gastric fundus. Again 

demonstrated


is extensive subcutaneous emphysema, which appears somewhat worse on the left. 

Right


chest tube remains in place. Small right medial and apical pneumothorax are 

present,


slightly more conspicuous than on the previous exam, still small, somewhat 

difficult


to identify due to overlying subcutaneous emphysema. There is probably a tiny 

left


apical pneumothorax as well. Previously demonstrated pneumomediastinum has 

improved


considerably although still present. Bilateral infiltrates appear worse on the 

right.


 


Impression: Equivocally slightly increased but still small right pneumothorax.


 


Suspect tiny left apical pneumothorax


 


Improving pneumomediastinum


 


Worsening subcutaneous emphysema


 


Worsening right and stable left lung infiltrates 





 Right chest tube remains. There is still a small apical pneumothorax. Tiny


left apical pneumothorax persists, unchanged. There is decreased pneumomediasti

num.


Subcutaneous gas has decreased as well. Bilateral infiltrates are unchanged.


Endotracheal tube remains at the level of the thoracic inlet. Orogastric tube is


again demonstrated, tip not well-visualized but probably stable in position


 


Impression: Stable tiny bilateral apical pneumothoraces


 


Unchanged bilateral infiltrates


 


Decreased pneumomediastinum and subcutaneous emphysema














Norberto Vazquez                Jan 23, 2021 17:16

## 2021-01-23 NOTE — NUR
NURSE HAND-OFF REPORT: 



Latest Vital Signs: Temperature 97.6 , Pulse 96 , B/P 119 /77 , Respiratory Rate 20 , O2 SAT 
94 , Mechanical Ventilator, O2 Flow Rate  .  

Vital Sign Comment: 



EKG Rhythm: Sinus Tachycardia

Rhythm change?: N 

MD Notified?: N -Dr. Thaddeus GUTIERREZ Response: 



Latest Singh Fall Score: 50  

Fall Risk: High Risk 

Safety Measures: Call light Within Reach, Bed Alarm Zone 1, Side Rails Side Rails x3, Bed 
position Low and Locked.

Fall Precautions: 

Door Sign



Report given to mz rn using sbad.

## 2021-01-23 NOTE — DIAGNOSTIC IMAGING REPORT
EXAM:

  XR Chest, 1 View

 

CLINICAL HISTORY:

  F/U

 

TECHNIQUE:

  Frontal view of the chest.

 

COMPARISON:

Chest radiograph January 22, 2021.  

 

FINDINGS/IMPRESSION:

Endotracheal tube terminates 6.4 cm above the tito.  

Feeding tube terminates in the stomach.

Right-sided chest tube.

 

Patchy bilateral airspace consolidations, consistent with multifocal 

infiltrate, which are mildly improved when compared to previous study.  

Follow-up chest radiograph recommended.

 

Otherwise, stable exam, including cardiomegaly.

## 2021-01-23 NOTE — NUR
NURSE NOTES:

Glucose noted to be 175, Levemir dose given. Patient is afebrile at this time 97.7 (ax). 
Pressors being tapered down accordingly,  HR right now is 110 ST. Chest tube remains in 
place and is patent, chest tube safety checks performed. FiO2 at 100% with saturations at 
90%. Patient grimaces with painful stimuli, upper extremities 1/5, lower extremities 0/5. 

Feeds restarted. Will continue to monitor.

## 2021-01-23 NOTE — PULMONOLOGY PROGRESS NOTE
Subjective


ROS Limited/Unobtainable:  Yes


Interval Events:  Noted sub cut emphsema  On Versed drip. R CT placed


Constitutional:  Reports: fever, other - last night Pq=716.7


HEENT:  Repors: no symptoms


Respiratory:  Reports: shortness of breath - better


Cardiovascular:  Reports: no symptoms


Gastrointestinal/Abdominal:  Reports: no symptoms


Allergies:  


Coded Allergies:  


     No Known Allergies (Unverified , 6/11/19)


All Systems:  reviewed and negative except above





Objective





Last 24 Hour Vital Signs








  Date Time  Temp Pulse Resp B/P (MAP) Pulse Ox O2 Delivery O2 Flow Rate FiO2


 


1/23/21 08:55  100      


 


1/23/21 07:00  96 20 119/77 (91) 94   


 


1/23/21 06:00    99/76    


 


1/23/21 06:00  105 21 103/75 (84) 86   


 


1/23/21 05:38  110  107/57    


 


1/23/21 05:30  107 25 107/67 (80) 82   


 


1/23/21 05:00    100/75    


 


1/23/21 05:00   20   Mechanical Ventilator  100


 


1/23/21 05:00  105 24 101/73 (82) 0   


 


1/23/21 04:30  107 24 100/70 (80) 66   


 


1/23/21 04:00 97.6 105 20 107/67 (80) 73   


 


1/23/21 04:00      Mechanical Ventilator  


 


1/23/21 04:00    100/79    


 


1/23/21 04:00   28   Mechanical Ventilator  100


 


1/23/21 04:00  104      


 


1/23/21 04:00        100


 


1/23/21 03:30  104 24 74/43 (53) 68   


 


1/23/21 03:15  106 22     60


 


1/23/21 03:00    109/74    


 


1/23/21 03:00   26   Mechanical Ventilator  100


 


1/23/21 03:00  106 21 112/66 (81) 50   


 


1/23/21 02:30  104 24 102/56 (71) 47   


 


1/23/21 02:15  104 22 104/51 (68) 42   


 


1/23/21 02:00  104 19 110/59 (76) 53   


 


1/23/21 02:00    106/50    


 


1/23/21 02:00   28   Mechanical Ventilator  100


 


1/23/21 01:45  105 21 100/59 (73) 34   


 


1/23/21 01:43    101/50    


 


1/23/21 01:30  105 22 101/54 (70)    


 


1/23/21 01:15  105 18 101/58 (72) 51   


 


1/23/21 01:00    108/50    


 


1/23/21 01:00    101/58    


 


1/23/21 01:00   26   Mechanical Ventilator  100


 


1/23/21 01:00  105 19 105/54 (71) 54   


 


1/23/21 00:45  107 20 109/55 (73)    


 


1/23/21 00:30  107 19 107/59 (75) 53   


 


1/23/21 00:15  99 18 89/57 (68) 75   


 


1/23/21 00:00  101 13 73/49 (57) 56   


 


1/23/21 00:00 97.0 101 13 73/49 (57) 56   


 


1/23/21 00:00      Mechanical Ventilator  


 


1/23/21 00:00    89/48    


 


1/23/21 00:00   30   Mechanical Ventilator  100


 


1/23/21 00:00        100


 


1/23/21 00:00  109      


 


1/22/21 23:39  102 22     60


 


1/22/21 23:00    83/58    


 


1/22/21 23:00   23   Mechanical Ventilator  100


 


1/22/21 23:00  112 18 86/58 (67)    


 


1/22/21 22:30  111 21 106/56 (73)    


 


1/22/21 22:00    91/62    


 


1/22/21 22:00   26   Mechanical Ventilator  100


 


1/22/21 22:00  121 20 91/62 (72) 76   


 


1/22/21 21:30  125 21 117/63 (81) 72   


 


1/22/21 21:15   30   Mechanical Ventilator  100


 


1/22/21 21:00    100/64    


 


1/22/21 21:00  129 40 100/64 (76) 85   


 


1/22/21 20:36  84  94/45    


 


1/22/21 20:30  123 34 95/67 (76) 84   


 


1/22/21 20:15   31   Mechanical Ventilator  100


 


1/22/21 20:00        100


 


1/22/21 20:00  123 28 103/72 (82) 83   


 


1/22/21 20:00      Mechanical Ventilator  


 


1/22/21 20:00    103/72    


 


1/22/21 20:00  122      


 


1/22/21 19:31    87/64    


 


1/22/21 19:30  118 27     60


 


1/22/21 19:15   24   Mechanical Ventilator  100


 


1/22/21 19:00    106/68    


 


1/22/21 19:00  118 24 106/68 (81) 83   


 


1/22/21 18:15   21   Mechanical Ventilator  100


 


1/22/21 18:00  115 23 90/50 (63) 79   


 


1/22/21 18:00    91/70    


 


1/22/21 18:00   20   Mechanical Ventilator  100


 


1/22/21 17:45   20   Mechanical Ventilator  100


 


1/22/21 17:30   21   Mechanical Ventilator  100


 


1/22/21 17:14   21   Endotracheal Tube  100


 


1/22/21 17:00  105 20 100/76 (84) 79   


 


1/22/21 17:00    100/76    


 


1/22/21 16:00  94 17 104/70 (81) 91   


 


1/22/21 16:00  118      


 


1/22/21 16:00    107/63    


 


1/22/21 16:00        100


 


1/22/21 16:00      Mechanical Ventilator  


 


1/22/21 15:40  110 21     60


 


1/22/21 15:00 97.1 91 14 118/68 (85) 97   


 


1/22/21 15:00    109/69    


 


1/22/21 14:19  94  129/74    


 


1/22/21 14:00    108/66    


 


1/22/21 14:00  90 14 117/72 (87) 99   


 


1/22/21 13:00  84 17 102/66 (78) 99   


 


1/22/21 13:00    102/64    


 


1/22/21 12:00      Mechanical Ventilator  


 


1/22/21 12:00  96      


 


1/22/21 12:00        100


 


1/22/21 12:00    109/67    


 


1/22/21 12:00 100.8 108 30 109/67 (81) 100   


 


1/22/21 11:59  108      


 


1/22/21 11:53 99.0       


 


1/22/21 11:40  107 19     60


 


1/22/21 11:00  103 18 107/59 (75) 99   


 


1/22/21 11:00    107/59    


 


1/22/21 10:00  103 19 91/53 (66) 99   


 


1/22/21 10:00    95/55    

















Intake and Output  


 


 1/22/21 1/23/21





 19:00 07:00


 


Intake Total 1600.0 ml 2305.54 ml


 


Output Total 840 ml 390 ml


 


Balance 760.0 ml 1915.54 ml


 


  


 


Free Water 100 ml 


 


IV Total 1030.0 ml 2085.54 ml


 


Tube Feeding 470 ml 220 ml


 


Output Urine Total 780 ml 390 ml


 


Chest Tube Drainage Total 60 ml 








Objective


On a Versed drip


General Appearance:  no acute distress


HEENT:  atraumatic


Respiratory:  lungs clear, decreased breath sounds


Cardiovascular:  normal rate, regular rhythm


Abdomen:  soft, non tender, no organomegaly


Laboratory Tests


1/22/21 10:06: 


Arterial Blood pH 7.309L, Arterial Blood Partial Pressure CO2 59.7*H, Arterial 

Blood Partial Pressure O2 115.3H, Arterial Blood HCO3 29.3H, Arterial Blood 

Oxygen Saturation 97.6, Arterial Blood Base Excess 2.1H, Abelardo Test Positive


1/23/21 05:45: 


White Blood Count 5.9#, Red Blood Count 3.78L, Hemoglobin 10.9L, Hematocrit 

34.4L, Mean Corpuscular Volume 91, Mean Corpuscular Hemoglobin 28.9, Mean 

Corpuscular Hemoglobin Concent 31.8L, Red Cell Distribution Width 13.2, Platelet

Count 134L, Mean Platelet Volume 12.9H, Neutrophils (%) (Auto) , Lymphocytes (%)

(Auto) , Monocytes (%) (Auto) , Eosinophils (%) (Auto) , Basophils (%) (Auto) , 

Neutrophils % (Manual) [Pending], Lymphocytes % (Manual) [Pending], Platelet 

Estimate [Pending], Platelet Morphology [Pending], Sodium Level 143, Potassium 

Level 3.3L, Chloride Level 109H, Carbon Dioxide Level 27, Anion Gap 7, Blood 

Urea Nitrogen 31H, Creatinine 1.0, Estimat Glomerular Filtration Rate > 60, 

Glucose Level 155#H, Calcium Level 8.2L





Current Medications








 Medications


  (Trade)  Dose


 Ordered  Sig/Julisa


 Route


 PRN Reason  Start Time


 Stop Time Status Last Admin


Dose Admin


 


 Acetaminophen


  (Tylenol)  650 mg  Q4H  PRN


 GT


 Temp >100.5  1/15/21 17:15


 2/14/21 17:14  1/22/21 11:05





 


 Amiodarone HCl


  (Cordarone)  400 mg  EVERY 12  HOURS


 NG


   1/19/21 21:00


 4/19/21 20:59  1/23/21 08:54





 


 Chlorhexidine


 Gluconate


  (Vanessa-Hex 2%)  1 applic  DAILY@2000


 TOPIC


   1/19/21 20:00


 4/19/21 19:59  1/22/21 20:33





 


 Dextrose  1,000 ml @ 


 50 mls/hr  Q20H


 IV


   1/18/21 10:00


 2/17/21 09:59  1/23/21 08:55





 


 Dextrose


  (Dextrose 50%)  25 ml  Q30M  PRN


 IV


 Hypoglycemia  1/9/21 13:30


 4/9/21 13:29   





 


 Dextrose


  (Dextrose 50%)  50 ml  Q30M  PRN


 IV


 Hypoglycemia  1/9/21 13:30


 4/9/21 13:29   





 


 Digoxin


  (Lanoxin)  0.125 mg  DAILY


 NG


   1/21/21 09:00


 4/21/21 08:59  1/23/21 08:55





 


 Diltiazem HCl


  (Cardizem Tab)  30 mg  EVERY 8  HOURS


 NG


   1/19/21 14:00


 2/18/21 13:59  1/23/21 05:38





 


 Docusate Sodium


  (Colace)  100 mg  TIDPRN  PRN


 GT


 Constipation  1/12/21 01:15


 2/11/21 01:14  1/12/21 01:42





 


 Enoxaparin Sodium


  (Lovenox)  80 mg  EVERY 12  HOURS


 SUBQ


   1/2/21 21:00


 4/2/21 20:59  1/23/21 08:53





 


 Insulin Aspart


  (NovoLOG)    Q6HR


 SUBQ


   1/14/21 12:00


 4/9/21 17:59  1/21/21 05:35





 


 Insulin Aspart


  (NovoLOG)  9 units  EVERY 6  HOURS


 SUBQ


   1/21/21 12:00


 4/15/21 06:59  1/23/21 06:08





 


 Insulin Detemir


  (Levemir)  20 units  Q12HR


 SUBQ


   1/14/21 09:00


 4/12/21 08:59  1/22/21 21:00





 


 Norepinephrine


 Bitartrate 8 mg/


 Sodium Chloride  500 ml @ 0


 mls/hr  Q24H  PRN


 IV


 For hypotension  1/21/21 22:30


 1/24/21 22:29  1/23/21 01:43





 


 Pantoprazole


  (Protonix)  40 mg  DAILY


 IVP


   1/14/21 09:00


 2/13/21 08:59  1/23/21 08:54





 


 Piperacillin Sod/


 Tazobactam Sod


 3.375 gm/Sodium


 Chloride  110 ml @ 


 27.5 mls/hr  EVERY 8  HOURS


 IVPB


   1/15/21 14:00


 1/23/21 10:00  1/23/21 05:36





 


 Piperacillin Sod/


 Tazobactam Sod


 3.375 gm/Sodium


 Chloride  110 ml @ 


 27.5 mls/hr  EVERY 8  HOURS


 IVPB


   1/23/21 14:00


 1/30/21 13:59   





 


 Quetiapine


 Fumarate


  (SEROqueL)  50 mg  Q12HR


 ORAL


   1/4/21 21:00


 2/18/21 20:59  1/23/21 08:54





 


 Sodium Chloride  500 ml @ 


 999 mls/hr  Q31M PRN


 IV


 For hypotension  1/15/21 18:30


 2/14/21 18:29   





 


 Vancomycin HCl  300 ml @ 


 150 mls/hr  Q12H


 IVPB


   1/16/21 11:00


 1/25/21 10:59  1/22/21 22:49





 


 Vancomycin HCl


  ([facility] pharmacy


 to dose)  1 ea  DAILY  PRN


 MISC


 Per rx protocol  1/14/21 09:15


 2/13/21 09:14   














Assessment/Plan


Assessment/Plan


1. COVID-19 pneumonia.


   - on Decadron, azithromycin, and ceftriaxone


   - Intubated now; will continue AC mode for now


          -Currently 100% FiO2. PEEP 12; SaO2 96%


             - Has R apical PTX and subcut emphysema


             - S/p R CT placement


2. Diabetes mellitus.; 


3. Elevated inflammatory markers.


4. High D-dimer. On full dose Lovenox


5. Hyper natremia; will initiate D5W


6. Hyperglycemia.


   - BG control


7. Hypoxemia., secondary to #1; improved





DVT prophylaxis   On Lovenox.


Sedated; advised RN to increase sedation








Hypotensive this AM


Placed in Trendelenburg


Will decrease PEEP to 12











Sung Lemos MD           Jan 23, 2021 09:07

## 2021-01-23 NOTE — GENERAL PROGRESS NOTE
Subjective


Allergies:  


Coded Allergies:  


     No Known Allergies (Unverified , 6/11/19)


Subjective


on ventilator 60% fio2


on sediation


unresponsive


in icu


rt chest tube s placed due to pneumothorex


afib is controlled


hypotensuion on tndelberg position





Objective





Last 24 Hour Vital Signs








  Date Time  Temp Pulse Resp B/P (MAP) Pulse Ox O2 Delivery O2 Flow Rate FiO2


 


1/23/21 08:55  100      


 


1/23/21 07:00  96 20 119/77 (91) 94   


 


1/23/21 06:00    99/76    


 


1/23/21 06:00  105 21 103/75 (84) 86   


 


1/23/21 05:38  110  107/57    


 


1/23/21 05:30  107 25 107/67 (80) 82   


 


1/23/21 05:00    100/75    


 


1/23/21 05:00   20   Mechanical Ventilator  100


 


1/23/21 05:00  105 24 101/73 (82) 0   


 


1/23/21 04:30  107 24 100/70 (80) 66   


 


1/23/21 04:00 97.6 105 20 107/67 (80) 73   


 


1/23/21 04:00      Mechanical Ventilator  


 


1/23/21 04:00    100/79    


 


1/23/21 04:00   28   Mechanical Ventilator  100


 


1/23/21 04:00  104      


 


1/23/21 04:00        100


 


1/23/21 03:30  104 24 74/43 (53) 68   


 


1/23/21 03:15  106 22     60


 


1/23/21 03:00    109/74    


 


1/23/21 03:00   26   Mechanical Ventilator  100


 


1/23/21 03:00  106 21 112/66 (81) 50   


 


1/23/21 02:30  104 24 102/56 (71) 47   


 


1/23/21 02:15  104 22 104/51 (68) 42   


 


1/23/21 02:00  104 19 110/59 (76) 53   


 


1/23/21 02:00    106/50    


 


1/23/21 02:00   28   Mechanical Ventilator  100


 


1/23/21 01:45  105 21 100/59 (73) 34   


 


1/23/21 01:43    101/50    


 


1/23/21 01:30  105 22 101/54 (70)    


 


1/23/21 01:15  105 18 101/58 (72) 51   


 


1/23/21 01:00    108/50    


 


1/23/21 01:00    101/58    


 


1/23/21 01:00   26   Mechanical Ventilator  100


 


1/23/21 01:00  105 19 105/54 (71) 54   


 


1/23/21 00:45  107 20 109/55 (73)    


 


1/23/21 00:30  107 19 107/59 (75) 53   


 


1/23/21 00:15  99 18 89/57 (68) 75   


 


1/23/21 00:00  101 13 73/49 (57) 56   


 


1/23/21 00:00 97.0 101 13 73/49 (57) 56   


 


1/23/21 00:00      Mechanical Ventilator  


 


1/23/21 00:00    89/48    


 


1/23/21 00:00   30   Mechanical Ventilator  100


 


1/23/21 00:00        100


 


1/23/21 00:00  109      


 


1/22/21 23:39  102 22     60


 


1/22/21 23:00    83/58    


 


1/22/21 23:00   23   Mechanical Ventilator  100


 


1/22/21 23:00  112 18 86/58 (67)    


 


1/22/21 22:30  111 21 106/56 (73)    


 


1/22/21 22:00    91/62    


 


1/22/21 22:00   26   Mechanical Ventilator  100


 


1/22/21 22:00  121 20 91/62 (72) 76   


 


1/22/21 21:30  125 21 117/63 (81) 72   


 


1/22/21 21:15   30   Mechanical Ventilator  100


 


1/22/21 21:00    100/64    


 


1/22/21 21:00  129 40 100/64 (76) 85   


 


1/22/21 20:36  84  94/45    


 


1/22/21 20:30  123 34 95/67 (76) 84   


 


1/22/21 20:15   31   Mechanical Ventilator  100


 


1/22/21 20:00        100


 


1/22/21 20:00  123 28 103/72 (82) 83   


 


1/22/21 20:00      Mechanical Ventilator  


 


1/22/21 20:00    103/72    


 


1/22/21 20:00  122      


 


1/22/21 19:31    87/64    


 


1/22/21 19:30  118 27     60


 


1/22/21 19:15   24   Mechanical Ventilator  100


 


1/22/21 19:00    106/68    


 


1/22/21 19:00  118 24 106/68 (81) 83   


 


1/22/21 18:15   21   Mechanical Ventilator  100


 


1/22/21 18:00  115 23 90/50 (63) 79   


 


1/22/21 18:00    91/70    


 


1/22/21 18:00   20   Mechanical Ventilator  100


 


1/22/21 17:45   20   Mechanical Ventilator  100


 


1/22/21 17:30   21   Mechanical Ventilator  100


 


1/22/21 17:14   21   Endotracheal Tube  100


 


1/22/21 17:00  105 20 100/76 (84) 79   


 


1/22/21 17:00    100/76    


 


1/22/21 16:00  94 17 104/70 (81) 91   


 


1/22/21 16:00  118      


 


1/22/21 16:00    107/63    


 


1/22/21 16:00        100


 


1/22/21 16:00      Mechanical Ventilator  


 


1/22/21 15:40  110 21     60


 


1/22/21 15:00 97.1 91 14 118/68 (85) 97   


 


1/22/21 15:00    109/69    


 


1/22/21 14:19  94  129/74    


 


1/22/21 14:00    108/66    


 


1/22/21 14:00  90 14 117/72 (87) 99   


 


1/22/21 13:00  84 17 102/66 (78) 99   


 


1/22/21 13:00    102/64    


 


1/22/21 12:00      Mechanical Ventilator  


 


1/22/21 12:00  96      


 


1/22/21 12:00        100


 


1/22/21 12:00    109/67    


 


1/22/21 12:00 100.8 108 30 109/67 (81) 100   


 


1/22/21 11:59  108      


 


1/22/21 11:53 99.0       


 


1/22/21 11:40  107 19     60


 


1/22/21 11:00  103 18 107/59 (75) 99   


 


1/22/21 11:00    107/59    


 


1/22/21 10:00  103 19 91/53 (66) 99   


 


1/22/21 10:00    95/55    

















Intake and Output  


 


 1/22/21 1/23/21





 19:00 07:00


 


Intake Total 1600.0 ml 2305.54 ml


 


Output Total 840 ml 390 ml


 


Balance 760.0 ml 1915.54 ml


 


  


 


Free Water 100 ml 


 


IV Total 1030.0 ml 2085.54 ml


 


Tube Feeding 470 ml 220 ml


 


Output Urine Total 780 ml 390 ml


 


Chest Tube Drainage Total 60 ml 








Laboratory Tests


1/22/21 10:06: 


Arterial Blood pH 7.309L, Arterial Blood Partial Pressure CO2 59.7*H, Arterial 

Blood Partial Pressure O2 115.3H, Arterial Blood HCO3 29.3H, Arterial Blood 

Oxygen Saturation 97.6, Arterial Blood Base Excess 2.1H, Abelardo Test Positive


1/23/21 05:45: 


White Blood Count 5.9#, Red Blood Count 3.78L, Hemoglobin 10.9L, Hematocrit 

34.4L, Mean Corpuscular Volume 91, Mean Corpuscular Hemoglobin 28.9, Mean 

Corpuscular Hemoglobin Concent 31.8L, Red Cell Distribution Width 13.2, Platelet

Count 134L, Mean Platelet Volume 12.9H, Neutrophils (%) (Auto) , Lymphocytes (%)

(Auto) , Monocytes (%) (Auto) , Eosinophils (%) (Auto) , Basophils (%) (Auto) , 

Neutrophils % (Manual) [Pending], Lymphocytes % (Manual) [Pending], Platelet 

Estimate [Pending], Platelet Morphology [Pending], Sodium Level 143, Potassium 

Level 3.3L, Chloride Level 109H, Carbon Dioxide Level 27, Anion Gap 7, Blood 

Urea Nitrogen 31H, Creatinine 1.0, Estimat Glomerular Filtration Rate > 60, G

lucose Level 155#H, Calcium Level 8.2L


Height (Feet):  5


Height (Inches):  7.00


Weight (Pounds):  198


General Appearance:  lethargic


Neck:  supple


Cardiovascular:  tachycardia


Respiratory/Chest:  crackles/rales


Abdomen:  non tender, soft


Extremities:  non-tender





Assessment/Plan


Assessment/Plan:


hypotension better on levophed drip


afib with rvr on digoxine , add amiodarone , cardiology on case


covid pna


ac resp distress on ventilator


etoh abused


dehydration


agitated -halodol


fever susided


cont on ventilator at icu


cont iv abx and iv decadrone


pulmonary and gi on consult


dw charge nurse


poor prognosis 


elmer son-











Moi Travis MD             Jan 23, 2021 09:04

## 2021-01-23 NOTE — NUR
NURSE NOTES:

Patient on bed, no signs of grimacing and distress noted. Orally intubated ETT 7.5 and 23 cm 
lip line with settings of AC 16, , FiO2 100%, PEEP 12, tolerating well with high 90s 
to 98% saturation. Chest tube on R lateral side, patent and intact, no kinks. On feeding 
vital AF running 55 mL/hr NGT, patent, and intact. On rectal tube and cash catheter, patent 
and intact. Patient has a L UA PICC line running D5W at 50 mL/hr. Patient is currently on 
Levophed currently running at 30 mcg/min. HOB elevated, bed is on lowest position, locked, 
side rails up (x2). Will continue to monitor.Will continue plan of care.

## 2021-01-23 NOTE — INFECTIOUS DISEASES PROG NOTE
Assessment/Plan


Assessment/Plan


A


1. COVID-19 pneumonia.


2. New-onset diabetes.


3. Hypoxic respiratory failure


4. Sepsis with fever, tachycardia & leukocytosis


5. Bacteremia with COANS, ? line infection


6. Atrial fibrillation





P


1. Finished remdesivir course


2. Continue Zosyn & Vancomycin





Subjective


ROS Limited/Unobtainable:  Yes


Allergies:  


Coded Allergies:  


     No Known Allergies (Unverified , 6/11/19)





Objective





Last 24 Hour Vital Signs








  Date Time  Temp Pulse Resp B/P (MAP) Pulse Ox O2 Delivery O2 Flow Rate FiO2


 


1/23/21 07:00  96 20 119/77 (91) 94   


 


1/23/21 06:00    99/76    


 


1/23/21 06:00  105 21 103/75 (84) 86   


 


1/23/21 05:38  110  107/57    


 


1/23/21 05:30  107 25 107/67 (80) 82   


 


1/23/21 05:00    100/75    


 


1/23/21 05:00   20   Mechanical Ventilator  100


 


1/23/21 05:00  105 24 101/73 (82) 0   


 


1/23/21 04:30  107 24 100/70 (80) 66   


 


1/23/21 04:00 97.6 105 20 107/67 (80) 73   


 


1/23/21 04:00      Mechanical Ventilator  


 


1/23/21 04:00    100/79    


 


1/23/21 04:00   28   Mechanical Ventilator  100


 


1/23/21 04:00  104      


 


1/23/21 04:00        100


 


1/23/21 03:30  104 24 74/43 (53) 68   


 


1/23/21 03:15  106 22     60


 


1/23/21 03:00    109/74    


 


1/23/21 03:00   26   Mechanical Ventilator  100


 


1/23/21 03:00  106 21 112/66 (81) 50   


 


1/23/21 02:30  104 24 102/56 (71) 47   


 


1/23/21 02:15  104 22 104/51 (68) 42   


 


1/23/21 02:00  104 19 110/59 (76) 53   


 


1/23/21 02:00    106/50    


 


1/23/21 02:00   28   Mechanical Ventilator  100


 


1/23/21 01:45  105 21 100/59 (73) 34   


 


1/23/21 01:43    101/50    


 


1/23/21 01:30  105 22 101/54 (70)    


 


1/23/21 01:15  105 18 101/58 (72) 51   


 


1/23/21 01:00    108/50    


 


1/23/21 01:00    101/58    


 


1/23/21 01:00   26   Mechanical Ventilator  100


 


1/23/21 01:00  105 19 105/54 (71) 54   


 


1/23/21 00:45  107 20 109/55 (73)    


 


1/23/21 00:30  107 19 107/59 (75) 53   


 


1/23/21 00:15  99 18 89/57 (68) 75   


 


1/23/21 00:00  101 13 73/49 (57) 56   


 


1/23/21 00:00 97.0 101 13 73/49 (57) 56   


 


1/23/21 00:00      Mechanical Ventilator  


 


1/23/21 00:00    89/48    


 


1/23/21 00:00   30   Mechanical Ventilator  100


 


1/23/21 00:00        100


 


1/23/21 00:00  109      


 


1/22/21 23:39  102 22     60


 


1/22/21 23:00    83/58    


 


1/22/21 23:00   23   Mechanical Ventilator  100


 


1/22/21 23:00  112 18 86/58 (67)    


 


1/22/21 22:30  111 21 106/56 (73)    


 


1/22/21 22:00    91/62    


 


1/22/21 22:00   26   Mechanical Ventilator  100


 


1/22/21 22:00  121 20 91/62 (72) 76   


 


1/22/21 21:30  125 21 117/63 (81) 72   


 


1/22/21 21:15   30   Mechanical Ventilator  100


 


1/22/21 21:00    100/64    


 


1/22/21 21:00  129 40 100/64 (76) 85   


 


1/22/21 20:36  84  94/45    


 


1/22/21 20:30  123 34 95/67 (76) 84   


 


1/22/21 20:15   31   Mechanical Ventilator  100


 


1/22/21 20:00        100


 


1/22/21 20:00  123 28 103/72 (82) 83   


 


1/22/21 20:00      Mechanical Ventilator  


 


1/22/21 20:00    103/72    


 


1/22/21 20:00  122      


 


1/22/21 19:31    87/64    


 


1/22/21 19:30  118 27     60


 


1/22/21 19:15   24   Mechanical Ventilator  100


 


1/22/21 19:00    106/68    


 


1/22/21 19:00  118 24 106/68 (81) 83   


 


1/22/21 18:15   21   Mechanical Ventilator  100


 


1/22/21 18:00  115 23 90/50 (63) 79   


 


1/22/21 18:00    91/70    


 


1/22/21 18:00   20   Mechanical Ventilator  100


 


1/22/21 17:45   20   Mechanical Ventilator  100


 


1/22/21 17:30   21   Mechanical Ventilator  100


 


1/22/21 17:14   21   Endotracheal Tube  100


 


1/22/21 17:00  105 20 100/76 (84) 79   


 


1/22/21 17:00    100/76    


 


1/22/21 16:00  94 17 104/70 (81) 91   


 


1/22/21 16:00  118      


 


1/22/21 16:00    107/63    


 


1/22/21 16:00        100


 


1/22/21 16:00      Mechanical Ventilator  


 


1/22/21 15:40  110 21     60


 


1/22/21 15:00 97.1 91 14 118/68 (85) 97   


 


1/22/21 15:00    109/69    


 


1/22/21 14:19  94  129/74    


 


1/22/21 14:00    108/66    


 


1/22/21 14:00  90 14 117/72 (87) 99   


 


1/22/21 13:00  84 17 102/66 (78) 99   


 


1/22/21 13:00    102/64    


 


1/22/21 12:00      Mechanical Ventilator  


 


1/22/21 12:00  96      


 


1/22/21 12:00        100


 


1/22/21 12:00    109/67    


 


1/22/21 12:00 100.8 108 30 109/67 (81) 100   


 


1/22/21 11:59  108      


 


1/22/21 11:53 99.0       


 


1/22/21 11:40  107 19     60


 


1/22/21 11:00  103 18 107/59 (75) 99   


 


1/22/21 11:00    107/59    


 


1/22/21 10:00  103 19 91/53 (66) 99   


 


1/22/21 10:00    95/55    


 


1/22/21 09:00  98 21 105/61 (76) 99   


 


1/22/21 09:00  98 21 105/61 (76) 99   


 


1/22/21 09:00    107/59    


 


1/22/21 08:57  99      








Height (Feet):  5


Height (Inches):  7.00


Weight (Pounds):  198


HEENT:  other - orally intubated


Respiratory/Chest:  other - on ventilator, LYI2=278%, chest tube


Cardiovascular:  normal rate, other - left arm PICC line


Abdomen:  soft, non tender


Extremities:  no edema





Laboratory Tests








Test


 1/22/21


10:06 1/23/21


05:45


 


Arterial Blood pH


 7.309


(7.350-7.450) 





 


Arterial Blood Partial


Pressure CO2 59.7 mmHg


(35.0-45.0)  *H 





 


Arterial Blood Partial


Pressure O2 115.3 mmHg


(75.0-100.0)  H 





 


Arterial Blood HCO3


 29.3 mmol/L


(22.0-26.0)  H 





 


Arterial Blood Oxygen


Saturation 97.6 %


() 





 


Arterial Blood Base Excess 2.1 (-2-2)  H 


 


Abelardo Test Positive   


 


White Blood Count


 


 5.9 K/UL


(4.8-10.8)  #


 


Red Blood Count


 


 3.78 M/UL


(4.70-6.10)  L


 


Hemoglobin


 


 10.9 G/DL


(14.2-18.0)  L


 


Hematocrit


 


 34.4 %


(42.0-52.0)  L


 


Mean Corpuscular Volume  91 FL (80-99)  


 


Mean Corpuscular Hemoglobin


 


 28.9 PG


(27.0-31.0)


 


Mean Corpuscular Hemoglobin


Concent 


 31.8 G/DL


(32.0-36.0)  L


 


Red Cell Distribution Width


 


 13.2 %


(11.6-14.8)


 


Platelet Count


 


 134 K/UL


(150-450)  L


 


Mean Platelet Volume


 


 12.9 FL


(6.5-10.1)  H


 


Neutrophils (%) (Auto)


 


 % (45.0-75.0)





 


Lymphocytes (%) (Auto)


 


 % (20.0-45.0)





 


Monocytes (%) (Auto)   % (1.0-10.0)  


 


Eosinophils (%) (Auto)   % (0.0-3.0)  


 


Basophils (%) (Auto)   % (0.0-2.0)  


 


Neutrophils % (Manual)  Pending  


 


Lymphocytes % (Manual)  Pending  


 


Platelet Estimate  Pending  


 


Platelet Morphology  Pending  


 


Sodium Level


 


 143 MMOL/L


(136-145)


 


Potassium Level


 


 3.3 MMOL/L


(3.5-5.1)  L


 


Chloride Level


 


 109 MMOL/L


()  H


 


Carbon Dioxide Level


 


 27 MMOL/L


(21-32)


 


Anion Gap


 


 7 mmol/L


(5-15)


 


Blood Urea Nitrogen


 


 31 mg/dL


(7-18)  H


 


Creatinine


 


 1.0 MG/DL


(0.55-1.30)


 


Estimat Glomerular Filtration


Rate 


 > 60 mL/min


(>60)


 


Glucose Level


 


 155 MG/DL


()  #H


 


Calcium Level


 


 8.2 MG/DL


(8.5-10.1)  L











Current Medications








 Medications


  (Trade)  Dose


 Ordered  Sig/Julisa


 Route


 PRN Reason  Start Time


 Stop Time Status Last Admin


Dose Admin


 


 Acetaminophen


  (Tylenol)  650 mg  Q4H  PRN


 GT


 Temp >100.5  1/15/21 17:15


 2/14/21 17:14  1/22/21 11:05





 


 Amiodarone HCl


  (Cordarone)  400 mg  EVERY 12  HOURS


 NG


   1/19/21 21:00


 4/19/21 20:59  1/22/21 20:37





 


 Chlorhexidine


 Gluconate


  (Vanessa-Hex 2%)  1 applic  DAILY@2000


 TOPIC


   1/19/21 20:00


 4/19/21 19:59  1/22/21 20:33





 


 Dextrose  1,000 ml @ 


 50 mls/hr  Q20H


 IV


   1/18/21 10:00


 2/17/21 09:59  1/22/21 13:30





 


 Dextrose


  (Dextrose 50%)  25 ml  Q30M  PRN


 IV


 Hypoglycemia  1/9/21 13:30


 4/9/21 13:29   





 


 Dextrose


  (Dextrose 50%)  50 ml  Q30M  PRN


 IV


 Hypoglycemia  1/9/21 13:30


 4/9/21 13:29   





 


 Digoxin


  (Lanoxin)  0.125 mg  DAILY


 NG


   1/21/21 09:00


 4/21/21 08:59  1/22/21 08:57





 


 Diltiazem HCl


  (Cardizem Tab)  30 mg  EVERY 8  HOURS


 NG


   1/19/21 14:00


 2/18/21 13:59  1/23/21 05:38





 


 Docusate Sodium


  (Colace)  100 mg  TIDPRN  PRN


 GT


 Constipation  1/12/21 01:15


 2/11/21 01:14  1/12/21 01:42





 


 Enoxaparin Sodium


  (Lovenox)  80 mg  EVERY 12  HOURS


 SUBQ


   1/2/21 21:00


 4/2/21 20:59  1/21/21 20:44





 


 Insulin Aspart


  (NovoLOG)    Q6HR


 SUBQ


   1/14/21 12:00


 4/9/21 17:59  1/21/21 05:35





 


 Insulin Aspart


  (NovoLOG)  9 units  EVERY 6  HOURS


 SUBQ


   1/21/21 12:00


 4/15/21 06:59  1/23/21 06:08





 


 Insulin Detemir


  (Levemir)  20 units  Q12HR


 SUBQ


   1/14/21 09:00


 4/12/21 08:59  1/22/21 21:00





 


 Norepinephrine


 Bitartrate 8 mg/


 Sodium Chloride  500 ml @ 0


 mls/hr  Q24H  PRN


 IV


 For hypotension  1/21/21 22:30


 1/24/21 22:29  1/23/21 01:43





 


 Pantoprazole


  (Protonix)  40 mg  DAILY


 IVP


   1/14/21 09:00


 2/13/21 08:59  1/22/21 08:57





 


 Piperacillin Sod/


 Tazobactam Sod


 3.375 gm/Sodium


 Chloride  110 ml @ 


 27.5 mls/hr  EVERY 8  HOURS


 IVPB


   1/15/21 14:00


 1/23/21 10:00  1/23/21 05:36





 


 Piperacillin Sod/


 Tazobactam Sod


 3.375 gm/Sodium


 Chloride  110 ml @ 


 27.5 mls/hr  EVERY 8  HOURS


 IVPB


   1/23/21 14:00


 1/30/21 13:59   





 


 Quetiapine


 Fumarate


  (SEROqueL)  50 mg  Q12HR


 ORAL


   1/4/21 21:00


 2/18/21 20:59  1/22/21 20:34





 


 Sodium Chloride  500 ml @ 


 999 mls/hr  Q31M PRN


 IV


 For hypotension  1/15/21 18:30


 2/14/21 18:29   





 


 Vancomycin HCl  300 ml @ 


 150 mls/hr  Q12H


 IVPB


   1/16/21 11:00


 1/25/21 10:59  1/22/21 22:49





 


 Vancomycin HCl


  ([facility] pharmacy


 to dose)  1 ea  DAILY  PRN


 MISC


 Per rx protocol  1/14/21 09:15


 2/13/21 09:14   




















Lamin Felix MD               Jan 23, 2021 08:10

## 2021-01-23 NOTE — NUR
NURSE NOTES:

Slowly tapering down pressors as much as bp would allow. No change in patient condition at 
this time. Blood pressures are holding up so far, HR remains NSR in the 90s. Patients 
remains afebrile.

## 2021-01-24 VITALS — DIASTOLIC BLOOD PRESSURE: 57 MMHG | SYSTOLIC BLOOD PRESSURE: 91 MMHG

## 2021-01-24 VITALS — SYSTOLIC BLOOD PRESSURE: 84 MMHG | DIASTOLIC BLOOD PRESSURE: 50 MMHG

## 2021-01-24 VITALS — SYSTOLIC BLOOD PRESSURE: 115 MMHG | DIASTOLIC BLOOD PRESSURE: 62 MMHG

## 2021-01-24 VITALS — SYSTOLIC BLOOD PRESSURE: 88 MMHG | DIASTOLIC BLOOD PRESSURE: 55 MMHG

## 2021-01-24 VITALS — SYSTOLIC BLOOD PRESSURE: 96 MMHG | DIASTOLIC BLOOD PRESSURE: 55 MMHG

## 2021-01-24 VITALS — DIASTOLIC BLOOD PRESSURE: 56 MMHG | SYSTOLIC BLOOD PRESSURE: 93 MMHG

## 2021-01-24 VITALS — SYSTOLIC BLOOD PRESSURE: 104 MMHG | DIASTOLIC BLOOD PRESSURE: 67 MMHG

## 2021-01-24 VITALS — SYSTOLIC BLOOD PRESSURE: 99 MMHG | DIASTOLIC BLOOD PRESSURE: 60 MMHG

## 2021-01-24 VITALS — DIASTOLIC BLOOD PRESSURE: 51 MMHG | SYSTOLIC BLOOD PRESSURE: 78 MMHG

## 2021-01-24 VITALS — DIASTOLIC BLOOD PRESSURE: 56 MMHG | SYSTOLIC BLOOD PRESSURE: 94 MMHG

## 2021-01-24 VITALS — SYSTOLIC BLOOD PRESSURE: 96 MMHG | DIASTOLIC BLOOD PRESSURE: 61 MMHG

## 2021-01-24 VITALS — DIASTOLIC BLOOD PRESSURE: 54 MMHG | SYSTOLIC BLOOD PRESSURE: 92 MMHG

## 2021-01-24 VITALS — DIASTOLIC BLOOD PRESSURE: 63 MMHG | SYSTOLIC BLOOD PRESSURE: 103 MMHG

## 2021-01-24 VITALS — SYSTOLIC BLOOD PRESSURE: 94 MMHG | DIASTOLIC BLOOD PRESSURE: 57 MMHG

## 2021-01-24 VITALS — DIASTOLIC BLOOD PRESSURE: 54 MMHG | SYSTOLIC BLOOD PRESSURE: 88 MMHG

## 2021-01-24 VITALS — SYSTOLIC BLOOD PRESSURE: 105 MMHG | DIASTOLIC BLOOD PRESSURE: 60 MMHG

## 2021-01-24 VITALS — SYSTOLIC BLOOD PRESSURE: 90 MMHG | DIASTOLIC BLOOD PRESSURE: 58 MMHG

## 2021-01-24 VITALS — DIASTOLIC BLOOD PRESSURE: 52 MMHG | SYSTOLIC BLOOD PRESSURE: 90 MMHG

## 2021-01-24 VITALS — DIASTOLIC BLOOD PRESSURE: 57 MMHG | SYSTOLIC BLOOD PRESSURE: 95 MMHG

## 2021-01-24 VITALS — DIASTOLIC BLOOD PRESSURE: 57 MMHG | SYSTOLIC BLOOD PRESSURE: 86 MMHG

## 2021-01-24 VITALS — SYSTOLIC BLOOD PRESSURE: 103 MMHG | DIASTOLIC BLOOD PRESSURE: 64 MMHG

## 2021-01-24 VITALS — DIASTOLIC BLOOD PRESSURE: 53 MMHG | SYSTOLIC BLOOD PRESSURE: 86 MMHG

## 2021-01-24 VITALS — SYSTOLIC BLOOD PRESSURE: 101 MMHG | DIASTOLIC BLOOD PRESSURE: 57 MMHG

## 2021-01-24 VITALS — SYSTOLIC BLOOD PRESSURE: 86 MMHG | DIASTOLIC BLOOD PRESSURE: 55 MMHG

## 2021-01-24 VITALS — SYSTOLIC BLOOD PRESSURE: 86 MMHG | DIASTOLIC BLOOD PRESSURE: 53 MMHG

## 2021-01-24 VITALS — DIASTOLIC BLOOD PRESSURE: 70 MMHG | SYSTOLIC BLOOD PRESSURE: 99 MMHG

## 2021-01-24 VITALS — DIASTOLIC BLOOD PRESSURE: 57 MMHG | SYSTOLIC BLOOD PRESSURE: 87 MMHG

## 2021-01-24 VITALS — SYSTOLIC BLOOD PRESSURE: 90 MMHG | DIASTOLIC BLOOD PRESSURE: 54 MMHG

## 2021-01-24 VITALS — SYSTOLIC BLOOD PRESSURE: 92 MMHG | DIASTOLIC BLOOD PRESSURE: 60 MMHG

## 2021-01-24 VITALS — SYSTOLIC BLOOD PRESSURE: 92 MMHG | DIASTOLIC BLOOD PRESSURE: 57 MMHG

## 2021-01-24 VITALS — DIASTOLIC BLOOD PRESSURE: 55 MMHG | SYSTOLIC BLOOD PRESSURE: 92 MMHG

## 2021-01-24 VITALS — SYSTOLIC BLOOD PRESSURE: 101 MMHG | DIASTOLIC BLOOD PRESSURE: 59 MMHG

## 2021-01-24 VITALS — DIASTOLIC BLOOD PRESSURE: 58 MMHG | SYSTOLIC BLOOD PRESSURE: 106 MMHG

## 2021-01-24 VITALS — SYSTOLIC BLOOD PRESSURE: 85 MMHG | DIASTOLIC BLOOD PRESSURE: 55 MMHG

## 2021-01-24 VITALS — SYSTOLIC BLOOD PRESSURE: 83 MMHG | DIASTOLIC BLOOD PRESSURE: 54 MMHG

## 2021-01-24 VITALS — SYSTOLIC BLOOD PRESSURE: 88 MMHG | DIASTOLIC BLOOD PRESSURE: 54 MMHG

## 2021-01-24 VITALS — SYSTOLIC BLOOD PRESSURE: 94 MMHG | DIASTOLIC BLOOD PRESSURE: 54 MMHG

## 2021-01-24 VITALS — SYSTOLIC BLOOD PRESSURE: 114 MMHG | DIASTOLIC BLOOD PRESSURE: 62 MMHG

## 2021-01-24 VITALS — DIASTOLIC BLOOD PRESSURE: 57 MMHG | SYSTOLIC BLOOD PRESSURE: 98 MMHG

## 2021-01-24 VITALS — DIASTOLIC BLOOD PRESSURE: 55 MMHG | SYSTOLIC BLOOD PRESSURE: 90 MMHG

## 2021-01-24 LAB
ADD MANUAL DIFF: YES
ANION GAP SERPL CALC-SCNC: 11 MMOL/L (ref 5–15)
BUN SERPL-MCNC: 38 MG/DL (ref 7–18)
CALCIUM SERPL-MCNC: 7.6 MG/DL (ref 8.5–10.1)
CHLORIDE SERPL-SCNC: 107 MMOL/L (ref 98–107)
CO2 SERPL-SCNC: 25 MMOL/L (ref 21–32)
CREAT SERPL-MCNC: 1.3 MG/DL (ref 0.55–1.3)
ERYTHROCYTE [DISTWIDTH] IN BLOOD BY AUTOMATED COUNT: 14 % (ref 11.6–14.8)
HCT VFR BLD CALC: 29 % (ref 42–52)
HGB BLD-MCNC: 8.9 G/DL (ref 14.2–18)
MCV RBC AUTO: 93 FL (ref 80–99)
PLATELET # BLD: 98 K/UL (ref 150–450)
POTASSIUM SERPL-SCNC: 3.7 MMOL/L (ref 3.5–5.1)
RBC # BLD AUTO: 3.12 M/UL (ref 4.7–6.1)
SODIUM SERPL-SCNC: 142 MMOL/L (ref 136–145)
WBC # BLD AUTO: 7.7 K/UL (ref 4.8–10.8)

## 2021-01-24 RX ADMIN — PANTOPRAZOLE SODIUM SCH MG: 40 INJECTION, POWDER, FOR SOLUTION INTRAVENOUS at 09:16

## 2021-01-24 RX ADMIN — DIGOXIN SCH MG: 0.12 TABLET ORAL at 09:17

## 2021-01-24 RX ADMIN — INSULIN DETEMIR SCH UNITS: 100 INJECTION, SOLUTION SUBCUTANEOUS at 09:36

## 2021-01-24 RX ADMIN — AMIODARONE HYDROCHLORIDE SCH MG: 200 TABLET ORAL at 20:21

## 2021-01-24 RX ADMIN — AMIODARONE HYDROCHLORIDE SCH MG: 200 TABLET ORAL at 09:16

## 2021-01-24 RX ADMIN — DEXTROSE MONOHYDRATE SCH MLS/HR: 50 INJECTION, SOLUTION INTRAVENOUS at 21:51

## 2021-01-24 RX ADMIN — INSULIN DETEMIR SCH UNITS: 100 INJECTION, SOLUTION SUBCUTANEOUS at 20:22

## 2021-01-24 RX ADMIN — INSULIN ASPART SCH UNITS: 100 INJECTION, SOLUTION INTRAVENOUS; SUBCUTANEOUS at 05:23

## 2021-01-24 RX ADMIN — ENOXAPARIN SODIUM SCH MG: 80 INJECTION SUBCUTANEOUS at 20:22

## 2021-01-24 RX ADMIN — DEXTROSE MONOHYDRATE SCH MLS/HR: 50 INJECTION, SOLUTION INTRAVENOUS at 05:22

## 2021-01-24 RX ADMIN — ENOXAPARIN SODIUM SCH MG: 80 INJECTION SUBCUTANEOUS at 09:40

## 2021-01-24 RX ADMIN — INSULIN ASPART SCH UNITS: 100 INJECTION, SOLUTION INTRAVENOUS; SUBCUTANEOUS at 23:36

## 2021-01-24 RX ADMIN — DEXTROSE MONOHYDRATE SCH MLS/HR: 50 INJECTION, SOLUTION INTRAVENOUS at 13:50

## 2021-01-24 RX ADMIN — INSULIN ASPART SCH UNITS: 100 INJECTION, SOLUTION INTRAVENOUS; SUBCUTANEOUS at 11:51

## 2021-01-24 RX ADMIN — INSULIN ASPART SCH UNITS: 100 INJECTION, SOLUTION INTRAVENOUS; SUBCUTANEOUS at 17:32

## 2021-01-24 RX ADMIN — INSULIN ASPART SCH UNITS: 100 INJECTION, SOLUTION INTRAVENOUS; SUBCUTANEOUS at 05:22

## 2021-01-24 RX ADMIN — Medication SCH MLS/HR: at 11:03

## 2021-01-24 RX ADMIN — INSULIN ASPART SCH UNITS: 100 INJECTION, SOLUTION INTRAVENOUS; SUBCUTANEOUS at 23:37

## 2021-01-24 RX ADMIN — INSULIN ASPART SCH UNITS: 100 INJECTION, SOLUTION INTRAVENOUS; SUBCUTANEOUS at 17:33

## 2021-01-24 RX ADMIN — INSULIN ASPART SCH UNITS: 100 INJECTION, SOLUTION INTRAVENOUS; SUBCUTANEOUS at 11:58

## 2021-01-24 RX ADMIN — CHLORHEXIDINE GLUCONATE SCH APPLIC: 213 SOLUTION TOPICAL at 19:36

## 2021-01-24 NOTE — SURGERY PROGRESS NOTE
Surgery Progress Note


Subjective


Procedure Performed


Right tube thoracostomy chest tube insertion


Additional Comments


chest tube stable


no leak


ETT 7.5; on AC 16, , FiO2 100%, PEEP 12, O2sat 90%


h/h drop no active bleeding





Objective





Last 24 Hour Vital Signs








  Date Time  Temp Pulse Resp B/P (MAP) Pulse Ox O2 Delivery O2 Flow Rate FiO2


 


1/24/21 09:30  86 23 114/62 (79) 93   


 


1/24/21 09:17  88      


 


1/24/21 09:00  87 25 88/55 (66) 90   


 


1/24/21 08:30  87 22 92/55 (67) 91   


 


1/24/21 08:00      Mechanical Ventilator  


 


1/24/21 08:00 97.9 87 23 92/57 (69) 90   


 


1/24/21 08:00    92/57    


 


1/24/21 08:00        100


 


1/24/21 07:15  86 22 91/57 (68) 90   


 


1/24/21 07:00  85 20 88/55 (66) 91   


 


1/24/21 07:00    88/55    


 


1/24/21 06:30  84 24 83/54 (64) 90   


 


1/24/21 06:00  85 21 86/53 (64) 89   


 


1/24/21 06:00    85/57    


 


1/24/21 05:30  84 21 90/54 (66) 90   


 


1/24/21 05:00    98/52    


 


1/24/21 05:00  84 19 86/57 (67) 91   


 


1/24/21 04:30  84 19 86/55 (65) 92   


 


1/24/21 04:15  83 19 85/55 (65) 93   


 


1/24/21 04:00    88/57    


 


1/24/21 04:00      Mechanical Ventilator  


 


1/24/21 04:00        100


 


1/24/21 04:00 96.5 84 24 90/58 (69) 93   


 


1/24/21 04:00  85      


 


1/24/21 03:35  85 21     100


 


1/24/21 03:30  85 20 94/54 (67) 93   


 


1/24/21 03:02  85 18 90/55 (67) 94   


 


1/24/21 03:00    96/55    


 


1/24/21 03:00  85 18 88/54 (65) 93   


 


1/24/21 02:30  86 21 95/57 (70) 94   


 


1/24/21 02:15  87 21 96/61 (73) 97   


 


1/24/21 02:00  88 22 92/60 (71) 98   


 


1/24/21 02:00    99/59    


 


1/24/21 01:45  90 23 94/57 (69) 84   


 


1/24/21 01:30  92 22 103/64 (77) 74   


 


1/24/21 01:30    110/75    


 


1/24/21 01:15  95 23 105/60 (75) 70   


 


1/24/21 01:00    96/71    


 


1/24/21 01:00  91 20 101/59 (73) 93   


 


1/24/21 00:45  91 20 90/52 (65) 94   


 


1/24/21 00:30  92 21 101/57 (72) 94   


 


1/24/21 00:30    103/69    


 


1/24/21 00:15  94 22 106/58 (74) 94   


 


1/24/21 00:00      Mechanical Ventilator  


 


1/24/21 00:00 100.3 97 28 115/62 (79) 94   


 


1/24/21 00:00    106/58    


 


1/24/21 00:00  91      


 


1/24/21 00:00        100


 


1/23/21 23:45  98 26 103/56 (72) 93   


 


1/23/21 23:30  98 21 91/53 (66) 95   


 


1/23/21 23:29    107/62    


 


1/23/21 23:20  101 25     100


 


1/23/21 23:00  100 26 105/57 (73) 90   


 


1/23/21 23:00    107/62    


 


1/23/21 22:30  100 24 111/60 (77) 91   


 


1/23/21 22:00    123/69    


 


1/23/21 22:00  98 24 109/60 (76) 91   


 


1/23/21 21:30  97 23 110/61 (77) 91   


 


1/23/21 21:00    117/75    


 


1/23/21 21:00  93 31 107/59 (75) 91   


 


1/23/21 20:30  95 23 110/58 (75) 86   


 


1/23/21 20:00      Mechanical Ventilator  


 


1/23/21 20:00 97.7 94 18 108/58 (75) 89   


 


1/23/21 20:00    102/55    


 


1/23/21 20:00  110      


 


1/23/21 20:00        100


 


1/23/21 19:30  94 22 132/69 (90) 92   


 


1/23/21 19:26    134/75    


 


1/23/21 19:00    122/72    


 


1/23/21 19:00  93 21 94/70 (78) 84   


 


1/23/21 18:56  93 18     100


 


1/23/21 18:30  97 19 129/66 (87) 79   


 


1/23/21 18:00  99 20 134/75 (94) 84   


 


1/23/21 18:00    132/69    


 


1/23/21 17:00    138/81    


 


1/23/21 17:00  100 20 122/63 (82) 80   


 


1/23/21 16:00      Mechanical Ventilator  


 


1/23/21 16:00        100


 


1/23/21 16:00 97.2 99 19 123/64 (83) 90   


 


1/23/21 16:00    122/63    


 


1/23/21 16:00  134      


 


1/23/21 15:16  100 18     60


 


1/23/21 15:00  97 18 108/59 (75) 84   


 


1/23/21 15:00    104/64    


 


1/23/21 14:00  94 30 153/76 (101) 83   


 


1/23/21 14:00    132/65    


 


1/23/21 13:00    133/76    


 


1/23/21 13:00  93 18 134/69 (90) 89   


 


1/23/21 12:00    68/35    


 


1/23/21 12:00  88      


 


1/23/21 12:00 96.2 91 15 153/78 (103) 75   


 


1/23/21 12:00      Mechanical Ventilator  


 


1/23/21 12:00        100


 


1/23/21 11:00  98 16     60


 


1/23/21 11:00    119/66    


 


1/23/21 11:00  99 15 119/69 (86) 89   








I&O











Intake and Output 0 


 


 1/23/21 1/24/21





 19:00 07:00


 


Intake Total 723.5 ml 1963.75 ml


 


Output Total 420 ml 1380 ml


 


Balance 303.5 ml 583.75 ml


 


  


 


IV Total 663.5 ml 1413.75 ml


 


Tube Feeding 60 ml 550 ml


 


Output Urine Total 420 ml 955 ml


 


Stool Total  300 ml


 


Chest Tube Drainage Total  125 ml








Dressing:  saturated


Cardiovascular:  RSR


Respiratory:  decreased breath sounds


Abdomen:  soft, flat, non-tender, non-distended, decreased bowel sounds


Extremities:  edema, no tenderness, no cyanosis





Laboratory Tests








Test


 1/24/21


06:00 1/24/21


09:50


 


White Blood Count


 7.7 K/UL


(4.8-10.8) 





 


Red Blood Count


 3.12 M/UL


(4.70-6.10)  L 





 


Hemoglobin


 8.9 G/DL


(14.2-18.0)  L 





 


Hematocrit


 29.0 %


(42.0-52.0)  L 





 


Mean Corpuscular Volume 93 FL (80-99)   


 


Mean Corpuscular Hemoglobin


 28.7 PG


(27.0-31.0) 





 


Mean Corpuscular Hemoglobin


Concent 30.8 G/DL


(32.0-36.0)  L 





 


Red Cell Distribution Width


 14.0 %


(11.6-14.8) 





 


Platelet Count


 98 K/UL


(150-450)  L 





 


Mean Platelet Volume


 10.2 FL


(6.5-10.1)  H 





 


Neutrophils (%) (Auto)


 % (45.0-75.0)


 





 


Lymphocytes (%) (Auto)


 % (20.0-45.0)


 





 


Monocytes (%) (Auto)  % (1.0-10.0)   


 


Eosinophils (%) (Auto)  % (0.0-3.0)   


 


Basophils (%) (Auto)  % (0.0-2.0)   


 


Differential Total Cells


Counted 100  


 





 


Neutrophils % (Manual) 84 % (45-75)  H 


 


Lymphocytes % (Manual) 7 % (20-45)  L 


 


Monocytes % (Manual) 1 % (1-10)   


 


Eosinophils % (Manual) 2 % (0-3)   


 


Basophils % (Manual) 0 % (0-2)   


 


Band Neutrophils 6 % (0-8)   


 


Platelet Estimate Decreased  L 


 


Platelet Morphology Normal   


 


Hypochromasia 1+   


 


Anisocytosis 1+   


 


Sodium Level


 142 MMOL/L


(136-145) 





 


Potassium Level


 3.7 MMOL/L


(3.5-5.1) 





 


Chloride Level


 107 MMOL/L


() 





 


Carbon Dioxide Level


 25 MMOL/L


(21-32) 





 


Anion Gap


 11 mmol/L


(5-15) 





 


Blood Urea Nitrogen


 38 mg/dL


(7-18)  H 





 


Creatinine


 1.3 MG/DL


(0.55-1.30) 





 


Estimat Glomerular Filtration


Rate 55.2 mL/min


(>60) 





 


Glucose Level


 198 MG/DL


()  H 





 


Calcium Level


 7.6 MG/DL


(8.5-10.1)  L 





 


Vancomycin Level Trough  Pending  











Plan


Problems:  


(1) Pneumonia due to COVID-19 virus


Assessment & Plan:  Interim endotracheal intubation, endotracheal tube tip in 

good position


approximately 4 cm above the tito. There are extensive bilateral infiltrates, 

which


are markedly increased from the prior study. Pleural spaces are probably clear, 

not


well demonstrated


Markedly increased and now extensive bilateral infiltrates 


cont as per pulm and iID





(2) Hypoxia


Assessment & Plan:  patient developing DTI. in current condition, critically ill

and declining potential inevitable decline 


all care precautions taken and will cont to monitor and assist with improvement 





(3) Abdominal pain


Assessment & Plan:  65M abd pain, septic, covid, intubated ons upport


currently abd exam benign but limited given condition


line bo mary noted


okay for meds and feeds


nutritional optimization 


DAILY ESTIMATED NEEDS:


Needs based on Critical Care/ 73kg abw 


22-28  kcals/kg 


2925-0700  total kcals


1.2-2  g protein/kg


  g total protein 


25-30  mL/kg


6086-8548  total fluid mLs





NUTRITION DIAGNOSIS:


Swallowing difficulty R/T respiratory failure as evidenced by pt now


orally intubated, OGT in place, NPO





 


CURRENT TF:NPO 





 





ENTERAL NUTRITION RECOMMENDATIONS:


Vital AF 1.2 @ 60ml/hr x 24 hrs  to provide 1440ml, 1728kcal, 116g prot, 1167ml 

free water 





* As medically appropriate, initiate critical care and carb controlled TF 

formula of Vital AF 1.2


* Initiate @ 20ml/hr x 6hrs, advance 10ml q 4-6 hrs as tolerated to goal rate


* HOB over 30 degrees/ water flush per MD


* Does not exceed est kcal needs w/ Propofol running @ 8.083ml/hr x 24 hrs 

(provides 213 lipid kcal)





 





ADDITIONAL RECOMMENDATIONS:


* Calibrated bedscale wt 


* Monitor BGs closely w/ Decadron and TF, need for long acting insulin 


* Monitor lytes, replete as needed  


* Monitor Propofol rate, need to adjust TF rate  





(4) Acute metabolic encephalopathy


(5) Pneumothorax on right


Assessment & Plan:  right ptx


chest tube placed


decreased air


stable ptx


cont tube to suction


Endotracheal tube tip projects at the level of the thoracic inlet.


Orogastric tube tip projects at the level of the gastric fundus. Again 

demonstrated


is extensive subcutaneous emphysema, which appears somewhat worse on the left. 

Right


chest tube remains in place. Small right medial and apical pneumothorax are 

present,


slightly more conspicuous than on the previous exam, still small, somewhat 

difficult


to identify due to overlying subcutaneous emphysema. There is probably a tiny 

left


apical pneumothorax as well. Previously demonstrated pneumomediastinum has 

improved


considerably although still present. Bilateral infiltrates appear worse on the 

right.


 


Impression: Equivocally slightly increased but still small right pneumothorax.


 


Suspect tiny left apical pneumothorax


 


Improving pneumomediastinum


 


Worsening subcutaneous emphysema


 


Worsening right and stable left lung infiltrates 





 Right chest tube remains. There is still a small apical pneumothorax. Tiny


left apical pneumothorax persists, unchanged. There is decreased 

pneumomediastinum.


Subcutaneous gas has decreased as well. Bilateral infiltrates are unchanged.


Endotracheal tube remains at the level of the thoracic inlet. Orogastric tube is


again demonstrated, tip not well-visualized but probably stable in position


 


Impression: Stable tiny bilateral apical pneumothoraces


 


Unchanged bilateral infiltrates


 


Decreased pneumomediastinum and subcutaneous emphysema














Norberto Vazquez                Jan 24, 2021 10:15

## 2021-01-24 NOTE — NUR
NURSE NOTES:



Pt remains on Levophed 4mcg/min to maintain SBP >90. Pt suctioned. O2sat remains 90% on 100% 
FiO2. No distress noted.

## 2021-01-24 NOTE — NUR
NURSE NOTES:

Tapering pressors down accordingly. Patients blood sugar noted to be 226, NovoLog 9 units 
and sliding scale given per as ordered. Dr. Mijares who is covering for Navirgiliomi rounding on 
patient, updated MD on patients sugars, vitals and overall other issues. No orders given at 
this time.

## 2021-01-24 NOTE — NUR
NURSE NOTES:



Pt fully cleaned and linens changed. Oral care done. Tube feeding changed; continues to run 
@ 55mL/hr. Pt remains on cooling blanket. No distress noted

## 2021-01-24 NOTE — CARDIAC ELECTROPHYSIOLOGY PN
Assessment/Plan


Assessment/Plan


1. Atrial fibrillation with rapid ventricular response.      


    On digoxin 0.125 mg p.o. daily and Amiodarone 400 po q12  


      In SR. Digoxin level 0.6.   





2. Hypernatremia with sodium 151 with azotemia, BUN of 30, creatinine 0.8.   On 

iv fluids





3. Right pneumothorax, status post chest tube with subcutaneous emphysema.





4. COVID-19 pneumonia, 100% FiO2 and PEEP of 12, on remdesivir and Solu-Medrol 

per ID.


5. Diabetes.


6. Hypotension due to dehydration and sepsis. On iv fluid and Levophed 4 Mcg


7. Hypernatremia and azotemia.   





DW RN





Subjective


Subjective


In ICU on 100% Fio2 and PEEP 12 and Levo decreased to 4 Mcg 


 Right chest tube had minimal drainage. In SR on Dig and Amiodarone





Objective





Last 24 Hour Vital Signs








  Date Time  Temp Pulse Resp B/P (MAP) Pulse Ox O2 Delivery O2 Flow Rate FiO2


 


1/24/21 15:00  90 18 92/54 (67) 97   


 


1/24/21 15:00    92/54    


 


1/24/21 14:00  90 22 90/54 (66) 96   


 


1/24/21 14:00    90/54    


 


1/24/21 13:30  94 23 87/57 (67) 94   


 


1/24/21 13:00  96 28 88/54 (65) 90   


 


1/24/21 12:00      Mechanical Ventilator  


 


1/24/21 12:00        100


 


1/24/21 12:00    90/55    


 


1/24/21 12:00 98.6 94 24 90/55 (67) 94   


 


1/24/21 12:00  94      


 


1/24/21 11:00  93 27 86/53 (64) 93   


 


1/24/21 11:00    85/53    


 


1/24/21 10:30  93 21 84/50 (61) 94   


 


1/24/21 10:00    85/49    


 


1/24/21 10:00  90 27 78/51 (60) 94   


 


1/24/21 09:30  86 23 114/62 (79) 93   


 


1/24/21 09:17  88      


 


1/24/21 09:00  87 25 88/55 (66) 90   


 


1/24/21 09:00    88/55    


 


1/24/21 08:30  87 22 92/55 (67) 91   


 


1/24/21 08:00      Mechanical Ventilator  


 


1/24/21 08:00 97.9 87 23 92/57 (69) 90   


 


1/24/21 08:00    92/57    


 


1/24/21 08:00  87      


 


1/24/21 08:00        100


 


1/24/21 07:15  86 22 91/57 (68) 90   


 


1/24/21 07:00  85 20 88/55 (66) 91   


 


1/24/21 07:00    88/55    


 


1/24/21 06:30  84 24 83/54 (64) 90   


 


1/24/21 06:00  85 21 86/53 (64) 89   


 


1/24/21 06:00    85/57    


 


1/24/21 05:30  84 21 90/54 (66) 90   


 


1/24/21 05:00    98/52    


 


1/24/21 05:00  84 19 86/57 (67) 91   


 


1/24/21 04:30  84 19 86/55 (65) 92   


 


1/24/21 04:15  83 19 85/55 (65) 93   


 


1/24/21 04:00    88/57    


 


1/24/21 04:00      Mechanical Ventilator  


 


1/24/21 04:00        100


 


1/24/21 04:00 96.5 84 24 90/58 (69) 93   


 


1/24/21 04:00  85      


 


1/24/21 03:35  85 21     100


 


1/24/21 03:30  85 20 94/54 (67) 93   


 


1/24/21 03:02  85 18 90/55 (67) 94   


 


1/24/21 03:00    96/55    


 


1/24/21 03:00  85 18 88/54 (65) 93   


 


1/24/21 02:30  86 21 95/57 (70) 94   


 


1/24/21 02:15  87 21 96/61 (73) 97   


 


1/24/21 02:00  88 22 92/60 (71) 98   


 


1/24/21 02:00    99/59    


 


1/24/21 01:45  90 23 94/57 (69) 84   


 


1/24/21 01:30  92 22 103/64 (77) 74   


 


1/24/21 01:30    110/75    


 


1/24/21 01:15  95 23 105/60 (75) 70   


 


1/24/21 01:00    96/71    


 


1/24/21 01:00  91 20 101/59 (73) 93   


 


1/24/21 00:45  91 20 90/52 (65) 94   


 


1/24/21 00:30  92 21 101/57 (72) 94   


 


1/24/21 00:30    103/69    


 


1/24/21 00:15  94 22 106/58 (74) 94   


 


1/24/21 00:00      Mechanical Ventilator  


 


1/24/21 00:00 100.3 97 28 115/62 (79) 94   


 


1/24/21 00:00    106/58    


 


1/24/21 00:00  91      


 


1/24/21 00:00        100


 


1/23/21 23:45  98 26 103/56 (72) 93   


 


1/23/21 23:30  98 21 91/53 (66) 95   


 


1/23/21 23:29    107/62    


 


1/23/21 23:20  101 25     100


 


1/23/21 23:00  100 26 105/57 (73) 90   


 


1/23/21 23:00    107/62    


 


1/23/21 22:30  100 24 111/60 (77) 91   


 


1/23/21 22:00    123/69    


 


1/23/21 22:00  98 24 109/60 (76) 91   


 


1/23/21 21:30  97 23 110/61 (77) 91   


 


1/23/21 21:00    117/75    


 


1/23/21 21:00  93 31 107/59 (75) 91   


 


1/23/21 20:30  95 23 110/58 (75) 86   


 


1/23/21 20:00      Mechanical Ventilator  


 


1/23/21 20:00 97.7 94 18 108/58 (75) 89   


 


1/23/21 20:00    102/55    


 


1/23/21 20:00  110      


 


1/23/21 20:00        100


 


1/23/21 19:30  94 22 132/69 (90) 92   


 


1/23/21 19:26    134/75    


 


1/23/21 19:00    122/72    


 


1/23/21 19:00  93 21 94/70 (78) 84   


 


1/23/21 18:56  93 18     100


 


1/23/21 18:30  97 19 129/66 (87) 79   


 


1/23/21 18:00  99 20 134/75 (94) 84   


 


1/23/21 18:00    132/69    


 


1/23/21 17:00    138/81    


 


1/23/21 17:00  100 20 122/63 (82) 80   

















Intake and Output  


 


 1/23/21 1/24/21





 19:00 07:00


 


Intake Total 723.5 ml 1963.75 ml


 


Output Total 420 ml 1380 ml


 


Balance 303.5 ml 583.75 ml


 


  


 


IV Total 663.5 ml 1413.75 ml


 


Tube Feeding 60 ml 550 ml


 


Output Urine Total 420 ml 955 ml


 


Stool Total  300 ml


 


Chest Tube Drainage Total  125 ml











Laboratory Tests








Test


 1/24/21


06:00 1/24/21


09:50


 


White Blood Count


 7.7 K/UL


(4.8-10.8) 





 


Red Blood Count


 3.12 M/UL


(4.70-6.10)  L 





 


Hemoglobin


 8.9 G/DL


(14.2-18.0)  L 





 


Hematocrit


 29.0 %


(42.0-52.0)  L 





 


Mean Corpuscular Volume 93 FL (80-99)   


 


Mean Corpuscular Hemoglobin


 28.7 PG


(27.0-31.0) 





 


Mean Corpuscular Hemoglobin


Concent 30.8 G/DL


(32.0-36.0)  L 





 


Red Cell Distribution Width


 14.0 %


(11.6-14.8) 





 


Platelet Count


 98 K/UL


(150-450)  L 





 


Mean Platelet Volume


 10.2 FL


(6.5-10.1)  H 





 


Neutrophils (%) (Auto)


 % (45.0-75.0)


 





 


Lymphocytes (%) (Auto)


 % (20.0-45.0)


 





 


Monocytes (%) (Auto)  % (1.0-10.0)   


 


Eosinophils (%) (Auto)  % (0.0-3.0)   


 


Basophils (%) (Auto)  % (0.0-2.0)   


 


Differential Total Cells


Counted 100  


 





 


Neutrophils % (Manual) 84 % (45-75)  H 


 


Lymphocytes % (Manual) 7 % (20-45)  L 


 


Monocytes % (Manual) 1 % (1-10)   


 


Eosinophils % (Manual) 2 % (0-3)   


 


Basophils % (Manual) 0 % (0-2)   


 


Band Neutrophils 6 % (0-8)   


 


Platelet Estimate Decreased  L 


 


Platelet Morphology Normal   


 


Hypochromasia 1+   


 


Anisocytosis 1+   


 


Sodium Level


 142 MMOL/L


(136-145) 





 


Potassium Level


 3.7 MMOL/L


(3.5-5.1) 





 


Chloride Level


 107 MMOL/L


() 





 


Carbon Dioxide Level


 25 MMOL/L


(21-32) 





 


Anion Gap


 11 mmol/L


(5-15) 





 


Blood Urea Nitrogen


 38 mg/dL


(7-18)  H 





 


Creatinine


 1.3 MG/DL


(0.55-1.30) 





 


Estimat Glomerular Filtration


Rate 55.2 mL/min


(>60) 





 


Glucose Level


 198 MG/DL


()  H 





 


Calcium Level


 7.6 MG/DL


(8.5-10.1)  L 





 


Vancomycin Level Trough


 


 37.0 ug/mL


(5.0-12.0)  H








Objective


HEENT: No JVD. Orally intubated


LUNGS:  Have decreased breath sounds with the chest tube on the right side


and subcutaneous emphysema.


CARDIOVASCULAR:  Regular S1, S2 with


no gallop.


ABDOMEN:  Soft.


EXTREMITIES:  A 1+ pitting edema.











Jake George MD               Jan 24, 2021 16:03

## 2021-01-24 NOTE — NUR
NURSE NOTES:



Dr Vazquez at bedside, assessing pt. Updated him on pt's current condition. No new orders 
given.

## 2021-01-24 NOTE — NUR
NURSE NOTES:

125ml of serous fluid from chest tube for overnight. Patient was doing well at 2mcg/min of 
Levophed, but had to increase to 4 mcg/min to keep MAP above 60. Patient tolerating vent 
settings well. No acute distress at this time. Glucose noted to be 213, coverage given. 
Remains afebrile at this time.

## 2021-01-24 NOTE — NUR
NURSE NOTES:

Bed bath given, labs drawn and sent with phlebotomist, patient remains afebrile at this 
time. Oral care performed and suctioned. Passive range of motion performed. Repositioned 
patient, heels offloaded. Chest tube remains in place-dressing changed, serous colored fluid 
noted coming from chest tube. Sacral dressing changed. Will continue to monitor.

## 2021-01-24 NOTE — NUR
NURSE NOTES:

PATIENT  WEAKLY MOVEMENT TO TACTILE STIMULI, ON ETT TO VENT, AC16//FIO2 100%/PEEP 12, 
O2 SATURATION 94% NOTED, HR 90'S/MIN SR, CHEST TUBE TO RIGHT UPPER SIDE, INTACT AND 
SEROSANGUINEOUS DISCHARGE OUTED,   OGT INTACT AND PATENT ONGOING VITAL AF 1.2 AT 55ML/HR, NO 
RESIDUE NOTED, KEPT HOB 30 DEGREES AND ASPIRATION PRECAUTION,  RECTAL TUBE INTACT AND 
PATENT, BROWN STOOL OUTED, F/C INTACT AND PATENT, DARK RAI COLOR URINE OUTED, PICC LINE TO 
LEFT UPPER ARM, INTACT AND PATENT, ONGOING LEVOPHED 4MCG/MIN AND IV FLUID D5W AT 50ML/HR  
VIA PICC LINE,  ON P200 BED, MADE LOWER BED POSITION , ON BED ALARM AND LOCKED, WILL 
CONTINUE TO MONITOR.

## 2021-01-24 NOTE — NUR
NURSE NOTES:



Dr Travis at bedside assessing pt. Updated him on pt's current condition. No new orders 
given.

## 2021-01-24 NOTE — NUR
NURSE NOTES:



Report received from SUZI Dave. Patient noted facial grimacing when touched. Not opening 
eyes, not tracking. Patient is orally intubated ETT 7.5; 26 cm @ the lip line with settings 
of AC 16, , FiO2 100%, PEEP 12, O2sat 90% on cardiac monitor. Chest tube on R lateral 
side, patent and intact, to -20 suction. On feeding vital AF running 55 mL/hr NGT, patent, 
and intact. Rectal tube noted, draining brown loose stool. Stern catheter, patent and 
intact, draining with gravity to urometer. Patient has a L UA PICC line running Levophed @ 
4mcg/min to maintain SBP >90 and D5W at 50 mL/hr. HOB elevated, bed is on lowest position, 
locked, side rails up (x2). Will continue to monitor. Will resume plan of care.

## 2021-01-24 NOTE — NUR
NURSE NOTES:

Tapering pressor down accordingly. Patient given oral care. Patient opens eyes upon stimuli. 
Upper extremities 1/2 and lower extremities 0/5. Patient repositioned and oral care 
provided.

## 2021-01-24 NOTE — PULMONOLOGY PROGRESS NOTE
Subjective


ROS Limited/Unobtainable:  Yes


Interval Events:  Noted sub cut emphsema  On Versed drip. R CT placed


Constitutional:  Reports: fever, other - last night Tp=159.7


HEENT:  Repors: no symptoms


Respiratory:  Reports: shortness of breath - better


Cardiovascular:  Reports: no symptoms


Gastrointestinal/Abdominal:  Reports: no symptoms


Allergies:  


Coded Allergies:  


     No Known Allergies (Unverified , 6/11/19)


All Systems:  reviewed and negative except above





Objective





Last 24 Hour Vital Signs








  Date Time  Temp Pulse Resp B/P (MAP) Pulse Ox O2 Delivery O2 Flow Rate FiO2


 


1/24/21 07:15  86 22 91/57 (68) 90   


 


1/24/21 07:00  85 20 88/55 (66) 91   


 


1/24/21 06:30  84 24 83/54 (64) 90   


 


1/24/21 06:00  85 21 86/53 (64) 89   


 


1/24/21 06:00    85/57    


 


1/24/21 05:30  84 21 90/54 (66) 90   


 


1/24/21 05:00    98/52    


 


1/24/21 05:00  84 19 86/57 (67) 91   


 


1/24/21 04:30  84 19 86/55 (65) 92   


 


1/24/21 04:15  83 19 85/55 (65) 93   


 


1/24/21 04:00    88/57    


 


1/24/21 04:00      Mechanical Ventilator  


 


1/24/21 04:00        100


 


1/24/21 04:00 96.5 84 24 90/58 (69) 93   


 


1/24/21 04:00  85      


 


1/24/21 03:35  85 21     100


 


1/24/21 03:30  85 20 94/54 (67) 93   


 


1/24/21 03:02  85 18 90/55 (67) 94   


 


1/24/21 03:00    96/55    


 


1/24/21 03:00  85 18 88/54 (65) 93   


 


1/24/21 02:30  86 21 95/57 (70) 94   


 


1/24/21 02:15  87 21 96/61 (73) 97   


 


1/24/21 02:00  88 22 92/60 (71) 98   


 


1/24/21 02:00    99/59    


 


1/24/21 01:45  90 23 94/57 (69) 84   


 


1/24/21 01:30  92 22 103/64 (77) 74   


 


1/24/21 01:30    110/75    


 


1/24/21 01:15  95 23 105/60 (75) 70   


 


1/24/21 01:00    96/71    


 


1/24/21 01:00  91 20 101/59 (73) 93   


 


1/24/21 00:45  91 20 90/52 (65) 94   


 


1/24/21 00:30  92 21 101/57 (72) 94   


 


1/24/21 00:30    103/69    


 


1/24/21 00:15  94 22 106/58 (74) 94   


 


1/24/21 00:00      Mechanical Ventilator  


 


1/24/21 00:00 100.3 97 28 115/62 (79) 94   


 


1/24/21 00:00    106/58    


 


1/24/21 00:00  91      


 


1/24/21 00:00        100


 


1/23/21 23:45  98 26 103/56 (72) 93   


 


1/23/21 23:30  98 21 91/53 (66) 95   


 


1/23/21 23:29    107/62    


 


1/23/21 23:20  101 25     100


 


1/23/21 23:00  100 26 105/57 (73) 90   


 


1/23/21 23:00    107/62    


 


1/23/21 22:30  100 24 111/60 (77) 91   


 


1/23/21 22:00    123/69    


 


1/23/21 22:00  98 24 109/60 (76) 91   


 


1/23/21 21:30  97 23 110/61 (77) 91   


 


1/23/21 21:00    117/75    


 


1/23/21 21:00  93 31 107/59 (75) 91   


 


1/23/21 20:30  95 23 110/58 (75) 86   


 


1/23/21 20:00      Mechanical Ventilator  


 


1/23/21 20:00 97.7 94 18 108/58 (75) 89   


 


1/23/21 20:00    102/55    


 


1/23/21 20:00  110      


 


1/23/21 20:00        100


 


1/23/21 19:30  94 22 132/69 (90) 92   


 


1/23/21 19:26    134/75    


 


1/23/21 19:00    122/72    


 


1/23/21 19:00  93 21 94/70 (78) 84   


 


1/23/21 18:56  93 18     100


 


1/23/21 18:30  97 19 129/66 (87) 79   


 


1/23/21 18:00  99 20 134/75 (94) 84   


 


1/23/21 18:00    132/69    


 


1/23/21 17:00    138/81    


 


1/23/21 17:00  100 20 122/63 (82) 80   


 


1/23/21 16:00      Mechanical Ventilator  


 


1/23/21 16:00        100


 


1/23/21 16:00 97.2 99 19 123/64 (83) 90   


 


1/23/21 16:00    122/63    


 


1/23/21 16:00  134      


 


1/23/21 15:16  100 18     60


 


1/23/21 15:00  97 18 108/59 (75) 84   


 


1/23/21 15:00    104/64    


 


1/23/21 14:00  94 30 153/76 (101) 83   


 


1/23/21 14:00    132/65    


 


1/23/21 13:00    133/76    


 


1/23/21 13:00  93 18 134/69 (90) 89   


 


1/23/21 12:00    68/35    


 


1/23/21 12:00  88      


 


1/23/21 12:00 96.2 91 15 153/78 (103) 75   


 


1/23/21 12:00      Mechanical Ventilator  


 


1/23/21 12:00        100


 


1/23/21 11:00  98 16     60


 


1/23/21 11:00    119/66    


 


1/23/21 11:00  99 15 119/69 (86) 89   


 


1/23/21 10:00  98 17 95/53 (67) 95   


 


1/23/21 09:43    66/38    


 


1/23/21 09:00  100 20 139/71 (93) 95   


 


1/23/21 08:55  100      


 


1/23/21 08:00        100


 


1/23/21 08:00  99 21 129/73 (91) 94   


 


1/23/21 08:00      Mechanical Ventilator  

















Intake and Output  


 


 1/23/21 1/24/21





 19:00 07:00


 


Intake Total 723.5 ml 1871.25 ml


 


Output Total 420 ml 1380 ml


 


Balance 303.5 ml 491.25 ml


 


  


 


IV Total 663.5 ml 1321.25 ml


 


Tube Feeding 60 ml 550 ml


 


Output Urine Total 420 ml 955 ml


 


Stool Total  300 ml


 


Chest Tube Drainage Total  125 ml








Objective


On Versed drip


General Appearance:  no acute distress


HEENT:  atraumatic


Respiratory:  lungs clear, decreased breath sounds


Cardiovascular:  normal rate, regular rhythm


Abdomen:  soft, non tender, no organomegaly


Laboratory Tests


1/23/21 08:15: 


Arterial Blood pH 7.249*L, Arterial Blood Partial Pressure CO2 61.1*H, Arterial 

Blood Partial Pressure O2 46.8*L, Arterial Blood HCO3 26.1H, Arterial Blood Oxy

gen Saturation 81.3*L, Arterial Blood Base Excess -1.9, Abelardo Test Positive





Current Medications








 Medications


  (Trade)  Dose


 Ordered  Sig/Julisa


 Route


 PRN Reason  Start Time


 Stop Time Status Last Admin


Dose Admin


 


 Acetaminophen


  (Tylenol)  650 mg  Q4H  PRN


 GT


 Temp >100.5  1/15/21 17:15


 2/14/21 17:14  1/22/21 11:05





 


 Amiodarone HCl


  (Cordarone)  400 mg  EVERY 12  HOURS


 NG


   1/19/21 21:00


 4/19/21 20:59  1/23/21 20:42





 


 Chlorhexidine


 Gluconate


  (Vanessa-Hex 2%)  1 applic  DAILY@2000


 TOPIC


   1/19/21 20:00


 4/19/21 19:59  1/23/21 20:00





 


 Dextrose  1,000 ml @ 


 50 mls/hr  Q20H


 IV


   1/18/21 10:00


 2/17/21 09:59  1/24/21 05:22





 


 Dextrose


  (Dextrose 50%)  25 ml  Q30M  PRN


 IV


 Hypoglycemia  1/9/21 13:30


 4/9/21 13:29   





 


 Dextrose


  (Dextrose 50%)  50 ml  Q30M  PRN


 IV


 Hypoglycemia  1/9/21 13:30


 4/9/21 13:29   





 


 Digoxin


  (Lanoxin)  0.125 mg  DAILY


 NG


   1/21/21 09:00


 4/21/21 08:59  1/23/21 08:55





 


 Docusate Sodium


  (Colace)  100 mg  TIDPRN  PRN


 GT


 Constipation  1/12/21 01:15


 2/11/21 01:14  1/12/21 01:42





 


 Enoxaparin Sodium


  (Lovenox)  80 mg  EVERY 12  HOURS


 SUBQ


   1/2/21 21:00


 4/2/21 20:59  1/23/21 20:43





 


 Insulin Aspart


  (NovoLOG)    Q6HR


 SUBQ


   1/14/21 12:00


 4/9/21 17:59  1/24/21 05:22





 


 Insulin Aspart


  (NovoLOG)  9 units  EVERY 6  HOURS


 SUBQ


   1/21/21 12:00


 4/15/21 06:59  1/24/21 05:23





 


 Insulin Detemir


  (Levemir)  20 units  Q12HR


 SUBQ


   1/14/21 09:00


 4/12/21 08:59  1/23/21 20:42





 


 Norepinephrine


 Bitartrate 8 mg/


 Sodium Chloride  500 ml @ 0


 mls/hr  Q24H  PRN


 IV


 For hypotension  1/21/21 22:30


 1/24/21 22:29  1/23/21 23:29





 


 Pantoprazole


  (Protonix)  40 mg  DAILY


 IVP


   1/14/21 09:00


 2/13/21 08:59  1/23/21 08:54





 


 Piperacillin Sod/


 Tazobactam Sod


 3.375 gm/Sodium


 Chloride  110 ml @ 


 27.5 mls/hr  EVERY 8  HOURS


 IVPB


   1/23/21 14:00


 1/30/21 13:59  1/24/21 05:22





 


 Quetiapine


 Fumarate


  (SEROqueL)  50 mg  Q12HR


 ORAL


   1/4/21 21:00


 2/18/21 20:59  1/23/21 20:42





 


 Sodium Chloride  500 ml @ 


 999 mls/hr  Q31M PRN


 IV


 For hypotension  1/15/21 18:30


 2/14/21 18:29   





 


 Vancomycin HCl  300 ml @ 


 150 mls/hr  Q12H


 IVPB


   1/16/21 11:00


 1/25/21 10:59  1/23/21 23:04





 


 Vancomycin HCl


  ([facility] pharmacy


 to dose)  1 ea  DAILY  PRN


 MISC


 Per rx protocol  1/14/21 09:15


 2/13/21 09:14   














Assessment/Plan


Assessment/Plan


1. COVID-19 pneumonia.


   - on Decadron, azithromycin, and ceftriaxone


   - Intubated now; will continue AC mode for now


          -Currently 100% FiO2. PEEP 12; SaO2 96%


             - Has R apical PTX and subcut emphysema


             - S/p R CT placement


2. Diabetes mellitus.; 


3. Elevated inflammatory markers.


4. High D-dimer. On full dose Lovenox


5. Hyper natremia; will initiate D5W


6. Hyperglycemia.


   - BG control


7. Hypoxemia., secondary to #1; improved





DVT prophylaxis   On Lovenox.


Sedated; advised RN to increase sedation








Hypotensive this AM


Placed in Trendelenburg


Will decrease PEEP to 12











Sung Lemos MD           Jan 24, 2021 07:34

## 2021-01-24 NOTE — SURGERY PROGRESS NOTE
Surgery Progress Note


Subjective


Procedure Performed


Right tube thoracostomy chest tube insertion


Additional Comments


ill appearing


no nv


ct stable





Objective





Last 24 Hour Vital Signs








  Date Time  Temp Pulse Resp B/P (MAP) Pulse Ox O2 Delivery O2 Flow Rate FiO2


 


1/24/21 10:30  93 21 84/50 (61) 94   


 


1/24/21 10:00  90 27 78/51 (60) 94   


 


1/24/21 09:30  86 23 114/62 (79) 93   


 


1/24/21 09:17  88      


 


1/24/21 09:00  87 25 88/55 (66) 90   


 


1/24/21 08:30  87 22 92/55 (67) 91   


 


1/24/21 08:00      Mechanical Ventilator  


 


1/24/21 08:00 97.9 87 23 92/57 (69) 90   


 


1/24/21 08:00    92/57    


 


1/24/21 08:00        100


 


1/24/21 07:15  86 22 91/57 (68) 90   


 


1/24/21 07:00  85 20 88/55 (66) 91   


 


1/24/21 07:00    88/55    


 


1/24/21 06:30  84 24 83/54 (64) 90   


 


1/24/21 06:00  85 21 86/53 (64) 89   


 


1/24/21 06:00    85/57    


 


1/24/21 05:30  84 21 90/54 (66) 90   


 


1/24/21 05:00    98/52    


 


1/24/21 05:00  84 19 86/57 (67) 91   


 


1/24/21 04:30  84 19 86/55 (65) 92   


 


1/24/21 04:15  83 19 85/55 (65) 93   


 


1/24/21 04:00    88/57    


 


1/24/21 04:00      Mechanical Ventilator  


 


1/24/21 04:00        100


 


1/24/21 04:00 96.5 84 24 90/58 (69) 93   


 


1/24/21 04:00  85      


 


1/24/21 03:35  85 21     100


 


1/24/21 03:30  85 20 94/54 (67) 93   


 


1/24/21 03:02  85 18 90/55 (67) 94   


 


1/24/21 03:00    96/55    


 


1/24/21 03:00  85 18 88/54 (65) 93   


 


1/24/21 02:30  86 21 95/57 (70) 94   


 


1/24/21 02:15  87 21 96/61 (73) 97   


 


1/24/21 02:00  88 22 92/60 (71) 98   


 


1/24/21 02:00    99/59    


 


1/24/21 01:45  90 23 94/57 (69) 84   


 


1/24/21 01:30  92 22 103/64 (77) 74   


 


1/24/21 01:30    110/75    


 


1/24/21 01:15  95 23 105/60 (75) 70   


 


1/24/21 01:00    96/71    


 


1/24/21 01:00  91 20 101/59 (73) 93   


 


1/24/21 00:45  91 20 90/52 (65) 94   


 


1/24/21 00:30  92 21 101/57 (72) 94   


 


1/24/21 00:30    103/69    


 


1/24/21 00:15  94 22 106/58 (74) 94   


 


1/24/21 00:00      Mechanical Ventilator  


 


1/24/21 00:00 100.3 97 28 115/62 (79) 94   


 


1/24/21 00:00    106/58    


 


1/24/21 00:00  91      


 


1/24/21 00:00        100


 


1/23/21 23:45  98 26 103/56 (72) 93   


 


1/23/21 23:30  98 21 91/53 (66) 95   


 


1/23/21 23:29    107/62    


 


1/23/21 23:20  101 25     100


 


1/23/21 23:00  100 26 105/57 (73) 90   


 


1/23/21 23:00    107/62    


 


1/23/21 22:30  100 24 111/60 (77) 91   


 


1/23/21 22:00    123/69    


 


1/23/21 22:00  98 24 109/60 (76) 91   


 


1/23/21 21:30  97 23 110/61 (77) 91   


 


1/23/21 21:00    117/75    


 


1/23/21 21:00  93 31 107/59 (75) 91   


 


1/23/21 20:30  95 23 110/58 (75) 86   


 


1/23/21 20:00      Mechanical Ventilator  


 


1/23/21 20:00 97.7 94 18 108/58 (75) 89   


 


1/23/21 20:00    102/55    


 


1/23/21 20:00  110      


 


1/23/21 20:00        100


 


1/23/21 19:30  94 22 132/69 (90) 92   


 


1/23/21 19:26    134/75    


 


1/23/21 19:00    122/72    


 


1/23/21 19:00  93 21 94/70 (78) 84   


 


1/23/21 18:56  93 18     100


 


1/23/21 18:30  97 19 129/66 (87) 79   


 


1/23/21 18:00  99 20 134/75 (94) 84   


 


1/23/21 18:00    132/69    


 


1/23/21 17:00    138/81    


 


1/23/21 17:00  100 20 122/63 (82) 80   


 


1/23/21 16:00      Mechanical Ventilator  


 


1/23/21 16:00        100


 


1/23/21 16:00 97.2 99 19 123/64 (83) 90   


 


1/23/21 16:00    122/63    


 


1/23/21 16:00  134      


 


1/23/21 15:16  100 18     60


 


1/23/21 15:00  97 18 108/59 (75) 84   


 


1/23/21 15:00    104/64    


 


1/23/21 14:00  94 30 153/76 (101) 83   


 


1/23/21 14:00    132/65    


 


1/23/21 13:00    133/76    


 


1/23/21 13:00  93 18 134/69 (90) 89   


 


1/23/21 12:00    68/35    


 


1/23/21 12:00  88      


 


1/23/21 12:00 96.2 91 15 153/78 (103) 75   


 


1/23/21 12:00      Mechanical Ventilator  


 


1/23/21 12:00        100








I&O











Intake and Output  


 


 1/23/21 1/24/21





 19:00 07:00


 


Intake Total 723.5 ml 1963.75 ml


 


Output Total 420 ml 1380 ml


 


Balance 303.5 ml 583.75 ml


 


  


 


IV Total 663.5 ml 1413.75 ml


 


Tube Feeding 60 ml 550 ml


 


Output Urine Total 420 ml 955 ml


 


Stool Total  300 ml


 


Chest Tube Drainage Total  125 ml








Dressing:  saturated


Cardiovascular:  RSR


Respiratory:  decreased breath sounds


Abdomen:  soft, non-tender, present bowel sounds


Extremities:  no tenderness, no cyanosis





Laboratory Tests








Test


 1/24/21


06:00 1/24/21


09:50


 


White Blood Count


 7.7 K/UL


(4.8-10.8) 





 


Red Blood Count


 3.12 M/UL


(4.70-6.10)  L 





 


Hemoglobin


 8.9 G/DL


(14.2-18.0)  L 





 


Hematocrit


 29.0 %


(42.0-52.0)  L 





 


Mean Corpuscular Volume 93 FL (80-99)   


 


Mean Corpuscular Hemoglobin


 28.7 PG


(27.0-31.0) 





 


Mean Corpuscular Hemoglobin


Concent 30.8 G/DL


(32.0-36.0)  L 





 


Red Cell Distribution Width


 14.0 %


(11.6-14.8) 





 


Platelet Count


 98 K/UL


(150-450)  L 





 


Mean Platelet Volume


 10.2 FL


(6.5-10.1)  H 





 


Neutrophils (%) (Auto)


 % (45.0-75.0)


 





 


Lymphocytes (%) (Auto)


 % (20.0-45.0)


 





 


Monocytes (%) (Auto)  % (1.0-10.0)   


 


Eosinophils (%) (Auto)  % (0.0-3.0)   


 


Basophils (%) (Auto)  % (0.0-2.0)   


 


Differential Total Cells


Counted 100  


 





 


Neutrophils % (Manual) 84 % (45-75)  H 


 


Lymphocytes % (Manual) 7 % (20-45)  L 


 


Monocytes % (Manual) 1 % (1-10)   


 


Eosinophils % (Manual) 2 % (0-3)   


 


Basophils % (Manual) 0 % (0-2)   


 


Band Neutrophils 6 % (0-8)   


 


Platelet Estimate Decreased  L 


 


Platelet Morphology Normal   


 


Hypochromasia 1+   


 


Anisocytosis 1+   


 


Sodium Level


 142 MMOL/L


(136-145) 





 


Potassium Level


 3.7 MMOL/L


(3.5-5.1) 





 


Chloride Level


 107 MMOL/L


() 





 


Carbon Dioxide Level


 25 MMOL/L


(21-32) 





 


Anion Gap


 11 mmol/L


(5-15) 





 


Blood Urea Nitrogen


 38 mg/dL


(7-18)  H 





 


Creatinine


 1.3 MG/DL


(0.55-1.30) 





 


Estimat Glomerular Filtration


Rate 55.2 mL/min


(>60) 





 


Glucose Level


 198 MG/DL


()  H 





 


Calcium Level


 7.6 MG/DL


(8.5-10.1)  L 





 


Vancomycin Level Trough


 


 37.0 ug/mL


(5.0-12.0)  H











Plan


Problems:  


(1) Pneumonia due to COVID-19 virus


Assessment & Plan:  Interim endotracheal intubation, endotracheal tube tip in 

good position


approximately 4 cm above the tito. There are extensive bilateral infiltrates, 

which


are markedly increased from the prior study. Pleural spaces are probably clear, 

not


well demonstrated


Markedly increased and now extensive bilateral infiltrates 


cont as per pulm and iID





(2) Hypoxia


Assessment & Plan:  patient developing DTI. in current condition, critically ill

and declining potential inevitable decline 


all care precautions taken and will cont to monitor and assist with improvement 





(3) Abdominal pain


Assessment & Plan:  65M abd pain, septic, covid, intubated ons upport


currently abd exam benign but limited given condition


line bo mary noted


okay for meds and feeds


nutritional optimization 


DAILY ESTIMATED NEEDS:


Needs based on Critical Care/ 73kg abw 


22-28  kcals/kg 


4100-1836  total kcals


1.2-2  g protein/kg


  g total protein 


25-30  mL/kg


9637-4333  total fluid mLs





NUTRITION DIAGNOSIS:


Swallowing difficulty R/T respiratory failure as evidenced by pt now


orally intubated, OGT in place, NPO





 


CURRENT TF:NPO 





 





ENTERAL NUTRITION RECOMMENDATIONS:


Vital AF 1.2 @ 60ml/hr x 24 hrs  to provide 1440ml, 1728kcal, 116g prot, 1167ml 

free water 





* As medically appropriate, initiate critical care and carb controlled TF 

formula of Vital AF 1.2


* Initiate @ 20ml/hr x 6hrs, advance 10ml q 4-6 hrs as tolerated to goal rate


* HOB over 30 degrees/ water flush per MD


* Does not exceed est kcal needs w/ Propofol running @ 8.083ml/hr x 24 hrs 

(provides 213 lipid kcal)





 





ADDITIONAL RECOMMENDATIONS:


* Calibrated bedscale wt 


* Monitor BGs closely w/ Decadron and TF, need for long acting insulin 


* Monitor lytes, replete as needed  


* Monitor Propofol rate, need to adjust TF rate  





(4) Acute metabolic encephalopathy


(5) Pneumothorax on right


Assessment & Plan:  right ptx


chest tube placed


decreased air


stable ptx


cont tube to suction


Endotracheal tube tip projects at the level of the thoracic inlet.


Orogastric tube tip projects at the level of the gastric fundus. Again 

demonstrated


is extensive subcutaneous emphysema, which appears somewhat worse on the left. 

Right


chest tube remains in place. Small right medial and apical pneumothorax are 

present,


slightly more conspicuous than on the previous exam, still small, somewhat 

difficult


to identify due to overlying subcutaneous emphysema. There is probably a tiny 

left


apical pneumothorax as well. Previously demonstrated pneumomediastinum has 

improved


considerably although still present. Bilateral infiltrates appear worse on the 

right.


 


Impression: Equivocally slightly increased but still small right pneumothorax.


 


Suspect tiny left apical pneumothorax


 


Improving pneumomediastinum


 


Worsening subcutaneous emphysema


 


Worsening right and stable left lung infiltrates 





 Right chest tube remains. There is still a small apical pneumothorax. Tiny


left apical pneumothorax persists, unchanged. There is decreased 

pneumomediastinum.


Subcutaneous gas has decreased as well. Bilateral infiltrates are unchanged.


Endotracheal tube remains at the level of the thoracic inlet. Orogastric tube is


again demonstrated, tip not well-visualized but probably stable in position


 


Impression: Stable tiny bilateral apical pneumothoraces


 


Unchanged bilateral infiltrates


 


Decreased pneumomediastinum and subcutaneous emphysema














Norberto Vazquez                Jan 24, 2021 11:17

## 2021-01-24 NOTE — NUR
NURSE NOTES:



All scheduled AM medications administered as ordered. Pt turned and repositioned. Oral care 
done. No distress noted at this time.

## 2021-01-24 NOTE — GENERAL PROGRESS NOTE
Subjective


Allergies:  


Coded Allergies:  


     No Known Allergies (Unverified , 6/11/19)


Subjective


on ventilator 60% fio2


on sediation


unresponsive


in icu


rt chest tube s placed due to pneumothorex


afib is controlled


hypotensuion on tndelberg position





Objective





Last 24 Hour Vital Signs








  Date Time  Temp Pulse Resp B/P (MAP) Pulse Ox O2 Delivery O2 Flow Rate FiO2


 


1/24/21 12:00      Mechanical Ventilator  


 


1/24/21 12:00        100


 


1/24/21 12:00    90/55    


 


1/24/21 12:00 98.6 94 24 90/55 (67) 94   


 


1/24/21 11:00  93 27 86/53 (64) 93   


 


1/24/21 11:00    85/53    


 


1/24/21 10:30  93 21 84/50 (61) 94   


 


1/24/21 10:00    85/49    


 


1/24/21 10:00  90 27 78/51 (60) 94   


 


1/24/21 09:30  86 23 114/62 (79) 93   


 


1/24/21 09:17  88      


 


1/24/21 09:00  87 25 88/55 (66) 90   


 


1/24/21 09:00    88/55    


 


1/24/21 08:30  87 22 92/55 (67) 91   


 


1/24/21 08:00      Mechanical Ventilator  


 


1/24/21 08:00 97.9 87 23 92/57 (69) 90   


 


1/24/21 08:00    92/57    


 


1/24/21 08:00        100


 


1/24/21 07:15  86 22 91/57 (68) 90   


 


1/24/21 07:00  85 20 88/55 (66) 91   


 


1/24/21 07:00    88/55    


 


1/24/21 06:30  84 24 83/54 (64) 90   


 


1/24/21 06:00  85 21 86/53 (64) 89   


 


1/24/21 06:00    85/57    


 


1/24/21 05:30  84 21 90/54 (66) 90   


 


1/24/21 05:00    98/52    


 


1/24/21 05:00  84 19 86/57 (67) 91   


 


1/24/21 04:30  84 19 86/55 (65) 92   


 


1/24/21 04:15  83 19 85/55 (65) 93   


 


1/24/21 04:00    88/57    


 


1/24/21 04:00      Mechanical Ventilator  


 


1/24/21 04:00        100


 


1/24/21 04:00 96.5 84 24 90/58 (69) 93   


 


1/24/21 04:00  85      


 


1/24/21 03:35  85 21     100


 


1/24/21 03:30  85 20 94/54 (67) 93   


 


1/24/21 03:02  85 18 90/55 (67) 94   


 


1/24/21 03:00    96/55    


 


1/24/21 03:00  85 18 88/54 (65) 93   


 


1/24/21 02:30  86 21 95/57 (70) 94   


 


1/24/21 02:15  87 21 96/61 (73) 97   


 


1/24/21 02:00  88 22 92/60 (71) 98   


 


1/24/21 02:00    99/59    


 


1/24/21 01:45  90 23 94/57 (69) 84   


 


1/24/21 01:30  92 22 103/64 (77) 74   


 


1/24/21 01:30    110/75    


 


1/24/21 01:15  95 23 105/60 (75) 70   


 


1/24/21 01:00    96/71    


 


1/24/21 01:00  91 20 101/59 (73) 93   


 


1/24/21 00:45  91 20 90/52 (65) 94   


 


1/24/21 00:30  92 21 101/57 (72) 94   


 


1/24/21 00:30    103/69    


 


1/24/21 00:15  94 22 106/58 (74) 94   


 


1/24/21 00:00      Mechanical Ventilator  


 


1/24/21 00:00 100.3 97 28 115/62 (79) 94   


 


1/24/21 00:00    106/58    


 


1/24/21 00:00  91      


 


1/24/21 00:00        100


 


1/23/21 23:45  98 26 103/56 (72) 93   


 


1/23/21 23:30  98 21 91/53 (66) 95   


 


1/23/21 23:29    107/62    


 


1/23/21 23:20  101 25     100


 


1/23/21 23:00  100 26 105/57 (73) 90   


 


1/23/21 23:00    107/62    


 


1/23/21 22:30  100 24 111/60 (77) 91   


 


1/23/21 22:00    123/69    


 


1/23/21 22:00  98 24 109/60 (76) 91   


 


1/23/21 21:30  97 23 110/61 (77) 91   


 


1/23/21 21:00    117/75    


 


1/23/21 21:00  93 31 107/59 (75) 91   


 


1/23/21 20:30  95 23 110/58 (75) 86   


 


1/23/21 20:00      Mechanical Ventilator  


 


1/23/21 20:00 97.7 94 18 108/58 (75) 89   


 


1/23/21 20:00    102/55    


 


1/23/21 20:00  110      


 


1/23/21 20:00        100


 


1/23/21 19:30  94 22 132/69 (90) 92   


 


1/23/21 19:26    134/75    


 


1/23/21 19:00    122/72    


 


1/23/21 19:00  93 21 94/70 (78) 84   


 


1/23/21 18:56  93 18     100


 


1/23/21 18:30  97 19 129/66 (87) 79   


 


1/23/21 18:00  99 20 134/75 (94) 84   


 


1/23/21 18:00    132/69    


 


1/23/21 17:00    138/81    


 


1/23/21 17:00  100 20 122/63 (82) 80   


 


1/23/21 16:00      Mechanical Ventilator  


 


1/23/21 16:00        100


 


1/23/21 16:00 97.2 99 19 123/64 (83) 90   


 


1/23/21 16:00    122/63    


 


1/23/21 16:00  134      


 


1/23/21 15:16  100 18     60


 


1/23/21 15:00  97 18 108/59 (75) 84   


 


1/23/21 15:00    104/64    


 


1/23/21 14:00  94 30 153/76 (101) 83   


 


1/23/21 14:00    132/65    

















Intake and Output  


 


 1/23/21 1/24/21





 19:00 07:00


 


Intake Total 723.5 ml 1963.75 ml


 


Output Total 420 ml 1380 ml


 


Balance 303.5 ml 583.75 ml


 


  


 


IV Total 663.5 ml 1413.75 ml


 


Tube Feeding 60 ml 550 ml


 


Output Urine Total 420 ml 955 ml


 


Stool Total  300 ml


 


Chest Tube Drainage Total  125 ml








Laboratory Tests


1/24/21 06:00: 


White Blood Count 7.7, Red Blood Count 3.12L, Hemoglobin 8.9L, Hematocrit 29.0L,

Mean Corpuscular Volume 93, Mean Corpuscular Hemoglobin 28.7, Mean Corpuscular 

Hemoglobin Concent 30.8L, Red Cell Distribution Width 14.0, Platelet Count 98L, 

Mean Platelet Volume 10.2H, Neutrophils (%) (Auto) , Lymphocytes (%) (Auto) , 

Monocytes (%) (Auto) , Eosinophils (%) (Auto) , Basophils (%) (Auto) , 

Differential Total Cells Counted 100, Neutrophils % (Manual) 84H, Lymphocytes % 

(Manual) 7L, Monocytes % (Manual) 1, Eosinophils % (Manual) 2, Basophils % 

(Manual) 0, Band Neutrophils 6, Platelet Estimate DecreasedL, Platelet 

Morphology Normal, Hypochromasia 1+, Anisocytosis 1+, Sodium Level 142, 

Potassium Level 3.7, Chloride Level 107, Carbon Dioxide Level 25, Anion Gap 11, 

Blood Urea Nitrogen 38H, Creatinine 1.3, Estimat Glomerular Filtration Rate 

55.2, Glucose Level 198H, Calcium Level 7.6L


1/24/21 09:50: Vancomycin Level Trough 37.0H


Height (Feet):  5


Height (Inches):  7.00


Weight (Pounds):  198


General Appearance:  alert


Neck:  supple


Cardiovascular:  regular rhythm


Respiratory/Chest:  normal breath sounds, rhonchi - bilaterally


Abdomen:  non tender, soft


Extremities:  non-tender





Assessment/Plan


Assessment/Plan:


hypotension better on levophed drip


afib with rvr on digoxine , add amiodarone , cardiology on case


covid pna


ac resp distress on ventilator


etoh abused


dehydration


agitated -halodol


fever susided


cont on ventilator at icu


cont iv abx and iv decadrone


pulmonary and gi on consult


dw charge nurse


poor prognosis











Moi Travis MD             Jan 24, 2021 13:11

## 2021-01-24 NOTE — NUR
NURSE HAND-OFF REPORT: 



Latest Vital Signs: Temperature 97.9 , Pulse 83 , B/P 99 /60 , Respiratory Rate 24 , O2 SAT 
96 , Mechanical Ventilator, FiO2 100%  .  

Vital Sign Comment: 



EKG Rhythm: Sinus Rhythm

Rhythm change?: N 

MD Notified?: 

MD Response: 



Latest Singh Fall Score: 50  

Fall Risk: High Risk 

Safety Measures: Call light Within Reach, Bed Alarm Zone 1, Side Rails Side Rails x3, Bed 
position Low and Locked.

Fall Precautions: 

Door Sign



Report given to SUZI Wilkinson.

## 2021-01-25 VITALS — SYSTOLIC BLOOD PRESSURE: 105 MMHG | DIASTOLIC BLOOD PRESSURE: 65 MMHG

## 2021-01-25 VITALS — DIASTOLIC BLOOD PRESSURE: 59 MMHG | SYSTOLIC BLOOD PRESSURE: 107 MMHG

## 2021-01-25 VITALS — SYSTOLIC BLOOD PRESSURE: 95 MMHG | DIASTOLIC BLOOD PRESSURE: 60 MMHG

## 2021-01-25 VITALS — SYSTOLIC BLOOD PRESSURE: 103 MMHG | DIASTOLIC BLOOD PRESSURE: 59 MMHG

## 2021-01-25 VITALS — SYSTOLIC BLOOD PRESSURE: 103 MMHG | DIASTOLIC BLOOD PRESSURE: 60 MMHG

## 2021-01-25 VITALS — SYSTOLIC BLOOD PRESSURE: 100 MMHG | DIASTOLIC BLOOD PRESSURE: 58 MMHG

## 2021-01-25 VITALS — DIASTOLIC BLOOD PRESSURE: 59 MMHG | SYSTOLIC BLOOD PRESSURE: 101 MMHG

## 2021-01-25 VITALS — DIASTOLIC BLOOD PRESSURE: 66 MMHG | SYSTOLIC BLOOD PRESSURE: 118 MMHG

## 2021-01-25 VITALS — SYSTOLIC BLOOD PRESSURE: 114 MMHG | DIASTOLIC BLOOD PRESSURE: 68 MMHG

## 2021-01-25 VITALS — DIASTOLIC BLOOD PRESSURE: 62 MMHG | SYSTOLIC BLOOD PRESSURE: 107 MMHG

## 2021-01-25 VITALS — DIASTOLIC BLOOD PRESSURE: 69 MMHG | SYSTOLIC BLOOD PRESSURE: 110 MMHG

## 2021-01-25 VITALS — SYSTOLIC BLOOD PRESSURE: 107 MMHG | DIASTOLIC BLOOD PRESSURE: 64 MMHG

## 2021-01-25 VITALS — SYSTOLIC BLOOD PRESSURE: 99 MMHG | DIASTOLIC BLOOD PRESSURE: 60 MMHG

## 2021-01-25 VITALS — SYSTOLIC BLOOD PRESSURE: 100 MMHG | DIASTOLIC BLOOD PRESSURE: 57 MMHG

## 2021-01-25 VITALS — SYSTOLIC BLOOD PRESSURE: 103 MMHG | DIASTOLIC BLOOD PRESSURE: 63 MMHG

## 2021-01-25 VITALS — DIASTOLIC BLOOD PRESSURE: 56 MMHG | SYSTOLIC BLOOD PRESSURE: 105 MMHG

## 2021-01-25 VITALS — SYSTOLIC BLOOD PRESSURE: 109 MMHG | DIASTOLIC BLOOD PRESSURE: 83 MMHG

## 2021-01-25 VITALS — SYSTOLIC BLOOD PRESSURE: 93 MMHG | DIASTOLIC BLOOD PRESSURE: 54 MMHG

## 2021-01-25 VITALS — DIASTOLIC BLOOD PRESSURE: 62 MMHG | SYSTOLIC BLOOD PRESSURE: 104 MMHG

## 2021-01-25 VITALS — SYSTOLIC BLOOD PRESSURE: 95 MMHG | DIASTOLIC BLOOD PRESSURE: 57 MMHG

## 2021-01-25 VITALS — DIASTOLIC BLOOD PRESSURE: 64 MMHG | SYSTOLIC BLOOD PRESSURE: 106 MMHG

## 2021-01-25 VITALS — SYSTOLIC BLOOD PRESSURE: 106 MMHG | DIASTOLIC BLOOD PRESSURE: 58 MMHG

## 2021-01-25 VITALS — SYSTOLIC BLOOD PRESSURE: 111 MMHG | DIASTOLIC BLOOD PRESSURE: 65 MMHG

## 2021-01-25 LAB
ADD MANUAL DIFF: NO
ALBUMIN SERPL-MCNC: 1 G/DL (ref 3.4–5)
ALBUMIN/GLOB SERPL: 0.2 {RATIO} (ref 1–2.7)
ALP SERPL-CCNC: 364 U/L (ref 46–116)
ALT SERPL-CCNC: 29 U/L (ref 12–78)
ANION GAP SERPL CALC-SCNC: 7 MMOL/L (ref 5–15)
AST SERPL-CCNC: 32 U/L (ref 15–37)
BASOPHILS NFR BLD AUTO: 0.9 % (ref 0–2)
BILIRUB SERPL-MCNC: 0.5 MG/DL (ref 0.2–1)
BUN SERPL-MCNC: 46 MG/DL (ref 7–18)
CALCIUM SERPL-MCNC: 7.9 MG/DL (ref 8.5–10.1)
CHLORIDE SERPL-SCNC: 106 MMOL/L (ref 98–107)
CO2 SERPL-SCNC: 28 MMOL/L (ref 21–32)
CREAT SERPL-MCNC: 1.5 MG/DL (ref 0.55–1.3)
EOSINOPHIL NFR BLD AUTO: 2 % (ref 0–3)
ERYTHROCYTE [DISTWIDTH] IN BLOOD BY AUTOMATED COUNT: 14.4 % (ref 11.6–14.8)
GLOBULIN SER-MCNC: 4.2 G/DL
HCT VFR BLD CALC: 30.4 % (ref 42–52)
HGB BLD-MCNC: 9.1 G/DL (ref 14.2–18)
LYMPHOCYTES NFR BLD AUTO: 4.7 % (ref 20–45)
MCV RBC AUTO: 93 FL (ref 80–99)
MONOCYTES NFR BLD AUTO: 1.6 % (ref 1–10)
PLATELET # BLD: 90 K/UL (ref 150–450)
POTASSIUM SERPL-SCNC: 3.4 MMOL/L (ref 3.5–5.1)
RBC # BLD AUTO: 3.26 M/UL (ref 4.7–6.1)
SODIUM SERPL-SCNC: 141 MMOL/L (ref 136–145)
WBC # BLD AUTO: 7.6 K/UL (ref 4.8–10.8)

## 2021-01-25 RX ADMIN — ENOXAPARIN SODIUM SCH MG: 80 INJECTION SUBCUTANEOUS at 09:00

## 2021-01-25 RX ADMIN — PANTOPRAZOLE SODIUM SCH MG: 40 INJECTION, POWDER, FOR SOLUTION INTRAVENOUS at 09:10

## 2021-01-25 RX ADMIN — INSULIN DETEMIR SCH UNITS: 100 INJECTION, SOLUTION SUBCUTANEOUS at 09:22

## 2021-01-25 RX ADMIN — DEXTROSE MONOHYDRATE SCH MLS/HR: 50 INJECTION, SOLUTION INTRAVENOUS at 15:02

## 2021-01-25 RX ADMIN — DEXTROSE MONOHYDRATE SCH MLS/HR: 50 INJECTION, SOLUTION INTRAVENOUS at 05:33

## 2021-01-25 RX ADMIN — INSULIN ASPART SCH UNITS: 100 INJECTION, SOLUTION INTRAVENOUS; SUBCUTANEOUS at 17:19

## 2021-01-25 RX ADMIN — ENOXAPARIN SODIUM SCH MG: 80 INJECTION SUBCUTANEOUS at 20:28

## 2021-01-25 RX ADMIN — DIGOXIN SCH MG: 0.12 TABLET ORAL at 09:12

## 2021-01-25 RX ADMIN — INSULIN ASPART SCH UNITS: 100 INJECTION, SOLUTION INTRAVENOUS; SUBCUTANEOUS at 05:33

## 2021-01-25 RX ADMIN — INSULIN ASPART SCH UNITS: 100 INJECTION, SOLUTION INTRAVENOUS; SUBCUTANEOUS at 11:54

## 2021-01-25 RX ADMIN — INSULIN DETEMIR SCH UNITS: 100 INJECTION, SOLUTION SUBCUTANEOUS at 21:00

## 2021-01-25 RX ADMIN — AMIODARONE HYDROCHLORIDE SCH MG: 200 TABLET ORAL at 09:12

## 2021-01-25 RX ADMIN — INSULIN ASPART SCH UNITS: 100 INJECTION, SOLUTION INTRAVENOUS; SUBCUTANEOUS at 05:35

## 2021-01-25 RX ADMIN — CHLORHEXIDINE GLUCONATE SCH APPLIC: 213 SOLUTION TOPICAL at 20:27

## 2021-01-25 RX ADMIN — DEXTROSE MONOHYDRATE SCH MLS/HR: 50 INJECTION, SOLUTION INTRAVENOUS at 21:04

## 2021-01-25 RX ADMIN — INSULIN ASPART SCH UNITS: 100 INJECTION, SOLUTION INTRAVENOUS; SUBCUTANEOUS at 17:18

## 2021-01-25 NOTE — NUR
NURSE NOTES:

received pt from Dian GIFFORD RN., pt is resting on the bed, opens eye with shaking. pt is 
confused at thist time. AOx 0. pt orally intubated ETT 7.5n lip line 26cm AC 16  Fio2 
100% Peep 12 and O2sat ranges from 80-85%, and Dr. Ibanez made aware per Dian Walton RN. pt 
has vital AF running at 55ml/hr OGT. pt has rectal tube with dark green stool, no leakage 
noted. pt Stern cath is in, no hematuria noted, and draining well with gravity. Left upper 
arm PICC line dressing site intact, clean, and patent. D5W is running at 50ml/hr and 
Levophed is running 4mcg/hr. no active bleeding noted. right side chest tube noted with 
continuos suction, no leakage noted. bilateral soft wrist restrain noted, pulse noted, and 
skin intact. call light within reach. will continue to monitor pt with plan of care. bed at 
the lowest position, alarmed, and locked.

## 2021-01-25 NOTE — HEMATOLOGY/ONC PROGRESS NOTE
Assessment/Plan


Assessment/Plan


Leukocytosis 2/2 covid pna wbc 15->14-->17


Anemia 2/2 chronic disease, hgb 11-->10.4


Hypercoag disorer now on lovenox sq


Ac resp distress on ventilator


Pneumomediastinum


etoh abused


agitated -halodol


vent





Ngt


cont iv abx and iv decadrone


pulmonary and gi on consult


smear is noted


pneumomediastinum per pulm


dw charge nurse


lovenox sq





Subjective


Allergies:  


Coded Allergies:  


     No Known Allergies (Unverified , 6/11/19)


All Systems:  reviewed and negative except above


Subjective


1/10 on ventilator 60% fio2, on sediation, unresponsive, in icu


1/14 on vent, icu, wbc elev, no bleeding, unresponsive


1/15 on vent, with cash, icu, no bleeding, unresponsive


1/17 on vent, dw rn, no major changes, icu, nv


1/18 nv, labs reviewd, hr is elev, with afib, is onlovenox sq


1/19 remains on vent, sedated with wrist restraints, icu


1/20 on vent, with hematuria, ngt, in icu, labs reviewed


1/21 remains on vent, settings have been adjusted as per icu care


1/22 remains on vent, icu, on levo 6mcg, off versed, labs noted, elmer rn


1/25 remains onv ent, in icu, with restraints, on levo gtt as well





Objective


Objective





Current Medications








 Medications


  (Trade)  Dose


 Ordered  Sig/Julisa


 Route


 PRN Reason  Start Time


 Stop Time Status Last Admin


Dose Admin


 


 Acetaminophen


  (Tylenol)  650 mg  Q4H  PRN


 GT


 Temp >100.5  1/15/21 17:15


 2/14/21 17:14  1/22/21 11:05





 


 Amiodarone HCl


  (Cordarone)  400 mg  EVERY 12  HOURS


 NG


   1/19/21 21:00


 4/19/21 20:59  1/25/21 09:12





 


 Chlorhexidine


 Gluconate


  (Vanessa-Hex 2%)  1 applic  DAILY@2000


 TOPIC


   1/19/21 20:00


 4/19/21 19:59  1/24/21 19:36





 


 Dextrose  1,000 ml @ 


 50 mls/hr  Q20H


 IV


   1/18/21 10:00


 2/17/21 09:59  1/25/21 01:02





 


 Dextrose


  (Dextrose 50%)  25 ml  Q30M  PRN


 IV


 Hypoglycemia  1/9/21 13:30


 4/9/21 13:29   





 


 Dextrose


  (Dextrose 50%)  50 ml  Q30M  PRN


 IV


 Hypoglycemia  1/9/21 13:30


 4/9/21 13:29   





 


 Digoxin


  (Lanoxin)  0.125 mg  DAILY


 NG


   1/21/21 09:00


 4/21/21 08:59  1/25/21 09:12





 


 Docusate Sodium


  (Colace)  100 mg  TIDPRN  PRN


 GT


 Constipation  1/12/21 01:15


 2/11/21 01:14  1/12/21 01:42





 


 Enoxaparin Sodium


  (Lovenox)  80 mg  EVERY 12  HOURS


 SUBQ


   1/2/21 21:00


 4/2/21 20:59  1/25/21 09:00





 


 Insulin Aspart


  (NovoLOG)    Q6HR


 SUBQ


   1/14/21 12:00


 4/9/21 17:59  1/24/21 11:51





 


 Insulin Aspart


  (NovoLOG)  9 units  EVERY 6  HOURS


 SUBQ


   1/21/21 12:00


 4/15/21 06:59  1/25/21 05:35





 


 Insulin Detemir


  (Levemir)  20 units  Q12HR


 SUBQ


   1/14/21 09:00


 4/12/21 08:59  1/25/21 09:22





 


 Norepinephrine


 Bitartrate 8 mg/


 Dextrose  500 ml @ 0


 mls/hr  Q24H  PRN


 IV


 For hypotension  1/25/21 00:00


 1/28/21 00:00  1/25/21 01:02





 


 Pantoprazole


  (Protonix)  40 mg  DAILY


 IVP


   1/14/21 09:00


 2/13/21 08:59  1/25/21 09:10





 


 Piperacillin Sod/


 Tazobactam Sod


 3.375 gm/Sodium


 Chloride  110 ml @ 


 27.5 mls/hr  EVERY 8  HOURS


 IVPB


   1/23/21 14:00


 1/30/21 13:59  1/25/21 05:33





 


 Quetiapine


 Fumarate


  (SEROqueL)  50 mg  Q12HR


 ORAL


   1/4/21 21:00


 2/18/21 20:59  1/25/21 09:12





 


 Sodium Chloride  500 ml @ 


 999 mls/hr  Q31M PRN


 IV


 For hypotension  1/15/21 18:30


 2/14/21 18:29   





 


 Vancomycin HCl


  (Vanco pharmacy


 to dose)  1 ea  DAILY  PRN


 MISC


 Per rx protocol  1/14/21 09:15


 2/13/21 09:14   














Last 24 Hour Vital Signs








  Date Time  Temp Pulse Resp B/P (MAP) Pulse Ox O2 Delivery O2 Flow Rate FiO2


 


1/25/21 10:09  88 23 93/54 (67) 65   


 


1/25/21 10:00    99/58    


 


1/25/21 09:12  115      


 


1/25/21 09:00  86 21 99/60 (73) 51   


 


1/25/21 08:00  87      


 


1/25/21 08:00    109/83    


 


1/25/21 08:00      Mechanical Ventilator  


 


1/25/21 08:00        100


 


1/25/21 08:00 95.9 85 19 109/83 (92) 70   


 


1/25/21 07:15  90 19     100


 


1/25/21 07:00  88 19 110/69 (83) 83   


 


1/25/21 07:00    110/69    


 


1/25/21 06:00    118/66    


 


1/25/21 06:00  87 16 118/66 (83) 97   


 


1/25/21 05:00  89 19 105/65 (78) 97   


 


1/25/21 05:00    105/65    


 


1/25/21 04:00        100


 


1/25/21 04:00 100.1 95 28 105/56 (72) 98   


 


1/25/21 04:00    105/56    


 


1/25/21 04:00  95      


 


1/25/21 04:00      Mechanical Ventilator  


 


1/25/21 03:46  95 27     100


 


1/25/21 03:00    103/59    


 


1/25/21 03:00  94 26 103/59 (74) 95   


 


1/25/21 02:00  94 26 101/59 (73) 95   


 


1/25/21 02:00    101/59    


 


1/25/21 01:02    100/57    


 


1/25/21 01:00  93 24 100/57 (71) 97   


 


1/25/21 00:00      Mechanical Ventilator  


 


1/25/21 00:00    100/58    


 


1/25/21 00:00  91      


 


1/25/21 00:00 97.0 91 22 100/58 (72) 96   


 


1/25/21 00:00        100


 


1/24/21 23:21  91 20     100


 


1/24/21 23:00  90 20 96/55 (69) 95   


 


1/24/21 23:00    96/55    


 


1/24/21 22:00  90 19 94/56 (69) 94   


 


1/24/21 22:00    94/56    


 


1/24/21 21:00    93/56    


 


1/24/21 21:00  89 17 93/56 (68) 96   


 


1/24/21 20:00        100


 


1/24/21 20:00 97.4 86 19 103/63 (76) 96   


 


1/24/21 20:00    103/63    


 


1/24/21 20:00      Mechanical Ventilator  


 


1/24/21 19:54  85 18     100


 


1/24/21 19:24  84      


 


1/24/21 19:00    99/60    


 


1/24/21 19:00  83 24 99/60 (73) 96   


 


1/24/21 18:00  87 22 98/57 (71) 93   


 


1/24/21 18:00    98/57    


 


1/24/21 17:00  85 22 99/70 (80) 94   


 


1/24/21 17:00    99/70    


 


1/24/21 16:00 97.9 89 23 104/67 (79) 97   


 


1/24/21 16:00      Mechanical Ventilator  


 


1/24/21 16:00    104/67    


 


1/24/21 16:00  89      


 


1/24/21 16:00        100


 


1/24/21 15:00  90 18 92/54 (67) 97   


 


1/24/21 15:00    92/54    


 


1/24/21 14:00  90 22 90/54 (66) 96   


 


1/24/21 14:00    90/54    


 


1/24/21 13:30  94 23 87/57 (67) 94   


 


1/24/21 13:00  96 28 88/54 (65) 90   


 


1/24/21 13:00  95 26     100


 


1/24/21 13:00    88/54    


 


1/24/21 12:00      Mechanical Ventilator  


 


1/24/21 12:00        100


 


1/24/21 12:00    90/55    


 


1/24/21 12:00 98.6 94 24 90/55 (67) 94   


 


1/24/21 12:00  94      


 


1/24/21 11:00  93 27 86/53 (64) 93   


 


1/24/21 11:00    85/53    


 


1/24/21 10:30  93 21 84/50 (61) 94   


 


1/24/21 10:00    85/49    


 


1/24/21 10:00  90 27 78/51 (60) 94   


 


1/24/21 09:30  86 23 114/62 (79) 93   


 


1/24/21 09:17  88      


 


1/24/21 09:00  87 25 88/55 (66) 90   


 


1/24/21 09:00    88/55    


 


1/24/21 08:30  87 22 92/55 (67) 91   


 


1/24/21 08:00      Mechanical Ventilator  


 


1/24/21 08:00 97.9 87 23 92/57 (69) 90   


 


1/24/21 08:00    92/57    


 


1/24/21 08:00  87      


 


1/24/21 08:00        100


 


1/24/21 07:15  86 22 91/57 (68) 90   


 


1/24/21 07:00  85 20 88/55 (66) 91   


 


1/24/21 07:00    88/55    


 


1/24/21 07:00  87 23     100


 


1/24/21 06:30  84 24 83/54 (64) 90   


 


1/24/21 06:00  85 21 86/53 (64) 89   


 


1/24/21 06:00    85/57    


 


1/24/21 05:30  84 21 90/54 (66) 90   


 


1/24/21 05:00    98/52    


 


1/24/21 05:00  84 19 86/57 (67) 91   


 


1/24/21 04:30  84 19 86/55 (65) 92   


 


1/24/21 04:15  83 19 85/55 (65) 93   


 


1/24/21 04:00    88/57    


 


1/24/21 04:00      Mechanical Ventilator  


 


1/24/21 04:00        100


 


1/24/21 04:00 96.5 84 24 90/58 (69) 93   


 


1/24/21 04:00  85      


 


1/24/21 03:35  85 21     100


 


1/24/21 03:30  85 20 94/54 (67) 93   


 


1/24/21 03:02  85 18 90/55 (67) 94   


 


1/24/21 03:00    96/55    


 


1/24/21 03:00  85 18 88/54 (65) 93   


 


1/24/21 02:30  86 21 95/57 (70) 94   


 


1/24/21 02:15  87 21 96/61 (73) 97   


 


1/24/21 02:00  88 22 92/60 (71) 98   


 


1/24/21 02:00    99/59    


 


1/24/21 01:45  90 23 94/57 (69) 84   


 


1/24/21 01:30  92 22 103/64 (77) 74   


 


1/24/21 01:30    110/75    


 


1/24/21 01:15  95 23 105/60 (75) 70   


 


1/24/21 01:00    96/71    


 


1/24/21 01:00  91 20 101/59 (73) 93   


 


1/24/21 00:45  91 20 90/52 (65) 94   


 


1/24/21 00:30  92 21 101/57 (72) 94   


 


1/24/21 00:30    103/69    


 


1/24/21 00:15  94 22 106/58 (74) 94   


 


1/24/21 00:00      Mechanical Ventilator  


 


1/24/21 00:00 100.3 97 28 115/62 (79) 94   


 


1/24/21 00:00    106/58    


 


1/24/21 00:00  91      


 


1/24/21 00:00        100


 


1/23/21 23:45  98 26 103/56 (72) 93   


 


1/23/21 23:30  98 21 91/53 (66) 95   


 


1/23/21 23:29    107/62    


 


1/23/21 23:20  101 25     100


 


1/23/21 23:00  100 26 105/57 (73) 90   


 


1/23/21 23:00    107/62    


 


1/23/21 22:30  100 24 111/60 (77) 91   


 


1/23/21 22:00    123/69    


 


1/23/21 22:00  98 24 109/60 (76) 91   


 


1/23/21 21:30  97 23 110/61 (77) 91   


 


1/23/21 21:00    117/75    


 


1/23/21 21:00  93 31 107/59 (75) 91   


 


1/23/21 20:30  95 23 110/58 (75) 86   


 


1/23/21 20:00      Mechanical Ventilator  


 


1/23/21 20:00 97.7 94 18 108/58 (75) 89   


 


1/23/21 20:00    102/55    


 


1/23/21 20:00  110      


 


1/23/21 20:00        100


 


1/23/21 19:30  94 22 132/69 (90) 92   


 


1/23/21 19:26    134/75    


 


1/23/21 19:00    122/72    


 


1/23/21 19:00  93 21 94/70 (78) 84   


 


1/23/21 18:56  93 18     100


 


1/23/21 18:30  97 19 129/66 (87) 79   


 


1/23/21 18:00  99 20 134/75 (94) 84   


 


1/23/21 18:00    132/69    


 


1/23/21 17:00    138/81    


 


1/23/21 17:00  100 20 122/63 (82) 80   


 


1/23/21 16:00      Mechanical Ventilator  


 


1/23/21 16:00        100


 


1/23/21 16:00 97.2 99 19 123/64 (83) 90   


 


1/23/21 16:00    122/63    


 


1/23/21 16:00  134      


 


1/23/21 15:16  100 18     60


 


1/23/21 15:00  97 18 108/59 (75) 84   


 


1/23/21 15:00    104/64    


 


1/23/21 14:00  94 30 153/76 (101) 83   


 


1/23/21 14:00    132/65    


 


1/23/21 13:00    133/76    


 


1/23/21 13:00  93 18 134/69 (90) 89   


 


1/23/21 12:00    68/35    


 


1/23/21 12:00  88      


 


1/23/21 12:00 96.2 91 15 153/78 (103) 75   


 


1/23/21 12:00      Mechanical Ventilator  


 


1/23/21 12:00        100

















Intake and Output  


 


 1/24/21 1/25/21





 19:00 07:00


 


Intake Total 1743.75 ml 1636.0 ml


 


Output Total 895 ml 490 ml


 


Balance 848.75 ml 1146.0 ml


 


  


 


IV Total 963.75 ml 916.0 ml


 


Tube Feeding 660 ml 660 ml


 


Other 120 ml 60 ml


 


Output Urine Total 820 ml 460 ml


 


Stool Total 25 ml 


 


Chest Tube Drainage Total 50 ml 30 ml


 


# Bowel Movements  50











Labs








Test


 1/23/21


00:10 1/23/21


05:45 1/23/21


05:49 1/23/21


08:15


 


White Blood Count


 


 5.9 K/UL


(4.8-10.8) 


 





 


Red Blood Count


 


 3.78 M/UL


(4.70-6.10) 


 





 


Hemoglobin


 


 10.9 G/DL


(14.2-18.0) 


 





 


Hematocrit


 


 34.4 %


(42.0-52.0) 


 





 


Mean Corpuscular Volume  91 FL (80-99)   


 


Mean Corpuscular Hemoglobin


 


 28.9 PG


(27.0-31.0) 


 





 


Mean Corpuscular Hemoglobin


Concent 


 31.8 G/DL


(32.0-36.0) 


 





 


Red Cell Distribution Width


 


 13.2 %


(11.6-14.8) 


 





 


Platelet Count


 


 134 K/UL


(150-450) 


 





 


Mean Platelet Volume


 


 12.9 FL


(6.5-10.1) 


 





 


Neutrophils (%) (Auto)   % (45.0-75.0)   


 


Lymphocytes (%) (Auto)   % (20.0-45.0)   


 


Monocytes (%) (Auto)   % (1.0-10.0)   


 


Eosinophils (%) (Auto)   % (0.0-3.0)   


 


Basophils (%) (Auto)   % (0.0-2.0)   


 


Differential Total Cells


Counted 


 100 


 


 





 


Neutrophils % (Manual)  81 % (45-75)   


 


Lymphocytes % (Manual)  4 % (20-45)   


 


Monocytes % (Manual)  2 % (1-10)   


 


Eosinophils % (Manual)  0 % (0-3)   


 


Basophils % (Manual)  0 % (0-2)   


 


Band Neutrophils  13 % (0-8)   


 


Platelet Estimate  Decreased   


 


Platelet Morphology  Normal   


 


Hypochromasia  1+   


 


Anisocytosis  1+   


 


Sodium Level


 


 143 MMOL/L


(136-145) 


 





 


Potassium Level


 


 3.3 MMOL/L


(3.5-5.1) 


 





 


Chloride Level


 


 109 MMOL/L


() 


 





 


Carbon Dioxide Level


 


 27 MMOL/L


(21-32) 


 





 


Anion Gap


 


 7 mmol/L


(5-15) 


 





 


Blood Urea Nitrogen


 


 31 mg/dL


(7-18) 


 





 


Creatinine


 


 1.0 MG/DL


(0.55-1.30) 


 





 


Estimat Glomerular Filtration


Rate 


 > 60 mL/min


(>60) 


 





 


Glucose Level


 


 155 MG/DL


() 


 





 


Calcium Level


 


 8.2 MG/DL


(8.5-10.1) 


 





 


Arterial Blood pH


 


 


 


 7.249


(7.350-7.450)


 


Arterial Blood Partial


Pressure CO2 


 


 


 61.1 mmHg


(35.0-45.0)


 


Arterial Blood Partial


Pressure O2 


 


 


 46.8 mmHg


(75.0-100.0)


 


Arterial Blood HCO3


 


 


 


 26.1 mmol/L


(22.0-26.0)


 


Arterial Blood Oxygen


Saturation 


 


 


 81.3 %


()


 


Arterial Blood Base Excess    -1.9 (-2-2) 


 


Abelardo Test    Positive 


 


Test


 1/24/21


06:00 1/24/21


09:50 1/25/21


05:48 





 


White Blood Count


 7.7 K/UL


(4.8-10.8) 


 


 





 


Red Blood Count


 3.12 M/UL


(4.70-6.10) 


 


 





 


Hemoglobin


 8.9 G/DL


(14.2-18.0) 


 


 





 


Hematocrit


 29.0 %


(42.0-52.0) 


 


 





 


Mean Corpuscular Volume 93 FL (80-99)    


 


Mean Corpuscular Hemoglobin


 28.7 PG


(27.0-31.0) 


 


 





 


Mean Corpuscular Hemoglobin


Concent 30.8 G/DL


(32.0-36.0) 


 


 





 


Red Cell Distribution Width


 14.0 %


(11.6-14.8) 


 


 





 


Platelet Count


 98 K/UL


(150-450) 


 


 





 


Mean Platelet Volume


 10.2 FL


(6.5-10.1) 


 


 





 


Neutrophils (%) (Auto)  % (45.0-75.0)    


 


Lymphocytes (%) (Auto)  % (20.0-45.0)    


 


Monocytes (%) (Auto)  % (1.0-10.0)    


 


Eosinophils (%) (Auto)  % (0.0-3.0)    


 


Basophils (%) (Auto)  % (0.0-2.0)    


 


Differential Total Cells


Counted 100 


 


 


 





 


Neutrophils % (Manual) 84 % (45-75)    


 


Lymphocytes % (Manual) 7 % (20-45)    


 


Monocytes % (Manual) 1 % (1-10)    


 


Eosinophils % (Manual) 2 % (0-3)    


 


Basophils % (Manual) 0 % (0-2)    


 


Band Neutrophils 6 % (0-8)    


 


Platelet Estimate Decreased    


 


Platelet Morphology Normal    


 


Hypochromasia 1+    


 


Anisocytosis 1+    


 


Sodium Level


 142 MMOL/L


(136-145) 


 


 





 


Potassium Level


 3.7 MMOL/L


(3.5-5.1) 


 


 





 


Chloride Level


 107 MMOL/L


() 


 


 





 


Carbon Dioxide Level


 25 MMOL/L


(21-32) 


 


 





 


Anion Gap


 11 mmol/L


(5-15) 


 


 





 


Blood Urea Nitrogen


 38 mg/dL


(7-18) 


 


 





 


Creatinine


 1.3 MG/DL


(0.55-1.30) 


 


 





 


Estimat Glomerular Filtration


Rate 55.2 mL/min


(>60) 


 


 





 


Glucose Level


 198 MG/DL


() 


 


 





 


Calcium Level


 7.6 MG/DL


(8.5-10.1) 


 


 





 


Vancomycin Level Trough


 


 37.0 ug/mL


(5.0-12.0) 


 





 


Random Vancomycin Level   24.8 ug/mL  








Height (Feet):  5


Height (Inches):  7.00


Weight (Pounds):  198


Objective


General Appearance:  lethargic, moderate distress


Neck:  supple


Cardiovascular:  regular rhythm


Respiratory/Chest:  crackles/rales, rhonchi - bilaterally vent++


Abdomen:  non tender, soft


Extremities:  non-tender











Emmett Cook MD          Jan 25, 2021 11:39

## 2021-01-25 NOTE — NUR
NURSE NOTES:

MORNING CARE WAS DONE, PATIENT NO RESPONSE TO NAME, OPEN EYES TO TACTILE STIMULI, RECTAL 
TUBE INTACT AND PATENT, WILL CONTINUE TO MONITOR.

## 2021-01-25 NOTE — NUR
NURSE NOTES:

made Dr. fuentes aware pt's stool is dark green. and received orders: CBC,CBP, and C.diff. 
noted. and will carry on.

## 2021-01-25 NOTE — GENERAL PROGRESS NOTE
Subjective


Allergies:  


Coded Allergies:  


     No Known Allergies (Unverified , 6/11/19)


Subjective


on ventilator 60% fio2


on sediation


unresponsive


in icu


rt chest tube s placed due to pneumothorex


afib is controlled


hypotensuion better





Objective





Last 24 Hour Vital Signs








  Date Time  Temp Pulse Resp B/P (MAP) Pulse Ox O2 Delivery O2 Flow Rate FiO2


 


1/25/21 16:18  96      


 


1/25/21 16:00      Mechanical Ventilator  


 


1/25/21 16:00 95.9 96 26 104/62 (76) 75   


 


1/25/21 16:00        100


 


1/25/21 15:00  95 22 106/64 (78) 78   


 


1/25/21 15:00    106/64    


 


1/25/21 14:02  93 20 95/60 (72) 75   


 


1/25/21 14:00    95/60    


 


1/25/21 13:00    96/57    


 


1/25/21 13:00  92 21 95/57 (70) 66   


 


1/25/21 12:00      Mechanical Ventilator  


 


1/25/21 12:00        100


 


1/25/21 12:00 96.0 92 21 106/58 (74) 65   


 


1/25/21 12:00  92      


 


1/25/21 12:00    120/70    


 


1/25/21 11:00    129/69    


 


1/25/21 11:00  92 20 95/60 (72) 72   


 


1/25/21 10:09  88 23 93/54 (67) 65   


 


1/25/21 10:00    99/58    


 


1/25/21 09:12  115      


 


1/25/21 09:00  86 21 99/60 (73) 51   


 


1/25/21 08:01  87      


 


1/25/21 08:00    109/83    


 


1/25/21 08:00      Mechanical Ventilator  


 


1/25/21 08:00        100


 


1/25/21 08:00 95.9 85 19 109/83 (92) 70   


 


1/25/21 07:15  90 19     100


 


1/25/21 07:00  88 19 110/69 (83) 83   


 


1/25/21 07:00    110/69    


 


1/25/21 06:00    118/66    


 


1/25/21 06:00  87 16 118/66 (83) 97   


 


1/25/21 05:00  89 19 105/65 (78) 97   


 


1/25/21 05:00    105/65    


 


1/25/21 04:00        100


 


1/25/21 04:00 100.1 95 28 105/56 (72) 98   


 


1/25/21 04:00    105/56    


 


1/25/21 04:00  95      


 


1/25/21 04:00      Mechanical Ventilator  


 


1/25/21 03:46  95 27     100


 


1/25/21 03:00    103/59    


 


1/25/21 03:00  94 26 103/59 (74) 95   


 


1/25/21 02:00  94 26 101/59 (73) 95   


 


1/25/21 02:00    101/59    


 


1/25/21 01:02    100/57    


 


1/25/21 01:00  93 24 100/57 (71) 97   


 


1/25/21 00:00      Mechanical Ventilator  


 


1/25/21 00:00    100/58    


 


1/25/21 00:00  91      


 


1/25/21 00:00 97.0 91 22 100/58 (72) 96   


 


1/25/21 00:00        100


 


1/24/21 23:21  91 20     100


 


1/24/21 23:00  90 20 96/55 (69) 95   


 


1/24/21 23:00    96/55    


 


1/24/21 22:00  90 19 94/56 (69) 94   


 


1/24/21 22:00    94/56    


 


1/24/21 21:00    93/56    


 


1/24/21 21:00  89 17 93/56 (68) 96   


 


1/24/21 20:00        100


 


1/24/21 20:00 97.4 86 19 103/63 (76) 96   


 


1/24/21 20:00    103/63    


 


1/24/21 20:00      Mechanical Ventilator  


 


1/24/21 19:54  85 18     100


 


1/24/21 19:24  84      


 


1/24/21 19:00    99/60    


 


1/24/21 19:00  83 24 99/60 (73) 96   


 


1/24/21 18:00  87 22 98/57 (71) 93   


 


1/24/21 18:00    98/57    

















Intake and Output  


 


 1/24/21 1/25/21





 19:00 07:00


 


Intake Total 1743.75 ml 1636.0 ml


 


Output Total 895 ml 490 ml


 


Balance 848.75 ml 1146.0 ml


 


  


 


IV Total 963.75 ml 916.0 ml


 


Tube Feeding 660 ml 660 ml


 


Other 120 ml 60 ml


 


Output Urine Total 820 ml 460 ml


 


Stool Total 25 ml 


 


Chest Tube Drainage Total 50 ml 30 ml


 


# Bowel Movements  50








Laboratory Tests


1/25/21 05:48: Random Vancomycin Level 24.8


1/25/21 12:10: 


White Blood Count 7.6, Red Blood Count 3.26L, Hemoglobin 9.1L, Hematocrit 30.4L,

Mean Corpuscular Volume 93, Mean Corpuscular Hemoglobin 28.0, Mean Corpuscular 

Hemoglobin Concent 30.0L, Red Cell Distribution Width 14.4, Platelet Count 90L, 

Mean Platelet Volume 11.5H, Neutrophils (%) (Auto) , Lymphocytes (%) (Auto) 4.7L

, Monocytes (%) (Auto) 1.6, Eosinophils (%) (Auto) 2.0, Basophils (%) (Auto) 0.

9, Sodium Level 141, Potassium Level 3.4L, Chloride Level 106, Carbon Dioxide 

Level 28, Anion Gap 7, Blood Urea Nitrogen 46H, Creatinine 1.5H, Estimat 

Glomerular Filtration Rate 46.8, Glucose Level 164H, Calcium Level 7.9L, Total 

Bilirubin 0.5, Aspartate Amino Transf (AST/SGOT) 32, Alanine Aminotransferase 

(ALT/SGPT) 29, Alkaline Phosphatase 364H, Total Protein 5.2L, Albumin 1.0L, 

Globulin 4.2, Albumin/Globulin Ratio 0.2L


Height (Feet):  5


Height (Inches):  7.00


Weight (Pounds):  198


General Appearance:  lethargic


Neck:  supple


Cardiovascular:  tachycardia


Respiratory/Chest:  rhonchi - bilaterally


Abdomen:  non tender, soft


Extremities:  non-tender





Assessment/Plan


Assessment/Plan:


hypotension better on levophed drip


afib with rvr on digoxine , add amiodarone , cardiology on case


covid pna


ac resp distress on ventilator


etoh abused


dehydration


agitated -halodol


fever susided


cont on ventilator at icu


cont iv abx and iv decadrone


pulmonary and gi on consult


dw charge nurse


poor prognosis











Moi Travis MD             Jan 25, 2021 17:02

## 2021-01-25 NOTE — NUR
NURSE NOTES:

Dr Ibanez at bedside,aware of pt's low O2 sat on low 70's,also noted pt's gasping breath 
ordered to increase Levophed drip to 4mcg/hr.

## 2021-01-25 NOTE — CARDIAC ELECTROPHYSIOLOGY PN
Assessment/Plan


Assessment/Plan


1. Atrial fibrillation with rapid ventricular response.      


    On digoxin 0.125 mg p.o. daily and Amiodarone 400 po q12  


      In SR. Digoxin level 0.6.   Decrease Amio to 200 po daily





2. Hypernatremia with sodium 151 with azotemia, BUN of 30, creatinine 0.8.   On 

iv fluids





3. Right pneumothorax, status post chest tube with subcutaneous emphysema.





4. COVID-19 pneumonia, 100% FiO2 and PEEP of 12, on remdesivir and Solu-Medrol 

per ID.


5. Diabetes.


6. Hypotension due to dehydration and sepsis. On iv fluid and Levophed 4 Mcg


7. Hypernatremia and azotemia.   





DW RN





Subjective


Subjective


In ICU on 100% Fio2 and PEEP 12 and Levo 4 Mcg. Saturating only 62%


 Right chest tube had minimal drainage. In SR on Dig and Amiodarone





Objective





Last 24 Hour Vital Signs








  Date Time  Temp Pulse Resp B/P (MAP) Pulse Ox O2 Delivery O2 Flow Rate FiO2


 


1/25/21 10:09  88 23 93/54 (67) 65   


 


1/25/21 10:00    99/58    


 


1/25/21 09:12  115      


 


1/25/21 09:00  86 21 99/60 (73) 51   


 


1/25/21 08:00  87      


 


1/25/21 08:00    109/83    


 


1/25/21 08:00      Mechanical Ventilator  


 


1/25/21 08:00        100


 


1/25/21 08:00 95.9 85 19 109/83 (92) 70   


 


1/25/21 07:15  90 19     100


 


1/25/21 07:00  88 19 110/69 (83) 83   


 


1/25/21 07:00    110/69    


 


1/25/21 06:00    118/66    


 


1/25/21 06:00  87 16 118/66 (83) 97   


 


1/25/21 05:00  89 19 105/65 (78) 97   


 


1/25/21 05:00    105/65    


 


1/25/21 04:00        100


 


1/25/21 04:00 100.1 95 28 105/56 (72) 98   


 


1/25/21 04:00    105/56    


 


1/25/21 04:00  95      


 


1/25/21 04:00      Mechanical Ventilator  


 


1/25/21 03:46  95 27     100


 


1/25/21 03:00    103/59    


 


1/25/21 03:00  94 26 103/59 (74) 95   


 


1/25/21 02:00  94 26 101/59 (73) 95   


 


1/25/21 02:00    101/59    


 


1/25/21 01:02    100/57    


 


1/25/21 01:00  93 24 100/57 (71) 97   


 


1/25/21 00:00      Mechanical Ventilator  


 


1/25/21 00:00    100/58    


 


1/25/21 00:00  91      


 


1/25/21 00:00 97.0 91 22 100/58 (72) 96   


 


1/25/21 00:00        100


 


1/24/21 23:21  91 20     100


 


1/24/21 23:00  90 20 96/55 (69) 95   


 


1/24/21 23:00    96/55    


 


1/24/21 22:00  90 19 94/56 (69) 94   


 


1/24/21 22:00    94/56    


 


1/24/21 21:00    93/56    


 


1/24/21 21:00  89 17 93/56 (68) 96   


 


1/24/21 20:00        100


 


1/24/21 20:00 97.4 86 19 103/63 (76) 96   


 


1/24/21 20:00    103/63    


 


1/24/21 20:00      Mechanical Ventilator  


 


1/24/21 19:54  85 18     100


 


1/24/21 19:24  84      


 


1/24/21 19:00    99/60    


 


1/24/21 19:00  83 24 99/60 (73) 96   


 


1/24/21 18:00  87 22 98/57 (71) 93   


 


1/24/21 18:00    98/57    


 


1/24/21 17:00  85 22 99/70 (80) 94   


 


1/24/21 17:00    99/70    


 


1/24/21 16:00 97.9 89 23 104/67 (79) 97   


 


1/24/21 16:00      Mechanical Ventilator  


 


1/24/21 16:00    104/67    


 


1/24/21 16:00  89      


 


1/24/21 16:00        100


 


1/24/21 15:00  90 18 92/54 (67) 97   


 


1/24/21 15:00    92/54    


 


1/24/21 14:00  90 22 90/54 (66) 96   


 


1/24/21 14:00    90/54    


 


1/24/21 13:30  94 23 87/57 (63) 94   

















Intake and Output  


 


 1/24/21 1/25/21





 19:00 07:00


 


Intake Total 1743.75 ml 1636.0 ml


 


Output Total 895 ml 490 ml


 


Balance 848.75 ml 1146.0 ml


 


  


 


IV Total 963.75 ml 916.0 ml


 


Tube Feeding 660 ml 660 ml


 


Other 120 ml 60 ml


 


Output Urine Total 820 ml 460 ml


 


Stool Total 25 ml 


 


Chest Tube Drainage Total 50 ml 30 ml


 


# Bowel Movements  50











Laboratory Tests








Test


 1/25/21


05:48


 


Random Vancomycin Level 24.8 ug/mL  








Objective


HEENT: No JVD. Orally intubated


LUNGS:  Have decreased breath sounds with the chest tube on the right side


and subcutaneous emphysema.


CARDIOVASCULAR:  Regular S1, S2 with


no gallop.


ABDOMEN:  Soft.


EXTREMITIES:  A 1+ pitting edema.











Jake George MD               Jan 25, 2021 13:04

## 2021-01-25 NOTE — NUR
NURSE NOTES:

PATIENT AWOKE, TRIED TO TOUCH ENDOTUBE, DID NOT FOLLOWED COMMANDS, APPLIED 2 POINT SOFT 
RESTRAINTS AS ORDERED, WILL CONTINUE TO MONITOR.

## 2021-01-25 NOTE — NUR
NURSE HAND-OFF REPORT: 



Latest Vital Signs: Temperature 100.1 , Pulse 88 , B/P 110 /69 , Respiratory Rate 19 , O2 
SAT 83 , Mechanical Ventilator, O2 Flow Rate  .  

Vital Sign Comment: 



EKG Rhythm: Sinus Rhythm

Rhythm change?: N 

MD Notified?: N -Dr. Thaddeus GUTIERREZ Response: 



Latest Singh Fall Score: 50  

Fall Risk: High Risk 

Safety Measures: Call light Within Reach, Bed Alarm Zone 1, Side Rails Side Rails x3, Bed 
position Low and Locked.

Fall Precautions: 

Door Sign



Report given to SUZI OROSCO.

## 2021-01-25 NOTE — INFECTIOUS DISEASES PROG NOTE
Assessment/Plan


Assessment/Plan


antibiotics : vancomycin iv, zosyn





A


1. COVID-19 pneumonia.


on 100 % FiO2 with saturation of 65 %


s/p remdesivir


s/p ivermectin


s/p solumedrol


2. New-onset diabetes.


3. respiratory failure





P


1. Continue iv vancomycin, zosyn


2. Continue isolation.





Subjective


ROS Limited/Unobtainable:  Yes


Allergies:  


Coded Allergies:  


     No Known Allergies (Unverified , 6/11/19)





Objective





Last 24 Hour Vital Signs








  Date Time  Temp Pulse Resp B/P (MAP) Pulse Ox O2 Delivery O2 Flow Rate FiO2


 


1/25/21 10:09  88 23 93/54 (67) 65   


 


1/25/21 10:00    99/58    


 


1/25/21 09:12  115      


 


1/25/21 09:00  86 21 99/60 (73) 51   


 


1/25/21 08:00  87      


 


1/25/21 08:00    109/83    


 


1/25/21 08:00      Mechanical Ventilator  


 


1/25/21 08:00        100


 


1/25/21 08:00 95.9 85 19 109/83 (92) 70   


 


1/25/21 07:15  90 19     100


 


1/25/21 07:00  88 19 110/69 (83) 83   


 


1/25/21 07:00    110/69    


 


1/25/21 06:00    118/66    


 


1/25/21 06:00  87 16 118/66 (83) 97   


 


1/25/21 05:00  89 19 105/65 (78) 97   


 


1/25/21 05:00    105/65    


 


1/25/21 04:00        100


 


1/25/21 04:00 100.1 95 28 105/56 (72) 98   


 


1/25/21 04:00    105/56    


 


1/25/21 04:00  95      


 


1/25/21 04:00      Mechanical Ventilator  


 


1/25/21 03:46  95 27     100


 


1/25/21 03:00    103/59    


 


1/25/21 03:00  94 26 103/59 (74) 95   


 


1/25/21 02:00  94 26 101/59 (73) 95   


 


1/25/21 02:00    101/59    


 


1/25/21 01:02    100/57    


 


1/25/21 01:00  93 24 100/57 (71) 97   


 


1/25/21 00:00      Mechanical Ventilator  


 


1/25/21 00:00    100/58    


 


1/25/21 00:00  91      


 


1/25/21 00:00 97.0 91 22 100/58 (72) 96   


 


1/25/21 00:00        100


 


1/24/21 23:21  91 20     100


 


1/24/21 23:00  90 20 96/55 (69) 95   


 


1/24/21 23:00    96/55    


 


1/24/21 22:00  90 19 94/56 (69) 94   


 


1/24/21 22:00    94/56    


 


1/24/21 21:00    93/56    


 


1/24/21 21:00  89 17 93/56 (68) 96   


 


1/24/21 20:00        100


 


1/24/21 20:00 97.4 86 19 103/63 (76) 96   


 


1/24/21 20:00    103/63    


 


1/24/21 20:00      Mechanical Ventilator  


 


1/24/21 19:54  85 18     100


 


1/24/21 19:24  84      


 


1/24/21 19:00    99/60    


 


1/24/21 19:00  83 24 99/60 (73) 96   


 


1/24/21 18:00  87 22 98/57 (71) 93   


 


1/24/21 18:00    98/57    


 


1/24/21 17:00  85 22 99/70 (80) 94   


 


1/24/21 17:00    99/70    


 


1/24/21 16:00 97.9 89 23 104/67 (79) 97   


 


1/24/21 16:00      Mechanical Ventilator  


 


1/24/21 16:00    104/67    


 


1/24/21 16:00  89      


 


1/24/21 16:00        100


 


1/24/21 15:00  90 18 92/54 (67) 97   


 


1/24/21 15:00    92/54    


 


1/24/21 14:00  90 22 90/54 (66) 96   


 


1/24/21 14:00    90/54    


 


1/24/21 13:30  94 23 87/57 (67) 94   


 


1/24/21 13:00  96 28 88/54 (65) 90   


 


1/24/21 13:00  95 26     100


 


1/24/21 13:00    88/54    


 


1/24/21 12:00      Mechanical Ventilator  


 


1/24/21 12:00        100


 


1/24/21 12:00    90/55    


 


1/24/21 12:00 98.6 94 24 90/55 (67) 94   


 


1/24/21 12:00  94      








Height (Feet):  5


Height (Inches):  7.00


Weight (Pounds):  198


HEENT:  other - intubated





Laboratory Tests








Test


 1/25/21


05:48


 


Random Vancomycin Level 24.8 ug/mL  











Current Medications








 Medications


  (Trade)  Dose


 Ordered  Sig/Julisa


 Route


 PRN Reason  Start Time


 Stop Time Status Last Admin


Dose Admin


 


 Acetaminophen


  (Tylenol)  650 mg  Q4H  PRN


 GT


 Temp >100.5  1/15/21 17:15


 2/14/21 17:14  1/22/21 11:05





 


 Amiodarone HCl


  (Cordarone)  400 mg  EVERY 12  HOURS


 NG


   1/19/21 21:00


 4/19/21 20:59  1/25/21 09:12





 


 Chlorhexidine


 Gluconate


  (Vanessa-Hex 2%)  1 applic  DAILY@2000


 TOPIC


   1/19/21 20:00


 4/19/21 19:59  1/24/21 19:36





 


 Dextrose  1,000 ml @ 


 50 mls/hr  Q20H


 IV


   1/18/21 10:00


 2/17/21 09:59  1/25/21 01:02





 


 Dextrose


  (Dextrose 50%)  25 ml  Q30M  PRN


 IV


 Hypoglycemia  1/9/21 13:30


 4/9/21 13:29   





 


 Dextrose


  (Dextrose 50%)  50 ml  Q30M  PRN


 IV


 Hypoglycemia  1/9/21 13:30


 4/9/21 13:29   





 


 Digoxin


  (Lanoxin)  0.125 mg  DAILY


 NG


   1/21/21 09:00


 4/21/21 08:59  1/25/21 09:12





 


 Docusate Sodium


  (Colace)  100 mg  TIDPRN  PRN


 GT


 Constipation  1/12/21 01:15


 2/11/21 01:14  1/12/21 01:42





 


 Enoxaparin Sodium


  (Lovenox)  80 mg  EVERY 12  HOURS


 SUBQ


   1/2/21 21:00


 4/2/21 20:59  1/25/21 09:00





 


 Insulin Aspart


  (NovoLOG)    Q6HR


 SUBQ


   1/14/21 12:00


 4/9/21 17:59  1/24/21 11:51





 


 Insulin Aspart


  (NovoLOG)  9 units  EVERY 6  HOURS


 SUBQ


   1/21/21 12:00


 4/15/21 06:59  1/25/21 05:35





 


 Insulin Detemir


  (Levemir)  20 units  Q12HR


 SUBQ


   1/14/21 09:00


 4/12/21 08:59  1/25/21 09:22





 


 Norepinephrine


 Bitartrate 8 mg/


 Dextrose  500 ml @ 0


 mls/hr  Q24H  PRN


 IV


 For hypotension  1/25/21 00:00


 1/28/21 00:00  1/25/21 01:02





 


 Pantoprazole


  (Protonix)  40 mg  DAILY


 IVP


   1/14/21 09:00


 2/13/21 08:59  1/25/21 09:10





 


 Piperacillin Sod/


 Tazobactam Sod


 3.375 gm/Sodium


 Chloride  110 ml @ 


 27.5 mls/hr  EVERY 8  HOURS


 IVPB


   1/23/21 14:00


 1/30/21 13:59  1/25/21 05:33





 


 Quetiapine


 Fumarate


  (SEROqueL)  50 mg  Q12HR


 ORAL


   1/4/21 21:00


 2/18/21 20:59  1/25/21 09:12





 


 Sodium Chloride  500 ml @ 


 999 mls/hr  Q31M PRN


 IV


 For hypotension  1/15/21 18:30


 2/14/21 18:29   





 


 Vancomycin HCl


  ([facility] pharmacy


 to dose)  1 ea  DAILY  PRN


 MISC


 Per rx protocol  1/14/21 09:15


 2/13/21 09:14   




















Ashley Davis MD      Jan 25, 2021 11:55

## 2021-01-25 NOTE — GENERAL PROGRESS NOTE
Subjective


ROS Limited/Unobtainable:  Yes


Allergies:  


Coded Allergies:  


     No Known Allergies (Unverified , 6/11/19)


Subjective


events noted - interval notes reviewed 


glucose values are stable 











Item Value  Date Time


 


Bedside Blood Glucose 116 mg/dl 1/25/21 0535


 


Bedside Blood Glucose 117 mg/dl 1/24/21 2337


 


Bedside Blood Glucose 96 mg/dl 1/24/21 2023


 


Bedside Blood Glucose 89 mg/dl 1/24/21 1800


 


Bedside Blood Glucose 141 mg/dl H 1/24/21 1200


 


Bedside Blood Glucose 146 mg/dl H 1/24/21 0936











Objective





Last 24 Hour Vital Signs








  Date Time  Temp Pulse Resp B/P (MAP) Pulse Ox O2 Delivery O2 Flow Rate FiO2


 


1/25/21 06:00    118/66    


 


1/25/21 06:00  87 16 118/66 (83) 97   


 


1/25/21 05:00  89 19 105/65 (78) 97   


 


1/25/21 05:00    105/65    


 


1/25/21 04:00        100


 


1/25/21 04:00 100.1 95 28 105/56 (72) 98   


 


1/25/21 04:00    105/56    


 


1/25/21 04:00  95      


 


1/25/21 04:00      Mechanical Ventilator  


 


1/25/21 03:46  95 27     100


 


1/25/21 03:00    103/59    


 


1/25/21 03:00  94 26 103/59 (74) 95   


 


1/25/21 02:00  94 26 101/59 (73) 95   


 


1/25/21 02:00    101/59    


 


1/25/21 01:02    100/57    


 


1/25/21 01:00  93 24 100/57 (71) 97   


 


1/25/21 00:00      Mechanical Ventilator  


 


1/25/21 00:00    100/58    


 


1/25/21 00:00  91      


 


1/25/21 00:00 97.0 91 22 100/58 (72) 96   


 


1/25/21 00:00        100


 


1/24/21 23:21  91 20     100


 


1/24/21 23:00  90 20 96/55 (69) 95   


 


1/24/21 23:00    96/55    


 


1/24/21 22:00  90 19 94/56 (69) 94   


 


1/24/21 22:00    94/56    


 


1/24/21 21:00    93/56    


 


1/24/21 21:00  89 17 93/56 (68) 96   


 


1/24/21 20:00        100


 


1/24/21 20:00 97.4 86 19 103/63 (76) 96   


 


1/24/21 20:00    103/63    


 


1/24/21 20:00      Mechanical Ventilator  


 


1/24/21 19:54  85 18     100


 


1/24/21 19:24  84      


 


1/24/21 19:00    99/60    


 


1/24/21 19:00  83 24 99/60 (73) 96   


 


1/24/21 18:00  87 22 98/57 (71) 93   


 


1/24/21 18:00    98/57    


 


1/24/21 17:00  85 22 99/70 (80) 94   


 


1/24/21 17:00    99/70    


 


1/24/21 16:00 97.9 89 23 104/67 (79) 97   


 


1/24/21 16:00      Mechanical Ventilator  


 


1/24/21 16:00    104/67    


 


1/24/21 16:00  89      


 


1/24/21 16:00        100


 


1/24/21 15:00  90 18 92/54 (67) 97   


 


1/24/21 15:00    92/54    


 


1/24/21 14:00  90 22 90/54 (66) 96   


 


1/24/21 14:00    90/54    


 


1/24/21 13:30  94 23 87/57 (67) 94   


 


1/24/21 13:00  96 28 88/54 (65) 90   


 


1/24/21 13:00  95 26     100


 


1/24/21 13:00    88/54    


 


1/24/21 12:00      Mechanical Ventilator  


 


1/24/21 12:00        100


 


1/24/21 12:00    90/55    


 


1/24/21 12:00 98.6 94 24 90/55 (67) 94   


 


1/24/21 12:00  94      


 


1/24/21 11:00  93 27 86/53 (64) 93   


 


1/24/21 11:00    85/53    


 


1/24/21 10:30  93 21 84/50 (61) 94   


 


1/24/21 10:00    85/49    


 


1/24/21 10:00  90 27 78/51 (60) 94   


 


1/24/21 09:30  86 23 114/62 (79) 93   


 


1/24/21 09:17  88      


 


1/24/21 09:00  87 25 88/55 (66) 90   


 


1/24/21 09:00    88/55    


 


1/24/21 08:30  87 22 92/55 (67) 91   


 


1/24/21 08:00      Mechanical Ventilator  


 


1/24/21 08:00 97.9 87 23 92/57 (69) 90   


 


1/24/21 08:00    92/57    


 


1/24/21 08:00  87      


 


1/24/21 08:00        100


 


1/24/21 07:15  86 22 91/57 (68) 90   


 


1/24/21 07:00  85 20 88/55 (66) 91   


 


1/24/21 07:00    88/55    


 


1/24/21 07:00  87 23     100

















Intake and Output  


 


 1/24/21 1/25/21





 19:00 07:00


 


Intake Total 1743.75 ml 1488.5 ml


 


Output Total 895 ml 460 ml


 


Balance 848.75 ml 1028.5 ml


 


  


 


IV Total 963.75 ml 823.5 ml


 


Tube Feeding 660 ml 605 ml


 


Other 120 ml 60 ml


 


Output Urine Total 820 ml 430 ml


 


Stool Total 25 ml 


 


Chest Tube Drainage Total 50 ml 30 ml


 


# Bowel Movements  50








Laboratory Tests


1/24/21 09:50: Vancomycin Level Trough 37.0H


1/25/21 05:48: Random Vancomycin Level [Pending]


Height (Feet):  5


Height (Inches):  7.00


Weight (Pounds):  198


Objective





Current Medications








 Medications


  (Trade)  Dose


 Ordered  Sig/Julisa


 Route


 PRN Reason  Start Time


 Stop Time Status Last Admin


Dose Admin


 


 Acetaminophen


  (Tylenol)  650 mg  Q4H  PRN


 GT


 Temp >100.5  1/15/21 17:15


 2/14/21 17:14  1/22/21 11:05





 


 Amiodarone HCl


  (Cordarone)  400 mg  EVERY 12  HOURS


 NG


   1/19/21 21:00


 4/19/21 20:59  1/24/21 20:21





 


 Chlorhexidine


 Gluconate


  (Vanessa-Hex 2%)  1 applic  DAILY@2000


 TOPIC


   1/19/21 20:00


 4/19/21 19:59  1/24/21 19:36





 


 Dextrose  1,000 ml @ 


 50 mls/hr  Q20H


 IV


   1/18/21 10:00


 2/17/21 09:59  1/25/21 01:02





 


 Dextrose


  (Dextrose 50%)  25 ml  Q30M  PRN


 IV


 Hypoglycemia  1/9/21 13:30


 4/9/21 13:29   





 


 Dextrose


  (Dextrose 50%)  50 ml  Q30M  PRN


 IV


 Hypoglycemia  1/9/21 13:30


 4/9/21 13:29   





 


 Digoxin


  (Lanoxin)  0.125 mg  DAILY


 NG


   1/21/21 09:00


 4/21/21 08:59  1/24/21 09:17





 


 Docusate Sodium


  (Colace)  100 mg  TIDPRN  PRN


 GT


 Constipation  1/12/21 01:15


 2/11/21 01:14  1/12/21 01:42





 


 Enoxaparin Sodium


  (Lovenox)  80 mg  EVERY 12  HOURS


 SUBQ


   1/2/21 21:00


 4/2/21 20:59  1/24/21 09:40





 


 Insulin Aspart


  (NovoLOG)    Q6HR


 SUBQ


   1/14/21 12:00


 4/9/21 17:59  1/24/21 11:51





 


 Insulin Aspart


  (NovoLOG)  9 units  EVERY 6  HOURS


 SUBQ


   1/21/21 12:00


 4/15/21 06:59  1/25/21 05:35





 


 Insulin Detemir


  (Levemir)  20 units  Q12HR


 SUBQ


   1/14/21 09:00


 4/12/21 08:59  1/24/21 09:36





 


 Norepinephrine


 Bitartrate 8 mg/


 Dextrose  500 ml @ 0


 mls/hr  Q24H  PRN


 IV


 For hypotension  1/25/21 00:00


 1/28/21 00:00  1/25/21 01:02





 


 Pantoprazole


  (Protonix)  40 mg  DAILY


 IVP


   1/14/21 09:00


 2/13/21 08:59  1/24/21 09:16





 


 Piperacillin Sod/


 Tazobactam Sod


 3.375 gm/Sodium


 Chloride  110 ml @ 


 27.5 mls/hr  EVERY 8  HOURS


 IVPB


   1/23/21 14:00


 1/30/21 13:59  1/25/21 05:33





 


 Quetiapine


 Fumarate


  (SEROqueL)  50 mg  Q12HR


 ORAL


   1/4/21 21:00


 2/18/21 20:59  1/24/21 20:21





 


 Sodium Chloride  500 ml @ 


 999 mls/hr  Q31M PRN


 IV


 For hypotension  1/15/21 18:30


 2/14/21 18:29   





 


 Vancomycin HCl


  ([facility]


 to dose)  1 ea  DAILY  PRN


 MISC


 Per rx protocol  1/14/21 09:15


 2/13/21 09:14   














Assessment/Plan


Problem List:  


(1) Diabetes mellitus out of control


ICD Codes:  E11.65 - Type 2 diabetes mellitus with hyperglycemia


SNOMED:  22260741, [id number]


(2) Pneumonia due to COVID-19 virus


ICD Codes:  U07.1 - COVID-19; J12.82 - Pneumonia due to coronavirus disease 2019


SNOMED:  665032814471102527


Assessment/Plan:


continue Novolog 9 units every 6 hours 


continue Novolog sliding scale every 6 hours 


hypoglycemia protocol in order











Nick Mcmahon MD                 Jan 25, 2021 06:40

## 2021-01-25 NOTE — NUR
NURSE NOTES:

Report received from Minh Golden RN.Pt sedated ocasionally opens eyes,arm movement noted with 
 bilat wrist restraints in placed, no resp distress presented,orally intubated,lip line 
26,AC 16  Fio2 100,Peep12, RT chest tube in placed,,no signs of pain or discomfort,SR 
on the monitor,OGT feeding Vital AF 1.2 55 ml/hr,no residual noted,,Stern cath draining 
yellow urine,IV site to MAYO PIcc line intact with Levophed drip at 2 mcg /hr  and D5W at 50 
ml/hr,SR up x2,HOB elevated,bed lock in lowest position,will continue with plans of care.

## 2021-01-25 NOTE — PULMONOLOGY PROGRESS NOTE
Subjective


ROS Limited/Unobtainable:  Yes


Interval Events:  Noted sub cut emphsema  On Versed drip. R CT placed


Constitutional:  Reports: fever, other - last night Cr=488.7


HEENT:  Repors: no symptoms


Respiratory:  Reports: shortness of breath - better


Cardiovascular:  Reports: no symptoms


Gastrointestinal/Abdominal:  Reports: no symptoms


Allergies:  


Coded Allergies:  


     No Known Allergies (Unverified , 6/11/19)


All Systems:  reviewed and negative except above





Objective





Last 24 Hour Vital Signs








  Date Time  Temp Pulse Resp B/P (MAP) Pulse Ox O2 Delivery O2 Flow Rate FiO2


 


1/25/21 10:09  88 23 93/54 (67) 65   


 


1/25/21 10:00    99/58    


 


1/25/21 09:12  115      


 


1/25/21 09:00  86 21 99/60 (73) 51   


 


1/25/21 08:00  87      


 


1/25/21 08:00    109/83    


 


1/25/21 08:00      Mechanical Ventilator  


 


1/25/21 08:00        100


 


1/25/21 08:00 95.9 85 19 109/83 (92) 70   


 


1/25/21 07:15  90 19     100


 


1/25/21 07:00  88 19 110/69 (83) 83   


 


1/25/21 07:00    110/69    


 


1/25/21 06:00    118/66    


 


1/25/21 06:00  87 16 118/66 (83) 97   


 


1/25/21 05:00  89 19 105/65 (78) 97   


 


1/25/21 05:00    105/65    


 


1/25/21 04:00        100


 


1/25/21 04:00 100.1 95 28 105/56 (72) 98   


 


1/25/21 04:00    105/56    


 


1/25/21 04:00  95      


 


1/25/21 04:00      Mechanical Ventilator  


 


1/25/21 03:46  95 27     100


 


1/25/21 03:00    103/59    


 


1/25/21 03:00  94 26 103/59 (74) 95   


 


1/25/21 02:00  94 26 101/59 (73) 95   


 


1/25/21 02:00    101/59    


 


1/25/21 01:02    100/57    


 


1/25/21 01:00  93 24 100/57 (71) 97   


 


1/25/21 00:00      Mechanical Ventilator  


 


1/25/21 00:00    100/58    


 


1/25/21 00:00  91      


 


1/25/21 00:00 97.0 91 22 100/58 (72) 96   


 


1/25/21 00:00        100


 


1/24/21 23:21  91 20     100


 


1/24/21 23:00  90 20 96/55 (69) 95   


 


1/24/21 23:00    96/55    


 


1/24/21 22:00  90 19 94/56 (69) 94   


 


1/24/21 22:00    94/56    


 


1/24/21 21:00    93/56    


 


1/24/21 21:00  89 17 93/56 (68) 96   


 


1/24/21 20:00        100


 


1/24/21 20:00 97.4 86 19 103/63 (76) 96   


 


1/24/21 20:00    103/63    


 


1/24/21 20:00      Mechanical Ventilator  


 


1/24/21 19:54  85 18     100


 


1/24/21 19:24  84      


 


1/24/21 19:00    99/60    


 


1/24/21 19:00  83 24 99/60 (73) 96   


 


1/24/21 18:00  87 22 98/57 (71) 93   


 


1/24/21 18:00    98/57    


 


1/24/21 17:00  85 22 99/70 (80) 94   


 


1/24/21 17:00    99/70    


 


1/24/21 16:00 97.9 89 23 104/67 (79) 97   


 


1/24/21 16:00      Mechanical Ventilator  


 


1/24/21 16:00    104/67    


 


1/24/21 16:00  89      


 


1/24/21 16:00        100


 


1/24/21 15:00  90 18 92/54 (67) 97   


 


1/24/21 15:00    92/54    


 


1/24/21 14:00  90 22 90/54 (66) 96   


 


1/24/21 14:00    90/54    


 


1/24/21 13:30  94 23 87/57 (67) 94   


 


1/24/21 13:00  96 28 88/54 (65) 90   


 


1/24/21 13:00  95 26     100


 


1/24/21 13:00    88/54    


 


1/24/21 12:00      Mechanical Ventilator  


 


1/24/21 12:00        100


 


1/24/21 12:00    90/55    


 


1/24/21 12:00 98.6 94 24 90/55 (67) 94   


 


1/24/21 12:00  94      

















Intake and Output  


 


 1/24/21 1/25/21





 19:00 07:00


 


Intake Total 1743.75 ml 1636.0 ml


 


Output Total 895 ml 490 ml


 


Balance 848.75 ml 1146.0 ml


 


  


 


IV Total 963.75 ml 916.0 ml


 


Tube Feeding 660 ml 660 ml


 


Other 120 ml 60 ml


 


Output Urine Total 820 ml 460 ml


 


Stool Total 25 ml 


 


Chest Tube Drainage Total 50 ml 30 ml


 


# Bowel Movements  50








Objective


On Versed drip


General Appearance:  no acute distress


HEENT:  atraumatic


Respiratory:  lungs clear, decreased breath sounds


Cardiovascular:  normal rate, regular rhythm


Abdomen:  soft, non tender, no organomegaly


Laboratory Tests


1/25/21 05:48: Random Vancomycin Level 24.8





Current Medications








 Medications


  (Trade)  Dose


 Ordered  Sig/Julisa


 Route


 PRN Reason  Start Time


 Stop Time Status Last Admin


Dose Admin


 


 Acetaminophen


  (Tylenol)  650 mg  Q4H  PRN


 GT


 Temp >100.5  1/15/21 17:15


 2/14/21 17:14  1/22/21 11:05





 


 Amiodarone HCl


  (Cordarone)  400 mg  EVERY 12  HOURS


 NG


   1/19/21 21:00


 4/19/21 20:59  1/25/21 09:12





 


 Chlorhexidine


 Gluconate


  (Vanessa-Hex 2%)  1 applic  DAILY@2000


 TOPIC


   1/19/21 20:00


 4/19/21 19:59  1/24/21 19:36





 


 Dextrose  1,000 ml @ 


 50 mls/hr  Q20H


 IV


   1/18/21 10:00


 2/17/21 09:59  1/25/21 01:02





 


 Dextrose


  (Dextrose 50%)  25 ml  Q30M  PRN


 IV


 Hypoglycemia  1/9/21 13:30


 4/9/21 13:29   





 


 Dextrose


  (Dextrose 50%)  50 ml  Q30M  PRN


 IV


 Hypoglycemia  1/9/21 13:30


 4/9/21 13:29   





 


 Digoxin


  (Lanoxin)  0.125 mg  DAILY


 NG


   1/21/21 09:00


 4/21/21 08:59  1/25/21 09:12





 


 Docusate Sodium


  (Colace)  100 mg  TIDPRN  PRN


 GT


 Constipation  1/12/21 01:15


 2/11/21 01:14  1/12/21 01:42





 


 Enoxaparin Sodium


  (Lovenox)  80 mg  EVERY 12  HOURS


 SUBQ


   1/2/21 21:00


 4/2/21 20:59  1/25/21 09:00





 


 Insulin Aspart


  (NovoLOG)    Q6HR


 SUBQ


   1/14/21 12:00


 4/9/21 17:59  1/24/21 11:51





 


 Insulin Aspart


  (NovoLOG)  9 units  EVERY 6  HOURS


 SUBQ


   1/21/21 12:00


 4/15/21 06:59  1/25/21 05:35





 


 Insulin Detemir


  (Levemir)  20 units  Q12HR


 SUBQ


   1/14/21 09:00


 4/12/21 08:59  1/25/21 09:22





 


 Norepinephrine


 Bitartrate 8 mg/


 Dextrose  500 ml @ 0


 mls/hr  Q24H  PRN


 IV


 For hypotension  1/25/21 00:00


 1/28/21 00:00  1/25/21 01:02





 


 Pantoprazole


  (Protonix)  40 mg  DAILY


 IVP


   1/14/21 09:00


 2/13/21 08:59  1/25/21 09:10





 


 Piperacillin Sod/


 Tazobactam Sod


 3.375 gm/Sodium


 Chloride  110 ml @ 


 27.5 mls/hr  EVERY 8  HOURS


 IVPB


   1/23/21 14:00


 1/30/21 13:59  1/25/21 05:33





 


 Quetiapine


 Fumarate


  (SEROqueL)  50 mg  Q12HR


 ORAL


   1/4/21 21:00


 2/18/21 20:59  1/25/21 09:12





 


 Sodium Chloride  500 ml @ 


 999 mls/hr  Q31M PRN


 IV


 For hypotension  1/15/21 18:30


 2/14/21 18:29   





 


 Vancomycin HCl


  ([facility]


 to dose)  1 ea  DAILY  PRN


 MISC


 Per rx protocol  1/14/21 09:15


 2/13/21 09:14   














Assessment/Plan


Assessment/Plan


1. COVID-19 pneumonia.


   - on Decadron, azithromycin, and ceftriaxone


   - Intubated now; will continue AC mode for now


          -Currently 100% FiO2. PEEP 12; SaO2 78-84%


             - Has R apical PTX and subcut emphysema


             - S/p R CT placement


2. Diabetes mellitus.; 


3. Elevated inflammatory markers.


4. High D-dimer. On full dose Lovenox


5. Hyper natremia; will continue D5W


6. Hyperglycemia.


   - BG control


7. Hypoxemia., secondary to #1; improved





DVT prophylaxis   On Lovenox.


Sedated; advised RN to increase sedation








Hypotensive


On levophed


Continue PEEP to 12





Advised to increase sedation


May need to increase PEEP











Sung Lemos MD           Jan 25, 2021 11:21

## 2021-01-25 NOTE — NUR
NURSE HAND-OFF REPORT: 



Latest Vital Signs: Temperature 95.9 , Pulse 92 , B/P 103 /63 , Respiratory Rate 24 , O2 SAT 
86 , Mechanical Ventilator, O2 Flow Rate  .  

Vital Sign Comment: 



EKG Rhythm: Sinus Rhythm

Rhythm change?: N 

MD Notified?: N -Dr. Thaddeus GUTIERREZ Response: 



Latest Singh Fall Score: 50  

Fall Risk: High Risk 

Safety Measures: Call light Within Reach, Bed Alarm Zone 1, Side Rails Side Rails x3, Bed 
position Low and Locked.

Fall Precautions: 

Door Sign



Report given to Tomi Gustafson RN.

## 2021-01-25 NOTE — NUR
CASE MANAGEMENT:REVIEW



1/25/21

SI: COVID PNA. RT PTX

96.0   92  21  120/70   95/60   SAT 66% ON VENT SUPPORT WITH 100%  FIO2

H/H-9.1/30.4   PLT-90    BUN+46   CR+1.5  



IS: IV ZOSYN Q8HRS

LEVOPHED GTT

AMIODARONE NG QD

DIGOXIN NG QD

IVF@50/HR

LOVENOX SQ Q12

**: ICU STATUS

## 2021-01-25 NOTE — NUR
NURSE NOTES:

NO RESPONSE TO VOICE, LEFT ARM WEAKLY MOVING TO TACTILE STIMULI, WILL CONTINUE TO MONITOR.

## 2021-01-26 VITALS — DIASTOLIC BLOOD PRESSURE: 60 MMHG | SYSTOLIC BLOOD PRESSURE: 127 MMHG

## 2021-01-26 VITALS — DIASTOLIC BLOOD PRESSURE: 67 MMHG | SYSTOLIC BLOOD PRESSURE: 126 MMHG

## 2021-01-26 VITALS — SYSTOLIC BLOOD PRESSURE: 121 MMHG | DIASTOLIC BLOOD PRESSURE: 66 MMHG

## 2021-01-26 VITALS — SYSTOLIC BLOOD PRESSURE: 121 MMHG | DIASTOLIC BLOOD PRESSURE: 89 MMHG

## 2021-01-26 VITALS — SYSTOLIC BLOOD PRESSURE: 129 MMHG | DIASTOLIC BLOOD PRESSURE: 65 MMHG

## 2021-01-26 VITALS — DIASTOLIC BLOOD PRESSURE: 68 MMHG | SYSTOLIC BLOOD PRESSURE: 129 MMHG

## 2021-01-26 VITALS — SYSTOLIC BLOOD PRESSURE: 129 MMHG | DIASTOLIC BLOOD PRESSURE: 64 MMHG

## 2021-01-26 VITALS — SYSTOLIC BLOOD PRESSURE: 111 MMHG | DIASTOLIC BLOOD PRESSURE: 62 MMHG

## 2021-01-26 VITALS — SYSTOLIC BLOOD PRESSURE: 123 MMHG | DIASTOLIC BLOOD PRESSURE: 63 MMHG

## 2021-01-26 VITALS — SYSTOLIC BLOOD PRESSURE: 111 MMHG | DIASTOLIC BLOOD PRESSURE: 63 MMHG

## 2021-01-26 VITALS — SYSTOLIC BLOOD PRESSURE: 117 MMHG | DIASTOLIC BLOOD PRESSURE: 62 MMHG

## 2021-01-26 VITALS — SYSTOLIC BLOOD PRESSURE: 110 MMHG | DIASTOLIC BLOOD PRESSURE: 57 MMHG

## 2021-01-26 VITALS — SYSTOLIC BLOOD PRESSURE: 130 MMHG | DIASTOLIC BLOOD PRESSURE: 62 MMHG

## 2021-01-26 VITALS — DIASTOLIC BLOOD PRESSURE: 63 MMHG | SYSTOLIC BLOOD PRESSURE: 111 MMHG

## 2021-01-26 VITALS — DIASTOLIC BLOOD PRESSURE: 60 MMHG | SYSTOLIC BLOOD PRESSURE: 113 MMHG

## 2021-01-26 VITALS — SYSTOLIC BLOOD PRESSURE: 119 MMHG | DIASTOLIC BLOOD PRESSURE: 69 MMHG

## 2021-01-26 VITALS — DIASTOLIC BLOOD PRESSURE: 63 MMHG | SYSTOLIC BLOOD PRESSURE: 129 MMHG

## 2021-01-26 VITALS — DIASTOLIC BLOOD PRESSURE: 67 MMHG | SYSTOLIC BLOOD PRESSURE: 131 MMHG

## 2021-01-26 VITALS — SYSTOLIC BLOOD PRESSURE: 108 MMHG | DIASTOLIC BLOOD PRESSURE: 57 MMHG

## 2021-01-26 VITALS — DIASTOLIC BLOOD PRESSURE: 66 MMHG | SYSTOLIC BLOOD PRESSURE: 123 MMHG

## 2021-01-26 VITALS — DIASTOLIC BLOOD PRESSURE: 63 MMHG | SYSTOLIC BLOOD PRESSURE: 121 MMHG

## 2021-01-26 VITALS — DIASTOLIC BLOOD PRESSURE: 68 MMHG | SYSTOLIC BLOOD PRESSURE: 124 MMHG

## 2021-01-26 VITALS — DIASTOLIC BLOOD PRESSURE: 65 MMHG | SYSTOLIC BLOOD PRESSURE: 127 MMHG

## 2021-01-26 VITALS — SYSTOLIC BLOOD PRESSURE: 131 MMHG | DIASTOLIC BLOOD PRESSURE: 67 MMHG

## 2021-01-26 LAB
ADD MANUAL DIFF: YES
ALBUMIN SERPL-MCNC: 1 G/DL (ref 3.4–5)
ALBUMIN/GLOB SERPL: 0.2 {RATIO} (ref 1–2.7)
ALP SERPL-CCNC: 369 U/L (ref 46–116)
ALT SERPL-CCNC: 27 U/L (ref 12–78)
ANION GAP SERPL CALC-SCNC: 7 MMOL/L (ref 5–15)
AST SERPL-CCNC: 25 U/L (ref 15–37)
BILIRUB SERPL-MCNC: 0.4 MG/DL (ref 0.2–1)
BUN SERPL-MCNC: 49 MG/DL (ref 7–18)
CALCIUM SERPL-MCNC: 8 MG/DL (ref 8.5–10.1)
CHLORIDE SERPL-SCNC: 105 MMOL/L (ref 98–107)
CO2 SERPL-SCNC: 27 MMOL/L (ref 21–32)
CREAT SERPL-MCNC: 1.7 MG/DL (ref 0.55–1.3)
ERYTHROCYTE [DISTWIDTH] IN BLOOD BY AUTOMATED COUNT: 14.1 % (ref 11.6–14.8)
GLOBULIN SER-MCNC: 4.4 G/DL
HCT VFR BLD CALC: 30.2 % (ref 42–52)
HGB BLD-MCNC: 9.3 G/DL (ref 14.2–18)
MCV RBC AUTO: 91 FL (ref 80–99)
PLATELET # BLD: 119 K/UL (ref 150–450)
POTASSIUM SERPL-SCNC: 4.1 MMOL/L (ref 3.5–5.1)
RBC # BLD AUTO: 3.34 M/UL (ref 4.7–6.1)
SODIUM SERPL-SCNC: 139 MMOL/L (ref 136–145)
WBC # BLD AUTO: 7.8 K/UL (ref 4.8–10.8)

## 2021-01-26 RX ADMIN — ENOXAPARIN SODIUM SCH MG: 80 INJECTION SUBCUTANEOUS at 20:15

## 2021-01-26 RX ADMIN — INSULIN ASPART SCH UNITS: 100 INJECTION, SOLUTION INTRAVENOUS; SUBCUTANEOUS at 12:00

## 2021-01-26 RX ADMIN — INSULIN ASPART SCH UNITS: 100 INJECTION, SOLUTION INTRAVENOUS; SUBCUTANEOUS at 06:35

## 2021-01-26 RX ADMIN — ENOXAPARIN SODIUM SCH MG: 80 INJECTION SUBCUTANEOUS at 08:39

## 2021-01-26 RX ADMIN — INSULIN DETEMIR SCH UNITS: 100 INJECTION, SOLUTION SUBCUTANEOUS at 21:34

## 2021-01-26 RX ADMIN — AMIODARONE HYDROCHLORIDE SCH MG: 200 TABLET ORAL at 08:38

## 2021-01-26 RX ADMIN — PANTOPRAZOLE SODIUM SCH MG: 40 INJECTION, POWDER, FOR SOLUTION INTRAVENOUS at 08:37

## 2021-01-26 RX ADMIN — INSULIN ASPART SCH UNITS: 100 INJECTION, SOLUTION INTRAVENOUS; SUBCUTANEOUS at 18:02

## 2021-01-26 RX ADMIN — INSULIN ASPART SCH UNITS: 100 INJECTION, SOLUTION INTRAVENOUS; SUBCUTANEOUS at 06:00

## 2021-01-26 RX ADMIN — CHLORHEXIDINE GLUCONATE SCH APPLIC: 213 SOLUTION TOPICAL at 20:15

## 2021-01-26 RX ADMIN — INSULIN DETEMIR SCH UNITS: 100 INJECTION, SOLUTION SUBCUTANEOUS at 08:41

## 2021-01-26 RX ADMIN — INSULIN ASPART SCH UNITS: 100 INJECTION, SOLUTION INTRAVENOUS; SUBCUTANEOUS at 12:42

## 2021-01-26 RX ADMIN — INSULIN ASPART SCH UNITS: 100 INJECTION, SOLUTION INTRAVENOUS; SUBCUTANEOUS at 18:01

## 2021-01-26 RX ADMIN — DIGOXIN SCH MG: 0.12 TABLET ORAL at 08:38

## 2021-01-26 RX ADMIN — INSULIN ASPART SCH UNITS: 100 INJECTION, SOLUTION INTRAVENOUS; SUBCUTANEOUS at 06:29

## 2021-01-26 RX ADMIN — INSULIN ASPART SCH UNITS: 100 INJECTION, SOLUTION INTRAVENOUS; SUBCUTANEOUS at 00:00

## 2021-01-26 RX ADMIN — DEXTROSE MONOHYDRATE SCH MLS/HR: 50 INJECTION, SOLUTION INTRAVENOUS at 05:12

## 2021-01-26 RX ADMIN — DEXTROSE MONOHYDRATE SCH MLS/HR: 50 INJECTION, SOLUTION INTRAVENOUS at 15:42

## 2021-01-26 RX ADMIN — DEXTROSE MONOHYDRATE SCH MLS/HR: 50 INJECTION, SOLUTION INTRAVENOUS at 21:33

## 2021-01-26 RX ADMIN — INSULIN ASPART SCH UNITS: 100 INJECTION, SOLUTION INTRAVENOUS; SUBCUTANEOUS at 00:52

## 2021-01-26 NOTE — NUR
NURSE NOTES:

Dr George at bedside,updated re pt's status,ordered to titrate Levophed from 4mcg/hr to 2 
mcg/hr.order carried out.

## 2021-01-26 NOTE — NUR
NURSE NOTES:

oral suctioned. right chest tube secured, dressing site intact, clean, and patent. pt is 
resting on the bed. call light within reach. O2sat is now at 91%. call light within reach.

## 2021-01-26 NOTE — NUR
CASE MANAGEMENT:REVIEW



1/26/21

SI: COVID PNA. RT PTX

100.0   97  28  124/68   SAT 84% ON VENT SUPPORT WITH 100%  FIO2

H/H-9.1/30.4   PLT-90    BUN+46   CR+1.5  

H/H-9.3/30.2    PLT-119   BUN+49   CR+1.7



IS: IV VANCOMYCIN X1

IV ZOSYN Q8HRS

LEVOPHED GTT

AMIODARONE NG QD

DIGOXIN NG QD

IVF@50/HR

LOVENOX SQ Q12

**: ICU STATUS

DCP: FROM HOME

## 2021-01-26 NOTE — NUR
NURSE HAND-OFF REPORT: 



Latest Vital Signs: Temperature 98.4 , Pulse 89 , B/P 121 /89 , Respiratory Rate 27 , O2 SAT 
92 , Mechanical Ventilator, O2 Flow Rate  .  

Vital Sign Comment: stable



EKG Rhythm: Sinus Rhythm

Rhythm change?: N 

MD Notified?: N -Dr. Thaddeus GUTIERREZ Response: 



Latest Singh Fall Score: 50  

Fall Risk: High Risk 

Safety Measures: Call light Within Reach, Bed Alarm Zone 1, Side Rails Side Rails x3, Bed 
position Low and Locked.

Fall Precautions: 

Door Sign



Report given to .Tomi Gustafson

## 2021-01-26 NOTE — NUR
NURSE NOTES:

repositioned pt, oral suctioned. no active bleeding shows at this time. call light within 
reach. will continue to monitor pt.

## 2021-01-26 NOTE — NUR
RESPIRATORY NOTE:

PT received on ACVC 16, 600, 100%, +12. Alarms are on and audible. Vent circuit is secure 
and out of the way. Airway is secure and patent. No s/s of respiratory distress noted. Will 
continue to monitor.

## 2021-01-26 NOTE — CARDIAC ELECTROPHYSIOLOGY PN
Assessment/Plan


Assessment/Plan


1. Atrial fibrillation with rapid ventricular response.      


    On digoxin 0.125 mg p.o. daily and Amiodarone 200 po qd  


      In SR. Digoxin level 0.6.    





2. Hypernatremia with sodium 151 with azotemia, BUN of 30, creatinine 0.8.   On 

iv fluids





3. Right pneumothorax, status post chest tube with subcutaneous emphysema.





4. COVID-19 pneumonia, 100% FiO2 and PEEP of 12, on remdesivir and Solu-Medrol 

per ID.


5. Diabetes.


6. Hypotension due to dehydration and sepsis. On iv fluid and Levophed 4 Mcg


Decrease Levo to 2 Mcg


7. Hypernatremia and azotemia.   





DW RN





Subjective


Subjective


In ICU on 100% Fio2 and PEEP 12 and Levo 4 Mcg.  


 Right chest tube had minimal drainage. In SR on Dig and Amiodarone


BP betetr today





Objective





Last 24 Hour Vital Signs








  Date Time  Temp Pulse Resp B/P (MAP) Pulse Ox O2 Delivery O2 Flow Rate FiO2


 


1/26/21 11:52    124/61    


 


1/26/21 11:08  92 22 110/57 (74) 94   


 


1/26/21 11:00    105/57    


 


1/26/21 10:00  92 22 108/57 (74) 94   


 


1/26/21 10:00    108/57    


 


1/26/21 09:00  94 24 121/63 (82) 92   


 


1/26/21 09:00    121/63    


 


1/26/21 08:38  97      


 


1/26/21 08:00  96      


 


1/26/21 08:00        100


 


1/26/21 08:00    124/68    


 


1/26/21 08:00 100.8 97 28 124/68 (86) 84   


 


1/26/21 08:00      Mechanical Ventilator  


 


1/26/21 07:00  98 26 129/64 (85) 87   


 


1/26/21 07:00    129/64    


 


1/26/21 06:00    134/67    


 


1/26/21 06:00  95 25 129/65 (86) 88   


 


1/26/21 05:00    132/64    


 


1/26/21 05:00  98 25 130/62 (84) 86   


 


1/26/21 04:00 99.0 94 20 131/67 (88) 90   


 


1/26/21 04:00  96      


 


1/26/21 04:00        100


 


1/26/21 04:00      Mechanical Ventilator  


 


1/26/21 04:00    128/68    


 


1/26/21 04:00        100


 


1/26/21 03:27  95 24     100


 


1/26/21 03:00    134/65    


 


1/26/21 03:00  94 20 131/67 (88) 90   


 


1/26/21 02:00    111/62    


 


1/26/21 02:00  94 20 111/62 (78) 91   


 


1/26/21 01:00    115/64    


 


1/26/21 01:00  98 27 111/63 (79) 70   


 


1/26/21 00:00      Mechanical Ventilator  


 


1/26/21 00:00 98.8 95 35 111/63 (79) 84   


 


1/26/21 00:00  83      


 


1/26/21 00:00    112/65    


 


1/25/21 23:12  92 24     100


 


1/25/21 23:00  95 23 111/65 (80) 84   


 


1/25/21 23:00    108/62    


 


1/25/21 22:00  91 23 107/64 (78) 87   


 


1/25/21 22:00    110/63    


 


1/25/21 21:00    110/65    


 


1/25/21 21:00  91 26 114/68 (83) 85   


 


1/25/21 20:00      Mechanical Ventilator  


 


1/25/21 20:00  88      


 


1/25/21 20:00    107/62    


 


1/25/21 20:00        100


 


1/25/21 20:00 98.4 91 23 107/62 (77) 86   


 


1/25/21 19:12  92 24     100


 


1/25/21 19:00  93 23 103/63 (76) 86   


 


1/25/21 19:00    107/63    


 


1/25/21 18:00    107/59    


 


1/25/21 18:00  96 23 107/59 (75) 85   


 


1/25/21 17:00    103/60    


 


1/25/21 17:00  96 22 103/60 (74) 82   


 


1/25/21 16:18  96      


 


1/25/21 16:00      Mechanical Ventilator  


 


1/25/21 16:00    104/62    


 


1/25/21 16:00 95.9 96 26 104/62 (76) 75   


 


1/25/21 16:00        100


 


1/25/21 15:15  95 25     100


 


1/25/21 15:00  95 22 106/64 (78) 78   


 


1/25/21 15:00    106/64    


 


1/25/21 14:02  93 20 95/60 (72) 75   


 


1/25/21 14:00    95/60    


 


1/25/21 13:00    96/57    


 


1/25/21 13:00  92 21 95/57 (70) 66   

















Intake and Output  


 


 1/25/21 1/26/21





 19:00 07:00


 


Intake Total 1707.5 ml 1626.1666 ml


 


Output Total 905 ml 1270 ml


 


Balance 802.5 ml 356.1666 ml


 


  


 


Free Water 230 ml 30 ml


 


IV Total 757.5 ml 936.1666 ml


 


Tube Feeding 660 ml 660 ml


 


Other 60 ml 


 


Output Urine Total 705 ml 1040 ml


 


Stool Total  60 ml


 


Chest Tube Drainage Total 200 ml 170 ml











Laboratory Tests








Test


 1/25/21


12:10 1/26/21


03:30 1/26/21


09:07


 


White Blood Count


 7.6 K/UL


(4.8-10.8) 7.8 K/UL


(4.8-10.8) 





 


Red Blood Count


 3.26 M/UL


(4.70-6.10)  L 3.34 M/UL


(4.70-6.10)  L 





 


Hemoglobin


 9.1 G/DL


(14.2-18.0)  L 9.3 G/DL


(14.2-18.0)  L 





 


Hematocrit


 30.4 %


(42.0-52.0)  L 30.2 %


(42.0-52.0)  L 





 


Mean Corpuscular Volume 93 FL (80-99)   91 FL (80-99)   


 


Mean Corpuscular Hemoglobin


 28.0 PG


(27.0-31.0) 28.0 PG


(27.0-31.0) 





 


Mean Corpuscular Hemoglobin


Concent 30.0 G/DL


(32.0-36.0)  L 30.9 G/DL


(32.0-36.0)  L 





 


Red Cell Distribution Width


 14.4 %


(11.6-14.8) 14.1 %


(11.6-14.8) 





 


Platelet Count


 90 K/UL


(150-450)  L 119 K/UL


(150-450)  L 





 


Mean Platelet Volume


 11.5 FL


(6.5-10.1)  H 11.7 FL


(6.5-10.1)  H 





 


Neutrophils (%) (Auto)


 % (45.0-75.0)


 % (45.0-75.0)


 





 


Lymphocytes (%) (Auto)


 4.7 %


(20.0-45.0)  L % (20.0-45.0)


 





 


Monocytes (%) (Auto)


 1.6 %


(1.0-10.0)  % (1.0-10.0)  


 





 


Eosinophils (%) (Auto)


 2.0 %


(0.0-3.0)  % (0.0-3.0)  


 





 


Basophils (%) (Auto)


 0.9 %


(0.0-2.0)  % (0.0-2.0)  


 





 


Sodium Level


 141 MMOL/L


(136-145) 139 MMOL/L


(136-145) 





 


Potassium Level


 3.4 MMOL/L


(3.5-5.1)  L 4.1 MMOL/L


(3.5-5.1) 





 


Chloride Level


 106 MMOL/L


() 105 MMOL/L


() 





 


Carbon Dioxide Level


 28 MMOL/L


(21-32) 27 MMOL/L


(21-32) 





 


Anion Gap


 7 mmol/L


(5-15) 7 mmol/L


(5-15) 





 


Blood Urea Nitrogen


 46 mg/dL


(7-18)  H 49 mg/dL


(7-18)  H 





 


Creatinine


 1.5 MG/DL


(0.55-1.30)  H 1.7 MG/DL


(0.55-1.30)  H 





 


Estimat Glomerular Filtration


Rate 46.8 mL/min


(>60) 40.5 mL/min


(>60) 





 


Glucose Level


 164 MG/DL


()  H 140 MG/DL


()  H 





 


Calcium Level


 7.9 MG/DL


(8.5-10.1)  L 8.0 MG/DL


(8.5-10.1)  L 





 


Total Bilirubin


 0.5 MG/DL


(0.2-1.0) 0.4 MG/DL


(0.2-1.0) 





 


Aspartate Amino Transf


(AST/SGOT) 32 U/L (15-37)


 25 U/L (15-37)


 





 


Alanine Aminotransferase


(ALT/SGPT) 29 U/L (12-78)


 27 U/L (12-78)


 





 


Alkaline Phosphatase


 364 U/L


()  H 369 U/L


()  H 





 


Total Protein


 5.2 G/DL


(6.4-8.2)  L 5.4 G/DL


(6.4-8.2)  L 





 


Albumin


 1.0 G/DL


(3.4-5.0)  L 1.0 G/DL


(3.4-5.0)  L 





 


Globulin 4.2 g/dL   4.4 g/dL   


 


Albumin/Globulin Ratio


 0.2 (1.0-2.7)


L 0.2 (1.0-2.7)


L 





 


Differential Total Cells


Counted 


 100  


 





 


Neutrophils % (Manual)  87 % (45-75)  H 


 


Lymphocytes % (Manual)  9 % (20-45)  L 


 


Monocytes % (Manual)  2 % (1-10)   


 


Eosinophils % (Manual)  0 % (0-3)   


 


Basophils % (Manual)  0 % (0-2)   


 


Band Neutrophils  2 % (0-8)   


 


Nucleated Red Blood Cells  2 /100 WBC   


 


Platelet Estimate  Decreased  L 


 


Platelet Morphology  Normal   


 


Hypochromasia  1+   


 


Random Vancomycin Level  19.2 ug/mL   


 


Arterial Blood pH


 


 


 7.263


(7.350-7.450)


 


Arterial Blood Partial


Pressure CO2 


 


 67.0 mmHg


(35.0-45.0)  *H


 


Arterial Blood Partial


Pressure O2 


 


 61.2 mmHg


(75.0-100.0)  L


 


Arterial Blood HCO3


 


 


 29.6 mmol/L


(22.0-26.0)  H


 


Arterial Blood Oxygen


Saturation 


 


 90.3 %


()  L


 


Arterial Blood Base Excess   1.5 (-2-2)  


 


Abelardo Test   Positive  











Microbiology








 Date/Time


Source Procedure


Growth Status





 


 1/26/21 04:00


Stool Clostridium difficile Toxin Assay - Final Complete








Objective


HEENT: No JVD. Orally intubated


LUNGS:  Have decreased breath sounds with the chest tube on the right side


and subcutaneous emphysema.


CARDIOVASCULAR:  Regular S1, S2 with


no gallop.


ABDOMEN:  Soft.


EXTREMITIES:  A 1+ pitting edema.











Jake George MD               Jan 26, 2021 12:07

## 2021-01-26 NOTE — NUR
NURSE NOTES:

Report received from Tomi Gustafson RN.Pt sedated,orally intubated ETT7.5 lip line 26,vent 
settings AC 16, Fio2 100% Peep12,no signs of pain or discomfort,SR on the monitor,OGT 
Vital AF1.2 at 55 ml/hr no residual noted in placed per auscultation,Stern cath draining 
yellow urine skin warm and edematous IV site to MAYO PICC line intact with Levophed at 4 
mcg/hr and IVF D5W at 50 ml/hr,with bilat wrist restraints ,SR up x2 HOB elevated bed lock 
in lowest position,will continue with plans of care.

## 2021-01-26 NOTE — NUR
NURSE NOTES:

oral care given, oral suctioned. repositioned pt. chest tube secured. pt is in stable 
condition at this time. will continue to monitor pt.

## 2021-01-26 NOTE — PULMONOLOGY PROGRESS NOTE
Subjective


ROS Limited/Unobtainable:  Yes


Interval Events:  Remains on high PEEP and FiO2


Constitutional:  Reports: no symptoms, other - last night Fx=197.7


HEENT:  Repors: no symptoms


Respiratory:  Reports: shortness of breath - better


Cardiovascular:  Reports: no symptoms


Gastrointestinal/Abdominal:  Reports: no symptoms


Allergies:  


Coded Allergies:  


     No Known Allergies (Unverified , 6/11/19)


All Systems:  reviewed and negative except above





Objective





Last 24 Hour Vital Signs








  Date Time  Temp Pulse Resp B/P (MAP) Pulse Ox O2 Delivery O2 Flow Rate FiO2


 


1/26/21 08:38  97      


 


1/26/21 08:00        100


 


1/26/21 08:00 100.8 97 28 124/68 (86) 84   


 


1/26/21 07:00  98 26 129/64 (85) 87   


 


1/26/21 07:00    129/64    


 


1/26/21 06:00    134/67    


 


1/26/21 06:00  95 25 129/65 (86) 88   


 


1/26/21 05:00    132/64    


 


1/26/21 05:00  98 25 130/62 (84) 86   


 


1/26/21 04:00 99.0 94 20 131/67 (88) 90   


 


1/26/21 04:00  96      


 


1/26/21 04:00        100


 


1/26/21 04:00      Mechanical Ventilator  


 


1/26/21 04:00    128/68    


 


1/26/21 04:00        100


 


1/26/21 03:27  95 24     100


 


1/26/21 03:00    134/65    


 


1/26/21 03:00  94 20 131/67 (88) 90   


 


1/26/21 02:00    111/62    


 


1/26/21 02:00  94 20 111/62 (78) 91   


 


1/26/21 01:00    115/64    


 


1/26/21 01:00  98 27 111/63 (79) 70   


 


1/26/21 00:00      Mechanical Ventilator  


 


1/26/21 00:00 98.8 95 35 111/63 (79) 84   


 


1/26/21 00:00  83      


 


1/26/21 00:00    112/65    


 


1/25/21 23:12  92 24     100


 


1/25/21 23:00  95 23 111/65 (80) 84   


 


1/25/21 23:00    108/62    


 


1/25/21 22:00  91 23 107/64 (78) 87   


 


1/25/21 22:00    110/63    


 


1/25/21 21:00    110/65    


 


1/25/21 21:00  91 26 114/68 (83) 85   


 


1/25/21 20:00      Mechanical Ventilator  


 


1/25/21 20:00  88      


 


1/25/21 20:00    107/62    


 


1/25/21 20:00        100


 


1/25/21 20:00 98.4 91 23 107/62 (77) 86   


 


1/25/21 19:12  92 24     100


 


1/25/21 19:00  93 23 103/63 (76) 86   


 


1/25/21 19:00    107/63    


 


1/25/21 18:00    107/59    


 


1/25/21 18:00  96 23 107/59 (75) 85   


 


1/25/21 17:00    103/60    


 


1/25/21 17:00  96 22 103/60 (74) 82   


 


1/25/21 16:18  96      


 


1/25/21 16:00      Mechanical Ventilator  


 


1/25/21 16:00    104/62    


 


1/25/21 16:00 95.9 96 26 104/62 (76) 75   


 


1/25/21 16:00        100


 


1/25/21 15:15  95 25     100


 


1/25/21 15:00  95 22 106/64 (78) 78   


 


1/25/21 15:00    106/64    


 


1/25/21 14:02  93 20 95/60 (72) 75   


 


1/25/21 14:00    95/60    


 


1/25/21 13:00    96/57    


 


1/25/21 13:00  92 21 95/57 (70) 66   


 


1/25/21 12:00      Mechanical Ventilator  


 


1/25/21 12:00        100


 


1/25/21 12:00 96.0 92 21 106/58 (74) 65   


 


1/25/21 12:00  92      


 


1/25/21 12:00    120/70    


 


1/25/21 11:15  91 20     100


 


1/25/21 11:00    129/69    


 


1/25/21 11:00  92 20 95/60 (72) 72   


 


1/25/21 10:09  88 23 93/54 (67) 65   


 


1/25/21 10:00    99/58    

















Intake and Output  


 


 1/25/21 1/26/21





 19:00 07:00


 


Intake Total 1707.5 ml 1626.1666 ml


 


Output Total 905 ml 1270 ml


 


Balance 802.5 ml 356.1666 ml


 


  


 


Free Water 230 ml 30 ml


 


IV Total 757.5 ml 936.1666 ml


 


Tube Feeding 660 ml 660 ml


 


Other 60 ml 


 


Output Urine Total 705 ml 1040 ml


 


Stool Total  60 ml


 


Chest Tube Drainage Total 200 ml 170 ml








Objective


On Versed drip


General Appearance:  no acute distress


HEENT:  atraumatic


Respiratory:  lungs clear, decreased breath sounds


Cardiovascular:  normal rate, regular rhythm


Abdomen:  soft, non tender, no organomegaly





Microbiology








 Date/Time


Source Procedure


Growth Status





 


 1/26/21 04:00


Stool Clostridium difficile Toxin Assay - Final Complete








Laboratory Tests


1/25/21 12:10: 


White Blood Count 7.6, Red Blood Count 3.26L, Hemoglobin 9.1L, Hematocrit 30.4L,

Mean Corpuscular Volume 93, Mean Corpuscular Hemoglobin 28.0, Mean Corpuscular 

Hemoglobin Concent 30.0L, Red Cell Distribution Width 14.4, Platelet Count 90L, 

Mean Platelet Volume 11.5H, Neutrophils (%) (Auto) , Lymphocytes (%) (Auto) 4.7L

, Monocytes (%) (Auto) 1.6, Eosinophils (%) (Auto) 2.0, Basophils (%) (Auto) 

0.9, Sodium Level 141, Potassium Level 3.4L, Chloride Level 106, Carbon Dioxide 

Level 28, Anion Gap 7, Blood Urea Nitrogen 46H, Creatinine 1.5H, Estimat 

Glomerular Filtration Rate 46.8, Glucose Level 164H, Calcium Level 7.9L, Total 

Bilirubin 0.5, Aspartate Amino Transf (AST/SGOT) 32, Alanine Aminotransferase 

(ALT/SGPT) 29, Alkaline Phosphatase 364H, Total Protein 5.2L, Albumin 1.0L, Jackie

bulin 4.2, Albumin/Globulin Ratio 0.2L


1/26/21 03:30: 


White Blood Count 7.8, Red Blood Count 3.34L, Hemoglobin 9.3L, Hematocrit 30.2L,

Mean Corpuscular Volume 91, Mean Corpuscular Hemoglobin 28.0, Mean Corpuscular 

Hemoglobin Concent 30.9L, Red Cell Distribution Width 14.1, Platelet Count 119L,

Mean Platelet Volume 11.7H, Neutrophils (%) (Auto) , Lymphocytes (%) (Auto) , 

Monocytes (%) (Auto) , Eosinophils (%) (Auto) , Basophils (%) (Auto) , Sodium 

Level 139, Potassium Level 4.1, Chloride Level 105, Carbon Dioxide Level 27, 

Anion Gap 7, Blood Urea Nitrogen 49H, Creatinine 1.7H, Estimat Glomerular 

Filtration Rate 40.5, Glucose Level 140H, Calcium Level 8.0L, Total Bilirubin 

0.4, Aspartate Amino Transf (AST/SGOT) 25, Alanine Aminotransferase (ALT/SGPT) 

27, Alkaline Phosphatase 369H, Total Protein 5.4L, Albumin 1.0L, Globulin 4.4, 

Albumin/Globulin Ratio 0.2L, Differential Total Cells Counted 100, Neutrophils %

(Manual) 87H, Lymphocytes % (Manual) 9L, Monocytes % (Manual) 2, Eosinophils % 

(Manual) 0, Basophils % (Manual) 0, Band Neutrophils 2, Nucleated Red Blood 

Cells 2, Platelet Estimate DecreasedL, Platelet Morphology Normal, Hypochromasia

1+, Random Vancomycin Level 19.2


1/26/21 09:07: 


Arterial Blood pH 7.263L, Arterial Blood Partial Pressure CO2 67.0*H, Arterial 

Blood Partial Pressure O2 61.2L, Arterial Blood HCO3 29.6H, Arterial Blood 

Oxygen Saturation 90.3L, Arterial Blood Base Excess 1.5, Abelardo Test Positive





Current Medications








 Medications


  (Trade)  Dose


 Ordered  Sig/Julisa


 Route


 PRN Reason  Start Time


 Stop Time Status Last Admin


Dose Admin


 


 Acetaminophen


  (Tylenol)  650 mg  Q4H  PRN


 GT


 Temp >100.5  1/15/21 17:15


 2/14/21 17:14  1/22/21 11:05





 


 Amiodarone HCl


  (Cordarone)  200 mg  DAILY


 NG


   1/26/21 09:00


 4/19/21 20:59  1/26/21 08:38





 


 Chlorhexidine


 Gluconate


  (Vanessa-Hex 2%)  1 applic  DAILY@2000


 TOPIC


   1/19/21 20:00


 4/19/21 19:59  1/25/21 20:27





 


 Dextrose  1,000 ml @ 


 50 mls/hr  Q20H


 IV


   1/18/21 10:00


 2/17/21 09:59  1/25/21 21:04





 


 Dextrose


  (Dextrose 50%)  25 ml  Q30M  PRN


 IV


 Hypoglycemia  1/9/21 13:30


 4/9/21 13:29   





 


 Dextrose


  (Dextrose 50%)  50 ml  Q30M  PRN


 IV


 Hypoglycemia  1/9/21 13:30


 4/9/21 13:29   





 


 Digoxin


  (Lanoxin)  0.125 mg  DAILY


 NG


   1/21/21 09:00


 4/21/21 08:59  1/26/21 08:38





 


 Docusate Sodium


  (Colace)  100 mg  TIDPRN  PRN


 GT


 Constipation  1/12/21 01:15


 2/11/21 01:14  1/12/21 01:42





 


 Enoxaparin Sodium


  (Lovenox)  80 mg  EVERY 12  HOURS


 SUBQ


   1/2/21 21:00


 4/2/21 20:59  1/25/21 09:00





 


 Insulin Aspart


  (NovoLOG)    Q6HR


 SUBQ


   1/14/21 12:00


 4/9/21 17:59  1/26/21 06:35





 


 Insulin Aspart


  (NovoLOG)  9 units  EVERY 6  HOURS


 SUBQ


   1/21/21 12:00


 4/15/21 06:59  1/26/21 06:29





 


 Insulin Detemir


  (Levemir)  10 units  Q12HR


 SUBQ


   1/26/21 09:00


 4/12/21 08:59  1/26/21 08:41





 


 Norepinephrine


 Bitartrate 8 mg/


 Dextrose  500 ml @ 0


 mls/hr  Q24H  PRN


 IV


 For hypotension  1/25/21 00:00


 1/28/21 00:00  1/25/21 01:02





 


 Pantoprazole


  (Protonix)  40 mg  DAILY


 IVP


   1/14/21 09:00


 2/13/21 08:59  1/26/21 08:37





 


 Piperacillin Sod/


 Tazobactam Sod


 3.375 gm/Sodium


 Chloride  110 ml @ 


 27.5 mls/hr  EVERY 8  HOURS


 IVPB


   1/23/21 14:00


 1/30/21 13:59  1/26/21 05:12





 


 Quetiapine


 Fumarate


  (SEROqueL)  50 mg  Q12HR


 ORAL


   1/4/21 21:00


 2/18/21 20:59  1/26/21 08:38





 


 Sodium Chloride  500 ml @ 


 999 mls/hr  Q31M PRN


 IV


 For hypotension  1/15/21 18:30


 2/14/21 18:29   





 


 Vancomycin HCl  250 ml @ 


 166.667


 mls/hr  ONCE


 IVPB


   1/26/21 08:00


 1/26/21 10:00  1/26/21 08:37





 


 Vancomycin HCl


  ([facility] pharmacy


 to dose)  1 ea  DAILY  PRN


 MISC


 Per rx protocol  1/14/21 09:15


 2/13/21 09:14   














Assessment/Plan


Assessment/Plan


1. COVID-19 pneumonia.


   - on Decadron, azithromycin, and ceftriaxone


   - Intubated now; will continue AC mode for now


          -Currently 100% FiO2. PEEP 12; SaO2 78-84%


            - ABG 7.26/63/62


             - Has R apical PTX and subcut emphysema


             - S/p R CT placement


2. Diabetes mellitus.; 


3. Elevated inflammatory markers.


4. High D-dimer. On full dose Lovenox


5. Hyper natremia; will continue D5W


6. Hyperglycemia.


   - BG control


7. Hypoxemia., secondary to #1; improved





DVT prophylaxis   On Lovenox.


Sedated; advised RN to increase sedation








Hypotensive


On levophed


Continue PEEP 12


Poor prognosis











Sung Lemos MD           Jan 26, 2021 09:45

## 2021-01-26 NOTE — NUR
NURSE NOTES:

Dr Ochoa called ,told RN to inform Dr Lemos re result of am Chest Xray,pt with 
subcutaneous Emphysema .

## 2021-01-26 NOTE — NUR
RD ASSESSMENT & RECOMMENDATIONS

SEE CARE ACTIVITY FOR COMPLETE ASSESSMENT



DAILY ESTIMATED NEEDS:

Needs based on Critical Care/ 73kg abw 

22-28  kcals/kg 

3295-3033  total kcals

1.25-2  g protein/kg

  g total protein 

20-25  mL/kg

9316-3470  total fluid mLs



NUTRITION DIAGNOSIS:

Swallowing difficulty R/T respiratory failure as evidenced by pt now

orally intubated, on OGT feeds.





CURRENT TF: Vital 1.2 goal of 55ml/hr x24 hrs  





ENTERAL NUTRITION RECOMMENDATIONS:

Vital AF 1.2 for CARB CONTROL and critical care @ goal 55ml/hr x 24 hrs  to provide 1320ml, 
1584kcal, 99g prot, 1070ml free water 



**** FEED WITH HEMODYNAMIC STABILITY

* Continue Vital AF 1.2 for carb control and critical care

* Maintain lowered goal rate of 55ml/hr for improved BG control

  (will meet 99% est kcal and 100% est prot needs)

* HOB over 30 degrees/ water flush per MD

---------

*** WITHOUT HEMODYNAMIC STABILITY, rec trophic feeding of Vital AF 1.2 @

10ml/hr x 24 hrs to maintain gut integrity if able to keep HOB >30 degrees

***







ADDITIONAL RECOMMENDATIONS:

* Calibrated bedscale wt 

* Tf at goal w/ D5, monitor BG  

* When tolerating Tf at goal add JESÚS BID for skin integrity  

* Maintain D5 IVF to prevent hypoglycemia when NPO  

* Monitor HD stability: NE @ 30mcg-> now @4mcg  

. 

.

## 2021-01-26 NOTE — SURGERY PROGRESS NOTE
Surgery Progress Note


Subjective


Procedure Performed


Right tube thoracostomy chest tube insertion


Symptoms:  other


Additional Comments


chest tube stable


difficult to wean





Objective





Last 24 Hour Vital Signs








  Date Time  Temp Pulse Resp B/P (MAP) Pulse Ox O2 Delivery O2 Flow Rate FiO2


 


1/26/21 11:08  92 22 110/57 (74) 94   


 


1/26/21 10:00  92 22 108/57 (74) 94   


 


1/26/21 10:00    108/57    


 


1/26/21 09:00  94 24 121/63 (82) 92   


 


1/26/21 09:00    121/63    


 


1/26/21 08:38  97      


 


1/26/21 08:00  96      


 


1/26/21 08:00        100


 


1/26/21 08:00    124/68    


 


1/26/21 08:00 100.8 97 28 124/68 (86) 84   


 


1/26/21 08:00      Mechanical Ventilator  


 


1/26/21 07:00  98 26 129/64 (85) 87   


 


1/26/21 07:00    129/64    


 


1/26/21 06:00    134/67    


 


1/26/21 06:00  95 25 129/65 (86) 88   


 


1/26/21 05:00    132/64    


 


1/26/21 05:00  98 25 130/62 (84) 86   


 


1/26/21 04:00 99.0 94 20 131/67 (88) 90   


 


1/26/21 04:00  96      


 


1/26/21 04:00        100


 


1/26/21 04:00      Mechanical Ventilator  


 


1/26/21 04:00    128/68    


 


1/26/21 04:00        100


 


1/26/21 03:27  95 24     100


 


1/26/21 03:00    134/65    


 


1/26/21 03:00  94 20 131/67 (88) 90   


 


1/26/21 02:00    111/62    


 


1/26/21 02:00  94 20 111/62 (78) 91   


 


1/26/21 01:00    115/64    


 


1/26/21 01:00  98 27 111/63 (79) 70   


 


1/26/21 00:00      Mechanical Ventilator  


 


1/26/21 00:00 98.8 95 35 111/63 (79) 84   


 


1/26/21 00:00  83      


 


1/26/21 00:00    112/65    


 


1/25/21 23:12  92 24     100


 


1/25/21 23:00  95 23 111/65 (80) 84   


 


1/25/21 23:00    108/62    


 


1/25/21 22:00  91 23 107/64 (78) 87   


 


1/25/21 22:00    110/63    


 


1/25/21 21:00    110/65    


 


1/25/21 21:00  91 26 114/68 (83) 85   


 


1/25/21 20:00      Mechanical Ventilator  


 


1/25/21 20:00  88      


 


1/25/21 20:00    107/62    


 


1/25/21 20:00        100


 


1/25/21 20:00 98.4 91 23 107/62 (77) 86   


 


1/25/21 19:12  92 24     100


 


1/25/21 19:00  93 23 103/63 (76) 86   


 


1/25/21 19:00    107/63    


 


1/25/21 18:00    107/59    


 


1/25/21 18:00  96 23 107/59 (75) 85   


 


1/25/21 17:00    103/60    


 


1/25/21 17:00  96 22 103/60 (74) 82   


 


1/25/21 16:18  96      


 


1/25/21 16:00      Mechanical Ventilator  


 


1/25/21 16:00    104/62    


 


1/25/21 16:00 95.9 96 26 104/62 (76) 75   


 


1/25/21 16:00        100


 


1/25/21 15:15  95 25     100


 


1/25/21 15:00  95 22 106/64 (78) 78   


 


1/25/21 15:00    106/64    


 


1/25/21 14:02  93 20 95/60 (72) 75   


 


1/25/21 14:00    95/60    


 


1/25/21 13:00    96/57    


 


1/25/21 13:00  92 21 95/57 (70) 66   


 


1/25/21 12:00      Mechanical Ventilator  


 


1/25/21 12:00        100


 


1/25/21 12:00 96.0 92 21 106/58 (74) 65   


 


1/25/21 12:00  92      


 


1/25/21 12:00    120/70    








I&O











Intake and Output  


 


 1/25/21 1/26/21





 19:00 07:00


 


Intake Total 1707.5 ml 1626.1666 ml


 


Output Total 905 ml 1270 ml


 


Balance 802.5 ml 356.1666 ml


 


  


 


Free Water 230 ml 30 ml


 


IV Total 757.5 ml 936.1666 ml


 


Tube Feeding 660 ml 660 ml


 


Other 60 ml 


 


Output Urine Total 705 ml 1040 ml


 


Stool Total  60 ml


 


Chest Tube Drainage Total 200 ml 170 ml








Dressing:  saturated


Cardiovascular:  RSR


Respiratory:  decreased breath sounds


Abdomen:  soft, non-tender, present bowel sounds


Extremities:  no tenderness, no cyanosis





Laboratory Tests








Test


 1/25/21


12:10 1/26/21


03:30 1/26/21


09:07


 


White Blood Count


 7.6 K/UL


(4.8-10.8) 7.8 K/UL


(4.8-10.8) 





 


Red Blood Count


 3.26 M/UL


(4.70-6.10)  L 3.34 M/UL


(4.70-6.10)  L 





 


Hemoglobin


 9.1 G/DL


(14.2-18.0)  L 9.3 G/DL


(14.2-18.0)  L 





 


Hematocrit


 30.4 %


(42.0-52.0)  L 30.2 %


(42.0-52.0)  L 





 


Mean Corpuscular Volume 93 FL (80-99)   91 FL (80-99)   


 


Mean Corpuscular Hemoglobin


 28.0 PG


(27.0-31.0) 28.0 PG


(27.0-31.0) 





 


Mean Corpuscular Hemoglobin


Concent 30.0 G/DL


(32.0-36.0)  L 30.9 G/DL


(32.0-36.0)  L 





 


Red Cell Distribution Width


 14.4 %


(11.6-14.8) 14.1 %


(11.6-14.8) 





 


Platelet Count


 90 K/UL


(150-450)  L 119 K/UL


(150-450)  L 





 


Mean Platelet Volume


 11.5 FL


(6.5-10.1)  H 11.7 FL


(6.5-10.1)  H 





 


Neutrophils (%) (Auto)


 % (45.0-75.0)


 % (45.0-75.0)


 





 


Lymphocytes (%) (Auto)


 4.7 %


(20.0-45.0)  L % (20.0-45.0)


 





 


Monocytes (%) (Auto)


 1.6 %


(1.0-10.0)  % (1.0-10.0)  


 





 


Eosinophils (%) (Auto)


 2.0 %


(0.0-3.0)  % (0.0-3.0)  


 





 


Basophils (%) (Auto)


 0.9 %


(0.0-2.0)  % (0.0-2.0)  


 





 


Sodium Level


 141 MMOL/L


(136-145) 139 MMOL/L


(136-145) 





 


Potassium Level


 3.4 MMOL/L


(3.5-5.1)  L 4.1 MMOL/L


(3.5-5.1) 





 


Chloride Level


 106 MMOL/L


() 105 MMOL/L


() 





 


Carbon Dioxide Level


 28 MMOL/L


(21-32) 27 MMOL/L


(21-32) 





 


Anion Gap


 7 mmol/L


(5-15) 7 mmol/L


(5-15) 





 


Blood Urea Nitrogen


 46 mg/dL


(7-18)  H 49 mg/dL


(7-18)  H 





 


Creatinine


 1.5 MG/DL


(0.55-1.30)  H 1.7 MG/DL


(0.55-1.30)  H 





 


Estimat Glomerular Filtration


Rate 46.8 mL/min


(>60) 40.5 mL/min


(>60) 





 


Glucose Level


 164 MG/DL


()  H 140 MG/DL


()  H 





 


Calcium Level


 7.9 MG/DL


(8.5-10.1)  L 8.0 MG/DL


(8.5-10.1)  L 





 


Total Bilirubin


 0.5 MG/DL


(0.2-1.0) 0.4 MG/DL


(0.2-1.0) 





 


Aspartate Amino Transf


(AST/SGOT) 32 U/L (15-37)


 25 U/L (15-37)


 





 


Alanine Aminotransferase


(ALT/SGPT) 29 U/L (12-78)


 27 U/L (12-78)


 





 


Alkaline Phosphatase


 364 U/L


()  H 369 U/L


()  H 





 


Total Protein


 5.2 G/DL


(6.4-8.2)  L 5.4 G/DL


(6.4-8.2)  L 





 


Albumin


 1.0 G/DL


(3.4-5.0)  L 1.0 G/DL


(3.4-5.0)  L 





 


Globulin 4.2 g/dL   4.4 g/dL   


 


Albumin/Globulin Ratio


 0.2 (1.0-2.7)


L 0.2 (1.0-2.7)


L 





 


Differential Total Cells


Counted 


 100  


 





 


Neutrophils % (Manual)  87 % (45-75)  H 


 


Lymphocytes % (Manual)  9 % (20-45)  L 


 


Monocytes % (Manual)  2 % (1-10)   


 


Eosinophils % (Manual)  0 % (0-3)   


 


Basophils % (Manual)  0 % (0-2)   


 


Band Neutrophils  2 % (0-8)   


 


Nucleated Red Blood Cells  2 /100 WBC   


 


Platelet Estimate  Decreased  L 


 


Platelet Morphology  Normal   


 


Hypochromasia  1+   


 


Random Vancomycin Level  19.2 ug/mL   


 


Arterial Blood pH


 


 


 7.263


(7.350-7.450)


 


Arterial Blood Partial


Pressure CO2 


 


 67.0 mmHg


(35.0-45.0)  *H


 


Arterial Blood Partial


Pressure O2 


 


 61.2 mmHg


(75.0-100.0)  L


 


Arterial Blood HCO3


 


 


 29.6 mmol/L


(22.0-26.0)  H


 


Arterial Blood Oxygen


Saturation 


 


 90.3 %


()  L


 


Arterial Blood Base Excess   1.5 (-2-2)  


 


Abelardo Test   Positive  











Plan


Problems:  


(1) Pneumonia due to COVID-19 virus


Assessment & Plan:  Interim endotracheal intubation, endotracheal tube tip in 

good position


approximately 4 cm above the tito. There are extensive bilateral infiltrates, 

which


are markedly increased from the prior study. Pleural spaces are probably clear, 

not


well demonstrated


Markedly increased and now extensive bilateral infiltrates 


cont as per pulm and iID





(2) Hypoxia


Assessment & Plan:  patient developing DTI. in current condition, critically ill

and declining potential inevitable decline 


all care precautions taken and will cont to monitor and assist with improvement 





(3) Abdominal pain


Assessment & Plan:  65M abd pain, septic, covid, intubated ons upport


currently abd exam benign but limited given condition


line bo mary noted


okay for meds and feeds


nutritional optimization 


DAILY ESTIMATED NEEDS:


Needs based on Critical Care/ 73kg abw 


22-28  kcals/kg 


2150-2280  total kcals


1.2-2  g protein/kg


  g total protein 


25-30  mL/kg


8243-7355  total fluid mLs





NUTRITION DIAGNOSIS:


Swallowing difficulty R/T respiratory failure as evidenced by pt now


orally intubated, OGT in place, NPO





 


CURRENT TF:NPO 





 





ENTERAL NUTRITION RECOMMENDATIONS:


Vital AF 1.2 @ 60ml/hr x 24 hrs  to provide 1440ml, 1728kcal, 116g prot, 1167ml 

free water 





* As medically appropriate, initiate critical care and carb controlled TF 

formula of Vital AF 1.2


* Initiate @ 20ml/hr x 6hrs, advance 10ml q 4-6 hrs as tolerated to goal rate


* HOB over 30 degrees/ water flush per MD


* Does not exceed est kcal needs w/ Propofol running @ 8.083ml/hr x 24 hrs 

(provides 213 lipid kcal)





 





ADDITIONAL RECOMMENDATIONS:


* Calibrated bedscale wt 


* Monitor BGs closely w/ Decadron and TF, need for long acting insulin 


* Monitor lytes, replete as needed  


* Monitor Propofol rate, need to adjust TF rate  





(4) Acute metabolic encephalopathy


(5) Pneumothorax on right


Assessment & Plan:  right ptx


chest tube placed


decreased air


stable ptx


cont tube to suction


Endotracheal tube tip projects at the level of the thoracic inlet.


Orogastric tube tip projects at the level of the gastric fundus. Again 

demonstrated


is extensive subcutaneous emphysema, which appears somewhat worse on the left. 

Right


chest tube remains in place. Small right medial and apical pneumothorax are 

present,


slightly more conspicuous than on the previous exam, still small, somewhat 

difficult


to identify due to overlying subcutaneous emphysema. There is probably a tiny 

left


apical pneumothorax as well. Previously demonstrated pneumomediastinum has 

improved


considerably although still present. Bilateral infiltrates appear worse on the 

right.


 


Impression: Equivocally slightly increased but still small right pneumothorax.


 


Suspect tiny left apical pneumothorax


 


Improving pneumomediastinum


 


Worsening subcutaneous emphysema


 


Worsening right and stable left lung infiltrates 





 Right chest tube remains. There is still a small apical pneumothorax. Tiny


left apical pneumothorax persists, unchanged. There is decreased 

pneumomediastinum.


Subcutaneous gas has decreased as well. Bilateral infiltrates are unchanged.


Endotracheal tube remains at the level of the thoracic inlet. Orogastric tube is


again demonstrated, tip not well-visualized but probably stable in position


 


Impression: Stable tiny bilateral apical pneumothoraces


 


Unchanged bilateral infiltrates


 


Decreased pneumomediastinum and subcutaneous emphysema





Additional Comments


of note, patient seen and examined on 1/25.  unable to complete note because of 

log in issue











Norberto Vazquez                Jan 26, 2021 11:16

## 2021-01-26 NOTE — DIAGNOSTIC IMAGING REPORT
Indication: Cough

 

Technique: One view of the chest

 

Comparison: 1/23/2021

 

Findings: Still satisfactory positions of endotracheal and orogastric tubes, left arm

PICC. Right chest tube remains. No gross pneumothorax. There is new or increasing

right chest wall and bilateral supraclavicular subcutaneous emphysema. There is

probably a slight degree of pneumomediastinum in the upper mediastinum and lower

neck. Small left pleural effusion is unchanged

 

Impression: New subcutaneous emphysema and possible minimal pneumomediastinum.

Patient's nurse notified at the time of interpretation

 

Unchanged bilateral infiltrates.

## 2021-01-26 NOTE — NUR
NURSE HAND-OFF REPORT: 



Need to follow up: C.diff, ABG, chest-xray



Latest Vital Signs: Temperature 99.0 , Pulse 98 , B/P 129 /64 , Respiratory Rate 26 , O2 SAT 
87 , Mechanical Ventilator, O2 Flow Rate  .  

Vital Sign Comment: [stable]



EKG Rhythm: Sinus Rhythm

Rhythm change?: N 

MD Notified?: N -Dr. Thaddeus GUTIERREZ Response: 



Latest Singh Fall Score: 50  

Fall Risk: High Risk 

Safety Measures: Call light Within Reach, Bed Alarm Zone 1, Side Rails Side Rails x3, Bed 
position Low and Locked.

Fall Precautions: 

Door Sign



Report given to [Dian GIFFORD RN].

## 2021-01-26 NOTE — HEMATOLOGY/ONC PROGRESS NOTE
Assessment/Plan


Assessment/Plan


Leukocytosis 2/2 covid pna wbc 15->14-->17


Anemia 2/2 chronic disease, hgb 11-->10.4


Hypercoag disorer now on lovenox sq


Ac resp distress on ventilator


Pneumomediastinum


etoh abused


agitated -halodol


vent





Ngt


cont iv abx and iv decadrone


pulmonary and gi on consult


smear is noted


pneumomediastinum per pulm


dw charge nurse


lovenox sq





Subjective


Allergies:  


Coded Allergies:  


     No Known Allergies (Unverified , 6/11/19)


All Systems:  reviewed and negative except above


Subjective


1/10 on ventilator 60% fio2, on sediation, unresponsive, in icu


1/14 on vent, icu, wbc elev, no bleeding, unresponsive


1/15 on vent, with cash, icu, no bleeding, unresponsive


1/17 on vent, dw rn, no major changes, icu, nv


1/18 nv, labs reviewd, hr is elev, with afib, is onlovenox sq


1/19 remains on vent, sedated with wrist restraints, icu


1/20 on vent, with hematuria, ngt, in icu, labs reviewed


1/21 remains on vent, settings have been adjusted as per icu care


1/22 remains on vent, icu, on levo 6mcg, off versed, labs noted, elmer rn


1/25 remains onv ent, in icu, with restraints, on levo gtt as well


1/26 nv, suctioned, rectal tube, in icu, with restraints, no bleeding





Objective


Objective





Current Medications








 Medications


  (Trade)  Dose


 Ordered  Sig/Julisa


 Route


 PRN Reason  Start Time


 Stop Time Status Last Admin


Dose Admin


 


 Acetaminophen


  (Tylenol)  650 mg  Q4H  PRN


 GT


 Temp >100.5  1/15/21 17:15


 2/14/21 17:14  1/22/21 11:05





 


 Amiodarone HCl


  (Cordarone)  200 mg  DAILY


 NG


   1/26/21 09:00


 4/19/21 20:59   





 


 Chlorhexidine


 Gluconate


  (Vanessa-Hex 2%)  1 applic  DAILY@2000


 TOPIC


   1/19/21 20:00


 4/19/21 19:59  1/25/21 20:27





 


 Dextrose  1,000 ml @ 


 50 mls/hr  Q20H


 IV


   1/18/21 10:00


 2/17/21 09:59  1/25/21 21:04





 


 Dextrose


  (Dextrose 50%)  25 ml  Q30M  PRN


 IV


 Hypoglycemia  1/9/21 13:30


 4/9/21 13:29   





 


 Dextrose


  (Dextrose 50%)  50 ml  Q30M  PRN


 IV


 Hypoglycemia  1/9/21 13:30


 4/9/21 13:29   





 


 Digoxin


  (Lanoxin)  0.125 mg  DAILY


 NG


   1/21/21 09:00


 4/21/21 08:59  1/25/21 09:12





 


 Docusate Sodium


  (Colace)  100 mg  TIDPRN  PRN


 GT


 Constipation  1/12/21 01:15


 2/11/21 01:14  1/12/21 01:42





 


 Enoxaparin Sodium


  (Lovenox)  80 mg  EVERY 12  HOURS


 SUBQ


   1/2/21 21:00


 4/2/21 20:59  1/25/21 09:00





 


 Insulin Aspart


  (NovoLOG)    Q6HR


 SUBQ


   1/14/21 12:00


 4/9/21 17:59  1/26/21 06:35





 


 Insulin Aspart


  (NovoLOG)  9 units  EVERY 6  HOURS


 SUBQ


   1/21/21 12:00


 4/15/21 06:59  1/26/21 06:29





 


 Insulin Detemir


  (Levemir)  10 units  Q12HR


 SUBQ


   1/26/21 09:00


 4/12/21 08:59   





 


 Norepinephrine


 Bitartrate 8 mg/


 Dextrose  500 ml @ 0


 mls/hr  Q24H  PRN


 IV


 For hypotension  1/25/21 00:00


 1/28/21 00:00  1/25/21 01:02





 


 Pantoprazole


  (Protonix)  40 mg  DAILY


 IVP


   1/14/21 09:00


 2/13/21 08:59  1/25/21 09:10





 


 Piperacillin Sod/


 Tazobactam Sod


 3.375 gm/Sodium


 Chloride  110 ml @ 


 27.5 mls/hr  EVERY 8  HOURS


 IVPB


   1/23/21 14:00


 1/30/21 13:59  1/26/21 05:12





 


 Quetiapine


 Fumarate


  (SEROqueL)  50 mg  Q12HR


 ORAL


   1/4/21 21:00


 2/18/21 20:59  1/25/21 20:28





 


 Sodium Chloride  500 ml @ 


 999 mls/hr  Q31M PRN


 IV


 For hypotension  1/15/21 18:30


 2/14/21 18:29   





 


 Vancomycin HCl  250 ml @ 


 166.667


 mls/hr  ONCE


 IVPB


   1/26/21 08:00


 1/26/21 10:00   





 


 Vancomycin HCl


  ([facility] pharmacy


 to dose)  1 ea  DAILY  PRN


 MISC


 Per rx protocol  1/14/21 09:15


 2/13/21 09:14   














Last 24 Hour Vital Signs








  Date Time  Temp Pulse Resp B/P (MAP) Pulse Ox O2 Delivery O2 Flow Rate FiO2


 


1/26/21 06:00  95 25 129/65 (86) 88   


 


1/26/21 05:00  98 25 130/62 (84) 86   


 


1/26/21 04:00 99.0 94 20 131/67 (88) 90   


 


1/26/21 04:00  96      


 


1/26/21 04:00        100


 


1/26/21 04:00      Mechanical Ventilator  


 


1/26/21 04:00    128/68    


 


1/26/21 04:00        100


 


1/26/21 03:27  95 24     100


 


1/26/21 03:00  94 20 131/67 (88) 90   


 


1/26/21 02:00    111/62    


 


1/26/21 02:00  94 20 111/62 (78) 91   


 


1/26/21 01:00    115/64    


 


1/26/21 01:00  98 27 111/63 (79) 70   


 


1/26/21 00:00      Mechanical Ventilator  


 


1/26/21 00:00 98.8 95 35 111/63 (79) 84   


 


1/26/21 00:00  83      


 


1/26/21 00:00    112/65    


 


1/25/21 23:12  92 24     100


 


1/25/21 23:00  95 23 111/65 (80) 84   


 


1/25/21 23:00    108/62    


 


1/25/21 22:00  91 23 107/64 (78) 87   


 


1/25/21 22:00    110/63    


 


1/25/21 21:00    110/65    


 


1/25/21 21:00  91 26 114/68 (83) 85   


 


1/25/21 20:00      Mechanical Ventilator  


 


1/25/21 20:00  88      


 


1/25/21 20:00    107/62    


 


1/25/21 20:00        100


 


1/25/21 20:00 98.4 91 23 107/62 (77) 86   


 


1/25/21 19:12  92 24     100


 


1/25/21 19:00  93 23 103/63 (76) 86   


 


1/25/21 19:00    107/63    


 


1/25/21 18:00    107/59    


 


1/25/21 18:00  96 23 107/59 (75) 85   


 


1/25/21 17:00    103/60    


 


1/25/21 17:00  96 22 103/60 (74) 82   


 


1/25/21 16:18  96      


 


1/25/21 16:00      Mechanical Ventilator  


 


1/25/21 16:00    104/62    


 


1/25/21 16:00 95.9 96 26 104/62 (76) 75   


 


1/25/21 16:00        100


 


1/25/21 15:15  95 25     100


 


1/25/21 15:00  95 22 106/64 (78) 78   


 


1/25/21 15:00    106/64    


 


1/25/21 14:02  93 20 95/60 (72) 75   


 


1/25/21 14:00    95/60    


 


1/25/21 13:00    96/57    


 


1/25/21 13:00  92 21 95/57 (70) 66   


 


1/25/21 12:00      Mechanical Ventilator  


 


1/25/21 12:00        100


 


1/25/21 12:00 96.0 92 21 106/58 (74) 65   


 


1/25/21 12:00  92      


 


1/25/21 12:00    120/70    


 


1/25/21 11:15  91 20     100


 


1/25/21 11:00    129/69    


 


1/25/21 11:00  92 20 95/60 (72) 72   


 


1/25/21 10:09  88 23 93/54 (67) 65   


 


1/25/21 10:00    99/58    


 


1/25/21 09:12  115      


 


1/25/21 09:00  86 21 99/60 (73) 51   


 


1/25/21 08:01  87      


 


1/25/21 08:00    109/83    


 


1/25/21 08:00      Mechanical Ventilator  


 


1/25/21 08:00        100


 


1/25/21 08:00 95.9 85 19 109/83 (92) 70   


 


1/25/21 07:15  90 19     100


 


1/25/21 07:00  88 19 110/69 (83) 83   


 


1/25/21 07:00    110/69    


 


1/25/21 06:00    118/66    


 


1/25/21 06:00  87 16 118/66 (83) 97   


 


1/25/21 05:00  89 19 105/65 (78) 97   


 


1/25/21 05:00    105/65    


 


1/25/21 04:00        100


 


1/25/21 04:00 100.1 95 28 105/56 (72) 98   


 


1/25/21 04:00    105/56    


 


1/25/21 04:00  95      


 


1/25/21 04:00      Mechanical Ventilator  


 


1/25/21 03:46  95 27     100


 


1/25/21 03:00    103/59    


 


1/25/21 03:00  94 26 103/59 (74) 95   


 


1/25/21 02:00  94 26 101/59 (73) 95   


 


1/25/21 02:00    101/59    


 


1/25/21 01:02    100/57    


 


1/25/21 01:00  93 24 100/57 (71) 97   


 


1/25/21 00:00      Mechanical Ventilator  


 


1/25/21 00:00    100/58    


 


1/25/21 00:00  91      


 


1/25/21 00:00 97.0 91 22 100/58 (72) 96   


 


1/25/21 00:00        100


 


1/24/21 23:21  91 20     100


 


1/24/21 23:00  90 20 96/55 (69) 95   


 


1/24/21 23:00    96/55    


 


1/24/21 22:00  90 19 94/56 (69) 94   


 


1/24/21 22:00    94/56    


 


1/24/21 21:00    93/56    


 


1/24/21 21:00  89 17 93/56 (68) 96   


 


1/24/21 20:00        100


 


1/24/21 20:00 97.4 86 19 103/63 (76) 96   


 


1/24/21 20:00    103/63    


 


1/24/21 20:00      Mechanical Ventilator  


 


1/24/21 19:54  85 18     100


 


1/24/21 19:24  84      


 


1/24/21 19:00    99/60    


 


1/24/21 19:00  83 24 99/60 (73) 96   


 


1/24/21 18:00  87 22 98/57 (71) 93   


 


1/24/21 18:00    98/57    


 


1/24/21 17:00  85 22 99/70 (80) 94   


 


1/24/21 17:00    99/70    


 


1/24/21 16:00 97.9 89 23 104/67 (79) 97   


 


1/24/21 16:00      Mechanical Ventilator  


 


1/24/21 16:00    104/67    


 


1/24/21 16:00  89      


 


1/24/21 16:00        100


 


1/24/21 15:00  90 18 92/54 (67) 97   


 


1/24/21 15:00    92/54    


 


1/24/21 14:00  90 22 90/54 (66) 96   


 


1/24/21 14:00    90/54    


 


1/24/21 13:30  94 23 87/57 (67) 94   


 


1/24/21 13:00  96 28 88/54 (65) 90   


 


1/24/21 13:00  95 26     100


 


1/24/21 13:00    88/54    


 


1/24/21 12:00      Mechanical Ventilator  


 


1/24/21 12:00        100


 


1/24/21 12:00    90/55    


 


1/24/21 12:00 98.6 94 24 90/55 (67) 94   


 


1/24/21 12:00  94      


 


1/24/21 11:00  93 27 86/53 (64) 93   


 


1/24/21 11:00    85/53    


 


1/24/21 10:30  93 21 84/50 (61) 94   


 


1/24/21 10:00    85/49    


 


1/24/21 10:00  90 27 78/51 (60) 94   


 


1/24/21 09:30  86 23 114/62 (79) 93   


 


1/24/21 09:17  88      


 


1/24/21 09:00  87 25 88/55 (66) 90   


 


1/24/21 09:00    88/55    


 


1/24/21 08:30  87 22 92/55 (67) 91   


 


1/24/21 08:00      Mechanical Ventilator  


 


1/24/21 08:00 97.9 87 23 92/57 (69) 90   


 


1/24/21 08:00    92/57    


 


1/24/21 08:00  87      


 


1/24/21 08:00        100


 


1/24/21 07:15  86 22 91/57 (68) 90   


 


1/24/21 07:00  85 20 88/55 (66) 91   


 


1/24/21 07:00    88/55    


 


1/24/21 07:00  87 23     100

















Intake and Output  


 


 1/25/21 1/26/21





 19:00 07:00


 


Intake Total 1707.5 ml 1326.6666 ml


 


Output Total 905 ml 1230 ml


 


Balance 802.5 ml 96.6666 ml


 


  


 


Free Water 230 ml 30 ml


 


IV Total 757.5 ml 691.6666 ml


 


Tube Feeding 660 ml 605 ml


 


Other 60 ml 


 


Output Urine Total 705 ml 1000 ml


 


Stool Total  60 ml


 


Chest Tube Drainage Total 200 ml 170 ml











Labs








Test


 1/23/21


08:15 1/23/21


20:13 1/23/21


23:26 1/24/21


05:11


 


Arterial Blood pH


 7.249


(7.350-7.450) 


 


 





 


Arterial Blood Partial


Pressure CO2 61.1 mmHg


(35.0-45.0) 


 


 





 


Arterial Blood Partial


Pressure O2 46.8 mmHg


(75.0-100.0) 


 


 





 


Arterial Blood HCO3


 26.1 mmol/L


(22.0-26.0) 


 


 





 


Arterial Blood Oxygen


Saturation 81.3 %


() 


 


 





 


Arterial Blood Base Excess -1.9 (-2-2)    


 


Abelardo Test Positive    


 


Test


 1/24/21


06:00 1/24/21


09:29 1/24/21


09:50 1/24/21


11:28


 


White Blood Count


 7.7 K/UL


(4.8-10.8) 


 


 





 


Red Blood Count


 3.12 M/UL


(4.70-6.10) 


 


 





 


Hemoglobin


 8.9 G/DL


(14.2-18.0) 


 


 





 


Hematocrit


 29.0 %


(42.0-52.0) 


 


 





 


Mean Corpuscular Volume 93 FL (80-99)    


 


Mean Corpuscular Hemoglobin


 28.7 PG


(27.0-31.0) 


 


 





 


Mean Corpuscular Hemoglobin


Concent 30.8 G/DL


(32.0-36.0) 


 


 





 


Red Cell Distribution Width


 14.0 %


(11.6-14.8) 


 


 





 


Platelet Count


 98 K/UL


(150-450) 


 


 





 


Mean Platelet Volume


 10.2 FL


(6.5-10.1) 


 


 





 


Neutrophils (%) (Auto)  % (45.0-75.0)    


 


Lymphocytes (%) (Auto)  % (20.0-45.0)    


 


Monocytes (%) (Auto)  % (1.0-10.0)    


 


Eosinophils (%) (Auto)  % (0.0-3.0)    


 


Basophils (%) (Auto)  % (0.0-2.0)    


 


Differential Total Cells


Counted 100 


 


 


 





 


Neutrophils % (Manual) 84 % (45-75)    


 


Lymphocytes % (Manual) 7 % (20-45)    


 


Monocytes % (Manual) 1 % (1-10)    


 


Eosinophils % (Manual) 2 % (0-3)    


 


Basophils % (Manual) 0 % (0-2)    


 


Band Neutrophils 6 % (0-8)    


 


Platelet Estimate Decreased    


 


Platelet Morphology Normal    


 


Hypochromasia 1+    


 


Anisocytosis 1+    


 


Sodium Level


 142 MMOL/L


(136-145) 


 


 





 


Potassium Level


 3.7 MMOL/L


(3.5-5.1) 


 


 





 


Chloride Level


 107 MMOL/L


() 


 


 





 


Carbon Dioxide Level


 25 MMOL/L


(21-32) 


 


 





 


Anion Gap


 11 mmol/L


(5-15) 


 


 





 


Blood Urea Nitrogen


 38 mg/dL


(7-18) 


 


 





 


Creatinine


 1.3 MG/DL


(0.55-1.30) 


 


 





 


Estimat Glomerular Filtration


Rate 55.2 mL/min


(>60) 


 


 





 


Glucose Level


 198 MG/DL


() 


 


 





 


Calcium Level


 7.6 MG/DL


(8.5-10.1) 


 


 





 


POC Whole Blood Glucose


 


 146 MG/DL


() 


 141 MG/DL


()


 


Vancomycin Level Trough


 


 


 37.0 ug/mL


(5.0-12.0) 





 


Test


 1/24/21


17:28 1/24/21


20:13 1/24/21


23:15 1/25/21


05:15


 


POC Whole Blood Glucose


 89 MG/DL


() 96 MG/DL


() 117 MG/DL


() 116 MG/DL


()


 


Test


 1/25/21


05:48 1/25/21


09:21 1/25/21


12:10 1/26/21


03:30


 


Random Vancomycin Level 24.8 ug/mL    19.2 ug/mL 


 


POC Whole Blood Glucose


 


 125 MG/DL


() 


 





 


White Blood Count


 


 


 7.6 K/UL


(4.8-10.8) 7.8 K/UL


(4.8-10.8)


 


Red Blood Count


 


 


 3.26 M/UL


(4.70-6.10) 3.34 M/UL


(4.70-6.10)


 


Hemoglobin


 


 


 9.1 G/DL


(14.2-18.0) 9.3 G/DL


(14.2-18.0)


 


Hematocrit


 


 


 30.4 %


(42.0-52.0) 30.2 %


(42.0-52.0)


 


Mean Corpuscular Volume   93 FL (80-99)  91 FL (80-99) 


 


Mean Corpuscular Hemoglobin


 


 


 28.0 PG


(27.0-31.0) 28.0 PG


(27.0-31.0)


 


Mean Corpuscular Hemoglobin


Concent 


 


 30.0 G/DL


(32.0-36.0) 30.9 G/DL


(32.0-36.0)


 


Red Cell Distribution Width


 


 


 14.4 %


(11.6-14.8) 14.1 %


(11.6-14.8)


 


Platelet Count


 


 


 90 K/UL


(150-450) 119 K/UL


(150-450)


 


Mean Platelet Volume


 


 


 11.5 FL


(6.5-10.1) 11.7 FL


(6.5-10.1)


 


Neutrophils (%) (Auto)    % (45.0-75.0)   % (45.0-75.0) 


 


Lymphocytes (%) (Auto)


 


 


 4.7 %


(20.0-45.0)  % (20.0-45.0) 





 


Monocytes (%) (Auto)


 


 


 1.6 %


(1.0-10.0)  % (1.0-10.0) 





 


Eosinophils (%) (Auto)


 


 


 2.0 %


(0.0-3.0)  % (0.0-3.0) 





 


Basophils (%) (Auto)


 


 


 0.9 %


(0.0-2.0)  % (0.0-2.0) 





 


Sodium Level


 


 


 141 MMOL/L


(136-145) 139 MMOL/L


(136-145)


 


Potassium Level


 


 


 3.4 MMOL/L


(3.5-5.1) 4.1 MMOL/L


(3.5-5.1)


 


Chloride Level


 


 


 106 MMOL/L


() 105 MMOL/L


()


 


Carbon Dioxide Level


 


 


 28 MMOL/L


(21-32) 27 MMOL/L


(21-32)


 


Anion Gap


 


 


 7 mmol/L


(5-15) 7 mmol/L


(5-15)


 


Blood Urea Nitrogen


 


 


 46 mg/dL


(7-18) 49 mg/dL


(7-18)


 


Creatinine


 


 


 1.5 MG/DL


(0.55-1.30) 1.7 MG/DL


(0.55-1.30)


 


Estimat Glomerular Filtration


Rate 


 


 46.8 mL/min


(>60) 40.5 mL/min


(>60)


 


Glucose Level


 


 


 164 MG/DL


() 140 MG/DL


()


 


Calcium Level


 


 


 7.9 MG/DL


(8.5-10.1) 8.0 MG/DL


(8.5-10.1)


 


Total Bilirubin


 


 


 0.5 MG/DL


(0.2-1.0) 0.4 MG/DL


(0.2-1.0)


 


Aspartate Amino Transf


(AST/SGOT) 


 


 32 U/L (15-37) 


 25 U/L (15-37) 





 


Alanine Aminotransferase


(ALT/SGPT) 


 


 29 U/L (12-78) 


 27 U/L (12-78) 





 


Alkaline Phosphatase


 


 


 364 U/L


() 369 U/L


()


 


Total Protein


 


 


 5.2 G/DL


(6.4-8.2) 5.4 G/DL


(6.4-8.2)


 


Albumin


 


 


 1.0 G/DL


(3.4-5.0) 1.0 G/DL


(3.4-5.0)


 


Globulin   4.2 g/dL  4.4 g/dL 


 


Albumin/Globulin Ratio   0.2 (1.0-2.7)  0.2 (1.0-2.7) 








Height (Feet):  5


Height (Inches):  7.00


Weight (Pounds):  198


Objective


General Appearance:  lethargic, moderate distress


Neck:  supple


Cardiovascular:  regular rhythm


Respiratory/Chest:  crackles/rales, rhonchi - bilaterally vent++


Abdomen:  non tender, soft


Extremities:  non-tender











Emmett Cook MD          Jan 26, 2021 06:45

## 2021-01-26 NOTE — INFECTIOUS DISEASES PROG NOTE
Assessment/Plan


Assessment/Plan


A


1. COVID-19 pneumonia.


2. New-onset diabetes.


3. Hypoxic respiratory failure


4. Sepsis with fever, tachycardia & leukocytosis


5. Bacteremia with COANS, ? line infection


6. Atrial fibrillation





P


1. Finished remdesivir course


2. Continue Zosyn & Vancomycin





Subjective


ROS Limited/Unobtainable:  Yes


Constitutional:  Reports: fever, other - T=100.8


Neurologic:  Reports: confusion, other - on restraint


Allergies:  


Coded Allergies:  


     No Known Allergies (Unverified , 6/11/19)





Objective





Last 24 Hour Vital Signs








  Date Time  Temp Pulse Resp B/P (MAP) Pulse Ox O2 Delivery O2 Flow Rate FiO2


 


1/26/21 08:38  97      


 


1/26/21 08:00  96      


 


1/26/21 08:00        100


 


1/26/21 08:00 100.8 97 28 124/68 (86) 84   


 


1/26/21 08:00      Mechanical Ventilator  


 


1/26/21 07:00  98 26 129/64 (85) 87   


 


1/26/21 07:00    129/64    


 


1/26/21 06:00    134/67    


 


1/26/21 06:00  95 25 129/65 (86) 88   


 


1/26/21 05:00    132/64    


 


1/26/21 05:00  98 25 130/62 (84) 86   


 


1/26/21 04:00 99.0 94 20 131/67 (88) 90   


 


1/26/21 04:00  96      


 


1/26/21 04:00        100


 


1/26/21 04:00      Mechanical Ventilator  


 


1/26/21 04:00    128/68    


 


1/26/21 04:00        100


 


1/26/21 03:27  95 24     100


 


1/26/21 03:00    134/65    


 


1/26/21 03:00  94 20 131/67 (88) 90   


 


1/26/21 02:00    111/62    


 


1/26/21 02:00  94 20 111/62 (78) 91   


 


1/26/21 01:00    115/64    


 


1/26/21 01:00  98 27 111/63 (79) 70   


 


1/26/21 00:00      Mechanical Ventilator  


 


1/26/21 00:00 98.8 95 35 111/63 (79) 84   


 


1/26/21 00:00  83      


 


1/26/21 00:00    112/65    


 


1/25/21 23:12  92 24     100


 


1/25/21 23:00  95 23 111/65 (80) 84   


 


1/25/21 23:00    108/62    


 


1/25/21 22:00  91 23 107/64 (78) 87   


 


1/25/21 22:00    110/63    


 


1/25/21 21:00    110/65    


 


1/25/21 21:00  91 26 114/68 (83) 85   


 


1/25/21 20:00      Mechanical Ventilator  


 


1/25/21 20:00  88      


 


1/25/21 20:00    107/62    


 


1/25/21 20:00        100


 


1/25/21 20:00 98.4 91 23 107/62 (77) 86   


 


1/25/21 19:12  92 24     100


 


1/25/21 19:00  93 23 103/63 (76) 86   


 


1/25/21 19:00    107/63    


 


1/25/21 18:00    107/59    


 


1/25/21 18:00  96 23 107/59 (75) 85   


 


1/25/21 17:00    103/60    


 


1/25/21 17:00  96 22 103/60 (74) 82   


 


1/25/21 16:18  96      


 


1/25/21 16:00      Mechanical Ventilator  


 


1/25/21 16:00    104/62    


 


1/25/21 16:00 95.9 96 26 104/62 (76) 75   


 


1/25/21 16:00        100


 


1/25/21 15:15  95 25     100


 


1/25/21 15:00  95 22 106/64 (78) 78   


 


1/25/21 15:00    106/64    


 


1/25/21 14:02  93 20 95/60 (72) 75   


 


1/25/21 14:00    95/60    


 


1/25/21 13:00    96/57    


 


1/25/21 13:00  92 21 95/57 (70) 66   


 


1/25/21 12:00      Mechanical Ventilator  


 


1/25/21 12:00        100


 


1/25/21 12:00 96.0 92 21 106/58 (74) 65   


 


1/25/21 12:00  92      


 


1/25/21 12:00    120/70    


 


1/25/21 11:15  91 20     100


 


1/25/21 11:00    129/69    


 


1/25/21 11:00  92 20 95/60 (72) 72   








Height (Feet):  5


Height (Inches):  7.00


Weight (Pounds):  198


HEENT:  other - orally intubated


Respiratory/Chest:  other - on ventilator, [id number]%, R chest tube


Abdomen:  soft, non tender


Extremities:  no edema


Neurologic/Psychiatric:  unresponsiveness, aphasia, other





Microbiology








 Date/Time


Source Procedure


Growth Status





 


 1/26/21 04:00


Stool Clostridium difficile Toxin Assay - Final Complete











Laboratory Tests








Test


 1/25/21


12:10 1/26/21


03:30 1/26/21


09:07


 


White Blood Count


 7.6 K/UL


(4.8-10.8) 7.8 K/UL


(4.8-10.8) 





 


Red Blood Count


 3.26 M/UL


(4.70-6.10)  L 3.34 M/UL


(4.70-6.10)  L 





 


Hemoglobin


 9.1 G/DL


(14.2-18.0)  L 9.3 G/DL


(14.2-18.0)  L 





 


Hematocrit


 30.4 %


(42.0-52.0)  L 30.2 %


(42.0-52.0)  L 





 


Mean Corpuscular Volume 93 FL (80-99)   91 FL (80-99)   


 


Mean Corpuscular Hemoglobin


 28.0 PG


(27.0-31.0) 28.0 PG


(27.0-31.0) 





 


Mean Corpuscular Hemoglobin


Concent 30.0 G/DL


(32.0-36.0)  L 30.9 G/DL


(32.0-36.0)  L 





 


Red Cell Distribution Width


 14.4 %


(11.6-14.8) 14.1 %


(11.6-14.8) 





 


Platelet Count


 90 K/UL


(150-450)  L 119 K/UL


(150-450)  L 





 


Mean Platelet Volume


 11.5 FL


(6.5-10.1)  H 11.7 FL


(6.5-10.1)  H 





 


Neutrophils (%) (Auto)


 % (45.0-75.0)


 % (45.0-75.0)


 





 


Lymphocytes (%) (Auto)


 4.7 %


(20.0-45.0)  L % (20.0-45.0)


 





 


Monocytes (%) (Auto)


 1.6 %


(1.0-10.0)  % (1.0-10.0)  


 





 


Eosinophils (%) (Auto)


 2.0 %


(0.0-3.0)  % (0.0-3.0)  


 





 


Basophils (%) (Auto)


 0.9 %


(0.0-2.0)  % (0.0-2.0)  


 





 


Sodium Level


 141 MMOL/L


(136-145) 139 MMOL/L


(136-145) 





 


Potassium Level


 3.4 MMOL/L


(3.5-5.1)  L 4.1 MMOL/L


(3.5-5.1) 





 


Chloride Level


 106 MMOL/L


() 105 MMOL/L


() 





 


Carbon Dioxide Level


 28 MMOL/L


(21-32) 27 MMOL/L


(21-32) 





 


Anion Gap


 7 mmol/L


(5-15) 7 mmol/L


(5-15) 





 


Blood Urea Nitrogen


 46 mg/dL


(7-18)  H 49 mg/dL


(7-18)  H 





 


Creatinine


 1.5 MG/DL


(0.55-1.30)  H 1.7 MG/DL


(0.55-1.30)  H 





 


Estimat Glomerular Filtration


Rate 46.8 mL/min


(>60) 40.5 mL/min


(>60) 





 


Glucose Level


 164 MG/DL


()  H 140 MG/DL


()  H 





 


Calcium Level


 7.9 MG/DL


(8.5-10.1)  L 8.0 MG/DL


(8.5-10.1)  L 





 


Total Bilirubin


 0.5 MG/DL


(0.2-1.0) 0.4 MG/DL


(0.2-1.0) 





 


Aspartate Amino Transf


(AST/SGOT) 32 U/L (15-37)


 25 U/L (15-37)


 





 


Alanine Aminotransferase


(ALT/SGPT) 29 U/L (12-78)


 27 U/L (12-78)


 





 


Alkaline Phosphatase


 364 U/L


()  H 369 U/L


()  H 





 


Total Protein


 5.2 G/DL


(6.4-8.2)  L 5.4 G/DL


(6.4-8.2)  L 





 


Albumin


 1.0 G/DL


(3.4-5.0)  L 1.0 G/DL


(3.4-5.0)  L 





 


Globulin 4.2 g/dL   4.4 g/dL   


 


Albumin/Globulin Ratio


 0.2 (1.0-2.7)


L 0.2 (1.0-2.7)


L 





 


Differential Total Cells


Counted 


 100  


 





 


Neutrophils % (Manual)  87 % (45-75)  H 


 


Lymphocytes % (Manual)  9 % (20-45)  L 


 


Monocytes % (Manual)  2 % (1-10)   


 


Eosinophils % (Manual)  0 % (0-3)   


 


Basophils % (Manual)  0 % (0-2)   


 


Band Neutrophils  2 % (0-8)   


 


Nucleated Red Blood Cells  2 /100 WBC   


 


Platelet Estimate  Decreased  L 


 


Platelet Morphology  Normal   


 


Hypochromasia  1+   


 


Random Vancomycin Level  19.2 ug/mL   


 


Arterial Blood pH


 


 


 7.263


(7.350-7.450)


 


Arterial Blood Partial


Pressure CO2 


 


 67.0 mmHg


(35.0-45.0)  *H


 


Arterial Blood Partial


Pressure O2 


 


 61.2 mmHg


(75.0-100.0)  L


 


Arterial Blood HCO3


 


 


 29.6 mmol/L


(22.0-26.0)  H


 


Arterial Blood Oxygen


Saturation 


 


 90.3 %


()  L


 


Arterial Blood Base Excess   1.5 (-2-2)  


 


Abelardo Test   Positive  











Current Medications








 Medications


  (Trade)  Dose


 Ordered  Sig/Julisa


 Route


 PRN Reason  Start Time


 Stop Time Status Last Admin


Dose Admin


 


 Acetaminophen


  (Tylenol)  650 mg  Q4H  PRN


 GT


 Temp >100.5  1/15/21 17:15


 2/14/21 17:14  1/22/21 11:05





 


 Amiodarone HCl


  (Cordarone)  200 mg  DAILY


 NG


   1/26/21 09:00


 4/19/21 20:59  1/26/21 08:38





 


 Chlorhexidine


 Gluconate


  (Vanessa-Hex 2%)  1 applic  DAILY@2000


 TOPIC


   1/19/21 20:00


 4/19/21 19:59  1/25/21 20:27





 


 Dextrose  1,000 ml @ 


 50 mls/hr  Q20H


 IV


   1/18/21 10:00


 2/17/21 09:59  1/25/21 21:04





 


 Dextrose


  (Dextrose 50%)  25 ml  Q30M  PRN


 IV


 Hypoglycemia  1/9/21 13:30


 4/9/21 13:29   





 


 Dextrose


  (Dextrose 50%)  50 ml  Q30M  PRN


 IV


 Hypoglycemia  1/9/21 13:30


 4/9/21 13:29   





 


 Digoxin


  (Lanoxin)  0.125 mg  DAILY


 NG


   1/21/21 09:00


 4/21/21 08:59  1/26/21 08:38





 


 Docusate Sodium


  (Colace)  100 mg  TIDPRN  PRN


 GT


 Constipation  1/12/21 01:15


 2/11/21 01:14  1/12/21 01:42





 


 Enoxaparin Sodium


  (Lovenox)  80 mg  EVERY 12  HOURS


 SUBQ


   1/2/21 21:00


 4/2/21 20:59  1/25/21 09:00





 


 Insulin Aspart


  (NovoLOG)    Q6HR


 SUBQ


   1/14/21 12:00


 4/9/21 17:59  1/26/21 06:35





 


 Insulin Aspart


  (NovoLOG)  9 units  EVERY 6  HOURS


 SUBQ


   1/21/21 12:00


 4/15/21 06:59  1/26/21 06:29





 


 Insulin Detemir


  (Levemir)  10 units  Q12HR


 SUBQ


   1/26/21 09:00


 4/12/21 08:59  1/26/21 08:41





 


 Norepinephrine


 Bitartrate 8 mg/


 Dextrose  500 ml @ 0


 mls/hr  Q24H  PRN


 IV


 For hypotension  1/25/21 00:00


 1/28/21 00:00  1/25/21 01:02





 


 Pantoprazole


  (Protonix)  40 mg  DAILY


 IVP


   1/14/21 09:00


 2/13/21 08:59  1/26/21 08:37





 


 Piperacillin Sod/


 Tazobactam Sod


 3.375 gm/Sodium


 Chloride  110 ml @ 


 27.5 mls/hr  EVERY 8  HOURS


 IVPB


   1/23/21 14:00


 1/30/21 13:59  1/26/21 05:12





 


 Quetiapine


 Fumarate


  (SEROqueL)  50 mg  Q12HR


 ORAL


   1/4/21 21:00


 2/18/21 20:59  1/26/21 08:38





 


 Sodium Chloride  500 ml @ 


 999 mls/hr  Q31M PRN


 IV


 For hypotension  1/15/21 18:30


 2/14/21 18:29   





 


 Vancomycin HCl


  ([facility] pharmacy


 to dose)  1 ea  DAILY  PRN


 MISC


 Per rx protocol  1/14/21 09:15


 2/13/21 09:14   




















Lamin Felix MD               Jan 26, 2021 10:36

## 2021-01-26 NOTE — GENERAL PROGRESS NOTE
Subjective


ROS Limited/Unobtainable:  Yes


Allergies:  


Coded Allergies:  


     No Known Allergies (Unverified , 6/11/19)


Subjective


events noted - interval notes reviewed 


glucose values are stable 


Levemir 20 u was held 











Item Value  Date Time


 


Bedside Blood Glucose 121 mg/dl H 1/26/21 0052


 


Bedside Blood Glucose 97 mg/dl 1/25/21 2100


 


Bedside Blood Glucose 165 mg/dl H 1/25/21 1719


 


Bedside Blood Glucose 157 mg/dl H 1/25/21 1154


 


Bedside Blood Glucose 125 mg/dl H 1/25/21 0922


 


Bedside Blood Glucose 116 mg/dl 1/25/21 0535











Objective





Last 24 Hour Vital Signs








  Date Time  Temp Pulse Resp B/P (MAP) Pulse Ox O2 Delivery O2 Flow Rate FiO2


 


1/26/21 06:00  95 25 129/65 (86) 88   


 


1/26/21 05:00  98 25 130/62 (84) 86   


 


1/26/21 04:00 99.0 94 20 131/67 (88) 90   


 


1/26/21 04:00  96      


 


1/26/21 04:00        100


 


1/26/21 04:00      Mechanical Ventilator  


 


1/26/21 04:00    128/68    


 


1/26/21 04:00        100


 


1/26/21 03:27  95 24     100


 


1/26/21 03:00  94 20 131/67 (88) 90   


 


1/26/21 02:00    111/62    


 


1/26/21 02:00  94 20 111/62 (78) 91   


 


1/26/21 01:00    115/64    


 


1/26/21 01:00  98 27 111/63 (79) 70   


 


1/26/21 00:00      Mechanical Ventilator  


 


1/26/21 00:00 98.8 95 35 111/63 (79) 84   


 


1/26/21 00:00  83      


 


1/26/21 00:00    112/65    


 


1/25/21 23:12  92 24     100


 


1/25/21 23:00  95 23 111/65 (80) 84   


 


1/25/21 23:00    108/62    


 


1/25/21 22:00  91 23 107/64 (78) 87   


 


1/25/21 22:00    110/63    


 


1/25/21 21:00    110/65    


 


1/25/21 21:00  91 26 114/68 (83) 85   


 


1/25/21 20:00      Mechanical Ventilator  


 


1/25/21 20:00  88      


 


1/25/21 20:00    107/62    


 


1/25/21 20:00        100


 


1/25/21 20:00 98.4 91 23 107/62 (77) 86   


 


1/25/21 19:12  92 24     100


 


1/25/21 19:00  93 23 103/63 (76) 86   


 


1/25/21 19:00    107/63    


 


1/25/21 18:00    107/59    


 


1/25/21 18:00  96 23 107/59 (75) 85   


 


1/25/21 17:00    103/60    


 


1/25/21 17:00  96 22 103/60 (74) 82   


 


1/25/21 16:18  96      


 


1/25/21 16:00      Mechanical Ventilator  


 


1/25/21 16:00    104/62    


 


1/25/21 16:00 95.9 96 26 104/62 (76) 75   


 


1/25/21 16:00        100


 


1/25/21 15:15  95 25     100


 


1/25/21 15:00  95 22 106/64 (78) 78   


 


1/25/21 15:00    106/64    


 


1/25/21 14:02  93 20 95/60 (72) 75   


 


1/25/21 14:00    95/60    


 


1/25/21 13:00    96/57    


 


1/25/21 13:00  92 21 95/57 (70) 66   


 


1/25/21 12:00      Mechanical Ventilator  


 


1/25/21 12:00        100


 


1/25/21 12:00 96.0 92 21 106/58 (74) 65   


 


1/25/21 12:00  92      


 


1/25/21 12:00    120/70    


 


1/25/21 11:15  91 20     100


 


1/25/21 11:00    129/69    


 


1/25/21 11:00  92 20 95/60 (72) 72   


 


1/25/21 10:09  88 23 93/54 (67) 65   


 


1/25/21 10:00    99/58    


 


1/25/21 09:12  115      


 


1/25/21 09:00  86 21 99/60 (73) 51   


 


1/25/21 08:01  87      


 


1/25/21 08:00    109/83    


 


1/25/21 08:00      Mechanical Ventilator  


 


1/25/21 08:00        100


 


1/25/21 08:00 95.9 85 19 109/83 (92) 70   


 


1/25/21 07:15  90 19     100


 


1/25/21 07:00  88 19 110/69 (83) 83   


 


1/25/21 07:00    110/69    

















Intake and Output  


 


 1/25/21 1/26/21





 19:00 07:00


 


Intake Total 1707.5 ml 1326.6666 ml


 


Output Total 905 ml 1230 ml


 


Balance 802.5 ml 96.6666 ml


 


  


 


Free Water 230 ml 30 ml


 


IV Total 757.5 ml 691.6666 ml


 


Tube Feeding 660 ml 605 ml


 


Other 60 ml 


 


Output Urine Total 705 ml 1000 ml


 


Stool Total  60 ml


 


Chest Tube Drainage Total 200 ml 170 ml








Laboratory Tests


1/25/21 09:21: POC Whole Blood Glucose 125H


1/25/21 12:10: 


White Blood Count 7.6, Red Blood Count 3.26L, Hemoglobin 9.1L, Hematocrit 30.4L,

Mean Corpuscular Volume 93, Mean Corpuscular Hemoglobin 28.0, Mean Corpuscular 

Hemoglobin Concent 30.0L, Red Cell Distribution Width 14.4, Platelet Count 90L, 

Mean Platelet Volume 11.5H, Neutrophils (%) (Auto) , Lymphocytes (%) (Auto) 4.7L

, Monocytes (%) (Auto) 1.6, Eosinophils (%) (Auto) 2.0, Basophils (%) (Auto) 

0.9, Sodium Level 141, Potassium Level 3.4L, Chloride Level 106, Carbon Dioxide 

Level 28, Anion Gap 7, Blood Urea Nitrogen 46H, Creatinine 1.5H, Estimat 

Glomerular Filtration Rate 46.8, Glucose Level 164H, Calcium Level 7.9L, Total 

Bilirubin 0.5, Aspartate Amino Transf (AST/SGOT) 32, Alanine Aminotransferase 

(ALT/SGPT) 29, Alkaline Phosphatase 364H, Total Protein 5.2L, Albumin 1.0L, 

Globulin 4.2, Albumin/Globulin Ratio 0.2L


1/26/21 03:30: 


White Blood Count 7.8, Red Blood Count 3.34L, Hemoglobin 9.3L, Hematocrit 30.2L,

Mean Corpuscular Volume 91, Mean Corpuscular Hemoglobin 28.0, Mean Corpuscular 

Hemoglobin Concent 30.9L, Red Cell Distribution Width 14.1, Platelet Count 119L,

Mean Platelet Volume 11.7H, Neutrophils (%) (Auto) , Lymphocytes (%) (Auto) , 

Monocytes (%) (Auto) , Eosinophils (%) (Auto) , Basophils (%) (Auto) , Sodium 

Level 139, Potassium Level 4.1, Chloride Level 105, Carbon Dioxide Level 27, 

Anion Gap 7, Blood Urea Nitrogen 49H, Creatinine 1.7H, Estimat Glomerular 

Filtration Rate 40.5, Glucose Level 140H, Calcium Level 8.0L, Total Bilirubin 

0.4, Aspartate Amino Transf (AST/SGOT) 25, Alanine Aminotransferase (ALT/SGPT) 

27, Alkaline Phosphatase 369H, Total Protein 5.4L, Albumin 1.0L, Globulin 4.4, 

Albumin/Globulin Ratio 0.2L, Neutrophils % (Manual) [Pending], Lymphocytes % 

(Manual) [Pending], Platelet Estimate [Pending], Platelet Morphology [Pending], 

Random Vancomycin Level 19.2


Height (Feet):  5


Height (Inches):  7.00


Weight (Pounds):  198


Objective





Current Medications








 Medications


  (Trade)  Dose


 Ordered  Sig/Julisa


 Route


 PRN Reason  Start Time


 Stop Time Status Last Admin


Dose Admin


 


 Acetaminophen


  (Tylenol)  650 mg  Q4H  PRN


 GT


 Temp >100.5  1/15/21 17:15


 2/14/21 17:14  1/22/21 11:05





 


 Amiodarone HCl


  (Cordarone)  200 mg  DAILY


 NG


   1/26/21 09:00


 4/19/21 20:59   





 


 Chlorhexidine


 Gluconate


  (Vanessa-Hex 2%)  1 applic  DAILY@2000


 TOPIC


   1/19/21 20:00


 4/19/21 19:59  1/25/21 20:27





 


 Dextrose  1,000 ml @ 


 50 mls/hr  Q20H


 IV


   1/18/21 10:00


 2/17/21 09:59  1/25/21 21:04





 


 Dextrose


  (Dextrose 50%)  25 ml  Q30M  PRN


 IV


 Hypoglycemia  1/9/21 13:30


 4/9/21 13:29   





 


 Dextrose


  (Dextrose 50%)  50 ml  Q30M  PRN


 IV


 Hypoglycemia  1/9/21 13:30


 4/9/21 13:29   





 


 Digoxin


  (Lanoxin)  0.125 mg  DAILY


 NG


   1/21/21 09:00


 4/21/21 08:59  1/25/21 09:12





 


 Docusate Sodium


  (Colace)  100 mg  TIDPRN  PRN


 GT


 Constipation  1/12/21 01:15


 2/11/21 01:14  1/12/21 01:42





 


 Enoxaparin Sodium


  (Lovenox)  80 mg  EVERY 12  HOURS


 SUBQ


   1/2/21 21:00


 4/2/21 20:59  1/25/21 09:00





 


 Insulin Aspart


  (NovoLOG)    Q6HR


 SUBQ


   1/14/21 12:00


 4/9/21 17:59  1/25/21 17:19





 


 Insulin Aspart


  (NovoLOG)  9 units  EVERY 6  HOURS


 SUBQ


   1/21/21 12:00


 4/15/21 06:59  1/26/21 00:52





 


 Insulin Detemir


  (Levemir)  20 units  Q12HR


 SUBQ


   1/14/21 09:00


 4/12/21 08:59  1/25/21 09:22





 


 Norepinephrine


 Bitartrate 8 mg/


 Dextrose  500 ml @ 0


 mls/hr  Q24H  PRN


 IV


 For hypotension  1/25/21 00:00


 1/28/21 00:00  1/25/21 01:02





 


 Pantoprazole


  (Protonix)  40 mg  DAILY


 IVP


   1/14/21 09:00


 2/13/21 08:59  1/25/21 09:10





 


 Piperacillin Sod/


 Tazobactam Sod


 3.375 gm/Sodium


 Chloride  110 ml @ 


 27.5 mls/hr  EVERY 8  HOURS


 IVPB


   1/23/21 14:00


 1/30/21 13:59  1/26/21 05:12





 


 Quetiapine


 Fumarate


  (SEROqueL)  50 mg  Q12HR


 ORAL


   1/4/21 21:00


 2/18/21 20:59  1/25/21 20:28





 


 Sodium Chloride  500 ml @ 


 999 mls/hr  Q31M PRN


 IV


 For hypotension  1/15/21 18:30


 2/14/21 18:29   





 


 Vancomycin HCl  250 ml @ 


 166.667


 mls/hr  ONCE


 IVPB


   1/26/21 08:00


 1/26/21 10:00   





 


 Vancomycin HCl


  ([facility]


 to dose)  1 ea  DAILY  PRN


 MISC


 Per rx protocol  1/14/21 09:15


 2/13/21 09:14   














Assessment/Plan


Problem List:  


(1) Diabetes mellitus out of control


ICD Codes:  E11.65 - Type 2 diabetes mellitus with hyperglycemia


SNOMED:  72666368, [id number]


(2) Pneumonia due to COVID-19 virus


ICD Codes:  U07.1 - COVID-19; J12.82 - Pneumonia due to coronavirus disease 2019


SNOMED:  [id number]


Assessment/Plan:


continue Novolog 9 units every 6 hours 


reduce Levemir to 10 units bid 


continue Novolog sliding scale every 6 hours 


hypoglycemia protocol in order











Nick Mcmahon MD                 Jan 26, 2021 06:30

## 2021-01-26 NOTE — GENERAL PROGRESS NOTE
Subjective


Allergies:  


Coded Allergies:  


     No Known Allergies (Unverified , 6/11/19)


Subjective


on ventilator 60% fio2


on sediation


unresponsive


in icu


rt chest tube s placed due to pneumothorex


afib is controlled


hypotensuion better





Objective





Last 24 Hour Vital Signs








  Date Time  Temp Pulse Resp B/P (MAP) Pulse Ox O2 Delivery O2 Flow Rate FiO2


 


1/26/21 08:38  97      


 


1/26/21 08:00        100


 


1/26/21 08:00 100.8 97 28 124/68 (86) 84   


 


1/26/21 07:00  98 26 129/64 (85) 87   


 


1/26/21 07:00    129/64    


 


1/26/21 06:00    134/67    


 


1/26/21 06:00  95 25 129/65 (86) 88   


 


1/26/21 05:00    132/64    


 


1/26/21 05:00  98 25 130/62 (84) 86   


 


1/26/21 04:00 99.0 94 20 131/67 (88) 90   


 


1/26/21 04:00  96      


 


1/26/21 04:00        100


 


1/26/21 04:00      Mechanical Ventilator  


 


1/26/21 04:00    128/68    


 


1/26/21 04:00        100


 


1/26/21 03:27  95 24     100


 


1/26/21 03:00    134/65    


 


1/26/21 03:00  94 20 131/67 (88) 90   


 


1/26/21 02:00    111/62    


 


1/26/21 02:00  94 20 111/62 (78) 91   


 


1/26/21 01:00    115/64    


 


1/26/21 01:00  98 27 111/63 (79) 70   


 


1/26/21 00:00      Mechanical Ventilator  


 


1/26/21 00:00 98.8 95 35 111/63 (79) 84   


 


1/26/21 00:00  83      


 


1/26/21 00:00    112/65    


 


1/25/21 23:12  92 24     100


 


1/25/21 23:00  95 23 111/65 (80) 84   


 


1/25/21 23:00    108/62    


 


1/25/21 22:00  91 23 107/64 (78) 87   


 


1/25/21 22:00    110/63    


 


1/25/21 21:00    110/65    


 


1/25/21 21:00  91 26 114/68 (83) 85   


 


1/25/21 20:00      Mechanical Ventilator  


 


1/25/21 20:00  88      


 


1/25/21 20:00    107/62    


 


1/25/21 20:00        100


 


1/25/21 20:00 98.4 91 23 107/62 (77) 86   


 


1/25/21 19:12  92 24     100


 


1/25/21 19:00  93 23 103/63 (76) 86   


 


1/25/21 19:00    107/63    


 


1/25/21 18:00    107/59    


 


1/25/21 18:00  96 23 107/59 (75) 85   


 


1/25/21 17:00    103/60    


 


1/25/21 17:00  96 22 103/60 (74) 82   


 


1/25/21 16:18  96      


 


1/25/21 16:00      Mechanical Ventilator  


 


1/25/21 16:00    104/62    


 


1/25/21 16:00 95.9 96 26 104/62 (76) 75   


 


1/25/21 16:00        100


 


1/25/21 15:15  95 25     100


 


1/25/21 15:00  95 22 106/64 (78) 78   


 


1/25/21 15:00    106/64    


 


1/25/21 14:02  93 20 95/60 (72) 75   


 


1/25/21 14:00    95/60    


 


1/25/21 13:00    96/57    


 


1/25/21 13:00  92 21 95/57 (70) 66   


 


1/25/21 12:00      Mechanical Ventilator  


 


1/25/21 12:00        100


 


1/25/21 12:00 96.0 92 21 106/58 (74) 65   


 


1/25/21 12:00  92      


 


1/25/21 12:00    120/70    


 


1/25/21 11:15  91 20     100


 


1/25/21 11:00    129/69    


 


1/25/21 11:00  92 20 95/60 (72) 72   


 


1/25/21 10:09  88 23 93/54 (67) 65   


 


1/25/21 10:00    99/58    

















Intake and Output  


 


 1/25/21 1/26/21





 19:00 07:00


 


Intake Total 1707.5 ml 1626.1666 ml


 


Output Total 905 ml 1270 ml


 


Balance 802.5 ml 356.1666 ml


 


  


 


Free Water 230 ml 30 ml


 


IV Total 757.5 ml 936.1666 ml


 


Tube Feeding 660 ml 660 ml


 


Other 60 ml 


 


Output Urine Total 705 ml 1040 ml


 


Stool Total  60 ml


 


Chest Tube Drainage Total 200 ml 170 ml








Laboratory Tests


1/25/21 12:10: 


White Blood Count 7.6, Red Blood Count 3.26L, Hemoglobin 9.1L, Hematocrit 30.4L,

Mean Corpuscular Volume 93, Mean Corpuscular Hemoglobin 28.0, Mean Corpuscular 

Hemoglobin Concent 30.0L, Red Cell Distribution Width 14.4, Platelet Count 90L, 

Mean Platelet Volume 11.5H, Neutrophils (%) (Auto) , Lymphocytes (%) (Auto) 4.7L

, Monocytes (%) (Auto) 1.6, Eosinophils (%) (Auto) 2.0, Basophils (%) (Auto) 

0.9, Sodium Level 141, Potassium Level 3.4L, Chloride Level 106, Carbon Dioxide 

Level 28, Anion Gap 7, Blood Urea Nitrogen 46H, Creatinine 1.5H, Estimat 

Glomerular Filtration Rate 46.8, Glucose Level 164H, Calcium Level 7.9L, Total 

Bilirubin 0.5, Aspartate Amino Transf (AST/SGOT) 32, Alanine Aminotransferase 

(ALT/SGPT) 29, Alkaline Phosphatase 364H, Total Protein 5.2L, Albumin 1.0L, 

Globulin 4.2, Albumin/Globulin Ratio 0.2L


1/26/21 03:30: 


White Blood Count 7.8, Red Blood Count 3.34L, Hemoglobin 9.3L, Hematocrit 30.2L,

Mean Corpuscular Volume 91, Mean Corpuscular Hemoglobin 28.0, Mean Corpuscular 

Hemoglobin Concent 30.9L, Red Cell Distribution Width 14.1, Platelet Count 119L,

Mean Platelet Volume 11.7H, Neutrophils (%) (Auto) , Lymphocytes (%) (Auto) , 

Monocytes (%) (Auto) , Eosinophils (%) (Auto) , Basophils (%) (Auto) , Sodium 

Level 139, Potassium Level 4.1, Chloride Level 105, Carbon Dioxide Level 27, 

Anion Gap 7, Blood Urea Nitrogen 49H, Creatinine 1.7H, Estimat Glomerular 

Filtration Rate 40.5, Glucose Level 140H, Calcium Level 8.0L, Total Bilirubin 0

.4, Aspartate Amino Transf (AST/SGOT) 25, Alanine Aminotransferase (ALT/SGPT) 

27, Alkaline Phosphatase 369H, Total Protein 5.4L, Albumin 1.0L, Globulin 4.4, 

Albumin/Globulin Ratio 0.2L, Differential Total Cells Counted 100, Neutrophils %

(Manual) 87H, Lymphocytes % (Manual) 9L, Monocytes % (Manual) 2, Eosinophils % 

(Manual) 0, Basophils % (Manual) 0, Band Neutrophils 2, Nucleated Red Blood 

Cells 2, Platelet Estimate DecreasedL, Platelet Morphology Normal, Hypochromasia

1+, Random Vancomycin Level 19.2


1/26/21 09:07: 


Arterial Blood pH 7.263L, Arterial Blood Partial Pressure CO2 67.0*H, Arterial 

Blood Partial Pressure O2 61.2L, Arterial Blood HCO3 29.6H, Arterial Blood 

Oxygen Saturation 90.3L, Arterial Blood Base Excess 1.5, Abelardo Test Positive


Height (Feet):  5


Height (Inches):  7.00


Weight (Pounds):  198


General Appearance:  lethargic


Neck:  supple


Cardiovascular:  regular rhythm


Respiratory/Chest:  crackles/rales


Abdomen:  non tender, soft


Extremities:  non-tender





Assessment/Plan


Assessment/Plan:


hypotension better on tirate down levophed drip


afib with rvr on digoxine , add amiodarone , cardiology on case


covid pna


ac resp distress on ventilator


etoh abused


dehydration


agitated -halodol


fever susided


cont on ventilator at icu


cont iv abx and iv decadrone


pulmonary and gi on consult


dw charge nurse


poor prognosis











Moi Travis MD             Jan 26, 2021 09:56

## 2021-01-26 NOTE — NUR
NURSE NOTES:

Dr Lemos at bedside,relayed message of Dr Ochoa,and showed him the ABG results,no new 
order given.

## 2021-01-26 NOTE — NUR
NURSE NOTES:

chest tube secured, dressing site is intact, clean, and patent. call light within reach. 
will continue to mau buchanan

## 2021-01-26 NOTE — NUR
NURSE NOTES:

O2sat is at 88-90% at this time. no s/s of pain. call light within reach. bilateral wrist 
pulse noted, skin intact. will continue to monitor.

## 2021-01-27 VITALS — DIASTOLIC BLOOD PRESSURE: 66 MMHG | SYSTOLIC BLOOD PRESSURE: 117 MMHG

## 2021-01-27 VITALS — DIASTOLIC BLOOD PRESSURE: 61 MMHG | SYSTOLIC BLOOD PRESSURE: 103 MMHG

## 2021-01-27 VITALS — SYSTOLIC BLOOD PRESSURE: 126 MMHG | DIASTOLIC BLOOD PRESSURE: 76 MMHG

## 2021-01-27 VITALS — SYSTOLIC BLOOD PRESSURE: 109 MMHG | DIASTOLIC BLOOD PRESSURE: 65 MMHG

## 2021-01-27 VITALS — SYSTOLIC BLOOD PRESSURE: 114 MMHG | DIASTOLIC BLOOD PRESSURE: 66 MMHG

## 2021-01-27 VITALS — SYSTOLIC BLOOD PRESSURE: 113 MMHG | DIASTOLIC BLOOD PRESSURE: 65 MMHG

## 2021-01-27 VITALS — DIASTOLIC BLOOD PRESSURE: 60 MMHG | SYSTOLIC BLOOD PRESSURE: 107 MMHG

## 2021-01-27 VITALS — DIASTOLIC BLOOD PRESSURE: 60 MMHG | SYSTOLIC BLOOD PRESSURE: 110 MMHG

## 2021-01-27 VITALS — SYSTOLIC BLOOD PRESSURE: 119 MMHG | DIASTOLIC BLOOD PRESSURE: 68 MMHG

## 2021-01-27 VITALS — SYSTOLIC BLOOD PRESSURE: 110 MMHG | DIASTOLIC BLOOD PRESSURE: 64 MMHG

## 2021-01-27 VITALS — DIASTOLIC BLOOD PRESSURE: 65 MMHG | SYSTOLIC BLOOD PRESSURE: 115 MMHG

## 2021-01-27 VITALS — SYSTOLIC BLOOD PRESSURE: 114 MMHG | DIASTOLIC BLOOD PRESSURE: 62 MMHG

## 2021-01-27 VITALS — SYSTOLIC BLOOD PRESSURE: 112 MMHG | DIASTOLIC BLOOD PRESSURE: 59 MMHG

## 2021-01-27 VITALS — DIASTOLIC BLOOD PRESSURE: 66 MMHG | SYSTOLIC BLOOD PRESSURE: 121 MMHG

## 2021-01-27 VITALS — SYSTOLIC BLOOD PRESSURE: 103 MMHG | DIASTOLIC BLOOD PRESSURE: 63 MMHG

## 2021-01-27 VITALS — DIASTOLIC BLOOD PRESSURE: 68 MMHG | SYSTOLIC BLOOD PRESSURE: 109 MMHG

## 2021-01-27 VITALS — DIASTOLIC BLOOD PRESSURE: 62 MMHG | SYSTOLIC BLOOD PRESSURE: 102 MMHG

## 2021-01-27 VITALS — SYSTOLIC BLOOD PRESSURE: 111 MMHG | DIASTOLIC BLOOD PRESSURE: 62 MMHG

## 2021-01-27 VITALS — DIASTOLIC BLOOD PRESSURE: 67 MMHG | SYSTOLIC BLOOD PRESSURE: 100 MMHG

## 2021-01-27 VITALS — DIASTOLIC BLOOD PRESSURE: 63 MMHG | SYSTOLIC BLOOD PRESSURE: 100 MMHG

## 2021-01-27 VITALS — SYSTOLIC BLOOD PRESSURE: 138 MMHG | DIASTOLIC BLOOD PRESSURE: 85 MMHG

## 2021-01-27 VITALS — SYSTOLIC BLOOD PRESSURE: 103 MMHG | DIASTOLIC BLOOD PRESSURE: 67 MMHG

## 2021-01-27 VITALS — DIASTOLIC BLOOD PRESSURE: 65 MMHG | SYSTOLIC BLOOD PRESSURE: 108 MMHG

## 2021-01-27 VITALS — DIASTOLIC BLOOD PRESSURE: 65 MMHG | SYSTOLIC BLOOD PRESSURE: 106 MMHG

## 2021-01-27 VITALS — DIASTOLIC BLOOD PRESSURE: 66 MMHG | SYSTOLIC BLOOD PRESSURE: 109 MMHG

## 2021-01-27 VITALS — SYSTOLIC BLOOD PRESSURE: 105 MMHG | DIASTOLIC BLOOD PRESSURE: 61 MMHG

## 2021-01-27 VITALS — DIASTOLIC BLOOD PRESSURE: 66 MMHG | SYSTOLIC BLOOD PRESSURE: 112 MMHG

## 2021-01-27 VITALS — SYSTOLIC BLOOD PRESSURE: 109 MMHG | DIASTOLIC BLOOD PRESSURE: 64 MMHG

## 2021-01-27 VITALS — DIASTOLIC BLOOD PRESSURE: 64 MMHG | SYSTOLIC BLOOD PRESSURE: 103 MMHG

## 2021-01-27 VITALS — DIASTOLIC BLOOD PRESSURE: 46 MMHG | SYSTOLIC BLOOD PRESSURE: 100 MMHG

## 2021-01-27 VITALS — DIASTOLIC BLOOD PRESSURE: 57 MMHG | SYSTOLIC BLOOD PRESSURE: 105 MMHG

## 2021-01-27 VITALS — DIASTOLIC BLOOD PRESSURE: 66 MMHG | SYSTOLIC BLOOD PRESSURE: 118 MMHG

## 2021-01-27 VITALS — SYSTOLIC BLOOD PRESSURE: 117 MMHG | DIASTOLIC BLOOD PRESSURE: 68 MMHG

## 2021-01-27 VITALS — DIASTOLIC BLOOD PRESSURE: 62 MMHG | SYSTOLIC BLOOD PRESSURE: 103 MMHG

## 2021-01-27 VITALS — DIASTOLIC BLOOD PRESSURE: 56 MMHG | SYSTOLIC BLOOD PRESSURE: 101 MMHG

## 2021-01-27 VITALS — SYSTOLIC BLOOD PRESSURE: 101 MMHG | DIASTOLIC BLOOD PRESSURE: 64 MMHG

## 2021-01-27 VITALS — DIASTOLIC BLOOD PRESSURE: 60 MMHG | SYSTOLIC BLOOD PRESSURE: 103 MMHG

## 2021-01-27 VITALS — DIASTOLIC BLOOD PRESSURE: 61 MMHG | SYSTOLIC BLOOD PRESSURE: 107 MMHG

## 2021-01-27 VITALS — DIASTOLIC BLOOD PRESSURE: 62 MMHG | SYSTOLIC BLOOD PRESSURE: 116 MMHG

## 2021-01-27 VITALS — DIASTOLIC BLOOD PRESSURE: 59 MMHG | SYSTOLIC BLOOD PRESSURE: 106 MMHG

## 2021-01-27 VITALS — SYSTOLIC BLOOD PRESSURE: 102 MMHG | DIASTOLIC BLOOD PRESSURE: 59 MMHG

## 2021-01-27 VITALS — SYSTOLIC BLOOD PRESSURE: 120 MMHG | DIASTOLIC BLOOD PRESSURE: 67 MMHG

## 2021-01-27 VITALS — SYSTOLIC BLOOD PRESSURE: 113 MMHG | DIASTOLIC BLOOD PRESSURE: 66 MMHG

## 2021-01-27 VITALS — DIASTOLIC BLOOD PRESSURE: 68 MMHG | SYSTOLIC BLOOD PRESSURE: 121 MMHG

## 2021-01-27 LAB
ADD MANUAL DIFF: YES
ALBUMIN SERPL-MCNC: 1 G/DL (ref 3.4–5)
ALBUMIN/GLOB SERPL: 0.2 {RATIO} (ref 1–2.7)
ALP SERPL-CCNC: 309 U/L (ref 46–116)
ALT SERPL-CCNC: 29 U/L (ref 12–78)
APTT BLD: 26 SEC (ref 23–33)
AST SERPL-CCNC: 25 U/L (ref 15–37)
BILIRUB SERPL-MCNC: 0.4 MG/DL (ref 0.2–1)
BUN SERPL-MCNC: 51 MG/DL (ref 7–18)
CALCIUM SERPL-MCNC: 8.3 MG/DL (ref 8.5–10.1)
CHLORIDE SERPL-SCNC: 109 MMOL/L (ref 98–107)
CO2 SERPL-SCNC: 28 MMOL/L (ref 21–32)
CREAT SERPL-MCNC: 1.8 MG/DL (ref 0.55–1.3)
ERYTHROCYTE [DISTWIDTH] IN BLOOD BY AUTOMATED COUNT: 14.8 % (ref 11.6–14.8)
GLOBULIN SER-MCNC: 4.5 G/DL
HCT VFR BLD CALC: 29.2 % (ref 42–52)
HGB BLD-MCNC: 9.2 G/DL (ref 14.2–18)
INR PPP: 1 (ref 0.9–1.1)
MCV RBC AUTO: 93 FL (ref 80–99)
PLATELET # BLD: 115 K/UL (ref 150–450)
POTASSIUM SERPL-SCNC: 4.1 MMOL/L (ref 3.5–5.1)
RBC # BLD AUTO: 3.16 M/UL (ref 4.7–6.1)
SODIUM SERPL-SCNC: 143 MMOL/L (ref 136–145)
WBC # BLD AUTO: 7.3 K/UL (ref 4.8–10.8)

## 2021-01-27 RX ADMIN — DEXTROSE MONOHYDRATE SCH MLS/HR: 50 INJECTION, SOLUTION INTRAVENOUS at 06:17

## 2021-01-27 RX ADMIN — INSULIN DETEMIR SCH UNITS: 100 INJECTION, SOLUTION SUBCUTANEOUS at 10:05

## 2021-01-27 RX ADMIN — DIGOXIN SCH MG: 0.12 TABLET ORAL at 09:44

## 2021-01-27 RX ADMIN — INSULIN DETEMIR SCH UNITS: 100 INJECTION, SOLUTION SUBCUTANEOUS at 21:18

## 2021-01-27 RX ADMIN — DEXTROSE MONOHYDRATE SCH MLS/HR: 50 INJECTION, SOLUTION INTRAVENOUS at 22:14

## 2021-01-27 RX ADMIN — ACETAMINOPHEN PRN MG: 160 SOLUTION ORAL at 04:23

## 2021-01-27 RX ADMIN — Medication SCH MLS/HR: at 19:00

## 2021-01-27 RX ADMIN — INSULIN ASPART SCH UNITS: 100 INJECTION, SOLUTION INTRAVENOUS; SUBCUTANEOUS at 00:00

## 2021-01-27 RX ADMIN — INSULIN ASPART SCH UNITS: 100 INJECTION, SOLUTION INTRAVENOUS; SUBCUTANEOUS at 06:18

## 2021-01-27 RX ADMIN — ENOXAPARIN SODIUM SCH MG: 80 INJECTION SUBCUTANEOUS at 21:00

## 2021-01-27 RX ADMIN — CHLORHEXIDINE GLUCONATE SCH APPLIC: 213 SOLUTION TOPICAL at 20:27

## 2021-01-27 RX ADMIN — ENOXAPARIN SODIUM SCH MG: 80 INJECTION SUBCUTANEOUS at 09:45

## 2021-01-27 RX ADMIN — INSULIN ASPART SCH UNITS: 100 INJECTION, SOLUTION INTRAVENOUS; SUBCUTANEOUS at 17:39

## 2021-01-27 RX ADMIN — INSULIN ASPART SCH UNITS: 100 INJECTION, SOLUTION INTRAVENOUS; SUBCUTANEOUS at 12:18

## 2021-01-27 RX ADMIN — DEXTROSE MONOHYDRATE SCH MLS/HR: 50 INJECTION, SOLUTION INTRAVENOUS at 13:03

## 2021-01-27 RX ADMIN — AMIODARONE HYDROCHLORIDE SCH MG: 200 TABLET ORAL at 09:44

## 2021-01-27 RX ADMIN — PANTOPRAZOLE SODIUM SCH MG: 40 INJECTION, POWDER, FOR SOLUTION INTRAVENOUS at 09:43

## 2021-01-27 RX ADMIN — INSULIN ASPART SCH UNITS: 100 INJECTION, SOLUTION INTRAVENOUS; SUBCUTANEOUS at 17:38

## 2021-01-27 NOTE — GENERAL PROGRESS NOTE
Subjective


ROS Limited/Unobtainable:  Yes


Allergies:  


Coded Allergies:  


     No Known Allergies (Unverified , 6/11/19)


Subjective


events noted - interval notes reviewed 


glucose values are stable 


remained intubated 











Item Value  Date Time


 


Bedside Blood Glucose 177 mg/dl H 1/27/21 0618


 


Bedside Blood Glucose 107 mg/dl 1/27/21 0000


 


Bedside Blood Glucose 113 mg/dl 1/26/21 2134


 


Bedside Blood Glucose 184 mg/dl H 1/26/21 1802


 


Bedside Blood Glucose 208 mg/dl H 1/26/21 1242


 


Bedside Blood Glucose 171 mg/dl H 1/26/21 0841











Objective





Last 24 Hour Vital Signs








  Date Time  Temp Pulse Resp B/P (MAP) Pulse Ox O2 Delivery O2 Flow Rate FiO2


 


1/27/21 05:00  92 21 119/68 (85) 94   


 


1/27/21 04:53 99.0       


 


1/27/21 04:00 89.7 91 25 110/60 (77) 93   


 


1/27/21 04:00      Mechanical Ventilator  


 


1/27/21 04:00        100


 


1/27/21 03:08  93 28     100


 


1/27/21 03:07  93      


 


1/27/21 03:00  91 23 112/59 (76) 91   


 


1/27/21 02:00  92 23 117/68 (84) 92   


 


1/27/21 01:00  89 19 114/66 (82) 93   


 


1/27/21 01:00    112/65    


 


1/27/21 00:00 99.2 89 25 121/66 (84) 94   


 


1/27/21 00:00      Mechanical Ventilator  


 


1/27/21 00:00    90/55    


 


1/27/21 00:00        100


 


1/26/21 23:14  87 24     100


 


1/26/21 23:11  88      


 


1/26/21 23:00    124/67    


 


1/26/21 23:00  88 23 119/69 (86) 92   


 


1/26/21 22:00    122/65    


 


1/26/21 22:00  88 29 123/66 (85) 92   


 


1/26/21 21:00  88 34 121/66 (84) 90   


 


1/26/21 21:00    128/63    


 


1/26/21 20:00        100


 


1/26/21 20:00      Mechanical Ventilator  


 


1/26/21 20:00    121/63    


 


1/26/21 20:00 98.4 88 27 127/65 (85) 91   


 


1/26/21 19:23  88      


 


1/26/21 19:21  88 26     100


 


1/26/21 19:00  89 27 121/89 (100) 92   


 


1/26/21 19:00    121/89    


 


1/26/21 18:00    129/68    


 


1/26/21 18:00  88 27 129/68 (88) 92   


 


1/26/21 17:00    128/67    


 


1/26/21 17:00  87 26 126/67 (86) 93   


 


1/26/21 16:00  90      


 


1/26/21 16:00 98.4 88 28 129/63 (85) 94   


 


1/26/21 16:00        100


 


1/26/21 16:00    128/63    


 


1/26/21 16:00      Mechanical Ventilator  


 


1/26/21 15:25  83 26     100


 


1/26/21 15:00    117/62    


 


1/26/21 15:00  90 26 117/62 (80) 94   


 


1/26/21 14:00    123/63    


 


1/26/21 14:00  91 25 123/63 (83) 93   


 


1/26/21 13:00  92 25 113/60 (77) 91   


 


1/26/21 13:00    113/60    


 


1/26/21 12:00        100


 


1/26/21 12:00      Mechanical Ventilator  


 


1/26/21 12:00 99.3 92 22 127/60 (82) 92   


 


1/26/21 12:00  96      


 


1/26/21 12:00    127/60    


 


1/26/21 11:52    124/61    


 


1/26/21 11:08  92 22 110/57 (74) 94   


 


1/26/21 11:00    105/57    


 


1/26/21 10:50  91 26     100


 


1/26/21 10:00  92 22 108/57 (74) 94   


 


1/26/21 10:00    108/57    


 


1/26/21 09:00  94 24 121/63 (82) 92   


 


1/26/21 09:00    121/63    


 


1/26/21 08:38  97      


 


1/26/21 08:00  96      


 


1/26/21 08:00        100


 


1/26/21 08:00    124/68    


 


1/26/21 08:00 100.8 97 28 124/68 (86) 84   


 


1/26/21 08:00      Mechanical Ventilator  


 


1/26/21 07:22  94 26     100


 


1/26/21 07:00  98 26 129/64 (85) 87   


 


1/26/21 07:00    129/64    

















Intake and Output  


 


 1/26/21 1/27/21





 19:00 07:00


 


Intake Total 1500.0 ml 1265.0 ml


 


Output Total 1280 ml 1075 ml


 


Balance 220.0 ml 190.0 ml


 


  


 


Free Water  30 ml


 


IV Total 720.0 ml 685.0 ml


 


Tube Feeding 660 ml 550 ml


 


Other 120 ml 


 


Output Urine Total 1200 ml 925 ml


 


Stool Total 50 ml 50 ml


 


Chest Tube Drainage Total 30 ml 100 ml








Laboratory Tests


1/26/21 09:07: 


Arterial Blood pH 7.263L, Arterial Blood Partial Pressure CO2 67.0*H, Arterial 

Blood Partial Pressure O2 61.2L, Arterial Blood HCO3 29.6H, Arterial Blood 

Oxygen Saturation 90.3L, Arterial Blood Base Excess 1.5, Abelardo Test Positive


1/26/21 12:34: POC Whole Blood Glucose 208H


1/26/21 17:58: POC Whole Blood Glucose 184H


1/27/21 04:35: 


White Blood Count 7.3, Red Blood Count 3.16L, Hemoglobin 9.2L, Hematocrit 29.2L,

Mean Corpuscular Volume 93, Mean Corpuscular Hemoglobin 29.1, Mean Corpuscular 

Hemoglobin Concent 31.4L, Red Cell Distribution Width 14.8, Platelet Count 115L,

Mean Platelet Volume 10.5H, Neutrophils (%) (Auto) , Lymphocytes (%) (Auto) , 

Monocytes (%) (Auto) , Eosinophils (%) (Auto) , Basophils (%) (Auto) , 

Neutrophils % (Manual) [Pending], Lymphocytes % (Manual) [Pending], Platelet 

Estimate [Pending], Platelet Morphology [Pending], Prothrombin Time 10.8, 

Prothromb Time International Ratio 1.0, Activated Partial Thromboplast Time 26, 

Sodium Level 143, Potassium Level 4.1, Chloride Level 109H, Carbon Dioxide Level

 28, Blood Urea Nitrogen 51H, Creatinine 1.8H, Estimat Glomerular Filtration 

Rate 37.9, Glucose Level 172H, Calcium Level 8.3L, Total Bilirubin 0.4, 

Aspartate Amino Transf (AST/SGOT) 25, Alanine Aminotransferase (ALT/SGPT) 29, 

Alkaline Phosphatase 309H, Total Protein 5.5L, Albumin 1.0L, Globulin 4.5, 

Albumin/Globulin Ratio 0.2L


Height (Feet):  5


Height (Inches):  7.00


Weight (Pounds):  198


General Appearance:  other - intubated


EENT:  other - ETT


Cardiovascular:  normal rate


Respiratory/Chest:  decreased breath sounds


Abdomen:  normal bowel sounds


Objective





Current Medications








 Medications


  (Trade)  Dose


 Ordered  Sig/Julisa


 Route


 PRN Reason  Start Time


 Stop Time Status Last Admin


Dose Admin


 


 Acetaminophen


  (Tylenol)  650 mg  Q4H  PRN


 GT


 Temp >100.5  1/15/21 17:15


 2/14/21 17:14  1/27/21 04:23





 


 Amiodarone HCl


  (Cordarone)  200 mg  DAILY


 NG


   1/26/21 09:00


 4/19/21 20:59  1/26/21 08:38





 


 Chlorhexidine


 Gluconate


  (Vanessa-Hex 2%)  1 applic  DAILY@2000


 TOPIC


   1/19/21 20:00


 4/19/21 19:59  1/26/21 20:15





 


 Dextrose  1,000 ml @ 


 50 mls/hr  Q20H


 IV


   1/18/21 10:00


 2/17/21 09:59  1/26/21 17:56





 


 Dextrose


  (Dextrose 50%)  25 ml  Q30M  PRN


 IV


 Hypoglycemia  1/9/21 13:30


 4/9/21 13:29   





 


 Dextrose


  (Dextrose 50%)  50 ml  Q30M  PRN


 IV


 Hypoglycemia  1/9/21 13:30


 4/9/21 13:29   





 


 Digoxin


  (Lanoxin)  0.125 mg  DAILY


 NG


   1/21/21 09:00


 4/21/21 08:59  1/26/21 08:38





 


 Docusate Sodium


  (Colace)  100 mg  TIDPRN  PRN


 GT


 Constipation  1/12/21 01:15


 2/11/21 01:14  1/12/21 01:42





 


 Enoxaparin Sodium


  (Lovenox)  80 mg  EVERY 12  HOURS


 SUBQ


   1/2/21 21:00


 4/2/21 20:59  1/25/21 09:00





 


 Insulin Aspart


  (NovoLOG)    Q6HR


 SUBQ


   1/14/21 12:00


 4/9/21 17:59  1/27/21 06:18





 


 Insulin Aspart


  (NovoLOG)  9 units  EVERY 6  HOURS


 SUBQ


   1/21/21 12:00


 4/15/21 06:59  1/27/21 06:18





 


 Insulin Detemir


  (Levemir)  10 units  Q12HR


 SUBQ


   1/26/21 09:00


 4/12/21 08:59  1/26/21 21:34





 


 Norepinephrine


 Bitartrate 8 mg/


 Dextrose  500 ml @ 0


 mls/hr  Q24H  PRN


 IV


 For hypotension  1/25/21 00:00


 1/28/21 00:00  1/26/21 11:52





 


 Pantoprazole


  (Protonix)  40 mg  DAILY


 IVP


   1/14/21 09:00


 2/13/21 08:59  1/26/21 08:37





 


 Piperacillin Sod/


 Tazobactam Sod


 3.375 gm/Sodium


 Chloride  110 ml @ 


 27.5 mls/hr  EVERY 8  HOURS


 IVPB


   1/23/21 14:00


 1/30/21 13:59  1/27/21 06:17





 


 Quetiapine


 Fumarate


  (SEROqueL)  50 mg  Q12HR


 ORAL


   1/4/21 21:00


 2/18/21 20:59  1/26/21 20:15





 


 Sodium Chloride  500 ml @ 


 999 mls/hr  Q31M PRN


 IV


 For hypotension  1/15/21 18:30


 2/14/21 18:29   





 


 [facility]comycin HCl


  [facility]


 [facility]  1 ea  DAILY  PRN


 MISC


 Per rx protocol  1/14/21 09:15


 2/13/21 09:14   














Assessment/Plan


Problem List:  


(1) Diabetes mellitus out of control


ICD Codes:  E11.65 - Type 2 diabetes mellitus with hyperglycemia


SNOMED:  76705915, [id number]


(2) Pneumonia due to COVID-19 virus


ICD Codes:  U07.1 - COVID-19; J12.82 - Pneumonia due to coronavirus disease 2019


SNOMED:  [id number]


Assessment/Plan:


continue Novolog 9 units every 6 hours 


continue Levemir 10 units bid 


continue Novolog sliding scale every 6 hours 


hypoglycemia protocol in order











Nick Mcmahon MD                 Jan 27, 2021 06:29

## 2021-01-27 NOTE — NUR
NURSE NOTES:

pt stable at this time. O2sat is at 95%. right chest tube no leakage noted. call light 
within reach. will continue to monitor pt.

## 2021-01-27 NOTE — NUR
NURSE NOTES:

left voice mail to Dr. Ibanez regarding new critical ABG result: PH 7.185 PCO2 79.9 PO2 29.9 
HCO3 29.5 O2 Saturation 55.8 will wait for call back.

## 2021-01-27 NOTE — PULMONOLOGY PROGRESS NOTE
Subjective


ROS Limited/Unobtainable:  Yes


Interval Events:  Remains on high PEEP and FiO2


Constitutional:  Reports: fever, other - T=100.8


HEENT:  Repors: no symptoms


Respiratory:  Reports: shortness of breath - better


Cardiovascular:  Reports: no symptoms


Gastrointestinal/Abdominal:  Reports: no symptoms


Allergies:  


Coded Allergies:  


     No Known Allergies (Unverified , 6/11/19)


All Systems:  reviewed and negative except above





Objective





Last 24 Hour Vital Signs








  Date Time  Temp Pulse Resp B/P (MAP) Pulse Ox O2 Delivery O2 Flow Rate FiO2


 


1/27/21 10:00  88 22 112/66 (81) 97   


 


1/27/21 09:44  87      


 


1/27/21 09:30  88 21 115/65 (82) 96   


 


1/27/21 09:00  88 24 109/66 (80) 96   


 


1/27/21 08:00      Mechanical Ventilator  


 


1/27/21 08:00 100.8 88 25 103/67 (79) 97   


 


1/27/21 08:00        100


 


1/27/21 07:26  90 25     100


 


1/27/21 07:00    105/61    


 


1/27/21 07:00  92 26 107/60 (76) 95   


 


1/27/21 06:00    101/55    


 


1/27/21 06:00  92 21 102/59 (73) 96   


 


1/27/21 05:00  92 21 119/68 (85) 94   


 


1/27/21 05:00    111/63    


 


1/27/21 04:53 99.0       


 


1/27/21 04:00 98.7 91 25 110/60 (77) 93   


 


1/27/21 04:00      Mechanical Ventilator  


 


1/27/21 04:00        100


 


1/27/21 04:00    115/66    


 


1/27/21 03:08  93 28     100


 


1/27/21 03:07  93      


 


1/27/21 03:00    114/61    


 


1/27/21 03:00  91 23 112/59 (76) 91   


 


1/27/21 02:00    107/60    


 


1/27/21 02:00  92 23 117/68 (84) 92   


 


1/27/21 01:00  89 19 114/66 (82) 93   


 


1/27/21 01:00    112/65    


 


1/27/21 00:00 99.2 89 25 121/66 (84) 94   


 


1/27/21 00:00      Mechanical Ventilator  


 


1/27/21 00:00    90/55    


 


1/27/21 00:00        100


 


1/26/21 23:14  87 24     100


 


1/26/21 23:11  88      


 


1/26/21 23:00    124/67    


 


1/26/21 23:00  88 23 119/69 (86) 92   


 


1/26/21 22:00    122/65    


 


1/26/21 22:00  88 29 123/66 (85) 92   


 


1/26/21 21:00  88 34 121/66 (84) 90   


 


1/26/21 21:00    128/63    


 


1/26/21 20:00        100


 


1/26/21 20:00      Mechanical Ventilator  


 


1/26/21 20:00    121/63    


 


1/26/21 20:00 98.4 88 27 127/65 (85) 91   


 


1/26/21 19:23  88      


 


1/26/21 19:21  88 26     100


 


1/26/21 19:00  89 27 121/89 (100) 92   


 


1/26/21 19:00    121/89    


 


1/26/21 18:00    129/68    


 


1/26/21 18:00  88 27 129/68 (88) 92   


 


1/26/21 17:00    128/67    


 


1/26/21 17:00  87 26 126/67 (86) 93   


 


1/26/21 16:00  90      


 


1/26/21 16:00 98.4 88 28 129/63 (85) 94   


 


1/26/21 16:00        100


 


1/26/21 16:00    128/63    


 


1/26/21 16:00      Mechanical Ventilator  


 


1/26/21 15:25  83 26     100


 


1/26/21 15:00    117/62    


 


1/26/21 15:00  90 26 117/62 (80) 94   


 


1/26/21 14:00    123/63    


 


1/26/21 14:00  91 25 123/63 (83) 93   


 


1/26/21 13:00  92 25 113/60 (77) 91   


 


1/26/21 13:00    113/60    


 


1/26/21 12:00        100


 


1/26/21 12:00      Mechanical Ventilator  


 


1/26/21 12:00 99.3 92 22 127/60 (82) 92   


 


1/26/21 12:00  96      


 


1/26/21 12:00    127/60    


 


1/26/21 11:52    124/61    


 


1/26/21 11:08  92 22 110/57 (74) 94   


 


1/26/21 11:00    105/57    


 


1/26/21 10:50  91 26     100

















Intake and Output  


 


 1/26/21 1/27/21





 19:00 07:00


 


Intake Total 1500.0 ml 1520.0 ml


 


Output Total 1280 ml 1375 ml


 


Balance 220.0 ml 145.0 ml


 


  


 


Free Water  60 ml


 


IV Total 720.0 ml 800.0 ml


 


Tube Feeding 660 ml 660 ml


 


Other 120 ml 


 


Output Urine Total 1200 ml 1175 ml


 


Stool Total 50 ml 50 ml


 


Chest Tube Drainage Total 30 ml 150 ml








Objective


On Versed drip


General Appearance:  no acute distress


HEENT:  atraumatic


Respiratory:  lungs clear, decreased breath sounds


Cardiovascular:  normal rate, regular rhythm


Abdomen:  soft, non tender, no organomegaly





Microbiology








 Date/Time


Source Procedure


Growth Status





 


 1/26/21 04:00


Stool Clostridium difficile Toxin Assay - Final Complete








Laboratory Tests


1/26/21 12:34: POC Whole Blood Glucose 208H


1/26/21 17:58: POC Whole Blood Glucose 184H


1/27/21 04:35: 


White Blood Count 7.3, Red Blood Count 3.16L, Hemoglobin 9.2L, Hematocrit 29.2L,

Mean Corpuscular Volume 93, Mean Corpuscular Hemoglobin 29.1, Mean Corpuscular 

Hemoglobin Concent 31.4L, Red Cell Distribution Width 14.8, Platelet Count 115L,

Mean Platelet Volume 10.5H, Neutrophils (%) (Auto) , Lymphocytes (%) (Auto) , 

Monocytes (%) (Auto) , Eosinophils (%) (Auto) , Basophils (%) (Auto) , 

Differential Total Cells Counted 100, Neutrophils % (Manual) 84H, Lymphocytes % 

(Manual) 10L, Monocytes % (Manual) 4, Eosinophils % (Manual) 2, Basophils % (Man

ual) 0, Band Neutrophils 0, Nucleated Red Blood Cells 2, Platelet Estimate 

DecreasedL, Platelet Morphology Normal, Polychromasia 1+, Hypochromasia 1+, 

Anisocytosis 1+, Prothrombin Time 10.8, Prothromb Time International Ratio 1.0, 

Activated Partial Thromboplast Time 26, Sodium Level 143, Potassium Level 4.1, 

Chloride Level 109H, Carbon Dioxide Level 28, Blood Urea Nitrogen 51H, 

Creatinine 1.8H, Estimat Glomerular Filtration Rate 37.9, Glucose Level 172H, 

Calcium Level 8.3L, Total Bilirubin 0.4, Aspartate Amino Transf (AST/SGOT) 25, 

Alanine Aminotransferase (ALT/SGPT) 29, Alkaline Phosphatase 309H, Total Protein

5.5L, Albumin 1.0L, Globulin 4.5, Albumin/Globulin Ratio 0.2L


1/27/21 07:26: 


Arterial Blood pH 7.254L, Arterial Blood Partial Pressure CO2 69.3*H, Arterial 

Blood Partial Pressure O2 75.8, Arterial Blood HCO3 30.0H, Arterial Blood Oxygen

Saturation 94.1L, Arterial Blood Base Excess 1.7, Abelardo Test Positive





Current Medications








 Medications


  (Trade)  Dose


 Ordered  Sig/Julisa


 Route


 PRN Reason  Start Time


 Stop Time Status Last Admin


Dose Admin


 


 Acetaminophen


  (Tylenol)  650 mg  Q4H  PRN


 GT


 Temp >100.5  1/15/21 17:15


 2/14/21 17:14  1/27/21 04:23





 


 Amiodarone HCl


  (Cordarone)  200 mg  DAILY


 NG


   1/26/21 09:00


 4/19/21 20:59  1/27/21 09:44





 


 Chlorhexidine


 Gluconate


  (Vanessa-Hex 2%)  1 applic  DAILY@2000


 TOPIC


   1/19/21 20:00


 4/19/21 19:59  1/26/21 20:15





 


 Dextrose  1,000 ml @ 


 50 mls/hr  Q20H


 IV


   1/18/21 10:00


 2/17/21 09:59  1/26/21 17:56





 


 Dextrose


  (Dextrose 50%)  25 ml  Q30M  PRN


 IV


 Hypoglycemia  1/9/21 13:30


 4/9/21 13:29   





 


 Dextrose


  (Dextrose 50%)  50 ml  Q30M  PRN


 IV


 Hypoglycemia  1/9/21 13:30


 4/9/21 13:29   





 


 Digoxin


  (Lanoxin)  0.125 mg  DAILY


 NG


   1/21/21 09:00


 4/21/21 08:59  1/27/21 09:44





 


 Docusate Sodium


  (Colace)  100 mg  TIDPRN  PRN


 GT


 Constipation  1/12/21 01:15


 2/11/21 01:14  1/12/21 01:42





 


 Enoxaparin Sodium


  (Lovenox)  80 mg  EVERY 12  HOURS


 SUBQ


   1/2/21 21:00


 4/2/21 20:59  1/27/21 09:45





 


 Insulin Aspart


  (NovoLOG)    Q6HR


 SUBQ


   1/14/21 12:00


 4/9/21 17:59  1/27/21 06:18





 


 Insulin Aspart


  (NovoLOG)  9 units  EVERY 6  HOURS


 SUBQ


   1/21/21 12:00


 4/15/21 06:59  1/27/21 06:18





 


 Insulin Detemir


  (Levemir)  10 units  Q12HR


 SUBQ


   1/26/21 09:00


 4/12/21 08:59  1/27/21 10:05





 


 Norepinephrine


 Bitartrate 8 mg/


 Dextrose  500 ml @ 0


 mls/hr  Q24H  PRN


 IV


 For hypotension  1/25/21 00:00


 1/28/21 00:00  1/26/21 11:52





 


 Pantoprazole


  (Protonix)  40 mg  DAILY


 IVP


   1/14/21 09:00


 2/13/21 08:59  1/27/21 09:43





 


 Piperacillin Sod/


 Tazobactam Sod


 3.375 gm/Sodium


 Chloride  110 ml @ 


 27.5 mls/hr  EVERY 8  HOURS


 IVPB


   1/23/21 14:00


 1/30/21 13:59  1/27/21 06:17





 


 Quetiapine


 Fumarate


  (SEROqueL)  50 mg  Q12HR


 ORAL


   1/4/21 21:00


 2/18/21 20:59  1/27/21 09:45





 


 Sodium Chloride  500 ml @ 


 999 mls/hr  Q31M PRN


 IV


 For hypotension  1/15/21 18:30


 2/14/21 18:29   





 


 [facility] HCl


  [facility]


 [facility]  1 ea  DAILY  PRN


 MISC


 Per rx protocol  1/14/21 09:15


 2/13/21 09:14   














Assessment/Plan


Assessment/Plan


1. COVID-19 pneumonia.


   - on Decadron, azithromycin, and ceftriaxone


   - Intubated now; will continue AC mode for now


          -Currently 100% FiO2. PEEP 12; SaO2 78-84%


            - ABG 7.26/63/62


             - Has R apical PTX and subcut emphysema


             - S/p R CT placement


2. Diabetes mellitus.; 


3. Elevated inflammatory markers.


4. High D-dimer. On full dose Lovenox


5. Hyper natremia; will continue D5W


6. Hyperglycemia.


   - BG control


7. Hypoxemia., secondary to #1; improved





DVT prophylaxis   On Lovenox.


Sedated; advised RN to increase sedation








Hypotensive


On levophed


Continue PEEP 12


Poor prognosis











Sung Lemos MD           Jan 27, 2021 10:38

## 2021-01-27 NOTE — HEMATOLOGY/ONC PROGRESS NOTE
Assessment/Plan


Assessment/Plan


Leukocytosis 2/2 covid pna wbc 15->14-->17


Anemia 2/2 chronic disease, hgb 11-->10.4


Hypercoag disorer now on lovenox sq


Ac resp distress on ventilator


Pneumomediastinum


etoh abused


agitated -halodol


vent





Ngt


cont iv abx and iv decadrone


pulmonary and gi on consult


smear is noted


pneumomediastinum per pulm


dw charge nurse


lovenox sq





Subjective


Allergies:  


Coded Allergies:  


     No Known Allergies (Unverified , 6/11/19)


All Systems:  reviewed and negative except above


Subjective


1/10 on ventilator 60% fio2, on sediation, unresponsive, in icu


1/14 on vent, icu, wbc elev, no bleeding, unresponsive


1/15 on vent, with cash, icu, no bleeding, unresponsive


1/17 on vent, elmer rn, no major changes, icu, nv


1/18 nv, labs reviewd, hr is elev, with afib, is onlovenox sq


1/19 remains on vent, sedated with wrist restraints, icu


1/20 on vent, with hematuria, ngt, in icu, labs reviewed


1/21 remains on vent, settings have been adjusted as per icu care


1/22 remains on vent, icu, on levo 6mcg, off versed, labs noted, elmer rn


1/25 remains onv ent, in icu, with restraints, on levo gtt as well


1/26 nv, suctioned, rectal tube, in icu, with restraints, no bleeding


1/27 nv, remains on vent, rectal tube, labs reviewed, elmer rn





Objective


Objective





Current Medications








 Medications


  (Trade)  Dose


 Ordered  Sig/Julisa


 Route


 PRN Reason  Start Time


 Stop Time Status Last Admin


Dose Admin


 


 Acetaminophen


  (Tylenol)  650 mg  Q4H  PRN


 GT


 Temp >100.5  1/15/21 17:15


 2/14/21 17:14  1/27/21 04:23





 


 Amiodarone HCl


  (Cordarone)  200 mg  DAILY


 NG


   1/26/21 09:00


 4/19/21 20:59  1/26/21 08:38





 


 Chlorhexidine


 Gluconate


  (Vanessa-Hex 2%)  1 applic  DAILY@2000


 TOPIC


   1/19/21 20:00


 4/19/21 19:59  1/26/21 20:15





 


 Dextrose  1,000 ml @ 


 50 mls/hr  Q20H


 IV


   1/18/21 10:00


 2/17/21 09:59  1/26/21 17:56





 


 Dextrose


  (Dextrose 50%)  25 ml  Q30M  PRN


 IV


 Hypoglycemia  1/9/21 13:30


 4/9/21 13:29   





 


 Dextrose


  (Dextrose 50%)  50 ml  Q30M  PRN


 IV


 Hypoglycemia  1/9/21 13:30


 4/9/21 13:29   





 


 Digoxin


  (Lanoxin)  0.125 mg  DAILY


 NG


   1/21/21 09:00


 4/21/21 08:59  1/26/21 08:38





 


 Docusate Sodium


  (Colace)  100 mg  TIDPRN  PRN


 GT


 Constipation  1/12/21 01:15


 2/11/21 01:14  1/12/21 01:42





 


 Enoxaparin Sodium


  (Lovenox)  80 mg  EVERY 12  HOURS


 SUBQ


   1/2/21 21:00


 4/2/21 20:59  1/25/21 09:00





 


 Insulin Aspart


  (NovoLOG)    Q6HR


 SUBQ


   1/14/21 12:00


 4/9/21 17:59  1/27/21 06:18





 


 Insulin Aspart


  (NovoLOG)  9 units  EVERY 6  HOURS


 SUBQ


   1/21/21 12:00


 4/15/21 06:59  1/27/21 06:18





 


 Insulin Detemir


  (Levemir)  10 units  Q12HR


 SUBQ


   1/26/21 09:00


 4/12/21 08:59  1/26/21 21:34





 


 Norepinephrine


 Bitartrate 8 mg/


 Dextrose  500 ml @ 0


 mls/hr  Q24H  PRN


 IV


 For hypotension  1/25/21 00:00


 1/28/21 00:00  1/26/21 11:52





 


 Pantoprazole


  (Protonix)  40 mg  DAILY


 IVP


   1/14/21 09:00


 2/13/21 08:59  1/26/21 08:37





 


 Piperacillin Sod/


 Tazobactam Sod


 3.375 gm/Sodium


 Chloride  110 ml @ 


 27.5 mls/hr  EVERY 8  HOURS


 IVPB


   1/23/21 14:00


 1/30/21 13:59  1/27/21 06:17





 


 Quetiapine


 Fumarate


  (SEROqueL)  50 mg  Q12HR


 ORAL


   1/4/21 21:00


 2/18/21 20:59  1/26/21 20:15





 


 Sodium Chloride  500 ml @ 


 999 mls/hr  Q31M PRN


 IV


 For hypotension  1/15/21 18:30


 2/14/21 18:29   





 


 Vancomycin HCl


  ([facility]


 to dose)  1 ea  DAILY  PRN


 MISC


 Per rx protocol  1/14/21 09:15


 2/13/21 09:14   














Last 24 Hour Vital Signs








  Date Time  Temp Pulse Resp B/P (MAP) Pulse Ox O2 Delivery O2 Flow Rate FiO2


 


1/27/21 05:00  92 21 119/68 (85) 94   


 


1/27/21 04:53 99.0       


 


1/27/21 04:00 89.7 91 25 110/60 (77) 93   


 


1/27/21 04:00      Mechanical Ventilator  


 


1/27/21 04:00        100


 


1/27/21 03:08  93 28     100


 


1/27/21 03:07  93      


 


1/27/21 03:00  91 23 112/59 (76) 91   


 


1/27/21 02:00  92 23 117/68 (84) 92   


 


1/27/21 01:00  89 19 114/66 (82) 93   


 


1/27/21 01:00    112/65    


 


1/27/21 00:00 99.2 89 25 121/66 (84) 94   


 


1/27/21 00:00      Mechanical Ventilator  


 


1/27/21 00:00    90/55    


 


1/27/21 00:00        100


 


1/26/21 23:14  87 24     100


 


1/26/21 23:11  88      


 


1/26/21 23:00    124/67    


 


1/26/21 23:00  88 23 119/69 (86) 92   


 


1/26/21 22:00    122/65    


 


1/26/21 22:00  88 29 123/66 (85) 92   


 


1/26/21 21:00  88 34 121/66 (84) 90   


 


1/26/21 21:00    128/63    


 


1/26/21 20:00        100


 


1/26/21 20:00      Mechanical Ventilator  


 


1/26/21 20:00    121/63    


 


1/26/21 20:00 98.4 88 27 127/65 (85) 91   


 


1/26/21 19:23  88      


 


1/26/21 19:21  88 26     100


 


1/26/21 19:00  89 27 121/89 (100) 92   


 


1/26/21 19:00    121/89    


 


1/26/21 18:00    129/68    


 


1/26/21 18:00  88 27 129/68 (88) 92   


 


1/26/21 17:00    128/67    


 


1/26/21 17:00  87 26 126/67 (86) 93   


 


1/26/21 16:00  90      


 


1/26/21 16:00 98.4 88 28 129/63 (85) 94   


 


1/26/21 16:00        100


 


1/26/21 16:00    128/63    


 


1/26/21 16:00      Mechanical Ventilator  


 


1/26/21 15:25  83 26     100


 


1/26/21 15:00    117/62    


 


1/26/21 15:00  90 26 117/62 (80) 94   


 


1/26/21 14:00    123/63    


 


1/26/21 14:00  91 25 123/63 (83) 93   


 


1/26/21 13:00  92 25 113/60 (77) 91   


 


1/26/21 13:00    113/60    


 


1/26/21 12:00        100


 


1/26/21 12:00      Mechanical Ventilator  


 


1/26/21 12:00 99.3 92 22 127/60 (82) 92   


 


1/26/21 12:00  96      


 


1/26/21 12:00    127/60    


 


1/26/21 11:52    124/61    


 


1/26/21 11:08  92 22 110/57 (74) 94   


 


1/26/21 11:00    105/57    


 


1/26/21 10:50  91 26     100


 


1/26/21 10:00  92 22 108/57 (74) 94   


 


1/26/21 10:00    108/57    


 


1/26/21 09:00  94 24 121/63 (82) 92   


 


1/26/21 09:00    121/63    


 


1/26/21 08:38  97      


 


1/26/21 08:00  96      


 


1/26/21 08:00        100


 


1/26/21 08:00    124/68    


 


1/26/21 08:00 100.8 97 28 124/68 (86) 84   


 


1/26/21 08:00      Mechanical Ventilator  


 


1/26/21 07:22  94 26     100


 


1/26/21 07:00  98 26 129/64 (85) 87   


 


1/26/21 07:00    129/64    


 


1/26/21 06:00    134/67    


 


1/26/21 06:00  95 25 129/65 (86) 88   


 


1/26/21 05:00    132/64    


 


1/26/21 05:00  98 25 130/62 (84) 86   


 


1/26/21 04:00 99.0 94 20 131/67 (88) 90   


 


1/26/21 04:00  96      


 


1/26/21 04:00        100


 


1/26/21 04:00      Mechanical Ventilator  


 


1/26/21 04:00    128/68    


 


1/26/21 04:00        100


 


1/26/21 03:27  95 24     100


 


1/26/21 03:00    134/65    


 


1/26/21 03:00  94 20 131/67 (88) 90   


 


1/26/21 02:00    111/62    


 


1/26/21 02:00  94 20 111/62 (78) 91   


 


1/26/21 01:00    115/64    


 


1/26/21 01:00  98 27 111/63 (79) 70   


 


1/26/21 00:00      Mechanical Ventilator  


 


1/26/21 00:00 98.8 95 35 111/63 (79) 84   


 


1/26/21 00:00  83      


 


1/26/21 00:00    112/65    


 


1/25/21 23:12  92 24     100


 


1/25/21 23:00  95 23 111/65 (80) 84   


 


1/25/21 23:00    108/62    


 


1/25/21 22:00  91 23 107/64 (78) 87   


 


1/25/21 22:00    110/63    


 


1/25/21 21:00    110/65    


 


1/25/21 21:00  91 26 114/68 (83) 85   


 


1/25/21 20:00      Mechanical Ventilator  


 


1/25/21 20:00  88      


 


1/25/21 20:00    107/62    


 


1/25/21 20:00        100


 


1/25/21 20:00 98.4 91 23 107/62 (77) 86   


 


1/25/21 19:12  92 24     100


 


1/25/21 19:00  93 23 103/63 (76) 86   


 


1/25/21 19:00    107/63    


 


1/25/21 18:00    107/59    


 


1/25/21 18:00  96 23 107/59 (75) 85   


 


1/25/21 17:00    103/60    


 


1/25/21 17:00  96 22 103/60 (74) 82   


 


1/25/21 16:18  96      


 


1/25/21 16:00      Mechanical Ventilator  


 


1/25/21 16:00    104/62    


 


1/25/21 16:00 95.9 96 26 104/62 (76) 75   


 


1/25/21 16:00        100


 


1/25/21 15:15  95 25     100


 


1/25/21 15:00  95 22 106/64 (78) 78   


 


1/25/21 15:00    106/64    


 


1/25/21 14:02  93 20 95/60 (72) 75   


 


1/25/21 14:00    95/60    


 


1/25/21 13:00    96/57    


 


1/25/21 13:00  92 21 95/57 (70) 66   


 


1/25/21 12:00      Mechanical Ventilator  


 


1/25/21 12:00        100


 


1/25/21 12:00 96.0 92 21 106/58 (74) 65   


 


1/25/21 12:00  92      


 


1/25/21 12:00    120/70    


 


1/25/21 11:15  91 20     100


 


1/25/21 11:00    129/69    


 


1/25/21 11:00  92 20 95/60 (72) 72   


 


1/25/21 10:09  88 23 93/54 (67) 65   


 


1/25/21 10:00    99/58    


 


1/25/21 09:12  115      


 


1/25/21 09:00  86 21 99/60 (73) 51   


 


1/25/21 08:01  87      


 


1/25/21 08:00    109/83    


 


1/25/21 08:00      Mechanical Ventilator  


 


1/25/21 08:00        100


 


1/25/21 08:00 95.9 85 19 109/83 (92) 70   


 


1/25/21 07:15  90 19     100


 


1/25/21 07:00  88 19 110/69 (83) 83   


 


1/25/21 07:00    110/69    

















Intake and Output  


 


 1/26/21 1/27/21





 19:00 07:00


 


Intake Total 1500.0 ml 1265.0 ml


 


Output Total 1280 ml 1075 ml


 


Balance 220.0 ml 190.0 ml


 


  


 


Free Water  30 ml


 


IV Total 720.0 ml 685.0 ml


 


Tube Feeding 660 ml 550 ml


 


Other 120 ml 


 


Output Urine Total 1200 ml 925 ml


 


Stool Total 50 ml 50 ml


 


Chest Tube Drainage Total 30 ml 100 ml











Labs








Test


 1/24/21


09:29 1/24/21


09:50 1/24/21


11:28 1/24/21


17:28


 


POC Whole Blood Glucose


 146 MG/DL


() 


 141 MG/DL


() 89 MG/DL


()


 


Vancomycin Level Trough


 


 37.0 ug/mL


(5.0-12.0) 


 





 


Test


 1/24/21


20:13 1/24/21


23:15 1/25/21


05:15 1/25/21


05:48


 


POC Whole Blood Glucose


 96 MG/DL


() 117 MG/DL


() 116 MG/DL


() 





 


Random Vancomycin Level    24.8 ug/mL 


 


Test


 1/25/21


09:21 1/25/21


12:10 1/26/21


03:30 1/26/21


09:07


 


POC Whole Blood Glucose


 125 MG/DL


() 


 


 





 


White Blood Count


 


 7.6 K/UL


(4.8-10.8) 7.8 K/UL


(4.8-10.8) 





 


Red Blood Count


 


 3.26 M/UL


(4.70-6.10) 3.34 M/UL


(4.70-6.10) 





 


Hemoglobin


 


 9.1 G/DL


(14.2-18.0) 9.3 G/DL


(14.2-18.0) 





 


Hematocrit


 


 30.4 %


(42.0-52.0) 30.2 %


(42.0-52.0) 





 


Mean Corpuscular Volume  93 FL (80-99)  91 FL (80-99)  


 


Mean Corpuscular Hemoglobin


 


 28.0 PG


(27.0-31.0) 28.0 PG


(27.0-31.0) 





 


Mean Corpuscular Hemoglobin


Concent 


 30.0 G/DL


(32.0-36.0) 30.9 G/DL


(32.0-36.0) 





 


Red Cell Distribution Width


 


 14.4 %


(11.6-14.8) 14.1 %


(11.6-14.8) 





 


Platelet Count


 


 90 K/UL


(150-450) 119 K/UL


(150-450) 





 


Mean Platelet Volume


 


 11.5 FL


(6.5-10.1) 11.7 FL


(6.5-10.1) 





 


Neutrophils (%) (Auto)   % (45.0-75.0)   % (45.0-75.0)  


 


Lymphocytes (%) (Auto)


 


 4.7 %


(20.0-45.0)  % (20.0-45.0) 


 





 


Monocytes (%) (Auto)


 


 1.6 %


(1.0-10.0)  % (1.0-10.0) 


 





 


Eosinophils (%) (Auto)


 


 2.0 %


(0.0-3.0)  % (0.0-3.0) 


 





 


Basophils (%) (Auto)


 


 0.9 %


(0.0-2.0)  % (0.0-2.0) 


 





 


Sodium Level


 


 141 MMOL/L


(136-145) 139 MMOL/L


(136-145) 





 


Potassium Level


 


 3.4 MMOL/L


(3.5-5.1) 4.1 MMOL/L


(3.5-5.1) 





 


Chloride Level


 


 106 MMOL/L


() 105 MMOL/L


() 





 


Carbon Dioxide Level


 


 28 MMOL/L


(21-32) 27 MMOL/L


(21-32) 





 


Anion Gap


 


 7 mmol/L


(5-15) 7 mmol/L


(5-15) 





 


Blood Urea Nitrogen


 


 46 mg/dL


(7-18) 49 mg/dL


(7-18) 





 


Creatinine


 


 1.5 MG/DL


(0.55-1.30) 1.7 MG/DL


(0.55-1.30) 





 


Estimat Glomerular Filtration


Rate 


 46.8 mL/min


(>60) 40.5 mL/min


(>60) 





 


Glucose Level


 


 164 MG/DL


() 140 MG/DL


() 





 


Calcium Level


 


 7.9 MG/DL


(8.5-10.1) 8.0 MG/DL


(8.5-10.1) 





 


Total Bilirubin


 


 0.5 MG/DL


(0.2-1.0) 0.4 MG/DL


(0.2-1.0) 





 


Aspartate Amino Transf


(AST/SGOT) 


 32 U/L (15-37) 


 25 U/L (15-37) 


 





 


Alanine Aminotransferase


(ALT/SGPT) 


 29 U/L (12-78) 


 27 U/L (12-78) 


 





 


Alkaline Phosphatase


 


 364 U/L


() 369 U/L


() 





 


Total Protein


 


 5.2 G/DL


(6.4-8.2) 5.4 G/DL


(6.4-8.2) 





 


Albumin


 


 1.0 G/DL


(3.4-5.0) 1.0 G/DL


(3.4-5.0) 





 


Globulin  4.2 g/dL  4.4 g/dL  


 


Albumin/Globulin Ratio  0.2 (1.0-2.7)  0.2 (1.0-2.7)  


 


Differential Total Cells


Counted 


 


 100 


 





 


Neutrophils % (Manual)   87 % (45-75)  


 


Lymphocytes % (Manual)   9 % (20-45)  


 


Monocytes % (Manual)   2 % (1-10)  


 


Eosinophils % (Manual)   0 % (0-3)  


 


Basophils % (Manual)   0 % (0-2)  


 


Band Neutrophils   2 % (0-8)  


 


Nucleated Red Blood Cells   2 /100 WBC  


 


Platelet Estimate   Decreased  


 


Platelet Morphology   Normal  


 


Hypochromasia   1+  


 


Random Vancomycin Level   19.2 ug/mL  


 


Arterial Blood pH


 


 


 


 7.263


(7.350-7.450)


 


Arterial Blood Partial


Pressure CO2 


 


 


 67.0 mmHg


(35.0-45.0)


 


Arterial Blood Partial


Pressure O2 


 


 


 61.2 mmHg


(75.0-100.0)


 


Arterial Blood HCO3


 


 


 


 29.6 mmol/L


(22.0-26.0)


 


Arterial Blood Oxygen


Saturation 


 


 


 90.3 %


()


 


Arterial Blood Base Excess    1.5 (-2-2) 


 


Abelardo Test    Positive 


 


Test


 1/26/21


12:34 1/26/21


17:58 1/27/21


04:35 





 


POC Whole Blood Glucose


 208 MG/DL


() 184 MG/DL


() 


 





 


White Blood Count


 


 


 7.3 K/UL


(4.8-10.8) 





 


Red Blood Count


 


 


 3.16 M/UL


(4.70-6.10) 





 


Hemoglobin


 


 


 9.2 G/DL


(14.2-18.0) 





 


Hematocrit


 


 


 29.2 %


(42.0-52.0) 





 


Mean Corpuscular Volume   93 FL (80-99)  


 


Mean Corpuscular Hemoglobin


 


 


 29.1 PG


(27.0-31.0) 





 


Mean Corpuscular Hemoglobin


Concent 


 


 31.4 G/DL


(32.0-36.0) 





 


Red Cell Distribution Width


 


 


 14.8 %


(11.6-14.8) 





 


Platelet Count


 


 


 115 K/UL


(150-450) 





 


Mean Platelet Volume


 


 


 10.5 FL


(6.5-10.1) 





 


Neutrophils (%) (Auto)    % (45.0-75.0)  


 


Lymphocytes (%) (Auto)    % (20.0-45.0)  


 


Monocytes (%) (Auto)    % (1.0-10.0)  


 


Eosinophils (%) (Auto)    % (0.0-3.0)  


 


Basophils (%) (Auto)    % (0.0-2.0)  


 


Prothrombin Time


 


 


 10.8 SEC


(9.30-11.50) 





 


Prothromb Time International


Ratio 


 


 1.0 (0.9-1.1) 


 





 


Activated Partial


Thromboplast Time 


 


 26 SEC (23-33) 


 





 


Sodium Level


 


 


 143 MMOL/L


(136-145) 





 


Potassium Level


 


 


 4.1 MMOL/L


(3.5-5.1) 





 


Chloride Level


 


 


 109 MMOL/L


() 





 


Carbon Dioxide Level


 


 


 28 MMOL/L


(21-32) 





 


Blood Urea Nitrogen


 


 


 51 mg/dL


(7-18) 





 


Creatinine


 


 


 1.8 MG/DL


(0.55-1.30) 





 


Estimat Glomerular Filtration


Rate 


 


 37.9 mL/min


(>60) 





 


Glucose Level


 


 


 172 MG/DL


() 





 


Calcium Level


 


 


 8.3 MG/DL


(8.5-10.1) 





 


Total Bilirubin


 


 


 0.4 MG/DL


(0.2-1.0) 





 


Aspartate Amino Transf


(AST/SGOT) 


 


 25 U/L (15-37) 


 





 


Alanine Aminotransferase


(ALT/SGPT) 


 


 29 U/L (12-78) 


 





 


Alkaline Phosphatase


 


 


 309 U/L


() 





 


Total Protein


 


 


 5.5 G/DL


(6.4-8.2) 





 


Albumin


 


 


 1.0 G/DL


(3.4-5.0) 





 


Globulin   4.5 g/dL  


 


Albumin/Globulin Ratio   0.2 (1.0-2.7)  








Height (Feet):  5


Height (Inches):  7.00


Weight (Pounds):  198


Objective


General Appearance:  lethargic, moderate distress


Neck:  supple


Cardiovascular:  regular rhythm


Respiratory/Chest:  crackles/rales, rhonchi - bilaterally vent++


Abdomen:  non tender, soft


Extremities:  non-tender











Emmett Cook MD          Jan 27, 2021 06:27

## 2021-01-27 NOTE — NUR
NURSE NOTES:



Pt very awake and agitated. O2sat reading 47% on cardiac monitor. Dr Lemos gave an order 
to start Fentanyl to achieve RASS -2.

## 2021-01-27 NOTE — NUR
NURSE HAND-OFF REPORT: 



Latest Vital Signs: Temperature 100.1 , Pulse 91 , B/P 107 /61 , Respiratory Rate 23 , O2 
SAT 36 , Mechanical Ventilator, FiO2 100% .  

Vital Sign Comment: 



EKG Rhythm: Sinus Rhythm

Rhythm change?: N 

MD Notified?: 

MD Response: 



Latest Singh Fall Score: 50  

Fall Risk: High Risk 

Safety Measures: Call light Within Reach, Bed Alarm Zone 1, Side Rails Side Rails x3, Bed 
position Low and Locked.

Fall Precautions: 

Door Sign



Report given to SUZI Gustafson.

## 2021-01-27 NOTE — CARDIAC ELECTROPHYSIOLOGY PN
Assessment/Plan


Assessment/Plan


1. Atrial fibrillation with rapid ventricular response.      


    On digoxin 0.125 mg p.o. daily and Amiodarone 200 po qd  


      In SR. Digoxin level 0.6.    





2. Hypernatremia with sodium 151 with azotemia, BUN of 30, creatinine 0.8.   On 

iv fluids





3. Right pneumothorax, status post chest tube with subcutaneous emphysema.


CXR and ABG pending





4. COVID-19 pneumonia, 100% FiO2 and PEEP of 12, on remdesivir and Solu-Medrol 

per ID.


5. Diabetes.


6. Hypotension due to dehydration and sepsis. On iv fluid and Levophed 2 Mcg 


7. Hypernatremia and azotemia.   





DW RN





Subjective


Subjective


In ICU on 100% Fio2 and PEEP 12 and Levo decreased to 2 Mcg.  


 Right chest tube had 150 cc drainage. In SR on Dig and Amiodarone





Objective





Last 24 Hour Vital Signs








  Date Time  Temp Pulse Resp B/P (MAP) Pulse Ox O2 Delivery O2 Flow Rate FiO2


 


1/27/21 06:00  92 21 102/59 (73) 96   


 


1/27/21 05:00  92 21 119/68 (85) 94   


 


1/27/21 04:53 99.0       


 


1/27/21 04:00 89.7 91 25 110/60 (77) 93   


 


1/27/21 04:00      Mechanical Ventilator  


 


1/27/21 04:00        100


 


1/27/21 03:08  93 28     100


 


1/27/21 03:07  93      


 


1/27/21 03:00  91 23 112/59 (76) 91   


 


1/27/21 02:00  92 23 117/68 (84) 92   


 


1/27/21 01:00  89 19 114/66 (82) 93   


 


1/27/21 01:00    112/65    


 


1/27/21 00:00 99.2 89 25 121/66 (84) 94   


 


1/27/21 00:00      Mechanical Ventilator  


 


1/27/21 00:00    90/55    


 


1/27/21 00:00        100


 


1/26/21 23:14  87 24     100


 


1/26/21 23:11  88      


 


1/26/21 23:00    124/67    


 


1/26/21 23:00  88 23 119/69 (86) 92   


 


1/26/21 22:00    122/65    


 


1/26/21 22:00  88 29 123/66 (85) 92   


 


1/26/21 21:00  88 34 121/66 (84) 90   


 


1/26/21 21:00    128/63    


 


1/26/21 20:00        100


 


1/26/21 20:00      Mechanical Ventilator  


 


1/26/21 20:00    121/63    


 


1/26/21 20:00 98.4 88 27 127/65 (85) 91   


 


1/26/21 19:23  88      


 


1/26/21 19:21  88 26     100


 


1/26/21 19:00  89 27 121/89 (100) 92   


 


1/26/21 19:00    121/89    


 


1/26/21 18:00    129/68    


 


1/26/21 18:00  88 27 129/68 (88) 92   


 


1/26/21 17:00    128/67    


 


1/26/21 17:00  87 26 126/67 (86) 93   


 


1/26/21 16:00  90      


 


1/26/21 16:00 98.4 88 28 129/63 (85) 94   


 


1/26/21 16:00        100


 


1/26/21 16:00    128/63    


 


1/26/21 16:00      Mechanical Ventilator  


 


1/26/21 15:25  83 26     100


 


1/26/21 15:00    117/62    


 


1/26/21 15:00  90 26 117/62 (80) 94   


 


1/26/21 14:00    123/63    


 


1/26/21 14:00  91 25 123/63 (83) 93   


 


1/26/21 13:00  92 25 113/60 (77) 91   


 


1/26/21 13:00    113/60    


 


1/26/21 12:00        100


 


1/26/21 12:00      Mechanical Ventilator  


 


1/26/21 12:00 99.3 92 22 127/60 (82) 92   


 


1/26/21 12:00  96      


 


1/26/21 12:00    127/60    


 


1/26/21 11:52    124/61    


 


1/26/21 11:08  92 22 110/57 (74) 94   


 


1/26/21 11:00    105/57    


 


1/26/21 10:50  91 26     100


 


1/26/21 10:00  92 22 108/57 (74) 94   


 


1/26/21 10:00    108/57    


 


1/26/21 09:00  94 24 121/63 (82) 92   


 


1/26/21 09:00    121/63    


 


1/26/21 08:38  97      


 


1/26/21 08:00  96      


 


1/26/21 08:00        100


 


1/26/21 08:00    124/68    


 


1/26/21 08:00 100.8 97 28 124/68 (86) 84   


 


1/26/21 08:00      Mechanical Ventilator  

















Intake and Output  


 


 1/26/21 1/27/21





 19:00 07:00


 


Intake Total 1500.0 ml 1435.0 ml


 


Output Total 1280 ml 1225 ml


 


Balance 220.0 ml 210.0 ml


 


  


 


Free Water  30 ml


 


IV Total 720.0 ml 800.0 ml


 


Tube Feeding 660 ml 605 ml


 


Other 120 ml 


 


Output Urine Total 1200 ml 1025 ml


 


Stool Total 50 ml 50 ml


 


Chest Tube Drainage Total 30 ml 150 ml











Laboratory Tests








Test


 1/26/21


09:07 1/26/21


12:34 1/26/21


17:58 1/27/21


04:35


 


Arterial Blood pH


 7.263


(7.350-7.450) 


 


 





 


Arterial Blood Partial


Pressure CO2 67.0 mmHg


(35.0-45.0)  *H 


 


 





 


Arterial Blood Partial


Pressure O2 61.2 mmHg


(75.0-100.0)  L 


 


 





 


Arterial Blood HCO3


 29.6 mmol/L


(22.0-26.0)  H 


 


 





 


Arterial Blood Oxygen


Saturation 90.3 %


()  L 


 


 





 


Arterial Blood Base Excess 1.5 (-2-2)     


 


Abelardo Test Positive     


 


POC Whole Blood Glucose


 


 208 MG/DL


()  H 184 MG/DL


()  H 





 


White Blood Count


 


 


 


 7.3 K/UL


(4.8-10.8)


 


Red Blood Count


 


 


 


 3.16 M/UL


(4.70-6.10)  L


 


Hemoglobin


 


 


 


 9.2 G/DL


(14.2-18.0)  L


 


Hematocrit


 


 


 


 29.2 %


(42.0-52.0)  L


 


Mean Corpuscular Volume    93 FL (80-99)  


 


Mean Corpuscular Hemoglobin


 


 


 


 29.1 PG


(27.0-31.0)


 


Mean Corpuscular Hemoglobin


Concent 


 


 


 31.4 G/DL


(32.0-36.0)  L


 


Red Cell Distribution Width


 


 


 


 14.8 %


(11.6-14.8)


 


Platelet Count


 


 


 


 115 K/UL


(150-450)  L


 


Mean Platelet Volume


 


 


 


 10.5 FL


(6.5-10.1)  H


 


Neutrophils (%) (Auto)


 


 


 


 % (45.0-75.0)





 


Lymphocytes (%) (Auto)


 


 


 


 % (20.0-45.0)





 


Monocytes (%) (Auto)     % (1.0-10.0)  


 


Eosinophils (%) (Auto)     % (0.0-3.0)  


 


Basophils (%) (Auto)     % (0.0-2.0)  


 


Neutrophils % (Manual)    Pending  


 


Lymphocytes % (Manual)    Pending  


 


Platelet Estimate    Pending  


 


Platelet Morphology    Pending  


 


Prothrombin Time


 


 


 


 10.8 SEC


(9.30-11.50)


 


Prothromb Time International


Ratio 


 


 


 1.0 (0.9-1.1)  





 


Activated Partial


Thromboplast Time 


 


 


 26 SEC (23-33)





 


Sodium Level


 


 


 


 143 MMOL/L


(136-145)


 


Potassium Level


 


 


 


 4.1 MMOL/L


(3.5-5.1)


 


Chloride Level


 


 


 


 109 MMOL/L


()  H


 


Carbon Dioxide Level


 


 


 


 28 MMOL/L


(21-32)


 


Blood Urea Nitrogen


 


 


 


 51 mg/dL


(7-18)  H


 


Creatinine


 


 


 


 1.8 MG/DL


(0.55-1.30)  H


 


Estimat Glomerular Filtration


Rate 


 


 


 37.9 mL/min


(>60)


 


Glucose Level


 


 


 


 172 MG/DL


()  H


 


Calcium Level


 


 


 


 8.3 MG/DL


(8.5-10.1)  L


 


Total Bilirubin


 


 


 


 0.4 MG/DL


(0.2-1.0)


 


Aspartate Amino Transf


(AST/SGOT) 


 


 


 25 U/L (15-37)





 


Alanine Aminotransferase


(ALT/SGPT) 


 


 


 29 U/L (12-78)





 


Alkaline Phosphatase


 


 


 


 309 U/L


()  H


 


Total Protein


 


 


 


 5.5 G/DL


(6.4-8.2)  L


 


Albumin


 


 


 


 1.0 G/DL


(3.4-5.0)  L


 


Globulin    4.5 g/dL  


 


Albumin/Globulin Ratio


 


 


 


 0.2 (1.0-2.7)


L











Microbiology








 Date/Time


Source Procedure


Growth Status





 


 1/26/21 04:00


Stool Clostridium difficile Toxin Assay - Final Complete








Objective


HEENT: No JVD. Orally intubated


LUNGS:  Have decreased breath sounds with the chest tube on the right side


and subcutaneous emphysema.


CARDIOVASCULAR:  Regular S1, S2 with


no gallop.


ABDOMEN:  Soft.


EXTREMITIES:  A 1+ pitting edema.











Jake George MD               Jan 27, 2021 07:31

## 2021-01-27 NOTE — DIAGNOSTIC IMAGING REPORT
Indication: Cough

 

Technique: One view of the chest

 

Comparison: 1/26/2021

 

Findings: Stable satisfactory tube and line positions. No evidence of pneumothorax.

Subcutaneous emphysema has markedly improved in the right chest wall and

supraclavicular region, persists in the left supraclavicular region. Questionable

pneumomediastinum is unchanged if real. Bilateral infiltrates are unchanged.

Cardiomegaly persists. Unchanged left pleural effusion

 

Impression: Decreased subcutaneous emphysema. Otherwise little change over one day

## 2021-01-27 NOTE — NUR
NURSE NOTES:

Family members (pt's wife and son) at the bedside. updated pt's status at this time, 
encouraged questions and answered all the questions in nursing scope. call light within 
reach. will continue to monitor pt.

## 2021-01-27 NOTE — NUR
NURSE NOTES:



Pt turned and repositioned for comfort. Oral care done. Orally suctioned. No distress noted 
at this time.

## 2021-01-27 NOTE — NUR
NURSE NOTES:

cleaned pt, oral care given. oral suctioned. provided new gown, new pillow cases, new 
chucks. no active bleeding noted at this time. feeding no residual noted. O2s sat is at 95%. 
call light within reach.

## 2021-01-27 NOTE — NUR
NURSE NOTES:

pt is resting on the bed, still O2sat is low (50-60%) at this time. will closely monitor pt.

## 2021-01-27 NOTE — NUR
NURSE NOTES:

per Dr. Ibanez, "we will just keep pt RASS-2, repeat ABG and Chest X-ray in tomorrow AM." 
noted. will carry on.

## 2021-01-27 NOTE — NUR
NURSE NOTES:



Pt turned and repositioned. Oral care done. Pt remains on Levophed 2mcg/min to maintain SBP 
>90

## 2021-01-27 NOTE — NUR
NURSE NOTES:

repositioned pt, oral care given. oral suctioned. call light within reach. will continue to 
monitor pt.

## 2021-01-27 NOTE — INFECTIOUS DISEASES PROG NOTE
Assessment/Plan


Assessment/Plan


antibiotics : vancomycin iv, zosyn





A


1. COVID-19 pneumonia.


on 100 % FiO2 with saturation of 98 %


s/p remdesivir


s/p ivermectin


s/p solumedrol


2. New-onset diabetes.


3. respiratory failure


4. renal failure





P


1. Continue zosyn


2. d/c iv vancomycin


3. Continue isolation.





Subjective


ROS Limited/Unobtainable:  Yes


Allergies:  


Coded Allergies:  


     No Known Allergies (Unverified , 6/11/19)





Objective





Last 24 Hour Vital Signs








  Date Time  Temp Pulse Resp B/P (MAP) Pulse Ox O2 Delivery O2 Flow Rate FiO2


 


1/27/21 11:30  90 16 107/61 (76) 98   


 


1/27/21 11:00  92 19 103/63 (76) 97   


 


1/27/21 10:00  88 22 112/66 (81) 97   


 


1/27/21 09:44  87      


 


1/27/21 09:30  88 21 115/65 (82) 96   


 


1/27/21 09:00  88 24 109/66 (80) 96   


 


1/27/21 08:00      Mechanical Ventilator  


 


1/27/21 08:00 100.8 88 25 103/67 (79) 97   


 


1/27/21 08:00        100


 


1/27/21 07:26  90 25     100


 


1/27/21 07:00    105/61    


 


1/27/21 07:00  92 26 107/60 (76) 95   


 


1/27/21 06:00    101/55    


 


1/27/21 06:00  92 21 102/59 (73) 96   


 


1/27/21 05:00  92 21 119/68 (85) 94   


 


1/27/21 05:00    111/63    


 


1/27/21 04:53 99.0       


 


1/27/21 04:00 98.7 91 25 110/60 (77) 93   


 


1/27/21 04:00      Mechanical Ventilator  


 


1/27/21 04:00        100


 


1/27/21 04:00    115/66    


 


1/27/21 03:08  93 28     100


 


1/27/21 03:07  93      


 


1/27/21 03:00    114/61    


 


1/27/21 03:00  91 23 112/59 (76) 91   


 


1/27/21 02:00    107/60    


 


1/27/21 02:00  92 23 117/68 (84) 92   


 


1/27/21 01:00  89 19 114/66 (82) 93   


 


1/27/21 01:00    112/65    


 


1/27/21 00:00 99.2 89 25 121/66 (84) 94   


 


1/27/21 00:00      Mechanical Ventilator  


 


1/27/21 00:00    90/55    


 


1/27/21 00:00        100


 


1/26/21 23:14  87 24     100


 


1/26/21 23:11  88      


 


1/26/21 23:00    124/67    


 


1/26/21 23:00  88 23 119/69 (86) 92   


 


1/26/21 22:00    122/65    


 


1/26/21 22:00  88 29 123/66 (85) 92   


 


1/26/21 21:00  88 34 121/66 (84) 90   


 


1/26/21 21:00    128/63    


 


1/26/21 20:00        100


 


1/26/21 20:00      Mechanical Ventilator  


 


1/26/21 20:00    121/63    


 


1/26/21 20:00 98.4 88 27 127/65 (85) 91   


 


1/26/21 19:23  88      


 


1/26/21 19:21  88 26     100


 


1/26/21 19:00  89 27 121/89 (100) 92   


 


1/26/21 19:00    121/89    


 


1/26/21 18:00    129/68    


 


1/26/21 18:00  88 27 129/68 (88) 92   


 


1/26/21 17:00    128/67    


 


1/26/21 17:00  87 26 126/67 (86) 93   


 


1/26/21 16:00  90      


 


1/26/21 16:00 98.4 88 28 129/63 (85) 94   


 


1/26/21 16:00        100


 


1/26/21 16:00    128/63    


 


1/26/21 16:00      Mechanical Ventilator  


 


1/26/21 15:25  83 26     100


 


1/26/21 15:00    117/62    


 


1/26/21 15:00  90 26 117/62 (80) 94   


 


1/26/21 14:00    123/63    


 


1/26/21 14:00  91 25 123/63 (83) 93   


 


1/26/21 13:00  92 25 113/60 (77) 91   


 


1/26/21 13:00    113/60    


 


1/26/21 12:00        100


 


1/26/21 12:00      Mechanical Ventilator  


 


1/26/21 12:00 99.3 92 22 127/60 (82) 92   


 


1/26/21 12:00  96      


 


1/26/21 12:00    127/60    


 


1/26/21 11:52    124/61    








Height (Feet):  5


Height (Inches):  7.00


Weight (Pounds):  198





Microbiology








 Date/Time


Source Procedure


Growth Status





 


 1/26/21 04:00


Stool Clostridium difficile Toxin Assay - Final Complete











Laboratory Tests








Test


 1/26/21


12:34 1/26/21


17:58 1/27/21


04:35 1/27/21


07:26


 


POC Whole Blood Glucose


 208 MG/DL


()  H 184 MG/DL


()  H 


 





 


White Blood Count


 


 


 7.3 K/UL


(4.8-10.8) 





 


Red Blood Count


 


 


 3.16 M/UL


(4.70-6.10)  L 





 


Hemoglobin


 


 


 9.2 G/DL


(14.2-18.0)  L 





 


Hematocrit


 


 


 29.2 %


(42.0-52.0)  L 





 


Mean Corpuscular Volume   93 FL (80-99)   


 


Mean Corpuscular Hemoglobin


 


 


 29.1 PG


(27.0-31.0) 





 


Mean Corpuscular Hemoglobin


Concent 


 


 31.4 G/DL


(32.0-36.0)  L 





 


Red Cell Distribution Width


 


 


 14.8 %


(11.6-14.8) 





 


Platelet Count


 


 


 115 K/UL


(150-450)  L 





 


Mean Platelet Volume


 


 


 10.5 FL


(6.5-10.1)  H 





 


Neutrophils (%) (Auto)


 


 


 % (45.0-75.0)


 





 


Lymphocytes (%) (Auto)


 


 


 % (20.0-45.0)


 





 


Monocytes (%) (Auto)    % (1.0-10.0)   


 


Eosinophils (%) (Auto)    % (0.0-3.0)   


 


Basophils (%) (Auto)    % (0.0-2.0)   


 


Differential Total Cells


Counted 


 


 100  


 





 


Neutrophils % (Manual)   84 % (45-75)  H 


 


Lymphocytes % (Manual)   10 % (20-45)  L 


 


Monocytes % (Manual)   4 % (1-10)   


 


Eosinophils % (Manual)   2 % (0-3)   


 


Basophils % (Manual)   0 % (0-2)   


 


Band Neutrophils   0 % (0-8)   


 


Nucleated Red Blood Cells   2 /100 WBC   


 


Platelet Estimate   Decreased  L 


 


Platelet Morphology   Normal   


 


Polychromasia   1+   


 


Hypochromasia   1+   


 


Anisocytosis   1+   


 


Prothrombin Time


 


 


 10.8 SEC


(9.30-11.50) 





 


Prothromb Time International


Ratio 


 


 1.0 (0.9-1.1)  


 





 


Activated Partial


Thromboplast Time 


 


 26 SEC (23-33)


 





 


Sodium Level


 


 


 143 MMOL/L


(136-145) 





 


Potassium Level


 


 


 4.1 MMOL/L


(3.5-5.1) 





 


Chloride Level


 


 


 109 MMOL/L


()  H 





 


Carbon Dioxide Level


 


 


 28 MMOL/L


(21-32) 





 


Blood Urea Nitrogen


 


 


 51 mg/dL


(7-18)  H 





 


Creatinine


 


 


 1.8 MG/DL


(0.55-1.30)  H 





 


Estimat Glomerular Filtration


Rate 


 


 37.9 mL/min


(>60) 





 


Glucose Level


 


 


 172 MG/DL


()  H 





 


Calcium Level


 


 


 8.3 MG/DL


(8.5-10.1)  L 





 


Total Bilirubin


 


 


 0.4 MG/DL


(0.2-1.0) 





 


Aspartate Amino Transf


(AST/SGOT) 


 


 25 U/L (15-37)


 





 


Alanine Aminotransferase


(ALT/SGPT) 


 


 29 U/L (12-78)


 





 


Alkaline Phosphatase


 


 


 309 U/L


()  H 





 


Total Protein


 


 


 5.5 G/DL


(6.4-8.2)  L 





 


Albumin


 


 


 1.0 G/DL


(3.4-5.0)  L 





 


Globulin   4.5 g/dL   


 


Albumin/Globulin Ratio


 


 


 0.2 (1.0-2.7)


L 





 


Arterial Blood pH


 


 


 


 7.254


(7.350-7.450)


 


Arterial Blood Partial


Pressure CO2 


 


 


 69.3 mmHg


(35.0-45.0)  *H


 


Arterial Blood Partial


Pressure O2 


 


 


 75.8 mmHg


(75.0-100.0)


 


Arterial Blood HCO3


 


 


 


 30.0 mmol/L


(22.0-26.0)  H


 


Arterial Blood Oxygen


Saturation 


 


 


 94.1 %


()  L


 


Arterial Blood Base Excess    1.7 (-2-2)  


 


Abelardo Test    Positive  











Current Medications








 Medications


  (Trade)  Dose


 Ordered  Sig/Julisa


 Route


 PRN Reason  Start Time


 Stop Time Status Last Admin


Dose Admin


 


 Acetaminophen


  (Tylenol)  650 mg  Q4H  PRN


 GT


 Temp >100.5  1/15/21 17:15


 2/14/21 17:14  1/27/21 04:23





 


 Amiodarone HCl


  (Cordarone)  200 mg  DAILY


 NG


   1/26/21 09:00


 4/19/21 20:59  1/27/21 09:44





 


 Chlorhexidine


 Gluconate


  (Vanessa-Hex 2%)  1 applic  DAILY@2000


 TOPIC


   1/19/21 20:00


 4/19/21 19:59  1/26/21 20:15





 


 Dextrose  1,000 ml @ 


 50 mls/hr  Q20H


 IV


   1/18/21 10:00


 2/17/21 09:59  1/26/21 17:56





 


 Dextrose


  (Dextrose 50%)  25 ml  Q30M  PRN


 IV


 Hypoglycemia  1/9/21 13:30


 4/9/21 13:29   





 


 Dextrose


  (Dextrose 50%)  50 ml  Q30M  PRN


 IV


 Hypoglycemia  1/9/21 13:30


 4/9/21 13:29   





 


 Digoxin


  (Lanoxin)  0.125 mg  DAILY


 NG


   1/21/21 09:00


 4/21/21 08:59  1/27/21 09:44





 


 Docusate Sodium


  (Colace)  100 mg  TIDPRN  PRN


 GT


 Constipation  1/12/21 01:15


 2/11/21 01:14  1/12/21 01:42





 


 Enoxaparin Sodium


  (Lovenox)  80 mg  EVERY 12  HOURS


 SUBQ


   1/2/21 21:00


 4/2/21 20:59  1/27/21 09:45





 


 Insulin Aspart


  (NovoLOG)    Q6HR


 SUBQ


   1/14/21 12:00


 4/9/21 17:59  1/27/21 06:18





 


 Insulin Aspart


  (NovoLOG)  9 units  EVERY 6  HOURS


 SUBQ


   1/21/21 12:00


 4/15/21 06:59  1/27/21 06:18





 


 Insulin Detemir


  (Levemir)  10 units  Q12HR


 SUBQ


   1/26/21 09:00


 4/12/21 08:59  1/27/21 10:05





 


 Norepinephrine


 Bitartrate 8 mg/


 Dextrose  500 ml @ 0


 mls/hr  Q24H  PRN


 IV


 For hypotension  1/25/21 00:00


 1/28/21 00:00  1/26/21 11:52





 


 Pantoprazole


  (Protonix)  40 mg  DAILY


 IVP


   1/14/21 09:00


 2/13/21 08:59  1/27/21 09:43





 


 Piperacillin Sod/


 Tazobactam Sod


 3.375 gm/Sodium


 Chloride  110 ml @ 


 27.5 mls/hr  EVERY 8  HOURS


 IVPB


   1/23/21 14:00


 1/30/21 13:59  1/27/21 06:17





 


 Quetiapine


 Fumarate


  (SEROqueL)  50 mg  Q12HR


 ORAL


   1/4/21 21:00


 2/18/21 20:59  1/27/21 09:45





 


 Sodium Chloride  500 ml @ 


 999 mls/hr  Q31M PRN


 IV


 For hypotension  1/15/21 18:30


 2/14/21 18:29   





 


 Vancomycin HCl


  ([facility] pharmacy


 to dose)  1 ea  DAILY  PRN


 MISC


 Per rx protocol  1/14/21 09:15


 2/13/21 09:14   




















Ashley Davis MD      Jan 27, 2021 11:46

## 2021-01-27 NOTE — GENERAL PROGRESS NOTE
Subjective


Allergies:  


Coded Allergies:  


     No Known Allergies (Unverified , 6/11/19)


Subjective


on ventilator 60% fio2


on sediation


unresponsive


in icu


rt chest tube s placed due to pneumothorex


afib is controlled


hypotensuion better





Objective





Last 24 Hour Vital Signs








  Date Time  Temp Pulse Resp B/P (MAP) Pulse Ox O2 Delivery O2 Flow Rate FiO2


 


1/27/21 16:00      Mechanical Ventilator  


 


1/27/21 16:00        100


 


1/27/21 16:00 100.1 93 25 103/60 (74) 90   


 


1/27/21 15:30  91 23 118/66 (83) 96   


 


1/27/21 15:22  88 24     100


 


1/27/21 15:00  91 24 117/66 (83) 95   


 


1/27/21 14:30  91 22 120/67 (84) 96   


 


1/27/21 14:00  92 22 116/62 (80) 94   


 


1/27/21 13:00    115/64    


 


1/27/21 13:00  91 21 114/62 (79) 95   


 


1/27/21 12:30  91 18 110/64 (79) 97   


 


1/27/21 12:00 99.8 91 18 111/62 (78) 97   


 


1/27/21 12:00        100


 


1/27/21 12:00    112/62    


 


1/27/21 12:00      Mechanical Ventilator  


 


1/27/21 11:30  90 16 107/61 (76) 98   


 


1/27/21 11:22        


 


1/27/21 11:20  92 26     100


 


1/27/21 11:00  92 19 103/63 (76) 97   


 


1/27/21 11:00    113/65    


 


1/27/21 10:00    112/66    


 


1/27/21 10:00  88 22 112/66 (81) 97   


 


1/27/21 09:44  87      


 


1/27/21 09:30  88 21 115/65 (82) 96   


 


1/27/21 09:00  88 24 109/66 (80) 96   


 


1/27/21 09:00    109/66    


 


1/27/21 08:00    109/66    


 


1/27/21 08:00      Mechanical Ventilator  


 


1/27/21 08:00 100.8 88 25 103/67 (79) 97   


 


1/27/21 08:00        100


 


1/27/21 07:26  90 25     100


 


1/27/21 07:00    105/61    


 


1/27/21 07:00  92 26 107/60 (76) 95   


 


1/27/21 06:00    101/55    


 


1/27/21 06:00  92 21 102/59 (73) 96   


 


1/27/21 05:00  92 21 119/68 (85) 94   


 


1/27/21 05:00    111/63    


 


1/27/21 04:53 99.0       


 


1/27/21 04:00 98.7 91 25 110/60 (77) 93   


 


1/27/21 04:00      Mechanical Ventilator  


 


1/27/21 04:00        100


 


1/27/21 04:00    115/66    


 


1/27/21 03:08  93 28     100


 


1/27/21 03:07  93      


 


1/27/21 03:00    114/61    


 


1/27/21 03:00  91 23 112/59 (76) 91   


 


1/27/21 02:00    107/60    


 


1/27/21 02:00  92 23 117/68 (84) 92   


 


1/27/21 01:00  89 19 114/66 (82) 93   


 


1/27/21 01:00    112/65    


 


1/27/21 00:00 99.2 89 25 121/66 (84) 94   


 


1/27/21 00:00      Mechanical Ventilator  


 


1/27/21 00:00    90/55    


 


1/27/21 00:00        100


 


1/26/21 23:14  87 24     100


 


1/26/21 23:11  88      


 


1/26/21 23:00    124/67    


 


1/26/21 23:00  88 23 119/69 (86) 92   


 


1/26/21 22:00    122/65    


 


1/26/21 22:00  88 29 123/66 (85) 92   


 


1/26/21 21:00  88 34 121/66 (84) 90   


 


1/26/21 21:00    128/63    


 


1/26/21 20:00        100


 


1/26/21 20:00      Mechanical Ventilator  


 


1/26/21 20:00    121/63    


 


1/26/21 20:00 98.4 88 27 127/65 (85) 91   


 


1/26/21 19:23  88      


 


1/26/21 19:21  88 26     100


 


1/26/21 19:00  89 27 121/89 (100) 92   


 


1/26/21 19:00    121/89    


 


1/26/21 18:00    129/68    


 


1/26/21 18:00  88 27 129/68 (88) 92   


 


1/26/21 17:00    128/67    


 


1/26/21 17:00  87 26 126/67 (86) 93   

















Intake and Output  


 


 1/26/21 1/27/21





 19:00 07:00


 


Intake Total 1500.0 ml 1520.0 ml


 


Output Total 1280 ml 1375 ml


 


Balance 220.0 ml 145.0 ml


 


  


 


Free Water  60 ml


 


IV Total 720.0 ml 800.0 ml


 


Tube Feeding 660 ml 660 ml


 


Other 120 ml 


 


Output Urine Total 1200 ml 1175 ml


 


Stool Total 50 ml 50 ml


 


Chest Tube Drainage Total 30 ml 150 ml








Laboratory Tests


1/26/21 17:58: POC Whole Blood Glucose 184H


1/27/21 04:35: 


White Blood Count 7.3, Red Blood Count 3.16L, Hemoglobin 9.2L, Hematocrit 29.2L,

Mean Corpuscular Volume 93, Mean Corpuscular Hemoglobin 29.1, Mean Corpuscular 

Hemoglobin Concent 31.4L, Red Cell Distribution Width 14.8, Platelet Count 115L,

Mean Platelet Volume 10.5H, Neutrophils (%) (Auto) , Lymphocytes (%) (Auto) , 

Monocytes (%) (Auto) , Eosinophils (%) (Auto) , Basophils (%) (Auto) , 

Differential Total Cells Counted 100, Neutrophils % (Manual) 84H, Lymphocytes % 

(Manual) 10L, Monocytes % (Manual) 4, Eosinophils % (Manual) 2, Basophils % 

(Manual) 0, Band Neutrophils 0, Nucleated Red Blood Cells 2, Platelet Estimate 

DecreasedL, Platelet Morphology Normal, Polychromasia 1+, Hypochromasia 1+, 

Anisocytosis 1+, Prothrombin Time 10.8, Prothromb Time International Ratio 1.0, 

Activated Partial Thromboplast Time 26, Sodium Level 143, Potassium Level 4.1, 

Chloride Level 109H, Carbon Dioxide Level 28, Blood Urea Nitrogen 51H, 

Creatinine 1.8H, Estimat Glomerular Filtration Rate 37.9, Glucose Level 172H, C

alcium Level 8.3L, Total Bilirubin 0.4, Aspartate Amino Transf (AST/SGOT) 25, 

Alanine Aminotransferase (ALT/SGPT) 29, Alkaline Phosphatase 309H, Total Protein

5.5L, Albumin 1.0L, Globulin 4.5, Albumin/Globulin Ratio 0.2L


1/27/21 07:26: 


Arterial Blood pH 7.254L, Arterial Blood Partial Pressure CO2 69.3*H, Arterial 

Blood Partial Pressure O2 75.8, Arterial Blood HCO3 30.0H, Arterial Blood Oxygen

Saturation 94.1L, Arterial Blood Base Excess 1.7, Abelardo Test Positive


1/27/21 12:10: POC Whole Blood Glucose 174H


Height (Feet):  5


Height (Inches):  7.00


Weight (Pounds):  198


General Appearance:  severe distress


Neck:  supple


Cardiovascular:  regular rhythm


Respiratory/Chest:  crackles/rales


Abdomen:  non tender, soft


Extremities:  non-tender





Assessment/Plan


Assessment/Plan:


hypotension better on tirate down levophed drip


afib with rvr on digoxine , add amiodarone , cardiology on case


covid pna


ac resp distress on ventilator


etoh abused


dehydration


agitated -halodol


severe meta acidosis


cont on ventilator at icu


cont iv abx and iv decadrone


pulmonary and gi on consult


dw charge nurse


poor prognosis











Moi Travis MD             Jan 27, 2021 16:44

## 2021-01-27 NOTE — NUR
NURSE NOTES:



Report received from SUZI Gustafson. Pt is obtunded. Orally intubated; ETT 7.5, 26cm @ the lip 
line. Vent settings; AC 16, , FiO2 100%, PEEP 12; O2sat 97%. SR on cardiac monitor. 
OGT running Vital AF @ 55mL/hr. Rectal tube and Stern catheter in place and draining. MAYO 
PICC running Levophed @ 2mcg/min and D5W @ 50mL/hr. Rt chest tube to -20 suction. Pt on a 
cooling blanket. Bed locked and in lowest position. Will resume plan of care.

## 2021-01-27 NOTE — NUR
NURSE HAND-OFF REPORT: 





Follow up: X-ray, and ABG 



Latest Vital Signs: Temperature 99.0 , Pulse 92 , B/P 102 /59 , Respiratory Rate 21 , O2 SAT 
96 , Mechanical Ventilator, O2 Flow Rate  .  

Vital Sign Comment: [stable condition]



EKG Rhythm: Sinus Rhythm

Rhythm change?: N 

MD Notified?: N 

MD Response: 



Latest Singh Fall Score: 50  

Fall Risk: High Risk 

Safety Measures: Call light Within Reach, Bed Alarm Zone 1, Side Rails Side Rails x3, Bed 
position Low and Locked.

Fall Precautions: 

Door Sign



Report given to [Ana ARCHER].

## 2021-01-27 NOTE — SURGERY PROGRESS NOTE
Surgery Progress Note


Subjective


Procedure Performed


Right tube thoracostomy chest tube insertion


Additional Comments


no acute events


labs noted


cxr noted


chest tube stable


no leak 


on vent





Objective





Last 24 Hour Vital Signs








  Date Time  Temp Pulse Resp B/P (MAP) Pulse Ox O2 Delivery O2 Flow Rate FiO2


 


1/27/21 17:00  90 21 103/61 (75) 95   


 


1/27/21 16:00      Mechanical Ventilator  


 


1/27/21 16:00        100


 


1/27/21 16:00 100.1 93 25 103/60 (74) 90   


 


1/27/21 15:30  91 23 118/66 (83) 96   


 


1/27/21 15:22  88 24     100


 


1/27/21 15:00  91 24 117/66 (83) 95   


 


1/27/21 14:30  91 22 120/67 (84) 96   


 


1/27/21 14:00  92 22 116/62 (80) 94   


 


1/27/21 13:00    115/64    


 


1/27/21 13:00  91 21 114/62 (79) 95   


 


1/27/21 12:30  91 18 110/64 (79) 97   


 


1/27/21 12:00 99.8 91 18 111/62 (78) 97   


 


1/27/21 12:00        100


 


1/27/21 12:00    112/62    


 


1/27/21 12:00      Mechanical Ventilator  


 


1/27/21 11:30  90 16 107/61 (76) 98   


 


1/27/21 11:22        


 


1/27/21 11:20  92 26     100


 


1/27/21 11:00  92 19 103/63 (76) 97   


 


1/27/21 11:00    113/65    


 


1/27/21 10:00    112/66    


 


1/27/21 10:00  88 22 112/66 (81) 97   


 


1/27/21 09:44  87      


 


1/27/21 09:30  88 21 115/65 (82) 96   


 


1/27/21 09:00  88 24 109/66 (80) 96   


 


1/27/21 09:00    109/66    


 


1/27/21 08:00    109/66    


 


1/27/21 08:00      Mechanical Ventilator  


 


1/27/21 08:00 100.8 88 25 103/67 (79) 97   


 


1/27/21 08:00        100


 


1/27/21 07:26  90 25     100


 


1/27/21 07:00    105/61    


 


1/27/21 07:00  92 26 107/60 (76) 95   


 


1/27/21 06:00    101/55    


 


1/27/21 06:00  92 21 102/59 (73) 96   


 


1/27/21 05:00  92 21 119/68 (85) 94   


 


1/27/21 05:00    111/63    


 


1/27/21 04:53 99.0       


 


1/27/21 04:00 98.7 91 25 110/60 (77) 93   


 


1/27/21 04:00      Mechanical Ventilator  


 


1/27/21 04:00        100


 


1/27/21 04:00    115/66    


 


1/27/21 03:08  93 28     100


 


1/27/21 03:07  93      


 


1/27/21 03:00    114/61    


 


1/27/21 03:00  91 23 112/59 (76) 91   


 


1/27/21 02:00    107/60    


 


1/27/21 02:00  92 23 117/68 (84) 92   


 


1/27/21 01:00  89 19 114/66 (82) 93   


 


1/27/21 01:00    112/65    


 


1/27/21 00:00 99.2 89 25 121/66 (84) 94   


 


1/27/21 00:00      Mechanical Ventilator  


 


1/27/21 00:00    90/55    


 


1/27/21 00:00        100


 


1/26/21 23:14  87 24     100


 


1/26/21 23:11  88      


 


1/26/21 23:00    124/67    


 


1/26/21 23:00  88 23 119/69 (86) 92   


 


1/26/21 22:00    122/65    


 


1/26/21 22:00  88 29 123/66 (85) 92   


 


1/26/21 21:00  88 34 121/66 (84) 90   


 


1/26/21 21:00    128/63    


 


1/26/21 20:00        100


 


1/26/21 20:00      Mechanical Ventilator  


 


1/26/21 20:00    121/63    


 


1/26/21 20:00 98.4 88 27 127/65 (85) 91   


 


1/26/21 19:23  88      


 


1/26/21 19:21  88 26     100


 


1/26/21 19:00  89 27 121/89 (100) 92   


 


1/26/21 19:00    121/89    


 


1/26/21 18:00    129/68    


 


1/26/21 18:00  88 27 129/68 (88) 92   








I&O











Intake and Output  


 


 1/26/21 1/27/21





 19:00 07:00


 


Intake Total 1500.0 ml 1520.0 ml


 


Output Total 1280 ml 1375 ml


 


Balance 220.0 ml 145.0 ml


 


  


 


Free Water  60 ml


 


IV Total 720.0 ml 800.0 ml


 


Tube Feeding 660 ml 660 ml


 


Other 120 ml 


 


Output Urine Total 1200 ml 1175 ml


 


Stool Total 50 ml 50 ml


 


Chest Tube Drainage Total 30 ml 150 ml








Dressing:  saturated


Cardiovascular:  RSR


Respiratory:  decreased breath sounds


Abdomen:  soft, non-tender, present bowel sounds


Extremities:  no tenderness, no cyanosis





Laboratory Tests








Test


 1/26/21


17:58 1/27/21


04:35 1/27/21


07:26 1/27/21


12:10


 


POC Whole Blood Glucose


 184 MG/DL


()  H 


 


 174 MG/DL


()  H


 


White Blood Count


 


 7.3 K/UL


(4.8-10.8) 


 





 


Red Blood Count


 


 3.16 M/UL


(4.70-6.10)  L 


 





 


Hemoglobin


 


 9.2 G/DL


(14.2-18.0)  L 


 





 


Hematocrit


 


 29.2 %


(42.0-52.0)  L 


 





 


Mean Corpuscular Volume  93 FL (80-99)    


 


Mean Corpuscular Hemoglobin


 


 29.1 PG


(27.0-31.0) 


 





 


Mean Corpuscular Hemoglobin


Concent 


 31.4 G/DL


(32.0-36.0)  L 


 





 


Red Cell Distribution Width


 


 14.8 %


(11.6-14.8) 


 





 


Platelet Count


 


 115 K/UL


(150-450)  L 


 





 


Mean Platelet Volume


 


 10.5 FL


(6.5-10.1)  H 


 





 


Neutrophils (%) (Auto)


 


 % (45.0-75.0)


 


 





 


Lymphocytes (%) (Auto)


 


 % (20.0-45.0)


 


 





 


Monocytes (%) (Auto)   % (1.0-10.0)    


 


Eosinophils (%) (Auto)   % (0.0-3.0)    


 


Basophils (%) (Auto)   % (0.0-2.0)    


 


Differential Total Cells


Counted 


 100  


 


 





 


Neutrophils % (Manual)  84 % (45-75)  H  


 


Lymphocytes % (Manual)  10 % (20-45)  L  


 


Monocytes % (Manual)  4 % (1-10)    


 


Eosinophils % (Manual)  2 % (0-3)    


 


Basophils % (Manual)  0 % (0-2)    


 


Band Neutrophils  0 % (0-8)    


 


Nucleated Red Blood Cells  2 /100 WBC    


 


Platelet Estimate  Decreased  L  


 


Platelet Morphology  Normal    


 


Polychromasia  1+    


 


Hypochromasia  1+    


 


Anisocytosis  1+    


 


Prothrombin Time


 


 10.8 SEC


(9.30-11.50) 


 





 


Prothromb Time International


Ratio 


 1.0 (0.9-1.1)  


 


 





 


Activated Partial


Thromboplast Time 


 26 SEC (23-33)


 


 





 


Sodium Level


 


 143 MMOL/L


(136-145) 


 





 


Potassium Level


 


 4.1 MMOL/L


(3.5-5.1) 


 





 


Chloride Level


 


 109 MMOL/L


()  H 


 





 


Carbon Dioxide Level


 


 28 MMOL/L


(21-32) 


 





 


Blood Urea Nitrogen


 


 51 mg/dL


(7-18)  H 


 





 


Creatinine


 


 1.8 MG/DL


(0.55-1.30)  H 


 





 


Estimat Glomerular Filtration


Rate 


 37.9 mL/min


(>60) 


 





 


Glucose Level


 


 172 MG/DL


()  H 


 





 


Calcium Level


 


 8.3 MG/DL


(8.5-10.1)  L 


 





 


Total Bilirubin


 


 0.4 MG/DL


(0.2-1.0) 


 





 


Aspartate Amino Transf


(AST/SGOT) 


 25 U/L (15-37)


 


 





 


Alanine Aminotransferase


(ALT/SGPT) 


 29 U/L (12-78)


 


 





 


Alkaline Phosphatase


 


 309 U/L


()  H 


 





 


Total Protein


 


 5.5 G/DL


(6.4-8.2)  L 


 





 


Albumin


 


 1.0 G/DL


(3.4-5.0)  L 


 





 


Globulin  4.5 g/dL    


 


Albumin/Globulin Ratio


 


 0.2 (1.0-2.7)


L 


 





 


Arterial Blood pH


 


 


 7.254


(7.350-7.450) 





 


Arterial Blood Partial


Pressure CO2 


 


 69.3 mmHg


(35.0-45.0)  *H 





 


Arterial Blood Partial


Pressure O2 


 


 75.8 mmHg


(75.0-100.0) 





 


Arterial Blood HCO3


 


 


 30.0 mmol/L


(22.0-26.0)  H 





 


Arterial Blood Oxygen


Saturation 


 


 94.1 %


()  L 





 


Arterial Blood Base Excess   1.7 (-2-2)   


 


Abelardo Test   Positive   











Plan


Problems:  


(1) Pneumonia due to COVID-19 virus


Assessment & Plan:  Interim endotracheal intubation, endotracheal tube tip in 

good position


approximately 4 cm above the tito. There are extensive bilateral infiltrates, 

which


are markedly increased from the prior study. Pleural spaces are probably clear, 

not


well demonstrated


Markedly increased and now extensive bilateral infiltrates 


cont as per pulm and iID





(2) Hypoxia


Assessment & Plan:  patient developing DTI. in current condition, critically ill

and declining potential inevitable decline 


all care precautions taken and will cont to monitor and assist with improvement 





(3) Abdominal pain


Assessment & Plan:  65M abd pain, septic, covid, intubated ons upport


currently abd exam benign but limited given condition


line bo mary noted


okay for meds and feeds


nutritional optimization 


DAILY ESTIMATED NEEDS:


Needs based on Critical Care/ 73kg abw 


22-28  kcals/kg 


5405-9013  total kcals


1.2-2  g protein/kg


  g total protein 


25-30  mL/kg


1367-7624  total fluid mLs





NUTRITION DIAGNOSIS:


Swallowing difficulty R/T respiratory failure as evidenced by pt now


orally intubated, OGT in place, NPO





 


CURRENT TF:NPO 





 





ENTERAL NUTRITION RECOMMENDATIONS:


Vital AF 1.2 @ 60ml/hr x 24 hrs  to provide 1440ml, 1728kcal, 116g prot, 1167ml 

free water 





* As medically appropriate, initiate critical care and carb controlled TF 

formula of Vital AF 1.2


* Initiate @ 20ml/hr x 6hrs, advance 10ml q 4-6 hrs as tolerated to goal rate


* HOB over 30 degrees/ water flush per MD


* Does not exceed est kcal needs w/ Propofol running @ 8.083ml/hr x 24 hrs 

(provides 213 lipid kcal)





 





ADDITIONAL RECOMMENDATIONS:


* Calibrated bedscale wt 


* Monitor BGs closely w/ Decadron and TF, need for long acting insulin 


* Monitor lytes, replete as needed  


* Monitor Propofol rate, need to adjust TF rate  





(4) Acute metabolic encephalopathy


(5) Pneumothorax on right


Assessment & Plan:  right ptx


chest tube placed


decreased air


stable ptx


cont tube to suction


Endotracheal tube tip projects at the level of the thoracic inlet.


Orogastric tube tip projects at the level of the gastric fundus. Again 

demonstrated


is extensive subcutaneous emphysema, which appears somewhat worse on the left. 

Right


chest tube remains in place. Small right medial and apical pneumothorax are 

present,


slightly more conspicuous than on the previous exam, still small, somewhat 

difficult


to identify due to overlying subcutaneous emphysema. There is probably a tiny 

left


apical pneumothorax as well. Previously demonstrated pneumomediastinum has 

improved


considerably although still present. Bilateral infiltrates appear worse on the 

right.


 


Impression: Equivocally slightly increased but still small right pneumothorax.


 


Suspect tiny left apical pneumothorax


 


Improving pneumomediastinum


 


Worsening subcutaneous emphysema


 


Worsening right and stable left lung infiltrates 





 Right chest tube remains. There is still a small apical pneumothorax. Tiny


left apical pneumothorax persists, unchanged. There is decreased 

pneumomediastinum.


Subcutaneous gas has decreased as well. Bilateral infiltrates are unchanged.


Endotracheal tube remains at the level of the thoracic inlet. Orogastric tube is


again demonstrated, tip not well-visualized but probably stable in position


 


Impression: Stable tiny bilateral apical pneumothoraces


 


Unchanged bilateral infiltrates


 


Decreased pneumomediastinum and subcutaneous emphysema














Norberto Vazquez                Jan 27, 2021 17:08

## 2021-01-27 NOTE — NUR
NURSE NOTES:

repositioned pt, oral suctioned. pt VSS. rectal tube in place.chest tube no leakage noted. 
call light within reach. will continue to monitor pt.

## 2021-01-27 NOTE — NUR
NURSE NOTES:

received pt from SUZI Perez., pt is resting on the bed and pt is awake, AOx0 at this time. 
pt orally intubated ETT 7.5n lip line 26cm AC 16  Fio2 100% Peep 12 and O2sat ranges 
from 30-40%. pt has vital AF running at 55ml/hr OGT, no residual noted at this time, intact, 
clean, and patent. pt has rectal tube draining well with gravity. pt Stern cath is in, no 
hematuria noted, and draining well with gravity. Left upper arm PICC line dressing site 
intact, clean, and patent. D5W is running at 50ml/hr and Levophed is running 2 mcg/hr. 
Fentanyl is going at 20mcg/hr. ABD large and soft, hyperactive. right side chest tube noted 
with continuos suction, no leakage noted. bilateral soft wrist restrain noted, pulse noted, 
and skin intact. call light within reach. will continue to monitor pt with plan of care. bed 
at the lowest position, side rails x3 up, alarmed, and locked.

## 2021-01-27 NOTE — NUR
NURSE NOTES:



Pt remains on Levophed @ 2mcg/min. SBP sustaining above 90. No distress noted. Orally 
suctioned.

## 2021-01-28 VITALS — SYSTOLIC BLOOD PRESSURE: 93 MMHG | DIASTOLIC BLOOD PRESSURE: 59 MMHG

## 2021-01-28 VITALS — DIASTOLIC BLOOD PRESSURE: 58 MMHG | SYSTOLIC BLOOD PRESSURE: 97 MMHG

## 2021-01-28 VITALS — SYSTOLIC BLOOD PRESSURE: 101 MMHG | DIASTOLIC BLOOD PRESSURE: 66 MMHG

## 2021-01-28 VITALS — DIASTOLIC BLOOD PRESSURE: 67 MMHG | SYSTOLIC BLOOD PRESSURE: 105 MMHG

## 2021-01-28 VITALS — DIASTOLIC BLOOD PRESSURE: 71 MMHG | SYSTOLIC BLOOD PRESSURE: 116 MMHG

## 2021-01-28 VITALS — SYSTOLIC BLOOD PRESSURE: 89 MMHG | DIASTOLIC BLOOD PRESSURE: 58 MMHG

## 2021-01-28 VITALS — SYSTOLIC BLOOD PRESSURE: 130 MMHG | DIASTOLIC BLOOD PRESSURE: 86 MMHG

## 2021-01-28 VITALS — SYSTOLIC BLOOD PRESSURE: 104 MMHG | DIASTOLIC BLOOD PRESSURE: 61 MMHG

## 2021-01-28 VITALS — DIASTOLIC BLOOD PRESSURE: 62 MMHG | SYSTOLIC BLOOD PRESSURE: 102 MMHG

## 2021-01-28 VITALS — DIASTOLIC BLOOD PRESSURE: 70 MMHG | SYSTOLIC BLOOD PRESSURE: 106 MMHG

## 2021-01-28 VITALS — SYSTOLIC BLOOD PRESSURE: 103 MMHG | DIASTOLIC BLOOD PRESSURE: 62 MMHG

## 2021-01-28 VITALS — SYSTOLIC BLOOD PRESSURE: 91 MMHG | DIASTOLIC BLOOD PRESSURE: 52 MMHG

## 2021-01-28 VITALS — DIASTOLIC BLOOD PRESSURE: 61 MMHG | SYSTOLIC BLOOD PRESSURE: 97 MMHG

## 2021-01-28 VITALS — SYSTOLIC BLOOD PRESSURE: 102 MMHG | DIASTOLIC BLOOD PRESSURE: 65 MMHG

## 2021-01-28 VITALS — DIASTOLIC BLOOD PRESSURE: 62 MMHG | SYSTOLIC BLOOD PRESSURE: 98 MMHG

## 2021-01-28 VITALS — DIASTOLIC BLOOD PRESSURE: 54 MMHG | SYSTOLIC BLOOD PRESSURE: 92 MMHG

## 2021-01-28 VITALS — SYSTOLIC BLOOD PRESSURE: 99 MMHG | DIASTOLIC BLOOD PRESSURE: 62 MMHG

## 2021-01-28 VITALS — SYSTOLIC BLOOD PRESSURE: 111 MMHG | DIASTOLIC BLOOD PRESSURE: 67 MMHG

## 2021-01-28 VITALS — SYSTOLIC BLOOD PRESSURE: 99 MMHG | DIASTOLIC BLOOD PRESSURE: 61 MMHG

## 2021-01-28 VITALS — DIASTOLIC BLOOD PRESSURE: 67 MMHG | SYSTOLIC BLOOD PRESSURE: 102 MMHG

## 2021-01-28 VITALS — DIASTOLIC BLOOD PRESSURE: 58 MMHG | SYSTOLIC BLOOD PRESSURE: 91 MMHG

## 2021-01-28 VITALS — DIASTOLIC BLOOD PRESSURE: 56 MMHG | SYSTOLIC BLOOD PRESSURE: 84 MMHG

## 2021-01-28 VITALS — DIASTOLIC BLOOD PRESSURE: 58 MMHG | SYSTOLIC BLOOD PRESSURE: 92 MMHG

## 2021-01-28 VITALS — DIASTOLIC BLOOD PRESSURE: 59 MMHG | SYSTOLIC BLOOD PRESSURE: 91 MMHG

## 2021-01-28 VITALS — SYSTOLIC BLOOD PRESSURE: 92 MMHG | DIASTOLIC BLOOD PRESSURE: 59 MMHG

## 2021-01-28 VITALS — DIASTOLIC BLOOD PRESSURE: 65 MMHG | SYSTOLIC BLOOD PRESSURE: 102 MMHG

## 2021-01-28 VITALS — SYSTOLIC BLOOD PRESSURE: 112 MMHG | DIASTOLIC BLOOD PRESSURE: 71 MMHG

## 2021-01-28 VITALS — SYSTOLIC BLOOD PRESSURE: 132 MMHG | DIASTOLIC BLOOD PRESSURE: 80 MMHG

## 2021-01-28 VITALS — SYSTOLIC BLOOD PRESSURE: 116 MMHG | DIASTOLIC BLOOD PRESSURE: 66 MMHG

## 2021-01-28 VITALS — DIASTOLIC BLOOD PRESSURE: 67 MMHG | SYSTOLIC BLOOD PRESSURE: 98 MMHG

## 2021-01-28 VITALS — DIASTOLIC BLOOD PRESSURE: 63 MMHG | SYSTOLIC BLOOD PRESSURE: 96 MMHG

## 2021-01-28 VITALS — DIASTOLIC BLOOD PRESSURE: 60 MMHG | SYSTOLIC BLOOD PRESSURE: 90 MMHG

## 2021-01-28 VITALS — DIASTOLIC BLOOD PRESSURE: 63 MMHG | SYSTOLIC BLOOD PRESSURE: 104 MMHG

## 2021-01-28 VITALS — DIASTOLIC BLOOD PRESSURE: 63 MMHG | SYSTOLIC BLOOD PRESSURE: 94 MMHG

## 2021-01-28 VITALS — DIASTOLIC BLOOD PRESSURE: 68 MMHG | SYSTOLIC BLOOD PRESSURE: 121 MMHG

## 2021-01-28 VITALS — DIASTOLIC BLOOD PRESSURE: 59 MMHG | SYSTOLIC BLOOD PRESSURE: 95 MMHG

## 2021-01-28 VITALS — SYSTOLIC BLOOD PRESSURE: 91 MMHG | DIASTOLIC BLOOD PRESSURE: 60 MMHG

## 2021-01-28 VITALS — SYSTOLIC BLOOD PRESSURE: 104 MMHG | DIASTOLIC BLOOD PRESSURE: 65 MMHG

## 2021-01-28 VITALS — DIASTOLIC BLOOD PRESSURE: 59 MMHG | SYSTOLIC BLOOD PRESSURE: 92 MMHG

## 2021-01-28 VITALS — SYSTOLIC BLOOD PRESSURE: 96 MMHG | DIASTOLIC BLOOD PRESSURE: 55 MMHG

## 2021-01-28 VITALS — DIASTOLIC BLOOD PRESSURE: 52 MMHG | SYSTOLIC BLOOD PRESSURE: 86 MMHG

## 2021-01-28 VITALS — SYSTOLIC BLOOD PRESSURE: 94 MMHG | DIASTOLIC BLOOD PRESSURE: 60 MMHG

## 2021-01-28 VITALS — DIASTOLIC BLOOD PRESSURE: 59 MMHG | SYSTOLIC BLOOD PRESSURE: 93 MMHG

## 2021-01-28 VITALS — SYSTOLIC BLOOD PRESSURE: 105 MMHG | DIASTOLIC BLOOD PRESSURE: 65 MMHG

## 2021-01-28 VITALS — DIASTOLIC BLOOD PRESSURE: 64 MMHG | SYSTOLIC BLOOD PRESSURE: 100 MMHG

## 2021-01-28 VITALS — DIASTOLIC BLOOD PRESSURE: 70 MMHG | SYSTOLIC BLOOD PRESSURE: 109 MMHG

## 2021-01-28 VITALS — SYSTOLIC BLOOD PRESSURE: 105 MMHG | DIASTOLIC BLOOD PRESSURE: 67 MMHG

## 2021-01-28 VITALS — SYSTOLIC BLOOD PRESSURE: 113 MMHG | DIASTOLIC BLOOD PRESSURE: 87 MMHG

## 2021-01-28 VITALS — SYSTOLIC BLOOD PRESSURE: 110 MMHG | DIASTOLIC BLOOD PRESSURE: 68 MMHG

## 2021-01-28 VITALS — DIASTOLIC BLOOD PRESSURE: 55 MMHG | SYSTOLIC BLOOD PRESSURE: 95 MMHG

## 2021-01-28 VITALS — SYSTOLIC BLOOD PRESSURE: 92 MMHG | DIASTOLIC BLOOD PRESSURE: 58 MMHG

## 2021-01-28 VITALS — DIASTOLIC BLOOD PRESSURE: 63 MMHG | SYSTOLIC BLOOD PRESSURE: 97 MMHG

## 2021-01-28 VITALS — SYSTOLIC BLOOD PRESSURE: 99 MMHG | DIASTOLIC BLOOD PRESSURE: 70 MMHG

## 2021-01-28 VITALS — SYSTOLIC BLOOD PRESSURE: 134 MMHG | DIASTOLIC BLOOD PRESSURE: 83 MMHG

## 2021-01-28 VITALS — DIASTOLIC BLOOD PRESSURE: 63 MMHG | SYSTOLIC BLOOD PRESSURE: 98 MMHG

## 2021-01-28 VITALS — SYSTOLIC BLOOD PRESSURE: 108 MMHG | DIASTOLIC BLOOD PRESSURE: 69 MMHG

## 2021-01-28 VITALS — SYSTOLIC BLOOD PRESSURE: 103 MMHG | DIASTOLIC BLOOD PRESSURE: 65 MMHG

## 2021-01-28 VITALS — DIASTOLIC BLOOD PRESSURE: 70 MMHG | SYSTOLIC BLOOD PRESSURE: 117 MMHG

## 2021-01-28 VITALS — SYSTOLIC BLOOD PRESSURE: 86 MMHG | DIASTOLIC BLOOD PRESSURE: 55 MMHG

## 2021-01-28 VITALS — SYSTOLIC BLOOD PRESSURE: 102 MMHG | DIASTOLIC BLOOD PRESSURE: 63 MMHG

## 2021-01-28 VITALS — SYSTOLIC BLOOD PRESSURE: 95 MMHG | DIASTOLIC BLOOD PRESSURE: 61 MMHG

## 2021-01-28 VITALS — DIASTOLIC BLOOD PRESSURE: 75 MMHG | SYSTOLIC BLOOD PRESSURE: 132 MMHG

## 2021-01-28 VITALS — SYSTOLIC BLOOD PRESSURE: 103 MMHG | DIASTOLIC BLOOD PRESSURE: 63 MMHG

## 2021-01-28 VITALS — SYSTOLIC BLOOD PRESSURE: 91 MMHG | DIASTOLIC BLOOD PRESSURE: 57 MMHG

## 2021-01-28 VITALS — DIASTOLIC BLOOD PRESSURE: 66 MMHG | SYSTOLIC BLOOD PRESSURE: 104 MMHG

## 2021-01-28 VITALS — DIASTOLIC BLOOD PRESSURE: 53 MMHG | SYSTOLIC BLOOD PRESSURE: 93 MMHG

## 2021-01-28 VITALS — DIASTOLIC BLOOD PRESSURE: 71 MMHG | SYSTOLIC BLOOD PRESSURE: 117 MMHG

## 2021-01-28 VITALS — SYSTOLIC BLOOD PRESSURE: 108 MMHG | DIASTOLIC BLOOD PRESSURE: 62 MMHG

## 2021-01-28 VITALS — SYSTOLIC BLOOD PRESSURE: 100 MMHG | DIASTOLIC BLOOD PRESSURE: 60 MMHG

## 2021-01-28 VITALS — DIASTOLIC BLOOD PRESSURE: 54 MMHG | SYSTOLIC BLOOD PRESSURE: 83 MMHG

## 2021-01-28 VITALS — DIASTOLIC BLOOD PRESSURE: 77 MMHG | SYSTOLIC BLOOD PRESSURE: 123 MMHG

## 2021-01-28 VITALS — DIASTOLIC BLOOD PRESSURE: 54 MMHG | SYSTOLIC BLOOD PRESSURE: 88 MMHG

## 2021-01-28 VITALS — DIASTOLIC BLOOD PRESSURE: 65 MMHG | SYSTOLIC BLOOD PRESSURE: 104 MMHG

## 2021-01-28 VITALS — SYSTOLIC BLOOD PRESSURE: 98 MMHG | DIASTOLIC BLOOD PRESSURE: 63 MMHG

## 2021-01-28 VITALS — SYSTOLIC BLOOD PRESSURE: 119 MMHG | DIASTOLIC BLOOD PRESSURE: 64 MMHG

## 2021-01-28 VITALS — SYSTOLIC BLOOD PRESSURE: 101 MMHG | DIASTOLIC BLOOD PRESSURE: 64 MMHG

## 2021-01-28 VITALS — DIASTOLIC BLOOD PRESSURE: 58 MMHG | SYSTOLIC BLOOD PRESSURE: 94 MMHG

## 2021-01-28 LAB
ADD MANUAL DIFF: YES
ALBUMIN SERPL-MCNC: 1 G/DL (ref 3.4–5)
ALBUMIN/GLOB SERPL: 0.2 {RATIO} (ref 1–2.7)
ALP SERPL-CCNC: 250 U/L (ref 46–116)
ALT SERPL-CCNC: 26 U/L (ref 12–78)
ANION GAP SERPL CALC-SCNC: 5 MMOL/L (ref 5–15)
AST SERPL-CCNC: 20 U/L (ref 15–37)
BILIRUB SERPL-MCNC: 0.4 MG/DL (ref 0.2–1)
BUN SERPL-MCNC: 50 MG/DL (ref 7–18)
CALCIUM SERPL-MCNC: 8.1 MG/DL (ref 8.5–10.1)
CHLORIDE SERPL-SCNC: 109 MMOL/L (ref 98–107)
CO2 SERPL-SCNC: 30 MMOL/L (ref 21–32)
CREAT SERPL-MCNC: 1.7 MG/DL (ref 0.55–1.3)
ERYTHROCYTE [DISTWIDTH] IN BLOOD BY AUTOMATED COUNT: 14.8 % (ref 11.6–14.8)
GLOBULIN SER-MCNC: 4.6 G/DL
HCT VFR BLD CALC: 30.9 % (ref 42–52)
HGB BLD-MCNC: 9.6 G/DL (ref 14.2–18)
MCV RBC AUTO: 93 FL (ref 80–99)
PLATELET # BLD: 122 K/UL (ref 150–450)
POTASSIUM SERPL-SCNC: 4.2 MMOL/L (ref 3.5–5.1)
RBC # BLD AUTO: 3.3 M/UL (ref 4.7–6.1)
SODIUM SERPL-SCNC: 144 MMOL/L (ref 136–145)
WBC # BLD AUTO: 8.4 K/UL (ref 4.8–10.8)

## 2021-01-28 RX ADMIN — INSULIN ASPART SCH UNITS: 100 INJECTION, SOLUTION INTRAVENOUS; SUBCUTANEOUS at 06:00

## 2021-01-28 RX ADMIN — INSULIN ASPART SCH UNITS: 100 INJECTION, SOLUTION INTRAVENOUS; SUBCUTANEOUS at 00:46

## 2021-01-28 RX ADMIN — INSULIN ASPART SCH UNITS: 100 INJECTION, SOLUTION INTRAVENOUS; SUBCUTANEOUS at 00:00

## 2021-01-28 RX ADMIN — DIGOXIN SCH MG: 0.12 TABLET ORAL at 08:48

## 2021-01-28 RX ADMIN — DEXTROSE MONOHYDRATE SCH MLS/HR: 50 INJECTION, SOLUTION INTRAVENOUS at 05:32

## 2021-01-28 RX ADMIN — INSULIN ASPART SCH UNITS: 100 INJECTION, SOLUTION INTRAVENOUS; SUBCUTANEOUS at 12:00

## 2021-01-28 RX ADMIN — Medication SCH MLS/HR: at 16:29

## 2021-01-28 RX ADMIN — INSULIN DETEMIR SCH UNITS: 100 INJECTION, SOLUTION SUBCUTANEOUS at 09:17

## 2021-01-28 RX ADMIN — ENOXAPARIN SODIUM SCH MG: 80 INJECTION SUBCUTANEOUS at 09:18

## 2021-01-28 RX ADMIN — CHLORHEXIDINE GLUCONATE SCH APPLIC: 213 SOLUTION TOPICAL at 21:23

## 2021-01-28 RX ADMIN — INSULIN DETEMIR SCH UNITS: 100 INJECTION, SOLUTION SUBCUTANEOUS at 21:24

## 2021-01-28 RX ADMIN — Medication SCH MLS/HR: at 00:45

## 2021-01-28 RX ADMIN — INSULIN ASPART SCH UNITS: 100 INJECTION, SOLUTION INTRAVENOUS; SUBCUTANEOUS at 18:00

## 2021-01-28 RX ADMIN — INSULIN ASPART SCH UNITS: 100 INJECTION, SOLUTION INTRAVENOUS; SUBCUTANEOUS at 06:36

## 2021-01-28 RX ADMIN — DEXTROSE MONOHYDRATE SCH MLS/HR: 50 INJECTION, SOLUTION INTRAVENOUS at 21:25

## 2021-01-28 RX ADMIN — AMIODARONE HYDROCHLORIDE SCH MG: 200 TABLET ORAL at 08:48

## 2021-01-28 RX ADMIN — ENOXAPARIN SODIUM SCH MG: 80 INJECTION SUBCUTANEOUS at 21:25

## 2021-01-28 RX ADMIN — DEXTROSE MONOHYDRATE SCH MLS/HR: 50 INJECTION, SOLUTION INTRAVENOUS at 14:15

## 2021-01-28 RX ADMIN — PANTOPRAZOLE SODIUM SCH MG: 40 INJECTION, POWDER, FOR SOLUTION INTRAVENOUS at 08:48

## 2021-01-28 RX ADMIN — Medication SCH MLS/HR: at 17:00

## 2021-01-28 NOTE — INFECTIOUS DISEASES PROG NOTE
Assessment/Plan


Assessment/Plan


A


1. COVID-19 pneumonia.


2. New-onset diabetes.


3. Hypoxic respiratory failure


4. Sepsis with fever, tachycardia & leukocytosis


5. Bacteremia with COANS, ? line infection


6. Atrial fibrillatio7. Diarrhea, C. difficile negative





P


1. Finished remdesivir course


2. Continue Zosyn





Subjective


ROS Limited/Unobtainable:  Yes


Constitutional:  Denies: fever


Gastrointestinal/Abdominal:  Reports: diarrhea


Allergies:  


Coded Allergies:  


     No Known Allergies (Unverified , 6/11/19)





Objective





Last 24 Hour Vital Signs








  Date Time  Temp Pulse Resp B/P (MAP) Pulse Ox O2 Delivery O2 Flow Rate FiO2


 


1/28/21 07:05  88 23     100


 


1/28/21 07:00  88 16 93/59 (70) 92   


 


1/28/21 06:45   16 93/59  Mechanical Ventilator  100


 


1/28/21 06:30  88 16 92/59 (70) 92   


 


1/28/21 06:00  88 16 92/58 (69) 90   


 


1/28/21 05:45   16 92/58  Mechanical Ventilator  100


 


1/28/21 05:30  87 16 91/60 (70) 89   


 


1/28/21 05:00  87 16 97/63 (74) 91   


 


1/28/21 04:45  87 14 97/61 (73) 91   


 


1/28/21 04:45   16 94/60  Mechanical Ventilator  100


 


1/28/21 04:30  87 16 100/60 (73) 80   


 


1/28/21 04:30   17 97/61  Mechanical Ventilator  100


 


1/28/21 04:15   16 100/60  Mechanical Ventilator  100


 


1/28/21 04:15  87 16 105/67 (80) 93   


 


1/28/21 04:00      Mechanical Ventilator  


 


1/28/21 04:00 97.2 88 16 105/67 (80) 91   


 


1/28/21 04:00        100


 


1/28/21 04:00   17 105/67  Mechanical Ventilator  100


 


1/28/21 03:48  90 22     100


 


1/28/21 03:32  88      


 


1/28/21 03:15  91 18 106/70 (82) 89   


 


1/28/21 03:15   19 105/68  Mechanical Ventilator  100


 


1/28/21 03:00  92 18 102/67 (79) 89   


 


1/28/21 02:15   14 103/67  Mechanical Ventilator  100


 


1/28/21 02:00  98 17 101/66 (78) 84   





  96      


 


1/28/21 01:15   16 100/62  Mechanical Ventilator  100


 


1/28/21 01:00  101 18 100/64 (76) 78   





  78      


 


1/28/21 00:45    101/64    


 


1/28/21 00:30  101 25 101/64 (76) 66   


 


1/28/21 00:15   23 101/64  Mechanical Ventilator  100


 


1/28/21 00:00      Mechanical Ventilator  


 


1/28/21 00:00    104/67    


 


1/28/21 00:00 98.7 102 22 103/63 (76) 72   


 


1/28/21 00:00        100


 


1/27/21 23:45  103 21 109/65 (80) 74   


 


1/27/21 23:40  103 22     100


 


1/27/21 23:39  103      


 


1/27/21 23:30  103 19 105/61 (76) 73   


 


1/27/21 23:15   20 105/61  Mechanical Ventilator  100


 


1/27/21 23:15  104 19 101/64 (76) 72   


 


1/27/21 23:00    102/64    


 


1/27/21 23:00  103 19 106/65 (79) 72   


 


1/27/21 22:45  104 19 113/66 (82) 74   


 


1/27/21 22:30  103 19 100/63 (75) 72   


 


1/27/21 22:15  103 20 102/62 (75) 72   


 


1/27/21 22:15   19 100/63  Mechanical Ventilator  100


 


1/27/21 22:00    102/62    


 


1/27/21 22:00  103 18 100/67 (78) 72   


 


1/27/21 21:45  102 19 103/62 (76) 72   


 


1/27/21 21:30  101 19 109/64 (79) 71   


 


1/27/21 21:15  99 17 108/65 (79) 71   


 


1/27/21 21:15   19 109/64  Mechanical Ventilator  100


 


1/27/21 21:00  98 17 109/68 (82) 71   


 


1/27/21 21:00   18 108/65  Mechanical Ventilator  100


 


1/27/21 21:00    108/65    


 


1/27/21 20:45   18 121/68  Mechanical Ventilator  100


 


1/27/21 20:45  97 19 121/68 (85) 64   


 


1/27/21 20:30  93 17 100/46 (64) 62   


 


1/27/21 20:30   19 112/60  Mechanical Ventilator  100


 


1/27/21 20:15   18 100/46  Mechanical Ventilator  100


 


1/27/21 20:15  92 17 101/56 (71) 55   


 


1/27/21 20:13  92 19 103/64 (77) 60   


 


1/27/21 20:00      Mechanical Ventilator  


 


1/27/21 20:00   17 103/64  Mechanical Ventilator  100


 


1/27/21 20:00    103/64    


 


1/27/21 20:00 97.4 91 16 105/57 (73) 69   


 


1/27/21 20:00        100


 


1/27/21 19:45      Mechanical Ventilator  100


 


1/27/21 19:38  85 24     100


 


1/27/21 19:30   16 107/60  Mechanical Ventilator  100


 


1/27/21 19:22  91      


 


1/27/21 19:15   25 105/59  Mechanical Ventilator  100


 


1/27/21 19:00   23 106/59  Mechanical Ventilator  100


 


1/27/21 19:00    107/61    


 


1/27/21 19:00  91 23 107/61 (76) 36   


 


1/27/21 18:45  87 23 106/59 (75) 47   


 


1/27/21 18:30  86 21 113/65 (81) 49   


 


1/27/21 18:15  93 21 126/76 (93) 70   


 


1/27/21 18:00  95 20 138/85 (102) 77   


 


1/27/21 18:00    138/85    


 


1/27/21 17:00  90 21 103/61 (75) 95   


 


1/27/21 17:00    110/66    


 


1/27/21 16:00      Mechanical Ventilator  


 


1/27/21 16:00        100


 


1/27/21 16:00    103/60    


 


1/27/21 16:00  91      


 


1/27/21 16:00 100.1 93 25 103/60 (74) 90   


 


1/27/21 15:30  91 23 118/66 (83) 96   


 


1/27/21 15:22  88 24     100


 


1/27/21 15:00  91 24 117/66 (83) 95   


 


1/27/21 15:00    122/67    


 


1/27/21 14:30  91 22 120/67 (84) 96   


 


1/27/21 14:00  92 22 116/62 (80) 94   


 


1/27/21 14:00    120/65    


 


1/27/21 13:00    115/64    


 


1/27/21 13:00  91 21 114/62 (79) 95   


 


1/27/21 12:30  91 18 110/64 (79) 97   


 


1/27/21 12:00 99.8 91 18 111/62 (78) 97   


 


1/27/21 12:00        100


 


1/27/21 12:00    112/62    


 


1/27/21 12:00      Mechanical Ventilator  


 


1/27/21 11:30  90 16 107/61 (76) 98   


 


1/27/21 11:22        


 


1/27/21 11:20  92 26     100


 


1/27/21 11:00  92 19 103/63 (76) 97   


 


1/27/21 11:00    113/65    


 


1/27/21 10:00    112/66    


 


1/27/21 10:00  88 22 112/66 (81) 97   


 


1/27/21 09:44  87      


 


1/27/21 09:30  88 21 115/65 (82) 96   


 


1/27/21 09:00  88 24 109/66 (80) 96   


 


1/27/21 09:00    109/66    








Height (Feet):  5


Height (Inches):  7.00


Weight (Pounds):  198


HEENT:  other - orally intubated


Respiratory/Chest:  other - on ventilator, KDL1=475%, R chest tube


Cardiovascular:  normal rate, other - left arm PICC line


Abdomen:  soft, non tender, other - OG & rectal tubes


Extremities:  other - edema


Neurologic/Psychiatric:  other - sedated





Microbiology








 Date/Time


Source Procedure


Growth Status





 


 1/26/21 04:00


Stool Clostridium difficile Toxin Assay - Final Complete











Laboratory Tests








Test


 1/27/21


12:10 1/27/21


20:35 1/28/21


03:50 1/28/21


07:35


 


POC Whole Blood Glucose


 174 MG/DL


()  H 


 


 





 


Arterial Blood pH


 


 7.185


(7.350-7.450) 


 7.207


(7.350-7.450)


 


Arterial Blood Partial


Pressure CO2 


 79.9 mmHg


(35.0-45.0)  *H 


 70.4 mmHg


(35.0-45.0)  *H


 


Arterial Blood Partial


Pressure O2 


 29.9 mmHg


(75.0-100.0) 


 49.2 mmHg


(75.0-100.0)


 


Arterial Blood HCO3


 


 29.5 mmol/L


(22.0-26.0)  H 


 27.3 mmol/L


(22.0-26.0)  H


 


Arterial Blood Oxygen


Saturation 


 55.8 %


()  *L 


 84.6 %


()  *L


 


Arterial Blood Base Excess  -0.2 (-2-2)    -1.6 (-2-2)  


 


Abelardo Test  Positive    Positive  


 


White Blood Count


 


 


 8.4 K/UL


(4.8-10.8) 





 


Red Blood Count


 


 


 3.30 M/UL


(4.70-6.10)  L 





 


Hemoglobin


 


 


 9.6 G/DL


(14.2-18.0)  L 





 


Hematocrit


 


 


 30.9 %


(42.0-52.0)  L 





 


Mean Corpuscular Volume   93 FL (80-99)   


 


Mean Corpuscular Hemoglobin


 


 


 28.9 PG


(27.0-31.0) 





 


Mean Corpuscular Hemoglobin


Concent 


 


 31.0 G/DL


(32.0-36.0)  L 





 


Red Cell Distribution Width


 


 


 14.8 %


(11.6-14.8) 





 


Platelet Count


 


 


 122 K/UL


(150-450)  L 





 


Mean Platelet Volume


 


 


 11.1 FL


(6.5-10.1)  H 





 


Neutrophils (%) (Auto)


 


 


 % (45.0-75.0)


 





 


Lymphocytes (%) (Auto)


 


 


 % (20.0-45.0)


 





 


Monocytes (%) (Auto)    % (1.0-10.0)   


 


Eosinophils (%) (Auto)    % (0.0-3.0)   


 


Basophils (%) (Auto)    % (0.0-2.0)   


 


Neutrophils % (Manual)   Pending   


 


Lymphocytes % (Manual)   Pending   


 


Platelet Estimate   Pending   


 


Platelet Morphology   Pending   


 


Sodium Level


 


 


 144 MMOL/L


(136-145) 





 


Potassium Level


 


 


 4.2 MMOL/L


(3.5-5.1) 





 


Chloride Level


 


 


 109 MMOL/L


()  H 





 


Carbon Dioxide Level


 


 


 30 MMOL/L


(21-32) 





 


Anion Gap


 


 


 5 mmol/L


(5-15) 





 


Blood Urea Nitrogen


 


 


 50 mg/dL


(7-18)  H 





 


Creatinine


 


 


 1.7 MG/DL


(0.55-1.30)  H 





 


Estimat Glomerular Filtration


Rate 


 


 40.5 mL/min


(>60) 





 


Glucose Level


 


 


 136 MG/DL


()  H 





 


Calcium Level


 


 


 8.1 MG/DL


(8.5-10.1)  L 





 


Total Bilirubin


 


 


 0.4 MG/DL


(0.2-1.0) 





 


Aspartate Amino Transf


(AST/SGOT) 


 


 20 U/L (15-37)


 





 


Alanine Aminotransferase


(ALT/SGPT) 


 


 26 U/L (12-78)


 





 


Alkaline Phosphatase


 


 


 250 U/L


()  H 





 


Total Protein


 


 


 5.6 G/DL


(6.4-8.2)  L 





 


Albumin


 


 


 1.0 G/DL


(3.4-5.0)  L 





 


Globulin   4.6 g/dL   


 


Albumin/Globulin Ratio


 


 


 0.2 (1.0-2.7)


L 





 


Test


 1/28/21


08:18 


 


 





 


POC Whole Blood Glucose


 122 MG/DL


()  H 


 


 














Current Medications








 Medications


  (Trade)  Dose


 Ordered  Sig/Julisa


 Route


 PRN Reason  Start Time


 Stop Time Status Last Admin


Dose Admin


 


 Acetaminophen


  (Tylenol)  650 mg  Q4H  PRN


 GT


 Temp >100.5  1/15/21 17:15


 2/14/21 17:14  1/27/21 04:23





 


 Amiodarone HCl


  (Cordarone)  200 mg  DAILY


 NG


   1/26/21 09:00


 4/19/21 20:59  1/27/21 09:44





 


 Chlorhexidine


 Gluconate


  (Vanessa-Hex 2%)  1 applic  DAILY@2000


 TOPIC


   1/19/21 20:00


 4/19/21 19:59  1/27/21 20:27





 


 Dextrose  1,000 ml @ 


 50 mls/hr  Q20H


 IV


   1/18/21 10:00


 2/17/21 09:59  1/27/21 12:21





 


 Dextrose


  (Dextrose 50%)  25 ml  Q30M  PRN


 IV


 Hypoglycemia  1/9/21 13:30


 4/9/21 13:29   





 


 Dextrose


  (Dextrose 50%)  50 ml  Q30M  PRN


 IV


 Hypoglycemia  1/9/21 13:30


 4/9/21 13:29   





 


 Digoxin


  (Lanoxin)  0.125 mg  DAILY


 NG


   1/21/21 09:00


 4/21/21 08:59  1/27/21 09:44





 


 Docusate Sodium


  (Colace)  100 mg  TIDPRN  PRN


 GT


 Constipation  1/12/21 01:15


 2/11/21 01:14  1/12/21 01:42





 


 Enoxaparin Sodium


  (Lovenox)  80 mg  EVERY 12  HOURS


 SUBQ


   1/2/21 21:00


 4/2/21 20:59  1/27/21 09:45





 


 Fentanyl Citrate  250 ml @ 1


 mls/hr  Q24H


 IV


   1/27/21 18:45


 1/29/21 18:44  1/27/21 19:00





 


 Insulin Aspart


  (NovoLOG)    Q6HR


 SUBQ


   1/14/21 12:00


 4/9/21 17:59  1/28/21 06:36





 


 Insulin Aspart


  (NovoLOG)  9 units  EVERY 6  HOURS


 SUBQ


   1/21/21 12:00


 4/15/21 06:59  1/28/21 06:00





 


 Insulin Detemir


  (Levemir)  10 units  Q12HR


 SUBQ


   1/26/21 09:00


 4/12/21 08:59  1/27/21 21:18





 


 Norepinephrine


 Bitartrate  250 ml @ 0


 mls/hr  Q24H


 IV


   1/28/21 00:30


 1/31/21 00:20  1/28/21 00:45





 


 Pantoprazole


  (Protonix)  40 mg  DAILY


 IVP


   1/14/21 09:00


 2/13/21 08:59  1/27/21 09:43





 


 Piperacillin Sod/


 Tazobactam Sod


 3.375 gm/Sodium


 Chloride  110 ml @ 


 27.5 mls/hr  EVERY 8  HOURS


 IVPB


   1/23/21 14:00


 1/30/21 13:59  1/28/21 05:32





 


 Quetiapine


 Fumarate


  (SEROqueL)  50 mg  Q12HR


 ORAL


   1/4/21 21:00


 2/18/21 20:59  1/27/21 20:27





 


 Sodium Chloride  500 ml @ 


 999 mls/hr  Q31M PRN


 IV


 For hypotension  1/15/21 18:30


 2/14/21 18:29   




















Lamin Felix MD               Jan 28, 2021 08:49

## 2021-01-28 NOTE — NUR
NURSE NOTES:

PT oral suctioned. pt repositioned pt. chest tube secured at this time. no leakage BM and no 
leakage in cash cath. will continue to monitor pt.

## 2021-01-28 NOTE — NUR
Pt's . 0800 . Levemir 10units given this AM. Previous Blood sugars 170-180's. 
1200 9Units of Novolog held. Dr. Mcmahon with Endocrine paged. Awaiting MD call.

## 2021-01-28 NOTE — SURGERY PROGRESS NOTE
Surgery Progress Note


Subjective


Procedure Performed


Right tube thoracostomy chest tube insertion


Additional Comments


febrile


tachy


ill appearing


no n/v


labs noted


ct stable





Objective





Last 24 Hour Vital Signs








  Date Time  Temp Pulse Resp B/P (MAP) Pulse Ox O2 Delivery O2 Flow Rate FiO2


 


1/28/21 14:00   20 113/87  Mechanical Ventilator  100


 


1/28/21 14:00  86 20 109/70 (83) 95   


 


1/28/21 13:45  86 13 112/71 (85) 95   


 


1/28/21 13:30  87 0 102/65 (77) 95   


 


1/28/21 13:15  87 0 99/70 (80) 96   


 


1/28/21 13:00   20 99/70  Mechanical Ventilator  100


 


1/28/21 13:00  87 0 105/65 (78) 96   


 


1/28/21 12:45  86 20 104/65 (78) 96   


 


1/28/21 12:30        100


 


1/28/21 12:30  89 20 91/52 (65) 81   


 


1/28/21 12:15  86 16 97/58 (71) 94   


 


1/28/21 12:00      Mechanical Ventilator  


 


1/28/21 12:00   18 97/58  Mechanical Ventilator  100


 


1/28/21 12:00  90      


 


1/28/21 12:00        100


 


1/28/21 12:00 98.4 87 18 92/59 (70) 95   


 


1/28/21 11:45  88 16 95/55 (68) 95   


 


1/28/21 11:30  88 16 94/58 (70) 95   


 


1/28/21 11:25  87 20     100


 


1/28/21 11:15  89 16 96/55 (69) 96   


 


1/28/21 11:00  88 16 92/54 (67) 96   


 


1/28/21 11:00   16 96/55  Mechanical Ventilator  100


 


1/28/21 10:45  89 16 91/59 (70) 95   


 


1/28/21 10:30  88 7 93/59 (70) 94   


 


1/28/21 10:15  87 16 83/54 (64) 94   


 


1/28/21 10:15      Mechanical Ventilator  


 


1/28/21 10:00   16 83/54  Mechanical Ventilator  100


 


1/28/21 10:00  87 15 86/55 (65) 95   


 


1/28/21 09:45  89 17 89/58 (68) 95   


 


1/28/21 09:30  90 16 91/58 (69) 95   


 


1/28/21 09:15  90 16 95/61 (72) 94   


 


1/28/21 09:15  90 16  94   


 


1/28/21 09:00   16 95/61  Mechanical Ventilator  100


 


1/28/21 09:00  90 16 84/56 (65) 93   


 


1/28/21 08:48  90      


 


1/28/21 08:45  90 17 88/54 (65) 92   


 


1/28/21 08:30  89 14 86/52 (63) 76   


 


1/28/21 08:15  88 1 92/58 (69) 92   


 


1/28/21 08:00  88      


 


1/28/21 08:00        100


 


1/28/21 08:00   16 92/58  Mechanical Ventilator  100


 


1/28/21 08:00      Mechanical Ventilator  


 


1/28/21 08:00 97.2 88 16 90/60 (70) 94   


 


1/28/21 07:45  88 16 92/58 (69) 93   


 


1/28/21 07:30  89 16 91/57 (68) 93   


 


1/28/21 07:15  89 16 95/59 (71) 93   


 


1/28/21 07:05  88 23     100


 


1/28/21 07:00  88 16 93/59 (70) 92   


 


1/28/21 07:00  88 16 93/59 (70) 92   


 


1/28/21 06:45   16 93/59  Mechanical Ventilator  100


 


1/28/21 06:30  88 16 92/59 (70) 92   


 


1/28/21 06:00  88 16 92/58 (69) 90   


 


1/28/21 05:45   16 92/58  Mechanical Ventilator  100


 


1/28/21 05:30  87 16 91/60 (70) 89   


 


1/28/21 05:00  87 16 97/63 (74) 91   


 


1/28/21 04:45  87 14 97/61 (73) 91   


 


1/28/21 04:45   16 94/60  Mechanical Ventilator  100


 


1/28/21 04:30  87 16 100/60 (73) 80   


 


1/28/21 04:30   17 97/61  Mechanical Ventilator  100


 


1/28/21 04:15   16 100/60  Mechanical Ventilator  100


 


1/28/21 04:15  87 16 105/67 (80) 93   


 


1/28/21 04:00      Mechanical Ventilator  


 


1/28/21 04:00 97.2 88 16 105/67 (80) 91   


 


1/28/21 04:00        100


 


1/28/21 04:00   17 105/67  Mechanical Ventilator  100


 


1/28/21 03:48  90 22     100


 


1/28/21 03:32  88      


 


1/28/21 03:15  91 18 106/70 (82) 89   


 


1/28/21 03:15   19 105/68  Mechanical Ventilator  100


 


1/28/21 03:00  92 18 102/67 (79) 89   


 


1/28/21 02:15   14 103/67  Mechanical Ventilator  100


 


1/28/21 02:00  98 17 101/66 (78) 84   





  96      


 


1/28/21 01:15   16 100/62  Mechanical Ventilator  100


 


1/28/21 01:00  101 18 100/64 (76) 78   





  78      


 


1/28/21 00:45    101/64    


 


1/28/21 00:30  101 25 101/64 (76) 66   


 


1/28/21 00:15   23 101/64  Mechanical Ventilator  100


 


1/28/21 00:00      Mechanical Ventilator  


 


1/28/21 00:00    104/67    


 


1/28/21 00:00 98.7 102 22 103/63 (76) 72   


 


1/28/21 00:00        100


 


1/27/21 23:45  103 21 109/65 (80) 74   


 


1/27/21 23:40  103 22     100


 


1/27/21 23:39  103      


 


1/27/21 23:30  103 19 105/61 (76) 73   


 


1/27/21 23:15   20 105/61  Mechanical Ventilator  100


 


1/27/21 23:15  104 19 101/64 (76) 72   


 


1/27/21 23:00    102/64    


 


1/27/21 23:00  103 19 106/65 (79) 72   


 


1/27/21 22:45  104 19 113/66 (82) 74   


 


1/27/21 22:30  103 19 100/63 (75) 72   


 


1/27/21 22:15  103 20 102/62 (75) 72   


 


1/27/21 22:15   19 100/63  Mechanical Ventilator  100


 


1/27/21 22:00    102/62    


 


1/27/21 22:00  103 18 100/67 (78) 72   


 


1/27/21 21:45  102 19 103/62 (76) 72   


 


1/27/21 21:30  101 19 109/64 (79) 71   


 


1/27/21 21:15  99 17 108/65 (79) 71   


 


1/27/21 21:15   19 109/64  Mechanical Ventilator  100


 


1/27/21 21:00  98 17 109/68 (82) 71   


 


1/27/21 21:00   18 108/65  Mechanical Ventilator  100


 


1/27/21 21:00    108/65    


 


1/27/21 20:45   18 121/68  Mechanical Ventilator  100


 


1/27/21 20:45  97 19 121/68 (85) 64   


 


1/27/21 20:30  93 17 100/46 (64) 62   


 


1/27/21 20:30   19 112/60  Mechanical Ventilator  100


 


1/27/21 20:15   18 100/46  Mechanical Ventilator  100


 


1/27/21 20:15  92 17 101/56 (71) 55   


 


1/27/21 20:13  92 19 103/64 (77) 60   


 


1/27/21 20:00      Mechanical Ventilator  


 


1/27/21 20:00   17 103/64  Mechanical Ventilator  100


 


1/27/21 20:00    103/64    


 


1/27/21 20:00 97.4 91 16 105/57 (73) 69   


 


1/27/21 20:00        100


 


1/27/21 19:45      Mechanical Ventilator  100


 


1/27/21 19:38  85 24     100


 


1/27/21 19:30   16 107/60  Mechanical Ventilator  100


 


1/27/21 19:22  91      


 


1/27/21 19:15   25 105/59  Mechanical Ventilator  100


 


1/27/21 19:00   23 106/59  Mechanical Ventilator  100


 


1/27/21 19:00    107/61    


 


1/27/21 19:00  91 23 107/61 (76) 36   


 


1/27/21 18:45  87 23 106/59 (75) 47   


 


1/27/21 18:30  86 21 113/65 (81) 49   


 


1/27/21 18:15  93 21 126/76 (93) 70   


 


1/27/21 18:00  95 20 138/85 (102) 77   


 


1/27/21 18:00    138/85    


 


1/27/21 17:00  90 21 103/61 (75) 95   


 


1/27/21 17:00    110/66    


 


1/27/21 16:00      Mechanical Ventilator  


 


1/27/21 16:00        100


 


1/27/21 16:00    103/60    


 


1/27/21 16:00  91      


 


1/27/21 16:00 100.1 93 25 103/60 (74) 90   


 


1/27/21 15:30  91 23 118/66 (83) 96   


 


1/27/21 15:22  88 24     100


 


1/27/21 15:00  91 24 117/66 (83) 95   


 


1/27/21 15:00    122/67    








I&O











Intake and Output  


 


 1/27/21 1/28/21





 19:00 07:00


 


Intake Total 1652.5 ml 1568.71581 ml


 


Output Total 1525 ml 905 ml


 


Balance 127.5 ml 663.17726 ml


 


  


 


IV Total 892.5 ml 908.92552 ml


 


Tube Feeding 660 ml 660 ml


 


Other 100 ml 


 


Output Urine Total 1175 ml 765 ml


 


Stool Total 50 ml 30 ml


 


Chest Tube Drainage Total 300 ml 110 ml








Dressing:  saturated


Drains:  other


Cardiovascular:  RSR


Respiratory:  decreased breath sounds


Abdomen:  soft, non-tender, present bowel sounds


Extremities:  no tenderness, no cyanosis





Laboratory Tests








Test


 1/27/21


20:35 1/28/21


03:50 1/28/21


07:35 1/28/21


08:18


 


Arterial Blood pH


 7.185


(7.350-7.450) 


 7.207


(7.350-7.450) 





 


Arterial Blood Partial


Pressure CO2 79.9 mmHg


(35.0-45.0)  *H 


 70.4 mmHg


(35.0-45.0)  *H 





 


Arterial Blood Partial


Pressure O2 29.9 mmHg


(75.0-100.0) 


 49.2 mmHg


(75.0-100.0) 





 


Arterial Blood HCO3


 29.5 mmol/L


(22.0-26.0)  H 


 27.3 mmol/L


(22.0-26.0)  H 





 


Arterial Blood Oxygen


Saturation 55.8 %


()  *L 


 84.6 %


()  *L 





 


Arterial Blood Base Excess -0.2 (-2-2)    -1.6 (-2-2)   


 


Abelardo Test Positive    Positive   


 


White Blood Count


 


 8.4 K/UL


(4.8-10.8) 


 





 


Red Blood Count


 


 3.30 M/UL


(4.70-6.10)  L 


 





 


Hemoglobin


 


 9.6 G/DL


(14.2-18.0)  L 


 





 


Hematocrit


 


 30.9 %


(42.0-52.0)  L 


 





 


Mean Corpuscular Volume  93 FL (80-99)    


 


Mean Corpuscular Hemoglobin


 


 28.9 PG


(27.0-31.0) 


 





 


Mean Corpuscular Hemoglobin


Concent 


 31.0 G/DL


(32.0-36.0)  L 


 





 


Red Cell Distribution Width


 


 14.8 %


(11.6-14.8) 


 





 


Platelet Count


 


 122 K/UL


(150-450)  L 


 





 


Mean Platelet Volume


 


 11.1 FL


(6.5-10.1)  H 


 





 


Neutrophils (%) (Auto)


 


 % (45.0-75.0)


 


 





 


Lymphocytes (%) (Auto)


 


 % (20.0-45.0)


 


 





 


Monocytes (%) (Auto)   % (1.0-10.0)    


 


Eosinophils (%) (Auto)   % (0.0-3.0)    


 


Basophils (%) (Auto)   % (0.0-2.0)    


 


Differential Total Cells


Counted 


 100  


 


 





 


Neutrophils % (Manual)  71 % (45-75)    


 


Lymphocytes % (Manual)  19 % (20-45)  L  


 


Monocytes % (Manual)  3 % (1-10)    


 


Eosinophils % (Manual)  2 % (0-3)    


 


Basophils % (Manual)  0 % (0-2)    


 


Band Neutrophils  5 % (0-8)    


 


Platelet Estimate  Decreased  L  


 


Platelet Morphology  Normal    


 


Hypochromasia  1+    


 


Sodium Level


 


 144 MMOL/L


(136-145) 


 





 


Potassium Level


 


 4.2 MMOL/L


(3.5-5.1) 


 





 


Chloride Level


 


 109 MMOL/L


()  H 


 





 


Carbon Dioxide Level


 


 30 MMOL/L


(21-32) 


 





 


Anion Gap


 


 5 mmol/L


(5-15) 


 





 


Blood Urea Nitrogen


 


 50 mg/dL


(7-18)  H 


 





 


Creatinine


 


 1.7 MG/DL


(0.55-1.30)  H 


 





 


Estimat Glomerular Filtration


Rate 


 40.5 mL/min


(>60) 


 





 


Glucose Level


 


 136 MG/DL


()  H 


 





 


Calcium Level


 


 8.1 MG/DL


(8.5-10.1)  L 


 





 


Total Bilirubin


 


 0.4 MG/DL


(0.2-1.0) 


 





 


Aspartate Amino Transf


(AST/SGOT) 


 20 U/L (15-37)


 


 





 


Alanine Aminotransferase


(ALT/SGPT) 


 26 U/L (12-78)


 


 





 


Alkaline Phosphatase


 


 250 U/L


()  H 


 





 


Total Protein


 


 5.6 G/DL


(6.4-8.2)  L 


 





 


Albumin


 


 1.0 G/DL


(3.4-5.0)  L 


 





 


Globulin  4.6 g/dL    


 


Albumin/Globulin Ratio


 


 0.2 (1.0-2.7)


L 


 





 


POC Whole Blood Glucose


 


 


 


 122 MG/DL


()  H


 


Test


 1/28/21


10:59 1/28/21


11:56 


 





 


Arterial Blood pH


 7.183


(7.350-7.450) 


 


 





 


Arterial Blood Partial


Pressure CO2 76.7 mmHg


(35.0-45.0)  *H 


 


 





 


Arterial Blood Partial


Pressure O2 71.3 mmHg


(75.0-100.0)  L 


 


 





 


Arterial Blood HCO3


 28.2 mmol/L


(22.0-26.0)  H 


 


 





 


Arterial Blood Oxygen


Saturation 93.2 %


()  L 


 


 





 


Arterial Blood Base Excess -1.2 (-2-2)     


 


Abelardo Test Positive     


 


POC Whole Blood Glucose


 


 103 MG/DL


() 


 














Plan


Problems:  


(1) Pneumonia due to COVID-19 virus


Assessment & Plan:  Interim endotracheal intubation, endotracheal tube tip in 

good position


approximately 4 cm above the tito. There are extensive bilateral infiltrates, 

which


are markedly increased from the prior study. Pleural spaces are probably clear, 

not


well demonstrated


Markedly increased and now extensive bilateral infiltrates 


cont as per pulm and iID





(2) Hypoxia


Assessment & Plan:  patient developing DTI. in current condition, critically ill

and declining potential inevitable decline 


all care precautions taken and will cont to monitor and assist with improvement 





(3) Abdominal pain


Assessment & Plan:  65M abd pain, septic, covid, intubated ons upport


currently abd exam benign but limited given condition


line bo mary noted


okay for meds and feeds


nutritional optimization 


DAILY ESTIMATED NEEDS:


Needs based on Critical Care/ 73kg abw 


22-28  kcals/kg 


7037-7116  total kcals


1.2-2  g protein/kg


  g total protein 


25-30  mL/kg


1798-3848  total fluid mLs





NUTRITION DIAGNOSIS:


Swallowing difficulty R/T respiratory failure as evidenced by pt now


orally intubated, OGT in place, NPO





 


CURRENT TF:NPO 





 





ENTERAL NUTRITION RECOMMENDATIONS:


Vital AF 1.2 @ 60ml/hr x 24 hrs  to provide 1440ml, 1728kcal, 116g prot, 1167ml 

free water 





* As medically appropriate, initiate critical care and carb controlled TF 

formula of Vital AF 1.2


* Initiate @ 20ml/hr x 6hrs, advance 10ml q 4-6 hrs as tolerated to goal rate


* HOB over 30 degrees/ water flush per MD


* Does not exceed est kcal needs w/ Propofol running @ 8.083ml/hr x 24 hrs 

(provides 213 lipid kcal)





 





ADDITIONAL RECOMMENDATIONS:


* Calibrated bedscale wt 


* Monitor BGs closely w/ Decadron and TF, need for long acting insulin 


* Monitor lytes, replete as needed  


* Monitor Propofol rate, need to adjust TF rate  





(4) Acute metabolic encephalopathy


(5) Pneumothorax on right


Assessment & Plan:  right ptx


chest tube placed


decreased air


stable ptx


cont tube to suction


Endotracheal tube tip projects at the level of the thoracic inlet.


Orogastric tube tip projects at the level of the gastric fundus. Again 

demonstrated


is extensive subcutaneous emphysema, which appears somewhat worse on the left. 

Right


chest tube remains in place. Small right medial and apical pneumothorax are 

present,


slightly more conspicuous than on the previous exam, still small, somewhat 

difficult


to identify due to overlying subcutaneous emphysema. There is probably a tiny 

left


apical pneumothorax as well. Previously demonstrated pneumomediastinum has 

improved


considerably although still present. Bilateral infiltrates appear worse on the 

right.


 


Impression: Equivocally slightly increased but still small right pneumothorax.


 


Suspect tiny left apical pneumothorax


 


Improving pneumomediastinum


 


Worsening subcutaneous emphysema


 


Worsening right and stable left lung infiltrates 





 Right chest tube remains. There is still a small apical pneumothorax. Tiny


left apical pneumothorax persists, unchanged. There is decreased 

pneumomediastinum.


Subcutaneous gas has decreased as well. Bilateral infiltrates are unchanged.


Endotracheal tube remains at the level of the thoracic inlet. Orogastric tube is


again demonstrated, tip not well-visualized but probably stable in position


 


Impression: Stable tiny bilateral apical pneumothoraces


 


Unchanged bilateral infiltrates


 


Decreased pneumomediastinum and subcutaneous emphysema














Norberto Vazquez                Jan 28, 2021 14:59

## 2021-01-28 NOTE — NUR
RESPIRATORY NOTE:



Rate changed from 16 to 20 per order and is tolerating well with no signs of respiratory 
distress or sob noted at this time. Airway is secure and patent. Will continue to monitor.

## 2021-01-28 NOTE — NUR
RD ASSESSMENT & RECOMMENDATIONS

SEE CARE ACTIVITY FOR COMPLETE ASSESSMENT



DAILY ESTIMATED NEEDS:

Needs based on Critical Care/ 72kg abw 

22-28  kcals/kg 

3483-6886  total kcals

1.25-2  g protein/kg

  g total protein 

20-25  mL/kg

6580-1018  total fluid mLs



NUTRITION DIAGNOSIS:

Swallowing difficulty R/T respiratory failure as evidenced by pt now

orally intubated, on OGT feeds.



CURRENT TF: Vital 1.2 goal of 55ml/hr x24 hrs  





ENTERAL NUTRITION RECOMMENDATIONS:

Vital AF 1.2 for CARB CONTROL and critical care @ goal 55ml/hr x 24 hrs  to provide 1320ml, 
1584kcal, 99g prot, 1070ml free water 



**** FEED WITH HEMODYANMIC STABILITY

* Continue Vital AF 1.2 for carb control and critical care

* Maintain lowered goal rate of 55ml/hr for improved BG control

  (will meet 99% est kcal and 100% est prot needs)

* HOB over 30 degrees/ water flush per MD

---------

*** WITHOUT HEMODYNAMIC STABILITY, rec trophic feeding of Vital AF 1.2 @

10ml/hr x 24 hrs to maintain gut integrity if able to keep HOB >30 degrees

***





ADDITIONAL RECOMMENDATIONS:

* Calibrated bedscale wt 

* Tf at goal w/ D5, monitor BG  

* When tolerating Tf at goal add JESÚS BID for skin integrity  

* Maintain D5 IVF to prevent hypoglycemia when NPO  

* Monitor HD stability: NE @ 30mcg-> now @6mcg  

. 

.

## 2021-01-28 NOTE — DIAGNOSTIC IMAGING REPORT
Procedure: XRAY Chest 1v

Reason for study: Reason For Exam: [id number]

 

Comparison films:  1/27/2021.

 

FINDINGS:

 

Radiograph is underpenetrated.

 

Endotracheal tube, NG tube, right chest tube as well as left PICC line remain in

place. Extensive diffuse bilateral alveolar densities unchanged compared to previous

exam. Cardiac and mediastinal silhouette are within normal limits. There may be

underlying effusions. The bony thorax appear unremarkable.

 

IMPRESSION:  

 

NO SIGNIFICANT CHANGE COMPARED TO PREVIOUS EXAM.

## 2021-01-28 NOTE — CARDIAC ELECTROPHYSIOLOGY PN
Assessment/Plan


Assessment/Plan


1. Atrial fibrillation with rapid ventricular response.      


    On digoxin 0.125 mg p.o. daily and Amiodarone 200 po qd  


      In SR. Digoxin level 0.6.    





2. Hypernatremia with sodium 151 with azotemia, BUN of 30, creatinine 0.8.   On 

iv fluids





3. Right pneumothorax, status post chest tube with subcutaneous emphysema.


CXR and ABG pending





4. COVID-19 pneumonia, 100% FiO2 and PEEP of 12, on remdesivir and Solu-Medrol 

per ID.


5. Diabetes.


6. Hypotension due to dehydration and sepsis. On iv fluid and Levophed 2 Mcg 

that will increase to 4


7. Hypernatremia and azotemia.   





DW RN





Subjective


Subjective


In ICU on 100% Fio2 and PEEP 12 and Levo   2 Mcg but BP in 80s


 Right chest tube had 130 cc drainage overnight. In SR on Dig and Amiodarone





Objective





Last 24 Hour Vital Signs








  Date Time  Temp Pulse Resp B/P (MAP) Pulse Ox O2 Delivery O2 Flow Rate FiO2


 


1/28/21 10:15  87 16 83/54 (64) 94   


 


1/28/21 10:15      Mechanical Ventilator  


 


1/28/21 10:00  87 15 86/55 (65) 95   


 


1/28/21 09:45  89 17 89/58 (68) 95   


 


1/28/21 09:30  90 16 91/58 (69) 95   


 


1/28/21 09:15  90 16 95/61 (72) 94   


 


1/28/21 09:15  90 16  94   


 


1/28/21 09:00   16 95/61  Mechanical Ventilator  100


 


1/28/21 09:00  90 16 84/56 (65) 93   


 


1/28/21 08:48  90      


 


1/28/21 08:45  90 17 88/54 (65) 92   


 


1/28/21 08:30  89 14 86/52 (63) 76   


 


1/28/21 08:15  88 1 92/58 (69) 92   


 


1/28/21 08:00  88      


 


1/28/21 08:00        100


 


1/28/21 08:00   16 92/58  Mechanical Ventilator  100


 


1/28/21 08:00      Mechanical Ventilator  


 


1/28/21 08:00 97.2 88 16 90/60 (70) 94   


 


1/28/21 07:45  88 16 92/58 (69) 93   


 


1/28/21 07:30  89 16 91/57 (68) 93   


 


1/28/21 07:15  89 16 95/59 (71) 93   


 


1/28/21 07:05  88 23     100


 


1/28/21 07:00  88 16 93/59 (70) 92   


 


1/28/21 07:00  88 16 93/59 (70) 92   


 


1/28/21 06:45   16 93/59  Mechanical Ventilator  100


 


1/28/21 06:30  88 16 92/59 (70) 92   


 


1/28/21 06:00  88 16 92/58 (69) 90   


 


1/28/21 05:45   16 92/58  Mechanical Ventilator  100


 


1/28/21 05:30  87 16 91/60 (70) 89   


 


1/28/21 05:00  87 16 97/63 (74) 91   


 


1/28/21 04:45  87 14 97/61 (73) 91   


 


1/28/21 04:45   16 94/60  Mechanical Ventilator  100


 


1/28/21 04:30  87 16 100/60 (73) 80   


 


1/28/21 04:30   17 97/61  Mechanical Ventilator  100


 


1/28/21 04:15   16 100/60  Mechanical Ventilator  100


 


1/28/21 04:15  87 16 105/67 (80) 93   


 


1/28/21 04:00      Mechanical Ventilator  


 


1/28/21 04:00 97.2 88 16 105/67 (80) 91   


 


1/28/21 04:00        100


 


1/28/21 04:00   17 105/67  Mechanical Ventilator  100


 


1/28/21 03:48  90 22     100


 


1/28/21 03:32  88      


 


1/28/21 03:15  91 18 106/70 (82) 89   


 


1/28/21 03:15   19 105/68  Mechanical Ventilator  100


 


1/28/21 03:00  92 18 102/67 (79) 89   


 


1/28/21 02:15   14 103/67  Mechanical Ventilator  100


 


1/28/21 02:00  98 17 101/66 (78) 84   





  96      


 


1/28/21 01:15   16 100/62  Mechanical Ventilator  100


 


1/28/21 01:00  101 18 100/64 (76) 78   





  78      


 


1/28/21 00:45    101/64    


 


1/28/21 00:30  101 25 101/64 (76) 66   


 


1/28/21 00:15   23 101/64  Mechanical Ventilator  100


 


1/28/21 00:00      Mechanical Ventilator  


 


1/28/21 00:00    104/67    


 


1/28/21 00:00 98.7 102 22 103/63 (76) 72   


 


1/28/21 00:00        100


 


1/27/21 23:45  103 21 109/65 (80) 74   


 


1/27/21 23:40  103 22     100


 


1/27/21 23:39  103      


 


1/27/21 23:30  103 19 105/61 (76) 73   


 


1/27/21 23:15   20 105/61  Mechanical Ventilator  100


 


1/27/21 23:15  104 19 101/64 (76) 72   


 


1/27/21 23:00    102/64    


 


1/27/21 23:00  103 19 106/65 (79) 72   


 


1/27/21 22:45  104 19 113/66 (82) 74   


 


1/27/21 22:30  103 19 100/63 (75) 72   


 


1/27/21 22:15  103 20 102/62 (75) 72   


 


1/27/21 22:15   19 100/63  Mechanical Ventilator  100


 


1/27/21 22:00    102/62    


 


1/27/21 22:00  103 18 100/67 (78) 72   


 


1/27/21 21:45  102 19 103/62 (76) 72   


 


1/27/21 21:30  101 19 109/64 (79) 71   


 


1/27/21 21:15  99 17 108/65 (79) 71   


 


1/27/21 21:15   19 109/64  Mechanical Ventilator  100


 


1/27/21 21:00  98 17 109/68 (82) 71   


 


1/27/21 21:00   18 108/65  Mechanical Ventilator  100


 


1/27/21 21:00    108/65    


 


1/27/21 20:45   18 121/68  Mechanical Ventilator  100


 


1/27/21 20:45  97 19 121/68 (85) 64   


 


1/27/21 20:30  93 17 100/46 (64) 62   


 


1/27/21 20:30   19 112/60  Mechanical Ventilator  100


 


1/27/21 20:15   18 100/46  Mechanical Ventilator  100


 


1/27/21 20:15  92 17 101/56 (71) 55   


 


1/27/21 20:13  92 19 103/64 (77) 60   


 


1/27/21 20:00      Mechanical Ventilator  


 


1/27/21 20:00   17 103/64  Mechanical Ventilator  100


 


1/27/21 20:00    103/64    


 


1/27/21 20:00 97.4 91 16 105/57 (73) 69   


 


1/27/21 20:00        100


 


1/27/21 19:45      Mechanical Ventilator  100


 


1/27/21 19:38  85 24     100


 


1/27/21 19:30   16 107/60  Mechanical Ventilator  100


 


1/27/21 19:22  91      


 


1/27/21 19:15   25 105/59  Mechanical Ventilator  100


 


1/27/21 19:00   23 106/59  Mechanical Ventilator  100


 


1/27/21 19:00    107/61    


 


1/27/21 19:00  91 23 107/61 (76) 36   


 


1/27/21 18:45  87 23 106/59 (75) 47   


 


1/27/21 18:30  86 21 113/65 (81) 49   


 


1/27/21 18:15  93 21 126/76 (93) 70   


 


1/27/21 18:00  95 20 138/85 (102) 77   


 


1/27/21 18:00    138/85    


 


1/27/21 17:00  90 21 103/61 (75) 95   


 


1/27/21 17:00    110/66    


 


1/27/21 16:00      Mechanical Ventilator  


 


1/27/21 16:00        100


 


1/27/21 16:00    103/60    


 


1/27/21 16:00  91      


 


1/27/21 16:00 100.1 93 25 103/60 (74) 90   


 


1/27/21 15:30  91 23 118/66 (83) 96   


 


1/27/21 15:22  88 24     100


 


1/27/21 15:00  91 24 117/66 (83) 95   


 


1/27/21 15:00    122/67    


 


1/27/21 14:30  91 22 120/67 (84) 96   


 


1/27/21 14:00  92 22 116/62 (80) 94   


 


1/27/21 14:00    120/65    


 


1/27/21 13:00    115/64    


 


1/27/21 13:00  91 21 114/62 (79) 95   


 


1/27/21 12:30  91 18 110/64 (79) 97   


 


1/27/21 12:00 99.8 91 18 111/62 (78) 97   


 


1/27/21 12:00        100


 


1/27/21 12:00    112/62    


 


1/27/21 12:00      Mechanical Ventilator  


 


1/27/21 11:30  90 16 107/61 (76) 98   


 


1/27/21 11:22        


 


1/27/21 11:20  92 26     100


 


1/27/21 11:00  92 19 103/63 (76) 97   


 


1/27/21 11:00    113/65    

















Intake and Output  


 


 1/27/21 1/28/21





 19:00 07:00


 


Intake Total 1652.5 ml 1568.81734 ml


 


Output Total 1525 ml 905 ml


 


Balance 127.5 ml 663.68782 ml


 


  


 


IV Total 892.5 ml 908.49480 ml


 


Tube Feeding 660 ml 660 ml


 


Other 100 ml 


 


Output Urine Total 1175 ml 765 ml


 


Stool Total 50 ml 30 ml


 


Chest Tube Drainage Total 300 ml 110 ml











Laboratory Tests








Test


 1/27/21


12:10 1/27/21


20:35 1/28/21


03:50 1/28/21


07:35


 


POC Whole Blood Glucose


 174 MG/DL


()  H 


 


 





 


Arterial Blood pH


 


 7.185


(7.350-7.450) 


 7.207


(7.350-7.450)


 


Arterial Blood Partial


Pressure CO2 


 79.9 mmHg


(35.0-45.0)  *H 


 70.4 mmHg


(35.0-45.0)  *H


 


Arterial Blood Partial


Pressure O2 


 29.9 mmHg


(75.0-100.0) 


 49.2 mmHg


(75.0-100.0)


 


Arterial Blood HCO3


 


 29.5 mmol/L


(22.0-26.0)  H 


 27.3 mmol/L


(22.0-26.0)  H


 


Arterial Blood Oxygen


Saturation 


 55.8 %


()  *L 


 84.6 %


()  *L


 


Arterial Blood Base Excess  -0.2 (-2-2)    -1.6 (-2-2)  


 


Abelardo Test  Positive    Positive  


 


White Blood Count


 


 


 8.4 K/UL


(4.8-10.8) 





 


Red Blood Count


 


 


 3.30 M/UL


(4.70-6.10)  L 





 


Hemoglobin


 


 


 9.6 G/DL


(14.2-18.0)  L 





 


Hematocrit


 


 


 30.9 %


(42.0-52.0)  L 





 


Mean Corpuscular Volume   93 FL (80-99)   


 


Mean Corpuscular Hemoglobin


 


 


 28.9 PG


(27.0-31.0) 





 


Mean Corpuscular Hemoglobin


Concent 


 


 31.0 G/DL


(32.0-36.0)  L 





 


Red Cell Distribution Width


 


 


 14.8 %


(11.6-14.8) 





 


Platelet Count


 


 


 122 K/UL


(150-450)  L 





 


Mean Platelet Volume


 


 


 11.1 FL


(6.5-10.1)  H 





 


Neutrophils (%) (Auto)


 


 


 % (45.0-75.0)


 





 


Lymphocytes (%) (Auto)


 


 


 % (20.0-45.0)


 





 


Monocytes (%) (Auto)    % (1.0-10.0)   


 


Eosinophils (%) (Auto)    % (0.0-3.0)   


 


Basophils (%) (Auto)    % (0.0-2.0)   


 


Differential Total Cells


Counted 


 


 100  


 





 


Neutrophils % (Manual)   71 % (45-75)   


 


Lymphocytes % (Manual)   19 % (20-45)  L 


 


Monocytes % (Manual)   3 % (1-10)   


 


Eosinophils % (Manual)   2 % (0-3)   


 


Basophils % (Manual)   0 % (0-2)   


 


Band Neutrophils   5 % (0-8)   


 


Platelet Estimate   Decreased  L 


 


Platelet Morphology   Normal   


 


Hypochromasia   1+   


 


Sodium Level


 


 


 144 MMOL/L


(136-145) 





 


Potassium Level


 


 


 4.2 MMOL/L


(3.5-5.1) 





 


Chloride Level


 


 


 109 MMOL/L


()  H 





 


Carbon Dioxide Level


 


 


 30 MMOL/L


(21-32) 





 


Anion Gap


 


 


 5 mmol/L


(5-15) 





 


Blood Urea Nitrogen


 


 


 50 mg/dL


(7-18)  H 





 


Creatinine


 


 


 1.7 MG/DL


(0.55-1.30)  H 





 


Estimat Glomerular Filtration


Rate 


 


 40.5 mL/min


(>60) 





 


Glucose Level


 


 


 136 MG/DL


()  H 





 


Calcium Level


 


 


 8.1 MG/DL


(8.5-10.1)  L 





 


Total Bilirubin


 


 


 0.4 MG/DL


(0.2-1.0) 





 


Aspartate Amino Transf


(AST/SGOT) 


 


 20 U/L (15-37)


 





 


Alanine Aminotransferase


(ALT/SGPT) 


 


 26 U/L (12-78)


 





 


Alkaline Phosphatase


 


 


 250 U/L


()  H 





 


Total Protein


 


 


 5.6 G/DL


(6.4-8.2)  L 





 


Albumin


 


 


 1.0 G/DL


(3.4-5.0)  L 





 


Globulin   4.6 g/dL   


 


Albumin/Globulin Ratio


 


 


 0.2 (1.0-2.7)


L 





 


Test


 1/28/21


08:18 


 


 





 


POC Whole Blood Glucose


 122 MG/DL


()  H 


 


 














Microbiology








 Date/Time


Source Procedure


Growth Status





 


 1/26/21 04:00


Stool Clostridium difficile Toxin Assay - Final Complete








Objective


HEENT: No JVD. Orally intubated


LUNGS:  Have decreased breath sounds with the chest tube on the right side


and subcutaneous emphysema.


CARDIOVASCULAR:  Regular S1, S2 with


no gallop.


ABDOMEN:  Soft.


EXTREMITIES:  A 1+ pitting edema.











Jake George MD               Jan 28, 2021 10:50

## 2021-01-28 NOTE — NUR
NURSE HAND-OFF REPORT: 



Follow up: ABG, Chest Xray.



Latest Vital Signs: Temperature 97.2 , Pulse 88 , B/P 93 /59 , Respiratory Rate 16 , O2 SAT 
92 , Mechanical Ventilator, O2 Flow Rate  .  

Vital Sign Comment: [stable]



EKG Rhythm: Sinus Rhythm

Rhythm change?: N 

MD Notified?: N -Dr. Thaddeus GUTIERREZ Response: 



Latest Singh Fall Score: 50  

Fall Risk: High Risk 

Safety Measures: Call light Within Reach, Bed Alarm Zone 1, Side Rails Side Rails x3, Bed 
position Low and Locked.

Fall Precautions: 

Door Sign



Report given to [Aan ARCHER].

## 2021-01-28 NOTE — GENERAL PROGRESS NOTE
Subjective


ROS Limited/Unobtainable:  Yes


Allergies:  


Coded Allergies:  


     No Known Allergies (Unverified , 6/11/19)


Subjective


events noted - interval notes reviewed 


glucose values are stable 


in ICU











Item Value  Date Time


 


Glucose Level 136 MG/DL H 1/28/21 0350


 


Bedside Blood Glucose 137 mg/dl H 1/28/21 0046


 


Bedside Blood Glucose 175 mg/dl H 1/27/21 2118


 


Bedside Blood Glucose 189 mg/dl H 1/27/21 1800


 


Bedside Blood Glucose 174 mg/dl H 1/27/21 1218


 


Bedside Blood Glucose 169 mg/dl H 1/27/21 1005


 


Bedside Blood Glucose 177 mg/dl H 1/27/21 0618


 


Bedside Blood Glucose 107 mg/dl 1/27/21 0000











Objective





Last 24 Hour Vital Signs








  Date Time  Temp Pulse Resp B/P (MAP) Pulse Ox O2 Delivery O2 Flow Rate FiO2


 


1/28/21 04:45  87 14 97/61 (73) 91   


 


1/28/21 04:30  87 16 100/60 (73) 80   


 


1/28/21 04:15  87 16 105/67 (80) 93   


 


1/28/21 04:00      Mechanical Ventilator  


 


1/28/21 04:00 97.2 88 16 105/67 (80) 91   


 


1/28/21 04:00        100


 


1/28/21 03:48  90 22     100


 


1/28/21 03:32  88      


 


1/28/21 03:00  92 18 102/67 (79) 89   


 


1/28/21 02:15   14 103/67  Mechanical Ventilator  100


 


1/28/21 02:00  98 17 101/66 (78) 84   





  96      


 


1/28/21 01:15   16 100/62  Mechanical Ventilator  100


 


1/28/21 01:00  101 18 100/64 (76) 78   





  78      


 


1/28/21 00:45    101/64    


 


1/28/21 00:30  101 25 101/64 (76) 66   


 


1/28/21 00:15   23 101/64  Mechanical Ventilator  100


 


1/28/21 00:00      Mechanical Ventilator  


 


1/28/21 00:00    104/67    


 


1/28/21 00:00 98.7 102 22 103/63 (76) 72   


 


1/28/21 00:00        100


 


1/27/21 23:45  103 21 109/65 (80) 74   


 


1/27/21 23:40  103 22     100


 


1/27/21 23:39  103      


 


1/27/21 23:30  103 19 105/61 (76) 73   


 


1/27/21 23:15   20 105/61  Mechanical Ventilator  100


 


1/27/21 23:15  104 19 101/64 (76) 72   


 


1/27/21 23:00    102/64    


 


1/27/21 23:00  103 19 106/65 (79) 72   


 


1/27/21 22:45  104 19 113/66 (82) 74   


 


1/27/21 22:30  103 19 100/63 (75) 72   


 


1/27/21 22:15  103 20 102/62 (75) 72   


 


1/27/21 22:15   19 100/63  Mechanical Ventilator  100


 


1/27/21 22:00    102/62    


 


1/27/21 22:00  103 18 100/67 (78) 72   


 


1/27/21 21:45  102 19 103/62 (76) 72   


 


1/27/21 21:30  101 19 109/64 (79) 71   


 


1/27/21 21:15  99 17 108/65 (79) 71   


 


1/27/21 21:15   19 109/64  Mechanical Ventilator  100


 


1/27/21 21:00  98 17 109/68 (82) 71   


 


1/27/21 21:00   18 108/65  Mechanical Ventilator  100


 


1/27/21 21:00    108/65    


 


1/27/21 20:45   18 121/68  Mechanical Ventilator  100


 


1/27/21 20:45  97 19 121/68 (85) 64   


 


1/27/21 20:30  93 17 100/46 (64) 62   


 


1/27/21 20:30   19 112/60  Mechanical Ventilator  100


 


1/27/21 20:15   18 100/46  Mechanical Ventilator  100


 


1/27/21 20:15  92 17 101/56 (71) 55   


 


1/27/21 20:13  92 19 103/64 (77) 60   


 


1/27/21 20:00      Mechanical Ventilator  


 


1/27/21 20:00   17 103/64  Mechanical Ventilator  100


 


1/27/21 20:00    103/64    


 


1/27/21 20:00 97.4 91 16 105/57 (73) 69   


 


1/27/21 20:00        100


 


1/27/21 19:45      Mechanical Ventilator  100


 


1/27/21 19:38  85 24     100


 


1/27/21 19:30   16 107/60  Mechanical Ventilator  100


 


1/27/21 19:22  91      


 


1/27/21 19:15   25 105/59  Mechanical Ventilator  100


 


1/27/21 19:00   23 106/59  Mechanical Ventilator  100


 


1/27/21 19:00    107/61    


 


1/27/21 19:00  91 23 107/61 (76) 36   


 


1/27/21 18:45  87 23 106/59 (75) 47   


 


1/27/21 18:30  86 21 113/65 (81) 49   


 


1/27/21 18:15  93 21 126/76 (93) 70   


 


1/27/21 18:00  95 20 138/85 (102) 77   


 


1/27/21 18:00    138/85    


 


1/27/21 17:00  90 21 103/61 (75) 95   


 


1/27/21 17:00    110/66    


 


1/27/21 16:00      Mechanical Ventilator  


 


1/27/21 16:00        100


 


1/27/21 16:00    103/60    


 


1/27/21 16:00  91      


 


1/27/21 16:00 100.1 93 25 103/60 (74) 90   


 


1/27/21 15:30  91 23 118/66 (83) 96   


 


1/27/21 15:22  88 24     100


 


1/27/21 15:00  91 24 117/66 (83) 95   


 


1/27/21 15:00    122/67    


 


1/27/21 14:30  91 22 120/67 (84) 96   


 


1/27/21 14:00  92 22 116/62 (80) 94   


 


1/27/21 14:00    120/65    


 


1/27/21 13:00    115/64    


 


1/27/21 13:00  91 21 114/62 (79) 95   


 


1/27/21 12:30  91 18 110/64 (79) 97   


 


1/27/21 12:00 99.8 91 18 111/62 (78) 97   


 


1/27/21 12:00        100


 


1/27/21 12:00    112/62    


 


1/27/21 12:00      Mechanical Ventilator  


 


1/27/21 11:30  90 16 107/61 (76) 98   


 


1/27/21 11:22        


 


1/27/21 11:20  92 26     100


 


1/27/21 11:00  92 19 103/63 (76) 97   


 


1/27/21 11:00    113/65    


 


1/27/21 10:00    112/66    


 


1/27/21 10:00  88 22 112/66 (81) 97   


 


1/27/21 09:44  87      


 


1/27/21 09:30  88 21 115/65 (82) 96   


 


1/27/21 09:00  88 24 109/66 (80) 96   


 


1/27/21 09:00    109/66    


 


1/27/21 08:00  89      


 


1/27/21 08:00    109/66    


 


1/27/21 08:00      Mechanical Ventilator  


 


1/27/21 08:00 100.8 88 25 103/67 (79) 97   


 


1/27/21 08:00        100


 


1/27/21 07:26  90 25     100


 


1/27/21 07:00    105/61    


 


1/27/21 07:00  92 26 107/60 (76) 95   

















Intake and Output  


 


 1/27/21 1/28/21





 19:00 07:00


 


Intake Total 1652.5 ml 1180.625 ml


 


Output Total 1525 ml 645 ml


 


Balance 127.5 ml 535.625 ml


 


  


 


IV Total 892.5 ml 630.625 ml


 


Tube Feeding 660 ml 550 ml


 


Other 100 ml 


 


Output Urine Total 1175 ml 645 ml


 


Stool Total 50 ml 


 


Chest Tube Drainage Total 300 ml 








Laboratory Tests


1/27/21 07:26: 


Arterial Blood pH 7.254L, Arterial Blood Partial Pressure CO2 69.3*H, Arterial 

Blood Partial Pressure O2 75.8, Arterial Blood HCO3 30.0H, Arterial Blood Oxygen

Saturation 94.1L, Arterial Blood Base Excess 1.7, Abelardo Test Positive


1/27/21 12:10: POC Whole Blood Glucose 174H


1/27/21 20:35: 


Arterial Blood pH 7.185*L, Arterial Blood Partial Pressure CO2 79.9*H, Arterial 

Blood Partial Pressure O2 29.9*L, Arterial Blood HCO3 29.5H, Arterial Blood 

Oxygen Saturation 55.8*L, Arterial Blood Base Excess -0.2, Abelardo Test Positive


1/28/21 03:50: 


White Blood Count 8.4, Red Blood Count 3.30L, Hemoglobin 9.6L, Hematocrit 30.9L,

Mean Corpuscular Volume 93, Mean Corpuscular Hemoglobin 28.9, Mean Corpuscular 

Hemoglobin Concent 31.0L, Red Cell Distribution Width 14.8, Platelet Count 122L,

Mean Platelet Volume 11.1H, Neutrophils (%) (Auto) , Lymphocytes (%) (Auto) , 

Monocytes (%) (Auto) , Eosinophils (%) (Auto) , Basophils (%) (Auto) , 

Neutrophils % (Manual) [Pending], Lymphocytes % (Manual) [Pending], Platelet 

Estimate [Pending], Platelet Morphology [Pending], Sodium Level 144, Potassium 

Level 4.2, Chloride Level 109H, Carbon Dioxide Level 30, Anion Gap 5, Blood Urea

 Nitrogen 50H, Creatinine 1.7H, Estimat Glomerular Filtration Rate 40.5, Glucose

 Level 136H, Calcium Level 8.1L, Total Bilirubin 0.4, Aspartate Amino Transf 

(AST/SGOT) 20, Alanine Aminotransferase (ALT/SGPT) 26, Alkaline Phosphatase 250H

, Total Protein 5.6L, Albumin 1.0L, Globulin 4.6, Albumin/Globulin Ratio 0.2L


Height (Feet):  5


Height (Inches):  7.00


Weight (Pounds):  198


Objective





Current Medications








 Medications


  (Trade)  Dose


 Ordered  Sig/Julisa


 Route


 PRN Reason  Start Time


 Stop Time Status Last Admin


Dose Admin


 


 Acetaminophen


  (Tylenol)  650 mg  Q4H  PRN


 GT


 Temp >100.5  1/15/21 17:15


 2/14/21 17:14  1/27/21 04:23





 


 Amiodarone HCl


  (Cordarone)  200 mg  DAILY


 NG


   1/26/21 09:00


 4/19/21 20:59  1/27/21 09:44





 


 Chlorhexidine


 Gluconate


  (Vanessa-Hex 2%)  1 applic  DAILY@2000


 TOPIC


   1/19/21 20:00


 4/19/21 19:59  1/27/21 20:27





 


 Dextrose  1,000 ml @ 


 50 mls/hr  Q20H


 IV


   1/18/21 10:00


 2/17/21 09:59  1/27/21 12:21





 


 Dextrose


  (Dextrose 50%)  25 ml  Q30M  PRN


 IV


 Hypoglycemia  1/9/21 13:30


 4/9/21 13:29   





 


 Dextrose


  (Dextrose 50%)  50 ml  Q30M  PRN


 IV


 Hypoglycemia  1/9/21 13:30


 4/9/21 13:29   





 


 Digoxin


  (Lanoxin)  0.125 mg  DAILY


 NG


   1/21/21 09:00


 4/21/21 08:59  1/27/21 09:44





 


 Docusate Sodium


  (Colace)  100 mg  TIDPRN  PRN


 GT


 Constipation  1/12/21 01:15


 2/11/21 01:14  1/12/21 01:42





 


 Enoxaparin Sodium


  (Lovenox)  80 mg  EVERY 12  HOURS


 SUBQ


   1/2/21 21:00


 4/2/21 20:59  1/27/21 09:45





 


 Fentanyl Citrate  250 ml @ 1


 mls/hr  Q24H


 IV


   1/27/21 18:45


 1/29/21 18:44  1/27/21 19:00





 


 Insulin Aspart


  (NovoLOG)    Q6HR


 SUBQ


   1/14/21 12:00


 4/9/21 17:59  1/27/21 17:38





 


 Insulin Aspart


  (NovoLOG)  9 units  EVERY 6  HOURS


 SUBQ


   1/21/21 12:00


 4/15/21 06:59  1/28/21 00:46





 


 Insulin Detemir


  (Levemir)  10 units  Q12HR


 SUBQ


   1/26/21 09:00


 4/12/21 08:59  1/27/21 21:18





 


 Norepinephrine


 Bitartrate  250 ml @ 0


 mls/hr  Q24H


 IV


   1/28/21 00:30


 1/31/21 00:20  1/28/21 00:45





 


 Pantoprazole


  (Protonix)  40 mg  DAILY


 IVP


   1/14/21 09:00


 2/13/21 08:59  1/27/21 09:43





 


 Piperacillin Sod/


 Tazobactam Sod


 3.375 gm/Sodium


 Chloride  110 ml @ 


 27.5 mls/hr  EVERY 8  HOURS


 IVPB


   1/23/21 14:00


 1/30/21 13:59  1/28/21 05:32





 


 Quetiapine


 Fumarate


  (SEROqueL)  50 mg  Q12HR


 ORAL


   1/4/21 21:00


 2/18/21 20:59  1/27/21 20:27





 


 Sodium Chloride  500 ml @ 


 999 mls/hr  Q31M PRN


 IV


 For hypotension  1/15/21 18:30


 2/14/21 18:29   














Assessment/Plan


Problem List:  


(1) Diabetes mellitus out of control


ICD Codes:  E11.65 - Type 2 diabetes mellitus with hyperglycemia


SNOMED:  78406969, [id number]


(2) Pneumonia due to COVID-19 virus


ICD Codes:  U07.1 - COVID-19; J12.82 - Pneumonia due to coronavirus disease 2019


SNOMED:  158004096443207526


Assessment/Plan:


continue Novolog 9 units every 6 hours 


continue Levemir 10 units bid 


continue Novolog sliding scale every 6 hours 


hypoglycemia protocol in order











Nick Mcmahon MD                 Jan 28, 2021 06:31

## 2021-01-28 NOTE — NUR
NURSE NOTES:



Report received from SUZI Gustafson. Pt is Sedated RASS -2. Orally intubated; ETT 7.5, 26cm @ the 
lip line. Vent settings; AC 16, , FiO2 100%, PEEP 12; O2sat 96%. SR on cardiac 
monitor. OGT running Vital AF @ 55mL/hr. Rectal tube and Stern catheter in place and 
draining. MAYO PICC running Levophed @ 2mcg/min, Fentanyl 130mcg/hr, and D5W @ 50mL/hr. Rt 
chest tube to -20 suction. Pt on a cooling blanket. Bed locked and in lowest position. Will 
resume plan of care.

## 2021-01-28 NOTE — NUR
NURSE NOTES:



No s/s of distress. Oral care given. Pt repositioned. More movement noted to bilat upper 
extremeties.

## 2021-01-28 NOTE — NUR
RESPIRATORY NOTE:



Patient received on AC 16  PEEP+12 @ 100% FiO2. Alarms are on and audible. Vent circuit 
is secure and out of the way. Airway is secure and patent. No signs of respiratory distress 
or shortness of breath noted at this time. Will continue to monitor.

## 2021-01-28 NOTE — HEMATOLOGY/ONC PROGRESS NOTE
Assessment/Plan


Assessment/Plan


Leukocytosis 2/2 covid pna wbc 15->14-->17


Anemia 2/2 chronic disease, hgb 11-->10.4


Hypercoag disorer now on lovenox sq


Ac resp distress on ventilator


Pneumomediastinum


etoh abused


agitated -halodol


vent





Ngt


cont iv abx and iv decadrone


pulmonary and gi on consult


smear is noted


pneumomediastinum per pulm


dw charge nurse


lovenox sq





Subjective


Constitutional:  Denies: no symptoms, chills, fever, malaise, weakness, other


HEENT:  Denies: no symptoms, eye pain, blurred vision, tearing, double vision, 

ear pain, ear discharge, nose pain, nose congestion, throat pain, throat 

swelling, mouth pain, mouth swelling, other


Cardiovascular:  Denies: no symptoms, chest pain, edema, irregular heart rate, 

lightheadedness, palpitations, syncope, other


Respiratory:  Denies: no symptoms, cough, shortness of breath, SOB with 

excertion, SOB at rest, sputum, wheezing, other


Gastrointestinal/Abdominal:  Denies: no symptoms, abdomen distended, abdominal 

pain, black stools, tarry stools, blood in stool, constipated, diarrhea, 

difficulty swallowing, nausea, poor appetite, poor fluid intake, rectal bleedin

g, vomiting, other


Neurologic/Psychiatric:  Denies: no symptoms, anxiety, depressed, emotional 

problems, headache, numbness, paresthesia, pre-existing deficit, seizure, 

tingling, tremors, weakness, other


Endocrine:  Denies: no symptoms, excessive sweating, flushing, intolerance to 

cold, intolerance to heat, increased hunger, increased thirst, increased urine, 

unexplained weight gain, unexplained weight loss, other


Allergies:  


Coded Allergies:  


     No Known Allergies (Unverified , 6/11/19)


Subjective


1/10 on ventilator 60% fio2, on sediation, unresponsive, in icu


1/14 on vent, icu, wbc elev, no bleeding, unresponsive


1/15 on vent, with cash, icu, no bleeding, unresponsive


1/17 on vent, dw rn, no major changes, icu, nv


1/18 nv, labs reviewd, hr is elev, with afib, is onlovenox sq


1/19 remains on vent, sedated with wrist restraints, icu


1/20 on vent, with hematuria, ngt, in icu, labs reviewed


1/21 remains on vent, settings have been adjusted as per icu care


1/22 remains on vent, icu, on levo 6mcg, off versed, labs noted, elmer rn


1/25 remains onv ent, in icu, with restraints, on levo gtt as well


1/26 nv, suctioned, rectal tube, in icu, with restraints, no bleeding


1/27 nv, remains on vent, rectal tube, labs reviewed, dw rn


1/28 nv, icu, on vent/ bipap, labs noted, no bleeding, meds reviewed





Objective


Objective





Current Medications








 Medications


  (Trade)  Dose


 Ordered  Sig/Julisa


 Route


 PRN Reason  Start Time


 Stop Time Status Last Admin


Dose Admin


 


 Acetaminophen


  (Tylenol)  650 mg  Q4H  PRN


 GT


 Temp >100.5  1/15/21 17:15


 2/14/21 17:14  1/27/21 04:23





 


 Amiodarone HCl


  (Cordarone)  200 mg  DAILY


 NG


   1/26/21 09:00


 4/19/21 20:59  1/27/21 09:44





 


 Chlorhexidine


 Gluconate


  (Vanessa-Hex 2%)  1 applic  DAILY@2000


 TOPIC


   1/19/21 20:00


 4/19/21 19:59  1/27/21 20:27





 


 Dextrose  1,000 ml @ 


 50 mls/hr  Q20H


 IV


   1/18/21 10:00


 2/17/21 09:59  1/27/21 12:21





 


 Dextrose


  (Dextrose 50%)  25 ml  Q30M  PRN


 IV


 Hypoglycemia  1/9/21 13:30


 4/9/21 13:29   





 


 Dextrose


  (Dextrose 50%)  50 ml  Q30M  PRN


 IV


 Hypoglycemia  1/9/21 13:30


 4/9/21 13:29   





 


 Digoxin


  (Lanoxin)  0.125 mg  DAILY


 NG


   1/21/21 09:00


 4/21/21 08:59  1/27/21 09:44





 


 Docusate Sodium


  (Colace)  100 mg  TIDPRN  PRN


 GT


 Constipation  1/12/21 01:15


 2/11/21 01:14  1/12/21 01:42





 


 Enoxaparin Sodium


  (Lovenox)  80 mg  EVERY 12  HOURS


 SUBQ


   1/2/21 21:00


 4/2/21 20:59  1/27/21 09:45





 


 Fentanyl Citrate  250 ml @ 1


 mls/hr  Q24H


 IV


   1/27/21 18:45


 1/29/21 18:44  1/27/21 19:00





 


 Insulin Aspart


  (NovoLOG)    Q6HR


 SUBQ


   1/14/21 12:00


 4/9/21 17:59  1/28/21 06:36





 


 Insulin Aspart


  (NovoLOG)  9 units  EVERY 6  HOURS


 SUBQ


   1/21/21 12:00


 4/15/21 06:59  1/28/21 06:00





 


 Insulin Detemir


  (Levemir)  10 units  Q12HR


 SUBQ


   1/26/21 09:00


 4/12/21 08:59  1/27/21 21:18





 


 Norepinephrine


 Bitartrate  250 ml @ 0


 mls/hr  Q24H


 IV


   1/28/21 00:30


 1/31/21 00:20  1/28/21 00:45





 


 Pantoprazole


  (Protonix)  40 mg  DAILY


 IVP


   1/14/21 09:00


 2/13/21 08:59  1/27/21 09:43





 


 Piperacillin Sod/


 Tazobactam Sod


 3.375 gm/Sodium


 Chloride  110 ml @ 


 27.5 mls/hr  EVERY 8  HOURS


 IVPB


   1/23/21 14:00


 1/30/21 13:59  1/28/21 05:32





 


 Quetiapine


 Fumarate


  (SEROqueL)  50 mg  Q12HR


 ORAL


   1/4/21 21:00


 2/18/21 20:59  1/27/21 20:27





 


 Sodium Chloride  500 ml @ 


 999 mls/hr  Q31M PRN


 IV


 For hypotension  1/15/21 18:30


 2/14/21 18:29   














Last 24 Hour Vital Signs








  Date Time  Temp Pulse Resp B/P (MAP) Pulse Ox O2 Delivery O2 Flow Rate FiO2


 


1/28/21 04:45  87 14 97/61 (73) 91   


 


1/28/21 04:30  87 16 100/60 (73) 80   


 


1/28/21 04:15  87 16 105/67 (80) 93   


 


1/28/21 04:00      Mechanical Ventilator  


 


1/28/21 04:00 97.2 88 16 105/67 (80) 91   


 


1/28/21 04:00        100


 


1/28/21 03:48  90 22     100


 


1/28/21 03:32  88      


 


1/28/21 03:00  92 18 102/67 (79) 89   


 


1/28/21 02:15   14 103/67  Mechanical Ventilator  100


 


1/28/21 02:00  98 17 101/66 (78) 84   





  96      


 


1/28/21 01:15   16 100/62  Mechanical Ventilator  100


 


1/28/21 01:00  101 18 100/64 (76) 78   





  78      


 


1/28/21 00:45    101/64    


 


1/28/21 00:30  101 25 101/64 (76) 66   


 


1/28/21 00:15   23 101/64  Mechanical Ventilator  100


 


1/28/21 00:00      Mechanical Ventilator  


 


1/28/21 00:00    104/67    


 


1/28/21 00:00 98.7 102 22 103/63 (76) 72   


 


1/28/21 00:00        100


 


1/27/21 23:45  103 21 109/65 (80) 74   


 


1/27/21 23:40  103 22     100


 


1/27/21 23:39  103      


 


1/27/21 23:30  103 19 105/61 (76) 73   


 


1/27/21 23:15   20 105/61  Mechanical Ventilator  100


 


1/27/21 23:15  104 19 101/64 (76) 72   


 


1/27/21 23:00    102/64    


 


1/27/21 23:00  103 19 106/65 (79) 72   


 


1/27/21 22:45  104 19 113/66 (82) 74   


 


1/27/21 22:30  103 19 100/63 (75) 72   


 


1/27/21 22:15  103 20 102/62 (75) 72   


 


1/27/21 22:15   19 100/63  Mechanical Ventilator  100


 


1/27/21 22:00    102/62    


 


1/27/21 22:00  103 18 100/67 (78) 72   


 


1/27/21 21:45  102 19 103/62 (76) 72   


 


1/27/21 21:30  101 19 109/64 (79) 71   


 


1/27/21 21:15  99 17 108/65 (79) 71   


 


1/27/21 21:15   19 109/64  Mechanical Ventilator  100


 


1/27/21 21:00  98 17 109/68 (82) 71   


 


1/27/21 21:00   18 108/65  Mechanical Ventilator  100


 


1/27/21 21:00    108/65    


 


1/27/21 20:45   18 121/68  Mechanical Ventilator  100


 


1/27/21 20:45  97 19 121/68 (85) 64   


 


1/27/21 20:30  93 17 100/46 (64) 62   


 


1/27/21 20:30   19 112/60  Mechanical Ventilator  100


 


1/27/21 20:15   18 100/46  Mechanical Ventilator  100


 


1/27/21 20:15  92 17 101/56 (71) 55   


 


1/27/21 20:13  92 19 103/64 (77) 60   


 


1/27/21 20:00      Mechanical Ventilator  


 


1/27/21 20:00   17 103/64  Mechanical Ventilator  100


 


1/27/21 20:00    103/64    


 


1/27/21 20:00 97.4 91 16 105/57 (73) 69   


 


1/27/21 20:00        100


 


1/27/21 19:45      Mechanical Ventilator  100


 


1/27/21 19:38  85 24     100


 


1/27/21 19:30   16 107/60  Mechanical Ventilator  100


 


1/27/21 19:22  91      


 


1/27/21 19:15   25 105/59  Mechanical Ventilator  100


 


1/27/21 19:00   23 106/59  Mechanical Ventilator  100


 


1/27/21 19:00    107/61    


 


1/27/21 19:00  91 23 107/61 (76) 36   


 


1/27/21 18:45  87 23 106/59 (75) 47   


 


1/27/21 18:30  86 21 113/65 (81) 49   


 


1/27/21 18:15  93 21 126/76 (93) 70   


 


1/27/21 18:00  95 20 138/85 (102) 77   


 


1/27/21 18:00    138/85    


 


1/27/21 17:00  90 21 103/61 (75) 95   


 


1/27/21 17:00    110/66    


 


1/27/21 16:00      Mechanical Ventilator  


 


1/27/21 16:00        100


 


1/27/21 16:00    103/60    


 


1/27/21 16:00  91      


 


1/27/21 16:00 100.1 93 25 103/60 (74) 90   


 


1/27/21 15:30  91 23 118/66 (83) 96   


 


1/27/21 15:22  88 24     100


 


1/27/21 15:00  91 24 117/66 (83) 95   


 


1/27/21 15:00    122/67    


 


1/27/21 14:30  91 22 120/67 (84) 96   


 


1/27/21 14:00  92 22 116/62 (80) 94   


 


1/27/21 14:00    120/65    


 


1/27/21 13:00    115/64    


 


1/27/21 13:00  91 21 114/62 (79) 95   


 


1/27/21 12:30  91 18 110/64 (79) 97   


 


1/27/21 12:00 99.8 91 18 111/62 (78) 97   


 


1/27/21 12:00        100


 


1/27/21 12:00    112/62    


 


1/27/21 12:00      Mechanical Ventilator  


 


1/27/21 11:30  90 16 107/61 (76) 98   


 


1/27/21 11:22        


 


1/27/21 11:20  92 26     100


 


1/27/21 11:00  92 19 103/63 (76) 97   


 


1/27/21 11:00    113/65    


 


1/27/21 10:00    112/66    


 


1/27/21 10:00  88 22 112/66 (81) 97   


 


1/27/21 09:44  87      


 


1/27/21 09:30  88 21 115/65 (82) 96   


 


1/27/21 09:00  88 24 109/66 (80) 96   


 


1/27/21 09:00    109/66    


 


1/27/21 08:00  89      


 


1/27/21 08:00    109/66    


 


1/27/21 08:00      Mechanical Ventilator  


 


1/27/21 08:00 100.8 88 25 103/67 (79) 97   


 


1/27/21 08:00        100


 


1/27/21 07:26  90 25     100


 


1/27/21 07:00    105/61    


 


1/27/21 07:00  92 26 107/60 (76) 95   


 


1/27/21 06:00    101/55    


 


1/27/21 06:00  92 21 102/59 (73) 96   


 


1/27/21 05:00  92 21 119/68 (85) 94   


 


1/27/21 05:00    111/63    


 


1/27/21 04:53 99.0       


 


1/27/21 04:00 98.7 91 25 110/60 (77) 93   


 


1/27/21 04:00      Mechanical Ventilator  


 


1/27/21 04:00        100


 


1/27/21 04:00    115/66    


 


1/27/21 03:08  93 28     100


 


1/27/21 03:07  93      


 


1/27/21 03:00    114/61    


 


1/27/21 03:00  91 23 112/59 (76) 91   


 


1/27/21 02:00    107/60    


 


1/27/21 02:00  92 23 117/68 (84) 92   


 


1/27/21 01:00  89 19 114/66 (82) 93   


 


1/27/21 01:00    112/65    


 


1/27/21 00:00 99.2 89 25 121/66 (84) 94   


 


1/27/21 00:00      Mechanical Ventilator  


 


1/27/21 00:00    90/55    


 


1/27/21 00:00        100


 


1/26/21 23:14  87 24     100


 


1/26/21 23:11  88      


 


1/26/21 23:00    124/67    


 


1/26/21 23:00  88 23 119/69 (86) 92   


 


1/26/21 22:00    122/65    


 


1/26/21 22:00  88 29 123/66 (85) 92   


 


1/26/21 21:00  88 34 121/66 (84) 90   


 


1/26/21 21:00    128/63    


 


1/26/21 20:00        100


 


1/26/21 20:00      Mechanical Ventilator  


 


1/26/21 20:00    121/63    


 


1/26/21 20:00 98.4 88 27 127/65 (85) 91   


 


1/26/21 19:23  88      


 


1/26/21 19:21  88 26     100


 


1/26/21 19:00  89 27 121/89 (100) 92   


 


1/26/21 19:00    121/89    


 


1/26/21 18:00    129/68    


 


1/26/21 18:00  88 27 129/68 (88) 92   


 


1/26/21 17:00    128/67    


 


1/26/21 17:00  87 26 126/67 (86) 93   


 


1/26/21 16:00  90      


 


1/26/21 16:00 98.4 88 28 129/63 (85) 94   


 


1/26/21 16:00        100


 


1/26/21 16:00    128/63    


 


1/26/21 16:00      Mechanical Ventilator  


 


1/26/21 15:25  83 26     100


 


1/26/21 15:00    117/62    


 


1/26/21 15:00  90 26 117/62 (80) 94   


 


1/26/21 14:00    123/63    


 


1/26/21 14:00  91 25 123/63 (83) 93   


 


1/26/21 13:00  92 25 113/60 (77) 91   


 


1/26/21 13:00    113/60    


 


1/26/21 12:00        100


 


1/26/21 12:00      Mechanical Ventilator  


 


1/26/21 12:00 99.3 92 22 127/60 (82) 92   


 


1/26/21 12:00  96      


 


1/26/21 12:00    127/60    


 


1/26/21 11:52    124/61    


 


1/26/21 11:08  92 22 110/57 (74) 94   


 


1/26/21 11:00    105/57    


 


1/26/21 10:50  91 26     100


 


1/26/21 10:00  92 22 108/57 (74) 94   


 


1/26/21 10:00    108/57    


 


1/26/21 09:00  94 24 121/63 (82) 92   


 


1/26/21 09:00    121/63    


 


1/26/21 08:38  97      


 


1/26/21 08:00  96      


 


1/26/21 08:00        100


 


1/26/21 08:00    124/68    


 


1/26/21 08:00 100.8 97 28 124/68 (86) 84   


 


1/26/21 08:00      Mechanical Ventilator  


 


1/26/21 07:22  94 26     100


 


1/26/21 07:00  98 26 129/64 (85) 87   


 


1/26/21 07:00    129/64    

















Intake and Output  


 


 1/27/21 1/28/21





 19:00 07:00


 


Intake Total 1652.5 ml 1235.625 ml


 


Output Total 1525 ml 845 ml


 


Balance 127.5 ml 390.625 ml


 


  


 


IV Total 892.5 ml 630.625 ml


 


Tube Feeding 660 ml 605 ml


 


Other 100 ml 


 


Output Urine Total 1175 ml 705 ml


 


Stool Total 50 ml 30 ml


 


Chest Tube Drainage Total 300 ml 110 ml











Labs








Test


 1/25/21


09:21 1/25/21


12:10 1/26/21


03:30 1/26/21


09:07


 


POC Whole Blood Glucose


 125 MG/DL


() 


 


 





 


White Blood Count


 


 7.6 K/UL


(4.8-10.8) 7.8 K/UL


(4.8-10.8) 





 


Red Blood Count


 


 3.26 M/UL


(4.70-6.10) 3.34 M/UL


(4.70-6.10) 





 


Hemoglobin


 


 9.1 G/DL


(14.2-18.0) 9.3 G/DL


(14.2-18.0) 





 


Hematocrit


 


 30.4 %


(42.0-52.0) 30.2 %


(42.0-52.0) 





 


Mean Corpuscular Volume  93 FL (80-99)  91 FL (80-99)  


 


Mean Corpuscular Hemoglobin


 


 28.0 PG


(27.0-31.0) 28.0 PG


(27.0-31.0) 





 


Mean Corpuscular Hemoglobin


Concent 


 30.0 G/DL


(32.0-36.0) 30.9 G/DL


(32.0-36.0) 





 


Red Cell Distribution Width


 


 14.4 %


(11.6-14.8) 14.1 %


(11.6-14.8) 





 


Platelet Count


 


 90 K/UL


(150-450) 119 K/UL


(150-450) 





 


Mean Platelet Volume


 


 11.5 FL


(6.5-10.1) 11.7 FL


(6.5-10.1) 





 


Neutrophils (%) (Auto)   % (45.0-75.0)   % (45.0-75.0)  


 


Lymphocytes (%) (Auto)


 


 4.7 %


(20.0-45.0)  % (20.0-45.0) 


 





 


Monocytes (%) (Auto)


 


 1.6 %


(1.0-10.0)  % (1.0-10.0) 


 





 


Eosinophils (%) (Auto)


 


 2.0 %


(0.0-3.0)  % (0.0-3.0) 


 





 


Basophils (%) (Auto)


 


 0.9 %


(0.0-2.0)  % (0.0-2.0) 


 





 


Sodium Level


 


 141 MMOL/L


(136-145) 139 MMOL/L


(136-145) 





 


Potassium Level


 


 3.4 MMOL/L


(3.5-5.1) 4.1 MMOL/L


(3.5-5.1) 





 


Chloride Level


 


 106 MMOL/L


() 105 MMOL/L


() 





 


Carbon Dioxide Level


 


 28 MMOL/L


(21-32) 27 MMOL/L


(21-32) 





 


Anion Gap


 


 7 mmol/L


(5-15) 7 mmol/L


(5-15) 





 


Blood Urea Nitrogen


 


 46 mg/dL


(7-18) 49 mg/dL


(7-18) 





 


Creatinine


 


 1.5 MG/DL


(0.55-1.30) 1.7 MG/DL


(0.55-1.30) 





 


Estimat Glomerular Filtration


Rate 


 46.8 mL/min


(>60) 40.5 mL/min


(>60) 





 


Glucose Level


 


 164 MG/DL


() 140 MG/DL


() 





 


Calcium Level


 


 7.9 MG/DL


(8.5-10.1) 8.0 MG/DL


(8.5-10.1) 





 


Total Bilirubin


 


 0.5 MG/DL


(0.2-1.0) 0.4 MG/DL


(0.2-1.0) 





 


Aspartate Amino Transf


(AST/SGOT) 


 32 U/L (15-37) 


 25 U/L (15-37) 


 





 


Alanine Aminotransferase


(ALT/SGPT) 


 29 U/L (12-78) 


 27 U/L (12-78) 


 





 


Alkaline Phosphatase


 


 364 U/L


() 369 U/L


() 





 


Total Protein


 


 5.2 G/DL


(6.4-8.2) 5.4 G/DL


(6.4-8.2) 





 


Albumin


 


 1.0 G/DL


(3.4-5.0) 1.0 G/DL


(3.4-5.0) 





 


Globulin  4.2 g/dL  4.4 g/dL  


 


Albumin/Globulin Ratio  0.2 (1.0-2.7)  0.2 (1.0-2.7)  


 


Differential Total Cells


Counted 


 


 100 


 





 


Neutrophils % (Manual)   87 % (45-75)  


 


Lymphocytes % (Manual)   9 % (20-45)  


 


Monocytes % (Manual)   2 % (1-10)  


 


Eosinophils % (Manual)   0 % (0-3)  


 


Basophils % (Manual)   0 % (0-2)  


 


Band Neutrophils   2 % (0-8)  


 


Nucleated Red Blood Cells   2 /100 WBC  


 


Platelet Estimate   Decreased  


 


Platelet Morphology   Normal  


 


Hypochromasia   1+  


 


Random Vancomycin Level   19.2 ug/mL  


 


Arterial Blood pH


 


 


 


 7.263


(7.350-7.450)


 


Arterial Blood Partial


Pressure CO2 


 


 


 67.0 mmHg


(35.0-45.0)


 


Arterial Blood Partial


Pressure O2 


 


 


 61.2 mmHg


(75.0-100.0)


 


Arterial Blood HCO3


 


 


 


 29.6 mmol/L


(22.0-26.0)


 


Arterial Blood Oxygen


Saturation 


 


 


 90.3 %


()


 


Arterial Blood Base Excess    1.5 (-2-2) 


 


Abelardo Test    Positive 


 


Test


 1/26/21


12:34 1/26/21


17:58 1/27/21


04:35 1/27/21


07:26


 


POC Whole Blood Glucose


 208 MG/DL


() 184 MG/DL


() 


 





 


White Blood Count


 


 


 7.3 K/UL


(4.8-10.8) 





 


Red Blood Count


 


 


 3.16 M/UL


(4.70-6.10) 





 


Hemoglobin


 


 


 9.2 G/DL


(14.2-18.0) 





 


Hematocrit


 


 


 29.2 %


(42.0-52.0) 





 


Mean Corpuscular Volume   93 FL (80-99)  


 


Mean Corpuscular Hemoglobin


 


 


 29.1 PG


(27.0-31.0) 





 


Mean Corpuscular Hemoglobin


Concent 


 


 31.4 G/DL


(32.0-36.0) 





 


Red Cell Distribution Width


 


 


 14.8 %


(11.6-14.8) 





 


Platelet Count


 


 


 115 K/UL


(150-450) 





 


Mean Platelet Volume


 


 


 10.5 FL


(6.5-10.1) 





 


Neutrophils (%) (Auto)    % (45.0-75.0)  


 


Lymphocytes (%) (Auto)    % (20.0-45.0)  


 


Monocytes (%) (Auto)    % (1.0-10.0)  


 


Eosinophils (%) (Auto)    % (0.0-3.0)  


 


Basophils (%) (Auto)    % (0.0-2.0)  


 


Differential Total Cells


Counted 


 


 100 


 





 


Neutrophils % (Manual)   84 % (45-75)  


 


Lymphocytes % (Manual)   10 % (20-45)  


 


Monocytes % (Manual)   4 % (1-10)  


 


Eosinophils % (Manual)   2 % (0-3)  


 


Basophils % (Manual)   0 % (0-2)  


 


Band Neutrophils   0 % (0-8)  


 


Nucleated Red Blood Cells   2 /100 WBC  


 


Platelet Estimate   Decreased  


 


Platelet Morphology   Normal  


 


Polychromasia   1+  


 


Hypochromasia   1+  


 


Anisocytosis   1+  


 


Prothrombin Time


 


 


 10.8 SEC


(9.30-11.50) 





 


Prothromb Time International


Ratio 


 


 1.0 (0.9-1.1) 


 





 


Activated Partial


Thromboplast Time 


 


 26 SEC (23-33) 


 





 


Sodium Level


 


 


 143 MMOL/L


(136-145) 





 


Potassium Level


 


 


 4.1 MMOL/L


(3.5-5.1) 





 


Chloride Level


 


 


 109 MMOL/L


() 





 


Carbon Dioxide Level


 


 


 28 MMOL/L


(21-32) 





 


Blood Urea Nitrogen


 


 


 51 mg/dL


(7-18) 





 


Creatinine


 


 


 1.8 MG/DL


(0.55-1.30) 





 


Estimat Glomerular Filtration


Rate 


 


 37.9 mL/min


(>60) 





 


Glucose Level


 


 


 172 MG/DL


() 





 


Calcium Level


 


 


 8.3 MG/DL


(8.5-10.1) 





 


Total Bilirubin


 


 


 0.4 MG/DL


(0.2-1.0) 





 


Aspartate Amino Transf


(AST/SGOT) 


 


 25 U/L (15-37) 


 





 


Alanine Aminotransferase


(ALT/SGPT) 


 


 29 U/L (12-78) 


 





 


Alkaline Phosphatase


 


 


 309 U/L


() 





 


Total Protein


 


 


 5.5 G/DL


(6.4-8.2) 





 


Albumin


 


 


 1.0 G/DL


(3.4-5.0) 





 


Globulin   4.5 g/dL  


 


Albumin/Globulin Ratio   0.2 (1.0-2.7)  


 


Arterial Blood pH


 


 


 


 7.254


(7.350-7.450)


 


Arterial Blood Partial


Pressure CO2 


 


 


 69.3 mmHg


(35.0-45.0)


 


Arterial Blood Partial


Pressure O2 


 


 


 75.8 mmHg


(75.0-100.0)


 


Arterial Blood HCO3


 


 


 


 30.0 mmol/L


(22.0-26.0)


 


Arterial Blood Oxygen


Saturation 


 


 


 94.1 %


()


 


Arterial Blood Base Excess    1.7 (-2-2) 


 


Abelardo Test    Positive 


 


Test


 1/27/21


12:10 1/27/21


20:35 1/28/21


03:50 





 


POC Whole Blood Glucose


 174 MG/DL


() 


 


 





 


Arterial Blood pH


 


 7.185


(7.350-7.450) 


 





 


Arterial Blood Partial


Pressure CO2 


 79.9 mmHg


(35.0-45.0) 


 





 


Arterial Blood Partial


Pressure O2 


 29.9 mmHg


(75.0-100.0) 


 





 


Arterial Blood HCO3


 


 29.5 mmol/L


(22.0-26.0) 


 





 


Arterial Blood Oxygen


Saturation 


 55.8 %


() 


 





 


Arterial Blood Base Excess  -0.2 (-2-2)   


 


Abelardo Test  Positive   


 


White Blood Count


 


 


 8.4 K/UL


(4.8-10.8) 





 


Red Blood Count


 


 


 3.30 M/UL


(4.70-6.10) 





 


Hemoglobin


 


 


 9.6 G/DL


(14.2-18.0) 





 


Hematocrit


 


 


 30.9 %


(42.0-52.0) 





 


Mean Corpuscular Volume   93 FL (80-99)  


 


Mean Corpuscular Hemoglobin


 


 


 28.9 PG


(27.0-31.0) 





 


Mean Corpuscular Hemoglobin


Concent 


 


 31.0 G/DL


(32.0-36.0) 





 


Red Cell Distribution Width


 


 


 14.8 %


(11.6-14.8) 





 


Platelet Count


 


 


 122 K/UL


(150-450) 





 


Mean Platelet Volume


 


 


 11.1 FL


(6.5-10.1) 





 


Neutrophils (%) (Auto)    % (45.0-75.0)  


 


Lymphocytes (%) (Auto)    % (20.0-45.0)  


 


Monocytes (%) (Auto)    % (1.0-10.0)  


 


Eosinophils (%) (Auto)    % (0.0-3.0)  


 


Basophils (%) (Auto)    % (0.0-2.0)  


 


Sodium Level


 


 


 144 MMOL/L


(136-145) 





 


Potassium Level


 


 


 4.2 MMOL/L


(3.5-5.1) 





 


Chloride Level


 


 


 109 MMOL/L


() 





 


Carbon Dioxide Level


 


 


 30 MMOL/L


(21-32) 





 


Anion Gap


 


 


 5 mmol/L


(5-15) 





 


Blood Urea Nitrogen


 


 


 50 mg/dL


(7-18) 





 


Creatinine


 


 


 1.7 MG/DL


(0.55-1.30) 





 


Estimat Glomerular Filtration


Rate 


 


 40.5 mL/min


(>60) 





 


Glucose Level


 


 


 136 MG/DL


() 





 


Calcium Level


 


 


 8.1 MG/DL


(8.5-10.1) 





 


Total Bilirubin


 


 


 0.4 MG/DL


(0.2-1.0) 





 


Aspartate Amino Transf


(AST/SGOT) 


 


 20 U/L (15-37) 


 





 


Alanine Aminotransferase


(ALT/SGPT) 


 


 26 U/L (12-78) 


 





 


Alkaline Phosphatase


 


 


 250 U/L


() 





 


Total Protein


 


 


 5.6 G/DL


(6.4-8.2) 





 


Albumin


 


 


 1.0 G/DL


(3.4-5.0) 





 


Globulin   4.6 g/dL  


 


Albumin/Globulin Ratio   0.2 (1.0-2.7)  








Height (Feet):  5


Height (Inches):  7.00


Weight (Pounds):  198


Objective


General Appearance:  lethargic, moderate distress


Neck:  supple


Cardiovascular:  regular rhythm


Respiratory/Chest:  crackles/rales, rhonchi - bilaterally vent++


Abdomen:  non tender, soft


Extremities:  non-tender











Emmett Cook MD          Jan 28, 2021 06:48

## 2021-01-28 NOTE — NUR
NURSE NOTES:



Partial bath and cash care given. Pt repositioned. Pt more alert and moving upper 
extremeties. Pt desating. Fentanyl gtt increased up to 160mcg/hr. Pt suctioned several times 
of moderate amount of pale yellow secretions. Will continue to monitor pt and titrate 
sedation as needed..

## 2021-01-28 NOTE — NUR
NURSE NOTES:

repositioned pt, oral care given. oral suctioned. cleaned pt, provided new gown, new pillow 
cases, and new blanket. call light within reach. right chest tube secured, no leakage noted 
at this time.

## 2021-01-28 NOTE — NUR
Pt resting quietly. Tolerating vent. Pt awakens easily. Does not follow commands. Titrating 
Levophed for SBP >90. No s/s of distress noted.

## 2021-01-28 NOTE — NUR
NURSE HAND-OFF REPORT: 



Latest Vital Signs: Temperature 98.3 , Pulse 85 , B/P 123 /77 , Respiratory Rate 18 , O2 SAT 
83 , Mechanical Ventilator, O2 Flow Rate 100%. 

Vital Sign Comment: Resp tech notified of O2 sats. Sedation being titrated.



EKG Rhythm: Sinus Rhythm

Rhythm change?: N 

MD Notified?: N

MD Response: 



Latest Singh Fall Score: 50  

Fall Risk: High Risk 

Safety Measures: Call light Within Reach, Bed Alarm Zone 1, Side Rails Side Rails x3, Bed 
position Low and Locked.

Fall Precautions: 

Patient Fall Education



Report given to .

## 2021-01-28 NOTE — NUR
Pulmonary MD at bedside. Informed him of pt's ABG results and pt's status. AC rate increase 
to 20. No order for repeat ABG obtained. pt resting quietly. no s/s of distress. Levo @ 
6mcg/min. Fentanyl @ 130mcg/hr. no change in status. oral and denilson care given. Pt 
repositioned.

## 2021-01-28 NOTE — NUR
NURSE NOTES:

pt O2sat is getting better at this time, ranges 85-89%. will continue to monitor pt. oral 
suctioned. repositioned pt.

## 2021-01-28 NOTE — GENERAL PROGRESS NOTE
Subjective


Allergies:  


Coded Allergies:  


     No Known Allergies (Unverified , 6/11/19)


Subjective


on ventilator 60% fio2


on sediation


unresponsive


in icu


rt chest tube s placed due to pneumothorex


afib is controlled


hypotensuion better





Objective





Last 24 Hour Vital Signs








  Date Time  Temp Pulse Resp B/P (MAP) Pulse Ox O2 Delivery O2 Flow Rate FiO2


 


1/28/21 16:29   20 104/66  Mechanical Ventilator  100


 


1/28/21 16:15  86 20 104/66 (79) 94   


 


1/28/21 16:00   20 102/62  Mechanical Ventilator  100


 


1/28/21 16:00  86      


 


1/28/21 16:00 98.3 86 21 102/62 (75) 94   


 


1/28/21 16:00      Mechanical Ventilator  


 


1/28/21 15:45  86 20 104/61 (75) 95   


 


1/28/21 15:30  88 21 103/62 (76) 95   


 


1/28/21 15:15  88 20 104/63 (77) 95   


 


1/28/21 15:00   20 104/63  Mechanical Ventilator  100


 


1/28/21 15:00  89 20 108/62 (77) 96   


 


1/28/21 14:45  87 20 99/61 (74) 96   


 


1/28/21 14:30  89 21     100


 


1/28/21 14:30  87 20 102/63 (76) 95   


 


1/28/21 14:15  85 19 113/87 (96) 95   


 


1/28/21 14:00   20 113/87  Mechanical Ventilator  100


 


1/28/21 14:00  86 20 109/70 (83) 95   


 


1/28/21 13:45  86 13 112/71 (85) 95   


 


1/28/21 13:30  87 0 102/65 (77) 95   


 


1/28/21 13:15  87 0 99/70 (80) 96   


 


1/28/21 13:00   20 99/70  Mechanical Ventilator  100


 


1/28/21 13:00  87 0 105/65 (78) 96   


 


1/28/21 12:45  86 20 104/65 (78) 96   


 


1/28/21 12:30        100


 


1/28/21 12:30  89 20 91/52 (65) 81   


 


1/28/21 12:15  86 16 97/58 (71) 94   


 


1/28/21 12:00      Mechanical Ventilator  


 


1/28/21 12:00   18 97/58  Mechanical Ventilator  100


 


1/28/21 12:00  90      


 


1/28/21 12:00        100


 


1/28/21 12:00 98.4 87 18 92/59 (70) 95   


 


1/28/21 11:45  88 16 95/55 (68) 95   


 


1/28/21 11:30  88 16 94/58 (70) 95   


 


1/28/21 11:25  87 20     100


 


1/28/21 11:15  89 16 96/55 (69) 96   


 


1/28/21 11:00  88 16 92/54 (67) 96   


 


1/28/21 11:00   16 96/55  Mechanical Ventilator  100


 


1/28/21 10:45  89 16 91/59 (70) 95   


 


1/28/21 10:30  88 7 93/59 (70) 94   


 


1/28/21 10:15  87 16 83/54 (64) 94   


 


1/28/21 10:15      Mechanical Ventilator  


 


1/28/21 10:00   16 83/54  Mechanical Ventilator  100


 


1/28/21 10:00  87 15 86/55 (65) 95   


 


1/28/21 09:45  89 17 89/58 (68) 95   


 


1/28/21 09:30  90 16 91/58 (69) 95   


 


1/28/21 09:15  90 16 95/61 (72) 94   


 


1/28/21 09:15  90 16  94   


 


1/28/21 09:00   16 95/61  Mechanical Ventilator  100


 


1/28/21 09:00  90 16 84/56 (65) 93   


 


1/28/21 08:48  90      


 


1/28/21 08:45  90 17 88/54 (65) 92   


 


1/28/21 08:30  89 14 86/52 (63) 76   


 


1/28/21 08:15  88 1 92/58 (69) 92   


 


1/28/21 08:00  88      


 


1/28/21 08:00        100


 


1/28/21 08:00   16 92/58  Mechanical Ventilator  100


 


1/28/21 08:00      Mechanical Ventilator  


 


1/28/21 08:00 97.2 88 16 90/60 (70) 94   


 


1/28/21 07:45  88 16 92/58 (69) 93   


 


1/28/21 07:30  89 16 91/57 (68) 93   


 


1/28/21 07:15  89 16 95/59 (71) 93   


 


1/28/21 07:05  88 23     100


 


1/28/21 07:00  88 16 93/59 (70) 92   


 


1/28/21 07:00  88 16 93/59 (70) 92   


 


1/28/21 06:45   16 93/59  Mechanical Ventilator  100


 


1/28/21 06:30  88 16 92/59 (70) 92   


 


1/28/21 06:00  88 16 92/58 (69) 90   


 


1/28/21 05:45   16 92/58  Mechanical Ventilator  100


 


1/28/21 05:30  87 16 91/60 (70) 89   


 


1/28/21 05:00  87 16 97/63 (74) 91   


 


1/28/21 04:45  87 14 97/61 (73) 91   


 


1/28/21 04:45   16 94/60  Mechanical Ventilator  100


 


1/28/21 04:30  87 16 100/60 (73) 80   


 


1/28/21 04:30   17 97/61  Mechanical Ventilator  100


 


1/28/21 04:15   16 100/60  Mechanical Ventilator  100


 


1/28/21 04:15  87 16 105/67 (80) 93   


 


1/28/21 04:00      Mechanical Ventilator  


 


1/28/21 04:00 97.2 88 16 105/67 (80) 91   


 


1/28/21 04:00        100


 


1/28/21 04:00   17 105/67  Mechanical Ventilator  100


 


1/28/21 03:48  90 22     100


 


1/28/21 03:32  88      


 


1/28/21 03:15  91 18 106/70 (82) 89   


 


1/28/21 03:15   19 105/68  Mechanical Ventilator  100


 


1/28/21 03:00  92 18 102/67 (79) 89   


 


1/28/21 02:15   14 103/67  Mechanical Ventilator  100


 


1/28/21 02:00  98 17 101/66 (78) 84   





  96      


 


1/28/21 01:15   16 100/62  Mechanical Ventilator  100


 


1/28/21 01:00  101 18 100/64 (76) 78   





  78      


 


1/28/21 00:45    101/64    


 


1/28/21 00:30  101 25 101/64 (76) 66   


 


1/28/21 00:15   23 101/64  Mechanical Ventilator  100


 


1/28/21 00:00      Mechanical Ventilator  


 


1/28/21 00:00    104/67    


 


1/28/21 00:00 98.7 102 22 103/63 (76) 72   


 


1/28/21 00:00        100


 


1/27/21 23:45  103 21 109/65 (80) 74   


 


1/27/21 23:40  103 22     100


 


1/27/21 23:39  103      


 


1/27/21 23:30  103 19 105/61 (76) 73   


 


1/27/21 23:15   20 105/61  Mechanical Ventilator  100


 


1/27/21 23:15  104 19 101/64 (76) 72   


 


1/27/21 23:00    102/64    


 


1/27/21 23:00  103 19 106/65 (79) 72   


 


1/27/21 22:45  104 19 113/66 (82) 74   


 


1/27/21 22:30  103 19 100/63 (75) 72   


 


1/27/21 22:15  103 20 102/62 (75) 72   


 


1/27/21 22:15   19 100/63  Mechanical Ventilator  100


 


1/27/21 22:00    102/62    


 


1/27/21 22:00  103 18 100/67 (78) 72   


 


1/27/21 21:45  102 19 103/62 (76) 72   


 


1/27/21 21:30  101 19 109/64 (79) 71   


 


1/27/21 21:15  99 17 108/65 (79) 71   


 


1/27/21 21:15   19 109/64  Mechanical Ventilator  100


 


1/27/21 21:00  98 17 109/68 (82) 71   


 


1/27/21 21:00   18 108/65  Mechanical Ventilator  100


 


1/27/21 21:00    108/65    


 


1/27/21 20:45   18 121/68  Mechanical Ventilator  100


 


1/27/21 20:45  97 19 121/68 (85) 64   


 


1/27/21 20:30  93 17 100/46 (64) 62   


 


1/27/21 20:30   19 112/60  Mechanical Ventilator  100


 


1/27/21 20:15   18 100/46  Mechanical Ventilator  100


 


1/27/21 20:15  92 17 101/56 (71) 55   


 


1/27/21 20:13  92 19 103/64 (77) 60   


 


1/27/21 20:00      Mechanical Ventilator  


 


1/27/21 20:00   17 103/64  Mechanical Ventilator  100


 


1/27/21 20:00    103/64    


 


1/27/21 20:00 97.4 91 16 105/57 (73) 69   


 


1/27/21 20:00        100


 


1/27/21 19:45      Mechanical Ventilator  100


 


1/27/21 19:38  85 24     100


 


1/27/21 19:30   16 107/60  Mechanical Ventilator  100


 


1/27/21 19:22  91      


 


1/27/21 19:15   25 105/59  Mechanical Ventilator  100


 


1/27/21 19:00   23 106/59  Mechanical Ventilator  100


 


1/27/21 19:00    107/61    


 


1/27/21 19:00  91 23 107/61 (76) 36   


 


1/27/21 18:45  87 23 106/59 (75) 47   


 


1/27/21 18:30  86 21 113/65 (81) 49   


 


1/27/21 18:15  93 21 126/76 (93) 70   


 


1/27/21 18:00  95 20 138/85 (102) 77   


 


1/27/21 18:00    138/85    

















Intake and Output  


 


 1/27/21 1/28/21





 19:00 07:00


 


Intake Total 1652.5 ml 1568.74892 ml


 


Output Total 1525 ml 905 ml


 


Balance 127.5 ml 663.38120 ml


 


  


 


IV Total 892.5 ml 908.38667 ml


 


Tube Feeding 660 ml 660 ml


 


Other 100 ml 


 


Output Urine Total 1175 ml 765 ml


 


Stool Total 50 ml 30 ml


 


Chest Tube Drainage Total 300 ml 110 ml








Laboratory Tests


1/27/21 20:35: 


Arterial Blood pH 7.185*L, Arterial Blood Partial Pressure CO2 79.9*H, Arterial 

Blood Partial Pressure O2 29.9*L, Arterial Blood HCO3 29.5H, Arterial Blood 

Oxygen Saturation 55.8*L, Arterial Blood Base Excess -0.2, Abelardo Test Positive


1/28/21 03:50: 


White Blood Count 8.4, Red Blood Count 3.30L, Hemoglobin 9.6L, Hematocrit 30.9L,

Mean Corpuscular Volume 93, Mean Corpuscular Hemoglobin 28.9, Mean Corpuscular 

Hemoglobin Concent 31.0L, Red Cell Distribution Width 14.8, Platelet Count 122L,

Mean Platelet Volume 11.1H, Neutrophils (%) (Auto) , Lymphocytes (%) (Auto) , 

Monocytes (%) (Auto) , Eosinophils (%) (Auto) , Basophils (%) (Auto) , 

Differential Total Cells Counted 100, Neutrophils % (Manual) 71, Lymphocytes % 

(Manual) 19L, Monocytes % (Manual) 3, Eosinophils % (Manual) 2, Basophils % 

(Manual) 0, Band Neutrophils 5, Platelet Estimate DecreasedL, Platelet 

Morphology Normal, Hypochromasia 1+, Sodium Level 144, Potassium Level 4.2, 

Chloride Level 109H, Carbon Dioxide Level 30, Anion Gap 5, Blood Urea Nitrogen 

50H, Creatinine 1.7H, Estimat Glomerular Filtration Rate 40.5, Glucose Level 

136H, Calcium Level 8.1L, Total Bilirubin 0.4, Aspartate Amino Transf (AST/SGOT)

20, Alanine Aminotransferase (ALT/SGPT) 26, Alkaline Phosphatase 250H, Total 

Protein 5.6L, Albumin 1.0L, Globulin 4.6, Albumin/Globulin Ratio 0.2L


1/28/21 07:35: 


Arterial Blood pH 7.207*L, Arterial Blood Partial Pressure CO2 70.4*H, Arterial 

Blood Partial Pressure O2 49.2*L, Arterial Blood HCO3 27.3H, Arterial Blood 

Oxygen Saturation 84.6*L, Arterial Blood Base Excess -1.6, Abelardo Test Positive


1/28/21 08:18: POC Whole Blood Glucose 122H


1/28/21 10:59: 


Arterial Blood pH 7.183*L, Arterial Blood Partial Pressure CO2 76.7*H, Arterial 

Blood Partial Pressure O2 71.3L, Arterial Blood HCO3 28.2H, Arterial Blood 

Oxygen Saturation 93.2L, Arterial Blood Base Excess -1.2, Abelardo Test Positive


1/28/21 11:56: POC Whole Blood Glucose 103


Height (Feet):  5


Height (Inches):  7.00


Weight (Pounds):  198


Neck:  supple


Cardiovascular:  regular rhythm


Respiratory/Chest:  crackles/rales


Abdomen:  non tender, soft


Extremities:  non-tender





Assessment/Plan


Assessment/Plan:


hypotension better on tirate down levophed drip


afib with rvr on digoxine , add amiodarone , cardiology on case


covid pna


ac resp distress on ventilator


etoh abused


dehydration


agitated -halodol


severe meta acidosis


cont on ventilator at icu


cont iv abx and iv decadrone


pulmonary and gi on consult


dw charge nurse


slight better











Moi Travis MD             Jan 28, 2021 17:02

## 2021-01-29 VITALS — DIASTOLIC BLOOD PRESSURE: 63 MMHG | SYSTOLIC BLOOD PRESSURE: 94 MMHG

## 2021-01-29 VITALS — DIASTOLIC BLOOD PRESSURE: 38 MMHG | SYSTOLIC BLOOD PRESSURE: 71 MMHG

## 2021-01-29 VITALS — SYSTOLIC BLOOD PRESSURE: 96 MMHG | DIASTOLIC BLOOD PRESSURE: 56 MMHG

## 2021-01-29 VITALS — SYSTOLIC BLOOD PRESSURE: 103 MMHG | DIASTOLIC BLOOD PRESSURE: 65 MMHG

## 2021-01-29 VITALS — DIASTOLIC BLOOD PRESSURE: 59 MMHG | SYSTOLIC BLOOD PRESSURE: 98 MMHG

## 2021-01-29 VITALS — DIASTOLIC BLOOD PRESSURE: 66 MMHG | SYSTOLIC BLOOD PRESSURE: 101 MMHG

## 2021-01-29 VITALS — DIASTOLIC BLOOD PRESSURE: 55 MMHG | SYSTOLIC BLOOD PRESSURE: 94 MMHG

## 2021-01-29 VITALS — SYSTOLIC BLOOD PRESSURE: 106 MMHG | DIASTOLIC BLOOD PRESSURE: 68 MMHG

## 2021-01-29 VITALS — SYSTOLIC BLOOD PRESSURE: 88 MMHG | DIASTOLIC BLOOD PRESSURE: 55 MMHG

## 2021-01-29 VITALS — DIASTOLIC BLOOD PRESSURE: 83 MMHG | SYSTOLIC BLOOD PRESSURE: 164 MMHG

## 2021-01-29 VITALS — DIASTOLIC BLOOD PRESSURE: 55 MMHG | SYSTOLIC BLOOD PRESSURE: 97 MMHG

## 2021-01-29 VITALS — DIASTOLIC BLOOD PRESSURE: 26 MMHG | SYSTOLIC BLOOD PRESSURE: 46 MMHG

## 2021-01-29 VITALS — DIASTOLIC BLOOD PRESSURE: 83 MMHG | SYSTOLIC BLOOD PRESSURE: 141 MMHG

## 2021-01-29 VITALS — SYSTOLIC BLOOD PRESSURE: 102 MMHG | DIASTOLIC BLOOD PRESSURE: 63 MMHG

## 2021-01-29 VITALS — DIASTOLIC BLOOD PRESSURE: 61 MMHG | SYSTOLIC BLOOD PRESSURE: 101 MMHG

## 2021-01-29 VITALS — DIASTOLIC BLOOD PRESSURE: 58 MMHG | SYSTOLIC BLOOD PRESSURE: 95 MMHG

## 2021-01-29 VITALS — SYSTOLIC BLOOD PRESSURE: 65 MMHG | DIASTOLIC BLOOD PRESSURE: 40 MMHG

## 2021-01-29 VITALS — SYSTOLIC BLOOD PRESSURE: 132 MMHG | DIASTOLIC BLOOD PRESSURE: 80 MMHG

## 2021-01-29 VITALS — SYSTOLIC BLOOD PRESSURE: 98 MMHG | DIASTOLIC BLOOD PRESSURE: 53 MMHG

## 2021-01-29 VITALS — SYSTOLIC BLOOD PRESSURE: 96 MMHG | DIASTOLIC BLOOD PRESSURE: 55 MMHG

## 2021-01-29 VITALS — SYSTOLIC BLOOD PRESSURE: 88 MMHG | DIASTOLIC BLOOD PRESSURE: 49 MMHG

## 2021-01-29 VITALS — SYSTOLIC BLOOD PRESSURE: 97 MMHG | DIASTOLIC BLOOD PRESSURE: 59 MMHG

## 2021-01-29 VITALS — SYSTOLIC BLOOD PRESSURE: 94 MMHG | DIASTOLIC BLOOD PRESSURE: 55 MMHG

## 2021-01-29 VITALS — SYSTOLIC BLOOD PRESSURE: 93 MMHG | DIASTOLIC BLOOD PRESSURE: 55 MMHG

## 2021-01-29 VITALS — DIASTOLIC BLOOD PRESSURE: 56 MMHG | SYSTOLIC BLOOD PRESSURE: 99 MMHG

## 2021-01-29 VITALS — DIASTOLIC BLOOD PRESSURE: 69 MMHG | SYSTOLIC BLOOD PRESSURE: 95 MMHG

## 2021-01-29 VITALS — SYSTOLIC BLOOD PRESSURE: 92 MMHG | DIASTOLIC BLOOD PRESSURE: 55 MMHG

## 2021-01-29 VITALS — DIASTOLIC BLOOD PRESSURE: 27 MMHG | SYSTOLIC BLOOD PRESSURE: 42 MMHG

## 2021-01-29 VITALS — DIASTOLIC BLOOD PRESSURE: 55 MMHG | SYSTOLIC BLOOD PRESSURE: 105 MMHG

## 2021-01-29 VITALS — SYSTOLIC BLOOD PRESSURE: 108 MMHG | DIASTOLIC BLOOD PRESSURE: 71 MMHG

## 2021-01-29 VITALS — DIASTOLIC BLOOD PRESSURE: 63 MMHG | SYSTOLIC BLOOD PRESSURE: 95 MMHG

## 2021-01-29 VITALS — DIASTOLIC BLOOD PRESSURE: 58 MMHG | SYSTOLIC BLOOD PRESSURE: 100 MMHG

## 2021-01-29 VITALS — DIASTOLIC BLOOD PRESSURE: 59 MMHG | SYSTOLIC BLOOD PRESSURE: 95 MMHG

## 2021-01-29 VITALS — DIASTOLIC BLOOD PRESSURE: 62 MMHG | SYSTOLIC BLOOD PRESSURE: 99 MMHG

## 2021-01-29 VITALS — SYSTOLIC BLOOD PRESSURE: 98 MMHG | DIASTOLIC BLOOD PRESSURE: 58 MMHG

## 2021-01-29 VITALS — SYSTOLIC BLOOD PRESSURE: 103 MMHG | DIASTOLIC BLOOD PRESSURE: 66 MMHG

## 2021-01-29 VITALS — SYSTOLIC BLOOD PRESSURE: 100 MMHG | DIASTOLIC BLOOD PRESSURE: 61 MMHG

## 2021-01-29 VITALS — DIASTOLIC BLOOD PRESSURE: 64 MMHG | SYSTOLIC BLOOD PRESSURE: 103 MMHG

## 2021-01-29 VITALS — SYSTOLIC BLOOD PRESSURE: 111 MMHG | DIASTOLIC BLOOD PRESSURE: 72 MMHG

## 2021-01-29 VITALS — DIASTOLIC BLOOD PRESSURE: 56 MMHG | SYSTOLIC BLOOD PRESSURE: 98 MMHG

## 2021-01-29 VITALS — SYSTOLIC BLOOD PRESSURE: 95 MMHG | DIASTOLIC BLOOD PRESSURE: 54 MMHG

## 2021-01-29 VITALS — DIASTOLIC BLOOD PRESSURE: 68 MMHG | SYSTOLIC BLOOD PRESSURE: 112 MMHG

## 2021-01-29 VITALS — DIASTOLIC BLOOD PRESSURE: 81 MMHG | SYSTOLIC BLOOD PRESSURE: 131 MMHG

## 2021-01-29 VITALS — SYSTOLIC BLOOD PRESSURE: 100 MMHG | DIASTOLIC BLOOD PRESSURE: 60 MMHG

## 2021-01-29 VITALS — DIASTOLIC BLOOD PRESSURE: 80 MMHG | SYSTOLIC BLOOD PRESSURE: 137 MMHG

## 2021-01-29 VITALS — SYSTOLIC BLOOD PRESSURE: 91 MMHG | DIASTOLIC BLOOD PRESSURE: 58 MMHG

## 2021-01-29 VITALS — SYSTOLIC BLOOD PRESSURE: 99 MMHG | DIASTOLIC BLOOD PRESSURE: 52 MMHG

## 2021-01-29 VITALS — SYSTOLIC BLOOD PRESSURE: 174 MMHG | DIASTOLIC BLOOD PRESSURE: 87 MMHG

## 2021-01-29 VITALS — SYSTOLIC BLOOD PRESSURE: 114 MMHG | DIASTOLIC BLOOD PRESSURE: 70 MMHG

## 2021-01-29 VITALS — DIASTOLIC BLOOD PRESSURE: 49 MMHG | SYSTOLIC BLOOD PRESSURE: 80 MMHG

## 2021-01-29 VITALS — DIASTOLIC BLOOD PRESSURE: 50 MMHG | SYSTOLIC BLOOD PRESSURE: 87 MMHG

## 2021-01-29 VITALS — DIASTOLIC BLOOD PRESSURE: 65 MMHG | SYSTOLIC BLOOD PRESSURE: 107 MMHG

## 2021-01-29 VITALS — DIASTOLIC BLOOD PRESSURE: 63 MMHG | SYSTOLIC BLOOD PRESSURE: 106 MMHG

## 2021-01-29 VITALS — DIASTOLIC BLOOD PRESSURE: 53 MMHG | SYSTOLIC BLOOD PRESSURE: 90 MMHG

## 2021-01-29 VITALS — SYSTOLIC BLOOD PRESSURE: 93 MMHG | DIASTOLIC BLOOD PRESSURE: 57 MMHG

## 2021-01-29 VITALS — SYSTOLIC BLOOD PRESSURE: 101 MMHG | DIASTOLIC BLOOD PRESSURE: 64 MMHG

## 2021-01-29 VITALS — DIASTOLIC BLOOD PRESSURE: 57 MMHG | SYSTOLIC BLOOD PRESSURE: 96 MMHG

## 2021-01-29 VITALS — SYSTOLIC BLOOD PRESSURE: 100 MMHG | DIASTOLIC BLOOD PRESSURE: 68 MMHG

## 2021-01-29 VITALS — SYSTOLIC BLOOD PRESSURE: 113 MMHG | DIASTOLIC BLOOD PRESSURE: 64 MMHG

## 2021-01-29 VITALS — SYSTOLIC BLOOD PRESSURE: 88 MMHG | DIASTOLIC BLOOD PRESSURE: 54 MMHG

## 2021-01-29 VITALS — SYSTOLIC BLOOD PRESSURE: 113 MMHG | DIASTOLIC BLOOD PRESSURE: 68 MMHG

## 2021-01-29 VITALS — DIASTOLIC BLOOD PRESSURE: 68 MMHG | SYSTOLIC BLOOD PRESSURE: 102 MMHG

## 2021-01-29 VITALS — DIASTOLIC BLOOD PRESSURE: 49 MMHG | SYSTOLIC BLOOD PRESSURE: 64 MMHG

## 2021-01-29 VITALS — DIASTOLIC BLOOD PRESSURE: 65 MMHG | SYSTOLIC BLOOD PRESSURE: 112 MMHG

## 2021-01-29 VITALS — DIASTOLIC BLOOD PRESSURE: 54 MMHG | SYSTOLIC BLOOD PRESSURE: 96 MMHG

## 2021-01-29 VITALS — DIASTOLIC BLOOD PRESSURE: 65 MMHG | SYSTOLIC BLOOD PRESSURE: 101 MMHG

## 2021-01-29 VITALS — DIASTOLIC BLOOD PRESSURE: 60 MMHG | SYSTOLIC BLOOD PRESSURE: 92 MMHG

## 2021-01-29 VITALS — SYSTOLIC BLOOD PRESSURE: 94 MMHG | DIASTOLIC BLOOD PRESSURE: 52 MMHG

## 2021-01-29 VITALS — SYSTOLIC BLOOD PRESSURE: 104 MMHG | DIASTOLIC BLOOD PRESSURE: 66 MMHG

## 2021-01-29 VITALS — DIASTOLIC BLOOD PRESSURE: 65 MMHG | SYSTOLIC BLOOD PRESSURE: 91 MMHG

## 2021-01-29 VITALS — SYSTOLIC BLOOD PRESSURE: 98 MMHG | DIASTOLIC BLOOD PRESSURE: 55 MMHG

## 2021-01-29 VITALS — DIASTOLIC BLOOD PRESSURE: 53 MMHG | SYSTOLIC BLOOD PRESSURE: 94 MMHG

## 2021-01-29 VITALS — SYSTOLIC BLOOD PRESSURE: 181 MMHG | DIASTOLIC BLOOD PRESSURE: 89 MMHG

## 2021-01-29 VITALS — SYSTOLIC BLOOD PRESSURE: 47 MMHG | DIASTOLIC BLOOD PRESSURE: 31 MMHG

## 2021-01-29 VITALS — DIASTOLIC BLOOD PRESSURE: 72 MMHG | SYSTOLIC BLOOD PRESSURE: 111 MMHG

## 2021-01-29 VITALS — DIASTOLIC BLOOD PRESSURE: 75 MMHG | SYSTOLIC BLOOD PRESSURE: 113 MMHG

## 2021-01-29 VITALS — SYSTOLIC BLOOD PRESSURE: 106 MMHG | DIASTOLIC BLOOD PRESSURE: 63 MMHG

## 2021-01-29 VITALS — DIASTOLIC BLOOD PRESSURE: 60 MMHG | SYSTOLIC BLOOD PRESSURE: 100 MMHG

## 2021-01-29 VITALS — DIASTOLIC BLOOD PRESSURE: 54 MMHG | SYSTOLIC BLOOD PRESSURE: 94 MMHG

## 2021-01-29 VITALS — DIASTOLIC BLOOD PRESSURE: 43 MMHG | SYSTOLIC BLOOD PRESSURE: 68 MMHG

## 2021-01-29 VITALS — DIASTOLIC BLOOD PRESSURE: 60 MMHG | SYSTOLIC BLOOD PRESSURE: 96 MMHG

## 2021-01-29 VITALS — DIASTOLIC BLOOD PRESSURE: 58 MMHG | SYSTOLIC BLOOD PRESSURE: 103 MMHG

## 2021-01-29 VITALS — SYSTOLIC BLOOD PRESSURE: 114 MMHG | DIASTOLIC BLOOD PRESSURE: 73 MMHG

## 2021-01-29 VITALS — SYSTOLIC BLOOD PRESSURE: 105 MMHG | DIASTOLIC BLOOD PRESSURE: 65 MMHG

## 2021-01-29 VITALS — SYSTOLIC BLOOD PRESSURE: 110 MMHG | DIASTOLIC BLOOD PRESSURE: 68 MMHG

## 2021-01-29 VITALS — DIASTOLIC BLOOD PRESSURE: 82 MMHG | SYSTOLIC BLOOD PRESSURE: 157 MMHG

## 2021-01-29 VITALS — DIASTOLIC BLOOD PRESSURE: 68 MMHG | SYSTOLIC BLOOD PRESSURE: 117 MMHG

## 2021-01-29 VITALS — SYSTOLIC BLOOD PRESSURE: 115 MMHG | DIASTOLIC BLOOD PRESSURE: 70 MMHG

## 2021-01-29 VITALS — SYSTOLIC BLOOD PRESSURE: 97 MMHG | DIASTOLIC BLOOD PRESSURE: 54 MMHG

## 2021-01-29 VITALS — DIASTOLIC BLOOD PRESSURE: 54 MMHG | SYSTOLIC BLOOD PRESSURE: 84 MMHG

## 2021-01-29 VITALS — DIASTOLIC BLOOD PRESSURE: 62 MMHG | SYSTOLIC BLOOD PRESSURE: 100 MMHG

## 2021-01-29 VITALS — DIASTOLIC BLOOD PRESSURE: 54 MMHG | SYSTOLIC BLOOD PRESSURE: 90 MMHG

## 2021-01-29 VITALS — SYSTOLIC BLOOD PRESSURE: 116 MMHG | DIASTOLIC BLOOD PRESSURE: 72 MMHG

## 2021-01-29 VITALS — SYSTOLIC BLOOD PRESSURE: 96 MMHG | DIASTOLIC BLOOD PRESSURE: 59 MMHG

## 2021-01-29 VITALS — SYSTOLIC BLOOD PRESSURE: 94 MMHG | DIASTOLIC BLOOD PRESSURE: 56 MMHG

## 2021-01-29 VITALS — DIASTOLIC BLOOD PRESSURE: 64 MMHG | SYSTOLIC BLOOD PRESSURE: 102 MMHG

## 2021-01-29 VITALS — SYSTOLIC BLOOD PRESSURE: 193 MMHG | DIASTOLIC BLOOD PRESSURE: 90 MMHG

## 2021-01-29 VITALS — DIASTOLIC BLOOD PRESSURE: 56 MMHG | SYSTOLIC BLOOD PRESSURE: 90 MMHG

## 2021-01-29 VITALS — DIASTOLIC BLOOD PRESSURE: 65 MMHG | SYSTOLIC BLOOD PRESSURE: 102 MMHG

## 2021-01-29 VITALS — DIASTOLIC BLOOD PRESSURE: 55 MMHG | SYSTOLIC BLOOD PRESSURE: 90 MMHG

## 2021-01-29 PROCEDURE — 5A12012 PERFORMANCE OF CARDIAC OUTPUT, SINGLE, MANUAL: ICD-10-PCS

## 2021-01-29 RX ADMIN — INSULIN ASPART SCH UNITS: 100 INJECTION, SOLUTION INTRAVENOUS; SUBCUTANEOUS at 12:00

## 2021-01-29 RX ADMIN — INSULIN DETEMIR SCH UNITS: 100 INJECTION, SOLUTION SUBCUTANEOUS at 21:00

## 2021-01-29 RX ADMIN — ENOXAPARIN SODIUM SCH MG: 80 INJECTION SUBCUTANEOUS at 21:47

## 2021-01-29 RX ADMIN — DEXTROSE MONOHYDRATE SCH MLS/HR: 50 INJECTION, SOLUTION INTRAVENOUS at 15:02

## 2021-01-29 RX ADMIN — INSULIN ASPART SCH UNITS: 100 INJECTION, SOLUTION INTRAVENOUS; SUBCUTANEOUS at 06:13

## 2021-01-29 RX ADMIN — PANTOPRAZOLE SODIUM SCH MG: 40 INJECTION, POWDER, FOR SOLUTION INTRAVENOUS at 10:17

## 2021-01-29 RX ADMIN — ENOXAPARIN SODIUM SCH MG: 80 INJECTION SUBCUTANEOUS at 10:00

## 2021-01-29 RX ADMIN — DIGOXIN SCH MG: 0.12 TABLET ORAL at 10:19

## 2021-01-29 RX ADMIN — Medication SCH MLS/HR: at 04:42

## 2021-01-29 RX ADMIN — INSULIN DETEMIR SCH UNITS: 100 INJECTION, SOLUTION SUBCUTANEOUS at 10:00

## 2021-01-29 RX ADMIN — Medication SCH MLS/HR: at 18:00

## 2021-01-29 RX ADMIN — CHLORHEXIDINE GLUCONATE SCH APPLIC: 213 SOLUTION TOPICAL at 21:48

## 2021-01-29 RX ADMIN — AMIODARONE HYDROCHLORIDE SCH MG: 200 TABLET ORAL at 10:19

## 2021-01-29 RX ADMIN — ACETAMINOPHEN PRN MG: 160 SOLUTION ORAL at 06:10

## 2021-01-29 RX ADMIN — DEXTROSE MONOHYDRATE SCH MLS/HR: 50 INJECTION, SOLUTION INTRAVENOUS at 21:46

## 2021-01-29 RX ADMIN — INSULIN ASPART SCH UNITS: 100 INJECTION, SOLUTION INTRAVENOUS; SUBCUTANEOUS at 18:00

## 2021-01-29 RX ADMIN — ACETAMINOPHEN PRN MG: 160 SOLUTION ORAL at 06:59

## 2021-01-29 RX ADMIN — INSULIN ASPART SCH UNITS: 100 INJECTION, SOLUTION INTRAVENOUS; SUBCUTANEOUS at 06:00

## 2021-01-29 RX ADMIN — DEXTROSE MONOHYDRATE SCH MLS/HR: 50 INJECTION, SOLUTION INTRAVENOUS at 06:10

## 2021-01-29 RX ADMIN — Medication SCH MLS/HR: at 09:00

## 2021-01-29 RX ADMIN — INSULIN ASPART SCH UNITS: 100 INJECTION, SOLUTION INTRAVENOUS; SUBCUTANEOUS at 00:00

## 2021-01-29 NOTE — PULMONOLOGY PROGRESS NOTE
Subjective


ROS Limited/Unobtainable:  Yes


Interval Events:  Remains on high PEEP and FiO2


Constitutional:  Denies: fever


HEENT:  Repors: no symptoms


Respiratory:  Reports: shortness of breath - better


Cardiovascular:  Reports: no symptoms


Gastrointestinal/Abdominal:  Reports: diarrhea


Allergies:  


Coded Allergies:  


     No Known Allergies (Unverified , 6/11/19)


All Systems:  reviewed and negative except above





Objective





Last 24 Hour Vital Signs








  Date Time  Temp Pulse Resp B/P (MAP) Pulse Ox O2 Delivery O2 Flow Rate FiO2


 


1/29/21 07:00  99 19  89   


 


1/29/21 07:00 101.9 99 19 99/52 (68) 89   


 


1/29/21 06:45  99 19 98/53 (68) 89   


 


1/29/21 06:45  99 19 98/53 (68) 89   


 


1/29/21 06:40 102.0       


 


1/29/21 06:30  100 20 98/55 (69) 89   


 


1/29/21 06:30  100 20 98/55 (69) 89   


 


1/29/21 06:15  100 21 97/54 (68) 89   


 


1/29/21 06:15  100 21  89   


 


1/29/21 06:15  100 21 97/54 (68) 89   


 


1/29/21 06:15  100 21 97/54 (68) 89   


 


1/29/21 06:00  101 20 98/55 (69) 88   


 


1/29/21 06:00  101 20 98/55 (69) 88   


 


1/29/21 06:00 102.2 101 20 98/55 (69) 88   


 


1/29/21 06:00  101 20 98/55 (69) 88   


 


1/29/21 05:45  102 21 105/55 (72) 91   


 


1/29/21 05:30  101 21 94/63 (73) 91   


 


1/29/21 05:15  100 20 98/59 (72) 91   


 


1/29/21 05:00  98 21 91/58 (69) 91   


 


1/29/21 04:42   20 98/64  Mechanical Ventilator  100


 


1/29/21 04:30  97 20 90/55 (67) 91   


 


1/29/21 04:15  96 20 93/57 (69) 92   


 


1/29/21 04:00  94      


 


1/29/21 04:00  95 18 90/56 (67) 92   


 


1/29/21 04:00  95 18 90/56 (67) 92   


 


1/29/21 04:00 100.7       


 


1/29/21 04:00        100


 


1/29/21 04:00      Mechanical Ventilator  


 


1/29/21 03:45  94 12 95/58 (70) 93   


 


1/29/21 03:45  94 12 95/58 (70) 93   


 


1/29/21 03:30  93 9 95/54 (68) 94   


 


1/29/21 03:30  93 9 95/54 (68) 94   


 


1/29/21 03:15  91 7 94/55 (68) 95   


 


1/29/21 03:15  91 7 94/55 (68) 95   


 


1/29/21 03:00  89 19 98/56 (70) 94   


 


1/29/21 03:00  89 19  94   


 


1/29/21 03:00  89 19 98/56 (70) 94   


 


1/29/21 02:57  88 20     100


 


1/29/21 02:45  87 21 96/54 (68) 93   


 


1/29/21 02:45  87 21 96/54 (68) 93   


 


1/29/21 02:45  87 21 96/54 (68) 93   


 


1/29/21 02:30  85 19 95/54 (68) 94   


 


1/29/21 02:30  85 19 95/54 (68) 94   


 


1/29/21 02:30  85 19 95/54 (68) 94   


 


1/29/21 02:15  86 20 93/55 (68) 94   


 


1/29/21 02:15  86 20 93/55 (68) 94   


 


1/29/21 02:15  86 20 93/55 (68) 94   


 


1/29/21 02:15  86 20 93/55 (68) 94   


 


1/29/21 02:00  86 20 90/53 (65) 94   


 


1/29/21 02:00  86 20 90/53 (65) 94   


 


1/29/21 02:00  86 20 90/53 (65) 94   


 


1/29/21 02:00  86 20 90/53 (65) 94   


 


1/29/21 01:45  85 18 94/54 (67) 94   


 


1/29/21 01:45  85 18 94/54 (67) 94   


 


1/29/21 01:45  85 18 94/54 (67) 94   


 


1/29/21 01:45  85 18 94/54 (67) 94   


 


1/29/21 01:30  85 17 94/55 (68) 93   


 


1/29/21 01:30  85 17 94/55 (68) 93   


 


1/29/21 01:30  85 17 94/55 (68) 93   


 


1/29/21 01:30  85 17 94/55 (68) 93   


 


1/29/21 01:15  86 20 94/55 (68) 93   


 


1/29/21 01:15  86 20 94/55 (68) 93   


 


1/29/21 01:15  86 20 94/55 (68) 93   


 


1/29/21 01:15  86 20 94/55 (68) 93   


 


1/29/21 01:00  85 20 94/53 (67) 93   


 


1/29/21 01:00  85 20 94/53 (67) 93   


 


1/29/21 01:00  85 20 94/53 (67) 93   


 


1/29/21 01:00  85 20 94/53 (67) 93   


 


1/29/21 01:00  85 20 94/53 (67) 93   


 


1/29/21 00:45  85 20 96/55 (69) 92   


 


1/29/21 00:45  85 20 96/55 (69) 92   


 


1/29/21 00:45  85 20 96/55 (69) 92   


 


1/29/21 00:45  85 20 96/55 (69) 92   


 


1/29/21 00:45  85 20 96/55 (69) 92   


 


1/29/21 00:30  86 15 97/55 (69) 93   


 


1/29/21 00:30  86 15 97/55 (69) 93   


 


1/29/21 00:30  86 15 97/55 (69) 93   


 


1/29/21 00:30  86 15 97/55 (69) 93   


 


1/29/21 00:30  86 15 97/55 (69) 93   


 


1/29/21 00:15  86 0 100/60 (73) 94   


 


1/29/21 00:15  86 0 100/60 (73) 94   


 


1/29/21 00:15  86 0 100/60 (73) 94   


 


1/29/21 00:15  86 0 100/60 (73) 94   


 


1/29/21 00:15  86 0 100/60 (73) 94   


 


1/29/21 00:10      Mechanical Ventilator  


 


1/29/21 00:00 99.9       


 


1/29/21 00:00  86 20 100/60 (73) 89   


 


1/29/21 00:00  86 20 100/60 (73) 89   


 


1/29/21 00:00  86 20 100/60 (73) 89   


 


1/29/21 00:00  86 20 100/60 (73) 89   


 


1/29/21 00:00  86      


 


1/29/21 00:00  86 20 100/60 (73) 89   


 


1/29/21 00:00  86 20 100/60 (73) 89   


 


1/28/21 23:45  87 20 92/59 (70) 94   


 


1/28/21 23:30  86 20 96/63 (74) 93   


 


1/28/21 23:15  85 20 94/63 (73) 94   


 


1/28/21 23:00   20 93/44  Mechanical Ventilator  100


 


1/28/21 23:00  84 7 96/63 (74) 90   


 


1/28/21 23:00  84 7 96/63 (74) 90   


 


1/28/21 22:53  85 21     100


 


1/28/21 22:45  85 19 93/53 (66) 93   


 


1/28/21 22:30  84 20 94/60 (71) 94   


 


1/28/21 22:15  82 20 98/63 (75) 92   


 


1/28/21 22:00  82 19 98/62 (74) 90   


 


1/28/21 22:00   18 98/62  Mechanical Ventilator  100


 


1/28/21 22:00  82 19 98/62 (74) 90   


 


1/28/21 21:45  82 21 104/65 (78) 90   


 


1/28/21 21:30  82 20 99/62 (74) 91   


 


1/28/21 21:25   20 102/67  Mechanical Ventilator  100


 


1/28/21 21:15  83 20 102/67 (79) 90   


 


1/28/21 21:00  81 20 98/67 (77) 92   


 


1/28/21 20:45  81 21 103/65 (78) 91   


 


1/28/21 20:30  80 0 102/65 (77) 90   


 


1/28/21 20:30   20 110/68  Non-Rebreather  100


 


1/28/21 20:15  81 11 110/68 (82) 91   


 


1/28/21 20:00 99.3       


 


1/28/21 20:00  82 7 111/67 (82) 91   


 


1/28/21 20:00  84      


 


1/28/21 20:00      Mechanical Ventilator  


 


1/28/21 20:00        100


 


1/28/21 19:45  84 18 121/68 (85) 87   


 


1/28/21 19:30  84 20 108/69 (82)    


 


1/28/21 19:15  85 20 98/63 (75) 85   


 


1/28/21 19:00  85 18 123/77 (92) 83   


 


1/28/21 19:00   20 123/77  Mechanical Ventilator  100


 


1/28/21 19:00  85 18 123/77 (92) 83   


 


1/28/21 18:55  84 20     100


 


1/28/21 18:45  85 21 116/71 (86) 90   


 


1/28/21 18:40   21 116/71  Mechanical Ventilator  100


 


1/28/21 18:30  85 20 132/75 (94) 91   


 


1/28/21 18:25   20 130/86  Mechanical Ventilator  100


 


1/28/21 18:15  87 23 130/86 (101) 85   


 


1/28/21 18:15   20 130/86  Mechanical Ventilator  100


 


1/28/21 18:00  88 21 132/80 (97) 84   


 


1/28/21 18:00   20 132/80  Mechanical Ventilator  100


 


1/28/21 17:45  87 20 134/83 (100) 88   


 


1/28/21 17:35   20 119/64  Mechanical Ventilator  100


 


1/28/21 17:30  90 20 119/64 (82) 67   


 


1/28/21 17:15  87 21 117/71 (86) 85   


 


1/28/21 17:00    117/71    


 


1/28/21 17:00   20 117/71  Mechanical Ventilator  100


 


1/28/21 17:00  87 21 119/71 (87) 94   


 


1/28/21 17:00  86 20 132/80 (97) 86   


 


1/28/21 16:45  89 20 116/66 (83) 90   


 


1/28/21 16:30  89 19 117/70 (86) 91   


 


1/28/21 16:29   20 104/66  Mechanical Ventilator  100


 


1/28/21 16:15  86 20 104/66 (79) 94   


 


1/28/21 16:00   20 102/62  Mechanical Ventilator  100


 


1/28/21 16:00  86      


 


1/28/21 16:00 98.3 86 21 102/62 (75) 94   


 


1/28/21 16:00      Mechanical Ventilator  


 


1/28/21 15:45  86 20 104/61 (75) 95   


 


1/28/21 15:30  88 21 103/62 (76) 95   


 


1/28/21 15:15  88 20 104/63 (77) 95   


 


1/28/21 15:00   20 104/63  Mechanical Ventilator  100


 


1/28/21 15:00  89 20 108/62 (77) 96   


 


1/28/21 14:45  87 20 99/61 (74) 96   


 


1/28/21 14:30  89 21     100


 


1/28/21 14:30  87 20 102/63 (76) 95   


 


1/28/21 14:15  85 19 113/87 (96) 95   


 


1/28/21 14:00   20 113/87  Mechanical Ventilator  100


 


1/28/21 14:00  86 20 109/70 (83) 95   


 


1/28/21 13:45  86 13 112/71 (85) 95   


 


1/28/21 13:30  87 0 102/65 (77) 95   


 


1/28/21 13:15  87 0 99/70 (80) 96   


 


1/28/21 13:00   20 99/70  Mechanical Ventilator  100


 


1/28/21 13:00  87 0 105/65 (78) 96   


 


1/28/21 12:45  86 20 104/65 (78) 96   


 


1/28/21 12:30        100


 


1/28/21 12:30  89 20 91/52 (65) 81   


 


1/28/21 12:15  86 16 97/58 (71) 94   


 


1/28/21 12:00      Mechanical Ventilator  


 


1/28/21 12:00   18 97/58  Mechanical Ventilator  100


 


1/28/21 12:00  90      


 


1/28/21 12:00        100


 


1/28/21 12:00 98.4 87 18 92/59 (70) 95   


 


1/28/21 11:45  88 16 95/55 (68) 95   


 


1/28/21 11:30  88 16 94/58 (70) 95   


 


1/28/21 11:25  87 20     100


 


1/28/21 11:15  89 16 96/55 (69) 96   


 


1/28/21 11:00  88 16 92/54 (67) 96   


 


1/28/21 11:00   16 96/55  Mechanical Ventilator  100


 


1/28/21 10:45  89 16 91/59 (70) 95   


 


1/28/21 10:30  88 7 93/59 (70) 94   


 


1/28/21 10:15  87 16 83/54 (64) 94   


 


1/28/21 10:15      Mechanical Ventilator  

















Intake and Output  


 


 1/28/21 1/29/21





 19:00 07:00


 


Intake Total 1765.327 ml 572 ml


 


Output Total 1350 ml 1065 ml


 


Balance 415.327 ml -493 ml


 


  


 


IV Total 1105.327 ml 77 ml


 


Tube Feeding 660 ml 495 ml


 


Output Urine Total 960 ml 1065 ml


 


Stool Total 150 ml 


 


Chest Tube Drainage Total 240 ml 








Objective


On Versed drip


General Appearance:  no acute distress


HEENT:  atraumatic


Respiratory:  lungs clear, decreased breath sounds


Cardiovascular:  normal rate, regular rhythm


Abdomen:  soft, non tender, no organomegaly


Laboratory Tests


1/28/21 10:59: 


Arterial Blood pH 7.183*L, Arterial Blood Partial Pressure CO2 76.7*H, Arterial 

Blood Partial Pressure O2 71.3L, Arterial Blood HCO3 28.2H, Arterial Blood 

Oxygen Saturation 93.2L, Arterial Blood Base Excess -1.2, Abelardo Test Positive


1/28/21 11:56: POC Whole Blood Glucose 103


1/28/21 17:45: POC Whole Blood Glucose 189H





Current Medications








 Medications


  (Trade)  Dose


 Ordered  Sig/Julisa


 Route


 PRN Reason  Start Time


 Stop Time Status Last Admin


Dose Admin


 


 Acetaminophen


  (Tylenol)  650 mg  Q4H  PRN


 GT


 Temp >100.5  1/15/21 17:15


 2/14/21 17:14  1/29/21 06:59





 


 Amiodarone HCl


  (Cordarone)  200 mg  DAILY


 NG


   1/26/21 09:00


 4/19/21 20:59  1/28/21 08:48





 


 Chlorhexidine


 Gluconate


  (Vanessa-Hex 2%)  1 applic  DAILY@2000


 TOPIC


   1/19/21 20:00


 4/19/21 19:59  1/28/21 21:23





 


 Dextrose  1,000 ml @ 


 50 mls/hr  Q20H


 IV


   1/18/21 10:00


 2/17/21 09:59  1/29/21 06:10





 


 Dextrose


  (Dextrose 50%)  25 ml  Q30M  PRN


 IV


 Hypoglycemia  1/9/21 13:30


 4/9/21 13:29   





 


 Dextrose


  (Dextrose 50%)  50 ml  Q30M  PRN


 IV


 Hypoglycemia  1/9/21 13:30


 4/9/21 13:29   





 


 Digoxin


  (Lanoxin)  0.125 mg  DAILY


 NG


   1/21/21 09:00


 4/21/21 08:59  1/28/21 08:48





 


 Docusate Sodium


  (Colace)  100 mg  TIDPRN  PRN


 GT


 Constipation  1/12/21 01:15


 2/11/21 01:14  1/12/21 01:42





 


 Enoxaparin Sodium


  (Lovenox)  80 mg  EVERY 12  HOURS


 SUBQ


   1/2/21 21:00


 4/2/21 20:59  1/28/21 21:25





 


 Fentanyl Citrate  250 ml @ 1


 mls/hr  Q24H


 IV


   1/27/21 18:45


 1/29/21 18:44  1/29/21 04:42





 


 Insulin Aspart


  (NovoLOG)    Q6HR


 SUBQ


   1/14/21 12:00


 4/9/21 17:59  1/28/21 18:00





 


 Insulin Aspart


  (NovoLOG)  6 units  EVERY 6  HOURS


 SUBQ


   1/29/21 12:00


 4/15/21 06:59   





 


 Insulin Detemir


  (Levemir)  10 units  Q12HR


 SUBQ


   1/26/21 09:00


 4/12/21 08:59  1/28/21 21:24





 


 Norepinephrine


 Bitartrate  250 ml @ 0


 mls/hr  Q24H


 IV


   1/28/21 00:30


 1/31/21 00:20  1/28/21 17:00





 


 Pantoprazole


  (Protonix)  40 mg  DAILY


 IVP


   1/14/21 09:00


 2/13/21 08:59  1/28/21 08:48





 


 Piperacillin Sod/


 Tazobactam Sod


 3.375 gm/Sodium


 Chloride  110 ml @ 


 27.5 mls/hr  EVERY 8  HOURS


 IVPB


   1/23/21 14:00


 2/1/21 13:59  1/29/21 06:10





 


 Quetiapine


 Fumarate


  (SEROqueL)  50 mg  Q12HR


 ORAL


   1/4/21 21:00


 2/18/21 20:59  1/28/21 21:24





 


 Sodium Chloride  500 ml @ 


 999 mls/hr  Q31M PRN


 IV


 For hypotension  1/15/21 18:30


 2/14/21 18:29   














Assessment/Plan


Assessment/Plan


1. COVID-19 pneumonia.


   - on Decadron, azithromycin, and ceftriaxone


   - Intubated now; will continue AC mode for now


          -Currently 100% FiO2. PEEP 12; SaO2 78-84%


            - ABG 7.26/63/62


             - Has R apical PTX and subcut emphysema


             - S/p R CT placement


2. Diabetes mellitus.; 


3. Elevated inflammatory markers.


4. High D-dimer. On full dose Lovenox


5. Hyper natremia; will continue D5W


6. Hyperglycemia.


   - BG control


7. Hypoxemia., secondary to #1; improved





DVT prophylaxis   On Lovenox.


Sedated; advised RN to increase sedation








Hypotensive


On levophed


Continue PEEP 12


Poor prognosis











Sung Lemos MD           Jan 29, 2021 10:06

## 2021-01-29 NOTE — EMERGENCY ROOM REPORT
History of Present Illness


General


Chief Complaint:  Flu Like Symptoms


Source:  Medical Record, PMD





Present Illness


Allergies:  


Coded Allergies:  


     No Known Allergies (Unverified , 6/11/19)





COVID-19 Screening


Contact w/high risk pt:  Yes


Experienced COVID-19 symptoms?:  Yes


COVID-19 Testing performed PTA:  No


COVID-19 Screening:  Positive COVID-19





Nursing Documentation-PMH


Hx Cardiac Problems:  No


Hx Cancer:  No


Hx Gastrointestinal Problems:  No


Hx Neurological Problems:  No





Physical Exam





Vital Signs








  Date Time  Temp Pulse Resp B/P (MAP) Pulse Ox O2 Delivery O2 Flow Rate FiO2


 


1/25/21 07:00    110/69    


 


1/25/21 07:00  88 19  83   


 


1/25/21 07:15        100


 


1/25/21 08:00 95.9       


 


1/25/21 08:00      Mechanical Ventilator  











Procedures


CPR/Code Blue


CPR/Code Blue Narrative


see code blue sheet for full narrative





Medical Decision Making


Diagnostic Impression:  


   Primary Impression:  


   Pneumonia due to COVID-19 virus


   Additional Impression:  


   hypoxic respiratory failure


ER Course


I was called to the ICU to evaluate this patient.  Patient coded.  Lost pulse.  

After multiple rounds of chest compressions and epinephrine x1 patient regained 

pulse.  Patient is already intubated.  Covid positive.  Coded earlier today as 

well.  Patient already on max Levophed.  Hypotensive.  Will start vasopressin.





Last Vital Signs








  Date Time  Temp Pulse Resp B/P (MAP) Pulse Ox O2 Delivery O2 Flow Rate FiO2


 


1/29/21 19:19  91 22     100


 


1/29/21 19:15    102/63 (76) 89   


 


1/29/21 19:00      Mechanical Ventilator  


 


1/29/21 16:00 97.7       








Status:  improved


Disposition:  ADMITTED AS INPATIENT


Condition:  Critical


Referrals:  


NOT CHOSEN IPA/MD,REFERRING (PCP)











Gene Pulido MD            Jan 29, 2021 20:36

## 2021-01-29 NOTE — NUR
NURSE NOTES:



Pt continues to desat. O2 sats remains in the 70s to 80s. Pt repositioned and suctioned 
numerous times. Pt sedation is adequate. BP and HR is stable. Called Resp Therapist to 
obtain ABG. Will call and talk with Dr. Lemos concerning pt's status after rcv ABG 
results.

## 2021-01-29 NOTE — NUR
RESPIRATORY NOTE:



Patient received on AC 20 Vt600 PEEP +12 @ 100% FiO2, no signs of respiratory distress or 
shortness of breath noted at this time. SpO2 84%. Pulse ox probe changed, suctioned prn with 
small secretions noted. SpO2 unchanged. Vent circuit secure and out of the way. Airway is 
secure and patent. Will continue to monitor.

## 2021-01-29 NOTE — NUR
NURSE NOTES:

Patient vitals stable at this time.  Respiratory status is stable at this time.  40mg laspix 
given for fluid overload with good output.  Levo running at 20mcg/hr. Fentanyl at 200mcg. 
Chest tube to wall suction. Tube feeding on hold as patient currently flat due to previous 
code.

## 2021-01-29 NOTE — DIAGNOSTIC IMAGING REPORT
Indication: Shortness of breath

 

Technique: One view of the chest

 

Comparison: 1/28/2021

 

Findings: Stable satisfactory positions of endotracheal tube, orogastric tube, left

arm PICC, right chest tube. Bilateral infiltrates are unchanged. Left pleural

effusion is unchanged.

 

Impression:  Unchanged, over one day, findings as above.

## 2021-01-29 NOTE — DIAGNOSTIC IMAGING REPORT
EXAM:

  XR Abdomen, 2 Views

 

CLINICAL HISTORY:

  TUBE PLACEMENT

 

TECHNIQUE:

  Frontal view of the abdomen/pelvis with upright view of the abdomen.

 

COMPARISON:

  X-ray abdomen 1/21/2021

 

FINDINGS:

  Lower thorax:  There are multifocal opacities throughout the lung bases,

 similar to the previous exam.  

  Intraperitoneal space:  No free air.

  Gastrointestinal tract:  Unremarkable.  No dilation.

  Bones/joints:  Unremarkable.

  Tubes, lines and devices:  An enteric tube courses below the level of 

the diaphragm with the terminus overlying the expected region of the 

distal stomach/pylorus with the side-port overlying the body of the 

stomach.  A right-sided chest tube is present. There are 2 

lines/catheters overlying the midline pelvis.

 

IMPRESSION:     

1.  No acute findings in the abdomen or pelvis.

2.  An enteric tube terminates in the expected region of the distal 

stomach/pylorus, similar to the previous exam.

3.  Multifocal opacities within the lung bases are similar to the 

previous exam.

## 2021-01-29 NOTE — NUR
NURSE NOTES:



Pt omar in the 40s and 30's. Arrived to bedside. Pt staring straight no response and no 
palpable pulse. Code called and CPR started. Levophed gtt @ 6mcg/min continued. Fentanyl gtt 
stopped.

## 2021-01-29 NOTE — NUR
RESPIRATORY NOTE:



Code blue called @ 1324. Upon arrival patient found pulseless, CPR 

immediately started by RTs. Patient ventilated via ventilator due to being 

COVID positive. After rounds of CPR, ROSC achieved @ 1329

## 2021-01-29 NOTE — GENERAL PROGRESS NOTE
Subjective


Allergies:  


Coded Allergies:  


     No Known Allergies (Unverified , 6/11/19)


Subjective


ac cardiic arrest-revived


on ventilator 60% fio2


on sediation


unresponsive


in icu


rt chest tube s placed due to pneumothorex


afib is controlled


hypotensuion better





Objective





Last 24 Hour Vital Signs








  Date Time  Temp Pulse Resp B/P (MAP) Pulse Ox O2 Delivery O2 Flow Rate FiO2


 


1/29/21 15:20  95 23     100


 


1/29/21 15:15  96 19 107/65 (79) 78   


 


1/29/21 15:00  96 23 106/63 (77) 78   


 


1/29/21 14:45  97 24 103/58 (73) 72   


 


1/29/21 14:30  98 23 102/64 (77) 78   


 


1/29/21 14:15  100 22 106/63 (77) 73   


 


1/29/21 14:00  102 24 112/65 (81) 74   


 


1/29/21 13:45  104 20 113/64 (80) 78   


 


1/29/21 13:33  118 21 164/83 (110) 41   


 


1/29/21 13:30  125 22 193/90 (124) 79   


 


1/29/21 13:15  100 18 97/59 (72) 83   


 


1/29/21 13:00  96 22 102/68 (79) 88   


 


1/29/21 12:45  96 20 95/59 (71) 87   


 


1/29/21 12:30  98 20 100/68 (79) 83   


 


1/29/21 12:15  98 20 99/62 (74) 79   


 


1/29/21 12:00        100


 


1/29/21 12:00 98.2 98 21 88/55 (66) 77   


 


1/29/21 12:00      Mechanical Ventilator  


 


1/29/21 11:45  99 19 99/56 (70) 82   


 


1/29/21 11:30  101 20 96/56 (69) 80   


 


1/29/21 11:15  101 21 88/54 (65) 84   


 


1/29/21 11:00  101 19 88/49 (62) 61   


 


1/29/21 11:00  101 20     100


 


1/29/21 10:45  99 20 87/50 (62) 83   


 


1/29/21 10:30  99 20 100/58 (72) 88   


 


1/29/21 10:19  100      


 


1/29/21 10:15  100 20 100/62 (75) 89   


 


1/29/21 10:00  101 21 96/59 (71) 89   


 


1/29/21 09:45  103 20 96/60 (72) 85   


 


1/29/21 09:30  101 20 94/52 (66) 89   


 


1/29/21 09:15  101 20 94/56 (69) 88   


 


1/29/21 09:00    65/40    


 


1/29/21 09:00  105 19 98/58 (71) 80   


 


1/29/21 08:45  96 20 65/40 (48) 88   


 


1/29/21 08:30  100 22 92/55 (67) 87   


 


1/29/21 08:15  103 21 96/57 (70) 84   


 


1/29/21 08:10  98 21 80/49 (59) 85   


 


1/29/21 08:00 101.9 96 18 68/43 (51) 83   


 


1/29/21 08:00      Mechanical Ventilator  


 


1/29/21 08:00        100


 


1/29/21 07:45  94 19 71/38 (49) 88   


 


1/29/21 07:30  99 20 99/56 (70) 89   


 


1/29/21 07:29 101.5       


 


1/29/21 07:15  97 20 97/55 (69) 89   


 


1/29/21 07:00  99 19  89   


 


1/29/21 07:00  96 24     100


 


1/29/21 07:00 101.9 99 19 99/52 (68) 89   


 


1/29/21 06:45  99 19 98/53 (68) 89   


 


1/29/21 06:45  99 19 98/53 (68) 89   


 


1/29/21 06:40 102.0       


 


1/29/21 06:30  100 20 98/55 (69) 89   


 


1/29/21 06:30  100 20 98/55 (69) 89   


 


1/29/21 06:15  100 21 97/54 (68) 89   


 


1/29/21 06:15  100 21  89   


 


1/29/21 06:15  100 21 97/54 (68) 89   


 


1/29/21 06:15  100 21 97/54 (68) 89   


 


1/29/21 06:00  101 20 98/55 (69) 88   


 


1/29/21 06:00  101 20 98/55 (69) 88   


 


1/29/21 06:00 102.2 101 20 98/55 (69) 88   


 


1/29/21 06:00  101 20 98/55 (69) 88   


 


1/29/21 05:45  102 21 105/55 (72) 91   


 


1/29/21 05:30  101 21 94/63 (73) 91   


 


1/29/21 05:15  100 20 98/59 (72) 91   


 


1/29/21 05:00  98 21 91/58 (69) 91   


 


1/29/21 04:42   20 98/64  Mechanical Ventilator  100


 


1/29/21 04:30  97 20 90/55 (67) 91   


 


1/29/21 04:15  96 20 93/57 (69) 92   


 


1/29/21 04:00  94      


 


1/29/21 04:00  95 18 90/56 (67) 92   


 


1/29/21 04:00  95 18 90/56 (67) 92   


 


1/29/21 04:00 100.7       


 


1/29/21 04:00        100


 


1/29/21 04:00      Mechanical Ventilator  


 


1/29/21 03:45  94 12 95/58 (70) 93   


 


1/29/21 03:45  94 12 95/58 (70) 93   


 


1/29/21 03:30  93 9 95/54 (68) 94   


 


1/29/21 03:30  93 9 95/54 (68) 94   


 


1/29/21 03:15  91 7 94/55 (68) 95   


 


1/29/21 03:15  91 7 94/55 (68) 95   


 


1/29/21 03:00  89 19 98/56 (70) 94   


 


1/29/21 03:00  89 19  94   


 


1/29/21 03:00  89 19 98/56 (70) 94   


 


1/29/21 02:57  88 20     100


 


1/29/21 02:45  87 21 96/54 (68) 93   


 


1/29/21 02:45  87 21 96/54 (68) 93   


 


1/29/21 02:45  87 21 96/54 (68) 93   


 


1/29/21 02:30  85 19 95/54 (68) 94   


 


1/29/21 02:30  85 19 95/54 (68) 94   


 


1/29/21 02:30  85 19 95/54 (68) 94   


 


1/29/21 02:15  86 20 93/55 (68) 94   


 


1/29/21 02:15  86 20 93/55 (68) 94   


 


1/29/21 02:15  86 20 93/55 (68) 94   


 


1/29/21 02:15  86 20 93/55 (68) 94   


 


1/29/21 02:00  86 20 90/53 (65) 94   


 


1/29/21 02:00  86 20 90/53 (65) 94   


 


1/29/21 02:00  86 20 90/53 (65) 94   


 


1/29/21 02:00  86 20 90/53 (65) 94   


 


1/29/21 01:45  85 18 94/54 (67) 94   


 


1/29/21 01:45  85 18 94/54 (67) 94   


 


1/29/21 01:45  85 18 94/54 (67) 94   


 


1/29/21 01:45  85 18 94/54 (67) 94   


 


1/29/21 01:30  85 17 94/55 (68) 93   


 


1/29/21 01:30  85 17 94/55 (68) 93   


 


1/29/21 01:30  85 17 94/55 (68) 93   


 


1/29/21 01:30  85 17 94/55 (68) 93   


 


1/29/21 01:15  86 20 94/55 (68) 93   


 


1/29/21 01:15  86 20 94/55 (68) 93   


 


1/29/21 01:15  86 20 94/55 (68) 93   


 


1/29/21 01:15  86 20 94/55 (68) 93   


 


1/29/21 01:00  85 20 94/53 (67) 93   


 


1/29/21 01:00  85 20 94/53 (67) 93   


 


1/29/21 01:00  85 20 94/53 (67) 93   


 


1/29/21 01:00  85 20 94/53 (67) 93   


 


1/29/21 01:00  85 20 94/53 (67) 93   


 


1/29/21 00:45  85 20 96/55 (69) 92   


 


1/29/21 00:45  85 20 96/55 (69) 92   


 


1/29/21 00:45  85 20 96/55 (69) 92   


 


1/29/21 00:45  85 20 96/55 (69) 92   


 


1/29/21 00:45  85 20 96/55 (69) 92   


 


1/29/21 00:30  86 15 97/55 (69) 93   


 


1/29/21 00:30  86 15 97/55 (69) 93   


 


1/29/21 00:30  86 15 97/55 (69) 93   


 


1/29/21 00:30  86 15 97/55 (69) 93   


 


1/29/21 00:30  86 15 97/55 (69) 93   


 


1/29/21 00:15  86 0 100/60 (73) 94   


 


1/29/21 00:15  86 0 100/60 (73) 94   


 


1/29/21 00:15  86 0 100/60 (73) 94   


 


1/29/21 00:15  86 0 100/60 (73) 94   


 


1/29/21 00:15  86 0 100/60 (73) 94   


 


1/29/21 00:10      Mechanical Ventilator  


 


1/29/21 00:00 99.9       


 


1/29/21 00:00  86 20 100/60 (73) 89   


 


1/29/21 00:00  86 20 100/60 (73) 89   


 


1/29/21 00:00  86 20 100/60 (73) 89   


 


1/29/21 00:00  86 20 100/60 (73) 89   


 


1/29/21 00:00  86      


 


1/29/21 00:00  86 20 100/60 (73) 89   


 


1/29/21 00:00  86 20 100/60 (73) 89   


 


1/28/21 23:45  87 20 92/59 (70) 94   


 


1/28/21 23:30  86 20 96/63 (74) 93   


 


1/28/21 23:15  85 20 94/63 (73) 94   


 


1/28/21 23:00   20 93/44  Mechanical Ventilator  100


 


1/28/21 23:00  84 7 96/63 (74) 90   


 


1/28/21 23:00  84 7 96/63 (74) 90   


 


1/28/21 22:53  85 21     100


 


1/28/21 22:45  85 19 93/53 (66) 93   


 


1/28/21 22:30  84 20 94/60 (71) 94   


 


1/28/21 22:15  82 20 98/63 (75) 92   


 


1/28/21 22:00  82 19 98/62 (74) 90   


 


1/28/21 22:00   18 98/62  Mechanical Ventilator  100


 


1/28/21 22:00  82 19 98/62 (74) 90   


 


1/28/21 21:45  82 21 104/65 (78) 90   


 


1/28/21 21:30  82 20 99/62 (74) 91   


 


1/28/21 21:25   20 102/67  Mechanical Ventilator  100


 


1/28/21 21:15  83 20 102/67 (79) 90   


 


1/28/21 21:00  81 20 98/67 (77) 92   


 


1/28/21 20:45  81 21 103/65 (78) 91   


 


1/28/21 20:30  80 0 102/65 (77) 90   


 


1/28/21 20:30   20 110/68  Non-Rebreather  100


 


1/28/21 20:15  81 11 110/68 (82) 91   


 


1/28/21 20:00 99.3       


 


1/28/21 20:00  82 7 111/67 (82) 91   


 


1/28/21 20:00  84      


 


1/28/21 20:00      Mechanical Ventilator  


 


1/28/21 20:00        100


 


1/28/21 19:45  84 18 121/68 (85) 87   


 


1/28/21 19:30  84 20 108/69 (82)    


 


1/28/21 19:15  85 20 98/63 (75) 85   


 


1/28/21 19:00  85 18 123/77 (92) 83   


 


1/28/21 19:00   20 123/77  Mechanical Ventilator  100


 


1/28/21 19:00  85 18 123/77 (92) 83   


 


1/28/21 18:55  84 20     100


 


1/28/21 18:45  85 21 116/71 (86) 90   


 


1/28/21 18:40   21 116/71  Mechanical Ventilator  100


 


1/28/21 18:30  85 20 132/75 (94) 91   


 


1/28/21 18:25   20 130/86  Mechanical Ventilator  100


 


1/28/21 18:15  87 23 130/86 (101) 85   


 


1/28/21 18:15   20 130/86  Mechanical Ventilator  100


 


1/28/21 18:00  88 21 132/80 (97) 84   


 


1/28/21 18:00   20 132/80  Mechanical Ventilator  100


 


1/28/21 17:45  87 20 134/83 (100) 88   


 


1/28/21 17:35   20 119/64  Mechanical Ventilator  100


 


1/28/21 17:30  90 20 119/64 (82) 67   


 


1/28/21 17:15  87 21 117/71 (86) 85   


 


1/28/21 17:00    117/71    


 


1/28/21 17:00   20 117/71  Mechanical Ventilator  100


 


1/28/21 17:00  87 21 119/71 (87) 94   


 


1/28/21 17:00  86 20 132/80 (97) 86   


 


1/28/21 16:45  89 20 116/66 (83) 90   


 


1/28/21 16:30  89 19 117/70 (86) 91   


 


1/28/21 16:29   20 104/66  Mechanical Ventilator  100

















Intake and Output  


 


 1/28/21 1/29/21





 19:00 07:00


 


Intake Total 1765.327 ml 572 ml


 


Output Total 1350 ml 1065 ml


 


Balance 415.327 ml -493 ml


 


  


 


IV Total 1105.327 ml 77 ml


 


Tube Feeding 660 ml 495 ml


 


Output Urine Total 960 ml 1065 ml


 


Stool Total 150 ml 


 


Chest Tube Drainage Total 240 ml 








Laboratory Tests


1/28/21 17:45: POC Whole Blood Glucose 189H


1/29/21 10:11: POC Whole Blood Glucose [Pending]


1/29/21 12:12: POC Whole Blood Glucose [Pending]


1/29/21 13:35: 


Arterial Blood pH 7.148*L, Arterial Blood Partial Pressure CO2 92.4*H, Arterial 

Blood Partial Pressure O2 35.1*L, Arterial Blood HCO3 31.3H, Arterial Blood 

Oxygen Saturation 59.7*L, Arterial Blood Base Excess 0.6, Abelardo Test Positive


Height (Feet):  5


Height (Inches):  7.00


Weight (Pounds):  198


General Appearance:  severe distress


Neck:  supple


Cardiovascular:  regular rhythm


Respiratory/Chest:  rhonchi - bilaterally


Abdomen:  non tender, soft


Extremities:  non-tender





Assessment/Plan


Assessment/Plan:


ac cardiic arrest-cpr-resuscitated


hypotension better on tirate down levophed drip


afib with rvr on digoxine , add amiodarone , cardiology on case


covid pna


ac resp distress on ventilator


etoh abused


dehydration


agitated -halodol


severe meta acidosis


cont on ventilator at icu


cont iv abx and iv decadrone


pulmonary and gi on consult


dw charge nurse











Moi Travis MD             Jan 29, 2021 16:30

## 2021-01-29 NOTE — SURGERY PROGRESS NOTE
Surgery Progress Note


Subjective


Procedure Performed


Right tube thoracostomy chest tube insertion


Additional Comments


declining


ill appearing


no n/v


labs noted


saturating in the 80's





Objective





Last 24 Hour Vital Signs








  Date Time  Temp Pulse Resp B/P (MAP) Pulse Ox O2 Delivery O2 Flow Rate FiO2


 


1/29/21 14:15  100 22 106/63 (77) 73   


 


1/29/21 14:00  102 24 112/65 (81) 74   


 


1/29/21 13:45  104 20 113/64 (80) 78   


 


1/29/21 13:33  118 21 164/83 (110) 41   


 


1/29/21 13:30  125 22 193/90 (124) 79   


 


1/29/21 13:15  100 18 97/59 (72) 83   


 


1/29/21 13:00  96 22 102/68 (79) 88   


 


1/29/21 12:45  96 20 95/59 (71) 87   


 


1/29/21 12:30  98 20 100/68 (79) 83   


 


1/29/21 12:15  98 20 99/62 (74) 79   


 


1/29/21 12:00        100


 


1/29/21 12:00 98.2 98 21 88/55 (66) 77   


 


1/29/21 11:45  99 19 99/56 (70) 82   


 


1/29/21 11:30  101 20 96/56 (69) 80   


 


1/29/21 11:15  101 21 88/54 (65) 84   


 


1/29/21 11:00  101 19 88/49 (62) 61   


 


1/29/21 11:00  101 20     100


 


1/29/21 10:45  99 20 87/50 (62) 83   


 


1/29/21 10:30  99 20 100/58 (72) 88   


 


1/29/21 10:19  100      


 


1/29/21 10:15  100 20 100/62 (75) 89   


 


1/29/21 10:00  101 21 96/59 (71) 89   


 


1/29/21 09:45  103 20 96/60 (72) 85   


 


1/29/21 09:30  101 20 94/52 (66) 89   


 


1/29/21 09:15  101 20 94/56 (69) 88   


 


1/29/21 09:00    65/40    


 


1/29/21 09:00  105 19 98/58 (71) 80   


 


1/29/21 08:45  96 20 65/40 (48) 88   


 


1/29/21 08:30  100 22 92/55 (67) 87   


 


1/29/21 08:15  103 21 96/57 (70) 84   


 


1/29/21 08:10  98 21 80/49 (59) 85   


 


1/29/21 08:00 101.9 96 18 68/43 (51) 83   


 


1/29/21 08:00        100


 


1/29/21 07:45  94 19 71/38 (49) 88   


 


1/29/21 07:30  99 20 99/56 (70) 89   


 


1/29/21 07:29 101.5       


 


1/29/21 07:15  97 20 97/55 (69) 89   


 


1/29/21 07:00  99 19  89   


 


1/29/21 07:00  96 24     100


 


1/29/21 07:00 101.9 99 19 99/52 (68) 89   


 


1/29/21 06:45  99 19 98/53 (68) 89   


 


1/29/21 06:45  99 19 98/53 (68) 89   


 


1/29/21 06:40 102.0       


 


1/29/21 06:30  100 20 98/55 (69) 89   


 


1/29/21 06:30  100 20 98/55 (69) 89   


 


1/29/21 06:15  100 21 97/54 (68) 89   


 


1/29/21 06:15  100 21  89   


 


1/29/21 06:15  100 21 97/54 (68) 89   


 


1/29/21 06:15  100 21 97/54 (68) 89   


 


1/29/21 06:00  101 20 98/55 (69) 88   


 


1/29/21 06:00  101 20 98/55 (69) 88   


 


1/29/21 06:00 102.2 101 20 98/55 (69) 88   


 


1/29/21 06:00  101 20 98/55 (69) 88   


 


1/29/21 05:45  102 21 105/55 (72) 91   


 


1/29/21 05:30  101 21 94/63 (73) 91   


 


1/29/21 05:15  100 20 98/59 (72) 91   


 


1/29/21 05:00  98 21 91/58 (69) 91   


 


1/29/21 04:42   20 98/64  Mechanical Ventilator  100


 


1/29/21 04:30  97 20 90/55 (67) 91   


 


1/29/21 04:15  96 20 93/57 (69) 92   


 


1/29/21 04:00  94      


 


1/29/21 04:00  95 18 90/56 (67) 92   


 


1/29/21 04:00  95 18 90/56 (67) 92   


 


1/29/21 04:00 100.7       


 


1/29/21 04:00        100


 


1/29/21 04:00      Mechanical Ventilator  


 


1/29/21 03:45  94 12 95/58 (70) 93   


 


1/29/21 03:45  94 12 95/58 (70) 93   


 


1/29/21 03:30  93 9 95/54 (68) 94   


 


1/29/21 03:30  93 9 95/54 (68) 94   


 


1/29/21 03:15  91 7 94/55 (68) 95   


 


1/29/21 03:15  91 7 94/55 (68) 95   


 


1/29/21 03:00  89 19 98/56 (70) 94   


 


1/29/21 03:00  89 19  94   


 


1/29/21 03:00  89 19 98/56 (70) 94   


 


1/29/21 02:57  88 20     100


 


1/29/21 02:45  87 21 96/54 (68) 93   


 


1/29/21 02:45  87 21 96/54 (68) 93   


 


1/29/21 02:45  87 21 96/54 (68) 93   


 


1/29/21 02:30  85 19 95/54 (68) 94   


 


1/29/21 02:30  85 19 95/54 (68) 94   


 


1/29/21 02:30  85 19 95/54 (68) 94   


 


1/29/21 02:15  86 20 93/55 (68) 94   


 


1/29/21 02:15  86 20 93/55 (68) 94   


 


1/29/21 02:15  86 20 93/55 (68) 94   


 


1/29/21 02:15  86 20 93/55 (68) 94   


 


1/29/21 02:00  86 20 90/53 (65) 94   


 


1/29/21 02:00  86 20 90/53 (65) 94   


 


1/29/21 02:00  86 20 90/53 (65) 94   


 


1/29/21 02:00  86 20 90/53 (65) 94   


 


1/29/21 01:45  85 18 94/54 (67) 94   


 


1/29/21 01:45  85 18 94/54 (67) 94   


 


1/29/21 01:45  85 18 94/54 (67) 94   


 


1/29/21 01:45  85 18 94/54 (67) 94   


 


1/29/21 01:30  85 17 94/55 (68) 93   


 


1/29/21 01:30  85 17 94/55 (68) 93   


 


1/29/21 01:30  85 17 94/55 (68) 93   


 


1/29/21 01:30  85 17 94/55 (68) 93   


 


1/29/21 01:15  86 20 94/55 (68) 93   


 


1/29/21 01:15  86 20 94/55 (68) 93   


 


1/29/21 01:15  86 20 94/55 (68) 93   


 


1/29/21 01:15  86 20 94/55 (68) 93   


 


1/29/21 01:00  85 20 94/53 (67) 93   


 


1/29/21 01:00  85 20 94/53 (67) 93   


 


1/29/21 01:00  85 20 94/53 (67) 93   


 


1/29/21 01:00  85 20 94/53 (67) 93   


 


1/29/21 01:00  85 20 94/53 (67) 93   


 


1/29/21 00:45  85 20 96/55 (69) 92   


 


1/29/21 00:45  85 20 96/55 (69) 92   


 


1/29/21 00:45  85 20 96/55 (69) 92   


 


1/29/21 00:45  85 20 96/55 (69) 92   


 


1/29/21 00:45  85 20 96/55 (69) 92   


 


1/29/21 00:30  86 15 97/55 (69) 93   


 


1/29/21 00:30  86 15 97/55 (69) 93   


 


1/29/21 00:30  86 15 97/55 (69) 93   


 


1/29/21 00:30  86 15 97/55 (69) 93   


 


1/29/21 00:30  86 15 97/55 (69) 93   


 


1/29/21 00:15  86 0 100/60 (73) 94   


 


1/29/21 00:15  86 0 100/60 (73) 94   


 


1/29/21 00:15  86 0 100/60 (73) 94   


 


1/29/21 00:15  86 0 100/60 (73) 94   


 


1/29/21 00:15  86 0 100/60 (73) 94   


 


1/29/21 00:10      Mechanical Ventilator  


 


1/29/21 00:00 99.9       


 


1/29/21 00:00  86 20 100/60 (73) 89   


 


1/29/21 00:00  86 20 100/60 (73) 89   


 


1/29/21 00:00  86 20 100/60 (73) 89   


 


1/29/21 00:00  86 20 100/60 (73) 89   


 


1/29/21 00:00  86      


 


1/29/21 00:00  86 20 100/60 (73) 89   


 


1/29/21 00:00  86 20 100/60 (73) 89   


 


1/28/21 23:45  87 20 92/59 (70) 94   


 


1/28/21 23:30  86 20 96/63 (74) 93   


 


1/28/21 23:15  85 20 94/63 (73) 94   


 


1/28/21 23:00   20 93/44  Mechanical Ventilator  100


 


1/28/21 23:00  84 7 96/63 (74) 90   


 


1/28/21 23:00  84 7 96/63 (74) 90   


 


1/28/21 22:53  85 21     100


 


1/28/21 22:45  85 19 93/53 (66) 93   


 


1/28/21 22:30  84 20 94/60 (71) 94   


 


1/28/21 22:15  82 20 98/63 (75) 92   


 


1/28/21 22:00  82 19 98/62 (74) 90   


 


1/28/21 22:00   18 98/62  Mechanical Ventilator  100


 


1/28/21 22:00  82 19 98/62 (74) 90   


 


1/28/21 21:45  82 21 104/65 (78) 90   


 


1/28/21 21:30  82 20 99/62 (74) 91   


 


1/28/21 21:25   20 102/67  Mechanical Ventilator  100


 


1/28/21 21:15  83 20 102/67 (79) 90   


 


1/28/21 21:00  81 20 98/67 (77) 92   


 


1/28/21 20:45  81 21 103/65 (78) 91   


 


1/28/21 20:30  80 0 102/65 (77) 90   


 


1/28/21 20:30   20 110/68  Non-Rebreather  100


 


1/28/21 20:15  81 11 110/68 (82) 91   


 


1/28/21 20:00 99.3       


 


1/28/21 20:00  82 7 111/67 (82) 91   


 


1/28/21 20:00  84      


 


1/28/21 20:00      Mechanical Ventilator  


 


1/28/21 20:00        100


 


1/28/21 19:45  84 18 121/68 (85) 87   


 


1/28/21 19:30  84 20 108/69 (82)    


 


1/28/21 19:15  85 20 98/63 (75) 85   


 


1/28/21 19:00  85 18 123/77 (92) 83   


 


1/28/21 19:00   20 123/77  Mechanical Ventilator  100


 


1/28/21 19:00  85 18 123/77 (92) 83   


 


1/28/21 18:55  84 20     100


 


1/28/21 18:45  85 21 116/71 (86) 90   


 


1/28/21 18:40   21 116/71  Mechanical Ventilator  100


 


1/28/21 18:30  85 20 132/75 (94) 91   


 


1/28/21 18:25   20 130/86  Mechanical Ventilator  100


 


1/28/21 18:15  87 23 130/86 (101) 85   


 


1/28/21 18:15   20 130/86  Mechanical Ventilator  100


 


1/28/21 18:00  88 21 132/80 (97) 84   


 


1/28/21 18:00   20 132/80  Mechanical Ventilator  100


 


1/28/21 17:45  87 20 134/83 (100) 88   


 


1/28/21 17:35   20 119/64  Mechanical Ventilator  100


 


1/28/21 17:30  90 20 119/64 (82) 67   


 


1/28/21 17:15  87 21 117/71 (86) 85   


 


1/28/21 17:00    117/71    


 


1/28/21 17:00   20 117/71  Mechanical Ventilator  100


 


1/28/21 17:00  87 21 119/71 (87) 94   


 


1/28/21 17:00  86 20 132/80 (97) 86   


 


1/28/21 16:45  89 20 116/66 (83) 90   


 


1/28/21 16:30  89 19 117/70 (86) 91   


 


1/28/21 16:29   20 104/66  Mechanical Ventilator  100


 


1/28/21 16:15  86 20 104/66 (79) 94   


 


1/28/21 16:00   20 102/62  Mechanical Ventilator  100


 


1/28/21 16:00  86      


 


1/28/21 16:00 98.3 86 21 102/62 (75) 94   


 


1/28/21 16:00      Mechanical Ventilator  


 


1/28/21 15:45  86 20 104/61 (75) 95   


 


1/28/21 15:30  88 21 103/62 (76) 95   


 


1/28/21 15:15  88 20 104/63 (77) 95   


 


1/28/21 15:00   20 104/63  Mechanical Ventilator  100


 


1/28/21 15:00  89 20 108/62 (77) 96   








I&O











Intake and Output  


 


 1/28/21 1/29/21





 19:00 07:00


 


Intake Total 1765.327 ml 572 ml


 


Output Total 1350 ml 1065 ml


 


Balance 415.327 ml -493 ml


 


  


 


IV Total 1105.327 ml 77 ml


 


Tube Feeding 660 ml 495 ml


 


Output Urine Total 960 ml 1065 ml


 


Stool Total 150 ml 


 


Chest Tube Drainage Total 240 ml 








Dressing:  saturated


Drains:  other


Cardiovascular:  RSR, other


Respiratory:  decreased breath sounds, other


Abdomen:  soft, present bowel sounds, other, decreased bowel sounds


Extremities:  edema, no tenderness, no cyanosis





Laboratory Tests








Test


 1/28/21


17:45 1/29/21


10:11 1/29/21


12:12 1/29/21


13:35


 


POC Whole Blood Glucose


 189 MG/DL


()  H Pending  


 Pending  


 





 


Arterial Blood pH


 


 


 


 7.148


(7.350-7.450)


 


Arterial Blood Partial


Pressure CO2 


 


 


 92.4 mmHg


(35.0-45.0)  *H


 


Arterial Blood Partial


Pressure O2 


 


 


 35.1 mmHg


(75.0-100.0)


 


Arterial Blood HCO3


 


 


 


 31.3 mmol/L


(22.0-26.0)  H


 


Arterial Blood Oxygen


Saturation 


 


 


 59.7 %


()  *L


 


Arterial Blood Base Excess    0.6 (-2-2)  


 


Abelardo Test    Positive  











Plan


Problems:  


(1) Pneumonia due to COVID-19 virus


Assessment & Plan:  Interim endotracheal intubation, endotracheal tube tip in 

good position


approximately 4 cm above the tito. There are extensive bilateral infiltrates, 

which


are markedly increased from the prior study. Pleural spaces are probably clear, 

not


well demonstrated


Markedly increased and now extensive bilateral infiltrates 


cont as per pulm and iID





(2) Hypoxia


Assessment & Plan:  patient developing DTI. in current condition, critically ill

and declining potential inevitable decline 


all care precautions taken and will cont to monitor and assist with improvement 





(3) Abdominal pain


Assessment & Plan:  65M abd pain, septic, covid, intubated ons upport


currently abd exam benign but limited given condition


line bo mary noted


okay for meds and feeds


nutritional optimization 


DAILY ESTIMATED NEEDS:


Needs based on Critical Care/ 73kg abw 


22-28  kcals/kg 


9614-1026  total kcals


1.2-2  g protein/kg


  g total protein 


25-30  mL/kg


2848-8746  total fluid mLs





NUTRITION DIAGNOSIS:


Swallowing difficulty R/T respiratory failure as evidenced by pt now


orally intubated, OGT in place, NPO





 


CURRENT TF:NPO 





 





ENTERAL NUTRITION RECOMMENDATIONS:


Vital AF 1.2 @ 60ml/hr x 24 hrs  to provide 1440ml, 1728kcal, 116g prot, 1167ml 

free water 





* As medically appropriate, initiate critical care and carb controlled TF 

formula of Vital AF 1.2


* Initiate @ 20ml/hr x 6hrs, advance 10ml q 4-6 hrs as tolerated to goal rate


* HOB over 30 degrees/ water flush per MD


* Does not exceed est kcal needs w/ Propofol running @ 8.083ml/hr x 24 hrs 

(provides 213 lipid kcal)





 





ADDITIONAL RECOMMENDATIONS:


* Calibrated bedscale wt 


* Monitor BGs closely w/ Decadron and TF, need for long acting insulin 


* Monitor lytes, replete as needed  


* Monitor Propofol rate, need to adjust TF rate  





(4) Acute metabolic encephalopathy


(5) Pneumothorax on right


Assessment & Plan:  right ptx


chest tube placed


decreased air


stable ptx


cont tube to suction


Endotracheal tube tip projects at the level of the thoracic inlet.


Orogastric tube tip projects at the level of the gastric fundus. Again 

demonstrated


is extensive subcutaneous emphysema, which appears somewhat worse on the left. 

Right


chest tube remains in place. Small right medial and apical pneumothorax are 

present,


slightly more conspicuous than on the previous exam, still small, somewhat 

difficult


to identify due to overlying subcutaneous emphysema. There is probably a tiny 

left


apical pneumothorax as well. Previously demonstrated pneumomediastinum has 

improved


considerably although still present. Bilateral infiltrates appear worse on the 

right.


 


Impression: Equivocally slightly increased but still small right pneumothorax.


 


Suspect tiny left apical pneumothorax


 


Improving pneumomediastinum


 


Worsening subcutaneous emphysema


 


Worsening right and stable left lung infiltrates 





 Right chest tube remains. There is still a small apical pneumothorax. Tiny


left apical pneumothorax persists, unchanged. There is decreased 

pneumomediastinum.


Subcutaneous gas has decreased as well. Bilateral infiltrates are unchanged.


Endotracheal tube remains at the level of the thoracic inlet. Orogastric tube is


again demonstrated, tip not well-visualized but probably stable in position


 


Impression: Stable tiny bilateral apical pneumothoraces


 


Unchanged bilateral infiltrates


 


Decreased pneumomediastinum and subcutaneous emphysema














Norberto Vazquez                Jan 29, 2021 14:54

## 2021-01-29 NOTE — EMERGENCY ROOM REPORT
Physical Exam





Vital Signs








  Date Time  Temp Pulse Resp B/P (MAP) Pulse Ox O2 Delivery O2 Flow Rate FiO2


 


1/25/21 07:00    110/69    


 


1/25/21 07:00  88 19  83   


 


1/25/21 07:15        100


 


1/25/21 08:00 95.9       


 


1/25/21 08:00      Mechanical Ventilator  











Medical Decision Making


Diagnostic Impression:  


   Primary Impression:  


   Pneumonia due to COVID-19 virus


   Additional Impression:  


   hypoxic respiratory failure


ER Course


Called to ICU for CODE BLUE.  Patient intubated for respiratory failure 

secondary to COVID-19 pneumonia.  On my arrival the patient was undergoing 

compressions already received epinephrine.  Initial rhythm was PEA according to 

nursing report.  Next rhythm was asystole.  CPR continued and bicarb was given. 

Third pulse check patient had narrow complex tachycardia and palpable pulse.  

Blood pressure stabilized.  Remainder of care per ICU team.





Last Vital Signs








  Date Time  Temp Pulse Resp B/P (MAP) Pulse Ox O2 Delivery O2 Flow Rate FiO2


 


1/29/21 11:00  101 20     100


 


1/29/21 09:00    65/40    


 


1/29/21 07:29 101.5       


 


1/29/21 07:00     89   


 


1/29/21 04:42      Mechanical Ventilator  








Disposition:  ADMITTED AS INPATIENT


Condition:  Critical


Referrals:  


NOT CHOSEN IPA/MD,REFERRING (PCP)





Procedures


CPR/Code Blue


CPR/Code Blue Narrative


See MDM section for CODE BLUE narrative











Hoang Brown MD              Jan 29, 2021 13:34

## 2021-01-29 NOTE — GENERAL PROGRESS NOTE
Subjective


ROS Limited/Unobtainable:  Yes


Allergies:  


Coded Allergies:  


     No Known Allergies (Unverified , 6/11/19)


Subjective


events noted - interval notes reviewed 


glucose values are stable 


in ICU








Item Value  Date Time


 


Bedside Blood Glucose 107 mg/dl 1/29/21 0617


 


Bedside Blood Glucose 127 mg/dl H 1/29/21 0000


 


Bedside Blood Glucose 197 mg/dl H 1/28/21 2124


 


Bedside Blood Glucose 189 mg/dl H 1/28/21 1800


 


Bedside Blood Glucose 103 mg/dl 1/28/21 1200


 


Bedside Blood Glucose 122 mg/dl H 1/28/21 0917











Objective





Last 24 Hour Vital Signs








  Date Time  Temp Pulse Resp B/P (MAP) Pulse Ox O2 Delivery O2 Flow Rate FiO2


 


1/29/21 06:15  100 21 97/54 (68) 89   


 


1/29/21 06:15  100 21  89   


 


1/29/21 06:00 102.2 101 20 98/55 (69) 88   


 


1/29/21 06:00  101 20 98/55 (69) 88   


 


1/29/21 05:45  102 21 105/55 (72) 91   


 


1/29/21 05:30  101 21 94/63 (73) 91   


 


1/29/21 05:15  100 20 98/59 (72) 91   


 


1/29/21 05:00  98 21 91/58 (69) 91   


 


1/29/21 04:42   20 98/64  Mechanical Ventilator  100


 


1/29/21 04:30  97 20 90/55 (67) 91   


 


1/29/21 04:15  96 20 93/57 (69) 92   


 


1/29/21 04:00  94      


 


1/29/21 04:00  95 18 90/56 (67) 92   


 


1/29/21 04:00  95 18 90/56 (67) 92   


 


1/29/21 04:00 100.7       


 


1/29/21 04:00        100


 


1/29/21 04:00      Mechanical Ventilator  


 


1/29/21 03:45  94 12 95/58 (70) 93   


 


1/29/21 03:45  94 12 95/58 (70) 93   


 


1/29/21 03:30  93 9 95/54 (68) 94   


 


1/29/21 03:30  93 9 95/54 (68) 94   


 


1/29/21 03:15  91 7 94/55 (68) 95   


 


1/29/21 03:15  91 7 94/55 (68) 95   


 


1/29/21 03:00  89 19 98/56 (70) 94   


 


1/29/21 03:00  89 19  94   


 


1/29/21 03:00  89 19 98/56 (70) 94   


 


1/29/21 02:57  88 20     100


 


1/29/21 02:45  87 21 96/54 (68) 93   


 


1/29/21 02:45  87 21 96/54 (68) 93   


 


1/29/21 02:45  87 21 96/54 (68) 93   


 


1/29/21 02:30  85 19 95/54 (68) 94   


 


1/29/21 02:30  85 19 95/54 (68) 94   


 


1/29/21 02:30  85 19 95/54 (68) 94   


 


1/29/21 02:15  86 20 93/55 (68) 94   


 


1/29/21 02:15  86 20 93/55 (68) 94   


 


1/29/21 02:15  86 20 93/55 (68) 94   


 


1/29/21 02:15  86 20 93/55 (68) 94   


 


1/29/21 02:00  86 20 90/53 (65) 94   


 


1/29/21 02:00  86 20 90/53 (65) 94   


 


1/29/21 02:00  86 20 90/53 (65) 94   


 


1/29/21 02:00  86 20 90/53 (65) 94   


 


1/29/21 01:45  85 18 94/54 (67) 94   


 


1/29/21 01:45  85 18 94/54 (67) 94   


 


1/29/21 01:45  85 18 94/54 (67) 94   


 


1/29/21 01:45  85 18 94/54 (67) 94   


 


1/29/21 01:30  85 17 94/55 (68) 93   


 


1/29/21 01:30  85 17 94/55 (68) 93   


 


1/29/21 01:30  85 17 94/55 (68) 93   


 


1/29/21 01:30  85 17 94/55 (68) 93   


 


1/29/21 01:15  86 20 94/55 (68) 93   


 


1/29/21 01:15  86 20 94/55 (68) 93   


 


1/29/21 01:15  86 20 94/55 (68) 93   


 


1/29/21 01:15  86 20 94/55 (68) 93   


 


1/29/21 01:00  85 20 94/53 (67) 93   


 


1/29/21 01:00  85 20 94/53 (67) 93   


 


1/29/21 01:00  85 20 94/53 (67) 93   


 


1/29/21 01:00  85 20 94/53 (67) 93   


 


1/29/21 01:00  85 20 94/53 (67) 93   


 


1/29/21 00:45  85 20 96/55 (69) 92   


 


1/29/21 00:45  85 20 96/55 (69) 92   


 


1/29/21 00:45  85 20 96/55 (69) 92   


 


1/29/21 00:45  85 20 96/55 (69) 92   


 


1/29/21 00:45  85 20 96/55 (69) 92   


 


1/29/21 00:30  86 15 97/55 (69) 93   


 


1/29/21 00:30  86 15 97/55 (69) 93   


 


1/29/21 00:30  86 15 97/55 (69) 93   


 


1/29/21 00:30  86 15 97/55 (69) 93   


 


1/29/21 00:30  86 15 97/55 (69) 93   


 


1/29/21 00:15  86 0 100/60 (73) 94   


 


1/29/21 00:15  86 0 100/60 (73) 94   


 


1/29/21 00:15  86 0 100/60 (73) 94   


 


1/29/21 00:15  86 0 100/60 (73) 94   


 


1/29/21 00:15  86 0 100/60 (73) 94   


 


1/29/21 00:10      Mechanical Ventilator  


 


1/29/21 00:00 99.9       


 


1/29/21 00:00  86 20 100/60 (73) 89   


 


1/29/21 00:00  86 20 100/60 (73) 89   


 


1/29/21 00:00  86 20 100/60 (73) 89   


 


1/29/21 00:00  86 20 100/60 (73) 89   


 


1/29/21 00:00  86      


 


1/29/21 00:00  86 20 100/60 (73) 89   


 


1/29/21 00:00  86 20 100/60 (73) 89   


 


1/28/21 23:45  87 20 92/59 (70) 94   


 


1/28/21 23:30  86 20 96/63 (74) 93   


 


1/28/21 23:15  85 20 94/63 (73) 94   


 


1/28/21 23:00   20 93/44  Mechanical Ventilator  100


 


1/28/21 23:00  84 7 96/63 (74) 90   


 


1/28/21 23:00  84 7 96/63 (74) 90   


 


1/28/21 22:53  85 21     100


 


1/28/21 22:45  85 19 93/53 (66) 93   


 


1/28/21 22:30  84 20 94/60 (71) 94   


 


1/28/21 22:15  82 20 98/63 (75) 92   


 


1/28/21 22:00  82 19 98/62 (74) 90   


 


1/28/21 22:00   18 98/62  Mechanical Ventilator  100


 


1/28/21 22:00  82 19 98/62 (74) 90   


 


1/28/21 21:45  82 21 104/65 (78) 90   


 


1/28/21 21:30  82 20 99/62 (74) 91   


 


1/28/21 21:25   20 102/67  Mechanical Ventilator  100


 


1/28/21 21:15  83 20 102/67 (79) 90   


 


1/28/21 21:00  81 20 98/67 (77) 92   


 


1/28/21 20:45  81 21 103/65 (78) 91   


 


1/28/21 20:30  80 0 102/65 (77) 90   


 


1/28/21 20:30   20 110/68  Non-Rebreather  100


 


1/28/21 20:15  81 11 110/68 (82) 91   


 


1/28/21 20:00 99.3       


 


1/28/21 20:00  82 7 111/67 (82) 91   


 


1/28/21 20:00  84      


 


1/28/21 20:00      Mechanical Ventilator  


 


1/28/21 20:00        100


 


1/28/21 19:45  84 18 121/68 (85) 87   


 


1/28/21 19:30  84 20 108/69 (82)    


 


1/28/21 19:15  85 20 98/63 (75) 85   


 


1/28/21 19:00  85 18 123/77 (92) 83   


 


1/28/21 19:00   20 123/77  Mechanical Ventilator  100


 


1/28/21 19:00  85 18 123/77 (92) 83   


 


1/28/21 18:55  84 20     100


 


1/28/21 18:45  85 21 116/71 (86) 90   


 


1/28/21 18:40   21 116/71  Mechanical Ventilator  100


 


1/28/21 18:30  85 20 132/75 (94) 91   


 


1/28/21 18:25   20 130/86  Mechanical Ventilator  100


 


1/28/21 18:15  87 23 130/86 (101) 85   


 


1/28/21 18:15   20 130/86  Mechanical Ventilator  100


 


1/28/21 18:00  88 21 132/80 (97) 84   


 


1/28/21 18:00   20 132/80  Mechanical Ventilator  100


 


1/28/21 17:45  87 20 134/83 (100) 88   


 


1/28/21 17:35   20 119/64  Mechanical Ventilator  100


 


1/28/21 17:30  90 20 119/64 (82) 67   


 


1/28/21 17:15  87 21 117/71 (86) 85   


 


1/28/21 17:00    117/71    


 


1/28/21 17:00   20 117/71  Mechanical Ventilator  100


 


1/28/21 17:00  87 21 119/71 (87) 94   


 


1/28/21 17:00  86 20 132/80 (97) 86   


 


1/28/21 16:45  89 20 116/66 (83) 90   


 


1/28/21 16:30  89 19 117/70 (86) 91   


 


1/28/21 16:29   20 104/66  Mechanical Ventilator  100


 


1/28/21 16:15  86 20 104/66 (79) 94   


 


1/28/21 16:00   20 102/62  Mechanical Ventilator  100


 


1/28/21 16:00  86      


 


1/28/21 16:00 98.3 86 21 102/62 (75) 94   


 


1/28/21 16:00      Mechanical Ventilator  


 


1/28/21 15:45  86 20 104/61 (75) 95   


 


1/28/21 15:30  88 21 103/62 (76) 95   


 


1/28/21 15:15  88 20 104/63 (77) 95   


 


1/28/21 15:00   20 104/63  Mechanical Ventilator  100


 


1/28/21 15:00  89 20 108/62 (77) 96   


 


1/28/21 14:45  87 20 99/61 (74) 96   


 


1/28/21 14:30  89 21     100


 


1/28/21 14:30  87 20 102/63 (76) 95   


 


1/28/21 14:15  85 19 113/87 (96) 95   


 


1/28/21 14:00   20 113/87  Mechanical Ventilator  100


 


1/28/21 14:00  86 20 109/70 (83) 95   


 


1/28/21 13:45  86 13 112/71 (85) 95   


 


1/28/21 13:30  87 0 102/65 (77) 95   


 


1/28/21 13:15  87 0 99/70 (80) 96   


 


1/28/21 13:00   20 99/70  Mechanical Ventilator  100


 


1/28/21 13:00  87 0 105/65 (78) 96   


 


1/28/21 12:45  86 20 104/65 (78) 96   


 


1/28/21 12:30        100


 


1/28/21 12:30  89 20 91/52 (65) 81   


 


1/28/21 12:15  86 16 97/58 (71) 94   


 


1/28/21 12:00      Mechanical Ventilator  


 


1/28/21 12:00   18 97/58  Mechanical Ventilator  100


 


1/28/21 12:00  90      


 


1/28/21 12:00        100


 


1/28/21 12:00 98.4 87 18 92/59 (70) 95   


 


1/28/21 11:45  88 16 95/55 (68) 95   


 


1/28/21 11:30  88 16 94/58 (70) 95   


 


1/28/21 11:25  87 20     100


 


1/28/21 11:15  89 16 96/55 (69) 96   


 


1/28/21 11:00  88 16 92/54 (67) 96   


 


1/28/21 11:00   16 96/55  Mechanical Ventilator  100


 


1/28/21 10:45  89 16 91/59 (70) 95   


 


1/28/21 10:30  88 7 93/59 (70) 94   


 


1/28/21 10:15  87 16 83/54 (64) 94   


 


1/28/21 10:15      Mechanical Ventilator  


 


1/28/21 10:00   16 83/54  Mechanical Ventilator  100


 


1/28/21 10:00  87 15 86/55 (65) 95   


 


1/28/21 09:45  89 17 89/58 (68) 95   


 


1/28/21 09:30  90 16 91/58 (69) 95   


 


1/28/21 09:15  90 16 95/61 (72) 94   


 


1/28/21 09:15  90 16  94   


 


1/28/21 09:00   16 95/61  Mechanical Ventilator  100


 


1/28/21 09:00  90 16 84/56 (65) 93   


 


1/28/21 08:48  90      


 


1/28/21 08:45  90 17 88/54 (65) 92   


 


1/28/21 08:30  89 14 86/52 (63) 76   


 


1/28/21 08:15  88 1 92/58 (69) 92   


 


1/28/21 08:00  88      


 


1/28/21 08:00        100


 


1/28/21 08:00   16 92/58  Mechanical Ventilator  100


 


1/28/21 08:00      Mechanical Ventilator  


 


1/28/21 08:00 97.2 88 16 90/60 (70) 94   


 


1/28/21 07:45  88 16 92/58 (69) 93   


 


1/28/21 07:30  89 16 91/57 (68) 93   


 


1/28/21 07:15  89 16 95/59 (71) 93   


 


1/28/21 07:05  88 23     100


 


1/28/21 07:00  88 16 93/59 (70) 92   


 


1/28/21 07:00  88 16 93/59 (70) 92   


 


1/28/21 06:45   16 93/59  Mechanical Ventilator  100

















Intake and Output  


 


 1/28/21 1/29/21





 19:00 07:00


 


Intake Total 1765.327 ml 572 ml


 


Output Total 1350 ml 1065 ml


 


Balance 415.327 ml -493 ml


 


  


 


IV Total 1105.327 ml 77 ml


 


Tube Feeding 660 ml 495 ml


 


Output Urine Total 960 ml 1065 ml


 


Stool Total 150 ml 


 


Chest Tube Drainage Total 240 ml 








Laboratory Tests


1/28/21 07:35: 


Arterial Blood pH 7.207*L, Arterial Blood Partial Pressure CO2 70.4*H, Arterial 

Blood Partial Pressure O2 49.2*L, Arterial Blood HCO3 27.3H, Arterial Blood 

Oxygen Saturation 84.6*L, Arterial Blood Base Excess -1.6, Abelardo Test Positive


1/28/21 08:18: POC Whole Blood Glucose 122H


1/28/21 10:59: 


Arterial Blood pH 7.183*L, Arterial Blood Partial Pressure CO2 76.7*H, Arterial 

Blood Partial Pressure O2 71.3L, Arterial Blood HCO3 28.2H, Arterial Blood 

Oxygen Saturation 93.2L, Arterial Blood Base Excess -1.2, Abelardo Test Positive


1/28/21 11:56: POC Whole Blood Glucose 103


1/28/21 17:45: POC Whole Blood Glucose 189H


Height (Feet):  5


Height (Inches):  7.00


Weight (Pounds):  198


Objective





Current Medications








 Medications


  (Trade)  Dose


 Ordered  Sig/Julisa


 Route


 PRN Reason  Start Time


 Stop Time Status Last Admin


Dose Admin


 


 Acetaminophen


  (Tylenol)  650 mg  Q4H  PRN


 GT


 Temp >100.5  1/15/21 17:15


 2/14/21 17:14  1/29/21 06:10





 


 Amiodarone HCl


  (Cordarone)  200 mg  DAILY


 NG


   1/26/21 09:00


 4/19/21 20:59  1/28/21 08:48





 


 Chlorhexidine


 Gluconate


  (Vanessa-Hex 2%)  1 applic  DAILY@2000


 TOPIC


   1/19/21 20:00


 4/19/21 19:59  1/28/21 21:23





 


 Dextrose  1,000 ml @ 


 50 mls/hr  Q20H


 IV


   1/18/21 10:00


 2/17/21 09:59  1/29/21 06:10





 


 Dextrose


  (Dextrose 50%)  25 ml  Q30M  PRN


 IV


 Hypoglycemia  1/9/21 13:30


 4/9/21 13:29   





 


 Dextrose


  (Dextrose 50%)  50 ml  Q30M  PRN


 IV


 Hypoglycemia  1/9/21 13:30


 4/9/21 13:29   





 


 Digoxin


  (Lanoxin)  0.125 mg  DAILY


 NG


   1/21/21 09:00


 4/21/21 08:59  1/28/21 08:48





 


 Docusate Sodium


  (Colace)  100 mg  TIDPRN  PRN


 GT


 Constipation  1/12/21 01:15


 2/11/21 01:14  1/12/21 01:42





 


 Enoxaparin Sodium


  (Lovenox)  80 mg  EVERY 12  HOURS


 SUBQ


   1/2/21 21:00


 4/2/21 20:59  1/28/21 21:25





 


 Fentanyl Citrate  250 ml @ 1


 mls/hr  Q24H


 IV


   1/27/21 18:45


 1/29/21 18:44  1/29/21 04:42





 


 Insulin Aspart


  (NovoLOG)    Q6HR


 SUBQ


   1/14/21 12:00


 4/9/21 17:59  1/28/21 18:00





 


 Insulin Aspart


  (NovoLOG)  9 units  EVERY 6  HOURS


 SUBQ


   1/21/21 12:00


 4/15/21 06:59  1/29/21 06:13





 


 Insulin Detemir


  (Levemir)  10 units  Q12HR


 SUBQ


   1/26/21 09:00


 4/12/21 08:59  1/28/21 21:24





 


 Norepinephrine


 Bitartrate  250 ml @ 0


 mls/hr  Q24H


 IV


   1/28/21 00:30


 1/31/21 00:20  1/28/21 17:00





 


 Pantoprazole


  (Protonix)  40 mg  DAILY


 IVP


   1/14/21 09:00


 2/13/21 08:59  1/28/21 08:48





 


 Piperacillin Sod/


 Tazobactam Sod


 3.375 gm/Sodium


 Chloride  110 ml @ 


 27.5 mls/hr  EVERY 8  HOURS


 IVPB


   1/23/21 14:00


 1/30/21 13:59  1/29/21 06:10





 


 Quetiapine


 Fumarate


  (SEROqueL)  50 mg  Q12HR


 ORAL


   1/4/21 21:00


 2/18/21 20:59  1/28/21 21:24





 


 Sodium Chloride  500 ml @ 


 999 mls/hr  Q31M PRN


 IV


 For hypotension  1/15/21 18:30


 2/14/21 18:29   














Assessment/Plan


Problem List:  


(1) Diabetes mellitus out of control


ICD Codes:  E11.65 - Type 2 diabetes mellitus with hyperglycemia


SNOMED:  46427842, [id number]


(2) Pneumonia due to COVID-19 virus


ICD Codes:  U07.1 - COVID-19; J12.82 - Pneumonia due to coronavirus disease 2019


SNOMED:  249758689708568853


Assessment/Plan:


reudce Novolgog to 6 units every 6 hours 


continue Levemir 10 units bid 


continue Novolog sliding scale every 6 hours 


hypoglycemia protocol in order











Nick Mcmahon MD                 Jan 29, 2021 06:44

## 2021-01-29 NOTE — NUR
NURSE NOTES:



Pt ETT at 21cm at the lip. Pt ETT previously was at 26cm. Resp therapist called and 
notified. ETT advanced by Therapist to 26cm. Bilat lungs fields heard. Cxr ordered to check 
placement.

## 2021-01-29 NOTE — NUR
CASE MANAGEMENT:REVIEW



1/29/21

SI: COVID PNA. RT PTX. INTUBATED

CPR PERFORMED TODAY

98.2   98  21  88/55  77% % FIO2

H/H-9.6/30.9   PLT-122



IS: IV ZOSYN Q8HRS

IV LASIX Q12

LEVOPHED GTT

FENTANYL GTT

AMIODARONE NG QD

DIGOXIN NG QD

IVF@50/HR

LOVENOX SQ Q12

**: ICU STATUS

DCP: FROM HOME

## 2021-01-29 NOTE — CARDIAC ELECTROPHYSIOLOGY PN
Assessment/Plan


Assessment/Plan


1. Atrial fibrillation with rapid ventricular response.      


    On digoxin 0.125 mg p.o. daily and Amiodarone 200 po qd  


      In SR.   





2. Hypernatremia with sodium 151 with azotemia, BUN of 30, creatinine 0.8.   On 

iv fluids





3. Right pneumothorax, status post chest tube with subcutaneous emphysema.


CXR and ABG pending





4. COVID-19 pneumonia, 100% FiO2 and PEEP of 12, on Remdesivir and Solu-Medrol 

per ID.


5. Diabetes.


6. Hypotension due to dehydration and sepsis. On iv fluid and Levophed 6 Mcg  


7. Hypernatremia and azotemia.   





GERALDINE RN





Subjective


Subjective


In ICU on 100% Fio2 and PEEP 12 and Levo 6 Mcg  


 Right chest tube had 130 cc drainage overnight. In SR on Dig and Amiodarone 


Lasix 40 iv bid added for volume overload





Objective





Last 24 Hour Vital Signs








  Date Time  Temp Pulse Resp B/P (MAP) Pulse Ox O2 Delivery O2 Flow Rate FiO2


 


1/29/21 11:00  101 20     100


 


1/29/21 10:19  100      


 


1/29/21 09:00    65/40    


 


1/29/21 07:29 101.5       


 


1/29/21 07:00  99 19  89   


 


1/29/21 07:00  96 24     100


 


1/29/21 07:00 101.9 99 19 99/52 (68) 89   


 


1/29/21 06:45  99 19 98/53 (68) 89   


 


1/29/21 06:45  99 19 98/53 (68) 89   


 


1/29/21 06:40 102.0       


 


1/29/21 06:30  100 20 98/55 (69) 89   


 


1/29/21 06:30  100 20 98/55 (69) 89   


 


1/29/21 06:15  100 21 97/54 (68) 89   


 


1/29/21 06:15  100 21  89   


 


1/29/21 06:15  100 21 97/54 (68) 89   


 


1/29/21 06:15  100 21 97/54 (68) 89   


 


1/29/21 06:00  101 20 98/55 (69) 88   


 


1/29/21 06:00  101 20 98/55 (69) 88   


 


1/29/21 06:00 102.2 101 20 98/55 (69) 88   


 


1/29/21 06:00  101 20 98/55 (69) 88   


 


1/29/21 05:45  102 21 105/55 (72) 91   


 


1/29/21 05:30  101 21 94/63 (73) 91   


 


1/29/21 05:15  100 20 98/59 (72) 91   


 


1/29/21 05:00  98 21 91/58 (69) 91   


 


1/29/21 04:42   20 98/64  Mechanical Ventilator  100


 


1/29/21 04:30  97 20 90/55 (67) 91   


 


1/29/21 04:15  96 20 93/57 (69) 92   


 


1/29/21 04:00  94      


 


1/29/21 04:00  95 18 90/56 (67) 92   


 


1/29/21 04:00  95 18 90/56 (67) 92   


 


1/29/21 04:00 100.7       


 


1/29/21 04:00        100


 


1/29/21 04:00      Mechanical Ventilator  


 


1/29/21 03:45  94 12 95/58 (70) 93   


 


1/29/21 03:45  94 12 95/58 (70) 93   


 


1/29/21 03:30  93 9 95/54 (68) 94   


 


1/29/21 03:30  93 9 95/54 (68) 94   


 


1/29/21 03:15  91 7 94/55 (68) 95   


 


1/29/21 03:15  91 7 94/55 (68) 95   


 


1/29/21 03:00  89 19 98/56 (70) 94   


 


1/29/21 03:00  89 19  94   


 


1/29/21 03:00  89 19 98/56 (70) 94   


 


1/29/21 02:57  88 20     100


 


1/29/21 02:45  87 21 96/54 (68) 93   


 


1/29/21 02:45  87 21 96/54 (68) 93   


 


1/29/21 02:45  87 21 96/54 (68) 93   


 


1/29/21 02:30  85 19 95/54 (68) 94   


 


1/29/21 02:30  85 19 95/54 (68) 94   


 


1/29/21 02:30  85 19 95/54 (68) 94   


 


1/29/21 02:15  86 20 93/55 (68) 94   


 


1/29/21 02:15  86 20 93/55 (68) 94   


 


1/29/21 02:15  86 20 93/55 (68) 94   


 


1/29/21 02:15  86 20 93/55 (68) 94   


 


1/29/21 02:00  86 20 90/53 (65) 94   


 


1/29/21 02:00  86 20 90/53 (65) 94   


 


1/29/21 02:00  86 20 90/53 (65) 94   


 


1/29/21 02:00  86 20 90/53 (65) 94   


 


1/29/21 01:45  85 18 94/54 (67) 94   


 


1/29/21 01:45  85 18 94/54 (67) 94   


 


1/29/21 01:45  85 18 94/54 (67) 94   


 


1/29/21 01:45  85 18 94/54 (67) 94   


 


1/29/21 01:30  85 17 94/55 (68) 93   


 


1/29/21 01:30  85 17 94/55 (68) 93   


 


1/29/21 01:30  85 17 94/55 (68) 93   


 


1/29/21 01:30  85 17 94/55 (68) 93   


 


1/29/21 01:15  86 20 94/55 (68) 93   


 


1/29/21 01:15  86 20 94/55 (68) 93   


 


1/29/21 01:15  86 20 94/55 (68) 93   


 


1/29/21 01:15  86 20 94/55 (68) 93   


 


1/29/21 01:00  85 20 94/53 (67) 93   


 


1/29/21 01:00  85 20 94/53 (67) 93   


 


1/29/21 01:00  85 20 94/53 (67) 93   


 


1/29/21 01:00  85 20 94/53 (67) 93   


 


1/29/21 01:00  85 20 94/53 (67) 93   


 


1/29/21 00:45  85 20 96/55 (69) 92   


 


1/29/21 00:45  85 20 96/55 (69) 92   


 


1/29/21 00:45  85 20 96/55 (69) 92   


 


1/29/21 00:45  85 20 96/55 (69) 92   


 


1/29/21 00:45  85 20 96/55 (69) 92   


 


1/29/21 00:30  86 15 97/55 (69) 93   


 


1/29/21 00:30  86 15 97/55 (69) 93   


 


1/29/21 00:30  86 15 97/55 (69) 93   


 


1/29/21 00:30  86 15 97/55 (69) 93   


 


1/29/21 00:30  86 15 97/55 (69) 93   


 


1/29/21 00:15  86 0 100/60 (73) 94   


 


1/29/21 00:15  86 0 100/60 (73) 94   


 


1/29/21 00:15  86 0 100/60 (73) 94   


 


1/29/21 00:15  86 0 100/60 (73) 94   


 


1/29/21 00:15  86 0 100/60 (73) 94   


 


1/29/21 00:10      Mechanical Ventilator  


 


1/29/21 00:00 99.9       


 


1/29/21 00:00  86 20 100/60 (73) 89   


 


1/29/21 00:00  86 20 100/60 (73) 89   


 


1/29/21 00:00  86 20 100/60 (73) 89   


 


1/29/21 00:00  86 20 100/60 (73) 89   


 


1/29/21 00:00  86      


 


1/29/21 00:00  86 20 100/60 (73) 89   


 


1/29/21 00:00  86 20 100/60 (73) 89   


 


1/28/21 23:45  87 20 92/59 (70) 94   


 


1/28/21 23:30  86 20 96/63 (74) 93   


 


1/28/21 23:15  85 20 94/63 (73) 94   


 


1/28/21 23:00   20 93/44  Mechanical Ventilator  100


 


1/28/21 23:00  84 7 96/63 (74) 90   


 


1/28/21 23:00  84 7 96/63 (74) 90   


 


1/28/21 22:53  85 21     100


 


1/28/21 22:45  85 19 93/53 (66) 93   


 


1/28/21 22:30  84 20 94/60 (71) 94   


 


1/28/21 22:15  82 20 98/63 (75) 92   


 


1/28/21 22:00  82 19 98/62 (74) 90   


 


1/28/21 22:00   18 98/62  Mechanical Ventilator  100


 


1/28/21 22:00  82 19 98/62 (74) 90   


 


1/28/21 21:45  82 21 104/65 (78) 90   


 


1/28/21 21:30  82 20 99/62 (74) 91   


 


1/28/21 21:25   20 102/67  Mechanical Ventilator  100


 


1/28/21 21:15  83 20 102/67 (79) 90   


 


1/28/21 21:00  81 20 98/67 (77) 92   


 


1/28/21 20:45  81 21 103/65 (78) 91   


 


1/28/21 20:30  80 0 102/65 (77) 90   


 


1/28/21 20:30   20 110/68  Non-Rebreather  100


 


1/28/21 20:15  81 11 110/68 (82) 91   


 


1/28/21 20:00 99.3       


 


1/28/21 20:00  82 7 111/67 (82) 91   


 


1/28/21 20:00  84      


 


1/28/21 20:00      Mechanical Ventilator  


 


1/28/21 20:00        100


 


1/28/21 19:45  84 18 121/68 (85) 87   


 


1/28/21 19:30  84 20 108/69 (82)    


 


1/28/21 19:15  85 20 98/63 (75) 85   


 


1/28/21 19:00  85 18 123/77 (92) 83   


 


1/28/21 19:00   20 123/77  Mechanical Ventilator  100


 


1/28/21 19:00  85 18 123/77 (92) 83   


 


1/28/21 18:55  84 20     100


 


1/28/21 18:45  85 21 116/71 (86) 90   


 


1/28/21 18:40   21 116/71  Mechanical Ventilator  100


 


1/28/21 18:30  85 20 132/75 (94) 91   


 


1/28/21 18:25   20 130/86  Mechanical Ventilator  100


 


1/28/21 18:15  87 23 130/86 (101) 85   


 


1/28/21 18:15   20 130/86  Mechanical Ventilator  100


 


1/28/21 18:00  88 21 132/80 (97) 84   


 


1/28/21 18:00   20 132/80  Mechanical Ventilator  100


 


1/28/21 17:45  87 20 134/83 (100) 88   


 


1/28/21 17:35   20 119/64  Mechanical Ventilator  100


 


1/28/21 17:30  90 20 119/64 (82) 67   


 


1/28/21 17:15  87 21 117/71 (86) 85   


 


1/28/21 17:00    117/71    


 


1/28/21 17:00   20 117/71  Mechanical Ventilator  100


 


1/28/21 17:00  87 21 119/71 (87) 94   


 


1/28/21 17:00  86 20 132/80 (97) 86   


 


1/28/21 16:45  89 20 116/66 (83) 90   


 


1/28/21 16:30  89 19 117/70 (86) 91   


 


1/28/21 16:29   20 104/66  Mechanical Ventilator  100


 


1/28/21 16:15  86 20 104/66 (79) 94   


 


1/28/21 16:00   20 102/62  Mechanical Ventilator  100


 


1/28/21 16:00  86      


 


1/28/21 16:00 98.3 86 21 102/62 (75) 94   


 


1/28/21 16:00      Mechanical Ventilator  


 


1/28/21 15:45  86 20 104/61 (75) 95   


 


1/28/21 15:30  88 21 103/62 (76) 95   


 


1/28/21 15:15  88 20 104/63 (77) 95   


 


1/28/21 15:00   20 104/63  Mechanical Ventilator  100


 


1/28/21 15:00  89 20 108/62 (77) 96   


 


1/28/21 14:45  87 20 99/61 (74) 96   


 


1/28/21 14:30  89 21     100


 


1/28/21 14:30  87 20 102/63 (76) 95   


 


1/28/21 14:15  85 19 113/87 (96) 95   


 


1/28/21 14:00   20 113/87  Mechanical Ventilator  100


 


1/28/21 14:00  86 20 109/70 (83) 95   


 


1/28/21 13:45  86 13 112/71 (85) 95   


 


1/28/21 13:30  87 0 102/65 (77) 95   


 


1/28/21 13:15  87 0 99/70 (80) 96   


 


1/28/21 13:00   20 99/70  Mechanical Ventilator  100


 


1/28/21 13:00  87 0 105/65 (78) 96   


 


1/28/21 12:45  86 20 104/65 (78) 96   

















Intake and Output  


 


 1/28/21 1/29/21





 19:00 07:00


 


Intake Total 1765.327 ml 572 ml


 


Output Total 1350 ml 1065 ml


 


Balance 415.327 ml -493 ml


 


  


 


IV Total 1105.327 ml 77 ml


 


Tube Feeding 660 ml 495 ml


 


Output Urine Total 960 ml 1065 ml


 


Stool Total 150 ml 


 


Chest Tube Drainage Total 240 ml 











Laboratory Tests








Test


 1/28/21


17:45 1/29/21


10:11 1/29/21


12:12


 


POC Whole Blood Glucose


 189 MG/DL


()  H Pending  


 Pending  











Objective


HEENT: No JVD. Orally intubated


LUNGS:  Have decreased breath sounds with the chest tube on the right side


and subcutaneous emphysema.


CARDIOVASCULAR:  Regular S1, S2 with


no gallop.


ABDOMEN:  Soft.


EXTREMITIES:  A 1+ pitting edema.











Jake George MD               Jan 29, 2021 12:33

## 2021-01-29 NOTE — NUR
CODE BLUE:

See Code sheet which remains on paper.code blue called, cardiac monitor shows omar then 
progressed to asystole

## 2021-01-29 NOTE — INFECTIOUS DISEASES PROG NOTE
Assessment/Plan


Assessment/Plan


antibiotics : zosyn





A


1. COVID-19 pneumonia.


on 100 % FiO2 with saturation of 95 %


s/p remdesivir


s/p ivermectin


s/p solumedrol


2. New-onset diabetes.


3. respiratory failure


4. renal failure





P


1. Continue zosyn


2. Continue isolation.





Subjective


ROS Limited/Unobtainable:  Yes


Allergies:  


Coded Allergies:  


     No Known Allergies (Unverified , 6/11/19)





Objective





Last 24 Hour Vital Signs








  Date Time  Temp Pulse Resp B/P (MAP) Pulse Ox O2 Delivery O2 Flow Rate FiO2


 


1/29/21 10:19  100      


 


1/29/21 09:00    65/40    


 


1/29/21 07:29 101.5       


 


1/29/21 07:00  99 19  89   


 


1/29/21 07:00 101.9 99 19 99/52 (68) 89   


 


1/29/21 06:45  99 19 98/53 (68) 89   


 


1/29/21 06:45  99 19 98/53 (68) 89   


 


1/29/21 06:40 102.0       


 


1/29/21 06:30  100 20 98/55 (69) 89   


 


1/29/21 06:30  100 20 98/55 (69) 89   


 


1/29/21 06:15  100 21 97/54 (68) 89   


 


1/29/21 06:15  100 21  89   


 


1/29/21 06:15  100 21 97/54 (68) 89   


 


1/29/21 06:15  100 21 97/54 (68) 89   


 


1/29/21 06:00  101 20 98/55 (69) 88   


 


1/29/21 06:00  101 20 98/55 (69) 88   


 


1/29/21 06:00 102.2 101 20 98/55 (69) 88   


 


1/29/21 06:00  101 20 98/55 (69) 88   


 


1/29/21 05:45  102 21 105/55 (72) 91   


 


1/29/21 05:30  101 21 94/63 (73) 91   


 


1/29/21 05:15  100 20 98/59 (72) 91   


 


1/29/21 05:00  98 21 91/58 (69) 91   


 


1/29/21 04:42   20 98/64  Mechanical Ventilator  100


 


1/29/21 04:30  97 20 90/55 (67) 91   


 


1/29/21 04:15  96 20 93/57 (69) 92   


 


1/29/21 04:00  94      


 


1/29/21 04:00  95 18 90/56 (67) 92   


 


1/29/21 04:00  95 18 90/56 (67) 92   


 


1/29/21 04:00 100.7       


 


1/29/21 04:00        100


 


1/29/21 04:00      Mechanical Ventilator  


 


1/29/21 03:45  94 12 95/58 (70) 93   


 


1/29/21 03:45  94 12 95/58 (70) 93   


 


1/29/21 03:30  93 9 95/54 (68) 94   


 


1/29/21 03:30  93 9 95/54 (68) 94   


 


1/29/21 03:15  91 7 94/55 (68) 95   


 


1/29/21 03:15  91 7 94/55 (68) 95   


 


1/29/21 03:00  89 19 98/56 (70) 94   


 


1/29/21 03:00  89 19  94   


 


1/29/21 03:00  89 19 98/56 (70) 94   


 


1/29/21 02:57  88 20     100


 


1/29/21 02:45  87 21 96/54 (68) 93   


 


1/29/21 02:45  87 21 96/54 (68) 93   


 


1/29/21 02:45  87 21 96/54 (68) 93   


 


1/29/21 02:30  85 19 95/54 (68) 94   


 


1/29/21 02:30  85 19 95/54 (68) 94   


 


1/29/21 02:30  85 19 95/54 (68) 94   


 


1/29/21 02:15  86 20 93/55 (68) 94   


 


1/29/21 02:15  86 20 93/55 (68) 94   


 


1/29/21 02:15  86 20 93/55 (68) 94   


 


1/29/21 02:15  86 20 93/55 (68) 94   


 


1/29/21 02:00  86 20 90/53 (65) 94   


 


1/29/21 02:00  86 20 90/53 (65) 94   


 


1/29/21 02:00  86 20 90/53 (65) 94   


 


1/29/21 02:00  86 20 90/53 (65) 94   


 


1/29/21 01:45  85 18 94/54 (67) 94   


 


1/29/21 01:45  85 18 94/54 (67) 94   


 


1/29/21 01:45  85 18 94/54 (67) 94   


 


1/29/21 01:45  85 18 94/54 (67) 94   


 


1/29/21 01:30  85 17 94/55 (68) 93   


 


1/29/21 01:30  85 17 94/55 (68) 93   


 


1/29/21 01:30  85 17 94/55 (68) 93   


 


1/29/21 01:30  85 17 94/55 (68) 93   


 


1/29/21 01:15  86 20 94/55 (68) 93   


 


1/29/21 01:15  86 20 94/55 (68) 93   


 


1/29/21 01:15  86 20 94/55 (68) 93   


 


1/29/21 01:15  86 20 94/55 (68) 93   


 


1/29/21 01:00  85 20 94/53 (67) 93   


 


1/29/21 01:00  85 20 94/53 (67) 93   


 


1/29/21 01:00  85 20 94/53 (67) 93   


 


1/29/21 01:00  85 20 94/53 (67) 93   


 


1/29/21 01:00  85 20 94/53 (67) 93   


 


1/29/21 00:45  85 20 96/55 (69) 92   


 


1/29/21 00:45  85 20 96/55 (69) 92   


 


1/29/21 00:45  85 20 96/55 (69) 92   


 


1/29/21 00:45  85 20 96/55 (69) 92   


 


1/29/21 00:45  85 20 96/55 (69) 92   


 


1/29/21 00:30  86 15 97/55 (69) 93   


 


1/29/21 00:30  86 15 97/55 (69) 93   


 


1/29/21 00:30  86 15 97/55 (69) 93   


 


1/29/21 00:30  86 15 97/55 (69) 93   


 


1/29/21 00:30  86 15 97/55 (69) 93   


 


1/29/21 00:15  86 0 100/60 (73) 94   


 


1/29/21 00:15  86 0 100/60 (73) 94   


 


1/29/21 00:15  86 0 100/60 (73) 94   


 


1/29/21 00:15  86 0 100/60 (73) 94   


 


1/29/21 00:15  86 0 100/60 (73) 94   


 


1/29/21 00:10      Mechanical Ventilator  


 


1/29/21 00:00 99.9       


 


1/29/21 00:00  86 20 100/60 (73) 89   


 


1/29/21 00:00  86 20 100/60 (73) 89   


 


1/29/21 00:00  86 20 100/60 (73) 89   


 


1/29/21 00:00  86 20 100/60 (73) 89   


 


1/29/21 00:00  86      


 


1/29/21 00:00  86 20 100/60 (73) 89   


 


1/29/21 00:00  86 20 100/60 (73) 89   


 


1/28/21 23:45  87 20 92/59 (70) 94   


 


1/28/21 23:30  86 20 96/63 (74) 93   


 


1/28/21 23:15  85 20 94/63 (73) 94   


 


1/28/21 23:00   20 93/44  Mechanical Ventilator  100


 


1/28/21 23:00  84 7 96/63 (74) 90   


 


1/28/21 23:00  84 7 96/63 (74) 90   


 


1/28/21 22:53  85 21     100


 


1/28/21 22:45  85 19 93/53 (66) 93   


 


1/28/21 22:30  84 20 94/60 (71) 94   


 


1/28/21 22:15  82 20 98/63 (75) 92   


 


1/28/21 22:00  82 19 98/62 (74) 90   


 


1/28/21 22:00   18 98/62  Mechanical Ventilator  100


 


1/28/21 22:00  82 19 98/62 (74) 90   


 


1/28/21 21:45  82 21 104/65 (78) 90   


 


1/28/21 21:30  82 20 99/62 (74) 91   


 


1/28/21 21:25   20 102/67  Mechanical Ventilator  100


 


1/28/21 21:15  83 20 102/67 (79) 90   


 


1/28/21 21:00  81 20 98/67 (77) 92   


 


1/28/21 20:45  81 21 103/65 (78) 91   


 


1/28/21 20:30  80 0 102/65 (77) 90   


 


1/28/21 20:30   20 110/68  Non-Rebreather  100


 


1/28/21 20:15  81 11 110/68 (82) 91   


 


1/28/21 20:00 99.3       


 


1/28/21 20:00  82 7 111/67 (82) 91   


 


1/28/21 20:00  84      


 


1/28/21 20:00      Mechanical Ventilator  


 


1/28/21 20:00        100


 


1/28/21 19:45  84 18 121/68 (85) 87   


 


1/28/21 19:30  84 20 108/69 (82)    


 


1/28/21 19:15  85 20 98/63 (75) 85   


 


1/28/21 19:00  85 18 123/77 (92) 83   


 


1/28/21 19:00   20 123/77  Mechanical Ventilator  100


 


1/28/21 19:00  85 18 123/77 (92) 83   


 


1/28/21 18:55  84 20     100


 


1/28/21 18:45  85 21 116/71 (86) 90   


 


1/28/21 18:40   21 116/71  Mechanical Ventilator  100


 


1/28/21 18:30  85 20 132/75 (94) 91   


 


1/28/21 18:25   20 130/86  Mechanical Ventilator  100


 


1/28/21 18:15  87 23 130/86 (101) 85   


 


1/28/21 18:15   20 130/86  Mechanical Ventilator  100


 


1/28/21 18:00  88 21 132/80 (97) 84   


 


1/28/21 18:00   20 132/80  Mechanical Ventilator  100


 


1/28/21 17:45  87 20 134/83 (100) 88   


 


1/28/21 17:35   20 119/64  Mechanical Ventilator  100


 


1/28/21 17:30  90 20 119/64 (82) 67   


 


1/28/21 17:15  87 21 117/71 (86) 85   


 


1/28/21 17:00    117/71    


 


1/28/21 17:00   20 117/71  Mechanical Ventilator  100


 


1/28/21 17:00  87 21 119/71 (87) 94   


 


1/28/21 17:00  86 20 132/80 (97) 86   


 


1/28/21 16:45  89 20 116/66 (83) 90   


 


1/28/21 16:30  89 19 117/70 (86) 91   


 


1/28/21 16:29   20 104/66  Mechanical Ventilator  100


 


1/28/21 16:15  86 20 104/66 (79) 94   


 


1/28/21 16:00   20 102/62  Mechanical Ventilator  100


 


1/28/21 16:00  86      


 


1/28/21 16:00 98.3 86 21 102/62 (75) 94   


 


1/28/21 16:00      Mechanical Ventilator  


 


1/28/21 15:45  86 20 104/61 (75) 95   


 


1/28/21 15:30  88 21 103/62 (76) 95   


 


1/28/21 15:15  88 20 104/63 (77) 95   


 


1/28/21 15:00   20 104/63  Mechanical Ventilator  100


 


1/28/21 15:00  89 20 108/62 (77) 96   


 


1/28/21 14:45  87 20 99/61 (74) 96   


 


1/28/21 14:30  89 21     100


 


1/28/21 14:30  87 20 102/63 (76) 95   


 


1/28/21 14:15  85 19 113/87 (96) 95   


 


1/28/21 14:00   20 113/87  Mechanical Ventilator  100


 


1/28/21 14:00  86 20 109/70 (83) 95   


 


1/28/21 13:45  86 13 112/71 (85) 95   


 


1/28/21 13:30  87 0 102/65 (77) 95   


 


1/28/21 13:15  87 0 99/70 (80) 96   


 


1/28/21 13:00   20 99/70  Mechanical Ventilator  100


 


1/28/21 13:00  87 0 105/65 (78) 96   


 


1/28/21 12:45  86 20 104/65 (78) 96   


 


1/28/21 12:30        100


 


1/28/21 12:30  89 20 91/52 (65) 81   


 


1/28/21 12:15  86 16 97/58 (71) 94   


 


1/28/21 12:00      Mechanical Ventilator  


 


1/28/21 12:00   18 97/58  Mechanical Ventilator  100


 


1/28/21 12:00  90      


 


1/28/21 12:00        100


 


1/28/21 12:00 98.4 87 18 92/59 (70) 95   


 


1/28/21 11:45  88 16 95/55 (68) 95   








Height (Feet):  5


Height (Inches):  7.00


Weight (Pounds):  198


HEENT:  other - intubated





Laboratory Tests








Test


 1/28/21


11:56 1/28/21


17:45 1/29/21


10:11


 


POC Whole Blood Glucose


 103 MG/DL


() 189 MG/DL


()  H Pending  














Current Medications








 Medications


  (Trade)  Dose


 Ordered  Sig/Julisa


 Route


 PRN Reason  Start Time


 Stop Time Status Last Admin


Dose Admin


 


 Acetaminophen


  (Tylenol)  650 mg  Q4H  PRN


 GT


 Temp >100.5  1/15/21 17:15


 2/14/21 17:14  1/29/21 06:59





 


 Amiodarone HCl


  (Cordarone)  200 mg  DAILY


 NG


   1/26/21 09:00


 4/19/21 20:59  1/29/21 10:19





 


 Chlorhexidine


 Gluconate


  (Vanessa-Hex 2%)  1 applic  DAILY@2000


 TOPIC


   1/19/21 20:00


 4/19/21 19:59  1/28/21 21:23





 


 Dextrose  1,000 ml @ 


 50 mls/hr  Q20H


 IV


   1/18/21 10:00


 2/17/21 09:59  1/29/21 06:10





 


 Dextrose


  (Dextrose 50%)  25 ml  Q30M  PRN


 IV


 Hypoglycemia  1/9/21 13:30


 4/9/21 13:29   





 


 Dextrose


  (Dextrose 50%)  50 ml  Q30M  PRN


 IV


 Hypoglycemia  1/9/21 13:30


 4/9/21 13:29   





 


 Digoxin


  (Lanoxin)  0.125 mg  DAILY


 NG


   1/21/21 09:00


 4/21/21 08:59  1/29/21 10:19





 


 Docusate Sodium


  (Colace)  100 mg  TIDPRN  PRN


 GT


 Constipation  1/12/21 01:15


 2/11/21 01:14  1/12/21 01:42





 


 Enoxaparin Sodium


  (Lovenox)  80 mg  EVERY 12  HOURS


 SUBQ


   1/2/21 21:00


 4/2/21 20:59  1/29/21 10:00





 


 Fentanyl Citrate  250 ml @ 1


 mls/hr  Q24H


 IV


   1/27/21 18:45


 1/29/21 18:44  1/29/21 04:42





 


 Furosemide


  (Lasix)  40 mg  EVERY 12  HOURS


 IV


   1/29/21 10:15


 2/28/21 10:14  1/29/21 11:20





 


 Insulin Aspart


  (NovoLOG)    Q6HR


 SUBQ


   1/14/21 12:00


 4/9/21 17:59  1/28/21 18:00





 


 Insulin Aspart


  (NovoLOG)  6 units  EVERY 6  HOURS


 SUBQ


   1/29/21 12:00


 4/15/21 06:59   





 


 Insulin Detemir


  (Levemir)  10 units  Q12HR


 SUBQ


   1/26/21 09:00


 4/12/21 08:59  1/29/21 10:00





 


 Norepinephrine


 Bitartrate  250 ml @ 0


 mls/hr  Q24H


 IV


   1/28/21 00:30


 1/31/21 00:20  1/29/21 09:00





 


 Pantoprazole


  (Protonix)  40 mg  DAILY


 IVP


   1/14/21 09:00


 2/13/21 08:59  1/29/21 10:17





 


 Piperacillin Sod/


 Tazobactam Sod


 3.375 gm/Sodium


 Chloride  110 ml @ 


 27.5 mls/hr  EVERY 8  HOURS


 IVPB


   1/23/21 14:00


 2/1/21 13:59  1/29/21 06:10





 


 Quetiapine


 Fumarate


  (SEROqueL)  50 mg  Q12HR


 ORAL


   1/4/21 21:00


 2/18/21 20:59  1/29/21 10:18





 


 Sodium Chloride  500 ml @ 


 999 mls/hr  Q31M PRN


 IV


 For hypotension  1/15/21 18:30


 2/14/21 18:29   




















Ashley Davis MD      Jan 29, 2021 11:45

## 2021-01-29 NOTE — NUR
NURSE HAND-OFF REPORT: 



Latest Vital Signs: Temperature 97.7 , Pulse 91 , B/P 102 /63 , Respiratory Rate 22 , O2 SAT 
89 , Mechanical Ventilator, O2 Flow Rate  .  

Vital Sign Comment: pt on Levophed @ 6mcg/min



EKG Rhythm: Sinus Rhythm

Rhythm change?: N 

MD Notified?: N

MD Response: 



Latest Singh Fall Score: 50  

Fall Risk: High Risk 

Safety Measures: Call light Within Reach, Bed Alarm Zone 1, Side Rails Side Rails x3, Bed 
position Low and Locked.

Fall Precautions: 

Patient Fall Education



Report given to .

## 2021-01-29 NOTE — NUR
NURSE NOTES:



Pt continues to sat in the 70s. Dr. Lemos notified of the pt's condition and also notified 
of the code that took place earlier.  Also informed MD of the urine output post Lasix 
administration. MD does not want to implement any new orders at this time. Will continue to 
monitor patient.

## 2021-01-29 NOTE — HEMATOLOGY/ONC PROGRESS NOTE
Assessment/Plan


Assessment/Plan


Leukocytosis 2/2 covid pna wbc 15->14-->17-->9.6


Anemia 2/2 chronic disease, hgb 11-->10.4


Thrombocytopenia with plt 122


Hypercoag disorer now on lovenox sq


Ac resp distress on ventilator


Pneumomediastinum


etoh abused


agitated -halodol


vent





Ngt


cont iv abx and iv decadrone


pulmonary and gi on consult


smear is noted


pneumomediastinum per pulm


dw charge nurse


lovenox sq





Subjective


Allergies:  


Coded Allergies:  


     No Known Allergies (Unverified , 6/11/19)


All Systems:  reviewed and negative except above


Subjective


1/10 on ventilator 60% fio2, on sediation, unresponsive, in icu


1/14 on vent, icu, wbc elev, no bleeding, unresponsive


1/15 on vent, with cash, icu, no bleeding, unresponsive


1/17 on vent, elmer rn, no major changes, icu, nv


1/18 nv, labs reviewd, hr is elev, with afib, is onlovenox sq


1/19 remains on vent, sedated with wrist restraints, icu


1/20 on vent, with hematuria, ngt, in icu, labs reviewed


1/21 remains on vent, settings have been adjusted as per icu care


1/22 remains on vent, icu, on levo 6mcg, off versed, labs noted, elmer rn


1/25 remains onv ent, in icu, with restraints, on levo gtt as well


1/26 nv, suctioned, rectal tube, in icu, with restraints, no bleeding


1/27 nv, remains on vent, rectal tube, labs reviewed, elmer rn


1/28 nv, icu, on vent/ bipap, labs noted, no bleeding, meds reviewed


1/29 nv, on vent, overnight no changes, meds reviewed, labs noted





Objective


Objective





Current Medications








 Medications


  (Trade)  Dose


 Ordered  Sig/Julisa


 Route


 PRN Reason  Start Time


 Stop Time Status Last Admin


Dose Admin


 


 Acetaminophen


  (Tylenol)  650 mg  Q4H  PRN


 GT


 Temp >100.5  1/15/21 17:15


 2/14/21 17:14  1/29/21 06:59





 


 Amiodarone HCl


  (Cordarone)  200 mg  DAILY


 NG


   1/26/21 09:00


 4/19/21 20:59  1/28/21 08:48





 


 Chlorhexidine


 Gluconate


  (Vanessa-Hex 2%)  1 applic  DAILY@2000


 TOPIC


   1/19/21 20:00


 4/19/21 19:59  1/28/21 21:23





 


 Dextrose  1,000 ml @ 


 50 mls/hr  Q20H


 IV


   1/18/21 10:00


 2/17/21 09:59  1/29/21 06:10





 


 Dextrose


  (Dextrose 50%)  25 ml  Q30M  PRN


 IV


 Hypoglycemia  1/9/21 13:30


 4/9/21 13:29   





 


 Dextrose


  (Dextrose 50%)  50 ml  Q30M  PRN


 IV


 Hypoglycemia  1/9/21 13:30


 4/9/21 13:29   





 


 Digoxin


  (Lanoxin)  0.125 mg  DAILY


 NG


   1/21/21 09:00


 4/21/21 08:59  1/28/21 08:48





 


 Docusate Sodium


  (Colace)  100 mg  TIDPRN  PRN


 GT


 Constipation  1/12/21 01:15


 2/11/21 01:14  1/12/21 01:42





 


 Enoxaparin Sodium


  (Lovenox)  80 mg  EVERY 12  HOURS


 SUBQ


   1/2/21 21:00


 4/2/21 20:59  1/28/21 21:25





 


 Fentanyl Citrate  250 ml @ 1


 mls/hr  Q24H


 IV


   1/27/21 18:45


 1/29/21 18:44  1/29/21 04:42





 


 Insulin Aspart


  (NovoLOG)    Q6HR


 SUBQ


   1/14/21 12:00


 4/9/21 17:59  1/28/21 18:00





 


 Insulin Aspart


  (NovoLOG)  6 units  EVERY 6  HOURS


 SUBQ


   1/29/21 12:00


 4/15/21 06:59   





 


 Insulin Detemir


  (Levemir)  10 units  Q12HR


 SUBQ


   1/26/21 09:00


 4/12/21 08:59  1/28/21 21:24





 


 Norepinephrine


 Bitartrate  250 ml @ 0


 mls/hr  Q24H


 IV


   1/28/21 00:30


 1/31/21 00:20  1/28/21 17:00





 


 Pantoprazole


  (Protonix)  40 mg  DAILY


 IVP


   1/14/21 09:00


 2/13/21 08:59  1/28/21 08:48





 


 Piperacillin Sod/


 Tazobactam Sod


 3.375 gm/Sodium


 Chloride  110 ml @ 


 27.5 mls/hr  EVERY 8  HOURS


 IVPB


   1/23/21 14:00


 1/30/21 13:59  1/29/21 06:10





 


 Quetiapine


 Fumarate


  (SEROqueL)  50 mg  Q12HR


 ORAL


   1/4/21 21:00


 2/18/21 20:59  1/28/21 21:24





 


 Sodium Chloride  500 ml @ 


 999 mls/hr  Q31M PRN


 IV


 For hypotension  1/15/21 18:30


 2/14/21 18:29   














Last 24 Hour Vital Signs








  Date Time  Temp Pulse Resp B/P (MAP) Pulse Ox O2 Delivery O2 Flow Rate FiO2


 


1/29/21 07:00  99 19  89   


 


1/29/21 07:00 101.9 99 19 99/52 (68) 89   


 


1/29/21 06:45  99 19 98/53 (68) 89   


 


1/29/21 06:45  99 19 98/53 (68) 89   


 


1/29/21 06:40 102.0       


 


1/29/21 06:30  100 20 98/55 (69) 89   


 


1/29/21 06:30  100 20 98/55 (69) 89   


 


1/29/21 06:15  100 21 97/54 (68) 89   


 


1/29/21 06:15  100 21  89   


 


1/29/21 06:15  100 21 97/54 (68) 89   


 


1/29/21 06:15  100 21 97/54 (68) 89   


 


1/29/21 06:00  101 20 98/55 (69) 88   


 


1/29/21 06:00  101 20 98/55 (69) 88   


 


1/29/21 06:00 102.2 101 20 98/55 (69) 88   


 


1/29/21 06:00  101 20 98/55 (69) 88   


 


1/29/21 05:45  102 21 105/55 (72) 91   


 


1/29/21 05:30  101 21 94/63 (73) 91   


 


1/29/21 05:15  100 20 98/59 (72) 91   


 


1/29/21 05:00  98 21 91/58 (69) 91   


 


1/29/21 04:42   20 98/64  Mechanical Ventilator  100


 


1/29/21 04:30  97 20 90/55 (67) 91   


 


1/29/21 04:15  96 20 93/57 (69) 92   


 


1/29/21 04:00  94      


 


1/29/21 04:00  95 18 90/56 (67) 92   


 


1/29/21 04:00  95 18 90/56 (67) 92   


 


1/29/21 04:00 100.7       


 


1/29/21 04:00        100


 


1/29/21 04:00      Mechanical Ventilator  


 


1/29/21 03:45  94 12 95/58 (70) 93   


 


1/29/21 03:45  94 12 95/58 (70) 93   


 


1/29/21 03:30  93 9 95/54 (68) 94   


 


1/29/21 03:30  93 9 95/54 (68) 94   


 


1/29/21 03:15  91 7 94/55 (68) 95   


 


1/29/21 03:15  91 7 94/55 (68) 95   


 


1/29/21 03:00  89 19 98/56 (70) 94   


 


1/29/21 03:00  89 19  94   


 


1/29/21 03:00  89 19 98/56 (70) 94   


 


1/29/21 02:57  88 20     100


 


1/29/21 02:45  87 21 96/54 (68) 93   


 


1/29/21 02:45  87 21 96/54 (68) 93   


 


1/29/21 02:45  87 21 96/54 (68) 93   


 


1/29/21 02:30  85 19 95/54 (68) 94   


 


1/29/21 02:30  85 19 95/54 (68) 94   


 


1/29/21 02:30  85 19 95/54 (68) 94   


 


1/29/21 02:15  86 20 93/55 (68) 94   


 


1/29/21 02:15  86 20 93/55 (68) 94   


 


1/29/21 02:15  86 20 93/55 (68) 94   


 


1/29/21 02:15  86 20 93/55 (68) 94   


 


1/29/21 02:00  86 20 90/53 (65) 94   


 


1/29/21 02:00  86 20 90/53 (65) 94   


 


1/29/21 02:00  86 20 90/53 (65) 94   


 


1/29/21 02:00  86 20 90/53 (65) 94   


 


1/29/21 01:45  85 18 94/54 (67) 94   


 


1/29/21 01:45  85 18 94/54 (67) 94   


 


1/29/21 01:45  85 18 94/54 (67) 94   


 


1/29/21 01:45  85 18 94/54 (67) 94   


 


1/29/21 01:30  85 17 94/55 (68) 93   


 


1/29/21 01:30  85 17 94/55 (68) 93   


 


1/29/21 01:30  85 17 94/55 (68) 93   


 


1/29/21 01:30  85 17 94/55 (68) 93   


 


1/29/21 01:15  86 20 94/55 (68) 93   


 


1/29/21 01:15  86 20 94/55 (68) 93   


 


1/29/21 01:15  86 20 94/55 (68) 93   


 


1/29/21 01:15  86 20 94/55 (68) 93   


 


1/29/21 01:00  85 20 94/53 (67) 93   


 


1/29/21 01:00  85 20 94/53 (67) 93   


 


1/29/21 01:00  85 20 94/53 (67) 93   


 


1/29/21 01:00  85 20 94/53 (67) 93   


 


1/29/21 01:00  85 20 94/53 (67) 93   


 


1/29/21 00:45  85 20 96/55 (69) 92   


 


1/29/21 00:45  85 20 96/55 (69) 92   


 


1/29/21 00:45  85 20 96/55 (69) 92   


 


1/29/21 00:45  85 20 96/55 (69) 92   


 


1/29/21 00:45  85 20 96/55 (69) 92   


 


1/29/21 00:30  86 15 97/55 (69) 93   


 


1/29/21 00:30  86 15 97/55 (69) 93   


 


1/29/21 00:30  86 15 97/55 (69) 93   


 


1/29/21 00:30  86 15 97/55 (69) 93   


 


1/29/21 00:30  86 15 97/55 (69) 93   


 


1/29/21 00:15  86 0 100/60 (73) 94   


 


1/29/21 00:15  86 0 100/60 (73) 94   


 


1/29/21 00:15  86 0 100/60 (73) 94   


 


1/29/21 00:15  86 0 100/60 (73) 94   


 


1/29/21 00:15  86 0 100/60 (73) 94   


 


1/29/21 00:10      Mechanical Ventilator  


 


1/29/21 00:00 99.9       


 


1/29/21 00:00  86 20 100/60 (73) 89   


 


1/29/21 00:00  86 20 100/60 (73) 89   


 


1/29/21 00:00  86 20 100/60 (73) 89   


 


1/29/21 00:00  86 20 100/60 (73) 89   


 


1/29/21 00:00  86      


 


1/29/21 00:00  86 20 100/60 (73) 89   


 


1/29/21 00:00  86 20 100/60 (73) 89   


 


1/28/21 23:45  87 20 92/59 (70) 94   


 


1/28/21 23:30  86 20 96/63 (74) 93   


 


1/28/21 23:15  85 20 94/63 (73) 94   


 


1/28/21 23:00   20 93/44  Mechanical Ventilator  100


 


1/28/21 23:00  84 7 96/63 (74) 90   


 


1/28/21 23:00  84 7 96/63 (74) 90   


 


1/28/21 22:53  85 21     100


 


1/28/21 22:45  85 19 93/53 (66) 93   


 


1/28/21 22:30  84 20 94/60 (71) 94   


 


1/28/21 22:15  82 20 98/63 (75) 92   


 


1/28/21 22:00  82 19 98/62 (74) 90   


 


1/28/21 22:00   18 98/62  Mechanical Ventilator  100


 


1/28/21 22:00  82 19 98/62 (74) 90   


 


1/28/21 21:45  82 21 104/65 (78) 90   


 


1/28/21 21:30  82 20 99/62 (74) 91   


 


1/28/21 21:25   20 102/67  Mechanical Ventilator  100


 


1/28/21 21:15  83 20 102/67 (79) 90   


 


1/28/21 21:00  81 20 98/67 (77) 92   


 


1/28/21 20:45  81 21 103/65 (78) 91   


 


1/28/21 20:30  80 0 102/65 (77) 90   


 


1/28/21 20:30   20 110/68  Non-Rebreather  100


 


1/28/21 20:15  81 11 110/68 (82) 91   


 


1/28/21 20:00 99.3       


 


1/28/21 20:00  82 7 111/67 (82) 91   


 


1/28/21 20:00  84      


 


1/28/21 20:00      Mechanical Ventilator  


 


1/28/21 20:00        100


 


1/28/21 19:45  84 18 121/68 (85) 87   


 


1/28/21 19:30  84 20 108/69 (82)    


 


1/28/21 19:15  85 20 98/63 (75) 85   


 


1/28/21 19:00  85 18 123/77 (92) 83   


 


1/28/21 19:00   20 123/77  Mechanical Ventilator  100


 


1/28/21 19:00  85 18 123/77 (92) 83   


 


1/28/21 18:55  84 20     100


 


1/28/21 18:45  85 21 116/71 (86) 90   


 


1/28/21 18:40   21 116/71  Mechanical Ventilator  100


 


1/28/21 18:30  85 20 132/75 (94) 91   


 


1/28/21 18:25   20 130/86  Mechanical Ventilator  100


 


1/28/21 18:15  87 23 130/86 (101) 85   


 


1/28/21 18:15   20 130/86  Mechanical Ventilator  100


 


1/28/21 18:00  88 21 132/80 (97) 84   


 


1/28/21 18:00   20 132/80  Mechanical Ventilator  100


 


1/28/21 17:45  87 20 134/83 (100) 88   


 


1/28/21 17:35   20 119/64  Mechanical Ventilator  100


 


1/28/21 17:30  90 20 119/64 (82) 67   


 


1/28/21 17:15  87 21 117/71 (86) 85   


 


1/28/21 17:00    117/71    


 


1/28/21 17:00   20 117/71  Mechanical Ventilator  100


 


1/28/21 17:00  87 21 119/71 (87) 94   


 


1/28/21 17:00  86 20 132/80 (97) 86   


 


1/28/21 16:45  89 20 116/66 (83) 90   


 


1/28/21 16:30  89 19 117/70 (86) 91   


 


1/28/21 16:29   20 104/66  Mechanical Ventilator  100


 


1/28/21 16:15  86 20 104/66 (79) 94   


 


1/28/21 16:00   20 102/62  Mechanical Ventilator  100


 


1/28/21 16:00  86      


 


1/28/21 16:00 98.3 86 21 102/62 (75) 94   


 


1/28/21 16:00      Mechanical Ventilator  


 


1/28/21 15:45  86 20 104/61 (75) 95   


 


1/28/21 15:30  88 21 103/62 (76) 95   


 


1/28/21 15:15  88 20 104/63 (77) 95   


 


1/28/21 15:00   20 104/63  Mechanical Ventilator  100


 


1/28/21 15:00  89 20 108/62 (77) 96   


 


1/28/21 14:45  87 20 99/61 (74) 96   


 


1/28/21 14:30  89 21     100


 


1/28/21 14:30  87 20 102/63 (76) 95   


 


1/28/21 14:15  85 19 113/87 (96) 95   


 


1/28/21 14:00   20 113/87  Mechanical Ventilator  100


 


1/28/21 14:00  86 20 109/70 (83) 95   


 


1/28/21 13:45  86 13 112/71 (85) 95   


 


1/28/21 13:30  87 0 102/65 (77) 95   


 


1/28/21 13:15  87 0 99/70 (80) 96   


 


1/28/21 13:00   20 99/70  Mechanical Ventilator  100


 


1/28/21 13:00  87 0 105/65 (78) 96   


 


1/28/21 12:45  86 20 104/65 (78) 96   


 


1/28/21 12:30        100


 


1/28/21 12:30  89 20 91/52 (65) 81   


 


1/28/21 12:15  86 16 97/58 (71) 94   


 


1/28/21 12:00      Mechanical Ventilator  


 


1/28/21 12:00   18 97/58  Mechanical Ventilator  100


 


1/28/21 12:00  90      


 


1/28/21 12:00        100


 


1/28/21 12:00 98.4 87 18 92/59 (70) 95   


 


1/28/21 11:45  88 16 95/55 (68) 95   


 


1/28/21 11:30  88 16 94/58 (70) 95   


 


1/28/21 11:25  87 20     100


 


1/28/21 11:15  89 16 96/55 (69) 96   


 


1/28/21 11:00  88 16 92/54 (67) 96   


 


1/28/21 11:00   16 96/55  Mechanical Ventilator  100


 


1/28/21 10:45  89 16 91/59 (70) 95   


 


1/28/21 10:30  88 7 93/59 (70) 94   


 


1/28/21 10:15  87 16 83/54 (64) 94   


 


1/28/21 10:15      Mechanical Ventilator  


 


1/28/21 10:00   16 83/54  Mechanical Ventilator  100


 


1/28/21 10:00  87 15 86/55 (65) 95   


 


1/28/21 09:45  89 17 89/58 (68) 95   


 


1/28/21 09:30  90 16 91/58 (69) 95   


 


1/28/21 09:15  90 16 95/61 (72) 94   


 


1/28/21 09:15  90 16  94   


 


1/28/21 09:00   16 95/61  Mechanical Ventilator  100


 


1/28/21 09:00  90 16 84/56 (65) 93   


 


1/28/21 08:48  90      


 


1/28/21 08:45  90 17 88/54 (65) 92   


 


1/28/21 08:30  89 14 86/52 (63) 76   


 


1/28/21 08:15  88 1 92/58 (69) 92   


 


1/28/21 08:00  88      


 


1/28/21 08:00        100


 


1/28/21 08:00   16 92/58  Mechanical Ventilator  100


 


1/28/21 08:00      Mechanical Ventilator  


 


1/28/21 08:00 97.2 88 16 90/60 (70) 94   


 


1/28/21 07:45  88 16 92/58 (69) 93   


 


1/28/21 07:30  89 16 91/57 (68) 93   


 


1/28/21 07:15  89 16 95/59 (71) 93   


 


1/28/21 07:05  88 23     100


 


1/28/21 07:00  88 16 93/59 (70) 92   


 


1/28/21 07:00  88 16 93/59 (70) 92   


 


1/28/21 06:45   16 93/59  Mechanical Ventilator  100


 


1/28/21 06:30  88 16 92/59 (70) 92   


 


1/28/21 06:00  88 16 92/58 (69) 90   


 


1/28/21 05:45   16 92/58  Mechanical Ventilator  100


 


1/28/21 05:30  87 16 91/60 (70) 89   


 


1/28/21 05:00  87 16 97/63 (74) 91   


 


1/28/21 04:45  87 14 97/61 (73) 91   


 


1/28/21 04:45   16 94/60  Mechanical Ventilator  100


 


1/28/21 04:30  87 16 100/60 (73) 80   


 


1/28/21 04:30   17 97/61  Mechanical Ventilator  100


 


1/28/21 04:15   16 100/60  Mechanical Ventilator  100


 


1/28/21 04:15  87 16 105/67 (80) 93   


 


1/28/21 04:00      Mechanical Ventilator  


 


1/28/21 04:00 97.2 88 16 105/67 (80) 91   


 


1/28/21 04:00        100


 


1/28/21 04:00   17 105/67  Mechanical Ventilator  100


 


1/28/21 03:48  90 22     100


 


1/28/21 03:32  88      


 


1/28/21 03:15  91 18 106/70 (82) 89   


 


1/28/21 03:15   19 105/68  Mechanical Ventilator  100


 


1/28/21 03:00  92 18 102/67 (79) 89   


 


1/28/21 02:15   14 103/67  Mechanical Ventilator  100


 


1/28/21 02:00  98 17 101/66 (78) 84   





  96      


 


1/28/21 01:15   16 100/62  Mechanical Ventilator  100


 


1/28/21 01:00  101 18 100/64 (76) 78   





  78      


 


1/28/21 00:45    101/64    


 


1/28/21 00:30  101 25 101/64 (76) 66   


 


1/28/21 00:15   23 101/64  Mechanical Ventilator  100


 


1/28/21 00:00      Mechanical Ventilator  


 


1/28/21 00:00    104/67    


 


1/28/21 00:00 98.7 102 22 103/63 (76) 72   


 


1/28/21 00:00        100


 


1/27/21 23:45  103 21 109/65 (80) 74   


 


1/27/21 23:40  103 22     100


 


1/27/21 23:39  103      


 


1/27/21 23:30  103 19 105/61 (76) 73   


 


1/27/21 23:15   20 105/61  Mechanical Ventilator  100


 


1/27/21 23:15  104 19 101/64 (76) 72   


 


1/27/21 23:00    102/64    


 


1/27/21 23:00  103 19 106/65 (79) 72   


 


1/27/21 22:45  104 19 113/66 (82) 74   


 


1/27/21 22:30  103 19 100/63 (75) 72   


 


1/27/21 22:15  103 20 102/62 (75) 72   


 


1/27/21 22:15   19 100/63  Mechanical Ventilator  100


 


1/27/21 22:00    102/62    


 


1/27/21 22:00  103 18 100/67 (78) 72   


 


1/27/21 21:45  102 19 103/62 (76) 72   


 


1/27/21 21:30  101 19 109/64 (79) 71   


 


1/27/21 21:15  99 17 108/65 (79) 71   


 


1/27/21 21:15   19 109/64  Mechanical Ventilator  100


 


1/27/21 21:00  98 17 109/68 (82) 71   


 


1/27/21 21:00   18 108/65  Mechanical Ventilator  100


 


1/27/21 21:00    108/65    


 


1/27/21 20:45   18 121/68  Mechanical Ventilator  100


 


1/27/21 20:45  97 19 121/68 (85) 64   


 


1/27/21 20:30  93 17 100/46 (64) 62   


 


1/27/21 20:30   19 112/60  Mechanical Ventilator  100


 


1/27/21 20:15   18 100/46  Mechanical Ventilator  100


 


1/27/21 20:15  92 17 101/56 (71) 55   


 


1/27/21 20:13  92 19 103/64 (77) 60   


 


1/27/21 20:00      Mechanical Ventilator  


 


1/27/21 20:00   17 103/64  Mechanical Ventilator  100


 


1/27/21 20:00    103/64    


 


1/27/21 20:00 97.4 91 16 105/57 (73) 69   


 


1/27/21 20:00        100


 


1/27/21 19:45      Mechanical Ventilator  100


 


1/27/21 19:38  85 24     100


 


1/27/21 19:30   16 107/60  Mechanical Ventilator  100


 


1/27/21 19:22  91      


 


1/27/21 19:15   25 105/59  Mechanical Ventilator  100


 


1/27/21 19:00   23 106/59  Mechanical Ventilator  100


 


1/27/21 19:00    107/61    


 


1/27/21 19:00  91 23 107/61 (76) 36   


 


1/27/21 18:45  87 23 106/59 (75) 47   


 


1/27/21 18:30  86 21 113/65 (81) 49   


 


1/27/21 18:15  93 21 126/76 (93) 70   


 


1/27/21 18:00  95 20 138/85 (102) 77   


 


1/27/21 18:00    138/85    


 


1/27/21 17:00  90 21 103/61 (75) 95   


 


1/27/21 17:00    110/66    


 


1/27/21 16:00      Mechanical Ventilator  


 


1/27/21 16:00        100


 


1/27/21 16:00    103/60    


 


1/27/21 16:00  91      


 


1/27/21 16:00 100.1 93 25 103/60 (74) 90   


 


1/27/21 15:30  91 23 118/66 (83) 96   


 


1/27/21 15:22  88 24     100


 


1/27/21 15:00  91 24 117/66 (83) 95   


 


1/27/21 15:00    122/67    


 


1/27/21 14:30  91 22 120/67 (84) 96   


 


1/27/21 14:00  92 22 116/62 (80) 94   


 


1/27/21 14:00    120/65    


 


1/27/21 13:00    115/64    


 


1/27/21 13:00  91 21 114/62 (79) 95   


 


1/27/21 12:30  91 18 110/64 (79) 97   


 


1/27/21 12:00 99.8 91 18 111/62 (78) 97   


 


1/27/21 12:00        100


 


1/27/21 12:00    112/62    


 


1/27/21 12:00      Mechanical Ventilator  


 


1/27/21 11:30  90 16 107/61 (76) 98   


 


1/27/21 11:22        


 


1/27/21 11:20  92 26     100


 


1/27/21 11:00  92 19 103/63 (76) 97   


 


1/27/21 11:00    113/65    


 


1/27/21 10:00    112/66    


 


1/27/21 10:00  88 22 112/66 (81) 97   


 


1/27/21 09:44  87      


 


1/27/21 09:30  88 21 115/65 (82) 96   


 


1/27/21 09:00  88 24 109/66 (80) 96   


 


1/27/21 09:00    109/66    


 


1/27/21 08:00  89      


 


1/27/21 08:00    109/66    


 


1/27/21 08:00      Mechanical Ventilator  


 


1/27/21 08:00 100.8 88 25 103/67 (79) 97   


 


1/27/21 08:00        100

















Intake and Output  


 


 1/28/21 1/29/21





 19:00 07:00


 


Intake Total 1765.327 ml 572 ml


 


Output Total 1350 ml 1065 ml


 


Balance 415.327 ml -493 ml


 


  


 


IV Total 1105.327 ml 77 ml


 


Tube Feeding 660 ml 495 ml


 


Output Urine Total 960 ml 1065 ml


 


Stool Total 150 ml 


 


Chest Tube Drainage Total 240 ml 











Labs








Test


 1/26/21


09:07 1/26/21


12:34 1/26/21


17:58 1/27/21


04:35


 


Arterial Blood pH


 7.263


(7.350-7.450) 


 


 





 


Arterial Blood Partial


Pressure CO2 67.0 mmHg


(35.0-45.0) 


 


 





 


Arterial Blood Partial


Pressure O2 61.2 mmHg


(75.0-100.0) 


 


 





 


Arterial Blood HCO3


 29.6 mmol/L


(22.0-26.0) 


 


 





 


Arterial Blood Oxygen


Saturation 90.3 %


() 


 


 





 


Arterial Blood Base Excess 1.5 (-2-2)    


 


Abelardo Test Positive    


 


POC Whole Blood Glucose


 


 208 MG/DL


() 184 MG/DL


() 





 


White Blood Count


 


 


 


 7.3 K/UL


(4.8-10.8)


 


Red Blood Count


 


 


 


 3.16 M/UL


(4.70-6.10)


 


Hemoglobin


 


 


 


 9.2 G/DL


(14.2-18.0)


 


Hematocrit


 


 


 


 29.2 %


(42.0-52.0)


 


Mean Corpuscular Volume    93 FL (80-99) 


 


Mean Corpuscular Hemoglobin


 


 


 


 29.1 PG


(27.0-31.0)


 


Mean Corpuscular Hemoglobin


Concent 


 


 


 31.4 G/DL


(32.0-36.0)


 


Red Cell Distribution Width


 


 


 


 14.8 %


(11.6-14.8)


 


Platelet Count


 


 


 


 115 K/UL


(150-450)


 


Mean Platelet Volume


 


 


 


 10.5 FL


(6.5-10.1)


 


Neutrophils (%) (Auto)     % (45.0-75.0) 


 


Lymphocytes (%) (Auto)     % (20.0-45.0) 


 


Monocytes (%) (Auto)     % (1.0-10.0) 


 


Eosinophils (%) (Auto)     % (0.0-3.0) 


 


Basophils (%) (Auto)     % (0.0-2.0) 


 


Differential Total Cells


Counted 


 


 


 100 





 


Neutrophils % (Manual)    84 % (45-75) 


 


Lymphocytes % (Manual)    10 % (20-45) 


 


Monocytes % (Manual)    4 % (1-10) 


 


Eosinophils % (Manual)    2 % (0-3) 


 


Basophils % (Manual)    0 % (0-2) 


 


Band Neutrophils    0 % (0-8) 


 


Nucleated Red Blood Cells    2 /100 WBC 


 


Platelet Estimate    Decreased 


 


Platelet Morphology    Normal 


 


Polychromasia    1+ 


 


Hypochromasia    1+ 


 


Anisocytosis    1+ 


 


Prothrombin Time


 


 


 


 10.8 SEC


(9.30-11.50)


 


Prothromb Time International


Ratio 


 


 


 1.0 (0.9-1.1) 





 


Activated Partial


Thromboplast Time 


 


 


 26 SEC (23-33) 





 


Sodium Level


 


 


 


 143 MMOL/L


(136-145)


 


Potassium Level


 


 


 


 4.1 MMOL/L


(3.5-5.1)


 


Chloride Level


 


 


 


 109 MMOL/L


()


 


Carbon Dioxide Level


 


 


 


 28 MMOL/L


(21-32)


 


Blood Urea Nitrogen


 


 


 


 51 mg/dL


(7-18)


 


Creatinine


 


 


 


 1.8 MG/DL


(0.55-1.30)


 


Estimat Glomerular Filtration


Rate 


 


 


 37.9 mL/min


(>60)


 


Glucose Level


 


 


 


 172 MG/DL


()


 


Calcium Level


 


 


 


 8.3 MG/DL


(8.5-10.1)


 


Total Bilirubin


 


 


 


 0.4 MG/DL


(0.2-1.0)


 


Aspartate Amino Transf


(AST/SGOT) 


 


 


 25 U/L (15-37) 





 


Alanine Aminotransferase


(ALT/SGPT) 


 


 


 29 U/L (12-78) 





 


Alkaline Phosphatase


 


 


 


 309 U/L


()


 


Total Protein


 


 


 


 5.5 G/DL


(6.4-8.2)


 


Albumin


 


 


 


 1.0 G/DL


(3.4-5.0)


 


Globulin    4.5 g/dL 


 


Albumin/Globulin Ratio    0.2 (1.0-2.7) 


 


Test


 1/27/21


07:26 1/27/21


12:10 1/27/21


20:35 1/28/21


03:50


 


Arterial Blood pH


 7.254


(7.350-7.450) 


 7.185


(7.350-7.450) 





 


Arterial Blood Partial


Pressure CO2 69.3 mmHg


(35.0-45.0) 


 79.9 mmHg


(35.0-45.0) 





 


Arterial Blood Partial


Pressure O2 75.8 mmHg


(75.0-100.0) 


 29.9 mmHg


(75.0-100.0) 





 


Arterial Blood HCO3


 30.0 mmol/L


(22.0-26.0) 


 29.5 mmol/L


(22.0-26.0) 





 


Arterial Blood Oxygen


Saturation 94.1 %


() 


 55.8 %


() 





 


Arterial Blood Base Excess 1.7 (-2-2)   -0.2 (-2-2)  


 


Abelardo Test Positive   Positive  


 


POC Whole Blood Glucose


 


 174 MG/DL


() 


 





 


White Blood Count


 


 


 


 8.4 K/UL


(4.8-10.8)


 


Red Blood Count


 


 


 


 3.30 M/UL


(4.70-6.10)


 


Hemoglobin


 


 


 


 9.6 G/DL


(14.2-18.0)


 


Hematocrit


 


 


 


 30.9 %


(42.0-52.0)


 


Mean Corpuscular Volume    93 FL (80-99) 


 


Mean Corpuscular Hemoglobin


 


 


 


 28.9 PG


(27.0-31.0)


 


Mean Corpuscular Hemoglobin


Concent 


 


 


 31.0 G/DL


(32.0-36.0)


 


Red Cell Distribution Width


 


 


 


 14.8 %


(11.6-14.8)


 


Platelet Count


 


 


 


 122 K/UL


(150-450)


 


Mean Platelet Volume


 


 


 


 11.1 FL


(6.5-10.1)


 


Neutrophils (%) (Auto)     % (45.0-75.0) 


 


Lymphocytes (%) (Auto)     % (20.0-45.0) 


 


Monocytes (%) (Auto)     % (1.0-10.0) 


 


Eosinophils (%) (Auto)     % (0.0-3.0) 


 


Basophils (%) (Auto)     % (0.0-2.0) 


 


Differential Total Cells


Counted 


 


 


 100 





 


Neutrophils % (Manual)    71 % (45-75) 


 


Lymphocytes % (Manual)    19 % (20-45) 


 


Monocytes % (Manual)    3 % (1-10) 


 


Eosinophils % (Manual)    2 % (0-3) 


 


Basophils % (Manual)    0 % (0-2) 


 


Band Neutrophils    5 % (0-8) 


 


Platelet Estimate    Decreased 


 


Platelet Morphology    Normal 


 


Hypochromasia    1+ 


 


Sodium Level


 


 


 


 144 MMOL/L


(136-145)


 


Potassium Level


 


 


 


 4.2 MMOL/L


(3.5-5.1)


 


Chloride Level


 


 


 


 109 MMOL/L


()


 


Carbon Dioxide Level


 


 


 


 30 MMOL/L


(21-32)


 


Anion Gap


 


 


 


 5 mmol/L


(5-15)


 


Blood Urea Nitrogen


 


 


 


 50 mg/dL


(7-18)


 


Creatinine


 


 


 


 1.7 MG/DL


(0.55-1.30)


 


Estimat Glomerular Filtration


Rate 


 


 


 40.5 mL/min


(>60)


 


Glucose Level


 


 


 


 136 MG/DL


()


 


Calcium Level


 


 


 


 8.1 MG/DL


(8.5-10.1)


 


Total Bilirubin


 


 


 


 0.4 MG/DL


(0.2-1.0)


 


Aspartate Amino Transf


(AST/SGOT) 


 


 


 20 U/L (15-37) 





 


Alanine Aminotransferase


(ALT/SGPT) 


 


 


 26 U/L (12-78) 





 


Alkaline Phosphatase


 


 


 


 250 U/L


()


 


Total Protein


 


 


 


 5.6 G/DL


(6.4-8.2)


 


Albumin


 


 


 


 1.0 G/DL


(3.4-5.0)


 


Globulin    4.6 g/dL 


 


Albumin/Globulin Ratio    0.2 (1.0-2.7) 


 


Test


 1/28/21


07:35 1/28/21


08:18 1/28/21


10:59 1/28/21


11:56


 


Arterial Blood pH


 7.207


(7.350-7.450) 


 7.183


(7.350-7.450) 





 


Arterial Blood Partial


Pressure CO2 70.4 mmHg


(35.0-45.0) 


 76.7 mmHg


(35.0-45.0) 





 


Arterial Blood Partial


Pressure O2 49.2 mmHg


(75.0-100.0) 


 71.3 mmHg


(75.0-100.0) 





 


Arterial Blood HCO3


 27.3 mmol/L


(22.0-26.0) 


 28.2 mmol/L


(22.0-26.0) 





 


Arterial Blood Oxygen


Saturation 84.6 %


() 


 93.2 %


() 





 


Arterial Blood Base Excess -1.6 (-2-2)   -1.2 (-2-2)  


 


Abelardo Test Positive   Positive  


 


POC Whole Blood Glucose


 


 122 MG/DL


() 


 103 MG/DL


()


 


Test


 1/28/21


17:45 


 


 





 


POC Whole Blood Glucose


 189 MG/DL


() 


 


 











Height (Feet):  5


Height (Inches):  7.00


Weight (Pounds):  198


Objective


General Appearance:  lethargic, moderate distress


Neck:  supple


Cardiovascular:  regular rhythm


Respiratory/Chest:  crackles/rales, rhonchi - bilaterally vent++


Abdomen:  non tender, soft


Extremities:  non-tender











Emmett Cook MD          Jan 29, 2021 07:42

## 2021-01-29 NOTE — NUR
NURSE NOTES:



Pt rcvd sedated on Fentanyl gtt. no s/s of distress. O2 sats low. MD aware. CT in place and 
patent. dsg changed. site clear. no air leak noted. dsg to PICC changed site clear. pt 
continues to have loose stools. pt repositioned and oral care given. cool blanket in place 
for temp. pt's room extremely hot. enginerring and house sup notified. Room temp maybe cause 
to temp.

## 2021-01-29 NOTE — NUR
NURSE HAND-OFF REPORT: 



Latest Vital Signs: Temperature 102.0 , Pulse 100 , B/P 97 /54 , Respiratory Rate 21 , O2 
SAT 89 , Mechanical Ventilator, O2 Flow Rate  .  

Vital Sign Comment: 



EKG Rhythm: Sinus Rhythm

Rhythm change?: N 

MD Notified?: N -Dr. Thaddeus GUTIERREZ Response: 



Latest Singh Fall Score: 50  

Fall Risk: High Risk 

Safety Measures: Call light Within Reach, Bed Alarm Zone 1, Side Rails Side Rails x3, Bed 
position Low and Locked.

Fall Precautions: 

Patient Fall Education



Report given to Troy

## 2021-01-30 VITALS — DIASTOLIC BLOOD PRESSURE: 72 MMHG | SYSTOLIC BLOOD PRESSURE: 122 MMHG

## 2021-01-30 VITALS — DIASTOLIC BLOOD PRESSURE: 76 MMHG | SYSTOLIC BLOOD PRESSURE: 123 MMHG

## 2021-01-30 VITALS — DIASTOLIC BLOOD PRESSURE: 82 MMHG | SYSTOLIC BLOOD PRESSURE: 115 MMHG

## 2021-01-30 VITALS — DIASTOLIC BLOOD PRESSURE: 57 MMHG | SYSTOLIC BLOOD PRESSURE: 90 MMHG

## 2021-01-30 VITALS — DIASTOLIC BLOOD PRESSURE: 66 MMHG | SYSTOLIC BLOOD PRESSURE: 111 MMHG

## 2021-01-30 VITALS — DIASTOLIC BLOOD PRESSURE: 69 MMHG | SYSTOLIC BLOOD PRESSURE: 106 MMHG

## 2021-01-30 VITALS — DIASTOLIC BLOOD PRESSURE: 58 MMHG | SYSTOLIC BLOOD PRESSURE: 97 MMHG

## 2021-01-30 VITALS — SYSTOLIC BLOOD PRESSURE: 78 MMHG | DIASTOLIC BLOOD PRESSURE: 51 MMHG

## 2021-01-30 VITALS — SYSTOLIC BLOOD PRESSURE: 91 MMHG | DIASTOLIC BLOOD PRESSURE: 58 MMHG

## 2021-01-30 VITALS — DIASTOLIC BLOOD PRESSURE: 74 MMHG | SYSTOLIC BLOOD PRESSURE: 121 MMHG

## 2021-01-30 VITALS — SYSTOLIC BLOOD PRESSURE: 97 MMHG | DIASTOLIC BLOOD PRESSURE: 58 MMHG

## 2021-01-30 VITALS — DIASTOLIC BLOOD PRESSURE: 72 MMHG | SYSTOLIC BLOOD PRESSURE: 114 MMHG

## 2021-01-30 VITALS — SYSTOLIC BLOOD PRESSURE: 123 MMHG | DIASTOLIC BLOOD PRESSURE: 71 MMHG

## 2021-01-30 VITALS — SYSTOLIC BLOOD PRESSURE: 77 MMHG | DIASTOLIC BLOOD PRESSURE: 52 MMHG

## 2021-01-30 VITALS — DIASTOLIC BLOOD PRESSURE: 56 MMHG | SYSTOLIC BLOOD PRESSURE: 94 MMHG

## 2021-01-30 VITALS — SYSTOLIC BLOOD PRESSURE: 83 MMHG | DIASTOLIC BLOOD PRESSURE: 54 MMHG

## 2021-01-30 VITALS — DIASTOLIC BLOOD PRESSURE: 42 MMHG | SYSTOLIC BLOOD PRESSURE: 65 MMHG

## 2021-01-30 VITALS — SYSTOLIC BLOOD PRESSURE: 110 MMHG | DIASTOLIC BLOOD PRESSURE: 72 MMHG

## 2021-01-30 VITALS — SYSTOLIC BLOOD PRESSURE: 91 MMHG | DIASTOLIC BLOOD PRESSURE: 61 MMHG

## 2021-01-30 VITALS — DIASTOLIC BLOOD PRESSURE: 58 MMHG | SYSTOLIC BLOOD PRESSURE: 94 MMHG

## 2021-01-30 VITALS — DIASTOLIC BLOOD PRESSURE: 64 MMHG | SYSTOLIC BLOOD PRESSURE: 102 MMHG

## 2021-01-30 VITALS — DIASTOLIC BLOOD PRESSURE: 60 MMHG | SYSTOLIC BLOOD PRESSURE: 87 MMHG

## 2021-01-30 VITALS — DIASTOLIC BLOOD PRESSURE: 76 MMHG | SYSTOLIC BLOOD PRESSURE: 114 MMHG

## 2021-01-30 VITALS — DIASTOLIC BLOOD PRESSURE: 60 MMHG | SYSTOLIC BLOOD PRESSURE: 88 MMHG

## 2021-01-30 VITALS — SYSTOLIC BLOOD PRESSURE: 133 MMHG | DIASTOLIC BLOOD PRESSURE: 88 MMHG

## 2021-01-30 VITALS — DIASTOLIC BLOOD PRESSURE: 56 MMHG | SYSTOLIC BLOOD PRESSURE: 97 MMHG

## 2021-01-30 VITALS — DIASTOLIC BLOOD PRESSURE: 66 MMHG | SYSTOLIC BLOOD PRESSURE: 102 MMHG

## 2021-01-30 VITALS — DIASTOLIC BLOOD PRESSURE: 59 MMHG | SYSTOLIC BLOOD PRESSURE: 91 MMHG

## 2021-01-30 VITALS — SYSTOLIC BLOOD PRESSURE: 69 MMHG | DIASTOLIC BLOOD PRESSURE: 46 MMHG

## 2021-01-30 VITALS — DIASTOLIC BLOOD PRESSURE: 56 MMHG | SYSTOLIC BLOOD PRESSURE: 99 MMHG

## 2021-01-30 VITALS — SYSTOLIC BLOOD PRESSURE: 96 MMHG | DIASTOLIC BLOOD PRESSURE: 58 MMHG

## 2021-01-30 VITALS — DIASTOLIC BLOOD PRESSURE: 58 MMHG | SYSTOLIC BLOOD PRESSURE: 88 MMHG

## 2021-01-30 VITALS — SYSTOLIC BLOOD PRESSURE: 98 MMHG | DIASTOLIC BLOOD PRESSURE: 62 MMHG

## 2021-01-30 VITALS — DIASTOLIC BLOOD PRESSURE: 63 MMHG | SYSTOLIC BLOOD PRESSURE: 97 MMHG

## 2021-01-30 VITALS — DIASTOLIC BLOOD PRESSURE: 51 MMHG | SYSTOLIC BLOOD PRESSURE: 76 MMHG

## 2021-01-30 VITALS — SYSTOLIC BLOOD PRESSURE: 113 MMHG | DIASTOLIC BLOOD PRESSURE: 68 MMHG

## 2021-01-30 VITALS — DIASTOLIC BLOOD PRESSURE: 66 MMHG | SYSTOLIC BLOOD PRESSURE: 96 MMHG

## 2021-01-30 VITALS — DIASTOLIC BLOOD PRESSURE: 70 MMHG | SYSTOLIC BLOOD PRESSURE: 104 MMHG

## 2021-01-30 VITALS — DIASTOLIC BLOOD PRESSURE: 72 MMHG | SYSTOLIC BLOOD PRESSURE: 124 MMHG

## 2021-01-30 VITALS — SYSTOLIC BLOOD PRESSURE: 115 MMHG | DIASTOLIC BLOOD PRESSURE: 71 MMHG

## 2021-01-30 VITALS — DIASTOLIC BLOOD PRESSURE: 69 MMHG | SYSTOLIC BLOOD PRESSURE: 103 MMHG

## 2021-01-30 VITALS — DIASTOLIC BLOOD PRESSURE: 63 MMHG | SYSTOLIC BLOOD PRESSURE: 102 MMHG

## 2021-01-30 VITALS — DIASTOLIC BLOOD PRESSURE: 47 MMHG | SYSTOLIC BLOOD PRESSURE: 73 MMHG

## 2021-01-30 VITALS — SYSTOLIC BLOOD PRESSURE: 120 MMHG | DIASTOLIC BLOOD PRESSURE: 76 MMHG

## 2021-01-30 VITALS — DIASTOLIC BLOOD PRESSURE: 73 MMHG | SYSTOLIC BLOOD PRESSURE: 120 MMHG

## 2021-01-30 VITALS — DIASTOLIC BLOOD PRESSURE: 71 MMHG | SYSTOLIC BLOOD PRESSURE: 116 MMHG

## 2021-01-30 VITALS — DIASTOLIC BLOOD PRESSURE: 74 MMHG | SYSTOLIC BLOOD PRESSURE: 112 MMHG

## 2021-01-30 VITALS — DIASTOLIC BLOOD PRESSURE: 64 MMHG | SYSTOLIC BLOOD PRESSURE: 105 MMHG

## 2021-01-30 VITALS — DIASTOLIC BLOOD PRESSURE: 68 MMHG | SYSTOLIC BLOOD PRESSURE: 110 MMHG

## 2021-01-30 VITALS — SYSTOLIC BLOOD PRESSURE: 105 MMHG | DIASTOLIC BLOOD PRESSURE: 62 MMHG

## 2021-01-30 VITALS — DIASTOLIC BLOOD PRESSURE: 57 MMHG | SYSTOLIC BLOOD PRESSURE: 91 MMHG

## 2021-01-30 VITALS — SYSTOLIC BLOOD PRESSURE: 92 MMHG | DIASTOLIC BLOOD PRESSURE: 56 MMHG

## 2021-01-30 VITALS — SYSTOLIC BLOOD PRESSURE: 99 MMHG | DIASTOLIC BLOOD PRESSURE: 73 MMHG

## 2021-01-30 VITALS — SYSTOLIC BLOOD PRESSURE: 106 MMHG | DIASTOLIC BLOOD PRESSURE: 65 MMHG

## 2021-01-30 VITALS — SYSTOLIC BLOOD PRESSURE: 104 MMHG | DIASTOLIC BLOOD PRESSURE: 62 MMHG

## 2021-01-30 VITALS — DIASTOLIC BLOOD PRESSURE: 64 MMHG | SYSTOLIC BLOOD PRESSURE: 101 MMHG

## 2021-01-30 VITALS — SYSTOLIC BLOOD PRESSURE: 99 MMHG | DIASTOLIC BLOOD PRESSURE: 66 MMHG

## 2021-01-30 VITALS — SYSTOLIC BLOOD PRESSURE: 100 MMHG | DIASTOLIC BLOOD PRESSURE: 62 MMHG

## 2021-01-30 VITALS — SYSTOLIC BLOOD PRESSURE: 108 MMHG | DIASTOLIC BLOOD PRESSURE: 70 MMHG

## 2021-01-30 VITALS — SYSTOLIC BLOOD PRESSURE: 102 MMHG | DIASTOLIC BLOOD PRESSURE: 64 MMHG

## 2021-01-30 VITALS — DIASTOLIC BLOOD PRESSURE: 53 MMHG | SYSTOLIC BLOOD PRESSURE: 87 MMHG

## 2021-01-30 VITALS — DIASTOLIC BLOOD PRESSURE: 63 MMHG | SYSTOLIC BLOOD PRESSURE: 92 MMHG

## 2021-01-30 VITALS — DIASTOLIC BLOOD PRESSURE: 58 MMHG | SYSTOLIC BLOOD PRESSURE: 91 MMHG

## 2021-01-30 VITALS — DIASTOLIC BLOOD PRESSURE: 58 MMHG | SYSTOLIC BLOOD PRESSURE: 96 MMHG

## 2021-01-30 VITALS — DIASTOLIC BLOOD PRESSURE: 62 MMHG | SYSTOLIC BLOOD PRESSURE: 96 MMHG

## 2021-01-30 VITALS — SYSTOLIC BLOOD PRESSURE: 91 MMHG | DIASTOLIC BLOOD PRESSURE: 60 MMHG

## 2021-01-30 VITALS — DIASTOLIC BLOOD PRESSURE: 77 MMHG | SYSTOLIC BLOOD PRESSURE: 113 MMHG

## 2021-01-30 VITALS — DIASTOLIC BLOOD PRESSURE: 63 MMHG | SYSTOLIC BLOOD PRESSURE: 99 MMHG

## 2021-01-30 VITALS — DIASTOLIC BLOOD PRESSURE: 60 MMHG | SYSTOLIC BLOOD PRESSURE: 94 MMHG

## 2021-01-30 VITALS — SYSTOLIC BLOOD PRESSURE: 89 MMHG | DIASTOLIC BLOOD PRESSURE: 56 MMHG

## 2021-01-30 VITALS — DIASTOLIC BLOOD PRESSURE: 72 MMHG | SYSTOLIC BLOOD PRESSURE: 115 MMHG

## 2021-01-30 VITALS — DIASTOLIC BLOOD PRESSURE: 70 MMHG | SYSTOLIC BLOOD PRESSURE: 106 MMHG

## 2021-01-30 VITALS — DIASTOLIC BLOOD PRESSURE: 61 MMHG | SYSTOLIC BLOOD PRESSURE: 92 MMHG

## 2021-01-30 VITALS — SYSTOLIC BLOOD PRESSURE: 88 MMHG | DIASTOLIC BLOOD PRESSURE: 59 MMHG

## 2021-01-30 VITALS — DIASTOLIC BLOOD PRESSURE: 56 MMHG | SYSTOLIC BLOOD PRESSURE: 82 MMHG

## 2021-01-30 VITALS — SYSTOLIC BLOOD PRESSURE: 104 MMHG | DIASTOLIC BLOOD PRESSURE: 71 MMHG

## 2021-01-30 VITALS — SYSTOLIC BLOOD PRESSURE: 114 MMHG | DIASTOLIC BLOOD PRESSURE: 73 MMHG

## 2021-01-30 VITALS — SYSTOLIC BLOOD PRESSURE: 98 MMHG | DIASTOLIC BLOOD PRESSURE: 61 MMHG

## 2021-01-30 VITALS — SYSTOLIC BLOOD PRESSURE: 103 MMHG | DIASTOLIC BLOOD PRESSURE: 67 MMHG

## 2021-01-30 VITALS — SYSTOLIC BLOOD PRESSURE: 106 MMHG | DIASTOLIC BLOOD PRESSURE: 69 MMHG

## 2021-01-30 VITALS — SYSTOLIC BLOOD PRESSURE: 104 MMHG | DIASTOLIC BLOOD PRESSURE: 61 MMHG

## 2021-01-30 VITALS — DIASTOLIC BLOOD PRESSURE: 56 MMHG | SYSTOLIC BLOOD PRESSURE: 93 MMHG

## 2021-01-30 VITALS — SYSTOLIC BLOOD PRESSURE: 103 MMHG | DIASTOLIC BLOOD PRESSURE: 65 MMHG

## 2021-01-30 VITALS — SYSTOLIC BLOOD PRESSURE: 78 MMHG | DIASTOLIC BLOOD PRESSURE: 50 MMHG

## 2021-01-30 VITALS — DIASTOLIC BLOOD PRESSURE: 66 MMHG | SYSTOLIC BLOOD PRESSURE: 110 MMHG

## 2021-01-30 VITALS — SYSTOLIC BLOOD PRESSURE: 100 MMHG | DIASTOLIC BLOOD PRESSURE: 64 MMHG

## 2021-01-30 VITALS — DIASTOLIC BLOOD PRESSURE: 77 MMHG | SYSTOLIC BLOOD PRESSURE: 117 MMHG

## 2021-01-30 VITALS — DIASTOLIC BLOOD PRESSURE: 62 MMHG | SYSTOLIC BLOOD PRESSURE: 101 MMHG

## 2021-01-30 VITALS — SYSTOLIC BLOOD PRESSURE: 118 MMHG | DIASTOLIC BLOOD PRESSURE: 74 MMHG

## 2021-01-30 VITALS — DIASTOLIC BLOOD PRESSURE: 58 MMHG | SYSTOLIC BLOOD PRESSURE: 86 MMHG

## 2021-01-30 VITALS — SYSTOLIC BLOOD PRESSURE: 104 MMHG | DIASTOLIC BLOOD PRESSURE: 66 MMHG

## 2021-01-30 VITALS — DIASTOLIC BLOOD PRESSURE: 55 MMHG | SYSTOLIC BLOOD PRESSURE: 78 MMHG

## 2021-01-30 VITALS — SYSTOLIC BLOOD PRESSURE: 109 MMHG | DIASTOLIC BLOOD PRESSURE: 71 MMHG

## 2021-01-30 VITALS — SYSTOLIC BLOOD PRESSURE: 115 MMHG | DIASTOLIC BLOOD PRESSURE: 75 MMHG

## 2021-01-30 VITALS — SYSTOLIC BLOOD PRESSURE: 96 MMHG | DIASTOLIC BLOOD PRESSURE: 60 MMHG

## 2021-01-30 VITALS — DIASTOLIC BLOOD PRESSURE: 74 MMHG | SYSTOLIC BLOOD PRESSURE: 107 MMHG

## 2021-01-30 VITALS — DIASTOLIC BLOOD PRESSURE: 45 MMHG | SYSTOLIC BLOOD PRESSURE: 68 MMHG

## 2021-01-30 VITALS — SYSTOLIC BLOOD PRESSURE: 119 MMHG | DIASTOLIC BLOOD PRESSURE: 72 MMHG

## 2021-01-30 VITALS — DIASTOLIC BLOOD PRESSURE: 65 MMHG | SYSTOLIC BLOOD PRESSURE: 105 MMHG

## 2021-01-30 VITALS — DIASTOLIC BLOOD PRESSURE: 58 MMHG | SYSTOLIC BLOOD PRESSURE: 89 MMHG

## 2021-01-30 VITALS — SYSTOLIC BLOOD PRESSURE: 122 MMHG | DIASTOLIC BLOOD PRESSURE: 74 MMHG

## 2021-01-30 LAB
ADD MANUAL DIFF: YES
ANION GAP SERPL CALC-SCNC: 8 MMOL/L (ref 5–15)
BUN SERPL-MCNC: 63 MG/DL (ref 7–18)
CALCIUM SERPL-MCNC: 7.9 MG/DL (ref 8.5–10.1)
CHLORIDE SERPL-SCNC: 109 MMOL/L (ref 98–107)
CO2 SERPL-SCNC: 30 MMOL/L (ref 21–32)
CREAT SERPL-MCNC: 2.2 MG/DL (ref 0.55–1.3)
ERYTHROCYTE [DISTWIDTH] IN BLOOD BY AUTOMATED COUNT: 17.2 % (ref 11.6–14.8)
HCT VFR BLD CALC: 29.3 % (ref 42–52)
HGB BLD-MCNC: 9.8 G/DL (ref 14.2–18)
MCV RBC AUTO: 90 FL (ref 80–99)
PLATELET # BLD: 184 K/UL (ref 150–450)
POTASSIUM SERPL-SCNC: 3.7 MMOL/L (ref 3.5–5.1)
RBC # BLD AUTO: 3.24 M/UL (ref 4.7–6.1)
SODIUM SERPL-SCNC: 147 MMOL/L (ref 136–145)
WBC # BLD AUTO: 13.2 K/UL (ref 4.8–10.8)

## 2021-01-30 RX ADMIN — AMIODARONE HYDROCHLORIDE SCH MG: 200 TABLET ORAL at 10:30

## 2021-01-30 RX ADMIN — PANTOPRAZOLE SODIUM SCH MG: 40 INJECTION, POWDER, FOR SOLUTION INTRAVENOUS at 09:32

## 2021-01-30 RX ADMIN — ENOXAPARIN SODIUM SCH MG: 80 INJECTION SUBCUTANEOUS at 20:12

## 2021-01-30 RX ADMIN — DIGOXIN SCH MG: 0.12 TABLET ORAL at 10:30

## 2021-01-30 RX ADMIN — ENOXAPARIN SODIUM SCH MG: 80 INJECTION SUBCUTANEOUS at 10:30

## 2021-01-30 RX ADMIN — Medication SCH MLS/HR: at 05:49

## 2021-01-30 RX ADMIN — INSULIN ASPART SCH UNITS: 100 INJECTION, SOLUTION INTRAVENOUS; SUBCUTANEOUS at 05:53

## 2021-01-30 RX ADMIN — INSULIN DETEMIR SCH UNITS: 100 INJECTION, SOLUTION SUBCUTANEOUS at 10:30

## 2021-01-30 RX ADMIN — INSULIN ASPART SCH UNITS: 100 INJECTION, SOLUTION INTRAVENOUS; SUBCUTANEOUS at 23:46

## 2021-01-30 RX ADMIN — INSULIN ASPART SCH UNITS: 100 INJECTION, SOLUTION INTRAVENOUS; SUBCUTANEOUS at 00:00

## 2021-01-30 RX ADMIN — Medication SCH MLS/HR: at 16:55

## 2021-01-30 RX ADMIN — INSULIN ASPART SCH UNITS: 100 INJECTION, SOLUTION INTRAVENOUS; SUBCUTANEOUS at 18:00

## 2021-01-30 RX ADMIN — DEXTROSE MONOHYDRATE SCH MLS/HR: 50 INJECTION, SOLUTION INTRAVENOUS at 05:48

## 2021-01-30 RX ADMIN — CHLORHEXIDINE GLUCONATE SCH APPLIC: 213 SOLUTION TOPICAL at 20:10

## 2021-01-30 RX ADMIN — INSULIN ASPART SCH UNITS: 100 INJECTION, SOLUTION INTRAVENOUS; SUBCUTANEOUS at 12:00

## 2021-01-30 RX ADMIN — SODIUM CHLORIDE SCH MLS/HR: 0.9 INJECTION INTRAVENOUS at 13:26

## 2021-01-30 RX ADMIN — Medication SCH MLS/HR: at 20:40

## 2021-01-30 RX ADMIN — INSULIN DETEMIR SCH UNITS: 100 INJECTION, SOLUTION SUBCUTANEOUS at 20:11

## 2021-01-30 NOTE — NUR
NURSE NOTES:

Received report on patient from prior shift RN.  Patient resting and comfortable.  Patient 
on mechanical ventilator at 100%. Vitals stable at this time.  Patient has BRANDI infusing at 
240mcg/hr to maintain his pressure.  Patient has a femore triple lumen cath and a PIV in 
right forearm. Patient repositioned and assessed. Will continue to monitor.

## 2021-01-30 NOTE — PULMONOLOGY PROGRESS NOTE
Subjective


ROS Limited/Unobtainable:  Yes


Interval Events:  code blue yesterday


Constitutional:  Reports: fever, other - Nr=478.2


HEENT:  Repors: no symptoms


Respiratory:  Reports: shortness of breath - better


Cardiovascular:  Reports: no symptoms


Gastrointestinal/Abdominal:  Reports: diarrhea


Allergies:  


Coded Allergies:  


     No Known Allergies (Unverified , 6/11/19)


All Systems:  reviewed and negative except above





Objective





Last 24 Hour Vital Signs








  Date Time  Temp Pulse Resp B/P (MAP) Pulse Ox O2 Delivery O2 Flow Rate FiO2


 


1/30/21 10:30  98      


 


1/30/21 07:54    99/66    


 


1/30/21 07:15  77 21 99/66 (77) 90   


 


1/30/21 07:00  77 18 104/66 (79) 90   


 


1/30/21 06:45  77 19 102/63 (76) 91   


 


1/30/21 06:30  78 18 106/70 (82) 89   


 


1/30/21 06:15  78 18 104/70 (81) 89   


 


1/30/21 06:00  78 19 110/72 (85) 88   


 


1/30/21 05:49   20 107/51  Mechanical Ventilator  100


 


1/30/21 05:45  77 20 107/74 (85) 89   


 


1/30/21 05:30  78 20 104/71 (82) 89   


 


1/30/21 05:15  79 18 106/69 (81) 89   


 


1/30/21 05:00  80 18 123/71 (88) 88   


 


1/30/21 04:45  80 18 115/75 (88) 88   


 


1/30/21 04:30  80 20 112/74 (87) 90   


 


1/30/21 04:15  81 19 113/77 (89) 88   


 


1/30/21 04:00      Mechanical Ventilator  


 


1/30/21 04:00        100


 


1/30/21 04:00  82 17 121/74 (90) 87   


 


1/30/21 04:00  82      


 


1/30/21 03:55  82 20     100


 


1/30/21 03:45  81 19 120/76 (91) 91   


 


1/30/21 03:45  81 19 120/76 (91) 91   


 


1/30/21 03:30  82 19 114/72 (86) 90   


 


1/30/21 03:15  83 21 116/71 (86) 91   


 


1/30/21 03:00  83 19 115/82 (93) 90   


 


1/30/21 02:45  85 20 114/76 (89) 91   


 


1/30/21 02:30  85 21 133/88 (103) 90   


 


1/30/21 02:15  87 20 109/71 (84) 86   


 


1/30/21 02:15  87 20 109/71 (84) 86   


 


1/30/21 02:04    89/56    


 


1/30/21 02:00  86 20 89/56 (67) 90   


 


1/30/21 01:45  85 20 87/53 (64) 84   


 


1/30/21 01:30  84 19 78/50 (59) 91   


 


1/30/21 01:15  84 19 123/76 (92) 94   


 


1/30/21 01:00  85 20 120/73 (89) 94   


 


1/30/21 00:45  84 20 122/74 (90) 95   


 


1/30/21 00:30  85 20 124/72 (89) 94   


 


1/30/21 00:15  88 20 119/72 (88) 88   


 


1/30/21 00:00  85 21 117/77 (90) 93   


 


1/30/21 00:00      Mechanical Ventilator  


 


1/30/21 00:00        100


 


1/30/21 00:00  89      


 


1/30/21 00:00  85 21 117/77 (90) 93   


 


1/29/21 23:45  85 20 116/72 (87) 94   


 


1/29/21 23:30  87 19 114/73 (87) 93   


 


1/29/21 23:29  87 22     100


 


1/29/21 23:15  89 21 111/72 (85) 92   


 


1/29/21 23:00  89 20 111/72 (85) 92   


 


1/29/21 22:45  91 22 113/75 (88) 92   


 


1/29/21 22:30  92 21 131/81 (98) 91   


 


1/29/21 22:15  94 20 132/80 (97) 89   


 


1/29/21 22:03  94 20 101/66 (78) 83   


 


1/29/21 22:00  93 20 64/49 (54) 85   


 


1/29/21 21:45  94 20 106/68 (81) 86   


 


1/29/21 21:30  95 22 103/65 (78) 85   


 


1/29/21 21:15  98 21 102/65 (77) 84   


 


1/29/21 21:08  100 22 103/64 (77) 83   


 


1/29/21 21:00  103 22 101/64 (76) 82   


 


1/29/21 20:45  107 21 101/65 (77) 78   


 


1/29/21 20:45  107 21  78   


 


1/29/21 20:30  110 21 110/68 (82) 76   


 


1/29/21 20:21  112 21 137/80 (99) 87   


 


1/29/21 20:15  115 20 141/83 (102) 87   


 


1/29/21 20:10  120 20 157/82 (107) 85   


 


1/29/21 20:06  128 22 181/89 (119) 75   


 


1/29/21 20:02  123 20 174/87 (116) 57   


 


1/29/21 20:00      Mechanical Ventilator  


 


1/29/21 20:00  5      


 


1/29/21 20:00  121 21  39   


 


1/29/21 20:00    47/36    


 


1/29/21 20:00        100


 


1/29/21 19:52  31 16 46/26 (33) 80   


 


1/29/21 19:48  38 19 42/27 (32) 80   


 


1/29/21 19:45  80 21 47/31 (36) 81   


 


1/29/21 19:30  90 22 101/61 (74) 89   


 


1/29/21 19:19  91 22     100


 


1/29/21 19:15  90 20 102/63 (76) 89   


 


1/29/21 19:15  90 20 102/63 (76) 89   


 


1/29/21 19:00  91 20 100/61 (74) 88   


 


1/29/21 19:00   20 102/63  Mechanical Ventilator  100


 


1/29/21 19:00  91 20 100/61 (74) 88   


 


1/29/21 18:45  92 21 105/65 (78) 89   


 


1/29/21 18:30  93 19 113/68 (83) 85   


 


1/29/21 18:30   21 105/65  Mechanical Ventilator  100


 


1/29/21 18:25  92  95/63 (74) 64   


 


1/29/21 18:15   20 95/63  Mechanical Ventilator  100


 


1/29/21 18:15  93 19 92/60 (71) 73   


 


1/29/21 18:00  90 21 91/65 (74) 87   


 


1/29/21 18:00   20 91/65  Mechanical Ventilator  100


 


1/29/21 17:45   20 92/60  Mechanical Ventilator  100


 


1/29/21 17:45  90 19 114/70 (85) 87   


 


1/29/21 17:30  90 22 104/66 (79) 86   


 


1/29/21 17:30   20 114/70  Mechanical Ventilator  100


 


1/29/21 17:15  91 20 90/54 (66) 85   


 


1/29/21 17:00  92 22 95/69 (78) 86   


 


1/29/21 17:00   21 90/54  Mechanical Ventilator  100


 


1/29/21 16:45  93 20 103/66 (78) 84   


 


1/29/21 16:30  93 20 112/68 (83) 84   


 


1/29/21 16:15  94 21 115/70 (85) 85   


 


1/29/21 16:00        100


 


1/29/21 16:00   21 108/71  Mechanical Ventilator  100


 


1/29/21 16:00 97.7 95 21 108/71 (83) 78   


 


1/29/21 16:00  98      


 


1/29/21 16:00      Mechanical Ventilator  


 


1/29/21 15:45  93 19 84/54 (64) 79   


 


1/29/21 15:30  95 22 117/68 (84) 79   


 


1/29/21 15:20  95 23     100


 


1/29/21 15:15  96 19 107/65 (79) 78   


 


1/29/21 15:00   21 107/65  Mechanical Ventilator  100


 


1/29/21 15:00  96 23 106/63 (77) 78   


 


1/29/21 14:45  97 24 103/58 (73) 72   


 


1/29/21 14:30  98 23 102/64 (77) 78   


 


1/29/21 14:15  100 22 106/63 (77) 73   


 


1/29/21 14:00   22 106/63  Mechanical Ventilator  100


 


1/29/21 14:00  102 24 112/65 (81) 74   


 


1/29/21 13:45  104 20 113/64 (80) 78   


 


1/29/21 13:33  118 21 164/83 (110) 41   


 


1/29/21 13:30  125 22 193/90 (124) 79   


 


1/29/21 13:15  100 18 97/59 (72) 83   


 


1/29/21 13:00  96 22 102/68 (79) 88   


 


1/29/21 13:00   21 97/59  Mechanical Ventilator  100


 


1/29/21 12:45  96 20 95/59 (71) 87   


 


1/29/21 12:30  98 20 100/68 (79) 83   

















Intake and Output  


 


 1/29/21 1/30/21





 19:00 07:00


 


Intake Total 1812.883 ml 220 ml


 


Output Total 1705 ml 1560 ml


 


Balance 107.883 ml -1340 ml


 


  


 


Free Water 260 ml 


 


IV Total 837.883 ml 


 


Tube Feeding 715 ml 220 ml


 


Output Urine Total 955 ml 1375 ml


 


Stool Total 600 ml 0 ml


 


Chest Tube Drainage Total 150 ml 185 ml








Objective


On Versed drip


General Appearance:  no acute distress


HEENT:  atraumatic


Respiratory:  lungs clear, decreased breath sounds


Cardiovascular:  normal rate, regular rhythm


Abdomen:  soft, non tender, no organomegaly


Laboratory Tests


1/29/21 13:35: 


Arterial Blood pH 7.148*L, Arterial Blood Partial Pressure CO2 92.4*H, Arterial 

Blood Partial Pressure O2 35.1*L, Arterial Blood HCO3 31.3H, Arterial Blood 

Oxygen Saturation 59.7*L, Arterial Blood Base Excess 0.6, Abelardo Test Positive


1/29/21 13:38: POC Whole Blood Glucose [Pending]


1/29/21 18:43: POC Whole Blood Glucose [Pending]


1/29/21 23:50: POC Whole Blood Glucose 121H


1/30/21 04:15: 


White Blood Count 13.2H, Red Blood Count 3.24L, Hemoglobin 9.8L, Hematocrit 

29.3L, Mean Corpuscular Volume 90, Mean Corpuscular Hemoglobin 30.1, Mean Cor

puscular Hemoglobin Concent 33.3, Red Cell Distribution Width 17.2H, Platelet 

Count 184, Mean Platelet Volume 10.7H, Neutrophils (%) (Auto) , Lymphocytes (%) 

(Auto) , Monocytes (%) (Auto) , Eosinophils (%) (Auto) , Basophils (%) (Auto) , 

Differential Total Cells Counted 100, Neutrophils % (Manual) 89H, Lymphocytes % 

(Manual) 7L, Monocytes % (Manual) 1, Eosinophils % (Manual) 3, Basophils % 

(Manual) 0, Band Neutrophils 0, Nucleated Red Blood Cells 2, Platelet Estimate 

Adequate, Platelet Morphology Normal, Polychromasia 2+, Hypochromasia 1+, 

Anisocytosis 1+, Sodium Level 147H, Potassium Level 3.7, Chloride Level 109H, 

Carbon Dioxide Level 30, Anion Gap 8, Blood Urea Nitrogen 63H, Creatinine 2.2H, 

Estimat Glomerular Filtration Rate 30.1, Glucose Level 115H, Calcium Level 7.9L


1/30/21 08:14: 


Arterial Blood pH 7.290L, Arterial Blood Partial Pressure CO2 65.7*H, Arterial 

Blood Partial Pressure O2 53.7L, Arterial Blood HCO3 30.9H, Arterial Blood 

Oxygen Saturation 86.2*L, Arterial Blood Base Excess 3H, Abelardo Test Positive


1/30/21 09:38: POC Whole Blood Glucose [Pending]





Current Medications








 Medications


  (Trade)  Dose


 Ordered  Sig/Julisa


 Route


 PRN Reason  Start Time


 Stop Time Status Last Admin


Dose Admin


 


 Acetaminophen


  (Tylenol)  650 mg  Q4H  PRN


 GT


 Temp >100.5  1/15/21 17:15


 2/14/21 17:14  1/29/21 06:59





 


 Amiodarone HCl


  (Cordarone)  200 mg  DAILY


 NG


   1/26/21 09:00


 4/19/21 20:59  1/30/21 10:30





 


 Cefepime HCl 1 gm/


 Dextrose  55 ml @ 


 110 mls/hr  Q24H


 IVPB


   1/30/21 11:00


 2/6/21 10:59   





 


 Chlorhexidine


 Gluconate


  (Vanessa-Hex 2%)  1 applic  DAILY@2000


 TOPIC


   1/19/21 20:00


 4/19/21 19:59  1/29/21 21:48





 


 Dextrose


  (Dextrose 50%)  25 ml  Q30M  PRN


 IV


 Hypoglycemia  1/9/21 13:30


 4/9/21 13:29   





 


 Dextrose


  (Dextrose 50%)  50 ml  Q30M  PRN


 IV


 Hypoglycemia  1/9/21 13:30


 4/9/21 13:29   





 


 Digoxin


  (Lanoxin)  0.125 mg  DAILY


 NG


   1/21/21 09:00


 4/21/21 08:59  1/30/21 10:30





 


 Docusate Sodium


  (Colace)  100 mg  TIDPRN  PRN


 GT


 Constipation  1/12/21 01:15


 2/11/21 01:14  1/12/21 01:42





 


 Enoxaparin Sodium


  (Lovenox)  80 mg  EVERY 12  HOURS


 SUBQ


   1/2/21 21:00


 4/2/21 20:59  1/30/21 10:30





 


 Fentanyl Citrate  250 ml @ 0


 mls/hr  Q24H


 IV


   1/29/21 19:00


 1/31/21 18:59  1/30/21 05:49





 


 Furosemide


  (Lasix)  40 mg  EVERY 12  HOURS


 IV


   1/29/21 10:15


 2/28/21 10:14  1/30/21 09:32





 


 Insulin Aspart


  (NovoLOG)    Q6HR


 SUBQ


   1/14/21 12:00


 4/9/21 17:59  1/29/21 18:00





 


 Insulin Aspart


  (NovoLOG)  6 units  EVERY 6  HOURS


 SUBQ


   1/29/21 12:00


 4/15/21 06:59  1/29/21 18:00





 


 Insulin Detemir


  (Levemir)  10 units  Q12HR


 SUBQ


   1/26/21 09:00


 4/12/21 08:59  1/30/21 10:30





 


 Norepinephrine


 Bitartrate 4 mg/


 Sodium Chloride  250 ml @ 0


 mls/hr  Q24H


 IV


   1/29/21 18:00


 1/31/21 02:00  1/30/21 07:54





 


 Pantoprazole


  (Protonix)  40 mg  DAILY


 IVP


   1/14/21 09:00


 2/13/21 08:59  1/30/21 09:32





 


 Quetiapine


 Fumarate


  (SEROqueL)  50 mg  Q12HR


 ORAL


   1/4/21 21:00


 2/18/21 20:59  1/30/21 10:30





 


 Sodium Chloride  500 ml @ 


 999 mls/hr  Q31M PRN


 IV


 For hypotension  1/15/21 18:30


 2/14/21 18:29   














Assessment/Plan


Assessment/Plan


1. COVID-19 pneumonia.


   - on Decadron, azithromycin, and ceftriaxone


   - Intubated now; will continue AC mode for now


          -Currently 100% FiO2. PEEP 12; SaO2 78-84 ->91%


            - Has R apical PTX and subcut emphysema


             - S/p R CT placement





2. Diabetes mellitus.; 


3. Elevated inflammatory markers.


4. High D-dimer. On full dose Lovenox


5. Hyper natremia; will continue D5W


6. Hyperglycemia.


   - BG control


7. Hypoxemia., secondary to #1; improved





DVT prophylaxis   On Lovenox.


Sedated; advised RN to increase sedation








Hypotensive


On levophed


Continue PEEP 12


Poor prognosis











Sung Lemos MD           Jan 30, 2021 12:28

## 2021-01-30 NOTE — NUR
NURSE NOTES:



No change in status. Pt repositioned. VSS. CT in place and patent. no s/s of distress.

## 2021-01-30 NOTE — HEMATOLOGY/ONC PROGRESS NOTE
Assessment/Plan


Assessment/Plan


Leukocytosis 2/2 covid pna wbc 15->14-->17-->9.6


Anemia 2/2 chronic disease, hgb 11-->10.4


Thrombocytopenia with plt 122


Hypercoag disorer now on lovenox sq


Ac resp distress on ventilator


Pneumomediastinum


etoh abused


agitated -halodol


vent





Ngt


cont iv abx and iv decadrone


pulmonary and gi on consult


smear is noted


pneumomediastinum per pulm


dw charge nurse


lovenox sq





Subjective


Allergies:  


Coded Allergies:  


     No Known Allergies (Unverified , 6/11/19)


Subjective





 Subjective 


Subjective


Allergies:  


Coded Allergies:  


     No Known Allergies (Unverified , 6/11/19)


All Systems:  reviewed and negative except above


Subjective


1/10 on ventilator 60% fio2, on sediation, unresponsive, in icu


1/14 on vent, icu, wbc elev, no bleeding, unresponsive


1/15 on vent, with cash, icu, no bleeding, unresponsive


1/17 on vent, elmer rn, no major changes, icu, nv


1/18 nv, labs reviewd, hr is elev, with afib, is onlovenox sq


1/19 remains on vent, sedated with wrist restraints, icu


1/20 on vent, with hematuria, ngt, in icu, labs reviewed


1/21 remains on vent, settings have been adjusted as per icu care


1/22 remains on vent, icu, on levo 6mcg, off versed, labs noted, elmer rn


1/25 remains onv ent, in icu, with restraints, on levo gtt as well


1/26 nv, suctioned, rectal tube, in icu, with restraints, no bleeding


1/27 nv, remains on vent, rectal tube, labs reviewed, elmer rn


1/28 nv, icu, on vent/ bipap, labs noted, no bleeding, meds reviewed


1/29 nv, on vent, overnight no changes, meds reviewed, labs noted


1/30: code blue last night.





Objective


Objective





Current Medications








 Medications


  (Trade)  Dose


 Ordered  Sig/Julisa


 Route


 PRN Reason  Start Time


 Stop Time Status Last Admin


Dose Admin


 


 Acetaminophen


  (Tylenol)  650 mg  Q4H  PRN


 GT


 Temp >100.5  1/15/21 17:15


 2/14/21 17:14  1/29/21 06:59





 


 Amiodarone HCl


  (Cordarone)  200 mg  DAILY


 NG


   1/26/21 09:00


 4/19/21 20:59  1/30/21 10:30





 


 Cefepime HCl 1 gm/


 Dextrose  55 ml @ 


 110 mls/hr  Q24H


 IVPB


   1/30/21 11:00


 2/6/21 10:59   





 


 Chlorhexidine


 Gluconate


  (Vanessa-Hex 2%)  1 applic  DAILY@2000


 TOPIC


   1/19/21 20:00


 4/19/21 19:59  1/29/21 21:48





 


 Dextrose


  (Dextrose 50%)  25 ml  Q30M  PRN


 IV


 Hypoglycemia  1/9/21 13:30


 4/9/21 13:29   





 


 Dextrose


  (Dextrose 50%)  50 ml  Q30M  PRN


 IV


 Hypoglycemia  1/9/21 13:30


 4/9/21 13:29   





 


 Digoxin


  (Lanoxin)  0.125 mg  DAILY


 NG


   1/21/21 09:00


 4/21/21 08:59  1/30/21 10:30





 


 Docusate Sodium


  (Colace)  100 mg  TIDPRN  PRN


 GT


 Constipation  1/12/21 01:15


 2/11/21 01:14  1/12/21 01:42





 


 Enoxaparin Sodium


  (Lovenox)  80 mg  EVERY 12  HOURS


 SUBQ


   1/2/21 21:00


 4/2/21 20:59  1/30/21 10:30





 


 Fentanyl Citrate  250 ml @ 0


 mls/hr  Q24H


 IV


   1/29/21 19:00


 1/31/21 18:59  1/30/21 05:49





 


 Furosemide


  (Lasix)  40 mg  EVERY 12  HOURS


 IV


   1/29/21 10:15


 2/28/21 10:14  1/30/21 09:32





 


 Insulin Aspart


  (NovoLOG)    Q6HR


 SUBQ


   1/14/21 12:00


 4/9/21 17:59  1/29/21 18:00





 


 Insulin Aspart


  (NovoLOG)  6 units  EVERY 6  HOURS


 SUBQ


   1/29/21 12:00


 4/15/21 06:59  1/29/21 18:00





 


 Insulin Detemir


  (Levemir)  10 units  Q12HR


 SUBQ


   1/26/21 09:00


 4/12/21 08:59  1/30/21 10:30





 


 Norepinephrine


 Bitartrate 4 mg/


 Sodium Chloride  250 ml @ 0


 mls/hr  Q24H


 IV


   1/29/21 18:00


 1/31/21 02:00  1/30/21 07:54





 


 Pantoprazole


  (Protonix)  40 mg  DAILY


 IVP


   1/14/21 09:00


 2/13/21 08:59  1/30/21 09:32





 


 Quetiapine


 Fumarate


  (SEROqueL)  50 mg  Q12HR


 ORAL


   1/4/21 21:00


 2/18/21 20:59  1/30/21 10:30





 


 Sodium Chloride  500 ml @ 


 999 mls/hr  Q31M PRN


 IV


 For hypotension  1/15/21 18:30


 2/14/21 18:29   














Last 24 Hour Vital Signs








  Date Time  Temp Pulse Resp B/P (MAP) Pulse Ox O2 Delivery O2 Flow Rate FiO2


 


1/30/21 10:30  98      


 


1/30/21 07:54    99/66    


 


1/30/21 07:15  77 21 99/66 (77) 90   


 


1/30/21 07:00  77 18 104/66 (79) 90   


 


1/30/21 06:45  77 19 102/63 (76) 91   


 


1/30/21 06:30  78 18 106/70 (82) 89   


 


1/30/21 06:15  78 18 104/70 (81) 89   


 


1/30/21 06:00  78 19 110/72 (85) 88   


 


1/30/21 05:49   20 107/51  Mechanical Ventilator  100


 


1/30/21 05:45  77 20 107/74 (85) 89   


 


1/30/21 05:30  78 20 104/71 (82) 89   


 


1/30/21 05:15  79 18 106/69 (81) 89   


 


1/30/21 05:00  80 18 123/71 (88) 88   


 


1/30/21 04:45  80 18 115/75 (88) 88   


 


1/30/21 04:30  80 20 112/74 (87) 90   


 


1/30/21 04:15  81 19 113/77 (89) 88   


 


1/30/21 04:00      Mechanical Ventilator  


 


1/30/21 04:00        100


 


1/30/21 04:00  82 17 121/74 (90) 87   


 


1/30/21 04:00  82      


 


1/30/21 03:55  82 20     100


 


1/30/21 03:45  81 19 120/76 (91) 91   


 


1/30/21 03:45  81 19 120/76 (91) 91   


 


1/30/21 03:30  82 19 114/72 (86) 90   


 


1/30/21 03:15  83 21 116/71 (86) 91   


 


1/30/21 03:00  83 19 115/82 (93) 90   


 


1/30/21 02:45  85 20 114/76 (89) 91   


 


1/30/21 02:30  85 21 133/88 (103) 90   


 


1/30/21 02:15  87 20 109/71 (84) 86   


 


1/30/21 02:15  87 20 109/71 (84) 86   


 


1/30/21 02:04    89/56    


 


1/30/21 02:00  86 20 89/56 (67) 90   


 


1/30/21 01:45  85 20 87/53 (64) 84   


 


1/30/21 01:30  84 19 78/50 (59) 91   


 


1/30/21 01:15  84 19 123/76 (92) 94   


 


1/30/21 01:00  85 20 120/73 (89) 94   


 


1/30/21 00:45  84 20 122/74 (90) 95   


 


1/30/21 00:30  85 20 124/72 (89) 94   


 


1/30/21 00:15  88 20 119/72 (88) 88   


 


1/30/21 00:00  85 21 117/77 (90) 93   


 


1/30/21 00:00      Mechanical Ventilator  


 


1/30/21 00:00        100


 


1/30/21 00:00  89      


 


1/30/21 00:00  85 21 117/77 (90) 93   


 


1/29/21 23:45  85 20 116/72 (87) 94   


 


1/29/21 23:30  87 19 114/73 (87) 93   


 


1/29/21 23:29  87 22     100


 


1/29/21 23:15  89 21 111/72 (85) 92   


 


1/29/21 23:00  89 20 111/72 (85) 92   


 


1/29/21 22:45  91 22 113/75 (88) 92   


 


1/29/21 22:30  92 21 131/81 (98) 91   


 


1/29/21 22:15  94 20 132/80 (97) 89   


 


1/29/21 22:03  94 20 101/66 (78) 83   


 


1/29/21 22:00  93 20 64/49 (54) 85   


 


1/29/21 21:45  94 20 106/68 (81) 86   


 


1/29/21 21:30  95 22 103/65 (78) 85   


 


1/29/21 21:15  98 21 102/65 (77) 84   


 


1/29/21 21:08  100 22 103/64 (77) 83   


 


1/29/21 21:00  103 22 101/64 (76) 82   


 


1/29/21 20:45  107 21 101/65 (77) 78   


 


1/29/21 20:45  107 21  78   


 


1/29/21 20:30  110 21 110/68 (82) 76   


 


1/29/21 20:21  112 21 137/80 (99) 87   


 


1/29/21 20:15  115 20 141/83 (102) 87   


 


1/29/21 20:10  120 20 157/82 (107) 85   


 


1/29/21 20:06  128 22 181/89 (119) 75   


 


1/29/21 20:02  123 20 174/87 (116) 57   


 


1/29/21 20:00      Mechanical Ventilator  


 


1/29/21 20:00  5      


 


1/29/21 20:00  121 21  39   


 


1/29/21 20:00    47/36    


 


1/29/21 20:00        100


 


1/29/21 19:52  31 16 46/26 (33) 80   


 


1/29/21 19:48  38 19 42/27 (32) 80   


 


1/29/21 19:45  80 21 47/31 (36) 81   


 


1/29/21 19:30  90 22 101/61 (74) 89   


 


1/29/21 19:19  91 22     100


 


1/29/21 19:15  90 20 102/63 (76) 89   


 


1/29/21 19:15  90 20 102/63 (76) 89   


 


1/29/21 19:00  91 20 100/61 (74) 88   


 


1/29/21 19:00   20 102/63  Mechanical Ventilator  100


 


1/29/21 19:00  91 20 100/61 (74) 88   


 


1/29/21 18:45  92 21 105/65 (78) 89   


 


1/29/21 18:30  93 19 113/68 (83) 85   


 


1/29/21 18:30   21 105/65  Mechanical Ventilator  100


 


1/29/21 18:25  92  95/63 (74) 64   


 


1/29/21 18:15   20 95/63  Mechanical Ventilator  100


 


1/29/21 18:15  93 19 92/60 (71) 73   


 


1/29/21 18:00  90 21 91/65 (74) 87   


 


1/29/21 18:00   20 91/65  Mechanical Ventilator  100


 


1/29/21 17:45   20 92/60  Mechanical Ventilator  100


 


1/29/21 17:45  90 19 114/70 (85) 87   


 


1/29/21 17:30  90 22 104/66 (79) 86   


 


1/29/21 17:30   20 114/70  Mechanical Ventilator  100


 


1/29/21 17:15  91 20 90/54 (66) 85   


 


1/29/21 17:00  92 22 95/69 (78) 86   


 


1/29/21 17:00   21 90/54  Mechanical Ventilator  100


 


1/29/21 16:45  93 20 103/66 (78) 84   


 


1/29/21 16:30  93 20 112/68 (83) 84   


 


1/29/21 16:15  94 21 115/70 (85) 85   


 


1/29/21 16:00        100


 


1/29/21 16:00   21 108/71  Mechanical Ventilator  100


 


1/29/21 16:00 97.7 95 21 108/71 (83) 78   


 


1/29/21 16:00  98      


 


1/29/21 16:00      Mechanical Ventilator  


 


1/29/21 15:45  93 19 84/54 (64) 79   


 


1/29/21 15:30  95 22 117/68 (84) 79   


 


1/29/21 15:20  95 23     100


 


1/29/21 15:15  96 19 107/65 (79) 78   


 


1/29/21 15:00   21 107/65  Mechanical Ventilator  100


 


1/29/21 15:00  96 23 106/63 (77) 78   


 


1/29/21 14:45  97 24 103/58 (73) 72   


 


1/29/21 14:30  98 23 102/64 (77) 78   


 


1/29/21 14:15  100 22 106/63 (77) 73   


 


1/29/21 14:00   22 106/63  Mechanical Ventilator  100


 


1/29/21 14:00  102 24 112/65 (81) 74   


 


1/29/21 13:45  104 20 113/64 (80) 78   


 


1/29/21 13:33  118 21 164/83 (110) 41   


 


1/29/21 13:30  125 22 193/90 (124) 79   


 


1/29/21 13:15  100 18 97/59 (72) 83   


 


1/29/21 13:00  96 22 102/68 (79) 88   


 


1/29/21 13:00   21 97/59  Mechanical Ventilator  100


 


1/29/21 12:45  96 20 95/59 (71) 87   


 


1/29/21 12:30  98 20 100/68 (79) 83   


 


1/29/21 12:15  98 20 99/62 (74) 79   


 


1/29/21 12:00        100


 


1/29/21 12:00 98.2 98 21 88/55 (66) 77   


 


1/29/21 12:00   21 88/55  Mechanical Ventilator  100


 


1/29/21 12:00      Mechanical Ventilator  


 


1/29/21 12:00  94      


 


1/29/21 11:45  99 19 99/56 (70) 82   


 


1/29/21 11:30  101 20 96/56 (69) 80   


 


1/29/21 11:15  101 21 88/54 (65) 84   


 


1/29/21 11:00  101 19 88/49 (62) 61   


 


1/29/21 11:00   20 88/49  Mechanical Ventilator  100


 


1/29/21 11:00  101 20     100


 


1/29/21 10:45  99 20 87/50 (62) 83   


 


1/29/21 10:30  99 20 100/58 (72) 88   


 


1/29/21 10:19  100      


 


1/29/21 10:15  100 20 100/62 (75) 89   


 


1/29/21 10:00   20 96/59  Mechanical Ventilator  100


 


1/29/21 10:00  101 21 96/59 (71) 89   


 


1/29/21 09:45  103 20 96/60 (72) 85   


 


1/29/21 09:30  101 20 94/52 (66) 89   


 


1/29/21 09:15  101 20 94/56 (69) 88   


 


1/29/21 09:00    65/40    


 


1/29/21 09:00   20 98/58  Mechanical Ventilator  100


 


1/29/21 09:00  105 19 98/58 (71) 80   


 


1/29/21 08:45  96 20 65/40 (48) 88   


 


1/29/21 08:30  100 22 92/55 (67) 87   


 


1/29/21 08:15  103 21 96/57 (70) 84   


 


1/29/21 08:10  98 21 80/49 (59) 85   


 


1/29/21 08:00 101.9 96 18 68/43 (51) 83   


 


1/29/21 08:00      Mechanical Ventilator  


 


1/29/21 08:00   20 70/43  Mechanical Ventilator  100


 


1/29/21 08:00        100


 


1/29/21 08:00  92      


 


1/29/21 07:45  94 19 71/38 (49) 88   


 


1/29/21 07:30  99 20 99/56 (70) 89   


 


1/29/21 07:29 101.5       


 


1/29/21 07:15  97 20 97/55 (69) 89   


 


1/29/21 07:00  99 19  89   


 


1/29/21 07:00  96 24     100


 


1/29/21 07:00 101.9 99 19 99/52 (68) 89   


 


1/29/21 07:00   20 99/52  Mechanical Ventilator  100


 


1/29/21 07:00   21 99/52  Mechanical Ventilator  100


 


1/29/21 06:45  99 19 98/53 (68) 89   


 


1/29/21 06:45  99 19 98/53 (68) 89   


 


1/29/21 06:40 102.0       


 


1/29/21 06:30  100 20 98/55 (69) 89   


 


1/29/21 06:30  100 20 98/55 (69) 89   


 


1/29/21 06:15  100 21 97/54 (68) 89   


 


1/29/21 06:15  100 21  89   


 


1/29/21 06:15  100 21 97/54 (68) 89   


 


1/29/21 06:15  100 21 97/54 (68) 89   


 


1/29/21 06:00  101 20 98/55 (69) 88   


 


1/29/21 06:00  101 20 98/55 (69) 88   


 


1/29/21 06:00 102.2 101 20 98/55 (69) 88   


 


1/29/21 06:00  101 20 98/55 (69) 88   


 


1/29/21 05:45  102 21 105/55 (72) 91   


 


1/29/21 05:30  101 21 94/63 (73) 91   


 


1/29/21 05:15  100 20 98/59 (72) 91   


 


1/29/21 05:00  98 21 91/58 (69) 91   


 


1/29/21 04:42   20 98/64  Mechanical Ventilator  100


 


1/29/21 04:30  97 20 90/55 (67) 91   


 


1/29/21 04:15  96 20 93/57 (69) 92   


 


1/29/21 04:00  94      


 


1/29/21 04:00  95 18 90/56 (67) 92   


 


1/29/21 04:00  95 18 90/56 (67) 92   


 


1/29/21 04:00 100.7       


 


1/29/21 04:00        100


 


1/29/21 04:00      Mechanical Ventilator  


 


1/29/21 03:45  94 12 95/58 (70) 93   


 


1/29/21 03:45  94 12 95/58 (70) 93   


 


1/29/21 03:30  93 9 95/54 (68) 94   


 


1/29/21 03:30  93 9 95/54 (68) 94   


 


1/29/21 03:15  91 7 94/55 (68) 95   


 


1/29/21 03:15  91 7 94/55 (68) 95   


 


1/29/21 03:00  89 19 98/56 (70) 94   


 


1/29/21 03:00  89 19  94   


 


1/29/21 03:00  89 19 98/56 (70) 94   


 


1/29/21 02:57  88 20     100


 


1/29/21 02:45  87 21 96/54 (68) 93   


 


1/29/21 02:45  87 21 96/54 (68) 93   


 


1/29/21 02:45  87 21 96/54 (68) 93   


 


1/29/21 02:30  85 19 95/54 (68) 94   


 


1/29/21 02:30  85 19 95/54 (68) 94   


 


1/29/21 02:30  85 19 95/54 (68) 94   


 


1/29/21 02:15  86 20 93/55 (68) 94   


 


1/29/21 02:15  86 20 93/55 (68) 94   


 


1/29/21 02:15  86 20 93/55 (68) 94   


 


1/29/21 02:15  86 20 93/55 (68) 94   


 


1/29/21 02:00  86 20 90/53 (65) 94   


 


1/29/21 02:00  86 20 90/53 (65) 94   


 


1/29/21 02:00  86 20 90/53 (65) 94   


 


1/29/21 02:00  86 20 90/53 (65) 94   


 


1/29/21 01:45  85 18 94/54 (67) 94   


 


1/29/21 01:45  85 18 94/54 (67) 94   


 


1/29/21 01:45  85 18 94/54 (67) 94   


 


1/29/21 01:45  85 18 94/54 (67) 94   


 


1/29/21 01:30  85 17 94/55 (68) 93   


 


1/29/21 01:30  85 17 94/55 (68) 93   


 


1/29/21 01:30  85 17 94/55 (68) 93   


 


1/29/21 01:30  85 17 94/55 (68) 93   


 


1/29/21 01:15  86 20 94/55 (68) 93   


 


1/29/21 01:15  86 20 94/55 (68) 93   


 


1/29/21 01:15  86 20 94/55 (68) 93   


 


1/29/21 01:15  86 20 94/55 (68) 93   


 


1/29/21 01:00  85 20 94/53 (67) 93   


 


1/29/21 01:00  85 20 94/53 (67) 93   


 


1/29/21 01:00  85 20 94/53 (67) 93   


 


1/29/21 01:00  85 20 94/53 (67) 93   


 


1/29/21 01:00  85 20 94/53 (67) 93   


 


1/29/21 00:45  85 20 96/55 (69) 92   


 


1/29/21 00:45  85 20 96/55 (69) 92   


 


1/29/21 00:45  85 20 96/55 (69) 92   


 


1/29/21 00:45  85 20 96/55 (69) 92   


 


1/29/21 00:45  85 20 96/55 (69) 92   


 


1/29/21 00:30  86 15 97/55 (69) 93   


 


1/29/21 00:30  86 15 97/55 (69) 93   


 


1/29/21 00:30  86 15 97/55 (69) 93   


 


1/29/21 00:30  86 15 97/55 (69) 93   


 


1/29/21 00:30  86 15 97/55 (69) 93   


 


1/29/21 00:15  86 0 100/60 (73) 94   


 


1/29/21 00:15  86 0 100/60 (73) 94   


 


1/29/21 00:15  86 0 100/60 (73) 94   


 


1/29/21 00:15  86 0 100/60 (73) 94   


 


1/29/21 00:15  86 0 100/60 (73) 94   


 


1/29/21 00:10      Mechanical Ventilator  


 


1/29/21 00:00 99.9       


 


1/29/21 00:00  86 20 100/60 (73) 89   


 


1/29/21 00:00  86 20 100/60 (73) 89   


 


1/29/21 00:00  86 20 100/60 (73) 89   


 


1/29/21 00:00  86 20 100/60 (73) 89   


 


1/29/21 00:00  86      


 


1/29/21 00:00  86 20 100/60 (73) 89   


 


1/29/21 00:00  86 20 100/60 (73) 89   


 


1/28/21 23:45  87 20 92/59 (70) 94   


 


1/28/21 23:30  86 20 96/63 (74) 93   


 


1/28/21 23:15  85 20 94/63 (73) 94   


 


1/28/21 23:00   20 93/44  Mechanical Ventilator  100


 


1/28/21 23:00  84 7 96/63 (74) 90   


 


1/28/21 23:00  84 7 96/63 (74) 90   


 


1/28/21 22:53  85 21     100


 


1/28/21 22:45  85 19 93/53 (66) 93   


 


1/28/21 22:30  84 20 94/60 (71) 94   


 


1/28/21 22:15  82 20 98/63 (75) 92   


 


1/28/21 22:00  82 19 98/62 (74) 90   


 


1/28/21 22:00   18 98/62  Mechanical Ventilator  100


 


1/28/21 22:00  82 19 98/62 (74) 90   


 


1/28/21 21:45  82 21 104/65 (78) 90   


 


1/28/21 21:30  82 20 99/62 (74) 91   


 


1/28/21 21:25   20 102/67  Mechanical Ventilator  100


 


1/28/21 21:15  83 20 102/67 (79) 90   


 


1/28/21 21:00  81 20 98/67 (77) 92   


 


1/28/21 20:45  81 21 103/65 (78) 91   


 


1/28/21 20:30  80 0 102/65 (77) 90   


 


1/28/21 20:30   20 110/68  Non-Rebreather  100


 


1/28/21 20:15  81 11 110/68 (82) 91   


 


1/28/21 20:00 99.3       


 


1/28/21 20:00  82 7 111/67 (82) 91   


 


1/28/21 20:00  84      


 


1/28/21 20:00      Mechanical Ventilator  


 


1/28/21 20:00        100


 


1/28/21 19:45  84 18 121/68 (85) 87   


 


1/28/21 19:30  84 20 108/69 (82)    


 


1/28/21 19:15  85 20 98/63 (75) 85   


 


1/28/21 19:00  85 18 123/77 (92) 83   


 


1/28/21 19:00   20 123/77  Mechanical Ventilator  100


 


1/28/21 19:00  85 18 123/77 (92) 83   


 


1/28/21 18:55  84 20     100


 


1/28/21 18:45  85 21 116/71 (86) 90   


 


1/28/21 18:40   21 116/71  Mechanical Ventilator  100


 


1/28/21 18:30  85 20 132/75 (94) 91   


 


1/28/21 18:25   20 130/86  Mechanical Ventilator  100


 


1/28/21 18:15  87 23 130/86 (101) 85   


 


1/28/21 18:15   20 130/86  Mechanical Ventilator  100


 


1/28/21 18:00  88 21 132/80 (97) 84   


 


1/28/21 18:00   20 132/80  Mechanical Ventilator  100


 


1/28/21 17:45  87 20 134/83 (100) 88   


 


1/28/21 17:35   20 119/64  Mechanical Ventilator  100


 


1/28/21 17:30  90 20 119/64 (82) 67   


 


1/28/21 17:15  87 21 117/71 (86) 85   


 


1/28/21 17:00    117/71    


 


1/28/21 17:00   20 117/71  Mechanical Ventilator  100


 


1/28/21 17:00  87 21 119/71 (87) 94   


 


1/28/21 17:00  86 20 132/80 (97) 86   


 


1/28/21 16:45  89 20 116/66 (83) 90   


 


1/28/21 16:30  89 19 117/70 (86) 91   


 


1/28/21 16:29   20 104/66  Mechanical Ventilator  100


 


1/28/21 16:15  86 20 104/66 (79) 94   


 


1/28/21 16:00   20 102/62  Mechanical Ventilator  100


 


1/28/21 16:00  86      


 


1/28/21 16:00 98.3 86 21 102/62 (75) 94   


 


1/28/21 16:00      Mechanical Ventilator  


 


1/28/21 15:45  86 20 104/61 (75) 95   


 


1/28/21 15:30  88 21 103/62 (76) 95   


 


1/28/21 15:15  88 20 104/63 (77) 95   


 


1/28/21 15:00   20 104/63  Mechanical Ventilator  100


 


1/28/21 15:00  89 20 108/62 (77) 96   


 


1/28/21 14:45  87 20 99/61 (74) 96   


 


1/28/21 14:30  89 21     100


 


1/28/21 14:30  87 20 102/63 (76) 95   


 


1/28/21 14:15  85 19 113/87 (96) 95   


 


1/28/21 14:00   20 113/87  Mechanical Ventilator  100


 


1/28/21 14:00  86 20 109/70 (83) 95   


 


1/28/21 13:45  86 13 112/71 (85) 95   


 


1/28/21 13:30  87 0 102/65 (77) 95   


 


1/28/21 13:15  87 0 99/70 (80) 96   


 


1/28/21 13:00   20 99/70  Mechanical Ventilator  100


 


1/28/21 13:00  87 0 105/65 (78) 96   


 


1/28/21 12:45  86 20 104/65 (78) 96   


 


1/28/21 12:30        100


 


1/28/21 12:30  89 20 91/52 (65) 81   


 


1/28/21 12:15  86 16 97/58 (71) 94   


 


1/28/21 12:00      Mechanical Ventilator  


 


1/28/21 12:00   18 97/58  Mechanical Ventilator  100


 


1/28/21 12:00  90      


 


1/28/21 12:00        100


 


1/28/21 12:00 98.4 87 18 92/59 (70) 95   


 


1/28/21 11:45  88 16 95/55 (68) 95   


 


1/28/21 11:30  88 16 94/58 (70) 95   

















Intake and Output  


 


 1/29/21 1/30/21





 19:00 07:00


 


Intake Total 1812.883 ml 220 ml


 


Output Total 1705 ml 1560 ml


 


Balance 107.883 ml -1340 ml


 


  


 


Free Water 260 ml 


 


IV Total 837.883 ml 


 


Tube Feeding 715 ml 220 ml


 


Output Urine Total 955 ml 1375 ml


 


Stool Total 600 ml 0 ml


 


Chest Tube Drainage Total 150 ml 185 ml











Labs








Test


 1/27/21


12:10 1/27/21


20:35 1/28/21


03:50 1/28/21


07:35


 


POC Whole Blood Glucose


 174 MG/DL


() 


 


 





 


Arterial Blood pH


 


 7.185


(7.350-7.450) 


 7.207


(7.350-7.450)


 


Arterial Blood Partial


Pressure CO2 


 79.9 mmHg


(35.0-45.0) 


 70.4 mmHg


(35.0-45.0)


 


Arterial Blood Partial


Pressure O2 


 29.9 mmHg


(75.0-100.0) 


 49.2 mmHg


(75.0-100.0)


 


Arterial Blood HCO3


 


 29.5 mmol/L


(22.0-26.0) 


 27.3 mmol/L


(22.0-26.0)


 


Arterial Blood Oxygen


Saturation 


 55.8 %


() 


 84.6 %


()


 


Arterial Blood Base Excess  -0.2 (-2-2)   -1.6 (-2-2) 


 


Abelardo Test  Positive   Positive 


 


White Blood Count


 


 


 8.4 K/UL


(4.8-10.8) 





 


Red Blood Count


 


 


 3.30 M/UL


(4.70-6.10) 





 


Hemoglobin


 


 


 9.6 G/DL


(14.2-18.0) 





 


Hematocrit


 


 


 30.9 %


(42.0-52.0) 





 


Mean Corpuscular Volume   93 FL (80-99)  


 


Mean Corpuscular Hemoglobin


 


 


 28.9 PG


(27.0-31.0) 





 


Mean Corpuscular Hemoglobin


Concent 


 


 31.0 G/DL


(32.0-36.0) 





 


Red Cell Distribution Width


 


 


 14.8 %


(11.6-14.8) 





 


Platelet Count


 


 


 122 K/UL


(150-450) 





 


Mean Platelet Volume


 


 


 11.1 FL


(6.5-10.1) 





 


Neutrophils (%) (Auto)    % (45.0-75.0)  


 


Lymphocytes (%) (Auto)    % (20.0-45.0)  


 


Monocytes (%) (Auto)    % (1.0-10.0)  


 


Eosinophils (%) (Auto)    % (0.0-3.0)  


 


Basophils (%) (Auto)    % (0.0-2.0)  


 


Differential Total Cells


Counted 


 


 100 


 





 


Neutrophils % (Manual)   71 % (45-75)  


 


Lymphocytes % (Manual)   19 % (20-45)  


 


Monocytes % (Manual)   3 % (1-10)  


 


Eosinophils % (Manual)   2 % (0-3)  


 


Basophils % (Manual)   0 % (0-2)  


 


Band Neutrophils   5 % (0-8)  


 


Platelet Estimate   Decreased  


 


Platelet Morphology   Normal  


 


Hypochromasia   1+  


 


Sodium Level


 


 


 144 MMOL/L


(136-145) 





 


Potassium Level


 


 


 4.2 MMOL/L


(3.5-5.1) 





 


Chloride Level


 


 


 109 MMOL/L


() 





 


Carbon Dioxide Level


 


 


 30 MMOL/L


(21-32) 





 


Anion Gap


 


 


 5 mmol/L


(5-15) 





 


Blood Urea Nitrogen


 


 


 50 mg/dL


(7-18) 





 


Creatinine


 


 


 1.7 MG/DL


(0.55-1.30) 





 


Estimat Glomerular Filtration


Rate 


 


 40.5 mL/min


(>60) 





 


Glucose Level


 


 


 136 MG/DL


() 





 


Calcium Level


 


 


 8.1 MG/DL


(8.5-10.1) 





 


Total Bilirubin


 


 


 0.4 MG/DL


(0.2-1.0) 





 


Aspartate Amino Transf


(AST/SGOT) 


 


 20 U/L (15-37) 


 





 


Alanine Aminotransferase


(ALT/SGPT) 


 


 26 U/L (12-78) 


 





 


Alkaline Phosphatase


 


 


 250 U/L


() 





 


Total Protein


 


 


 5.6 G/DL


(6.4-8.2) 





 


Albumin


 


 


 1.0 G/DL


(3.4-5.0) 





 


Globulin   4.6 g/dL  


 


Albumin/Globulin Ratio   0.2 (1.0-2.7)  


 


Test


 1/28/21


08:18 1/28/21


10:59 1/28/21


11:56 1/28/21


17:45


 


POC Whole Blood Glucose


 122 MG/DL


() 


 103 MG/DL


() 189 MG/DL


()


 


Arterial Blood pH


 


 7.183


(7.350-7.450) 


 





 


Arterial Blood Partial


Pressure CO2 


 76.7 mmHg


(35.0-45.0) 


 





 


Arterial Blood Partial


Pressure O2 


 71.3 mmHg


(75.0-100.0) 


 





 


Arterial Blood HCO3


 


 28.2 mmol/L


(22.0-26.0) 


 





 


Arterial Blood Oxygen


Saturation 


 93.2 %


() 


 





 


Arterial Blood Base Excess  -1.2 (-2-2)   


 


Abelardo Test  Positive   


 


Test


 1/29/21


10:11 1/29/21


12:12 1/29/21


13:35 1/30/21


04:15


 


Arterial Blood pH


 


 


 7.148


(7.350-7.450) 





 


Arterial Blood Partial


Pressure CO2 


 


 92.4 mmHg


(35.0-45.0) 





 


Arterial Blood Partial


Pressure O2 


 


 35.1 mmHg


(75.0-100.0) 





 


Arterial Blood HCO3


 


 


 31.3 mmol/L


(22.0-26.0) 





 


Arterial Blood Oxygen


Saturation 


 


 59.7 %


() 





 


Arterial Blood Base Excess   0.6 (-2-2)  


 


Abelardo Test   Positive  


 


White Blood Count


 


 


 


 13.2 K/UL


(4.8-10.8)


 


Red Blood Count


 


 


 


 3.24 M/UL


(4.70-6.10)


 


Hemoglobin


 


 


 


 9.8 G/DL


(14.2-18.0)


 


Hematocrit


 


 


 


 29.3 %


(42.0-52.0)


 


Mean Corpuscular Volume    90 FL (80-99) 


 


Mean Corpuscular Hemoglobin


 


 


 


 30.1 PG


(27.0-31.0)


 


Mean Corpuscular Hemoglobin


Concent 


 


 


 33.3 G/DL


(32.0-36.0)


 


Red Cell Distribution Width


 


 


 


 17.2 %


(11.6-14.8)


 


Platelet Count


 


 


 


 184 K/UL


(150-450)


 


Mean Platelet Volume


 


 


 


 10.7 FL


(6.5-10.1)


 


Neutrophils (%) (Auto)     % (45.0-75.0) 


 


Lymphocytes (%) (Auto)     % (20.0-45.0) 


 


Monocytes (%) (Auto)     % (1.0-10.0) 


 


Eosinophils (%) (Auto)     % (0.0-3.0) 


 


Basophils (%) (Auto)     % (0.0-2.0) 


 


Differential Total Cells


Counted 


 


 


 100 





 


Neutrophils % (Manual)    89 % (45-75) 


 


Lymphocytes % (Manual)    7 % (20-45) 


 


Monocytes % (Manual)    1 % (1-10) 


 


Eosinophils % (Manual)    3 % (0-3) 


 


Basophils % (Manual)    0 % (0-2) 


 


Band Neutrophils    0 % (0-8) 


 


Nucleated Red Blood Cells    2 /100 WBC 


 


Platelet Estimate    Adequate 


 


Platelet Morphology    Normal 


 


Polychromasia    2+ 


 


Hypochromasia    1+ 


 


Anisocytosis    1+ 


 


Sodium Level


 


 


 


 147 MMOL/L


(136-145)


 


Potassium Level


 


 


 


 3.7 MMOL/L


(3.5-5.1)


 


Chloride Level


 


 


 


 109 MMOL/L


()


 


Carbon Dioxide Level


 


 


 


 30 MMOL/L


(21-32)


 


Anion Gap


 


 


 


 8 mmol/L


(5-15)


 


Blood Urea Nitrogen


 


 


 


 63 mg/dL


(7-18)


 


Creatinine


 


 


 


 2.2 MG/DL


(0.55-1.30)


 


Estimat Glomerular Filtration


Rate 


 


 


 30.1 mL/min


(>60)


 


Glucose Level


 


 


 


 115 MG/DL


()


 


Calcium Level


 


 


 


 7.9 MG/DL


(8.5-10.1)


 


Test


 1/30/21


08:14 


 


 





 


Arterial Blood pH


 7.290


(7.350-7.450) 


 


 





 


Arterial Blood Partial


Pressure CO2 65.7 mmHg


(35.0-45.0) 


 


 





 


Arterial Blood Partial


Pressure O2 53.7 mmHg


(75.0-100.0) 


 


 





 


Arterial Blood HCO3


 30.9 mmol/L


(22.0-26.0) 


 


 





 


Arterial Blood Oxygen


Saturation 86.2 %


() 


 


 





 


Arterial Blood Base Excess 3 (-2-2)    


 


Abelardo Test Positive    








Height (Feet):  5


Height (Inches):  7.00


Weight (Pounds):  198


Objective


Objective


General Appearance:  lethargic, moderate distress


Neck:  supple


Cardiovascular:  regular rhythm


Respiratory/Chest:  crackles/rales, rhonchi - bilaterally vent++


Abdomen:  non tender, soft


Extremities:  non-tender











Catarina Fletcher NP                Jan 30, 2021 11:29

## 2021-01-30 NOTE — NUR
NURSE NOTES:



pt resting quietly. no s/s of distress. no change in status. Levophed @ 12mcg/min and 
Fentanyl @ 230 mcg/hr. VSS.

## 2021-01-30 NOTE — NUR
NURSE NOTES:



pt resting quietly. no s/s of distress. Levophed and Fentanyl infusing. OGT replaced d/t OGT 
found out and hanging on the floor. Order placed for KUB to check placement. Oral care given 
and pt repositioned.

## 2021-01-30 NOTE — INFECTIOUS DISEASES PROG NOTE
Assessment/Plan


Assessment/Plan


A


1. COVID-19 pneumonia.


2. New-onset diabetes.


3. Hypoxic respiratory failure


4. Sepsis with fever, tachycardia & leukocytosis


5. Bacteremia with COANS, ? line infection


6. Atrial fibrillation


7. Diarrhea, C. difficile negative


8. Cardiac arrest


9. Acute renal failure





P


1. Finished remdesivir course


2. Change Zosyn to Cefepime


3. Poor prognosis





Subjective


ROS Limited/Unobtainable:  Yes


Constitutional:  Reports: fever, other - Te=541.2


Cardiovascular:  Reports: other - coded X 2 yesterday, on Levophed


Allergies:  


Coded Allergies:  


     No Known Allergies (Unverified , 6/11/19)





Objective





Last 24 Hour Vital Signs








  Date Time  Temp Pulse Resp B/P (MAP) Pulse Ox O2 Delivery O2 Flow Rate FiO2


 


1/30/21 07:54    99/66    


 


1/30/21 07:15  77 21 99/66 (77) 90   


 


1/30/21 07:00  77 18 104/66 (79) 90   


 


1/30/21 06:45  77 19 102/63 (76) 91   


 


1/30/21 06:30  78 18 106/70 (82) 89   


 


1/30/21 06:15  78 18 104/70 (81) 89   


 


1/30/21 06:00  78 19 110/72 (85) 88   


 


1/30/21 05:49   20 107/51  Mechanical Ventilator  100


 


1/30/21 05:45  77 20 107/74 (85) 89   


 


1/30/21 05:30  78 20 104/71 (82) 89   


 


1/30/21 05:15  79 18 106/69 (81) 89   


 


1/30/21 05:00  80 18 123/71 (88) 88   


 


1/30/21 04:45  80 18 115/75 (88) 88   


 


1/30/21 04:30  80 20 112/74 (87) 90   


 


1/30/21 04:15  81 19 113/77 (89) 88   


 


1/30/21 04:00      Mechanical Ventilator  


 


1/30/21 04:00        100


 


1/30/21 04:00  82 17 121/74 (90) 87   


 


1/30/21 04:00  82      


 


1/30/21 03:55  82 20     100


 


1/30/21 03:45  81 19 120/76 (91) 91   


 


1/30/21 03:45  81 19 120/76 (91) 91   


 


1/30/21 03:30  82 19 114/72 (86) 90   


 


1/30/21 03:15  83 21 116/71 (86) 91   


 


1/30/21 03:00  83 19 115/82 (93) 90   


 


1/30/21 02:45  85 20 114/76 (89) 91   


 


1/30/21 02:30  85 21 133/88 (103) 90   


 


1/30/21 02:15  87 20 109/71 (84) 86   


 


1/30/21 02:15  87 20 109/71 (84) 86   


 


1/30/21 02:04    89/56    


 


1/30/21 02:00  86 20 89/56 (67) 90   


 


1/30/21 01:45  85 20 87/53 (64) 84   


 


1/30/21 01:30  84 19 78/50 (59) 91   


 


1/30/21 01:15  84 19 123/76 (92) 94   


 


1/30/21 01:00  85 20 120/73 (89) 94   


 


1/30/21 00:45  84 20 122/74 (90) 95   


 


1/30/21 00:30  85 20 124/72 (89) 94   


 


1/30/21 00:15  88 20 119/72 (88) 88   


 


1/30/21 00:00  85 21 117/77 (90) 93   


 


1/30/21 00:00      Mechanical Ventilator  


 


1/30/21 00:00        100


 


1/30/21 00:00  89      


 


1/30/21 00:00  85 21 117/77 (90) 93   


 


1/29/21 23:45  85 20 116/72 (87) 94   


 


1/29/21 23:30  87 19 114/73 (87) 93   


 


1/29/21 23:29  87 22     100


 


1/29/21 23:15  89 21 111/72 (85) 92   


 


1/29/21 23:00  89 20 111/72 (85) 92   


 


1/29/21 22:45  91 22 113/75 (88) 92   


 


1/29/21 22:30  92 21 131/81 (98) 91   


 


1/29/21 22:15  94 20 132/80 (97) 89   


 


1/29/21 22:03  94 20 101/66 (78) 83   


 


1/29/21 22:00  93 20 64/49 (54) 85   


 


1/29/21 21:45  94 20 106/68 (81) 86   


 


1/29/21 21:30  95 22 103/65 (78) 85   


 


1/29/21 21:15  98 21 102/65 (77) 84   


 


1/29/21 21:08  100 22 103/64 (77) 83   


 


1/29/21 21:00  103 22 101/64 (76) 82   


 


1/29/21 20:45  107 21 101/65 (77) 78   


 


1/29/21 20:45  107 21  78   


 


1/29/21 20:30  110 21 110/68 (82) 76   


 


1/29/21 20:21  112 21 137/80 (99) 87   


 


1/29/21 20:15  115 20 141/83 (102) 87   


 


1/29/21 20:10  120 20 157/82 (107) 85   


 


1/29/21 20:06  128 22 181/89 (119) 75   


 


1/29/21 20:02  123 20 174/87 (116) 57   


 


1/29/21 20:00      Mechanical Ventilator  


 


1/29/21 20:00  5      


 


1/29/21 20:00  121 21  39   


 


1/29/21 20:00    47/36    


 


1/29/21 20:00        100


 


1/29/21 19:52  31 16 46/26 (33) 80   


 


1/29/21 19:48  38 19 42/27 (32) 80   


 


1/29/21 19:45  80 21 47/31 (36) 81   


 


1/29/21 19:30  90 22 101/61 (74) 89   


 


1/29/21 19:19  91 22     100


 


1/29/21 19:15  90 20 102/63 (76) 89   


 


1/29/21 19:15  90 20 102/63 (76) 89   


 


1/29/21 19:00  91 20 100/61 (74) 88   


 


1/29/21 19:00   20 102/63  Mechanical Ventilator  100


 


1/29/21 19:00  91 20 100/61 (74) 88   


 


1/29/21 18:45  92 21 105/65 (78) 89   


 


1/29/21 18:30  93 19 113/68 (83) 85   


 


1/29/21 18:30   21 105/65  Mechanical Ventilator  100


 


1/29/21 18:25  92  95/63 (74) 64   


 


1/29/21 18:15   20 95/63  Mechanical Ventilator  100


 


1/29/21 18:15  93 19 92/60 (71) 73   


 


1/29/21 18:00  90 21 91/65 (74) 87   


 


1/29/21 18:00   20 91/65  Mechanical Ventilator  100


 


1/29/21 17:45   20 92/60  Mechanical Ventilator  100


 


1/29/21 17:45  90 19 114/70 (85) 87   


 


1/29/21 17:30  90 22 104/66 (79) 86   


 


1/29/21 17:30   20 114/70  Mechanical Ventilator  100


 


1/29/21 17:15  91 20 90/54 (66) 85   


 


1/29/21 17:00  92 22 95/69 (78) 86   


 


1/29/21 17:00   21 90/54  Mechanical Ventilator  100


 


1/29/21 16:45  93 20 103/66 (78) 84   


 


1/29/21 16:30  93 20 112/68 (83) 84   


 


1/29/21 16:15  94 21 115/70 (85) 85   


 


1/29/21 16:00        100


 


1/29/21 16:00   21 108/71  Mechanical Ventilator  100


 


1/29/21 16:00 97.7 95 21 108/71 (83) 78   


 


1/29/21 16:00  98      


 


1/29/21 16:00      Mechanical Ventilator  


 


1/29/21 15:45  93 19 84/54 (64) 79   


 


1/29/21 15:30  95 22 117/68 (84) 79   


 


1/29/21 15:20  95 23     100


 


1/29/21 15:15  96 19 107/65 (79) 78   


 


1/29/21 15:00   21 107/65  Mechanical Ventilator  100


 


1/29/21 15:00  96 23 106/63 (77) 78   


 


1/29/21 14:45  97 24 103/58 (73) 72   


 


1/29/21 14:30  98 23 102/64 (77) 78   


 


1/29/21 14:15  100 22 106/63 (77) 73   


 


1/29/21 14:00   22 106/63  Mechanical Ventilator  100


 


1/29/21 14:00  102 24 112/65 (81) 74   


 


1/29/21 13:45  104 20 113/64 (80) 78   


 


1/29/21 13:33  118 21 164/83 (110) 41   


 


1/29/21 13:30  125 22 193/90 (124) 79   


 


1/29/21 13:15  100 18 97/59 (72) 83   


 


1/29/21 13:00  96 22 102/68 (79) 88   


 


1/29/21 13:00   21 97/59  Mechanical Ventilator  100


 


1/29/21 12:45  96 20 95/59 (71) 87   


 


1/29/21 12:30  98 20 100/68 (79) 83   


 


1/29/21 12:15  98 20 99/62 (74) 79   


 


1/29/21 12:00        100


 


1/29/21 12:00 98.2 98 21 88/55 (66) 77   


 


1/29/21 12:00   21 88/55  Mechanical Ventilator  100


 


1/29/21 12:00      Mechanical Ventilator  


 


1/29/21 12:00  94      


 


1/29/21 11:45  99 19 99/56 (70) 82   


 


1/29/21 11:30  101 20 96/56 (69) 80   


 


1/29/21 11:15  101 21 88/54 (65) 84   


 


1/29/21 11:00  101 19 88/49 (62) 61   


 


1/29/21 11:00   20 88/49  Mechanical Ventilator  100


 


1/29/21 11:00  101 20     100


 


1/29/21 10:45  99 20 87/50 (62) 83   


 


1/29/21 10:30  99 20 100/58 (72) 88   


 


1/29/21 10:19  100      


 


1/29/21 10:15  100 20 100/62 (75) 89   


 


1/29/21 10:00   20 96/59  Mechanical Ventilator  100


 


1/29/21 10:00  101 21 96/59 (71) 89   


 


1/29/21 09:45  103 20 96/60 (72) 85   








Height (Feet):  5


Height (Inches):  7.00


Weight (Pounds):  198


HEENT:  other - orally intubated


Respiratory/Chest:  other - on ventilator, OHR1=751%, R chest tube


Cardiovascular:  normal rate, other - left arm PICC line


Abdomen:  soft, non tender


Neurologic/Psychiatric:  unresponsiveness, other - sedated





Laboratory Tests








Test


 1/29/21


10:11 1/29/21


12:12 1/29/21


13:35 1/30/21


04:15


 


POC Whole Blood Glucose Pending   Pending    


 


Arterial Blood pH


 


 


 7.148


(7.350-7.450) 





 


Arterial Blood Partial


Pressure CO2 


 


 92.4 mmHg


(35.0-45.0)  *H 





 


Arterial Blood Partial


Pressure O2 


 


 35.1 mmHg


(75.0-100.0) 





 


Arterial Blood HCO3


 


 


 31.3 mmol/L


(22.0-26.0)  H 





 


Arterial Blood Oxygen


Saturation 


 


 59.7 %


()  *L 





 


Arterial Blood Base Excess   0.6 (-2-2)   


 


Abelardo Test   Positive   


 


White Blood Count


 


 


 


 13.2 K/UL


(4.8-10.8)  H


 


Red Blood Count


 


 


 


 3.24 M/UL


(4.70-6.10)  L


 


Hemoglobin


 


 


 


 9.8 G/DL


(14.2-18.0)  L


 


Hematocrit


 


 


 


 29.3 %


(42.0-52.0)  L


 


Mean Corpuscular Volume    90 FL (80-99)  


 


Mean Corpuscular Hemoglobin


 


 


 


 30.1 PG


(27.0-31.0)


 


Mean Corpuscular Hemoglobin


Concent 


 


 


 33.3 G/DL


(32.0-36.0)


 


Red Cell Distribution Width


 


 


 


 17.2 %


(11.6-14.8)  H


 


Platelet Count


 


 


 


 184 K/UL


(150-450)


 


Mean Platelet Volume


 


 


 


 10.7 FL


(6.5-10.1)  H


 


Neutrophils (%) (Auto)


 


 


 


 % (45.0-75.0)





 


Lymphocytes (%) (Auto)


 


 


 


 % (20.0-45.0)





 


Monocytes (%) (Auto)     % (1.0-10.0)  


 


Eosinophils (%) (Auto)     % (0.0-3.0)  


 


Basophils (%) (Auto)     % (0.0-2.0)  


 


Differential Total Cells


Counted 


 


 


 100  





 


Neutrophils % (Manual)    89 % (45-75)  H


 


Lymphocytes % (Manual)    7 % (20-45)  L


 


Monocytes % (Manual)    1 % (1-10)  


 


Eosinophils % (Manual)    3 % (0-3)  


 


Basophils % (Manual)    0 % (0-2)  


 


Band Neutrophils    0 % (0-8)  


 


Nucleated Red Blood Cells    2 /100 WBC  


 


Platelet Estimate    Adequate  


 


Platelet Morphology    Normal  


 


Polychromasia    2+  


 


Hypochromasia    1+  


 


Anisocytosis    1+  


 


Sodium Level


 


 


 


 147 MMOL/L


(136-145)  H


 


Potassium Level


 


 


 


 3.7 MMOL/L


(3.5-5.1)


 


Chloride Level


 


 


 


 109 MMOL/L


()  H


 


Carbon Dioxide Level


 


 


 


 30 MMOL/L


(21-32)


 


Anion Gap


 


 


 


 8 mmol/L


(5-15)


 


Blood Urea Nitrogen


 


 


 


 63 mg/dL


(7-18)  H


 


Creatinine


 


 


 


 2.2 MG/DL


(0.55-1.30)  H


 


Estimat Glomerular Filtration


Rate 


 


 


 30.1 mL/min


(>60)


 


Glucose Level


 


 


 


 115 MG/DL


()  H


 


Calcium Level


 


 


 


 7.9 MG/DL


(8.5-10.1)  L











Current Medications








 Medications


  (Trade)  Dose


 Ordered  Sig/Julisa


 Route


 PRN Reason  Start Time


 Stop Time Status Last Admin


Dose Admin


 


 Acetaminophen


  (Tylenol)  650 mg  Q4H  PRN


 GT


 Temp >100.5  1/15/21 17:15


 2/14/21 17:14  1/29/21 06:59





 


 Amiodarone HCl


  (Cordarone)  200 mg  DAILY


 NG


   1/26/21 09:00


 4/19/21 20:59  1/29/21 10:19





 


 Chlorhexidine


 Gluconate


  (Vanessa-Hex 2%)  1 applic  DAILY@2000


 TOPIC


   1/19/21 20:00


 4/19/21 19:59  1/29/21 21:48





 


 Dextrose


  (Dextrose 50%)  25 ml  Q30M  PRN


 IV


 Hypoglycemia  1/9/21 13:30


 4/9/21 13:29   





 


 Dextrose


  (Dextrose 50%)  50 ml  Q30M  PRN


 IV


 Hypoglycemia  1/9/21 13:30


 4/9/21 13:29   





 


 Digoxin


  (Lanoxin)  0.125 mg  DAILY


 NG


   1/21/21 09:00


 4/21/21 08:59  1/29/21 10:19





 


 Docusate Sodium


  (Colace)  100 mg  TIDPRN  PRN


 GT


 Constipation  1/12/21 01:15


 2/11/21 01:14  1/12/21 01:42





 


 Enoxaparin Sodium


  (Lovenox)  80 mg  EVERY 12  HOURS


 SUBQ


   1/2/21 21:00


 4/2/21 20:59  1/29/21 21:47





 


 Fentanyl Citrate  250 ml @ 0


 mls/hr  Q24H


 IV


   1/29/21 19:00


 1/31/21 18:59  1/30/21 05:49





 


 Furosemide


  (Lasix)  40 mg  EVERY 12  HOURS


 IV


   1/29/21 10:15


 2/28/21 10:14  1/29/21 21:00





 


 Insulin Aspart


  (NovoLOG)    Q6HR


 SUBQ


   1/14/21 12:00


 4/9/21 17:59  1/29/21 18:00





 


 Insulin Aspart


  (NovoLOG)  6 units  EVERY 6  HOURS


 SUBQ


   1/29/21 12:00


 4/15/21 06:59  1/29/21 18:00





 


 Insulin Detemir


  (Levemir)  10 units  Q12HR


 SUBQ


   1/26/21 09:00


 4/12/21 08:59  1/29/21 10:00





 


 Norepinephrine


 Bitartrate 4 mg/


 Sodium Chloride  250 ml @ 0


 mls/hr  Q24H


 IV


   1/29/21 18:00


 1/31/21 02:00  1/30/21 07:54





 


 Pantoprazole


  (Protonix)  40 mg  DAILY


 IVP


   1/14/21 09:00


 2/13/21 08:59  1/29/21 10:17





 


 Piperacillin Sod/


 Tazobactam Sod


 3.375 gm/Sodium


 Chloride  110 ml @ 


 27.5 mls/hr  EVERY 8  HOURS


 IVPB


   1/23/21 14:00


 2/1/21 13:59  1/30/21 05:48





 


 Quetiapine


 Fumarate


  (SEROqueL)  50 mg  Q12HR


 ORAL


   1/4/21 21:00


 2/18/21 20:59  1/29/21 21:47





 


 Sodium Chloride  500 ml @ 


 999 mls/hr  Q31M PRN


 IV


 For hypotension  1/15/21 18:30


 2/14/21 18:29   




















Lamin Felix MD               Jan 30, 2021 09:40

## 2021-01-30 NOTE — NUR
Patient resting vital;s stable. Patient repositioned.  Patient has some decrease in urine 
output.  Will continue to monitor.

## 2021-01-30 NOTE — NUR
NURSE HAND-OFF REPORT: 



Latest Vital Signs: Temperature 98.8 , Pulse 90 , B/P 105 /64 , Respiratory Rate 20 , O2 SAT 
89 , Mechanical Ventilator, O2 Flow Rate  .  

Vital Sign Comment: Levophed @ 12mcg/min



EKG Rhythm: Sinus Rhythm

Rhythm change?: N 

MD Notified?: N

MD Response: 



Latest Singh Fall Score: 50  

Fall Risk: High Risk 

Safety Measures: Call light Within Reach, Bed Alarm Zone 1, Side Rails Side Rails x3, Bed 
position Low and Locked.

Fall Precautions: 

Patient Fall Education



Report given to SUZI Pardo.

## 2021-01-30 NOTE — GENERAL PROGRESS NOTE
Subjective


Allergies:  


Coded Allergies:  


     No Known Allergies (Unverified , 6/11/19)


Subjective


events noted - interval notes reviewed 


glucose values are stable and improved 


in ICU











Item Value  Date Time


 


Bedside Blood Glucose 98 mg/dl 1/30/21 1030


 


Bedside Blood Glucose 115 mg/dl 1/30/21 0600


 


Bedside Blood Glucose 121 mg/dl H 1/30/21 0002


 


Bedside Blood Glucose 127 mg/dl H 1/29/21 2100


 


Bedside Blood Glucose 242 mg/dl H 1/29/21 1800











Objective





Last 24 Hour Vital Signs








  Date Time  Temp Pulse Resp B/P (MAP) Pulse Ox O2 Delivery O2 Flow Rate FiO2


 


1/30/21 10:30  98      


 


1/30/21 07:54    99/66    


 


1/30/21 07:15  77 21 99/66 (77) 90   


 


1/30/21 07:00  77 18 104/66 (79) 90   


 


1/30/21 06:45  77 19 102/63 (76) 91   


 


1/30/21 06:30  78 18 106/70 (82) 89   


 


1/30/21 06:15  78 18 104/70 (81) 89   


 


1/30/21 06:00  78 19 110/72 (85) 88   


 


1/30/21 05:49   20 107/51  Mechanical Ventilator  100


 


1/30/21 05:45  77 20 107/74 (85) 89   


 


1/30/21 05:30  78 20 104/71 (82) 89   


 


1/30/21 05:15  79 18 106/69 (81) 89   


 


1/30/21 05:00  80 18 123/71 (88) 88   


 


1/30/21 04:45  80 18 115/75 (88) 88   


 


1/30/21 04:30  80 20 112/74 (87) 90   


 


1/30/21 04:15  81 19 113/77 (89) 88   


 


1/30/21 04:00      Mechanical Ventilator  


 


1/30/21 04:00        100


 


1/30/21 04:00  82 17 121/74 (90) 87   


 


1/30/21 04:00  82      


 


1/30/21 03:55  82 20     100


 


1/30/21 03:45  81 19 120/76 (91) 91   


 


1/30/21 03:45  81 19 120/76 (91) 91   


 


1/30/21 03:30  82 19 114/72 (86) 90   


 


1/30/21 03:15  83 21 116/71 (86) 91   


 


1/30/21 03:00  83 19 115/82 (93) 90   


 


1/30/21 02:45  85 20 114/76 (89) 91   


 


1/30/21 02:30  85 21 133/88 (103) 90   


 


1/30/21 02:15  87 20 109/71 (84) 86   


 


1/30/21 02:15  87 20 109/71 (84) 86   


 


1/30/21 02:04    89/56    


 


1/30/21 02:00  86 20 89/56 (67) 90   


 


1/30/21 01:45  85 20 87/53 (64) 84   


 


1/30/21 01:30  84 19 78/50 (59) 91   


 


1/30/21 01:15  84 19 123/76 (92) 94   


 


1/30/21 01:00  85 20 120/73 (89) 94   


 


1/30/21 00:45  84 20 122/74 (90) 95   


 


1/30/21 00:30  85 20 124/72 (89) 94   


 


1/30/21 00:15  88 20 119/72 (88) 88   


 


1/30/21 00:00  85 21 117/77 (90) 93   


 


1/30/21 00:00      Mechanical Ventilator  


 


1/30/21 00:00        100


 


1/30/21 00:00  89      


 


1/30/21 00:00  85 21 117/77 (90) 93   


 


1/29/21 23:45  85 20 116/72 (87) 94   


 


1/29/21 23:30  87 19 114/73 (87) 93   


 


1/29/21 23:29  87 22     100


 


1/29/21 23:15  89 21 111/72 (85) 92   


 


1/29/21 23:00  89 20 111/72 (85) 92   


 


1/29/21 22:45  91 22 113/75 (88) 92   


 


1/29/21 22:30  92 21 131/81 (98) 91   


 


1/29/21 22:15  94 20 132/80 (97) 89   


 


1/29/21 22:03  94 20 101/66 (78) 83   


 


1/29/21 22:00  93 20 64/49 (54) 85   


 


1/29/21 21:45  94 20 106/68 (81) 86   


 


1/29/21 21:30  95 22 103/65 (78) 85   


 


1/29/21 21:15  98 21 102/65 (77) 84   


 


1/29/21 21:08  100 22 103/64 (77) 83   


 


1/29/21 21:00  103 22 101/64 (76) 82   


 


1/29/21 20:45  107 21 101/65 (77) 78   


 


1/29/21 20:45  107 21  78   


 


1/29/21 20:30  110 21 110/68 (82) 76   


 


1/29/21 20:21  112 21 137/80 (99) 87   


 


1/29/21 20:15  115 20 141/83 (102) 87   


 


1/29/21 20:10  120 20 157/82 (107) 85   


 


1/29/21 20:06  128 22 181/89 (119) 75   


 


1/29/21 20:02  123 20 174/87 (116) 57   


 


1/29/21 20:00      Mechanical Ventilator  


 


1/29/21 20:00  5      


 


1/29/21 20:00  121 21  39   


 


1/29/21 20:00    47/36    


 


1/29/21 20:00        100


 


1/29/21 19:52  31 16 46/26 (33) 80   


 


1/29/21 19:48  38 19 42/27 (32) 80   


 


1/29/21 19:45  80 21 47/31 (36) 81   


 


1/29/21 19:30  90 22 101/61 (74) 89   


 


1/29/21 19:19  91 22     100


 


1/29/21 19:15  90 20 102/63 (76) 89   


 


1/29/21 19:15  90 20 102/63 (76) 89   


 


1/29/21 19:00  91 20 100/61 (74) 88   


 


1/29/21 19:00   20 102/63  Mechanical Ventilator  100


 


1/29/21 19:00  91 20 100/61 (74) 88   


 


1/29/21 18:45  92 21 105/65 (78) 89   


 


1/29/21 18:30  93 19 113/68 (83) 85   


 


1/29/21 18:30   21 105/65  Mechanical Ventilator  100


 


1/29/21 18:25  92  95/63 (74) 64   


 


1/29/21 18:15   20 95/63  Mechanical Ventilator  100


 


1/29/21 18:15  93 19 92/60 (71) 73   


 


1/29/21 18:00  90 21 91/65 (74) 87   


 


1/29/21 18:00   20 91/65  Mechanical Ventilator  100


 


1/29/21 17:45   20 92/60  Mechanical Ventilator  100


 


1/29/21 17:45  90 19 114/70 (85) 87   


 


1/29/21 17:30  90 22 104/66 (79) 86   


 


1/29/21 17:30   20 114/70  Mechanical Ventilator  100


 


1/29/21 17:15  91 20 90/54 (66) 85   


 


1/29/21 17:00  92 22 95/69 (78) 86   


 


1/29/21 17:00   21 90/54  Mechanical Ventilator  100


 


1/29/21 16:45  93 20 103/66 (78) 84   


 


1/29/21 16:30  93 20 112/68 (83) 84   


 


1/29/21 16:15  94 21 115/70 (85) 85   


 


1/29/21 16:00        100


 


1/29/21 16:00   21 108/71  Mechanical Ventilator  100


 


1/29/21 16:00 97.7 95 21 108/71 (83) 78   


 


1/29/21 16:00  98      


 


1/29/21 16:00      Mechanical Ventilator  


 


1/29/21 15:45  93 19 84/54 (64) 79   


 


1/29/21 15:30  95 22 117/68 (84) 79   


 


1/29/21 15:20  95 23     100


 


1/29/21 15:15  96 19 107/65 (79) 78   


 


1/29/21 15:00   21 107/65  Mechanical Ventilator  100


 


1/29/21 15:00  96 23 106/63 (77) 78   


 


1/29/21 14:45  97 24 103/58 (73) 72   


 


1/29/21 14:30  98 23 102/64 (77) 78   


 


1/29/21 14:15  100 22 106/63 (77) 73   


 


1/29/21 14:00   22 106/63  Mechanical Ventilator  100


 


1/29/21 14:00  102 24 112/65 (81) 74   


 


1/29/21 13:45  104 20 113/64 (80) 78   


 


1/29/21 13:33  118 21 164/83 (110) 41   


 


1/29/21 13:30  125 22 193/90 (124) 79   


 


1/29/21 13:15  100 18 97/59 (72) 83   


 


1/29/21 13:00  96 22 102/68 (79) 88   


 


1/29/21 13:00   21 97/59  Mechanical Ventilator  100

















Intake and Output  


 


 1/29/21 1/30/21





 19:00 07:00


 


Intake Total 1812.883 ml 220 ml


 


Output Total 1705 ml 1560 ml


 


Balance 107.883 ml -1340 ml


 


  


 


Free Water 260 ml 


 


IV Total 837.883 ml 


 


Tube Feeding 715 ml 220 ml


 


Output Urine Total 955 ml 1375 ml


 


Stool Total 600 ml 0 ml


 


Chest Tube Drainage Total 150 ml 185 ml








Laboratory Tests


1/29/21 13:35: 


Arterial Blood pH 7.148*L, Arterial Blood Partial Pressure CO2 92.4*H, Arterial 

Blood Partial Pressure O2 35.1*L, Arterial Blood HCO3 31.3H, Arterial Blood 

Oxygen Saturation 59.7*L, Arterial Blood Base Excess 0.6, Abelardo Test Positive


1/29/21 13:38: POC Whole Blood Glucose [Pending]


1/29/21 18:43: POC Whole Blood Glucose [Pending]


1/29/21 23:50: POC Whole Blood Glucose 121H


1/30/21 04:15: 


White Blood Count 13.2H, Red Blood Count 3.24L, Hemoglobin 9.8L, Hematocrit 

29.3L, Mean Corpuscular Volume 90, Mean Corpuscular Hemoglobin 30.1, Mean 

Corpuscular Hemoglobin Concent 33.3, Red Cell Distribution Width 17.2H, Platelet

 Count 184, Mean Platelet Volume 10.7H, Neutrophils (%) (Auto) , Lymphocytes (%)

 (Auto) , Monocytes (%) (Auto) , Eosinophils (%) (Auto) , Basophils (%) (Auto) ,

 Differential Total Cells Counted 100, Neutrophils % (Manual) 89H, Lymphocytes %

 (Manual) 7L, Monocytes % (Manual) 1, Eosinophils % (Manual) 3, Basophils % (Ma

nual) 0, Band Neutrophils 0, Nucleated Red Blood Cells 2, Platelet Estimate 

Adequate, Platelet Morphology Normal, Polychromasia 2+, Hypochromasia 1+, 

Anisocytosis 1+, Sodium Level 147H, Potassium Level 3.7, Chloride Level 109H, 

Carbon Dioxide Level 30, Anion Gap 8, Blood Urea Nitrogen 63H, Creatinine 2.2H, 

Estimat Glomerular Filtration Rate 30.1, Glucose Level 115H, Calcium Level 7.9L


1/30/21 08:14: 


Arterial Blood pH 7.290L, Arterial Blood Partial Pressure CO2 65.7*H, Arterial 

Blood Partial Pressure O2 53.7L, Arterial Blood HCO3 30.9H, Arterial Blood 

Oxygen Saturation 86.2*L, Arterial Blood Base Excess 3H, Abelardo Test Positive


1/30/21 09:38: POC Whole Blood Glucose [Pending]


Height (Feet):  5


Height (Inches):  7.00


Weight (Pounds):  198


Objective





Current Medications








 Medications


  (Trade)  Dose


 Ordered  Sig/Julisa


 Route


 PRN Reason  Start Time


 Stop Time Status Last Admin


Dose Admin


 


 Acetaminophen


  (Tylenol)  650 mg  Q4H  PRN


 GT


 Temp >100.5  1/15/21 17:15


 2/14/21 17:14  1/29/21 06:59





 


 Amiodarone HCl


  (Cordarone)  200 mg  DAILY


 NG


   1/26/21 09:00


 4/19/21 20:59  1/30/21 10:30





 


 Cefepime HCl 1 gm/


 Dextrose  55 ml @ 


 110 mls/hr  Q24H


 IVPB


   1/30/21 11:00


 2/6/21 10:59   





 


 Chlorhexidine


 Gluconate


  (Vanessa-Hex 2%)  1 applic  DAILY@2000


 TOPIC


   1/19/21 20:00


 4/19/21 19:59  1/29/21 21:48





 


 Dextrose


  (Dextrose 50%)  25 ml  Q30M  PRN


 IV


 Hypoglycemia  1/9/21 13:30


 4/9/21 13:29   





 


 Dextrose


  (Dextrose 50%)  50 ml  Q30M  PRN


 IV


 Hypoglycemia  1/9/21 13:30


 4/9/21 13:29   





 


 Digoxin


  (Lanoxin)  0.125 mg  DAILY


 NG


   1/21/21 09:00


 4/21/21 08:59  1/30/21 10:30





 


 Docusate Sodium


  (Colace)  100 mg  TIDPRN  PRN


 GT


 Constipation  1/12/21 01:15


 2/11/21 01:14  1/12/21 01:42





 


 Enoxaparin Sodium


  (Lovenox)  80 mg  EVERY 12  HOURS


 SUBQ


   1/2/21 21:00


 4/2/21 20:59  1/30/21 10:30





 


 Fentanyl Citrate  250 ml @ 0


 mls/hr  Q24H


 IV


   1/29/21 19:00


 1/31/21 18:59  1/30/21 05:49





 


 Furosemide


  (Lasix)  40 mg  EVERY 12  HOURS


 IV


   1/29/21 10:15


 2/28/21 10:14  1/30/21 09:32





 


 Insulin Aspart


  (NovoLOG)    Q6HR


 SUBQ


   1/14/21 12:00


 4/9/21 17:59  1/29/21 18:00





 


 Insulin Aspart


  (NovoLOG)  6 units  EVERY 6  HOURS


 SUBQ


   1/29/21 12:00


 4/15/21 06:59  1/29/21 18:00





 


 Insulin Detemir


  (Levemir)  10 units  Q12HR


 SUBQ


   1/26/21 09:00


 4/12/21 08:59  1/30/21 10:30





 


 Norepinephrine


 Bitartrate 4 mg/


 Sodium Chloride  250 ml @ 0


 mls/hr  Q24H


 IV


   1/29/21 18:00


 1/31/21 02:00  1/30/21 07:54





 


 Pantoprazole


  (Protonix)  40 mg  DAILY


 IVP


   1/14/21 09:00


 2/13/21 08:59  1/30/21 09:32





 


 Quetiapine


 Fumarate


  (SEROqueL)  50 mg  Q12HR


 ORAL


   1/4/21 21:00


 2/18/21 20:59  1/30/21 10:30





 


 Sodium Chloride  500 ml @ 


 999 mls/hr  Q31M PRN


 IV


 For hypotension  1/15/21 18:30


 2/14/21 18:29   














Assessment/Plan


Problem List:  


(1) Diabetes mellitus out of control


ICD Codes:  E11.65 - Type 2 diabetes mellitus with hyperglycemia


SNOMED:  66500469, 292457854


(2) Pneumonia due to COVID-19 virus


ICD Codes:  U07.1 - COVID-19; J12.82 - Pneumonia due to coronavirus disease 2019


SNOMED:  274077202085747678


Assessment/Plan:


DC Novolog 6 units every 6 hours 


continue Levemir 10 units bid 


continue Novolog sliding scale every 6 hours 


hypoglycemia protocol in order











Nick Mcmahon MD                 Jan 30, 2021 12:54

## 2021-01-30 NOTE — NUR
NURSE NOTES:



Levophed titrated to keep SBP >90. no s/s of distress. O2 sats 87-96%. CT patent with no air 
leak. pt repositioned and oral care given

## 2021-01-30 NOTE — DIAGNOSTIC IMAGING REPORT
EXAM:

  XR Abdomen, 2 Views

 

CLINICAL HISTORY:

  TUBE PLCMT

 

TECHNIQUE:

  Frontal view of the abdomen/pelvis with upright view of the abdomen.

 

COMPARISON:

  No relevant prior studies available.

 

FINDINGS:

  Intraperitoneal space:  No free air.

  Gastrointestinal tract:  Unremarkable bowel gas pattern.  No dilation.

  Bones/joints:  Unremarkable.

  Tubes, lines and devices:  There is an NG tube its tip in good position 

in the upper to mid stomach.

 

IMPRESSION:     

  There is an NG tube its tip in good position in the upper to mid 

stomach.

## 2021-01-30 NOTE — CARDIAC ELECTROPHYSIOLOGY PN
Assessment/Plan


Assessment/Plan


1. Atrial fibrillation with rapid ventricular response.      


    On digoxin 0.125 mg p.o. daily and Amiodarone 200 po qd  


      In SR.   Will check Dig level 





2. Acute renal failure .DC Lasix


3. Right pneumothorax, status post chest tube with subcutaneous emphysema. 





4. COVID-19 pneumonia, 100% FiO2 and PEEP of 12, on Remdesivir and Solu-Medrol 

per ID.


5. Diabetes.


6. Septic shock. On iv fluid and Levophed 12 Mcg  


7. S/P PEA arrest 1/29/21





DW RN





Subjective


Subjective


In ICU on 100% Fio2 and PEEP 12 and Levo 12 Mcg  


 Right chest tube had 130 cc drainage overnight. In SR on Dig and Amiodarone 


On Lasix 40 iv bid 


Coded twice yesterday for PEA and had chest compressions and Epi and bicarb 

injections





Objective





Last 24 Hour Vital Signs








  Date Time  Temp Pulse Resp B/P (MAP) Pulse Ox O2 Delivery O2 Flow Rate FiO2


 


1/30/21 14:45  96 20 94/58 (70) 90   


 


1/30/21 14:40  96 20 96/58 (71) 90   


 


1/30/21 14:30  94 21 89/58 (68) 90   


 


1/30/21 14:15  95 21 92/56 (68) 90   


 


1/30/21 14:00  95 22 97/56 (70) 91   


 


1/30/21 14:00   20 97/56  Mechanical Ventilator  100


 


1/30/21 13:45  93  93/56 (68) 81   


 


1/30/21 13:30   21 90/57  Mechanical Ventilator  100


 


1/30/21 13:30  84 21 90/57 (68) 94   


 


1/30/21 13:24    89/56    


 


1/30/21 13:15  85 21 89/56 (67) 93   


 


1/30/21 13:00  84 21 91/57 (68) 94   


 


1/30/21 13:00   21 91/57  Mechanical Ventilator  100


 


1/30/21 12:53  84 21 88/58 (68) 95   


 


1/30/21 12:45  84 19 88/59 (69) 95   


 


1/30/21 12:30  85 21 99/56 (70) 93   


 


1/30/21 12:22  86 21 91/60 (70) 92   


 


1/30/21 12:15  86 21 87/60 (69) 92   


 


1/30/21 12:00 99.2 87 21 91/59 (70) 91   


 


1/30/21 12:00   21 91/59  Mechanical Ventilator  100


 


1/30/21 12:00      Mechanical Ventilator  


 


1/30/21 12:00        100


 


1/30/21 11:45  87 20 88/60 (69) 91   


 


1/30/21 11:30  87 21 91/58 (69) 90   


 


1/30/21 11:15  87 22 86/58 (67) 89   


 


1/30/21 11:10  87 22 82/56 (65) 89   


 


1/30/21 11:00   21 83/54  Mechanical Ventilator  100


 


1/30/21 11:00  87 22 83/54 (64) 89   


 


1/30/21 10:56  86 20 78/55 (63) 89   


 


1/30/21 10:45  86 22 78/51 (60) 90   


 


1/30/21 10:30  98      


 


1/30/21 10:30  85 20 76/51 (59) 91   


 


1/30/21 10:18  80 20 73/47 (56) 93   


 


1/30/21 10:15  80 20 77/52 (60) 94   


 


1/30/21 10:00  80 20 99/73 (82) 96   


 


1/30/21 10:00   20 99/73  Mechanical Ventilator  100


 


1/30/21 09:45  82 19 110/68 (82) 94   


 


1/30/21 09:30  82 17 102/66 (78) 89   


 


1/30/21 09:15  80 20 102/64 (77) 94   


 


1/30/21 09:00  80 20 96/66 (76) 93   


 


1/30/21 09:00   20 96/66  Mechanical Ventilator  100


 


1/30/21 08:45  80 20 103/69 (80) 91   


 


1/30/21 08:30  80 20 105/65 (78) 94   


 


1/30/21 08:15  79 21 102/64 (77) 92   


 


1/30/21 08:00        100


 


1/30/21 08:00      Mechanical Ventilator  


 


1/30/21 08:00 98.3 80 21 103/65 (78) 91   


 


1/30/21 08:00   20 103/65  Mechanical Ventilator  100


 


1/30/21 08:00    103/65    


 


1/30/21 07:54    99/66    


 


1/30/21 07:45  78 20 99/66 (77) 93   


 


1/30/21 07:30  77 19 122/72 (89) 91   


 


1/30/21 07:15  77 21 99/66 (77) 90   


 


1/30/21 07:00   20 104/66  Mechanical Ventilator  100


 


1/30/21 07:00  77 18 104/66 (79) 90   


 


1/30/21 06:45  77 19 102/63 (76) 91   


 


1/30/21 06:30  78 18 106/70 (82) 89   


 


1/30/21 06:15  78 18 104/70 (81) 89   


 


1/30/21 06:00  78 19 110/72 (85) 88   


 


1/30/21 05:49   20 107/51  Mechanical Ventilator  100


 


1/30/21 05:45  77 20 107/74 (85) 89   


 


1/30/21 05:30  78 20 104/71 (82) 89   


 


1/30/21 05:15  79 18 106/69 (81) 89   


 


1/30/21 05:00  80 18 123/71 (88) 88   


 


1/30/21 04:45  80 18 115/75 (88) 88   


 


1/30/21 04:30  80 20 112/74 (87) 90   


 


1/30/21 04:15  81 19 113/77 (89) 88   


 


1/30/21 04:00      Mechanical Ventilator  


 


1/30/21 04:00        100


 


1/30/21 04:00  82 17 121/74 (90) 87   


 


1/30/21 04:00  82      


 


1/30/21 03:55  82 20     100


 


1/30/21 03:45  81 19 120/76 (91) 91   


 


1/30/21 03:45  81 19 120/76 (91) 91   


 


1/30/21 03:30  82 19 114/72 (86) 90   


 


1/30/21 03:15  83 21 116/71 (86) 91   


 


1/30/21 03:00  83 19 115/82 (93) 90   


 


1/30/21 02:45  85 20 114/76 (89) 91   


 


1/30/21 02:30  85 21 133/88 (103) 90   


 


1/30/21 02:15  87 20 109/71 (84) 86   


 


1/30/21 02:15  87 20 109/71 (84) 86   


 


1/30/21 02:04    89/56    


 


1/30/21 02:00  86 20 89/56 (67) 90   


 


1/30/21 01:45  85 20 87/53 (64) 84   


 


1/30/21 01:30  84 19 78/50 (59) 91   


 


1/30/21 01:15  84 19 123/76 (92) 94   


 


1/30/21 01:00  85 20 120/73 (89) 94   


 


1/30/21 00:45  84 20 122/74 (90) 95   


 


1/30/21 00:30  85 20 124/72 (89) 94   


 


1/30/21 00:15  88 20 119/72 (88) 88   


 


1/30/21 00:00  85 21 117/77 (90) 93   


 


1/30/21 00:00      Mechanical Ventilator  


 


1/30/21 00:00        100


 


1/30/21 00:00  89      


 


1/30/21 00:00  85 21 117/77 (90) 93   


 


1/29/21 23:45  85 20 116/72 (87) 94   


 


1/29/21 23:30  87 19 114/73 (87) 93   


 


1/29/21 23:29  87 22     100


 


1/29/21 23:15  89 21 111/72 (85) 92   


 


1/29/21 23:00  89 20 111/72 (85) 92   


 


1/29/21 22:45  91 22 113/75 (88) 92   


 


1/29/21 22:30  92 21 131/81 (98) 91   


 


1/29/21 22:15  94 20 132/80 (97) 89   


 


1/29/21 22:03  94 20 101/66 (78) 83   


 


1/29/21 22:00  93 20 64/49 (54) 85   


 


1/29/21 21:45  94 20 106/68 (81) 86   


 


1/29/21 21:30  95 22 103/65 (78) 85   


 


1/29/21 21:15  98 21 102/65 (77) 84   


 


1/29/21 21:08  100 22 103/64 (77) 83   


 


1/29/21 21:00  103 22 101/64 (76) 82   


 


1/29/21 20:45  107 21 101/65 (77) 78   


 


1/29/21 20:45  107 21  78   


 


1/29/21 20:30  110 21 110/68 (82) 76   


 


1/29/21 20:21  112 21 137/80 (99) 87   


 


1/29/21 20:15  115 20 141/83 (102) 87   


 


1/29/21 20:10  120 20 157/82 (107) 85   


 


1/29/21 20:06  128 22 181/89 (119) 75   


 


1/29/21 20:02  123 20 174/87 (116) 57   


 


1/29/21 20:00      Mechanical Ventilator  


 


1/29/21 20:00  5      


 


1/29/21 20:00  121 21  39   


 


1/29/21 20:00    47/36    


 


1/29/21 20:00        100


 


1/29/21 19:52  31 16 46/26 (33) 80   


 


1/29/21 19:48  38 19 42/27 (32) 80   


 


1/29/21 19:45  80 21 47/31 (36) 81   


 


1/29/21 19:30  90 22 101/61 (74) 89   


 


1/29/21 19:19  91 22     100


 


1/29/21 19:15  90 20 102/63 (76) 89   


 


1/29/21 19:15  90 20 102/63 (76) 89   


 


1/29/21 19:00  91 20 100/61 (74) 88   


 


1/29/21 19:00   20 102/63  Mechanical Ventilator  100


 


1/29/21 19:00  91 20 100/61 (74) 88   


 


1/29/21 18:45  92 21 105/65 (78) 89   


 


1/29/21 18:30  93 19 113/68 (83) 85   


 


1/29/21 18:30   21 105/65  Mechanical Ventilator  100


 


1/29/21 18:25  92  95/63 (74) 64   


 


1/29/21 18:15   20 95/63  Mechanical Ventilator  100


 


1/29/21 18:15  93 19 92/60 (71) 73   


 


1/29/21 18:00  90 21 91/65 (74) 87   


 


1/29/21 18:00   20 91/65  Mechanical Ventilator  100


 


1/29/21 17:45   20 92/60  Mechanical Ventilator  100


 


1/29/21 17:45  90 19 114/70 (85) 87   


 


1/29/21 17:30  90 22 104/66 (79) 86   


 


1/29/21 17:30   20 114/70  Mechanical Ventilator  100


 


1/29/21 17:15  91 20 90/54 (66) 85   


 


1/29/21 17:00  92 22 95/69 (78) 86   


 


1/29/21 17:00   21 90/54  Mechanical Ventilator  100


 


1/29/21 16:45  93 20 103/66 (78) 84   


 


1/29/21 16:30  93 20 112/68 (83) 84   


 


1/29/21 16:15  94 21 115/70 (85) 85   


 


1/29/21 16:00        100


 


1/29/21 16:00   21 108/71  Mechanical Ventilator  100


 


1/29/21 16:00 97.7 95 21 108/71 (83) 78   


 


1/29/21 16:00  98      


 


1/29/21 16:00      Mechanical Ventilator  


 


1/29/21 15:45  93 19 84/54 (64) 79   


 


1/29/21 15:30  95 22 117/68 (84) 79   


 


1/29/21 15:20  95 23     100


 


1/29/21 15:15  96 19 107/65 (79) 78   

















Intake and Output  


 


 1/29/21 1/30/21





 19:00 07:00


 


Intake Total 1812.883 ml 282.5 ml


 


Output Total 1705 ml 1560 ml


 


Balance 107.883 ml -1277.5 ml


 


  


 


Free Water 260 ml 


 


IV Total 837.883 ml 62.5 ml


 


Tube Feeding 715 ml 220 ml


 


Output Urine Total 955 ml 1375 ml


 


Stool Total 600 ml 0 ml


 


Chest Tube Drainage Total 150 ml 185 ml











Laboratory Tests








Test


 1/29/21


18:43 1/29/21


23:50 1/30/21


04:15 1/30/21


08:14


 


POC Whole Blood Glucose


 Pending  


 121 MG/DL


()  H 


 





 


White Blood Count


 


 


 13.2 K/UL


(4.8-10.8)  H 





 


Red Blood Count


 


 


 3.24 M/UL


(4.70-6.10)  L 





 


Hemoglobin


 


 


 9.8 G/DL


(14.2-18.0)  L 





 


Hematocrit


 


 


 29.3 %


(42.0-52.0)  L 





 


Mean Corpuscular Volume   90 FL (80-99)   


 


Mean Corpuscular Hemoglobin


 


 


 30.1 PG


(27.0-31.0) 





 


Mean Corpuscular Hemoglobin


Concent 


 


 33.3 G/DL


(32.0-36.0) 





 


Red Cell Distribution Width


 


 


 17.2 %


(11.6-14.8)  H 





 


Platelet Count


 


 


 184 K/UL


(150-450) 





 


Mean Platelet Volume


 


 


 10.7 FL


(6.5-10.1)  H 





 


Neutrophils (%) (Auto)


 


 


 % (45.0-75.0)


 





 


Lymphocytes (%) (Auto)


 


 


 % (20.0-45.0)


 





 


Monocytes (%) (Auto)    % (1.0-10.0)   


 


Eosinophils (%) (Auto)    % (0.0-3.0)   


 


Basophils (%) (Auto)    % (0.0-2.0)   


 


Differential Total Cells


Counted 


 


 100  


 





 


Neutrophils % (Manual)   89 % (45-75)  H 


 


Lymphocytes % (Manual)   7 % (20-45)  L 


 


Monocytes % (Manual)   1 % (1-10)   


 


Eosinophils % (Manual)   3 % (0-3)   


 


Basophils % (Manual)   0 % (0-2)   


 


Band Neutrophils   0 % (0-8)   


 


Nucleated Red Blood Cells   2 /100 WBC   


 


Platelet Estimate   Adequate   


 


Platelet Morphology   Normal   


 


Polychromasia   2+   


 


Hypochromasia   1+   


 


Anisocytosis   1+   


 


Sodium Level


 


 


 147 MMOL/L


(136-145)  H 





 


Potassium Level


 


 


 3.7 MMOL/L


(3.5-5.1) 





 


Chloride Level


 


 


 109 MMOL/L


()  H 





 


Carbon Dioxide Level


 


 


 30 MMOL/L


(21-32) 





 


Anion Gap


 


 


 8 mmol/L


(5-15) 





 


Blood Urea Nitrogen


 


 


 63 mg/dL


(7-18)  H 





 


Creatinine


 


 


 2.2 MG/DL


(0.55-1.30)  H 





 


Estimat Glomerular Filtration


Rate 


 


 30.1 mL/min


(>60) 





 


Glucose Level


 


 


 115 MG/DL


()  H 





 


Calcium Level


 


 


 7.9 MG/DL


(8.5-10.1)  L 





 


Arterial Blood pH


 


 


 


 7.290


(7.350-7.450)


 


Arterial Blood Partial


Pressure CO2 


 


 


 65.7 mmHg


(35.0-45.0)  *H


 


Arterial Blood Partial


Pressure O2 


 


 


 53.7 mmHg


(75.0-100.0)  L


 


Arterial Blood HCO3


 


 


 


 30.9 mmol/L


(22.0-26.0)  H


 


Arterial Blood Oxygen


Saturation 


 


 


 86.2 %


()  *L


 


Arterial Blood Base Excess    3 (-2-2)  H


 


Abelardo Test    Positive  


 


Test


 1/30/21


09:38 


 


 





 


POC Whole Blood Glucose Pending     








Objective


HEENT: No JVD. Orally intubated


LUNGS:  Have decreased breath sounds with the chest tube on the right side


and subcutaneous emphysema.


CARDIOVASCULAR:  Regular S1, S2 with


no gallop.


ABDOMEN:  Soft.


EXTREMITIES:  A 1+ pitting edema.











Jake George MD               Jan 30, 2021 15:14

## 2021-01-30 NOTE — NUR
NURSE NOTES:

Patient vitals stable at this time.  Patient abdomen a little distended with low urine 
ouput.  RN assessed cash flushed for catheter for patency.  Patient urine is flowing 
adequately now.  Will continue to monitor.

## 2021-01-30 NOTE — NUR
NURSE NOTES:



Tube feedings restarted @ 55ml/hr after placement verified. AM dose of Levimir given. Pt 
resting quietly. no s/s of distress.

## 2021-01-30 NOTE — SURGERY PROGRESS NOTE
Surgery Progress Note


Subjective


Procedure Performed


Right tube thoracostomy chest tube insertion


Additional Comments


ill appearing


no n/v


labs noted


no air leak


ct in p lace


desaturating


prognosis guarded





Objective





Last 24 Hour Vital Signs








  Date Time  Temp Pulse Resp B/P (MAP) Pulse Ox O2 Delivery O2 Flow Rate FiO2


 


1/30/21 10:30  98      


 


1/30/21 07:54    99/66    


 


1/30/21 07:15  77 21 99/66 (77) 90   


 


1/30/21 07:00  77 18 104/66 (79) 90   


 


1/30/21 06:45  77 19 102/63 (76) 91   


 


1/30/21 06:30  78 18 106/70 (82) 89   


 


1/30/21 06:15  78 18 104/70 (81) 89   


 


1/30/21 06:00  78 19 110/72 (85) 88   


 


1/30/21 05:49   20 107/51  Mechanical Ventilator  100


 


1/30/21 05:45  77 20 107/74 (85) 89   


 


1/30/21 05:30  78 20 104/71 (82) 89   


 


1/30/21 05:15  79 18 106/69 (81) 89   


 


1/30/21 05:00  80 18 123/71 (88) 88   


 


1/30/21 04:45  80 18 115/75 (88) 88   


 


1/30/21 04:30  80 20 112/74 (87) 90   


 


1/30/21 04:15  81 19 113/77 (89) 88   


 


1/30/21 04:00      Mechanical Ventilator  


 


1/30/21 04:00        100


 


1/30/21 04:00  82 17 121/74 (90) 87   


 


1/30/21 04:00  82      


 


1/30/21 03:55  82 20     100


 


1/30/21 03:45  81 19 120/76 (91) 91   


 


1/30/21 03:45  81 19 120/76 (91) 91   


 


1/30/21 03:30  82 19 114/72 (86) 90   


 


1/30/21 03:15  83 21 116/71 (86) 91   


 


1/30/21 03:00  83 19 115/82 (93) 90   


 


1/30/21 02:45  85 20 114/76 (89) 91   


 


1/30/21 02:30  85 21 133/88 (103) 90   


 


1/30/21 02:15  87 20 109/71 (84) 86   


 


1/30/21 02:15  87 20 109/71 (84) 86   


 


1/30/21 02:04    89/56    


 


1/30/21 02:00  86 20 89/56 (67) 90   


 


1/30/21 01:45  85 20 87/53 (64) 84   


 


1/30/21 01:30  84 19 78/50 (59) 91   


 


1/30/21 01:15  84 19 123/76 (92) 94   


 


1/30/21 01:00  85 20 120/73 (89) 94   


 


1/30/21 00:45  84 20 122/74 (90) 95   


 


1/30/21 00:30  85 20 124/72 (89) 94   


 


1/30/21 00:15  88 20 119/72 (88) 88   


 


1/30/21 00:00  85 21 117/77 (90) 93   


 


1/30/21 00:00      Mechanical Ventilator  


 


1/30/21 00:00        100


 


1/30/21 00:00  89      


 


1/30/21 00:00  85 21 117/77 (90) 93   


 


1/29/21 23:45  85 20 116/72 (87) 94   


 


1/29/21 23:30  87 19 114/73 (87) 93   


 


1/29/21 23:29  87 22     100


 


1/29/21 23:15  89 21 111/72 (85) 92   


 


1/29/21 23:00  89 20 111/72 (85) 92   


 


1/29/21 22:45  91 22 113/75 (88) 92   


 


1/29/21 22:30  92 21 131/81 (98) 91   


 


1/29/21 22:15  94 20 132/80 (97) 89   


 


1/29/21 22:03  94 20 101/66 (78) 83   


 


1/29/21 22:00  93 20 64/49 (54) 85   


 


1/29/21 21:45  94 20 106/68 (81) 86   


 


1/29/21 21:30  95 22 103/65 (78) 85   


 


1/29/21 21:15  98 21 102/65 (77) 84   


 


1/29/21 21:08  100 22 103/64 (77) 83   


 


1/29/21 21:00  103 22 101/64 (76) 82   


 


1/29/21 20:45  107 21 101/65 (77) 78   


 


1/29/21 20:45  107 21  78   


 


1/29/21 20:30  110 21 110/68 (82) 76   


 


1/29/21 20:21  112 21 137/80 (99) 87   


 


1/29/21 20:15  115 20 141/83 (102) 87   


 


1/29/21 20:10  120 20 157/82 (107) 85   


 


1/29/21 20:06  128 22 181/89 (119) 75   


 


1/29/21 20:02  123 20 174/87 (116) 57   


 


1/29/21 20:00      Mechanical Ventilator  


 


1/29/21 20:00  5      


 


1/29/21 20:00  121 21  39   


 


1/29/21 20:00    47/36    


 


1/29/21 20:00        100


 


1/29/21 19:52  31 16 46/26 (33) 80   


 


1/29/21 19:48  38 19 42/27 (32) 80   


 


1/29/21 19:45  80 21 47/31 (36) 81   


 


1/29/21 19:30  90 22 101/61 (74) 89   


 


1/29/21 19:19  91 22     100


 


1/29/21 19:15  90 20 102/63 (76) 89   


 


1/29/21 19:15  90 20 102/63 (76) 89   


 


1/29/21 19:00  91 20 100/61 (74) 88   


 


1/29/21 19:00   20 102/63  Mechanical Ventilator  100


 


1/29/21 19:00  91 20 100/61 (74) 88   


 


1/29/21 18:45  92 21 105/65 (78) 89   


 


1/29/21 18:30  93 19 113/68 (83) 85   


 


1/29/21 18:30   21 105/65  Mechanical Ventilator  100


 


1/29/21 18:25  92  95/63 (74) 64   


 


1/29/21 18:15   20 95/63  Mechanical Ventilator  100


 


1/29/21 18:15  93 19 92/60 (71) 73   


 


1/29/21 18:00  90 21 91/65 (74) 87   


 


1/29/21 18:00   20 91/65  Mechanical Ventilator  100


 


1/29/21 17:45   20 92/60  Mechanical Ventilator  100


 


1/29/21 17:45  90 19 114/70 (85) 87   


 


1/29/21 17:30  90 22 104/66 (79) 86   


 


1/29/21 17:30   20 114/70  Mechanical Ventilator  100


 


1/29/21 17:15  91 20 90/54 (66) 85   


 


1/29/21 17:00  92 22 95/69 (78) 86   


 


1/29/21 17:00   21 90/54  Mechanical Ventilator  100


 


1/29/21 16:45  93 20 103/66 (78) 84   


 


1/29/21 16:30  93 20 112/68 (83) 84   


 


1/29/21 16:15  94 21 115/70 (85) 85   


 


1/29/21 16:00        100


 


1/29/21 16:00   21 108/71  Mechanical Ventilator  100


 


1/29/21 16:00 97.7 95 21 108/71 (83) 78   


 


1/29/21 16:00  98      


 


1/29/21 16:00      Mechanical Ventilator  


 


1/29/21 15:45  93 19 84/54 (64) 79   


 


1/29/21 15:30  95 22 117/68 (84) 79   


 


1/29/21 15:20  95 23     100


 


1/29/21 15:15  96 19 107/65 (79) 78   


 


1/29/21 15:00   21 107/65  Mechanical Ventilator  100


 


1/29/21 15:00  96 23 106/63 (77) 78   


 


1/29/21 14:45  97 24 103/58 (73) 72   


 


1/29/21 14:30  98 23 102/64 (77) 78   


 


1/29/21 14:15  100 22 106/63 (77) 73   


 


1/29/21 14:00   22 106/63  Mechanical Ventilator  100


 


1/29/21 14:00  102 24 112/65 (81) 74   


 


1/29/21 13:45  104 20 113/64 (80) 78   


 


1/29/21 13:33  118 21 164/83 (110) 41   


 


1/29/21 13:30  125 22 193/90 (124) 79   


 


1/29/21 13:15  100 18 97/59 (72) 83   


 


1/29/21 13:00  96 22 102/68 (79) 88   


 


1/29/21 13:00   21 97/59  Mechanical Ventilator  100


 


1/29/21 12:45  96 20 95/59 (71) 87   


 


1/29/21 12:30  98 20 100/68 (79) 83   








I&O











Intake and Output  


 


 1/29/21 1/30/21





 19:00 07:00


 


Intake Total 1812.883 ml 220 ml


 


Output Total 1705 ml 1560 ml


 


Balance 107.883 ml -1340 ml


 


  


 


Free Water 260 ml 


 


IV Total 837.883 ml 


 


Tube Feeding 715 ml 220 ml


 


Output Urine Total 955 ml 1375 ml


 


Stool Total 600 ml 0 ml


 


Chest Tube Drainage Total 150 ml 185 ml








Dressing:  saturated


Cardiovascular:  RSR


Respiratory:  decreased breath sounds


Abdomen:  non-tender, present bowel sounds, non-distended


Extremities:  no tenderness, no cyanosis





Laboratory Tests








Test


 1/29/21


13:35 1/29/21


13:38 1/29/21


18:43 1/29/21


23:50


 


Arterial Blood pH


 7.148


(7.350-7.450) 


 


 





 


Arterial Blood Partial


Pressure CO2 92.4 mmHg


(35.0-45.0)  *H 


 


 





 


Arterial Blood Partial


Pressure O2 35.1 mmHg


(75.0-100.0) 


 


 





 


Arterial Blood HCO3


 31.3 mmol/L


(22.0-26.0)  H 


 


 





 


Arterial Blood Oxygen


Saturation 59.7 %


()  *L 


 


 





 


Arterial Blood Base Excess 0.6 (-2-2)     


 


Abelardo Test Positive     


 


POC Whole Blood Glucose


 


 Pending  


 Pending  


 121 MG/DL


()  H


 


Test


 1/30/21


04:15 1/30/21


08:14 1/30/21


09:38 





 


White Blood Count


 13.2 K/UL


(4.8-10.8)  H 


 


 





 


Red Blood Count


 3.24 M/UL


(4.70-6.10)  L 


 


 





 


Hemoglobin


 9.8 G/DL


(14.2-18.0)  L 


 


 





 


Hematocrit


 29.3 %


(42.0-52.0)  L 


 


 





 


Mean Corpuscular Volume 90 FL (80-99)     


 


Mean Corpuscular Hemoglobin


 30.1 PG


(27.0-31.0) 


 


 





 


Mean Corpuscular Hemoglobin


Concent 33.3 G/DL


(32.0-36.0) 


 


 





 


Red Cell Distribution Width


 17.2 %


(11.6-14.8)  H 


 


 





 


Platelet Count


 184 K/UL


(150-450) 


 


 





 


Mean Platelet Volume


 10.7 FL


(6.5-10.1)  H 


 


 





 


Neutrophils (%) (Auto)


 % (45.0-75.0)


 


 


 





 


Lymphocytes (%) (Auto)


 % (20.0-45.0)


 


 


 





 


Monocytes (%) (Auto)  % (1.0-10.0)     


 


Eosinophils (%) (Auto)  % (0.0-3.0)     


 


Basophils (%) (Auto)  % (0.0-2.0)     


 


Differential Total Cells


Counted 100  


 


 


 





 


Neutrophils % (Manual) 89 % (45-75)  H   


 


Lymphocytes % (Manual) 7 % (20-45)  L   


 


Monocytes % (Manual) 1 % (1-10)     


 


Eosinophils % (Manual) 3 % (0-3)     


 


Basophils % (Manual) 0 % (0-2)     


 


Band Neutrophils 0 % (0-8)     


 


Nucleated Red Blood Cells 2 /100 WBC     


 


Platelet Estimate Adequate     


 


Platelet Morphology Normal     


 


Polychromasia 2+     


 


Hypochromasia 1+     


 


Anisocytosis 1+     


 


Sodium Level


 147 MMOL/L


(136-145)  H 


 


 





 


Potassium Level


 3.7 MMOL/L


(3.5-5.1) 


 


 





 


Chloride Level


 109 MMOL/L


()  H 


 


 





 


Carbon Dioxide Level


 30 MMOL/L


(21-32) 


 


 





 


Anion Gap


 8 mmol/L


(5-15) 


 


 





 


Blood Urea Nitrogen


 63 mg/dL


(7-18)  H 


 


 





 


Creatinine


 2.2 MG/DL


(0.55-1.30)  H 


 


 





 


Estimat Glomerular Filtration


Rate 30.1 mL/min


(>60) 


 


 





 


Glucose Level


 115 MG/DL


()  H 


 


 





 


Calcium Level


 7.9 MG/DL


(8.5-10.1)  L 


 


 





 


Arterial Blood pH


 


 7.290


(7.350-7.450) 


 





 


Arterial Blood Partial


Pressure CO2 


 65.7 mmHg


(35.0-45.0)  *H 


 





 


Arterial Blood Partial


Pressure O2 


 53.7 mmHg


(75.0-100.0)  L 


 





 


Arterial Blood HCO3


 


 30.9 mmol/L


(22.0-26.0)  H 


 





 


Arterial Blood Oxygen


Saturation 


 86.2 %


()  *L 


 





 


Arterial Blood Base Excess  3 (-2-2)  H  


 


Abelardo Test  Positive    


 


POC Whole Blood Glucose   Pending   











Plan


Problems:  


(1) Pneumonia due to COVID-19 virus


Assessment & Plan:  Interim endotracheal intubation, endotracheal tube tip in 

good position


approximately 4 cm above the tito. There are extensive bilateral infiltrates, 

which


are markedly increased from the prior study. Pleural spaces are probably clear, 

not


well demonstrated


Markedly increased and now extensive bilateral infiltrates 


cont as per pulm and iID





(2) Hypoxia


Assessment & Plan:  patient developing DTI. in current condition, critically ill

and declining potential inevitable decline 


all care precautions taken and will cont to monitor and assist with improvement 





(3) Abdominal pain


Assessment & Plan:  65M abd pain, septic, covid, intubated ons upport


currently abd exam benign but limited given condition


line bo mary noted


okay for meds and feeds


nutritional optimization 


DAILY ESTIMATED NEEDS:


Needs based on Critical Care/ 72kg abw 


22-28  kcals/kg 


3244-9433  total kcals


1.2-2  g protein/kg


  g total protein 


25-30  mL/kg


4955-7151  total fluid mLs





NUTRITION DIAGNOSIS:


Swallowing difficulty R/T respiratory failure as evidenced by pt now


orally intubated, OGT in place, NPO





 


CURRENT TF:NPO 





 





ENTERAL NUTRITION RECOMMENDATIONS:


Vital AF 1.2 @ 60ml/hr x 24 hrs  to provide 1440ml, 1728kcal, 116g prot, 1167ml 

free water 





* As medically appropriate, initiate critical care and carb controlled TF 

formula of Vital AF 1.2


* Initiate @ 20ml/hr x 6hrs, advance 10ml q 4-6 hrs as tolerated to goal rate


* HOB over 30 degrees/ water flush per MD


* Does not exceed est kcal needs w/ Propofol running @ 8.083ml/hr x 24 hrs 

(provides 213 lipid kcal)





 





ADDITIONAL RECOMMENDATIONS:


* Calibrated bedscale wt 


* Monitor BGs closely w/ Decadron and TF, need for long acting insulin 


* Monitor lytes, replete as needed  


* Monitor Propofol rate, need to adjust TF rate  





(4) Acute metabolic encephalopathy


(5) Pneumothorax on right


Assessment & Plan:  right ptx


chest tube placed


decreased air


stable ptx


cont tube to suction


Endotracheal tube tip projects at the level of the thoracic inlet.


Orogastric tube tip projects at the level of the gastric fundus. Again 

demonstrated


is extensive subcutaneous emphysema, which appears somewhat worse on the left. 

Right


chest tube remains in place. Small right medial and apical pneumothorax are 

present,


slightly more conspicuous than on the previous exam, still small, somewhat 

difficult


to identify due to overlying subcutaneous emphysema. There is probably a tiny 

left


apical pneumothorax as well. Previously demonstrated pneumomediastinum has 

improved


considerably although still present. Bilateral infiltrates appear worse on the 

right.


 


Impression: Equivocally slightly increased but still small right pneumothorax.


 


Suspect tiny left apical pneumothorax


 


Improving pneumomediastinum


 


Worsening subcutaneous emphysema


 


Worsening right and stable left lung infiltrates 





 Right chest tube remains. There is still a small apical pneumothorax. Tiny


left apical pneumothorax persists, unchanged. There is decreased 

pneumomediastinum.


Subcutaneous gas has decreased as well. Bilateral infiltrates are unchanged.


Endotracheal tube remains at the level of the thoracic inlet. Orogastric tube is


again demonstrated, tip not well-visualized but probably stable in position


 


Impression: Stable tiny bilateral apical pneumothoraces


 


Unchanged bilateral infiltrates


 


Decreased pneumomediastinum and subcutaneous emphysema














Norberto Vazquez                Jan 30, 2021 12:19

## 2021-01-31 VITALS — DIASTOLIC BLOOD PRESSURE: 53 MMHG | SYSTOLIC BLOOD PRESSURE: 77 MMHG

## 2021-01-31 VITALS — DIASTOLIC BLOOD PRESSURE: 81 MMHG | SYSTOLIC BLOOD PRESSURE: 124 MMHG

## 2021-01-31 VITALS — SYSTOLIC BLOOD PRESSURE: 122 MMHG | DIASTOLIC BLOOD PRESSURE: 77 MMHG

## 2021-01-31 VITALS — SYSTOLIC BLOOD PRESSURE: 118 MMHG | DIASTOLIC BLOOD PRESSURE: 68 MMHG

## 2021-01-31 VITALS — DIASTOLIC BLOOD PRESSURE: 68 MMHG | SYSTOLIC BLOOD PRESSURE: 103 MMHG

## 2021-01-31 VITALS — SYSTOLIC BLOOD PRESSURE: 117 MMHG | DIASTOLIC BLOOD PRESSURE: 73 MMHG

## 2021-01-31 VITALS — SYSTOLIC BLOOD PRESSURE: 106 MMHG | DIASTOLIC BLOOD PRESSURE: 71 MMHG

## 2021-01-31 VITALS — DIASTOLIC BLOOD PRESSURE: 66 MMHG | SYSTOLIC BLOOD PRESSURE: 111 MMHG

## 2021-01-31 VITALS — DIASTOLIC BLOOD PRESSURE: 70 MMHG | SYSTOLIC BLOOD PRESSURE: 116 MMHG

## 2021-01-31 VITALS — DIASTOLIC BLOOD PRESSURE: 41 MMHG | SYSTOLIC BLOOD PRESSURE: 64 MMHG

## 2021-01-31 VITALS — DIASTOLIC BLOOD PRESSURE: 69 MMHG | SYSTOLIC BLOOD PRESSURE: 106 MMHG

## 2021-01-31 VITALS — SYSTOLIC BLOOD PRESSURE: 128 MMHG | DIASTOLIC BLOOD PRESSURE: 70 MMHG

## 2021-01-31 VITALS — DIASTOLIC BLOOD PRESSURE: 66 MMHG | SYSTOLIC BLOOD PRESSURE: 106 MMHG

## 2021-01-31 VITALS — SYSTOLIC BLOOD PRESSURE: 128 MMHG | DIASTOLIC BLOOD PRESSURE: 74 MMHG

## 2021-01-31 VITALS — SYSTOLIC BLOOD PRESSURE: 118 MMHG | DIASTOLIC BLOOD PRESSURE: 70 MMHG

## 2021-01-31 VITALS — SYSTOLIC BLOOD PRESSURE: 111 MMHG | DIASTOLIC BLOOD PRESSURE: 66 MMHG

## 2021-01-31 VITALS — DIASTOLIC BLOOD PRESSURE: 73 MMHG | SYSTOLIC BLOOD PRESSURE: 116 MMHG

## 2021-01-31 VITALS — DIASTOLIC BLOOD PRESSURE: 72 MMHG | SYSTOLIC BLOOD PRESSURE: 119 MMHG

## 2021-01-31 VITALS — SYSTOLIC BLOOD PRESSURE: 118 MMHG | DIASTOLIC BLOOD PRESSURE: 75 MMHG

## 2021-01-31 VITALS — DIASTOLIC BLOOD PRESSURE: 71 MMHG | SYSTOLIC BLOOD PRESSURE: 119 MMHG

## 2021-01-31 VITALS — DIASTOLIC BLOOD PRESSURE: 72 MMHG | SYSTOLIC BLOOD PRESSURE: 114 MMHG

## 2021-01-31 VITALS — SYSTOLIC BLOOD PRESSURE: 108 MMHG | DIASTOLIC BLOOD PRESSURE: 68 MMHG

## 2021-01-31 VITALS — DIASTOLIC BLOOD PRESSURE: 70 MMHG | SYSTOLIC BLOOD PRESSURE: 113 MMHG

## 2021-01-31 VITALS — SYSTOLIC BLOOD PRESSURE: 102 MMHG | DIASTOLIC BLOOD PRESSURE: 60 MMHG

## 2021-01-31 VITALS — SYSTOLIC BLOOD PRESSURE: 100 MMHG | DIASTOLIC BLOOD PRESSURE: 67 MMHG

## 2021-01-31 VITALS — DIASTOLIC BLOOD PRESSURE: 36 MMHG | SYSTOLIC BLOOD PRESSURE: 57 MMHG

## 2021-01-31 VITALS — SYSTOLIC BLOOD PRESSURE: 105 MMHG | DIASTOLIC BLOOD PRESSURE: 63 MMHG

## 2021-01-31 VITALS — DIASTOLIC BLOOD PRESSURE: 67 MMHG | SYSTOLIC BLOOD PRESSURE: 107 MMHG

## 2021-01-31 VITALS — DIASTOLIC BLOOD PRESSURE: 38 MMHG | SYSTOLIC BLOOD PRESSURE: 54 MMHG

## 2021-01-31 VITALS — DIASTOLIC BLOOD PRESSURE: 77 MMHG | SYSTOLIC BLOOD PRESSURE: 119 MMHG

## 2021-01-31 VITALS — DIASTOLIC BLOOD PRESSURE: 57 MMHG | SYSTOLIC BLOOD PRESSURE: 82 MMHG

## 2021-01-31 VITALS — SYSTOLIC BLOOD PRESSURE: 113 MMHG | DIASTOLIC BLOOD PRESSURE: 68 MMHG

## 2021-01-31 VITALS — SYSTOLIC BLOOD PRESSURE: 139 MMHG | DIASTOLIC BLOOD PRESSURE: 66 MMHG

## 2021-01-31 VITALS — SYSTOLIC BLOOD PRESSURE: 123 MMHG | DIASTOLIC BLOOD PRESSURE: 70 MMHG

## 2021-01-31 VITALS — DIASTOLIC BLOOD PRESSURE: 70 MMHG | SYSTOLIC BLOOD PRESSURE: 115 MMHG

## 2021-01-31 VITALS — DIASTOLIC BLOOD PRESSURE: 69 MMHG | SYSTOLIC BLOOD PRESSURE: 111 MMHG

## 2021-01-31 VITALS — SYSTOLIC BLOOD PRESSURE: 75 MMHG | DIASTOLIC BLOOD PRESSURE: 51 MMHG

## 2021-01-31 VITALS — SYSTOLIC BLOOD PRESSURE: 115 MMHG | DIASTOLIC BLOOD PRESSURE: 72 MMHG

## 2021-01-31 VITALS — SYSTOLIC BLOOD PRESSURE: 115 MMHG | DIASTOLIC BLOOD PRESSURE: 73 MMHG

## 2021-01-31 VITALS — DIASTOLIC BLOOD PRESSURE: 63 MMHG | SYSTOLIC BLOOD PRESSURE: 104 MMHG

## 2021-01-31 VITALS — SYSTOLIC BLOOD PRESSURE: 109 MMHG | DIASTOLIC BLOOD PRESSURE: 68 MMHG

## 2021-01-31 VITALS — SYSTOLIC BLOOD PRESSURE: 110 MMHG | DIASTOLIC BLOOD PRESSURE: 63 MMHG

## 2021-01-31 VITALS — DIASTOLIC BLOOD PRESSURE: 75 MMHG | SYSTOLIC BLOOD PRESSURE: 96 MMHG

## 2021-01-31 VITALS — SYSTOLIC BLOOD PRESSURE: 111 MMHG | DIASTOLIC BLOOD PRESSURE: 72 MMHG

## 2021-01-31 VITALS — DIASTOLIC BLOOD PRESSURE: 56 MMHG | SYSTOLIC BLOOD PRESSURE: 104 MMHG

## 2021-01-31 VITALS — DIASTOLIC BLOOD PRESSURE: 72 MMHG | SYSTOLIC BLOOD PRESSURE: 113 MMHG

## 2021-01-31 VITALS — SYSTOLIC BLOOD PRESSURE: 113 MMHG | DIASTOLIC BLOOD PRESSURE: 70 MMHG

## 2021-01-31 VITALS — SYSTOLIC BLOOD PRESSURE: 90 MMHG | DIASTOLIC BLOOD PRESSURE: 61 MMHG

## 2021-01-31 VITALS — SYSTOLIC BLOOD PRESSURE: 119 MMHG | DIASTOLIC BLOOD PRESSURE: 66 MMHG

## 2021-01-31 VITALS — DIASTOLIC BLOOD PRESSURE: 65 MMHG | SYSTOLIC BLOOD PRESSURE: 109 MMHG

## 2021-01-31 VITALS — DIASTOLIC BLOOD PRESSURE: 76 MMHG | SYSTOLIC BLOOD PRESSURE: 121 MMHG

## 2021-01-31 VITALS — DIASTOLIC BLOOD PRESSURE: 71 MMHG | SYSTOLIC BLOOD PRESSURE: 112 MMHG

## 2021-01-31 VITALS — SYSTOLIC BLOOD PRESSURE: 104 MMHG | DIASTOLIC BLOOD PRESSURE: 58 MMHG

## 2021-01-31 VITALS — SYSTOLIC BLOOD PRESSURE: 108 MMHG | DIASTOLIC BLOOD PRESSURE: 75 MMHG

## 2021-01-31 VITALS — DIASTOLIC BLOOD PRESSURE: 71 MMHG | SYSTOLIC BLOOD PRESSURE: 110 MMHG

## 2021-01-31 VITALS — DIASTOLIC BLOOD PRESSURE: 69 MMHG | SYSTOLIC BLOOD PRESSURE: 110 MMHG

## 2021-01-31 VITALS — DIASTOLIC BLOOD PRESSURE: 57 MMHG | SYSTOLIC BLOOD PRESSURE: 85 MMHG

## 2021-01-31 VITALS — SYSTOLIC BLOOD PRESSURE: 117 MMHG | DIASTOLIC BLOOD PRESSURE: 69 MMHG

## 2021-01-31 VITALS — DIASTOLIC BLOOD PRESSURE: 71 MMHG | SYSTOLIC BLOOD PRESSURE: 116 MMHG

## 2021-01-31 VITALS — SYSTOLIC BLOOD PRESSURE: 104 MMHG | DIASTOLIC BLOOD PRESSURE: 66 MMHG

## 2021-01-31 VITALS — SYSTOLIC BLOOD PRESSURE: 119 MMHG | DIASTOLIC BLOOD PRESSURE: 72 MMHG

## 2021-01-31 VITALS — SYSTOLIC BLOOD PRESSURE: 136 MMHG | DIASTOLIC BLOOD PRESSURE: 78 MMHG

## 2021-01-31 VITALS — SYSTOLIC BLOOD PRESSURE: 76 MMHG | DIASTOLIC BLOOD PRESSURE: 54 MMHG

## 2021-01-31 VITALS — DIASTOLIC BLOOD PRESSURE: 73 MMHG | SYSTOLIC BLOOD PRESSURE: 120 MMHG

## 2021-01-31 VITALS — SYSTOLIC BLOOD PRESSURE: 99 MMHG | DIASTOLIC BLOOD PRESSURE: 61 MMHG

## 2021-01-31 VITALS — DIASTOLIC BLOOD PRESSURE: 34 MMHG | SYSTOLIC BLOOD PRESSURE: 54 MMHG

## 2021-01-31 VITALS — DIASTOLIC BLOOD PRESSURE: 72 MMHG | SYSTOLIC BLOOD PRESSURE: 116 MMHG

## 2021-01-31 VITALS — SYSTOLIC BLOOD PRESSURE: 102 MMHG | DIASTOLIC BLOOD PRESSURE: 65 MMHG

## 2021-01-31 VITALS — SYSTOLIC BLOOD PRESSURE: 110 MMHG | DIASTOLIC BLOOD PRESSURE: 66 MMHG

## 2021-01-31 VITALS — SYSTOLIC BLOOD PRESSURE: 121 MMHG | DIASTOLIC BLOOD PRESSURE: 72 MMHG

## 2021-01-31 VITALS — DIASTOLIC BLOOD PRESSURE: 72 MMHG | SYSTOLIC BLOOD PRESSURE: 110 MMHG

## 2021-01-31 VITALS — DIASTOLIC BLOOD PRESSURE: 63 MMHG | SYSTOLIC BLOOD PRESSURE: 95 MMHG

## 2021-01-31 VITALS — DIASTOLIC BLOOD PRESSURE: 70 MMHG | SYSTOLIC BLOOD PRESSURE: 114 MMHG

## 2021-01-31 VITALS — DIASTOLIC BLOOD PRESSURE: 38 MMHG | SYSTOLIC BLOOD PRESSURE: 49 MMHG

## 2021-01-31 VITALS — DIASTOLIC BLOOD PRESSURE: 61 MMHG | SYSTOLIC BLOOD PRESSURE: 103 MMHG

## 2021-01-31 VITALS — SYSTOLIC BLOOD PRESSURE: 103 MMHG | DIASTOLIC BLOOD PRESSURE: 84 MMHG

## 2021-01-31 VITALS — DIASTOLIC BLOOD PRESSURE: 61 MMHG | SYSTOLIC BLOOD PRESSURE: 100 MMHG

## 2021-01-31 VITALS — SYSTOLIC BLOOD PRESSURE: 125 MMHG | DIASTOLIC BLOOD PRESSURE: 77 MMHG

## 2021-01-31 VITALS — DIASTOLIC BLOOD PRESSURE: 70 MMHG | SYSTOLIC BLOOD PRESSURE: 121 MMHG

## 2021-01-31 VITALS — SYSTOLIC BLOOD PRESSURE: 113 MMHG | DIASTOLIC BLOOD PRESSURE: 73 MMHG

## 2021-01-31 VITALS — DIASTOLIC BLOOD PRESSURE: 60 MMHG | SYSTOLIC BLOOD PRESSURE: 92 MMHG

## 2021-01-31 VITALS — DIASTOLIC BLOOD PRESSURE: 45 MMHG | SYSTOLIC BLOOD PRESSURE: 64 MMHG

## 2021-01-31 VITALS — DIASTOLIC BLOOD PRESSURE: 60 MMHG | SYSTOLIC BLOOD PRESSURE: 86 MMHG

## 2021-01-31 VITALS — DIASTOLIC BLOOD PRESSURE: 61 MMHG | SYSTOLIC BLOOD PRESSURE: 98 MMHG

## 2021-01-31 VITALS — SYSTOLIC BLOOD PRESSURE: 120 MMHG | DIASTOLIC BLOOD PRESSURE: 75 MMHG

## 2021-01-31 VITALS — DIASTOLIC BLOOD PRESSURE: 67 MMHG | SYSTOLIC BLOOD PRESSURE: 105 MMHG

## 2021-01-31 VITALS — SYSTOLIC BLOOD PRESSURE: 116 MMHG | DIASTOLIC BLOOD PRESSURE: 70 MMHG

## 2021-01-31 VITALS — SYSTOLIC BLOOD PRESSURE: 117 MMHG | DIASTOLIC BLOOD PRESSURE: 77 MMHG

## 2021-01-31 VITALS — DIASTOLIC BLOOD PRESSURE: 71 MMHG | SYSTOLIC BLOOD PRESSURE: 107 MMHG

## 2021-01-31 VITALS — DIASTOLIC BLOOD PRESSURE: 51 MMHG | SYSTOLIC BLOOD PRESSURE: 75 MMHG

## 2021-01-31 VITALS — DIASTOLIC BLOOD PRESSURE: 79 MMHG | SYSTOLIC BLOOD PRESSURE: 128 MMHG

## 2021-01-31 VITALS — DIASTOLIC BLOOD PRESSURE: 28 MMHG | SYSTOLIC BLOOD PRESSURE: 39 MMHG

## 2021-01-31 VITALS — SYSTOLIC BLOOD PRESSURE: 107 MMHG | DIASTOLIC BLOOD PRESSURE: 66 MMHG

## 2021-01-31 VITALS — SYSTOLIC BLOOD PRESSURE: 117 MMHG | DIASTOLIC BLOOD PRESSURE: 72 MMHG

## 2021-01-31 VITALS — DIASTOLIC BLOOD PRESSURE: 72 MMHG | SYSTOLIC BLOOD PRESSURE: 118 MMHG

## 2021-01-31 VITALS — DIASTOLIC BLOOD PRESSURE: 67 MMHG | SYSTOLIC BLOOD PRESSURE: 117 MMHG

## 2021-01-31 VITALS — DIASTOLIC BLOOD PRESSURE: 73 MMHG | SYSTOLIC BLOOD PRESSURE: 115 MMHG

## 2021-01-31 VITALS — DIASTOLIC BLOOD PRESSURE: 64 MMHG | SYSTOLIC BLOOD PRESSURE: 98 MMHG

## 2021-01-31 VITALS — SYSTOLIC BLOOD PRESSURE: 109 MMHG | DIASTOLIC BLOOD PRESSURE: 63 MMHG

## 2021-01-31 LAB — PHOSPHATE SERPL-MCNC: 5.4 MG/DL (ref 2.5–4.9)

## 2021-01-31 RX ADMIN — ENOXAPARIN SODIUM SCH MG: 80 INJECTION SUBCUTANEOUS at 21:08

## 2021-01-31 RX ADMIN — INSULIN DETEMIR SCH UNITS: 100 INJECTION, SOLUTION SUBCUTANEOUS at 09:00

## 2021-01-31 RX ADMIN — Medication SCH MLS/HR: at 00:50

## 2021-01-31 RX ADMIN — INSULIN DETEMIR SCH UNITS: 100 INJECTION, SOLUTION SUBCUTANEOUS at 21:08

## 2021-01-31 RX ADMIN — ENOXAPARIN SODIUM SCH MG: 80 INJECTION SUBCUTANEOUS at 09:00

## 2021-01-31 RX ADMIN — SODIUM CHLORIDE SCH MLS/HR: 900 INJECTION, SOLUTION INTRAVENOUS at 10:48

## 2021-01-31 RX ADMIN — INSULIN ASPART SCH UNITS: 100 INJECTION, SOLUTION INTRAVENOUS; SUBCUTANEOUS at 18:00

## 2021-01-31 RX ADMIN — AMIODARONE HYDROCHLORIDE SCH MG: 200 TABLET ORAL at 09:00

## 2021-01-31 RX ADMIN — PANTOPRAZOLE SODIUM SCH MG: 40 INJECTION, POWDER, FOR SOLUTION INTRAVENOUS at 09:00

## 2021-01-31 RX ADMIN — INSULIN ASPART SCH UNITS: 100 INJECTION, SOLUTION INTRAVENOUS; SUBCUTANEOUS at 12:00

## 2021-01-31 RX ADMIN — DIGOXIN SCH MG: 0.12 TABLET ORAL at 09:00

## 2021-01-31 RX ADMIN — Medication SCH MLS/HR: at 01:02

## 2021-01-31 RX ADMIN — INSULIN ASPART SCH UNITS: 100 INJECTION, SOLUTION INTRAVENOUS; SUBCUTANEOUS at 06:00

## 2021-01-31 RX ADMIN — SODIUM CHLORIDE SCH MLS/HR: 0.9 INJECTION INTRAVENOUS at 11:45

## 2021-01-31 RX ADMIN — SODIUM CHLORIDE SCH MLS/HR: 900 INJECTION, SOLUTION INTRAVENOUS at 19:32

## 2021-01-31 RX ADMIN — CHLORHEXIDINE GLUCONATE SCH APPLIC: 213 SOLUTION TOPICAL at 20:00

## 2021-01-31 NOTE — NUR
NURSE NOTES:

Patient resting.  Vitals stable.  Patient blood sugar was 253 and covered with ordered 
insulin.  Tube beeding still on hold.  Patient bathed and repositioned.  Will continue to 
monitor

## 2021-01-31 NOTE — PULMONOLOGY PROGRESS NOTE
Subjective


ROS Limited/Unobtainable:  Yes


Interval Events:  code blue (1/29)


Constitutional:  Reports: fever, other - Yz=107.2


HEENT:  Repors: no symptoms


Respiratory:  Reports: shortness of breath - better


Cardiovascular:  Reports: no symptoms


Gastrointestinal/Abdominal:  Reports: diarrhea


Allergies:  


Coded Allergies:  


     No Known Allergies (Unverified , 6/11/19)


All Systems:  reviewed and negative except above





Objective





Last 24 Hour Vital Signs








  Date Time  Temp Pulse Resp B/P (MAP) Pulse Ox O2 Delivery O2 Flow Rate FiO2


 


1/31/21 09:00  86      


 


1/31/21 07:39  95 25     100


 


1/31/21 07:00  94 20 115/73 (87) 85   


 


1/31/21 06:45  95 21 115/73 (87) 84   


 


1/31/21 06:30  94 21 128/74 (92) 86   


 


1/31/21 06:30  94 21 128/74 (92) 86   


 


1/31/21 06:15  94 23 124/81 (95) 83   


 


1/31/21 06:00  91 22 96/75 (82) 86   


 


1/31/21 05:45  94 20 106/69 (81) 86   


 


1/31/21 05:30  93 23 113/73 (86) 86   


 


1/31/21 05:19  93 25     100


 


1/31/21 05:15  93 22 118/70 (86) 85   


 


1/31/21 05:00  93 20 119/66 (83) 85   


 


1/31/21 04:45  92 22 104/56 (72) 86   


 


1/31/21 04:30  91 23 108/75 (86) 89   


 


1/31/21 04:15  91 22 111/72 (85) 89   


 


1/31/21 04:00  91      


 


1/31/21 04:00  91 21 103/84 (90) 89   


 


1/31/21 04:00        100


 


1/31/21 04:00      Mechanical Ventilator  


 


1/31/21 03:45  90 22 115/70 (85) 89   


 


1/31/21 03:30  91 24 110/69 (83) 87   


 


1/31/21 03:15  91 23 119/72 (88) 88   


 


1/31/21 03:15  92 21     100


 


1/31/21 03:00  91 22 111/66 (81) 87   


 


1/31/21 02:45  91 23 121/72 (88) 88   


 


1/31/21 02:30  91 23 116/70 (85) 88   


 


1/31/21 02:15  91 23 116/71 (86) 89   


 


1/31/21 02:00  90 23 113/70 (84) 87   


 


1/31/21 02:00  90 23 113/70 (84) 87   


 


1/31/21 01:45  92 23 118/68 (85) 87   


 


1/31/21 01:30  91 22 120/75 (90) 88   


 


1/31/21 01:23  91 22     100


 


1/31/21 01:15  91 22 122/77 (92) 86   


 


1/31/21 01:02    102/65    


 


1/31/21 01:00  91 21 102/65 (77) 87   


 


1/31/21 01:00  91 21 102/65 (77) 87   


 


1/31/21 00:50   20 102/65  Mechanical Ventilator  100


 


1/31/21 00:45  91 22 121/70 (87) 89   


 


1/31/21 00:45  91 22 121/70 (87) 89   


 


1/31/21 00:30  91 21 119/71 (87) 90   


 


1/31/21 00:30  91 21 119/71 (87) 90   


 


1/31/21 00:15  92 21 125/77 (93) 90   


 


1/31/21 00:15  92 21 125/77 (93) 90   


 


1/31/21 00:00 98.1 92 20 82/57 (65) 89   


 


1/31/21 00:00  85      


 


1/31/21 00:00  92 20 82/57 (65) 89   


 


1/31/21 00:00      Mechanical Ventilator  


 


1/31/21 00:00        100


 


1/30/21 23:45  92 21 118/74 (89) 92   


 


1/30/21 23:30  92 22 115/71 (86) 92   


 


1/30/21 23:15  93 21 115/72 (86) 91   


 


1/30/21 23:15  93 21     100


 


1/30/21 23:15  93 21 115/72 (86) 91   


 


1/30/21 23:00  94 21 114/73 (87) 89   


 


1/30/21 22:45  95 22 103/67 (79) 87   


 


1/30/21 22:30  95 21 106/69 (81) 88   


 


1/30/21 22:15  95 22 113/68 (83) 88   


 


1/30/21 22:00  96 20 99/63 (75) 89   


 


1/30/21 22:00  96 20 99/63 (75) 89   


 


1/30/21 21:45  97 20 111/66 (81) 90   


 


1/30/21 21:30  97 20 104/62 (76) 90   


 


1/30/21 21:15  94 21 92/63 (73) 90   


 


1/30/21 21:15  94 21 92/63 (73) 90   


 


1/30/21 21:06  96 23     100


 


1/30/21 21:00  96 19 98/62 (74) 91   


 


1/30/21 20:45  93 20 96/58 (71) 91   


 


1/30/21 20:40    68/32    


 


1/30/21 20:30  92 19 102/64 (77) 85   


 


1/30/21 20:30  92 19 102/64 (77) 85   


 


1/30/21 20:15  90 21 106/65 (79) 89   


 


1/30/21 20:08 98.5       


 


1/30/21 20:08  90 19 97/58 (71) 85   


 


1/30/21 20:00  89      


 


1/30/21 20:00        100


 


1/30/21 20:00  89 20 68/45 (53) 91   


 


1/30/21 20:00      Mechanical Ventilator  


 


1/30/21 19:45  90 20 105/62 (76) 89   


 


1/30/21 19:30  89 20 100/64 (76) 90   


 


1/30/21 19:25  90 20     100


 


1/30/21 19:15  91 21 108/70 (83) 89   


 


1/30/21 19:07  95 20 110/66 (81) 78   


 


1/30/21 19:00  86 21 65/42 (50) 83   


 


1/30/21 18:45  92 19 105/64 (78) 89   


 


1/30/21 18:45  92 19 105/64 (78) 89   


 


1/30/21 18:30  93 19 104/61 (75) 90   


 


1/30/21 18:15  94 20 100/62 (75) 91   


 


1/30/21 18:00  94 21 101/64 (76) 90   


 


1/30/21 17:45  92 20 101/62 (75) 89   


 


1/30/21 17:30  96 20 97/63 (74) 89   


 


1/30/21 17:18  94 20     100


 


1/30/21 17:15  93 22 96/60 (72) 87   


 


1/30/21 17:00  95 20 96/62 (73) 90   


 


1/30/21 17:00   20 96/62  Mechanical Ventilator  100


 


1/30/21 16:55   20 101/66  Mechanical Ventilator  100


 


1/30/21 16:45  92 21 97/58 (71) 90   


 


1/30/21 16:30  93 22 98/61 (73) 92   


 


1/30/21 16:15  91 21 92/61 (71) 92   


 


1/30/21 16:00  93      


 


1/30/21 16:00   21 94/60  Mechanical Ventilator  100


 


1/30/21 16:00        100


 


1/30/21 16:00 98.8 94 21 94/60 (71) 92   


 


1/30/21 16:00      Mechanical Ventilator  


 


1/30/21 15:45  92 20 91/58 (69) 93   


 


1/30/21 15:30  92 20 97/58 (71) 95   


 


1/30/21 15:18  94 21     100


 


1/30/21 15:18  93 21 94/56 (69) 81   


 


1/30/21 15:15  91 17 69/46 (54) 65   


 


1/30/21 15:00   20 91/61  Mechanical Ventilator  100


 


1/30/21 15:00  97 21 91/61 (71) 90   


 


1/30/21 14:45  96 20 94/58 (70) 90   


 


1/30/21 14:40  96 20 96/58 (71) 90   


 


1/30/21 14:30  94 21 89/58 (68) 90   


 


1/30/21 14:15  95 21 92/56 (68) 90   


 


1/30/21 14:00  95 22 97/56 (70) 91   


 


1/30/21 14:00   20 97/56  Mechanical Ventilator  100


 


1/30/21 13:45  93  93/56 (68) 81   


 


1/30/21 13:30   21 90/57  Mechanical Ventilator  100


 


1/30/21 13:30  84 21 90/57 (68) 94   


 


1/30/21 13:24    89/56    


 


1/30/21 13:15  85 21 89/56 (67) 93   


 


1/30/21 13:14  85 21     100


 


1/30/21 13:00  84 21 91/57 (68) 94   


 


1/30/21 13:00   21 91/57  Mechanical Ventilator  100


 


1/30/21 12:53  84 21 88/58 (68) 95   


 


1/30/21 12:45  84 19 88/59 (69) 95   


 


1/30/21 12:30  85 21 99/56 (70) 93   


 


1/30/21 12:22  86 21 91/60 (70) 92   


 


1/30/21 12:15  86 21 87/60 (69) 92   


 


1/30/21 12:00 99.2 87 21 91/59 (70) 91   


 


1/30/21 12:00   21 91/59  Mechanical Ventilator  100


 


1/30/21 12:00      Mechanical Ventilator  


 


1/30/21 12:00  87      


 


1/30/21 12:00        100


 


1/30/21 11:45  87 20 88/60 (69) 91   


 


1/30/21 11:30  87 21 91/58 (69) 90   


 


1/30/21 11:22  86 21     100


 


1/30/21 11:15  87 22 86/58 (67) 89   


 


1/30/21 11:10  87 22 82/56 (65) 89   


 


1/30/21 11:00   21 83/54  Mechanical Ventilator  100


 


1/30/21 11:00  87 22 83/54 (64) 89   


 


1/30/21 10:56  86 20 78/55 (63) 89   


 


1/30/21 10:45  86 22 78/51 (60) 90   

















Intake and Output  


 


 1/30/21 1/31/21





 19:00 07:00


 


Intake Total 1170.5 ml 660 ml


 


Output Total 1525 ml 1300 ml


 


Balance -354.5 ml -640 ml


 


  


 


IV Total 675.5 ml 


 


Tube Feeding 495 ml 660 ml


 


Output Urine Total 1425 ml 1100 ml


 


Stool Total 0 ml 200 ml


 


Chest Tube Drainage Total 100 ml 








General Appearance:  no acute distress


HEENT:  atraumatic


Respiratory:  lungs clear, decreased breath sounds


Cardiovascular:  normal rate, regular rhythm


Abdomen:  soft, non tender, no organomegaly


Laboratory Tests


1/30/21 18:46: POC Whole Blood Glucose [Pending]


1/31/21 05:55: Digoxin Level 1.0


1/31/21 06:55: 


Phosphorus Level [Pending], Magnesium Level [Pending]


1/31/21 09:32: POC Whole Blood Glucose [Pending]





Current Medications








 Medications


  (Trade)  Dose


 Ordered  Sig/Julisa


 Route


 PRN Reason  Start Time


 Stop Time Status Last Admin


Dose Admin


 


 Acetaminophen


  (Tylenol)  650 mg  Q4H  PRN


 GT


 Temp >100.5  1/15/21 17:15


 2/14/21 17:14  1/29/21 06:59





 


 Amiodarone HCl


  (Cordarone)  200 mg  DAILY


 NG


   1/26/21 09:00


 4/19/21 20:59  1/31/21 09:00





 


 Cefepime HCl 1 gm/


 Dextrose  55 ml @ 


 110 mls/hr  Q24H


 IVPB


   1/30/21 11:00


 2/6/21 10:59  1/30/21 13:26





 


 Chlorhexidine


 Gluconate


  (Vanessa-Hex 2%)  1 applic  DAILY@2000


 TOPIC


   1/19/21 20:00


 4/19/21 19:59  1/30/21 20:10





 


 Dextrose


  (Dextrose 50%)  25 ml  Q30M  PRN


 IV


 Hypoglycemia  1/9/21 13:30


 4/9/21 13:29   





 


 Dextrose


  (Dextrose 50%)  50 ml  Q30M  PRN


 IV


 Hypoglycemia  1/9/21 13:30


 4/9/21 13:29   





 


 Digoxin


  (Lanoxin)  0.125 mg  DAILY


 NG


   1/21/21 09:00


 4/21/21 08:59  1/31/21 09:00





 


 Docusate Sodium


  (Colace)  100 mg  TIDPRN  PRN


 GT


 Constipation  1/12/21 01:15


 2/11/21 01:14  1/12/21 01:42





 


 Enoxaparin Sodium


  (Lovenox)  80 mg  EVERY 12  HOURS


 SUBQ


   1/2/21 21:00


 4/2/21 20:59  1/31/21 09:00





 


 Fentanyl Citrate  250 ml @ 0


 mls/hr  Q24H


 IV


   1/29/21 19:00


 1/31/21 18:59  1/31/21 00:50





 


 Insulin Aspart


  (NovoLOG)    Q6HR


 SUBQ


   1/14/21 12:00


 4/9/21 17:59  1/30/21 23:46





 


 Insulin Detemir


  (Levemir)  10 units  Q12HR


 SUBQ


   1/26/21 09:00


 4/12/21 08:59  1/31/21 09:00





 


 Norepinephrine


 Bitartrate  250 ml @ 


 7.5 mls/hr  Q24H


 IV


   1/30/21 20:15


 1/31/21 11:30  1/31/21 01:02





 


 Norepinephrine


 Bitartrate 16 mg/


 Dextrose  516 ml @ 0


 mls/hr  Q24H


 IV


   1/31/21 10:30


 2/3/21 10:29   





 


 Pantoprazole


  (Protonix)  40 mg  DAILY


 IVP


   1/14/21 09:00


 2/13/21 08:59  1/31/21 09:00





 


 Quetiapine


 Fumarate


  (SEROqueL)  50 mg  Q12HR


 ORAL


   1/4/21 21:00


 2/18/21 20:59  1/31/21 09:00





 


 Sodium Chloride  500 ml @ 


 999 mls/hr  Q31M PRN


 IV


 For hypotension  1/15/21 18:30


 2/14/21 18:29   














Assessment/Plan


Assessment/Plan


1. COVID-19 pneumonia.


   - s/p Decadron, azithromycin, and ceftriaxone


   - Intubated now; will continue AC mode for now


          -Currently 100% FiO2. PEEP 12; SaO2 78-84 ->91%


            - Has R apical PTX and subcut emphysema


             - S/p R CT placement





2. Diabetes mellitus.; 


3. Elevated inflammatory markers.


4. High D-dimer. On full dose Lovenox


5. Hypernatremia; will continue D5W


6. Hyperglycemia.


   - BG control


7. Hypoxemia., secondary to #1; improved





DVT prophylaxis   On Lovenox.


Sedated; advised RN to increase sedation





Hypotensive


On levophed


Continue PEEP 12


Poor prognosis





The case for this patient was discussed with my supervising physician


Time spent for this case was approximately 31 minutes











Lg Miranda                 Jan 31, 2021 10:46

## 2021-01-31 NOTE — NUR
NURSE NOTES:

Patient resting comfortable.  vitals are stable. Patient still has high residual of tube 
feeding.  Will hold for 6 hours and reassess.  will cont to monitor.

## 2021-01-31 NOTE — GENERAL PROGRESS NOTE
Subjective


ROS Limited/Unobtainable:  Yes


Allergies:  


Coded Allergies:  


     No Known Allergies (Unverified , 6/11/19)


Subjective


events noted - interval notes reviewed 


glucose values are trending up 


in ICU - intubated 











Item Value  Date Time


 


Bedside Blood Glucose 246 mg/dl H 1/31/21 0900


 


Bedside Blood Glucose 136 mg/dl H 1/31/21 0642


 


Bedside Blood Glucose 165 mg/dl H 1/31/21 0000


 


Bedside Blood Glucose 127 mg/dl H 1/30/21 2011


 


Bedside Blood Glucose 122 mg/dl H 1/30/21 1800


 


Bedside Blood Glucose 89 mg/dl 1/30/21 1200











Objective





Last 24 Hour Vital Signs








  Date Time  Temp Pulse Resp B/P (MAP) Pulse Ox O2 Delivery O2 Flow Rate FiO2


 


1/31/21 10:48    64/41    


 


1/31/21 09:00  86      


 


1/31/21 07:39  95 25     100


 


1/31/21 07:00  94 20 115/73 (87) 85   


 


1/31/21 06:45  95 21 115/73 (87) 84   


 


1/31/21 06:30  94 21 128/74 (92) 86   


 


1/31/21 06:30  94 21 128/74 (92) 86   


 


1/31/21 06:15  94 23 124/81 (95) 83   


 


1/31/21 06:00  91 22 96/75 (82) 86   


 


1/31/21 05:45  94 20 106/69 (81) 86   


 


1/31/21 05:30  93 23 113/73 (86) 86   


 


1/31/21 05:19  93 25     100


 


1/31/21 05:15  93 22 118/70 (86) 85   


 


1/31/21 05:00  93 20 119/66 (83) 85   


 


1/31/21 04:45  92 22 104/56 (72) 86   


 


1/31/21 04:30  91 23 108/75 (86) 89   


 


1/31/21 04:15  91 22 111/72 (85) 89   


 


1/31/21 04:00  91      


 


1/31/21 04:00  91 21 103/84 (90) 89   


 


1/31/21 04:00        100


 


1/31/21 04:00      Mechanical Ventilator  


 


1/31/21 03:45  90 22 115/70 (85) 89   


 


1/31/21 03:30  91 24 110/69 (83) 87   


 


1/31/21 03:15  91 23 119/72 (88) 88   


 


1/31/21 03:15  92 21     100


 


1/31/21 03:00  91 22 111/66 (81) 87   


 


1/31/21 02:45  91 23 121/72 (88) 88   


 


1/31/21 02:30  91 23 116/70 (85) 88   


 


1/31/21 02:15  91 23 116/71 (86) 89   


 


1/31/21 02:00  90 23 113/70 (84) 87   


 


1/31/21 02:00  90 23 113/70 (84) 87   


 


1/31/21 01:45  92 23 118/68 (85) 87   


 


1/31/21 01:30  91 22 120/75 (90) 88   


 


1/31/21 01:23  91 22     100


 


1/31/21 01:15  91 22 122/77 (92) 86   


 


1/31/21 01:02    102/65    


 


1/31/21 01:00  91 21 102/65 (77) 87   


 


1/31/21 01:00  91 21 102/65 (77) 87   


 


1/31/21 00:50   20 102/65  Mechanical Ventilator  100


 


1/31/21 00:45  91 22 121/70 (87) 89   


 


1/31/21 00:45  91 22 121/70 (87) 89   


 


1/31/21 00:30  91 21 119/71 (87) 90   


 


1/31/21 00:30  91 21 119/71 (87) 90   


 


1/31/21 00:15  92 21 125/77 (93) 90   


 


1/31/21 00:15  92 21 125/77 (93) 90   


 


1/31/21 00:00 98.1 92 20 82/57 (65) 89   


 


1/31/21 00:00  85      


 


1/31/21 00:00  92 20 82/57 (65) 89   


 


1/31/21 00:00      Mechanical Ventilator  


 


1/31/21 00:00        100


 


1/30/21 23:45  92 21 118/74 (89) 92   


 


1/30/21 23:30  92 22 115/71 (86) 92   


 


1/30/21 23:15  93 21 115/72 (86) 91   


 


1/30/21 23:15  93 21     100


 


1/30/21 23:15  93 21 115/72 (86) 91   


 


1/30/21 23:00  94 21 114/73 (87) 89   


 


1/30/21 22:45  95 22 103/67 (79) 87   


 


1/30/21 22:30  95 21 106/69 (81) 88   


 


1/30/21 22:15  95 22 113/68 (83) 88   


 


1/30/21 22:00  96 20 99/63 (75) 89   


 


1/30/21 22:00  96 20 99/63 (75) 89   


 


1/30/21 21:45  97 20 111/66 (81) 90   


 


1/30/21 21:30  97 20 104/62 (76) 90   


 


1/30/21 21:15  94 21 92/63 (73) 90   


 


1/30/21 21:15  94 21 92/63 (73) 90   


 


1/30/21 21:06  96 23     100


 


1/30/21 21:00  96 19 98/62 (74) 91   


 


1/30/21 20:45  93 20 96/58 (71) 91   


 


1/30/21 20:40    68/32    


 


1/30/21 20:30  92 19 102/64 (77) 85   


 


1/30/21 20:30  92 19 102/64 (77) 85   


 


1/30/21 20:15  90 21 106/65 (79) 89   


 


1/30/21 20:08 98.5       


 


1/30/21 20:08  90 19 97/58 (71) 85   


 


1/30/21 20:00  89      


 


1/30/21 20:00        100


 


1/30/21 20:00  89 20 68/45 (53) 91   


 


1/30/21 20:00      Mechanical Ventilator  


 


1/30/21 19:45  90 20 105/62 (76) 89   


 


1/30/21 19:30  89 20 100/64 (76) 90   


 


1/30/21 19:25  90 20     100


 


1/30/21 19:15  91 21 108/70 (83) 89   


 


1/30/21 19:07  95 20 110/66 (81) 78   


 


1/30/21 19:00  86 21 65/42 (50) 83   


 


1/30/21 18:45  92 19 105/64 (78) 89   


 


1/30/21 18:45  92 19 105/64 (78) 89   


 


1/30/21 18:30  93 19 104/61 (75) 90   


 


1/30/21 18:15  94 20 100/62 (75) 91   


 


1/30/21 18:00  94 21 101/64 (76) 90   


 


1/30/21 17:45  92 20 101/62 (75) 89   


 


1/30/21 17:30  96 20 97/63 (74) 89   


 


1/30/21 17:18  94 20     100


 


1/30/21 17:15  93 22 96/60 (72) 87   


 


1/30/21 17:00  95 20 96/62 (73) 90   


 


1/30/21 17:00   20 96/62  Mechanical Ventilator  100


 


1/30/21 16:55   20 101/66  Mechanical Ventilator  100


 


1/30/21 16:45  92 21 97/58 (71) 90   


 


1/30/21 16:30  93 22 98/61 (73) 92   


 


1/30/21 16:15  91 21 92/61 (71) 92   


 


1/30/21 16:00  93      


 


1/30/21 16:00   21 94/60  Mechanical Ventilator  100


 


1/30/21 16:00        100


 


1/30/21 16:00 98.8 94 21 94/60 (71) 92   


 


1/30/21 16:00      Mechanical Ventilator  


 


1/30/21 15:45  92 20 91/58 (69) 93   


 


1/30/21 15:30  92 20 97/58 (71) 95   


 


1/30/21 15:18  94 21     100


 


1/30/21 15:18  93 21 94/56 (69) 81   


 


1/30/21 15:15  91 17 69/46 (54) 65   


 


1/30/21 15:00   20 91/61  Mechanical Ventilator  100


 


1/30/21 15:00  97 21 91/61 (71) 90   


 


1/30/21 14:45  96 20 94/58 (70) 90   


 


1/30/21 14:40  96 20 96/58 (71) 90   


 


1/30/21 14:30  94 21 89/58 (68) 90   


 


1/30/21 14:15  95 21 92/56 (68) 90   


 


1/30/21 14:00  95 22 97/56 (70) 91   


 


1/30/21 14:00   20 97/56  Mechanical Ventilator  100


 


1/30/21 13:45  93  93/56 (68) 81   


 


1/30/21 13:30   21 90/57  Mechanical Ventilator  100


 


1/30/21 13:30  84 21 90/57 (68) 94   


 


1/30/21 13:24    89/56    


 


1/30/21 13:15  85 21 89/56 (67) 93   


 


1/30/21 13:14  85 21     100


 


1/30/21 13:00  84 21 91/57 (68) 94   


 


1/30/21 13:00   21 91/57  Mechanical Ventilator  100


 


1/30/21 12:53  84 21 88/58 (68) 95   


 


1/30/21 12:45  84 19 88/59 (69) 95   


 


1/30/21 12:30  85 21 99/56 (70) 93   


 


1/30/21 12:22  86 21 91/60 (70) 92   


 


1/30/21 12:15  86 21 87/60 (69) 92   


 


1/30/21 12:00 99.2 87 21 91/59 (70) 91   


 


1/30/21 12:00   21 91/59  Mechanical Ventilator  100


 


1/30/21 12:00      Mechanical Ventilator  


 


1/30/21 12:00  87      


 


1/30/21 12:00        100


 


1/30/21 11:45  87 20 88/60 (69) 91   


 


1/30/21 11:30  87 21 91/58 (69) 90   


 


1/30/21 11:22  86 21     100

















Intake and Output  


 


 1/30/21 1/31/21





 19:00 07:00


 


Intake Total 1170.5 ml 660 ml


 


Output Total 1525 ml 1300 ml


 


Balance -354.5 ml -640 ml


 


  


 


IV Total 675.5 ml 


 


Tube Feeding 495 ml 660 ml


 


Output Urine Total 1425 ml 1100 ml


 


Stool Total 0 ml 200 ml


 


Chest Tube Drainage Total 100 ml 








Laboratory Tests


1/30/21 18:46: POC Whole Blood Glucose [Pending]


1/31/21 05:55: Digoxin Level 1.0


1/31/21 06:55: 


Phosphorus Level 5.4H, Magnesium Level 2.1


1/31/21 09:32: POC Whole Blood Glucose [Pending]


Height (Feet):  5


Height (Inches):  7.00


Weight (Pounds):  198


General Appearance:  other - intubated


EENT:  other - ETT


Cardiovascular:  normal rate


Respiratory/Chest:  decreased breath sounds


Abdomen:  normal bowel sounds


Objective





Current Medications








 Medications


  (Trade)  Dose


 Ordered  Sig/Julisa


 Route


 PRN Reason  Start Time


 Stop Time Status Last Admin


Dose Admin


 


 Acetaminophen


  (Tylenol)  650 mg  Q4H  PRN


 GT


 Temp >100.5  1/15/21 17:15


 2/14/21 17:14  1/29/21 06:59





 


 Amiodarone HCl


  (Cordarone)  200 mg  DAILY


 NG


   1/26/21 09:00


 4/19/21 20:59  1/31/21 09:00





 


 Cefepime HCl 1 gm/


 Dextrose  55 ml @ 


 110 mls/hr  Q24H


 IVPB


   1/30/21 11:00


 2/6/21 10:59  1/30/21 13:26





 


 Chlorhexidine


 Gluconate


  (Vanessa-Hex 2%)  1 applic  DAILY@2000


 TOPIC


   1/19/21 20:00


 4/19/21 19:59  1/30/21 20:10





 


 Dextrose


  (Dextrose 50%)  25 ml  Q30M  PRN


 IV


 Hypoglycemia  1/9/21 13:30


 4/9/21 13:29   





 


 Dextrose


  (Dextrose 50%)  50 ml  Q30M  PRN


 IV


 Hypoglycemia  1/9/21 13:30


 4/9/21 13:29   





 


 Digoxin


  (Lanoxin)  0.125 mg  DAILY


 NG


   1/21/21 09:00


 4/21/21 08:59  1/31/21 09:00





 


 Docusate Sodium


  (Colace)  100 mg  TIDPRN  PRN


 GT


 Constipation  1/12/21 01:15


 2/11/21 01:14  1/12/21 01:42





 


 Enoxaparin Sodium


  (Lovenox)  80 mg  EVERY 12  HOURS


 SUBQ


   1/2/21 21:00


 4/2/21 20:59  1/31/21 09:00





 


 Fentanyl Citrate  250 ml @ 0


 mls/hr  Q24H


 IV


   1/29/21 19:00


 1/31/21 18:59  1/31/21 00:50





 


 Insulin Aspart


  (NovoLOG)    Q6HR


 SUBQ


   1/14/21 12:00


 4/9/21 17:59  1/30/21 23:46





 


 Insulin Detemir


  (Levemir)  10 units  Q12HR


 SUBQ


   1/26/21 09:00


 4/12/21 08:59  1/31/21 09:00





 


 Norepinephrine


 Bitartrate  250 ml @ 


 7.5 mls/hr  Q24H


 IV


   1/30/21 20:15


 1/31/21 11:30  1/31/21 01:02





 


 Norepinephrine


 Bitartrate 16 mg/


 Dextrose  516 ml @ 0


 mls/hr  Q24H


 IV


   1/31/21 10:30


 2/3/21 10:29  1/31/21 10:48





 


 Pantoprazole


  (Protonix)  40 mg  DAILY


 IVP


   1/14/21 09:00


 2/13/21 08:59  1/31/21 09:00





 


 Quetiapine


 Fumarate


  (SEROqueL)  50 mg  Q12HR


 ORAL


   1/4/21 21:00


 2/18/21 20:59  1/31/21 09:00





 


 Sodium Chloride  500 ml @ 


 999 mls/hr  Q31M PRN


 IV


 For hypotension  1/15/21 18:30


 2/14/21 18:29   














Assessment/Plan


Problem List:  


(1) Diabetes mellitus out of control


ICD Codes:  E11.65 - Type 2 diabetes mellitus with hyperglycemia


SNOMED:  60599637, [id number]


(2) Pneumonia due to COVID-19 virus


ICD Codes:  U07.1 - COVID-19; J12.82 - Pneumonia due to coronavirus disease 2019


SNOMED:  [id number]


Assessment/Plan:


increase Levemir to 14 units bid 


continue Novolog sliding scale every 6 hours 


hypoglycemia protocol in order











Nick Mcmahon MD                 Jan 31, 2021 11:19

## 2021-01-31 NOTE — HEMATOLOGY/ONC PROGRESS NOTE
Assessment/Plan


Assessment/Plan


Leukocytosis 2/2 covid pna wbc 15->14-->17-->9.6


Anemia 2/2 chronic disease, hgb 11-->10.4


Thrombocytopenia with plt 122


Hypercoag disorder now on lovenox sq


Ac resp distress on ventilator


Pneumomediastinum


etoh abused


agitated -halodol


vent





Vent


Pressors


cont iv abx and iv decadrone


pulmonary and gi on consult


smear is noted


pneumomediastinum per pulm


elmer charge nurse


lovenox sq





Subjective


Neurologic/Psychiatric:  Denies: no symptoms, anxiety, depressed, emotional 

problems, headache, numbness, paresthesia, pre-existing deficit, seizure, 

tingling, tremors, weakness, other


Endocrine:  Denies: no symptoms, excessive sweating, flushing, intolerance to 

cold, intolerance to heat, increased hunger, increased thirst, increased urine, 

unexplained weight gain, unexplained weight loss, other


Allergies:  


Coded Allergies:  


     No Known Allergies (Unverified , 6/11/19)


All Systems:  reviewed and negative except above


Subjective


1/10 on ventilator 60% fio2, on sediation, unresponsive, in icu


1/14 on vent, icu, wbc elev, no bleeding, unresponsive


1/15 on vent, with cash, icu, no bleeding, unresponsive


1/17 on vent, elmer rn, no major changes, icu, nv


1/18 nv, labs reviewd, hr is elev, with afib, is onlovenox sq


1/19 remains on vent, sedated with wrist restraints, icu


1/20 on vent, with hematuria, ngt, in icu, labs reviewed


1/21 remains on vent, settings have been adjusted as per icu care


1/22 remains on vent, icu, on levo 6mcg, off versed, labs noted, elmer rn


1/25 remains onv ent, in icu, with restraints, on levo gtt as well


1/26 nv, suctioned, rectal tube, in icu, with restraints, no bleeding


1/27 nv, remains on vent, rectal tube, labs reviewed, elmer rn


1/28 nv, icu, on vent/ bipap, labs noted, no bleeding, meds reviewed


1/29 nv, on vent, overnight no changes, meds reviewed, labs noted


1/30: code blue last night.


1/31 labs are noted, nv, on vent, meds reviewed, no new change, elmer rn, on 

pressors





Objective


Objective





Current Medications








 Medications


  (Trade)  Dose


 Ordered  Sig/Julisa


 Route


 PRN Reason  Start Time


 Stop Time Status Last Admin


Dose Admin


 


 Acetaminophen


  (Tylenol)  650 mg  Q4H  PRN


 GT


 Temp >100.5  1/15/21 17:15


 2/14/21 17:14  1/29/21 06:59





 


 Amiodarone HCl


  (Cordarone)  200 mg  DAILY


 NG


   1/26/21 09:00


 4/19/21 20:59  1/30/21 10:30





 


 Cefepime HCl 1 gm/


 Dextrose  55 ml @ 


 110 mls/hr  Q24H


 IVPB


   1/30/21 11:00


 2/6/21 10:59  1/30/21 13:26





 


 Chlorhexidine


 Gluconate


  (Vanessa-Hex 2%)  1 applic  DAILY@2000


 TOPIC


   1/19/21 20:00


 4/19/21 19:59  1/30/21 20:10





 


 Dextrose


  (Dextrose 50%)  25 ml  Q30M  PRN


 IV


 Hypoglycemia  1/9/21 13:30


 4/9/21 13:29   





 


 Dextrose


  (Dextrose 50%)  50 ml  Q30M  PRN


 IV


 Hypoglycemia  1/9/21 13:30


 4/9/21 13:29   





 


 Digoxin


  (Lanoxin)  0.125 mg  DAILY


 NG


   1/21/21 09:00


 4/21/21 08:59  1/30/21 10:30





 


 Docusate Sodium


  (Colace)  100 mg  TIDPRN  PRN


 GT


 Constipation  1/12/21 01:15


 2/11/21 01:14  1/12/21 01:42





 


 Enoxaparin Sodium


  (Lovenox)  80 mg  EVERY 12  HOURS


 SUBQ


   1/2/21 21:00


 4/2/21 20:59  1/30/21 20:12





 


 Fentanyl Citrate  250 ml @ 0


 mls/hr  Q24H


 IV


   1/29/21 19:00


 1/31/21 18:59  1/31/21 00:50





 


 Insulin Aspart


  (NovoLOG)    Q6HR


 SUBQ


   1/14/21 12:00


 4/9/21 17:59  1/30/21 23:46





 


 Insulin Detemir


  (Levemir)  10 units  Q12HR


 SUBQ


   1/26/21 09:00


 4/12/21 08:59  1/30/21 20:11





 


 Norepinephrine


 Bitartrate  250 ml @ 


 7.5 mls/hr  Q24H


 IV


   1/30/21 20:15


 2/2/21 20:14  1/31/21 01:02





 


 Pantoprazole


  (Protonix)  40 mg  DAILY


 IVP


   1/14/21 09:00


 2/13/21 08:59  1/30/21 09:32





 


 Quetiapine


 Fumarate


  (SEROqueL)  50 mg  Q12HR


 ORAL


   1/4/21 21:00


 2/18/21 20:59  1/30/21 20:10





 


 Sodium Chloride  500 ml @ 


 999 mls/hr  Q31M PRN


 IV


 For hypotension  1/15/21 18:30


 2/14/21 18:29   














Last 24 Hour Vital Signs








  Date Time  Temp Pulse Resp B/P (MAP) Pulse Ox O2 Delivery O2 Flow Rate FiO2


 


1/31/21 06:30  94 21 128/74 (92) 86   


 


1/31/21 06:15  94 23 124/81 (95) 83   


 


1/31/21 06:00  91 22 96/75 (82) 86   


 


1/31/21 05:45  94 20 106/69 (81) 86   


 


1/31/21 05:30  93 23 113/73 (86) 86   


 


1/31/21 05:19  93 25     100


 


1/31/21 05:15  93 22 118/70 (86) 85   


 


1/31/21 05:00  93 20 119/66 (83) 85   


 


1/31/21 04:45  92 22 104/56 (72) 86   


 


1/31/21 04:30  91 23 108/75 (86) 89   


 


1/31/21 04:15  91 22 111/72 (85) 89   


 


1/31/21 04:00  91      


 


1/31/21 04:00  91 21 103/84 (90) 89   


 


1/31/21 04:00        100


 


1/31/21 04:00      Mechanical Ventilator  


 


1/31/21 03:45  90 22 115/70 (85) 89   


 


1/31/21 03:30  91 24 110/69 (83) 87   


 


1/31/21 03:15  91 23 119/72 (88) 88   


 


1/31/21 03:15  92 21     100


 


1/31/21 03:00  91 22 111/66 (81) 87   


 


1/31/21 02:45  91 23 121/72 (88) 88   


 


1/31/21 02:30  91 23 116/70 (85) 88   


 


1/31/21 02:15  91 23 116/71 (86) 89   


 


1/31/21 02:00  90 23 113/70 (84) 87   


 


1/31/21 02:00  90 23 113/70 (84) 87   


 


1/31/21 01:45  92 23 118/68 (85) 87   


 


1/31/21 01:30  91 22 120/75 (90) 88   


 


1/31/21 01:23  91 22     100


 


1/31/21 01:15  91 22 122/77 (92) 86   


 


1/31/21 01:02    102/65    


 


1/31/21 01:00  91 21 102/65 (77) 87   


 


1/31/21 01:00  91 21 102/65 (77) 87   


 


1/31/21 00:50   20 102/65  Mechanical Ventilator  100


 


1/31/21 00:45  91 22 121/70 (87) 89   


 


1/31/21 00:45  91 22 121/70 (87) 89   


 


1/31/21 00:30  91 21 119/71 (87) 90   


 


1/31/21 00:30  91 21 119/71 (87) 90   


 


1/31/21 00:15  92 21 125/77 (93) 90   


 


1/31/21 00:15  92 21 125/77 (93) 90   


 


1/31/21 00:00 98.1 92 20 82/57 (65) 89   


 


1/31/21 00:00  85      


 


1/31/21 00:00  92 20 82/57 (65) 89   


 


1/31/21 00:00      Mechanical Ventilator  


 


1/31/21 00:00        100


 


1/30/21 23:45  92 21 118/74 (89) 92   


 


1/30/21 23:30  92 22 115/71 (86) 92   


 


1/30/21 23:15  93 21 115/72 (86) 91   


 


1/30/21 23:15  93 21     100


 


1/30/21 23:15  93 21 115/72 (86) 91   


 


1/30/21 23:00  94 21 114/73 (87) 89   


 


1/30/21 22:45  95 22 103/67 (79) 87   


 


1/30/21 22:30  95 21 106/69 (81) 88   


 


1/30/21 22:15  95 22 113/68 (83) 88   


 


1/30/21 22:00  96 20 99/63 (75) 89   


 


1/30/21 22:00  96 20 99/63 (75) 89   


 


1/30/21 21:45  97 20 111/66 (81) 90   


 


1/30/21 21:30  97 20 104/62 (76) 90   


 


1/30/21 21:15  94 21 92/63 (73) 90   


 


1/30/21 21:15  94 21 92/63 (73) 90   


 


1/30/21 21:06  96 23     100


 


1/30/21 21:00  96 19 98/62 (74) 91   


 


1/30/21 20:45  93 20 96/58 (71) 91   


 


1/30/21 20:40    68/32    


 


1/30/21 20:30  92 19 102/64 (77) 85   


 


1/30/21 20:30  92 19 102/64 (77) 85   


 


1/30/21 20:15  90 21 106/65 (79) 89   


 


1/30/21 20:08 98.5       


 


1/30/21 20:08  90 19 97/58 (71) 85   


 


1/30/21 20:00  89      


 


1/30/21 20:00        100


 


1/30/21 20:00  89 20 68/45 (53) 91   


 


1/30/21 20:00      Mechanical Ventilator  


 


1/30/21 19:45  90 20 105/62 (76) 89   


 


1/30/21 19:30  89 20 100/64 (76) 90   


 


1/30/21 19:25  90 20     100


 


1/30/21 19:15  91 21 108/70 (83) 89   


 


1/30/21 19:07  95 20 110/66 (81) 78   


 


1/30/21 19:00  86 21 65/42 (50) 83   


 


1/30/21 18:45  92 19 105/64 (78) 89   


 


1/30/21 18:45  92 19 105/64 (78) 89   


 


1/30/21 18:30  93 19 104/61 (75) 90   


 


1/30/21 18:15  94 20 100/62 (75) 91   


 


1/30/21 18:00  94 21 101/64 (76) 90   


 


1/30/21 17:45  92 20 101/62 (75) 89   


 


1/30/21 17:30  96 20 97/63 (74) 89   


 


1/30/21 17:18  94 20     100


 


1/30/21 17:15  93 22 96/60 (72) 87   


 


1/30/21 17:00  95 20 96/62 (73) 90   


 


1/30/21 17:00   20 96/62  Mechanical Ventilator  100


 


1/30/21 16:55   20 101/66  Mechanical Ventilator  100


 


1/30/21 16:45  92 21 97/58 (71) 90   


 


1/30/21 16:30  93 22 98/61 (73) 92   


 


1/30/21 16:15  91 21 92/61 (71) 92   


 


1/30/21 16:00  93      


 


1/30/21 16:00   21 94/60  Mechanical Ventilator  100


 


1/30/21 16:00        100


 


1/30/21 16:00 98.8 94 21 94/60 (71) 92   


 


1/30/21 16:00      Mechanical Ventilator  


 


1/30/21 15:45  92 20 91/58 (69) 93   


 


1/30/21 15:30  92 20 97/58 (71) 95   


 


1/30/21 15:18  94 21     100


 


1/30/21 15:18  93 21 94/56 (69) 81   


 


1/30/21 15:15  91 17 69/46 (54) 65   


 


1/30/21 15:00   20 91/61  Mechanical Ventilator  100


 


1/30/21 15:00  97 21 91/61 (71) 90   


 


1/30/21 14:45  96 20 94/58 (70) 90   


 


1/30/21 14:40  96 20 96/58 (71) 90   


 


1/30/21 14:30  94 21 89/58 (68) 90   


 


1/30/21 14:15  95 21 92/56 (68) 90   


 


1/30/21 14:00  95 22 97/56 (70) 91   


 


1/30/21 14:00   20 97/56  Mechanical Ventilator  100


 


1/30/21 13:45  93  93/56 (68) 81   


 


1/30/21 13:30   21 90/57  Mechanical Ventilator  100


 


1/30/21 13:30  84 21 90/57 (68) 94   


 


1/30/21 13:24    89/56    


 


1/30/21 13:15  85 21 89/56 (67) 93   


 


1/30/21 13:14  85 21     100


 


1/30/21 13:00  84 21 91/57 (68) 94   


 


1/30/21 13:00   21 91/57  Mechanical Ventilator  100


 


1/30/21 12:53  84 21 88/58 (68) 95   


 


1/30/21 12:45  84 19 88/59 (69) 95   


 


1/30/21 12:30  85 21 99/56 (70) 93   


 


1/30/21 12:22  86 21 91/60 (70) 92   


 


1/30/21 12:15  86 21 87/60 (69) 92   


 


1/30/21 12:00 99.2 87 21 91/59 (70) 91   


 


1/30/21 12:00   21 91/59  Mechanical Ventilator  100


 


1/30/21 12:00      Mechanical Ventilator  


 


1/30/21 12:00  87      


 


1/30/21 12:00        100


 


1/30/21 11:45  87 20 88/60 (69) 91   


 


1/30/21 11:30  87 21 91/58 (69) 90   


 


1/30/21 11:22  86 21     100


 


1/30/21 11:15  87 22 86/58 (67) 89   


 


1/30/21 11:10  87 22 82/56 (65) 89   


 


1/30/21 11:00   21 83/54  Mechanical Ventilator  100


 


1/30/21 11:00  87 22 83/54 (64) 89   


 


1/30/21 10:56  86 20 78/55 (63) 89   


 


1/30/21 10:45  86 22 78/51 (60) 90   


 


1/30/21 10:30  98      


 


1/30/21 10:30  85 20 76/51 (59) 91   


 


1/30/21 10:18  80 20 73/47 (56) 93   


 


1/30/21 10:15  80 20 77/52 (60) 94   


 


1/30/21 10:00  80 20 99/73 (82) 96   


 


1/30/21 10:00   20 99/73  Mechanical Ventilator  100


 


1/30/21 09:45  82 19 110/68 (82) 94   


 


1/30/21 09:30  82 17 102/66 (78) 89   


 


1/30/21 09:16  80 22     100


 


1/30/21 09:15  80 20 102/64 (77) 94   


 


1/30/21 09:00  80 20 96/66 (76) 93   


 


1/30/21 09:00   20 96/66  Mechanical Ventilator  100


 


1/30/21 08:45  80 20 103/69 (80) 91   


 


1/30/21 08:30  80 20 105/65 (78) 94   


 


1/30/21 08:15  79 21 102/64 (77) 92   


 


1/30/21 08:00        100


 


1/30/21 08:00      Mechanical Ventilator  


 


1/30/21 08:00 98.3 80 21 103/65 (78) 91   


 


1/30/21 08:00   20 103/65  Mechanical Ventilator  100


 


1/30/21 08:00    103/65    


 


1/30/21 08:00  78      


 


1/30/21 07:54    99/66    


 


1/30/21 07:45  78 20 99/66 (77) 93   


 


1/30/21 07:30  77 19 122/72 (89) 91   


 


1/30/21 07:29  80 22     100


 


1/30/21 07:15  77 21 99/66 (77) 90   


 


1/30/21 07:00   20 104/66  Mechanical Ventilator  100


 


1/30/21 07:00  77 18 104/66 (79) 90   


 


1/30/21 06:45  77 19 102/63 (76) 91   


 


1/30/21 06:30  78 18 106/70 (82) 89   


 


1/30/21 06:15  78 18 104/70 (81) 89   


 


1/30/21 06:00  78 19 110/72 (85) 88   


 


1/30/21 05:49   20 107/51  Mechanical Ventilator  100


 


1/30/21 05:45  77 20 107/74 (85) 89   


 


1/30/21 05:30  78 20 104/71 (82) 89   


 


1/30/21 05:15  79 18 106/69 (81) 89   


 


1/30/21 05:00  80 18 123/71 (88) 88   


 


1/30/21 04:45  80 18 115/75 (88) 88   


 


1/30/21 04:30  80 20 112/74 (87) 90   


 


1/30/21 04:15  81 19 113/77 (89) 88   


 


1/30/21 04:00      Mechanical Ventilator  


 


1/30/21 04:00        100


 


1/30/21 04:00  82 17 121/74 (90) 87   


 


1/30/21 04:00  82      


 


1/30/21 03:55  82 20     100


 


1/30/21 03:45  81 19 120/76 (91) 91   


 


1/30/21 03:45  81 19 120/76 (91) 91   


 


1/30/21 03:30  82 19 114/72 (86) 90   


 


1/30/21 03:15  83 21 116/71 (86) 91   


 


1/30/21 03:00  83 19 115/82 (93) 90   


 


1/30/21 02:45  85 20 114/76 (89) 91   


 


1/30/21 02:30  85 21 133/88 (103) 90   


 


1/30/21 02:15  87 20 109/71 (84) 86   


 


1/30/21 02:15  87 20 109/71 (84) 86   


 


1/30/21 02:04    89/56    


 


1/30/21 02:00  86 20 89/56 (67) 90   


 


1/30/21 01:45  85 20 87/53 (64) 84   


 


1/30/21 01:30  84 19 78/50 (59) 91   


 


1/30/21 01:15  84 19 123/76 (92) 94   


 


1/30/21 01:00  85 20 120/73 (89) 94   


 


1/30/21 00:45  84 20 122/74 (90) 95   


 


1/30/21 00:30  85 20 124/72 (89) 94   


 


1/30/21 00:15  88 20 119/72 (88) 88   


 


1/30/21 00:00  85 21 117/77 (90) 93   


 


1/30/21 00:00      Mechanical Ventilator  


 


1/30/21 00:00        100


 


1/30/21 00:00  89      


 


1/30/21 00:00  85 21 117/77 (90) 93   


 


1/29/21 23:45  85 20 116/72 (87) 94   


 


1/29/21 23:30  87 19 114/73 (87) 93   


 


1/29/21 23:29  87 22     100


 


1/29/21 23:15  89 21 111/72 (85) 92   


 


1/29/21 23:00  89 20 111/72 (85) 92   


 


1/29/21 22:45  91 22 113/75 (88) 92   


 


1/29/21 22:30  92 21 131/81 (98) 91   


 


1/29/21 22:15  94 20 132/80 (97) 89   


 


1/29/21 22:03  94 20 101/66 (78) 83   


 


1/29/21 22:00  93 20 64/49 (54) 85   


 


1/29/21 21:45  94 20 106/68 (81) 86   


 


1/29/21 21:30  95 22 103/65 (78) 85   


 


1/29/21 21:15  98 21 102/65 (77) 84   


 


1/29/21 21:08  100 22 103/64 (77) 83   


 


1/29/21 21:00  103 22 101/64 (76) 82   


 


1/29/21 20:45  107 21 101/65 (77) 78   


 


1/29/21 20:45  107 21  78   


 


1/29/21 20:30  110 21 110/68 (82) 76   


 


1/29/21 20:21  112 21 137/80 (99) 87   


 


1/29/21 20:15  115 20 141/83 (102) 87   


 


1/29/21 20:10  120 20 157/82 (107) 85   


 


1/29/21 20:06  128 22 181/89 (119) 75   


 


1/29/21 20:02  123 20 174/87 (116) 57   


 


1/29/21 20:00      Mechanical Ventilator  


 


1/29/21 20:00  5      


 


1/29/21 20:00  121 21  39   


 


1/29/21 20:00    47/36    


 


1/29/21 20:00        100


 


1/29/21 19:52  31 16 46/26 (33) 80   


 


1/29/21 19:48  38 19 42/27 (32) 80   


 


1/29/21 19:45  80 21 47/31 (36) 81   


 


1/29/21 19:30  90 22 101/61 (74) 89   


 


1/29/21 19:19  91 22     100


 


1/29/21 19:15  90 20 102/63 (76) 89   


 


1/29/21 19:15  90 20 102/63 (76) 89   


 


1/29/21 19:00  91 20 100/61 (74) 88   


 


1/29/21 19:00   20 102/63  Mechanical Ventilator  100


 


1/29/21 19:00  91 20 100/61 (74) 88   


 


1/29/21 18:45  92 21 105/65 (78) 89   


 


1/29/21 18:30  93 19 113/68 (83) 85   


 


1/29/21 18:30   21 105/65  Mechanical Ventilator  100


 


1/29/21 18:25  92  95/63 (74) 64   


 


1/29/21 18:15   20 95/63  Mechanical Ventilator  100


 


1/29/21 18:15  93 19 92/60 (71) 73   


 


1/29/21 18:00  90 21 91/65 (74) 87   


 


1/29/21 18:00   20 91/65  Mechanical Ventilator  100


 


1/29/21 17:45   20 92/60  Mechanical Ventilator  100


 


1/29/21 17:45  90 19 114/70 (85) 87   


 


1/29/21 17:30  90 22 104/66 (79) 86   


 


1/29/21 17:30   20 114/70  Mechanical Ventilator  100


 


1/29/21 17:15  91 20 90/54 (66) 85   


 


1/29/21 17:00  92 22 95/69 (78) 86   


 


1/29/21 17:00   21 90/54  Mechanical Ventilator  100


 


1/29/21 16:45  93 20 103/66 (78) 84   


 


1/29/21 16:30  93 20 112/68 (83) 84   


 


1/29/21 16:15  94 21 115/70 (85) 85   


 


1/29/21 16:00        100


 


1/29/21 16:00   21 108/71  Mechanical Ventilator  100


 


1/29/21 16:00 97.7 95 21 108/71 (83) 78   


 


1/29/21 16:00  98      


 


1/29/21 16:00      Mechanical Ventilator  


 


1/29/21 15:45  93 19 84/54 (64) 79   


 


1/29/21 15:30  95 22 117/68 (84) 79   


 


1/29/21 15:20  95 23     100


 


1/29/21 15:15  96 19 107/65 (79) 78   


 


1/29/21 15:00   21 107/65  Mechanical Ventilator  100


 


1/29/21 15:00  96 23 106/63 (77) 78   


 


1/29/21 14:45  97 24 103/58 (73) 72   


 


1/29/21 14:30  98 23 102/64 (77) 78   


 


1/29/21 14:15  100 22 106/63 (77) 73   


 


1/29/21 14:00   22 106/63  Mechanical Ventilator  100


 


1/29/21 14:00  102 24 112/65 (81) 74   


 


1/29/21 13:45  104 20 113/64 (80) 78   


 


1/29/21 13:33  118 21 164/83 (110) 41   


 


1/29/21 13:30  125 22 193/90 (124) 79   


 


1/29/21 13:15  100 18 97/59 (72) 83   


 


1/29/21 13:00  96 22 102/68 (79) 88   


 


1/29/21 13:00   21 97/59  Mechanical Ventilator  100


 


1/29/21 12:45  96 20 95/59 (71) 87   


 


1/29/21 12:30  98 20 100/68 (79) 83   


 


1/29/21 12:15  98 20 99/62 (74) 79   


 


1/29/21 12:00        100


 


1/29/21 12:00 98.2 98 21 88/55 (66) 77   


 


1/29/21 12:00   21 88/55  Mechanical Ventilator  100


 


1/29/21 12:00      Mechanical Ventilator  


 


1/29/21 12:00  94      


 


1/29/21 11:45  99 19 99/56 (70) 82   


 


1/29/21 11:30  101 20 96/56 (69) 80   


 


1/29/21 11:15  101 21 88/54 (65) 84   


 


1/29/21 11:00  101 19 88/49 (62) 61   


 


1/29/21 11:00   20 88/49  Mechanical Ventilator  100


 


1/29/21 11:00  101 20     100


 


1/29/21 10:45  99 20 87/50 (62) 83   


 


1/29/21 10:30  99 20 100/58 (72) 88   


 


1/29/21 10:19  100      


 


1/29/21 10:15  100 20 100/62 (75) 89   


 


1/29/21 10:00   20 96/59  Mechanical Ventilator  100


 


1/29/21 10:00  101 21 96/59 (71) 89   


 


1/29/21 09:45  103 20 96/60 (72) 85   


 


1/29/21 09:30  101 20 94/52 (66) 89   


 


1/29/21 09:15  101 20 94/56 (69) 88   


 


1/29/21 09:00    65/40    


 


1/29/21 09:00   20 98/58  Mechanical Ventilator  100


 


1/29/21 09:00  105 19 98/58 (71) 80   


 


1/29/21 08:45  96 20 65/40 (48) 88   


 


1/29/21 08:30  100 22 92/55 (67) 87   


 


1/29/21 08:15  103 21 96/57 (70) 84   


 


1/29/21 08:10  98 21 80/49 (59) 85   


 


1/29/21 08:00 101.9 96 18 68/43 (51) 83   


 


1/29/21 08:00      Mechanical Ventilator  


 


1/29/21 08:00   20 70/43  Mechanical Ventilator  100


 


1/29/21 08:00        100


 


1/29/21 08:00  92      


 


1/29/21 07:45  94 19 71/38 (49) 88   


 


1/29/21 07:30  99 20 99/56 (70) 89   


 


1/29/21 07:29 101.5       


 


1/29/21 07:15  97 20 97/55 (69) 89   


 


1/29/21 07:00  99 19  89   


 


1/29/21 07:00  96 24     100


 


1/29/21 07:00 101.9 99 19 99/52 (68) 89   


 


1/29/21 07:00   20 99/52  Mechanical Ventilator  100


 


1/29/21 07:00   21 99/52  Mechanical Ventilator  100

















Intake and Output  


 


 1/30/21 1/31/21





 19:00 07:00


 


Intake Total 1170.5 ml 605 ml


 


Output Total 1525 ml 1200 ml


 


Balance -354.5 ml -595 ml


 


  


 


IV Total 675.5 ml 


 


Tube Feeding 495 ml 605 ml


 


Output Urine Total 1425 ml 1000 ml


 


Stool Total 0 ml 200 ml


 


Chest Tube Drainage Total 100 ml 











Labs








Test


 1/28/21


07:35 1/28/21


08:18 1/28/21


10:59 1/28/21


11:56


 


Arterial Blood pH


 7.207


(7.350-7.450) 


 7.183


(7.350-7.450) 





 


Arterial Blood Partial


Pressure CO2 70.4 mmHg


(35.0-45.0) 


 76.7 mmHg


(35.0-45.0) 





 


Arterial Blood Partial


Pressure O2 49.2 mmHg


(75.0-100.0) 


 71.3 mmHg


(75.0-100.0) 





 


Arterial Blood HCO3


 27.3 mmol/L


(22.0-26.0) 


 28.2 mmol/L


(22.0-26.0) 





 


Arterial Blood Oxygen


Saturation 84.6 %


() 


 93.2 %


() 





 


Arterial Blood Base Excess -1.6 (-2-2)   -1.2 (-2-2)  


 


Abelardo Test Positive   Positive  


 


POC Whole Blood Glucose


 


 122 MG/DL


() 


 103 MG/DL


()


 


Test


 1/28/21


17:45 1/29/21


10:11 1/29/21


12:12 1/29/21


13:35


 


POC Whole Blood Glucose


 189 MG/DL


() 


 


 





 


Arterial Blood pH


 


 


 


 7.148


(7.350-7.450)


 


Arterial Blood Partial


Pressure CO2 


 


 


 92.4 mmHg


(35.0-45.0)


 


Arterial Blood Partial


Pressure O2 


 


 


 35.1 mmHg


(75.0-100.0)


 


Arterial Blood HCO3


 


 


 


 31.3 mmol/L


(22.0-26.0)


 


Arterial Blood Oxygen


Saturation 


 


 


 59.7 %


()


 


Arterial Blood Base Excess    0.6 (-2-2) 


 


Abelardo Test    Positive 


 


Test


 1/29/21


13:38 1/29/21


18:43 1/29/21


23:50 1/30/21


04:15


 


POC Whole Blood Glucose


 


 


 121 MG/DL


() 





 


White Blood Count


 


 


 


 13.2 K/UL


(4.8-10.8)


 


Red Blood Count


 


 


 


 3.24 M/UL


(4.70-6.10)


 


Hemoglobin


 


 


 


 9.8 G/DL


(14.2-18.0)


 


Hematocrit


 


 


 


 29.3 %


(42.0-52.0)


 


Mean Corpuscular Volume    90 FL (80-99) 


 


Mean Corpuscular Hemoglobin


 


 


 


 30.1 PG


(27.0-31.0)


 


Mean Corpuscular Hemoglobin


Concent 


 


 


 33.3 G/DL


(32.0-36.0)


 


Red Cell Distribution Width


 


 


 


 17.2 %


(11.6-14.8)


 


Platelet Count


 


 


 


 184 K/UL


(150-450)


 


Mean Platelet Volume


 


 


 


 10.7 FL


(6.5-10.1)


 


Neutrophils (%) (Auto)     % (45.0-75.0) 


 


Lymphocytes (%) (Auto)     % (20.0-45.0) 


 


Monocytes (%) (Auto)     % (1.0-10.0) 


 


Eosinophils (%) (Auto)     % (0.0-3.0) 


 


Basophils (%) (Auto)     % (0.0-2.0) 


 


Differential Total Cells


Counted 


 


 


 100 





 


Neutrophils % (Manual)    89 % (45-75) 


 


Lymphocytes % (Manual)    7 % (20-45) 


 


Monocytes % (Manual)    1 % (1-10) 


 


Eosinophils % (Manual)    3 % (0-3) 


 


Basophils % (Manual)    0 % (0-2) 


 


Band Neutrophils    0 % (0-8) 


 


Nucleated Red Blood Cells    2 /100 WBC 


 


Platelet Estimate    Adequate 


 


Platelet Morphology    Normal 


 


Polychromasia    2+ 


 


Hypochromasia    1+ 


 


Anisocytosis    1+ 


 


Sodium Level


 


 


 


 147 MMOL/L


(136-145)


 


Potassium Level


 


 


 


 3.7 MMOL/L


(3.5-5.1)


 


Chloride Level


 


 


 


 109 MMOL/L


()


 


Carbon Dioxide Level


 


 


 


 30 MMOL/L


(21-32)


 


Anion Gap


 


 


 


 8 mmol/L


(5-15)


 


Blood Urea Nitrogen


 


 


 


 63 mg/dL


(7-18)


 


Creatinine


 


 


 


 2.2 MG/DL


(0.55-1.30)


 


Estimat Glomerular Filtration


Rate 


 


 


 30.1 mL/min


(>60)


 


Glucose Level


 


 


 


 115 MG/DL


()


 


Calcium Level


 


 


 


 7.9 MG/DL


(8.5-10.1)


 


Test


 1/30/21


08:14 1/30/21


09:38 1/30/21


18:46 1/31/21


05:55


 


Arterial Blood pH


 7.290


(7.350-7.450) 


 


 





 


Arterial Blood Partial


Pressure CO2 65.7 mmHg


(35.0-45.0) 


 


 





 


Arterial Blood Partial


Pressure O2 53.7 mmHg


(75.0-100.0) 


 


 





 


Arterial Blood HCO3


 30.9 mmol/L


(22.0-26.0) 


 


 





 


Arterial Blood Oxygen


Saturation 86.2 %


() 


 


 





 


Arterial Blood Base Excess 3 (-2-2)    


 


Abelardo Test Positive    








Height (Feet):  5


Height (Inches):  7.00


Weight (Pounds):  198


Objective


General Appearance:  lethargic, moderate distress


Neck:  supple


Cardiovascular:  regular rhythm


Respiratory/Chest:  crackles/rales, rhonchi - bilaterally vent++


Abdomen:  non tender, soft


Extremities:  non-tender











Emmett Cook MD          Jan 31, 2021 06:57

## 2021-01-31 NOTE — NUR
NURSE NOTES:

patient resting vitals are stable.  Patient repositioned and oral care completed. Patient 
has high tube feed residual. will continue to monitor.

## 2021-01-31 NOTE — GENERAL PROGRESS NOTE
Subjective


Allergies:  


Coded Allergies:  


     No Known Allergies (Unverified , 6/11/19)


Subjective


no   cardiic incident last night


on ventilator 60% fio2


on sediation


unresponsive


in icu


rt chest tube s placed due to pneumothorex


afib is controlled


hypotension on levophed drip





Objective





Last 24 Hour Vital Signs








  Date Time  Temp Pulse Resp B/P (MAP) Pulse Ox O2 Delivery O2 Flow Rate FiO2


 


1/31/21 07:39  95 25     100


 


1/31/21 07:00  94 20 115/73 (87) 85   


 


1/31/21 06:45  95 21 115/73 (87) 84   


 


1/31/21 06:30  94 21 128/74 (92) 86   


 


1/31/21 06:30  94 21 128/74 (92) 86   


 


1/31/21 06:15  94 23 124/81 (95) 83   


 


1/31/21 06:00  91 22 96/75 (82) 86   


 


1/31/21 05:45  94 20 106/69 (81) 86   


 


1/31/21 05:30  93 23 113/73 (86) 86   


 


1/31/21 05:19  93 25     100


 


1/31/21 05:15  93 22 118/70 (86) 85   


 


1/31/21 05:00  93 20 119/66 (83) 85   


 


1/31/21 04:45  92 22 104/56 (72) 86   


 


1/31/21 04:30  91 23 108/75 (86) 89   


 


1/31/21 04:15  91 22 111/72 (85) 89   


 


1/31/21 04:00  91      


 


1/31/21 04:00  91 21 103/84 (90) 89   


 


1/31/21 04:00        100


 


1/31/21 04:00      Mechanical Ventilator  


 


1/31/21 03:45  90 22 115/70 (85) 89   


 


1/31/21 03:30  91 24 110/69 (83) 87   


 


1/31/21 03:15  91 23 119/72 (88) 88   


 


1/31/21 03:15  92 21     100


 


1/31/21 03:00  91 22 111/66 (81) 87   


 


1/31/21 02:45  91 23 121/72 (88) 88   


 


1/31/21 02:30  91 23 116/70 (85) 88   


 


1/31/21 02:15  91 23 116/71 (86) 89   


 


1/31/21 02:00  90 23 113/70 (84) 87   


 


1/31/21 02:00  90 23 113/70 (84) 87   


 


1/31/21 01:45  92 23 118/68 (85) 87   


 


1/31/21 01:30  91 22 120/75 (90) 88   


 


1/31/21 01:23  91 22     100


 


1/31/21 01:15  91 22 122/77 (92) 86   


 


1/31/21 01:02    102/65    


 


1/31/21 01:00  91 21 102/65 (77) 87   


 


1/31/21 01:00  91 21 102/65 (77) 87   


 


1/31/21 00:50   20 102/65  Mechanical Ventilator  100


 


1/31/21 00:45  91 22 121/70 (87) 89   


 


1/31/21 00:45  91 22 121/70 (87) 89   


 


1/31/21 00:30  91 21 119/71 (87) 90   


 


1/31/21 00:30  91 21 119/71 (87) 90   


 


1/31/21 00:15  92 21 125/77 (93) 90   


 


1/31/21 00:15  92 21 125/77 (93) 90   


 


1/31/21 00:00 98.1 92 20 82/57 (65) 89   


 


1/31/21 00:00  85      


 


1/31/21 00:00  92 20 82/57 (65) 89   


 


1/31/21 00:00      Mechanical Ventilator  


 


1/31/21 00:00        100


 


1/30/21 23:45  92 21 118/74 (89) 92   


 


1/30/21 23:30  92 22 115/71 (86) 92   


 


1/30/21 23:15  93 21 115/72 (86) 91   


 


1/30/21 23:15  93 21     100


 


1/30/21 23:15  93 21 115/72 (86) 91   


 


1/30/21 23:00  94 21 114/73 (87) 89   


 


1/30/21 22:45  95 22 103/67 (79) 87   


 


1/30/21 22:30  95 21 106/69 (81) 88   


 


1/30/21 22:15  95 22 113/68 (83) 88   


 


1/30/21 22:00  96 20 99/63 (75) 89   


 


1/30/21 22:00  96 20 99/63 (75) 89   


 


1/30/21 21:45  97 20 111/66 (81) 90   


 


1/30/21 21:30  97 20 104/62 (76) 90   


 


1/30/21 21:15  94 21 92/63 (73) 90   


 


1/30/21 21:15  94 21 92/63 (73) 90   


 


1/30/21 21:06  96 23     100


 


1/30/21 21:00  96 19 98/62 (74) 91   


 


1/30/21 20:45  93 20 96/58 (71) 91   


 


1/30/21 20:40    68/32    


 


1/30/21 20:30  92 19 102/64 (77) 85   


 


1/30/21 20:30  92 19 102/64 (77) 85   


 


1/30/21 20:15  90 21 106/65 (79) 89   


 


1/30/21 20:08 98.5       


 


1/30/21 20:08  90 19 97/58 (71) 85   


 


1/30/21 20:00  89      


 


1/30/21 20:00        100


 


1/30/21 20:00  89 20 68/45 (53) 91   


 


1/30/21 20:00      Mechanical Ventilator  


 


1/30/21 19:45  90 20 105/62 (76) 89   


 


1/30/21 19:30  89 20 100/64 (76) 90   


 


1/30/21 19:25  90 20     100


 


1/30/21 19:15  91 21 108/70 (83) 89   


 


1/30/21 19:07  95 20 110/66 (81) 78   


 


1/30/21 19:00  86 21 65/42 (50) 83   


 


1/30/21 18:45  92 19 105/64 (78) 89   


 


1/30/21 18:45  92 19 105/64 (78) 89   


 


1/30/21 18:30  93 19 104/61 (75) 90   


 


1/30/21 18:15  94 20 100/62 (75) 91   


 


1/30/21 18:00  94 21 101/64 (76) 90   


 


1/30/21 17:45  92 20 101/62 (75) 89   


 


1/30/21 17:30  96 20 97/63 (74) 89   


 


1/30/21 17:18  94 20     100


 


1/30/21 17:15  93 22 96/60 (72) 87   


 


1/30/21 17:00  95 20 96/62 (73) 90   


 


1/30/21 17:00   20 96/62  Mechanical Ventilator  100


 


1/30/21 16:55   20 101/66  Mechanical Ventilator  100


 


1/30/21 16:45  92 21 97/58 (71) 90   


 


1/30/21 16:30  93 22 98/61 (73) 92   


 


1/30/21 16:15  91 21 92/61 (71) 92   


 


1/30/21 16:00  93      


 


1/30/21 16:00   21 94/60  Mechanical Ventilator  100


 


1/30/21 16:00        100


 


1/30/21 16:00 98.8 94 21 94/60 (71) 92   


 


1/30/21 16:00      Mechanical Ventilator  


 


1/30/21 15:45  92 20 91/58 (69) 93   


 


1/30/21 15:30  92 20 97/58 (71) 95   


 


1/30/21 15:18  94 21     100


 


1/30/21 15:18  93 21 94/56 (69) 81   


 


1/30/21 15:15  91 17 69/46 (54) 65   


 


1/30/21 15:00   20 91/61  Mechanical Ventilator  100


 


1/30/21 15:00  97 21 91/61 (71) 90   


 


1/30/21 14:45  96 20 94/58 (70) 90   


 


1/30/21 14:40  96 20 96/58 (71) 90   


 


1/30/21 14:30  94 21 89/58 (68) 90   


 


1/30/21 14:15  95 21 92/56 (68) 90   


 


1/30/21 14:00  95 22 97/56 (70) 91   


 


1/30/21 14:00   20 97/56  Mechanical Ventilator  100


 


1/30/21 13:45  93  93/56 (68) 81   


 


1/30/21 13:30   21 90/57  Mechanical Ventilator  100


 


1/30/21 13:30  84 21 90/57 (68) 94   


 


1/30/21 13:24    89/56    


 


1/30/21 13:15  85 21 89/56 (67) 93   


 


1/30/21 13:14  85 21     100


 


1/30/21 13:00  84 21 91/57 (68) 94   


 


1/30/21 13:00   21 91/57  Mechanical Ventilator  100


 


1/30/21 12:53  84 21 88/58 (68) 95   


 


1/30/21 12:45  84 19 88/59 (69) 95   


 


1/30/21 12:30  85 21 99/56 (70) 93   


 


1/30/21 12:22  86 21 91/60 (70) 92   


 


1/30/21 12:15  86 21 87/60 (69) 92   


 


1/30/21 12:00 99.2 87 21 91/59 (70) 91   


 


1/30/21 12:00   21 91/59  Mechanical Ventilator  100


 


1/30/21 12:00      Mechanical Ventilator  


 


1/30/21 12:00  87      


 


1/30/21 12:00        100


 


1/30/21 11:45  87 20 88/60 (69) 91   


 


1/30/21 11:30  87 21 91/58 (69) 90   


 


1/30/21 11:22  86 21     100


 


1/30/21 11:15  87 22 86/58 (67) 89   


 


1/30/21 11:10  87 22 82/56 (65) 89   


 


1/30/21 11:00   21 83/54  Mechanical Ventilator  100


 


1/30/21 11:00  87 22 83/54 (64) 89   


 


1/30/21 10:56  86 20 78/55 (63) 89   


 


1/30/21 10:45  86 22 78/51 (60) 90   


 


1/30/21 10:30  98      


 


1/30/21 10:30  85 20 76/51 (59) 91   


 


1/30/21 10:18  80 20 73/47 (56) 93   


 


1/30/21 10:15  80 20 77/52 (60) 94   


 


1/30/21 10:00  80 20 99/73 (82) 96   


 


1/30/21 10:00   20 99/73  Mechanical Ventilator  100


 


1/30/21 09:45  82 19 110/68 (82) 94   

















Intake and Output  


 


 1/30/21 1/31/21





 19:00 07:00


 


Intake Total 1170.5 ml 660 ml


 


Output Total 1525 ml 1300 ml


 


Balance -354.5 ml -640 ml


 


  


 


IV Total 675.5 ml 


 


Tube Feeding 495 ml 660 ml


 


Output Urine Total 1425 ml 1100 ml


 


Stool Total 0 ml 200 ml


 


Chest Tube Drainage Total 100 ml 








Laboratory Tests


1/30/21 09:38: POC Whole Blood Glucose [Pending]


1/30/21 18:46: POC Whole Blood Glucose [Pending]


1/31/21 05:55: Digoxin Level 1.0


Height (Feet):  5


Height (Inches):  7.00


Weight (Pounds):  198


General Appearance:  lethargic


Neck:  supple


Cardiovascular:  regular rhythm


Respiratory/Chest:  crackles/rales


Abdomen:  non tender, soft


Neurologic:  responsive





Assessment/Plan


Assessment/Plan:


no cardiic issue last night


hypotension better on tirate down levophed drip


afib with rvr on digoxine , add amiodarone , cardiology on case


covid pna


ac resp distress on ventilator


etoh abused


dehydration


agitated -halodol


severe meta acidosis


cont on ventilator at icu


cont iv abx and iv decadrone


pulmonary and gi on consult





dw son explained the prognosis -requested full code


icu nurse is aware of code status











Moi Travis MD             Jan 31, 2021 09:36

## 2021-01-31 NOTE — DIAGNOSTIC IMAGING REPORT
EXAM:

  XR Chest, 1 View

 

CLINICAL HISTORY:

  Shortness of breath

 

TECHNIQUE:

  Frontal view of the chest.

 

COMPARISON:

  Chest x-rays dated 1/29/21 and 1/23/21

 

FINDINGS:

  Lungs:  No significant change in patchy pulmonary opacities in 

bilateral lungs.

  Pleural space:  No significant change in small bilateral pleural 

effusions.

  Heart:  Unremarkable.  No cardiomegaly.

  Mediastinum:  Unremarkable.

  Bones/joints:  Unremarkable.

  Tubes, lines and devices:  Endotracheal tube tip 5.6 cm above the 

tito.  Left-sided PICC with catheter tip in the region of the SVC.  

Stable positioning of a chest tube in the right lower thorax.

 

IMPRESSION:     

  No significant interval change compared to the chest x-ray dated 

1/29/21.

## 2021-01-31 NOTE — SURGERY PROGRESS NOTE
Surgery Progress Note


Subjective


Procedure Performed


Right tube thoracostomy chest tube insertion


Symptoms:  worse


Additional Comments


ct stable


desaturating


declining





Objective





Last 24 Hour Vital Signs








  Date Time  Temp Pulse Resp B/P (MAP) Pulse Ox O2 Delivery O2 Flow Rate FiO2


 


1/31/21 10:48    64/41    


 


1/31/21 09:00  86      


 


1/31/21 07:39  95 25     100


 


1/31/21 07:00  94 20 115/73 (87) 85   


 


1/31/21 06:45  95 21 115/73 (87) 84   


 


1/31/21 06:30  94 21 128/74 (92) 86   


 


1/31/21 06:30  94 21 128/74 (92) 86   


 


1/31/21 06:15  94 23 124/81 (95) 83   


 


1/31/21 06:00  91 22 96/75 (82) 86   


 


1/31/21 05:45  94 20 106/69 (81) 86   


 


1/31/21 05:30  93 23 113/73 (86) 86   


 


1/31/21 05:19  93 25     100


 


1/31/21 05:15  93 22 118/70 (86) 85   


 


1/31/21 05:00  93 20 119/66 (83) 85   


 


1/31/21 04:45  92 22 104/56 (72) 86   


 


1/31/21 04:30  91 23 108/75 (86) 89   


 


1/31/21 04:15  91 22 111/72 (85) 89   


 


1/31/21 04:00  91      


 


1/31/21 04:00  91 21 103/84 (90) 89   


 


1/31/21 04:00        100


 


1/31/21 04:00      Mechanical Ventilator  


 


1/31/21 03:45  90 22 115/70 (85) 89   


 


1/31/21 03:30  91 24 110/69 (83) 87   


 


1/31/21 03:15  91 23 119/72 (88) 88   


 


1/31/21 03:15  92 21     100


 


1/31/21 03:00  91 22 111/66 (81) 87   


 


1/31/21 02:45  91 23 121/72 (88) 88   


 


1/31/21 02:30  91 23 116/70 (85) 88   


 


1/31/21 02:15  91 23 116/71 (86) 89   


 


1/31/21 02:00  90 23 113/70 (84) 87   


 


1/31/21 02:00  90 23 113/70 (84) 87   


 


1/31/21 01:45  92 23 118/68 (85) 87   


 


1/31/21 01:30  91 22 120/75 (90) 88   


 


1/31/21 01:23  91 22     100


 


1/31/21 01:15  91 22 122/77 (92) 86   


 


1/31/21 01:02    102/65    


 


1/31/21 01:00  91 21 102/65 (77) 87   


 


1/31/21 01:00  91 21 102/65 (77) 87   


 


1/31/21 00:50   20 102/65  Mechanical Ventilator  100


 


1/31/21 00:45  91 22 121/70 (87) 89   


 


1/31/21 00:45  91 22 121/70 (87) 89   


 


1/31/21 00:30  91 21 119/71 (87) 90   


 


1/31/21 00:30  91 21 119/71 (87) 90   


 


1/31/21 00:15  92 21 125/77 (93) 90   


 


1/31/21 00:15  92 21 125/77 (93) 90   


 


1/31/21 00:00 98.1 92 20 82/57 (65) 89   


 


1/31/21 00:00  85      


 


1/31/21 00:00  92 20 82/57 (65) 89   


 


1/31/21 00:00      Mechanical Ventilator  


 


1/31/21 00:00        100


 


1/30/21 23:45  92 21 118/74 (89) 92   


 


1/30/21 23:30  92 22 115/71 (86) 92   


 


1/30/21 23:15  93 21 115/72 (86) 91   


 


1/30/21 23:15  93 21     100


 


1/30/21 23:15  93 21 115/72 (86) 91   


 


1/30/21 23:00  94 21 114/73 (87) 89   


 


1/30/21 22:45  95 22 103/67 (79) 87   


 


1/30/21 22:30  95 21 106/69 (81) 88   


 


1/30/21 22:15  95 22 113/68 (83) 88   


 


1/30/21 22:00  96 20 99/63 (75) 89   


 


1/30/21 22:00  96 20 99/63 (75) 89   


 


1/30/21 21:45  97 20 111/66 (81) 90   


 


1/30/21 21:30  97 20 104/62 (76) 90   


 


1/30/21 21:15  94 21 92/63 (73) 90   


 


1/30/21 21:15  94 21 92/63 (73) 90   


 


1/30/21 21:06  96 23     100


 


1/30/21 21:00  96 19 98/62 (74) 91   


 


1/30/21 20:45  93 20 96/58 (71) 91   


 


1/30/21 20:40    68/32    


 


1/30/21 20:30  92 19 102/64 (77) 85   


 


1/30/21 20:30  92 19 102/64 (77) 85   


 


1/30/21 20:15  90 21 106/65 (79) 89   


 


1/30/21 20:08 98.5       


 


1/30/21 20:08  90 19 97/58 (71) 85   


 


1/30/21 20:00  89      


 


1/30/21 20:00        100


 


1/30/21 20:00  89 20 68/45 (53) 91   


 


1/30/21 20:00      Mechanical Ventilator  


 


1/30/21 19:45  90 20 105/62 (76) 89   


 


1/30/21 19:30  89 20 100/64 (76) 90   


 


1/30/21 19:25  90 20     100


 


1/30/21 19:15  91 21 108/70 (83) 89   


 


1/30/21 19:07  95 20 110/66 (81) 78   


 


1/30/21 19:00  86 21 65/42 (50) 83   


 


1/30/21 18:45  92 19 105/64 (78) 89   


 


1/30/21 18:45  92 19 105/64 (78) 89   


 


1/30/21 18:30  93 19 104/61 (75) 90   


 


1/30/21 18:15  94 20 100/62 (75) 91   


 


1/30/21 18:00  94 21 101/64 (76) 90   


 


1/30/21 17:45  92 20 101/62 (75) 89   


 


1/30/21 17:30  96 20 97/63 (74) 89   


 


1/30/21 17:18  94 20     100


 


1/30/21 17:15  93 22 96/60 (72) 87   


 


1/30/21 17:00  95 20 96/62 (73) 90   


 


1/30/21 17:00   20 96/62  Mechanical Ventilator  100


 


1/30/21 16:55   20 101/66  Mechanical Ventilator  100


 


1/30/21 16:45  92 21 97/58 (71) 90   


 


1/30/21 16:30  93 22 98/61 (73) 92   


 


1/30/21 16:15  91 21 92/61 (71) 92   


 


1/30/21 16:00  93      


 


1/30/21 16:00   21 94/60  Mechanical Ventilator  100


 


1/30/21 16:00        100


 


1/30/21 16:00 98.8 94 21 94/60 (71) 92   


 


1/30/21 16:00      Mechanical Ventilator  


 


1/30/21 15:45  92 20 91/58 (69) 93   


 


1/30/21 15:30  92 20 97/58 (71) 95   


 


1/30/21 15:18  94 21     100


 


1/30/21 15:18  93 21 94/56 (69) 81   


 


1/30/21 15:15  91 17 69/46 (54) 65   


 


1/30/21 15:00   20 91/61  Mechanical Ventilator  100


 


1/30/21 15:00  97 21 91/61 (71) 90   


 


1/30/21 14:45  96 20 94/58 (70) 90   


 


1/30/21 14:40  96 20 96/58 (71) 90   


 


1/30/21 14:30  94 21 89/58 (68) 90   


 


1/30/21 14:15  95 21 92/56 (68) 90   


 


1/30/21 14:00  95 22 97/56 (70) 91   


 


1/30/21 14:00   20 97/56  Mechanical Ventilator  100


 


1/30/21 13:45  93  93/56 (68) 81   


 


1/30/21 13:30   21 90/57  Mechanical Ventilator  100


 


1/30/21 13:30  84 21 90/57 (68) 94   


 


1/30/21 13:24    89/56    


 


1/30/21 13:15  85 21 89/56 (67) 93   


 


1/30/21 13:14  85 21     100


 


1/30/21 13:00  84 21 91/57 (68) 94   


 


1/30/21 13:00   21 91/57  Mechanical Ventilator  100


 


1/30/21 12:53  84 21 88/58 (68) 95   


 


1/30/21 12:45  84 19 88/59 (69) 95   


 


1/30/21 12:30  85 21 99/56 (70) 93   








I&O











Intake and Output  


 


 1/30/21 1/31/21





 19:00 07:00


 


Intake Total 1170.5 ml 660 ml


 


Output Total 1525 ml 1300 ml


 


Balance -354.5 ml -640 ml


 


  


 


IV Total 675.5 ml 


 


Tube Feeding 495 ml 660 ml


 


Output Urine Total 1425 ml 1100 ml


 


Stool Total 0 ml 200 ml


 


Chest Tube Drainage Total 100 ml 








Dressing:  saturated


Drains:  other


Cardiovascular:  RSR


Respiratory:  decreased breath sounds, other


Abdomen:  soft, flat, decreased bowel sounds


Extremities:  no tenderness, no cyanosis





Laboratory Tests








Test


 1/30/21


18:46 1/31/21


05:55 1/31/21


06:55 1/31/21


09:32


 


POC Whole Blood Glucose Pending     Pending  


 


Digoxin Level


 


 1.0 NG/ML


(0.5-2.0) 


 





 


Phosphorus Level


 


 


 5.4 MG/DL


(2.5-4.9)  H 





 


Magnesium Level


 


 


 2.1 MG/DL


(1.8-2.4) 














Plan


Problems:  


(1) Pneumonia due to COVID-19 virus


Assessment & Plan:  Interim endotracheal intubation, endotracheal tube tip in 

good position


approximately 4 cm above the tito. There are extensive bilateral infiltrates, 

which


are markedly increased from the prior study. Pleural spaces are probably clear, 

not


well demonstrated


Markedly increased and now extensive bilateral infiltrates 


cont as per pulm and iID





(2) Hypoxia


Assessment & Plan:  patient developing DTI. in current condition, critically ill

and declining potential inevitable decline 


all care precautions taken and will cont to monitor and assist with improvement 





(3) Abdominal pain


Assessment & Plan:  65M abd pain, septic, covid, intubated ons upport


currently abd exam benign but limited given condition


line bo mary noted


okay for meds and feeds


nutritional optimization 


DAILY ESTIMATED NEEDS:


Needs based on Critical Care/ 72kg abw 


22-28  kcals/kg 


3825-3108  total kcals


1.2-2  g protein/kg


  g total protein 


25-30  mL/kg


4169-2421  total fluid mLs





NUTRITION DIAGNOSIS:


Swallowing difficulty R/T respiratory failure as evidenced by pt now


orally intubated, OGT in place, NPO





 


CURRENT TF:NPO 





 





ENTERAL NUTRITION RECOMMENDATIONS:


Vital AF 1.2 @ 60ml/hr x 24 hrs  to provide 1440ml, 1728kcal, 116g prot, 1167ml 

free water 





* As medically appropriate, initiate critical care and carb controlled TF 

formula of Vital AF 1.2


* Initiate @ 20ml/hr x 6hrs, advance 10ml q 4-6 hrs as tolerated to goal rate


* HOB over 30 degrees/ water flush per MD


* Does not exceed est kcal needs w/ Propofol running @ 8.083ml/hr x 24 hrs 

(provides 213 lipid kcal)





 





ADDITIONAL RECOMMENDATIONS:


* Calibrated bedscale wt 


* Monitor BGs closely w/ Decadron and TF, need for long acting insulin 


* Monitor lytes, replete as needed  


* Monitor Propofol rate, need to adjust TF rate  





(4) Acute metabolic encephalopathy


(5) Pneumothorax on right


Assessment & Plan:  right ptx


chest tube placed


decreased air


stable ptx


cont tube to suction


Endotracheal tube tip projects at the level of the thoracic inlet.


Orogastric tube tip projects at the level of the gastric fundus. Again 

demonstrated


is extensive subcutaneous emphysema, which appears somewhat worse on the left. 

Right


chest tube remains in place. Small right medial and apical pneumothorax are 

present,


slightly more conspicuous than on the previous exam, still small, somewhat 

difficult


to identify due to overlying subcutaneous emphysema. There is probably a tiny 

left


apical pneumothorax as well. Previously demonstrated pneumomediastinum has 

improved


considerably although still present. Bilateral infiltrates appear worse on the 

right.


 


Impression: Equivocally slightly increased but still small right pneumothorax.


 


Suspect tiny left apical pneumothorax


 


Improving pneumomediastinum


 


Worsening subcutaneous emphysema


 


Worsening right and stable left lung infiltrates 





 Right chest tube remains. There is still a small apical pneumothorax. Tiny


left apical pneumothorax persists, unchanged. There is decreased 

pneumomediastinum.


Subcutaneous gas has decreased as well. Bilateral infiltrates are unchanged.


Endotracheal tube remains at the level of the thoracic inlet. Orogastric tube is


again demonstrated, tip not well-visualized but probably stable in position


 


Impression: Stable tiny bilateral apical pneumothoraces


 


Unchanged bilateral infiltrates


 


Decreased pneumomediastinum and subcutaneous emphysema














Norberto Vazquez                Jan 31, 2021 12:30

## 2021-01-31 NOTE — NUR
NURSE NOTES:

Patient resting. Patient vitals stable.  Patient  repositioned.  Patient has low urine 
output.  This RN assessed cash patency. Will monitor urine output.  Spoke to son with an 
update.  RN will continue to monitor.

## 2021-01-31 NOTE — NUR
NURSE HAND-OFF REPORT: 



Latest Vital Signs: Temperature 97.1 , Pulse 104 , B/P 100 /60 , Respiratory Rate 26 , O2 
SAT 87 , Mechanical Ventilator, O2 Flow Rate  .  

Vital Sign Comment: vitals stable 



EKG Rhythm: Sinus Rhythm

Rhythm change?: N 

MD Notified?: N -Dr. Thaddeus GUTIERREZ Response: 



Latest Singh Fall Score: 50  

Fall Risk: High Risk 

Safety Measures: Call light Within Reach, Bed Alarm Zone 1, Side Rails Side Rails x3, Bed 
position Low and Locked.

Fall Precautions: 

Patient Fall Education



Report given to Ellie GIFFORD

## 2021-01-31 NOTE — NUR
NURSE HAND-OFF REPORT: 



Latest Vital Signs: Temperature 96.7 , Pulse 94 , B/P 111 /69 , Respiratory Rate 9 , O2 SAT 
95 , Mechanical Ventilator, O2 Flow Rate  .  

Vital Sign Comment: Levophed @ 28mcg/min



EKG Rhythm: Sinus Rhythm

Rhythm change?: N 

MD Notified?: N

MD Response: 



Latest Singh Fall Score: 50  

Fall Risk: High Risk 

Safety Measures: Call light Within Reach, Bed Alarm Zone 1, Side Rails Side Rails x3, Bed 
position Low and Locked.

Fall Precautions: 

Patient Fall Education



Report given to SUZI Pardo

## 2021-01-31 NOTE — NUR
NURSE NOTES:

Patient report received from Ellie GIFFORD RN.  Patient vitals stable at this time with Levophed 
infusing at 28mcg/hr to maintain blood pressure. Patient resting comfortably.  Patient has 
Fentanyl infusing at 170mcg/hr for pain control and sedation.  Patient currently on 
mechanical ventilator on pressure control Assist control 32. PEEP 12. Pressure support 20. 
FIO2 100%. Patient tolerating at this time with a pulse ox 95%. Patient has r side chest 
tube to suction that is draining well. Patient has cash and rectal tube.  RN assess 
patency.  Oral care completed and patient repositioned.  RN will continue to monitor.

## 2021-02-01 VITALS — SYSTOLIC BLOOD PRESSURE: 100 MMHG | DIASTOLIC BLOOD PRESSURE: 57 MMHG

## 2021-02-01 VITALS — SYSTOLIC BLOOD PRESSURE: 95 MMHG | DIASTOLIC BLOOD PRESSURE: 60 MMHG

## 2021-02-01 VITALS — SYSTOLIC BLOOD PRESSURE: 103 MMHG | DIASTOLIC BLOOD PRESSURE: 63 MMHG

## 2021-02-01 VITALS — SYSTOLIC BLOOD PRESSURE: 114 MMHG | DIASTOLIC BLOOD PRESSURE: 68 MMHG

## 2021-02-01 VITALS — DIASTOLIC BLOOD PRESSURE: 68 MMHG | SYSTOLIC BLOOD PRESSURE: 121 MMHG

## 2021-02-01 VITALS — DIASTOLIC BLOOD PRESSURE: 61 MMHG | SYSTOLIC BLOOD PRESSURE: 104 MMHG

## 2021-02-01 VITALS — SYSTOLIC BLOOD PRESSURE: 119 MMHG | DIASTOLIC BLOOD PRESSURE: 72 MMHG

## 2021-02-01 VITALS — DIASTOLIC BLOOD PRESSURE: 71 MMHG | SYSTOLIC BLOOD PRESSURE: 123 MMHG

## 2021-02-01 VITALS — DIASTOLIC BLOOD PRESSURE: 60 MMHG | SYSTOLIC BLOOD PRESSURE: 100 MMHG

## 2021-02-01 VITALS — SYSTOLIC BLOOD PRESSURE: 116 MMHG | DIASTOLIC BLOOD PRESSURE: 73 MMHG

## 2021-02-01 VITALS — DIASTOLIC BLOOD PRESSURE: 78 MMHG | SYSTOLIC BLOOD PRESSURE: 120 MMHG

## 2021-02-01 VITALS — DIASTOLIC BLOOD PRESSURE: 61 MMHG | SYSTOLIC BLOOD PRESSURE: 105 MMHG

## 2021-02-01 VITALS — DIASTOLIC BLOOD PRESSURE: 74 MMHG | SYSTOLIC BLOOD PRESSURE: 120 MMHG

## 2021-02-01 VITALS — SYSTOLIC BLOOD PRESSURE: 103 MMHG | DIASTOLIC BLOOD PRESSURE: 66 MMHG

## 2021-02-01 VITALS — SYSTOLIC BLOOD PRESSURE: 107 MMHG | DIASTOLIC BLOOD PRESSURE: 71 MMHG

## 2021-02-01 VITALS — DIASTOLIC BLOOD PRESSURE: 86 MMHG | SYSTOLIC BLOOD PRESSURE: 149 MMHG

## 2021-02-01 VITALS — DIASTOLIC BLOOD PRESSURE: 61 MMHG | SYSTOLIC BLOOD PRESSURE: 100 MMHG

## 2021-02-01 VITALS — DIASTOLIC BLOOD PRESSURE: 64 MMHG | SYSTOLIC BLOOD PRESSURE: 109 MMHG

## 2021-02-01 VITALS — SYSTOLIC BLOOD PRESSURE: 87 MMHG | DIASTOLIC BLOOD PRESSURE: 59 MMHG

## 2021-02-01 VITALS — DIASTOLIC BLOOD PRESSURE: 79 MMHG | SYSTOLIC BLOOD PRESSURE: 130 MMHG

## 2021-02-01 VITALS — DIASTOLIC BLOOD PRESSURE: 70 MMHG | SYSTOLIC BLOOD PRESSURE: 119 MMHG

## 2021-02-01 VITALS — DIASTOLIC BLOOD PRESSURE: 76 MMHG | SYSTOLIC BLOOD PRESSURE: 119 MMHG

## 2021-02-01 VITALS — SYSTOLIC BLOOD PRESSURE: 112 MMHG | DIASTOLIC BLOOD PRESSURE: 67 MMHG

## 2021-02-01 VITALS — DIASTOLIC BLOOD PRESSURE: 79 MMHG | SYSTOLIC BLOOD PRESSURE: 128 MMHG

## 2021-02-01 VITALS — SYSTOLIC BLOOD PRESSURE: 117 MMHG | DIASTOLIC BLOOD PRESSURE: 67 MMHG

## 2021-02-01 VITALS — SYSTOLIC BLOOD PRESSURE: 124 MMHG | DIASTOLIC BLOOD PRESSURE: 79 MMHG

## 2021-02-01 VITALS — DIASTOLIC BLOOD PRESSURE: 74 MMHG | SYSTOLIC BLOOD PRESSURE: 133 MMHG

## 2021-02-01 VITALS — DIASTOLIC BLOOD PRESSURE: 75 MMHG | SYSTOLIC BLOOD PRESSURE: 123 MMHG

## 2021-02-01 VITALS — DIASTOLIC BLOOD PRESSURE: 73 MMHG | SYSTOLIC BLOOD PRESSURE: 114 MMHG

## 2021-02-01 VITALS — DIASTOLIC BLOOD PRESSURE: 60 MMHG | SYSTOLIC BLOOD PRESSURE: 99 MMHG

## 2021-02-01 VITALS — DIASTOLIC BLOOD PRESSURE: 63 MMHG | SYSTOLIC BLOOD PRESSURE: 107 MMHG

## 2021-02-01 VITALS — SYSTOLIC BLOOD PRESSURE: 105 MMHG | DIASTOLIC BLOOD PRESSURE: 65 MMHG

## 2021-02-01 VITALS — SYSTOLIC BLOOD PRESSURE: 124 MMHG | DIASTOLIC BLOOD PRESSURE: 78 MMHG

## 2021-02-01 VITALS — DIASTOLIC BLOOD PRESSURE: 56 MMHG | SYSTOLIC BLOOD PRESSURE: 89 MMHG

## 2021-02-01 VITALS — DIASTOLIC BLOOD PRESSURE: 64 MMHG | SYSTOLIC BLOOD PRESSURE: 101 MMHG

## 2021-02-01 VITALS — SYSTOLIC BLOOD PRESSURE: 96 MMHG | DIASTOLIC BLOOD PRESSURE: 59 MMHG

## 2021-02-01 VITALS — SYSTOLIC BLOOD PRESSURE: 168 MMHG | DIASTOLIC BLOOD PRESSURE: 87 MMHG

## 2021-02-01 VITALS — SYSTOLIC BLOOD PRESSURE: 102 MMHG | DIASTOLIC BLOOD PRESSURE: 63 MMHG

## 2021-02-01 VITALS — SYSTOLIC BLOOD PRESSURE: 112 MMHG | DIASTOLIC BLOOD PRESSURE: 66 MMHG

## 2021-02-01 VITALS — SYSTOLIC BLOOD PRESSURE: 100 MMHG | DIASTOLIC BLOOD PRESSURE: 61 MMHG

## 2021-02-01 VITALS — SYSTOLIC BLOOD PRESSURE: 105 MMHG | DIASTOLIC BLOOD PRESSURE: 63 MMHG

## 2021-02-01 VITALS — SYSTOLIC BLOOD PRESSURE: 92 MMHG | DIASTOLIC BLOOD PRESSURE: 59 MMHG

## 2021-02-01 VITALS — DIASTOLIC BLOOD PRESSURE: 67 MMHG | SYSTOLIC BLOOD PRESSURE: 106 MMHG

## 2021-02-01 VITALS — DIASTOLIC BLOOD PRESSURE: 75 MMHG | SYSTOLIC BLOOD PRESSURE: 125 MMHG

## 2021-02-01 VITALS — SYSTOLIC BLOOD PRESSURE: 104 MMHG | DIASTOLIC BLOOD PRESSURE: 63 MMHG

## 2021-02-01 LAB
ADD MANUAL DIFF: NO
ALBUMIN SERPL-MCNC: 1.1 G/DL (ref 3.4–5)
ALBUMIN/GLOB SERPL: 0.2 {RATIO} (ref 1–2.7)
ALP SERPL-CCNC: 381 U/L (ref 46–116)
ALT SERPL-CCNC: 39 U/L (ref 12–78)
ANION GAP SERPL CALC-SCNC: 9 MMOL/L (ref 5–15)
AST SERPL-CCNC: 42 U/L (ref 15–37)
BASOPHILS NFR BLD AUTO: 0.6 % (ref 0–2)
BILIRUB SERPL-MCNC: 0.5 MG/DL (ref 0.2–1)
BUN SERPL-MCNC: 75 MG/DL (ref 7–18)
CALCIUM SERPL-MCNC: 7.5 MG/DL (ref 8.5–10.1)
CHLORIDE SERPL-SCNC: 108 MMOL/L (ref 98–107)
CO2 SERPL-SCNC: 27 MMOL/L (ref 21–32)
CREAT SERPL-MCNC: 2.6 MG/DL (ref 0.55–1.3)
EOSINOPHIL NFR BLD AUTO: 0.5 % (ref 0–3)
ERYTHROCYTE [DISTWIDTH] IN BLOOD BY AUTOMATED COUNT: 18 % (ref 11.6–14.8)
GLOBULIN SER-MCNC: 4.5 G/DL
HCT VFR BLD CALC: 32.8 % (ref 42–52)
HGB BLD-MCNC: 9.8 G/DL (ref 14.2–18)
LYMPHOCYTES NFR BLD AUTO: 13.2 % (ref 20–45)
MCV RBC AUTO: 96 FL (ref 80–99)
MONOCYTES NFR BLD AUTO: 2.6 % (ref 1–10)
NEUTROPHILS NFR BLD AUTO: 83.1 % (ref 45–75)
PHOSPHATE SERPL-MCNC: 7.1 MG/DL (ref 2.5–4.9)
PLATELET # BLD: 181 K/UL (ref 150–450)
POTASSIUM SERPL-SCNC: 5.7 MMOL/L (ref 3.5–5.1)
RBC # BLD AUTO: 3.41 M/UL (ref 4.7–6.1)
SODIUM SERPL-SCNC: 143 MMOL/L (ref 136–145)
WBC # BLD AUTO: 9 K/UL (ref 4.8–10.8)

## 2021-02-01 RX ADMIN — INSULIN ASPART SCH UNITS: 100 INJECTION, SOLUTION INTRAVENOUS; SUBCUTANEOUS at 00:00

## 2021-02-01 RX ADMIN — MIDODRINE HYDROCHLORIDE SCH MG: 10 TABLET ORAL at 15:53

## 2021-02-01 RX ADMIN — CHLORHEXIDINE GLUCONATE SCH APPLIC: 213 SOLUTION TOPICAL at 21:06

## 2021-02-01 RX ADMIN — HYDROCORTISONE SODIUM SUCCINATE SCH MG: 100 INJECTION, POWDER, FOR SOLUTION INTRAMUSCULAR; INTRAVENOUS at 21:08

## 2021-02-01 RX ADMIN — ENOXAPARIN SODIUM SCH MG: 80 INJECTION SUBCUTANEOUS at 21:07

## 2021-02-01 RX ADMIN — INSULIN ASPART SCH UNITS: 100 INJECTION, SOLUTION INTRAVENOUS; SUBCUTANEOUS at 17:00

## 2021-02-01 RX ADMIN — Medication SCH MLS/HR: at 04:33

## 2021-02-01 RX ADMIN — Medication SCH MLS/HR: at 19:00

## 2021-02-01 RX ADMIN — DIGOXIN SCH MG: 0.12 TABLET ORAL at 09:16

## 2021-02-01 RX ADMIN — ENOXAPARIN SODIUM SCH MG: 80 INJECTION SUBCUTANEOUS at 09:17

## 2021-02-01 RX ADMIN — SODIUM CHLORIDE SCH MLS/HR: 900 INJECTION, SOLUTION INTRAVENOUS at 04:48

## 2021-02-01 RX ADMIN — SODIUM CHLORIDE SCH MLS/HR: 0.9 INJECTION INTRAVENOUS at 11:59

## 2021-02-01 RX ADMIN — PANTOPRAZOLE SODIUM SCH MG: 40 INJECTION, POWDER, FOR SOLUTION INTRAVENOUS at 21:07

## 2021-02-01 RX ADMIN — PANTOPRAZOLE SODIUM SCH MG: 40 INJECTION, POWDER, FOR SOLUTION INTRAVENOUS at 09:16

## 2021-02-01 RX ADMIN — INSULIN ASPART SCH UNITS: 100 INJECTION, SOLUTION INTRAVENOUS; SUBCUTANEOUS at 06:02

## 2021-02-01 RX ADMIN — INSULIN DETEMIR SCH UNITS: 100 INJECTION, SOLUTION SUBCUTANEOUS at 09:50

## 2021-02-01 RX ADMIN — SODIUM CHLORIDE SCH MLS/HR: 900 INJECTION, SOLUTION INTRAVENOUS at 15:59

## 2021-02-01 RX ADMIN — INSULIN ASPART SCH UNITS: 100 INJECTION, SOLUTION INTRAVENOUS; SUBCUTANEOUS at 12:10

## 2021-02-01 RX ADMIN — MIDODRINE HYDROCHLORIDE SCH MG: 10 TABLET ORAL at 21:07

## 2021-02-01 RX ADMIN — AMIODARONE HYDROCHLORIDE SCH MG: 200 TABLET ORAL at 09:16

## 2021-02-01 RX ADMIN — HYDROCORTISONE SODIUM SUCCINATE SCH MG: 100 INJECTION, POWDER, FOR SOLUTION INTRAMUSCULAR; INTRAVENOUS at 15:53

## 2021-02-01 NOTE — NUR
NURSE NOTES:

Patient resting with Fentanyl gtt infusing and Levophed gtt infusing and vitals are stable 
at this time. Patient has fever 101.2 cooling blanket turned on and patient has ice packs 
placed. RN will continue to monitor

## 2021-02-01 NOTE — NUR
NURSE NOTES:

Patient resting.  Patient vitals stable but febrile but temp is coming down. now 100.3. 
Patient LEvo and Fentanly bags changed and Tube feeding changed. Patient given 600ml free 
water flush and urine output has increased.  Patient repositioned.  RN will continue to 
monitor

## 2021-02-01 NOTE — NUR
Patient resting with little movement.  Patient urine output low.  Gave 600ml water flush. 
Will monitor for better urine output.  Fentanyl gtt infusing for pain control and sedation. 
Oral care completed.  Patient repositioned ad RN will continue to monitor.

## 2021-02-01 NOTE — NUR
NURSE NOTES:

Received report  from SUZI Lazar. Pt is sedated. Pt is SR to cardiac monitor HR 82. Pt is 
orally intubated with a 7.5 ETT noted 27 cm at the lip with the following settings: AC PC 25 
rate 32 FiO2 100 % Peep 12.  Right lateral chest tube noted with dry and intact dressing - 
water levels adjusted (20 cm H20 in suction control chamber and 2 cm H20 in water seal 
chamber) - CT connected to low, continuos wall suction per order. No air leak present. 
sanguineous drainage noted. OGT Nephro at 35 cc/hr no residuals. Abdomen is round, soft, and 
non-tender  w/ active bowel sounds to all quadrants. rectal tube noted draining . F/C noted 
draining yellow urine . On P200 mattress for wound management. Pt has a MAYO PICC with dry 
and intact dressing running Levophed gtt at mcg/min and fentanyl gtt at 180 mcg/hr. Bed in 
lowest position, alarm on, side rails up x 3, call light within reach. Covid +, airborne 
precaution maintained and observed. Will continue to monitor.

## 2021-02-01 NOTE — GENERAL PROGRESS NOTE
Subjective


ROS Limited/Unobtainable:  Yes


Allergies:  


Coded Allergies:  


     No Known Allergies (Unverified , 6/11/19)


Subjective


events noted - interval notes reviewed 


glucose values are improving 


in ICU - intubated 











Item Value  Date Time


 


Bedside Blood Glucose 185 mg/dl H 2/1/21 0602


 


Bedside Blood Glucose 183 mg/dl H 2/1/21 0000


 


Bedside Blood Glucose 253 mg/dl H 1/31/21 2108


 


Bedside Blood Glucose 285 mg/dl H 1/31/21 1800


 


Bedside Blood Glucose 301 mg/dl H 1/31/21 1200


 


Bedside Blood Glucose 246 mg/dl H 1/31/21 0900











Objective





Last 24 Hour Vital Signs








  Date Time  Temp Pulse Resp B/P (MAP) Pulse Ox O2 Delivery O2 Flow Rate FiO2


 


2/1/21 06:01   30 119/72  Mechanical Ventilator  100


 


2/1/21 06:01    119/72    


 


2/1/21 06:00  102 33 119/72 (88) 83   


 


2/1/21 05:45  103 29 116/73 (87) 82   


 


2/1/21 05:30  104 28 121/68 (85) 85   


 


2/1/21 05:15  105 26 114/68 (83) 86   


 


2/1/21 05:01   30 116/72  Mechanical Ventilator  100


 


2/1/21 05:01    117/67    


 


2/1/21 05:00  105 33 117/67 (84) 87   


 


2/1/21 04:54        100


 


2/1/21 04:48    110/73    


 


2/1/21 04:45  105 27 106/67 (80) 85   


 


2/1/21 04:45  105 27 106/67 (80) 85   


 


2/1/21 04:33   30 112/66  Mechanical Ventilator  100


 


2/1/21 04:30  107 28 112/66 (81) 88   


 


2/1/21 04:15  108 23 112/67 (82) 88   


 


2/1/21 04:00  95      


 


2/1/21 04:00 101.5 107 28 109/64 (79) 89   


 


2/1/21 04:00      Mechanical Ventilator  


 


2/1/21 03:45  109 26 107/63 (78) 86   


 


2/1/21 03:30  108 26 101/64 (76) 85   


 


2/1/21 03:28  108 33     100


 


2/1/21 03:15  107 26 103/63 (76) 85   


 


2/1/21 03:00  106 24 105/61 (76) 85   


 


2/1/21 03:00   31 103/63  Mechanical Ventilator  100


 


2/1/21 02:45  107 27 107/63 (78) 86   


 


2/1/21 02:30  106 27 102/63 (76) 85   


 


2/1/21 02:15  106 25 104/61 (75) 87   


 


2/1/21 02:00   30 104/61  Mechanical Ventilator  100


 


2/1/21 02:00  105 30 105/65 (78) 87   


 


2/1/21 01:45  105 28 92/59 (70) 86   


 


2/1/21 01:30  104 26 99/60 (73) 89   


 


2/1/21 01:15  104 26 100/60 (73) 87   


 


2/1/21 01:15  104 26 100/60 (73) 87   


 


2/1/21 01:00   30 100/60  Mechanical Ventilator  100


 


2/1/21 01:00  103 29 102/63 (76) 89   


 


2/1/21 00:45  102 31 100/57 (71) 89   


 


2/1/21 00:30  102 26 103/63 (76) 90   


 


2/1/21 00:15  101 30 100/61 (74) 90   


 


2/1/21 00:00  103      


 


2/1/21 00:00  100 32 100/61 (74) 90   


 


2/1/21 00:00      Mechanical Ventilator  


 


2/1/21 00:00   30 103/61  Mechanical Ventilator  100


 


1/31/21 23:30  99 32 99/61 (74) 92   


 


1/31/21 23:28  99 33     100


 


1/31/21 23:15  99 32 103/61 (75) 91   


 


1/31/21 23:00  98 28 98/61 (73) 91   


 


1/31/21 23:00   30 103/61  Mechanical Ventilator  100


 


1/31/21 22:45  97 31 102/60 (74) 92   


 


1/31/21 22:30  97 30 90/61 (71) 93   


 


1/31/21 22:15  96 31 100/61 (74) 93   


 


1/31/21 22:15  96 31 100/61 (74) 93   


 


1/31/21 22:00   30 100/61  Mechanical Ventilator  100


 


1/31/21 22:00  96 25 107/66 (80) 93   


 


1/31/21 21:45  96 23 107/67 (80) 94   


 


1/31/21 21:30  96 19 104/66 (79) 93   


 


1/31/21 21:15  96 20 100/67 (78) 94   


 


1/31/21 21:00  95 18 105/63 (77) 94   


 


1/31/21 21:00   30 100/67  Mechanical Ventilator  100


 


1/31/21 20:45  95 20 103/68 (80) 94   


 


1/31/21 20:30  95 33     100


 


1/31/21 20:30 97.1 94 15 105/67 (80) 94   


 


1/31/21 20:15  94 9 111/69 (83) 95   


 


1/31/21 20:15  94 9 111/69 (83) 95   


 


1/31/21 20:00      Mechanical Ventilator  


 


1/31/21 20:00  95 8 106/71 (83) 94   


 


1/31/21 20:00        100


 


1/31/21 20:00   30 111/69  Mechanical Ventilator  100


 


1/31/21 20:00  108      


 


1/31/21 19:45  95 9 110/71 (84) 95   


 


1/31/21 19:32    106/66    


 


1/31/21 19:30  94 10 106/66 (79) 95   


 


1/31/21 19:15  94 9 111/69 (83) 94   


 


1/31/21 19:00  94 9 112/71 (85) 93   


 


1/31/21 19:00   32 111/69  Mechanical Ventilator  100


 


1/31/21 18:45  92 10 115/72 (86) 92   


 


1/31/21 18:35  87 16 75/51 (59) 89   


 


1/31/21 18:33  87 16 75/51 (59) 89   


 


1/31/21 18:30  87 19 76/54 (61) 90   


 


1/31/21 18:15  94 10 104/63 (77) 94   


 


1/31/21 18:03  96 18 128/79 (95) 92   


 


1/31/21 18:00   32 128/79  Mechanical Ventilator  100


 


1/31/21 18:00  70 22  81   


 


1/31/21 17:45  97 29 119/72 (88) 93   


 


1/31/21 17:30  97 27 116/73 (87) 94   


 


1/31/21 17:18  98 34     100


 


1/31/21 17:15  98 26 114/72 (86) 93   


 


1/31/21 17:00  98 26 110/72 (85) 93   


 


1/31/21 17:00   25 114/72  Mechanical Ventilator  100


 


1/31/21 16:45  99 23 116/70 (85) 92   


 


1/31/21 16:30  100 26 117/69 (85) 92   


 


1/31/21 16:15  101 25 117/73 (88) 91   


 


1/31/21 16:00 96.7 101 26 117/72 (87) 91   


 


1/31/21 16:00        100


 


1/31/21 16:00   23 117/73  Mechanical Ventilator  100


 


1/31/21 16:00      Mechanical Ventilator  


 


1/31/21 15:45  101 24 115/70 (85) 91   


 


1/31/21 15:38  94 35     100


 


1/31/21 15:30  102 24 113/72 (86) 91   


 


1/31/21 15:15  103 25 114/70 (85) 90   


 


1/31/21 15:00   24 113/72  Mechanical Ventilator  100


 


1/31/21 15:00  104 24 113/68 (83) 90   


 


1/31/21 14:45  105 21 111/66 (81) 88   


 


1/31/21 14:30  106 24 109/68 (82) 87   


 


1/31/21 14:15  107 26 109/63 (78) 87   


 


1/31/21 14:15   16 110/63  Mechanical Ventilator  100


 


1/31/21 14:00  108 15 110/63 (79) 87   


 


1/31/21 14:00   23 109/63  Mechanical Ventilator  100


 


1/31/21 13:45  108 21 109/65 (80) 87   


 


1/31/21 13:41  113 33     100


 


1/31/21 13:30  109 22 98/64 (75) 83   


 


1/31/21 13:25   24 109/68  Mechanical Ventilator  100


 


1/31/21 13:15  109 19 119/66 (83) 86   


 


1/31/21 13:00  109 22 95/63 (74) 70   


 


1/31/21 13:00   20 119/66  Mechanical Ventilator  100


 


1/31/21 12:45  111 21 107/71 (83) 81   


 


1/31/21 12:30  111 20 113/70 (84) 85   


 


1/31/21 12:15  112 17 110/66 (81) 84   


 


1/31/21 12:00  109 21 117/67 (84) 87   


 


1/31/21 12:00   21 110/66  Mechanical Ventilator  100


 


1/31/21 12:00        100


 


1/31/21 12:00      Mechanical Ventilator  


 


1/31/21 11:45  108 25 117/77 (90) 87   


 


1/31/21 11:45   20 117/67  Mechanical Ventilator  100


 


1/31/21 11:43  112 36     100


 


1/31/21 11:35  111 25 136/78 (97) 86   


 


1/31/21 11:30  104 26  65   


 


1/31/21 11:29  94 30  67   


 


1/31/21 11:28  83 25  66   


 


1/31/21 11:27  75 23  68   


 


1/31/21 11:26  71 23 57/36 (43) 68   


 


1/31/21 11:25  64 24  73   


 


1/31/21 11:24  42 21  70   


 


1/31/21 11:23  38 21  62   


 


1/31/21 11:22  45 21  60   


 


1/31/21 11:21  67 25 39/28 (32) 60   


 


1/31/21 11:20  85 26  63   


 


1/31/21 11:19  95 25  63   


 


1/31/21 11:18  97 27  64   


 


1/31/21 11:17  95 28  69   


 


1/31/21 11:16  89 23  77   


 


1/31/21 11:15  79 26 64/45 (51) 82   


 


1/31/21 11:14  63 20  80   


 


1/31/21 11:13  44 21     


 


1/31/21 11:12  45 17  66   


 


1/31/21 11:11  62 21  72   


 


1/31/21 11:10  88 22  72   


 


1/31/21 11:00   23 64/45  Mechanical Ventilator  100


 


1/31/21 11:00  106 20 86/60 (69) 74   


 


1/31/21 10:55  111 22 85/57 (66) 78   


 


1/31/21 10:48    64/41    


 


1/31/21 10:48    98/64    


 


1/31/21 10:45  107 22 92/60 (71) 74   


 


1/31/21 10:33   23 119/75  Mechanical Ventilator  100


 


1/31/21 10:30  111 19 119/77 (91) 71   


 


1/31/21 10:29  108 23 118/75 (89) 67   


 


1/31/21 10:25  85 24 77/53 (61) 69   


 


1/31/21 10:21  77 22 49/38 (42) 76   


 


1/31/21 10:20  74 21 54/38 (43) 76   


 


1/31/21 10:20    49/38    


 


1/31/21 10:18  85 26 54/34 (41) 73   


 


1/31/21 10:15  92 19 64/41 (49) 76   


 


1/31/21 10:00  97 22 139/66 (90) 87   


 


1/31/21 10:00   21 64/41  Mechanical Ventilator  100


 


1/31/21 09:45  95 19 104/58 (73) 58   


 


1/31/21 09:45   22 139/66  Mechanical Ventilator  100


 


1/31/21 09:39  111 34     100


 


1/31/21 09:30   21 104/58  Mechanical Ventilator  100


 


1/31/21 09:30  94 21 123/70 (87) 88   


 


1/31/21 09:15  94 21 108/68 (81) 88   


 


1/31/21 09:15   21 123/71  Mechanical Ventilator  100


 


1/31/21 09:00  94 23 113/70 (84) 87   


 


1/31/21 09:00   22 108/68  Mechanical Ventilator  100


 


1/31/21 09:00  86      


 


1/31/21 08:45  94 23 116/72 (87) 88   


 


1/31/21 08:30  94 22 121/76 (91) 88   


 


1/31/21 08:15  94 22 128/70 (89) 87   


 


1/31/21 08:00        100


 


1/31/21 08:00      Mechanical Ventilator  


 


1/31/21 08:00   22 128/70  Mechanical Ventilator  100


 


1/31/21 08:00 98.8 94 21 120/73 (89) 88   


 


1/31/21 07:45  95 22 113/72 (86) 87   


 


1/31/21 07:39  95 25     100


 


1/31/21 07:30  94 22 118/72 (87) 87   


 


1/31/21 07:15  96 24 116/70 (85) 82   


 


1/31/21 07:00  94 20 115/73 (87) 85   


 


1/31/21 07:00   22 116/70  Mechanical Ventilator  100


 


1/31/21 06:45  95 21 115/73 (87) 84   

















Intake and Output  


 


 1/31/21 2/1/21





 19:00 07:00


 


Intake Total 1733.95 ml 1193.62 ml


 


Output Total 1570 ml 410 ml


 


Balance 163.95 ml 783.62 ml


 


  


 


Free Water 90 ml 


 


IV Total 983.95 ml 588.62 ml


 


Tube Feeding 660 ml 605 ml


 


Output Urine Total 1320 ml 410 ml


 


Stool Total 0 ml 0 ml


 


Chest Tube Drainage Total 250 ml 








Laboratory Tests


1/31/21 06:55: 


Phosphorus Level 5.4H, Magnesium Level 2.1


1/31/21 09:32: POC Whole Blood Glucose [Pending]


1/31/21 12:35: 


Arterial Blood pH 7.170*L, Arterial Blood Partial Pressure CO2 82.5*H, Arterial 

Blood Partial Pressure O2 50.7L, Arterial Blood HCO3 29.4H, Arterial Blood 

Oxygen Saturation 78.6*L, Arterial Blood Base Excess -0.5, Abelardo Test Positive


1/31/21 17:42: POC Whole Blood Glucose [Pending]


Height (Feet):  5


Height (Inches):  7.00


Weight (Pounds):  198


Objective





Current Medications








 Medications


  (Trade)  Dose


 Ordered  Sig/Julisa


 Route


 PRN Reason  Start Time


 Stop Time Status Last Admin


Dose Admin


 


 Acetaminophen


  (Tylenol)  650 mg  Q4H  PRN


 GT


 Temp >100.5  1/15/21 17:15


 2/14/21 17:14  1/29/21 06:59





 


 Amiodarone HCl


  (Cordarone)  200 mg  DAILY


 NG


   1/26/21 09:00


 4/19/21 20:59  1/31/21 09:00





 


 Cefepime HCl 1 gm/


 Dextrose  55 ml @ 


 110 mls/hr  Q24H


 IVPB


   1/30/21 11:00


 2/6/21 10:59  1/31/21 11:45





 


 Chlorhexidine


 Gluconate


  (Vanessa-Hex 2%)  1 applic  DAILY@2000


 TOPIC


   1/19/21 20:00


 4/19/21 19:59  1/31/21 20:00





 


 Dextrose


  (Dextrose 50%)  25 ml  Q30M  PRN


 IV


 Hypoglycemia  1/9/21 13:30


 4/9/21 13:29   





 


 Dextrose


  (Dextrose 50%)  50 ml  Q30M  PRN


 IV


 Hypoglycemia  1/9/21 13:30


 4/9/21 13:29   





 


 Digoxin


  (Lanoxin)  0.125 mg  DAILY


 NG


   1/21/21 09:00


 4/21/21 08:59  1/31/21 09:00





 


 Docusate Sodium


  (Colace)  100 mg  TIDPRN  PRN


 GT


 Constipation  1/12/21 01:15


 2/11/21 01:14  1/12/21 01:42





 


 Dopamine HCl/


 Dextrose  250 ml @ 0


 mls/hr  Q24H  PRN


 IV


 For hypotension  1/31/21 11:45


 2/3/21 11:45   





 


 Enoxaparin Sodium


  (Lovenox)  80 mg  EVERY 12  HOURS


 SUBQ


   1/2/21 21:00


 4/2/21 20:59  1/31/21 21:08





 


 Fentanyl Citrate  250 ml @ 1


 mls/hr  Q24H


 IV


   2/1/21 04:15


 2/3/21 04:14  2/1/21 04:33





 


 Insulin Aspart


  (NovoLOG)    Q6HR


 SUBQ


   1/14/21 12:00


 4/9/21 17:59  2/1/21 06:02





 


 Insulin Detemir


  (Levemir)  14 units  Q12HR


 SUBQ


   1/31/21 21:00


 4/12/21 08:59  1/31/21 21:08





 


 Norepinephrine


 Bitartrate 16 mg/


 Dextrose  516 ml @ 0


 mls/hr  Q24H


 IV


   1/31/21 10:30


 2/3/21 10:29  2/1/21 04:48





 


 Pantoprazole


  (Protonix)  40 mg  DAILY


 IVP


   1/14/21 09:00


 2/13/21 08:59  1/31/21 09:00





 


 Phenylephrine HCl


 100 mg/Dextrose  250 ml @ 0


 mls/hr  Q24H  PRN


 IV


 For hypotension  1/31/21 12:30


 2/3/21 12:29   





 


 Quetiapine


 Fumarate


  (SEROqueL)  50 mg  Q12HR


 ORAL


   1/4/21 21:00


 2/18/21 20:59  1/31/21 21:07





 


 Sodium Chloride  500 ml @ 


 999 mls/hr  Q31M PRN


 IV


 For hypotension  1/15/21 18:30


 2/14/21 18:29   














Assessment/Plan


Problem List:  


(1) Diabetes mellitus out of control


ICD Codes:  E11.65 - Type 2 diabetes mellitus with hyperglycemia


SNOMED:  31835412, [id number]


(2) Pneumonia due to COVID-19 virus


ICD Codes:  U07.1 - COVID-19; J12.82 - Pneumonia due to coronavirus disease 2019


SNOMED:  [id number]


Assessment/Plan:


continue Levemir  14 units bid 


continue Novolog sliding scale every 6 hours 


hypoglycemia protocol in order











Nick Mcmahon MD                  Feb 1, 2021 06:40

## 2021-02-01 NOTE — NUR
NURSE NOTES:WOUND CARE FOLLOW-UP NOTES:Attempted to re-evaluate sacral Pressure injury but 
only limited view of sacral wound appreciated secondary to pt not stable enough.

## 2021-02-01 NOTE — SURGERY PROGRESS NOTE
Surgery Progress Note


Subjective


Procedure Performed


Right tube thoracostomy chest tube insertion


Additional Comments


leukocytosis


on vent


ill appearing


ct stable





Objective





Last 24 Hour Vital Signs








  Date Time  Temp Pulse Resp B/P (MAP) Pulse Ox O2 Delivery O2 Flow Rate FiO2


 


2/1/21 13:00  90 25 102/63 (76) 89   


 


2/1/21 13:00   32 102/63  Mechanical Ventilator  100


 


2/1/21 13:00    102/63    


 


2/1/21 12:00  91      


 


2/1/21 12:00      Mechanical Ventilator  


 


2/1/21 12:00   32 101/67  Mechanical Ventilator  100


 


2/1/21 12:00    101/67    


 


2/1/21 12:00 98.2 91 14 104/63 (77) 89   


 


2/1/21 11:18        100


 


2/1/21 11:00   32 96/59  Mechanical Ventilator  100


 


2/1/21 11:00    96/59    


 


2/1/21 11:00  93 12 96/59 (71) 89   


 


2/1/21 10:00  93 22 119/70 (86) 87   


 


2/1/21 10:00   32 119/70  Mechanical Ventilator  100


 


2/1/21 10:00    119/70    


 


2/1/21 09:20  95 33     100


 


2/1/21 09:16  96      


 


2/1/21 09:00  97 31 128/79 (95) 87   


 


2/1/21 09:00   32 128/79  Mechanical Ventilator  100


 


2/1/21 09:00    128/79    


 


2/1/21 08:00      Mechanical Ventilator  


 


2/1/21 08:00  100      


 


2/1/21 08:00   32 120/78  Mechanical Ventilator  100


 


2/1/21 08:00    120/78    


 


2/1/21 08:00        100


 


2/1/21 08:00 97.8 100 26 120/78 (92) 87   


 


2/1/21 07:30  100 22 119/76 (90) 89   


 


2/1/21 07:20  102 34     100


 


2/1/21 07:15  101 26 124/79 (94) 86   


 


2/1/21 07:00   32 133/74  Mechanical Ventilator  100


 


2/1/21 07:00    133/74    


 


2/1/21 07:00  103 25 133/74 (93) 76   


 


2/1/21 06:45  111 26 149/86 (107) 61   


 


2/1/21 06:30  98 28 87/59 (68) 82   


 


2/1/21 06:15  100 32 120/74 (89) 82   


 


2/1/21 06:01   30 119/72  Mechanical Ventilator  100


 


2/1/21 06:01    119/72    


 


2/1/21 06:00  102 33 119/72 (88) 83   


 


2/1/21 06:00  102 33 119/72 (88) 83   


 


2/1/21 05:45  103 29 116/73 (87) 82   


 


2/1/21 05:30  104 28 121/68 (85) 85   


 


2/1/21 05:15  105 26 114/68 (83) 86   


 


2/1/21 05:01   30 116/72  Mechanical Ventilator  100


 


2/1/21 05:01    117/67    


 


2/1/21 05:00  105 33 117/67 (84) 87   


 


2/1/21 04:54        100


 


2/1/21 04:48    110/73    


 


2/1/21 04:45  105 27 106/67 (80) 85   


 


2/1/21 04:45  105 27 106/67 (80) 85   


 


2/1/21 04:33   30 112/66  Mechanical Ventilator  100


 


2/1/21 04:30  107 28 112/66 (81) 88   


 


2/1/21 04:15  108 23 112/67 (82) 88   


 


2/1/21 04:00  95      


 


2/1/21 04:00 101.5 107 28 109/64 (79) 89   


 


2/1/21 04:00      Mechanical Ventilator  


 


2/1/21 03:45  109 26 107/63 (78) 86   


 


2/1/21 03:30  108 26 101/64 (76) 85   


 


2/1/21 03:28  108 33     100


 


2/1/21 03:15  107 26 103/63 (76) 85   


 


2/1/21 03:00  106 24 105/61 (76) 85   


 


2/1/21 03:00   31 103/63  Mechanical Ventilator  100


 


2/1/21 02:45  107 27 107/63 (78) 86   


 


2/1/21 02:30  106 27 102/63 (76) 85   


 


2/1/21 02:15  106 25 104/61 (75) 87   


 


2/1/21 02:00   30 104/61  Mechanical Ventilator  100


 


2/1/21 02:00  105 30 105/65 (78) 87   


 


2/1/21 01:45  105 28 92/59 (70) 86   


 


2/1/21 01:30  104 26 99/60 (73) 89   


 


2/1/21 01:15  104 26 100/60 (73) 87   


 


2/1/21 01:15  104 26 100/60 (73) 87   


 


2/1/21 01:00   30 100/60  Mechanical Ventilator  100


 


2/1/21 01:00  103 29 102/63 (76) 89   


 


2/1/21 00:45  102 31 100/57 (71) 89   


 


2/1/21 00:30  102 26 103/63 (76) 90   


 


2/1/21 00:15  101 30 100/61 (74) 90   


 


2/1/21 00:00  103      


 


2/1/21 00:00  100 32 100/61 (74) 90   


 


2/1/21 00:00      Mechanical Ventilator  


 


2/1/21 00:00   30 103/61  Mechanical Ventilator  100


 


1/31/21 23:30  99 32 99/61 (74) 92   


 


1/31/21 23:28  99 33     100


 


1/31/21 23:15  99 32 103/61 (75) 91   


 


1/31/21 23:00  98 28 98/61 (73) 91   


 


1/31/21 23:00   30 103/61  Mechanical Ventilator  100


 


1/31/21 22:45  97 31 102/60 (74) 92   


 


1/31/21 22:30  97 30 90/61 (71) 93   


 


1/31/21 22:15  96 31 100/61 (74) 93   


 


1/31/21 22:15  96 31 100/61 (74) 93   


 


1/31/21 22:00   30 100/61  Mechanical Ventilator  100


 


1/31/21 22:00  96 25 107/66 (80) 93   


 


1/31/21 21:45  96 23 107/67 (80) 94   


 


1/31/21 21:30  96 19 104/66 (79) 93   


 


1/31/21 21:15  96 20 100/67 (78) 94   


 


1/31/21 21:00  95 18 105/63 (77) 94   


 


1/31/21 21:00   30 100/67  Mechanical Ventilator  100


 


1/31/21 20:45  95 20 103/68 (80) 94   


 


1/31/21 20:30  95 33     100


 


1/31/21 20:30 97.1 94 15 105/67 (80) 94   


 


1/31/21 20:15  94 9 111/69 (83) 95   


 


1/31/21 20:15  94 9 111/69 (83) 95   


 


1/31/21 20:00      Mechanical Ventilator  


 


1/31/21 20:00  95 8 106/71 (83) 94   


 


1/31/21 20:00        100


 


1/31/21 20:00   30 111/69  Mechanical Ventilator  100


 


1/31/21 20:00  108      


 


1/31/21 19:45  95 9 110/71 (84) 95   


 


1/31/21 19:32    106/66    


 


1/31/21 19:30  94 10 106/66 (79) 95   


 


1/31/21 19:15  94 9 111/69 (83) 94   


 


1/31/21 19:00  94 9 112/71 (85) 93   


 


1/31/21 19:00   32 111/69  Mechanical Ventilator  100


 


1/31/21 18:45  92 10 115/72 (86) 92   


 


1/31/21 18:35  87 16 75/51 (59) 89   


 


1/31/21 18:33  87 16 75/51 (59) 89   


 


1/31/21 18:30  87 19 76/54 (61) 90   


 


1/31/21 18:15  94 10 104/63 (77) 94   


 


1/31/21 18:03  96 18 128/79 (95) 92   


 


1/31/21 18:00   32 128/79  Mechanical Ventilator  100


 


1/31/21 18:00  70 22  81   


 


1/31/21 17:45  97 29 119/72 (88) 93   


 


1/31/21 17:30  97 27 116/73 (87) 94   


 


1/31/21 17:18  98 34     100


 


1/31/21 17:15  98 26 114/72 (86) 93   


 


1/31/21 17:00  98 26 110/72 (85) 93   


 


1/31/21 17:00   25 114/72  Mechanical Ventilator  100


 


1/31/21 16:45  99 23 116/70 (85) 92   


 


1/31/21 16:30  100 26 117/69 (85) 92   


 


1/31/21 16:15  101 25 117/73 (88) 91   


 


1/31/21 16:00 96.7 101 26 117/72 (87) 91   


 


1/31/21 16:00        100


 


1/31/21 16:00   23 117/73  Mechanical Ventilator  100


 


1/31/21 16:00      Mechanical Ventilator  


 


1/31/21 15:45  101 24 115/70 (85) 91   


 


1/31/21 15:38  94 35     100


 


1/31/21 15:30  102 24 113/72 (86) 91   


 


1/31/21 15:15  103 25 114/70 (85) 90   


 


1/31/21 15:00   24 113/72  Mechanical Ventilator  100


 


1/31/21 15:00  104 24 113/68 (83) 90   


 


1/31/21 14:45  105 21 111/66 (81) 88   


 


1/31/21 14:30  106 24 109/68 (82) 87   


 


1/31/21 14:15  107 26 109/63 (78) 87   


 


1/31/21 14:15   16 110/63  Mechanical Ventilator  100








I&O











Intake and Output  


 


 1/31/21 2/1/21





 19:00 07:00


 


Intake Total 1733.95 ml 1320.80 ml


 


Output Total 1570 ml 440 ml


 


Balance 163.95 ml 880.80 ml


 


  


 


Free Water 90 ml 


 


IV Total 983.95 ml 660.80 ml


 


Tube Feeding 660 ml 660 ml


 


Output Urine Total 1320 ml 440 ml


 


Stool Total 0 ml 0 ml


 


Chest Tube Drainage Total 250 ml 








Dressing:  saturated


Cardiovascular:  RSR


Respiratory:  decreased breath sounds


Abdomen:  non-tender, present bowel sounds, non-distended


Extremities:  no tenderness, no cyanosis





Laboratory Tests








Test


 1/31/21


17:42 2/1/21


09:23


 


POC Whole Blood Glucose Pending   


 


Arterial Blood pH


 


 7.116


(7.350-7.450)


 


Arterial Blood Partial


Pressure CO2 


 90.8 mmHg


(35.0-45.0)  *H


 


Arterial Blood Partial


Pressure O2 


 56.3 mmHg


(75.0-100.0)  L


 


Arterial Blood HCO3


 


 28.6 mmol/L


(22.0-26.0)  H


 


Arterial Blood Oxygen


Saturation 


 85.3 %


()  *L


 


Arterial Blood Base Excess  -2.3 (-2-2)  L


 


Abelardo Test  Positive  











Plan


Problems:  


(1) Pneumonia due to COVID-19 virus


Assessment & Plan:  Interim endotracheal intubation, endotracheal tube tip in 

good position


approximately 4 cm above the tito. There are extensive bilateral infiltrates, 

which


are markedly increased from the prior study. Pleural spaces are probably clear, 

not


well demonstrated


Markedly increased and now extensive bilateral infiltrates 


cont as per pulm and iID





(2) Hypoxia


Assessment & Plan:  patient developing DTI. in current condition, critically ill

and declining potential inevitable decline 


all care precautions taken and will cont to monitor and assist with improvement 





(3) Abdominal pain


Assessment & Plan:  65M abd pain, septic, covid, intubated ons upport


currently abd exam benign but limited given condition


line [facility] noted


okay for meds and feeds


nutritional optimization 


DAILY ESTIMATED NEEDS:


Needs based on Critical Care/ 73kg abw 


22-28  kcals/kg 


8780-6813  total kcals


1.2-2  g protein/kg


  g total protein 


25-30  mL/kg


7451-4552  total fluid mLs





NUTRITION DIAGNOSIS:


Swallowing difficulty R/T respiratory failure as evidenced by pt now


orally intubated, OGT in place, NPO





 


CURRENT TF:NPO 





 





ENTERAL NUTRITION RECOMMENDATIONS:


Vital AF 1.2 @ 60ml/hr x 24 hrs  to provide 1440ml, 1728kcal, 116g prot, 1167ml 

free water 





* As medically appropriate, initiate critical care and carb controlled TF 

formula of Vital AF 1.2


* Initiate @ 20ml/hr x 6hrs, advance 10ml q 4-6 hrs as tolerated to goal rate


* HOB over 30 degrees/ water flush per MD


* Does not exceed est kcal needs w/ Propofol running @ 8.083ml/hr x 24 hrs 

(provides 213 lipid kcal)





 





ADDITIONAL RECOMMENDATIONS:


* Calibrated bedscale wt 


* Monitor BGs closely w/ Decadron and TF, need for long acting insulin 


* Monitor lytes, replete as needed  


* Monitor Propofol rate, need to adjust TF rate  





(4) Acute metabolic encephalopathy


(5) Pneumothorax on right


Assessment & Plan:  right ptx


chest tube placed


decreased air


stable ptx


cont tube to suction


Endotracheal tube tip projects at the level of the thoracic inlet.


Orogastric tube tip projects at the level of the gastric fundus. Again 

demonstrated


is extensive subcutaneous emphysema, which appears somewhat worse on the left. 

Right


chest tube remains in place. Small right medial and apical pneumothorax are 

present,


slightly more conspicuous than on the previous exam, still small, somewhat 

difficult


to identify due to overlying subcutaneous emphysema. There is probably a tiny 

left


apical pneumothorax as well. Previously demonstrated pneumomediastinum has 

improved


considerably although still present. Bilateral infiltrates appear worse on the 

right.


 


Impression: Equivocally slightly increased but still small right pneumothorax.


 


Suspect tiny left apical pneumothorax


 


Improving pneumomediastinum


 


Worsening subcutaneous emphysema


 


Worsening right and stable left lung infiltrates 





 Right chest tube remains. There is still a small apical pneumothorax. Tiny


left apical pneumothorax persists, unchanged. There is decreased 

pneumomediastinum.


Subcutaneous gas has decreased as well. Bilateral infiltrates are unchanged.


Endotracheal tube remains at the level of the thoracic inlet. Orogastric tube is


again demonstrated, tip not well-visualized but probably stable in position


 


Impression: Stable tiny bilateral apical pneumothoraces


 


Unchanged bilateral infiltrates


 


Decreased pneumomediastinum and subcutaneous emphysema














Norberto Vazquez                 Feb 1, 2021 14:06

## 2021-02-01 NOTE — NUR
NURSE HAND-OFF REPORT: 



Latest Vital Signs: Temperature 99.0 , Pulse 89 , B/P 105 /63 , Respiratory Rate 32 , O2 SAT 
93 , Mechanical Ventilator, FiO2 100% .  

Vital Sign Comment: levophed titrated down per protocol (see VS)



EKG Rhythm: Sinus Rhythm

Rhythm change?: N

MD Notified?: Pt seen by Dr Ariel miner MD Response: n/a



Latest Singh Fall Score: 50  

Fall Risk: High Risk 

Safety Measures: Call light Within Reach, Bed Alarm Zone 1, Side Rails Side Rails x3, Bed 
position Low and Locked.

Fall Precautions: 

Yellow Socks

Yellow Gown

Door Sign

Patient Fall Education



Report given to SUZI Still.

## 2021-02-01 NOTE — HEMATOLOGY/ONC PROGRESS NOTE
Assessment/Plan


Assessment/Plan


Leukocytosis 2/2 covid pna wbc 15->14-->17-->9.6-->13


Anemia 2/2 chronic disease, hgb 11-->10.4-->9.8


Thrombocytopenia with plt 122


Hypercoag disorder now on lovenox sq


Ac resp distress on ventilator


Pneumomediastinum


etoh abused


agitated -halodol


vent





Vent


Pressors


cont iv abx and iv decadrone


pulmonary and gi on consult


smear is noted


pneumomediastinum per pulm


dw charge nurse


lovenox sq





Subjective


HEENT:  Denies: no symptoms, eye pain, blurred vision, tearing, double vision, 

ear pain, ear discharge, nose pain, nose congestion, throat pain, throat 

swelling, mouth pain, mouth swelling, other


Cardiovascular:  Denies: no symptoms, chest pain, edema, irregular heart rate, 

lightheadedness, palpitations, syncope, other


Genitourinary:  Denies: no symptoms, burning, discharge, frequency, flank pain, 

hematuria, incontinence, pain, urgency, other


Neurologic/Psychiatric:  Denies: no symptoms, anxiety, depressed, emotional 

problems, headache, numbness, paresthesia, pre-existing deficit, seizure, 

tingling, tremors, weakness, other


Hematologic/Lymphatic:  Denies: no symptoms, anemia, easy bleeding, easy 

bruising, adenopathy, other


Allergies:  


Coded Allergies:  


     No Known Allergies (Unverified , 6/11/19)


Subjective


1/10 on ventilator 60% fio2, on sediation, unresponsive, in icu


1/14 on vent, icu, wbc elev, no bleeding, unresponsive


1/15 on vent, with cash, icu, no bleeding, unresponsive


1/17 on vent, elmer rn, no major changes, icu, nv


1/18 nv, labs reviewd, hr is elev, with afib, is onlovenox sq


1/19 remains on vent, sedated with wrist restraints, icu


1/20 on vent, with hematuria, ngt, in icu, labs reviewed


1/21 remains on vent, settings have been adjusted as per icu care


1/22 remains on vent, icu, on levo 6mcg, off versed, labs noted, elmer rn


1/25 remains onv ent, in icu, with restraints, on levo gtt as well


1/26 nv, suctioned, rectal tube, in icu, with restraints, no bleeding


1/27 nv, remains on vent, rectal tube, labs reviewed, elmer rn


1/28 nv, icu, on vent/ bipap, labs noted, no bleeding, meds reviewed


1/29 nv, on vent, overnight no changes, meds reviewed, labs noted


1/30: code blue last night.


1/31 labs are noted, nv, on vent, meds reviewed, no new change, elmer rn, on 

pressors


2/1 vent, labs have been ordered, nv, meds reviewed, elmer rn





Objective


Objective





Current Medications








 Medications


  (Trade)  Dose


 Ordered  Sig/Julisa


 Route


 PRN Reason  Start Time


 Stop Time Status Last Admin


Dose Admin


 


 Acetaminophen


  (Tylenol)  650 mg  Q4H  PRN


 GT


 Temp >100.5  1/15/21 17:15


 2/14/21 17:14  1/29/21 06:59





 


 Amiodarone HCl


  (Cordarone)  200 mg  DAILY


 NG


   1/26/21 09:00


 4/19/21 20:59  1/31/21 09:00





 


 Cefepime HCl 1 gm/


 Dextrose  55 ml @ 


 110 mls/hr  Q24H


 IVPB


   1/30/21 11:00


 2/6/21 10:59  1/31/21 11:45





 


 Chlorhexidine


 Gluconate


  (Vanessa-Hex 2%)  1 applic  DAILY@2000


 TOPIC


   1/19/21 20:00


 4/19/21 19:59  1/31/21 20:00





 


 Dextrose


  (Dextrose 50%)  25 ml  Q30M  PRN


 IV


 Hypoglycemia  1/9/21 13:30


 4/9/21 13:29   





 


 Dextrose


  (Dextrose 50%)  50 ml  Q30M  PRN


 IV


 Hypoglycemia  1/9/21 13:30


 4/9/21 13:29   





 


 Digoxin


  (Lanoxin)  0.125 mg  DAILY


 NG


   1/21/21 09:00


 4/21/21 08:59  1/31/21 09:00





 


 Docusate Sodium


  (Colace)  100 mg  TIDPRN  PRN


 GT


 Constipation  1/12/21 01:15


 2/11/21 01:14  1/12/21 01:42





 


 Dopamine HCl/


 Dextrose  250 ml @ 0


 mls/hr  Q24H  PRN


 IV


 For hypotension  1/31/21 11:45


 2/3/21 11:45   





 


 Enoxaparin Sodium


  (Lovenox)  80 mg  EVERY 12  HOURS


 SUBQ


   1/2/21 21:00


 4/2/21 20:59  1/31/21 21:08





 


 Fentanyl Citrate  250 ml @ 1


 mls/hr  Q24H


 IV


   2/1/21 04:15


 2/3/21 04:14  2/1/21 04:33





 


 Insulin Aspart


  (NovoLOG)    Q6HR


 SUBQ


   1/14/21 12:00


 4/9/21 17:59  2/1/21 06:02





 


 Insulin Detemir


  (Levemir)  14 units  Q12HR


 SUBQ


   1/31/21 21:00


 4/12/21 08:59  1/31/21 21:08





 


 Norepinephrine


 Bitartrate 16 mg/


 Dextrose  516 ml @ 0


 mls/hr  Q24H


 IV


   1/31/21 10:30


 2/3/21 10:29  2/1/21 04:48





 


 Pantoprazole


  (Protonix)  40 mg  DAILY


 IVP


   1/14/21 09:00


 2/13/21 08:59  1/31/21 09:00





 


 Phenylephrine HCl


 100 mg/Dextrose  250 ml @ 0


 mls/hr  Q24H  PRN


 IV


 For hypotension  1/31/21 12:30


 2/3/21 12:29   





 


 Quetiapine


 Fumarate


  (SEROqueL)  50 mg  Q12HR


 ORAL


   1/4/21 21:00


 2/18/21 20:59  1/31/21 21:07





 


 Sodium Chloride  500 ml @ 


 999 mls/hr  Q31M PRN


 IV


 For hypotension  1/15/21 18:30


 2/14/21 18:29   














Last 24 Hour Vital Signs








  Date Time  Temp Pulse Resp B/P (MAP) Pulse Ox O2 Delivery O2 Flow Rate FiO2


 


2/1/21 07:30  100 22 119/76 (90) 89   


 


2/1/21 07:15  101 26 124/79 (94) 86   


 


2/1/21 07:00  103 25 133/74 (93) 76   


 


2/1/21 06:45  111 26 149/86 (107) 61   


 


2/1/21 06:30  98 28 87/59 (68) 82   


 


2/1/21 06:15  100 32 120/74 (89) 82   


 


2/1/21 06:01   30 119/72  Mechanical Ventilator  100


 


2/1/21 06:01    119/72    


 


2/1/21 06:00  102 33 119/72 (88) 83   


 


2/1/21 06:00  102 33 119/72 (88) 83   


 


2/1/21 05:45  103 29 116/73 (87) 82   


 


2/1/21 05:30  104 28 121/68 (85) 85   


 


2/1/21 05:15  105 26 114/68 (83) 86   


 


2/1/21 05:01   30 116/72  Mechanical Ventilator  100


 


2/1/21 05:01    117/67    


 


2/1/21 05:00  105 33 117/67 (84) 87   


 


2/1/21 04:54        100


 


2/1/21 04:48    110/73    


 


2/1/21 04:45  105 27 106/67 (80) 85   


 


2/1/21 04:45  105 27 106/67 (80) 85   


 


2/1/21 04:33   30 112/66  Mechanical Ventilator  100


 


2/1/21 04:30  107 28 112/66 (81) 88   


 


2/1/21 04:15  108 23 112/67 (82) 88   


 


2/1/21 04:00  95      


 


2/1/21 04:00 101.5 107 28 109/64 (79) 89   


 


2/1/21 04:00      Mechanical Ventilator  


 


2/1/21 03:45  109 26 107/63 (78) 86   


 


2/1/21 03:30  108 26 101/64 (76) 85   


 


2/1/21 03:28  108 33     100


 


2/1/21 03:15  107 26 103/63 (76) 85   


 


2/1/21 03:00  106 24 105/61 (76) 85   


 


2/1/21 03:00   31 103/63  Mechanical Ventilator  100


 


2/1/21 02:45  107 27 107/63 (78) 86   


 


2/1/21 02:30  106 27 102/63 (76) 85   


 


2/1/21 02:15  106 25 104/61 (75) 87   


 


2/1/21 02:00   30 104/61  Mechanical Ventilator  100


 


2/1/21 02:00  105 30 105/65 (78) 87   


 


2/1/21 01:45  105 28 92/59 (70) 86   


 


2/1/21 01:30  104 26 99/60 (73) 89   


 


2/1/21 01:15  104 26 100/60 (73) 87   


 


2/1/21 01:15  104 26 100/60 (73) 87   


 


2/1/21 01:00   30 100/60  Mechanical Ventilator  100


 


2/1/21 01:00  103 29 102/63 (76) 89   


 


2/1/21 00:45  102 31 100/57 (71) 89   


 


2/1/21 00:30  102 26 103/63 (76) 90   


 


2/1/21 00:15  101 30 100/61 (74) 90   


 


2/1/21 00:00  103      


 


2/1/21 00:00  100 32 100/61 (74) 90   


 


2/1/21 00:00      Mechanical Ventilator  


 


2/1/21 00:00   30 103/61  Mechanical Ventilator  100


 


1/31/21 23:30  99 32 99/61 (74) 92   


 


1/31/21 23:28  99 33     100


 


1/31/21 23:15  99 32 103/61 (75) 91   


 


1/31/21 23:00  98 28 98/61 (73) 91   


 


1/31/21 23:00   30 103/61  Mechanical Ventilator  100


 


1/31/21 22:45  97 31 102/60 (74) 92   


 


1/31/21 22:30  97 30 90/61 (71) 93   


 


1/31/21 22:15  96 31 100/61 (74) 93   


 


1/31/21 22:15  96 31 100/61 (74) 93   


 


1/31/21 22:00   30 100/61  Mechanical Ventilator  100


 


1/31/21 22:00  96 25 107/66 (80) 93   


 


1/31/21 21:45  96 23 107/67 (80) 94   


 


1/31/21 21:30  96 19 104/66 (79) 93   


 


1/31/21 21:15  96 20 100/67 (78) 94   


 


1/31/21 21:00  95 18 105/63 (77) 94   


 


1/31/21 21:00   30 100/67  Mechanical Ventilator  100


 


1/31/21 20:45  95 20 103/68 (80) 94   


 


1/31/21 20:30  95 33     100


 


1/31/21 20:30 97.1 94 15 105/67 (80) 94   


 


1/31/21 20:15  94 9 111/69 (83) 95   


 


1/31/21 20:15  94 9 111/69 (83) 95   


 


1/31/21 20:00      Mechanical Ventilator  


 


1/31/21 20:00  95 8 106/71 (83) 94   


 


1/31/21 20:00        100


 


1/31/21 20:00   30 111/69  Mechanical Ventilator  100


 


1/31/21 20:00  108      


 


1/31/21 19:45  95 9 110/71 (84) 95   


 


1/31/21 19:32    106/66    


 


1/31/21 19:30  94 10 106/66 (79) 95   


 


1/31/21 19:15  94 9 111/69 (83) 94   


 


1/31/21 19:00  94 9 112/71 (85) 93   


 


1/31/21 19:00   32 111/69  Mechanical Ventilator  100


 


1/31/21 18:45  92 10 115/72 (86) 92   


 


1/31/21 18:35  87 16 75/51 (59) 89   


 


1/31/21 18:33  87 16 75/51 (59) 89   


 


1/31/21 18:30  87 19 76/54 (61) 90   


 


1/31/21 18:15  94 10 104/63 (77) 94   


 


1/31/21 18:03  96 18 128/79 (95) 92   


 


1/31/21 18:00   32 128/79  Mechanical Ventilator  100


 


1/31/21 18:00  70 22  81   


 


1/31/21 17:45  97 29 119/72 (88) 93   


 


1/31/21 17:30  97 27 116/73 (87) 94   


 


1/31/21 17:18  98 34     100


 


1/31/21 17:15  98 26 114/72 (86) 93   


 


1/31/21 17:00  98 26 110/72 (85) 93   


 


1/31/21 17:00   25 114/72  Mechanical Ventilator  100


 


1/31/21 16:45  99 23 116/70 (85) 92   


 


1/31/21 16:30  100 26 117/69 (85) 92   


 


1/31/21 16:15  101 25 117/73 (88) 91   


 


1/31/21 16:00 96.7 101 26 117/72 (87) 91   


 


1/31/21 16:00        100


 


1/31/21 16:00   23 117/73  Mechanical Ventilator  100


 


1/31/21 16:00      Mechanical Ventilator  


 


1/31/21 15:45  101 24 115/70 (85) 91   


 


1/31/21 15:38  94 35     100


 


1/31/21 15:30  102 24 113/72 (86) 91   


 


1/31/21 15:15  103 25 114/70 (85) 90   


 


1/31/21 15:00   24 113/72  Mechanical Ventilator  100


 


1/31/21 15:00  104 24 113/68 (83) 90   


 


1/31/21 14:45  105 21 111/66 (81) 88   


 


1/31/21 14:30  106 24 109/68 (82) 87   


 


1/31/21 14:15  107 26 109/63 (78) 87   


 


1/31/21 14:15   16 110/63  Mechanical Ventilator  100


 


1/31/21 14:00  108 15 110/63 (79) 87   


 


1/31/21 14:00   23 109/63  Mechanical Ventilator  100


 


1/31/21 13:45  108 21 109/65 (80) 87   


 


1/31/21 13:41  113 33     100


 


1/31/21 13:30  109 22 98/64 (75) 83   


 


1/31/21 13:25   24 109/68  Mechanical Ventilator  100


 


1/31/21 13:15  109 19 119/66 (83) 86   


 


1/31/21 13:00  109 22 95/63 (74) 70   


 


1/31/21 13:00   20 119/66  Mechanical Ventilator  100


 


1/31/21 12:45  111 21 107/71 (83) 81   


 


1/31/21 12:30  111 20 113/70 (84) 85   


 


1/31/21 12:15  112 17 110/66 (81) 84   


 


1/31/21 12:00  109 21 117/67 (84) 87   


 


1/31/21 12:00   21 110/66  Mechanical Ventilator  100


 


1/31/21 12:00        100


 


1/31/21 12:00      Mechanical Ventilator  


 


1/31/21 11:45  108 25 117/77 (90) 87   


 


1/31/21 11:45   20 117/67  Mechanical Ventilator  100


 


1/31/21 11:43  112 36     100


 


1/31/21 11:35  111 25 136/78 (97) 86   


 


1/31/21 11:30  104 26  65   


 


1/31/21 11:29  94 30  67   


 


1/31/21 11:28  83 25  66   


 


1/31/21 11:27  75 23  68   


 


1/31/21 11:26  71 23 57/36 (43) 68   


 


1/31/21 11:25  64 24  73   


 


1/31/21 11:24  42 21  70   


 


1/31/21 11:23  38 21  62   


 


1/31/21 11:22  45 21  60   


 


1/31/21 11:21  67 25 39/28 (32) 60   


 


1/31/21 11:20  85 26  63   


 


1/31/21 11:19  95 25  63   


 


1/31/21 11:18  97 27  64   


 


1/31/21 11:17  95 28  69   


 


1/31/21 11:16  89 23  77   


 


1/31/21 11:15  79 26 64/45 (51) 82   


 


1/31/21 11:14  63 20  80   


 


1/31/21 11:13  44 21     


 


1/31/21 11:12  45 17  66   


 


1/31/21 11:11  62 21  72   


 


1/31/21 11:10  88 22  72   


 


1/31/21 11:00   23 64/45  Mechanical Ventilator  100


 


1/31/21 11:00  106 20 86/60 (69) 74   


 


1/31/21 10:55  111 22 85/57 (66) 78   


 


1/31/21 10:48    64/41    


 


1/31/21 10:48    98/64    


 


1/31/21 10:45  107 22 92/60 (71) 74   


 


1/31/21 10:33   23 119/75  Mechanical Ventilator  100


 


1/31/21 10:30  111 19 119/77 (91) 71   


 


1/31/21 10:29  108 23 118/75 (89) 67   


 


1/31/21 10:25  85 24 77/53 (61) 69   


 


1/31/21 10:21  77 22 49/38 (42) 76   


 


1/31/21 10:20  74 21 54/38 (43) 76   


 


1/31/21 10:20    49/38    


 


1/31/21 10:18  85 26 54/34 (41) 73   


 


1/31/21 10:15  92 19 64/41 (49) 76   


 


1/31/21 10:00  97 22 139/66 (90) 87   


 


1/31/21 10:00   21 64/41  Mechanical Ventilator  100


 


1/31/21 09:45  95 19 104/58 (73) 58   


 


1/31/21 09:45   22 139/66  Mechanical Ventilator  100


 


1/31/21 09:39  111 34     100


 


1/31/21 09:30   21 104/58  Mechanical Ventilator  100


 


1/31/21 09:30  94 21 123/70 (87) 88   


 


1/31/21 09:15  94 21 108/68 (81) 88   


 


1/31/21 09:15   21 123/71  Mechanical Ventilator  100


 


1/31/21 09:00  94 23 113/70 (84) 87   


 


1/31/21 09:00   22 108/68  Mechanical Ventilator  100


 


1/31/21 09:00  86      


 


1/31/21 08:45  94 23 116/72 (87) 88   


 


1/31/21 08:30  94 22 121/76 (91) 88   


 


1/31/21 08:15  94 22 128/70 (89) 87   


 


1/31/21 08:00        100


 


1/31/21 08:00      Mechanical Ventilator  


 


1/31/21 08:00   22 128/70  Mechanical Ventilator  100


 


1/31/21 08:00 98.8 94 21 120/73 (89) 88   


 


1/31/21 07:45  95 22 113/72 (86) 87   


 


1/31/21 07:39  95 25     100


 


1/31/21 07:30  94 22 118/72 (87) 87   


 


1/31/21 07:15  96 24 116/70 (85) 82   


 


1/31/21 07:00  94 20 115/73 (87) 85   


 


1/31/21 07:00   22 116/70  Mechanical Ventilator  100


 


1/31/21 06:45  95 21 115/73 (87) 84   


 


1/31/21 06:30  94 21 128/74 (92) 86   


 


1/31/21 06:30  94 21 128/74 (92) 86   


 


1/31/21 06:15  94 23 124/81 (95) 83   


 


1/31/21 06:00  91 22 96/75 (82) 86   


 


1/31/21 05:45  94 20 106/69 (81) 86   


 


1/31/21 05:30  93 23 113/73 (86) 86   


 


1/31/21 05:19  93 25     100


 


1/31/21 05:15  93 22 118/70 (86) 85   


 


1/31/21 05:00  93 20 119/66 (83) 85   


 


1/31/21 04:45  92 22 104/56 (72) 86   


 


1/31/21 04:30  91 23 108/75 (86) 89   


 


1/31/21 04:15  91 22 111/72 (85) 89   


 


1/31/21 04:00  91      


 


1/31/21 04:00  91 21 103/84 (90) 89   


 


1/31/21 04:00        100


 


1/31/21 04:00      Mechanical Ventilator  


 


1/31/21 03:45  90 22 115/70 (85) 89   


 


1/31/21 03:30  91 24 110/69 (83) 87   


 


1/31/21 03:15  91 23 119/72 (88) 88   


 


1/31/21 03:15  92 21     100


 


1/31/21 03:00  91 22 111/66 (81) 87   


 


1/31/21 02:45  91 23 121/72 (88) 88   


 


1/31/21 02:30  91 23 116/70 (85) 88   


 


1/31/21 02:15  91 23 116/71 (86) 89   


 


1/31/21 02:00  90 23 113/70 (84) 87   


 


1/31/21 02:00  90 23 113/70 (84) 87   


 


1/31/21 01:45  92 23 118/68 (85) 87   


 


1/31/21 01:30  91 22 120/75 (90) 88   


 


1/31/21 01:23  91 22     100


 


1/31/21 01:15  91 22 122/77 (92) 86   


 


1/31/21 01:02    102/65    


 


1/31/21 01:00  91 21 102/65 (77) 87   


 


1/31/21 01:00  91 21 102/65 (77) 87   


 


1/31/21 00:50   20 102/65  Mechanical Ventilator  100


 


1/31/21 00:45  91 22 121/70 (87) 89   


 


1/31/21 00:45  91 22 121/70 (87) 89   


 


1/31/21 00:30  91 21 119/71 (87) 90   


 


1/31/21 00:30  91 21 119/71 (87) 90   


 


1/31/21 00:15  92 21 125/77 (93) 90   


 


1/31/21 00:15  92 21 125/77 (93) 90   


 


1/31/21 00:00 98.1 92 20 82/57 (65) 89   


 


1/31/21 00:00  85      


 


1/31/21 00:00  92 20 82/57 (65) 89   


 


1/31/21 00:00      Mechanical Ventilator  


 


1/31/21 00:00        100


 


1/30/21 23:45  92 21 118/74 (89) 92   


 


1/30/21 23:30  92 22 115/71 (86) 92   


 


1/30/21 23:15  93 21 115/72 (86) 91   


 


1/30/21 23:15  93 21     100


 


1/30/21 23:15  93 21 115/72 (86) 91   


 


1/30/21 23:00  94 21 114/73 (87) 89   


 


1/30/21 22:45  95 22 103/67 (79) 87   


 


1/30/21 22:30  95 21 106/69 (81) 88   


 


1/30/21 22:15  95 22 113/68 (83) 88   


 


1/30/21 22:00  96 20 99/63 (75) 89   


 


1/30/21 22:00  96 20 99/63 (75) 89   


 


1/30/21 21:45  97 20 111/66 (81) 90   


 


1/30/21 21:30  97 20 104/62 (76) 90   


 


1/30/21 21:15  94 21 92/63 (73) 90   


 


1/30/21 21:15  94 21 92/63 (73) 90   


 


1/30/21 21:06  96 23     100


 


1/30/21 21:00  96 19 98/62 (74) 91   


 


1/30/21 20:45  93 20 96/58 (71) 91   


 


1/30/21 20:40    68/32    


 


1/30/21 20:30  92 19 102/64 (77) 85   


 


1/30/21 20:30  92 19 102/64 (77) 85   


 


1/30/21 20:15  90 21 106/65 (79) 89   


 


1/30/21 20:08 98.5       


 


1/30/21 20:08  90 19 97/58 (71) 85   


 


1/30/21 20:00  89      


 


1/30/21 20:00        100


 


1/30/21 20:00  89 20 68/45 (53) 91   


 


1/30/21 20:00      Mechanical Ventilator  


 


1/30/21 19:45  90 20 105/62 (76) 89   


 


1/30/21 19:30  89 20 100/64 (76) 90   


 


1/30/21 19:25  90 20     100


 


1/30/21 19:15  91 21 108/70 (83) 89   


 


1/30/21 19:07  95 20 110/66 (81) 78   


 


1/30/21 19:00  86 21 65/42 (50) 83   


 


1/30/21 18:45  92 19 105/64 (78) 89   


 


1/30/21 18:45  92 19 105/64 (78) 89   


 


1/30/21 18:30  93 19 104/61 (75) 90   


 


1/30/21 18:15  94 20 100/62 (75) 91   


 


1/30/21 18:00  94 21 101/64 (76) 90   


 


1/30/21 17:45  92 20 101/62 (75) 89   


 


1/30/21 17:30  96 20 97/63 (74) 89   


 


1/30/21 17:18  94 20     100


 


1/30/21 17:15  93 22 96/60 (72) 87   


 


1/30/21 17:00  95 20 96/62 (73) 90   


 


1/30/21 17:00   20 96/62  Mechanical Ventilator  100


 


1/30/21 16:55   20 101/66  Mechanical Ventilator  100


 


1/30/21 16:45  92 21 97/58 (71) 90   


 


1/30/21 16:30  93 22 98/61 (73) 92   


 


1/30/21 16:15  91 21 92/61 (71) 92   


 


1/30/21 16:00  93      


 


1/30/21 16:00   21 94/60  Mechanical Ventilator  100


 


1/30/21 16:00        100


 


1/30/21 16:00 98.8 94 21 94/60 (71) 92   


 


1/30/21 16:00      Mechanical Ventilator  


 


1/30/21 15:45  92 20 91/58 (69) 93   


 


1/30/21 15:30  92 20 97/58 (71) 95   


 


1/30/21 15:18  94 21     100


 


1/30/21 15:18  93 21 94/56 (69) 81   


 


1/30/21 15:15  91 17 69/46 (54) 65   


 


1/30/21 15:00   20 91/61  Mechanical Ventilator  100


 


1/30/21 15:00  97 21 91/61 (71) 90   


 


1/30/21 14:45  96 20 94/58 (70) 90   


 


1/30/21 14:40  96 20 96/58 (71) 90   


 


1/30/21 14:30  94 21 89/58 (68) 90   


 


1/30/21 14:15  95 21 92/56 (68) 90   


 


1/30/21 14:00  95 22 97/56 (70) 91   


 


1/30/21 14:00   20 97/56  Mechanical Ventilator  100


 


1/30/21 13:45  93  93/56 (68) 81   


 


1/30/21 13:30   21 90/57  Mechanical Ventilator  100


 


1/30/21 13:30  84 21 90/57 (68) 94   


 


1/30/21 13:24    89/56    


 


1/30/21 13:15  85 21 89/56 (67) 93   


 


1/30/21 13:14  85 21     100


 


1/30/21 13:00  84 21 91/57 (68) 94   


 


1/30/21 13:00   21 91/57  Mechanical Ventilator  100


 


1/30/21 12:53  84 21 88/58 (68) 95   


 


1/30/21 12:45  84 19 88/59 (69) 95   


 


1/30/21 12:30  85 21 99/56 (70) 93   


 


1/30/21 12:22  86 21 91/60 (70) 92   


 


1/30/21 12:15  86 21 87/60 (69) 92   


 


1/30/21 12:00 99.2 87 21 91/59 (70) 91   


 


1/30/21 12:00   21 91/59  Mechanical Ventilator  100


 


1/30/21 12:00      Mechanical Ventilator  


 


1/30/21 12:00  87      


 


1/30/21 12:00        100


 


1/30/21 11:45  87 20 88/60 (69) 91   


 


1/30/21 11:30  87 21 91/58 (69) 90   


 


1/30/21 11:22  86 21     100


 


1/30/21 11:15  87 22 86/58 (67) 89   


 


1/30/21 11:10  87 22 82/56 (65) 89   


 


1/30/21 11:00   21 83/54  Mechanical Ventilator  100


 


1/30/21 11:00  87 22 83/54 (64) 89   


 


1/30/21 10:56  86 20 78/55 (63) 89   


 


1/30/21 10:45  86 22 78/51 (60) 90   


 


1/30/21 10:30  98      


 


1/30/21 10:30  85 20 76/51 (59) 91   


 


1/30/21 10:18  80 20 73/47 (56) 93   


 


1/30/21 10:15  80 20 77/52 (60) 94   


 


1/30/21 10:00  80 20 99/73 (82) 96   


 


1/30/21 10:00   20 99/73  Mechanical Ventilator  100


 


1/30/21 09:45  82 19 110/68 (82) 94   


 


1/30/21 09:30  82 17 102/66 (78) 89   


 


1/30/21 09:16  80 22     100


 


1/30/21 09:15  80 20 102/64 (77) 94   


 


1/30/21 09:00  80 20 96/66 (76) 93   


 


1/30/21 09:00   20 96/66  Mechanical Ventilator  100


 


1/30/21 08:45  80 20 103/69 (80) 91   


 


1/30/21 08:30  80 20 105/65 (78) 94   


 


1/30/21 08:15  79 21 102/64 (77) 92   


 


1/30/21 08:00        100


 


1/30/21 08:00      Mechanical Ventilator  


 


1/30/21 08:00 98.3 80 21 103/65 (78) 91   


 


1/30/21 08:00   20 103/65  Mechanical Ventilator  100


 


1/30/21 08:00    103/65    


 


1/30/21 08:00  78      


 


1/30/21 07:54    99/66    

















Intake and Output  


 


 1/31/21 2/1/21





 19:00 07:00


 


Intake Total 1733.95 ml 1248.62 ml


 


Output Total 1570 ml 440 ml


 


Balance 163.95 ml 808.62 ml


 


  


 


Free Water 90 ml 


 


IV Total 983.95 ml 588.62 ml


 


Tube Feeding 660 ml 660 ml


 


Output Urine Total 1320 ml 440 ml


 


Stool Total 0 ml 0 ml


 


Chest Tube Drainage Total 250 ml 











Labs








Test


 1/29/21


10:11 1/29/21


12:12 1/29/21


13:35 1/29/21


13:38


 


Arterial Blood pH


 


 


 7.148


(7.350-7.450) 





 


Arterial Blood Partial


Pressure CO2 


 


 92.4 mmHg


(35.0-45.0) 





 


Arterial Blood Partial


Pressure O2 


 


 35.1 mmHg


(75.0-100.0) 





 


Arterial Blood HCO3


 


 


 31.3 mmol/L


(22.0-26.0) 





 


Arterial Blood Oxygen


Saturation 


 


 59.7 %


() 





 


Arterial Blood Base Excess   0.6 (-2-2)  


 


Abelardo Test   Positive  


 


Test


 1/29/21


18:43 1/29/21


23:50 1/30/21


04:15 1/30/21


08:14


 


POC Whole Blood Glucose


 


 121 MG/DL


() 


 





 


White Blood Count


 


 


 13.2 K/UL


(4.8-10.8) 





 


Red Blood Count


 


 


 3.24 M/UL


(4.70-6.10) 





 


Hemoglobin


 


 


 9.8 G/DL


(14.2-18.0) 





 


Hematocrit


 


 


 29.3 %


(42.0-52.0) 





 


Mean Corpuscular Volume   90 FL (80-99)  


 


Mean Corpuscular Hemoglobin


 


 


 30.1 PG


(27.0-31.0) 





 


Mean Corpuscular Hemoglobin


Concent 


 


 33.3 G/DL


(32.0-36.0) 





 


Red Cell Distribution Width


 


 


 17.2 %


(11.6-14.8) 





 


Platelet Count


 


 


 184 K/UL


(150-450) 





 


Mean Platelet Volume


 


 


 10.7 FL


(6.5-10.1) 





 


Neutrophils (%) (Auto)    % (45.0-75.0)  


 


Lymphocytes (%) (Auto)    % (20.0-45.0)  


 


Monocytes (%) (Auto)    % (1.0-10.0)  


 


Eosinophils (%) (Auto)    % (0.0-3.0)  


 


Basophils (%) (Auto)    % (0.0-2.0)  


 


Differential Total Cells


Counted 


 


 100 


 





 


Neutrophils % (Manual)   89 % (45-75)  


 


Lymphocytes % (Manual)   7 % (20-45)  


 


Monocytes % (Manual)   1 % (1-10)  


 


Eosinophils % (Manual)   3 % (0-3)  


 


Basophils % (Manual)   0 % (0-2)  


 


Band Neutrophils   0 % (0-8)  


 


Nucleated Red Blood Cells   2 /100 WBC  


 


Platelet Estimate   Adequate  


 


Platelet Morphology   Normal  


 


Polychromasia   2+  


 


Hypochromasia   1+  


 


Anisocytosis   1+  


 


Sodium Level


 


 


 147 MMOL/L


(136-145) 





 


Potassium Level


 


 


 3.7 MMOL/L


(3.5-5.1) 





 


Chloride Level


 


 


 109 MMOL/L


() 





 


Carbon Dioxide Level


 


 


 30 MMOL/L


(21-32) 





 


Anion Gap


 


 


 8 mmol/L


(5-15) 





 


Blood Urea Nitrogen


 


 


 63 mg/dL


(7-18) 





 


Creatinine


 


 


 2.2 MG/DL


(0.55-1.30) 





 


Estimat Glomerular Filtration


Rate 


 


 30.1 mL/min


(>60) 





 


Glucose Level


 


 


 115 MG/DL


() 





 


Calcium Level


 


 


 7.9 MG/DL


(8.5-10.1) 





 


Arterial Blood pH


 


 


 


 7.290


(7.350-7.450)


 


Arterial Blood Partial


Pressure CO2 


 


 


 65.7 mmHg


(35.0-45.0)


 


Arterial Blood Partial


Pressure O2 


 


 


 53.7 mmHg


(75.0-100.0)


 


Arterial Blood HCO3


 


 


 


 30.9 mmol/L


(22.0-26.0)


 


Arterial Blood Oxygen


Saturation 


 


 


 86.2 %


()


 


Arterial Blood Base Excess    3 (-2-2) 


 


Abelardo Test    Positive 


 


Test


 1/30/21


09:38 1/30/21


18:46 1/31/21


05:55 1/31/21


06:55


 


Digoxin Level


 


 


 1.0 NG/ML


(0.5-2.0) 





 


Phosphorus Level


 


 


 


 5.4 MG/DL


(2.5-4.9)


 


Magnesium Level


 


 


 


 2.1 MG/DL


(1.8-2.4)


 


Test


 1/31/21


09:32 1/31/21


12:35 1/31/21


17:42 





 


Arterial Blood pH


 


 7.170


(7.350-7.450) 


 





 


Arterial Blood Partial


Pressure CO2 


 82.5 mmHg


(35.0-45.0) 


 





 


Arterial Blood Partial


Pressure O2 


 50.7 mmHg


(75.0-100.0) 


 





 


Arterial Blood HCO3


 


 29.4 mmol/L


(22.0-26.0) 


 





 


Arterial Blood Oxygen


Saturation 


 78.6 %


() 


 





 


Arterial Blood Base Excess  -0.5 (-2-2)   


 


Abelardo Test  Positive   








Height (Feet):  5


Height (Inches):  7.00


Weight (Pounds):  198


Objective


General Appearance:  lethargic, moderate distress


Neck:  supple


Cardiovascular:  regular rhythm


Respiratory/Chest:  crackles/rales, rhonchi - bilaterally vent++


Abdomen:  non tender, soft


Extremities:  non-tender











Emmett Cook MD           Feb 1, 2021 07:47

## 2021-02-01 NOTE — PULMONOLOGY PROGRESS NOTE
Subjective


ROS Limited/Unobtainable:  Yes


Interval Events:  code blue (1/29)


Constitutional:  Reports: fever, other - Ch=107.2


HEENT:  Repors: no symptoms


Respiratory:  Reports: shortness of breath - better


Cardiovascular:  Reports: no symptoms


Gastrointestinal/Abdominal:  Reports: diarrhea


Allergies:  


Coded Allergies:  


     No Known Allergies (Unverified , 6/11/19)


All Systems:  reviewed and negative except above





Objective





Last 24 Hour Vital Signs








  Date Time  Temp Pulse Resp B/P (MAP) Pulse Ox O2 Delivery O2 Flow Rate FiO2


 


2/1/21 15:00  106 30 89/56 (67) 90   


 


2/1/21 15:00   32 89/56  Mechanical Ventilator  100


 


2/1/21 15:00    89/56    


 


2/1/21 14:00  118 34 168/87 (114) 61   


 


2/1/21 14:00   32 168/87  Mechanical Ventilator  100


 


2/1/21 14:00    168/87    


 


2/1/21 13:00  90 25 102/63 (76) 89   


 


2/1/21 13:00   32 102/63  Mechanical Ventilator  100


 


2/1/21 13:00    102/63    


 


2/1/21 12:00  91      


 


2/1/21 12:00      Mechanical Ventilator  


 


2/1/21 12:00   32 101/67  Mechanical Ventilator  100


 


2/1/21 12:00    101/67    


 


2/1/21 12:00 98.2 91 14 104/63 (77) 89   


 


2/1/21 11:18        100


 


2/1/21 11:10  95 32     100


 


2/1/21 11:00   32 96/59  Mechanical Ventilator  100


 


2/1/21 11:00    96/59    


 


2/1/21 11:00  93 12 96/59 (71) 89   


 


2/1/21 10:00  93 22 119/70 (86) 87   


 


2/1/21 10:00   32 119/70  Mechanical Ventilator  100


 


2/1/21 10:00    119/70    


 


2/1/21 09:20  95 33     100


 


2/1/21 09:16  96      


 


2/1/21 09:00  97 31 128/79 (95) 87   


 


2/1/21 09:00   32 128/79  Mechanical Ventilator  100


 


2/1/21 09:00    128/79    


 


2/1/21 08:00      Mechanical Ventilator  


 


2/1/21 08:00  100      


 


2/1/21 08:00   32 120/78  Mechanical Ventilator  100


 


2/1/21 08:00    120/78    


 


2/1/21 08:00        100


 


2/1/21 08:00 97.8 100 26 120/78 (92) 87   


 


2/1/21 07:30  100 22 119/76 (90) 89   


 


2/1/21 07:20  102 34     100


 


2/1/21 07:15  101 26 124/79 (94) 86   


 


2/1/21 07:00   32 133/74  Mechanical Ventilator  100


 


2/1/21 07:00    133/74    


 


2/1/21 07:00  103 25 133/74 (93) 76   


 


2/1/21 06:45  111 26 149/86 (107) 61   


 


2/1/21 06:30  98 28 87/59 (68) 82   


 


2/1/21 06:15  100 32 120/74 (89) 82   


 


2/1/21 06:01   30 119/72  Mechanical Ventilator  100


 


2/1/21 06:01    119/72    


 


2/1/21 06:00  102 33 119/72 (88) 83   


 


2/1/21 06:00  102 33 119/72 (88) 83   


 


2/1/21 05:45  103 29 116/73 (87) 82   


 


2/1/21 05:30  104 28 121/68 (85) 85   


 


2/1/21 05:15  105 26 114/68 (83) 86   


 


2/1/21 05:01   30 116/72  Mechanical Ventilator  100


 


2/1/21 05:01    117/67    


 


2/1/21 05:00  105 33 117/67 (84) 87   


 


2/1/21 04:54        100


 


2/1/21 04:48    110/73    


 


2/1/21 04:45  105 27 106/67 (80) 85   


 


2/1/21 04:45  105 27 106/67 (80) 85   


 


2/1/21 04:33   30 112/66  Mechanical Ventilator  100


 


2/1/21 04:30  107 28 112/66 (81) 88   


 


2/1/21 04:15  108 23 112/67 (82) 88   


 


2/1/21 04:00  95      


 


2/1/21 04:00 101.5 107 28 109/64 (79) 89   


 


2/1/21 04:00      Mechanical Ventilator  


 


2/1/21 03:45  109 26 107/63 (78) 86   


 


2/1/21 03:30  108 26 101/64 (76) 85   


 


2/1/21 03:28  108 33     100


 


2/1/21 03:15  107 26 103/63 (76) 85   


 


2/1/21 03:00  106 24 105/61 (76) 85   


 


2/1/21 03:00   31 103/63  Mechanical Ventilator  100


 


2/1/21 02:45  107 27 107/63 (78) 86   


 


2/1/21 02:30  106 27 102/63 (76) 85   


 


2/1/21 02:15  106 25 104/61 (75) 87   


 


2/1/21 02:00   30 104/61  Mechanical Ventilator  100


 


2/1/21 02:00  105 30 105/65 (78) 87   


 


2/1/21 01:45  105 28 92/59 (70) 86   


 


2/1/21 01:30  104 26 99/60 (73) 89   


 


2/1/21 01:15  104 26 100/60 (73) 87   


 


2/1/21 01:15  104 26 100/60 (73) 87   


 


2/1/21 01:00   30 100/60  Mechanical Ventilator  100


 


2/1/21 01:00  103 29 102/63 (76) 89   


 


2/1/21 00:45  102 31 100/57 (71) 89   


 


2/1/21 00:30  102 26 103/63 (76) 90   


 


2/1/21 00:15  101 30 100/61 (74) 90   


 


2/1/21 00:00  103      


 


2/1/21 00:00  100 32 100/61 (74) 90   


 


2/1/21 00:00      Mechanical Ventilator  


 


2/1/21 00:00   30 103/61  Mechanical Ventilator  100


 


1/31/21 23:30  99 32 99/61 (74) 92   


 


1/31/21 23:28  99 33     100


 


1/31/21 23:15  99 32 103/61 (75) 91   


 


1/31/21 23:00  98 28 98/61 (73) 91   


 


1/31/21 23:00   30 103/61  Mechanical Ventilator  100


 


1/31/21 22:45  97 31 102/60 (74) 92   


 


1/31/21 22:30  97 30 90/61 (71) 93   


 


1/31/21 22:15  96 31 100/61 (74) 93   


 


1/31/21 22:15  96 31 100/61 (74) 93   


 


1/31/21 22:00   30 100/61  Mechanical Ventilator  100


 


1/31/21 22:00  96 25 107/66 (80) 93   


 


1/31/21 21:45  96 23 107/67 (80) 94   


 


1/31/21 21:30  96 19 104/66 (79) 93   


 


1/31/21 21:15  96 20 100/67 (78) 94   


 


1/31/21 21:00  95 18 105/63 (77) 94   


 


1/31/21 21:00   30 100/67  Mechanical Ventilator  100


 


1/31/21 20:45  95 20 103/68 (80) 94   


 


1/31/21 20:30  95 33     100


 


1/31/21 20:30 97.1 94 15 105/67 (80) 94   


 


1/31/21 20:15  94 9 111/69 (83) 95   


 


1/31/21 20:15  94 9 111/69 (83) 95   


 


1/31/21 20:00      Mechanical Ventilator  


 


1/31/21 20:00  95 8 106/71 (83) 94   


 


1/31/21 20:00        100


 


1/31/21 20:00   30 111/69  Mechanical Ventilator  100


 


1/31/21 20:00  108      


 


1/31/21 19:45  95 9 110/71 (84) 95   


 


1/31/21 19:32    106/66    


 


1/31/21 19:30  94 10 106/66 (79) 95   


 


1/31/21 19:15  94 9 111/69 (83) 94   


 


1/31/21 19:00  94 9 112/71 (85) 93   


 


1/31/21 19:00   32 111/69  Mechanical Ventilator  100


 


1/31/21 18:45  92 10 115/72 (86) 92   


 


1/31/21 18:35  87 16 75/51 (59) 89   


 


1/31/21 18:33  87 16 75/51 (59) 89   


 


1/31/21 18:30  87 19 76/54 (61) 90   


 


1/31/21 18:15  94 10 104/63 (77) 94   


 


1/31/21 18:03  96 18 128/79 (95) 92   


 


1/31/21 18:00   32 128/79  Mechanical Ventilator  100


 


1/31/21 18:00  70 22  81   


 


1/31/21 17:45  97 29 119/72 (88) 93   


 


1/31/21 17:30  97 27 116/73 (87) 94   


 


1/31/21 17:18  98 34     100


 


1/31/21 17:15  98 26 114/72 (86) 93   


 


1/31/21 17:00  98 26 110/72 (85) 93   


 


1/31/21 17:00   25 114/72  Mechanical Ventilator  100


 


1/31/21 16:45  99 23 116/70 (85) 92   


 


1/31/21 16:30  100 26 117/69 (85) 92   


 


1/31/21 16:15  101 25 117/73 (88) 91   


 


1/31/21 16:00 96.7 101 26 117/72 (87) 91   


 


1/31/21 16:00        100


 


1/31/21 16:00   23 117/73  Mechanical Ventilator  100


 


1/31/21 16:00      Mechanical Ventilator  

















Intake and Output  


 


 1/31/21 2/1/21





 19:00 07:00


 


Intake Total 1733.95 ml 1320.80 ml


 


Output Total 1570 ml 440 ml


 


Balance 163.95 ml 880.80 ml


 


  


 


Free Water 90 ml 


 


IV Total 983.95 ml 660.80 ml


 


Tube Feeding 660 ml 660 ml


 


Output Urine Total 1320 ml 440 ml


 


Stool Total 0 ml 0 ml


 


Chest Tube Drainage Total 250 ml 








Objective


On Versed drip


General Appearance:  no acute distress


HEENT:  atraumatic


Respiratory:  lungs clear, decreased breath sounds


Cardiovascular:  normal rate, regular rhythm


Abdomen:  soft, non tender, no organomegaly


Laboratory Tests


1/31/21 17:42: POC Whole Blood Glucose [Pending]


2/1/21 09:23: 


Arterial Blood pH 7.116*L, Arterial Blood Partial Pressure CO2 90.8*H, Arterial 

Blood Partial Pressure O2 56.3L, Arterial Blood HCO3 28.6H, Arterial Blood 

Oxygen Saturation 85.3*L, Arterial Blood Base Excess -2.3L, Abelardo Test Positive


2/1/21 14:29: 


White Blood Count 9.0, Red Blood Count 3.41L, Hemoglobin 9.8L, Hematocrit 32.8L,

Mean Corpuscular Volume 96, Mean Corpuscular Hemoglobin 28.7, Mean Corpuscular 

Hemoglobin Concent 29.9L, Red Cell Distribution Width 18.0H, Platelet Count 181,

Mean Platelet Volume 9.5, Neutrophils (%) (Auto) 83.1H, Lymphocytes (%) (Auto) 

13.2L, Monocytes (%) (Auto) 2.6, Eosinophils (%) (Auto) 0.5, Basophils (%) 

(Auto) 0.6, Sodium Level 143, Potassium Level 5.7H, Chloride Level 108H, Carbon 

Dioxide Level 27, Anion Gap 9, Blood Urea Nitrogen 75H, Creatinine 2.6H, Estimat

Glomerular Filtration Rate 24.8, Glucose Level 330H, Uric Acid 7.5H, Calcium 

Level 7.5L, Phosphorus Level 7.1H, Magnesium Level 2.4, Total Bilirubin 0.5, 

Aspartate Amino Transf (AST/SGOT) 42H, Alanine Aminotransferase (ALT/SGPT) 39, 

Alkaline Phosphatase 381H, Total Protein 5.6L, Albumin 1.1L, Globulin 4.5, 

Albumin/Globulin Ratio 0.2L





Current Medications








 Medications


  (Trade)  Dose


 Ordered  Sig/Julisa


 Route


 PRN Reason  Start Time


 Stop Time Status Last Admin


Dose Admin


 


 Acetaminophen


  (Tylenol)  650 mg  Q4H  PRN


 GT


 Temp >100.5  1/15/21 17:15


 2/14/21 17:14  1/29/21 06:59





 


 Albumin Human  100 ml @ 


 100 mls/hr  ONCE  ONCE


 IV


   2/1/21 15:30


 2/1/21 16:29   





 


 Amiodarone HCl


  (Cordarone)  200 mg  DAILY


 NG


   1/26/21 09:00


 4/19/21 20:59  2/1/21 09:16





 


 Cefepime HCl 1 gm/


 Dextrose  55 ml @ 


 110 mls/hr  Q24H


 IVPB


   1/30/21 11:00


 2/6/21 10:59  2/1/21 11:59





 


 Chlorhexidine


 Gluconate


  (Vanessa-Hex 2%)  1 applic  DAILY@2000


 TOPIC


   1/19/21 20:00


 4/19/21 19:59  1/31/21 20:00





 


 Dextrose


  (Dextrose 50%)  25 ml  Q30M  PRN


 IV


 Hypoglycemia  1/9/21 13:30


 4/9/21 13:29   





 


 Dextrose


  (Dextrose 50%)  50 ml  Q30M  PRN


 IV


 Hypoglycemia  1/9/21 13:30


 4/9/21 13:29   





 


 Digoxin


  (Lanoxin)  0.125 mg  DAILY


 NG


   1/21/21 09:00


 4/21/21 08:59  2/1/21 09:16





 


 Docusate Sodium


  (Colace)  100 mg  TIDPRN  PRN


 GT


 Constipation  1/12/21 01:15


 2/11/21 01:14  1/12/21 01:42





 


 Dopamine HCl/


 Dextrose  250 ml @ 0


 mls/hr  Q24H  PRN


 IV


 For hypotension  1/31/21 11:45


 2/3/21 11:45   





 


 Enoxaparin Sodium


  (Lovenox)  80 mg  EVERY 12  HOURS


 SUBQ


   1/2/21 21:00


 4/2/21 20:59  2/1/21 09:17





 


 Fentanyl Citrate  250 ml @ 1


 mls/hr  Q24H


 IV


   2/1/21 04:15


 2/3/21 04:14  2/1/21 04:33





 


 Hydrocortisone


  (Solu-CORTEF)  100 mg  EVERY 8  HOURS


 IV


   2/1/21 15:45


 5/2/21 15:44  2/1/21 15:53





 


 Insulin Aspart


  (NovoLOG)    Q6HR


 SUBQ


   1/14/21 12:00


 4/9/21 17:59  2/1/21 12:10





 


 Insulin Detemir


  (Levemir)  14 units  Q12HR


 SUBQ


   1/31/21 21:00


 4/12/21 08:59  2/1/21 09:50





 


 Midodrine


  (Pro-Amatine)  10 mg  Q8HR


 ORAL


   2/1/21 15:30


 5/2/21 15:29  2/1/21 15:53





 


 Norepinephrine


 Bitartrate 16 mg/


 Dextrose  516 ml @ 0


 mls/hr  Q24H


 IV


   1/31/21 10:30


 2/3/21 10:29  2/1/21 04:48





 


 Pantoprazole


  (Protonix)  40 mg  Q12HR


 IVP


   2/1/21 21:00


 2/13/21 08:59   





 


 Phenylephrine HCl


 100 mg/Dextrose  250 ml @ 0


 mls/hr  Q24H  PRN


 IV


 For hypotension  1/31/21 12:30


 2/3/21 12:29   





 


 Sodium


 Polystyrene


 Sulfonate


  (Kayexalate)  30 gm  ONCE


 NG


   2/1/21 16:00


 2/1/21 18:00   





 


 Sodium Bicarbonate


  (Sodium


 Bicarbonate)  100 ml  ONCE


 IV


   2/1/21 15:45


 2/1/21 17:00   





 


 Sodium Chloride  500 ml @ 


 999 mls/hr  Q31M PRN


 IV


 For hypotension  1/15/21 18:30


 2/14/21 18:29   





 


 Sodium Chloride  1,000 ml @ 


 100 mls/hr  Q10H


 IV


   2/1/21 15:30


 3/3/21 15:29  2/1/21 15:53














Assessment/Plan


Assessment/Plan


1. COVID-19 pneumonia.


   - s/p Decadron, azithromycin, and ceftriaxone


   - Intubated now; will continue AC mode for now


          -Currently 100% FiO2. PEEP 12; SaO2 78-84 ->91%


            - Has R apical PTX and subcut emphysema


             - S/p R CT placement





2. Diabetes mellitus.; 


3. Elevated inflammatory markers.


4. High D-dimer. On full dose Lovenox


5. Hypernatremia; will continue D5W


6. Hyperglycemia.


   - BG control


7. Hypoxemia., secondary to #1; improved





DVT prophylaxis   On Lovenox.


Sedated; advised RN to increase sedation





Hypotensive


On levophed


Continue PEEP 12


Poor prognosis


discussed with RN.


We will start IV fluids











Sung Lemos MD            Feb 1, 2021 15:57

## 2021-02-01 NOTE — GENERAL PROGRESS NOTE
Subjective


Allergies:  


Coded Allergies:  


     No Known Allergies (Unverified , 6/11/19)


Subjective


no   cardiic incident last night


on ventilator 60% fio2


on sediation


unresponsive


in icu


rt chest tube s placed due to pneumothorex


afib is controlled


hypotension on levophed drip





Objective





Last 24 Hour Vital Signs








  Date Time  Temp Pulse Resp B/P (MAP) Pulse Ox O2 Delivery O2 Flow Rate FiO2


 


2/1/21 15:59    90/54    


 


2/1/21 15:00  106 30 89/56 (67) 90   


 


2/1/21 15:00   32 89/56  Mechanical Ventilator  100


 


2/1/21 15:00    89/56    


 


2/1/21 14:00  118 34 168/87 (114) 61   


 


2/1/21 14:00   32 168/87  Mechanical Ventilator  100


 


2/1/21 14:00    168/87    


 


2/1/21 13:00  90 25 102/63 (76) 89   


 


2/1/21 13:00   32 102/63  Mechanical Ventilator  100


 


2/1/21 13:00    102/63    


 


2/1/21 12:00  91      


 


2/1/21 12:00      Mechanical Ventilator  


 


2/1/21 12:00   32 101/67  Mechanical Ventilator  100


 


2/1/21 12:00    101/67    


 


2/1/21 12:00 98.2 91 14 104/63 (77) 89   


 


2/1/21 11:18        100


 


2/1/21 11:10  95 32     100


 


2/1/21 11:00   32 96/59  Mechanical Ventilator  100


 


2/1/21 11:00    96/59    


 


2/1/21 11:00  93 12 96/59 (71) 89   


 


2/1/21 10:00  93 22 119/70 (86) 87   


 


2/1/21 10:00   32 119/70  Mechanical Ventilator  100


 


2/1/21 10:00    119/70    


 


2/1/21 09:20  95 33     100


 


2/1/21 09:16  96      


 


2/1/21 09:00  97 31 128/79 (95) 87   


 


2/1/21 09:00   32 128/79  Mechanical Ventilator  100


 


2/1/21 09:00    128/79    


 


2/1/21 08:00      Mechanical Ventilator  


 


2/1/21 08:00  100      


 


2/1/21 08:00   32 120/78  Mechanical Ventilator  100


 


2/1/21 08:00    120/78    


 


2/1/21 08:00        100


 


2/1/21 08:00 97.8 100 26 120/78 (92) 87   


 


2/1/21 07:30  100 22 119/76 (90) 89   


 


2/1/21 07:20  102 34     100


 


2/1/21 07:15  101 26 124/79 (94) 86   


 


2/1/21 07:00   32 133/74  Mechanical Ventilator  100


 


2/1/21 07:00    133/74    


 


2/1/21 07:00  103 25 133/74 (93) 76   


 


2/1/21 06:45  111 26 149/86 (107) 61   


 


2/1/21 06:30  98 28 87/59 (68) 82   


 


2/1/21 06:15  100 32 120/74 (89) 82   


 


2/1/21 06:01   30 119/72  Mechanical Ventilator  100


 


2/1/21 06:01    119/72    


 


2/1/21 06:00  102 33 119/72 (88) 83   


 


2/1/21 06:00  102 33 119/72 (88) 83   


 


2/1/21 05:45  103 29 116/73 (87) 82   


 


2/1/21 05:30  104 28 121/68 (85) 85   


 


2/1/21 05:15  105 26 114/68 (83) 86   


 


2/1/21 05:01   30 116/72  Mechanical Ventilator  100


 


2/1/21 05:01    117/67    


 


2/1/21 05:00  105 33 117/67 (84) 87   


 


2/1/21 04:54        100


 


2/1/21 04:48    110/73    


 


2/1/21 04:45  105 27 106/67 (80) 85   


 


2/1/21 04:45  105 27 106/67 (80) 85   


 


2/1/21 04:33   30 112/66  Mechanical Ventilator  100


 


2/1/21 04:30  107 28 112/66 (81) 88   


 


2/1/21 04:15  108 23 112/67 (82) 88   


 


2/1/21 04:00  95      


 


2/1/21 04:00 101.5 107 28 109/64 (79) 89   


 


2/1/21 04:00      Mechanical Ventilator  


 


2/1/21 03:45  109 26 107/63 (78) 86   


 


2/1/21 03:30  108 26 101/64 (76) 85   


 


2/1/21 03:28  108 33     100


 


2/1/21 03:15  107 26 103/63 (76) 85   


 


2/1/21 03:00  106 24 105/61 (76) 85   


 


2/1/21 03:00   31 103/63  Mechanical Ventilator  100


 


2/1/21 02:45  107 27 107/63 (78) 86   


 


2/1/21 02:30  106 27 102/63 (76) 85   


 


2/1/21 02:15  106 25 104/61 (75) 87   


 


2/1/21 02:00   30 104/61  Mechanical Ventilator  100


 


2/1/21 02:00  105 30 105/65 (78) 87   


 


2/1/21 01:45  105 28 92/59 (70) 86   


 


2/1/21 01:30  104 26 99/60 (73) 89   


 


2/1/21 01:15  104 26 100/60 (73) 87   


 


2/1/21 01:15  104 26 100/60 (73) 87   


 


2/1/21 01:00   30 100/60  Mechanical Ventilator  100


 


2/1/21 01:00  103 29 102/63 (76) 89   


 


2/1/21 00:45  102 31 100/57 (71) 89   


 


2/1/21 00:30  102 26 103/63 (76) 90   


 


2/1/21 00:15  101 30 100/61 (74) 90   


 


2/1/21 00:00  103      


 


2/1/21 00:00  100 32 100/61 (74) 90   


 


2/1/21 00:00      Mechanical Ventilator  


 


2/1/21 00:00   30 103/61  Mechanical Ventilator  100


 


1/31/21 23:30  99 32 99/61 (74) 92   


 


1/31/21 23:28  99 33     100


 


1/31/21 23:15  99 32 103/61 (75) 91   


 


1/31/21 23:00  98 28 98/61 (73) 91   


 


1/31/21 23:00   30 103/61  Mechanical Ventilator  100


 


1/31/21 22:45  97 31 102/60 (74) 92   


 


1/31/21 22:30  97 30 90/61 (71) 93   


 


1/31/21 22:15  96 31 100/61 (74) 93   


 


1/31/21 22:15  96 31 100/61 (74) 93   


 


1/31/21 22:00   30 100/61  Mechanical Ventilator  100


 


1/31/21 22:00  96 25 107/66 (80) 93   


 


1/31/21 21:45  96 23 107/67 (80) 94   


 


1/31/21 21:30  96 19 104/66 (79) 93   


 


1/31/21 21:15  96 20 100/67 (78) 94   


 


1/31/21 21:00  95 18 105/63 (77) 94   


 


1/31/21 21:00   30 100/67  Mechanical Ventilator  100


 


1/31/21 20:45  95 20 103/68 (80) 94   


 


1/31/21 20:30  95 33     100


 


1/31/21 20:30 97.1 94 15 105/67 (80) 94   


 


1/31/21 20:15  94 9 111/69 (83) 95   


 


1/31/21 20:15  94 9 111/69 (83) 95   


 


1/31/21 20:00      Mechanical Ventilator  


 


1/31/21 20:00  95 8 106/71 (83) 94   


 


1/31/21 20:00        100


 


1/31/21 20:00   30 111/69  Mechanical Ventilator  100


 


1/31/21 20:00  108      


 


1/31/21 19:45  95 9 110/71 (84) 95   


 


1/31/21 19:32    106/66    


 


1/31/21 19:30  94 10 106/66 (79) 95   


 


1/31/21 19:15  94 9 111/69 (83) 94   


 


1/31/21 19:00  94 9 112/71 (85) 93   


 


1/31/21 19:00   32 111/69  Mechanical Ventilator  100


 


1/31/21 18:45  92 10 115/72 (86) 92   


 


1/31/21 18:35  87 16 75/51 (59) 89   


 


1/31/21 18:33  87 16 75/51 (59) 89   


 


1/31/21 18:30  87 19 76/54 (61) 90   


 


1/31/21 18:15  94 10 104/63 (77) 94   


 


1/31/21 18:03  96 18 128/79 (95) 92   


 


1/31/21 18:00   32 128/79  Mechanical Ventilator  100


 


1/31/21 18:00  70 22  81   


 


1/31/21 17:45  97 29 119/72 (88) 93   


 


1/31/21 17:30  97 27 116/73 (87) 94   


 


1/31/21 17:18  98 34     100


 


1/31/21 17:15  98 26 114/72 (86) 93   


 


1/31/21 17:00  98 26 110/72 (85) 93   


 


1/31/21 17:00   25 114/72  Mechanical Ventilator  100


 


1/31/21 16:45  99 23 116/70 (85) 92   

















Intake and Output  


 


 1/31/21 2/1/21





 19:00 07:00


 


Intake Total 1733.95 ml 1320.80 ml


 


Output Total 1570 ml 440 ml


 


Balance 163.95 ml 880.80 ml


 


  


 


Free Water 90 ml 


 


IV Total 983.95 ml 660.80 ml


 


Tube Feeding 660 ml 660 ml


 


Output Urine Total 1320 ml 440 ml


 


Stool Total 0 ml 0 ml


 


Chest Tube Drainage Total 250 ml 








Laboratory Tests


1/31/21 17:42: POC Whole Blood Glucose [Pending]


2/1/21 09:23: 


Arterial Blood pH 7.116*L, Arterial Blood Partial Pressure CO2 90.8*H, Arterial 

Blood Partial Pressure O2 56.3L, Arterial Blood HCO3 28.6H, Arterial Blood 

Oxygen Saturation 85.3*L, Arterial Blood Base Excess -2.3L, Abelardo Test Positive


2/1/21 14:29: 


White Blood Count 9.0, Red Blood Count 3.41L, Hemoglobin 9.8L, Hematocrit 32.8L,

 Mean Corpuscular Volume 96, Mean Corpuscular Hemoglobin 28.7, Mean Corpuscular 

Hemoglobin Concent 29.9L, Red Cell Distribution Width 18.0H, Platelet Count 181,

 Mean Platelet Volume 9.5, Neutrophils (%) (Auto) 83.1H, Lymphocytes (%) (Auto) 

13.2L, Monocytes (%) (Auto) 2.6, Eosinophils (%) (Auto) 0.5, Basophils (%) 

(Auto) 0.6, Sodium Level 143, Potassium Level 5.7H, Chloride Level 108H, Carbon 

Dioxide Level 27, Anion Gap 9, Blood Urea Nitrogen 75H, Creatinine 2.6H, Estimat

 Glomerular Filtration Rate 24.8, Glucose Level 330H, Uric Acid 7.5H, Calcium 

Level 7.5L, Phosphorus Level 7.1H, Magnesium Level 2.4, Total Bilirubin 0.5, 

Aspartate Amino Transf (AST/SGOT) 42H, Alanine Aminotransferase (ALT/SGPT) 39, 

Alkaline Phosphatase 381H, Total Protein 5.6L, Albumin 1.1L, Globulin 4.5, 

Albumin/Globulin Ratio 0.2L


Height (Feet):  5


Height (Inches):  7.00


Weight (Pounds):  198


Neck:  supple


Cardiovascular:  regular rhythm


Respiratory/Chest:  crackles/rales


Abdomen:  non tender, soft


Edema:  trace edema





Assessment/Plan


Assessment/Plan:


no cardiic issue last night


hypotension better on tirate down levophed drip


afib with rvr on digoxine , add amiodarone , cardiology on case


covid pna


ac resp distress on ventilator


etoh abused


dehydration


ac renal failure- nephro consult 


severe meta acidosis


cont on ventilator at [facility]


cont iv abx and iv decadrone


pulmonary and gi on consult





dw son explained the prognosis -requested full code


icu nurse is aware of code status











Moi Travis MD              Feb 1, 2021 16:35

## 2021-02-01 NOTE — INFECTIOUS DISEASES PROG NOTE
Assessment/Plan


Assessment/Plan


antibiotics : cefepime





A


1. COVID-19 pneumonia.


on 100 % FiO2 with saturation of 89 %


s/p remdesivir


s/p ivermectin


s/p solumedrol


2. New-onset diabetes.


3. respiratory failure


4. renal failure





P


1. Continue cefepime


2. Continue isolation.





Subjective


ROS Limited/Unobtainable:  Yes


Allergies:  


Coded Allergies:  


     No Known Allergies (Unverified , 6/11/19)





Objective





Last 24 Hour Vital Signs








  Date Time  Temp Pulse Resp B/P (MAP) Pulse Ox O2 Delivery O2 Flow Rate FiO2


 


2/1/21 09:20  95 33     100


 


2/1/21 09:16  96      


 


2/1/21 08:00        100


 


2/1/21 07:30  100 22 119/76 (90) 89   


 


2/1/21 07:20  102 34     100


 


2/1/21 07:15  101 26 124/79 (94) 86   


 


2/1/21 07:00  103 25 133/74 (93) 76   


 


2/1/21 06:45  111 26 149/86 (107) 61   


 


2/1/21 06:30  98 28 87/59 (68) 82   


 


2/1/21 06:15  100 32 120/74 (89) 82   


 


2/1/21 06:01   30 119/72  Mechanical Ventilator  100


 


2/1/21 06:01    119/72    


 


2/1/21 06:00  102 33 119/72 (88) 83   


 


2/1/21 06:00  102 33 119/72 (88) 83   


 


2/1/21 05:45  103 29 116/73 (87) 82   


 


2/1/21 05:30  104 28 121/68 (85) 85   


 


2/1/21 05:15  105 26 114/68 (83) 86   


 


2/1/21 05:01   30 116/72  Mechanical Ventilator  100


 


2/1/21 05:01    117/67    


 


2/1/21 05:00  105 33 117/67 (84) 87   


 


2/1/21 04:54        100


 


2/1/21 04:48    110/73    


 


2/1/21 04:45  105 27 106/67 (80) 85   


 


2/1/21 04:45  105 27 106/67 (80) 85   


 


2/1/21 04:33   30 112/66  Mechanical Ventilator  100


 


2/1/21 04:30  107 28 112/66 (81) 88   


 


2/1/21 04:15  108 23 112/67 (82) 88   


 


2/1/21 04:00  95      


 


2/1/21 04:00 101.5 107 28 109/64 (79) 89   


 


2/1/21 04:00      Mechanical Ventilator  


 


2/1/21 03:45  109 26 107/63 (78) 86   


 


2/1/21 03:30  108 26 101/64 (76) 85   


 


2/1/21 03:28  108 33     100


 


2/1/21 03:15  107 26 103/63 (76) 85   


 


2/1/21 03:00  106 24 105/61 (76) 85   


 


2/1/21 03:00   31 103/63  Mechanical Ventilator  100


 


2/1/21 02:45  107 27 107/63 (78) 86   


 


2/1/21 02:30  106 27 102/63 (76) 85   


 


2/1/21 02:15  106 25 104/61 (75) 87   


 


2/1/21 02:00   30 104/61  Mechanical Ventilator  100


 


2/1/21 02:00  105 30 105/65 (78) 87   


 


2/1/21 01:45  105 28 92/59 (70) 86   


 


2/1/21 01:30  104 26 99/60 (73) 89   


 


2/1/21 01:15  104 26 100/60 (73) 87   


 


2/1/21 01:15  104 26 100/60 (73) 87   


 


2/1/21 01:00   30 100/60  Mechanical Ventilator  100


 


2/1/21 01:00  103 29 102/63 (76) 89   


 


2/1/21 00:45  102 31 100/57 (71) 89   


 


2/1/21 00:30  102 26 103/63 (76) 90   


 


2/1/21 00:15  101 30 100/61 (74) 90   


 


2/1/21 00:00  103      


 


2/1/21 00:00  100 32 100/61 (74) 90   


 


2/1/21 00:00      Mechanical Ventilator  


 


2/1/21 00:00   30 103/61  Mechanical Ventilator  100


 


1/31/21 23:30  99 32 99/61 (74) 92   


 


1/31/21 23:28  99 33     100


 


1/31/21 23:15  99 32 103/61 (75) 91   


 


1/31/21 23:00  98 28 98/61 (73) 91   


 


1/31/21 23:00   30 103/61  Mechanical Ventilator  100


 


1/31/21 22:45  97 31 102/60 (74) 92   


 


1/31/21 22:30  97 30 90/61 (71) 93   


 


1/31/21 22:15  96 31 100/61 (74) 93   


 


1/31/21 22:15  96 31 100/61 (74) 93   


 


1/31/21 22:00   30 100/61  Mechanical Ventilator  100


 


1/31/21 22:00  96 25 107/66 (80) 93   


 


1/31/21 21:45  96 23 107/67 (80) 94   


 


1/31/21 21:30  96 19 104/66 (79) 93   


 


1/31/21 21:15  96 20 100/67 (78) 94   


 


1/31/21 21:00  95 18 105/63 (77) 94   


 


1/31/21 21:00   30 100/67  Mechanical Ventilator  100


 


1/31/21 20:45  95 20 103/68 (80) 94   


 


1/31/21 20:30  95 33     100


 


1/31/21 20:30 97.1 94 15 105/67 (80) 94   


 


1/31/21 20:15  94 9 111/69 (83) 95   


 


1/31/21 20:15  94 9 111/69 (83) 95   


 


1/31/21 20:00      Mechanical Ventilator  


 


1/31/21 20:00  95 8 106/71 (83) 94   


 


1/31/21 20:00        100


 


1/31/21 20:00   30 111/69  Mechanical Ventilator  100


 


1/31/21 20:00  108      


 


1/31/21 19:45  95 9 110/71 (84) 95   


 


1/31/21 19:32    106/66    


 


1/31/21 19:30  94 10 106/66 (79) 95   


 


1/31/21 19:15  94 9 111/69 (83) 94   


 


1/31/21 19:00  94 9 112/71 (85) 93   


 


1/31/21 19:00   32 111/69  Mechanical Ventilator  100


 


1/31/21 18:45  92 10 115/72 (86) 92   


 


1/31/21 18:35  87 16 75/51 (59) 89   


 


1/31/21 18:33  87 16 75/51 (59) 89   


 


1/31/21 18:30  87 19 76/54 (61) 90   


 


1/31/21 18:15  94 10 104/63 (77) 94   


 


1/31/21 18:03  96 18 128/79 (95) 92   


 


1/31/21 18:00   32 128/79  Mechanical Ventilator  100


 


1/31/21 18:00  70 22  81   


 


1/31/21 17:45  97 29 119/72 (88) 93   


 


1/31/21 17:30  97 27 116/73 (87) 94   


 


1/31/21 17:18  98 34     100


 


1/31/21 17:15  98 26 114/72 (86) 93   


 


1/31/21 17:00  98 26 110/72 (85) 93   


 


1/31/21 17:00   25 114/72  Mechanical Ventilator  100


 


1/31/21 16:45  99 23 116/70 (85) 92   


 


1/31/21 16:30  100 26 117/69 (85) 92   


 


1/31/21 16:15  101 25 117/73 (88) 91   


 


1/31/21 16:00 96.7 101 26 117/72 (87) 91   


 


1/31/21 16:00        100


 


1/31/21 16:00   23 117/73  Mechanical Ventilator  100


 


1/31/21 16:00      Mechanical Ventilator  


 


1/31/21 15:45  101 24 115/70 (85) 91   


 


1/31/21 15:38  94 35     100


 


1/31/21 15:30  102 24 113/72 (86) 91   


 


1/31/21 15:15  103 25 114/70 (85) 90   


 


1/31/21 15:00   24 113/72  Mechanical Ventilator  100


 


1/31/21 15:00  104 24 113/68 (83) 90   


 


1/31/21 14:45  105 21 111/66 (81) 88   


 


1/31/21 14:30  106 24 109/68 (82) 87   


 


1/31/21 14:15  107 26 109/63 (78) 87   


 


1/31/21 14:15   16 110/63  Mechanical Ventilator  100


 


1/31/21 14:00  108 15 110/63 (79) 87   


 


1/31/21 14:00   23 109/63  Mechanical Ventilator  100


 


1/31/21 13:45  108 21 109/65 (80) 87   


 


1/31/21 13:41  113 33     100


 


1/31/21 13:30  109 22 98/64 (75) 83   


 


1/31/21 13:25   24 109/68  Mechanical Ventilator  100


 


1/31/21 13:15  109 19 119/66 (83) 86   


 


1/31/21 13:00  109 22 95/63 (74) 70   


 


1/31/21 13:00   20 119/66  Mechanical Ventilator  100


 


1/31/21 12:45  111 21 107/71 (83) 81   


 


1/31/21 12:30  111 20 113/70 (84) 85   


 


1/31/21 12:15  112 17 110/66 (81) 84   


 


1/31/21 12:00  109 21 117/67 (84) 87   


 


1/31/21 12:00   21 110/66  Mechanical Ventilator  100


 


1/31/21 12:00        100


 


1/31/21 12:00      Mechanical Ventilator  


 


1/31/21 11:45  108 25 117/77 (90) 87   


 


1/31/21 11:45   20 117/67  Mechanical Ventilator  100


 


1/31/21 11:43  112 36     100


 


1/31/21 11:35  111 25 136/78 (97) 86   


 


1/31/21 11:30  104 26  65   


 


1/31/21 11:29  94 30  67   


 


1/31/21 11:28  83 25  66   


 


1/31/21 11:27  75 23  68   


 


1/31/21 11:26  71 23 57/36 (43) 68   


 


1/31/21 11:25  64 24  73   


 


1/31/21 11:24  42 21  70   


 


1/31/21 11:23  38 21  62   


 


1/31/21 11:22  45 21  60   


 


1/31/21 11:21  67 25 39/28 (32) 60   


 


1/31/21 11:20  85 26  63   


 


1/31/21 11:19  95 25  63   


 


1/31/21 11:18  97 27  64   


 


1/31/21 11:17  95 28  69   


 


1/31/21 11:16  89 23  77   


 


1/31/21 11:15  79 26 64/45 (51) 82   


 


1/31/21 11:14  63 20  80   


 


1/31/21 11:13  44 21     


 


1/31/21 11:12  45 17  66   


 


1/31/21 11:11  62 21  72   


 


1/31/21 11:10  88 22  72   


 


1/31/21 11:00   23 64/45  Mechanical Ventilator  100


 


1/31/21 11:00  106 20 86/60 (69) 74   








Height (Feet):  5


Height (Inches):  7.00


Weight (Pounds):  198


HEENT:  other - intubated





Laboratory Tests








Test


 1/31/21


11:26 1/31/21


12:35 1/31/21


17:42 2/1/21


09:23


 


POC Whole Blood Glucose Pending    Pending   


 


Arterial Blood pH


 


 7.170


(7.350-7.450) 


 7.116


(7.350-7.450)


 


Arterial Blood Partial


Pressure CO2 


 82.5 mmHg


(35.0-45.0)  *H 


 90.8 mmHg


(35.0-45.0)  *H


 


Arterial Blood Partial


Pressure O2 


 50.7 mmHg


(75.0-100.0)  L 


 56.3 mmHg


(75.0-100.0)  L


 


Arterial Blood HCO3


 


 29.4 mmol/L


(22.0-26.0)  H 


 28.6 mmol/L


(22.0-26.0)  H


 


Arterial Blood Oxygen


Saturation 


 78.6 %


()  *L 


 85.3 %


()  *L


 


Arterial Blood Base Excess  -0.5 (-2-2)    -2.3 (-2-2)  L


 


Abelardo Test  Positive    Positive  











Current Medications








 Medications


  (Trade)  Dose


 Ordered  Sig/Julisa


 Route


 PRN Reason  Start Time


 Stop Time Status Last Admin


Dose Admin


 


 Acetaminophen


  (Tylenol)  650 mg  Q4H  PRN


 GT


 Temp >100.5  1/15/21 17:15


 2/14/21 17:14  1/29/21 06:59





 


 Amiodarone HCl


  (Cordarone)  200 mg  DAILY


 NG


   1/26/21 09:00


 4/19/21 20:59  2/1/21 09:16





 


 Cefepime HCl 1 gm/


 Dextrose  55 ml @ 


 110 mls/hr  Q24H


 IVPB


   1/30/21 11:00


 2/6/21 10:59  1/31/21 11:45





 


 Chlorhexidine


 Gluconate


  (Vanessa-Hex 2%)  1 applic  DAILY@2000


 TOPIC


   1/19/21 20:00


 4/19/21 19:59  1/31/21 20:00





 


 Dextrose


  (Dextrose 50%)  25 ml  Q30M  PRN


 IV


 Hypoglycemia  1/9/21 13:30


 4/9/21 13:29   





 


 Dextrose


  (Dextrose 50%)  50 ml  Q30M  PRN


 IV


 Hypoglycemia  1/9/21 13:30


 4/9/21 13:29   





 


 Digoxin


  (Lanoxin)  0.125 mg  DAILY


 NG


   1/21/21 09:00


 4/21/21 08:59  2/1/21 09:16





 


 Docusate Sodium


  (Colace)  100 mg  TIDPRN  PRN


 GT


 Constipation  1/12/21 01:15


 2/11/21 01:14  1/12/21 01:42





 


 Dopamine HCl/


 Dextrose  250 ml @ 0


 mls/hr  Q24H  PRN


 IV


 For hypotension  1/31/21 11:45


 2/3/21 11:45   





 


 Enoxaparin Sodium


  (Lovenox)  80 mg  EVERY 12  HOURS


 SUBQ


   1/2/21 21:00


 4/2/21 20:59  2/1/21 09:17





 


 Fentanyl Citrate  250 ml @ 1


 mls/hr  Q24H


 IV


   2/1/21 04:15


 2/3/21 04:14  2/1/21 04:33





 


 Insulin Aspart


  (NovoLOG)    Q6HR


 SUBQ


   1/14/21 12:00


 4/9/21 17:59  2/1/21 06:02





 


 Insulin Detemir


  (Levemir)  14 units  Q12HR


 SUBQ


   1/31/21 21:00


 4/12/21 08:59  2/1/21 09:50





 


 Norepinephrine


 Bitartrate 16 mg/


 Dextrose  516 ml @ 0


 mls/hr  Q24H


 IV


   1/31/21 10:30


 2/3/21 10:29  2/1/21 04:48





 


 Pantoprazole


  (Protonix)  40 mg  DAILY


 IVP


   1/14/21 09:00


 2/13/21 08:59  2/1/21 09:16





 


 Phenylephrine HCl


 100 mg/Dextrose  250 ml @ 0


 mls/hr  Q24H  PRN


 IV


 For hypotension  1/31/21 12:30


 2/3/21 12:29   





 


 Quetiapine


 Fumarate


  (SEROqueL)  50 mg  Q12HR


 ORAL


   1/4/21 21:00


 2/18/21 20:59  2/1/21 09:15





 


 Sodium Chloride  500 ml @ 


 999 mls/hr  Q31M PRN


 IV


 For hypotension  1/15/21 18:30


 2/14/21 18:29   




















Ashley Davis MD       Feb 1, 2021 11:00

## 2021-02-01 NOTE — NUR
NURSE HAND-OFF REPORT: 



Latest Vital Signs: Temperature 101.5 , Pulse 100 , B/P 119 /76 , Respiratory Rate 22 , O2 
SAT 89 , Mechanical Ventilator, O2 Flow Rate  .  

Vital Sign Comment: 



EKG Rhythm: Sinus Rhythm

Rhythm change?: N 

MD Notified?: N -Dr. Thaddeus GUTIERREZ Response: 



Latest Singh Fall Score: 50  

Fall Risk: High Risk 

Safety Measures: Call light Within Reach, Bed Alarm Zone 1, Side Rails Side Rails x3, Bed 
position Low and Locked.

Fall Precautions: 

Patient Fall Education



Report given to Rodney

## 2021-02-01 NOTE — CONSULTATION
Consult Note


Consult Note


I am asked to evaluate the patient at the request of Dr. Travis for worsening 

renal failure and hyperkalemia


Record reviewed


Lab results reviewed


Day 30 of hospitalization at this hospital


Patient remains full code


Intubated on ventilator


Was coded 3 times up to this point


Right pneumothorax, status post chest tube








Currently being treated for the following conditions


Atrial fibrillation with rapid ventricular rate


COVID-19 pneumonia


Diabetes mellitus


Septic shock


Acute respiratory failure, on ventilator


Assessment/Plan





Acute kidney injury, multifactorial


Septic shock


Acute respiratory failure


Hyperkalemia, metabolic acidosis


Above-mentioned condition














Plan:


Feeding changed to Nepro


Midodrine 10 mg every 8 hours


Albumin bolus


1/2 Normal saline 100 cc an hour


Stop Seroquel


Monitor renal parameters


Avoid nephrotoxic's


1 amp of sodium bicarbonate


Stress dose of steroids


Discussed with RN Damien Sparks MD             Feb 1, 2021 15:27

## 2021-02-01 NOTE — NUR
CASE MANAGEMENT:REVIEW



2/1/21

SI: COVID PNA. RT PTX W/EMPHYSEMA

RESPIRATORY FAILURE ~ INTUBATED

101.5     111   26  87/59   SAT 89% ON VENT SUPPORT W/100% FIO2

PH-7.11  PCO2+90.8  PO2-56.3   HCO3+28.6  O2 SAT-85.3



IS: LEVOPHED GTT

FENTANYL GTT

IV CEFEPIME Q24

AMIODARONE NG QD

DIGOXIN NG QD

LOVENOX SQ Q12

**: ICU STATUS

DCP: FROM HOME

## 2021-02-01 NOTE — NUR
NURSE NOTES:

Patient is sedated -2 RASS score.  Pt has a MAYO PICC with dry and intact dressing running 
Levophed gtt at 16 mcg/min and fentanyl gtt at 200 mcg/hr. Bed in lowest position, alarm on, 
side rails up x 3, call light within reach. Covid +, airborne precaution maintained and 
observed. Will continue to monitor.

## 2021-02-01 NOTE — NUR
NURSE NOTES:

Pt received from [facility]. Pt is sedated, opens eyes to voice and makes eye contact for 
approx 6 sec, RASS noted -2; gag reflex intact, withdraws to light pain, bilat equal and 
round 3mm with sluggish rxn to light. Pt is SR to cardiac monitor with 1+ radial and 
dorsalis pedis pulses. 3+ pitting edema noted to hands and feet. Pt is orally intubated with 
a 7.5 ETT noted 27 cm at the lip with the following settings: ACPC 25 rate 32 FiO2 100 % 
Peep 12. lung fields noted diminished upon auscultation. Right lateral chest tube noted with 
dry and intact dressing - water levels adjusted (20 cm H20 in suction control chamber and 2 
cm H20 in water seal chamber) - CT connected to low, continuos wall suction per order. No 
air leak present. sanguineous drainage noted. Pt has an OGT running Vital at 55 cc/hr 
without gastric residuals. Abdomen is round, soft, and non-tender  w/ active bowel sounds to 
all quadrants. rectal tube noted draining liquid-like, brown stool. F/C noted draining 
yellow urine with sedimentation. Skin alterations noted. Pt has a MAYO PICC with dry and 
intact dressing running levophed gtt at 28 mcg/min and fentanyl gtt at 180 cg/hr. Bed in 
lowest position, alarm on, side rails up x 3, call light within reach. Will continue to 
monitor.

## 2021-02-01 NOTE — CONSULTATION
DATE OF CONSULTATION:  02/01/2021

ENDOCRINOLOGY CONSULTATION



CONSULTING PHYSICIAN:  Christian Mijares M.D.



REFERRING PHYSICIAN:  Moi Travis M.D.



REASON FOR CONSULTATION:  I was asked to see this 65-year-old  male

by Dr. Moi Travis in Endocrinology consultation for evaluation of

type 2 diabetes mellitus, acute COVID pneumonia. 



PERTINENT HISTORY: The patient is admitted

on 01/20/2021 with acute pneumonia, was intubated and placed in the ICU

and NG tube was placed with Glucerna 1.2 30 cc/hr per NGT

6h.  The patient improved and good control.



FAMILY HISTORY:  Unremarkable.



PERSONAL HISTORY:  Negative for tobacco use.



REVIEW OF SYSTEMS:  A 14-point review unremarkable.



PHYSICAL EXAMINATION:

GENERAL:  Patient is in no acute distress.

VITAL SIGNS:  Blood pressure is 110/60, pulse 70, respirations 20, and

temperature 97.7.  _____

HEAD AND NECK:  Unremarkable.

LUNGS:  Clear.

HEART:  Distant.

ABDOMEN:  Soft.  Bowel sounds present.

EXTREMITIES:  No edema.

NEUROLOGICAL:  Cranial nerves II through XII are intact.  Toes are

downgoing to plantar stimulation.



LABORATORY DATA:  Glucose 326 mg%.



ASSESSMENT:

1. Diabetes mellitus, type 2, out of control.

2. Acute COVID pneumonia.

3. Malnutrition



PLAN:  Levemir 15u sc q12 hrs and sliding scale novolog q6hrs

           Glucerna 1.2 30 c/hr pe [id number] in am.







  ______________________________________________

  Christian Mijares M.D.





DR:  KAREN

D:  02/01/2021 02:51

T:  02/01/2021 10:40

JOB#:  [id number]

CC:



HARDEEP

## 2021-02-01 NOTE — NUR
NURSE NOTES:

patient resting.  Patient vitals are stable at this time. patient levo gtt at 28mcg/hr.  
patient stable throughout the night.  patient urine output increased. patient repositioned.  
oral care completed. Rn will continue to monitor.

## 2021-02-01 NOTE — CARDIAC ELECTROPHYSIOLOGY PN
Assessment/Plan


Assessment/Plan


1. Atrial fibrillation with rapid ventricular response.      


      On digoxin 0.125 mg p.o. daily and Amiodarone 200 po qd  


      In SR.     Dig level 1.0





2. Acute renal failure. 


3. Right pneumothorax, status post chest tube with subcutaneous emphysema. 





4. COVID-19 pneumonia, 100% FiO2 and PEEP of 12, on Remdesivir and Solu-Medrol  

.


5. Diabetes.


6. Septic shock. On iv fluid and Levophed 30  Mcg and new synephrine  


7. S/P PEA arrest 1/29/21 and 1/31/21





DW RN and Dr Powell





Subjective


Subjective


In ICU on 100% Fio2 and PEEP 12 and Levo increased to 28 Mcg  


 Right chest tube had 130 cc drainage overnight. In SR on Dig and Amiodarone 


Coded twice 1/30/21 and again yesterday for PEA with chest compressions and Epi 

and bicarb injections





Objective





Last 24 Hour Vital Signs








  Date Time  Temp Pulse Resp B/P (MAP) Pulse Ox O2 Delivery O2 Flow Rate FiO2


 


2/1/21 15:00  106 30 89/56 (67) 90   


 


2/1/21 15:00   32 89/56  Mechanical Ventilator  100


 


2/1/21 15:00    89/56    


 


2/1/21 14:00  118 34 168/87 (114) 61   


 


2/1/21 14:00   32 168/87  Mechanical Ventilator  100


 


2/1/21 14:00    168/87    


 


2/1/21 13:00  90 25 102/63 (76) 89   


 


2/1/21 13:00   32 102/63  Mechanical Ventilator  100


 


2/1/21 13:00    102/63    


 


2/1/21 12:00  91      


 


2/1/21 12:00      Mechanical Ventilator  


 


2/1/21 12:00   32 101/67  Mechanical Ventilator  100


 


2/1/21 12:00    101/67    


 


2/1/21 12:00 98.2 91 14 104/63 (77) 89   


 


2/1/21 11:18        100


 


2/1/21 11:10  95 32     100


 


2/1/21 11:00   32 96/59  Mechanical Ventilator  100


 


2/1/21 11:00    96/59    


 


2/1/21 11:00  93 12 96/59 (71) 89   


 


2/1/21 10:00  93 22 119/70 (86) 87   


 


2/1/21 10:00   32 119/70  Mechanical Ventilator  100


 


2/1/21 10:00    119/70    


 


2/1/21 09:20  95 33     100


 


2/1/21 09:16  96      


 


2/1/21 09:00  97 31 128/79 (95) 87   


 


2/1/21 09:00   32 128/79  Mechanical Ventilator  100


 


2/1/21 09:00    128/79    


 


2/1/21 08:00      Mechanical Ventilator  


 


2/1/21 08:00  100      


 


2/1/21 08:00   32 120/78  Mechanical Ventilator  100


 


2/1/21 08:00    120/78    


 


2/1/21 08:00        100


 


2/1/21 08:00 97.8 100 26 120/78 (92) 87   


 


2/1/21 07:30  100 22 119/76 (90) 89   


 


2/1/21 07:20  102 34     100


 


2/1/21 07:15  101 26 124/79 (94) 86   


 


2/1/21 07:00   32 133/74  Mechanical Ventilator  100


 


2/1/21 07:00    133/74    


 


2/1/21 07:00  103 25 133/74 (93) 76   


 


2/1/21 06:45  111 26 149/86 (107) 61   


 


2/1/21 06:30  98 28 87/59 (68) 82   


 


2/1/21 06:15  100 32 120/74 (89) 82   


 


2/1/21 06:01   30 119/72  Mechanical Ventilator  100


 


2/1/21 06:01    119/72    


 


2/1/21 06:00  102 33 119/72 (88) 83   


 


2/1/21 06:00  102 33 119/72 (88) 83   


 


2/1/21 05:45  103 29 116/73 (87) 82   


 


2/1/21 05:30  104 28 121/68 (85) 85   


 


2/1/21 05:15  105 26 114/68 (83) 86   


 


2/1/21 05:01   30 116/72  Mechanical Ventilator  100


 


2/1/21 05:01    117/67    


 


2/1/21 05:00  105 33 117/67 (84) 87   


 


2/1/21 04:54        100


 


2/1/21 04:48    110/73    


 


2/1/21 04:45  105 27 106/67 (80) 85   


 


2/1/21 04:45  105 27 106/67 (80) 85   


 


2/1/21 04:33   30 112/66  Mechanical Ventilator  100


 


2/1/21 04:30  107 28 112/66 (81) 88   


 


2/1/21 04:15  108 23 112/67 (82) 88   


 


2/1/21 04:00  95      


 


2/1/21 04:00 101.5 107 28 109/64 (79) 89   


 


2/1/21 04:00      Mechanical Ventilator  


 


2/1/21 03:45  109 26 107/63 (78) 86   


 


2/1/21 03:30  108 26 101/64 (76) 85   


 


2/1/21 03:28  108 33     100


 


2/1/21 03:15  107 26 103/63 (76) 85   


 


2/1/21 03:00  106 24 105/61 (76) 85   


 


2/1/21 03:00   31 103/63  Mechanical Ventilator  100


 


2/1/21 02:45  107 27 107/63 (78) 86   


 


2/1/21 02:30  106 27 102/63 (76) 85   


 


2/1/21 02:15  106 25 104/61 (75) 87   


 


2/1/21 02:00   30 104/61  Mechanical Ventilator  100


 


2/1/21 02:00  105 30 105/65 (78) 87   


 


2/1/21 01:45  105 28 92/59 (70) 86   


 


2/1/21 01:30  104 26 99/60 (73) 89   


 


2/1/21 01:15  104 26 100/60 (73) 87   


 


2/1/21 01:15  104 26 100/60 (73) 87   


 


2/1/21 01:00   30 100/60  Mechanical Ventilator  100


 


2/1/21 01:00  103 29 102/63 (76) 89   


 


2/1/21 00:45  102 31 100/57 (71) 89   


 


2/1/21 00:30  102 26 103/63 (76) 90   


 


2/1/21 00:15  101 30 100/61 (74) 90   


 


2/1/21 00:00  103      


 


2/1/21 00:00  100 32 100/61 (74) 90   


 


2/1/21 00:00      Mechanical Ventilator  


 


2/1/21 00:00   30 103/61  Mechanical Ventilator  100


 


1/31/21 23:30  99 32 99/61 (74) 92   


 


1/31/21 23:28  99 33     100


 


1/31/21 23:15  99 32 103/61 (75) 91   


 


1/31/21 23:00  98 28 98/61 (73) 91   


 


1/31/21 23:00   30 103/61  Mechanical Ventilator  100


 


1/31/21 22:45  97 31 102/60 (74) 92   


 


1/31/21 22:30  97 30 90/61 (71) 93   


 


1/31/21 22:15  96 31 100/61 (74) 93   


 


1/31/21 22:15  96 31 100/61 (74) 93   


 


1/31/21 22:00   30 100/61  Mechanical Ventilator  100


 


1/31/21 22:00  96 25 107/66 (80) 93   


 


1/31/21 21:45  96 23 107/67 (80) 94   


 


1/31/21 21:30  96 19 104/66 (79) 93   


 


1/31/21 21:15  96 20 100/67 (78) 94   


 


1/31/21 21:00  95 18 105/63 (77) 94   


 


1/31/21 21:00   30 100/67  Mechanical Ventilator  100


 


1/31/21 20:45  95 20 103/68 (80) 94   


 


1/31/21 20:30  95 33     100


 


1/31/21 20:30 97.1 94 15 105/67 (80) 94   


 


1/31/21 20:15  94 9 111/69 (83) 95   


 


1/31/21 20:15  94 9 111/69 (83) 95   


 


1/31/21 20:00      Mechanical Ventilator  


 


1/31/21 20:00  95 8 106/71 (83) 94   


 


1/31/21 20:00        100


 


1/31/21 20:00   30 111/69  Mechanical Ventilator  100


 


1/31/21 20:00  108      


 


1/31/21 19:45  95 9 110/71 (84) 95   


 


1/31/21 19:32    106/66    


 


1/31/21 19:30  94 10 106/66 (79) 95   


 


1/31/21 19:15  94 9 111/69 (83) 94   


 


1/31/21 19:00  94 9 112/71 (85) 93   


 


1/31/21 19:00   32 111/69  Mechanical Ventilator  100


 


1/31/21 18:45  92 10 115/72 (86) 92   


 


1/31/21 18:35  87 16 75/51 (59) 89   


 


1/31/21 18:33  87 16 75/51 (59) 89   


 


1/31/21 18:30  87 19 76/54 (61) 90   


 


1/31/21 18:15  94 10 104/63 (77) 94   


 


1/31/21 18:03  96 18 128/79 (95) 92   


 


1/31/21 18:00   32 128/79  Mechanical Ventilator  100


 


1/31/21 18:00  70 22  81   


 


1/31/21 17:45  97 29 119/72 (88) 93   


 


1/31/21 17:30  97 27 116/73 (87) 94   


 


1/31/21 17:18  98 34     100


 


1/31/21 17:15  98 26 114/72 (86) 93   


 


1/31/21 17:00  98 26 110/72 (85) 93   


 


1/31/21 17:00   25 114/72  Mechanical Ventilator  100


 


1/31/21 16:45  99 23 116/70 (85) 92   


 


1/31/21 16:30  100 26 117/69 (85) 92   


 


1/31/21 16:15  101 25 117/73 (88) 91   


 


1/31/21 16:00 96.7 101 26 117/72 (87) 91   


 


1/31/21 16:00        100


 


1/31/21 16:00   23 117/73  Mechanical Ventilator  100


 


1/31/21 16:00      Mechanical Ventilator  


 


1/31/21 15:45  101 24 115/70 (85) 91   


 


1/31/21 15:38  94 35     100


 


1/31/21 15:30  102 24 113/72 (86) 91   

















Intake and Output  


 


 1/31/21 2/1/21





 19:00 07:00


 


Intake Total 1733.95 ml 1320.80 ml


 


Output Total 1570 ml 440 ml


 


Balance 163.95 ml 880.80 ml


 


  


 


Free Water 90 ml 


 


IV Total 983.95 ml 660.80 ml


 


Tube Feeding 660 ml 660 ml


 


Output Urine Total 1320 ml 440 ml


 


Stool Total 0 ml 0 ml


 


Chest Tube Drainage Total 250 ml 











Laboratory Tests








Test


 1/31/21


17:42 2/1/21


09:23 2/1/21


14:29


 


POC Whole Blood Glucose Pending    


 


Arterial Blood pH


 


 7.116


(7.350-7.450) 





 


Arterial Blood Partial


Pressure CO2 


 90.8 mmHg


(35.0-45.0)  *H 





 


Arterial Blood Partial


Pressure O2 


 56.3 mmHg


(75.0-100.0)  L 





 


Arterial Blood HCO3


 


 28.6 mmol/L


(22.0-26.0)  H 





 


Arterial Blood Oxygen


Saturation 


 85.3 %


()  *L 





 


Arterial Blood Base Excess  -2.3 (-2-2)  L 


 


Abelardo Test  Positive   


 


White Blood Count


 


 


 9.0 K/UL


(4.8-10.8)


 


Red Blood Count


 


 


 3.41 M/UL


(4.70-6.10)  L


 


Hemoglobin


 


 


 9.8 G/DL


(14.2-18.0)  L


 


Hematocrit


 


 


 32.8 %


(42.0-52.0)  L


 


Mean Corpuscular Volume   96 FL (80-99)  


 


Mean Corpuscular Hemoglobin


 


 


 28.7 PG


(27.0-31.0)


 


Mean Corpuscular Hemoglobin


Concent 


 


 29.9 G/DL


(32.0-36.0)  L


 


Red Cell Distribution Width


 


 


 18.0 %


(11.6-14.8)  H


 


Platelet Count


 


 


 181 K/UL


(150-450)


 


Mean Platelet Volume


 


 


 9.5 FL


(6.5-10.1)


 


Neutrophils (%) (Auto)


 


 


 83.1 %


(45.0-75.0)  H


 


Lymphocytes (%) (Auto)


 


 


 13.2 %


(20.0-45.0)  L


 


Monocytes (%) (Auto)


 


 


 2.6 %


(1.0-10.0)


 


Eosinophils (%) (Auto)


 


 


 0.5 %


(0.0-3.0)


 


Basophils (%) (Auto)


 


 


 0.6 %


(0.0-2.0)


 


Sodium Level


 


 


 143 MMOL/L


(136-145)


 


Potassium Level


 


 


 5.7 MMOL/L


(3.5-5.1)  H


 


Chloride Level


 


 


 108 MMOL/L


()  H


 


Carbon Dioxide Level


 


 


 27 MMOL/L


(21-32)


 


Anion Gap


 


 


 9 mmol/L


(5-15)


 


Blood Urea Nitrogen


 


 


 75 mg/dL


(7-18)  H


 


Creatinine


 


 


 2.6 MG/DL


(0.55-1.30)  H


 


Estimat Glomerular Filtration


Rate 


 


 24.8 mL/min


(>60)


 


Glucose Level


 


 


 330 MG/DL


()  H


 


Uric Acid


 


 


 7.5 MG/DL


(2.6-7.2)  H


 


Calcium Level


 


 


 7.5 MG/DL


(8.5-10.1)  L


 


Phosphorus Level


 


 


 7.1 MG/DL


(2.5-4.9)  H


 


Magnesium Level


 


 


 2.4 MG/DL


(1.8-2.4)


 


Total Bilirubin


 


 


 0.5 MG/DL


(0.2-1.0)


 


Aspartate Amino Transf


(AST/SGOT) 


 


 42 U/L (15-37)


H


 


Alanine Aminotransferase


(ALT/SGPT) 


 


 39 U/L (12-78)





 


Alkaline Phosphatase


 


 


 381 U/L


()  H


 


Total Protein


 


 


 5.6 G/DL


(6.4-8.2)  L


 


Albumin


 


 


 1.1 G/DL


(3.4-5.0)  L


 


Globulin   4.5 g/dL  


 


Albumin/Globulin Ratio


 


 


 0.2 (1.0-2.7)


L








Objective


HEENT: No JVD. Orally intubated


LUNGS:  Have decreased breath sounds with the chest tube on the right side


and subcutaneous emphysema.


CARDIOVASCULAR:  Regular S1, S2 with


no gallop.


ABDOMEN:  Soft.


EXTREMITIES:  A 1+ pitting edema.











Jake George MD                Feb 1, 2021 15:30

## 2021-02-01 NOTE — NUR
NURSE NOTES:

Pt repositioned.

PO care provided.

Axillary temp 99 F with cooling blanket on. 

Will continue to monitor.

## 2021-02-02 VITALS — SYSTOLIC BLOOD PRESSURE: 109 MMHG | DIASTOLIC BLOOD PRESSURE: 68 MMHG

## 2021-02-02 VITALS — SYSTOLIC BLOOD PRESSURE: 79 MMHG | DIASTOLIC BLOOD PRESSURE: 57 MMHG

## 2021-02-02 VITALS — SYSTOLIC BLOOD PRESSURE: 97 MMHG | DIASTOLIC BLOOD PRESSURE: 62 MMHG

## 2021-02-02 VITALS — SYSTOLIC BLOOD PRESSURE: 124 MMHG | DIASTOLIC BLOOD PRESSURE: 68 MMHG

## 2021-02-02 VITALS — SYSTOLIC BLOOD PRESSURE: 111 MMHG | DIASTOLIC BLOOD PRESSURE: 68 MMHG

## 2021-02-02 VITALS — DIASTOLIC BLOOD PRESSURE: 72 MMHG | SYSTOLIC BLOOD PRESSURE: 107 MMHG

## 2021-02-02 VITALS — DIASTOLIC BLOOD PRESSURE: 59 MMHG | SYSTOLIC BLOOD PRESSURE: 100 MMHG

## 2021-02-02 VITALS — DIASTOLIC BLOOD PRESSURE: 70 MMHG | SYSTOLIC BLOOD PRESSURE: 120 MMHG

## 2021-02-02 VITALS — DIASTOLIC BLOOD PRESSURE: 57 MMHG | SYSTOLIC BLOOD PRESSURE: 85 MMHG

## 2021-02-02 VITALS — SYSTOLIC BLOOD PRESSURE: 95 MMHG | DIASTOLIC BLOOD PRESSURE: 62 MMHG

## 2021-02-02 VITALS — DIASTOLIC BLOOD PRESSURE: 71 MMHG | SYSTOLIC BLOOD PRESSURE: 111 MMHG

## 2021-02-02 VITALS — DIASTOLIC BLOOD PRESSURE: 58 MMHG | SYSTOLIC BLOOD PRESSURE: 93 MMHG

## 2021-02-02 VITALS — SYSTOLIC BLOOD PRESSURE: 102 MMHG | DIASTOLIC BLOOD PRESSURE: 60 MMHG

## 2021-02-02 VITALS — DIASTOLIC BLOOD PRESSURE: 62 MMHG | SYSTOLIC BLOOD PRESSURE: 94 MMHG

## 2021-02-02 VITALS — DIASTOLIC BLOOD PRESSURE: 80 MMHG | SYSTOLIC BLOOD PRESSURE: 126 MMHG

## 2021-02-02 VITALS — SYSTOLIC BLOOD PRESSURE: 122 MMHG | DIASTOLIC BLOOD PRESSURE: 80 MMHG

## 2021-02-02 VITALS — SYSTOLIC BLOOD PRESSURE: 143 MMHG | DIASTOLIC BLOOD PRESSURE: 79 MMHG

## 2021-02-02 VITALS — SYSTOLIC BLOOD PRESSURE: 101 MMHG | DIASTOLIC BLOOD PRESSURE: 62 MMHG

## 2021-02-02 VITALS — SYSTOLIC BLOOD PRESSURE: 126 MMHG | DIASTOLIC BLOOD PRESSURE: 71 MMHG

## 2021-02-02 VITALS — SYSTOLIC BLOOD PRESSURE: 96 MMHG | DIASTOLIC BLOOD PRESSURE: 60 MMHG

## 2021-02-02 VITALS — DIASTOLIC BLOOD PRESSURE: 67 MMHG | SYSTOLIC BLOOD PRESSURE: 111 MMHG

## 2021-02-02 VITALS — DIASTOLIC BLOOD PRESSURE: 67 MMHG | SYSTOLIC BLOOD PRESSURE: 106 MMHG

## 2021-02-02 VITALS — DIASTOLIC BLOOD PRESSURE: 86 MMHG | SYSTOLIC BLOOD PRESSURE: 135 MMHG

## 2021-02-02 VITALS — SYSTOLIC BLOOD PRESSURE: 102 MMHG | DIASTOLIC BLOOD PRESSURE: 67 MMHG

## 2021-02-02 VITALS — DIASTOLIC BLOOD PRESSURE: 75 MMHG | SYSTOLIC BLOOD PRESSURE: 125 MMHG

## 2021-02-02 VITALS — DIASTOLIC BLOOD PRESSURE: 69 MMHG | SYSTOLIC BLOOD PRESSURE: 120 MMHG

## 2021-02-02 VITALS — SYSTOLIC BLOOD PRESSURE: 111 MMHG | DIASTOLIC BLOOD PRESSURE: 65 MMHG

## 2021-02-02 VITALS — DIASTOLIC BLOOD PRESSURE: 74 MMHG | SYSTOLIC BLOOD PRESSURE: 120 MMHG

## 2021-02-02 VITALS — SYSTOLIC BLOOD PRESSURE: 102 MMHG | DIASTOLIC BLOOD PRESSURE: 63 MMHG

## 2021-02-02 VITALS — SYSTOLIC BLOOD PRESSURE: 77 MMHG | DIASTOLIC BLOOD PRESSURE: 50 MMHG

## 2021-02-02 VITALS — SYSTOLIC BLOOD PRESSURE: 108 MMHG | DIASTOLIC BLOOD PRESSURE: 66 MMHG

## 2021-02-02 VITALS — DIASTOLIC BLOOD PRESSURE: 68 MMHG | SYSTOLIC BLOOD PRESSURE: 100 MMHG

## 2021-02-02 VITALS — DIASTOLIC BLOOD PRESSURE: 57 MMHG | SYSTOLIC BLOOD PRESSURE: 98 MMHG

## 2021-02-02 VITALS — DIASTOLIC BLOOD PRESSURE: 62 MMHG | SYSTOLIC BLOOD PRESSURE: 108 MMHG

## 2021-02-02 VITALS — DIASTOLIC BLOOD PRESSURE: 60 MMHG | SYSTOLIC BLOOD PRESSURE: 100 MMHG

## 2021-02-02 VITALS — SYSTOLIC BLOOD PRESSURE: 106 MMHG | DIASTOLIC BLOOD PRESSURE: 67 MMHG

## 2021-02-02 VITALS — SYSTOLIC BLOOD PRESSURE: 120 MMHG | DIASTOLIC BLOOD PRESSURE: 71 MMHG

## 2021-02-02 VITALS — SYSTOLIC BLOOD PRESSURE: 97 MMHG | DIASTOLIC BLOOD PRESSURE: 67 MMHG

## 2021-02-02 VITALS — DIASTOLIC BLOOD PRESSURE: 77 MMHG | SYSTOLIC BLOOD PRESSURE: 130 MMHG

## 2021-02-02 VITALS — SYSTOLIC BLOOD PRESSURE: 137 MMHG | DIASTOLIC BLOOD PRESSURE: 80 MMHG

## 2021-02-02 VITALS — SYSTOLIC BLOOD PRESSURE: 88 MMHG | DIASTOLIC BLOOD PRESSURE: 60 MMHG

## 2021-02-02 VITALS — DIASTOLIC BLOOD PRESSURE: 62 MMHG | SYSTOLIC BLOOD PRESSURE: 95 MMHG

## 2021-02-02 VITALS — SYSTOLIC BLOOD PRESSURE: 120 MMHG | DIASTOLIC BLOOD PRESSURE: 74 MMHG

## 2021-02-02 VITALS — SYSTOLIC BLOOD PRESSURE: 113 MMHG | DIASTOLIC BLOOD PRESSURE: 70 MMHG

## 2021-02-02 VITALS — DIASTOLIC BLOOD PRESSURE: 72 MMHG | SYSTOLIC BLOOD PRESSURE: 112 MMHG

## 2021-02-02 VITALS — SYSTOLIC BLOOD PRESSURE: 121 MMHG | DIASTOLIC BLOOD PRESSURE: 76 MMHG

## 2021-02-02 VITALS — SYSTOLIC BLOOD PRESSURE: 97 MMHG | DIASTOLIC BLOOD PRESSURE: 64 MMHG

## 2021-02-02 VITALS — SYSTOLIC BLOOD PRESSURE: 101 MMHG | DIASTOLIC BLOOD PRESSURE: 64 MMHG

## 2021-02-02 VITALS — DIASTOLIC BLOOD PRESSURE: 71 MMHG | SYSTOLIC BLOOD PRESSURE: 117 MMHG

## 2021-02-02 VITALS — DIASTOLIC BLOOD PRESSURE: 62 MMHG | SYSTOLIC BLOOD PRESSURE: 93 MMHG

## 2021-02-02 VITALS — SYSTOLIC BLOOD PRESSURE: 106 MMHG | DIASTOLIC BLOOD PRESSURE: 69 MMHG

## 2021-02-02 LAB
ADD MANUAL DIFF: YES
ALBUMIN SERPL-MCNC: 1.4 G/DL (ref 3.4–5)
ALBUMIN/GLOB SERPL: 0.3 {RATIO} (ref 1–2.7)
ALP SERPL-CCNC: 309 U/L (ref 46–116)
ALT SERPL-CCNC: 33 U/L (ref 12–78)
AMMONIA PLAS-SCNC: 55 UMOL/L (ref 11–32)
ANION GAP SERPL CALC-SCNC: 9 MMOL/L (ref 5–15)
AST SERPL-CCNC: 24 U/L (ref 15–37)
BILIRUB SERPL-MCNC: 0.6 MG/DL (ref 0.2–1)
BUN SERPL-MCNC: 74 MG/DL (ref 7–18)
CALCIUM SERPL-MCNC: 7.9 MG/DL (ref 8.5–10.1)
CHLORIDE SERPL-SCNC: 106 MMOL/L (ref 98–107)
CK SERPL-CCNC: 20 U/L (ref 26–308)
CO2 SERPL-SCNC: 29 MMOL/L (ref 21–32)
CREAT SERPL-MCNC: 2.6 MG/DL (ref 0.55–1.3)
ERYTHROCYTE [DISTWIDTH] IN BLOOD BY AUTOMATED COUNT: 18 % (ref 11.6–14.8)
GAMMA GLUTAMYL TRANSPEPTIDASE: 190 U/L (ref 5–85)
GLOBULIN SER-MCNC: 4.8 G/DL
HCT VFR BLD CALC: 30.2 % (ref 42–52)
HGB BLD-MCNC: 9 G/DL (ref 14.2–18)
LDH SERPL L TO P-CCNC: 279 U/L (ref 81–234)
MCV RBC AUTO: 97 FL (ref 80–99)
PHOSPHATE SERPL-MCNC: 6.1 MG/DL (ref 2.5–4.9)
PLATELET # BLD: 190 K/UL (ref 150–450)
POTASSIUM SERPL-SCNC: 4.5 MMOL/L (ref 3.5–5.1)
RBC # BLD AUTO: 3.12 M/UL (ref 4.7–6.1)
SODIUM SERPL-SCNC: 144 MMOL/L (ref 136–145)
WBC # BLD AUTO: 6.5 K/UL (ref 4.8–10.8)

## 2021-02-02 RX ADMIN — INSULIN ASPART SCH UNITS: 100 INJECTION, SOLUTION INTRAVENOUS; SUBCUTANEOUS at 06:27

## 2021-02-02 RX ADMIN — AMIODARONE HYDROCHLORIDE SCH MG: 200 TABLET ORAL at 08:11

## 2021-02-02 RX ADMIN — HYDROCORTISONE SODIUM SUCCINATE SCH MG: 100 INJECTION, POWDER, FOR SOLUTION INTRAMUSCULAR; INTRAVENOUS at 05:38

## 2021-02-02 RX ADMIN — Medication SCH MLS/HR: at 10:11

## 2021-02-02 RX ADMIN — INSULIN ASPART SCH UNITS: 100 INJECTION, SOLUTION INTRAVENOUS; SUBCUTANEOUS at 12:20

## 2021-02-02 RX ADMIN — PANTOPRAZOLE SODIUM SCH MG: 40 INJECTION, POWDER, FOR SOLUTION INTRAVENOUS at 20:39

## 2021-02-02 RX ADMIN — ENOXAPARIN SODIUM SCH MG: 80 INJECTION SUBCUTANEOUS at 08:12

## 2021-02-02 RX ADMIN — MIDODRINE HYDROCHLORIDE SCH MG: 10 TABLET ORAL at 15:10

## 2021-02-02 RX ADMIN — INSULIN ASPART SCH UNITS: 100 INJECTION, SOLUTION INTRAVENOUS; SUBCUTANEOUS at 00:00

## 2021-02-02 RX ADMIN — SODIUM CHLORIDE SCH MLS/HR: 900 INJECTION, SOLUTION INTRAVENOUS at 05:37

## 2021-02-02 RX ADMIN — SODIUM CHLORIDE SCH MLS/HR: 0.9 INJECTION INTRAVENOUS at 10:12

## 2021-02-02 RX ADMIN — Medication SCH MLS/HR: at 23:11

## 2021-02-02 RX ADMIN — HYDROCORTISONE SODIUM SUCCINATE SCH MG: 100 INJECTION, POWDER, FOR SOLUTION INTRAMUSCULAR; INTRAVENOUS at 22:05

## 2021-02-02 RX ADMIN — PANTOPRAZOLE SODIUM SCH MG: 40 INJECTION, POWDER, FOR SOLUTION INTRAVENOUS at 08:12

## 2021-02-02 RX ADMIN — MIDODRINE HYDROCHLORIDE SCH MG: 10 TABLET ORAL at 05:38

## 2021-02-02 RX ADMIN — ENOXAPARIN SODIUM SCH MG: 80 INJECTION SUBCUTANEOUS at 20:39

## 2021-02-02 RX ADMIN — HYDROCORTISONE SODIUM SUCCINATE SCH MG: 100 INJECTION, POWDER, FOR SOLUTION INTRAMUSCULAR; INTRAVENOUS at 15:10

## 2021-02-02 RX ADMIN — INSULIN DETEMIR SCH UNITS: 100 INJECTION, SOLUTION SUBCUTANEOUS at 08:29

## 2021-02-02 RX ADMIN — MIDODRINE HYDROCHLORIDE SCH MG: 10 TABLET ORAL at 22:05

## 2021-02-02 RX ADMIN — DIGOXIN SCH MG: 0.12 TABLET ORAL at 08:11

## 2021-02-02 RX ADMIN — Medication SCH MLS/HR: at 03:15

## 2021-02-02 RX ADMIN — INSULIN ASPART SCH UNITS: 100 INJECTION, SOLUTION INTRAVENOUS; SUBCUTANEOUS at 18:17

## 2021-02-02 RX ADMIN — INSULIN DETEMIR SCH UNITS: 100 INJECTION, SOLUTION SUBCUTANEOUS at 21:27

## 2021-02-02 RX ADMIN — CHLORHEXIDINE GLUCONATE SCH APPLIC: 213 SOLUTION TOPICAL at 20:39

## 2021-02-02 NOTE — NUR
NURSE NOTES:



Cleaned patient for small amount of leaked brown loose BM from rectal tube. Dressing 
reinforced. Turned and repositioned patient. Will continue to monitor.

## 2021-02-02 NOTE — SURGERY PROGRESS NOTE
Surgery Progress Note


Subjective


Procedure Performed


Right tube thoracostomy chest tube insertion


Additional Comments


ill appearing


70% saturation 


Fi02 100


peep 12


prognosis guarded 


ct okay





Objective





Last 24 Hour Vital Signs








  Date Time  Temp Pulse Resp B/P (MAP) Pulse Ox O2 Delivery O2 Flow Rate FiO2


 


2/2/21 10:11   26 96/60  Mechanical Ventilator  100


 


2/2/21 08:11  83      


 


2/2/21 07:15   26 117/71  Mechanical Ventilator  100


 


2/2/21 07:00  83 17 117/71 (86) 86   


 


2/2/21 07:00    117/71    


 


2/2/21 06:15   28 126/80  Mechanical Ventilator  100


 


2/2/21 06:00    126/80    


 


2/2/21 06:00  79 27 126/80 (95) 94   


 


2/2/21 05:37    126/80    


 


2/2/21 05:36    126/80    


 


2/2/21 05:26  77 34     100


 


2/2/21 05:15   26 121/76  Mechanical Ventilator  100


 


2/2/21 05:00  77 25 121/76 (91) 92   


 


2/2/21 05:00    121/76    


 


2/2/21 04:15   26 126/80  Mechanical Ventilator  100


 


2/2/21 04:00      Mechanical Ventilator  


 


2/2/21 04:00        100


 


2/2/21 04:00  78      


 


2/2/21 04:00 98.1 81 22 126/80 (95) 91   


 


2/2/21 04:00    126/80    


 


2/2/21 03:19  82 34     100


 


2/2/21 03:15   22 124/68  Mechanical Ventilator 100.0 100


 


2/2/21 03:00    112/72    


 


2/2/21 03:00  83 28 112/72 (85) 91   


 


2/2/21 02:30  90 22 124/68 (86) 90   


 


2/2/21 02:00    120/71    


 


2/2/21 02:00  83 25 120/71 (87) 86   


 


2/2/21 01:59  84 37     100


 


2/2/21 01:00  89 24 106/67 (80) 92   


 


2/2/21 01:00   28 106/67  Mechanical Ventilator  100


 


2/2/21 01:00    106/67    


 


2/2/21 00:00   28 108/62  Mechanical Ventilator  100


 


2/2/21 00:00    108/62    


 


2/2/21 00:00  86      


 


2/2/21 00:00 98.0 86 31 108/62 (77) 92   


 


2/2/21 00:00        100


 


2/2/21 00:00      Mechanical Ventilator  


 


2/1/21 23:45  82 37     100


 


2/1/21 23:30  80 27 103/66 (78) 92   


 


2/1/21 23:00  78 28 107/71 (83) 91   


 


2/1/21 23:00   28 105/68  Mechanical Ventilator  100


 


2/1/21 23:00    105/68    


 


2/1/21 22:00   28 120/76  Mechanical Ventilator  100


 


2/1/21 22:00    120/76    


 


2/1/21 22:00  76 25 130/79 (96) 92   


 


2/1/21 21:30  80 28 125/75 (92) 91   


 


2/1/21 21:21  76 34     100


 


2/1/21 21:00   26 120/73  Mechanical Ventilator  100


 


2/1/21 21:00    120/73    


 


2/1/21 21:00  78 27 124/78 (93) 94   


 


2/1/21 20:45   28 120/76  Mechanical Ventilator  100


 


2/1/21 20:45    120/76    


 


2/1/21 20:30  80 29 123/75 (91) 94   


 


2/1/21 20:30  80 29 123/75 (91) 92   


 


2/1/21 20:00 97.7 83 27 114/73 (87) 91   


 


2/1/21 20:00   25 116/71  Mechanical Ventilator  100


 


2/1/21 20:00    116/71    


 


2/1/21 20:00      Mechanical Ventilator  


 


2/1/21 20:00        100


 


2/1/21 20:00  87      


 


2/1/21 19:21  87 36     100


 


2/1/21 19:00   32 105/63  Mechanical Ventilator  100


 


2/1/21 19:00    105/63    


 


2/1/21 19:00  89 25 105/63 (77) 93   


 


2/1/21 18:30    106/67    


 


2/1/21 18:15    120/73    


 


2/1/21 18:00   32 117/72  Mechanical Ventilator  100


 


2/1/21 18:00    117/72    


 


2/1/21 18:00  97 31 119/72 (88) 91   


 


2/1/21 17:45    119/72    


 


2/1/21 17:30    126/77    


 


2/1/21 17:15    126/75    


 


2/1/21 17:05  101 36     100


 


2/1/21 17:00   32 123/71  Mechanical Ventilator  100


 


2/1/21 17:00    123/71    


 


2/1/21 17:00  106 24 123/71 (88) 90   


 


2/1/21 16:00      Mechanical Ventilator  


 


2/1/21 16:00        100


 


2/1/21 16:00   32 95/60  Mechanical Ventilator  100


 


2/1/21 16:00 99.0 106 26 95/60 (72) 90   


 


2/1/21 16:00  106      


 


2/1/21 15:59    90/54    


 


2/1/21 15:05  108 35     100


 


2/1/21 15:00  106 30 89/56 (67) 90   


 


2/1/21 15:00   32 89/56  Mechanical Ventilator  100


 


2/1/21 15:00    89/56    


 


2/1/21 14:00  118 34 168/87 (114) 61   


 


2/1/21 14:00   32 168/87  Mechanical Ventilator  100


 


2/1/21 14:00    168/87    


 


2/1/21 13:05  108 32     100








I&O











Intake and Output  


 


 2/1/21 2/2/21





 19:00 07:00


 


Intake Total 2274.4275 ml 2557.40 ml


 


Output Total 1250 ml 805 ml


 


Balance 1024.4275 ml 1752.40 ml


 


  


 


Free Water 400 ml 300 ml


 


IV Total 1214.4275 ml 1717.40 ml


 


Tube Feeding 660 ml 420 ml


 


Other  120 ml


 


Output Urine Total 800 ml 555 ml


 


Stool Total 100 ml 50 ml


 


Chest Tube Drainage Total 350 ml 200 ml








Dressing:  dry


Drains:  other


Cardiovascular:  RSR, other


Respiratory:  decreased breath sounds


Abdomen:  non-tender, present bowel sounds, decreased bowel sounds


Extremities:  edema, no tenderness, no cyanosis





Laboratory Tests








Test


 2/1/21


14:29 2/2/21


04:40 2/2/21


08:40


 


White Blood Count


 9.0 K/UL


(4.8-10.8) 6.5 K/UL


(4.8-10.8) 





 


Red Blood Count


 3.41 M/UL


(4.70-6.10)  L 3.12 M/UL


(4.70-6.10)  L 





 


Hemoglobin


 9.8 G/DL


(14.2-18.0)  L 9.0 G/DL


(14.2-18.0)  L 





 


Hematocrit


 32.8 %


(42.0-52.0)  L 30.2 %


(42.0-52.0)  L 





 


Mean Corpuscular Volume 96 FL (80-99)   97 FL (80-99)   


 


Mean Corpuscular Hemoglobin


 28.7 PG


(27.0-31.0) 28.8 PG


(27.0-31.0) 





 


Mean Corpuscular Hemoglobin


Concent 29.9 G/DL


(32.0-36.0)  L 29.8 G/DL


(32.0-36.0)  L 





 


Red Cell Distribution Width


 18.0 %


(11.6-14.8)  H 18.0 %


(11.6-14.8)  H 





 


Platelet Count


 181 K/UL


(150-450) 190 K/UL


(150-450) 





 


Mean Platelet Volume


 9.5 FL


(6.5-10.1) 9.5 FL


(6.5-10.1) 





 


Neutrophils (%) (Auto)


 83.1 %


(45.0-75.0)  H % (45.0-75.0)


 





 


Lymphocytes (%) (Auto)


 13.2 %


(20.0-45.0)  L % (20.0-45.0)


 





 


Monocytes (%) (Auto)


 2.6 %


(1.0-10.0)  % (1.0-10.0)  


 





 


Eosinophils (%) (Auto)


 0.5 %


(0.0-3.0)  % (0.0-3.0)  


 





 


Basophils (%) (Auto)


 0.6 %


(0.0-2.0)  % (0.0-2.0)  


 





 


Sodium Level


 143 MMOL/L


(136-145) 144 MMOL/L


(136-145) 





 


Potassium Level


 5.7 MMOL/L


(3.5-5.1)  H 4.5 MMOL/L


(3.5-5.1) 





 


Chloride Level


 108 MMOL/L


()  H 106 MMOL/L


() 





 


Carbon Dioxide Level


 27 MMOL/L


(21-32) 29 MMOL/L


(21-32) 





 


Anion Gap


 9 mmol/L


(5-15) 9 mmol/L


(5-15) 





 


Blood Urea Nitrogen


 75 mg/dL


(7-18)  H 74 mg/dL


(7-18)  H 





 


Creatinine


 2.6 MG/DL


(0.55-1.30)  H 2.6 MG/DL


(0.55-1.30)  H 





 


Estimat Glomerular Filtration


Rate 24.8 mL/min


(>60) 24.8 mL/min


(>60) 





 


Glucose Level


 330 MG/DL


()  H 281 MG/DL


()  H 





 


Uric Acid


 7.5 MG/DL


(2.6-7.2)  H 7.8 MG/DL


(2.6-7.2)  H 





 


Calcium Level


 7.5 MG/DL


(8.5-10.1)  L 7.9 MG/DL


(8.5-10.1)  L 





 


Phosphorus Level


 7.1 MG/DL


(2.5-4.9)  H 6.1 MG/DL


(2.5-4.9)  H 





 


Magnesium Level


 2.4 MG/DL


(1.8-2.4) 2.2 MG/DL


(1.8-2.4) 





 


Total Bilirubin


 0.5 MG/DL


(0.2-1.0) 0.6 MG/DL


(0.2-1.0) 





 


Aspartate Amino Transf


(AST/SGOT) 42 U/L (15-37)


H 24 U/L (15-37)


 





 


Alanine Aminotransferase


(ALT/SGPT) 39 U/L (12-78)


 33 U/L (12-78)


 





 


Alkaline Phosphatase


 381 U/L


()  H 309 U/L


()  H 





 


Total Protein


 5.6 G/DL


(6.4-8.2)  L 6.2 G/DL


(6.4-8.2)  L 





 


Albumin


 1.1 G/DL


(3.4-5.0)  L 1.4 G/DL


(3.4-5.0)  L 





 


Globulin 4.5 g/dL   4.8 g/dL   


 


Albumin/Globulin Ratio


 0.2 (1.0-2.7)


L 0.3 (1.0-2.7)


L 





 


Differential Total Cells


Counted 


 100  


 





 


Neutrophils % (Manual)  91 % (45-75)  H 


 


Lymphocytes % (Manual)  6 % (20-45)  L 


 


Monocytes % (Manual)  3 % (1-10)   


 


Eosinophils % (Manual)  0 % (0-3)   


 


Basophils % (Manual)  0 % (0-2)   


 


Band Neutrophils  0 % (0-8)   


 


Platelet Estimate  Adequate   


 


Platelet Morphology  Normal   


 


Polychromasia  1+   


 


Hypochromasia  1+   


 


Anisocytosis  1+   


 


Hemoglobin A1c


 


 10.5 %


(4.3-6.0)  H 





 


Lactic Acid Level


 


 1.30 mmol/L


(0.4-2.0) 





 


Gamma Glutamyl Transpeptidase


 


 190 U/L (5-85)


H 





 


Ammonia


 


 55 umol/L


(11-32)  H 





 


Lactate Dehydrogenase


 


 279 U/L


()  H 





 


Total Creatine Kinase


 


 20 U/L


()  L 





 


Troponin I


 


 0.001 ng/mL


(0.000-0.056) 





 


C-Reactive Protein,


Quantitative 


 17.0 mg/dL


(0.00-0.90)  H 





 


Pro-B-Type Natriuretic Peptide


 


 30581 pg/mL


(0-125)  H 





 


Vitamin B12 Level


 


 > 2000 PG/ML


(193-986)  H 





 


Vitamin D 25-Hydroxy  Pending   


 


25-Hydroxy Vitamin D2  Pending   


 


25-Hydroxy Vitamin D3  Pending   


 


Folate


 


 12.6 NG/ML


(8.6-58.9) 





 


Thyroid Stimulating Hormone


(TSH) 


 2.509 uiU/mL


(0.358-3.740) 





 


Digoxin Level


 


 1.4 NG/ML


(0.5-2.0) 





 


Arterial Blood pH


 


 


 7.179


(7.350-7.450)


 


Arterial Blood Partial


Pressure CO2 


 


 86.9 mmHg


(35.0-45.0)  *H


 


Arterial Blood Partial


Pressure O2 


 


 53.6 mmHg


(75.0-100.0)  L


 


Arterial Blood HCO3


 


 


 31.2 mmol/L


(22.0-26.0)  H


 


Arterial Blood Oxygen


Saturation 


 


 85.1 %


()  *L


 


Arterial Blood Base Excess   1.1 (-2-2)  


 


Abelardo Test   Positive  











Plan


Problems:  


(1) Pneumonia due to COVID-19 virus


Assessment & Plan:  Interim endotracheal intubation, endotracheal tube tip in 

good position


approximately 4 cm above the tito. There are extensive bilateral infiltrates, 

which


are markedly increased from the prior study. Pleural spaces are probably clear, 

not


well demonstrated


Markedly increased and now extensive bilateral infiltrates 


cont as per pulm and iID





(2) Hypoxia


Assessment & Plan:  patient developing DTI. in current condition, critically ill

and declining potential inevitable decline 


all care precautions taken and will cont to monitor and assist with improvement 





(3) Abdominal pain


Assessment & Plan:  65M abd pain, septic, covid, intubated ons upport


currently abd exam benign but limited given condition


line bo mary noted


okay for meds and feeds


nutritional optimization 


DAILY ESTIMATED NEEDS:


Needs based on Critical Care/ 73kg abw 


22-28  kcals/kg 


9408-5448  total kcals


1.2-2  g protein/kg


  g total protein 


25-30  mL/kg


9633-0511  total fluid mLs





NUTRITION DIAGNOSIS:


Swallowing difficulty R/T respiratory failure as evidenced by pt now


orally intubated, OGT in place, NPO





 


CURRENT TF:NPO 





 





ENTERAL NUTRITION RECOMMENDATIONS:


Vital AF 1.2 @ 60ml/hr x 24 hrs  to provide 1440ml, 1728kcal, 116g prot, 1167ml 

free water 





* As medically appropriate, initiate critical care and carb controlled TF 

formula of Vital AF 1.2


* Initiate @ 20ml/hr x 6hrs, advance 10ml q 4-6 hrs as tolerated to goal rate


* HOB over 30 degrees/ water flush per MD


* Does not exceed est kcal needs w/ Propofol running @ 8.083ml/hr x 24 hrs 

(provides 213 lipid kcal)





 





ADDITIONAL RECOMMENDATIONS:


* Calibrated bedscale wt 


* Monitor BGs closely w/ Decadron and TF, need for long acting insulin 


* Monitor lytes, replete as needed  


* Monitor Propofol rate, need to adjust TF rate  





(4) Acute metabolic encephalopathy


(5) Pneumothorax on right


Assessment & Plan:  right ptx


chest tube placed


decreased air


stable ptx


cont tube to suction


Endotracheal tube tip projects at the level of the thoracic inlet.


Orogastric tube tip projects at the level of the gastric fundus. Again 

demonstrated


is extensive subcutaneous emphysema, which appears somewhat worse on the left. 

Right


chest tube remains in place. Small right medial and apical pneumothorax are pre

sent,


slightly more conspicuous than on the previous exam, still small, somewhat 

difficult


to identify due to overlying subcutaneous emphysema. There is probably a tiny 

left


apical pneumothorax as well. Previously demonstrated pneumomediastinum has 

improved


considerably although still present. Bilateral infiltrates appear worse on the 

right.


 


Impression: Equivocally slightly increased but still small right pneumothorax.


 


Suspect tiny left apical pneumothorax


 


Improving pneumomediastinum


 


Worsening subcutaneous emphysema


 


Worsening right and stable left lung infiltrates 





 Right chest tube remains. There is still a small apical pneumothorax. Tiny


left apical pneumothorax persists, unchanged. There is decreased 

pneumomediastinum.


Subcutaneous gas has decreased as well. Bilateral infiltrates are unchanged.


Endotracheal tube remains at the level of the thoracic inlet. Orogastric tube is


again demonstrated, tip not well-visualized but probably stable in position


 


Impression: Stable tiny bilateral apical pneumothoraces


 


Unchanged bilateral infiltrates


 


Decreased pneumomediastinum and subcutaneous emphysema














Norberto Vazquez                 Feb 2, 2021 13:05

## 2021-02-02 NOTE — CARDIAC ELECTROPHYSIOLOGY PN
Assessment/Plan


Assessment/Plan


1. Atrial fibrillation with rapid ventricular response.      


      On digoxin 0.125 mg p.o. daily and Amiodarone 200 po qd  


       In SR.     Dig level 1.0





2. Acute renal failure. 


3. Right pneumothorax, status post chest tube with subcutaneous emphysema. 





4. COVID-19 pneumonia, 100% FiO2 and PEEP of 12, on Remdesivir and Solu-Medrol  

.


5. Diabetes.


6. Septic shock. On iv fluid and Levophed 16 Mcg  


7. S/P PEA arrest 1/29/21 and 1/31/21





DW RN and Dr Powell





Subjective


Subjective


In ICU on 100% Fio2 and PEEP 12 and Levo 16 Mcg  


 Right chest tube  . In SR on Dig and Amiodarone 


Coded twice 1/30/21  for PEA with chest compressions and Epi and bicarb 

injections





Objective





Last 24 Hour Vital Signs








  Date Time  Temp Pulse Resp B/P (MAP) Pulse Ox O2 Delivery O2 Flow Rate FiO2


 


2/2/21 08:11  83      


 


2/2/21 07:15   26 117/71  Mechanical Ventilator  100


 


2/2/21 07:00  83 17 117/71 (86) 86   


 


2/2/21 07:00    117/71    


 


2/2/21 06:15   28 126/80  Mechanical Ventilator  100


 


2/2/21 06:00    126/80    


 


2/2/21 06:00  79 27 126/80 (95) 94   


 


2/2/21 05:37    126/80    


 


2/2/21 05:36    126/80    


 


2/2/21 05:26  77 34     100


 


2/2/21 05:15   26 121/76  Mechanical Ventilator  100


 


2/2/21 05:00  77 25 121/76 (91) 92   


 


2/2/21 05:00    121/76    


 


2/2/21 04:15   26 126/80  Mechanical Ventilator  100


 


2/2/21 04:00      Mechanical Ventilator  


 


2/2/21 04:00        100


 


2/2/21 04:00  78      


 


2/2/21 04:00 98.1 81 22 126/80 (95) 91   


 


2/2/21 04:00    126/80    


 


2/2/21 03:19  82 34     100


 


2/2/21 03:15   22 124/68  Mechanical Ventilator 100.0 100


 


2/2/21 03:00    112/72    


 


2/2/21 03:00  83 28 112/72 (85) 91   


 


2/2/21 02:30  90 22 124/68 (86) 90   


 


2/2/21 02:00    120/71    


 


2/2/21 02:00  83 25 120/71 (87) 86   


 


2/2/21 01:59  84 37     100


 


2/2/21 01:00  89 24 106/67 (80) 92   


 


2/2/21 01:00   28 106/67  Mechanical Ventilator  100


 


2/2/21 01:00    106/67    


 


2/2/21 00:00   28 108/62  Mechanical Ventilator  100


 


2/2/21 00:00    108/62    


 


2/2/21 00:00  86      


 


2/2/21 00:00 98.0 86 31 108/62 (77) 92   


 


2/2/21 00:00        100


 


2/2/21 00:00      Mechanical Ventilator  


 


2/1/21 23:45  82 37     100


 


2/1/21 23:30  80 27 103/66 (78) 92   


 


2/1/21 23:00  78 28 107/71 (83) 91   


 


2/1/21 23:00   28 105/68  Mechanical Ventilator  100


 


2/1/21 23:00    105/68    


 


2/1/21 22:00   28 120/76  Mechanical Ventilator  100


 


2/1/21 22:00    120/76    


 


2/1/21 22:00  76 25 130/79 (96) 92   


 


2/1/21 21:30  80 28 125/75 (92) 91   


 


2/1/21 21:21  76 34     100


 


2/1/21 21:00   26 120/73  Mechanical Ventilator  100


 


2/1/21 21:00    120/73    


 


2/1/21 21:00  78 27 124/78 (93) 94   


 


2/1/21 20:45   28 120/76  Mechanical Ventilator  100


 


2/1/21 20:45    120/76    


 


2/1/21 20:30  80 29 123/75 (91) 94   


 


2/1/21 20:30  80 29 123/75 (91) 92   


 


2/1/21 20:00 97.7 83 27 114/73 (87) 91   


 


2/1/21 20:00   25 116/71  Mechanical Ventilator  100


 


2/1/21 20:00    116/71    


 


2/1/21 20:00      Mechanical Ventilator  


 


2/1/21 20:00        100


 


2/1/21 20:00  87      


 


2/1/21 19:21  87 36     100


 


2/1/21 19:00   32 105/63  Mechanical Ventilator  100


 


2/1/21 19:00    105/63    


 


2/1/21 19:00  89 25 105/63 (77) 93   


 


2/1/21 18:30    106/67    


 


2/1/21 18:15    120/73    


 


2/1/21 18:00   32 117/72  Mechanical Ventilator  100


 


2/1/21 18:00    117/72    


 


2/1/21 18:00  97 31 119/72 (88) 91   


 


2/1/21 17:45    119/72    


 


2/1/21 17:30    126/77    


 


2/1/21 17:15    126/75    


 


2/1/21 17:05  101 36     100


 


2/1/21 17:00   32 123/71  Mechanical Ventilator  100


 


2/1/21 17:00    123/71    


 


2/1/21 17:00  106 24 123/71 (88) 90   


 


2/1/21 16:00      Mechanical Ventilator  


 


2/1/21 16:00        100


 


2/1/21 16:00   32 95/60  Mechanical Ventilator  100


 


2/1/21 16:00 99.0 106 26 95/60 (72) 90   


 


2/1/21 16:00  106      


 


2/1/21 15:59    90/54    


 


2/1/21 15:05  108 35     100


 


2/1/21 15:00  106 30 89/56 (67) 90   


 


2/1/21 15:00   32 89/56  Mechanical Ventilator  100


 


2/1/21 15:00    89/56    


 


2/1/21 14:00  118 34 168/87 (114) 61   


 


2/1/21 14:00   32 168/87  Mechanical Ventilator  100


 


2/1/21 14:00    168/87    


 


2/1/21 13:05  108 32     100


 


2/1/21 13:00  90 25 102/63 (76) 89   


 


2/1/21 13:00   32 102/63  Mechanical Ventilator  100


 


2/1/21 13:00    102/63    


 


2/1/21 12:00  91      


 


2/1/21 12:00      Mechanical Ventilator  


 


2/1/21 12:00   32 101/67  Mechanical Ventilator  100


 


2/1/21 12:00    101/67    


 


2/1/21 12:00 98.2 91 14 104/63 (77) 89   


 


2/1/21 11:18        100


 


2/1/21 11:10  95 32     100


 


2/1/21 11:00   32 96/59  Mechanical Ventilator  100


 


2/1/21 11:00    96/59    


 


2/1/21 11:00  93 12 96/59 (71) 89   

















Intake and Output  


 


 2/1/21 2/2/21





 19:00 07:00


 


Intake Total 2274.4275 ml 2557.40 ml


 


Output Total 1250 ml 805 ml


 


Balance 1024.4275 ml 1752.40 ml


 


  


 


Free Water 400 ml 300 ml


 


IV Total 1214.4275 ml 1717.40 ml


 


Tube Feeding 660 ml 420 ml


 


Other  120 ml


 


Output Urine Total 800 ml 555 ml


 


Stool Total 100 ml 50 ml


 


Chest Tube Drainage Total 350 ml 200 ml











Laboratory Tests








Test


 2/1/21


14:29 2/2/21


04:40 2/2/21


08:40


 


White Blood Count


 9.0 K/UL


(4.8-10.8) 6.5 K/UL


(4.8-10.8) 





 


Red Blood Count


 3.41 M/UL


(4.70-6.10)  L 3.12 M/UL


(4.70-6.10)  L 





 


Hemoglobin


 9.8 G/DL


(14.2-18.0)  L 9.0 G/DL


(14.2-18.0)  L 





 


Hematocrit


 32.8 %


(42.0-52.0)  L 30.2 %


(42.0-52.0)  L 





 


Mean Corpuscular Volume 96 FL (80-99)   97 FL (80-99)   


 


Mean Corpuscular Hemoglobin


 28.7 PG


(27.0-31.0) 28.8 PG


(27.0-31.0) 





 


Mean Corpuscular Hemoglobin


Concent 29.9 G/DL


(32.0-36.0)  L 29.8 G/DL


(32.0-36.0)  L 





 


Red Cell Distribution Width


 18.0 %


(11.6-14.8)  H 18.0 %


(11.6-14.8)  H 





 


Platelet Count


 181 K/UL


(150-450) 190 K/UL


(150-450) 





 


Mean Platelet Volume


 9.5 FL


(6.5-10.1) 9.5 FL


(6.5-10.1) 





 


Neutrophils (%) (Auto)


 83.1 %


(45.0-75.0)  H % (45.0-75.0)


 





 


Lymphocytes (%) (Auto)


 13.2 %


(20.0-45.0)  L % (20.0-45.0)


 





 


Monocytes (%) (Auto)


 2.6 %


(1.0-10.0)  % (1.0-10.0)  


 





 


Eosinophils (%) (Auto)


 0.5 %


(0.0-3.0)  % (0.0-3.0)  


 





 


Basophils (%) (Auto)


 0.6 %


(0.0-2.0)  % (0.0-2.0)  


 





 


Sodium Level


 143 MMOL/L


(136-145) 144 MMOL/L


(136-145) 





 


Potassium Level


 5.7 MMOL/L


(3.5-5.1)  H 4.5 MMOL/L


(3.5-5.1) 





 


Chloride Level


 108 MMOL/L


()  H 106 MMOL/L


() 





 


Carbon Dioxide Level


 27 MMOL/L


(21-32) 29 MMOL/L


(21-32) 





 


Anion Gap


 9 mmol/L


(5-15) 9 mmol/L


(5-15) 





 


Blood Urea Nitrogen


 75 mg/dL


(7-18)  H 74 mg/dL


(7-18)  H 





 


Creatinine


 2.6 MG/DL


(0.55-1.30)  H 2.6 MG/DL


(0.55-1.30)  H 





 


Estimat Glomerular Filtration


Rate 24.8 mL/min


(>60) 24.8 mL/min


(>60) 





 


Glucose Level


 330 MG/DL


()  H 281 MG/DL


()  H 





 


Uric Acid


 7.5 MG/DL


(2.6-7.2)  H 7.8 MG/DL


(2.6-7.2)  H 





 


Calcium Level


 7.5 MG/DL


(8.5-10.1)  L 7.9 MG/DL


(8.5-10.1)  L 





 


Phosphorus Level


 7.1 MG/DL


(2.5-4.9)  H 6.1 MG/DL


(2.5-4.9)  H 





 


Magnesium Level


 2.4 MG/DL


(1.8-2.4) 2.2 MG/DL


(1.8-2.4) 





 


Total Bilirubin


 0.5 MG/DL


(0.2-1.0) 0.6 MG/DL


(0.2-1.0) 





 


Aspartate Amino Transf


(AST/SGOT) 42 U/L (15-37)


H 24 U/L (15-37)


 





 


Alanine Aminotransferase


(ALT/SGPT) 39 U/L (12-78)


 33 U/L (12-78)


 





 


Alkaline Phosphatase


 381 U/L


()  H 309 U/L


()  H 





 


Total Protein


 5.6 G/DL


(6.4-8.2)  L 6.2 G/DL


(6.4-8.2)  L 





 


Albumin


 1.1 G/DL


(3.4-5.0)  L 1.4 G/DL


(3.4-5.0)  L 





 


Globulin 4.5 g/dL   4.8 g/dL   


 


Albumin/Globulin Ratio


 0.2 (1.0-2.7)


L 0.3 (1.0-2.7)


L 





 


Differential Total Cells


Counted 


 100  


 





 


Neutrophils % (Manual)  91 % (45-75)  H 


 


Lymphocytes % (Manual)  6 % (20-45)  L 


 


Monocytes % (Manual)  3 % (1-10)   


 


Eosinophils % (Manual)  0 % (0-3)   


 


Basophils % (Manual)  0 % (0-2)   


 


Band Neutrophils  0 % (0-8)   


 


Platelet Estimate  Adequate   


 


Platelet Morphology  Normal   


 


Polychromasia  1+   


 


Hypochromasia  1+   


 


Anisocytosis  1+   


 


Hemoglobin A1c


 


 10.5 %


(4.3-6.0)  H 





 


Lactic Acid Level


 


 1.30 mmol/L


(0.4-2.0) 





 


Gamma Glutamyl Transpeptidase


 


 190 U/L (5-85)


H 





 


Ammonia


 


 55 umol/L


(11-32)  H 





 


Lactate Dehydrogenase


 


 279 U/L


()  H 





 


Total Creatine Kinase


 


 20 U/L


()  L 





 


Troponin I


 


 0.001 ng/mL


(0.000-0.056) 





 


C-Reactive Protein,


Quantitative 


 17.0 mg/dL


(0.00-0.90)  H 





 


Pro-B-Type Natriuretic Peptide


 


 92453 pg/mL


(0-125)  H 





 


Vitamin B12 Level


 


 > 2000 PG/ML


(193-986)  H 





 


Vitamin D 25-Hydroxy  Pending   


 


25-Hydroxy Vitamin D2  Pending   


 


25-Hydroxy Vitamin D3  Pending   


 


Folate


 


 12.6 NG/ML


(8.6-58.9) 





 


Thyroid Stimulating Hormone


(TSH) 


 2.509 uiU/mL


(0.358-3.740) 





 


Digoxin Level


 


 1.4 NG/ML


(0.5-2.0) 





 


Arterial Blood pH


 


 


 7.179


(7.350-7.450)


 


Arterial Blood Partial


Pressure CO2 


 


 86.9 mmHg


(35.0-45.0)  *H


 


Arterial Blood Partial


Pressure O2 


 


 53.6 mmHg


(75.0-100.0)  L


 


Arterial Blood HCO3


 


 


 31.2 mmol/L


(22.0-26.0)  H


 


Arterial Blood Oxygen


Saturation 


 


 85.1 %


()  *L


 


Arterial Blood Base Excess   1.1 (-2-2)  


 


Abelardo Test   Positive  








Objective


HEENT: No JVD. Orally intubated


LUNGS:  Have decreased breath sounds with the chest tube on the right side


and subcutaneous emphysema.


CARDIOVASCULAR:  Regular S1, S2 with


no gallop.


ABDOMEN:  Soft.


EXTREMITIES:  A 1+ pitting edema.











Jake George MD                Feb 2, 2021 10:03

## 2021-02-02 NOTE — INFECTIOUS DISEASES PROG NOTE
Assessment/Plan


Assessment/Plan


A


1. COVID-19 pneumonia.


2. New-onset diabetes.


3. Hypoxic respiratory failure


4. Sepsis with fever, tachycardia & leukocytosis


5. Bacteremia with COANS, ? line infection


6. Atrial fibrillation


7. Diarrhea, C. difficile negative


8. Cardiac arrest


9. Acute renal failure


10. Shock





P


1. Finished remdesivir course


2. Continue Cefepime


3. Poor prognosis





Subjective


ROS Limited/Unobtainable:  Yes


Neurologic:  Reports: other - sedated on restraint


Allergies:  


Coded Allergies:  


     No Known Allergies (Unverified , 6/11/19)





Objective





Last 24 Hour Vital Signs








  Date Time  Temp Pulse Resp B/P (MAP) Pulse Ox O2 Delivery O2 Flow Rate FiO2


 


2/2/21 10:11   26 96/60  Mechanical Ventilator  100


 


2/2/21 08:11  83      


 


2/2/21 07:15   26 117/71  Mechanical Ventilator  100


 


2/2/21 07:00  83 17 117/71 (86) 86   


 


2/2/21 07:00    117/71    


 


2/2/21 06:15   28 126/80  Mechanical Ventilator  100


 


2/2/21 06:00    126/80    


 


2/2/21 06:00  79 27 126/80 (95) 94   


 


2/2/21 05:37    126/80    


 


2/2/21 05:36    126/80    


 


2/2/21 05:26  77 34     100


 


2/2/21 05:15   26 121/76  Mechanical Ventilator  100


 


2/2/21 05:00  77 25 121/76 (91) 92   


 


2/2/21 05:00    121/76    


 


2/2/21 04:15   26 126/80  Mechanical Ventilator  100


 


2/2/21 04:00      Mechanical Ventilator  


 


2/2/21 04:00        100


 


2/2/21 04:00  78      


 


2/2/21 04:00 98.1 81 22 126/80 (95) 91   


 


2/2/21 04:00    126/80    


 


2/2/21 03:19  82 34     100


 


2/2/21 03:15   22 124/68  Mechanical Ventilator 100.0 100


 


2/2/21 03:00    112/72    


 


2/2/21 03:00  83 28 112/72 (85) 91   


 


2/2/21 02:30  90 22 124/68 (86) 90   


 


2/2/21 02:00    120/71    


 


2/2/21 02:00  83 25 120/71 (87) 86   


 


2/2/21 01:59  84 37     100


 


2/2/21 01:00  89 24 106/67 (80) 92   


 


2/2/21 01:00   28 106/67  Mechanical Ventilator  100


 


2/2/21 01:00    106/67    


 


2/2/21 00:00   28 108/62  Mechanical Ventilator  100


 


2/2/21 00:00    108/62    


 


2/2/21 00:00  86      


 


2/2/21 00:00 98.0 86 31 108/62 (77) 92   


 


2/2/21 00:00        100


 


2/2/21 00:00      Mechanical Ventilator  


 


2/1/21 23:45  82 37     100


 


2/1/21 23:30  80 27 103/66 (78) 92   


 


2/1/21 23:00  78 28 107/71 (83) 91   


 


2/1/21 23:00   28 105/68  Mechanical Ventilator  100


 


2/1/21 23:00    105/68    


 


2/1/21 22:00   28 120/76  Mechanical Ventilator  100


 


2/1/21 22:00    120/76    


 


2/1/21 22:00  76 25 130/79 (96) 92   


 


2/1/21 21:30  80 28 125/75 (92) 91   


 


2/1/21 21:21  76 34     100


 


2/1/21 21:00   26 120/73  Mechanical Ventilator  100


 


2/1/21 21:00    120/73    


 


2/1/21 21:00  78 27 124/78 (93) 94   


 


2/1/21 20:45   28 120/76  Mechanical Ventilator  100


 


2/1/21 20:45    120/76    


 


2/1/21 20:30  80 29 123/75 (91) 94   


 


2/1/21 20:30  80 29 123/75 (91) 92   


 


2/1/21 20:00 97.7 83 27 114/73 (87) 91   


 


2/1/21 20:00   25 116/71  Mechanical Ventilator  100


 


2/1/21 20:00    116/71    


 


2/1/21 20:00      Mechanical Ventilator  


 


2/1/21 20:00        100


 


2/1/21 20:00  87      


 


2/1/21 19:21  87 36     100


 


2/1/21 19:00   32 105/63  Mechanical Ventilator  100


 


2/1/21 19:00    105/63    


 


2/1/21 19:00  89 25 105/63 (77) 93   


 


2/1/21 18:30    106/67    


 


2/1/21 18:15    120/73    


 


2/1/21 18:00   32 117/72  Mechanical Ventilator  100


 


2/1/21 18:00    117/72    


 


2/1/21 18:00  97 31 119/72 (88) 91   


 


2/1/21 17:45    119/72    


 


2/1/21 17:30    126/77    


 


2/1/21 17:15    126/75    


 


2/1/21 17:05  101 36     100


 


2/1/21 17:00   32 123/71  Mechanical Ventilator  100


 


2/1/21 17:00    123/71    


 


2/1/21 17:00  106 24 123/71 (88) 90   


 


2/1/21 16:00      Mechanical Ventilator  


 


2/1/21 16:00        100


 


2/1/21 16:00   32 95/60  Mechanical Ventilator  100


 


2/1/21 16:00 99.0 106 26 95/60 (72) 90   


 


2/1/21 16:00  106      


 


2/1/21 15:59    90/54    


 


2/1/21 15:05  108 35     100


 


2/1/21 15:00  106 30 89/56 (67) 90   


 


2/1/21 15:00   32 89/56  Mechanical Ventilator  100


 


2/1/21 15:00    89/56    


 


2/1/21 14:00  118 34 168/87 (114) 61   


 


2/1/21 14:00   32 168/87  Mechanical Ventilator  100


 


2/1/21 14:00    168/87    








Height (Feet):  5


Height (Inches):  7.00


Weight (Pounds):  198


HEENT:  other - orally intubated


Respiratory/Chest:  other - on ventilator, R chest tube


Cardiovascular:  normal rate, other - PICC line


Abdomen:  soft, non tender, other - orogastric tube


Neurologic/Psychiatric:  other - sedated





Laboratory Tests








Test


 2/1/21


14:29 2/2/21


04:40 2/2/21


08:40


 


White Blood Count


 9.0 K/UL


(4.8-10.8) 6.5 K/UL


(4.8-10.8) 





 


Red Blood Count


 3.41 M/UL


(4.70-6.10)  L 3.12 M/UL


(4.70-6.10)  L 





 


Hemoglobin


 9.8 G/DL


(14.2-18.0)  L 9.0 G/DL


(14.2-18.0)  L 





 


Hematocrit


 32.8 %


(42.0-52.0)  L 30.2 %


(42.0-52.0)  L 





 


Mean Corpuscular Volume 96 FL (80-99)   97 FL (80-99)   


 


Mean Corpuscular Hemoglobin


 28.7 PG


(27.0-31.0) 28.8 PG


(27.0-31.0) 





 


Mean Corpuscular Hemoglobin


Concent 29.9 G/DL


(32.0-36.0)  L 29.8 G/DL


(32.0-36.0)  L 





 


Red Cell Distribution Width


 18.0 %


(11.6-14.8)  H 18.0 %


(11.6-14.8)  H 





 


Platelet Count


 181 K/UL


(150-450) 190 K/UL


(150-450) 





 


Mean Platelet Volume


 9.5 FL


(6.5-10.1) 9.5 FL


(6.5-10.1) 





 


Neutrophils (%) (Auto)


 83.1 %


(45.0-75.0)  H % (45.0-75.0)


 





 


Lymphocytes (%) (Auto)


 13.2 %


(20.0-45.0)  L % (20.0-45.0)


 





 


Monocytes (%) (Auto)


 2.6 %


(1.0-10.0)  % (1.0-10.0)  


 





 


Eosinophils (%) (Auto)


 0.5 %


(0.0-3.0)  % (0.0-3.0)  


 





 


Basophils (%) (Auto)


 0.6 %


(0.0-2.0)  % (0.0-2.0)  


 





 


Sodium Level


 143 MMOL/L


(136-145) 144 MMOL/L


(136-145) 





 


Potassium Level


 5.7 MMOL/L


(3.5-5.1)  H 4.5 MMOL/L


(3.5-5.1) 





 


Chloride Level


 108 MMOL/L


()  H 106 MMOL/L


() 





 


Carbon Dioxide Level


 27 MMOL/L


(21-32) 29 MMOL/L


(21-32) 





 


Anion Gap


 9 mmol/L


(5-15) 9 mmol/L


(5-15) 





 


Blood Urea Nitrogen


 75 mg/dL


(7-18)  H 74 mg/dL


(7-18)  H 





 


Creatinine


 2.6 MG/DL


(0.55-1.30)  H 2.6 MG/DL


(0.55-1.30)  H 





 


Estimat Glomerular Filtration


Rate 24.8 mL/min


(>60) 24.8 mL/min


(>60) 





 


Glucose Level


 330 MG/DL


()  H 281 MG/DL


()  H 





 


Uric Acid


 7.5 MG/DL


(2.6-7.2)  H 7.8 MG/DL


(2.6-7.2)  H 





 


Calcium Level


 7.5 MG/DL


(8.5-10.1)  L 7.9 MG/DL


(8.5-10.1)  L 





 


Phosphorus Level


 7.1 MG/DL


(2.5-4.9)  H 6.1 MG/DL


(2.5-4.9)  H 





 


Magnesium Level


 2.4 MG/DL


(1.8-2.4) 2.2 MG/DL


(1.8-2.4) 





 


Total Bilirubin


 0.5 MG/DL


(0.2-1.0) 0.6 MG/DL


(0.2-1.0) 





 


Aspartate Amino Transf


(AST/SGOT) 42 U/L (15-37)


H 24 U/L (15-37)


 





 


Alanine Aminotransferase


(ALT/SGPT) 39 U/L (12-78)


 33 U/L (12-78)


 





 


Alkaline Phosphatase


 381 U/L


()  H 309 U/L


()  H 





 


Total Protein


 5.6 G/DL


(6.4-8.2)  L 6.2 G/DL


(6.4-8.2)  L 





 


Albumin


 1.1 G/DL


(3.4-5.0)  L 1.4 G/DL


(3.4-5.0)  L 





 


Globulin 4.5 g/dL   4.8 g/dL   


 


Albumin/Globulin Ratio


 0.2 (1.0-2.7)


L 0.3 (1.0-2.7)


L 





 


Differential Total Cells


Counted 


 100  


 





 


Neutrophils % (Manual)  91 % (45-75)  H 


 


Lymphocytes % (Manual)  6 % (20-45)  L 


 


Monocytes % (Manual)  3 % (1-10)   


 


Eosinophils % (Manual)  0 % (0-3)   


 


Basophils % (Manual)  0 % (0-2)   


 


Band Neutrophils  0 % (0-8)   


 


Platelet Estimate  Adequate   


 


Platelet Morphology  Normal   


 


Polychromasia  1+   


 


Hypochromasia  1+   


 


Anisocytosis  1+   


 


Hemoglobin A1c


 


 10.5 %


(4.3-6.0)  H 





 


Lactic Acid Level


 


 1.30 mmol/L


(0.4-2.0) 





 


Gamma Glutamyl Transpeptidase


 


 190 U/L (5-85)


H 





 


Ammonia


 


 55 umol/L


(11-32)  H 





 


Lactate Dehydrogenase


 


 279 U/L


()  H 





 


Total Creatine Kinase


 


 20 U/L


()  L 





 


Troponin I


 


 0.001 ng/mL


(0.000-0.056) 





 


C-Reactive Protein,


Quantitative 


 17.0 mg/dL


(0.00-0.90)  H 





 


Pro-B-Type Natriuretic Peptide


 


 28946 pg/mL


(0-125)  H 





 


Vitamin B12 Level


 


 > 2000 PG/ML


(193-986)  H 





 


Vitamin D 25-Hydroxy  Pending   


 


25-Hydroxy Vitamin D2  Pending   


 


25-Hydroxy Vitamin D3  Pending   


 


Folate


 


 12.6 NG/ML


(8.6-58.9) 





 


Thyroid Stimulating Hormone


(TSH) 


 2.509 uiU/mL


(0.358-3.740) 





 


Digoxin Level


 


 1.4 NG/ML


(0.5-2.0) 





 


Arterial Blood pH


 


 


 7.179


(7.350-7.450)


 


Arterial Blood Partial


Pressure CO2 


 


 86.9 mmHg


(35.0-45.0)  *H


 


Arterial Blood Partial


Pressure O2 


 


 53.6 mmHg


(75.0-100.0)  L


 


Arterial Blood HCO3


 


 


 31.2 mmol/L


(22.0-26.0)  H


 


Arterial Blood Oxygen


Saturation 


 


 85.1 %


()  *L


 


Arterial Blood Base Excess   1.1 (-2-2)  


 


Abelardo Test   Positive  











Current Medications








 Medications


  (Trade)  Dose


 Ordered  Sig/Julisa


 Route


 PRN Reason  Start Time


 Stop Time Status Last Admin


Dose Admin


 


 Acetaminophen


  (Tylenol)  650 mg  Q4H  PRN


 GT


 Temp >100.5  1/15/21 17:15


 2/14/21 17:14  1/29/21 06:59





 


 Albumin Human  100 ml @ 


 100 mls/hr  Q8HR


 IV


   2/2/21 14:00


 5/3/21 13:59   





 


 Amiodarone HCl


  (Cordarone)  200 mg  DAILY


 NG


   1/26/21 09:00


 4/19/21 20:59  2/2/21 08:11





 


 Cefepime HCl 1 gm/


 Dextrose  55 ml @ 


 110 mls/hr  Q24H


 IVPB


   1/30/21 11:00


 2/6/21 10:59  2/2/21 10:12





 


 Chlorhexidine


 Gluconate


  (Vanessa-Hex 2%)  1 applic  DAILY@2000


 TOPIC


   1/19/21 20:00


 4/19/21 19:59  2/1/21 21:06





 


 Dextrose


  (Dextrose 50%)  25 ml  Q30M  PRN


 IV


 Hypoglycemia  1/9/21 13:30


 4/9/21 13:29   





 


 Dextrose


  (Dextrose 50%)  50 ml  Q30M  PRN


 IV


 Hypoglycemia  1/9/21 13:30


 4/9/21 13:29   





 


 Digoxin


  (Lanoxin)  0.125 mg  DAILY


 NG


   1/21/21 09:00


 4/21/21 08:59  2/2/21 08:11





 


 Docusate Sodium


  (Colace)  100 mg  TIDPRN  PRN


 GT


 Constipation  1/12/21 01:15


 2/11/21 01:14  1/12/21 01:42





 


 Dopamine HCl/


 Dextrose  250 ml @ 0


 mls/hr  Q24H  PRN


 IV


 For hypotension  1/31/21 11:45


 2/3/21 11:45   





 


 Enoxaparin Sodium


  (Lovenox)  80 mg  EVERY 12  HOURS


 SUBQ


   1/2/21 21:00


 4/2/21 20:59  2/2/21 08:12





 


 Fentanyl Citrate  250 ml @ 1


 mls/hr  Q24H


 IV


   2/2/21 03:00


 2/4/21 02:59  2/2/21 10:11





 


 Hydrocortisone


  (Solu-CORTEF)  100 mg  EVERY 8  HOURS


 IV


   2/1/21 15:45


 5/2/21 15:44  2/2/21 05:38





 


 Insulin Aspart


  (NovoLOG)    Q6HR


 SUBQ


   1/14/21 12:00


 4/9/21 17:59  2/2/21 12:20





 


 Insulin Detemir


  (Levemir)  26 units  Q12HR


 SUBQ


   2/2/21 09:00


 4/12/21 08:59  2/2/21 08:29





 


 Midodrine


  (Pro-Amatine)  10 mg  Q8HR


 ORAL


   2/1/21 15:30


 5/2/21 15:29  2/2/21 05:38





 


 Norepinephrine


 Bitartrate 16 mg/


 Dextrose  516 ml @ 0


 mls/hr  Q24H


 IV


   1/31/21 10:30


 2/3/21 10:29  2/2/21 05:37





 


 Pantoprazole


  (Protonix)  40 mg  Q12HR


 IVP


   2/1/21 21:00


 2/13/21 08:59  2/2/21 08:12





 


 Phenylephrine HCl


 100 mg/Dextrose  250 ml @ 0


 mls/hr  Q24H  PRN


 IV


 For hypotension  1/31/21 12:30


 2/3/21 12:29   





 


 Sodium Chloride  500 ml @ 


 999 mls/hr  Q31M PRN


 IV


 For hypotension  1/15/21 18:30


 2/14/21 18:29   





 


 Sodium Chloride  1,000 ml @ 


 100 mls/hr  Q10H


 IV


   2/1/21 15:30


 3/3/21 15:29  2/2/21 10:12




















Lamin Felix MD                Feb 2, 2021 13:15

## 2021-02-02 NOTE — NUR
NURSE NOTES:



Report received from SUZI Still. Patient is sedated in bed. RASS -2. On cooling blanket. 
Rectal temp 96.8. SR on cardiac monitor. Patient wakes up at times, trying to reach ETT. 
Continued bilateral soft wrist restraints. ETT 7.5/27cm at lip line. PC 25, rate 32, FiO2 
100%, P 12. O2 sat 80's to 95%. Right lateral chest tube in place draining serosang. No leak 
noted. Dressing dry and intact. OGT in place receiving Nepro at 35cc/hr. No residual noted. 
Stern and rectal tube in place draining to gravity. MAYO PICC line patent and asymptomatic. 
Patient is on Fentanyl at 200mcg/hr, Levo at 16mcg/min, and 1/2Ns at 100cc/hr. Bed in lowest 
position. Side rails up x3. Will resume plan of care.

-------------------------------------------------------------------------------

Addendum: 02/02/21 at 1102 by MI YEON YOON, RN RN

-------------------------------------------------------------------------------

NURSE NOTES:



Report received from SUZI Still. Patient is sedated in bed. RASS -2. On cooling blanket. 
Rectal temp 96.8. SR on cardiac monitor. Patient wakes up at times, trying to reach ETT. 
Continued bilateral soft wrist restraints. ETT 7.5/27cm at lip line. PC 25, AC rate 32, FiO2 
100%, P 12. O2 sat 80's to 95%. Right lateral chest tube in place draining serosang. No leak 
noted. Dressing dry and intact. OGT in place receiving Nepro at 35cc/hr. No residual noted. 
Stern and rectal tube in place draining to gravity. MAYO PICC line patent and asymptomatic. 
Patient is on Fentanyl at 200mcg/hr, Levo at 16mcg/min, and 1/2Ns at 100cc/hr. Bed in lowest 
position. Side rails up x3. Will resume plan of care.

## 2021-02-02 NOTE — NUR
ASE MANAGEMENT:REVIEW



2/2/21

SI: COVID PNA. RT PTX W/EMPHYSEMA

RESPIRATORY FAILURE ~ INTUBATED

98.1   82   34  97/79      SAT 86% ON VENT SUPPORT W/100% FIO2

H/H-9.0/30.2      AMMONIA+55





IS: IV ALBUMIN Q8HRS

IV NAHCO3 X1

LEVOPHED GTT

FENTANYL GTT

IV PROTONIX Q12

IV SOLUCORTEF

IVF@100/HR

IV CEFEPIME Q24

AMIODARONE NG QD

DIGOXIN NG QD

LOVENOX SQ Q12

**: ICU STATUS

DCP: FROM HOME

## 2021-02-02 NOTE — HEMATOLOGY/ONC PROGRESS NOTE
Assessment/Plan


Assessment/Plan


Leukocytosis 2/2 covid pna wbc 15->14-->17-->9.6-->13


Anemia 2/2 chronic disease, hgb 11-->10.4-->9.8


Thrombocytopenia with plt 122


Hypercoag disorder now on lovenox sq


Ac resp distress on ventilator


Pneumomediastinum


etoh abused


agitated -halodol


vent





Vent


Pressors


cont iv abx and iv decadrone


pulmonary and gi on consult


smear is noted


pneumomediastinum per pulm


dw charge nurse


lovenox sq





Subjective


Cardiovascular:  Denies: no symptoms, chest pain, edema, irregular heart rate, 

lightheadedness, palpitations, syncope, other


Respiratory:  Denies: no symptoms, cough, shortness of breath, SOB with 

excertion, SOB at rest, sputum, wheezing, other


Gastrointestinal/Abdominal:  Denies: no symptoms, abdomen distended, abdominal 

pain, black stools, tarry stools, blood in stool, constipated, diarrhea, 

difficulty swallowing, nausea, poor appetite, poor fluid intake, rectal 

bleeding, vomiting, other


Genitourinary:  Denies: no symptoms, burning, discharge, frequency, flank pain, 

hematuria, incontinence, pain, urgency, other


Neurologic/Psychiatric:  Denies: no symptoms, anxiety, depressed, emotional 

problems, headache, numbness, paresthesia, pre-existing deficit, seizure, 

tingling, tremors, weakness, other


Endocrine:  Denies: no symptoms, excessive sweating, flushing, intolerance to 

cold, intolerance to heat, increased hunger, increased thirst, increased urine, 

unexplained weight gain, unexplained weight loss, other


Allergies:  


Coded Allergies:  


     No Known Allergies (Unverified , 6/11/19)


Subjective


1/10 on ventilator 60% fio2, on sediation, unresponsive, in icu


1/14 on vent, icu, wbc elev, no bleeding, unresponsive


1/15 on vent, with cash, icu, no bleeding, unresponsive


1/17 on vent, dw rn, no major changes, icu, nv


1/18 nv, labs reviewd, hr is elev, with afib, is onlovenox sq


1/19 remains on vent, sedated with wrist restraints, icu


1/20 on vent, with hematuria, ngt, in icu, labs reviewed


1/21 remains on vent, settings have been adjusted as per icu care


1/22 remains on vent, icu, on levo 6mcg, off versed, labs noted, elmer rn


1/25 remains onv ent, in icu, with restraints, on levo gtt as well


1/26 nv, suctioned, rectal tube, in icu, with restraints, no bleeding


1/27 nv, remains on vent, rectal tube, labs reviewed, elmer rn


1/28 nv, icu, on vent/ bipap, labs noted, no bleeding, meds reviewed


1/29 nv, on vent, overnight no changes, meds reviewed, labs noted


1/30: code blue last night.


1/31 labs are noted, nv, on vent, meds reviewed, no new change, elmer rn, on 

pressors


2/1 vent, labs have been ordered, nv, meds reviewed, elmer rn


2/2 vent, levo, fentanyl, meds reviewed, nv





Objective


Objective





Current Medications








 Medications


  (Trade)  Dose


 Ordered  Sig/Julisa


 Route


 PRN Reason  Start Time


 Stop Time Status Last Admin


Dose Admin


 


 Acetaminophen


  (Tylenol)  650 mg  Q4H  PRN


 GT


 Temp >100.5  1/15/21 17:15


 2/14/21 17:14  1/29/21 06:59





 


 Amiodarone HCl


  (Cordarone)  200 mg  DAILY


 NG


   1/26/21 09:00


 4/19/21 20:59  2/1/21 09:16





 


 Cefepime HCl 1 gm/


 Dextrose  55 ml @ 


 110 mls/hr  Q24H


 IVPB


   1/30/21 11:00


 2/6/21 10:59  2/1/21 11:59





 


 Chlorhexidine


 Gluconate


  (Vanessa-Hex 2%)  1 applic  DAILY@2000


 TOPIC


   1/19/21 20:00


 4/19/21 19:59  2/1/21 21:06





 


 Dextrose


  (Dextrose 50%)  25 ml  Q30M  PRN


 IV


 Hypoglycemia  1/9/21 13:30


 4/9/21 13:29   





 


 Dextrose


  (Dextrose 50%)  50 ml  Q30M  PRN


 IV


 Hypoglycemia  1/9/21 13:30


 4/9/21 13:29   





 


 Digoxin


  (Lanoxin)  0.125 mg  DAILY


 NG


   1/21/21 09:00


 4/21/21 08:59  2/1/21 09:16





 


 Docusate Sodium


  (Colace)  100 mg  TIDPRN  PRN


 GT


 Constipation  1/12/21 01:15


 2/11/21 01:14  1/12/21 01:42





 


 Dopamine HCl/


 Dextrose  250 ml @ 0


 mls/hr  Q24H  PRN


 IV


 For hypotension  1/31/21 11:45


 2/3/21 11:45   





 


 Enoxaparin Sodium


  (Lovenox)  80 mg  EVERY 12  HOURS


 SUBQ


   1/2/21 21:00


 4/2/21 20:59  2/1/21 21:07





 


 Fentanyl Citrate  250 ml @ 1


 mls/hr  Q24H


 IV


   2/2/21 03:00


 2/4/21 02:59  2/2/21 03:15





 


 Hydrocortisone


  (Solu-CORTEF)  100 mg  EVERY 8  HOURS


 IV


   2/1/21 15:45


 5/2/21 15:44  2/2/21 05:38





 


 Insulin Aspart


  (NovoLOG)    Q6HR


 SUBQ


   1/14/21 12:00


 4/9/21 17:59  2/2/21 06:27





 


 Insulin Detemir


  (Levemir)  26 units  Q12HR


 SUBQ


   2/2/21 09:00


 4/12/21 08:59   





 


 Midodrine


  (Pro-Amatine)  10 mg  Q8HR


 ORAL


   2/1/21 15:30


 5/2/21 15:29  2/2/21 05:38





 


 Norepinephrine


 Bitartrate 16 mg/


 Dextrose  516 ml @ 0


 mls/hr  Q24H


 IV


   1/31/21 10:30


 2/3/21 10:29  2/2/21 05:37





 


 Pantoprazole


  (Protonix)  40 mg  Q12HR


 IVP


   2/1/21 21:00


 2/13/21 08:59  2/1/21 21:07





 


 Phenylephrine HCl


 100 mg/Dextrose  250 ml @ 0


 mls/hr  Q24H  PRN


 IV


 For hypotension  1/31/21 12:30


 2/3/21 12:29   





 


 Sodium Chloride  500 ml @ 


 999 mls/hr  Q31M PRN


 IV


 For hypotension  1/15/21 18:30


 2/14/21 18:29   





 


 Sodium Chloride  1,000 ml @ 


 100 mls/hr  Q10H


 IV


   2/1/21 15:30


 3/3/21 15:29  2/2/21 02:54














Last 24 Hour Vital Signs








  Date Time  Temp Pulse Resp B/P (MAP) Pulse Ox O2 Delivery O2 Flow Rate FiO2


 


2/2/21 06:00    126/80    


 


2/2/21 06:00  79 27 126/80 (95) 94   


 


2/2/21 05:37    126/80    


 


2/2/21 05:36    126/80    


 


2/2/21 05:26  77 34     100


 


2/2/21 05:15   26 121/76  Mechanical Ventilator  100


 


2/2/21 05:00  77 25 121/76 (91) 92   


 


2/2/21 05:00    121/76    


 


2/2/21 04:15   26 126/80  Mechanical Ventilator  100


 


2/2/21 04:00      Mechanical Ventilator  


 


2/2/21 04:00        100


 


2/2/21 04:00  78      


 


2/2/21 04:00 98.1 81 22 126/80 (95) 91   


 


2/2/21 04:00    126/80    


 


2/2/21 03:19  82 34     100


 


2/2/21 03:15   22 124/68  Mechanical Ventilator 100.0 100


 


2/2/21 03:00    112/72    


 


2/2/21 03:00  83 28 112/72 (85) 91   


 


2/2/21 02:30  90 22 124/68 (86) 90   


 


2/2/21 02:00    120/71    


 


2/2/21 02:00  83 25 120/71 (87) 86   


 


2/2/21 01:59  84 37     100


 


2/2/21 01:00  89 24 106/67 (80) 92   


 


2/2/21 01:00   28 106/67  Mechanical Ventilator  100


 


2/2/21 01:00    106/67    


 


2/2/21 00:00   28 108/62  Mechanical Ventilator  100


 


2/2/21 00:00    108/62    


 


2/2/21 00:00  86      


 


2/2/21 00:00 98.0 86 31 108/62 (77) 92   


 


2/2/21 00:00        100


 


2/2/21 00:00      Mechanical Ventilator  


 


2/1/21 23:45  82 37     100


 


2/1/21 23:30  80 27 103/66 (78) 92   


 


2/1/21 23:00  78 28 107/71 (83) 91   


 


2/1/21 23:00   28 105/68  Mechanical Ventilator  100


 


2/1/21 23:00    105/68    


 


2/1/21 22:00   28 120/76  Mechanical Ventilator  100


 


2/1/21 22:00    120/76    


 


2/1/21 22:00  76 25 130/79 (96) 92   


 


2/1/21 21:30  80 28 125/75 (92) 91   


 


2/1/21 21:21  76 34     100


 


2/1/21 21:00   26 120/73  Mechanical Ventilator  100


 


2/1/21 21:00    120/73    


 


2/1/21 21:00  78 27 124/78 (93) 94   


 


2/1/21 20:45   28 120/76  Mechanical Ventilator  100


 


2/1/21 20:45    120/76    


 


2/1/21 20:30  80 29 123/75 (91) 94   


 


2/1/21 20:30  80 29 123/75 (91) 92   


 


2/1/21 20:00 97.7 83 27 114/73 (87) 91   


 


2/1/21 20:00   25 116/71  Mechanical Ventilator  100


 


2/1/21 20:00    116/71    


 


2/1/21 20:00      Mechanical Ventilator  


 


2/1/21 20:00        100


 


2/1/21 20:00  87      


 


2/1/21 19:21  87 36     100


 


2/1/21 19:00   32 105/63  Mechanical Ventilator  100


 


2/1/21 19:00    105/63    


 


2/1/21 19:00  89 25 105/63 (77) 93   


 


2/1/21 18:30    106/67    


 


2/1/21 18:15    120/73    


 


2/1/21 18:00   32 117/72  Mechanical Ventilator  100


 


2/1/21 18:00    117/72    


 


2/1/21 18:00  97 31 119/72 (88) 91   


 


2/1/21 17:45    119/72    


 


2/1/21 17:30    126/77    


 


2/1/21 17:15    126/75    


 


2/1/21 17:05  101 36     100


 


2/1/21 17:00   32 123/71  Mechanical Ventilator  100


 


2/1/21 17:00    123/71    


 


2/1/21 17:00  106 24 123/71 (88) 90   


 


2/1/21 16:00      Mechanical Ventilator  


 


2/1/21 16:00        100


 


2/1/21 16:00   32 95/60  Mechanical Ventilator  100


 


2/1/21 16:00 99.0 106 26 95/60 (72) 90   


 


2/1/21 16:00  106      


 


2/1/21 15:59    90/54    


 


2/1/21 15:05  108 35     100


 


2/1/21 15:00  106 30 89/56 (67) 90   


 


2/1/21 15:00   32 89/56  Mechanical Ventilator  100


 


2/1/21 15:00    89/56    


 


2/1/21 14:00  118 34 168/87 (114) 61   


 


2/1/21 14:00   32 168/87  Mechanical Ventilator  100


 


2/1/21 14:00    168/87    


 


2/1/21 13:05  108 32     100


 


2/1/21 13:00  90 25 102/63 (76) 89   


 


2/1/21 13:00   32 102/63  Mechanical Ventilator  100


 


2/1/21 13:00    102/63    


 


2/1/21 12:00  91      


 


2/1/21 12:00      Mechanical Ventilator  


 


2/1/21 12:00   32 101/67  Mechanical Ventilator  100


 


2/1/21 12:00    101/67    


 


2/1/21 12:00 98.2 91 14 104/63 (77) 89   


 


2/1/21 11:18        100


 


2/1/21 11:10  95 32     100


 


2/1/21 11:00   32 96/59  Mechanical Ventilator  100


 


2/1/21 11:00    96/59    


 


2/1/21 11:00  93 12 96/59 (71) 89   


 


2/1/21 10:00  93 22 119/70 (86) 87   


 


2/1/21 10:00   32 119/70  Mechanical Ventilator  100


 


2/1/21 10:00    119/70    


 


2/1/21 09:20  95 33     100


 


2/1/21 09:16  96      


 


2/1/21 09:00  97 31 128/79 (95) 87   


 


2/1/21 09:00   32 128/79  Mechanical Ventilator  100


 


2/1/21 09:00    128/79    


 


2/1/21 08:00      Mechanical Ventilator  


 


2/1/21 08:00  100      


 


2/1/21 08:00   32 120/78  Mechanical Ventilator  100


 


2/1/21 08:00    120/78    


 


2/1/21 08:00        100


 


2/1/21 08:00 97.8 100 26 120/78 (92) 87   


 


2/1/21 07:30  100 22 119/76 (90) 89   


 


2/1/21 07:20  102 34     100


 


2/1/21 07:15  101 26 124/79 (94) 86   


 


2/1/21 07:00   32 133/74  Mechanical Ventilator  100


 


2/1/21 07:00    133/74    


 


2/1/21 07:00  103 25 133/74 (93) 76   


 


2/1/21 06:45  111 26 149/86 (107) 61   


 


2/1/21 06:30  98 28 87/59 (68) 82   


 


2/1/21 06:15  100 32 120/74 (89) 82   


 


2/1/21 06:01   30 119/72  Mechanical Ventilator  100


 


2/1/21 06:01    119/72    


 


2/1/21 06:00  102 33 119/72 (88) 83   


 


2/1/21 06:00  102 33 119/72 (88) 83   


 


2/1/21 05:45  103 29 116/73 (87) 82   


 


2/1/21 05:30  104 28 121/68 (85) 85   


 


2/1/21 05:15  105 26 114/68 (83) 86   


 


2/1/21 05:01   30 116/72  Mechanical Ventilator  100


 


2/1/21 05:01    117/67    


 


2/1/21 05:00  105 33 117/67 (84) 87   


 


2/1/21 04:54        100


 


2/1/21 04:48    110/73    


 


2/1/21 04:45  105 27 106/67 (80) 85   


 


2/1/21 04:45  105 27 106/67 (80) 85   


 


2/1/21 04:33   30 112/66  Mechanical Ventilator  100


 


2/1/21 04:30  107 28 112/66 (81) 88   


 


2/1/21 04:15  108 23 112/67 (82) 88   


 


2/1/21 04:00  95      


 


2/1/21 04:00 101.5 107 28 109/64 (79) 89   


 


2/1/21 04:00      Mechanical Ventilator  


 


2/1/21 03:45  109 26 107/63 (78) 86   


 


2/1/21 03:30  108 26 101/64 (76) 85   


 


2/1/21 03:28  108 33     100


 


2/1/21 03:15  107 26 103/63 (76) 85   


 


2/1/21 03:00  106 24 105/61 (76) 85   


 


2/1/21 03:00   31 103/63  Mechanical Ventilator  100


 


2/1/21 02:45  107 27 107/63 (78) 86   


 


2/1/21 02:30  106 27 102/63 (76) 85   


 


2/1/21 02:15  106 25 104/61 (75) 87   


 


2/1/21 02:00   30 104/61  Mechanical Ventilator  100


 


2/1/21 02:00  105 30 105/65 (78) 87   


 


2/1/21 01:45  105 28 92/59 (70) 86   


 


2/1/21 01:30  104 26 99/60 (73) 89   


 


2/1/21 01:15  104 26 100/60 (73) 87   


 


2/1/21 01:15  104 26 100/60 (73) 87   


 


2/1/21 01:00   30 100/60  Mechanical Ventilator  100


 


2/1/21 01:00  103 29 102/63 (76) 89   


 


2/1/21 00:45  102 31 100/57 (71) 89   


 


2/1/21 00:30  102 26 103/63 (76) 90   


 


2/1/21 00:15  101 30 100/61 (74) 90   


 


2/1/21 00:00  103      


 


2/1/21 00:00  100 32 100/61 (74) 90   


 


2/1/21 00:00      Mechanical Ventilator  


 


2/1/21 00:00   30 103/61  Mechanical Ventilator  100


 


1/31/21 23:30  99 32 99/61 (74) 92   


 


1/31/21 23:28  99 33     100


 


1/31/21 23:15  99 32 103/61 (75) 91   


 


1/31/21 23:00  98 28 98/61 (73) 91   


 


1/31/21 23:00   30 103/61  Mechanical Ventilator  100


 


1/31/21 22:45  97 31 102/60 (74) 92   


 


1/31/21 22:30  97 30 90/61 (71) 93   


 


1/31/21 22:15  96 31 100/61 (74) 93   


 


1/31/21 22:15  96 31 100/61 (74) 93   


 


1/31/21 22:00   30 100/61  Mechanical Ventilator  100


 


1/31/21 22:00  96 25 107/66 (80) 93   


 


1/31/21 21:45  96 23 107/67 (80) 94   


 


1/31/21 21:30  96 19 104/66 (79) 93   


 


1/31/21 21:15  96 20 100/67 (78) 94   


 


1/31/21 21:00  95 18 105/63 (77) 94   


 


1/31/21 21:00   30 100/67  Mechanical Ventilator  100


 


1/31/21 20:45  95 20 103/68 (80) 94   


 


1/31/21 20:30  95 33     100


 


1/31/21 20:30 97.1 94 15 105/67 (80) 94   


 


1/31/21 20:15  94 9 111/69 (83) 95   


 


1/31/21 20:15  94 9 111/69 (83) 95   


 


1/31/21 20:00      Mechanical Ventilator  


 


1/31/21 20:00  95 8 106/71 (83) 94   


 


1/31/21 20:00        100


 


1/31/21 20:00   30 111/69  Mechanical Ventilator  100


 


1/31/21 20:00  108      


 


1/31/21 19:45  95 9 110/71 (84) 95   


 


1/31/21 19:32    106/66    


 


1/31/21 19:30  94 10 106/66 (79) 95   


 


1/31/21 19:15  94 9 111/69 (83) 94   


 


1/31/21 19:00  94 9 112/71 (85) 93   


 


1/31/21 19:00   32 111/69  Mechanical Ventilator  100


 


1/31/21 18:45  92 10 115/72 (86) 92   


 


1/31/21 18:35  87 16 75/51 (59) 89   


 


1/31/21 18:33  87 16 75/51 (59) 89   


 


1/31/21 18:30  87 19 76/54 (61) 90   


 


1/31/21 18:15  94 10 104/63 (77) 94   


 


1/31/21 18:03  96 18 128/79 (95) 92   


 


1/31/21 18:00   32 128/79  Mechanical Ventilator  100


 


1/31/21 18:00  70 22  81   


 


1/31/21 17:45  97 29 119/72 (88) 93   


 


1/31/21 17:30  97 27 116/73 (87) 94   


 


1/31/21 17:18  98 34     100


 


1/31/21 17:15  98 26 114/72 (86) 93   


 


1/31/21 17:00  98 26 110/72 (85) 93   


 


1/31/21 17:00   25 114/72  Mechanical Ventilator  100


 


1/31/21 16:45  99 23 116/70 (85) 92   


 


1/31/21 16:30  100 26 117/69 (85) 92   


 


1/31/21 16:15  101 25 117/73 (88) 91   


 


1/31/21 16:00 96.7 101 26 117/72 (87) 91   


 


1/31/21 16:00        100


 


1/31/21 16:00   23 117/73  Mechanical Ventilator  100


 


1/31/21 16:00      Mechanical Ventilator  


 


1/31/21 15:45  101 24 115/70 (85) 91   


 


1/31/21 15:38  94 35     100


 


1/31/21 15:30  102 24 113/72 (86) 91   


 


1/31/21 15:15  103 25 114/70 (85) 90   


 


1/31/21 15:00   24 113/72  Mechanical Ventilator  100


 


1/31/21 15:00  104 24 113/68 (83) 90   


 


1/31/21 14:45  105 21 111/66 (81) 88   


 


1/31/21 14:30  106 24 109/68 (82) 87   


 


1/31/21 14:15  107 26 109/63 (78) 87   


 


1/31/21 14:15   16 110/63  Mechanical Ventilator  100


 


1/31/21 14:00  108 15 110/63 (79) 87   


 


1/31/21 14:00   23 109/63  Mechanical Ventilator  100


 


1/31/21 13:45  108 21 109/65 (80) 87   


 


1/31/21 13:41  113 33     100


 


1/31/21 13:30  109 22 98/64 (75) 83   


 


1/31/21 13:25   24 109/68  Mechanical Ventilator  100


 


1/31/21 13:15  109 19 119/66 (83) 86   


 


1/31/21 13:00  109 22 95/63 (74) 70   


 


1/31/21 13:00   20 119/66  Mechanical Ventilator  100


 


1/31/21 12:45  111 21 107/71 (83) 81   


 


1/31/21 12:30  111 20 113/70 (84) 85   


 


1/31/21 12:15  112 17 110/66 (81) 84   


 


1/31/21 12:00  109 21 117/67 (84) 87   


 


1/31/21 12:00   21 110/66  Mechanical Ventilator  100


 


1/31/21 12:00        100


 


1/31/21 12:00      Mechanical Ventilator  


 


1/31/21 11:45  108 25 117/77 (90) 87   


 


1/31/21 11:45   20 117/67  Mechanical Ventilator  100


 


1/31/21 11:43  112 36     100


 


1/31/21 11:35  111 25 136/78 (97) 86   


 


1/31/21 11:30  104 26  65   


 


1/31/21 11:29  94 30  67   


 


1/31/21 11:28  83 25  66   


 


1/31/21 11:27  75 23  68   


 


1/31/21 11:26  71 23 57/36 (43) 68   


 


1/31/21 11:25  64 24  73   


 


1/31/21 11:24  42 21  70   


 


1/31/21 11:23  38 21  62   


 


1/31/21 11:22  45 21  60   


 


1/31/21 11:21  67 25 39/28 (32) 60   


 


1/31/21 11:20  85 26  63   


 


1/31/21 11:19  95 25  63   


 


1/31/21 11:18  97 27  64   


 


1/31/21 11:17  95 28  69   


 


1/31/21 11:16  89 23  77   


 


1/31/21 11:15  79 26 64/45 (51) 82   


 


1/31/21 11:14  63 20  80   


 


1/31/21 11:13  44 21     


 


1/31/21 11:12  45 17  66   


 


1/31/21 11:11  62 21  72   


 


1/31/21 11:10  88 22  72   


 


1/31/21 11:00   23 64/45  Mechanical Ventilator  100


 


1/31/21 11:00  106 20 86/60 (69) 74   


 


1/31/21 10:55  111 22 85/57 (66) 78   


 


1/31/21 10:48    64/41    


 


1/31/21 10:48    98/64    


 


1/31/21 10:45  107 22 92/60 (71) 74   


 


1/31/21 10:33   23 119/75  Mechanical Ventilator  100


 


1/31/21 10:30  111 19 119/77 (91) 71   


 


1/31/21 10:29  108 23 118/75 (89) 67   


 


1/31/21 10:25  85 24 77/53 (61) 69   


 


1/31/21 10:21  77 22 49/38 (42) 76   


 


1/31/21 10:20  74 21 54/38 (43) 76   


 


1/31/21 10:20    49/38    


 


1/31/21 10:18  85 26 54/34 (41) 73   


 


1/31/21 10:15  92 19 64/41 (49) 76   


 


1/31/21 10:00  97 22 139/66 (90) 87   


 


1/31/21 10:00   21 64/41  Mechanical Ventilator  100


 


1/31/21 09:45  95 19 104/58 (73) 58   


 


1/31/21 09:45   22 139/66  Mechanical Ventilator  100


 


1/31/21 09:39  111 34     100


 


1/31/21 09:30   21 104/58  Mechanical Ventilator  100


 


1/31/21 09:30  94 21 123/70 (87) 88   


 


1/31/21 09:15  94 21 108/68 (81) 88   


 


1/31/21 09:15   21 123/71  Mechanical Ventilator  100


 


1/31/21 09:00  94 23 113/70 (84) 87   


 


1/31/21 09:00   22 108/68  Mechanical Ventilator  100


 


1/31/21 09:00  86      


 


1/31/21 08:45  94 23 116/72 (87) 88   


 


1/31/21 08:30  94 22 121/76 (91) 88   


 


1/31/21 08:15  94 22 128/70 (89) 87   


 


1/31/21 08:00        100


 


1/31/21 08:00      Mechanical Ventilator  


 


1/31/21 08:00   22 128/70  Mechanical Ventilator  100


 


1/31/21 08:00 98.8 94 21 120/73 (89) 88   


 


1/31/21 07:45  95 22 113/72 (86) 87   


 


1/31/21 07:39  95 25     100


 


1/31/21 07:30  94 22 118/72 (87) 87   


 


1/31/21 07:15  96 24 116/70 (85) 82   

















Intake and Output  


 


 2/1/21 2/2/21





 19:00 07:00


 


Intake Total 2274.4275 ml 2471.44 ml


 


Output Total 1250 ml 765 ml


 


Balance 1024.4275 ml 1706.44 ml


 


  


 


Free Water 400 ml 300 ml


 


IV Total 1214.4275 ml 1666.44 ml


 


Tube Feeding 660 ml 385 ml


 


Other  120 ml


 


Output Urine Total 800 ml 515 ml


 


Stool Total 100 ml 50 ml


 


Chest Tube Drainage Total 350 ml 200 ml











Labs








Test


 1/30/21


08:14 1/30/21


09:38 1/30/21


12:17 1/30/21


18:46


 


Arterial Blood pH


 7.290


(7.350-7.450) 


 


 





 


Arterial Blood Partial


Pressure CO2 65.7 mmHg


(35.0-45.0) 


 


 





 


Arterial Blood Partial


Pressure O2 53.7 mmHg


(75.0-100.0) 


 


 





 


Arterial Blood HCO3


 30.9 mmol/L


(22.0-26.0) 


 


 





 


Arterial Blood Oxygen


Saturation 86.2 %


() 


 


 





 


Arterial Blood Base Excess 3 (-2-2)    


 


Abelardo Test Positive    


 


Test


 1/31/21


05:55 1/31/21


06:55 1/31/21


09:32 1/31/21


11:26


 


Digoxin Level


 1.0 NG/ML


(0.5-2.0) 


 


 





 


Phosphorus Level


 


 5.4 MG/DL


(2.5-4.9) 


 





 


Magnesium Level


 


 2.1 MG/DL


(1.8-2.4) 


 





 


Test


 1/31/21


12:35 1/31/21


17:42 2/1/21


09:23 2/1/21


14:29


 


Arterial Blood pH


 7.170


(7.350-7.450) 


 7.116


(7.350-7.450) 





 


Arterial Blood Partial


Pressure CO2 82.5 mmHg


(35.0-45.0) 


 90.8 mmHg


(35.0-45.0) 





 


Arterial Blood Partial


Pressure O2 50.7 mmHg


(75.0-100.0) 


 56.3 mmHg


(75.0-100.0) 





 


Arterial Blood HCO3


 29.4 mmol/L


(22.0-26.0) 


 28.6 mmol/L


(22.0-26.0) 





 


Arterial Blood Oxygen


Saturation 78.6 %


() 


 85.3 %


() 





 


Arterial Blood Base Excess -0.5 (-2-2)   -2.3 (-2-2)  


 


Abelardo Test Positive   Positive  


 


White Blood Count


 


 


 


 9.0 K/UL


(4.8-10.8)


 


Red Blood Count


 


 


 


 3.41 M/UL


(4.70-6.10)


 


Hemoglobin


 


 


 


 9.8 G/DL


(14.2-18.0)


 


Hematocrit


 


 


 


 32.8 %


(42.0-52.0)


 


Mean Corpuscular Volume    96 FL (80-99) 


 


Mean Corpuscular Hemoglobin


 


 


 


 28.7 PG


(27.0-31.0)


 


Mean Corpuscular Hemoglobin


Concent 


 


 


 29.9 G/DL


(32.0-36.0)


 


Red Cell Distribution Width


 


 


 


 18.0 %


(11.6-14.8)


 


Platelet Count


 


 


 


 181 K/UL


(150-450)


 


Mean Platelet Volume


 


 


 


 9.5 FL


(6.5-10.1)


 


Neutrophils (%) (Auto)


 


 


 


 83.1 %


(45.0-75.0)


 


Lymphocytes (%) (Auto)


 


 


 


 13.2 %


(20.0-45.0)


 


Monocytes (%) (Auto)


 


 


 


 2.6 %


(1.0-10.0)


 


Eosinophils (%) (Auto)


 


 


 


 0.5 %


(0.0-3.0)


 


Basophils (%) (Auto)


 


 


 


 0.6 %


(0.0-2.0)


 


Sodium Level


 


 


 


 143 MMOL/L


(136-145)


 


Potassium Level


 


 


 


 5.7 MMOL/L


(3.5-5.1)


 


Chloride Level


 


 


 


 108 MMOL/L


()


 


Carbon Dioxide Level


 


 


 


 27 MMOL/L


(21-32)


 


Anion Gap


 


 


 


 9 mmol/L


(5-15)


 


Blood Urea Nitrogen


 


 


 


 75 mg/dL


(7-18)


 


Creatinine


 


 


 


 2.6 MG/DL


(0.55-1.30)


 


Estimat Glomerular Filtration


Rate 


 


 


 24.8 mL/min


(>60)


 


Glucose Level


 


 


 


 330 MG/DL


()


 


Uric Acid


 


 


 


 7.5 MG/DL


(2.6-7.2)


 


Calcium Level


 


 


 


 7.5 MG/DL


(8.5-10.1)


 


Phosphorus Level


 


 


 


 7.1 MG/DL


(2.5-4.9)


 


Magnesium Level


 


 


 


 2.4 MG/DL


(1.8-2.4)


 


Total Bilirubin


 


 


 


 0.5 MG/DL


(0.2-1.0)


 


Aspartate Amino Transf


(AST/SGOT) 


 


 


 42 U/L (15-37) 





 


Alanine Aminotransferase


(ALT/SGPT) 


 


 


 39 U/L (12-78) 





 


Alkaline Phosphatase


 


 


 


 381 U/L


()


 


Total Protein


 


 


 


 5.6 G/DL


(6.4-8.2)


 


Albumin


 


 


 


 1.1 G/DL


(3.4-5.0)


 


Globulin    4.5 g/dL 


 


Albumin/Globulin Ratio    0.2 (1.0-2.7) 


 


Test


 2/2/21


04:40 


 


 





 


White Blood Count


 6.5 K/UL


(4.8-10.8) 


 


 





 


Red Blood Count


 3.12 M/UL


(4.70-6.10) 


 


 





 


Hemoglobin


 9.0 G/DL


(14.2-18.0) 


 


 





 


Hematocrit


 30.2 %


(42.0-52.0) 


 


 





 


Mean Corpuscular Volume 97 FL (80-99)    


 


Mean Corpuscular Hemoglobin


 28.8 PG


(27.0-31.0) 


 


 





 


Mean Corpuscular Hemoglobin


Concent 29.8 G/DL


(32.0-36.0) 


 


 





 


Red Cell Distribution Width


 18.0 %


(11.6-14.8) 


 


 





 


Platelet Count


 190 K/UL


(150-450) 


 


 





 


Mean Platelet Volume


 9.5 FL


(6.5-10.1) 


 


 





 


Neutrophils (%) (Auto)  % (45.0-75.0)    


 


Lymphocytes (%) (Auto)  % (20.0-45.0)    


 


Monocytes (%) (Auto)  % (1.0-10.0)    


 


Eosinophils (%) (Auto)  % (0.0-3.0)    


 


Basophils (%) (Auto)  % (0.0-2.0)    


 


Sodium Level


 144 MMOL/L


(136-145) 


 


 





 


Potassium Level


 4.5 MMOL/L


(3.5-5.1) 


 


 





 


Chloride Level


 106 MMOL/L


() 


 


 





 


Carbon Dioxide Level


 29 MMOL/L


(21-32) 


 


 





 


Anion Gap


 9 mmol/L


(5-15) 


 


 





 


Blood Urea Nitrogen


 74 mg/dL


(7-18) 


 


 





 


Creatinine


 2.6 MG/DL


(0.55-1.30) 


 


 





 


Estimat Glomerular Filtration


Rate 24.8 mL/min


(>60) 


 


 





 


Glucose Level


 281 MG/DL


() 


 


 





 


Hemoglobin A1c


 10.5 %


(4.3-6.0) 


 


 





 


Lactic Acid Level


 1.30 mmol/L


(0.4-2.0) 


 


 





 


Uric Acid


 7.8 MG/DL


(2.6-7.2) 


 


 





 


Calcium Level


 7.9 MG/DL


(8.5-10.1) 


 


 





 


Phosphorus Level


 6.1 MG/DL


(2.5-4.9) 


 


 





 


Magnesium Level


 2.2 MG/DL


(1.8-2.4) 


 


 





 


Total Bilirubin


 0.6 MG/DL


(0.2-1.0) 


 


 





 


Gamma Glutamyl Transpeptidase 190 U/L (5-85)    


 


Aspartate Amino Transf


(AST/SGOT) 24 U/L (15-37) 


 


 


 





 


Alanine Aminotransferase


(ALT/SGPT) 33 U/L (12-78) 


 


 


 





 


Alkaline Phosphatase


 309 U/L


() 


 


 





 


Ammonia


 55 umol/L


(11-32) 


 


 





 


Lactate Dehydrogenase


 279 U/L


() 


 


 





 


Total Creatine Kinase


 20 U/L


() 


 


 





 


Troponin I


 0.001 ng/mL


(0.000-0.056) 


 


 





 


C-Reactive Protein,


Quantitative 17.0 mg/dL


(0.00-0.90) 


 


 





 


Pro-B-Type Natriuretic Peptide


 21652 pg/mL


(0-125) 


 


 





 


Total Protein


 6.2 G/DL


(6.4-8.2) 


 


 





 


Albumin


 1.4 G/DL


(3.4-5.0) 


 


 





 


Globulin 4.8 g/dL    


 


Albumin/Globulin Ratio 0.3 (1.0-2.7)    


 


Thyroid Stimulating Hormone


(TSH) 2.509 uiU/mL


(0.358-3.740) 


 


 





 


Digoxin Level


 1.4 NG/ML


(0.5-2.0) 


 


 











Height (Feet):  5


Height (Inches):  7.00


Weight (Pounds):  198


Objective


General Appearance:  lethargic, moderate distress


Neck:  supple


Cardiovascular:  regular rhythm


Respiratory/Chest:  crackles/rales, rhonchi - bilaterally vent++


Abdomen:  non tender, soft


Extremities:  non-tender











Emmett Cook MD           Feb 2, 2021 07:06

## 2021-02-02 NOTE — GENERAL PROGRESS NOTE
Subjective


ROS Limited/Unobtainable:  Yes


Allergies:  


Coded Allergies:  


     No Known Allergies (Unverified , 6/11/19)


Subjective


events noted - interval notes reviewed 


glucose values on higher side 











Item Value  Date Time


 


Bedside Blood Glucose 121 mg/dl H 2/2/21 0000


 


Bedside Blood Glucose 166 mg/dl H 2/1/21 2135


 


Bedside Blood Glucose 258 mg/dl H 2/1/21 1700


 


Bedside Blood Glucose 282 mg/dl H 2/1/21 0950











Objective





Last 24 Hour Vital Signs








  Date Time  Temp Pulse Resp B/P (MAP) Pulse Ox O2 Delivery O2 Flow Rate FiO2


 


2/2/21 06:00    126/80    


 


2/2/21 06:00  79 27 126/80 (95) 94   


 


2/2/21 05:37    126/80    


 


2/2/21 05:36    126/80    


 


2/2/21 05:26  77 34     100


 


2/2/21 05:15   26 121/76  Mechanical Ventilator  100


 


2/2/21 05:00  77 25 121/76 (91) 92   


 


2/2/21 05:00    121/76    


 


2/2/21 04:15   26 126/80  Mechanical Ventilator  100


 


2/2/21 04:00      Mechanical Ventilator  


 


2/2/21 04:00        100


 


2/2/21 04:00  78      


 


2/2/21 04:00 98.1 81 22 126/80 (95) 91   


 


2/2/21 04:00    126/80    


 


2/2/21 03:19  82 34     100


 


2/2/21 03:15   22 124/68  Mechanical Ventilator 100.0 100


 


2/2/21 03:00    112/72    


 


2/2/21 03:00  83 28 112/72 (85) 91   


 


2/2/21 02:30  90 22 124/68 (86) 90   


 


2/2/21 02:00    120/71    


 


2/2/21 02:00  83 25 120/71 (87) 86   


 


2/2/21 01:59  84 37     100


 


2/2/21 01:00  89 24 106/67 (80) 92   


 


2/2/21 01:00   28 106/67  Mechanical Ventilator  100


 


2/2/21 01:00    106/67    


 


2/2/21 00:00   28 108/62  Mechanical Ventilator  100


 


2/2/21 00:00    108/62    


 


2/2/21 00:00  86      


 


2/2/21 00:00 98.0 86 31 108/62 (77) 92   


 


2/2/21 00:00        100


 


2/2/21 00:00      Mechanical Ventilator  


 


2/1/21 23:45  82 37     100


 


2/1/21 23:30  80 27 103/66 (78) 92   


 


2/1/21 23:00  78 28 107/71 (83) 91   


 


2/1/21 23:00   28 105/68  Mechanical Ventilator  100


 


2/1/21 23:00    105/68    


 


2/1/21 22:00   28 120/76  Mechanical Ventilator  100


 


2/1/21 22:00    120/76    


 


2/1/21 22:00  76 25 130/79 (96) 92   


 


2/1/21 21:30  80 28 125/75 (92) 91   


 


2/1/21 21:21  76 34     100


 


2/1/21 21:00   26 120/73  Mechanical Ventilator  100


 


2/1/21 21:00    120/73    


 


2/1/21 21:00  78 27 124/78 (93) 94   


 


2/1/21 20:45   28 120/76  Mechanical Ventilator  100


 


2/1/21 20:45    120/76    


 


2/1/21 20:30  80 29 123/75 (91) 94   


 


2/1/21 20:30  80 29 123/75 (91) 92   


 


2/1/21 20:00 97.7 83 27 114/73 (87) 91   


 


2/1/21 20:00   25 116/71  Mechanical Ventilator  100


 


2/1/21 20:00    116/71    


 


2/1/21 20:00      Mechanical Ventilator  


 


2/1/21 20:00        100


 


2/1/21 20:00  87      


 


2/1/21 19:21  87 36     100


 


2/1/21 19:00   32 105/63  Mechanical Ventilator  100


 


2/1/21 19:00    105/63    


 


2/1/21 19:00  89 25 105/63 (77) 93   


 


2/1/21 18:30    106/67    


 


2/1/21 18:15    120/73    


 


2/1/21 18:00   32 117/72  Mechanical Ventilator  100


 


2/1/21 18:00    117/72    


 


2/1/21 18:00  97 31 119/72 (88) 91   


 


2/1/21 17:45    119/72    


 


2/1/21 17:30    126/77    


 


2/1/21 17:15    126/75    


 


2/1/21 17:05  101 36     100


 


2/1/21 17:00   32 123/71  Mechanical Ventilator  100


 


2/1/21 17:00    123/71    


 


2/1/21 17:00  106 24 123/71 (88) 90   


 


2/1/21 16:00      Mechanical Ventilator  


 


2/1/21 16:00        100


 


2/1/21 16:00   32 95/60  Mechanical Ventilator  100


 


2/1/21 16:00 99.0 106 26 95/60 (72) 90   


 


2/1/21 16:00  106      


 


2/1/21 15:59    90/54    


 


2/1/21 15:05  108 35     100


 


2/1/21 15:00  106 30 89/56 (67) 90   


 


2/1/21 15:00   32 89/56  Mechanical Ventilator  100


 


2/1/21 15:00    89/56    


 


2/1/21 14:00  118 34 168/87 (114) 61   


 


2/1/21 14:00   32 168/87  Mechanical Ventilator  100


 


2/1/21 14:00    168/87    


 


2/1/21 13:05  108 32     100


 


2/1/21 13:00  90 25 102/63 (76) 89   


 


2/1/21 13:00   32 102/63  Mechanical Ventilator  100


 


2/1/21 13:00    102/63    


 


2/1/21 12:00  91      


 


2/1/21 12:00      Mechanical Ventilator  


 


2/1/21 12:00   32 101/67  Mechanical Ventilator  100


 


2/1/21 12:00    101/67    


 


2/1/21 12:00 98.2 91 14 104/63 (77) 89   


 


2/1/21 11:18        100


 


2/1/21 11:10  95 32     100


 


2/1/21 11:00   32 96/59  Mechanical Ventilator  100


 


2/1/21 11:00    96/59    


 


2/1/21 11:00  93 12 96/59 (71) 89   


 


2/1/21 10:00  93 22 119/70 (86) 87   


 


2/1/21 10:00   32 119/70  Mechanical Ventilator  100


 


2/1/21 10:00    119/70    


 


2/1/21 09:20  95 33     100


 


2/1/21 09:16  96      


 


2/1/21 09:00  97 31 128/79 (95) 87   


 


2/1/21 09:00   32 128/79  Mechanical Ventilator  100


 


2/1/21 09:00    128/79    


 


2/1/21 08:00      Mechanical Ventilator  


 


2/1/21 08:00  100      


 


2/1/21 08:00   32 120/78  Mechanical Ventilator  100


 


2/1/21 08:00    120/78    


 


2/1/21 08:00        100


 


2/1/21 08:00 97.8 100 26 120/78 (92) 87   


 


2/1/21 07:30  100 22 119/76 (90) 89   


 


2/1/21 07:20  102 34     100


 


2/1/21 07:15  101 26 124/79 (94) 86   


 


2/1/21 07:00   32 133/74  Mechanical Ventilator  100


 


2/1/21 07:00    133/74    


 


2/1/21 07:00  103 25 133/74 (93) 76   


 


2/1/21 06:45  111 26 149/86 (107) 61   


 


2/1/21 06:30  98 28 87/59 (68) 82   

















Intake and Output  


 


 2/1/21 2/2/21





 19:00 07:00


 


Intake Total 2274.4275 ml 2471.44 ml


 


Output Total 1250 ml 765 ml


 


Balance 1024.4275 ml 1706.44 ml


 


  


 


Free Water 400 ml 300 ml


 


IV Total 1214.4275 ml 1666.44 ml


 


Tube Feeding 660 ml 385 ml


 


Other  120 ml


 


Output Urine Total 800 ml 515 ml


 


Stool Total 100 ml 50 ml


 


Chest Tube Drainage Total 350 ml 200 ml








Laboratory Tests


2/1/21 09:23: 


Arterial Blood pH 7.116*L, Arterial Blood Partial Pressure CO2 90.8*H, Arterial 

Blood Partial Pressure O2 56.3L, Arterial Blood HCO3 28.6H, Arterial Blood 

Oxygen Saturation 85.3*L, Arterial Blood Base Excess -2.3L, Abelardo Test Positive


2/1/21 14:29: 


White Blood Count 9.0, Red Blood Count 3.41L, Hemoglobin 9.8L, Hematocrit 32.8L,

Mean Corpuscular Volume 96, Mean Corpuscular Hemoglobin 28.7, Mean Corpuscular 

Hemoglobin Concent 29.9L, Red Cell Distribution Width 18.0H, Platelet Count 181,

Mean Platelet Volume 9.5, Neutrophils (%) (Auto) 83.1H, Lymphocytes (%) (Auto) 

13.2L, Monocytes (%) (Auto) 2.6, Eosinophils (%) (Auto) 0.5, Basophils (%) 

(Auto) 0.6, Sodium Level 143, Potassium Level 5.7H, Chloride Level 108H, Carbon 

Dioxide Level 27, Anion Gap 9, Blood Urea Nitrogen 75H, Creatinine 2.6H, Estimat

Glomerular Filtration Rate 24.8, Glucose Level 330H, Uric Acid 7.5H, Calcium L

evel 7.5L, Phosphorus Level 7.1H, Magnesium Level 2.4, Total Bilirubin 0.5, 

Aspartate Amino Transf (AST/SGOT) 42H, Alanine Aminotransferase (ALT/SGPT) 39, 

Alkaline Phosphatase 381H, Total Protein 5.6L, Albumin 1.1L, Globulin 4.5, 

Albumin/Globulin Ratio 0.2L


2/2/21 04:40: 


White Blood Count 6.5, Red Blood Count 3.12L, Hemoglobin 9.0L, Hematocrit 30.2L,

Mean Corpuscular Volume 97, Mean Corpuscular Hemoglobin 28.8, Mean Corpuscular 

Hemoglobin Concent 29.8L, Red Cell Distribution Width 18.0H, Platelet Count 190,

Mean Platelet Volume 9.5, Neutrophils (%) (Auto) , Lymphocytes (%) (Auto) , 

Monocytes (%) (Auto) , Eosinophils (%) (Auto) , Basophils (%) (Auto) , Sodium 

Level 144, Potassium Level 4.5, Chloride Level 106, Carbon Dioxide Level 29, 

Anion Gap 9, Blood Urea Nitrogen 74H, Creatinine 2.6H, Estimat Glomerular 

Filtration Rate 24.8, Glucose Level 281H, Uric Acid 7.8H, Calcium Level 7.9L, 

Phosphorus Level 6.1H, Magnesium Level 2.2, Total Bilirubin 0.6, Aspartate Amino

Transf (AST/SGOT) 24, Alanine Aminotransferase (ALT/SGPT) 33, Alkaline 

Phosphatase 309H, Total Protein 6.2L, Albumin 1.4L, Globulin 4.8, 

Albumin/Globulin Ratio 0.3L, Neutrophils % (Manual) [Pending], Lymphocytes % 

(Manual) [Pending], Platelet Estimate [Pending], Platelet Morphology [Pending], 

Hemoglobin A1c 10.5H, Lactic Acid Level 1.30, Gamma Glutamyl Transpeptidase 190H

, Ammonia 55H, Lactate Dehydrogenase 279H, Total Creatine Kinase 20L, Troponin I

 0.001, C-Reactive Protein, Quantitative 17.0H, Pro-B-Type Natriuretic Peptide 

41682P, Vitamin B12 Level [Pending], Vitamin D 25-Hydroxy [Pending], 25-Hydroxy 

Vitamin D2 [Pending], 25-Hydroxy Vitamin D3 [Pending], Folate [Pending], Thyroid

 Stimulating Hormone (TSH) 2.509, Digoxin Level [Pending]


Height (Feet):  5


Height (Inches):  7.00


Weight (Pounds):  198


Objective





Current Medications








 Medications


  (Trade)  Dose


 Ordered  Sig/Julisa


 Route


 PRN Reason  Start Time


 Stop Time Status Last Admin


Dose Admin


 


 Acetaminophen


  (Tylenol)  650 mg  Q4H  PRN


 GT


 Temp >100.5  1/15/21 17:15


 2/14/21 17:14  1/29/21 06:59





 


 Amiodarone HCl


  (Cordarone)  200 mg  DAILY


 NG


   1/26/21 09:00


 4/19/21 20:59  2/1/21 09:16





 


 Cefepime HCl 1 gm/


 Dextrose  55 ml @ 


 110 mls/hr  Q24H


 IVPB


   1/30/21 11:00


 2/6/21 10:59  2/1/21 11:59





 


 Chlorhexidine


 Gluconate


  (Vanessa-Hex 2%)  1 applic  DAILY@2000


 TOPIC


   1/19/21 20:00


 4/19/21 19:59  2/1/21 21:06





 


 Dextrose


  (Dextrose 50%)  25 ml  Q30M  PRN


 IV


 Hypoglycemia  1/9/21 13:30


 4/9/21 13:29   





 


 Dextrose


  (Dextrose 50%)  50 ml  Q30M  PRN


 IV


 Hypoglycemia  1/9/21 13:30


 4/9/21 13:29   





 


 Digoxin


  (Lanoxin)  0.125 mg  DAILY


 NG


   1/21/21 09:00


 4/21/21 08:59  2/1/21 09:16





 


 Docusate Sodium


  (Colace)  100 mg  TIDPRN  PRN


 GT


 Constipation  1/12/21 01:15


 2/11/21 01:14  1/12/21 01:42





 


 Dopamine HCl/


 Dextrose  250 ml @ 0


 mls/hr  Q24H  PRN


 IV


 For hypotension  1/31/21 11:45


 2/3/21 11:45   





 


 Enoxaparin Sodium


  (Lovenox)  80 mg  EVERY 12  HOURS


 SUBQ


   1/2/21 21:00


 4/2/21 20:59  2/1/21 21:07





 


 Fentanyl Citrate  250 ml @ 1


 mls/hr  Q24H


 IV


   2/2/21 03:00


 2/4/21 02:59  2/2/21 03:15





 


 Hydrocortisone


  (Solu-CORTEF)  100 mg  EVERY 8  HOURS


 IV


   2/1/21 15:45


 5/2/21 15:44  2/2/21 05:38





 


 Insulin Aspart


  (NovoLOG)    Q6HR


 SUBQ


   1/14/21 12:00


 4/9/21 17:59  2/1/21 17:00





 


 Insulin Detemir


  (Levemir)  22 units  Q12HR


 SUBQ


   2/1/21 21:00


 4/12/21 08:59  2/1/21 21:35





 


 Midodrine


  (Pro-Amatine)  10 mg  Q8HR


 ORAL


   2/1/21 15:30


 5/2/21 15:29  2/2/21 05:38





 


 Norepinephrine


 Bitartrate 16 mg/


 Dextrose  516 ml @ 0


 mls/hr  Q24H


 IV


   1/31/21 10:30


 2/3/21 10:29  2/2/21 05:37





 


 Pantoprazole


  (Protonix)  40 mg  Q12HR


 IVP


   2/1/21 21:00


 2/13/21 08:59  2/1/21 21:07





 


 Phenylephrine HCl


 100 mg/Dextrose  250 ml @ 0


 mls/hr  Q24H  PRN


 IV


 For hypotension  1/31/21 12:30


 2/3/21 12:29   





 


 Sodium Chloride  500 ml @ 


 999 mls/hr  Q31M PRN


 IV


 For hypotension  1/15/21 18:30


 2/14/21 18:29   





 


 Sodium Chloride  1,000 ml @ 


 100 mls/hr  Q10H


 IV


   2/1/21 15:30


 3/3/21 15:29  2/2/21 02:54














Assessment/Plan


Problem List:  


(1) Diabetes mellitus out of control


ICD Codes:  E11.65 - Type 2 diabetes mellitus with hyperglycemia


SNOMED:  01619387, [id number]


(2) Pneumonia due to COVID-19 virus


ICD Codes:  U07.1 - COVID-19; J12.82 - Pneumonia due to coronavirus disease 2019


SNOMED:  [id number]


Assessment/Plan:


increase Levemir to 26 units bid 


continue Novolog sliding scale every 6 hours 


hypoglycemia protocol in order











Nick Mcmahon MD                  Feb 2, 2021 06:26

## 2021-02-02 NOTE — NUR
NURSE NOTES:



Levo is running at 4mcg/min for /66. HR 78. O2 sat 86% with the current setting. RASS 
-2. Will continue to monitor.

## 2021-02-02 NOTE — NUR
NURSE HAND-OFF REPORT: 



Latest Vital Signs: Temperature 96.0 , Pulse 77 , B/P 94 /62 , Respiratory Rate 15 , O2 SAT 
91 , Mechanical Ventilator, O2 Flow Rate  .  

Vital Sign Comment: 



EKG Rhythm: Sinus Rhythm

Rhythm change?: N 

MD Notified?: 

MD Response: 



Latest Singh Fall Score: 50  

Fall Risk: High Risk 

Safety Measures: Call light Within Reach, Bed Alarm Zone 1, Side Rails Side Rails x3, Bed 
position Low and Locked.

Fall Precautions: 

Yellow Socks

Yellow Gown

Door Sign

Patient Fall Education



Report given to Tara Krishna.

## 2021-02-02 NOTE — NUR
NURSE HAND-OFF REPORT: 



Latest Vital Signs: Temperature 98.1 , Pulse 83 , B/P 117 /71 , Respiratory Rate 26 , O2 SAT 
86 , Mechanical Ventilator, O2 Flow Rate  .  

Vital Sign Comment: 



EKG Rhythm: Sinus Rhythm

Rhythm change?: N 

MD Notified?: Y -Dr Ariel GUTIERREZ Response: No New Orders Received



Latest Singh Fall Score: 50  

Fall Risk: High Risk 

Safety Measures: Call light Within Reach, Bed Alarm Zone 1, Side Rails Side Rails x3, Bed 
position Low and Locked.

Fall Precautions: 

Yellow Socks

Yellow Gown

Door Sign

Patient Fall Education



Report given to Mi Yeon,RN

## 2021-02-02 NOTE — GENERAL PROGRESS NOTE
Subjective


Allergies:  


Coded Allergies:  


     No Known Allergies (Unverified , 6/11/19)


Subjective


no   cardiic incident last night


on ventilator 60% fio2


on sediation


unresponsive


in icu


rt chest tube s placed due to pneumothorex


afib is controlled


hypotension on levophed drip





Objective





Last 24 Hour Vital Signs








  Date Time  Temp Pulse Resp B/P (MAP) Pulse Ox O2 Delivery O2 Flow Rate FiO2


 


2/2/21 10:11   26 96/60  Mechanical Ventilator  100


 


2/2/21 08:11  83      


 


2/2/21 07:15   26 117/71  Mechanical Ventilator  100


 


2/2/21 07:00  83 17 117/71 (86) 86   


 


2/2/21 07:00    117/71    


 


2/2/21 06:15   28 126/80  Mechanical Ventilator  100


 


2/2/21 06:00    126/80    


 


2/2/21 06:00  79 27 126/80 (95) 94   


 


2/2/21 05:37    126/80    


 


2/2/21 05:36    126/80    


 


2/2/21 05:26  77 34     100


 


2/2/21 05:15   26 121/76  Mechanical Ventilator  100


 


2/2/21 05:00  77 25 121/76 (91) 92   


 


2/2/21 05:00    121/76    


 


2/2/21 04:15   26 126/80  Mechanical Ventilator  100


 


2/2/21 04:00      Mechanical Ventilator  


 


2/2/21 04:00        100


 


2/2/21 04:00  78      


 


2/2/21 04:00 98.1 81 22 126/80 (95) 91   


 


2/2/21 04:00    126/80    


 


2/2/21 03:19  82 34     100


 


2/2/21 03:15   22 124/68  Mechanical Ventilator 100.0 100


 


2/2/21 03:00    112/72    


 


2/2/21 03:00  83 28 112/72 (85) 91   


 


2/2/21 02:30  90 22 124/68 (86) 90   


 


2/2/21 02:00    120/71    


 


2/2/21 02:00  83 25 120/71 (87) 86   


 


2/2/21 01:59  84 37     100


 


2/2/21 01:00  89 24 106/67 (80) 92   


 


2/2/21 01:00   28 106/67  Mechanical Ventilator  100


 


2/2/21 01:00    106/67    


 


2/2/21 00:00   28 108/62  Mechanical Ventilator  100


 


2/2/21 00:00    108/62    


 


2/2/21 00:00  86      


 


2/2/21 00:00 98.0 86 31 108/62 (77) 92   


 


2/2/21 00:00        100


 


2/2/21 00:00      Mechanical Ventilator  


 


2/1/21 23:45  82 37     100


 


2/1/21 23:30  80 27 103/66 (78) 92   


 


2/1/21 23:00  78 28 107/71 (83) 91   


 


2/1/21 23:00   28 105/68  Mechanical Ventilator  100


 


2/1/21 23:00    105/68    


 


2/1/21 22:00   28 120/76  Mechanical Ventilator  100


 


2/1/21 22:00    120/76    


 


2/1/21 22:00  76 25 130/79 (96) 92   


 


2/1/21 21:30  80 28 125/75 (92) 91   


 


2/1/21 21:21  76 34     100


 


2/1/21 21:00   26 120/73  Mechanical Ventilator  100


 


2/1/21 21:00    120/73    


 


2/1/21 21:00  78 27 124/78 (93) 94   


 


2/1/21 20:45   28 120/76  Mechanical Ventilator  100


 


2/1/21 20:45    120/76    


 


2/1/21 20:30  80 29 123/75 (91) 94   


 


2/1/21 20:30  80 29 123/75 (91) 92   


 


2/1/21 20:00 97.7 83 27 114/73 (87) 91   


 


2/1/21 20:00   25 116/71  Mechanical Ventilator  100


 


2/1/21 20:00    116/71    


 


2/1/21 20:00      Mechanical Ventilator  


 


2/1/21 20:00        100


 


2/1/21 20:00  87      


 


2/1/21 19:21  87 36     100


 


2/1/21 19:00   32 105/63  Mechanical Ventilator  100


 


2/1/21 19:00    105/63    


 


2/1/21 19:00  89 25 105/63 (77) 93   


 


2/1/21 18:30    106/67    


 


2/1/21 18:15    120/73    


 


2/1/21 18:00   32 117/72  Mechanical Ventilator  100


 


2/1/21 18:00    117/72    


 


2/1/21 18:00  97 31 119/72 (88) 91   


 


2/1/21 17:45    119/72    


 


2/1/21 17:30    126/77    


 


2/1/21 17:15    126/75    


 


2/1/21 17:05  101 36     100


 


2/1/21 17:00   32 123/71  Mechanical Ventilator  100


 


2/1/21 17:00    123/71    


 


2/1/21 17:00  106 24 123/71 (88) 90   


 


2/1/21 16:00      Mechanical Ventilator  


 


2/1/21 16:00        100


 


2/1/21 16:00   32 95/60  Mechanical Ventilator  100


 


2/1/21 16:00 99.0 106 26 95/60 (72) 90   


 


2/1/21 16:00  106      


 


2/1/21 15:59    90/54    


 


2/1/21 15:05  108 35     100


 


2/1/21 15:00  106 30 89/56 (67) 90   


 


2/1/21 15:00   32 89/56  Mechanical Ventilator  100


 


2/1/21 15:00    89/56    


 


2/1/21 14:00  118 34 168/87 (114) 61   


 


2/1/21 14:00   32 168/87  Mechanical Ventilator  100


 


2/1/21 14:00    168/87    


 


2/1/21 13:05  108 32     100


 


2/1/21 13:00  90 25 102/63 (76) 89   


 


2/1/21 13:00   32 102/63  Mechanical Ventilator  100


 


2/1/21 13:00    102/63    


 


2/1/21 12:00  91      


 


2/1/21 12:00      Mechanical Ventilator  


 


2/1/21 12:00   32 101/67  Mechanical Ventilator  100


 


2/1/21 12:00    101/67    


 


2/1/21 12:00 98.2 91 14 104/63 (77) 89   


 


2/1/21 11:18        100


 


2/1/21 11:10  95 32     100


 


2/1/21 11:00   32 96/59  Mechanical Ventilator  100


 


2/1/21 11:00    96/59    


 


2/1/21 11:00  93 12 96/59 (71) 89   

















Intake and Output  


 


 2/1/21 2/2/21





 19:00 07:00


 


Intake Total 2274.4275 ml 2557.40 ml


 


Output Total 1250 ml 805 ml


 


Balance 1024.4275 ml 1752.40 ml


 


  


 


Free Water 400 ml 300 ml


 


IV Total 1214.4275 ml 1717.40 ml


 


Tube Feeding 660 ml 420 ml


 


Other  120 ml


 


Output Urine Total 800 ml 555 ml


 


Stool Total 100 ml 50 ml


 


Chest Tube Drainage Total 350 ml 200 ml








Laboratory Tests


2/1/21 14:29: 


White Blood Count 9.0, Red Blood Count 3.41L, Hemoglobin 9.8L, Hematocrit 32.8L,

Mean Corpuscular Volume 96, Mean Corpuscular Hemoglobin 28.7, Mean Corpuscular 

Hemoglobin Concent 29.9L, Red Cell Distribution Width 18.0H, Platelet Count 181,

Mean Platelet Volume 9.5, Neutrophils (%) (Auto) 83.1H, Lymphocytes (%) (Auto) 

13.2L, Monocytes (%) (Auto) 2.6, Eosinophils (%) (Auto) 0.5, Basophils (%) 

(Auto) 0.6, Sodium Level 143, Potassium Level 5.7H, Chloride Level 108H, Carbon 

Dioxide Level 27, Anion Gap 9, Blood Urea Nitrogen 75H, Creatinine 2.6H, Estimat

Glomerular Filtration Rate 24.8, Glucose Level 330H, Uric Acid 7.5H, Calcium 

Level 7.5L, Phosphorus Level 7.1H, Magnesium Level 2.4, Total Bilirubin 0.5, 

Aspartate Amino Transf (AST/SGOT) 42H, Alanine Aminotransferase (ALT/SGPT) 39, 

Alkaline Phosphatase 381H, Total Protein 5.6L, Albumin 1.1L, Globulin 4.5, 

Albumin/Globulin Ratio 0.2L


2/2/21 04:40: 


White Blood Count 6.5, Red Blood Count 3.12L, Hemoglobin 9.0L, Hematocrit 30.2L,

Mean Corpuscular Volume 97, Mean Corpuscular Hemoglobin 28.8, Mean Corpuscular 

Hemoglobin Concent 29.8L, Red Cell Distribution Width 18.0H, Platelet Count 190,

Mean Platelet Volume 9.5, Neutrophils (%) (Auto) , Lymphocytes (%) (Auto) , 

Monocytes (%) (Auto) , Eosinophils (%) (Auto) , Basophils (%) (Auto) , Sodium 

Level 144, Potassium Level 4.5, Chloride Level 106, Carbon Dioxide Level 29, 

Anion Gap 9, Blood Urea Nitrogen 74H, Creatinine 2.6H, Estimat Glomerular 

Filtration Rate 24.8, Glucose Level 281H, Uric Acid 7.8H, Calcium Level 7.9L, 

Phosphorus Level 6.1H, Magnesium Level 2.2, Total Bilirubin 0.6, Aspartate Amino

Transf (AST/SGOT) 24, Alanine Aminotransferase (ALT/SGPT) 33, Alkaline 

Phosphatase 309H, Total Protein 6.2L, Albumin 1.4L, Globulin 4.8, 

Albumin/Globulin Ratio 0.3L, Differential Total Cells Counted 100, Neutrophils %

(Manual) 91H, Lymphocytes % (Manual) 6L, Monocytes % (Manual) 3, Eosinophils % 

(Manual) 0, Basophils % (Manual) 0, Band Neutrophils 0, Platelet Estimate 

Adequate, Platelet Morphology Normal, Polychromasia 1+, Hypochromasia 1+, 

Anisocytosis 1+, Hemoglobin A1c 10.5H, Lactic Acid Level 1.30, Gamma Glutamyl 

Transpeptidase 190H, Ammonia 55H, Lactate Dehydrogenase 279H, Total Creatine 

Kinase 20L, Troponin I 0.001, C-Reactive Protein, Quantitative 17.0H, Pro-B-Type

Natriuretic Peptide 11208G, Vitamin B12 Level > 2000H, Vitamin D 25-Hydroxy 

[Pending], 25-Hydroxy Vitamin D2 [Pending], 25-Hydroxy Vitamin D3 [Pending], 

Folate 12.6, Thyroid Stimulating Hormone (TSH) 2.509, Digoxin Level 1.4


2/2/21 08:40: 


Arterial Blood pH 7.179*L, Arterial Blood Partial Pressure CO2 86.9*H, Arterial 

Blood Partial Pressure O2 53.6L, Arterial Blood HCO3 31.2H, Arterial Blood 

Oxygen Saturation 85.1*L, Arterial Blood Base Excess 1.1, Abelardo Test Positive


Height (Feet):  5


Height (Inches):  7.00


Weight (Pounds):  198





Assessment/Plan


Assessment/Plan:


no cardiic issue last night


hypotension better on tirate down levophed drip


afib with rvr on digoxine , add amiodarone , cardiology on case


covid pna


ac resp distress on ventilator


etoh abused


dehydration


ac renal failure- nephro consult 


severe meta acidosis


cont on ventilator at [facility]


cont iv abx and iv decadrone


pulmonary and gi on consult





dw son explained the prognosis -requested full code


icu nurse is aware of code status











Moi Travis MD              Feb 2, 2021 10:44

## 2021-02-02 NOTE — NUR
NURSE NOTES:



Turned and repositioned patient. [place]-fast changed by RT. Oral care done. RASS -2. Will 
keep the rate 20mcg/hr. Will continue to monitor.

## 2021-02-02 NOTE — NEPHROLOGY PROGRESS NOTE
Assessment/Plan


Problem List:  


(1) TO (acute kidney injury)


(2) Acute respiratory failure


(3) Pneumonia due to COVID-19 virus


(4) Diabetes mellitus out of control


(5) Hyperkalemia


Assessment





Acute kidney injury, multifactorial


Septic shock


Acute respiratory failure


Hyperkalemia, metabolic acidosis


Above-mentioned condition


Plan





February 2: Labs reviewed.  Medication list reviewed.  Discussed with RN.  

Patient remains full code.  Patient is still intubated on ventilator.  

Hyperkalemia improved.  Serum creatinine 2.6 unchanged.  Status remains guarded.

 Continue per consultants.  Continue monitor renal parameters.  Taper pressors 

as possible.  Sodium bicarb IV





Previosly


Feeding changed to Nepro


Midodrine 10 mg every 8 hours


Albumin bolus


1/2 Normal saline 100 cc an hour


Stop Seroquel


Monitor renal parameters


Avoid nephrotoxic's


1 amp of sodium bicarbonate


Stress dose of steroids


Discussed with RN Katherine





Subjective


ROS Limited/Unobtainable:  Yes





Objective


Objective





Last 24 Hour Vital Signs








  Date Time  Temp Pulse Resp B/P (MAP) Pulse Ox O2 Delivery O2 Flow Rate FiO2


 


2/2/21 10:11   26 96/60  Mechanical Ventilator  100


 


2/2/21 08:11  83      


 


2/2/21 07:15   26 117/71  Mechanical Ventilator  100


 


2/2/21 07:00  83 17 117/71 (86) 86   


 


2/2/21 07:00    117/71    


 


2/2/21 06:15   28 126/80  Mechanical Ventilator  100


 


2/2/21 06:00    126/80    


 


2/2/21 06:00  79 27 126/80 (95) 94   


 


2/2/21 05:37    126/80    


 


2/2/21 05:36    126/80    


 


2/2/21 05:26  77 34     100


 


2/2/21 05:15   26 121/76  Mechanical Ventilator  100


 


2/2/21 05:00  77 25 121/76 (91) 92   


 


2/2/21 05:00    121/76    


 


2/2/21 04:15   26 126/80  Mechanical Ventilator  100


 


2/2/21 04:00      Mechanical Ventilator  


 


2/2/21 04:00        100


 


2/2/21 04:00  78      


 


2/2/21 04:00 98.1 81 22 126/80 (95) 91   


 


2/2/21 04:00    126/80    


 


2/2/21 03:19  82 34     100


 


2/2/21 03:15   22 124/68  Mechanical Ventilator 100.0 100


 


2/2/21 03:00    112/72    


 


2/2/21 03:00  83 28 112/72 (85) 91   


 


2/2/21 02:30  90 22 124/68 (86) 90   


 


2/2/21 02:00    120/71    


 


2/2/21 02:00  83 25 120/71 (87) 86   


 


2/2/21 01:59  84 37     100


 


2/2/21 01:00  89 24 106/67 (80) 92   


 


2/2/21 01:00   28 106/67  Mechanical Ventilator  100


 


2/2/21 01:00    106/67    


 


2/2/21 00:00   28 108/62  Mechanical Ventilator  100


 


2/2/21 00:00    108/62    


 


2/2/21 00:00  86      


 


2/2/21 00:00 98.0 86 31 108/62 (77) 92   


 


2/2/21 00:00        100


 


2/2/21 00:00      Mechanical Ventilator  


 


2/1/21 23:45  82 37     100


 


2/1/21 23:30  80 27 103/66 (78) 92   


 


2/1/21 23:00  78 28 107/71 (83) 91   


 


2/1/21 23:00   28 105/68  Mechanical Ventilator  100


 


2/1/21 23:00    105/68    


 


2/1/21 22:00   28 120/76  Mechanical Ventilator  100


 


2/1/21 22:00    120/76    


 


2/1/21 22:00  76 25 130/79 (96) 92   


 


2/1/21 21:30  80 28 125/75 (92) 91   


 


2/1/21 21:21  76 34     100


 


2/1/21 21:00   26 120/73  Mechanical Ventilator  100


 


2/1/21 21:00    120/73    


 


2/1/21 21:00  78 27 124/78 (93) 94   


 


2/1/21 20:45   28 120/76  Mechanical Ventilator  100


 


2/1/21 20:45    120/76    


 


2/1/21 20:30  80 29 123/75 (91) 94   


 


2/1/21 20:30  80 29 123/75 (91) 92   


 


2/1/21 20:00 97.7 83 27 114/73 (87) 91   


 


2/1/21 20:00   25 116/71  Mechanical Ventilator  100


 


2/1/21 20:00    116/71    


 


2/1/21 20:00      Mechanical Ventilator  


 


2/1/21 20:00        100


 


2/1/21 20:00  87      


 


2/1/21 19:21  87 36     100


 


2/1/21 19:00   32 105/63  Mechanical Ventilator  100


 


2/1/21 19:00    105/63    


 


2/1/21 19:00  89 25 105/63 (77) 93   


 


2/1/21 18:30    106/67    


 


2/1/21 18:15    120/73    


 


2/1/21 18:00   32 117/72  Mechanical Ventilator  100


 


2/1/21 18:00    117/72    


 


2/1/21 18:00  97 31 119/72 (88) 91   


 


2/1/21 17:45    119/72    


 


2/1/21 17:30    126/77    


 


2/1/21 17:15    126/75    


 


2/1/21 17:05  101 36     100


 


2/1/21 17:00   32 123/71  Mechanical Ventilator  100


 


2/1/21 17:00    123/71    


 


2/1/21 17:00  106 24 123/71 (88) 90   


 


2/1/21 16:00      Mechanical Ventilator  


 


2/1/21 16:00        100


 


2/1/21 16:00   32 95/60  Mechanical Ventilator  100


 


2/1/21 16:00 99.0 106 26 95/60 (72) 90   


 


2/1/21 16:00  106      


 


2/1/21 15:59    90/54    


 


2/1/21 15:05  108 35     100


 


2/1/21 15:00  106 30 89/56 (67) 90   


 


2/1/21 15:00   32 89/56  Mechanical Ventilator  100


 


2/1/21 15:00    89/56    


 


2/1/21 14:00  118 34 168/87 (114) 61   


 


2/1/21 14:00   32 168/87  Mechanical Ventilator  100


 


2/1/21 14:00    168/87    


 


2/1/21 13:05  108 32     100


 


2/1/21 13:00  90 25 102/63 (76) 89   


 


2/1/21 13:00   32 102/63  Mechanical Ventilator  100


 


2/1/21 13:00    102/63    


 


2/1/21 12:00  91      


 


2/1/21 12:00      Mechanical Ventilator  


 


2/1/21 12:00   32 101/67  Mechanical Ventilator  100


 


2/1/21 12:00    101/67    


 


2/1/21 12:00 98.2 91 14 104/63 (77) 89   


 


2/1/21 11:18        100


 


2/1/21 11:10  95 32     100


 


2/1/21 11:00   32 96/59  Mechanical Ventilator  100


 


2/1/21 11:00    96/59    


 


2/1/21 11:00  93 12 96/59 (71) 89   

















Intake and Output  


 


 2/1/21 2/2/21





 19:00 07:00


 


Intake Total 2274.4275 ml 2557.40 ml


 


Output Total 1250 ml 805 ml


 


Balance 1024.4275 ml 1752.40 ml


 


  


 


Free Water 400 ml 300 ml


 


IV Total 1214.4275 ml 1717.40 ml


 


Tube Feeding 660 ml 420 ml


 


Other  120 ml


 


Output Urine Total 800 ml 555 ml


 


Stool Total 100 ml 50 ml


 


Chest Tube Drainage Total 350 ml 200 ml











Current Medications








 Medications


  (Trade)  Dose


 Ordered  Sig/Julisa


 Route


 PRN Reason  Start Time


 Stop Time Status Last Admin


Dose Admin


 


 Acetaminophen


  (Tylenol)  650 mg  Q4H  PRN


 GT


 Temp >100.5  1/15/21 17:15


 2/14/21 17:14  1/29/21 06:59





 


 Amiodarone HCl


  (Cordarone)  200 mg  DAILY


 NG


   1/26/21 09:00


 4/19/21 20:59  2/2/21 08:11





 


 Cefepime HCl 1 gm/


 Dextrose  55 ml @ 


 110 mls/hr  Q24H


 IVPB


   1/30/21 11:00


 2/6/21 10:59  2/2/21 10:12





 


 Chlorhexidine


 Gluconate


  (Vanessa-Hex 2%)  1 applic  DAILY@2000


 TOPIC


   1/19/21 20:00


 4/19/21 19:59  2/1/21 21:06





 


 Dextrose


  (Dextrose 50%)  25 ml  Q30M  PRN


 IV


 Hypoglycemia  1/9/21 13:30


 4/9/21 13:29   





 


 Dextrose


  (Dextrose 50%)  50 ml  Q30M  PRN


 IV


 Hypoglycemia  1/9/21 13:30


 4/9/21 13:29   





 


 Digoxin


  (Lanoxin)  0.125 mg  DAILY


 NG


   1/21/21 09:00


 4/21/21 08:59  2/2/21 08:11





 


 Docusate Sodium


  (Colace)  100 mg  TIDPRN  PRN


 GT


 Constipation  1/12/21 01:15


 2/11/21 01:14  1/12/21 01:42





 


 Dopamine HCl/


 Dextrose  250 ml @ 0


 mls/hr  Q24H  PRN


 IV


 For hypotension  1/31/21 11:45


 2/3/21 11:45   





 


 Enoxaparin Sodium


  (Lovenox)  80 mg  EVERY 12  HOURS


 SUBQ


   1/2/21 21:00


 4/2/21 20:59  2/2/21 08:12





 


 Fentanyl Citrate  250 ml @ 1


 mls/hr  Q24H


 IV


   2/2/21 03:00


 2/4/21 02:59  2/2/21 10:11





 


 Hydrocortisone


  (Solu-CORTEF)  100 mg  EVERY 8  HOURS


 IV


   2/1/21 15:45


 5/2/21 15:44  2/2/21 05:38





 


 Insulin Aspart


  (NovoLOG)    Q6HR


 SUBQ


   1/14/21 12:00


 4/9/21 17:59  2/2/21 06:27





 


 Insulin Detemir


  (Levemir)  26 units  Q12HR


 SUBQ


   2/2/21 09:00


 4/12/21 08:59  2/2/21 08:29





 


 Midodrine


  (Pro-Amatine)  10 mg  Q8HR


 ORAL


   2/1/21 15:30


 5/2/21 15:29  2/2/21 05:38





 


 Norepinephrine


 Bitartrate 16 mg/


 Dextrose  516 ml @ 0


 mls/hr  Q24H


 IV


   1/31/21 10:30


 2/3/21 10:29  2/2/21 05:37





 


 Pantoprazole


  (Protonix)  40 mg  Q12HR


 IVP


   2/1/21 21:00


 2/13/21 08:59  2/2/21 08:12





 


 Phenylephrine HCl


 100 mg/Dextrose  250 ml @ 0


 mls/hr  Q24H  PRN


 IV


 For hypotension  1/31/21 12:30


 2/3/21 12:29   





 


 Sodium Chloride  500 ml @ 


 999 mls/hr  Q31M PRN


 IV


 For hypotension  1/15/21 18:30


 2/14/21 18:29   





 


 Sodium Chloride  1,000 ml @ 


 100 mls/hr  Q10H


 IV


   2/1/21 15:30


 3/3/21 15:29  2/2/21 10:12








Laboratory Tests


2/1/21 14:29: 


White Blood Count 9.0, Red Blood Count 3.41L, Hemoglobin 9.8L, Hematocrit 32.8L,

Mean Corpuscular Volume 96, Mean Corpuscular Hemoglobin 28.7, Mean Corpuscular 

Hemoglobin Concent 29.9L, Red Cell Distribution Width 18.0H, Platelet Count 181,

Mean Platelet Volume 9.5, Neutrophils (%) (Auto) 83.1H, Lymphocytes (%) (Auto) 

13.2L, Monocytes (%) (Auto) 2.6, Eosinophils (%) (Auto) 0.5, Basophils (%) 

(Auto) 0.6, Sodium Level 143, Potassium Level 5.7H, Chloride Level 108H, Carbon 

Dioxide Level 27, Anion Gap 9, Blood Urea Nitrogen 75H, Creatinine 2.6H, Estimat

Glomerular Filtration Rate 24.8, Glucose Level 330H, Uric Acid 7.5H, Calcium 

Level 7.5L, Phosphorus Level 7.1H, Magnesium Level 2.4, Total Bilirubin 0.5, 

Aspartate Amino Transf (AST/SGOT) 42H, Alanine Aminotransferase (ALT/SGPT) 39, 

Alkaline Phosphatase 381H, Total Protein 5.6L, Albumin 1.1L, Globulin 4.5, 

Albumin/Globulin Ratio 0.2L


2/2/21 04:40: 


White Blood Count 6.5, Red Blood Count 3.12L, Hemoglobin 9.0L, Hematocrit 30.2L,

Mean Corpuscular Volume 97, Mean Corpuscular Hemoglobin 28.8, Mean Corpuscular 

Hemoglobin Concent 29.8L, Red Cell Distribution Width 18.0H, Platelet Count 190,

Mean Platelet Volume 9.5, Neutrophils (%) (Auto) , Lymphocytes (%) (Auto) , 

Monocytes (%) (Auto) , Eosinophils (%) (Auto) , Basophils (%) (Auto) , Sodium 

Level 144, Potassium Level 4.5, Chloride Level 106, Carbon Dioxide Level 29, 

Anion Gap 9, Blood Urea Nitrogen 74H, Creatinine 2.6H, Estimat Glomerular 

Filtration Rate 24.8, Glucose Level 281H, Uric Acid 7.8H, Calcium Level 7.9L, 

Phosphorus Level 6.1H, Magnesium Level 2.2, Total Bilirubin 0.6, Aspartate Amino

Transf (AST/SGOT) 24, Alanine Aminotransferase (ALT/SGPT) 33, Alkaline 

Phosphatase 309H, Total Protein 6.2L, Albumin 1.4L, Globulin 4.8, 

Albumin/Globulin Ratio 0.3L, Differential Total Cells Counted 100, Neutrophils %

(Manual) 91H, Lymphocytes % (Manual) 6L, Monocytes % (Manual) 3, Eosinophils % 

(Manual) 0, Basophils % (Manual) 0, Band Neutrophils 0, Platelet Estimate 

Adequate, Platelet Morphology Normal, Polychromasia 1+, Hypochromasia 1+, 

Anisocytosis 1+, Hemoglobin A1c 10.5H, Lactic Acid Level 1.30, Gamma Glutamyl 

Transpeptidase 190H, Ammonia 55H, Lactate Dehydrogenase 279H, Total Creatine 

Kinase 20L, Troponin I 0.001, C-Reactive Protein, Quantitative 17.0H, Pro-B-Type

Natriuretic Peptide 76762T, Vitamin B12 Level > 2000H, Vitamin D 25-Hydroxy 

[Pending], 25-Hydroxy Vitamin D2 [Pending], 25-Hydroxy Vitamin D3 [Pending], 

Folate 12.6, Thyroid Stimulating Hormone (TSH) 2.509, Digoxin Level 1.4


2/2/21 08:40: 


Arterial Blood pH 7.179*L, Arterial Blood Partial Pressure CO2 86.9*H, Arterial 

Blood Partial Pressure O2 53.6L, Arterial Blood HCO3 31.2H, Arterial Blood 

Oxygen Saturation 85.1*L, Arterial Blood Base Excess 1.1, Abelardo Test Positive


Height (Feet):  5


Height (Inches):  7.00


Weight (Pounds):  198


General Appearance:  no apparent distress


EENT:  other - Intubated on ventilator


Cardiovascular:  tachycardia


Respiratory/Chest:  decreased breath sounds


Abdomen:  distended











Damien Powell MD             Feb 2, 2021 10:29

## 2021-02-02 NOTE — NUR
NURSE NOTES:

received pt from Miyeon RN., pt is sedated at this time. Rass -2. cardiac monitor shows SR 
at this time. ET 7.5 at lip line 27cm. AC 32 Pressure control 25 peep 12 %. O2sat is 
now at 90-91%. scant bleeding noted inside of mouth. OGT noted, Nephro is running at 
35ml/hr, no residual noted. Stern cath noted draining well with gravity, no bleeding in 
Urine noted. rectal tube noted, draining well with gravity as well. skin alternation noted, 
dressing sites are clean, dry and patent. left upper arm PICC line noted dressing site is 
clean, patent, and dry. Fentanyl is at 200mcg/hr, Levophed 10mcg/min, and 1/2 NS is running 
at 100cc/hr. ABD soft, active, and large. bilateral soft wrist restrain noted, pulse noted, 
skin intact. right lateral chest tube noted, with suction connected (white clear fluid 
noted). chest tube site dressing intact, clean, and patent. call light within reach. will 
continue to monitor pt with plan of care. bed at the lowest position, alarmed, and locked.

## 2021-02-02 NOTE — PULMONOLOGY PROGRESS NOTE
Subjective


ROS Limited/Unobtainable:  Yes


Interval Events:  code blue (1/29)


Constitutional:  Reports: fever, other - Ti=607.2


HEENT:  Repors: no symptoms


Respiratory:  Reports: shortness of breath - better


Cardiovascular:  Reports: no symptoms


Gastrointestinal/Abdominal:  Reports: diarrhea


Allergies:  


Coded Allergies:  


     No Known Allergies (Unverified , 6/11/19)


All Systems:  reviewed and negative except above





Objective





Last 24 Hour Vital Signs








  Date Time  Temp Pulse Resp B/P (MAP) Pulse Ox O2 Delivery O2 Flow Rate FiO2


 


2/2/21 13:15   30 103/76  Mechanical Ventilator  100


 


2/2/21 12:15   34 110/70  Mechanical Ventilator  100


 


2/2/21 11:15   32 97/79  Mechanical Ventilator  100


 


2/2/21 10:15   30 105/53  Mechanical Ventilator  100


 


2/2/21 10:11   26 96/60  Mechanical Ventilator  100


 


2/2/21 09:15   26 95/53  Mechanical Ventilator  100


 


2/2/21 08:15   25 108/64  Mechanical Ventilator  100


 


2/2/21 08:11  83      


 


2/2/21 08:00      Mechanical Ventilator  


 


2/2/21 07:15   26 117/71  Mechanical Ventilator  100


 


2/2/21 07:00  83 17 117/71 (86) 86   


 


2/2/21 07:00    117/71    


 


2/2/21 06:15   28 126/80  Mechanical Ventilator  100


 


2/2/21 06:00    126/80    


 


2/2/21 06:00  79 27 126/80 (95) 94   


 


2/2/21 05:37    126/80    


 


2/2/21 05:36    126/80    


 


2/2/21 05:26  77 34     100


 


2/2/21 05:15   26 121/76  Mechanical Ventilator  100


 


2/2/21 05:00  77 25 121/76 (91) 92   


 


2/2/21 05:00    121/76    


 


2/2/21 04:15   26 126/80  Mechanical Ventilator  100


 


2/2/21 04:00      Mechanical Ventilator  


 


2/2/21 04:00        100


 


2/2/21 04:00  78      


 


2/2/21 04:00 98.1 81 22 126/80 (95) 91   


 


2/2/21 04:00    126/80    


 


2/2/21 03:19  82 34     100


 


2/2/21 03:15   22 124/68  Mechanical Ventilator 100.0 100


 


2/2/21 03:00    112/72    


 


2/2/21 03:00  83 28 112/72 (85) 91   


 


2/2/21 02:30  90 22 124/68 (86) 90   


 


2/2/21 02:00    120/71    


 


2/2/21 02:00  83 25 120/71 (87) 86   


 


2/2/21 01:59  84 37     100


 


2/2/21 01:00  89 24 106/67 (80) 92   


 


2/2/21 01:00   28 106/67  Mechanical Ventilator  100


 


2/2/21 01:00    106/67    


 


2/2/21 00:00   28 108/62  Mechanical Ventilator  100


 


2/2/21 00:00    108/62    


 


2/2/21 00:00  86      


 


2/2/21 00:00 98.0 86 31 108/62 (77) 92   


 


2/2/21 00:00        100


 


2/2/21 00:00      Mechanical Ventilator  


 


2/1/21 23:45  82 37     100


 


2/1/21 23:30  80 27 103/66 (78) 92   


 


2/1/21 23:00  78 28 107/71 (83) 91   


 


2/1/21 23:00   28 105/68  Mechanical Ventilator  100


 


2/1/21 23:00    105/68    


 


2/1/21 22:00   28 120/76  Mechanical Ventilator  100


 


2/1/21 22:00    120/76    


 


2/1/21 22:00  76 25 130/79 (96) 92   


 


2/1/21 21:30  80 28 125/75 (92) 91   


 


2/1/21 21:21  76 34     100


 


2/1/21 21:00   26 120/73  Mechanical Ventilator  100


 


2/1/21 21:00    120/73    


 


2/1/21 21:00  78 27 124/78 (93) 94   


 


2/1/21 20:45   28 120/76  Mechanical Ventilator  100


 


2/1/21 20:45    120/76    


 


2/1/21 20:30  80 29 123/75 (91) 94   


 


2/1/21 20:30  80 29 123/75 (91) 92   


 


2/1/21 20:00 97.7 83 27 114/73 (87) 91   


 


2/1/21 20:00   25 116/71  Mechanical Ventilator  100


 


2/1/21 20:00    116/71    


 


2/1/21 20:00      Mechanical Ventilator  


 


2/1/21 20:00        100


 


2/1/21 20:00  87      


 


2/1/21 19:21  87 36     100


 


2/1/21 19:00   32 105/63  Mechanical Ventilator  100


 


2/1/21 19:00    105/63    


 


2/1/21 19:00  89 25 105/63 (77) 93   


 


2/1/21 18:30    106/67    


 


2/1/21 18:15    120/73    


 


2/1/21 18:00   32 117/72  Mechanical Ventilator  100


 


2/1/21 18:00    117/72    


 


2/1/21 18:00  97 31 119/72 (88) 91   


 


2/1/21 17:45    119/72    


 


2/1/21 17:30    126/77    


 


2/1/21 17:15    126/75    


 


2/1/21 17:05  101 36     100


 


2/1/21 17:00   32 123/71  Mechanical Ventilator  100


 


2/1/21 17:00    123/71    


 


2/1/21 17:00  106 24 123/71 (88) 90   


 


2/1/21 16:00      Mechanical Ventilator  


 


2/1/21 16:00        100


 


2/1/21 16:00   32 95/60  Mechanical Ventilator  100


 


2/1/21 16:00 99.0 106 26 95/60 (72) 90   


 


2/1/21 16:00  106      


 


2/1/21 15:59    90/54    


 


2/1/21 15:05  108 35     100


 


2/1/21 15:00  106 30 89/56 (67) 90   


 


2/1/21 15:00   32 89/56  Mechanical Ventilator  100


 


2/1/21 15:00    89/56    

















Intake and Output  


 


 2/1/21 2/2/21





 19:00 07:00


 


Intake Total 2274.4275 ml 2657.40 ml


 


Output Total 1250 ml 805 ml


 


Balance 1024.4275 ml 1852.40 ml


 


  


 


Free Water 400 ml 300 ml


 


IV Total 1214.4275 ml 1817.40 ml


 


Tube Feeding 660 ml 420 ml


 


Other  120 ml


 


Output Urine Total 800 ml 555 ml


 


Stool Total 100 ml 50 ml


 


Chest Tube Drainage Total 350 ml 200 ml








Objective


On Versed drip


General Appearance:  no acute distress


HEENT:  atraumatic


Respiratory:  lungs clear, decreased breath sounds


Cardiovascular:  normal rate, regular rhythm


Abdomen:  soft, non tender, no organomegaly


Laboratory Tests


2/1/21 14:29: 


White Blood Count 9.0, Red Blood Count 3.41L, Hemoglobin 9.8L, Hematocrit 32.8L,

Mean Corpuscular Volume 96, Mean Corpuscular Hemoglobin 28.7, Mean Corpuscular 

Hemoglobin Concent 29.9L, Red Cell Distribution Width 18.0H, Platelet Count 181,

Mean Platelet Volume 9.5, Neutrophils (%) (Auto) 83.1H, Lymphocytes (%) (Auto) 

13.2L, Monocytes (%) (Auto) 2.6, Eosinophils (%) (Auto) 0.5, Basophils (%) 

(Auto) 0.6, Sodium Level 143, Potassium Level 5.7H, Chloride Level 108H, Carbon 

Dioxide Level 27, Anion Gap 9, Blood Urea Nitrogen 75H, Creatinine 2.6H, Estimat

Glomerular Filtration Rate 24.8, Glucose Level 330H, Uric Acid 7.5H, Calcium 

Level 7.5L, Phosphorus Level 7.1H, Magnesium Level 2.4, Total Bilirubin 0.5, 

Aspartate Amino Transf (AST/SGOT) 42H, Alanine Aminotransferase (ALT/SGPT) 39, 

Alkaline Phosphatase 381H, Total Protein 5.6L, Albumin 1.1L, Globulin 4.5, 

Albumin/Globulin Ratio 0.2L


2/2/21 04:40: 


White Blood Count 6.5, Red Blood Count 3.12L, Hemoglobin 9.0L, Hematocrit 30.2L,

Mean Corpuscular Volume 97, Mean Corpuscular Hemoglobin 28.8, Mean Corpuscular 

Hemoglobin Concent 29.8L, Red Cell Distribution Width 18.0H, Platelet Count 190,

Mean Platelet Volume 9.5, Neutrophils (%) (Auto) , Lymphocytes (%) (Auto) , 

Monocytes (%) (Auto) , Eosinophils (%) (Auto) , Basophils (%) (Auto) , Sodium 

Level 144, Potassium Level 4.5, Chloride Level 106, Carbon Dioxide Level 29, 

Anion Gap 9, Blood Urea Nitrogen 74H, Creatinine 2.6H, Estimat Glomerular 

Filtration Rate 24.8, Glucose Level 281H, Uric Acid 7.8H, Calcium Level 7.9L, 

Phosphorus Level 6.1H, Magnesium Level 2.2, Total Bilirubin 0.6, Aspartate Amino

Transf (AST/SGOT) 24, Alanine Aminotransferase (ALT/SGPT) 33, Alkaline 

Phosphatase 309H, Total Protein 6.2L, Albumin 1.4L, Globulin 4.8, 

Albumin/Globulin Ratio 0.3L, Differential Total Cells Counted 100, Neutrophils %

(Manual) 91H, Lymphocytes % (Manual) 6L, Monocytes % (Manual) 3, Eosinophils % 

(Manual) 0, Basophils % (Manual) 0, Band Neutrophils 0, Platelet Estimate 

Adequate, Platelet Morphology Normal, Polychromasia 1+, Hypochromasia 1+, 

Anisocytosis 1+, Hemoglobin A1c 10.5H, Lactic Acid Level 1.30, Gamma Glutamyl 

Transpeptidase 190H, Ammonia 55H, Lactate Dehydrogenase 279H, Total Creatine 

Kinase 20L, Troponin I 0.001, C-Reactive Protein, Quantitative 17.0H, Pro-B-Type

Natriuretic Peptide 99599L, Vitamin B12 Level > 2000H, Vitamin D 25-Hydroxy 

[Pending], 25-Hydroxy Vitamin D2 [Pending], 25-Hydroxy Vitamin D3 [Pending], 

Folate 12.6, Thyroid Stimulating Hormone (TSH) 2.509, Digoxin Level 1.4


2/2/21 08:40: 


Arterial Blood pH 7.179*L, Arterial Blood Partial Pressure CO2 86.9*H, Arterial 

Blood Partial Pressure O2 53.6L, Arterial Blood HCO3 31.2H, Arterial Blood 

Oxygen Saturation 85.1*L, Arterial Blood Base Excess 1.1, Abelardo Test Positive





Current Medications








 Medications


  (Trade)  Dose


 Ordered  Sig/Julisa


 Route


 PRN Reason  Start Time


 Stop Time Status Last Admin


Dose Admin


 


 Acetaminophen


  (Tylenol)  650 mg  Q4H  PRN


 GT


 Temp >100.5  1/15/21 17:15


 2/14/21 17:14  1/29/21 06:59





 


 Albumin Human  100 ml @ 


 100 mls/hr  Q8HR


 IV


   2/2/21 14:00


 5/3/21 13:59   





 


 Amiodarone HCl


  (Cordarone)  200 mg  DAILY


 NG


   1/26/21 09:00


 4/19/21 20:59  2/2/21 08:11





 


 Cefepime HCl 1 gm/


 Dextrose  55 ml @ 


 110 mls/hr  Q24H


 IVPB


   1/30/21 11:00


 2/6/21 10:59  2/2/21 10:12





 


 Chlorhexidine


 Gluconate


  (Vanessa-Hex 2%)  1 applic  DAILY@2000


 TOPIC


   1/19/21 20:00


 4/19/21 19:59  2/1/21 21:06





 


 Dextrose


  (Dextrose 50%)  25 ml  Q30M  PRN


 IV


 Hypoglycemia  1/9/21 13:30


 4/9/21 13:29   





 


 Dextrose


  (Dextrose 50%)  50 ml  Q30M  PRN


 IV


 Hypoglycemia  1/9/21 13:30


 4/9/21 13:29   





 


 Digoxin


  (Lanoxin)  0.125 mg  DAILY


 NG


   1/21/21 09:00


 4/21/21 08:59  2/2/21 08:11





 


 Docusate Sodium


  (Colace)  100 mg  TIDPRN  PRN


 GT


 Constipation  1/12/21 01:15


 2/11/21 01:14  1/12/21 01:42





 


 Dopamine HCl/


 Dextrose  250 ml @ 0


 mls/hr  Q24H  PRN


 IV


 For hypotension  1/31/21 11:45


 2/3/21 11:45   





 


 Enoxaparin Sodium


  (Lovenox)  80 mg  EVERY 12  HOURS


 SUBQ


   1/2/21 21:00


 4/2/21 20:59  2/2/21 08:12





 


 Fentanyl Citrate  250 ml @ 1


 mls/hr  Q24H


 IV


   2/2/21 03:00


 2/4/21 02:59  2/2/21 10:11





 


 Hydrocortisone


  (Solu-CORTEF)  100 mg  EVERY 8  HOURS


 IV


   2/1/21 15:45


 5/2/21 15:44  2/2/21 05:38





 


 Insulin Aspart


  (NovoLOG)    Q6HR


 SUBQ


   1/14/21 12:00


 4/9/21 17:59  2/2/21 12:20





 


 Insulin Detemir


  (Levemir)  26 units  Q12HR


 SUBQ


   2/2/21 09:00


 4/12/21 08:59  2/2/21 08:29





 


 Midodrine


  (Pro-Amatine)  10 mg  Q8HR


 ORAL


   2/1/21 15:30


 5/2/21 15:29  2/2/21 05:38





 


 Norepinephrine


 Bitartrate 16 mg/


 Dextrose  516 ml @ 0


 mls/hr  Q24H


 IV


   1/31/21 10:30


 2/3/21 10:29  2/2/21 05:37





 


 Pantoprazole


  (Protonix)  40 mg  Q12HR


 IVP


   2/1/21 21:00


 2/13/21 08:59  2/2/21 08:12





 


 Phenylephrine HCl


 100 mg/Dextrose  250 ml @ 0


 mls/hr  Q24H  PRN


 IV


 For hypotension  1/31/21 12:30


 2/3/21 12:29   





 


 Sodium Chloride  500 ml @ 


 999 mls/hr  Q31M PRN


 IV


 For hypotension  1/15/21 18:30


 2/14/21 18:29   





 


 Sodium Chloride  1,000 ml @ 


 100 mls/hr  Q10H


 IV


   2/1/21 15:30


 3/3/21 15:29  2/2/21 10:12














Assessment/Plan


Assessment/Plan


1. COVID-19 pneumonia.


   - s/p Decadron, azithromycin, and ceftriaxone


   - Intubated now; will continue AC mode for now


          -Currently 100% FiO2. PEEP 12; SaO2 78-84 ->91%


            - Has R apical PTX and subcut emphysema


             - S/p R CT placement





2. Diabetes mellitus.; 


3. Elevated inflammatory markers.


4. High D-dimer. On full dose Lovenox


5. Hypernatremia; will continue D5W


6. Hyperglycemia.


   - BG control


7. Hypoxemia., secondary to #1; improved





DVT prophylaxis   On Lovenox.


Sedated; advised RN to increase sedation





Hypotensive


On levophed


Continue PEEP 12


Poor prognosis


discussed with RN.


Continue IV fluids











Sung Lemos MD            Feb 2, 2021 14:25

## 2021-02-02 NOTE — NUR
NURSE NOTES:



Dr Lemos and Dr Travis here to see the patient. Updated him with patient's current 
condition including ABG result and desaturation. No new orders for now.

-------------------------------------------------------------------------------

Addendum: 02/02/21 at 1602 by MI YEON YOON, RN RN

-------------------------------------------------------------------------------

NURSE NOTES:



Dr Lemos and Dr Travis here to see the patient. Updated him with patient's current 
condition including ABG result and desaturation. O2 sat 50's. Dr Lemos and JEREMIAH Kamara, 
are aware of low saturation. No new orders for now.

## 2021-02-02 NOTE — NUR
NURSE NOTES:

Blood glucose 281mg/dl with 8 units NovoLog insulin per sliding scale.  turned and 
reposition pt in bed.  oral care provided. no s/s of acute distress noted. will continue 
plan of care.

## 2021-02-02 NOTE — NUR
NURSE NOTES:

repositioned pt, oral care given, oral suctioned. old blood noted in the mouth, nothing is 
actively bleeding.

## 2021-02-03 VITALS — DIASTOLIC BLOOD PRESSURE: 81 MMHG | SYSTOLIC BLOOD PRESSURE: 133 MMHG

## 2021-02-03 VITALS — SYSTOLIC BLOOD PRESSURE: 121 MMHG | DIASTOLIC BLOOD PRESSURE: 73 MMHG

## 2021-02-03 VITALS — SYSTOLIC BLOOD PRESSURE: 110 MMHG | DIASTOLIC BLOOD PRESSURE: 63 MMHG

## 2021-02-03 VITALS — DIASTOLIC BLOOD PRESSURE: 70 MMHG | SYSTOLIC BLOOD PRESSURE: 145 MMHG

## 2021-02-03 VITALS — DIASTOLIC BLOOD PRESSURE: 69 MMHG | SYSTOLIC BLOOD PRESSURE: 120 MMHG

## 2021-02-03 VITALS — DIASTOLIC BLOOD PRESSURE: 79 MMHG | SYSTOLIC BLOOD PRESSURE: 136 MMHG

## 2021-02-03 VITALS — DIASTOLIC BLOOD PRESSURE: 67 MMHG | SYSTOLIC BLOOD PRESSURE: 111 MMHG

## 2021-02-03 VITALS — DIASTOLIC BLOOD PRESSURE: 84 MMHG | SYSTOLIC BLOOD PRESSURE: 111 MMHG

## 2021-02-03 VITALS — DIASTOLIC BLOOD PRESSURE: 67 MMHG | SYSTOLIC BLOOD PRESSURE: 118 MMHG

## 2021-02-03 VITALS — SYSTOLIC BLOOD PRESSURE: 128 MMHG | DIASTOLIC BLOOD PRESSURE: 73 MMHG

## 2021-02-03 VITALS — DIASTOLIC BLOOD PRESSURE: 63 MMHG | SYSTOLIC BLOOD PRESSURE: 105 MMHG

## 2021-02-03 VITALS — SYSTOLIC BLOOD PRESSURE: 130 MMHG | DIASTOLIC BLOOD PRESSURE: 73 MMHG

## 2021-02-03 VITALS — DIASTOLIC BLOOD PRESSURE: 64 MMHG | SYSTOLIC BLOOD PRESSURE: 110 MMHG

## 2021-02-03 VITALS — DIASTOLIC BLOOD PRESSURE: 43 MMHG | SYSTOLIC BLOOD PRESSURE: 59 MMHG

## 2021-02-03 VITALS — SYSTOLIC BLOOD PRESSURE: 68 MMHG | DIASTOLIC BLOOD PRESSURE: 43 MMHG

## 2021-02-03 VITALS — SYSTOLIC BLOOD PRESSURE: 129 MMHG | DIASTOLIC BLOOD PRESSURE: 78 MMHG

## 2021-02-03 VITALS — SYSTOLIC BLOOD PRESSURE: 127 MMHG | DIASTOLIC BLOOD PRESSURE: 74 MMHG

## 2021-02-03 VITALS — SYSTOLIC BLOOD PRESSURE: 112 MMHG | DIASTOLIC BLOOD PRESSURE: 67 MMHG

## 2021-02-03 VITALS — SYSTOLIC BLOOD PRESSURE: 127 MMHG | DIASTOLIC BLOOD PRESSURE: 73 MMHG

## 2021-02-03 VITALS — SYSTOLIC BLOOD PRESSURE: 114 MMHG | DIASTOLIC BLOOD PRESSURE: 69 MMHG

## 2021-02-03 VITALS — DIASTOLIC BLOOD PRESSURE: 66 MMHG | SYSTOLIC BLOOD PRESSURE: 122 MMHG

## 2021-02-03 VITALS — DIASTOLIC BLOOD PRESSURE: 81 MMHG | SYSTOLIC BLOOD PRESSURE: 140 MMHG

## 2021-02-03 VITALS — SYSTOLIC BLOOD PRESSURE: 122 MMHG | DIASTOLIC BLOOD PRESSURE: 71 MMHG

## 2021-02-03 VITALS — SYSTOLIC BLOOD PRESSURE: 117 MMHG | DIASTOLIC BLOOD PRESSURE: 65 MMHG

## 2021-02-03 VITALS — SYSTOLIC BLOOD PRESSURE: 125 MMHG | DIASTOLIC BLOOD PRESSURE: 73 MMHG

## 2021-02-03 VITALS — DIASTOLIC BLOOD PRESSURE: 75 MMHG | SYSTOLIC BLOOD PRESSURE: 134 MMHG

## 2021-02-03 VITALS — DIASTOLIC BLOOD PRESSURE: 62 MMHG | SYSTOLIC BLOOD PRESSURE: 107 MMHG

## 2021-02-03 VITALS — DIASTOLIC BLOOD PRESSURE: 66 MMHG | SYSTOLIC BLOOD PRESSURE: 119 MMHG

## 2021-02-03 VITALS — DIASTOLIC BLOOD PRESSURE: 63 MMHG | SYSTOLIC BLOOD PRESSURE: 108 MMHG

## 2021-02-03 VITALS — SYSTOLIC BLOOD PRESSURE: 114 MMHG | DIASTOLIC BLOOD PRESSURE: 66 MMHG

## 2021-02-03 VITALS — DIASTOLIC BLOOD PRESSURE: 76 MMHG | SYSTOLIC BLOOD PRESSURE: 150 MMHG

## 2021-02-03 VITALS — SYSTOLIC BLOOD PRESSURE: 134 MMHG | DIASTOLIC BLOOD PRESSURE: 77 MMHG

## 2021-02-03 VITALS — DIASTOLIC BLOOD PRESSURE: 71 MMHG | SYSTOLIC BLOOD PRESSURE: 126 MMHG

## 2021-02-03 VITALS — DIASTOLIC BLOOD PRESSURE: 68 MMHG | SYSTOLIC BLOOD PRESSURE: 116 MMHG

## 2021-02-03 VITALS — DIASTOLIC BLOOD PRESSURE: 76 MMHG | SYSTOLIC BLOOD PRESSURE: 148 MMHG

## 2021-02-03 VITALS — DIASTOLIC BLOOD PRESSURE: 67 MMHG | SYSTOLIC BLOOD PRESSURE: 112 MMHG

## 2021-02-03 VITALS — DIASTOLIC BLOOD PRESSURE: 77 MMHG | SYSTOLIC BLOOD PRESSURE: 134 MMHG

## 2021-02-03 VITALS — SYSTOLIC BLOOD PRESSURE: 68 MMHG | DIASTOLIC BLOOD PRESSURE: 45 MMHG

## 2021-02-03 VITALS — SYSTOLIC BLOOD PRESSURE: 93 MMHG | DIASTOLIC BLOOD PRESSURE: 75 MMHG

## 2021-02-03 VITALS — SYSTOLIC BLOOD PRESSURE: 118 MMHG | DIASTOLIC BLOOD PRESSURE: 66 MMHG

## 2021-02-03 VITALS — DIASTOLIC BLOOD PRESSURE: 62 MMHG | SYSTOLIC BLOOD PRESSURE: 112 MMHG

## 2021-02-03 LAB
ADD MANUAL DIFF: NO
ALBUMIN SERPL-MCNC: 2.1 G/DL (ref 3.4–5)
ALBUMIN/GLOB SERPL: 0.5 {RATIO} (ref 1–2.7)
ALP SERPL-CCNC: 329 U/L (ref 46–116)
ALT SERPL-CCNC: 39 U/L (ref 12–78)
ANION GAP SERPL CALC-SCNC: 10 MMOL/L (ref 5–15)
AST SERPL-CCNC: 27 U/L (ref 15–37)
BILIRUB SERPL-MCNC: 0.5 MG/DL (ref 0.2–1)
BUN SERPL-MCNC: 77 MG/DL (ref 7–18)
CALCIUM SERPL-MCNC: 8 MG/DL (ref 8.5–10.1)
CHLORIDE SERPL-SCNC: 105 MMOL/L (ref 98–107)
CK SERPL-CCNC: 16 U/L (ref 26–308)
CO2 SERPL-SCNC: 30 MMOL/L (ref 21–32)
CREAT SERPL-MCNC: 2.7 MG/DL (ref 0.55–1.3)
ERYTHROCYTE [DISTWIDTH] IN BLOOD BY AUTOMATED COUNT: 17.8 % (ref 11.6–14.8)
GAMMA GLUTAMYL TRANSPEPTIDASE: 210 U/L (ref 5–85)
GLOBULIN SER-MCNC: 4.4 G/DL
HCT VFR BLD CALC: 28.3 % (ref 42–52)
HGB BLD-MCNC: 8.6 G/DL (ref 14.2–18)
MCV RBC AUTO: 96 FL (ref 80–99)
PHOSPHATE SERPL-MCNC: 5.6 MG/DL (ref 2.5–4.9)
PLATELET # BLD: 239 K/UL (ref 150–450)
POTASSIUM SERPL-SCNC: 4.4 MMOL/L (ref 3.5–5.1)
RBC # BLD AUTO: 2.95 M/UL (ref 4.7–6.1)
SODIUM SERPL-SCNC: 145 MMOL/L (ref 136–145)
WBC # BLD AUTO: 6.5 K/UL (ref 4.8–10.8)

## 2021-02-03 RX ADMIN — INSULIN ASPART SCH UNITS: 100 INJECTION, SOLUTION INTRAVENOUS; SUBCUTANEOUS at 17:19

## 2021-02-03 RX ADMIN — INSULIN ASPART SCH UNITS: 100 INJECTION, SOLUTION INTRAVENOUS; SUBCUTANEOUS at 00:25

## 2021-02-03 RX ADMIN — INSULIN DETEMIR SCH UNITS: 100 INJECTION, SOLUTION SUBCUTANEOUS at 21:00

## 2021-02-03 RX ADMIN — HYDROCORTISONE SODIUM SUCCINATE SCH MG: 100 INJECTION, POWDER, FOR SOLUTION INTRAMUSCULAR; INTRAVENOUS at 14:25

## 2021-02-03 RX ADMIN — PANTOPRAZOLE SODIUM SCH MG: 40 INJECTION, POWDER, FOR SOLUTION INTRAVENOUS at 09:23

## 2021-02-03 RX ADMIN — MIDODRINE HYDROCHLORIDE SCH MG: 10 TABLET ORAL at 05:25

## 2021-02-03 RX ADMIN — HYDROCORTISONE SODIUM SUCCINATE SCH MG: 100 INJECTION, POWDER, FOR SOLUTION INTRAMUSCULAR; INTRAVENOUS at 05:24

## 2021-02-03 RX ADMIN — Medication SCH MLS/HR: at 17:11

## 2021-02-03 RX ADMIN — INSULIN ASPART SCH UNITS: 100 INJECTION, SOLUTION INTRAVENOUS; SUBCUTANEOUS at 06:04

## 2021-02-03 RX ADMIN — AMIODARONE HYDROCHLORIDE SCH MG: 200 TABLET ORAL at 09:23

## 2021-02-03 RX ADMIN — SODIUM CHLORIDE SCH MLS/HR: 0.9 INJECTION INTRAVENOUS at 11:27

## 2021-02-03 RX ADMIN — MIDODRINE HYDROCHLORIDE SCH MG: 10 TABLET ORAL at 14:50

## 2021-02-03 RX ADMIN — SODIUM CHLORIDE SCH MLS/HR: 900 INJECTION, SOLUTION INTRAVENOUS at 10:03

## 2021-02-03 RX ADMIN — MIDODRINE HYDROCHLORIDE SCH MG: 10 TABLET ORAL at 21:16

## 2021-02-03 RX ADMIN — INSULIN DETEMIR SCH UNITS: 100 INJECTION, SOLUTION SUBCUTANEOUS at 09:25

## 2021-02-03 RX ADMIN — DIGOXIN SCH MG: 0.12 TABLET ORAL at 09:23

## 2021-02-03 RX ADMIN — ENOXAPARIN SODIUM SCH MG: 80 INJECTION SUBCUTANEOUS at 09:24

## 2021-02-03 RX ADMIN — PANTOPRAZOLE SODIUM SCH MG: 40 INJECTION, POWDER, FOR SOLUTION INTRAVENOUS at 20:39

## 2021-02-03 RX ADMIN — CHLORHEXIDINE GLUCONATE SCH APPLIC: 213 SOLUTION TOPICAL at 20:39

## 2021-02-03 RX ADMIN — INSULIN ASPART SCH UNITS: 100 INJECTION, SOLUTION INTRAVENOUS; SUBCUTANEOUS at 11:50

## 2021-02-03 RX ADMIN — HYDROCORTISONE SODIUM SUCCINATE SCH MG: 100 INJECTION, POWDER, FOR SOLUTION INTRAMUSCULAR; INTRAVENOUS at 21:15

## 2021-02-03 RX ADMIN — Medication SCH MLS/HR: at 09:15

## 2021-02-03 RX ADMIN — SODIUM CHLORIDE SCH MLS/HR: 900 INJECTION, SOLUTION INTRAVENOUS at 11:59

## 2021-02-03 NOTE — NEPHROLOGY PROGRESS NOTE
Assessment/Plan


Problem List:  


(1) TO (acute kidney injury)


(2) Acute respiratory failure


(3) Pneumonia due to COVID-19 virus


(4) Diabetes mellitus out of control


(5) Hyperkalemia


Assessment





Acute kidney injury, multifactorial


Septic shock


Acute respiratory failure


Hyperkalemia, metabolic acidosis


Above-mentioned condition


Plan





February 3: Labs reviewed.  Medication reviewed.  Patient full code.  Remains 

intubated on ventilator.  Hypotensive.  Renal parameters unchanged.  Continue 

current support.  Prognosis dismal.


February 2: Labs reviewed.  Medication list reviewed.  Discussed with RN.  

Patient remains full code.  Patient is still intubated on ventilator.  

Hyperkalemia improved.  Serum creatinine 2.6 unchanged.  Status remains guarded.

 Continue per consultants.  Continue monitor renal parameters.  Taper pressors 

as possible.  Sodium bicarb IV





Previosly


Feeding changed to Nepro


Midodrine 10 mg every 8 hours


Albumin bolus


1/2 Normal saline 100 cc an hour


Stop Seroquel


Monitor renal parameters


Avoid nephrotoxic's


1 amp of sodium bicarbonate


Stress dose of steroids


Discussed with RN Katherine





Subjective


ROS Limited/Unobtainable:  Yes





Objective


Objective





Last 24 Hour Vital Signs








  Date Time  Temp Pulse Resp B/P (MAP) Pulse Ox O2 Delivery O2 Flow Rate FiO2


 


2/3/21 10:21    67/43    


 


2/3/21 10:03    75/55    


 


2/3/21 10:00   22 145/70  Mechanical Ventilator  100


 


2/3/21 09:23  83      


 


2/3/21 09:15   21 118/67  Mechanical Ventilator  100


 


2/3/21 09:00   21 118/67  Mechanical Ventilator  100


 


2/3/21 09:00    118/67    


 


2/3/21 08:00  83      


 


2/3/21 08:00 97.5 82 29 112/67 (82) 82   


 


2/3/21 08:00   26 113/59  Mechanical Ventilator  100


 


2/3/21 08:00    113/68    


 


2/3/21 08:00      Mechanical Ventilator  


 


2/3/21 08:00        100


 


2/3/21 07:15    113/68    


 


2/3/21 07:00  88 29 119/66 (83) 69   


 


2/3/21 06:45   21 111/84  Mechanical Ventilator  100


 


2/3/21 06:45  91 21 111/84 (93) 63   


 


2/3/21 06:30  84 32 130/73 (92) 88   


 


2/3/21 06:30   30 130/73  Mechanical Ventilator  100


 


2/3/21 06:15  87 28 93/75 (81) 79   


 


2/3/21 06:15   26 93/75  Mechanical Ventilator  100


 


2/3/21 06:15    93/75    


 


2/3/21 06:00   23 128/73  Mechanical Ventilator  100


 


2/3/21 06:00  86 27 128/73 (91) 87   


 


2/3/21 05:45   27 122/71  Mechanical Ventilator  100


 


2/3/21 05:45  89 25 122/71 (88) 84   


 


2/3/21 05:30   22 134/75  Mechanical Ventilator  100


 


2/3/21 05:30  88 21 134/75 (94) 90   


 


2/3/21 05:15   21 136/79  Mechanical Ventilator  100


 


2/3/21 05:15    136/79    


 


2/3/21 05:15  85 24 136/79 (98) 93   


 


2/3/21 05:00  88 23 126/71 (89) 94   


 


2/3/21 04:45  90 22 120/69 (86) 91   


 


2/3/21 04:30  90 27 118/67 (84) 90   


 


2/3/21 04:15  92 18 118/66 (83) 88   


 


2/3/21 04:15   24 110/70  Mechanical Ventilator  100


 


2/3/21 04:15    110/70    


 


2/3/21 04:00      Mechanical Ventilator  


 


2/3/21 04:00        100


 


2/3/21 04:00  92 22 114/66 (82) 85   


 


2/3/21 03:51  92      


 


2/3/21 03:15   15 119/66  Mechanical Ventilator  100


 


2/3/21 03:15    119/66    


 


2/3/21 03:08  86 32     100


 


2/3/21 03:00  86 23 111/67 (82) 86   


 


2/3/21 02:15   23 114/69  Mechanical Ventilator  100


 


2/3/21 02:15    114/69    


 


2/3/21 02:00  81 21 114/69 (84) 90   


 


2/3/21 01:15   28 118/69  Mechanical Ventilator  100


 


2/3/21 01:15    118/69    


 


2/3/21 01:15  81 30 127/73 (91) 93   


 


2/3/21 01:00  85 27 127/74 (91) 85   


 


2/3/21 00:30  86 23 150/76 (100) 83   


 


2/3/21 00:15  84 13 125/73 (90) 93   


 


2/3/21 00:15   22 125/73  Mechanical Ventilator  100


 


2/3/21 00:15    125/73    


 


2/3/21 00:00 97.0 87 21 129/78 (95) 89   


 


2/3/21 00:00        100


 


2/3/21 00:00   18 123/69  Mechanical Ventilator  100


 


2/3/21 00:00      Mechanical Ventilator  


 


2/2/21 23:59  86      


 


2/2/21 23:45  87 26 126/71 (89) 90   


 


2/2/21 23:45   19 126/71  Mechanical Ventilator  100


 


2/2/21 23:30  88 24 125/75 (92) 92   


 


2/2/21 23:30   23 125/75  Mechanical Ventilator  100


 


2/2/21 23:20  88 33     100


 


2/2/21 23:15   24 120/69  Mechanical Ventilator  100


 


2/2/21 23:15    120/69    


 


2/2/21 23:15  88 21 120/69 (86) 91   


 


2/2/21 23:11   27 111/71  Mechanical Ventilator  100


 


2/2/21 23:00   22 111/71  Mechanical Ventilator  100


 


2/2/21 23:00  89 20 111/71 (84) 91   


 


2/2/21 22:45  90 20 113/70 (84) 89   


 


2/2/21 22:45  90 20 113/70 (84) 89   


 


2/2/21 22:30  91 18 102/67 (79) 88   


 


2/2/21 22:15   23 98/57  Mechanical Ventilator  100


 


2/2/21 22:15    98/57    


 


2/2/21 22:00  92 17 98/57 (71) 85   


 


2/2/21 21:30  90 22 100/59 (73) 83   


 


2/2/21 21:15   27 99/68  Mechanical Ventilator  100


 


2/2/21 21:15    99/68    


 


2/2/21 21:00  88 19 102/60 (74) 85   


 


2/2/21 20:30  87 27 101/64 (76) 86   


 


2/2/21 20:15   16 105/70  Mechanical Ventilator  100


 


2/2/21 20:15    105/70    


 


2/2/21 20:00      Mechanical Ventilator  


 


2/2/21 20:00 96.7 84 14 109/68 (82) 89   


 


2/2/21 20:00        100


 


2/2/21 19:45  82 15 111/67 (82) 90   


 


2/2/21 19:30  81      


 


2/2/21 19:30  80 12 85/57 (66) 88   


 


2/2/21 19:18  84 33     100


 


2/2/21 19:15  80 15 79/57 (64) 90   


 


2/2/21 19:15   14 79/57  Mechanical Ventilator  100


 


2/2/21 19:15    79/57    


 


2/2/21 19:00  79 14 88/60 (69) 89   


 


2/2/21 19:00    88/60    


 


2/2/21 18:30  77 15 94/62 (73) 91   


 


2/2/21 18:15   17 94/62  Mechanical Ventilator  100


 


2/2/21 18:15  78 22 100/68 (79) 88   


 


2/2/21 18:00    93/62    


 


2/2/21 18:00  80 33 77/50 (59) 84   


 


2/2/21 17:30  75 30 93/62 (72) 84   


 


2/2/21 17:15   29 93/58  Mechanical Ventilator  100


 


2/2/21 17:00  75 32 93/58 (70) 56   


 


2/2/21 17:00    93/58    


 


2/2/21 16:30  77 29 102/63 (76) 80   


 


2/2/21 16:15   31 106/88  Mechanical Ventilator  100


 


2/2/21 16:00 96.0 79 32 108/66 (80) 83   


 


2/2/21 16:00    108/66    


 


2/2/21 16:00      Mechanical Ventilator  


 


2/2/21 16:00  76      


 


2/2/21 16:00        100


 


2/2/21 15:30  84 22 143/79 (100) 56   


 


2/2/21 15:15   29 106/67  Mechanical Ventilator  100


 


2/2/21 15:00  80 36 106/67 (80) 85   


 


2/2/21 15:00  81 36     100


 


2/2/21 15:00    106/67    


 


2/2/21 15:00  80 32 106/67 (80) 85   


 


2/2/21 14:30  83 32 101/62 (75) 81   


 


2/2/21 14:15   32 101/62  Mechanical Ventilator  100


 


2/2/21 14:00  83 30 95/62 (73) 83   


 


2/2/21 14:00    100/62    


 


2/2/21 13:30  85 20 95/62 (73) 57   


 


2/2/21 13:15   30 103/76  Mechanical Ventilator  100


 


2/2/21 13:00  85 17 111/68 (82) 82   


 


2/2/21 13:00    111/68    


 


2/2/21 12:30 96.7 85 31 106/69 (81) 73   


 


2/2/21 12:15   34 110/70  Mechanical Ventilator  100


 


2/2/21 12:00  82      


 


2/2/21 12:00    107/72    


 


2/2/21 12:00  87 26 107/72 (84) 67   


 


2/2/21 12:00        100


 


2/2/21 12:00      Mechanical Ventilator  


 


2/2/21 11:30  85 28 100/60 (73) 82   


 


2/2/21 11:15   32 97/79  Mechanical Ventilator  100


 


2/2/21 11:00  87 36     100


 


2/2/21 11:00    97/62    


 


2/2/21 11:00  87 29 97/62 (74) 80   

















Intake and Output  


 


 2/2/21 2/3/21





 19:00 07:00


 


Intake Total 2625.978 ml 2574.76363 ml


 


Output Total 990 ml 750 ml


 


Balance 1635.978 ml 1824.91576 ml


 


  


 


Free Water 400 ml 400 ml


 


IV Total 1705.978 ml 1754.51868 ml


 


Tube Feeding 420 ml 420 ml


 


Other 100 ml 


 


Output Urine Total 620 ml 560 ml


 


Stool Total 20 ml 30 ml


 


Chest Tube Drainage Total 350 ml 160 ml











Current Medications








 Medications


  (Trade)  Dose


 Ordered  Sig/Julisa


 Route


 PRN Reason  Start Time


 Stop Time Status Last Admin


Dose Admin


 


 Acetaminophen


  (Tylenol)  650 mg  Q4H  PRN


 GT


 Temp >100.5  1/15/21 17:15


 2/14/21 17:14  1/29/21 06:59





 


 Albumin Human  100 ml @ 


 100 mls/hr  Q8HR


 IV


   2/2/21 14:00


 5/3/21 13:59  2/3/21 05:24





 


 Amiodarone HCl


  (Cordarone)  200 mg  DAILY


 NG


   1/26/21 09:00


 4/19/21 20:59  2/3/21 09:23





 


 Cefepime HCl 1 gm/


 Dextrose  55 ml @ 


 110 mls/hr  Q24H


 IVPB


   1/30/21 11:00


 2/6/21 10:59  2/2/21 10:12





 


 Chlorhexidine


 Gluconate


  (Vanessa-Hex 2%)  1 applic  DAILY@2000


 TOPIC


   1/19/21 20:00


 4/19/21 19:59  2/2/21 20:39





 


 Dextrose


  (Dextrose 50%)  25 ml  Q30M  PRN


 IV


 Hypoglycemia  1/9/21 13:30


 4/9/21 13:29   





 


 Dextrose


  (Dextrose 50%)  50 ml  Q30M  PRN


 IV


 Hypoglycemia  1/9/21 13:30


 4/9/21 13:29   





 


 Digoxin


  (Lanoxin)  0.125 mg  DAILY


 NG


   1/21/21 09:00


 4/21/21 08:59  2/3/21 09:23





 


 Docusate Sodium


  (Colace)  100 mg  TIDPRN  PRN


 GT


 Constipation  1/12/21 01:15


 2/11/21 01:14  1/12/21 01:42





 


 Dopamine HCl/


 Dextrose  250 ml @ 0


 mls/hr  Q24H  PRN


 IV


 For hypotension  1/31/21 11:45


 2/3/21 11:45  2/3/21 10:21





 


 Enoxaparin Sodium


  (Lovenox)  80 mg  EVERY 12  HOURS


 SUBQ


   1/2/21 21:00


 4/2/21 20:59  2/3/21 09:24





 


 Fentanyl Citrate  250 ml @ 1


 mls/hr  Q24H


 IV


   2/2/21 03:00


 2/4/21 02:59  2/3/21 09:15





 


 Hydrocortisone


  (Solu-CORTEF)  100 mg  EVERY 8  HOURS


 IV


   2/1/21 15:45


 5/2/21 15:44  2/3/21 05:24





 


 Insulin Aspart


  (NovoLOG)    Q6HR


 SUBQ


   1/14/21 12:00


 4/9/21 17:59  2/3/21 06:04





 


 Insulin Detemir


  (Levemir)  26 units  Q12HR


 SUBQ


   2/2/21 09:00


 4/12/21 08:59  2/3/21 09:25





 


 Midodrine


  (Pro-Amatine)  10 mg  Q8HR


 ORAL


   2/1/21 15:30


 5/2/21 15:29  2/3/21 05:25





 


 Pantoprazole


  (Protonix)  40 mg  Q12HR


 IVP


   2/1/21 21:00


 2/13/21 08:59  2/3/21 09:23





 


 Phenylephrine HCl


 100 mg/Dextrose  250 ml @ 0


 mls/hr  Q24H  PRN


 IV


 For hypotension  1/31/21 12:30


 2/3/21 12:29   





 


 Sodium Chloride  500 ml @ 


 999 mls/hr  Q31M PRN


 IV


 For hypotension  1/15/21 18:30


 2/14/21 18:29   





 


 Sodium Chloride  1,000 ml @ 


 100 mls/hr  Q10H


 IV


   2/1/21 15:30


 3/3/21 15:29  2/3/21 07:32








Laboratory Tests


2/3/21 04:25: 


White Blood Count 6.5, Red Blood Count 2.95L, Hemoglobin 8.6L, Hematocrit 28.3L,

Mean Corpuscular Volume 96, Mean Corpuscular Hemoglobin 29.0, Mean Corpuscular 

Hemoglobin Concent 30.3L, Red Cell Distribution Width 17.8H, Platelet Count 239,

Mean Platelet Volume 9.4, Neutrophils (%) (Auto) , Lymphocytes (%) (Auto) , 

Monocytes (%) (Auto) , Eosinophils (%) (Auto) , Basophils (%) (Auto) , Sodium 

Level 145, Potassium Level 4.4, Chloride Level 105, Carbon Dioxide Level 30, 

Anion Gap 10, Blood Urea Nitrogen 77H, Creatinine 2.7H, Estimat Glomerular 

Filtration Rate 23.8, Glucose Level 198H, Lactic Acid Level 2.60H, Uric Acid 

8.6H, Calcium Level 8.0L, Phosphorus Level 5.6H, Magnesium Level 2.4, Total 

Bilirubin 0.5, Gamma Glutamyl Transpeptidase 210H, Aspartate Amino Transf 

(AST/SGOT) 27, Alanine Aminotransferase (ALT/SGPT) 39, Alkaline Phosphatase 329H

, Total Creatine Kinase 16L, C-Reactive Protein, Quantitative 8.3H, Pro-B-Type 

Natriuretic Peptide 09237R, Total Protein 6.5, Albumin 2.1L, Globulin 4.4, 

Albumin/Globulin Ratio 0.5L


2/3/21 08:31: 


Arterial Blood pH 7.194*L, Arterial Blood Partial Pressure CO2 84.6*H, Arterial 

Blood Partial Pressure O2 51.1L, Arterial Blood HCO3 31.9H, Arterial Blood 

Oxygen Saturation 81.4*L, Arterial Blood Base Excess 2.4H, Abelardo Test Positive


Height (Feet):  5


Height (Inches):  7.00


Weight (Pounds):  198


General Appearance:  no apparent distress, other - Hypotensive


EENT:  other - Intubated on ventilator


Cardiovascular:  tachycardia


Respiratory/Chest:  decreased breath sounds


Abdomen:  distended











Damien Powell MD             Feb 3, 2021 10:55

## 2021-02-03 NOTE — PULMONOLOGY PROGRESS NOTE
Subjective


ROS Limited/Unobtainable:  Yes


Interval Events:  code blue (1/29)


Constitutional:  Reports: fever, other - My=228.2


HEENT:  Repors: no symptoms


Respiratory:  Reports: shortness of breath - better


Cardiovascular:  Reports: no symptoms


Gastrointestinal/Abdominal:  Reports: diarrhea


Allergies:  


Coded Allergies:  


     No Known Allergies (Unverified , 6/11/19)


All Systems:  reviewed and negative except above





Objective





Last 24 Hour Vital Signs








  Date Time  Temp Pulse Resp B/P (MAP) Pulse Ox O2 Delivery O2 Flow Rate FiO2


 


2/3/21 10:21    67/43    


 


2/3/21 10:03    75/55    


 


2/3/21 10:00   22 145/70  Mechanical Ventilator  100


 


2/3/21 09:23  83      


 


2/3/21 09:15   21 118/67  Mechanical Ventilator  100


 


2/3/21 09:00   21 118/67  Mechanical Ventilator  100


 


2/3/21 09:00    118/67    


 


2/3/21 08:00  83      


 


2/3/21 08:00 97.5 82 29 112/67 (82) 82   


 


2/3/21 08:00   26 113/59  Mechanical Ventilator  100


 


2/3/21 08:00    113/68    


 


2/3/21 08:00      Mechanical Ventilator  


 


2/3/21 08:00        100


 


2/3/21 07:15    113/68    


 


2/3/21 07:00  88 29 119/66 (83) 69   


 


2/3/21 06:45   21 111/84  Mechanical Ventilator  100


 


2/3/21 06:45  91 21 111/84 (93) 63   


 


2/3/21 06:30  84 32 130/73 (92) 88   


 


2/3/21 06:30   30 130/73  Mechanical Ventilator  100


 


2/3/21 06:15  87 28 93/75 (81) 79   


 


2/3/21 06:15   26 93/75  Mechanical Ventilator  100


 


2/3/21 06:15    93/75    


 


2/3/21 06:00   23 128/73  Mechanical Ventilator  100


 


2/3/21 06:00  86 27 128/73 (91) 87   


 


2/3/21 05:45   27 122/71  Mechanical Ventilator  100


 


2/3/21 05:45  89 25 122/71 (88) 84   


 


2/3/21 05:30   22 134/75  Mechanical Ventilator  100


 


2/3/21 05:30  88 21 134/75 (94) 90   


 


2/3/21 05:15   21 136/79  Mechanical Ventilator  100


 


2/3/21 05:15    136/79    


 


2/3/21 05:15  85 24 136/79 (98) 93   


 


2/3/21 05:00  88 23 126/71 (89) 94   


 


2/3/21 04:45  90 22 120/69 (86) 91   


 


2/3/21 04:30  90 27 118/67 (84) 90   


 


2/3/21 04:15  92 18 118/66 (83) 88   


 


2/3/21 04:15   24 110/70  Mechanical Ventilator  100


 


2/3/21 04:15    110/70    


 


2/3/21 04:00      Mechanical Ventilator  


 


2/3/21 04:00        100


 


2/3/21 04:00  92 22 114/66 (82) 85   


 


2/3/21 03:51  92      


 


2/3/21 03:15   15 119/66  Mechanical Ventilator  100


 


2/3/21 03:15    119/66    


 


2/3/21 03:08  86 32     100


 


2/3/21 03:00  86 23 111/67 (82) 86   


 


2/3/21 02:15   23 114/69  Mechanical Ventilator  100


 


2/3/21 02:15    114/69    


 


2/3/21 02:00  81 21 114/69 (84) 90   


 


2/3/21 01:15   28 118/69  Mechanical Ventilator  100


 


2/3/21 01:15    118/69    


 


2/3/21 01:15  81 30 127/73 (91) 93   


 


2/3/21 01:00  85 27 127/74 (91) 85   


 


2/3/21 00:30  86 23 150/76 (100) 83   


 


2/3/21 00:15  84 13 125/73 (90) 93   


 


2/3/21 00:15   22 125/73  Mechanical Ventilator  100


 


2/3/21 00:15    125/73    


 


2/3/21 00:00 97.0 87 21 129/78 (95) 89   


 


2/3/21 00:00        100


 


2/3/21 00:00   18 123/69  Mechanical Ventilator  100


 


2/3/21 00:00      Mechanical Ventilator  


 


2/2/21 23:59  86      


 


2/2/21 23:45  87 26 126/71 (89) 90   


 


2/2/21 23:45   19 126/71  Mechanical Ventilator  100


 


2/2/21 23:30  88 24 125/75 (92) 92   


 


2/2/21 23:30   23 125/75  Mechanical Ventilator  100


 


2/2/21 23:20  88 33     100


 


2/2/21 23:15   24 120/69  Mechanical Ventilator  100


 


2/2/21 23:15    120/69    


 


2/2/21 23:15  88 21 120/69 (86) 91   


 


2/2/21 23:11   27 111/71  Mechanical Ventilator  100


 


2/2/21 23:00   22 111/71  Mechanical Ventilator  100


 


2/2/21 23:00  89 20 111/71 (84) 91   


 


2/2/21 22:45  90 20 113/70 (84) 89   


 


2/2/21 22:45  90 20 113/70 (84) 89   


 


2/2/21 22:30  91 18 102/67 (79) 88   


 


2/2/21 22:15   23 98/57  Mechanical Ventilator  100


 


2/2/21 22:15    98/57    


 


2/2/21 22:00  92 17 98/57 (71) 85   


 


2/2/21 21:30  90 22 100/59 (73) 83   


 


2/2/21 21:15   27 99/68  Mechanical Ventilator  100


 


2/2/21 21:15    99/68    


 


2/2/21 21:00  88 19 102/60 (74) 85   


 


2/2/21 20:30  87 27 101/64 (76) 86   


 


2/2/21 20:15   16 105/70  Mechanical Ventilator  100


 


2/2/21 20:15    105/70    


 


2/2/21 20:00      Mechanical Ventilator  


 


2/2/21 20:00 96.7 84 14 109/68 (82) 89   


 


2/2/21 20:00        100


 


2/2/21 19:45  82 15 111/67 (82) 90   


 


2/2/21 19:30  81      


 


2/2/21 19:30  80 12 85/57 (66) 88   


 


2/2/21 19:18  84 33     100


 


2/2/21 19:15  80 15 79/57 (64) 90   


 


2/2/21 19:15   14 79/57  Mechanical Ventilator  100


 


2/2/21 19:15    79/57    


 


2/2/21 19:00  79 14 88/60 (69) 89   


 


2/2/21 19:00    88/60    


 


2/2/21 18:30  77 15 94/62 (73) 91   


 


2/2/21 18:15   17 94/62  Mechanical Ventilator  100


 


2/2/21 18:15  78 22 100/68 (79) 88   


 


2/2/21 18:00    93/62    


 


2/2/21 18:00  80 33 77/50 (59) 84   


 


2/2/21 17:30  75 30 93/62 (72) 84   


 


2/2/21 17:15   29 93/58  Mechanical Ventilator  100


 


2/2/21 17:00  75 32 93/58 (70) 56   


 


2/2/21 17:00    93/58    


 


2/2/21 16:30  77 29 102/63 (76) 80   


 


2/2/21 16:15   31 106/88  Mechanical Ventilator  100


 


2/2/21 16:00 96.0 79 32 108/66 (80) 83   


 


2/2/21 16:00    108/66    


 


2/2/21 16:00      Mechanical Ventilator  


 


2/2/21 16:00  76      


 


2/2/21 16:00        100


 


2/2/21 15:30  84 22 143/79 (100) 56   


 


2/2/21 15:15   29 106/67  Mechanical Ventilator  100


 


2/2/21 15:00  80 36 106/67 (80) 85   


 


2/2/21 15:00  81 36     100


 


2/2/21 15:00    106/67    


 


2/2/21 15:00  80 32 106/67 (80) 85   


 


2/2/21 14:30  83 32 101/62 (75) 81   


 


2/2/21 14:15   32 101/62  Mechanical Ventilator  100


 


2/2/21 14:00  83 30 95/62 (73) 83   


 


2/2/21 14:00    100/62    


 


2/2/21 13:30  85 20 95/62 (73) 57   


 


2/2/21 13:15   30 103/76  Mechanical Ventilator  100


 


2/2/21 13:00  85 17 111/68 (82) 82   


 


2/2/21 13:00    111/68    


 


2/2/21 12:30 96.7 85 31 106/69 (81) 73   


 


2/2/21 12:15   34 110/70  Mechanical Ventilator  100


 


2/2/21 12:00  82      


 


2/2/21 12:00    107/72    


 


2/2/21 12:00  87 26 107/72 (84) 67   


 


2/2/21 12:00        100


 


2/2/21 12:00      Mechanical Ventilator  


 


2/2/21 11:30  85 28 100/60 (73) 82   


 


2/2/21 11:15   32 97/79  Mechanical Ventilator  100


 


2/2/21 11:00  87 36     100


 


2/2/21 11:00    97/62    


 


2/2/21 11:00  87 29 97/62 (74) 80   

















Intake and Output  


 


 2/2/21 2/3/21





 19:00 07:00


 


Intake Total 2625.978 ml 2574.99749 ml


 


Output Total 990 ml 750 ml


 


Balance 1635.978 ml 1824.26197 ml


 


  


 


Free Water 400 ml 400 ml


 


IV Total 1705.978 ml 1754.65509 ml


 


Tube Feeding 420 ml 420 ml


 


Other 100 ml 


 


Output Urine Total 620 ml 560 ml


 


Stool Total 20 ml 30 ml


 


Chest Tube Drainage Total 350 ml 160 ml








Objective


On Versed drip


General Appearance:  no acute distress


HEENT:  atraumatic


Respiratory:  lungs clear, decreased breath sounds


Cardiovascular:  normal rate, regular rhythm


Abdomen:  soft, non tender, no organomegaly


Laboratory Tests


2/3/21 04:25: 


White Blood Count 6.5, Red Blood Count 2.95L, Hemoglobin 8.6L, Hematocrit 28.3L,

Mean Corpuscular Volume 96, Mean Corpuscular Hemoglobin 29.0, Mean Corpuscular 

Hemoglobin Concent 30.3L, Red Cell Distribution Width 17.8H, Platelet Count 239,

Mean Platelet Volume 9.4, Neutrophils (%) (Auto) , Lymphocytes (%) (Auto) , 

Monocytes (%) (Auto) , Eosinophils (%) (Auto) , Basophils (%) (Auto) , Sodium 

Level 145, Potassium Level 4.4, Chloride Level 105, Carbon Dioxide Level 30, 

Anion Gap 10, Blood Urea Nitrogen 77H, Creatinine 2.7H, Estimat Glomerular 

Filtration Rate 23.8, Glucose Level 198H, Lactic Acid Level 2.60H, Uric Acid 

8.6H, Calcium Level 8.0L, Phosphorus Level 5.6H, Magnesium Level 2.4, Total 

Bilirubin 0.5, Gamma Glutamyl Transpeptidase 210H, Aspartate Amino Transf 

(AST/SGOT) 27, Alanine Aminotransferase (ALT/SGPT) 39, Alkaline Phosphatase 329H

, Total Creatine Kinase 16L, C-Reactive Protein, Quantitative 8.3H, Pro-B-Type 

Natriuretic Peptide 94985T, Total Protein 6.5, Albumin 2.1L, Globulin 4.4, 

Albumin/Globulin Ratio 0.5L


2/3/21 08:31: 


Arterial Blood pH 7.194*L, Arterial Blood Partial Pressure CO2 84.6*H, Arterial 

Blood Partial Pressure O2 51.1L, Arterial Blood HCO3 31.9H, Arterial Blood 

Oxygen Saturation 81.4*L, Arterial Blood Base Excess 2.4H, Abelardo Test Positive





Current Medications








 Medications


  (Trade)  Dose


 Ordered  Sig/Julisa


 Route


 PRN Reason  Start Time


 Stop Time Status Last Admin


Dose Admin


 


 Acetaminophen


  (Tylenol)  650 mg  Q4H  PRN


 GT


 Temp >100.5  1/15/21 17:15


 2/14/21 17:14  1/29/21 06:59





 


 Albumin Human  100 ml @ 


 100 mls/hr  Q8HR


 IV


   2/2/21 14:00


 5/3/21 13:59  2/3/21 05:24





 


 Amiodarone HCl


  (Cordarone)  200 mg  DAILY


 NG


   1/26/21 09:00


 4/19/21 20:59  2/3/21 09:23





 


 Cefepime HCl 1 gm/


 Dextrose  55 ml @ 


 110 mls/hr  Q24H


 IVPB


   1/30/21 11:00


 2/6/21 10:59  2/2/21 10:12





 


 Chlorhexidine


 Gluconate


  (Vanessa-Hex 2%)  1 applic  DAILY@2000


 TOPIC


   1/19/21 20:00


 4/19/21 19:59  2/2/21 20:39





 


 Dextrose


  (Dextrose 50%)  25 ml  Q30M  PRN


 IV


 Hypoglycemia  1/9/21 13:30


 4/9/21 13:29   





 


 Dextrose


  (Dextrose 50%)  50 ml  Q30M  PRN


 IV


 Hypoglycemia  1/9/21 13:30


 4/9/21 13:29   





 


 Digoxin


  (Lanoxin)  0.125 mg  DAILY


 NG


   1/21/21 09:00


 4/21/21 08:59  2/3/21 09:23





 


 Docusate Sodium


  (Colace)  100 mg  TIDPRN  PRN


 GT


 Constipation  1/12/21 01:15


 2/11/21 01:14  1/12/21 01:42





 


 Dopamine HCl/


 Dextrose  250 ml @ 0


 mls/hr  Q24H  PRN


 IV


 For hypotension  1/31/21 11:45


 2/3/21 11:45  2/3/21 10:21





 


 Enoxaparin Sodium


  (Lovenox)  80 mg  EVERY 12  HOURS


 SUBQ


   1/2/21 21:00


 4/2/21 20:59  2/3/21 09:24





 


 Fentanyl Citrate  250 ml @ 1


 mls/hr  Q24H


 IV


   2/2/21 03:00


 2/4/21 02:59  2/3/21 09:15





 


 Hydrocortisone


  (Solu-CORTEF)  100 mg  EVERY 8  HOURS


 IV


   2/1/21 15:45


 5/2/21 15:44  2/3/21 05:24





 


 Insulin Aspart


  (NovoLOG)    Q6HR


 SUBQ


   1/14/21 12:00


 4/9/21 17:59  2/3/21 06:04





 


 Insulin Detemir


  (Levemir)  26 units  Q12HR


 SUBQ


   2/2/21 09:00


 4/12/21 08:59  2/3/21 09:25





 


 Midodrine


  (Pro-Amatine)  10 mg  Q8HR


 ORAL


   2/1/21 15:30


 5/2/21 15:29  2/3/21 05:25





 


 Pantoprazole


  (Protonix)  40 mg  Q12HR


 IVP


   2/1/21 21:00


 2/13/21 08:59  2/3/21 09:23





 


 Phenylephrine HCl


 100 mg/Dextrose  250 ml @ 0


 mls/hr  Q24H  PRN


 IV


 For hypotension  1/31/21 12:30


 2/3/21 12:29   





 


 Sodium Chloride  500 ml @ 


 999 mls/hr  Q31M PRN


 IV


 For hypotension  1/15/21 18:30


 2/14/21 18:29   





 


 Sodium Chloride  1,000 ml @ 


 100 mls/hr  Q10H


 IV


   2/1/21 15:30


 3/3/21 15:29  2/3/21 07:32














Assessment/Plan


Assessment/Plan


1. COVID-19 pneumonia.


   - s/p Decadron, azithromycin, and ceftriaxone


   - Intubate; on PC ventilation


          -Currently 100% FiO2. PEEP 12; SaO2 78-84 ->91%


            - Has R apical PTX and subcut emphysema


             - S/p R CT placement





2. Diabetes mellitus.; 


3. Elevated inflammatory markers.


4. High D-dimer. On full dose Lovenox


5. Hypernatremia; will continue D5W


6. Hyperglycemia.


   - BG control


7. Hypoxemia., secondary to #1; improved





DVT prophylaxis   On Lovenox.


Sedated; advised RN to increase sedation





Hypotensive


On levophed


Continue PEEP 12; PC 25; rate 32


Has hypercapnia, acidemia


Poor prognosis


discussed with RN.


Continue IV fluids











Sung Lemos MD            Feb 3, 2021 10:50

## 2021-02-03 NOTE — NUR
NURSE NOTES:

chest tube site is intact, clean, and patent, it is draining well with gravity and suctions. 
pt is awake and little drowsy

## 2021-02-03 NOTE — NUR
NURSE HAND-OFF REPORT: 



Latest Vital Signs: Temperature 98.9 , Pulse 85 , B/P 140 /81 , Respiratory Rate 25 , O2 SAT 
82 , Mechanical Ventilator, O2 Flow Rate  .  

Vital Sign Comment: unstable



EKG Rhythm: Sinus Rhythm

Rhythm change?: N 

MD Notified?: Y -Dr Ariel GUTIERREZ Response: No New Orders Received



Latest Singh Fall Score: 50  

Fall Risk: High Risk 

Safety Measures: Call light Within Reach, Bed Alarm Zone 1, Side Rails Side Rails x3, Bed 
position Low and Locked.

Fall Precautions: 

Yellow Socks

Yellow Gown

Door Sign

Patient Fall Education



Report given to .Tomi Gustafson RN.

## 2021-02-03 NOTE — NUR
NURSE NOTES:

cleaned pt, provided new gown, provided new sheet. rectal tube in place. cash cath is 
draining well with gravity. right lateral chest tube in place. will continue to monitor pt

## 2021-02-03 NOTE — DIAGNOSTIC IMAGING REPORT
Indication: Shortness of breath

 

Technique: XRAY Chest 1v

 

Comparison: 1/31/2021

 

Findings: Extensive bilateral infiltrates are again seen, worsened compared to the

prior exam, particularly in the left lung. No significant pleural effusion is seen.

No appreciable pneumothorax. Right basilar chest tube, left arm PICC line,

endotracheal and enteric tubes remain in place. Heart size and osseous structures

stable.

 

Impression: 

Extensive bilateral infiltrates, slightly worsened compared to the prior exam,

particularly in the left lung.

 

Support lines/tubes stable in position.

## 2021-02-03 NOTE — NUR
NURSE NOTES:

Pt sedated,maintained on RASS -2light sedation,noted no resp distress,orally intubated,ETT 
7.6,lip line 27,AC 32,Pressure control 25,Fio2 100% Peep12,no signs of pain or discomfort SR 
on the monitor,OGTF Nepro at 35ml/hr,Stern cath draining yellow urine,Rectal tube with 
liquid brown stools,RT chest tube in placed,skin warm and dry IV site to MAYO PICC line 
intact with Fentanyl drip at  300mcg/hr and Levophed at 10 mcg min,IVF 1/2 NS at 100 
ml/hr,SR up x2 HOB elevated bed lock in lowest position will continue with plans of care.

## 2021-02-03 NOTE — HEMATOLOGY/ONC PROGRESS NOTE
Assessment/Plan


Assessment/Plan


Leukocytosis 2/2 covid pna wbc 15->14-->17-->9.6-->13


Anemia 2/2 chronic disease, hgb 11-->10.4-->9.8-->8.6


Thrombocytopenia with plt 122


Hypercoag disorder now on lovenox sq


Ac resp distress on ventilator


Pneumomediastinum


etoh abused


agitated -halodol


vent





Vent


Pressors


cont iv abx and iv decadrone


pulmonary and gi on consult


smear is noted


pneumomediastinum per pulm


dw charge nurse


lovenox sq





Subjective


Allergies:  


Coded Allergies:  


     No Known Allergies (Unverified , 6/11/19)


All Systems:  reviewed and negative except above


Subjective


1/10 on ventilator 60% fio2, on sediation, unresponsive, in icu


1/14 on vent, icu, wbc elev, no bleeding, unresponsive


1/15 on vent, with cash, icu, no bleeding, unresponsive


1/17 on vent, elmer rn, no major changes, icu, nv


1/18 nv, labs reviewd, hr is elev, with afib, is onlovenox sq


1/19 remains on vent, sedated with wrist restraints, icu


1/20 on vent, with hematuria, ngt, in icu, labs reviewed


1/21 remains on vent, settings have been adjusted as per icu care


1/22 remains on vent, icu, on levo 6mcg, off versed, labs noted, elmer rn


1/25 remains onv ent, in icu, with restraints, on levo gtt as well


1/26 nv, suctioned, rectal tube, in icu, with restraints, no bleeding


1/27 nv, remains on vent, rectal tube, labs reviewed, elmer rn


1/28 nv, icu, on vent/ bipap, labs noted, no bleeding, meds reviewed


1/29 nv, on vent, overnight no changes, meds reviewed, labs noted


1/30: code blue last night.


1/31 labs are noted, nv, on vent, meds reviewed, no new change, elmer rn, on pre

ssors


2/1 vent, labs have been ordered, nv, meds reviewed, elmer rn


2/2 vent, levo, fentanyl, meds reviewed, nv


2/3 vent, labs reviewed, meds noted, hgb 8.6





Objective


Objective





Current Medications








 Medications


  (Trade)  Dose


 Ordered  Sig/Julisa


 Route


 PRN Reason  Start Time


 Stop Time Status Last Admin


Dose Admin


 


 Acetaminophen


  (Tylenol)  650 mg  Q4H  PRN


 GT


 Temp >100.5  1/15/21 17:15


 2/14/21 17:14  1/29/21 06:59





 


 Albumin Human  100 ml @ 


 100 mls/hr  Q8HR


 IV


   2/2/21 14:00


 5/3/21 13:59  2/3/21 05:24





 


 Amiodarone HCl


  (Cordarone)  200 mg  DAILY


 NG


   1/26/21 09:00


 4/19/21 20:59  2/2/21 08:11





 


 Cefepime HCl 1 gm/


 Dextrose  55 ml @ 


 110 mls/hr  Q24H


 IVPB


   1/30/21 11:00


 2/6/21 10:59  2/2/21 10:12





 


 Chlorhexidine


 Gluconate


  (Vanessa-Hex 2%)  1 applic  DAILY@2000


 TOPIC


   1/19/21 20:00


 4/19/21 19:59  2/2/21 20:39





 


 Dextrose


  (Dextrose 50%)  25 ml  Q30M  PRN


 IV


 Hypoglycemia  1/9/21 13:30


 4/9/21 13:29   





 


 Dextrose


  (Dextrose 50%)  50 ml  Q30M  PRN


 IV


 Hypoglycemia  1/9/21 13:30


 4/9/21 13:29   





 


 Digoxin


  (Lanoxin)  0.125 mg  DAILY


 NG


   1/21/21 09:00


 4/21/21 08:59  2/2/21 08:11





 


 Docusate Sodium


  (Colace)  100 mg  TIDPRN  PRN


 GT


 Constipation  1/12/21 01:15


 2/11/21 01:14  1/12/21 01:42





 


 Dopamine HCl/


 Dextrose  250 ml @ 0


 mls/hr  Q24H  PRN


 IV


 For hypotension  1/31/21 11:45


 2/3/21 11:45   





 


 Enoxaparin Sodium


  (Lovenox)  80 mg  EVERY 12  HOURS


 SUBQ


   1/2/21 21:00


 4/2/21 20:59  2/2/21 08:12





 


 Fentanyl Citrate  250 ml @ 1


 mls/hr  Q24H


 IV


   2/2/21 03:00


 2/4/21 02:59  2/2/21 23:11





 


 Hydrocortisone


  (Solu-CORTEF)  100 mg  EVERY 8  HOURS


 IV


   2/1/21 15:45


 5/2/21 15:44  2/3/21 05:24





 


 Insulin Aspart


  (NovoLOG)    Q6HR


 SUBQ


   1/14/21 12:00


 4/9/21 17:59  2/3/21 06:04





 


 Insulin Detemir


  (Levemir)  26 units  Q12HR


 SUBQ


   2/2/21 09:00


 4/12/21 08:59  2/2/21 21:27





 


 Midodrine


  (Pro-Amatine)  10 mg  Q8HR


 ORAL


   2/1/21 15:30


 5/2/21 15:29  2/3/21 05:25





 


 Norepinephrine


 Bitartrate 16 mg/


 Dextrose  516 ml @ 0


 mls/hr  Q24H


 IV


   1/31/21 10:30


 2/3/21 10:29  2/2/21 05:37





 


 Pantoprazole


  (Protonix)  40 mg  Q12HR


 IVP


   2/1/21 21:00


 2/13/21 08:59  2/2/21 20:39





 


 Phenylephrine HCl


 100 mg/Dextrose  250 ml @ 0


 mls/hr  Q24H  PRN


 IV


 For hypotension  1/31/21 12:30


 2/3/21 12:29   





 


 Sodium Chloride  500 ml @ 


 999 mls/hr  Q31M PRN


 IV


 For hypotension  1/15/21 18:30


 2/14/21 18:29   





 


 Sodium Chloride  1,000 ml @ 


 100 mls/hr  Q10H


 IV


   2/1/21 15:30


 3/3/21 15:29  2/2/21 20:40














Last 24 Hour Vital Signs








  Date Time  Temp Pulse Resp B/P (MAP) Pulse Ox O2 Delivery O2 Flow Rate FiO2


 


2/3/21 04:15   24 110/70  Mechanical Ventilator  100


 


2/3/21 04:15    110/70    


 


2/3/21 04:00      Mechanical Ventilator  


 


2/3/21 04:00        100


 


2/3/21 03:51  92      


 


2/3/21 03:15   15 119/66  Mechanical Ventilator  100


 


2/3/21 03:15    119/66    


 


2/3/21 03:08  86 32     100


 


2/3/21 03:00  86 23 111/67 (82) 86   


 


2/3/21 02:15   23 114/69  Mechanical Ventilator  100


 


2/3/21 02:15    114/69    


 


2/3/21 02:00  81 21 114/69 (84) 90   


 


2/3/21 01:15   28 118/69  Mechanical Ventilator  100


 


2/3/21 01:15    118/69    


 


2/3/21 01:15  81 30 127/73 (91) 93   


 


2/3/21 01:00  85 27 127/74 (91) 85   


 


2/3/21 00:30  86 23 150/76 (100) 83   


 


2/3/21 00:15  84 13 125/73 (90) 93   


 


2/3/21 00:15   22 125/73  Mechanical Ventilator  100


 


2/3/21 00:15    125/73    


 


2/3/21 00:00 97.0 87 21 129/78 (95) 89   


 


2/3/21 00:00        100


 


2/3/21 00:00   18 123/69  Mechanical Ventilator  100


 


2/3/21 00:00      Mechanical Ventilator  


 


2/2/21 23:59  86      


 


2/2/21 23:45  87 26 126/71 (89) 90   


 


2/2/21 23:45   19 126/71  Mechanical Ventilator  100


 


2/2/21 23:30  88 24 125/75 (92) 92   


 


2/2/21 23:30   23 125/75  Mechanical Ventilator  100


 


2/2/21 23:20  88 33     100


 


2/2/21 23:15   24 120/69  Mechanical Ventilator  100


 


2/2/21 23:15    120/69    


 


2/2/21 23:15  88 21 120/69 (86) 91   


 


2/2/21 23:11   27 111/71  Mechanical Ventilator  100


 


2/2/21 23:00   22 111/71  Mechanical Ventilator  100


 


2/2/21 23:00  89 20 111/71 (84) 91   


 


2/2/21 22:45  90 20 113/70 (84) 89   


 


2/2/21 22:45  90 20 113/70 (84) 89   


 


2/2/21 22:30  91 18 102/67 (79) 88   


 


2/2/21 22:15   23 98/57  Mechanical Ventilator  100


 


2/2/21 22:15    98/57    


 


2/2/21 22:00  92 17 98/57 (71) 85   


 


2/2/21 21:30  90 22 100/59 (73) 83   


 


2/2/21 21:15   27 99/68  Mechanical Ventilator  100


 


2/2/21 21:15    99/68    


 


2/2/21 21:00  88 19 102/60 (74) 85   


 


2/2/21 20:30  87 27 101/64 (76) 86   


 


2/2/21 20:15   16 105/70  Mechanical Ventilator  100


 


2/2/21 20:15    105/70    


 


2/2/21 20:00      Mechanical Ventilator  


 


2/2/21 20:00 96.7 84 14 109/68 (82) 89   


 


2/2/21 20:00        100


 


2/2/21 19:45  82 15 111/67 (82) 90   


 


2/2/21 19:30  81      


 


2/2/21 19:30  80 12 85/57 (66) 88   


 


2/2/21 19:18  84 33     100


 


2/2/21 19:15  80 15 79/57 (64) 90   


 


2/2/21 19:15   14 79/57  Mechanical Ventilator  100


 


2/2/21 19:15    79/57    


 


2/2/21 19:00  79 14 88/60 (69) 89   


 


2/2/21 19:00    88/60    


 


2/2/21 18:30  77 15 94/62 (73) 91   


 


2/2/21 18:15   17 94/62  Mechanical Ventilator  100


 


2/2/21 18:15  78 22 100/68 (79) 88   


 


2/2/21 18:00    93/62    


 


2/2/21 18:00  80 33 77/50 (59) 84   


 


2/2/21 17:30  75 30 93/62 (72) 84   


 


2/2/21 17:15   29 93/58  Mechanical Ventilator  100


 


2/2/21 17:00  75 32 93/58 (70) 56   


 


2/2/21 17:00    93/58    


 


2/2/21 16:30  77 29 102/63 (76) 80   


 


2/2/21 16:15   31 106/88  Mechanical Ventilator  100


 


2/2/21 16:00 96.0 79 32 108/66 (80) 83   


 


2/2/21 16:00    108/66    


 


2/2/21 16:00      Mechanical Ventilator  


 


2/2/21 16:00  76      


 


2/2/21 16:00        100


 


2/2/21 15:30  84 22 143/79 (100) 56   


 


2/2/21 15:15   29 106/67  Mechanical Ventilator  100


 


2/2/21 15:00  80 36 106/67 (80) 85   


 


2/2/21 15:00  81 36     100


 


2/2/21 15:00    106/67    


 


2/2/21 15:00  80 32 106/67 (80) 85   


 


2/2/21 14:30  83 32 101/62 (75) 81   


 


2/2/21 14:15   32 101/62  Mechanical Ventilator  100


 


2/2/21 14:00  83 30 95/62 (73) 83   


 


2/2/21 14:00    100/62    


 


2/2/21 13:30  85 20 95/62 (73) 57   


 


2/2/21 13:15   30 103/76  Mechanical Ventilator  100


 


2/2/21 13:00  85 17 111/68 (82) 82   


 


2/2/21 13:00    111/68    


 


2/2/21 12:30 96.7 85 31 106/69 (81) 73   


 


2/2/21 12:15   34 110/70  Mechanical Ventilator  100


 


2/2/21 12:00  82      


 


2/2/21 12:00    107/72    


 


2/2/21 12:00  87 26 107/72 (84) 67   


 


2/2/21 12:00        100


 


2/2/21 12:00      Mechanical Ventilator  


 


2/2/21 11:30  85 28 100/60 (73) 82   


 


2/2/21 11:15   32 97/79  Mechanical Ventilator  100


 


2/2/21 11:00  87 36     100


 


2/2/21 11:00    97/62    


 


2/2/21 11:00  87 29 97/62 (74) 80   


 


2/2/21 10:30  94 29 97/67 (77) 71   


 


2/2/21 10:15   30 105/53  Mechanical Ventilator  100


 


2/2/21 10:15  89 19 97/64 (75) 62   


 


2/2/21 10:11   26 96/60  Mechanical Ventilator  100


 


2/2/21 10:00  89 20 96/60 (72) 67   


 


2/2/21 10:00    96/60    


 


2/2/21 09:45  84 33 120/70 (87) 89   


 


2/2/21 09:30  86 18 135/86 (102) 86   


 


2/2/21 09:30    135/86    


 


2/2/21 09:30  86 18 135/86 (102) 86   


 


2/2/21 09:15   26 95/53  Mechanical Ventilator  100


 


2/2/21 09:15  86 25 137/80 (99) 88   


 


2/2/21 09:00    122/80    


 


2/2/21 09:00  86 22 122/80 (94) 88   


 


2/2/21 08:45  86 22 130/77 (94) 88   


 


2/2/21 08:30 96.8 85 20 130/77 (94) 91   


 


2/2/21 08:30    130/77    


 


2/2/21 08:15   25 108/64  Mechanical Ventilator  100


 


2/2/21 08:15  84 21 120/74 (89) 91   


 


2/2/21 08:11  83      


 


2/2/21 08:00  85 19 120/74 (89) 91   


 


2/2/21 08:00    120/74    


 


2/2/21 08:00  84      


 


2/2/21 08:00        100


 


2/2/21 08:00      Mechanical Ventilator  


 


2/2/21 07:30  85 18 111/65 (80) 85   


 


2/2/21 07:15   26 117/71  Mechanical Ventilator  100


 


2/2/21 07:00  83 17 117/71 (86) 86   


 


2/2/21 07:00    117/71    


 


2/2/21 07:00  85 33     100


 


2/2/21 06:15   28 126/80  Mechanical Ventilator  100


 


2/2/21 06:00    126/80    


 


2/2/21 06:00  79 27 126/80 (95) 94   


 


2/2/21 05:37    126/80    


 


2/2/21 05:36    126/80    


 


2/2/21 05:26  77 34     100


 


2/2/21 05:15   26 121/76  Mechanical Ventilator  100


 


2/2/21 05:00  77 25 121/76 (91) 92   


 


2/2/21 05:00    121/76    


 


2/2/21 04:15   26 126/80  Mechanical Ventilator  100


 


2/2/21 04:00      Mechanical Ventilator  


 


2/2/21 04:00        100


 


2/2/21 04:00  78      


 


2/2/21 04:00 98.1 81 22 126/80 (95) 91   


 


2/2/21 04:00    126/80    


 


2/2/21 03:19  82 34     100


 


2/2/21 03:15   22 124/68  Mechanical Ventilator 100.0 100


 


2/2/21 03:00    112/72    


 


2/2/21 03:00  83 28 112/72 (85) 91   


 


2/2/21 02:30  90 22 124/68 (86) 90   


 


2/2/21 02:00    120/71    


 


2/2/21 02:00  83 25 120/71 (87) 86   


 


2/2/21 01:59  84 37     100


 


2/2/21 01:00  89 24 106/67 (80) 92   


 


2/2/21 01:00   28 106/67  Mechanical Ventilator  100


 


2/2/21 01:00    106/67    


 


2/2/21 00:00   28 108/62  Mechanical Ventilator  100


 


2/2/21 00:00    108/62    


 


2/2/21 00:00  86      


 


2/2/21 00:00 98.0 86 31 108/62 (77) 92   


 


2/2/21 00:00        100


 


2/2/21 00:00      Mechanical Ventilator  


 


2/1/21 23:45  82 37     100


 


2/1/21 23:30  80 27 103/66 (78) 92   


 


2/1/21 23:00  78 28 107/71 (83) 91   


 


2/1/21 23:00   28 105/68  Mechanical Ventilator  100


 


2/1/21 23:00    105/68    


 


2/1/21 22:00   28 120/76  Mechanical Ventilator  100


 


2/1/21 22:00    120/76    


 


2/1/21 22:00  76 25 130/79 (96) 92   


 


2/1/21 21:30  80 28 125/75 (92) 91   


 


2/1/21 21:21  76 34     100


 


2/1/21 21:00   26 120/73  Mechanical Ventilator  100


 


2/1/21 21:00    120/73    


 


2/1/21 21:00  78 27 124/78 (93) 94   


 


2/1/21 20:45   28 120/76  Mechanical Ventilator  100


 


2/1/21 20:45    120/76    


 


2/1/21 20:30  80 29 123/75 (91) 94   


 


2/1/21 20:30  80 29 123/75 (91) 92   


 


2/1/21 20:00 97.7 83 27 114/73 (87) 91   


 


2/1/21 20:00   25 116/71  Mechanical Ventilator  100


 


2/1/21 20:00    116/71    


 


2/1/21 20:00      Mechanical Ventilator  


 


2/1/21 20:00        100


 


2/1/21 20:00  87      


 


2/1/21 19:21  87 36     100


 


2/1/21 19:00   32 105/63  Mechanical Ventilator  100


 


2/1/21 19:00    105/63    


 


2/1/21 19:00  89 25 105/63 (77) 93   


 


2/1/21 18:30    106/67    


 


2/1/21 18:15    120/73    


 


2/1/21 18:00   32 117/72  Mechanical Ventilator  100


 


2/1/21 18:00    117/72    


 


2/1/21 18:00  97 31 119/72 (88) 91   


 


2/1/21 17:45    119/72    


 


2/1/21 17:30    126/77    


 


2/1/21 17:15    126/75    


 


2/1/21 17:05  101 36     100


 


2/1/21 17:00   32 123/71  Mechanical Ventilator  100


 


2/1/21 17:00    123/71    


 


2/1/21 17:00  106 24 123/71 (88) 90   


 


2/1/21 16:00      Mechanical Ventilator  


 


2/1/21 16:00        100


 


2/1/21 16:00   32 95/60  Mechanical Ventilator  100


 


2/1/21 16:00 99.0 106 26 95/60 (72) 90   


 


2/1/21 16:00  106      


 


2/1/21 15:59    90/54    


 


2/1/21 15:05  108 35     100


 


2/1/21 15:00  106 30 89/56 (67) 90   


 


2/1/21 15:00   32 89/56  Mechanical Ventilator  100


 


2/1/21 15:00    89/56    


 


2/1/21 14:00  118 34 168/87 (114) 61   


 


2/1/21 14:00   32 168/87  Mechanical Ventilator  100


 


2/1/21 14:00    168/87    


 


2/1/21 13:05  108 32     100


 


2/1/21 13:00  90 25 102/63 (76) 89   


 


2/1/21 13:00   32 102/63  Mechanical Ventilator  100


 


2/1/21 13:00    102/63    


 


2/1/21 12:00  91      


 


2/1/21 12:00      Mechanical Ventilator  


 


2/1/21 12:00   32 101/67  Mechanical Ventilator  100


 


2/1/21 12:00    101/67    


 


2/1/21 12:00 98.2 91 14 104/63 (77) 89   


 


2/1/21 11:18        100


 


2/1/21 11:10  95 32     100


 


2/1/21 11:00   32 96/59  Mechanical Ventilator  100


 


2/1/21 11:00    96/59    


 


2/1/21 11:00  93 12 96/59 (71) 89   


 


2/1/21 10:00  93 22 119/70 (86) 87   


 


2/1/21 10:00   32 119/70  Mechanical Ventilator  100


 


2/1/21 10:00    119/70    


 


2/1/21 09:20  95 33     100


 


2/1/21 09:16  96      


 


2/1/21 09:00  97 31 128/79 (95) 87   


 


2/1/21 09:00   32 128/79  Mechanical Ventilator  100


 


2/1/21 09:00    128/79    


 


2/1/21 08:00      Mechanical Ventilator  


 


2/1/21 08:00  100      


 


2/1/21 08:00   32 120/78  Mechanical Ventilator  100


 


2/1/21 08:00    120/78    


 


2/1/21 08:00        100


 


2/1/21 08:00 97.8 100 26 120/78 (92) 87   


 


2/1/21 07:30  100 22 119/76 (90) 89   


 


2/1/21 07:20  102 34     100


 


2/1/21 07:15  101 26 124/79 (94) 86   

















Intake and Output  


 


 2/2/21 2/3/21





 19:00 07:00


 


Intake Total 2625.978 ml 1965.98445 ml


 


Output Total 990 ml 480 ml


 


Balance 1635.978 ml 1485.63970 ml


 


  


 


Free Water 400 ml 300 ml


 


IV Total 1705.978 ml 1350.03312 ml


 


Tube Feeding 420 ml 315 ml


 


Other 100 ml 


 


Output Urine Total 620 ml 480 ml


 


Stool Total 20 ml 


 


Chest Tube Drainage Total 350 ml 











Labs








Test


 1/31/21


09:32 1/31/21


11:26 1/31/21


12:35 1/31/21


17:42


 


Arterial Blood pH


 


 


 7.170


(7.350-7.450) 





 


Arterial Blood Partial


Pressure CO2 


 


 82.5 mmHg


(35.0-45.0) 





 


Arterial Blood Partial


Pressure O2 


 


 50.7 mmHg


(75.0-100.0) 





 


Arterial Blood HCO3


 


 


 29.4 mmol/L


(22.0-26.0) 





 


Arterial Blood Oxygen


Saturation 


 


 78.6 %


() 





 


Arterial Blood Base Excess   -0.5 (-2-2)  


 


Abelardo Test   Positive  


 


Test


 2/1/21


09:23 2/1/21


14:29 2/2/21


04:40 2/2/21


08:40


 


Arterial Blood pH


 7.116


(7.350-7.450) 


 


 7.179


(7.350-7.450)


 


Arterial Blood Partial


Pressure CO2 90.8 mmHg


(35.0-45.0) 


 


 86.9 mmHg


(35.0-45.0)


 


Arterial Blood Partial


Pressure O2 56.3 mmHg


(75.0-100.0) 


 


 53.6 mmHg


(75.0-100.0)


 


Arterial Blood HCO3


 28.6 mmol/L


(22.0-26.0) 


 


 31.2 mmol/L


(22.0-26.0)


 


Arterial Blood Oxygen


Saturation 85.3 %


() 


 


 85.1 %


()


 


Arterial Blood Base Excess -2.3 (-2-2)    1.1 (-2-2) 


 


Abelardo Test Positive    Positive 


 


White Blood Count


 


 9.0 K/UL


(4.8-10.8) 6.5 K/UL


(4.8-10.8) 





 


Red Blood Count


 


 3.41 M/UL


(4.70-6.10) 3.12 M/UL


(4.70-6.10) 





 


Hemoglobin


 


 9.8 G/DL


(14.2-18.0) 9.0 G/DL


(14.2-18.0) 





 


Hematocrit


 


 32.8 %


(42.0-52.0) 30.2 %


(42.0-52.0) 





 


Mean Corpuscular Volume  96 FL (80-99)  97 FL (80-99)  


 


Mean Corpuscular Hemoglobin


 


 28.7 PG


(27.0-31.0) 28.8 PG


(27.0-31.0) 





 


Mean Corpuscular Hemoglobin


Concent 


 29.9 G/DL


(32.0-36.0) 29.8 G/DL


(32.0-36.0) 





 


Red Cell Distribution Width


 


 18.0 %


(11.6-14.8) 18.0 %


(11.6-14.8) 





 


Platelet Count


 


 181 K/UL


(150-450) 190 K/UL


(150-450) 





 


Mean Platelet Volume


 


 9.5 FL


(6.5-10.1) 9.5 FL


(6.5-10.1) 





 


Neutrophils (%) (Auto)


 


 83.1 %


(45.0-75.0)  % (45.0-75.0) 


 





 


Lymphocytes (%) (Auto)


 


 13.2 %


(20.0-45.0)  % (20.0-45.0) 


 





 


Monocytes (%) (Auto)


 


 2.6 %


(1.0-10.0)  % (1.0-10.0) 


 





 


Eosinophils (%) (Auto)


 


 0.5 %


(0.0-3.0)  % (0.0-3.0) 


 





 


Basophils (%) (Auto)


 


 0.6 %


(0.0-2.0)  % (0.0-2.0) 


 





 


Sodium Level


 


 143 MMOL/L


(136-145) 144 MMOL/L


(136-145) 





 


Potassium Level


 


 5.7 MMOL/L


(3.5-5.1) 4.5 MMOL/L


(3.5-5.1) 





 


Chloride Level


 


 108 MMOL/L


() 106 MMOL/L


() 





 


Carbon Dioxide Level


 


 27 MMOL/L


(21-32) 29 MMOL/L


(21-32) 





 


Anion Gap


 


 9 mmol/L


(5-15) 9 mmol/L


(5-15) 





 


Blood Urea Nitrogen


 


 75 mg/dL


(7-18) 74 mg/dL


(7-18) 





 


Creatinine


 


 2.6 MG/DL


(0.55-1.30) 2.6 MG/DL


(0.55-1.30) 





 


Estimat Glomerular Filtration


Rate 


 24.8 mL/min


(>60) 24.8 mL/min


(>60) 





 


Glucose Level


 


 330 MG/DL


() 281 MG/DL


() 





 


Uric Acid


 


 7.5 MG/DL


(2.6-7.2) 7.8 MG/DL


(2.6-7.2) 





 


Calcium Level


 


 7.5 MG/DL


(8.5-10.1) 7.9 MG/DL


(8.5-10.1) 





 


Phosphorus Level


 


 7.1 MG/DL


(2.5-4.9) 6.1 MG/DL


(2.5-4.9) 





 


Magnesium Level


 


 2.4 MG/DL


(1.8-2.4) 2.2 MG/DL


(1.8-2.4) 





 


Total Bilirubin


 


 0.5 MG/DL


(0.2-1.0) 0.6 MG/DL


(0.2-1.0) 





 


Aspartate Amino Transf


(AST/SGOT) 


 42 U/L (15-37) 


 24 U/L (15-37) 


 





 


Alanine Aminotransferase


(ALT/SGPT) 


 39 U/L (12-78) 


 33 U/L (12-78) 


 





 


Alkaline Phosphatase


 


 381 U/L


() 309 U/L


() 





 


Total Protein


 


 5.6 G/DL


(6.4-8.2) 6.2 G/DL


(6.4-8.2) 





 


Albumin


 


 1.1 G/DL


(3.4-5.0) 1.4 G/DL


(3.4-5.0) 





 


Globulin  4.5 g/dL  4.8 g/dL  


 


Albumin/Globulin Ratio  0.2 (1.0-2.7)  0.3 (1.0-2.7)  


 


Differential Total Cells


Counted 


 


 100 


 





 


Neutrophils % (Manual)   91 % (45-75)  


 


Lymphocytes % (Manual)   6 % (20-45)  


 


Monocytes % (Manual)   3 % (1-10)  


 


Eosinophils % (Manual)   0 % (0-3)  


 


Basophils % (Manual)   0 % (0-2)  


 


Band Neutrophils   0 % (0-8)  


 


Platelet Estimate   Adequate  


 


Platelet Morphology   Normal  


 


Polychromasia   1+  


 


Hypochromasia   1+  


 


Anisocytosis   1+  


 


Hemoglobin A1c


 


 


 10.5 %


(4.3-6.0) 





 


Lactic Acid Level


 


 


 1.30 mmol/L


(0.4-2.0) 





 


Gamma Glutamyl Transpeptidase   190 U/L (5-85)  


 


Ammonia


 


 


 55 umol/L


(11-32) 





 


Lactate Dehydrogenase


 


 


 279 U/L


() 





 


Total Creatine Kinase


 


 


 20 U/L


() 





 


Troponin I


 


 


 0.001 ng/mL


(0.000-0.056) 





 


C-Reactive Protein,


Quantitative 


 


 17.0 mg/dL


(0.00-0.90) 





 


Pro-B-Type Natriuretic Peptide


 


 


 87729 pg/mL


(0-125) 





 


Vitamin B12 Level


 


 


 > 2000 PG/ML


(193-986) 





 


Folate


 


 


 12.6 NG/ML


(8.6-58.9) 





 


Thyroid Stimulating Hormone


(TSH) 


 


 2.509 uiU/mL


(0.358-3.740) 





 


Digoxin Level


 


 


 1.4 NG/ML


(0.5-2.0) 





 


Test


 2/3/21


04:25 


 


 





 


White Blood Count


 6.5 K/UL


(4.8-10.8) 


 


 





 


Red Blood Count


 2.95 M/UL


(4.70-6.10) 


 


 





 


Hemoglobin


 8.6 G/DL


(14.2-18.0) 


 


 





 


Hematocrit


 28.3 %


(42.0-52.0) 


 


 





 


Mean Corpuscular Volume 96 FL (80-99)    


 


Mean Corpuscular Hemoglobin


 29.0 PG


(27.0-31.0) 


 


 





 


Mean Corpuscular Hemoglobin


Concent 30.3 G/DL


(32.0-36.0) 


 


 





 


Red Cell Distribution Width


 17.8 %


(11.6-14.8) 


 


 





 


Platelet Count


 239 K/UL


(150-450) 


 


 





 


Mean Platelet Volume


 9.4 FL


(6.5-10.1) 


 


 





 


Neutrophils (%) (Auto)  % (45.0-75.0)    


 


Lymphocytes (%) (Auto)  % (20.0-45.0)    


 


Monocytes (%) (Auto)  % (1.0-10.0)    


 


Eosinophils (%) (Auto)  % (0.0-3.0)    


 


Basophils (%) (Auto)  % (0.0-2.0)    


 


Sodium Level


 145 MMOL/L


(136-145) 


 


 





 


Potassium Level


 4.4 MMOL/L


(3.5-5.1) 


 


 





 


Chloride Level


 105 MMOL/L


() 


 


 





 


Carbon Dioxide Level


 30 MMOL/L


(21-32) 


 


 





 


Anion Gap


 10 mmol/L


(5-15) 


 


 





 


Blood Urea Nitrogen


 77 mg/dL


(7-18) 


 


 





 


Creatinine


 2.7 MG/DL


(0.55-1.30) 


 


 





 


Estimat Glomerular Filtration


Rate 23.8 mL/min


(>60) 


 


 





 


Glucose Level


 198 MG/DL


() 


 


 





 


Lactic Acid Level


 2.60 mmol/L


(0.4-2.0) 


 


 





 


Uric Acid


 8.6 MG/DL


(2.6-7.2) 


 


 





 


Calcium Level


 8.0 MG/DL


(8.5-10.1) 


 


 





 


Phosphorus Level


 5.6 MG/DL


(2.5-4.9) 


 


 





 


Magnesium Level


 2.4 MG/DL


(1.8-2.4) 


 


 





 


Total Bilirubin


 0.5 MG/DL


(0.2-1.0) 


 


 





 


Gamma Glutamyl Transpeptidase 210 U/L (5-85)    


 


Aspartate Amino Transf


(AST/SGOT) 27 U/L (15-37) 


 


 


 





 


Alanine Aminotransferase


(ALT/SGPT) 39 U/L (12-78) 


 


 


 





 


Alkaline Phosphatase


 329 U/L


() 


 


 





 


Total Creatine Kinase


 16 U/L


() 


 


 





 


C-Reactive Protein,


Quantitative 8.3 mg/dL


(0.00-0.90) 


 


 





 


Pro-B-Type Natriuretic Peptide


 32795 pg/mL


(0-125) 


 


 





 


Total Protein


 6.5 G/DL


(6.4-8.2) 


 


 





 


Albumin


 2.1 G/DL


(3.4-5.0) 


 


 





 


Globulin 4.4 g/dL    


 


Albumin/Globulin Ratio 0.5 (1.0-2.7)    








Height (Feet):  5


Height (Inches):  7.00


Weight (Pounds):  198


Objective


General Appearance:  lethargic, moderate distress


Neck:  supple


Cardiovascular:  regular rhythm


Respiratory/Chest:  crackles/rales, rhonchi - bilaterally vent++


Abdomen:  non tender, soft


Extremities:  non-tender











Emmett Cook MD           Feb 3, 2021 07:08

## 2021-02-03 NOTE — GENERAL PROGRESS NOTE
Subjective


Allergies:  


Coded Allergies:  


     No Known Allergies (Unverified , 6/11/19)


Subjective


no   cardiic incident last night


on ventilator 60% fio2


on sediation


unresponsive


in icu


rt chest tube s placed due to pneumothorex


afib is controlled


hypotension on levophed drip





Objective





Last 24 Hour Vital Signs








  Date Time  Temp Pulse Resp B/P (MAP) Pulse Ox O2 Delivery O2 Flow Rate FiO2


 


2/3/21 15:00  90 15 105/63 (77) 84   


 


2/3/21 15:00    107/64    


 


2/3/21 15:00   19 107/64  Mechanical Ventilator  100


 


2/3/21 14:00  85 29 108/63 (78) 84   


 


2/3/21 14:00    109/64    


 


2/3/21 14:00   18 107/64  Mechanical Ventilator  100


 


2/3/21 13:00  82 23 112/67 (82) 86   


 


2/3/21 13:00    112/67    


 


2/3/21 13:00   30 109/64  Mechanical Ventilator  100


 


2/3/21 12:22    103/62    


 


2/3/21 12:00 96.9 87 28 110/64 (79) 90   


 


2/3/21 12:00      Mechanical Ventilator  


 


2/3/21 12:00   23 113/68  Mechanical Ventilator  100


 


2/3/21 12:00  86      


 


2/3/21 12:00        100


 


2/3/21 11:59    103/62    


 


2/3/21 11:03  95 19 107/62 (77) 86   


 


2/3/21 11:00   19 107/62  Mechanical Ventilator  100


 


2/3/21 10:45  100 20 110/63 (79) 80   


 


2/3/21 10:30    107/62    


 


2/3/21 10:29  104 19 121/73 (89) 56   


 


2/3/21 10:24  69 14 68/45 (53) 58   


 


2/3/21 10:21    67/43    


 


2/3/21 10:15  70 17 59/43 (48) 64   


 


2/3/21 10:03    75/55    


 


2/3/21 10:00   22 145/70  Mechanical Ventilator  100


 


2/3/21 10:00  118 16 145/70 (95) 52   


 


2/3/21 09:49  84 17 68/43 (51) 69   


 


2/3/21 09:23  83      


 


2/3/21 09:15   21 118/67  Mechanical Ventilator  100


 


2/3/21 09:00   21 118/67  Mechanical Ventilator  100


 


2/3/21 09:00    118/67    


 


2/3/21 09:00  90 24 118/67 (84) 79   


 


2/3/21 08:00  83      


 


2/3/21 08:00 97.5 82 29 112/67 (82) 82   


 


2/3/21 08:00   26 113/59  Mechanical Ventilator  100


 


2/3/21 08:00    113/68    


 


2/3/21 08:00      Mechanical Ventilator  


 


2/3/21 08:00        100


 


2/3/21 07:15    113/68    


 


2/3/21 07:00  88 29 119/66 (83) 69   


 


2/3/21 06:45   21 111/84  Mechanical Ventilator  100


 


2/3/21 06:45  91 21 111/84 (93) 63   


 


2/3/21 06:30  84 32 130/73 (92) 88   


 


2/3/21 06:30   30 130/73  Mechanical Ventilator  100


 


2/3/21 06:15  87 28 93/75 (81) 79   


 


2/3/21 06:15   26 93/75  Mechanical Ventilator  100


 


2/3/21 06:15    93/75    


 


2/3/21 06:00   23 128/73  Mechanical Ventilator  100


 


2/3/21 06:00  86 27 128/73 (91) 87   


 


2/3/21 05:45   27 122/71  Mechanical Ventilator  100


 


2/3/21 05:45  89 25 122/71 (88) 84   


 


2/3/21 05:30   22 134/75  Mechanical Ventilator  100


 


2/3/21 05:30  88 21 134/75 (94) 90   


 


2/3/21 05:15   21 136/79  Mechanical Ventilator  100


 


2/3/21 05:15    136/79    


 


2/3/21 05:15  85 24 136/79 (98) 93   


 


2/3/21 05:00  88 23 126/71 (89) 94   


 


2/3/21 04:45  90 22 120/69 (86) 91   


 


2/3/21 04:30  90 27 118/67 (84) 90   


 


2/3/21 04:15  92 18 118/66 (83) 88   


 


2/3/21 04:15   24 110/70  Mechanical Ventilator  100


 


2/3/21 04:15    110/70    


 


2/3/21 04:00      Mechanical Ventilator  


 


2/3/21 04:00        100


 


2/3/21 04:00  92 22 114/66 (82) 85   


 


2/3/21 03:51  92      


 


2/3/21 03:15   15 119/66  Mechanical Ventilator  100


 


2/3/21 03:15    119/66    


 


2/3/21 03:08  86 32     100


 


2/3/21 03:00  86 23 111/67 (82) 86   


 


2/3/21 02:15   23 114/69  Mechanical Ventilator  100


 


2/3/21 02:15    114/69    


 


2/3/21 02:00  81 21 114/69 (84) 90   


 


2/3/21 01:15   28 118/69  Mechanical Ventilator  100


 


2/3/21 01:15    118/69    


 


2/3/21 01:15  81 30 127/73 (91) 93   


 


2/3/21 01:00  85 27 127/74 (91) 85   


 


2/3/21 00:30  86 23 150/76 (100) 83   


 


2/3/21 00:15  84 13 125/73 (90) 93   


 


2/3/21 00:15   22 125/73  Mechanical Ventilator  100


 


2/3/21 00:15    125/73    


 


2/3/21 00:00 97.0 87 21 129/78 (95) 89   


 


2/3/21 00:00        100


 


2/3/21 00:00   18 123/69  Mechanical Ventilator  100


 


2/3/21 00:00      Mechanical Ventilator  


 


2/2/21 23:59  86      


 


2/2/21 23:45  87 26 126/71 (89) 90   


 


2/2/21 23:45   19 126/71  Mechanical Ventilator  100


 


2/2/21 23:30  88 24 125/75 (92) 92   


 


2/2/21 23:30   23 125/75  Mechanical Ventilator  100


 


2/2/21 23:20  88 33     100


 


2/2/21 23:15   24 120/69  Mechanical Ventilator  100


 


2/2/21 23:15    120/69    


 


2/2/21 23:15  88 21 120/69 (86) 91   


 


2/2/21 23:11   27 111/71  Mechanical Ventilator  100


 


2/2/21 23:00   22 111/71  Mechanical Ventilator  100


 


2/2/21 23:00  89 20 111/71 (84) 91   


 


2/2/21 22:45  90 20 113/70 (84) 89   


 


2/2/21 22:45  90 20 113/70 (84) 89   


 


2/2/21 22:30  91 18 102/67 (79) 88   


 


2/2/21 22:15   23 98/57  Mechanical Ventilator  100


 


2/2/21 22:15    98/57    


 


2/2/21 22:00  92 17 98/57 (71) 85   


 


2/2/21 21:30  90 22 100/59 (73) 83   


 


2/2/21 21:15   27 99/68  Mechanical Ventilator  100


 


2/2/21 21:15    99/68    


 


2/2/21 21:00  88 19 102/60 (74) 85   


 


2/2/21 20:30  87 27 101/64 (76) 86   


 


2/2/21 20:15   16 105/70  Mechanical Ventilator  100


 


2/2/21 20:15    105/70    


 


2/2/21 20:00      Mechanical Ventilator  


 


2/2/21 20:00 96.7 84 14 109/68 (82) 89   


 


2/2/21 20:00        100


 


2/2/21 19:45  82 15 111/67 (82) 90   


 


2/2/21 19:30  81      


 


2/2/21 19:30  80 12 85/57 (66) 88   


 


2/2/21 19:18  84 33     100


 


2/2/21 19:15  80 15 79/57 (64) 90   


 


2/2/21 19:15   14 79/57  Mechanical Ventilator  100


 


2/2/21 19:15    79/57    


 


2/2/21 19:00  79 14 88/60 (69) 89   


 


2/2/21 19:00    88/60    


 


2/2/21 18:30  77 15 94/62 (73) 91   


 


2/2/21 18:15   17 94/62  Mechanical Ventilator  100


 


2/2/21 18:15  78 22 100/68 (79) 88   


 


2/2/21 18:00    93/62    


 


2/2/21 18:00  80 33 77/50 (59) 84   


 


2/2/21 17:30  75 30 93/62 (72) 84   


 


2/2/21 17:15   29 93/58  Mechanical Ventilator  100


 


2/2/21 17:00  75 32 93/58 (70) 56   


 


2/2/21 17:00    93/58    


 


2/2/21 16:30  77 29 102/63 (76) 80   


 


2/2/21 16:15   31 106/88  Mechanical Ventilator  100


 


2/2/21 16:00 96.0 79 32 108/66 (80) 83   


 


2/2/21 16:00    108/66    


 


2/2/21 16:00      Mechanical Ventilator  


 


2/2/21 16:00  76      


 


2/2/21 16:00        100

















Intake and Output  


 


 2/2/21 2/3/21





 19:00 07:00


 


Intake Total 2625.978 ml 2574.72796 ml


 


Output Total 990 ml 750 ml


 


Balance 1635.978 ml 1824.92721 ml


 


  


 


Free Water 400 ml 400 ml


 


IV Total 1705.978 ml 1754.96185 ml


 


Tube Feeding 420 ml 420 ml


 


Other 100 ml 


 


Output Urine Total 620 ml 560 ml


 


Stool Total 20 ml 30 ml


 


Chest Tube Drainage Total 350 ml 160 ml








Laboratory Tests


2/3/21 04:25: 


White Blood Count 6.5, Red Blood Count 2.95L, Hemoglobin 8.6L, Hematocrit 28.3L,

Mean Corpuscular Volume 96, Mean Corpuscular Hemoglobin 29.0, Mean Corpuscular 

Hemoglobin Concent 30.3L, Red Cell Distribution Width 17.8H, Platelet Count 239,

Mean Platelet Volume 9.4, Neutrophils (%) (Auto) , Lymphocytes (%) (Auto) , 

Monocytes (%) (Auto) , Eosinophils (%) (Auto) , Basophils (%) (Auto) , Sodium 

Level 145, Potassium Level 4.4, Chloride Level 105, Carbon Dioxide Level 30, 

Anion Gap 10, Blood Urea Nitrogen 77H, Creatinine 2.7H, Estimat Glomerular 

Filtration Rate 23.8, Glucose Level 198H, Lactic Acid Level 2.60H, Uric Acid 

8.6H, Calcium Level 8.0L, Phosphorus Level 5.6H, Magnesium Level 2.4, Total 

Bilirubin 0.5, Gamma Glutamyl Transpeptidase 210H, Aspartate Amino Transf 

(AST/SGOT) 27, Alanine Aminotransferase (ALT/SGPT) 39, Alkaline Phosphatase 329H

, Total Creatine Kinase 16L, C-Reactive Protein, Quantitative 8.3H, Pro-B-Type 

Natriuretic Peptide 22055I, Total Protein 6.5, Albumin 2.1L, Globulin 4.4, 

Albumin/Globulin Ratio 0.5L


2/3/21 08:31: 


Arterial Blood pH 7.194*L, Arterial Blood Partial Pressure CO2 84.6*H, Arterial 

Blood Partial Pressure O2 51.1L, Arterial Blood HCO3 31.9H, Arterial Blood 

Oxygen Saturation 81.4*L, Arterial Blood Base Excess 2.4H, Abelardo Test Positive


2/3/21 12:15: Lactic Acid Level 2.50H


Height (Feet):  5


Height (Inches):  7.00


Weight (Pounds):  198


Neck:  supple


Cardiovascular:  regular rhythm


Respiratory/Chest:  rhonchi - bilaterally


Abdomen:  soft, no organomegaly


Extremities:  non-tender





Assessment/Plan


Assessment/Plan:


no cardiic issue last night


hypotension better on tirate down levophed drip


afib with rvr on digoxine , add amiodarone , cardiology on case


covid pna


ac resp distress on ventilator


etoh abused


dehydration


ac renal failure- nephro consult 


severe meta acidosis


cont on ventilator at icu


cont iv abx and iv decadrone


pulmonary and gi on consult





dw son explained the prognosis -requested full code


icu nurse is aware of code status











Moi Travis MD              Feb 3, 2021 15:33

## 2021-02-03 NOTE — NUR
NURSE NOTES:

Pt BP low,BP 59/43, 68/45, S-Tach 118/min, pt struggling for breath ,RR 21/min.Attempted to 
infuse Dopamine drip but stopped,Levophed titrated to max,until BP was stable then titrated 
down until BP was on appropriate level.103/66,HR 95/min.

## 2021-02-03 NOTE — CARDIAC ELECTROPHYSIOLOGY PN
Assessment/Plan


Assessment/Plan


1. Atrial fibrillation with rapid ventricular response.      


      On digoxin 0.125 mg p.o. daily and Amiodarone 200 po qd  


       In SR.     Dig level 1.0





2. Acute renal failure. 


3. Right pneumothorax, status post chest tube with subcutaneous emphysema. 


No significant drainage





4. COVID-19 pneumonia, 100% FiO2 and PEEP of 12, on Remdesivir and Solu-Medrol  

.


5. Diabetes.


6. Septic shock. On iv fluid and Levophed decreased to 6 Mcg  


7. S/P PEA arrest 1/29/21 and 1/31/21





DW RN and Dr Powell





Subjective


Subjective


In ICU on 100% Fio2 and PEEP 12 and Levo decreased to 6 Mcg  


On Fentanyl rip at 30


 Right chest tube  . In SR on Dig and Amiodarone 


Coded twice 1/30/21  for PEA with chest compressions and Epi and bicarb 

injections





Objective





Last 24 Hour Vital Signs








  Date Time  Temp Pulse Resp B/P (MAP) Pulse Ox O2 Delivery O2 Flow Rate FiO2


 


2/3/21 14:00  85 29 108/63 (78) 84   


 


2/3/21 13:00  82 23 112/67 (82) 86   


 


2/3/21 13:00    112/67    


 


2/3/21 13:00   30 112/67  Mechanical Ventilator  100


 


2/3/21 12:22    103/62    


 


2/3/21 12:00 96.9 87 28 110/64 (79) 90   


 


2/3/21 12:00      Mechanical Ventilator  


 


2/3/21 12:00   23 113/68  Mechanical Ventilator  100


 


2/3/21 12:00  86      


 


2/3/21 12:00        100


 


2/3/21 11:59    103/62    


 


2/3/21 11:03  95 19 107/62 (77) 86   


 


2/3/21 11:00   19 107/62  Mechanical Ventilator  100


 


2/3/21 10:45  100 20 110/63 (79) 80   


 


2/3/21 10:30    107/62    


 


2/3/21 10:29  104 19 121/73 (89) 56   


 


2/3/21 10:24  69 14 68/45 (53) 58   


 


2/3/21 10:21    67/43    


 


2/3/21 10:15  70 17 59/43 (48) 64   


 


2/3/21 10:03    75/55    


 


2/3/21 10:00   22 145/70  Mechanical Ventilator  100


 


2/3/21 10:00  118 16 145/70 (95) 52   


 


2/3/21 09:49  84 17 68/43 (51) 69   


 


2/3/21 09:23  83      


 


2/3/21 09:15   21 118/67  Mechanical Ventilator  100


 


2/3/21 09:00   21 118/67  Mechanical Ventilator  100


 


2/3/21 09:00    118/67    


 


2/3/21 09:00  90 24 118/67 (84) 79   


 


2/3/21 08:00  83      


 


2/3/21 08:00 97.5 82 29 112/67 (82) 82   


 


2/3/21 08:00   26 113/59  Mechanical Ventilator  100


 


2/3/21 08:00    113/68    


 


2/3/21 08:00      Mechanical Ventilator  


 


2/3/21 08:00        100


 


2/3/21 07:15    113/68    


 


2/3/21 07:00  88 29 119/66 (83) 69   


 


2/3/21 06:45   21 111/84  Mechanical Ventilator  100


 


2/3/21 06:45  91 21 111/84 (93) 63   


 


2/3/21 06:30  84 32 130/73 (92) 88   


 


2/3/21 06:30   30 130/73  Mechanical Ventilator  100


 


2/3/21 06:15  87 28 93/75 (81) 79   


 


2/3/21 06:15   26 93/75  Mechanical Ventilator  100


 


2/3/21 06:15    93/75    


 


2/3/21 06:00   23 128/73  Mechanical Ventilator  100


 


2/3/21 06:00  86 27 128/73 (91) 87   


 


2/3/21 05:45   27 122/71  Mechanical Ventilator  100


 


2/3/21 05:45  89 25 122/71 (88) 84   


 


2/3/21 05:30   22 134/75  Mechanical Ventilator  100


 


2/3/21 05:30  88 21 134/75 (94) 90   


 


2/3/21 05:15   21 136/79  Mechanical Ventilator  100


 


2/3/21 05:15    136/79    


 


2/3/21 05:15  85 24 136/79 (98) 93   


 


2/3/21 05:00  88 23 126/71 (89) 94   


 


2/3/21 04:45  90 22 120/69 (86) 91   


 


2/3/21 04:30  90 27 118/67 (84) 90   


 


2/3/21 04:15  92 18 118/66 (83) 88   


 


2/3/21 04:15   24 110/70  Mechanical Ventilator  100


 


2/3/21 04:15    110/70    


 


2/3/21 04:00      Mechanical Ventilator  


 


2/3/21 04:00        100


 


2/3/21 04:00  92 22 114/66 (82) 85   


 


2/3/21 03:51  92      


 


2/3/21 03:15   15 119/66  Mechanical Ventilator  100


 


2/3/21 03:15    119/66    


 


2/3/21 03:08  86 32     100


 


2/3/21 03:00  86 23 111/67 (82) 86   


 


2/3/21 02:15   23 114/69  Mechanical Ventilator  100


 


2/3/21 02:15    114/69    


 


2/3/21 02:00  81 21 114/69 (84) 90   


 


2/3/21 01:15   28 118/69  Mechanical Ventilator  100


 


2/3/21 01:15    118/69    


 


2/3/21 01:15  81 30 127/73 (91) 93   


 


2/3/21 01:00  85 27 127/74 (91) 85   


 


2/3/21 00:30  86 23 150/76 (100) 83   


 


2/3/21 00:15  84 13 125/73 (90) 93   


 


2/3/21 00:15   22 125/73  Mechanical Ventilator  100


 


2/3/21 00:15    125/73    


 


2/3/21 00:00 97.0 87 21 129/78 (95) 89   


 


2/3/21 00:00        100


 


2/3/21 00:00   18 123/69  Mechanical Ventilator  100


 


2/3/21 00:00      Mechanical Ventilator  


 


2/2/21 23:59  86      


 


2/2/21 23:45  87 26 126/71 (89) 90   


 


2/2/21 23:45   19 126/71  Mechanical Ventilator  100


 


2/2/21 23:30  88 24 125/75 (92) 92   


 


2/2/21 23:30   23 125/75  Mechanical Ventilator  100


 


2/2/21 23:20  88 33     100


 


2/2/21 23:15   24 120/69  Mechanical Ventilator  100


 


2/2/21 23:15    120/69    


 


2/2/21 23:15  88 21 120/69 (86) 91   


 


2/2/21 23:11   27 111/71  Mechanical Ventilator  100


 


2/2/21 23:00   22 111/71  Mechanical Ventilator  100


 


2/2/21 23:00  89 20 111/71 (84) 91   


 


2/2/21 22:45  90 20 113/70 (84) 89   


 


2/2/21 22:45  90 20 113/70 (84) 89   


 


2/2/21 22:30  91 18 102/67 (79) 88   


 


2/2/21 22:15   23 98/57  Mechanical Ventilator  100


 


2/2/21 22:15    98/57    


 


2/2/21 22:00  92 17 98/57 (71) 85   


 


2/2/21 21:30  90 22 100/59 (73) 83   


 


2/2/21 21:15   27 99/68  Mechanical Ventilator  100


 


2/2/21 21:15    99/68    


 


2/2/21 21:00  88 19 102/60 (74) 85   


 


2/2/21 20:30  87 27 101/64 (76) 86   


 


2/2/21 20:15   16 105/70  Mechanical Ventilator  100


 


2/2/21 20:15    105/70    


 


2/2/21 20:00      Mechanical Ventilator  


 


2/2/21 20:00 96.7 84 14 109/68 (82) 89   


 


2/2/21 20:00        100


 


2/2/21 19:45  82 15 111/67 (82) 90   


 


2/2/21 19:30  81      


 


2/2/21 19:30  80 12 85/57 (66) 88   


 


2/2/21 19:18  84 33     100


 


2/2/21 19:15  80 15 79/57 (64) 90   


 


2/2/21 19:15   14 79/57  Mechanical Ventilator  100


 


2/2/21 19:15    79/57    


 


2/2/21 19:00  79 14 88/60 (69) 89   


 


2/2/21 19:00    88/60    


 


2/2/21 18:30  77 15 94/62 (73) 91   


 


2/2/21 18:15   17 94/62  Mechanical Ventilator  100


 


2/2/21 18:15  78 22 100/68 (79) 88   


 


2/2/21 18:00    93/62    


 


2/2/21 18:00  80 33 77/50 (59) 84   


 


2/2/21 17:30  75 30 93/62 (72) 84   


 


2/2/21 17:15   29 93/58  Mechanical Ventilator  100


 


2/2/21 17:00  75 32 93/58 (70) 56   


 


2/2/21 17:00    93/58    


 


2/2/21 16:30  77 29 102/63 (76) 80   


 


2/2/21 16:15   31 106/88  Mechanical Ventilator  100


 


2/2/21 16:00 96.0 79 32 108/66 (80) 83   


 


2/2/21 16:00    108/66    


 


2/2/21 16:00      Mechanical Ventilator  


 


2/2/21 16:00  76      


 


2/2/21 16:00        100


 


2/2/21 15:30  84 22 143/79 (100) 56   


 


2/2/21 15:15   29 106/67  Mechanical Ventilator  100


 


2/2/21 15:00  80 36 106/67 (80) 85   


 


2/2/21 15:00  81 36     100


 


2/2/21 15:00    106/67    


 


2/2/21 15:00  80 32 106/67 (80) 85   


 


2/2/21 14:30  83 32 101/62 (75) 81   

















Intake and Output  


 


 2/2/21 2/3/21





 19:00 07:00


 


Intake Total 2625.978 ml 2574.69373 ml


 


Output Total 990 ml 750 ml


 


Balance 1635.978 ml 1824.66007 ml


 


  


 


Free Water 400 ml 400 ml


 


IV Total 1705.978 ml 1754.84596 ml


 


Tube Feeding 420 ml 420 ml


 


Other 100 ml 


 


Output Urine Total 620 ml 560 ml


 


Stool Total 20 ml 30 ml


 


Chest Tube Drainage Total 350 ml 160 ml











Laboratory Tests








Test


 2/3/21


04:25 2/3/21


08:31 2/3/21


12:15


 


White Blood Count


 6.5 K/UL


(4.8-10.8) 


 





 


Red Blood Count


 2.95 M/UL


(4.70-6.10)  L 


 





 


Hemoglobin


 8.6 G/DL


(14.2-18.0)  L 


 





 


Hematocrit


 28.3 %


(42.0-52.0)  L 


 





 


Mean Corpuscular Volume 96 FL (80-99)    


 


Mean Corpuscular Hemoglobin


 29.0 PG


(27.0-31.0) 


 





 


Mean Corpuscular Hemoglobin


Concent 30.3 G/DL


(32.0-36.0)  L 


 





 


Red Cell Distribution Width


 17.8 %


(11.6-14.8)  H 


 





 


Platelet Count


 239 K/UL


(150-450) 


 





 


Mean Platelet Volume


 9.4 FL


(6.5-10.1) 


 





 


Neutrophils (%) (Auto)


 % (45.0-75.0)


 


 





 


Lymphocytes (%) (Auto)


 % (20.0-45.0)


 


 





 


Monocytes (%) (Auto)  % (1.0-10.0)    


 


Eosinophils (%) (Auto)  % (0.0-3.0)    


 


Basophils (%) (Auto)  % (0.0-2.0)    


 


Sodium Level


 145 MMOL/L


(136-145) 


 





 


Potassium Level


 4.4 MMOL/L


(3.5-5.1) 


 





 


Chloride Level


 105 MMOL/L


() 


 





 


Carbon Dioxide Level


 30 MMOL/L


(21-32) 


 





 


Anion Gap


 10 mmol/L


(5-15) 


 





 


Blood Urea Nitrogen


 77 mg/dL


(7-18)  H 


 





 


Creatinine


 2.7 MG/DL


(0.55-1.30)  H 


 





 


Estimat Glomerular Filtration


Rate 23.8 mL/min


(>60) 


 





 


Glucose Level


 198 MG/DL


()  H 


 





 


Lactic Acid Level


 2.60 mmol/L


(0.4-2.0)  H 


 2.50 mmol/L


(0.4-2.0)  H


 


Uric Acid


 8.6 MG/DL


(2.6-7.2)  H 


 





 


Calcium Level


 8.0 MG/DL


(8.5-10.1)  L 


 





 


Phosphorus Level


 5.6 MG/DL


(2.5-4.9)  H 


 





 


Magnesium Level


 2.4 MG/DL


(1.8-2.4) 


 





 


Total Bilirubin


 0.5 MG/DL


(0.2-1.0) 


 





 


Gamma Glutamyl Transpeptidase


 210 U/L (5-85)


H 


 





 


Aspartate Amino Transf


(AST/SGOT) 27 U/L (15-37)


 


 





 


Alanine Aminotransferase


(ALT/SGPT) 39 U/L (12-78)


 


 





 


Alkaline Phosphatase


 329 U/L


()  H 


 





 


Total Creatine Kinase


 16 U/L


()  L 


 





 


C-Reactive Protein,


Quantitative 8.3 mg/dL


(0.00-0.90)  H 


 





 


Pro-B-Type Natriuretic Peptide


 08299 pg/mL


(0-125)  H 


 





 


Total Protein


 6.5 G/DL


(6.4-8.2) 


 





 


Albumin


 2.1 G/DL


(3.4-5.0)  L 


 





 


Globulin 4.4 g/dL    


 


Albumin/Globulin Ratio


 0.5 (1.0-2.7)


L 


 





 


Arterial Blood pH


 


 7.194


(7.350-7.450) 





 


Arterial Blood Partial


Pressure CO2 


 84.6 mmHg


(35.0-45.0)  *H 





 


Arterial Blood Partial


Pressure O2 


 51.1 mmHg


(75.0-100.0)  L 





 


Arterial Blood HCO3


 


 31.9 mmol/L


(22.0-26.0)  H 





 


Arterial Blood Oxygen


Saturation 


 81.4 %


()  *L 





 


Arterial Blood Base Excess  2.4 (-2-2)  H 


 


Abelardo Test  Positive   








Objective


HEENT: No JVD. Orally intubated


LUNGS:  Have decreased breath sounds with the chest tube on the right side


and subcutaneous emphysema.


CARDIOVASCULAR:  Regular S1, S2 with


no gallop.


ABDOMEN:  Soft.


EXTREMITIES:  A 1+ pitting edema.











Jake George MD                Feb 3, 2021 14:27

## 2021-02-03 NOTE — NUR
NURSE NOTES:

Oral care done ,ETT suctioned PRN,with very minimal  secretions,moisturizer applied to 
lips,very dry with crusted blood.

## 2021-02-03 NOTE — NUR
NURSE NOTES:

received order for PRN versed to reach RASS-2 from Dr. Ibanez. pt is currently at RASS -2, 
so will keep monitor pt closely. call light within reach. bilateral soft wrist restrain is 
intact and pulse noted bilaterally.

## 2021-02-03 NOTE — CARDIOLOGY REPORT
--------------- APPROVED REPORT --------------





EXAM: Two-dimensional and M-mode echocardiogram with Doppler and color Doppler.



INDICATION

Congestive Heart Failure 



 Other Information 

Quality : Limited



<Conclusion>

Technically difficult study due to poor acoustical windows & pt's breathing.

Study quality precludes accurate  assessment of regional wall motion.

Anterior Echo-free space, may be due to pericardial fat or effusion.

Left ventricular ejection fraction can not determined.

## 2021-02-03 NOTE — INFECTIOUS DISEASES PROG NOTE
Assessment/Plan


Assessment/Plan


antibiotics : cefepime





A


1. COVID-19 pneumonia.


on 100 % FiO2 with saturation of 86 %


s/p remdesivir


s/p ivermectin


s/p solumedrol


2. New-onset diabetes.


3. respiratory failure


4. renal failure





P


1. Continue cefepime


2. Continue isolation.





Subjective


ROS Limited/Unobtainable:  Yes


Allergies:  


Coded Allergies:  


     No Known Allergies (Unverified , 6/11/19)





Objective





Last 24 Hour Vital Signs








  Date Time  Temp Pulse Resp B/P (MAP) Pulse Ox O2 Delivery O2 Flow Rate FiO2


 


2/3/21 11:03  95 19 107/62 (77) 86   


 


2/3/21 10:45  100 20 110/63 (79) 80   


 


2/3/21 10:29  104 19 121/73 (89) 56   


 


2/3/21 10:24  69 14 68/45 (53) 58   


 


2/3/21 10:21    67/43    


 


2/3/21 10:15  70 17 59/43 (48) 64   


 


2/3/21 10:03    75/55    


 


2/3/21 10:00   22 145/70  Mechanical Ventilator  100


 


2/3/21 10:00  118 16 145/70 (95) 52   


 


2/3/21 09:49  84 17 68/43 (51) 69   


 


2/3/21 09:23  83      


 


2/3/21 09:15   21 118/67  Mechanical Ventilator  100


 


2/3/21 09:00   21 118/67  Mechanical Ventilator  100


 


2/3/21 09:00    118/67    


 


2/3/21 09:00  90 24 118/67 (84) 79   


 


2/3/21 08:00  83      


 


2/3/21 08:00 97.5 82 29 112/67 (82) 82   


 


2/3/21 08:00   26 113/59  Mechanical Ventilator  100


 


2/3/21 08:00    113/68    


 


2/3/21 08:00      Mechanical Ventilator  


 


2/3/21 08:00        100


 


2/3/21 07:15    113/68    


 


2/3/21 07:00  88 29 119/66 (83) 69   


 


2/3/21 06:45   21 111/84  Mechanical Ventilator  100


 


2/3/21 06:45  91 21 111/84 (93) 63   


 


2/3/21 06:30  84 32 130/73 (92) 88   


 


2/3/21 06:30   30 130/73  Mechanical Ventilator  100


 


2/3/21 06:15  87 28 93/75 (81) 79   


 


2/3/21 06:15   26 93/75  Mechanical Ventilator  100


 


2/3/21 06:15    93/75    


 


2/3/21 06:00   23 128/73  Mechanical Ventilator  100


 


2/3/21 06:00  86 27 128/73 (91) 87   


 


2/3/21 05:45   27 122/71  Mechanical Ventilator  100


 


2/3/21 05:45  89 25 122/71 (88) 84   


 


2/3/21 05:30   22 134/75  Mechanical Ventilator  100


 


2/3/21 05:30  88 21 134/75 (94) 90   


 


2/3/21 05:15   21 136/79  Mechanical Ventilator  100


 


2/3/21 05:15    136/79    


 


2/3/21 05:15  85 24 136/79 (98) 93   


 


2/3/21 05:00  88 23 126/71 (89) 94   


 


2/3/21 04:45  90 22 120/69 (86) 91   


 


2/3/21 04:30  90 27 118/67 (84) 90   


 


2/3/21 04:15  92 18 118/66 (83) 88   


 


2/3/21 04:15   24 110/70  Mechanical Ventilator  100


 


2/3/21 04:15    110/70    


 


2/3/21 04:00      Mechanical Ventilator  


 


2/3/21 04:00        100


 


2/3/21 04:00  92 22 114/66 (82) 85   


 


2/3/21 03:51  92      


 


2/3/21 03:15   15 119/66  Mechanical Ventilator  100


 


2/3/21 03:15    119/66    


 


2/3/21 03:08  86 32     100


 


2/3/21 03:00  86 23 111/67 (82) 86   


 


2/3/21 02:15   23 114/69  Mechanical Ventilator  100


 


2/3/21 02:15    114/69    


 


2/3/21 02:00  81 21 114/69 (84) 90   


 


2/3/21 01:15   28 118/69  Mechanical Ventilator  100


 


2/3/21 01:15    118/69    


 


2/3/21 01:15  81 30 127/73 (91) 93   


 


2/3/21 01:00  85 27 127/74 (91) 85   


 


2/3/21 00:30  86 23 150/76 (100) 83   


 


2/3/21 00:15  84 13 125/73 (90) 93   


 


2/3/21 00:15   22 125/73  Mechanical Ventilator  100


 


2/3/21 00:15    125/73    


 


2/3/21 00:00 97.0 87 21 129/78 (95) 89   


 


2/3/21 00:00        100


 


2/3/21 00:00   18 123/69  Mechanical Ventilator  100


 


2/3/21 00:00      Mechanical Ventilator  


 


2/2/21 23:59  86      


 


2/2/21 23:45  87 26 126/71 (89) 90   


 


2/2/21 23:45   19 126/71  Mechanical Ventilator  100


 


2/2/21 23:30  88 24 125/75 (92) 92   


 


2/2/21 23:30   23 125/75  Mechanical Ventilator  100


 


2/2/21 23:20  88 33     100


 


2/2/21 23:15   24 120/69  Mechanical Ventilator  100


 


2/2/21 23:15    120/69    


 


2/2/21 23:15  88 21 120/69 (86) 91   


 


2/2/21 23:11   27 111/71  Mechanical Ventilator  100


 


2/2/21 23:00   22 111/71  Mechanical Ventilator  100


 


2/2/21 23:00  89 20 111/71 (84) 91   


 


2/2/21 22:45  90 20 113/70 (84) 89   


 


2/2/21 22:45  90 20 113/70 (84) 89   


 


2/2/21 22:30  91 18 102/67 (79) 88   


 


2/2/21 22:15   23 98/57  Mechanical Ventilator  100


 


2/2/21 22:15    98/57    


 


2/2/21 22:00  92 17 98/57 (71) 85   


 


2/2/21 21:30  90 22 100/59 (73) 83   


 


2/2/21 21:15   27 99/68  Mechanical Ventilator  100


 


2/2/21 21:15    99/68    


 


2/2/21 21:00  88 19 102/60 (74) 85   


 


2/2/21 20:30  87 27 101/64 (76) 86   


 


2/2/21 20:15   16 105/70  Mechanical Ventilator  100


 


2/2/21 20:15    105/70    


 


2/2/21 20:00      Mechanical Ventilator  


 


2/2/21 20:00 96.7 84 14 109/68 (82) 89   


 


2/2/21 20:00        100


 


2/2/21 19:45  82 15 111/67 (82) 90   


 


2/2/21 19:30  81      


 


2/2/21 19:30  80 12 85/57 (66) 88   


 


2/2/21 19:18  84 33     100


 


2/2/21 19:15  80 15 79/57 (64) 90   


 


2/2/21 19:15   14 79/57  Mechanical Ventilator  100


 


2/2/21 19:15    79/57    


 


2/2/21 19:00  79 14 88/60 (69) 89   


 


2/2/21 19:00    88/60    


 


2/2/21 18:30  77 15 94/62 (73) 91   


 


2/2/21 18:15   17 94/62  Mechanical Ventilator  100


 


2/2/21 18:15  78 22 100/68 (79) 88   


 


2/2/21 18:00    93/62    


 


2/2/21 18:00  80 33 77/50 (59) 84   


 


2/2/21 17:30  75 30 93/62 (72) 84   


 


2/2/21 17:15   29 93/58  Mechanical Ventilator  100


 


2/2/21 17:00  75 32 93/58 (70) 56   


 


2/2/21 17:00    93/58    


 


2/2/21 16:30  77 29 102/63 (76) 80   


 


2/2/21 16:15   31 106/88  Mechanical Ventilator  100


 


2/2/21 16:00 96.0 79 32 108/66 (80) 83   


 


2/2/21 16:00    108/66    


 


2/2/21 16:00      Mechanical Ventilator  


 


2/2/21 16:00  76      


 


2/2/21 16:00        100


 


2/2/21 15:30  84 22 143/79 (100) 56   


 


2/2/21 15:15   29 106/67  Mechanical Ventilator  100


 


2/2/21 15:00  80 36 106/67 (80) 85   


 


2/2/21 15:00  81 36     100


 


2/2/21 15:00    106/67    


 


2/2/21 15:00  80 32 106/67 (80) 85   


 


2/2/21 14:30  83 32 101/62 (75) 81   


 


2/2/21 14:15   32 101/62  Mechanical Ventilator  100


 


2/2/21 14:00  83 30 95/62 (73) 83   


 


2/2/21 14:00    100/62    


 


2/2/21 13:30  85 20 95/62 (73) 57   


 


2/2/21 13:15   30 103/76  Mechanical Ventilator  100


 


2/2/21 13:00  85 17 111/68 (82) 82   


 


2/2/21 13:00    111/68    


 


2/2/21 12:30 96.7 85 31 106/69 (81) 73   


 


2/2/21 12:15   34 110/70  Mechanical Ventilator  100


 


2/2/21 12:00  82      


 


2/2/21 12:00    107/72    


 


2/2/21 12:00  87 26 107/72 (84) 67   


 


2/2/21 12:00        100


 


2/2/21 12:00      Mechanical Ventilator  


 


2/2/21 11:30  85 28 100/60 (73) 82   








Height (Feet):  5


Height (Inches):  7.00


Weight (Pounds):  198





Laboratory Tests








Test


 2/3/21


04:25 2/3/21


08:31


 


White Blood Count


 6.5 K/UL


(4.8-10.8) 





 


Red Blood Count


 2.95 M/UL


(4.70-6.10)  L 





 


Hemoglobin


 8.6 G/DL


(14.2-18.0)  L 





 


Hematocrit


 28.3 %


(42.0-52.0)  L 





 


Mean Corpuscular Volume 96 FL (80-99)   


 


Mean Corpuscular Hemoglobin


 29.0 PG


(27.0-31.0) 





 


Mean Corpuscular Hemoglobin


Concent 30.3 G/DL


(32.0-36.0)  L 





 


Red Cell Distribution Width


 17.8 %


(11.6-14.8)  H 





 


Platelet Count


 239 K/UL


(150-450) 





 


Mean Platelet Volume


 9.4 FL


(6.5-10.1) 





 


Neutrophils (%) (Auto)


 % (45.0-75.0)


 





 


Lymphocytes (%) (Auto)


 % (20.0-45.0)


 





 


Monocytes (%) (Auto)  % (1.0-10.0)   


 


Eosinophils (%) (Auto)  % (0.0-3.0)   


 


Basophils (%) (Auto)  % (0.0-2.0)   


 


Sodium Level


 145 MMOL/L


(136-145) 





 


Potassium Level


 4.4 MMOL/L


(3.5-5.1) 





 


Chloride Level


 105 MMOL/L


() 





 


Carbon Dioxide Level


 30 MMOL/L


(21-32) 





 


Anion Gap


 10 mmol/L


(5-15) 





 


Blood Urea Nitrogen


 77 mg/dL


(7-18)  H 





 


Creatinine


 2.7 MG/DL


(0.55-1.30)  H 





 


Estimat Glomerular Filtration


Rate 23.8 mL/min


(>60) 





 


Glucose Level


 198 MG/DL


()  H 





 


Lactic Acid Level


 2.60 mmol/L


(0.4-2.0)  H 





 


Uric Acid


 8.6 MG/DL


(2.6-7.2)  H 





 


Calcium Level


 8.0 MG/DL


(8.5-10.1)  L 





 


Phosphorus Level


 5.6 MG/DL


(2.5-4.9)  H 





 


Magnesium Level


 2.4 MG/DL


(1.8-2.4) 





 


Total Bilirubin


 0.5 MG/DL


(0.2-1.0) 





 


Gamma Glutamyl Transpeptidase


 210 U/L (5-85)


H 





 


Aspartate Amino Transf


(AST/SGOT) 27 U/L (15-37)


 





 


Alanine Aminotransferase


(ALT/SGPT) 39 U/L (12-78)


 





 


Alkaline Phosphatase


 329 U/L


()  H 





 


Total Creatine Kinase


 16 U/L


()  L 





 


C-Reactive Protein,


Quantitative 8.3 mg/dL


(0.00-0.90)  H 





 


Pro-B-Type Natriuretic Peptide


 31506 pg/mL


(0-125)  H 





 


Total Protein


 6.5 G/DL


(6.4-8.2) 





 


Albumin


 2.1 G/DL


(3.4-5.0)  L 





 


Globulin 4.4 g/dL   


 


Albumin/Globulin Ratio


 0.5 (1.0-2.7)


L 





 


Arterial Blood pH


 


 7.194


(7.350-7.450)


 


Arterial Blood Partial


Pressure CO2 


 84.6 mmHg


(35.0-45.0)  *H


 


Arterial Blood Partial


Pressure O2 


 51.1 mmHg


(75.0-100.0)  L


 


Arterial Blood HCO3


 


 31.9 mmol/L


(22.0-26.0)  H


 


Arterial Blood Oxygen


Saturation 


 81.4 %


()  *L


 


Arterial Blood Base Excess  2.4 (-2-2)  H


 


Abelardo Test  Positive  











Current Medications








 Medications


  (Trade)  Dose


 Ordered  Sig/Julisa


 Route


 PRN Reason  Start Time


 Stop Time Status Last Admin


Dose Admin


 


 Acetaminophen


  (Tylenol)  650 mg  Q4H  PRN


 GT


 Temp >100.5  1/15/21 17:15


 2/14/21 17:14  1/29/21 06:59





 


 Albumin Human  100 ml @ 


 100 mls/hr  Q8HR


 IV


   2/2/21 14:00


 5/3/21 13:59  2/3/21 05:24





 


 Amiodarone HCl


  (Cordarone)  200 mg  DAILY


 NG


   1/26/21 09:00


 4/19/21 20:59  2/3/21 09:23





 


 Cefepime HCl 1 gm/


 Dextrose  55 ml @ 


 110 mls/hr  Q24H


 IVPB


   1/30/21 11:00


 2/6/21 10:59  2/2/21 10:12





 


 Chlorhexidine


 Gluconate


  (Vanessa-Hex 2%)  1 applic  DAILY@2000


 TOPIC


   1/19/21 20:00


 4/19/21 19:59  2/2/21 20:39





 


 Dextrose


  (Dextrose 50%)  25 ml  Q30M  PRN


 IV


 Hypoglycemia  1/9/21 13:30


 4/9/21 13:29   





 


 Dextrose


  (Dextrose 50%)  50 ml  Q30M  PRN


 IV


 Hypoglycemia  1/9/21 13:30


 4/9/21 13:29   





 


 Digoxin


  (Lanoxin)  0.125 mg  DAILY


 NG


   1/21/21 09:00


 4/21/21 08:59  2/3/21 09:23





 


 Docusate Sodium


  (Colace)  100 mg  TIDPRN  PRN


 GT


 Constipation  1/12/21 01:15


 2/11/21 01:14  1/12/21 01:42





 


 Dopamine HCl/


 Dextrose  250 ml @ 0


 mls/hr  Q24H  PRN


 IV


 For hypotension  1/31/21 11:45


 2/3/21 11:45  2/3/21 10:21





 


 Enoxaparin Sodium


  (Lovenox)  80 mg  EVERY 12  HOURS


 SUBQ


   1/2/21 21:00


 4/2/21 20:59  2/3/21 09:24





 


 Fentanyl Citrate  250 ml @ 1


 mls/hr  Q24H


 IV


   2/2/21 03:00


 2/4/21 02:59  2/3/21 09:15





 


 Hydrocortisone


  (Solu-CORTEF)  100 mg  EVERY 8  HOURS


 IV


   2/1/21 15:45


 5/2/21 15:44  2/3/21 05:24





 


 Insulin Aspart


  (NovoLOG)    Q6HR


 SUBQ


   1/14/21 12:00


 4/9/21 17:59  2/3/21 06:04





 


 Insulin Detemir


  (Levemir)  26 units  Q12HR


 SUBQ


   2/2/21 09:00


 4/12/21 08:59  2/3/21 09:25





 


 Midodrine


  (Pro-Amatine)  10 mg  Q8HR


 ORAL


   2/1/21 15:30


 5/2/21 15:29  2/3/21 05:25





 


 Pantoprazole


  (Protonix)  40 mg  Q12HR


 IVP


   2/1/21 21:00


 2/13/21 08:59  2/3/21 09:23





 


 Phenylephrine HCl


 100 mg/Dextrose  250 ml @ 0


 mls/hr  Q24H  PRN


 IV


 For hypotension  1/31/21 12:30


 2/3/21 12:29   





 


 Sodium Chloride  500 ml @ 


 999 mls/hr  Q31M PRN


 IV


 For hypotension  1/15/21 18:30


 2/14/21 18:29   





 


 Sodium Chloride  1,000 ml @ 


 100 mls/hr  Q10H


 IV


   2/1/21 15:30


 3/3/21 15:29  2/3/21 07:32




















Ashley Davis MD       Feb 3, 2021 11:19

## 2021-02-03 NOTE — NUR
NURSE NOTES:

received pt from Anton Huston RN., pt is getting sedation at this time, but now pt is at RASS 
-1. cardiac monitor shows SR at this time. ET 7.5 at lip line 27cm. AC 32 Pressure control 
25 peep 12 %. O2sat is now at 80-91%. scant bleeding noted inside of mouth. OGT noted, 
Nephro is running at 35ml/hr, no residual noted. Stern cath noted draining well with 
gravity, no bleeding in Urine noted. rectal tube noted, draining well with gravity as well. 
skin alternation noted, dressing sites are clean, dry and patent. left upper arm PICC line 
noted dressing site is clean, patent, and dry. Fentanyl is at 300 mcg/hr (MAXIMUM), Levophed 
6 mcg/min, and 1/2 NS is running at 100cc/hr. ABD soft, active, and large. bilateral soft 
wrist restrain noted, pulse noted, skin intact. right lateral chest tube noted, with suction 
connected (white  fluid noted). chest tube site dressing intact, clean, and patent. call 
light within reach. will continue to monitor pt with plan of care. bed at the lowest 
position, alarmed, and locked. 

-------------------------------------------------------------------------------

Addendum: 02/03/21 at 2017 by TAMAR WOODS RN

-------------------------------------------------------------------------------

made in error.

## 2021-02-03 NOTE — NUR
NURSE NOTES:

Dr. Ibanez made pt is getting Fentanyl (300mcg/hr). Need Versed as PRN for additional 
sedation because pt momentaly reaches Rass-1.  will wait for call back

## 2021-02-03 NOTE — NUR
RD ASSESSMENT & RECOMMENDATIONS

SEE CARE ACTIVITY FOR COMPLETE ASSESSMENT



DAILY ESTIMATED NEEDS:

Needs based on Critical Care/ 73kg abw 

22-28  kcals/kg 

4322-4516  total kcals

1.25-2  g protein/kg

  g total protein 

20-25  mL/kg

9527-9717  total fluid mLs



NUTRITION DIAGNOSIS:

Swallowing difficulty R/T respiratory failure as evidenced by pt now

orally intubated, on OGT feeds.



 



CURRENT TF:Nepro @ 35ml/hr x 24 hrs 



 

ENTERAL NUTRITION RECOMMENDATIONS:

Nepro @ 40ml/hr x 24 hrs  to provide 960ml, 1728kcal, 78g prot, 698ml free water 



**** FEED WITH HEMODYANMIC STABILITY

* Continue NEPRO consistently elev phos, creat trend up

* As medically appropriate, increase goal rate to 40ml/hr to better meet needs

* HOB over 30 degrees/ water flush per MD

* W/ improved renal fxn, add Prosource 1pkt BID to better meet prot needs (additional 22g 
prot)

---------

*** WITHOUT HEMODYNAMIC STABILITY, rec trophic feeding of Nepro @ 10ml/hr

x 24 hrs to maintain gut integrity if able to keep HOB >30 degrees ***



 



ADDITIONAL RECOMMENDATIONS:

* Calibrated bedscale wt 

* Monitor BGs, need to further increase insulin regimen 

* When tolerating Tf at goal add JESÚS BID for skin integrity  

* Monitor HD stability: NE @ 10mcg 

* Consider phos binders: consistently elev phos 

* Monitor renal fxn and lytes: creat trending up. Cont Nepro 

.

## 2021-02-03 NOTE — SURGERY PROGRESS NOTE
Surgery Progress Note


Subjective


Procedure Performed


Right tube thoracostomy chest tube insertion


Symptoms:  worse, other





Objective





Last 24 Hour Vital Signs








  Date Time  Temp Pulse Resp B/P (MAP) Pulse Ox O2 Delivery O2 Flow Rate FiO2


 


2/3/21 13:00  82 23 112/67 (82) 86   


 


2/3/21 13:00    112/67    


 


2/3/21 13:00   30 112/67  Mechanical Ventilator  100


 


2/3/21 12:22    103/62    


 


2/3/21 12:00 96.9 87 28 110/64 (79) 90   


 


2/3/21 12:00      Mechanical Ventilator  


 


2/3/21 12:00   23 113/68  Mechanical Ventilator  100


 


2/3/21 12:00  86      


 


2/3/21 12:00        100


 


2/3/21 11:59    103/62    


 


2/3/21 11:03  95 19 107/62 (77) 86   


 


2/3/21 11:00   19 107/62  Mechanical Ventilator  100


 


2/3/21 10:45  100 20 110/63 (79) 80   


 


2/3/21 10:30    107/62    


 


2/3/21 10:29  104 19 121/73 (89) 56   


 


2/3/21 10:24  69 14 68/45 (53) 58   


 


2/3/21 10:21    67/43    


 


2/3/21 10:15  70 17 59/43 (48) 64   


 


2/3/21 10:03    75/55    


 


2/3/21 10:00   22 145/70  Mechanical Ventilator  100


 


2/3/21 10:00  118 16 145/70 (95) 52   


 


2/3/21 09:49  84 17 68/43 (51) 69   


 


2/3/21 09:23  83      


 


2/3/21 09:15   21 118/67  Mechanical Ventilator  100


 


2/3/21 09:00   21 118/67  Mechanical Ventilator  100


 


2/3/21 09:00    118/67    


 


2/3/21 09:00  90 24 118/67 (84) 79   


 


2/3/21 08:00  83      


 


2/3/21 08:00 97.5 82 29 112/67 (82) 82   


 


2/3/21 08:00   26 113/59  Mechanical Ventilator  100


 


2/3/21 08:00    113/68    


 


2/3/21 08:00      Mechanical Ventilator  


 


2/3/21 08:00        100


 


2/3/21 07:15    113/68    


 


2/3/21 07:00  88 29 119/66 (83) 69   


 


2/3/21 06:45   21 111/84  Mechanical Ventilator  100


 


2/3/21 06:45  91 21 111/84 (93) 63   


 


2/3/21 06:30  84 32 130/73 (92) 88   


 


2/3/21 06:30   30 130/73  Mechanical Ventilator  100


 


2/3/21 06:15  87 28 93/75 (81) 79   


 


2/3/21 06:15   26 93/75  Mechanical Ventilator  100


 


2/3/21 06:15    93/75    


 


2/3/21 06:00   23 128/73  Mechanical Ventilator  100


 


2/3/21 06:00  86 27 128/73 (91) 87   


 


2/3/21 05:45   27 122/71  Mechanical Ventilator  100


 


2/3/21 05:45  89 25 122/71 (88) 84   


 


2/3/21 05:30   22 134/75  Mechanical Ventilator  100


 


2/3/21 05:30  88 21 134/75 (94) 90   


 


2/3/21 05:15   21 136/79  Mechanical Ventilator  100


 


2/3/21 05:15    136/79    


 


2/3/21 05:15  85 24 136/79 (98) 93   


 


2/3/21 05:00  88 23 126/71 (89) 94   


 


2/3/21 04:45  90 22 120/69 (86) 91   


 


2/3/21 04:30  90 27 118/67 (84) 90   


 


2/3/21 04:15  92 18 118/66 (83) 88   


 


2/3/21 04:15   24 110/70  Mechanical Ventilator  100


 


2/3/21 04:15    110/70    


 


2/3/21 04:00      Mechanical Ventilator  


 


2/3/21 04:00        100


 


2/3/21 04:00  92 22 114/66 (82) 85   


 


2/3/21 03:51  92      


 


2/3/21 03:15   15 119/66  Mechanical Ventilator  100


 


2/3/21 03:15    119/66    


 


2/3/21 03:08  86 32     100


 


2/3/21 03:00  86 23 111/67 (82) 86   


 


2/3/21 02:15   23 114/69  Mechanical Ventilator  100


 


2/3/21 02:15    114/69    


 


2/3/21 02:00  81 21 114/69 (84) 90   


 


2/3/21 01:15   28 118/69  Mechanical Ventilator  100


 


2/3/21 01:15    118/69    


 


2/3/21 01:15  81 30 127/73 (91) 93   


 


2/3/21 01:00  85 27 127/74 (91) 85   


 


2/3/21 00:30  86 23 150/76 (100) 83   


 


2/3/21 00:15  84 13 125/73 (90) 93   


 


2/3/21 00:15   22 125/73  Mechanical Ventilator  100


 


2/3/21 00:15    125/73    


 


2/3/21 00:00 97.0 87 21 129/78 (95) 89   


 


2/3/21 00:00        100


 


2/3/21 00:00   18 123/69  Mechanical Ventilator  100


 


2/3/21 00:00      Mechanical Ventilator  


 


2/2/21 23:59  86      


 


2/2/21 23:45  87 26 126/71 (89) 90   


 


2/2/21 23:45   19 126/71  Mechanical Ventilator  100


 


2/2/21 23:30  88 24 125/75 (92) 92   


 


2/2/21 23:30   23 125/75  Mechanical Ventilator  100


 


2/2/21 23:20  88 33     100


 


2/2/21 23:15   24 120/69  Mechanical Ventilator  100


 


2/2/21 23:15    120/69    


 


2/2/21 23:15  88 21 120/69 (86) 91   


 


2/2/21 23:11   27 111/71  Mechanical Ventilator  100


 


2/2/21 23:00   22 111/71  Mechanical Ventilator  100


 


2/2/21 23:00  89 20 111/71 (84) 91   


 


2/2/21 22:45  90 20 113/70 (84) 89   


 


2/2/21 22:45  90 20 113/70 (84) 89   


 


2/2/21 22:30  91 18 102/67 (79) 88   


 


2/2/21 22:15   23 98/57  Mechanical Ventilator  100


 


2/2/21 22:15    98/57    


 


2/2/21 22:00  92 17 98/57 (71) 85   


 


2/2/21 21:30  90 22 100/59 (73) 83   


 


2/2/21 21:15   27 99/68  Mechanical Ventilator  100


 


2/2/21 21:15    99/68    


 


2/2/21 21:00  88 19 102/60 (74) 85   


 


2/2/21 20:30  87 27 101/64 (76) 86   


 


2/2/21 20:15   16 105/70  Mechanical Ventilator  100


 


2/2/21 20:15    105/70    


 


2/2/21 20:00      Mechanical Ventilator  


 


2/2/21 20:00 96.7 84 14 109/68 (82) 89   


 


2/2/21 20:00        100


 


2/2/21 19:45  82 15 111/67 (82) 90   


 


2/2/21 19:30  81      


 


2/2/21 19:30  80 12 85/57 (66) 88   


 


2/2/21 19:18  84 33     100


 


2/2/21 19:15  80 15 79/57 (64) 90   


 


2/2/21 19:15   14 79/57  Mechanical Ventilator  100


 


2/2/21 19:15    79/57    


 


2/2/21 19:00  79 14 88/60 (69) 89   


 


2/2/21 19:00    88/60    


 


2/2/21 18:30  77 15 94/62 (73) 91   


 


2/2/21 18:15   17 94/62  Mechanical Ventilator  100


 


2/2/21 18:15  78 22 100/68 (79) 88   


 


2/2/21 18:00    93/62    


 


2/2/21 18:00  80 33 77/50 (59) 84   


 


2/2/21 17:30  75 30 93/62 (72) 84   


 


2/2/21 17:15   29 93/58  Mechanical Ventilator  100


 


2/2/21 17:00  75 32 93/58 (70) 56   


 


2/2/21 17:00    93/58    


 


2/2/21 16:30  77 29 102/63 (76) 80   


 


2/2/21 16:15   31 106/88  Mechanical Ventilator  100


 


2/2/21 16:00 96.0 79 32 108/66 (80) 83   


 


2/2/21 16:00    108/66    


 


2/2/21 16:00      Mechanical Ventilator  


 


2/2/21 16:00  76      


 


2/2/21 16:00        100


 


2/2/21 15:30  84 22 143/79 (100) 56   


 


2/2/21 15:15   29 106/67  Mechanical Ventilator  100


 


2/2/21 15:00  80 36 106/67 (80) 85   


 


2/2/21 15:00  81 36     100


 


2/2/21 15:00    106/67    


 


2/2/21 15:00  80 32 106/67 (80) 85   


 


2/2/21 14:30  83 32 101/62 (75) 81   


 


2/2/21 14:15   32 101/62  Mechanical Ventilator  100


 


2/2/21 14:00  83 30 95/62 (73) 83   


 


2/2/21 14:00    100/62    








I&O











Intake and Output  


 


 2/2/21 2/3/21





 19:00 07:00


 


Intake Total 2625.978 ml 2574.58890 ml


 


Output Total 990 ml 750 ml


 


Balance 1635.978 ml 1824.10180 ml


 


  


 


Free Water 400 ml 400 ml


 


IV Total 1705.978 ml 1754.87355 ml


 


Tube Feeding 420 ml 420 ml


 


Other 100 ml 


 


Output Urine Total 620 ml 560 ml


 


Stool Total 20 ml 30 ml


 


Chest Tube Drainage Total 350 ml 160 ml








Dressing:  saturated


Drains:  other


Cardiovascular:  RSR


Respiratory:  decreased breath sounds


Abdomen:  soft, non-tender, present bowel sounds, non-distended


Extremities:  edema, no tenderness, no cyanosis





Laboratory Tests








Test


 2/3/21


04:25 2/3/21


08:31 2/3/21


12:15


 


White Blood Count


 6.5 K/UL


(4.8-10.8) 


 





 


Red Blood Count


 2.95 M/UL


(4.70-6.10)  L 


 





 


Hemoglobin


 8.6 G/DL


(14.2-18.0)  L 


 





 


Hematocrit


 28.3 %


(42.0-52.0)  L 


 





 


Mean Corpuscular Volume 96 FL (80-99)    


 


Mean Corpuscular Hemoglobin


 29.0 PG


(27.0-31.0) 


 





 


Mean Corpuscular Hemoglobin


Concent 30.3 G/DL


(32.0-36.0)  L 


 





 


Red Cell Distribution Width


 17.8 %


(11.6-14.8)  H 


 





 


Platelet Count


 239 K/UL


(150-450) 


 





 


Mean Platelet Volume


 9.4 FL


(6.5-10.1) 


 





 


Neutrophils (%) (Auto)


 % (45.0-75.0)


 


 





 


Lymphocytes (%) (Auto)


 % (20.0-45.0)


 


 





 


Monocytes (%) (Auto)  % (1.0-10.0)    


 


Eosinophils (%) (Auto)  % (0.0-3.0)    


 


Basophils (%) (Auto)  % (0.0-2.0)    


 


Sodium Level


 145 MMOL/L


(136-145) 


 





 


Potassium Level


 4.4 MMOL/L


(3.5-5.1) 


 





 


Chloride Level


 105 MMOL/L


() 


 





 


Carbon Dioxide Level


 30 MMOL/L


(21-32) 


 





 


Anion Gap


 10 mmol/L


(5-15) 


 





 


Blood Urea Nitrogen


 77 mg/dL


(7-18)  H 


 





 


Creatinine


 2.7 MG/DL


(0.55-1.30)  H 


 





 


Estimat Glomerular Filtration


Rate 23.8 mL/min


(>60) 


 





 


Glucose Level


 198 MG/DL


()  H 


 





 


Lactic Acid Level


 2.60 mmol/L


(0.4-2.0)  H 


 2.50 mmol/L


(0.4-2.0)  H


 


Uric Acid


 8.6 MG/DL


(2.6-7.2)  H 


 





 


Calcium Level


 8.0 MG/DL


(8.5-10.1)  L 


 





 


Phosphorus Level


 5.6 MG/DL


(2.5-4.9)  H 


 





 


Magnesium Level


 2.4 MG/DL


(1.8-2.4) 


 





 


Total Bilirubin


 0.5 MG/DL


(0.2-1.0) 


 





 


Gamma Glutamyl Transpeptidase


 210 U/L (5-85)


H 


 





 


Aspartate Amino Transf


(AST/SGOT) 27 U/L (15-37)


 


 





 


Alanine Aminotransferase


(ALT/SGPT) 39 U/L (12-78)


 


 





 


Alkaline Phosphatase


 329 U/L


()  H 


 





 


Total Creatine Kinase


 16 U/L


()  L 


 





 


C-Reactive Protein,


Quantitative 8.3 mg/dL


(0.00-0.90)  H 


 





 


Pro-B-Type Natriuretic Peptide


 67882 pg/mL


(0-125)  H 


 





 


Total Protein


 6.5 G/DL


(6.4-8.2) 


 





 


Albumin


 2.1 G/DL


(3.4-5.0)  L 


 





 


Globulin 4.4 g/dL    


 


Albumin/Globulin Ratio


 0.5 (1.0-2.7)


L 


 





 


Arterial Blood pH


 


 7.194


(7.350-7.450) 





 


Arterial Blood Partial


Pressure CO2 


 84.6 mmHg


(35.0-45.0)  *H 





 


Arterial Blood Partial


Pressure O2 


 51.1 mmHg


(75.0-100.0)  L 





 


Arterial Blood HCO3


 


 31.9 mmol/L


(22.0-26.0)  H 





 


Arterial Blood Oxygen


Saturation 


 81.4 %


()  *L 





 


Arterial Blood Base Excess  2.4 (-2-2)  H 


 


Abelardo Test  Positive   











Plan


Problems:  


(1) Pneumonia due to COVID-19 virus


Assessment & Plan:  Interim endotracheal intubation, endotracheal tube tip in 

good position


approximately 4 cm above the tito. There are extensive bilateral infiltrates, 

which


are markedly increased from the prior study. Pleural spaces are probably clear, 

not


well demonstrated


Markedly increased and now extensive bilateral infiltrates 


cont as per pulm and iID





(2) Hypoxia


Assessment & Plan:  patient developing DTI. in current condition, critically ill

and declining potential inevitable decline 


all care precautions taken and will cont to monitor and assist with improvement 





(3) Abdominal pain


Assessment & Plan:  65M abd pain, septic, covid, intubated ons upport


currently abd exam benign but limited given condition


line bo mary noted


okay for meds and feeds


nutritional optimization 


DAILY ESTIMATED NEEDS:


Needs based on Critical Care/ 72kg abw 


22-28  kcals/kg 


7925-9005  total kcals


1.2-2  g protein/kg


  g total protein 


25-30  mL/kg


2892-5731  total fluid mLs





NUTRITION DIAGNOSIS:


Swallowing difficulty R/T respiratory failure as evidenced by pt now


orally intubated, OGT in place, NPO





 


CURRENT TF:NPO 





 





ENTERAL NUTRITION RECOMMENDATIONS:


Vital AF 1.2 @ 60ml/hr x 24 hrs  to provide 1440ml, 1728kcal, 116g prot, 1167ml 

free water 





* As medically appropriate, initiate critical care and carb controlled TF 

formula of Vital AF 1.2


* Initiate @ 20ml/hr x 6hrs, advance 10ml q 4-6 hrs as tolerated to goal rate


* HOB over 30 degrees/ water flush per MD


* Does not exceed est kcal needs w/ Propofol running @ 8.083ml/hr x 24 hrs 

(provides 213 lipid kcal)





 





ADDITIONAL RECOMMENDATIONS:


* Calibrated bedscale wt 


* Monitor BGs closely w/ Decadron and TF, need for long acting insulin 


* Monitor lytes, replete as needed  


* Monitor Propofol rate, need to adjust TF rate  





(4) Acute metabolic encephalopathy


(5) Pneumothorax on right


Assessment & Plan:  right ptx


chest tube placed


decreased air


stable ptx


cont tube to suction


Endotracheal tube tip projects at the level of the thoracic inlet.


Orogastric tube tip projects at the level of the gastric fundus. Again 

demonstrated


is extensive subcutaneous emphysema, which appears somewhat worse on the left. 

Right


chest tube remains in place. Small right medial and apical pneumothorax are 

present,


slightly more conspicuous than on the previous exam, still small, somewhat 

difficult


to identify due to overlying subcutaneous emphysema. There is probably a tiny 

left


apical pneumothorax as well. Previously demonstrated pneumomediastinum has 

improved


considerably although still present. Bilateral infiltrates appear worse on the 

right.


 


Impression: Equivocally slightly increased but still small right pneumothorax.


 


Suspect tiny left apical pneumothorax


 


Improving pneumomediastinum


 


Worsening subcutaneous emphysema


 


Worsening right and stable left lung infiltrates 





 Right chest tube remains. There is still a small apical pneumothorax. Tiny


left apical pneumothorax persists, unchanged. There is decreased 

pneumomediastinum.


Subcutaneous gas has decreased as well. Bilateral infiltrates are unchanged.


Endotracheal tube remains at the level of the thoracic inlet. Orogastric tube is


again demonstrated, tip not well-visualized but probably stable in position


 


Impression: Stable tiny bilateral apical pneumothoraces


 


Unchanged bilateral infiltrates


 


Decreased pneumomediastinum and subcutaneous emphysema














Norberto Vazquez                 Feb 3, 2021 13:59

## 2021-02-03 NOTE — NUR
NURSE NOTES:

pt reaches RASS-2 again. will continue to monitor pt. pt seems comfortable. repositioned pt.

## 2021-02-03 NOTE — NUR
NURSE NOTES:

received pt from Anton Huston RN., pt is getting sedation at this time, but now pt is at RASS 
-2. cardiac monitor shows SR at this time. ET 7.5 at lip line 27cm. AC 32 Pressure control 
25 peep 12 %. O2sat is now at 80-91%. scant bleeding noted inside of mouth. OGT noted, 
Nephro is running at 35ml/hr, no residual noted. Stern cath noted draining well with 
gravity, no bleeding in Urine noted. rectal tube noted, draining well with gravity as well. 
skin alternation noted, dressing sites are clean, dry and patent. left upper arm PICC line 
noted dressing site is clean, patent, and dry. Fentanyl is at 300 mcg/hr (MAXIMUM), Levophed 
6 mcg/min, and 1/2 NS is running at 100cc/hr. ABD soft, active, and large. bilateral soft 
wrist restrain noted, pulse noted, skin intact. right lateral chest tube noted, with suction 
connected (white  fluid noted). chest tube site dressing intact, clean, and patent. call 
light within reach. will continue to monitor pt with plan of care. bed at the lowest 
position, alarmed, and locked.

## 2021-02-03 NOTE — NUR
NURSE NOTES:

pt Rass -2 at this time. no active bleeding noted at this time, wound dressing changed. will 
continue to monitor pt.

## 2021-02-04 VITALS — DIASTOLIC BLOOD PRESSURE: 69 MMHG | SYSTOLIC BLOOD PRESSURE: 131 MMHG

## 2021-02-04 VITALS — DIASTOLIC BLOOD PRESSURE: 41 MMHG | SYSTOLIC BLOOD PRESSURE: 93 MMHG

## 2021-02-04 VITALS — DIASTOLIC BLOOD PRESSURE: 69 MMHG | SYSTOLIC BLOOD PRESSURE: 128 MMHG

## 2021-02-04 VITALS — DIASTOLIC BLOOD PRESSURE: 84 MMHG | SYSTOLIC BLOOD PRESSURE: 150 MMHG

## 2021-02-04 VITALS — DIASTOLIC BLOOD PRESSURE: 62 MMHG | SYSTOLIC BLOOD PRESSURE: 106 MMHG

## 2021-02-04 VITALS — DIASTOLIC BLOOD PRESSURE: 68 MMHG | SYSTOLIC BLOOD PRESSURE: 121 MMHG

## 2021-02-04 VITALS — SYSTOLIC BLOOD PRESSURE: 90 MMHG | DIASTOLIC BLOOD PRESSURE: 60 MMHG

## 2021-02-04 VITALS — DIASTOLIC BLOOD PRESSURE: 74 MMHG | SYSTOLIC BLOOD PRESSURE: 137 MMHG

## 2021-02-04 VITALS — SYSTOLIC BLOOD PRESSURE: 123 MMHG | DIASTOLIC BLOOD PRESSURE: 71 MMHG

## 2021-02-04 VITALS — SYSTOLIC BLOOD PRESSURE: 118 MMHG | DIASTOLIC BLOOD PRESSURE: 67 MMHG

## 2021-02-04 VITALS — DIASTOLIC BLOOD PRESSURE: 73 MMHG | SYSTOLIC BLOOD PRESSURE: 123 MMHG

## 2021-02-04 VITALS — DIASTOLIC BLOOD PRESSURE: 74 MMHG | SYSTOLIC BLOOD PRESSURE: 132 MMHG

## 2021-02-04 VITALS — DIASTOLIC BLOOD PRESSURE: 93 MMHG | SYSTOLIC BLOOD PRESSURE: 149 MMHG

## 2021-02-04 VITALS — DIASTOLIC BLOOD PRESSURE: 43 MMHG | SYSTOLIC BLOOD PRESSURE: 65 MMHG

## 2021-02-04 VITALS — DIASTOLIC BLOOD PRESSURE: 61 MMHG | SYSTOLIC BLOOD PRESSURE: 105 MMHG

## 2021-02-04 VITALS — DIASTOLIC BLOOD PRESSURE: 70 MMHG | SYSTOLIC BLOOD PRESSURE: 123 MMHG

## 2021-02-04 VITALS — SYSTOLIC BLOOD PRESSURE: 114 MMHG | DIASTOLIC BLOOD PRESSURE: 60 MMHG

## 2021-02-04 VITALS — SYSTOLIC BLOOD PRESSURE: 135 MMHG | DIASTOLIC BLOOD PRESSURE: 73 MMHG

## 2021-02-04 VITALS — SYSTOLIC BLOOD PRESSURE: 127 MMHG | DIASTOLIC BLOOD PRESSURE: 76 MMHG

## 2021-02-04 VITALS — DIASTOLIC BLOOD PRESSURE: 72 MMHG | SYSTOLIC BLOOD PRESSURE: 130 MMHG

## 2021-02-04 VITALS — SYSTOLIC BLOOD PRESSURE: 127 MMHG | DIASTOLIC BLOOD PRESSURE: 71 MMHG

## 2021-02-04 VITALS — SYSTOLIC BLOOD PRESSURE: 103 MMHG | DIASTOLIC BLOOD PRESSURE: 61 MMHG

## 2021-02-04 VITALS — DIASTOLIC BLOOD PRESSURE: 36 MMHG | SYSTOLIC BLOOD PRESSURE: 59 MMHG

## 2021-02-04 VITALS — SYSTOLIC BLOOD PRESSURE: 193 MMHG | DIASTOLIC BLOOD PRESSURE: 86 MMHG

## 2021-02-04 VITALS — SYSTOLIC BLOOD PRESSURE: 83 MMHG | DIASTOLIC BLOOD PRESSURE: 54 MMHG

## 2021-02-04 VITALS — SYSTOLIC BLOOD PRESSURE: 138 MMHG | DIASTOLIC BLOOD PRESSURE: 72 MMHG

## 2021-02-04 VITALS — SYSTOLIC BLOOD PRESSURE: 131 MMHG | DIASTOLIC BLOOD PRESSURE: 79 MMHG

## 2021-02-04 VITALS — DIASTOLIC BLOOD PRESSURE: 72 MMHG | SYSTOLIC BLOOD PRESSURE: 131 MMHG

## 2021-02-04 VITALS — SYSTOLIC BLOOD PRESSURE: 100 MMHG | DIASTOLIC BLOOD PRESSURE: 61 MMHG

## 2021-02-04 VITALS — SYSTOLIC BLOOD PRESSURE: 120 MMHG | DIASTOLIC BLOOD PRESSURE: 66 MMHG

## 2021-02-04 VITALS — SYSTOLIC BLOOD PRESSURE: 132 MMHG | DIASTOLIC BLOOD PRESSURE: 76 MMHG

## 2021-02-04 VITALS — SYSTOLIC BLOOD PRESSURE: 125 MMHG | DIASTOLIC BLOOD PRESSURE: 69 MMHG

## 2021-02-04 VITALS — DIASTOLIC BLOOD PRESSURE: 73 MMHG | SYSTOLIC BLOOD PRESSURE: 126 MMHG

## 2021-02-04 VITALS — DIASTOLIC BLOOD PRESSURE: 47 MMHG | SYSTOLIC BLOOD PRESSURE: 74 MMHG

## 2021-02-04 VITALS — DIASTOLIC BLOOD PRESSURE: 60 MMHG | SYSTOLIC BLOOD PRESSURE: 90 MMHG

## 2021-02-04 VITALS — DIASTOLIC BLOOD PRESSURE: 78 MMHG | SYSTOLIC BLOOD PRESSURE: 139 MMHG

## 2021-02-04 VITALS — SYSTOLIC BLOOD PRESSURE: 147 MMHG | DIASTOLIC BLOOD PRESSURE: 75 MMHG

## 2021-02-04 VITALS — SYSTOLIC BLOOD PRESSURE: 127 MMHG | DIASTOLIC BLOOD PRESSURE: 77 MMHG

## 2021-02-04 VITALS — DIASTOLIC BLOOD PRESSURE: 65 MMHG | SYSTOLIC BLOOD PRESSURE: 105 MMHG

## 2021-02-04 VITALS — DIASTOLIC BLOOD PRESSURE: 55 MMHG | SYSTOLIC BLOOD PRESSURE: 95 MMHG

## 2021-02-04 VITALS — DIASTOLIC BLOOD PRESSURE: 55 MMHG | SYSTOLIC BLOOD PRESSURE: 99 MMHG

## 2021-02-04 VITALS — SYSTOLIC BLOOD PRESSURE: 131 MMHG | DIASTOLIC BLOOD PRESSURE: 72 MMHG

## 2021-02-04 VITALS — DIASTOLIC BLOOD PRESSURE: 76 MMHG | SYSTOLIC BLOOD PRESSURE: 137 MMHG

## 2021-02-04 VITALS — DIASTOLIC BLOOD PRESSURE: 82 MMHG | SYSTOLIC BLOOD PRESSURE: 171 MMHG

## 2021-02-04 VITALS — DIASTOLIC BLOOD PRESSURE: 92 MMHG | SYSTOLIC BLOOD PRESSURE: 118 MMHG

## 2021-02-04 VITALS — SYSTOLIC BLOOD PRESSURE: 126 MMHG | DIASTOLIC BLOOD PRESSURE: 71 MMHG

## 2021-02-04 VITALS — SYSTOLIC BLOOD PRESSURE: 130 MMHG | DIASTOLIC BLOOD PRESSURE: 73 MMHG

## 2021-02-04 VITALS — SYSTOLIC BLOOD PRESSURE: 182 MMHG | DIASTOLIC BLOOD PRESSURE: 95 MMHG

## 2021-02-04 VITALS — SYSTOLIC BLOOD PRESSURE: 128 MMHG | DIASTOLIC BLOOD PRESSURE: 74 MMHG

## 2021-02-04 VITALS — DIASTOLIC BLOOD PRESSURE: 73 MMHG | SYSTOLIC BLOOD PRESSURE: 134 MMHG

## 2021-02-04 VITALS — SYSTOLIC BLOOD PRESSURE: 190 MMHG | DIASTOLIC BLOOD PRESSURE: 106 MMHG

## 2021-02-04 VITALS — SYSTOLIC BLOOD PRESSURE: 77 MMHG | DIASTOLIC BLOOD PRESSURE: 53 MMHG

## 2021-02-04 VITALS — DIASTOLIC BLOOD PRESSURE: 73 MMHG | SYSTOLIC BLOOD PRESSURE: 128 MMHG

## 2021-02-04 VITALS — SYSTOLIC BLOOD PRESSURE: 117 MMHG | DIASTOLIC BLOOD PRESSURE: 70 MMHG

## 2021-02-04 VITALS — SYSTOLIC BLOOD PRESSURE: 119 MMHG | DIASTOLIC BLOOD PRESSURE: 67 MMHG

## 2021-02-04 VITALS — DIASTOLIC BLOOD PRESSURE: 74 MMHG | SYSTOLIC BLOOD PRESSURE: 130 MMHG

## 2021-02-04 VITALS — SYSTOLIC BLOOD PRESSURE: 89 MMHG | DIASTOLIC BLOOD PRESSURE: 50 MMHG

## 2021-02-04 VITALS — DIASTOLIC BLOOD PRESSURE: 69 MMHG | SYSTOLIC BLOOD PRESSURE: 125 MMHG

## 2021-02-04 VITALS — DIASTOLIC BLOOD PRESSURE: 66 MMHG | SYSTOLIC BLOOD PRESSURE: 120 MMHG

## 2021-02-04 VITALS — SYSTOLIC BLOOD PRESSURE: 130 MMHG | DIASTOLIC BLOOD PRESSURE: 69 MMHG

## 2021-02-04 VITALS — SYSTOLIC BLOOD PRESSURE: 139 MMHG | DIASTOLIC BLOOD PRESSURE: 78 MMHG

## 2021-02-04 VITALS — DIASTOLIC BLOOD PRESSURE: 70 MMHG | SYSTOLIC BLOOD PRESSURE: 111 MMHG

## 2021-02-04 LAB
ADD MANUAL DIFF: NO
ALBUMIN SERPL-MCNC: 2.4 G/DL (ref 3.4–5)
ALBUMIN/GLOB SERPL: 0.6 {RATIO} (ref 1–2.7)
ALP SERPL-CCNC: 339 U/L (ref 46–116)
ALT SERPL-CCNC: 56 U/L (ref 12–78)
ANION GAP SERPL CALC-SCNC: 9 MMOL/L (ref 5–15)
APPEARANCE UR: (no result)
APTT PPP: YELLOW S
AST SERPL-CCNC: 38 U/L (ref 15–37)
BILIRUB SERPL-MCNC: 0.6 MG/DL (ref 0.2–1)
BUN SERPL-MCNC: 78 MG/DL (ref 7–18)
CALCIUM SERPL-MCNC: 8.2 MG/DL (ref 8.5–10.1)
CHLORIDE SERPL-SCNC: 105 MMOL/L (ref 98–107)
CO2 SERPL-SCNC: 30 MMOL/L (ref 21–32)
CREAT SERPL-MCNC: 2.8 MG/DL (ref 0.55–1.3)
ERYTHROCYTE [DISTWIDTH] IN BLOOD BY AUTOMATED COUNT: 19.9 % (ref 11.6–14.8)
GLOBULIN SER-MCNC: 4 G/DL
GLUCOSE UR STRIP-MCNC: NEGATIVE MG/DL
HCT VFR BLD CALC: 28.6 % (ref 42–52)
HGB BLD-MCNC: 8.5 G/DL (ref 14.2–18)
KETONES UR QL STRIP: NEGATIVE
LEUKOCYTE ESTERASE UR QL STRIP: (no result)
MCV RBC AUTO: 97 FL (ref 80–99)
NITRITE UR QL STRIP: NEGATIVE
PH UR STRIP: 5 [PH] (ref 4.5–8)
PHOSPHATE SERPL-MCNC: 5.5 MG/DL (ref 2.5–4.9)
PLATELET # BLD: 242 K/UL (ref 150–450)
POTASSIUM SERPL-SCNC: 4.2 MMOL/L (ref 3.5–5.1)
PROT UR QL STRIP: (no result)
RBC # BLD AUTO: 2.96 M/UL (ref 4.7–6.1)
SODIUM SERPL-SCNC: 144 MMOL/L (ref 136–145)
SP GR UR STRIP: 1.01 (ref 1–1.03)
UROBILINOGEN UR-MCNC: NORMAL MG/DL (ref 0–1)
WBC # BLD AUTO: 7.1 K/UL (ref 4.8–10.8)

## 2021-02-04 RX ADMIN — HYDROCORTISONE SODIUM SUCCINATE SCH MG: 100 INJECTION, POWDER, FOR SOLUTION INTRAMUSCULAR; INTRAVENOUS at 15:28

## 2021-02-04 RX ADMIN — INSULIN DETEMIR SCH UNITS: 100 INJECTION, SOLUTION SUBCUTANEOUS at 21:00

## 2021-02-04 RX ADMIN — INSULIN ASPART SCH UNITS: 100 INJECTION, SOLUTION INTRAVENOUS; SUBCUTANEOUS at 19:18

## 2021-02-04 RX ADMIN — PANTOPRAZOLE SODIUM SCH MG: 40 INJECTION, POWDER, FOR SOLUTION INTRAVENOUS at 09:57

## 2021-02-04 RX ADMIN — Medication SCH MLS/HR: at 02:08

## 2021-02-04 RX ADMIN — Medication SCH MLS/HR: at 23:09

## 2021-02-04 RX ADMIN — MIDODRINE HYDROCHLORIDE SCH MG: 10 TABLET ORAL at 15:28

## 2021-02-04 RX ADMIN — INSULIN ASPART SCH UNITS: 100 INJECTION, SOLUTION INTRAVENOUS; SUBCUTANEOUS at 00:28

## 2021-02-04 RX ADMIN — PANTOPRAZOLE SODIUM SCH MG: 40 INJECTION, POWDER, FOR SOLUTION INTRAVENOUS at 21:08

## 2021-02-04 RX ADMIN — MIDAZOLAM HYDROCHLORIDE PRN MLS/HR: 5 INJECTION INTRAMUSCULAR; INTRAVENOUS at 11:37

## 2021-02-04 RX ADMIN — ENOXAPARIN SODIUM SCH MG: 80 INJECTION SUBCUTANEOUS at 09:57

## 2021-02-04 RX ADMIN — INSULIN DETEMIR SCH UNITS: 100 INJECTION, SOLUTION SUBCUTANEOUS at 09:00

## 2021-02-04 RX ADMIN — AMIODARONE HYDROCHLORIDE SCH MG: 200 TABLET ORAL at 09:58

## 2021-02-04 RX ADMIN — INSULIN ASPART SCH UNITS: 100 INJECTION, SOLUTION INTRAVENOUS; SUBCUTANEOUS at 12:00

## 2021-02-04 RX ADMIN — MIDODRINE HYDROCHLORIDE SCH MG: 10 TABLET ORAL at 22:04

## 2021-02-04 RX ADMIN — SODIUM CHLORIDE SCH MLS/HR: 0.9 INJECTION INTRAVENOUS at 10:29

## 2021-02-04 RX ADMIN — DIGOXIN SCH MG: 0.12 TABLET ORAL at 09:57

## 2021-02-04 RX ADMIN — HYDROCORTISONE SODIUM SUCCINATE SCH MG: 100 INJECTION, POWDER, FOR SOLUTION INTRAMUSCULAR; INTRAVENOUS at 05:40

## 2021-02-04 RX ADMIN — INSULIN ASPART SCH UNITS: 100 INJECTION, SOLUTION INTRAVENOUS; SUBCUTANEOUS at 06:00

## 2021-02-04 RX ADMIN — MIDODRINE HYDROCHLORIDE SCH MG: 10 TABLET ORAL at 06:08

## 2021-02-04 RX ADMIN — HYDROCORTISONE SODIUM SUCCINATE SCH MG: 100 INJECTION, POWDER, FOR SOLUTION INTRAMUSCULAR; INTRAVENOUS at 21:08

## 2021-02-04 RX ADMIN — CHLORHEXIDINE GLUCONATE SCH APPLIC: 213 SOLUTION TOPICAL at 21:08

## 2021-02-04 RX ADMIN — Medication SCH MLS/HR: at 11:39

## 2021-02-04 NOTE — NUR
CASE MANAGEMENT:REVIEW



2/4/21

SI: COVID PNA. RT PTX W/EMPHYSEMA

RESPIRATORY FAILURE ~ INTUBATED

97.2  68  31  90/60       SAT 89% ON VENT SUPPORT W/100% FIO2

H/H-8.5/28.6     BUN+78  CR+2.8   PH-7.20  PCO2+78.8   PO2-43.1  HCO3+30.1





IS: LEVOPHED GTT

FENTANYL GTT

VERSED GTT

IV PROTONIX Q12

IV SOLUCORTEF

IVF@100/HR

IV CEFEPIME Q24

AMIODARONE NG QD

DIGOXIN NG QD

LOVENOX SQ Q12

**: ICU STATUS

DCP: FROM HOME



PLAN:

MONITOR CHEST TUBE OUTPUT ~ TO SUCTION

## 2021-02-04 NOTE — NUR
pt HR omar to the 30s. Prepared for code but pt recovered to sinus tach. Pt o2 saturations 
has not improved greater than 85%.

## 2021-02-04 NOTE — NUR
NURSE HAND-OFF REPORT: 



Need to Follow up: UA and random sodium Urine, new rythm: left ventricle BBB, undrawn lactic 
acid

Latest Vital Signs: Temperature 97.5 , Pulse 85 , B/P 118 /92 , Respiratory Rate 29 , O2 SAT 
85 , Mechanical Ventilator, O2 Flow Rate  .  

Vital Sign Comment: [stable ]



EKG Rhythm: Sinus Rhythm

Rhythm change?: N 

MD Notified?: Y -Dr Ariel GUTIERREZ Response: No New Orders Received



Latest Singh Fall Score: 50  

Fall Risk: High Risk 

Safety Measures: Call light Within Reach, Bed Alarm Zone 1, Side Rails Side Rails x3, Bed 
position Low and Locked.

Fall Precautions: 

Yellow Socks

Yellow Gown

Door Sign

Patient Fall Education



Report given to [Milla ARCHER].

## 2021-02-04 NOTE — GENERAL PROGRESS NOTE
Subjective


Allergies:  


Coded Allergies:  


     No Known Allergies (Unverified , 6/11/19)


Subjective


recurrent code blue


on ventilator 60% fio2


on sediation


unresponsive


in icu


rt chest tube s placed due to pneumothorex


afib is controlled


hypotension on levophed drip





Objective





Last 24 Hour Vital Signs








  Date Time  Temp Pulse Resp B/P (MAP) Pulse Ox O2 Delivery O2 Flow Rate FiO2


 


2/4/21 15:25  97 33     100


 


2/4/21 12:30 97.2 68 31 90/60 (70) 89   


 


2/4/21 12:00        100


 


2/4/21 12:00  69      


 


2/4/21 12:00  83 29 123/70 (87) 88   


 


2/4/21 11:39   33 111/70  Mechanical Ventilator  100


 


2/4/21 11:37   33   Mechanical Ventilator  100


 


2/4/21 11:30  86 28 111/70 (84) 81   


 


2/4/21 11:00  87 26 127/76 (93) 87   


 


2/4/21 10:30  89 32     100


 


2/4/21 10:00  88 30 120/66 (84) 81   


 


2/4/21 09:57  89      


 


2/4/21 09:00  88 30 123/71 (88) 88   


 


2/4/21 08:00        100


 


2/4/21 08:00  86 33 121/68 (85) 86   


 


2/4/21 08:00  84      


 


2/4/21 07:00   32 127/77  Mechanical Ventilator  100


 


2/4/21 07:00    127/77    


 


2/4/21 07:00  79 29 127/77 (94) 90   


 


2/4/21 06:45  83 34     100


 


2/4/21 06:00   29 118/92  Mechanical Ventilator  100


 


2/4/21 06:00    118/92    


 


2/4/21 06:00  85 29 118/92 (101) 85   


 


2/4/21 05:00   25 139/78  Mechanical Ventilator  100


 


2/4/21 05:00    139/78    


 


2/4/21 05:00  88 25 139/78 (98) 83   


 


2/4/21 04:14  89      


 


2/4/21 04:00 97.0 84 30 127/71 (89) 90   


 


2/4/21 04:00      Mechanical Ventilator  


 


2/4/21 04:00   30 127/71  Mechanical Ventilator  100


 


2/4/21 04:00    127/71    


 


2/4/21 04:00        100


 


2/4/21 03:09  80 33     100


 


2/4/21 03:00   29 120/66  Mechanical Ventilator  100


 


2/4/21 03:00    120/66    


 


2/4/21 03:00  82 26 120/66 (84) 89   


 


2/4/21 02:08   24 130/73  Mechanical Ventilator  100


 


2/4/21 02:00  78 29 130/73 (92) 91   


 


2/4/21 02:00   28 127/71  Mechanical Ventilator  100


 


2/4/21 02:00    127/71    


 


2/4/21 01:30  77 32     100


 


2/4/21 01:00   27 130/74  Mechanical Ventilator  100


 


2/4/21 01:00    130/74    


 


2/4/21 01:00  75 26 131/79 (96) 93   


 


2/4/21 00:00 97.5 81 12 135/73 (93) 94   


 


2/4/21 00:00   29 134/75  Mechanical Ventilator  100


 


2/4/21 00:00    134/75    


 


2/4/21 00:00      Mechanical Ventilator  


 


2/3/21 23:44  82      


 


2/3/21 23:00  86 18 116/68 (84) 86   


 


2/3/21 23:00   18 119/65  Mechanical Ventilator  100


 


2/3/21 23:00    119/65    


 


2/3/21 22:00  84 27 122/66 (84) 90   


 


2/3/21 22:00   27 126/68  Mechanical Ventilator  100


 


2/3/21 22:00    126/68    


 


2/3/21 21:00   29 118/64  Mechanical Ventilator  100


 


2/3/21 21:00    117/65    


 


2/3/21 21:00  78 30 117/65 (82) 90   


 


2/3/21 20:00      Mechanical Ventilator  


 


2/3/21 20:00   30 132/74  Mechanical Ventilator  100


 


2/3/21 20:00    132/74    


 


2/3/21 20:00 96.5 82 32 133/75 (94) 89   


 


2/3/21 20:00        100


 


2/3/21 19:29  86      


 


2/3/21 19:08  80 32     100


 


2/3/21 19:00  85 25 140/81 (100) 82   


 


2/3/21 19:00   30 140/81  Mechanical Ventilator  100


 


2/3/21 19:00    140/81    


 


2/3/21 18:00   25 148/76  Mechanical Ventilator  100


 


2/3/21 18:00    148/76    


 


2/3/21 18:00  86 25 148/76 (100) 67   


 


2/3/21 17:11   30 134/77  Mechanical Ventilator  100


 


2/3/21 17:00  86 15 134/77 (96) 89   

















Intake and Output  


 


 2/3/21 2/4/21





 19:00 07:00


 


Intake Total 2438.19 ml 2356.450 ml


 


Output Total 635 ml 600 ml


 


Balance 1803.19 ml 1756.450 ml


 


  


 


Free Water 200 ml 300 ml


 


IV Total 1698.19 ml 1636.450 ml


 


Tube Feeding 420 ml 420 ml


 


Other 120 ml 


 


Output Urine Total 515 ml 480 ml


 


Stool Total 20 ml 20 ml


 


Chest Tube Drainage Total 100 ml 100 ml








Laboratory Tests


2/3/21 17:17: POC Whole Blood Glucose 182H


2/3/21 18:30: Lactic Acid Level 2.60H


2/4/21 02:30: 


Urine Color Yellow, Urine Appearance Cloudy, Urine pH 5, Urine Specific Gravity 

1.015, Urine Protein 2+H, Urine Glucose (UA) Negative, Urine Ketones Negative, 

Urine Blood 5+H, Urine Nitrite Negative, Urine Bilirubin Negative, Urine 

Urobilinogen Normal, Urine Leukocyte Esterase 3+H, Urine RBC 10-15H, Urine WBC 

5-10H, Urine Squamous Epithelial Cells None, Urine Bacteria ModerateH, Urine 

Random Sodium < 20L


2/4/21 04:35: 


White Blood Count 7.1, Red Blood Count 2.96L, Hemoglobin 8.5L, Hematocrit 28.6L,

Mean Corpuscular Volume 97, Mean Corpuscular Hemoglobin 28.8, Mean Corpuscular 

Hemoglobin Concent 29.8L, Red Cell Distribution Width 19.9H, Platelet Count 242,

Mean Platelet Volume 8.9, Neutrophils (%) (Auto) , Lymphocytes (%) (Auto) , 

Monocytes (%) (Auto) , Eosinophils (%) (Auto) , Basophils (%) (Auto) , Sodium 

Level 144, Potassium Level 4.2, Chloride Level 105, Carbon Dioxide Level 30, 

Anion Gap 9, Blood Urea Nitrogen 78H, Creatinine 2.8H, Estimat Glomerular 

Filtration Rate 22.8, Glucose Level 124H, Uric Acid 9.2H, Calcium Level 8.2L, 

Phosphorus Level 5.5H, Magnesium Level 2.5H, Total Bilirubin 0.6, Aspartate 

Amino Transf (AST/SGOT) 38H, Alanine Aminotransferase (ALT/SGPT) 56, Alkaline 

Phosphatase 339H, C-Reactive Protein, Quantitative 4.8H, Pro-B-Type Natriuretic 

Peptide 90758M, Total Protein 6.4, Albumin 2.4L, Globulin 4.0, Albumin/Globulin 

Ratio 0.6L, Digoxin Level 1.5


2/4/21 07:26: 


Arterial Blood pH 7.200*L, Arterial Blood Partial Pressure CO2 78.8*H, Arterial 

Blood Partial Pressure O2 43.1*L, Arterial Blood HCO3 30.1H, Arterial Blood 

Oxygen Saturation 73.3*L, Arterial Blood Base Excess 1.0, Abelardo Test Positive


2/4/21 09:25: Lactic Acid Level 2.00


2/4/21 13:09: POC Whole Blood Glucose 190H


Height (Feet):  5


Height (Inches):  7.00


Weight (Pounds):  198


General Appearance:  severe distress


Neck:  supple


Cardiovascular:  regular rhythm


Respiratory/Chest:  rhonchi - bilaterally


Abdomen:  non tender, soft





Assessment/Plan


Assessment/Plan:


cpr this am 


hypotension better on tirate down levophed drip


afib with rvr on digoxine , add amiodarone , cardiology on case


covid pna


ac resp distress on ventilator


etoh abused


dehydration


ac renal failure- nephro consult 


severe meta acidosis


cont on ventilator at [facility]


cont iv abx and iv decadrone


pulmonary and gi on consult





poor prognosis , left message to the son


recomended dnr 


icu nurse is aware of code status











Moi Travis MD              Feb 4, 2021 17:00

## 2021-02-04 NOTE — INFECTIOUS DISEASES PROG NOTE
Assessment/Plan


Assessment/Plan


A


1. COVID-19 pneumonia.


2. New-onset diabetes.


3. Hypoxic respiratory failure


4. Sepsis with fever, tachycardia & leukocytosis


5. Bacteremia with COANS, ? line infection


6. Atrial fibrillation


7. Diarrhea, C. difficile negative


8. Cardiac arrest


9. Acute renal failure


10. Shock


11. Anemia





P


1. Finished remdesivir course


2. Continue Cefepime


3. Poor prognosis





Subjective


ROS Limited/Unobtainable:  Yes


Neurologic:  Reports: other - sedated on restraint


Allergies:  


Coded Allergies:  


     No Known Allergies (Unverified , 6/11/19)





Objective





Last 24 Hour Vital Signs








  Date Time  Temp Pulse Resp B/P (MAP) Pulse Ox O2 Delivery O2 Flow Rate FiO2


 


2/4/21 09:00  88 30 123/71 (88) 88   


 


2/4/21 08:00        100


 


2/4/21 08:00  86 33 121/68 (85) 86   


 


2/4/21 08:00  84      


 


2/4/21 07:00   32 127/77  Mechanical Ventilator  100


 


2/4/21 07:00    127/77    


 


2/4/21 07:00  79 29 127/77 (94) 90   


 


2/4/21 06:45  83 34     100


 


2/4/21 06:00   29 118/92  Mechanical Ventilator  100


 


2/4/21 06:00    118/92    


 


2/4/21 06:00  85 29 118/92 (101) 85   


 


2/4/21 05:00   25 139/78  Mechanical Ventilator  100


 


2/4/21 05:00    139/78    


 


2/4/21 05:00  88 25 139/78 (98) 83   


 


2/4/21 04:14  89      


 


2/4/21 04:00 97.0 84 30 127/71 (89) 90   


 


2/4/21 04:00      Mechanical Ventilator  


 


2/4/21 04:00   30 127/71  Mechanical Ventilator  100


 


2/4/21 04:00    127/71    


 


2/4/21 04:00        100


 


2/4/21 03:09  80 33     100


 


2/4/21 03:00   29 120/66  Mechanical Ventilator  100


 


2/4/21 03:00    120/66    


 


2/4/21 03:00  82 26 120/66 (84) 89   


 


2/4/21 02:08   24 130/73  Mechanical Ventilator  100


 


2/4/21 02:00  78 29 130/73 (92) 91   


 


2/4/21 02:00   28 127/71  Mechanical Ventilator  100


 


2/4/21 02:00    127/71    


 


2/4/21 01:30  77 32     100


 


2/4/21 01:00   27 130/74  Mechanical Ventilator  100


 


2/4/21 01:00    130/74    


 


2/4/21 01:00  75 26 131/79 (96) 93   


 


2/4/21 00:00 97.5 81 12 135/73 (93) 94   


 


2/4/21 00:00   29 134/75  Mechanical Ventilator  100


 


2/4/21 00:00    134/75    


 


2/4/21 00:00      Mechanical Ventilator  


 


2/3/21 23:44  82      


 


2/3/21 23:00  86 18 116/68 (84) 86   


 


2/3/21 23:00   18 119/65  Mechanical Ventilator  100


 


2/3/21 23:00    119/65    


 


2/3/21 22:00  84 27 122/66 (84) 90   


 


2/3/21 22:00   27 126/68  Mechanical Ventilator  100


 


2/3/21 22:00    126/68    


 


2/3/21 21:00   29 118/64  Mechanical Ventilator  100


 


2/3/21 21:00    117/65    


 


2/3/21 21:00  78 30 117/65 (82) 90   


 


2/3/21 20:00      Mechanical Ventilator  


 


2/3/21 20:00   30 132/74  Mechanical Ventilator  100


 


2/3/21 20:00    132/74    


 


2/3/21 20:00 96.5 82 32 133/75 (94) 89   


 


2/3/21 20:00        100


 


2/3/21 19:29  86      


 


2/3/21 19:08  80 32     100


 


2/3/21 19:00  85 25 140/81 (100) 82   


 


2/3/21 19:00   30 140/81  Mechanical Ventilator  100


 


2/3/21 19:00    140/81    


 


2/3/21 18:00   25 148/76  Mechanical Ventilator  100


 


2/3/21 18:00    148/76    


 


2/3/21 18:00  86 25 148/76 (100) 67   


 


2/3/21 17:11   30 134/77  Mechanical Ventilator  100


 


2/3/21 17:00  86 15 134/77 (96) 89   


 


2/3/21 16:00        100


 


2/3/21 16:00      Mechanical Ventilator  


 


2/3/21 16:00    112/62    


 


2/3/21 16:00  92      


 


2/3/21 16:00 98.9 91 25 112/62 (79) 88   


 


2/3/21 15:53  92 32     100


 


2/3/21 15:00  90 15 105/63 (77) 84   


 


2/3/21 15:00    107/64    


 


2/3/21 15:00   19 107/64  Mechanical Ventilator  100


 


2/3/21 14:00  85 29 108/63 (78) 84   


 


2/3/21 14:00    109/64    


 


2/3/21 14:00   18 107/64  Mechanical Ventilator  100


 


2/3/21 13:00  82 23 112/67 (82) 86   


 


2/3/21 13:00    112/67    


 


2/3/21 13:00   30 109/64  Mechanical Ventilator  100


 


2/3/21 12:22    103/62    


 


2/3/21 12:00 96.9 87 28 110/64 (79) 90   


 


2/3/21 12:00      Mechanical Ventilator  


 


2/3/21 12:00   23 113/68  Mechanical Ventilator  100


 


2/3/21 12:00  86      


 


2/3/21 12:00        100


 


2/3/21 11:59    103/62    


 


2/3/21 11:03  95 19 107/62 (77) 86   


 


2/3/21 11:00   19 107/62  Mechanical Ventilator  100


 


2/3/21 10:50  90 32     100


 


2/3/21 10:45  100 20 110/63 (79) 80   


 


2/3/21 10:30    107/62    


 


2/3/21 10:29  104 19 121/73 (89) 56   


 


2/3/21 10:24  69 14 68/45 (53) 58   


 


2/3/21 10:21    67/43    


 


2/3/21 10:15  70 17 59/43 (48) 64   


 


2/3/21 10:03    75/55    


 


2/3/21 10:00   22 145/70  Mechanical Ventilator  100


 


2/3/21 10:00  118 16 145/70 (95) 52   








Height (Feet):  5


Height (Inches):  7.00


Weight (Pounds):  198


HEENT:  other - orally intubated


Respiratory/Chest:  other - on ventilator, ITN6=013%, R chest tube


Cardiovascular:  normal rate


Abdomen:  soft, non tender


Extremities:  other - edema


Neurologic/Psychiatric:  other - sedated





Laboratory Tests








Test


 2/3/21


12:15 2/3/21


18:30 2/4/21


02:30 2/4/21


04:35


 


Lactic Acid Level


 2.50 mmol/L


(0.4-2.0)  H 2.60 mmol/L


(0.66-2.22)  H 


 





 


Urine Color   Yellow   


 


Urine Appearance   Cloudy   


 


Urine pH   5 (4.5-8.0)   


 


Urine Specific Gravity


 


 


 1.015


(1.005-1.035) 





 


Urine Protein


 


 


 2+ (NEGATIVE)


H 





 


Urine Glucose (UA)


 


 


 Negative


(NEGATIVE) 





 


Urine Ketones


 


 


 Negative


(NEGATIVE) 





 


Urine Blood


 


 


 5+ (NEGATIVE)


H 





 


Urine Nitrite


 


 


 Negative


(NEGATIVE) 





 


Urine Bilirubin


 


 


 Negative


(NEGATIVE) 





 


Urine Urobilinogen


 


 


 Normal MG/DL


(0.0-1.0) 





 


Urine Leukocyte Esterase


 


 


 3+ (NEGATIVE)


H 





 


Urine RBC


 


 


 10-15 /HPF (0


- 0)  H 





 


Urine WBC


 


 


 5-10 /HPF (0 -


0)  H 





 


Urine Squamous Epithelial


Cells 


 


 None /LPF


(NONE/OCC) 





 


Urine Bacteria


 


 


 Moderate /HPF


(NONE)  H 





 


Urine Random Sodium


 


 


 < 20 mmol/L


()  L 





 


White Blood Count


 


 


 


 7.1 K/UL


(4.8-10.8)


 


Red Blood Count


 


 


 


 2.96 M/UL


(4.70-6.10)  L


 


Hemoglobin


 


 


 


 8.5 G/DL


(14.2-18.0)  L


 


Hematocrit


 


 


 


 28.6 %


(42.0-52.0)  L


 


Mean Corpuscular Volume    97 FL (80-99)  


 


Mean Corpuscular Hemoglobin


 


 


 


 28.8 PG


(27.0-31.0)


 


Mean Corpuscular Hemoglobin


Concent 


 


 


 29.8 G/DL


(32.0-36.0)  L


 


Red Cell Distribution Width


 


 


 


 19.9 %


(11.6-14.8)  H


 


Platelet Count


 


 


 


 242 K/UL


(150-450)


 


Mean Platelet Volume


 


 


 


 8.9 FL


(6.5-10.1)


 


Neutrophils (%) (Auto)


 


 


 


 % (45.0-75.0)





 


Lymphocytes (%) (Auto)


 


 


 


 % (20.0-45.0)





 


Monocytes (%) (Auto)     % (1.0-10.0)  


 


Eosinophils (%) (Auto)     % (0.0-3.0)  


 


Basophils (%) (Auto)     % (0.0-2.0)  


 


Sodium Level


 


 


 


 144 MMOL/L


(136-145)


 


Potassium Level


 


 


 


 4.2 MMOL/L


(3.5-5.1)


 


Chloride Level


 


 


 


 105 MMOL/L


()


 


Carbon Dioxide Level


 


 


 


 30 MMOL/L


(21-32)


 


Anion Gap


 


 


 


 9 mmol/L


(5-15)


 


Blood Urea Nitrogen


 


 


 


 78 mg/dL


(7-18)  H


 


Creatinine


 


 


 


 2.8 MG/DL


(0.55-1.30)  H


 


Estimat Glomerular Filtration


Rate 


 


 


 22.8 mL/min


(>60)


 


Glucose Level


 


 


 


 124 MG/DL


()  H


 


Uric Acid


 


 


 


 9.2 MG/DL


(2.6-7.2)  H


 


Calcium Level


 


 


 


 8.2 MG/DL


(8.5-10.1)  L


 


Phosphorus Level


 


 


 


 5.5 MG/DL


(2.5-4.9)  H


 


Magnesium Level


 


 


 


 2.5 MG/DL


(1.8-2.4)  H


 


Total Bilirubin


 


 


 


 0.6 MG/DL


(0.2-1.0)


 


Aspartate Amino Transf


(AST/SGOT) 


 


 


 38 U/L (15-37)


H


 


Alanine Aminotransferase


(ALT/SGPT) 


 


 


 56 U/L (12-78)





 


Alkaline Phosphatase


 


 


 


 339 U/L


()  H


 


C-Reactive Protein,


Quantitative 


 


 


 4.8 mg/dL


(0.00-0.90)  H


 


Pro-B-Type Natriuretic Peptide


 


 


 


 19924 pg/mL


(0-125)  H


 


Total Protein


 


 


 


 6.4 G/DL


(6.4-8.2)


 


Albumin


 


 


 


 2.4 G/DL


(3.4-5.0)  L


 


Globulin    4.0 g/dL  


 


Albumin/Globulin Ratio


 


 


 


 0.6 (1.0-2.7)


L


 


Digoxin Level


 


 


 


 1.5 NG/ML


(0.5-2.0)


 


Test


 2/4/21


07:26 


 


 





 


Arterial Blood pH


 7.200


(7.350-7.450) 


 


 





 


Arterial Blood Partial


Pressure CO2 78.8 mmHg


(35.0-45.0)  *H 


 


 





 


Arterial Blood Partial


Pressure O2 43.1 mmHg


(75.0-100.0) 


 


 





 


Arterial Blood HCO3


 30.1 mmol/L


(22.0-26.0)  H 


 


 





 


Arterial Blood Oxygen


Saturation 73.3 %


()  *L 


 


 





 


Arterial Blood Base Excess 1.0 (-2-2)     


 


Abelardo Test Positive     











Current Medications








 Medications


  (Trade)  Dose


 Ordered  Sig/Julisa


 Route


 PRN Reason  Start Time


 Stop Time Status Last Admin


Dose Admin


 


 Acetaminophen


  (Tylenol)  650 mg  Q4H  PRN


 GT


 Temp >100.5  1/15/21 17:15


 2/14/21 17:14  1/29/21 06:59





 


 Amiodarone HCl


  (Cordarone)  200 mg  DAILY


 NG


   1/26/21 09:00


 4/19/21 20:59  2/3/21 09:23





 


 Cefepime HCl 1 gm/


 Dextrose  55 ml @ 


 110 mls/hr  Q24H


 IVPB


   1/30/21 11:00


 2/6/21 10:59  2/3/21 11:27





 


 Chlorhexidine


 Gluconate


  (Vanessa-Hex 2%)  1 applic  DAILY@2000


 TOPIC


   1/19/21 20:00


 4/19/21 19:59  2/3/21 20:39





 


 Dextrose


  (Dextrose 50%)  25 ml  Q30M  PRN


 IV


 Hypoglycemia  1/9/21 13:30


 4/9/21 13:29   





 


 Dextrose


  (Dextrose 50%)  50 ml  Q30M  PRN


 IV


 Hypoglycemia  1/9/21 13:30


 4/9/21 13:29   





 


 Digoxin


  (Lanoxin)  0.125 mg  DAILY


 NG


   1/21/21 09:00


 4/21/21 08:59  2/3/21 09:23





 


 Docusate Sodium


  (Colace)  100 mg  TIDPRN  PRN


 GT


 Constipation  1/12/21 01:15


 2/11/21 01:14  1/12/21 01:42





 


 Enoxaparin Sodium


  (Lovenox)  80 mg  DAILY


 SUBQ


   2/4/21 09:00


 5/5/21 08:59   





 


 Fentanyl Citrate  250 ml @ 1


 mls/hr  Q24H


 IV


   2/3/21 17:00


 2/5/21 16:59  2/4/21 02:08





 


 Hydrocortisone


  (Solu-CORTEF)  100 mg  EVERY 8  HOURS


 IV


   2/1/21 15:45


 5/2/21 15:44  2/4/21 05:40





 


 Insulin Aspart


  (NovoLOG)    Q6HR


 SUBQ


   1/14/21 12:00


 4/9/21 17:59  2/4/21 00:28





 


 Insulin Detemir


  (Levemir)  26 units  Q12HR


 SUBQ


   2/2/21 09:00


 4/12/21 08:59  2/3/21 21:00





 


 Midazolam HCl 50


 mg/Sodium Chloride  100 ml @ 0


 mls/hr  Q24H  PRN


 IV


 Restlessness  2/3/21 21:51


 2/5/21 21:50   





 


 Midodrine


  (Pro-Amatine)  10 mg  Q8HR


 ORAL


   2/1/21 15:30


 5/2/21 15:29  2/4/21 06:08





 


 Norepinephrine


 Bitartrate 16 mg/


 Dextrose  516 ml @ 0


 mls/hr  Q24H


 IV


   2/3/21 10:30


 2/6/21 10:29  2/3/21 12:22





 


 Pantoprazole


  (Protonix)  40 mg  Q12HR


 IVP


   2/1/21 21:00


 2/13/21 08:59  2/3/21 20:39





 


 Sodium Chloride  500 ml @ 


 999 mls/hr  Q31M PRN


 IV


 For hypotension  1/15/21 18:30


 2/14/21 18:29   





 


 Sodium Chloride  1,000 ml @ 


 100 mls/hr  Q10H


 IV


   2/1/21 15:30


 3/3/21 15:29  2/4/21 03:33




















Lamin Felix MD                Feb 4, 2021 09:57

## 2021-02-04 NOTE — DIAGNOSTIC IMAGING REPORT
Indication: Shortness of breath

 

Technique: XRAY Chest 1v

 

Comparison: 2/3/2020

 

Findings: Extensive bilateral infiltrates are again seen, not significantly changed

compared to the prior exam. No significant pleural effusion is seen. No appreciable

pneumothorax. Right basilar chest tube, left arm PICC line, endotracheal and enteric

tubes remain in place. Heart size and osseous structures stable.

 

Impression: 

Extensive bilateral infiltrates, not significantly changed compared to one day prior.

## 2021-02-04 NOTE — PULMONOLOGY PROGRESS NOTE
Subjective


ROS Limited/Unobtainable:  Yes


Interval Events:  code blue (1/29)


Constitutional:  Reports: fever, other - Vw=886.2


HEENT:  Repors: no symptoms


Respiratory:  Reports: shortness of breath - better


Cardiovascular:  Reports: no symptoms


Gastrointestinal/Abdominal:  Reports: diarrhea


Allergies:  


Coded Allergies:  


     No Known Allergies (Unverified , 6/11/19)


All Systems:  reviewed and negative except above





Objective





Last 24 Hour Vital Signs








  Date Time  Temp Pulse Resp B/P (MAP) Pulse Ox O2 Delivery O2 Flow Rate FiO2


 


2/4/21 09:57  89      


 


2/4/21 09:00  88 30 123/71 (88) 88   


 


2/4/21 08:00        100


 


2/4/21 08:00  86 33 121/68 (85) 86   


 


2/4/21 08:00  84      


 


2/4/21 07:00   32 127/77  Mechanical Ventilator  100


 


2/4/21 07:00    127/77    


 


2/4/21 07:00  79 29 127/77 (94) 90   


 


2/4/21 06:45  83 34     100


 


2/4/21 06:00   29 118/92  Mechanical Ventilator  100


 


2/4/21 06:00    118/92    


 


2/4/21 06:00  85 29 118/92 (101) 85   


 


2/4/21 05:00   25 139/78  Mechanical Ventilator  100


 


2/4/21 05:00    139/78    


 


2/4/21 05:00  88 25 139/78 (98) 83   


 


2/4/21 04:14  89      


 


2/4/21 04:00 97.0 84 30 127/71 (89) 90   


 


2/4/21 04:00      Mechanical Ventilator  


 


2/4/21 04:00   30 127/71  Mechanical Ventilator  100


 


2/4/21 04:00    127/71    


 


2/4/21 04:00        100


 


2/4/21 03:09  80 33     100


 


2/4/21 03:00   29 120/66  Mechanical Ventilator  100


 


2/4/21 03:00    120/66    


 


2/4/21 03:00  82 26 120/66 (84) 89   


 


2/4/21 02:08   24 130/73  Mechanical Ventilator  100


 


2/4/21 02:00  78 29 130/73 (92) 91   


 


2/4/21 02:00   28 127/71  Mechanical Ventilator  100


 


2/4/21 02:00    127/71    


 


2/4/21 01:30  77 32     100


 


2/4/21 01:00   27 130/74  Mechanical Ventilator  100


 


2/4/21 01:00    130/74    


 


2/4/21 01:00  75 26 131/79 (96) 93   


 


2/4/21 00:00 97.5 81 12 135/73 (93) 94   


 


2/4/21 00:00   29 134/75  Mechanical Ventilator  100


 


2/4/21 00:00    134/75    


 


2/4/21 00:00      Mechanical Ventilator  


 


2/3/21 23:44  82      


 


2/3/21 23:00  86 18 116/68 (84) 86   


 


2/3/21 23:00   18 119/65  Mechanical Ventilator  100


 


2/3/21 23:00    119/65    


 


2/3/21 22:00  84 27 122/66 (84) 90   


 


2/3/21 22:00   27 126/68  Mechanical Ventilator  100


 


2/3/21 22:00    126/68    


 


2/3/21 21:00   29 118/64  Mechanical Ventilator  100


 


2/3/21 21:00    117/65    


 


2/3/21 21:00  78 30 117/65 (82) 90   


 


2/3/21 20:00      Mechanical Ventilator  


 


2/3/21 20:00   30 132/74  Mechanical Ventilator  100


 


2/3/21 20:00    132/74    


 


2/3/21 20:00 96.5 82 32 133/75 (94) 89   


 


2/3/21 20:00        100


 


2/3/21 19:29  86      


 


2/3/21 19:08  80 32     100


 


2/3/21 19:00  85 25 140/81 (100) 82   


 


2/3/21 19:00   30 140/81  Mechanical Ventilator  100


 


2/3/21 19:00    140/81    


 


2/3/21 18:00   25 148/76  Mechanical Ventilator  100


 


2/3/21 18:00    148/76    


 


2/3/21 18:00  86 25 148/76 (100) 67   


 


2/3/21 17:11   30 134/77  Mechanical Ventilator  100


 


2/3/21 17:00  86 15 134/77 (96) 89   


 


2/3/21 16:00        100


 


2/3/21 16:00      Mechanical Ventilator  


 


2/3/21 16:00    112/62    


 


2/3/21 16:00  92      


 


2/3/21 16:00 98.9 91 25 112/62 (79) 88   


 


2/3/21 15:53  92 32     100


 


2/3/21 15:00  90 15 105/63 (77) 84   


 


2/3/21 15:00    107/64    


 


2/3/21 15:00   19 107/64  Mechanical Ventilator  100


 


2/3/21 14:00  85 29 108/63 (78) 84   


 


2/3/21 14:00    109/64    


 


2/3/21 14:00   18 107/64  Mechanical Ventilator  100


 


2/3/21 13:00  82 23 112/67 (82) 86   


 


2/3/21 13:00    112/67    


 


2/3/21 13:00   30 109/64  Mechanical Ventilator  100


 


2/3/21 12:22    103/62    


 


2/3/21 12:00 96.9 87 28 110/64 (79) 90   


 


2/3/21 12:00      Mechanical Ventilator  


 


2/3/21 12:00   23 113/68  Mechanical Ventilator  100


 


2/3/21 12:00  86      


 


2/3/21 12:00        100


 


2/3/21 11:59    103/62    

















Intake and Output  


 


 2/3/21 2/4/21





 19:00 07:00


 


Intake Total 2438.19 ml 2356.450 ml


 


Output Total 635 ml 600 ml


 


Balance 1803.19 ml 1756.450 ml


 


  


 


Free Water 200 ml 300 ml


 


IV Total 1698.19 ml 1636.450 ml


 


Tube Feeding 420 ml 420 ml


 


Other 120 ml 


 


Output Urine Total 515 ml 480 ml


 


Stool Total 20 ml 20 ml


 


Chest Tube Drainage Total 100 ml 100 ml








Objective


On Versed drip


General Appearance:  no acute distress


HEENT:  atraumatic


Respiratory:  lungs clear, decreased breath sounds


Cardiovascular:  normal rate, regular rhythm


Abdomen:  soft, non tender, no organomegaly


Laboratory Tests


2/3/21 12:15: Lactic Acid Level 2.50H


2/3/21 18:30: Lactic Acid Level 2.60H


2/4/21 02:30: 


Urine Color Yellow, Urine Appearance Cloudy, Urine pH 5, Urine Specific Gravity 

1.015, Urine Protein 2+H, Urine Glucose (UA) Negative, Urine Ketones Negative, 

Urine Blood 5+H, Urine Nitrite Negative, Urine Bilirubin Negative, Urine 

Urobilinogen Normal, Urine Leukocyte Esterase 3+H, Urine RBC 10-15H, Urine WBC 

5-10H, Urine Squamous Epithelial Cells None, Urine Bacteria ModerateH, Urine 

Random Sodium < 20L


2/4/21 04:35: 


White Blood Count 7.1, Red Blood Count 2.96L, Hemoglobin 8.5L, Hematocrit 28.6L,

Mean Corpuscular Volume 97, Mean Corpuscular Hemoglobin 28.8, Mean Corpuscular 

Hemoglobin Concent 29.8L, Red Cell Distribution Width 19.9H, Platelet Count 242,

Mean Platelet Volume 8.9, Neutrophils (%) (Auto) , Lymphocytes (%) (Auto) , 

Monocytes (%) (Auto) , Eosinophils (%) (Auto) , Basophils (%) (Auto) , Sodium 

Level 144, Potassium Level 4.2, Chloride Level 105, Carbon Dioxide Level 30, 

Anion Gap 9, Blood Urea Nitrogen 78H, Creatinine 2.8H, Estimat Glomerular 

Filtration Rate 22.8, Glucose Level 124H, Uric Acid 9.2H, Calcium Level 8.2L, 

Phosphorus Level 5.5H, Magnesium Level 2.5H, Total Bilirubin 0.6, Aspartate 

Amino Transf (AST/SGOT) 38H, Alanine Aminotransferase (ALT/SGPT) 56, Alkaline 

Phosphatase 339H, C-Reactive Protein, Quantitative 4.8H, Pro-B-Type Natriuretic 

Peptide 23332B, Total Protein 6.4, Albumin 2.4L, Globulin 4.0, Albumin/Globulin 

Ratio 0.6L, Digoxin Level 1.5


2/4/21 07:26: 


Arterial Blood pH 7.200*L, Arterial Blood Partial Pressure CO2 78.8*H, Arterial 

Blood Partial Pressure O2 43.1*L, Arterial Blood HCO3 30.1H, Arterial Blood 

Oxygen Saturation 73.3*L, Arterial Blood Base Excess 1.0, Abelardo Test Positive


2/4/21 09:25: Lactic Acid Level [Pending]





Current Medications








 Medications


  (Trade)  Dose


 Ordered  Sig/Julisa


 Route


 PRN Reason  Start Time


 Stop Time Status Last Admin


Dose Admin


 


 Acetaminophen


  (Tylenol)  650 mg  Q4H  PRN


 GT


 Temp >100.5  1/15/21 17:15


 2/14/21 17:14  1/29/21 06:59





 


 Amiodarone HCl


  (Cordarone)  200 mg  DAILY


 NG


   1/26/21 09:00


 4/19/21 20:59  2/4/21 09:58





 


 Cefepime HCl 1 gm/


 Dextrose  55 ml @ 


 110 mls/hr  Q24H


 IVPB


   1/30/21 11:00


 2/6/21 10:59  2/4/21 10:29





 


 Chlorhexidine


 Gluconate


  (Vanessa-Hex 2%)  1 applic  DAILY@2000


 TOPIC


   1/19/21 20:00


 4/19/21 19:59  2/3/21 20:39





 


 Dextrose


  (Dextrose 50%)  25 ml  Q30M  PRN


 IV


 Hypoglycemia  1/9/21 13:30


 4/9/21 13:29   





 


 Dextrose


  (Dextrose 50%)  50 ml  Q30M  PRN


 IV


 Hypoglycemia  1/9/21 13:30


 4/9/21 13:29   





 


 Digoxin


  (Lanoxin)  0.125 mg  DAILY


 NG


   1/21/21 09:00


 4/21/21 08:59  2/4/21 09:57





 


 Docusate Sodium


  (Colace)  100 mg  TIDPRN  PRN


 GT


 Constipation  1/12/21 01:15


 2/11/21 01:14  1/12/21 01:42





 


 Enoxaparin Sodium


  (Lovenox)  80 mg  DAILY


 SUBQ


   2/4/21 09:00


 5/5/21 08:59  2/4/21 09:57





 


 Fentanyl Citrate  250 ml @ 1


 mls/hr  Q24H


 IV


   2/3/21 17:00


 2/5/21 16:59  2/4/21 02:08





 


 Hydrocortisone


  (Solu-CORTEF)  100 mg  EVERY 8  HOURS


 IV


   2/1/21 15:45


 5/2/21 15:44  2/4/21 05:40





 


 Insulin Aspart


  (NovoLOG)    Q6HR


 SUBQ


   1/14/21 12:00


 4/9/21 17:59  2/4/21 00:28





 


 Insulin Detemir


  (Levemir)  26 units  Q12HR


 SUBQ


   2/2/21 09:00


 4/12/21 08:59  2/4/21 09:00





 


 Midazolam HCl 50


 mg/Sodium Chloride  100 ml @ 0


 mls/hr  Q24H  PRN


 IV


 Restlessness  2/3/21 21:51


 2/5/21 21:50   





 


 Midodrine


  (Pro-Amatine)  10 mg  Q8HR


 ORAL


   2/1/21 15:30


 5/2/21 15:29  2/4/21 06:08





 


 Norepinephrine


 Bitartrate 16 mg/


 Dextrose  516 ml @ 0


 mls/hr  Q24H


 IV


   2/3/21 10:30


 2/6/21 10:29  2/3/21 12:22





 


 Pantoprazole


  (Protonix)  40 mg  Q12HR


 IVP


   2/1/21 21:00


 2/13/21 08:59  2/4/21 09:57





 


 Sodium Chloride  500 ml @ 


 999 mls/hr  Q31M PRN


 IV


 For hypotension  1/15/21 18:30


 2/14/21 18:29   





 


 Sodium Chloride  1,000 ml @ 


 100 mls/hr  Q10H


 IV


   2/1/21 15:30


 3/3/21 15:29  2/4/21 03:33














Assessment/Plan


Assessment/Plan


1. COVID-19 pneumonia.


   - s/p Decadron, azithromycin, and ceftriaxone


   - Intubated; on PC ventilation


          -Currently 100% FiO2. PEEP 12; SaO2 78-84 ->91%


            - Has R apical PTX and subcut emphysema


             - S/p R CT placement





2. Diabetes mellitus.; 


3. Elevated inflammatory markers.


4. High D-dimer. On full dose Lovenox


5. Hypernatremia; will continue D5W


6. Hyperglycemia.


   - BG control


7. Hypoxemia., secondary to #1; improved





DVT prophylaxis   On Lovenox.


Sedated; advised RN to increase sedation





Hypotensive


On levophed


Continue PEEP 12; PC 25; rate 32


Has hypercapnia, acidemia


Poor prognosis


discussed with RN.


Continue IV fluids





Will need to increase sedation.











Sung Lemos MD            Feb 4, 2021 11:07

## 2021-02-04 NOTE — DIAGNOSTIC IMAGING REPORT
EXAM: XRAY Abdomen 1v

 

HISTORY: NG tube placement

 

COMPARISON: 1/30/2021.

 

TECHNIQUE:  Frontal view of the upper abdomen obtained.

 

FINDINGS:

 

There is an NG tube in the stomach. Right chest tube also again noted. There are

multiple overlying artifacts.  Nonspecific bowel gas pattern noted. Increased stool

lucencies in the colon demonstrated diffusely. No definite pathologic calcifications

identified.  There is no sign of free air. No acute abnormality noted of the

visualized osseous structures.

 

IMPRESSION:

 

NG TUBE IN STOMACH.

 

INCREASED STOOL LUCENCIES THROUGHOUT THE COLON.

## 2021-02-04 NOTE — GENERAL PROGRESS NOTE
Subjective


ROS Limited/Unobtainable:  Yes


Allergies:  


Coded Allergies:  


     No Known Allergies (Unverified , 6/11/19)


Subjective


events noted - interval notes reviewed 


glucose values are controlled 











Item Value  Date Time


 


Bedside Blood Glucose 150 mg/dl H 2/4/21 0028


 


Bedside Blood Glucose 131 mg/dl H 2/3/21 2100


 


Bedside Blood Glucose 182 mg/dl H 2/3/21 1720


 


Bedside Blood Glucose 174 mg/dl H 2/3/21 1150


 


Bedside Blood Glucose 189 mg/dl H 2/3/21 0925











Objective





Last 24 Hour Vital Signs








  Date Time  Temp Pulse Resp B/P (MAP) Pulse Ox O2 Delivery O2 Flow Rate FiO2


 


2/4/21 06:00  85 29 118/92 (101) 85   


 


2/4/21 05:00  88 25 139/78 (98) 83   


 


2/4/21 04:00  84 30 127/71 (89) 90   


 


2/4/21 04:00      Mechanical Ventilator  


 


2/4/21 04:00   30 127/71  Mechanical Ventilator  100


 


2/4/21 04:00    127/71    


 


2/4/21 04:00        100


 


2/4/21 03:09  80 33     100


 


2/4/21 03:00   29 120/66  Mechanical Ventilator  100


 


2/4/21 03:00    120/66    


 


2/4/21 03:00  82 26 120/66 (84) 89   


 


2/4/21 02:08   24 130/73  Mechanical Ventilator  100


 


2/4/21 02:00  78 29 130/73 (92) 91   


 


2/4/21 02:00   28 127/71  Mechanical Ventilator  100


 


2/4/21 02:00    127/71    


 


2/4/21 01:30  77 32     100


 


2/4/21 01:00   27 130/74  Mechanical Ventilator  100


 


2/4/21 01:00    130/74    


 


2/4/21 01:00  75 26 131/79 (96) 93   


 


2/4/21 00:00 97.5 81 12 135/73 (93) 94   


 


2/4/21 00:00   29 134/75  Mechanical Ventilator  100


 


2/4/21 00:00    134/75    


 


2/4/21 00:00      Mechanical Ventilator  


 


2/3/21 23:44  82      


 


2/3/21 23:00  86 18 116/68 (84) 86   


 


2/3/21 23:00   18 119/65  Mechanical Ventilator  100


 


2/3/21 23:00    119/65    


 


2/3/21 22:00  84 27 122/66 (84) 90   


 


2/3/21 22:00   27 126/68  Mechanical Ventilator  100


 


2/3/21 22:00    126/68    


 


2/3/21 21:00   29 118/64  Mechanical Ventilator  100


 


2/3/21 21:00    117/65    


 


2/3/21 21:00  78 30 117/65 (82) 90   


 


2/3/21 20:00      Mechanical Ventilator  


 


2/3/21 20:00   30 132/74  Mechanical Ventilator  100


 


2/3/21 20:00    132/74    


 


2/3/21 20:00 96.5 82 32 133/75 (94) 89   


 


2/3/21 20:00        100


 


2/3/21 19:29  86      


 


2/3/21 19:08  80 32     100


 


2/3/21 19:00  85 25 140/81 (100) 82   


 


2/3/21 19:00   30 140/81  Mechanical Ventilator  100


 


2/3/21 19:00    140/81    


 


2/3/21 18:00   25 148/76  Mechanical Ventilator  100


 


2/3/21 18:00    148/76    


 


2/3/21 18:00  86 25 148/76 (100) 67   


 


2/3/21 17:11   30 134/77  Mechanical Ventilator  100


 


2/3/21 17:00  86 15 134/77 (96) 89   


 


2/3/21 16:00        100


 


2/3/21 16:00      Mechanical Ventilator  


 


2/3/21 16:00    112/62    


 


2/3/21 16:00  92      


 


2/3/21 16:00 98.9 91 25 112/62 (79) 88   


 


2/3/21 15:53  92 32     100


 


2/3/21 15:00  90 15 105/63 (77) 84   


 


2/3/21 15:00    107/64    


 


2/3/21 15:00   19 107/64  Mechanical Ventilator  100


 


2/3/21 14:00  85 29 108/63 (78) 84   


 


2/3/21 14:00    109/64    


 


2/3/21 14:00   18 107/64  Mechanical Ventilator  100


 


2/3/21 13:00  82 23 112/67 (82) 86   


 


2/3/21 13:00    112/67    


 


2/3/21 13:00   30 109/64  Mechanical Ventilator  100


 


2/3/21 12:22    103/62    


 


2/3/21 12:00 96.9 87 28 110/64 (79) 90   


 


2/3/21 12:00      Mechanical Ventilator  


 


2/3/21 12:00   23 113/68  Mechanical Ventilator  100


 


2/3/21 12:00  86      


 


2/3/21 12:00        100


 


2/3/21 11:59    103/62    


 


2/3/21 11:03  95 19 107/62 (77) 86   


 


2/3/21 11:00   19 107/62  Mechanical Ventilator  100


 


2/3/21 10:50  90 32     100


 


2/3/21 10:45  100 20 110/63 (79) 80   


 


2/3/21 10:30    107/62    


 


2/3/21 10:29  104 19 121/73 (89) 56   


 


2/3/21 10:24  69 14 68/45 (53) 58   


 


2/3/21 10:21    67/43    


 


2/3/21 10:15  70 17 59/43 (48) 64   


 


2/3/21 10:03    75/55    


 


2/3/21 10:00   22 145/70  Mechanical Ventilator  100


 


2/3/21 10:00  118 16 145/70 (95) 52   


 


2/3/21 09:49  84 17 68/43 (51) 69   


 


2/3/21 09:23  83      


 


2/3/21 09:15   21 118/67  Mechanical Ventilator  100


 


2/3/21 09:00   21 118/67  Mechanical Ventilator  100


 


2/3/21 09:00    118/67    


 


2/3/21 09:00  90 24 118/67 (84) 79   


 


2/3/21 08:00  83      


 


2/3/21 08:00 97.5 82 29 112/67 (82) 82   


 


2/3/21 08:00   26 113/59  Mechanical Ventilator  100


 


2/3/21 08:00    113/68    


 


2/3/21 08:00      Mechanical Ventilator  


 


2/3/21 08:00        100


 


2/3/21 07:15    113/68    


 


2/3/21 07:07  84 22     100


 


2/3/21 07:00  88 29 119/66 (83) 69   


 


2/3/21 06:45   21 111/84  Mechanical Ventilator  100


 


2/3/21 06:45  91 21 111/84 (93) 63   


 


2/3/21 06:30  84 32 130/73 (92) 88   


 


2/3/21 06:30   30 130/73  Mechanical Ventilator  100

















Intake and Output  


 


 2/3/21 2/4/21





 19:00 07:00


 


Intake Total 2438.19 ml 1865.49 ml


 


Output Total 635 ml 530 ml


 


Balance 1803.19 ml 1335.49 ml


 


  


 


Free Water 200 ml 300 ml


 


IV Total 1698.19 ml 1215.49 ml


 


Tube Feeding 420 ml 350 ml


 


Other 120 ml 


 


Output Urine Total 515 ml 410 ml


 


Stool Total 20 ml 20 ml


 


Chest Tube Drainage Total 100 ml 100 ml








Laboratory Tests


2/3/21 08:31: 


Arterial Blood pH 7.194*L, Arterial Blood Partial Pressure CO2 84.6*H, Arterial 

Blood Partial Pressure O2 51.1L, Arterial Blood HCO3 31.9H, Arterial Blood 

Oxygen Saturation 81.4*L, Arterial Blood Base Excess 2.4H, Abelardo Test Positive


2/3/21 12:15: Lactic Acid Level 2.50H


2/3/21 18:30: Lactic Acid Level 2.60H


2/4/21 02:30: 


Urine Color Yellow, Urine Appearance Cloudy, Urine pH 5, Urine Specific Gravity 

1.015, Urine Protein 2+H, Urine Glucose (UA) Negative, Urine Ketones Negative, 

Urine Blood 5+H, Urine Nitrite Negative, Urine Bilirubin Negative, Urine 

Urobilinogen Normal, Urine Leukocyte Esterase 3+H, Urine RBC 10-15H, Urine WBC 

5-10H, Urine Squamous Epithelial Cells None, Urine Bacteria ModerateH, Urine 

Random Sodium < 20L


2/4/21 04:35: 


White Blood Count 7.1, Red Blood Count 2.96L, Hemoglobin 8.5L, Hematocrit 28.6L,

Mean Corpuscular Volume 97, Mean Corpuscular Hemoglobin 28.8, Mean Corpuscular 

Hemoglobin Concent 29.8L, Red Cell Distribution Width 19.9H, Platelet Count 242,

Mean Platelet Volume 8.9, Neutrophils (%) (Auto) , Lymphocytes (%) (Auto) , 

Monocytes (%) (Auto) , Eosinophils (%) (Auto) , Basophils (%) (Auto) , Sodium 

Level 144, Potassium Level 4.2, Chloride Level 105, Carbon Dioxide Level 30, 

Anion Gap 9, Blood Urea Nitrogen 78H, Creatinine 2.8H, Estimat Glomerular 

Filtration Rate 22.8, Glucose Level 124H, Uric Acid 9.2H, Calcium Level 8.2L, 

Phosphorus Level 5.5H, Magnesium Level 2.5H, Total Bilirubin 0.6, Aspartate 

Amino Transf (AST/SGOT) 38H, Alanine Aminotransferase (ALT/SGPT) 56, Alkaline 

Phosphatase 339H, C-Reactive Protein, Quantitative 4.8H, Pro-B-Type Natriuretic 

Peptide 79612G, Total Protein 6.4, Albumin 2.4L, Globulin 4.0, Albumin/Globulin 

Ratio 0.6L, Digoxin Level 1.5


Height (Feet):  5


Height (Inches):  7.00


Weight (Pounds):  198


Objective





Current Medications








 Medications


  (Trade)  Dose


 Ordered  Sig/Julisa


 Route


 PRN Reason  Start Time


 Stop Time Status Last Admin


Dose Admin


 


 Acetaminophen


  (Tylenol)  650 mg  Q4H  PRN


 GT


 Temp >100.5  1/15/21 17:15


 2/14/21 17:14  1/29/21 06:59





 


 Amiodarone HCl


  (Cordarone)  200 mg  DAILY


 NG


   1/26/21 09:00


 4/19/21 20:59  2/3/21 09:23





 


 Cefepime HCl 1 gm/


 Dextrose  55 ml @ 


 110 mls/hr  Q24H


 IVPB


   1/30/21 11:00


 2/6/21 10:59  2/3/21 11:27





 


 Chlorhexidine


 Gluconate


  (Vanessa-Hex 2%)  1 applic  DAILY@2000


 TOPIC


   1/19/21 20:00


 4/19/21 19:59  2/3/21 20:39





 


 Dextrose


  (Dextrose 50%)  25 ml  Q30M  PRN


 IV


 Hypoglycemia  1/9/21 13:30


 4/9/21 13:29   





 


 Dextrose


  (Dextrose 50%)  50 ml  Q30M  PRN


 IV


 Hypoglycemia  1/9/21 13:30


 4/9/21 13:29   





 


 Digoxin


  (Lanoxin)  0.125 mg  DAILY


 NG


   1/21/21 09:00


 4/21/21 08:59  2/3/21 09:23





 


 Docusate Sodium


  (Colace)  100 mg  TIDPRN  PRN


 GT


 Constipation  1/12/21 01:15


 2/11/21 01:14  1/12/21 01:42





 


 Enoxaparin Sodium


  (Lovenox)  80 mg  DAILY


 SUBQ


   2/4/21 09:00


 5/5/21 08:59   





 


 Fentanyl Citrate  250 ml @ 1


 mls/hr  Q24H


 IV


   2/3/21 17:00


 2/5/21 16:59  2/4/21 02:08





 


 Hydrocortisone


  (Solu-CORTEF)  100 mg  EVERY 8  HOURS


 IV


   2/1/21 15:45


 5/2/21 15:44  2/4/21 05:40





 


 Insulin Aspart


  (NovoLOG)    Q6HR


 SUBQ


   1/14/21 12:00


 4/9/21 17:59  2/4/21 00:28





 


 Insulin Detemir


  (Levemir)  26 units  Q12HR


 SUBQ


   2/2/21 09:00


 4/12/21 08:59  2/3/21 21:00





 


 Midazolam HCl 50


 mg/Sodium Chloride  100 ml @ 0


 mls/hr  Q24H  PRN


 IV


 Restlessness  2/3/21 21:51


 2/5/21 21:50   





 


 Midodrine


  (Pro-Amatine)  10 mg  Q8HR


 ORAL


   2/1/21 15:30


 5/2/21 15:29  2/4/21 06:08





 


 Norepinephrine


 Bitartrate 16 mg/


 Dextrose  516 ml @ 0


 mls/hr  Q24H


 IV


   2/3/21 10:30


 2/6/21 10:29  2/3/21 12:22





 


 Pantoprazole


  (Protonix)  40 mg  Q12HR


 IVP


   2/1/21 21:00


 2/13/21 08:59  2/3/21 20:39





 


 Sodium Chloride  500 ml @ 


 999 mls/hr  Q31M PRN


 IV


 For hypotension  1/15/21 18:30


 2/14/21 18:29   





 


 Sodium Chloride  1,000 ml @ 


 100 mls/hr  Q10H


 IV


   2/1/21 15:30


 3/3/21 15:29  2/4/21 03:33














Assessment/Plan


Problem List:  


(1) Diabetes mellitus out of control


ICD Codes:  E11.65 - Type 2 diabetes mellitus with hyperglycemia


SNOMED:  48424828, [id number]


(2) Pneumonia due to COVID-19 virus


ICD Codes:  U07.1 - COVID-19; J12.82 - Pneumonia due to coronavirus disease 2019


SNOMED:  889732936179219221


Assessment/Plan:


continue Levemir 26 units bid 


continue Novolog sliding scale every 6 hours 


hypoglycemia protocol in order











Nick Mcmahon MD                  Feb 4, 2021 06:30

## 2021-02-04 NOTE — NUR
Pt family visitation. Educated family on code status. Pt is now DNR. Primary MD Thaddeus 
aware. Will continue to monitor.

## 2021-02-04 NOTE — NUR
NURSE NOTES:

Dr. George aware pt had new episode of left bundle branch block. EKG done. pt is 
asymptomatic. will wait for call back.

## 2021-02-04 NOTE — CARDIAC ELECTROPHYSIOLOGY PN
Assessment/Plan


Assessment/Plan


1. Atrial fibrillation with rapid ventricular response.      


      On digoxin 0.125 mg p.o. daily and Amiodarone 200 po qd  


       In SR.     Dig level 1.0





2. Acute renal failure. 


3. Right pneumothorax, status post chest tube with subcutaneous emphysema. 


No significant drainage





4. COVID-19 pneumonia, 100% FiO2 and PEEP of 12, on Remdesivir and Solu-Medrol  

.


5. Diabetes.


6. Septic shock. On iv fluid and Levophed decreased to 6 Mcg  


7. S/P PEA arrest 1/29/21 and 1/31/21





DW RN and Dr Powell





Subjective


Subjective


In ICU on 100% Fio2 and PEEP 12 and Levo decreased to 5 Mcg  


On Fentanyl rip at 30. Intermittent LBBB.


 Right chest tube  . In SR on Dig and Amiodarone 


Coded twice 1/30/21  for PEA with chest compressions and Epi and bicarb 

injections





Objective





Last 24 Hour Vital Signs








  Date Time  Temp Pulse Resp B/P (MAP) Pulse Ox O2 Delivery O2 Flow Rate FiO2


 


2/4/21 09:57  89      


 


2/4/21 09:00  88 30 123/71 (88) 88   


 


2/4/21 08:00        100


 


2/4/21 08:00  86 33 121/68 (85) 86   


 


2/4/21 08:00  84      


 


2/4/21 07:00   32 127/77  Mechanical Ventilator  100


 


2/4/21 07:00    127/77    


 


2/4/21 07:00  79 29 127/77 (94) 90   


 


2/4/21 06:45  83 34     100


 


2/4/21 06:00   29 118/92  Mechanical Ventilator  100


 


2/4/21 06:00    118/92    


 


2/4/21 06:00  85 29 118/92 (101) 85   


 


2/4/21 05:00   25 139/78  Mechanical Ventilator  100


 


2/4/21 05:00    139/78    


 


2/4/21 05:00  88 25 139/78 (98) 83   


 


2/4/21 04:14  89      


 


2/4/21 04:00 97.0 84 30 127/71 (89) 90   


 


2/4/21 04:00      Mechanical Ventilator  


 


2/4/21 04:00   30 127/71  Mechanical Ventilator  100


 


2/4/21 04:00    127/71    


 


2/4/21 04:00        100


 


2/4/21 03:09  80 33     100


 


2/4/21 03:00   29 120/66  Mechanical Ventilator  100


 


2/4/21 03:00    120/66    


 


2/4/21 03:00  82 26 120/66 (84) 89   


 


2/4/21 02:08   24 130/73  Mechanical Ventilator  100


 


2/4/21 02:00  78 29 130/73 (92) 91   


 


2/4/21 02:00   28 127/71  Mechanical Ventilator  100


 


2/4/21 02:00    127/71    


 


2/4/21 01:30  77 32     100


 


2/4/21 01:00   27 130/74  Mechanical Ventilator  100


 


2/4/21 01:00    130/74    


 


2/4/21 01:00  75 26 131/79 (96) 93   


 


2/4/21 00:00 97.5 81 12 135/73 (93) 94   


 


2/4/21 00:00   29 134/75  Mechanical Ventilator  100


 


2/4/21 00:00    134/75    


 


2/4/21 00:00      Mechanical Ventilator  


 


2/3/21 23:44  82      


 


2/3/21 23:00  86 18 116/68 (84) 86   


 


2/3/21 23:00   18 119/65  Mechanical Ventilator  100


 


2/3/21 23:00    119/65    


 


2/3/21 22:00  84 27 122/66 (84) 90   


 


2/3/21 22:00   27 126/68  Mechanical Ventilator  100


 


2/3/21 22:00    126/68    


 


2/3/21 21:00   29 118/64  Mechanical Ventilator  100


 


2/3/21 21:00    117/65    


 


2/3/21 21:00  78 30 117/65 (82) 90   


 


2/3/21 20:00      Mechanical Ventilator  


 


2/3/21 20:00   30 132/74  Mechanical Ventilator  100


 


2/3/21 20:00    132/74    


 


2/3/21 20:00 96.5 82 32 133/75 (94) 89   


 


2/3/21 20:00        100


 


2/3/21 19:29  86      


 


2/3/21 19:08  80 32     100


 


2/3/21 19:00  85 25 140/81 (100) 82   


 


2/3/21 19:00   30 140/81  Mechanical Ventilator  100


 


2/3/21 19:00    140/81    


 


2/3/21 18:00   25 148/76  Mechanical Ventilator  100


 


2/3/21 18:00    148/76    


 


2/3/21 18:00  86 25 148/76 (100) 67   


 


2/3/21 17:11   30 134/77  Mechanical Ventilator  100


 


2/3/21 17:00  86 15 134/77 (96) 89   


 


2/3/21 16:00        100


 


2/3/21 16:00      Mechanical Ventilator  


 


2/3/21 16:00    112/62    


 


2/3/21 16:00  92      


 


2/3/21 16:00 98.9 91 25 112/62 (79) 88   


 


2/3/21 15:53  92 32     100


 


2/3/21 15:00  90 15 105/63 (77) 84   


 


2/3/21 15:00    107/64    


 


2/3/21 15:00   19 107/64  Mechanical Ventilator  100


 


2/3/21 14:00  85 29 108/63 (78) 84   


 


2/3/21 14:00    109/64    


 


2/3/21 14:00   18 107/64  Mechanical Ventilator  100


 


2/3/21 13:00  82 23 112/67 (82) 86   


 


2/3/21 13:00    112/67    


 


2/3/21 13:00   30 109/64  Mechanical Ventilator  100


 


2/3/21 12:22    103/62    


 


2/3/21 12:00 96.9 87 28 110/64 (79) 90   


 


2/3/21 12:00      Mechanical Ventilator  


 


2/3/21 12:00   23 113/68  Mechanical Ventilator  100


 


2/3/21 12:00  86      


 


2/3/21 12:00        100


 


2/3/21 11:59    103/62    


 


2/3/21 11:03  95 19 107/62 (77) 86   


 


2/3/21 11:00   19 107/62  Mechanical Ventilator  100


 


2/3/21 10:50  90 32     100


 


2/3/21 10:45  100 20 110/63 (79) 80   


 


2/3/21 10:30    107/62    


 


2/3/21 10:29  104 19 121/73 (89) 56   

















Intake and Output  


 


 2/3/21 2/4/21





 19:00 07:00


 


Intake Total 2438.19 ml 2356.450 ml


 


Output Total 635 ml 600 ml


 


Balance 1803.19 ml 1756.450 ml


 


  


 


Free Water 200 ml 300 ml


 


IV Total 1698.19 ml 1636.450 ml


 


Tube Feeding 420 ml 420 ml


 


Other 120 ml 


 


Output Urine Total 515 ml 480 ml


 


Stool Total 20 ml 20 ml


 


Chest Tube Drainage Total 100 ml 100 ml











Laboratory Tests








Test


 2/3/21


12:15 2/3/21


18:30 2/4/21


02:30 2/4/21


04:35


 


Lactic Acid Level


 2.50 mmol/L


(0.4-2.0)  H 2.60 mmol/L


(0.66-2.22)  H 


 





 


Urine Color   Yellow   


 


Urine Appearance   Cloudy   


 


Urine pH   5 (4.5-8.0)   


 


Urine Specific Gravity


 


 


 1.015


(1.005-1.035) 





 


Urine Protein


 


 


 2+ (NEGATIVE)


H 





 


Urine Glucose (UA)


 


 


 Negative


(NEGATIVE) 





 


Urine Ketones


 


 


 Negative


(NEGATIVE) 





 


Urine Blood


 


 


 5+ (NEGATIVE)


H 





 


Urine Nitrite


 


 


 Negative


(NEGATIVE) 





 


Urine Bilirubin


 


 


 Negative


(NEGATIVE) 





 


Urine Urobilinogen


 


 


 Normal MG/DL


(0.0-1.0) 





 


Urine Leukocyte Esterase


 


 


 3+ (NEGATIVE)


H 





 


Urine RBC


 


 


 10-15 /HPF (0


- 0)  H 





 


Urine WBC


 


 


 5-10 /HPF (0 -


0)  H 





 


Urine Squamous Epithelial


Cells 


 


 None /LPF


(NONE/OCC) 





 


Urine Bacteria


 


 


 Moderate /HPF


(NONE)  H 





 


Urine Random Sodium


 


 


 < 20 mmol/L


()  L 





 


White Blood Count


 


 


 


 7.1 K/UL


(4.8-10.8)


 


Red Blood Count


 


 


 


 2.96 M/UL


(4.70-6.10)  L


 


Hemoglobin


 


 


 


 8.5 G/DL


(14.2-18.0)  L


 


Hematocrit


 


 


 


 28.6 %


(42.0-52.0)  L


 


Mean Corpuscular Volume    97 FL (80-99)  


 


Mean Corpuscular Hemoglobin


 


 


 


 28.8 PG


(27.0-31.0)


 


Mean Corpuscular Hemoglobin


Concent 


 


 


 29.8 G/DL


(32.0-36.0)  L


 


Red Cell Distribution Width


 


 


 


 19.9 %


(11.6-14.8)  H


 


Platelet Count


 


 


 


 242 K/UL


(150-450)


 


Mean Platelet Volume


 


 


 


 8.9 FL


(6.5-10.1)


 


Neutrophils (%) (Auto)


 


 


 


 % (45.0-75.0)





 


Lymphocytes (%) (Auto)


 


 


 


 % (20.0-45.0)





 


Monocytes (%) (Auto)     % (1.0-10.0)  


 


Eosinophils (%) (Auto)     % (0.0-3.0)  


 


Basophils (%) (Auto)     % (0.0-2.0)  


 


Sodium Level


 


 


 


 144 MMOL/L


(136-145)


 


Potassium Level


 


 


 


 4.2 MMOL/L


(3.5-5.1)


 


Chloride Level


 


 


 


 105 MMOL/L


()


 


Carbon Dioxide Level


 


 


 


 30 MMOL/L


(21-32)


 


Anion Gap


 


 


 


 9 mmol/L


(5-15)


 


Blood Urea Nitrogen


 


 


 


 78 mg/dL


(7-18)  H


 


Creatinine


 


 


 


 2.8 MG/DL


(0.55-1.30)  H


 


Estimat Glomerular Filtration


Rate 


 


 


 22.8 mL/min


(>60)


 


Glucose Level


 


 


 


 124 MG/DL


()  H


 


Uric Acid


 


 


 


 9.2 MG/DL


(2.6-7.2)  H


 


Calcium Level


 


 


 


 8.2 MG/DL


(8.5-10.1)  L


 


Phosphorus Level


 


 


 


 5.5 MG/DL


(2.5-4.9)  H


 


Magnesium Level


 


 


 


 2.5 MG/DL


(1.8-2.4)  H


 


Total Bilirubin


 


 


 


 0.6 MG/DL


(0.2-1.0)


 


Aspartate Amino Transf


(AST/SGOT) 


 


 


 38 U/L (15-37)


H


 


Alanine Aminotransferase


(ALT/SGPT) 


 


 


 56 U/L (12-78)





 


Alkaline Phosphatase


 


 


 


 339 U/L


()  H


 


C-Reactive Protein,


Quantitative 


 


 


 4.8 mg/dL


(0.00-0.90)  H


 


Pro-B-Type Natriuretic Peptide


 


 


 


 69149 pg/mL


(0-125)  H


 


Total Protein


 


 


 


 6.4 G/DL


(6.4-8.2)


 


Albumin


 


 


 


 2.4 G/DL


(3.4-5.0)  L


 


Globulin    4.0 g/dL  


 


Albumin/Globulin Ratio


 


 


 


 0.6 (1.0-2.7)


L


 


Digoxin Level


 


 


 


 1.5 NG/ML


(0.5-2.0)


 


Test


 2/4/21


07:26 


 


 





 


Arterial Blood pH


 7.200


(7.350-7.450) 


 


 





 


Arterial Blood Partial


Pressure CO2 78.8 mmHg


(35.0-45.0)  *H 


 


 





 


Arterial Blood Partial


Pressure O2 43.1 mmHg


(75.0-100.0) 


 


 





 


Arterial Blood HCO3


 30.1 mmol/L


(22.0-26.0)  H 


 


 





 


Arterial Blood Oxygen


Saturation 73.3 %


()  *L 


 


 





 


Arterial Blood Base Excess 1.0 (-2-2)     


 


Abelardo Test Positive     








Objective


HEENT: No JVD. Orally intubated


LUNGS:  Have decreased breath sounds with the chest tube on the right side


and subcutaneous emphysema.


CARDIOVASCULAR:  Regular S1, S2 with


no gallop.


ABDOMEN:  Soft.


EXTREMITIES:  A 1+ pitting edema.











Jake George MD                Feb 4, 2021 10:24

## 2021-02-04 NOTE — NUR
RESPIRATORY NOTE:

Code blue was called at 1405. Patient in asystole; CPR was initiated on 

patient and ventilated via ventilater due to covid positive. Dr Goodman 

called the code @1423. Spontaneous ROSC achieved at @1425. Will continue 

to monitor.

## 2021-02-04 NOTE — NUR
NURSE NOTES:

pt is resting on the bed, Rass-2. O2sat is now at 90%. no active bleeding at this time. no 
Pain noted as well.

## 2021-02-04 NOTE — NEPHROLOGY PROGRESS NOTE
Assessment/Plan


Problem List:  


(1) TO (acute kidney injury)


(2) Acute respiratory failure


(3) Pneumonia due to COVID-19 virus


(4) Diabetes mellitus out of control


(5) Hyperkalemia


Assessment





Acute kidney injury, multifactorial


Septic shock


Acute respiratory failure


Hyperkalemia, metabolic acidosis


Above-mentioned condition


Plan





February 4: Remains full code.  Remains intubated on ventilator.  Labs reviewed.

 Serum creatinine 2.8.  Medication list reviewed.  Continue current management 

and per consultants.  Prognosis remains poor.


February 3: Labs reviewed.  Medication reviewed.  Patient full code.  Remains 

intubated on ventilator.  Hypotensive.  Renal parameters unchanged.  Continue 

current support.  Prognosis dismal.


February 2: Labs reviewed.  Medication list reviewed.  Discussed with RN.  

Patient remains full code.  Patient is still intubated on ventilator.  

Hyperkalemia improved.  Serum creatinine 2.6 unchanged.  Status remains guarded.

 Continue per consultants.  Continue monitor renal parameters.  Taper pressors 

as possible.  Sodium bicarb IV





Previosly


Feeding changed to Nepro


Midodrine 10 mg every 8 hours


Albumin bolus


1/2 Normal saline 100 cc an hour


Stop Seroquel


Monitor renal parameters


Avoid nephrotoxic's


1 amp of sodium bicarbonate


Stress dose of steroids


Discussed with SUZI Murphy





Subjective


ROS Limited/Unobtainable:  Yes





Objective


Objective





Last 24 Hour Vital Signs








  Date Time  Temp Pulse Resp B/P (MAP) Pulse Ox O2 Delivery O2 Flow Rate FiO2


 


2/4/21 07:00   32 127/77  Mechanical Ventilator  100


 


2/4/21 07:00    127/77    


 


2/4/21 07:00  79 29 127/77 (94) 90   


 


2/4/21 06:45  83 34     100


 


2/4/21 06:00   29 118/92  Mechanical Ventilator  100


 


2/4/21 06:00    118/92    


 


2/4/21 06:00  85 29 118/92 (101) 85   


 


2/4/21 05:00   25 139/78  Mechanical Ventilator  100


 


2/4/21 05:00    139/78    


 


2/4/21 05:00  88 25 139/78 (98) 83   


 


2/4/21 04:14  89      


 


2/4/21 04:00 97.0 84 30 127/71 (89) 90   


 


2/4/21 04:00      Mechanical Ventilator  


 


2/4/21 04:00   30 127/71  Mechanical Ventilator  100


 


2/4/21 04:00    127/71    


 


2/4/21 04:00        100


 


2/4/21 03:09  80 33     100


 


2/4/21 03:00   29 120/66  Mechanical Ventilator  100


 


2/4/21 03:00    120/66    


 


2/4/21 03:00  82 26 120/66 (84) 89   


 


2/4/21 02:08   24 130/73  Mechanical Ventilator  100


 


2/4/21 02:00  78 29 130/73 (92) 91   


 


2/4/21 02:00   28 127/71  Mechanical Ventilator  100


 


2/4/21 02:00    127/71    


 


2/4/21 01:30  77 32     100


 


2/4/21 01:00   27 130/74  Mechanical Ventilator  100


 


2/4/21 01:00    130/74    


 


2/4/21 01:00  75 26 131/79 (96) 93   


 


2/4/21 00:00 97.5 81 12 135/73 (93) 94   


 


2/4/21 00:00   29 134/75  Mechanical Ventilator  100


 


2/4/21 00:00    134/75    


 


2/4/21 00:00      Mechanical Ventilator  


 


2/3/21 23:44  82      


 


2/3/21 23:00  86 18 116/68 (84) 86   


 


2/3/21 23:00   18 119/65  Mechanical Ventilator  100


 


2/3/21 23:00    119/65    


 


2/3/21 22:00  84 27 122/66 (84) 90   


 


2/3/21 22:00   27 126/68  Mechanical Ventilator  100


 


2/3/21 22:00    126/68    


 


2/3/21 21:00   29 118/64  Mechanical Ventilator  100


 


2/3/21 21:00    117/65    


 


2/3/21 21:00  78 30 117/65 (82) 90   


 


2/3/21 20:00      Mechanical Ventilator  


 


2/3/21 20:00   30 132/74  Mechanical Ventilator  100


 


2/3/21 20:00    132/74    


 


2/3/21 20:00 96.5 82 32 133/75 (94) 89   


 


2/3/21 20:00        100


 


2/3/21 19:29  86      


 


2/3/21 19:08  80 32     100


 


2/3/21 19:00  85 25 140/81 (100) 82   


 


2/3/21 19:00   30 140/81  Mechanical Ventilator  100


 


2/3/21 19:00    140/81    


 


2/3/21 18:00   25 148/76  Mechanical Ventilator  100


 


2/3/21 18:00    148/76    


 


2/3/21 18:00  86 25 148/76 (100) 67   


 


2/3/21 17:11   30 134/77  Mechanical Ventilator  100


 


2/3/21 17:00  86 15 134/77 (96) 89   


 


2/3/21 16:00        100


 


2/3/21 16:00      Mechanical Ventilator  


 


2/3/21 16:00    112/62    


 


2/3/21 16:00  92      


 


2/3/21 16:00 98.9 91 25 112/62 (79) 88   


 


2/3/21 15:53  92 32     100


 


2/3/21 15:00  90 15 105/63 (77) 84   


 


2/3/21 15:00    107/64    


 


2/3/21 15:00   19 107/64  Mechanical Ventilator  100


 


2/3/21 14:00  85 29 108/63 (78) 84   


 


2/3/21 14:00    109/64    


 


2/3/21 14:00   18 107/64  Mechanical Ventilator  100


 


2/3/21 13:00  82 23 112/67 (82) 86   


 


2/3/21 13:00    112/67    


 


2/3/21 13:00   30 109/64  Mechanical Ventilator  100


 


2/3/21 12:22    103/62    


 


2/3/21 12:00 96.9 87 28 110/64 (79) 90   


 


2/3/21 12:00      Mechanical Ventilator  


 


2/3/21 12:00   23 113/68  Mechanical Ventilator  100


 


2/3/21 12:00  86      


 


2/3/21 12:00        100


 


2/3/21 11:59    103/62    


 


2/3/21 11:03  95 19 107/62 (77) 86   


 


2/3/21 11:00   19 107/62  Mechanical Ventilator  100


 


2/3/21 10:50  90 32     100


 


2/3/21 10:45  100 20 110/63 (79) 80   


 


2/3/21 10:30    107/62    


 


2/3/21 10:29  104 19 121/73 (89) 56   


 


2/3/21 10:24  69 14 68/45 (53) 58   


 


2/3/21 10:21    67/43    


 


2/3/21 10:15  70 17 59/43 (48) 64   


 


2/3/21 10:03    75/55    


 


2/3/21 10:00   22 145/70  Mechanical Ventilator  100


 


2/3/21 10:00  118 16 145/70 (95) 52   


 


2/3/21 09:49  84 17 68/43 (51) 69   


 


2/3/21 09:23  83      

















Intake and Output  


 


 2/3/21 2/4/21





 19:00 07:00


 


Intake Total 2438.19 ml 2356.450 ml


 


Output Total 635 ml 600 ml


 


Balance 1803.19 ml 1756.450 ml


 


  


 


Free Water 200 ml 300 ml


 


IV Total 1698.19 ml 1636.450 ml


 


Tube Feeding 420 ml 420 ml


 


Other 120 ml 


 


Output Urine Total 515 ml 480 ml


 


Stool Total 20 ml 20 ml


 


Chest Tube Drainage Total 100 ml 100 ml











Current Medications








 Medications


  (Trade)  Dose


 Ordered  Sig/Julisa


 Route


 PRN Reason  Start Time


 Stop Time Status Last Admin


Dose Admin


 


 Acetaminophen


  (Tylenol)  650 mg  Q4H  PRN


 GT


 Temp >100.5  1/15/21 17:15


 2/14/21 17:14  1/29/21 06:59





 


 Amiodarone HCl


  (Cordarone)  200 mg  DAILY


 NG


   1/26/21 09:00


 4/19/21 20:59  2/3/21 09:23





 


 Cefepime HCl 1 gm/


 Dextrose  55 ml @ 


 110 mls/hr  Q24H


 IVPB


   1/30/21 11:00


 2/6/21 10:59  2/3/21 11:27





 


 Chlorhexidine


 Gluconate


  (Vanessa-Hex 2%)  1 applic  DAILY@2000


 TOPIC


   1/19/21 20:00


 4/19/21 19:59  2/3/21 20:39





 


 Dextrose


  (Dextrose 50%)  25 ml  Q30M  PRN


 IV


 Hypoglycemia  1/9/21 13:30


 4/9/21 13:29   





 


 Dextrose


  (Dextrose 50%)  50 ml  Q30M  PRN


 IV


 Hypoglycemia  1/9/21 13:30


 4/9/21 13:29   





 


 Digoxin


  (Lanoxin)  0.125 mg  DAILY


 NG


   1/21/21 09:00


 4/21/21 08:59  2/3/21 09:23





 


 Docusate Sodium


  (Colace)  100 mg  TIDPRN  PRN


 GT


 Constipation  1/12/21 01:15


 2/11/21 01:14  1/12/21 01:42





 


 Enoxaparin Sodium


  (Lovenox)  80 mg  DAILY


 SUBQ


   2/4/21 09:00


 5/5/21 08:59   





 


 Fentanyl Citrate  250 ml @ 1


 mls/hr  Q24H


 IV


   2/3/21 17:00


 2/5/21 16:59  2/4/21 02:08





 


 Hydrocortisone


  (Solu-CORTEF)  100 mg  EVERY 8  HOURS


 IV


   2/1/21 15:45


 5/2/21 15:44  2/4/21 05:40





 


 Insulin Aspart


  (NovoLOG)    Q6HR


 SUBQ


   1/14/21 12:00


 4/9/21 17:59  2/4/21 00:28





 


 Insulin Detemir


  (Levemir)  26 units  Q12HR


 SUBQ


   2/2/21 09:00


 4/12/21 08:59  2/3/21 21:00





 


 Midazolam HCl 50


 mg/Sodium Chloride  100 ml @ 0


 mls/hr  Q24H  PRN


 IV


 Restlessness  2/3/21 21:51


 2/5/21 21:50   





 


 Midodrine


  (Pro-Amatine)  10 mg  Q8HR


 ORAL


   2/1/21 15:30


 5/2/21 15:29  2/4/21 06:08





 


 Norepinephrine


 Bitartrate 16 mg/


 Dextrose  516 ml @ 0


 mls/hr  Q24H


 IV


   2/3/21 10:30


 2/6/21 10:29  2/3/21 12:22





 


 Pantoprazole


  (Protonix)  40 mg  Q12HR


 IVP


   2/1/21 21:00


 2/13/21 08:59  2/3/21 20:39





 


 Sodium Chloride  500 ml @ 


 999 mls/hr  Q31M PRN


 IV


 For hypotension  1/15/21 18:30


 2/14/21 18:29   





 


 Sodium Chloride  1,000 ml @ 


 100 mls/hr  Q10H


 IV


   2/1/21 15:30


 3/3/21 15:29  2/4/21 03:33








Laboratory Tests


2/3/21 12:15: Lactic Acid Level 2.50H


2/3/21 18:30: Lactic Acid Level 2.60H


2/4/21 02:30: 


Urine Color Yellow, Urine Appearance Cloudy, Urine pH 5, Urine Specific Gravity 

1.015, Urine Protein 2+H, Urine Glucose (UA) Negative, Urine Ketones Negative, 

Urine Blood 5+H, Urine Nitrite Negative, Urine Bilirubin Negative, Urine 

Urobilinogen Normal, Urine Leukocyte Esterase 3+H, Urine RBC 10-15H, Urine WBC 

5-10H, Urine Squamous Epithelial Cells None, Urine Bacteria ModerateH, Urine 

Random Sodium < 20L


2/4/21 04:35: 


White Blood Count 7.1, Red Blood Count 2.96L, Hemoglobin 8.5L, Hematocrit 28.6L,

Mean Corpuscular Volume 97, Mean Corpuscular Hemoglobin 28.8, Mean Corpuscular 

Hemoglobin Concent 29.8L, Red Cell Distribution Width 19.9H, Platelet Count 242,

Mean Platelet Volume 8.9, Neutrophils (%) (Auto) , Lymphocytes (%) (Auto) , 

Monocytes (%) (Auto) , Eosinophils (%) (Auto) , Basophils (%) (Auto) , Sodium 

Level 144, Potassium Level 4.2, Chloride Level 105, Carbon Dioxide Level 30, 

Anion Gap 9, Blood Urea Nitrogen 78H, Creatinine 2.8H, Estimat Glomerular 

Filtration Rate 22.8, Glucose Level 124H, Uric Acid 9.2H, Calcium Level 8.2L, 

Phosphorus Level 5.5H, Magnesium Level 2.5H, Total Bilirubin 0.6, Aspartate 

Amino Transf (AST/SGOT) 38H, Alanine Aminotransferase (ALT/SGPT) 56, Alkaline 

Phosphatase 339H, C-Reactive Protein, Quantitative 4.8H, Pro-B-Type Natriuretic 

Peptide 71028K, Total Protein 6.4, Albumin 2.4L, Globulin 4.0, Albumin/Globulin 

Ratio 0.6L, Digoxin Level 1.5


2/4/21 07:26: 


Arterial Blood pH 7.200*L, Arterial Blood Partial Pressure CO2 78.8*H, Arterial 

Blood Partial Pressure O2 43.1*L, Arterial Blood HCO3 30.1H, Arterial Blood O

xygen Saturation 73.3*L, Arterial Blood Base Excess 1.0, Abelardo Test Positive


Height (Feet):  5


Height (Inches):  7.00


Weight (Pounds):  198


General Appearance:  no apparent distress, other - Intubated on ventilator


EENT:  other - On vent


Cardiovascular:  normal rate


Respiratory/Chest:  decreased breath sounds


Abdomen:  distended











Damien Powell MD             Feb 4, 2021 09:23

## 2021-02-04 NOTE — HEMATOLOGY/ONC PROGRESS NOTE
Assessment/Plan


Assessment/Plan


Leukocytosis 2/2 covid pna wbc 15->14-->17-->9.6-->13


Anemia 2/2 chronic disease, hgb 11-->10.4-->9.8-->8.6-->8.5


Thrombocytopenia with plt 122


Hypercoag disorder now on lovenox sq


Ac resp distress on ventilator


Pneumomediastinum


etoh abused


agitated -halodol


vent





Vent


Pressors


cont iv abx and iv decadrone


pulmonary and gi on consult


smear is noted


pneumomediastinum per pulm


dw charge nurse


lovenox sq





Subjective


Allergies:  


Coded Allergies:  


     No Known Allergies (Unverified , 6/11/19)


All Systems:  reviewed and negative except above


Subjective


1/10 on ventilator 60% fio2, on sediation, unresponsive, in icu


1/14 on vent, icu, wbc elev, no bleeding, unresponsive


1/15 on vent, with cash, icu, no bleeding, unresponsive


1/17 on vent, elmer rn, no major changes, icu, nv


1/18 nv, labs reviewd, hr is elev, with afib, is onlovenox sq


1/19 remains on vent, sedated with wrist restraints, icu


1/20 on vent, with hematuria, ngt, in icu, labs reviewed


1/21 remains on vent, settings have been adjusted as per icu care


1/22 remains on vent, icu, on levo 6mcg, off versed, labs noted, elmer rn


1/25 remains onv ent, in icu, with restraints, on levo gtt as well


1/26 nv, suctioned, rectal tube, in icu, with restraints, no bleeding


1/27 nv, remains on vent, rectal tube, labs reviewed, elmer rn


1/28 nv, icu, on vent/ bipap, labs noted, no bleeding, meds reviewed


1/29 nv, on vent, overnight no changes, meds reviewed, labs noted


1/30: code blue last night.


1/31 labs are noted, nv, on vent, meds reviewed, no new change, elmer rn, on 

pressors


2/1 vent, labs have been ordered, nv, meds reviewed, elmer rn


2/2 vent, levo, fentanyl, meds reviewed, nv


2/3 vent, labs reviewed, meds noted, hgb 8.6


2/4 nv, on vent, repositioned, hgb 8.5, no bleeding





Objective


Objective





Current Medications








 Medications


  (Trade)  Dose


 Ordered  Sig/Julisa


 Route


 PRN Reason  Start Time


 Stop Time Status Last Admin


Dose Admin


 


 Acetaminophen


  (Tylenol)  650 mg  Q4H  PRN


 GT


 Temp >100.5  1/15/21 17:15


 2/14/21 17:14  1/29/21 06:59





 


 Amiodarone HCl


  (Cordarone)  200 mg  DAILY


 NG


   1/26/21 09:00


 4/19/21 20:59  2/3/21 09:23





 


 Cefepime HCl 1 gm/


 Dextrose  55 ml @ 


 110 mls/hr  Q24H


 IVPB


   1/30/21 11:00


 2/6/21 10:59  2/3/21 11:27





 


 Chlorhexidine


 Gluconate


  (Vanessa-Hex 2%)  1 applic  DAILY@2000


 TOPIC


   1/19/21 20:00


 4/19/21 19:59  2/3/21 20:39





 


 Dextrose


  (Dextrose 50%)  25 ml  Q30M  PRN


 IV


 Hypoglycemia  1/9/21 13:30


 4/9/21 13:29   





 


 Dextrose


  (Dextrose 50%)  50 ml  Q30M  PRN


 IV


 Hypoglycemia  1/9/21 13:30


 4/9/21 13:29   





 


 Digoxin


  (Lanoxin)  0.125 mg  DAILY


 NG


   1/21/21 09:00


 4/21/21 08:59  2/3/21 09:23





 


 Docusate Sodium


  (Colace)  100 mg  TIDPRN  PRN


 GT


 Constipation  1/12/21 01:15


 2/11/21 01:14  1/12/21 01:42





 


 Enoxaparin Sodium


  (Lovenox)  80 mg  DAILY


 SUBQ


   2/4/21 09:00


 5/5/21 08:59   





 


 Fentanyl Citrate  250 ml @ 1


 mls/hr  Q24H


 IV


   2/3/21 17:00


 2/5/21 16:59  2/4/21 02:08





 


 Hydrocortisone


  (Solu-CORTEF)  100 mg  EVERY 8  HOURS


 IV


   2/1/21 15:45


 5/2/21 15:44  2/4/21 05:40





 


 Insulin Aspart


  (NovoLOG)    Q6HR


 SUBQ


   1/14/21 12:00


 4/9/21 17:59  2/4/21 00:28





 


 Insulin Detemir


  (Levemir)  26 units  Q12HR


 SUBQ


   2/2/21 09:00


 4/12/21 08:59  2/3/21 21:00





 


 Midazolam HCl 50


 mg/Sodium Chloride  100 ml @ 0


 mls/hr  Q24H  PRN


 IV


 Restlessness  2/3/21 21:51


 2/5/21 21:50   





 


 Midodrine


  (Pro-Amatine)  10 mg  Q8HR


 ORAL


   2/1/21 15:30


 5/2/21 15:29  2/4/21 06:08





 


 Norepinephrine


 Bitartrate 16 mg/


 Dextrose  516 ml @ 0


 mls/hr  Q24H


 IV


   2/3/21 10:30


 2/6/21 10:29  2/3/21 12:22





 


 Pantoprazole


  (Protonix)  40 mg  Q12HR


 IVP


   2/1/21 21:00


 2/13/21 08:59  2/3/21 20:39





 


 Sodium Chloride  500 ml @ 


 999 mls/hr  Q31M PRN


 IV


 For hypotension  1/15/21 18:30


 2/14/21 18:29   





 


 Sodium Chloride  1,000 ml @ 


 100 mls/hr  Q10H


 IV


   2/1/21 15:30


 3/3/21 15:29  2/4/21 03:33














Last 24 Hour Vital Signs








  Date Time  Temp Pulse Resp B/P (MAP) Pulse Ox O2 Delivery O2 Flow Rate FiO2


 


2/4/21 06:00  85 29 118/92 (101) 85   


 


2/4/21 05:00  88 25 139/78 (98) 83   


 


2/4/21 04:00  84 30 127/71 (89) 90   


 


2/4/21 04:00      Mechanical Ventilator  


 


2/4/21 04:00   30 127/71  Mechanical Ventilator  100


 


2/4/21 04:00    127/71    


 


2/4/21 04:00        100


 


2/4/21 03:09  80 33     100


 


2/4/21 03:00   29 120/66  Mechanical Ventilator  100


 


2/4/21 03:00    120/66    


 


2/4/21 03:00  82 26 120/66 (84) 89   


 


2/4/21 02:08   24 130/73  Mechanical Ventilator  100


 


2/4/21 02:00  78 29 130/73 (92) 91   


 


2/4/21 02:00   28 127/71  Mechanical Ventilator  100


 


2/4/21 02:00    127/71    


 


2/4/21 01:30  77 32     100


 


2/4/21 01:00   27 130/74  Mechanical Ventilator  100


 


2/4/21 01:00    130/74    


 


2/4/21 01:00  75 26 131/79 (96) 93   


 


2/4/21 00:00 97.5 81 12 135/73 (93) 94   


 


2/4/21 00:00   29 134/75  Mechanical Ventilator  100


 


2/4/21 00:00    134/75    


 


2/4/21 00:00      Mechanical Ventilator  


 


2/3/21 23:44  82      


 


2/3/21 23:00  86 18 116/68 (84) 86   


 


2/3/21 23:00   18 119/65  Mechanical Ventilator  100


 


2/3/21 23:00    119/65    


 


2/3/21 22:00  84 27 122/66 (84) 90   


 


2/3/21 22:00   27 126/68  Mechanical Ventilator  100


 


2/3/21 22:00    126/68    


 


2/3/21 21:00   29 118/64  Mechanical Ventilator  100


 


2/3/21 21:00    117/65    


 


2/3/21 21:00  78 30 117/65 (82) 90   


 


2/3/21 20:00      Mechanical Ventilator  


 


2/3/21 20:00   30 132/74  Mechanical Ventilator  100


 


2/3/21 20:00    132/74    


 


2/3/21 20:00 96.5 82 32 133/75 (94) 89   


 


2/3/21 20:00        100


 


2/3/21 19:29  86      


 


2/3/21 19:08  80 32     100


 


2/3/21 19:00  85 25 140/81 (100) 82   


 


2/3/21 19:00   30 140/81  Mechanical Ventilator  100


 


2/3/21 19:00    140/81    


 


2/3/21 18:00   25 148/76  Mechanical Ventilator  100


 


2/3/21 18:00    148/76    


 


2/3/21 18:00  86 25 148/76 (100) 67   


 


2/3/21 17:11   30 134/77  Mechanical Ventilator  100


 


2/3/21 17:00  86 15 134/77 (96) 89   


 


2/3/21 16:00        100


 


2/3/21 16:00      Mechanical Ventilator  


 


2/3/21 16:00    112/62    


 


2/3/21 16:00  92      


 


2/3/21 16:00 98.9 91 25 112/62 (79) 88   


 


2/3/21 15:53  92 32     100


 


2/3/21 15:00  90 15 105/63 (77) 84   


 


2/3/21 15:00    107/64    


 


2/3/21 15:00   19 107/64  Mechanical Ventilator  100


 


2/3/21 14:00  85 29 108/63 (78) 84   


 


2/3/21 14:00    109/64    


 


2/3/21 14:00   18 107/64  Mechanical Ventilator  100


 


2/3/21 13:00  82 23 112/67 (82) 86   


 


2/3/21 13:00    112/67    


 


2/3/21 13:00   30 109/64  Mechanical Ventilator  100


 


2/3/21 12:22    103/62    


 


2/3/21 12:00 96.9 87 28 110/64 (79) 90   


 


2/3/21 12:00      Mechanical Ventilator  


 


2/3/21 12:00   23 113/68  Mechanical Ventilator  100


 


2/3/21 12:00  86      


 


2/3/21 12:00        100


 


2/3/21 11:59    103/62    


 


2/3/21 11:03  95 19 107/62 (77) 86   


 


2/3/21 11:00   19 107/62  Mechanical Ventilator  100


 


2/3/21 10:50  90 32     100


 


2/3/21 10:45  100 20 110/63 (79) 80   


 


2/3/21 10:30    107/62    


 


2/3/21 10:29  104 19 121/73 (89) 56   


 


2/3/21 10:24  69 14 68/45 (53) 58   


 


2/3/21 10:21    67/43    


 


2/3/21 10:15  70 17 59/43 (48) 64   


 


2/3/21 10:03    75/55    


 


2/3/21 10:00   22 145/70  Mechanical Ventilator  100


 


2/3/21 10:00  118 16 145/70 (95) 52   


 


2/3/21 09:49  84 17 68/43 (51) 69   


 


2/3/21 09:23  83      


 


2/3/21 09:15   21 118/67  Mechanical Ventilator  100


 


2/3/21 09:00   21 118/67  Mechanical Ventilator  100


 


2/3/21 09:00    118/67    


 


2/3/21 09:00  90 24 118/67 (84) 79   


 


2/3/21 08:00  83      


 


2/3/21 08:00 97.5 82 29 112/67 (82) 82   


 


2/3/21 08:00   26 113/59  Mechanical Ventilator  100


 


2/3/21 08:00    113/68    


 


2/3/21 08:00      Mechanical Ventilator  


 


2/3/21 08:00        100


 


2/3/21 07:15    113/68    


 


2/3/21 07:07  84 22     100


 


2/3/21 07:00  88 29 119/66 (83) 69   


 


2/3/21 06:45   21 111/84  Mechanical Ventilator  100


 


2/3/21 06:45  91 21 111/84 (93) 63   


 


2/3/21 06:30  84 32 130/73 (92) 88   


 


2/3/21 06:30   30 130/73  Mechanical Ventilator  100


 


2/3/21 06:15  87 28 93/75 (81) 79   


 


2/3/21 06:15   26 93/75  Mechanical Ventilator  100


 


2/3/21 06:15    93/75    


 


2/3/21 06:00   23 128/73  Mechanical Ventilator  100


 


2/3/21 06:00  86 27 128/73 (91) 87   


 


2/3/21 05:45   27 122/71  Mechanical Ventilator  100


 


2/3/21 05:45  89 25 122/71 (88) 84   


 


2/3/21 05:30   22 134/75  Mechanical Ventilator  100


 


2/3/21 05:30  88 21 134/75 (94) 90   


 


2/3/21 05:15   21 136/79  Mechanical Ventilator  100


 


2/3/21 05:15    136/79    


 


2/3/21 05:15  85 24 136/79 (98) 93   


 


2/3/21 05:00  88 23 126/71 (89) 94   


 


2/3/21 04:45  90 22 120/69 (86) 91   


 


2/3/21 04:30  90 27 118/67 (84) 90   


 


2/3/21 04:15  92 18 118/66 (83) 88   


 


2/3/21 04:15   24 110/70  Mechanical Ventilator  100


 


2/3/21 04:15    110/70    


 


2/3/21 04:00      Mechanical Ventilator  


 


2/3/21 04:00        100


 


2/3/21 04:00  92 22 114/66 (82) 85   


 


2/3/21 03:51  92      


 


2/3/21 03:15   15 119/66  Mechanical Ventilator  100


 


2/3/21 03:15    119/66    


 


2/3/21 03:08  86 32     100


 


2/3/21 03:00  86 23 111/67 (82) 86   


 


2/3/21 02:15   23 114/69  Mechanical Ventilator  100


 


2/3/21 02:15    114/69    


 


2/3/21 02:00  81 21 114/69 (84) 90   


 


2/3/21 01:15   28 118/69  Mechanical Ventilator  100


 


2/3/21 01:15    118/69    


 


2/3/21 01:15  81 30 127/73 (91) 93   


 


2/3/21 01:00  85 27 127/74 (91) 85   


 


2/3/21 00:30  86 23 150/76 (100) 83   


 


2/3/21 00:15  84 13 125/73 (90) 93   


 


2/3/21 00:15   22 125/73  Mechanical Ventilator  100


 


2/3/21 00:15    125/73    


 


2/3/21 00:00 97.0 87 21 129/78 (95) 89   


 


2/3/21 00:00        100


 


2/3/21 00:00   18 123/69  Mechanical Ventilator  100


 


2/3/21 00:00      Mechanical Ventilator  


 


2/2/21 23:59  86      


 


2/2/21 23:45  87 26 126/71 (89) 90   


 


2/2/21 23:45   19 126/71  Mechanical Ventilator  100


 


2/2/21 23:30  88 24 125/75 (92) 92   


 


2/2/21 23:30   23 125/75  Mechanical Ventilator  100


 


2/2/21 23:20  88 33     100


 


2/2/21 23:15   24 120/69  Mechanical Ventilator  100


 


2/2/21 23:15    120/69    


 


2/2/21 23:15  88 21 120/69 (86) 91   


 


2/2/21 23:11   27 111/71  Mechanical Ventilator  100


 


2/2/21 23:00   22 111/71  Mechanical Ventilator  100


 


2/2/21 23:00  89 20 111/71 (84) 91   


 


2/2/21 22:45  90 20 113/70 (84) 89   


 


2/2/21 22:45  90 20 113/70 (84) 89   


 


2/2/21 22:30  91 18 102/67 (79) 88   


 


2/2/21 22:15   23 98/57  Mechanical Ventilator  100


 


2/2/21 22:15    98/57    


 


2/2/21 22:00  92 17 98/57 (71) 85   


 


2/2/21 21:30  90 22 100/59 (73) 83   


 


2/2/21 21:15   27 99/68  Mechanical Ventilator  100


 


2/2/21 21:15    99/68    


 


2/2/21 21:00  88 19 102/60 (74) 85   


 


2/2/21 20:30  87 27 101/64 (76) 86   


 


2/2/21 20:15   16 105/70  Mechanical Ventilator  100


 


2/2/21 20:15    105/70    


 


2/2/21 20:00      Mechanical Ventilator  


 


2/2/21 20:00 96.7 84 14 109/68 (82) 89   


 


2/2/21 20:00        100


 


2/2/21 19:45  82 15 111/67 (82) 90   


 


2/2/21 19:30  81      


 


2/2/21 19:30  80 12 85/57 (66) 88   


 


2/2/21 19:18  84 33     100


 


2/2/21 19:15  80 15 79/57 (64) 90   


 


2/2/21 19:15   14 79/57  Mechanical Ventilator  100


 


2/2/21 19:15    79/57    


 


2/2/21 19:00  79 14 88/60 (69) 89   


 


2/2/21 19:00    88/60    


 


2/2/21 18:30  77 15 94/62 (73) 91   


 


2/2/21 18:15   17 94/62  Mechanical Ventilator  100


 


2/2/21 18:15  78 22 100/68 (79) 88   


 


2/2/21 18:00    93/62    


 


2/2/21 18:00  80 33 77/50 (59) 84   


 


2/2/21 17:30  75 30 93/62 (72) 84   


 


2/2/21 17:15   29 93/58  Mechanical Ventilator  100


 


2/2/21 17:00  75 32 93/58 (70) 56   


 


2/2/21 17:00    93/58    


 


2/2/21 16:30  77 29 102/63 (76) 80   


 


2/2/21 16:15   31 106/88  Mechanical Ventilator  100


 


2/2/21 16:00 96.0 79 32 108/66 (80) 83   


 


2/2/21 16:00    108/66    


 


2/2/21 16:00      Mechanical Ventilator  


 


2/2/21 16:00  76      


 


2/2/21 16:00        100


 


2/2/21 15:30  84 22 143/79 (100) 56   


 


2/2/21 15:15   29 106/67  Mechanical Ventilator  100


 


2/2/21 15:00  80 36 106/67 (80) 85   


 


2/2/21 15:00  81 36     100


 


2/2/21 15:00    106/67    


 


2/2/21 15:00  80 32 106/67 (80) 85   


 


2/2/21 14:30  83 32 101/62 (75) 81   


 


2/2/21 14:15   32 101/62  Mechanical Ventilator  100


 


2/2/21 14:00  83 30 95/62 (73) 83   


 


2/2/21 14:00    100/62    


 


2/2/21 13:30  85 20 95/62 (73) 57   


 


2/2/21 13:15   30 103/76  Mechanical Ventilator  100


 


2/2/21 13:00  85 17 111/68 (82) 82   


 


2/2/21 13:00    111/68    


 


2/2/21 12:30 96.7 85 31 106/69 (81) 73   


 


2/2/21 12:15   34 110/70  Mechanical Ventilator  100


 


2/2/21 12:00  82      


 


2/2/21 12:00    107/72    


 


2/2/21 12:00  87 26 107/72 (84) 67   


 


2/2/21 12:00        100


 


2/2/21 12:00      Mechanical Ventilator  


 


2/2/21 11:30  85 28 100/60 (73) 82   


 


2/2/21 11:15   32 97/79  Mechanical Ventilator  100


 


2/2/21 11:00  87 36     100


 


2/2/21 11:00    97/62    


 


2/2/21 11:00  87 29 97/62 (74) 80   


 


2/2/21 10:30  94 29 97/67 (77) 71   


 


2/2/21 10:15   30 105/53  Mechanical Ventilator  100


 


2/2/21 10:15  89 19 97/64 (75) 62   


 


2/2/21 10:11   26 96/60  Mechanical Ventilator  100


 


2/2/21 10:00  89 20 96/60 (72) 67   


 


2/2/21 10:00    96/60    


 


2/2/21 09:45  84 33 120/70 (87) 89   


 


2/2/21 09:30  86 18 135/86 (102) 86   


 


2/2/21 09:30    135/86    


 


2/2/21 09:30  86 18 135/86 (102) 86   


 


2/2/21 09:15   26 95/53  Mechanical Ventilator  100


 


2/2/21 09:15  86 25 137/80 (99) 88   


 


2/2/21 09:00    122/80    


 


2/2/21 09:00  86 22 122/80 (94) 88   


 


2/2/21 08:45  86 22 130/77 (94) 88   


 


2/2/21 08:30 96.8 85 20 130/77 (94) 91   


 


2/2/21 08:30    130/77    


 


2/2/21 08:15   25 108/64  Mechanical Ventilator  100


 


2/2/21 08:15  84 21 120/74 (89) 91   


 


2/2/21 08:11  83      


 


2/2/21 08:00  85 19 120/74 (89) 91   


 


2/2/21 08:00    120/74    


 


2/2/21 08:00  84      


 


2/2/21 08:00        100


 


2/2/21 08:00      Mechanical Ventilator  


 


2/2/21 07:30  85 18 111/65 (80) 85   


 


2/2/21 07:15   26 117/71  Mechanical Ventilator  100


 


2/2/21 07:00  83 17 117/71 (86) 86   


 


2/2/21 07:00    117/71    


 


2/2/21 07:00  85 33     100

















Intake and Output  


 


 2/3/21 2/4/21





 19:00 07:00


 


Intake Total 2438.19 ml 1981.775 ml


 


Output Total 635 ml 560 ml


 


Balance 1803.19 ml 1421.775 ml


 


  


 


Free Water 200 ml 300 ml


 


IV Total 1698.19 ml 1296.775 ml


 


Tube Feeding 420 ml 385 ml


 


Other 120 ml 


 


Output Urine Total 515 ml 440 ml


 


Stool Total 20 ml 20 ml


 


Chest Tube Drainage Total 100 ml 100 ml











Labs








Test


 2/1/21


09:23 2/1/21


14:29 2/2/21


04:40 2/2/21


08:40


 


Arterial Blood pH


 7.116


(7.350-7.450) 


 


 7.179


(7.350-7.450)


 


Arterial Blood Partial


Pressure CO2 90.8 mmHg


(35.0-45.0) 


 


 86.9 mmHg


(35.0-45.0)


 


Arterial Blood Partial


Pressure O2 56.3 mmHg


(75.0-100.0) 


 


 53.6 mmHg


(75.0-100.0)


 


Arterial Blood HCO3


 28.6 mmol/L


(22.0-26.0) 


 


 31.2 mmol/L


(22.0-26.0)


 


Arterial Blood Oxygen


Saturation 85.3 %


() 


 


 85.1 %


()


 


Arterial Blood Base Excess -2.3 (-2-2)    1.1 (-2-2) 


 


Abelardo Test Positive    Positive 


 


White Blood Count


 


 9.0 K/UL


(4.8-10.8) 6.5 K/UL


(4.8-10.8) 





 


Red Blood Count


 


 3.41 M/UL


(4.70-6.10) 3.12 M/UL


(4.70-6.10) 





 


Hemoglobin


 


 9.8 G/DL


(14.2-18.0) 9.0 G/DL


(14.2-18.0) 





 


Hematocrit


 


 32.8 %


(42.0-52.0) 30.2 %


(42.0-52.0) 





 


Mean Corpuscular Volume  96 FL (80-99)  97 FL (80-99)  


 


Mean Corpuscular Hemoglobin


 


 28.7 PG


(27.0-31.0) 28.8 PG


(27.0-31.0) 





 


Mean Corpuscular Hemoglobin


Concent 


 29.9 G/DL


(32.0-36.0) 29.8 G/DL


(32.0-36.0) 





 


Red Cell Distribution Width


 


 18.0 %


(11.6-14.8) 18.0 %


(11.6-14.8) 





 


Platelet Count


 


 181 K/UL


(150-450) 190 K/UL


(150-450) 





 


Mean Platelet Volume


 


 9.5 FL


(6.5-10.1) 9.5 FL


(6.5-10.1) 





 


Neutrophils (%) (Auto)


 


 83.1 %


(45.0-75.0)  % (45.0-75.0) 


 





 


Lymphocytes (%) (Auto)


 


 13.2 %


(20.0-45.0)  % (20.0-45.0) 


 





 


Monocytes (%) (Auto)


 


 2.6 %


(1.0-10.0)  % (1.0-10.0) 


 





 


Eosinophils (%) (Auto)


 


 0.5 %


(0.0-3.0)  % (0.0-3.0) 


 





 


Basophils (%) (Auto)


 


 0.6 %


(0.0-2.0)  % (0.0-2.0) 


 





 


Sodium Level


 


 143 MMOL/L


(136-145) 144 MMOL/L


(136-145) 





 


Potassium Level


 


 5.7 MMOL/L


(3.5-5.1) 4.5 MMOL/L


(3.5-5.1) 





 


Chloride Level


 


 108 MMOL/L


() 106 MMOL/L


() 





 


Carbon Dioxide Level


 


 27 MMOL/L


(21-32) 29 MMOL/L


(21-32) 





 


Anion Gap


 


 9 mmol/L


(5-15) 9 mmol/L


(5-15) 





 


Blood Urea Nitrogen


 


 75 mg/dL


(7-18) 74 mg/dL


(7-18) 





 


Creatinine


 


 2.6 MG/DL


(0.55-1.30) 2.6 MG/DL


(0.55-1.30) 





 


Estimat Glomerular Filtration


Rate 


 24.8 mL/min


(>60) 24.8 mL/min


(>60) 





 


Glucose Level


 


 330 MG/DL


() 281 MG/DL


() 





 


Uric Acid


 


 7.5 MG/DL


(2.6-7.2) 7.8 MG/DL


(2.6-7.2) 





 


Calcium Level


 


 7.5 MG/DL


(8.5-10.1) 7.9 MG/DL


(8.5-10.1) 





 


Phosphorus Level


 


 7.1 MG/DL


(2.5-4.9) 6.1 MG/DL


(2.5-4.9) 





 


Magnesium Level


 


 2.4 MG/DL


(1.8-2.4) 2.2 MG/DL


(1.8-2.4) 





 


Total Bilirubin


 


 0.5 MG/DL


(0.2-1.0) 0.6 MG/DL


(0.2-1.0) 





 


Aspartate Amino Transf


(AST/SGOT) 


 42 U/L (15-37) 


 24 U/L (15-37) 


 





 


Alanine Aminotransferase


(ALT/SGPT) 


 39 U/L (12-78) 


 33 U/L (12-78) 


 





 


Alkaline Phosphatase


 


 381 U/L


() 309 U/L


() 





 


Total Protein


 


 5.6 G/DL


(6.4-8.2) 6.2 G/DL


(6.4-8.2) 





 


Albumin


 


 1.1 G/DL


(3.4-5.0) 1.4 G/DL


(3.4-5.0) 





 


Globulin  4.5 g/dL  4.8 g/dL  


 


Albumin/Globulin Ratio  0.2 (1.0-2.7)  0.3 (1.0-2.7)  


 


Differential Total Cells


Counted 


 


 100 


 





 


Neutrophils % (Manual)   91 % (45-75)  


 


Lymphocytes % (Manual)   6 % (20-45)  


 


Monocytes % (Manual)   3 % (1-10)  


 


Eosinophils % (Manual)   0 % (0-3)  


 


Basophils % (Manual)   0 % (0-2)  


 


Band Neutrophils   0 % (0-8)  


 


Platelet Estimate   Adequate  


 


Platelet Morphology   Normal  


 


Polychromasia   1+  


 


Hypochromasia   1+  


 


Anisocytosis   1+  


 


Hemoglobin A1c


 


 


 10.5 %


(4.3-6.0) 





 


Lactic Acid Level


 


 


 1.30 mmol/L


(0.4-2.0) 





 


Gamma Glutamyl Transpeptidase   190 U/L (5-85)  


 


Ammonia


 


 


 55 umol/L


(11-32) 





 


Lactate Dehydrogenase


 


 


 279 U/L


() 





 


Total Creatine Kinase


 


 


 20 U/L


() 





 


Troponin I


 


 


 0.001 ng/mL


(0.000-0.056) 





 


C-Reactive Protein,


Quantitative 


 


 17.0 mg/dL


(0.00-0.90) 





 


Pro-B-Type Natriuretic Peptide


 


 


 58692 pg/mL


(0-125) 





 


Vitamin B12 Level


 


 


 > 2000 PG/ML


(193-986) 





 


Folate


 


 


 12.6 NG/ML


(8.6-58.9) 





 


Thyroid Stimulating Hormone


(TSH) 


 


 2.509 uiU/mL


(0.358-3.740) 





 


Digoxin Level


 


 


 1.4 NG/ML


(0.5-2.0) 





 


Test


 2/3/21


04:25 2/3/21


08:31 2/3/21


12:15 2/3/21


18:30


 


White Blood Count


 6.5 K/UL


(4.8-10.8) 


 


 





 


Red Blood Count


 2.95 M/UL


(4.70-6.10) 


 


 





 


Hemoglobin


 8.6 G/DL


(14.2-18.0) 


 


 





 


Hematocrit


 28.3 %


(42.0-52.0) 


 


 





 


Mean Corpuscular Volume 96 FL (80-99)    


 


Mean Corpuscular Hemoglobin


 29.0 PG


(27.0-31.0) 


 


 





 


Mean Corpuscular Hemoglobin


Concent 30.3 G/DL


(32.0-36.0) 


 


 





 


Red Cell Distribution Width


 17.8 %


(11.6-14.8) 


 


 





 


Platelet Count


 239 K/UL


(150-450) 


 


 





 


Mean Platelet Volume


 9.4 FL


(6.5-10.1) 


 


 





 


Neutrophils (%) (Auto)  % (45.0-75.0)    


 


Lymphocytes (%) (Auto)  % (20.0-45.0)    


 


Monocytes (%) (Auto)  % (1.0-10.0)    


 


Eosinophils (%) (Auto)  % (0.0-3.0)    


 


Basophils (%) (Auto)  % (0.0-2.0)    


 


Sodium Level


 145 MMOL/L


(136-145) 


 


 





 


Potassium Level


 4.4 MMOL/L


(3.5-5.1) 


 


 





 


Chloride Level


 105 MMOL/L


() 


 


 





 


Carbon Dioxide Level


 30 MMOL/L


(21-32) 


 


 





 


Anion Gap


 10 mmol/L


(5-15) 


 


 





 


Blood Urea Nitrogen


 77 mg/dL


(7-18) 


 


 





 


Creatinine


 2.7 MG/DL


(0.55-1.30) 


 


 





 


Estimat Glomerular Filtration


Rate 23.8 mL/min


(>60) 


 


 





 


Glucose Level


 198 MG/DL


() 


 


 





 


Lactic Acid Level


 2.60 mmol/L


(0.4-2.0) 


 2.50 mmol/L


(0.4-2.0) 2.60 mmol/L


(0.66-2.22)


 


Uric Acid


 8.6 MG/DL


(2.6-7.2) 


 


 





 


Calcium Level


 8.0 MG/DL


(8.5-10.1) 


 


 





 


Phosphorus Level


 5.6 MG/DL


(2.5-4.9) 


 


 





 


Magnesium Level


 2.4 MG/DL


(1.8-2.4) 


 


 





 


Total Bilirubin


 0.5 MG/DL


(0.2-1.0) 


 


 





 


Gamma Glutamyl Transpeptidase 210 U/L (5-85)    


 


Aspartate Amino Transf


(AST/SGOT) 27 U/L (15-37) 


 


 


 





 


Alanine Aminotransferase


(ALT/SGPT) 39 U/L (12-78) 


 


 


 





 


Alkaline Phosphatase


 329 U/L


() 


 


 





 


Total Creatine Kinase


 16 U/L


() 


 


 





 


C-Reactive Protein,


Quantitative 8.3 mg/dL


(0.00-0.90) 


 


 





 


Pro-B-Type Natriuretic Peptide


 18510 pg/mL


(0-125) 


 


 





 


Total Protein


 6.5 G/DL


(6.4-8.2) 


 


 





 


Albumin


 2.1 G/DL


(3.4-5.0) 


 


 





 


Globulin 4.4 g/dL    


 


Albumin/Globulin Ratio 0.5 (1.0-2.7)    


 


Arterial Blood pH


 


 7.194


(7.350-7.450) 


 





 


Arterial Blood Partial


Pressure CO2 


 84.6 mmHg


(35.0-45.0) 


 





 


Arterial Blood Partial


Pressure O2 


 51.1 mmHg


(75.0-100.0) 


 





 


Arterial Blood HCO3


 


 31.9 mmol/L


(22.0-26.0) 


 





 


Arterial Blood Oxygen


Saturation 


 81.4 %


() 


 





 


Arterial Blood Base Excess  2.4 (-2-2)   


 


Abelardo Test  Positive   


 


Test


 2/4/21


02:30 2/4/21


04:35 


 





 


Urine Color Yellow    


 


Urine Appearance Cloudy    


 


Urine pH 5 (4.5-8.0)    


 


Urine Specific Gravity


 1.015


(1.005-1.035) 


 


 





 


Urine Protein 2+ (NEGATIVE)    


 


Urine Glucose (UA)


 Negative


(NEGATIVE) 


 


 





 


Urine Ketones


 Negative


(NEGATIVE) 


 


 





 


Urine Blood 5+ (NEGATIVE)    


 


Urine Nitrite


 Negative


(NEGATIVE) 


 


 





 


Urine Bilirubin


 Negative


(NEGATIVE) 


 


 





 


Urine Urobilinogen


 Normal MG/DL


(0.0-1.0) 


 


 





 


Urine Leukocyte Esterase 3+ (NEGATIVE)    


 


Urine RBC


 10-15 /HPF (0


- 0) 


 


 





 


Urine WBC


 5-10 /HPF (0 -


0) 


 


 





 


Urine Squamous Epithelial


Cells None /LPF


(NONE/OCC) 


 


 





 


Urine Bacteria


 Moderate /HPF


(NONE) 


 


 





 


Urine Random Sodium


 < 20 mmol/L


() 


 


 





 


White Blood Count


 


 7.1 K/UL


(4.8-10.8) 


 





 


Red Blood Count


 


 2.96 M/UL


(4.70-6.10) 


 





 


Hemoglobin


 


 8.5 G/DL


(14.2-18.0) 


 





 


Hematocrit


 


 28.6 %


(42.0-52.0) 


 





 


Mean Corpuscular Volume  97 FL (80-99)   


 


Mean Corpuscular Hemoglobin


 


 28.8 PG


(27.0-31.0) 


 





 


Mean Corpuscular Hemoglobin


Concent 


 29.8 G/DL


(32.0-36.0) 


 





 


Red Cell Distribution Width


 


 19.9 %


(11.6-14.8) 


 





 


Platelet Count


 


 242 K/UL


(150-450) 


 





 


Mean Platelet Volume


 


 8.9 FL


(6.5-10.1) 


 





 


Neutrophils (%) (Auto)   % (45.0-75.0)   


 


Lymphocytes (%) (Auto)   % (20.0-45.0)   


 


Monocytes (%) (Auto)   % (1.0-10.0)   


 


Eosinophils (%) (Auto)   % (0.0-3.0)   


 


Basophils (%) (Auto)   % (0.0-2.0)   


 


Sodium Level


 


 144 MMOL/L


(136-145) 


 





 


Potassium Level


 


 4.2 MMOL/L


(3.5-5.1) 


 





 


Chloride Level


 


 105 MMOL/L


() 


 





 


Carbon Dioxide Level


 


 30 MMOL/L


(21-32) 


 





 


Anion Gap


 


 9 mmol/L


(5-15) 


 





 


Blood Urea Nitrogen


 


 78 mg/dL


(7-18) 


 





 


Creatinine


 


 2.8 MG/DL


(0.55-1.30) 


 





 


Estimat Glomerular Filtration


Rate 


 22.8 mL/min


(>60) 


 





 


Glucose Level


 


 124 MG/DL


() 


 





 


Uric Acid


 


 9.2 MG/DL


(2.6-7.2) 


 





 


Calcium Level


 


 8.2 MG/DL


(8.5-10.1) 


 





 


Phosphorus Level


 


 5.5 MG/DL


(2.5-4.9) 


 





 


Magnesium Level


 


 2.5 MG/DL


(1.8-2.4) 


 





 


Total Bilirubin


 


 0.6 MG/DL


(0.2-1.0) 


 





 


Aspartate Amino Transf


(AST/SGOT) 


 38 U/L (15-37) 


 


 





 


Alanine Aminotransferase


(ALT/SGPT) 


 56 U/L (12-78) 


 


 





 


Alkaline Phosphatase


 


 339 U/L


() 


 





 


C-Reactive Protein,


Quantitative 


 4.8 mg/dL


(0.00-0.90) 


 





 


Pro-B-Type Natriuretic Peptide


 


 36872 pg/mL


(0-125) 


 





 


Total Protein


 


 6.4 G/DL


(6.4-8.2) 


 





 


Albumin


 


 2.4 G/DL


(3.4-5.0) 


 





 


Globulin  4.0 g/dL   


 


Albumin/Globulin Ratio  0.6 (1.0-2.7)   


 


Digoxin Level


 


 1.5 NG/ML


(0.5-2.0) 


 











Height (Feet):  5


Height (Inches):  7.00


Weight (Pounds):  198


Objective


General Appearance:  lethargic, moderate distress


Neck:  supple


Cardiovascular:  regular rhythm


Respiratory/Chest:  crackles/rales, rhonchi - bilaterally vent++


Abdomen:  non tender, soft


Extremities:  non-tender











Emmett Cook MD           Feb 4, 2021 06:48

## 2021-02-04 NOTE — NUR
NURSE NOTES:

repositioned pt. right now pt is at RASS-2. no active bleedings noted. chest tube is secured 
no bleeding noted.

## 2021-02-04 NOTE — EMERGENCY ROOM REPORT
Physical Exam


Called for Code Blue.


Patient asystolic.


Epi given before I arrived.





Last 24 Hour Vital Signs








  Date Time  Temp Pulse Resp B/P (MAP) Pulse Ox O2 Delivery O2 Flow Rate FiO2


 


2/4/21 12:30 97.2 68 31 90/60 (70) 89   


 


2/4/21 12:00        100


 


2/4/21 12:00  69      


 


2/4/21 12:00  83 29 123/70 (87) 88   


 


2/4/21 11:39   33 111/70  Mechanical Ventilator  100


 


2/4/21 11:37   33   Mechanical Ventilator  100


 


2/4/21 11:30  86 28 111/70 (84) 81   


 


2/4/21 11:00  87 26 127/76 (93) 87   


 


2/4/21 10:30  89 32     100


 


2/4/21 10:00  88 30 120/66 (84) 81   


 


2/4/21 09:57  89      


 


2/4/21 09:00  88 30 123/71 (88) 88   


 


2/4/21 08:00        100


 


2/4/21 08:00  86 33 121/68 (85) 86   


 


2/4/21 08:00  84      


 


2/4/21 07:00   32 127/77  Mechanical Ventilator  100


 


2/4/21 07:00    127/77    


 


2/4/21 07:00  79 29 127/77 (94) 90   


 


2/4/21 06:45  83 34     100


 


2/4/21 06:00   29 118/92  Mechanical Ventilator  100


 


2/4/21 06:00    118/92    


 


2/4/21 06:00  85 29 118/92 (101) 85   


 


2/4/21 05:00   25 139/78  Mechanical Ventilator  100


 


2/4/21 05:00    139/78    


 


2/4/21 05:00  88 25 139/78 (98) 83   


 


2/4/21 04:14  89      


 


2/4/21 04:00 97.0 84 30 127/71 (89) 90   


 


2/4/21 04:00      Mechanical Ventilator  


 


2/4/21 04:00   30 127/71  Mechanical Ventilator  100


 


2/4/21 04:00    127/71    


 


2/4/21 04:00        100


 


2/4/21 03:09  80 33     100


 


2/4/21 03:00   29 120/66  Mechanical Ventilator  100


 


2/4/21 03:00    120/66    


 


2/4/21 03:00  82 26 120/66 (84) 89   


 


2/4/21 02:08   24 130/73  Mechanical Ventilator  100


 


2/4/21 02:00  78 29 130/73 (92) 91   


 


2/4/21 02:00   28 127/71  Mechanical Ventilator  100


 


2/4/21 02:00    127/71    


 


2/4/21 01:30  77 32     100


 


2/4/21 01:00   27 130/74  Mechanical Ventilator  100


 


2/4/21 01:00    130/74    


 


2/4/21 01:00  75 26 131/79 (96) 93   


 


2/4/21 00:00 97.5 81 12 135/73 (93) 94   


 


2/4/21 00:00   29 134/75  Mechanical Ventilator  100


 


2/4/21 00:00    134/75    


 


2/4/21 00:00      Mechanical Ventilator  


 


2/3/21 23:44  82      


 


2/3/21 23:00  86 18 116/68 (84) 86   


 


2/3/21 23:00   18 119/65  Mechanical Ventilator  100


 


2/3/21 23:00    119/65    


 


2/3/21 22:00  84 27 122/66 (84) 90   


 


2/3/21 22:00   27 126/68  Mechanical Ventilator  100


 


2/3/21 22:00    126/68    


 


2/3/21 21:00   29 118/64  Mechanical Ventilator  100


 


2/3/21 21:00    117/65    


 


2/3/21 21:00  78 30 117/65 (82) 90   


 


2/3/21 20:00      Mechanical Ventilator  


 


2/3/21 20:00   30 132/74  Mechanical Ventilator  100


 


2/3/21 20:00    132/74    


 


2/3/21 20:00 96.5 82 32 133/75 (94) 89   


 


2/3/21 20:00        100


 


2/3/21 19:29  86      


 


2/3/21 19:08  80 32     100


 


2/3/21 19:00  85 25 140/81 (100) 82   


 


2/3/21 19:00   30 140/81  Mechanical Ventilator  100


 


2/3/21 19:00    140/81    


 


2/3/21 18:00   25 148/76  Mechanical Ventilator  100


 


2/3/21 18:00    148/76    


 


2/3/21 18:00  86 25 148/76 (100) 67   


 


2/3/21 17:11   30 134/77  Mechanical Ventilator  100


 


2/3/21 17:00  86 15 134/77 (96) 89   


 


2/3/21 16:00        100


 


2/3/21 16:00      Mechanical Ventilator  


 


2/3/21 16:00    112/62    


 


2/3/21 16:00  92      


 


2/3/21 16:00 98.9 91 25 112/62 (79) 88   


 


2/3/21 15:53  92 32     100








Sp02 EP Interpretation:  reviewed, abnormal - Interpreted as low by me


General Appearance:  other - Flaccid and unresponsive


Eyes:  bilateral eye other - Eyes closed


ENT:  other - Tracheal tube


Respiratory:  other - Good chest rise with ventilator


Cardiovascular #1:  other - No pulses except with CPR


Gastrointestinal:  decreased bowel sounds, overweight


Rectal:  other - Rectal tube


Genitourinary:  other - Fully


Musculoskeletal:  other - Acid


Neurologic:  other - Unresponsive


Psychiatric:  other - Unresponsive


Skin:  other - Sallow and cool


CPR/Code Blue


CPR/Code Blue Narrative


CODE BLUE called due to asystole.


Epinephrine 1 mg had been administered prior to my arrival.


CPR was supervised by me and this was performed with good quality and pulses.


On the monitor the patient had ventricular fibrillation.


I defibrillated the patient with 120 J.


The resultant rhythm was asystole.


1 amp of bicarb was administered as the patient had metabolic acidosis based on 

the morning labs.


Transient rhythm with a rate of 60.  There were no pulses with this.  This 

degenerated to asystole.  Epinephrine was repeated 1 amp.


Patient had a period of asystole but then returned with his own spontaneous 

circulation with pulses.





Medical Decision Making


Diagnostic Impression:  


   Primary Impression:  


   Cardiopulmonary arrest


   Additional Impression:  


   Pneumonia due to COVID-19 virus


ER Course


Patient suffered an asystolic arrest.


Please see CODE BLUE report.


Patient returns with his own spontaneous circulation.


Condition is grave.





Laboratory Tests








Test


 2/3/21


04:25 2/3/21


08:31 2/3/21


11:43 2/3/21


12:15


 


White Blood Count


 6.5 K/UL


(4.8-10.8) 


 


 





 


Red Blood Count


 2.95 M/UL


(4.70-6.10)  L 


 


 





 


Hemoglobin


 8.6 G/DL


(14.2-18.0)  L 


 


 





 


Hematocrit


 28.3 %


(42.0-52.0)  L 


 


 





 


Mean Corpuscular Volume 96 FL (80-99)     


 


Mean Corpuscular Hemoglobin


 29.0 PG


(27.0-31.0) 


 


 





 


Mean Corpuscular Hemoglobin


Concent 30.3 G/DL


(32.0-36.0)  L 


 


 





 


Red Cell Distribution Width


 17.8 %


(11.6-14.8)  H 


 


 





 


Platelet Count


 239 K/UL


(150-450) 


 


 





 


Mean Platelet Volume


 9.4 FL


(6.5-10.1) 


 


 





 


Neutrophils (%) (Auto)


 % (45.0-75.0)


 


 


 





 


Lymphocytes (%) (Auto)


 % (20.0-45.0)


 


 


 





 


Monocytes (%) (Auto)  % (1.0-10.0)     


 


Eosinophils (%) (Auto)  % (0.0-3.0)     


 


Basophils (%) (Auto)  % (0.0-2.0)     


 


Sodium Level


 145 MMOL/L


(136-145) 


 


 





 


Potassium Level


 4.4 MMOL/L


(3.5-5.1) 


 


 





 


Chloride Level


 105 MMOL/L


() 


 


 





 


Carbon Dioxide Level


 30 MMOL/L


(21-32) 


 


 





 


Anion Gap


 10 mmol/L


(5-15) 


 


 





 


Blood Urea Nitrogen


 77 mg/dL


(7-18)  H 


 


 





 


Creatinine


 2.7 MG/DL


(0.55-1.30)  H 


 


 





 


Estimated Glomerular


Filtration Rate 23.8 mL/min


(>60) 


 


 





 


Glucose Level


 198 MG/DL


()  H 


 


 





 


Lactic Acid Level


 2.60 mmol/L


(0.4-2.0)  H 


 


 2.50 mmol/L


(0.4-2.0)  H


 


Uric Acid


 8.6 MG/DL


(2.6-7.2)  H 


 


 





 


Calcium Level


 8.0 MG/DL


(8.5-10.1)  L 


 


 





 


Phosphorus Level


 5.6 MG/DL


(2.5-4.9)  H 


 


 





 


Magnesium Level


 2.4 MG/DL


(1.8-2.4) 


 


 





 


Total Bilirubin


 0.5 MG/DL


(0.2-1.0) 


 


 





 


Gamma Glutamyl Transpeptidase


 210 U/L (5-85)


H 


 


 





 


Aspartate Amino Transferase


(AST) 27 U/L (15-37)


 


 


 





 


Alanine Aminotransferase (ALT)


 39 U/L (12-78)


 


 


 





 


Alkaline Phosphatase


 329 U/L


()  H 


 


 





 


Total Creatine Kinase


 16 U/L


()  L 


 


 





 


C-Reactive Protein,


Quantitative 8.3 mg/dL


(0.00-0.90)  H 


 


 





 


Pro-B-Type Natriuretic Peptide


 11717 pg/mL


(0-125)  H 


 


 





 


Total Protein


 6.5 G/DL


(6.4-8.2) 


 


 





 


Albumin


 2.1 G/DL


(3.4-5.0)  L 


 


 





 


Globulin 4.4 g/dL     


 


Albumin/Globulin Ratio


 0.5 (1.0-2.7)


L 


 


 





 


Arterial Blood pH


 


 7.194


(7.350-7.450) 


 





 


Arterial Blood Partial


Pressure CO2 


 84.6 mmHg


(35.0-45.0)  *H 


 





 


Arterial Blood Partial


Pressure O2 


 51.1 mmHg


(75.0-100.0)  L 


 





 


Arterial Blood HCO3


 


 31.9 mmol/L


(22.0-26.0)  H 


 





 


Arterial Blood Oxygen


Saturation 


 81.4 %


()  *L 


 





 


Arterial Blood Base Excess  2.4 (-2-2)  H  


 


Abelardo Test  Positive    


 


POC Whole Blood Glucose


 


 


 174 MG/DL


()  H 





 


Test


 2/3/21


17:17 2/3/21


18:30 2/4/21


02:30 2/4/21


04:35


 


POC Whole Blood Glucose


 182 MG/DL


()  H 


 


 





 


Lactic Acid Level


 


 2.60 mmol/L


(0.66-2.22)  H 


 





 


Urine Color   Yellow   


 


Urine Appearance   Cloudy   


 


Urine pH   5 (4.5-8.0)   


 


Urine Specific Gravity


 


 


 1.015


(1.005-1.035) 





 


Urine Protein


 


 


 2+ (NEGATIVE)


H 





 


Urine Glucose (UA)


 


 


 Negative


(NEGATIVE) 





 


Urine Ketones


 


 


 Negative


(NEGATIVE) 





 


Urine Blood


 


 


 5+ (NEGATIVE)


H 





 


Urine Nitrite


 


 


 Negative


(NEGATIVE) 





 


Urine Bilirubin


 


 


 Negative


(NEGATIVE) 





 


Urine Urobilinogen


 


 


 Normal MG/DL


(0.0-1.0) 





 


Urine Leukocyte Esterase


 


 


 3+ (NEGATIVE)


H 





 


Urine RBC


 


 


 10-15 /HPF (0


- 0)  H 





 


Urine WBC


 


 


 5-10 /HPF (0 -


0)  H 





 


Urine Squamous Epithelial


Cells 


 


 None /LPF


(NONE/OCC) 





 


Urine Bacteria


 


 


 Moderate /HPF


(NONE)  H 





 


Urine Random Sodium


 


 


 < 20 mmol/L


()  L 





 


White Blood Count


 


 


 


 7.1 K/UL


(4.8-10.8)


 


Red Blood Count


 


 


 


 2.96 M/UL


(4.70-6.10)  L


 


Hemoglobin


 


 


 


 8.5 G/DL


(14.2-18.0)  L


 


Hematocrit


 


 


 


 28.6 %


(42.0-52.0)  L


 


Mean Corpuscular Volume    97 FL (80-99)  


 


Mean Corpuscular Hemoglobin


 


 


 


 28.8 PG


(27.0-31.0)


 


Mean Corpuscular Hemoglobin


Concent 


 


 


 29.8 G/DL


(32.0-36.0)  L


 


Red Cell Distribution Width


 


 


 


 19.9 %


(11.6-14.8)  H


 


Platelet Count


 


 


 


 242 K/UL


(150-450)


 


Mean Platelet Volume


 


 


 


 8.9 FL


(6.5-10.1)


 


Neutrophils (%) (Auto)


 


 


 


 % (45.0-75.0)





 


Lymphocytes (%) (Auto)


 


 


 


 % (20.0-45.0)





 


Monocytes (%) (Auto)     % (1.0-10.0)  


 


Eosinophils (%) (Auto)     % (0.0-3.0)  


 


Basophils (%) (Auto)     % (0.0-2.0)  


 


Sodium Level


 


 


 


 144 MMOL/L


(136-145)


 


Potassium Level


 


 


 


 4.2 MMOL/L


(3.5-5.1)


 


Chloride Level


 


 


 


 105 MMOL/L


()


 


Carbon Dioxide Level


 


 


 


 30 MMOL/L


(21-32)


 


Anion Gap


 


 


 


 9 mmol/L


(5-15)


 


Blood Urea Nitrogen


 


 


 


 78 mg/dL


(7-18)  H


 


Creatinine


 


 


 


 2.8 MG/DL


(0.55-1.30)  H


 


Estimated Glomerular


Filtration Rate 


 


 


 22.8 mL/min


(>60)


 


Glucose Level


 


 


 


 124 MG/DL


()  H


 


Uric Acid


 


 


 


 9.2 MG/DL


(2.6-7.2)  H


 


Calcium Level


 


 


 


 8.2 MG/DL


(8.5-10.1)  L


 


Phosphorus Level


 


 


 


 5.5 MG/DL


(2.5-4.9)  H


 


Magnesium Level


 


 


 


 2.5 MG/DL


(1.8-2.4)  H


 


Total Bilirubin


 


 


 


 0.6 MG/DL


(0.2-1.0)


 


Aspartate Amino Transferase


(AST) 


 


 


 38 U/L (15-37)


H


 


Alanine Aminotransferase (ALT)


 


 


 


 56 U/L (12-78)





 


Alkaline Phosphatase


 


 


 


 339 U/L


()  H


 


C-Reactive Protein,


Quantitative 


 


 


 4.8 mg/dL


(0.00-0.90)  H


 


Pro-B-Type Natriuretic Peptide


 


 


 


 35456 pg/mL


(0-125)  H


 


Total Protein


 


 


 


 6.4 G/DL


(6.4-8.2)


 


Albumin


 


 


 


 2.4 G/DL


(3.4-5.0)  L


 


Globulin    4.0 g/dL  


 


Albumin/Globulin Ratio


 


 


 


 0.6 (1.0-2.7)


L


 


Digoxin Level


 


 


 


 1.5 NG/ML


(0.5-2.0)


 


Test


 2/4/21


07:26 2/4/21


09:25 2/4/21


13:09 





 


Arterial Blood pH


 7.200


(7.350-7.450) 


 


 





 


Arterial Blood Partial


Pressure CO2 78.8 mmHg


(35.0-45.0)  *H 


 


 





 


Arterial Blood Partial


Pressure O2 43.1 mmHg


(75.0-100.0) 


 


 





 


Arterial Blood HCO3


 30.1 mmol/L


(22.0-26.0)  H 


 


 





 


Arterial Blood Oxygen


Saturation 73.3 %


()  *L 


 


 





 


Arterial Blood Base Excess 1.0 (-2-2)     


 


Abelardo Test Positive     


 


Lactic Acid Level


 


 2.00 mmol/L


(0.4-2.0) 


 





 


POC Whole Blood Glucose


 


 


 190 MG/DL


()  H 











Rhythm Strip Diag. Results


Rhythm:  NSR, no PVC's, no ectopy





Last Vital Signs








  Date Time  Temp Pulse Resp B/P (MAP) Pulse Ox O2 Delivery O2 Flow Rate FiO2


 


2/4/21 12:30 97.2 68 31 90/60 (70) 89   


 


2/4/21 12:00        100


 


2/4/21 11:39      Mechanical Ventilator  


 


2/2/21 03:15       100.0 








Status:  other - Condition is grave


Disposition:  ADMITTED AS INPATIENT


Condition:  Critical


Referrals:  


NOT CHOSEN IPA/MD,REFERRING (PCP)











Michele Goodman MD                 Feb 4, 2021 15:26

## 2021-02-04 NOTE — SURGERY PROGRESS NOTE
Surgery Progress Note


Subjective


Procedure Performed


Right tube thoracostomy chest tube insertion


Additional Comments


ill appearing


fi02 100%


peep 12


stat 80's


prognosis guarded





Objective





Last 24 Hour Vital Signs








  Date Time  Temp Pulse Resp B/P (MAP) Pulse Ox O2 Delivery O2 Flow Rate FiO2


 


2/4/21 07:00   32 127/77  Mechanical Ventilator  100


 


2/4/21 07:00    127/77    


 


2/4/21 07:00  79 29 127/77 (94) 90   


 


2/4/21 06:45  83 34     100


 


2/4/21 06:00   29 118/92  Mechanical Ventilator  100


 


2/4/21 06:00    118/92    


 


2/4/21 06:00  85 29 118/92 (101) 85   


 


2/4/21 05:00   25 139/78  Mechanical Ventilator  100


 


2/4/21 05:00    139/78    


 


2/4/21 05:00  88 25 139/78 (98) 83   


 


2/4/21 04:14  89      


 


2/4/21 04:00 97.0 84 30 127/71 (89) 90   


 


2/4/21 04:00      Mechanical Ventilator  


 


2/4/21 04:00   30 127/71  Mechanical Ventilator  100


 


2/4/21 04:00    127/71    


 


2/4/21 04:00        100


 


2/4/21 03:09  80 33     100


 


2/4/21 03:00   29 120/66  Mechanical Ventilator  100


 


2/4/21 03:00    120/66    


 


2/4/21 03:00  82 26 120/66 (84) 89   


 


2/4/21 02:08   24 130/73  Mechanical Ventilator  100


 


2/4/21 02:00  78 29 130/73 (92) 91   


 


2/4/21 02:00   28 127/71  Mechanical Ventilator  100


 


2/4/21 02:00    127/71    


 


2/4/21 01:30  77 32     100


 


2/4/21 01:00   27 130/74  Mechanical Ventilator  100


 


2/4/21 01:00    130/74    


 


2/4/21 01:00  75 26 131/79 (96) 93   


 


2/4/21 00:00 97.5 81 12 135/73 (93) 94   


 


2/4/21 00:00   29 134/75  Mechanical Ventilator  100


 


2/4/21 00:00    134/75    


 


2/4/21 00:00      Mechanical Ventilator  


 


2/3/21 23:44  82      


 


2/3/21 23:00  86 18 116/68 (84) 86   


 


2/3/21 23:00   18 119/65  Mechanical Ventilator  100


 


2/3/21 23:00    119/65    


 


2/3/21 22:00  84 27 122/66 (84) 90   


 


2/3/21 22:00   27 126/68  Mechanical Ventilator  100


 


2/3/21 22:00    126/68    


 


2/3/21 21:00   29 118/64  Mechanical Ventilator  100


 


2/3/21 21:00    117/65    


 


2/3/21 21:00  78 30 117/65 (82) 90   


 


2/3/21 20:00      Mechanical Ventilator  


 


2/3/21 20:00   30 132/74  Mechanical Ventilator  100


 


2/3/21 20:00    132/74    


 


2/3/21 20:00 96.5 82 32 133/75 (94) 89   


 


2/3/21 20:00        100


 


2/3/21 19:29  86      


 


2/3/21 19:08  80 32     100


 


2/3/21 19:00  85 25 140/81 (100) 82   


 


2/3/21 19:00   30 140/81  Mechanical Ventilator  100


 


2/3/21 19:00    140/81    


 


2/3/21 18:00   25 148/76  Mechanical Ventilator  100


 


2/3/21 18:00    148/76    


 


2/3/21 18:00  86 25 148/76 (100) 67   


 


2/3/21 17:11   30 134/77  Mechanical Ventilator  100


 


2/3/21 17:00  86 15 134/77 (96) 89   


 


2/3/21 16:00        100


 


2/3/21 16:00      Mechanical Ventilator  


 


2/3/21 16:00    112/62    


 


2/3/21 16:00  92      


 


2/3/21 16:00 98.9 91 25 112/62 (79) 88   


 


2/3/21 15:53  92 32     100


 


2/3/21 15:00  90 15 105/63 (77) 84   


 


2/3/21 15:00    107/64    


 


2/3/21 15:00   19 107/64  Mechanical Ventilator  100


 


2/3/21 14:00  85 29 108/63 (78) 84   


 


2/3/21 14:00    109/64    


 


2/3/21 14:00   18 107/64  Mechanical Ventilator  100


 


2/3/21 13:00  82 23 112/67 (82) 86   


 


2/3/21 13:00    112/67    


 


2/3/21 13:00   30 109/64  Mechanical Ventilator  100


 


2/3/21 12:22    103/62    


 


2/3/21 12:00 96.9 87 28 110/64 (79) 90   


 


2/3/21 12:00      Mechanical Ventilator  


 


2/3/21 12:00   23 113/68  Mechanical Ventilator  100


 


2/3/21 12:00  86      


 


2/3/21 12:00        100


 


2/3/21 11:59    103/62    


 


2/3/21 11:03  95 19 107/62 (77) 86   


 


2/3/21 11:00   19 107/62  Mechanical Ventilator  100


 


2/3/21 10:50  90 32     100


 


2/3/21 10:45  100 20 110/63 (79) 80   


 


2/3/21 10:30    107/62    


 


2/3/21 10:29  104 19 121/73 (89) 56   


 


2/3/21 10:24  69 14 68/45 (53) 58   


 


2/3/21 10:21    67/43    


 


2/3/21 10:15  70 17 59/43 (48) 64   


 


2/3/21 10:03    75/55    


 


2/3/21 10:00   22 145/70  Mechanical Ventilator  100


 


2/3/21 10:00  118 16 145/70 (95) 52   


 


2/3/21 09:49  84 17 68/43 (51) 69   


 


2/3/21 09:23  83      


 


2/3/21 09:15   21 118/67  Mechanical Ventilator  100


 


2/3/21 09:00   21 118/67  Mechanical Ventilator  100


 


2/3/21 09:00    118/67    


 


2/3/21 09:00  90 24 118/67 (84) 79   








I&O











Intake and Output  


 


 2/3/21 2/4/21





 19:00 07:00


 


Intake Total 2438.19 ml 2356.450 ml


 


Output Total 635 ml 600 ml


 


Balance 1803.19 ml 1756.450 ml


 


  


 


Free Water 200 ml 300 ml


 


IV Total 1698.19 ml 1636.450 ml


 


Tube Feeding 420 ml 420 ml


 


Other 120 ml 


 


Output Urine Total 515 ml 480 ml


 


Stool Total 20 ml 20 ml


 


Chest Tube Drainage Total 100 ml 100 ml








Cardiovascular:  RSR


Respiratory:  decreased breath sounds


Abdomen:  soft, non-tender, present bowel sounds, non-distended


Extremities:  no tenderness, no cyanosis





Laboratory Tests








Test


 2/3/21


12:15 2/3/21


18:30 2/4/21


02:30 2/4/21


04:35


 


Lactic Acid Level


 2.50 mmol/L


(0.4-2.0)  H 2.60 mmol/L


(0.66-2.22)  H 


 





 


Urine Color   Yellow   


 


Urine Appearance   Cloudy   


 


Urine pH   5 (4.5-8.0)   


 


Urine Specific Gravity


 


 


 1.015


(1.005-1.035) 





 


Urine Protein


 


 


 2+ (NEGATIVE)


H 





 


Urine Glucose (UA)


 


 


 Negative


(NEGATIVE) 





 


Urine Ketones


 


 


 Negative


(NEGATIVE) 





 


Urine Blood


 


 


 5+ (NEGATIVE)


H 





 


Urine Nitrite


 


 


 Negative


(NEGATIVE) 





 


Urine Bilirubin


 


 


 Negative


(NEGATIVE) 





 


Urine Urobilinogen


 


 


 Normal MG/DL


(0.0-1.0) 





 


Urine Leukocyte Esterase


 


 


 3+ (NEGATIVE)


H 





 


Urine RBC


 


 


 10-15 /HPF (0


- 0)  H 





 


Urine WBC


 


 


 5-10 /HPF (0 -


0)  H 





 


Urine Squamous Epithelial


Cells 


 


 None /LPF


(NONE/OCC) 





 


Urine Bacteria


 


 


 Moderate /HPF


(NONE)  H 





 


Urine Random Sodium


 


 


 < 20 mmol/L


()  L 





 


White Blood Count


 


 


 


 7.1 K/UL


(4.8-10.8)


 


Red Blood Count


 


 


 


 2.96 M/UL


(4.70-6.10)  L


 


Hemoglobin


 


 


 


 8.5 G/DL


(14.2-18.0)  L


 


Hematocrit


 


 


 


 28.6 %


(42.0-52.0)  L


 


Mean Corpuscular Volume    97 FL (80-99)  


 


Mean Corpuscular Hemoglobin


 


 


 


 28.8 PG


(27.0-31.0)


 


Mean Corpuscular Hemoglobin


Concent 


 


 


 29.8 G/DL


(32.0-36.0)  L


 


Red Cell Distribution Width


 


 


 


 19.9 %


(11.6-14.8)  H


 


Platelet Count


 


 


 


 242 K/UL


(150-450)


 


Mean Platelet Volume


 


 


 


 8.9 FL


(6.5-10.1)


 


Neutrophils (%) (Auto)


 


 


 


 % (45.0-75.0)





 


Lymphocytes (%) (Auto)


 


 


 


 % (20.0-45.0)





 


Monocytes (%) (Auto)     % (1.0-10.0)  


 


Eosinophils (%) (Auto)     % (0.0-3.0)  


 


Basophils (%) (Auto)     % (0.0-2.0)  


 


Sodium Level


 


 


 


 144 MMOL/L


(136-145)


 


Potassium Level


 


 


 


 4.2 MMOL/L


(3.5-5.1)


 


Chloride Level


 


 


 


 105 MMOL/L


()


 


Carbon Dioxide Level


 


 


 


 30 MMOL/L


(21-32)


 


Anion Gap


 


 


 


 9 mmol/L


(5-15)


 


Blood Urea Nitrogen


 


 


 


 78 mg/dL


(7-18)  H


 


Creatinine


 


 


 


 2.8 MG/DL


(0.55-1.30)  H


 


Estimat Glomerular Filtration


Rate 


 


 


 22.8 mL/min


(>60)


 


Glucose Level


 


 


 


 124 MG/DL


()  H


 


Uric Acid


 


 


 


 9.2 MG/DL


(2.6-7.2)  H


 


Calcium Level


 


 


 


 8.2 MG/DL


(8.5-10.1)  L


 


Phosphorus Level


 


 


 


 5.5 MG/DL


(2.5-4.9)  H


 


Magnesium Level


 


 


 


 2.5 MG/DL


(1.8-2.4)  H


 


Total Bilirubin


 


 


 


 0.6 MG/DL


(0.2-1.0)


 


Aspartate Amino Transf


(AST/SGOT) 


 


 


 38 U/L (15-37)


H


 


Alanine Aminotransferase


(ALT/SGPT) 


 


 


 56 U/L (12-78)





 


Alkaline Phosphatase


 


 


 


 339 U/L


()  H


 


C-Reactive Protein,


Quantitative 


 


 


 4.8 mg/dL


(0.00-0.90)  H


 


Pro-B-Type Natriuretic Peptide


 


 


 


 60910 pg/mL


(0-125)  H


 


Total Protein


 


 


 


 6.4 G/DL


(6.4-8.2)


 


Albumin


 


 


 


 2.4 G/DL


(3.4-5.0)  L


 


Globulin    4.0 g/dL  


 


Albumin/Globulin Ratio


 


 


 


 0.6 (1.0-2.7)


L


 


Digoxin Level


 


 


 


 1.5 NG/ML


(0.5-2.0)


 


Test


 2/4/21


07:26 


 


 





 


Arterial Blood pH


 7.200


(7.350-7.450) 


 


 





 


Arterial Blood Partial


Pressure CO2 78.8 mmHg


(35.0-45.0)  *H 


 


 





 


Arterial Blood Partial


Pressure O2 43.1 mmHg


(75.0-100.0) 


 


 





 


Arterial Blood HCO3


 30.1 mmol/L


(22.0-26.0)  H 


 


 





 


Arterial Blood Oxygen


Saturation 73.3 %


()  *L 


 


 





 


Arterial Blood Base Excess 1.0 (-2-2)     


 


Abelardo Test Positive     











Plan


Problems:  


(1) Pneumonia due to COVID-19 virus


Assessment & Plan:  Interim endotracheal intubation, endotracheal tube tip in 

good position


approximately 4 cm above the tito. There are extensive bilateral infiltrates, 

which


are markedly increased from the prior study. Pleural spaces are probably clear, 

not


well demonstrated


Markedly increased and now extensive bilateral infiltrates 


cont as per pulm and iID





(2) Hypoxia


Assessment & Plan:  patient developing DTI. in current condition, critically ill

and declining potential inevitable decline 


all care precautions taken and will cont to monitor and assist with improvement 





(3) Abdominal pain


Assessment & Plan:  65M abd pain, septic, covid, intubated ons upport


currently abd exam benign but limited given condition


line bo mary noted


okay for meds and feeds


nutritional optimization 


DAILY ESTIMATED NEEDS:


Needs based on Critical Care/ 73kg abw 


22-28  kcals/kg 


0302-9099  total kcals


1.2-2  g protein/kg


  g total protein 


25-30  mL/kg


6405-1843  total fluid mLs





NUTRITION DIAGNOSIS:


Swallowing difficulty R/T respiratory failure as evidenced by pt now


orally intubated, OGT in place, NPO





 


CURRENT TF:NPO 





 





ENTERAL NUTRITION RECOMMENDATIONS:


Vital AF 1.2 @ 60ml/hr x 24 hrs  to provide 1440ml, 1728kcal, 116g prot, 1167ml 

free water 





* As medically appropriate, initiate critical care and carb controlled TF formu

la of Vital AF 1.2


* Initiate @ 20ml/hr x 6hrs, advance 10ml q 4-6 hrs as tolerated to goal rate


* HOB over 30 degrees/ water flush per MD


* Does not exceed est kcal needs w/ Propofol running @ 8.083ml/hr x 24 hrs 

(provides 213 lipid kcal)





 





ADDITIONAL RECOMMENDATIONS:


* Calibrated bedscale wt 


* Monitor BGs closely w/ Decadron and TF, need for long acting insulin 


* Monitor lytes, replete as needed  


* Monitor Propofol rate, need to adjust TF rate  





(4) Acute metabolic encephalopathy


(5) Pneumothorax on right


Assessment & Plan:  right ptx


chest tube placed


decreased air


stable ptx


cont tube to suction


Endotracheal tube tip projects at the level of the thoracic inlet.


Orogastric tube tip projects at the level of the gastric fundus. Again 

demonstrated


is extensive subcutaneous emphysema, which appears somewhat worse on the left. 

Right


chest tube remains in place. Small right medial and apical pneumothorax are 

present,


slightly more conspicuous than on the previous exam, still small, somewhat 

difficult


to identify due to overlying subcutaneous emphysema. There is probably a tiny 

left


apical pneumothorax as well. Previously demonstrated pneumomediastinum has 

improved


considerably although still present. Bilateral infiltrates appear worse on the 

right.


 


Impression: Equivocally slightly increased but still small right pneumothorax.


 


Suspect tiny left apical pneumothorax


 


Improving pneumomediastinum


 


Worsening subcutaneous emphysema


 


Worsening right and stable left lung infiltrates 





 Right chest tube remains. There is still a small apical pneumothorax. Tiny


left apical pneumothorax persists, unchanged. There is decreased 

pneumomediastinum.


Subcutaneous gas has decreased as well. Bilateral infiltrates are unchanged.


Endotracheal tube remains at the level of the thoracic inlet. Orogastric tube is


again demonstrated, tip not well-visualized but probably stable in position


 


Impression: Stable tiny bilateral apical pneumothoraces


 


Unchanged bilateral infiltrates


 


Decreased pneumomediastinum and subcutaneous emphysema














Norberto Vazquez                 Feb 4, 2021 08:38

## 2021-02-04 NOTE — NUR
NURSE NOTES:

oral care given, oral suctioned. repositioned pt Q 2hrs. no fever at this time. RASS -2.  
stable vitals.

## 2021-02-05 VITALS — DIASTOLIC BLOOD PRESSURE: 69 MMHG | SYSTOLIC BLOOD PRESSURE: 125 MMHG

## 2021-02-05 VITALS — SYSTOLIC BLOOD PRESSURE: 129 MMHG | DIASTOLIC BLOOD PRESSURE: 74 MMHG

## 2021-02-05 VITALS — SYSTOLIC BLOOD PRESSURE: 140 MMHG | DIASTOLIC BLOOD PRESSURE: 74 MMHG

## 2021-02-05 VITALS — SYSTOLIC BLOOD PRESSURE: 109 MMHG | DIASTOLIC BLOOD PRESSURE: 61 MMHG

## 2021-02-05 VITALS — DIASTOLIC BLOOD PRESSURE: 74 MMHG | SYSTOLIC BLOOD PRESSURE: 138 MMHG

## 2021-02-05 VITALS — DIASTOLIC BLOOD PRESSURE: 71 MMHG | SYSTOLIC BLOOD PRESSURE: 125 MMHG

## 2021-02-05 VITALS — SYSTOLIC BLOOD PRESSURE: 129 MMHG | DIASTOLIC BLOOD PRESSURE: 68 MMHG

## 2021-02-05 VITALS — DIASTOLIC BLOOD PRESSURE: 64 MMHG | SYSTOLIC BLOOD PRESSURE: 106 MMHG

## 2021-02-05 VITALS — DIASTOLIC BLOOD PRESSURE: 68 MMHG | SYSTOLIC BLOOD PRESSURE: 122 MMHG

## 2021-02-05 VITALS — DIASTOLIC BLOOD PRESSURE: 67 MMHG | SYSTOLIC BLOOD PRESSURE: 128 MMHG

## 2021-02-05 VITALS — DIASTOLIC BLOOD PRESSURE: 66 MMHG | SYSTOLIC BLOOD PRESSURE: 109 MMHG

## 2021-02-05 VITALS — SYSTOLIC BLOOD PRESSURE: 102 MMHG | DIASTOLIC BLOOD PRESSURE: 56 MMHG

## 2021-02-05 VITALS — SYSTOLIC BLOOD PRESSURE: 119 MMHG | DIASTOLIC BLOOD PRESSURE: 67 MMHG

## 2021-02-05 VITALS — SYSTOLIC BLOOD PRESSURE: 128 MMHG | DIASTOLIC BLOOD PRESSURE: 70 MMHG

## 2021-02-05 VITALS — DIASTOLIC BLOOD PRESSURE: 64 MMHG | SYSTOLIC BLOOD PRESSURE: 108 MMHG

## 2021-02-05 VITALS — SYSTOLIC BLOOD PRESSURE: 115 MMHG | DIASTOLIC BLOOD PRESSURE: 64 MMHG

## 2021-02-05 VITALS — DIASTOLIC BLOOD PRESSURE: 64 MMHG | SYSTOLIC BLOOD PRESSURE: 102 MMHG

## 2021-02-05 VITALS — SYSTOLIC BLOOD PRESSURE: 103 MMHG | DIASTOLIC BLOOD PRESSURE: 64 MMHG

## 2021-02-05 VITALS — SYSTOLIC BLOOD PRESSURE: 107 MMHG | DIASTOLIC BLOOD PRESSURE: 65 MMHG

## 2021-02-05 VITALS — SYSTOLIC BLOOD PRESSURE: 140 MMHG | DIASTOLIC BLOOD PRESSURE: 78 MMHG

## 2021-02-05 VITALS — DIASTOLIC BLOOD PRESSURE: 61 MMHG | SYSTOLIC BLOOD PRESSURE: 103 MMHG

## 2021-02-05 VITALS — SYSTOLIC BLOOD PRESSURE: 129 MMHG | DIASTOLIC BLOOD PRESSURE: 72 MMHG

## 2021-02-05 VITALS — SYSTOLIC BLOOD PRESSURE: 117 MMHG | DIASTOLIC BLOOD PRESSURE: 68 MMHG

## 2021-02-05 VITALS — SYSTOLIC BLOOD PRESSURE: 105 MMHG | DIASTOLIC BLOOD PRESSURE: 64 MMHG

## 2021-02-05 VITALS — SYSTOLIC BLOOD PRESSURE: 116 MMHG | DIASTOLIC BLOOD PRESSURE: 68 MMHG

## 2021-02-05 VITALS — DIASTOLIC BLOOD PRESSURE: 61 MMHG | SYSTOLIC BLOOD PRESSURE: 104 MMHG

## 2021-02-05 VITALS — SYSTOLIC BLOOD PRESSURE: 124 MMHG | DIASTOLIC BLOOD PRESSURE: 71 MMHG

## 2021-02-05 VITALS — SYSTOLIC BLOOD PRESSURE: 95 MMHG | DIASTOLIC BLOOD PRESSURE: 60 MMHG

## 2021-02-05 VITALS — SYSTOLIC BLOOD PRESSURE: 125 MMHG | DIASTOLIC BLOOD PRESSURE: 72 MMHG

## 2021-02-05 VITALS — DIASTOLIC BLOOD PRESSURE: 68 MMHG | SYSTOLIC BLOOD PRESSURE: 112 MMHG

## 2021-02-05 VITALS — SYSTOLIC BLOOD PRESSURE: 109 MMHG | DIASTOLIC BLOOD PRESSURE: 65 MMHG

## 2021-02-05 VITALS — SYSTOLIC BLOOD PRESSURE: 121 MMHG | DIASTOLIC BLOOD PRESSURE: 71 MMHG

## 2021-02-05 VITALS — DIASTOLIC BLOOD PRESSURE: 67 MMHG | SYSTOLIC BLOOD PRESSURE: 114 MMHG

## 2021-02-05 VITALS — SYSTOLIC BLOOD PRESSURE: 126 MMHG | DIASTOLIC BLOOD PRESSURE: 69 MMHG

## 2021-02-05 VITALS — DIASTOLIC BLOOD PRESSURE: 81 MMHG | SYSTOLIC BLOOD PRESSURE: 139 MMHG

## 2021-02-05 VITALS — DIASTOLIC BLOOD PRESSURE: 64 MMHG | SYSTOLIC BLOOD PRESSURE: 118 MMHG

## 2021-02-05 VITALS — DIASTOLIC BLOOD PRESSURE: 69 MMHG | SYSTOLIC BLOOD PRESSURE: 116 MMHG

## 2021-02-05 VITALS — SYSTOLIC BLOOD PRESSURE: 112 MMHG | DIASTOLIC BLOOD PRESSURE: 67 MMHG

## 2021-02-05 VITALS — SYSTOLIC BLOOD PRESSURE: 130 MMHG | DIASTOLIC BLOOD PRESSURE: 70 MMHG

## 2021-02-05 VITALS — DIASTOLIC BLOOD PRESSURE: 72 MMHG | SYSTOLIC BLOOD PRESSURE: 127 MMHG

## 2021-02-05 VITALS — DIASTOLIC BLOOD PRESSURE: 67 MMHG | SYSTOLIC BLOOD PRESSURE: 121 MMHG

## 2021-02-05 VITALS — DIASTOLIC BLOOD PRESSURE: 69 MMHG | SYSTOLIC BLOOD PRESSURE: 114 MMHG

## 2021-02-05 VITALS — SYSTOLIC BLOOD PRESSURE: 141 MMHG | DIASTOLIC BLOOD PRESSURE: 83 MMHG

## 2021-02-05 VITALS — SYSTOLIC BLOOD PRESSURE: 114 MMHG | DIASTOLIC BLOOD PRESSURE: 67 MMHG

## 2021-02-05 VITALS — DIASTOLIC BLOOD PRESSURE: 74 MMHG | SYSTOLIC BLOOD PRESSURE: 129 MMHG

## 2021-02-05 VITALS — SYSTOLIC BLOOD PRESSURE: 104 MMHG | DIASTOLIC BLOOD PRESSURE: 61 MMHG

## 2021-02-05 VITALS — DIASTOLIC BLOOD PRESSURE: 69 MMHG | SYSTOLIC BLOOD PRESSURE: 131 MMHG

## 2021-02-05 VITALS — SYSTOLIC BLOOD PRESSURE: 85 MMHG | DIASTOLIC BLOOD PRESSURE: 54 MMHG

## 2021-02-05 VITALS — DIASTOLIC BLOOD PRESSURE: 66 MMHG | SYSTOLIC BLOOD PRESSURE: 122 MMHG

## 2021-02-05 VITALS — DIASTOLIC BLOOD PRESSURE: 58 MMHG | SYSTOLIC BLOOD PRESSURE: 98 MMHG

## 2021-02-05 VITALS — DIASTOLIC BLOOD PRESSURE: 62 MMHG | SYSTOLIC BLOOD PRESSURE: 106 MMHG

## 2021-02-05 VITALS — DIASTOLIC BLOOD PRESSURE: 74 MMHG | SYSTOLIC BLOOD PRESSURE: 134 MMHG

## 2021-02-05 VITALS — SYSTOLIC BLOOD PRESSURE: 119 MMHG | DIASTOLIC BLOOD PRESSURE: 72 MMHG

## 2021-02-05 VITALS — SYSTOLIC BLOOD PRESSURE: 129 MMHG | DIASTOLIC BLOOD PRESSURE: 76 MMHG

## 2021-02-05 VITALS — DIASTOLIC BLOOD PRESSURE: 71 MMHG | SYSTOLIC BLOOD PRESSURE: 129 MMHG

## 2021-02-05 VITALS — SYSTOLIC BLOOD PRESSURE: 136 MMHG | DIASTOLIC BLOOD PRESSURE: 75 MMHG

## 2021-02-05 VITALS — DIASTOLIC BLOOD PRESSURE: 72 MMHG | SYSTOLIC BLOOD PRESSURE: 130 MMHG

## 2021-02-05 VITALS — SYSTOLIC BLOOD PRESSURE: 105 MMHG | DIASTOLIC BLOOD PRESSURE: 63 MMHG

## 2021-02-05 VITALS — DIASTOLIC BLOOD PRESSURE: 78 MMHG | SYSTOLIC BLOOD PRESSURE: 140 MMHG

## 2021-02-05 VITALS — SYSTOLIC BLOOD PRESSURE: 89 MMHG | DIASTOLIC BLOOD PRESSURE: 57 MMHG

## 2021-02-05 VITALS — DIASTOLIC BLOOD PRESSURE: 61 MMHG | SYSTOLIC BLOOD PRESSURE: 106 MMHG

## 2021-02-05 VITALS — DIASTOLIC BLOOD PRESSURE: 70 MMHG | SYSTOLIC BLOOD PRESSURE: 120 MMHG

## 2021-02-05 VITALS — SYSTOLIC BLOOD PRESSURE: 113 MMHG | DIASTOLIC BLOOD PRESSURE: 66 MMHG

## 2021-02-05 VITALS — SYSTOLIC BLOOD PRESSURE: 92 MMHG | DIASTOLIC BLOOD PRESSURE: 59 MMHG

## 2021-02-05 VITALS — SYSTOLIC BLOOD PRESSURE: 130 MMHG | DIASTOLIC BLOOD PRESSURE: 74 MMHG

## 2021-02-05 VITALS — SYSTOLIC BLOOD PRESSURE: 116 MMHG | DIASTOLIC BLOOD PRESSURE: 66 MMHG

## 2021-02-05 VITALS — SYSTOLIC BLOOD PRESSURE: 141 MMHG | DIASTOLIC BLOOD PRESSURE: 76 MMHG

## 2021-02-05 VITALS — DIASTOLIC BLOOD PRESSURE: 69 MMHG | SYSTOLIC BLOOD PRESSURE: 126 MMHG

## 2021-02-05 VITALS — DIASTOLIC BLOOD PRESSURE: 69 MMHG | SYSTOLIC BLOOD PRESSURE: 130 MMHG

## 2021-02-05 VITALS — SYSTOLIC BLOOD PRESSURE: 144 MMHG | DIASTOLIC BLOOD PRESSURE: 76 MMHG

## 2021-02-05 VITALS — SYSTOLIC BLOOD PRESSURE: 125 MMHG | DIASTOLIC BLOOD PRESSURE: 70 MMHG

## 2021-02-05 VITALS — DIASTOLIC BLOOD PRESSURE: 67 MMHG | SYSTOLIC BLOOD PRESSURE: 117 MMHG

## 2021-02-05 VITALS — SYSTOLIC BLOOD PRESSURE: 102 MMHG | DIASTOLIC BLOOD PRESSURE: 64 MMHG

## 2021-02-05 VITALS — DIASTOLIC BLOOD PRESSURE: 78 MMHG | SYSTOLIC BLOOD PRESSURE: 142 MMHG

## 2021-02-05 LAB
ADD MANUAL DIFF: NO
ALBUMIN SERPL-MCNC: 2.2 G/DL (ref 3.4–5)
ALBUMIN/GLOB SERPL: 0.6 {RATIO} (ref 1–2.7)
ALP SERPL-CCNC: 398 U/L (ref 46–116)
ALT SERPL-CCNC: 85 U/L (ref 12–78)
ANION GAP SERPL CALC-SCNC: 9 MMOL/L (ref 5–15)
AST SERPL-CCNC: 58 U/L (ref 15–37)
BASOPHILS NFR BLD AUTO: 1.1 % (ref 0–2)
BILIRUB SERPL-MCNC: 0.7 MG/DL (ref 0.2–1)
BUN SERPL-MCNC: 92 MG/DL (ref 7–18)
CALCIUM SERPL-MCNC: 8 MG/DL (ref 8.5–10.1)
CHLORIDE SERPL-SCNC: 104 MMOL/L (ref 98–107)
CO2 SERPL-SCNC: 29 MMOL/L (ref 21–32)
CREAT SERPL-MCNC: 3.4 MG/DL (ref 0.55–1.3)
EOSINOPHIL NFR BLD AUTO: 0 % (ref 0–3)
ERYTHROCYTE [DISTWIDTH] IN BLOOD BY AUTOMATED COUNT: 18.5 % (ref 11.6–14.8)
GLOBULIN SER-MCNC: 4 G/DL
HCT VFR BLD CALC: 28.6 % (ref 42–52)
HGB BLD-MCNC: 8.5 G/DL (ref 14.2–18)
LYMPHOCYTES NFR BLD AUTO: 8 % (ref 20–45)
MCV RBC AUTO: 97 FL (ref 80–99)
MONOCYTES NFR BLD AUTO: 6.6 % (ref 1–10)
NEUTROPHILS NFR BLD AUTO: 84.3 % (ref 45–75)
PHOSPHATE SERPL-MCNC: 6.3 MG/DL (ref 2.5–4.9)
PLATELET # BLD: 260 K/UL (ref 150–450)
POTASSIUM SERPL-SCNC: 4.6 MMOL/L (ref 3.5–5.1)
RBC # BLD AUTO: 2.96 M/UL (ref 4.7–6.1)
SODIUM SERPL-SCNC: 142 MMOL/L (ref 136–145)
WBC # BLD AUTO: 7.6 K/UL (ref 4.8–10.8)

## 2021-02-05 RX ADMIN — MIDAZOLAM HYDROCHLORIDE PRN MLS/HR: 5 INJECTION INTRAMUSCULAR; INTRAVENOUS at 21:53

## 2021-02-05 RX ADMIN — INSULIN ASPART SCH UNITS: 100 INJECTION, SOLUTION INTRAVENOUS; SUBCUTANEOUS at 12:53

## 2021-02-05 RX ADMIN — MIDODRINE HYDROCHLORIDE SCH MG: 10 TABLET ORAL at 21:43

## 2021-02-05 RX ADMIN — MIDODRINE HYDROCHLORIDE SCH MG: 10 TABLET ORAL at 14:54

## 2021-02-05 RX ADMIN — Medication SCH MLS/HR: at 18:32

## 2021-02-05 RX ADMIN — INSULIN ASPART SCH UNITS: 100 INJECTION, SOLUTION INTRAVENOUS; SUBCUTANEOUS at 23:51

## 2021-02-05 RX ADMIN — SODIUM CHLORIDE SCH MLS/HR: 0.9 INJECTION INTRAVENOUS at 11:29

## 2021-02-05 RX ADMIN — DIGOXIN SCH MG: 0.12 TABLET ORAL at 08:31

## 2021-02-05 RX ADMIN — PANTOPRAZOLE SODIUM SCH MG: 40 INJECTION, POWDER, FOR SOLUTION INTRAVENOUS at 08:31

## 2021-02-05 RX ADMIN — INSULIN DETEMIR SCH UNITS: 100 INJECTION, SOLUTION SUBCUTANEOUS at 20:28

## 2021-02-05 RX ADMIN — CHLORHEXIDINE GLUCONATE SCH APPLIC: 213 SOLUTION TOPICAL at 20:12

## 2021-02-05 RX ADMIN — INSULIN DETEMIR SCH UNITS: 100 INJECTION, SOLUTION SUBCUTANEOUS at 08:36

## 2021-02-05 RX ADMIN — HYDROCORTISONE SODIUM SUCCINATE SCH MG: 100 INJECTION, POWDER, FOR SOLUTION INTRAMUSCULAR; INTRAVENOUS at 05:10

## 2021-02-05 RX ADMIN — INSULIN ASPART SCH UNITS: 100 INJECTION, SOLUTION INTRAVENOUS; SUBCUTANEOUS at 00:00

## 2021-02-05 RX ADMIN — Medication SCH MLS/HR: at 08:43

## 2021-02-05 RX ADMIN — INSULIN ASPART SCH UNITS: 100 INJECTION, SOLUTION INTRAVENOUS; SUBCUTANEOUS at 05:47

## 2021-02-05 RX ADMIN — AMIODARONE HYDROCHLORIDE SCH MG: 200 TABLET ORAL at 08:30

## 2021-02-05 RX ADMIN — INSULIN ASPART SCH UNITS: 100 INJECTION, SOLUTION INTRAVENOUS; SUBCUTANEOUS at 18:18

## 2021-02-05 RX ADMIN — MIDAZOLAM HYDROCHLORIDE PRN MLS/HR: 5 INJECTION INTRAMUSCULAR; INTRAVENOUS at 02:25

## 2021-02-05 RX ADMIN — PANTOPRAZOLE SODIUM SCH MG: 40 INJECTION, POWDER, FOR SOLUTION INTRAVENOUS at 20:12

## 2021-02-05 RX ADMIN — HYDROCORTISONE SODIUM SUCCINATE SCH MG: 100 INJECTION, POWDER, FOR SOLUTION INTRAMUSCULAR; INTRAVENOUS at 21:43

## 2021-02-05 RX ADMIN — ENOXAPARIN SODIUM SCH MG: 80 INJECTION SUBCUTANEOUS at 08:33

## 2021-02-05 RX ADMIN — MIDODRINE HYDROCHLORIDE SCH MG: 10 TABLET ORAL at 05:10

## 2021-02-05 RX ADMIN — HYDROCORTISONE SODIUM SUCCINATE SCH MG: 100 INJECTION, POWDER, FOR SOLUTION INTRAMUSCULAR; INTRAVENOUS at 14:48

## 2021-02-05 NOTE — GENERAL PROGRESS NOTE
Subjective


ROS Limited/Unobtainable:  Yes


Allergies:  


Coded Allergies:  


     No Known Allergies (Unverified , 6/11/19)


Subjective


events noted - interval notes reviewed 


glucose values are controlled 


on stress dose IVHC


sedated and intubated 














Item Value  Date Time


 


Bedside Blood Glucose 115 mg/dl 2/5/21 0547


 


Bedside Blood Glucose 152 mg/dl H 2/5/21 0034


 


Bedside Blood Glucose 187 mg/dl H 2/4/21 2100


 


Bedside Blood Glucose 171 mg/dl H 2/4/21 1800


 


Bedside Blood Glucose 190 mg/dl H 2/4/21 1200











Objective





Last 24 Hour Vital Signs








  Date Time  Temp Pulse Resp B/P (MAP) Pulse Ox O2 Delivery O2 Flow Rate FiO2


 


2/5/21 05:30  87 30 128/67 (87) 85   


 


2/5/21 05:15  88 31 129/72 (91) 85   


 


2/5/21 05:00  88 31 130/72 (91) 84   


 


2/5/21 04:45  88 29 130/69 (89) 83   


 


2/5/21 04:30  89 29 128/70 (89) 84   


 


2/5/21 04:15  88 30 131/69 (89) 85   


 


2/5/21 04:00 99.0 88 29 129/68 (88) 85   


 


2/5/21 04:00        100


 


2/5/21 04:00      Mechanical Ventilator  


 


2/5/21 03:45  89 27 126/69 (88) 85   


 


2/5/21 03:39  90 33     100


 


2/5/21 03:30  90 27 126/69 (88) 84   


 


2/5/21 03:15  92 30 125/72 (89) 86   


 


2/5/21 03:00  90 32 130/70 (90) 89   


 


2/5/21 02:25   18   Mechanical Ventilator  


 


2/5/21 02:12    140/78    


 


2/5/21 02:00  91 30 140/78 (98) 85   


 


2/5/21 01:45  91 31 142/78 (99) 85   


 


2/5/21 01:30  91 33 140/78 (98) 85   


 


2/5/21 01:15  92 31 144/76 (98) 85   


 


2/5/21 01:00  92 29 141/76 (97) 85   


 


2/5/21 01:00    140/78    


 


2/5/21 00:15  94 32 138/74 (95) 85   


 


2/5/21 00:00      Mechanical Ventilator  


 


2/5/21 00:00        100


 


2/5/21 00:00 98.7 96 31 140/74 (96) 84   


 


2/4/21 23:45  95 31 139/78 (98) 85   


 


2/4/21 23:30  95 31 137/74 (95) 83   


 


2/4/21 23:29  94 35     100


 


2/4/21 23:15  94 29 138/72 (94) 84   


 


2/4/21 23:09   18 132/76  Mechanical Ventilator  100


 


2/4/21 23:00  95 31 132/76 (94) 84   


 


2/4/21 22:15  95 31 131/72 (91) 82   


 


2/4/21 22:00  96 32 125/69 (87) 81   


 


2/4/21 21:45  97 33 130/69 (89) 80   


 


2/4/21 21:30  97 31 125/69 (87) 81   


 


2/4/21 21:15  98 31 117/70 (86) 80   


 


2/4/21 21:00  98 31 119/67 (84) 79   


 


2/4/21 20:15  103 30 130/72 (91) 79   


 


2/4/21 20:02  102 29 105/65 (78) 75   


 


2/4/21 20:00      Mechanical Ventilator  


 


2/4/21 20:00        100


 


2/4/21 20:00 99.2 104 32 106/62 (77) 75   


 


2/4/21 19:58  107 33 114/60 (78) 75   


 


2/4/21 19:58  105 33     100


 


2/4/21 19:54  122 32 134/73 (93) 66   


 


2/4/21 19:52  130 33 171/82 (111) 57   


 


2/4/21 19:50  140 32 190/106 (134) 65   


 


2/4/21 19:45  40 21  70   


 


2/4/21 19:30  96 29 95/55 (68) 79   


 


2/4/21 19:15  100 29 126/71 (89) 80   


 


2/4/21 19:00  100 31 126/73 (90) 80   


 


2/4/21 18:45  101 30  80   


 


2/4/21 18:30  102 30 128/74 (92) 80   


 


2/4/21 18:15  102 26 128/73 (91) 81   


 


2/4/21 18:00  103 27 128/69 (88) 80   


 


2/4/21 17:45  104 29 131/72 (91) 80   


 


2/4/21 17:30  103 28 130/74 (92) 79   


 


2/4/21 17:15  104 26 135/73 (93) 77   


 


2/4/21 17:00  104 29 137/76 (96) 75   


 


2/4/21 16:45  106 28 147/75 (99) 71   


 


2/4/21 16:30 98.5 103 23 131/69 (89) 69   


 


2/4/21 16:15  102 31 123/73 (90) 68   


 


2/4/21 16:00  83      


 


2/4/21 16:00        100


 


2/4/21 16:00  102 28 118/67 (84) 66   


 


2/4/21 15:45  95 30 83/54 (64) 63   


 


2/4/21 15:30  98 29 89/50 (63) 63   


 


2/4/21 15:25  97 33     100


 


2/4/21 15:15  102 26 99/55 (70)    


 


2/4/21 15:00  99 24 132/74 (93) 90   


 


2/4/21 14:45  103 26 150/84 (106) 91   


 


2/4/21 14:30  115 29 182/95 (124)    


 


2/4/21 14:27  111 30 149/93 (111)    


 


2/4/21 14:05  68 31 93/41 (58) 86   


 


2/4/21 14:00  44 28 59/36 (44) 86   


 


2/4/21 13:45  107 31 105/61 (76) 73   


 


2/4/21 13:43  94 32 193/86 (121) 81   


 


2/4/21 13:30  76 32 65/43 (50) 88   


 


2/4/21 13:15  87 29 103/61 (75) 89   


 


2/4/21 13:10   28   Mechanical Ventilator  100


 


2/4/21 13:00  85 30 100/61 (74) 89   


 


2/4/21 12:54  81 31 90/60 (70) 88   


 


2/4/21 12:49  70 32 77/53 (61) 88   


 


2/4/21 12:45  67 31 74/47 (56) 87   


 


2/4/21 12:30 97.2 68 31 90/60 (70) 89   


 


2/4/21 12:20   30   Mechanical Ventilator  100


 


2/4/21 12:00        100


 


2/4/21 12:00  69      


 


2/4/21 12:00  83 29 123/70 (87) 88   


 


2/4/21 11:39   33 111/70  Mechanical Ventilator  100


 


2/4/21 11:37   33   Mechanical Ventilator  100


 


2/4/21 11:30  86 28 111/70 (84) 81   


 


2/4/21 11:00  87 26 127/76 (93) 87   


 


2/4/21 10:30  89 32     100


 


2/4/21 10:00  88 30 120/66 (84) 81   


 


2/4/21 09:57  89      


 


2/4/21 09:00  88 30 123/71 (88) 88   


 


2/4/21 08:00        100


 


2/4/21 08:00   29 100/61  Mechanical Ventilator  100


 


2/4/21 08:00  86 33 121/68 (85) 86   


 


2/4/21 08:00  84      


 


2/4/21 07:00   32 127/77  Mechanical Ventilator  100


 


2/4/21 07:00    127/77    


 


2/4/21 07:00  79 29 127/77 (94) 90   


 


2/4/21 06:45  83 34     100

















Intake and Output  


 


 2/4/21 2/5/21





 19:00 07:00


 


Intake Total 2120.82 ml 1566.19 ml


 


Output Total 250 ml 60 ml


 


Balance 1870.82 ml 1506.19 ml


 


  


 


IV Total 1875.82 ml 1251.19 ml


 


Tube Feeding 245 ml 315 ml


 


Output Urine Total 250 ml 10 ml


 


Chest Tube Drainage Total  50 ml


 


# Voids  35


 


# Bowel Movements  10








Laboratory Tests


2/4/21 07:26: 


Arterial Blood pH 7.200*L, Arterial Blood Partial Pressure CO2 78.8*H, Arterial 

Blood Partial Pressure O2 43.1*L, Arterial Blood HCO3 30.1H, Arterial Blood 

Oxygen Saturation 73.3*L, Arterial Blood Base Excess 1.0, Abelardo Test Positive


2/4/21 09:25: Lactic Acid Level 2.00


2/4/21 13:09: POC Whole Blood Glucose 190H


2/5/21 03:25: 


White Blood Count 7.6, Red Blood Count 2.96L, Hemoglobin 8.5L, Hematocrit 28.6L,

Mean Corpuscular Volume 97, Mean Corpuscular Hemoglobin 28.8, Mean Corpuscular 

Hemoglobin Concent 29.8L, Red Cell Distribution Width 18.5H, Platelet Count 260,

Mean Platelet Volume 8.8, Neutrophils (%) (Auto) 84.3H, Lymphocytes (%) (Auto) 

8.0L, Monocytes (%) (Auto) 6.6, Eosinophils (%) (Auto) 0.0, Basophils (%) (Auto)

1.1, Sodium Level 142, Potassium Level 4.6, Chloride Level 104, Carbon Dioxide 

Level 29, Anion Gap 9, Blood Urea Nitrogen 92H, Creatinine 3.4H, Estimat 

Glomerular Filtration Rate 18.2, Glucose Level 134H, Uric Acid 10.6H, Calcium 

Level 8.0L, Phosphorus Level 6.3H, Magnesium Level 2.6H, Total Bilirubin 0.7, 

Aspartate Amino Transf (AST/SGOT) 58H, Alanine Aminotransferase (ALT/SGPT) 85H, 

Alkaline Phosphatase 398H, C-Reactive Protein, Quantitative 5.1H, Pro-B-Type 

Natriuretic Peptide > 17382X, Total Protein 6.2L, Albumin 2.2L, Globulin 4.0, 

Albumin/Globulin Ratio 0.6L


2/5/21 05:36: 


Arterial Blood pH 7.203*L, Arterial Blood Partial Pressure CO2 73.2*H, Arterial 

Blood Partial Pressure O2 51.3L, Arterial Blood HCO3 28.2H, Arterial Blood 

Oxygen Saturation 82.1*L, Arterial Blood Base Excess -0.7, Abelardo Test Positive


Height (Feet):  5


Height (Inches):  7.00


Weight (Pounds):  198


General Appearance:  other - intubated


EENT:  other - ETT


Cardiovascular:  tachycardia


Respiratory/Chest:  decreased breath sounds


Abdomen:  normal bowel sounds


Objective





Current Medications








 Medications


  (Trade)  Dose


 Ordered  Sig/Julisa


 Route


 PRN Reason  Start Time


 Stop Time Status Last Admin


Dose Admin


 


 Acetaminophen


  (Tylenol)  650 mg  Q4H  PRN


 GT


 Temp >100.5  1/15/21 17:15


 2/14/21 17:14  1/29/21 06:59





 


 Amiodarone HCl


  (Cordarone)  200 mg  DAILY


 NG


   1/26/21 09:00


 4/19/21 20:59  2/4/21 09:58





 


 Cefepime HCl 1 gm/


 Dextrose  55 ml @ 


 110 mls/hr  Q24H


 IVPB


   1/30/21 11:00


 2/6/21 10:59  2/4/21 10:29





 


 Chlorhexidine


 Gluconate


  (Vanessa-Hex 2%)  1 applic  DAILY@2000


 TOPIC


   1/19/21 20:00


 4/19/21 19:59  2/4/21 21:08





 


 Dextrose


  (Dextrose 50%)  25 ml  Q30M  PRN


 IV


 Hypoglycemia  1/9/21 13:30


 4/9/21 13:29   





 


 Dextrose


  (Dextrose 50%)  50 ml  Q30M  PRN


 IV


 Hypoglycemia  1/9/21 13:30


 4/9/21 13:29   





 


 Digoxin


  (Lanoxin)  0.125 mg  DAILY


 NG


   1/21/21 09:00


 4/21/21 08:59  2/4/21 09:57





 


 Docusate Sodium


  (Colace)  100 mg  TIDPRN  PRN


 GT


 Constipation  1/12/21 01:15


 2/11/21 01:14  1/12/21 01:42





 


 Enoxaparin Sodium


  (Lovenox)  80 mg  DAILY


 SUBQ


   2/4/21 09:00


 5/5/21 08:59  2/4/21 09:57





 


 Fentanyl Citrate  250 ml @ 1


 mls/hr  Q24H


 IV


   2/3/21 17:00


 2/5/21 16:59  2/4/21 23:09





 


 Hydrocortisone


  (Solu-CORTEF)  100 mg  EVERY 8  HOURS


 IV


   2/1/21 15:45


 5/2/21 15:44  2/5/21 05:10





 


 Insulin Aspart


  (NovoLOG)    Q6HR


 SUBQ


   1/14/21 12:00


 4/9/21 17:59  2/5/21 00:00





 


 Insulin Detemir


  (Levemir)  26 units  Q12HR


 SUBQ


   2/2/21 09:00


 4/12/21 08:59  2/4/21 21:00





 


 Midazolam HCl 50


 mg/Sodium Chloride  100 ml @ 0


 mls/hr  Q24H  PRN


 IV


 Restlessness  2/3/21 21:51


 2/5/21 21:50  2/5/21 02:25





 


 Midodrine


  (Pro-Amatine)  10 mg  Q8HR


 ORAL


   2/1/21 15:30


 5/2/21 15:29  2/5/21 05:10





 


 Norepinephrine


 Bitartrate 16 mg/


 Dextrose  516 ml @ 0


 mls/hr  Q24H


 IV


   2/3/21 10:30


 2/6/21 10:29  2/5/21 02:12





 


 Pantoprazole


  (Protonix)  40 mg  Q12HR


 IVP


   2/1/21 21:00


 2/13/21 08:59  2/4/21 21:08





 


 Quetiapine


 Fumarate


  (SEROqueL)  50 mg  Q12HR


 ORAL


   2/4/21 11:45


 3/21/21 11:44  2/4/21 21:08





 


 Sodium Chloride  500 ml @ 


 999 mls/hr  Q31M PRN


 IV


 For hypotension  1/15/21 18:30


 2/14/21 18:29   





 


 Sodium Chloride  1,000 ml @ 


 100 mls/hr  Q10H


 IV


   2/1/21 15:30


 3/3/21 15:29  2/4/21 23:10














Assessment/Plan


Problem List:  


(1) Diabetes mellitus out of control


ICD Codes:  E11.65 - Type 2 diabetes mellitus with hyperglycemia


SNOMED:  50561364, [id number]


(2) Pneumonia due to COVID-19 virus


ICD Codes:  U07.1 - COVID-19; J12.82 - Pneumonia due to coronavirus disease 2019


SNOMED:  [id number]


Assessment/Plan:


continue Levemir 26 units bid 


continue Novolog sliding scale every 6 hours 


hypoglycemia protocol in order











Nick Mcmahon MD                  Feb 5, 2021 06:02
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
## 2021-02-05 NOTE — HEMATOLOGY/ONC PROGRESS NOTE
Assessment/Plan


Assessment/Plan


Leukocytosis 2/2 covid pna wbc 15->14-->17-->9.6-->13


Anemia 2/2 chronic disease, hgb 11-->10.4-->9.8-->8.6-->8.5


Thrombocytopenia with plt 122


Hypercoag disorder now on lovenox sq


Ac resp distress on ventilator


Pneumomediastinum


etoh abused


agitated -halodol


vent





Vent


Pressors


cont iv abx and iv decadrone


pulmonary and gi on consult


smear is noted


pneumomediastinum per pulm


dw charge nurse


lovenox sq





Subjective


Allergies:  


Coded Allergies:  


     No Known Allergies (Unverified , 6/11/19)


All Systems:  reviewed and negative except above


Subjective


1/10 on ventilator 60% fio2, on sediation, unresponsive, in icu


1/14 on vent, icu, wbc elev, no bleeding, unresponsive


1/15 on vent, with cash, icu, no bleeding, unresponsive


1/17 on vent, elmer rn, no major changes, icu, nv


1/18 nv, labs reviewd, hr is elev, with afib, is onlovenox sq


1/19 remains on vent, sedated with wrist restraints, icu


1/20 on vent, with hematuria, ngt, in icu, labs reviewed


1/21 remains on vent, settings have been adjusted as per icu care


1/22 remains on vent, icu, on levo 6mcg, off versed, labs noted, elmer rn


1/25 remains onv ent, in icu, with restraints, on levo gtt as well


1/26 nv, suctioned, rectal tube, in icu, with restraints, no bleeding


1/27 nv, remains on vent, rectal tube, labs reviewed, elmer rn


1/28 nv, icu, on vent/ bipap, labs noted, no bleeding, meds reviewed


1/29 nv, on vent, overnight no changes, meds reviewed, labs noted


1/30: code blue last night.


1/31 labs are noted, nv, on vent, meds reviewed, no new change, elmer rn, on 

pressors


2/1 vent, labs have been ordered, nv, meds reviewed, elmer rn


2/2 vent, levo, fentanyl, meds reviewed, nv


2/3 vent, labs reviewed, meds noted, hgb 8.6


2/4 nv, on vent, repositioned, hgb 8.5, no bleeding


2/5 nv, is on vent, fentanyl as well as versed, hgb 8.5





Objective


Objective





Current Medications








 Medications


  (Trade)  Dose


 Ordered  Sig/Julisa


 Route


 PRN Reason  Start Time


 Stop Time Status Last Admin


Dose Admin


 


 Acetaminophen


  (Tylenol)  650 mg  Q4H  PRN


 GT


 Temp >100.5  1/15/21 17:15


 2/14/21 17:14  1/29/21 06:59





 


 Amiodarone HCl


  (Cordarone)  200 mg  DAILY


 NG


   1/26/21 09:00


 4/19/21 20:59  2/4/21 09:58





 


 Cefepime HCl 1 gm/


 Dextrose  55 ml @ 


 110 mls/hr  Q24H


 IVPB


   1/30/21 11:00


 2/6/21 10:59  2/4/21 10:29





 


 Chlorhexidine


 Gluconate


  (Vanessa-Hex 2%)  1 applic  DAILY@2000


 TOPIC


   1/19/21 20:00


 4/19/21 19:59  2/4/21 21:08





 


 Dextrose


  (Dextrose 50%)  25 ml  Q30M  PRN


 IV


 Hypoglycemia  1/9/21 13:30


 4/9/21 13:29   





 


 Dextrose


  (Dextrose 50%)  50 ml  Q30M  PRN


 IV


 Hypoglycemia  1/9/21 13:30


 4/9/21 13:29   





 


 Digoxin


  (Lanoxin)  0.125 mg  DAILY


 NG


   1/21/21 09:00


 4/21/21 08:59  2/4/21 09:57





 


 Docusate Sodium


  (Colace)  100 mg  TIDPRN  PRN


 GT


 Constipation  1/12/21 01:15


 2/11/21 01:14  1/12/21 01:42





 


 Enoxaparin Sodium


  (Lovenox)  80 mg  DAILY


 SUBQ


   2/4/21 09:00


 5/5/21 08:59  2/4/21 09:57





 


 Fentanyl Citrate  250 ml @ 1


 mls/hr  Q24H


 IV


   2/3/21 17:00


 2/5/21 16:59  2/4/21 23:09





 


 Hydrocortisone


  (Solu-CORTEF)  100 mg  EVERY 8  HOURS


 IV


   2/1/21 15:45


 5/2/21 15:44  2/5/21 05:10





 


 Insulin Aspart


  (NovoLOG)    Q6HR


 SUBQ


   1/14/21 12:00


 4/9/21 17:59  2/5/21 00:00





 


 Insulin Detemir


  (Levemir)  26 units  Q12HR


 SUBQ


   2/2/21 09:00


 4/12/21 08:59  2/4/21 21:00





 


 Midazolam HCl 50


 mg/Sodium Chloride  100 ml @ 0


 mls/hr  Q24H  PRN


 IV


 Restlessness  2/3/21 21:51


 2/5/21 21:50  2/5/21 02:25





 


 Midodrine


  (Pro-Amatine)  10 mg  Q8HR


 ORAL


   2/1/21 15:30


 5/2/21 15:29  2/5/21 05:10





 


 Norepinephrine


 Bitartrate 16 mg/


 Dextrose  516 ml @ 0


 mls/hr  Q24H


 IV


   2/3/21 10:30


 2/6/21 10:29  2/5/21 02:12





 


 Pantoprazole


  (Protonix)  40 mg  Q12HR


 IVP


   2/1/21 21:00


 2/13/21 08:59  2/4/21 21:08





 


 Quetiapine


 Fumarate


  (SEROqueL)  50 mg  Q12HR


 ORAL


   2/4/21 11:45


 3/21/21 11:44  2/4/21 21:08





 


 Sodium Chloride  500 ml @ 


 999 mls/hr  Q31M PRN


 IV


 For hypotension  1/15/21 18:30


 2/14/21 18:29   





 


 Sodium Chloride  1,000 ml @ 


 100 mls/hr  Q10H


 IV


   2/1/21 15:30


 3/3/21 15:29  2/4/21 23:10














Last 24 Hour Vital Signs








  Date Time  Temp Pulse Resp B/P (MAP) Pulse Ox O2 Delivery O2 Flow Rate FiO2


 


2/5/21 06:00  87 30 125/69 (87) 85   


 


2/5/21 05:30  87 30 128/67 (87) 85   


 


2/5/21 05:15  88 31 129/72 (91) 85   


 


2/5/21 05:00  88 31 130/72 (91) 84   


 


2/5/21 04:45  88 29 130/69 (89) 83   


 


2/5/21 04:30  89 29 128/70 (89) 84   


 


2/5/21 04:15  88 30 131/69 (89) 85   


 


2/5/21 04:00 99.0 88 29 129/68 (88) 85   


 


2/5/21 04:00        100


 


2/5/21 04:00      Mechanical Ventilator  


 


2/5/21 03:45  89 27 126/69 (88) 85   


 


2/5/21 03:39  90 33     100


 


2/5/21 03:30  90 27 126/69 (88) 84   


 


2/5/21 03:15  92 30 125/72 (89) 86   


 


2/5/21 03:00  90 32 130/70 (90) 89   


 


2/5/21 02:25   18   Mechanical Ventilator  


 


2/5/21 02:12    140/78    


 


2/5/21 02:00  91 30 140/78 (98) 85   


 


2/5/21 01:45  91 31 142/78 (99) 85   


 


2/5/21 01:30  91 33 140/78 (98) 85   


 


2/5/21 01:15  92 31 144/76 (98) 85   


 


2/5/21 01:00  92 29 141/76 (97) 85   


 


2/5/21 01:00    140/78    


 


2/5/21 00:15  94 32 138/74 (95) 85   


 


2/5/21 00:00      Mechanical Ventilator  


 


2/5/21 00:00        100


 


2/5/21 00:00 98.7 96 31 140/74 (96) 84   


 


2/4/21 23:45  95 31 139/78 (98) 85   


 


2/4/21 23:30  95 31 137/74 (95) 83   


 


2/4/21 23:29  94 35     100


 


2/4/21 23:15  94 29 138/72 (94) 84   


 


2/4/21 23:09   18 132/76  Mechanical Ventilator  100


 


2/4/21 23:00  95 31 132/76 (94) 84   


 


2/4/21 22:15  95 31 131/72 (91) 82   


 


2/4/21 22:00  96 32 125/69 (87) 81   


 


2/4/21 21:45  97 33 130/69 (89) 80   


 


2/4/21 21:30  97 31 125/69 (87) 81   


 


2/4/21 21:15  98 31 117/70 (86) 80   


 


2/4/21 21:00  98 31 119/67 (84) 79   


 


2/4/21 20:15  103 30 130/72 (91) 79   


 


2/4/21 20:02  102 29 105/65 (78) 75   


 


2/4/21 20:00      Mechanical Ventilator  


 


2/4/21 20:00        100


 


2/4/21 20:00 99.2 104 32 106/62 (77) 75   


 


2/4/21 19:58  107 33 114/60 (78) 75   


 


2/4/21 19:58  105 33     100


 


2/4/21 19:54  122 32 134/73 (93) 66   


 


2/4/21 19:52  130 33 171/82 (111) 57   


 


2/4/21 19:50  140 32 190/106 (134) 65   


 


2/4/21 19:45  40 21  70   


 


2/4/21 19:30  96 29 95/55 (68) 79   


 


2/4/21 19:15  100 29 126/71 (89) 80   


 


2/4/21 19:00  100 31 126/73 (90) 80   


 


2/4/21 18:45  101 30  80   


 


2/4/21 18:30  102 30 128/74 (92) 80   


 


2/4/21 18:15  102 26 128/73 (91) 81   


 


2/4/21 18:00  103 27 128/69 (88) 80   


 


2/4/21 17:45  104 29 131/72 (91) 80   


 


2/4/21 17:30  103 28 130/74 (92) 79   


 


2/4/21 17:15  104 26 135/73 (93) 77   


 


2/4/21 17:00  104 29 137/76 (96) 75   


 


2/4/21 16:45  106 28 147/75 (99) 71   


 


2/4/21 16:30 98.5 103 23 131/69 (89) 69   


 


2/4/21 16:15  102 31 123/73 (90) 68   


 


2/4/21 16:00  83      


 


2/4/21 16:00        100


 


2/4/21 16:00  102 28 118/67 (84) 66   


 


2/4/21 15:45  95 30 83/54 (64) 63   


 


2/4/21 15:30  98 29 89/50 (63) 63   


 


2/4/21 15:25  97 33     100


 


2/4/21 15:15  102 26 99/55 (70)    


 


2/4/21 15:00  99 24 132/74 (93) 90   


 


2/4/21 14:45  103 26 150/84 (106) 91   


 


2/4/21 14:30  115 29 182/95 (124)    


 


2/4/21 14:27  111 30 149/93 (111)    


 


2/4/21 14:05  68 31 93/41 (58) 86   


 


2/4/21 14:00  44 28 59/36 (44) 86   


 


2/4/21 13:45  107 31 105/61 (76) 73   


 


2/4/21 13:43  94 32 193/86 (121) 81   


 


2/4/21 13:30  76 32 65/43 (50) 88   


 


2/4/21 13:15  87 29 103/61 (75) 89   


 


2/4/21 13:10   28   Mechanical Ventilator  100


 


2/4/21 13:00  85 30 100/61 (74) 89   


 


2/4/21 12:54  81 31 90/60 (70) 88   


 


2/4/21 12:49  70 32 77/53 (61) 88   


 


2/4/21 12:45  67 31 74/47 (56) 87   


 


2/4/21 12:30 97.2 68 31 90/60 (70) 89   


 


2/4/21 12:20   30   Mechanical Ventilator  100


 


2/4/21 12:00        100


 


2/4/21 12:00  69      


 


2/4/21 12:00  83 29 123/70 (87) 88   


 


2/4/21 11:39   33 111/70  Mechanical Ventilator  100


 


2/4/21 11:37   33   Mechanical Ventilator  100


 


2/4/21 11:30  86 28 111/70 (84) 81   


 


2/4/21 11:00  87 26 127/76 (93) 87   


 


2/4/21 10:30  89 32     100


 


2/4/21 10:00  88 30 120/66 (84) 81   


 


2/4/21 09:57  89      


 


2/4/21 09:00  88 30 123/71 (88) 88   


 


2/4/21 08:00        100


 


2/4/21 08:00   29 100/61  Mechanical Ventilator  100


 


2/4/21 08:00  86 33 121/68 (85) 86   


 


2/4/21 08:00  84      


 


2/4/21 07:00   32 127/77  Mechanical Ventilator  100


 


2/4/21 07:00    127/77    


 


2/4/21 07:00  79 29 127/77 (94) 90   


 


2/4/21 06:45  83 34     100


 


2/4/21 06:00   29 118/92  Mechanical Ventilator  100


 


2/4/21 06:00    118/92    


 


2/4/21 06:00  85 29 118/92 (101) 85   


 


2/4/21 05:00   25 139/78  Mechanical Ventilator  100


 


2/4/21 05:00    139/78    


 


2/4/21 05:00  88 25 139/78 (98) 83   


 


2/4/21 04:14  89      


 


2/4/21 04:00 97.0 84 30 127/71 (89) 90   


 


2/4/21 04:00      Mechanical Ventilator  


 


2/4/21 04:00   30 127/71  Mechanical Ventilator  100


 


2/4/21 04:00    127/71    


 


2/4/21 04:00        100


 


2/4/21 03:09  80 33     100


 


2/4/21 03:00   29 120/66  Mechanical Ventilator  100


 


2/4/21 03:00    120/66    


 


2/4/21 03:00  82 26 120/66 (84) 89   


 


2/4/21 02:08   24 130/73  Mechanical Ventilator  100


 


2/4/21 02:00  78 29 130/73 (92) 91   


 


2/4/21 02:00   28 127/71  Mechanical Ventilator  100


 


2/4/21 02:00    127/71    


 


2/4/21 01:30  77 32     100


 


2/4/21 01:00   27 130/74  Mechanical Ventilator  100


 


2/4/21 01:00    130/74    


 


2/4/21 01:00  75 26 131/79 (96) 93   


 


2/4/21 00:00 97.5 81 12 135/73 (93) 94   


 


2/4/21 00:00   29 134/75  Mechanical Ventilator  100


 


2/4/21 00:00    134/75    


 


2/4/21 00:00      Mechanical Ventilator  


 


2/3/21 23:44  82      


 


2/3/21 23:00  86 18 116/68 (84) 86   


 


2/3/21 23:00   18 119/65  Mechanical Ventilator  100


 


2/3/21 23:00    119/65    


 


2/3/21 22:00  84 27 122/66 (84) 90   


 


2/3/21 22:00   27 126/68  Mechanical Ventilator  100


 


2/3/21 22:00    126/68    


 


2/3/21 21:00   29 118/64  Mechanical Ventilator  100


 


2/3/21 21:00    117/65    


 


2/3/21 21:00  78 30 117/65 (82) 90   


 


2/3/21 20:00      Mechanical Ventilator  


 


2/3/21 20:00   30 132/74  Mechanical Ventilator  100


 


2/3/21 20:00    132/74    


 


2/3/21 20:00 96.5 82 32 133/75 (94) 89   


 


2/3/21 20:00        100


 


2/3/21 19:29  86      


 


2/3/21 19:08  80 32     100


 


2/3/21 19:00  85 25 140/81 (100) 82   


 


2/3/21 19:00   30 140/81  Mechanical Ventilator  100


 


2/3/21 19:00    140/81    


 


2/3/21 18:00   25 148/76  Mechanical Ventilator  100


 


2/3/21 18:00    148/76    


 


2/3/21 18:00  86 25 148/76 (100) 67   


 


2/3/21 17:11   30 134/77  Mechanical Ventilator  100


 


2/3/21 17:00  86 15 134/77 (96) 89   


 


2/3/21 16:00        100


 


2/3/21 16:00      Mechanical Ventilator  


 


2/3/21 16:00    112/62    


 


2/3/21 16:00  92      


 


2/3/21 16:00 98.9 91 25 112/62 (79) 88   


 


2/3/21 15:53  92 32     100


 


2/3/21 15:00  90 15 105/63 (77) 84   


 


2/3/21 15:00    107/64    


 


2/3/21 15:00   19 107/64  Mechanical Ventilator  100


 


2/3/21 14:00  85 29 108/63 (78) 84   


 


2/3/21 14:00    109/64    


 


2/3/21 14:00   18 107/64  Mechanical Ventilator  100


 


2/3/21 13:00  82 23 112/67 (82) 86   


 


2/3/21 13:00    112/67    


 


2/3/21 13:00   30 109/64  Mechanical Ventilator  100


 


2/3/21 12:22    103/62    


 


2/3/21 12:00 96.9 87 28 110/64 (79) 90   


 


2/3/21 12:00      Mechanical Ventilator  


 


2/3/21 12:00   23 113/68  Mechanical Ventilator  100


 


2/3/21 12:00  86      


 


2/3/21 12:00        100


 


2/3/21 11:59    103/62    


 


2/3/21 11:03  95 19 107/62 (77) 86   


 


2/3/21 11:00   19 107/62  Mechanical Ventilator  100


 


2/3/21 10:50  90 32     100


 


2/3/21 10:45  100 20 110/63 (79) 80   


 


2/3/21 10:30    107/62    


 


2/3/21 10:29  104 19 121/73 (89) 56   


 


2/3/21 10:24  69 14 68/45 (53) 58   


 


2/3/21 10:21    67/43    


 


2/3/21 10:15  70 17 59/43 (48) 64   


 


2/3/21 10:03    75/55    


 


2/3/21 10:00   22 145/70  Mechanical Ventilator  100


 


2/3/21 10:00  118 16 145/70 (95) 52   


 


2/3/21 09:49  84 17 68/43 (51) 69   


 


2/3/21 09:23  83      


 


2/3/21 09:15   21 118/67  Mechanical Ventilator  100


 


2/3/21 09:00   21 118/67  Mechanical Ventilator  100


 


2/3/21 09:00    118/67    


 


2/3/21 09:00  90 24 118/67 (84) 79   


 


2/3/21 08:00  83      


 


2/3/21 08:00 97.5 82 29 112/67 (82) 82   


 


2/3/21 08:00   26 113/59  Mechanical Ventilator  100


 


2/3/21 08:00    113/68    


 


2/3/21 08:00      Mechanical Ventilator  


 


2/3/21 08:00        100


 


2/3/21 07:15    113/68    


 


2/3/21 07:07  84 22     100


 


2/3/21 07:00  88 29 119/66 (83) 69   

















Intake and Output  


 


 2/4/21 2/5/21





 19:00 07:00


 


Intake Total 2120.82 ml 1799.11 ml


 


Output Total 250 ml 60 ml


 


Balance 1870.82 ml 1739.11 ml


 


  


 


IV Total 1875.82 ml 1449.11 ml


 


Tube Feeding 245 ml 350 ml


 


Output Urine Total 250 ml 10 ml


 


Chest Tube Drainage Total  50 ml


 


# Voids  35


 


# Bowel Movements  10











Labs








Test


 2/2/21


08:40 2/3/21


04:25 2/3/21


08:31 2/3/21


11:43


 


Arterial Blood pH


 7.179


(7.350-7.450) 


 7.194


(7.350-7.450) 





 


Arterial Blood Partial


Pressure CO2 86.9 mmHg


(35.0-45.0) 


 84.6 mmHg


(35.0-45.0) 





 


Arterial Blood Partial


Pressure O2 53.6 mmHg


(75.0-100.0) 


 51.1 mmHg


(75.0-100.0) 





 


Arterial Blood HCO3


 31.2 mmol/L


(22.0-26.0) 


 31.9 mmol/L


(22.0-26.0) 





 


Arterial Blood Oxygen


Saturation 85.1 %


() 


 81.4 %


() 





 


Arterial Blood Base Excess 1.1 (-2-2)   2.4 (-2-2)  


 


Abelardo Test Positive   Positive  


 


White Blood Count


 


 6.5 K/UL


(4.8-10.8) 


 





 


Red Blood Count


 


 2.95 M/UL


(4.70-6.10) 


 





 


Hemoglobin


 


 8.6 G/DL


(14.2-18.0) 


 





 


Hematocrit


 


 28.3 %


(42.0-52.0) 


 





 


Mean Corpuscular Volume  96 FL (80-99)   


 


Mean Corpuscular Hemoglobin


 


 29.0 PG


(27.0-31.0) 


 





 


Mean Corpuscular Hemoglobin


Concent 


 30.3 G/DL


(32.0-36.0) 


 





 


Red Cell Distribution Width


 


 17.8 %


(11.6-14.8) 


 





 


Platelet Count


 


 239 K/UL


(150-450) 


 





 


Mean Platelet Volume


 


 9.4 FL


(6.5-10.1) 


 





 


Neutrophils (%) (Auto)   % (45.0-75.0)   


 


Lymphocytes (%) (Auto)   % (20.0-45.0)   


 


Monocytes (%) (Auto)   % (1.0-10.0)   


 


Eosinophils (%) (Auto)   % (0.0-3.0)   


 


Basophils (%) (Auto)   % (0.0-2.0)   


 


Sodium Level


 


 145 MMOL/L


(136-145) 


 





 


Potassium Level


 


 4.4 MMOL/L


(3.5-5.1) 


 





 


Chloride Level


 


 105 MMOL/L


() 


 





 


Carbon Dioxide Level


 


 30 MMOL/L


(21-32) 


 





 


Anion Gap


 


 10 mmol/L


(5-15) 


 





 


Blood Urea Nitrogen


 


 77 mg/dL


(7-18) 


 





 


Creatinine


 


 2.7 MG/DL


(0.55-1.30) 


 





 


Estimat Glomerular Filtration


Rate 


 23.8 mL/min


(>60) 


 





 


Glucose Level


 


 198 MG/DL


() 


 





 


Lactic Acid Level


 


 2.60 mmol/L


(0.4-2.0) 


 





 


Uric Acid


 


 8.6 MG/DL


(2.6-7.2) 


 





 


Calcium Level


 


 8.0 MG/DL


(8.5-10.1) 


 





 


Phosphorus Level


 


 5.6 MG/DL


(2.5-4.9) 


 





 


Magnesium Level


 


 2.4 MG/DL


(1.8-2.4) 


 





 


Total Bilirubin


 


 0.5 MG/DL


(0.2-1.0) 


 





 


Gamma Glutamyl Transpeptidase  210 U/L (5-85)   


 


Aspartate Amino Transf


(AST/SGOT) 


 27 U/L (15-37) 


 


 





 


Alanine Aminotransferase


(ALT/SGPT) 


 39 U/L (12-78) 


 


 





 


Alkaline Phosphatase


 


 329 U/L


() 


 





 


Total Creatine Kinase


 


 16 U/L


() 


 





 


C-Reactive Protein,


Quantitative 


 8.3 mg/dL


(0.00-0.90) 


 





 


Pro-B-Type Natriuretic Peptide


 


 39370 pg/mL


(0-125) 


 





 


Total Protein


 


 6.5 G/DL


(6.4-8.2) 


 





 


Albumin


 


 2.1 G/DL


(3.4-5.0) 


 





 


Globulin  4.4 g/dL   


 


Albumin/Globulin Ratio  0.5 (1.0-2.7)   


 


POC Whole Blood Glucose


 


 


 


 174 MG/DL


()


 


Test


 2/3/21


12:15 2/3/21


17:17 2/3/21


18:30 2/4/21


02:30


 


Lactic Acid Level


 2.50 mmol/L


(0.4-2.0) 


 2.60 mmol/L


(0.66-2.22) 





 


POC Whole Blood Glucose


 


 182 MG/DL


() 


 





 


Urine Color    Yellow 


 


Urine Appearance    Cloudy 


 


Urine pH    5 (4.5-8.0) 


 


Urine Specific Gravity


 


 


 


 1.015


(1.005-1.035)


 


Urine Protein    2+ (NEGATIVE) 


 


Urine Glucose (UA)


 


 


 


 Negative


(NEGATIVE)


 


Urine Ketones


 


 


 


 Negative


(NEGATIVE)


 


Urine Blood    5+ (NEGATIVE) 


 


Urine Nitrite


 


 


 


 Negative


(NEGATIVE)


 


Urine Bilirubin


 


 


 


 Negative


(NEGATIVE)


 


Urine Urobilinogen


 


 


 


 Normal MG/DL


(0.0-1.0)


 


Urine Leukocyte Esterase    3+ (NEGATIVE) 


 


Urine RBC


 


 


 


 10-15 /HPF (0


- 0)


 


Urine WBC


 


 


 


 5-10 /HPF (0 -


0)


 


Urine Squamous Epithelial


Cells 


 


 


 None /LPF


(NONE/OCC)


 


Urine Bacteria


 


 


 


 Moderate /HPF


(NONE)


 


Urine Random Sodium


 


 


 


 < 20 mmol/L


()


 


Test


 2/4/21


04:35 2/4/21


07:26 2/4/21


09:25 2/4/21


13:09


 


White Blood Count


 7.1 K/UL


(4.8-10.8) 


 


 





 


Red Blood Count


 2.96 M/UL


(4.70-6.10) 


 


 





 


Hemoglobin


 8.5 G/DL


(14.2-18.0) 


 


 





 


Hematocrit


 28.6 %


(42.0-52.0) 


 


 





 


Mean Corpuscular Volume 97 FL (80-99)    


 


Mean Corpuscular Hemoglobin


 28.8 PG


(27.0-31.0) 


 


 





 


Mean Corpuscular Hemoglobin


Concent 29.8 G/DL


(32.0-36.0) 


 


 





 


Red Cell Distribution Width


 19.9 %


(11.6-14.8) 


 


 





 


Platelet Count


 242 K/UL


(150-450) 


 


 





 


Mean Platelet Volume


 8.9 FL


(6.5-10.1) 


 


 





 


Neutrophils (%) (Auto)  % (45.0-75.0)    


 


Lymphocytes (%) (Auto)  % (20.0-45.0)    


 


Monocytes (%) (Auto)  % (1.0-10.0)    


 


Eosinophils (%) (Auto)  % (0.0-3.0)    


 


Basophils (%) (Auto)  % (0.0-2.0)    


 


Sodium Level


 144 MMOL/L


(136-145) 


 


 





 


Potassium Level


 4.2 MMOL/L


(3.5-5.1) 


 


 





 


Chloride Level


 105 MMOL/L


() 


 


 





 


Carbon Dioxide Level


 30 MMOL/L


(21-32) 


 


 





 


Anion Gap


 9 mmol/L


(5-15) 


 


 





 


Blood Urea Nitrogen


 78 mg/dL


(7-18) 


 


 





 


Creatinine


 2.8 MG/DL


(0.55-1.30) 


 


 





 


Estimat Glomerular Filtration


Rate 22.8 mL/min


(>60) 


 


 





 


Glucose Level


 124 MG/DL


() 


 


 





 


Uric Acid


 9.2 MG/DL


(2.6-7.2) 


 


 





 


Calcium Level


 8.2 MG/DL


(8.5-10.1) 


 


 





 


Phosphorus Level


 5.5 MG/DL


(2.5-4.9) 


 


 





 


Magnesium Level


 2.5 MG/DL


(1.8-2.4) 


 


 





 


Total Bilirubin


 0.6 MG/DL


(0.2-1.0) 


 


 





 


Aspartate Amino Transf


(AST/SGOT) 38 U/L (15-37) 


 


 


 





 


Alanine Aminotransferase


(ALT/SGPT) 56 U/L (12-78) 


 


 


 





 


Alkaline Phosphatase


 339 U/L


() 


 


 





 


C-Reactive Protein,


Quantitative 4.8 mg/dL


(0.00-0.90) 


 


 





 


Pro-B-Type Natriuretic Peptide


 90104 pg/mL


(0-125) 


 


 





 


Total Protein


 6.4 G/DL


(6.4-8.2) 


 


 





 


Albumin


 2.4 G/DL


(3.4-5.0) 


 


 





 


Globulin 4.0 g/dL    


 


Albumin/Globulin Ratio 0.6 (1.0-2.7)    


 


Digoxin Level


 1.5 NG/ML


(0.5-2.0) 


 


 





 


Arterial Blood pH


 


 7.200


(7.350-7.450) 


 





 


Arterial Blood Partial


Pressure CO2 


 78.8 mmHg


(35.0-45.0) 


 





 


Arterial Blood Partial


Pressure O2 


 43.1 mmHg


(75.0-100.0) 


 





 


Arterial Blood HCO3


 


 30.1 mmol/L


(22.0-26.0) 


 





 


Arterial Blood Oxygen


Saturation 


 73.3 %


() 


 





 


Arterial Blood Base Excess  1.0 (-2-2)   


 


Abelardo Test  Positive   


 


Lactic Acid Level


 


 


 2.00 mmol/L


(0.4-2.0) 





 


POC Whole Blood Glucose


 


 


 


 190 MG/DL


()


 


Test


 2/5/21


03:25 2/5/21


05:36 


 





 


White Blood Count


 7.6 K/UL


(4.8-10.8) 


 


 





 


Red Blood Count


 2.96 M/UL


(4.70-6.10) 


 


 





 


Hemoglobin


 8.5 G/DL


(14.2-18.0) 


 


 





 


Hematocrit


 28.6 %


(42.0-52.0) 


 


 





 


Mean Corpuscular Volume 97 FL (80-99)    


 


Mean Corpuscular Hemoglobin


 28.8 PG


(27.0-31.0) 


 


 





 


Mean Corpuscular Hemoglobin


Concent 29.8 G/DL


(32.0-36.0) 


 


 





 


Red Cell Distribution Width


 18.5 %


(11.6-14.8) 


 


 





 


Platelet Count


 260 K/UL


(150-450) 


 


 





 


Mean Platelet Volume


 8.8 FL


(6.5-10.1) 


 


 





 


Neutrophils (%) (Auto)


 84.3 %


(45.0-75.0) 


 


 





 


Lymphocytes (%) (Auto)


 8.0 %


(20.0-45.0) 


 


 





 


Monocytes (%) (Auto)


 6.6 %


(1.0-10.0) 


 


 





 


Eosinophils (%) (Auto)


 0.0 %


(0.0-3.0) 


 


 





 


Basophils (%) (Auto)


 1.1 %


(0.0-2.0) 


 


 





 


Sodium Level


 142 MMOL/L


(136-145) 


 


 





 


Potassium Level


 4.6 MMOL/L


(3.5-5.1) 


 


 





 


Chloride Level


 104 MMOL/L


() 


 


 





 


Carbon Dioxide Level


 29 MMOL/L


(21-32) 


 


 





 


Anion Gap


 9 mmol/L


(5-15) 


 


 





 


Blood Urea Nitrogen


 92 mg/dL


(7-18) 


 


 





 


Creatinine


 3.4 MG/DL


(0.55-1.30) 


 


 





 


Estimat Glomerular Filtration


Rate 18.2 mL/min


(>60) 


 


 





 


Glucose Level


 134 MG/DL


() 


 


 





 


Uric Acid


 10.6 MG/DL


(2.6-7.2) 


 


 





 


Calcium Level


 8.0 MG/DL


(8.5-10.1) 


 


 





 


Phosphorus Level


 6.3 MG/DL


(2.5-4.9) 


 


 





 


Magnesium Level


 2.6 MG/DL


(1.8-2.4) 


 


 





 


Total Bilirubin


 0.7 MG/DL


(0.2-1.0) 


 


 





 


Aspartate Amino Transf


(AST/SGOT) 58 U/L (15-37) 


 


 


 





 


Alanine Aminotransferase


(ALT/SGPT) 85 U/L (12-78) 


 


 


 





 


Alkaline Phosphatase


 398 U/L


() 


 


 





 


C-Reactive Protein,


Quantitative 5.1 mg/dL


(0.00-0.90) 


 


 





 


Pro-B-Type Natriuretic Peptide


 > 03298 pg/mL


(0-125) 


 


 





 


Total Protein


 6.2 G/DL


(6.4-8.2) 


 


 





 


Albumin


 2.2 G/DL


(3.4-5.0) 


 


 





 


Globulin 4.0 g/dL    


 


Albumin/Globulin Ratio 0.6 (1.0-2.7)    


 


Arterial Blood pH


 


 7.203


(7.350-7.450) 


 





 


Arterial Blood Partial


Pressure CO2 


 73.2 mmHg


(35.0-45.0) 


 





 


Arterial Blood Partial


Pressure O2 


 51.3 mmHg


(75.0-100.0) 


 





 


Arterial Blood HCO3


 


 28.2 mmol/L


(22.0-26.0) 


 





 


Arterial Blood Oxygen


Saturation 


 82.1 %


() 


 





 


Arterial Blood Base Excess  -0.7 (-2-2)   


 


Abelardo Test  Positive   








Height (Feet):  5


Height (Inches):  7.00


Weight (Pounds):  198


Objective


General Appearance:  lethargic, moderate distress


Neck:  supple


Cardiovascular:  regular rhythm


Respiratory/Chest:  crackles/rales, rhonchi - bilaterally vent++


Abdomen:  non tender, soft


Extremities:  non-tender











Emmett Cook MD           Feb 5, 2021 06:54

## 2021-02-05 NOTE — DIAGNOSTIC IMAGING REPORT
Indication: Dyspnea

 

Technique: One view of the chest

 

Comparison: 2/4/2021

 

Findings: Stable satisfactory tube and line positions. There is increased infiltrate

in the right lung. Infiltrate in the left lung is probably unchanged. The heart is

borderline enlarged. No pneumothorax

 

Impression: Slightly worsened right lung infiltrate, overt one day

## 2021-02-05 NOTE — NUR
pt is bathed. Hemodynamically stable. Remains on pressor support. Sedated on fent and florentino 
gtt. Will continue to monitor.

## 2021-02-05 NOTE — INFECTIOUS DISEASES PROG NOTE
Assessment/Plan


Assessment/Plan


antibiotics : cefepime





A


1. COVID-19 pneumonia.


on 100 % FiO2 with saturation of 80 %


s/p remdesivir


s/p ivermectin


s/p solumedrol


2. New-onset diabetes.


3. respiratory failure


4. renal failure





P


1. Continue cefepime


2. Continue isolation.





Subjective


ROS Limited/Unobtainable:  Yes


Allergies:  


Coded Allergies:  


     No Known Allergies (Unverified , 6/11/19)





Objective





Last 24 Hour Vital Signs








  Date Time  Temp Pulse Resp B/P (MAP) Pulse Ox O2 Delivery O2 Flow Rate FiO2


 


2/5/21 10:45  89 31 95/60 (72) 80   


 


2/5/21 10:42  85 29 89/57 (68) 79   


 


2/5/21 10:30  82 30 85/54 (64) 78   


 


2/5/21 10:15  89 31 102/64 (77) 77   


 


2/5/21 10:00  87 31 92/59 (70) 76   


 


2/5/21 09:45  87 30 102/56 (71) 75   


 


2/5/21 09:30  90 31 106/62 (77) 72   


 


2/5/21 09:20    115/64    


 


2/5/21 09:15  90 29 115/64 (81) 75   


 


2/5/21 09:00  92 29 139/81 (100) 76   


 


2/5/21 08:45  90 31 122/66 (84) 77   


 


2/5/21 08:43   30 118/64  Mechanical Ventilator  100


 


2/5/21 08:31  90      


 


2/5/21 08:30  89 31 118/64 (82) 80   


 


2/5/21 08:15  87 31 129/76 (93) 85   


 


2/5/21 08:00 97.8 85 29 129/74 (92) 85   


 


2/5/21 08:00        100


 


2/5/21 08:00      Mechanical Ventilator  


 


2/5/21 07:45  87 29 127/72 (90) 85   


 


2/5/21 07:30  87 31 134/74 (94) 83   


 


2/5/21 07:15  88 30 136/75 (95) 82   


 


2/5/21 07:00  88 30 125/71 (89) 82   


 


2/5/21 06:00  87 30 125/69 (87) 85   


 


2/5/21 05:30  87 30 128/67 (87) 85   


 


2/5/21 05:15  88 31 129/72 (91) 85   


 


2/5/21 05:00  88 31 130/72 (91) 84   


 


2/5/21 04:45  88 29 130/69 (89) 83   


 


2/5/21 04:30  89 29 128/70 (89) 84   


 


2/5/21 04:15  88 30 131/69 (89) 85   


 


2/5/21 04:00 99.0 88 29 129/68 (88) 85   


 


2/5/21 04:00        100


 


2/5/21 04:00      Mechanical Ventilator  


 


2/5/21 03:45  89 27 126/69 (88) 85   


 


2/5/21 03:39  90 33     100


 


2/5/21 03:30  90 27 126/69 (88) 84   


 


2/5/21 03:15  92 30 125/72 (89) 86   


 


2/5/21 03:00  90 32 130/70 (90) 89   


 


2/5/21 02:25   18   Mechanical Ventilator  


 


2/5/21 02:12    140/78    


 


2/5/21 02:00  91 30 140/78 (98) 85   


 


2/5/21 01:45  91 31 142/78 (99) 85   


 


2/5/21 01:30  91 33 140/78 (98) 85   


 


2/5/21 01:15  92 31 144/76 (98) 85   


 


2/5/21 01:00  92 29 141/76 (97) 85   


 


2/5/21 01:00    140/78    


 


2/5/21 00:15  94 32 138/74 (95) 85   


 


2/5/21 00:00      Mechanical Ventilator  


 


2/5/21 00:00        100


 


2/5/21 00:00 98.7 96 31 140/74 (96) 84   


 


2/4/21 23:45  95 31 139/78 (98) 85   


 


2/4/21 23:30  95 31 137/74 (95) 83   


 


2/4/21 23:29  94 35     100


 


2/4/21 23:15  94 29 138/72 (94) 84   


 


2/4/21 23:09   18 132/76  Mechanical Ventilator  100


 


2/4/21 23:00  95 31 132/76 (94) 84   


 


2/4/21 22:15  95 31 131/72 (91) 82   


 


2/4/21 22:00  96 32 125/69 (87) 81   


 


2/4/21 21:45  97 33 130/69 (89) 80   


 


2/4/21 21:30  97 31 125/69 (87) 81   


 


2/4/21 21:15  98 31 117/70 (86) 80   


 


2/4/21 21:00  98 31 119/67 (84) 79   


 


2/4/21 20:15  103 30 130/72 (91) 79   


 


2/4/21 20:02  102 29 105/65 (78) 75   


 


2/4/21 20:00      Mechanical Ventilator  


 


2/4/21 20:00        100


 


2/4/21 20:00 99.2 104 32 106/62 (77) 75   


 


2/4/21 19:58  107 33 114/60 (78) 75   


 


2/4/21 19:58  105 33     100


 


2/4/21 19:54  122 32 134/73 (93) 66   


 


2/4/21 19:52  130 33 171/82 (111) 57   


 


2/4/21 19:50  140 32 190/106 (134) 65   


 


2/4/21 19:45  40 21  70   


 


2/4/21 19:30  96 29 95/55 (68) 79   


 


2/4/21 19:15  100 29 126/71 (89) 80   


 


2/4/21 19:00  100 31 126/73 (90) 80   


 


2/4/21 18:45  101 30  80   


 


2/4/21 18:30  102 30 128/74 (92) 80   


 


2/4/21 18:15  102 26 128/73 (91) 81   


 


2/4/21 18:00  103 27 128/69 (88) 80   


 


2/4/21 17:45  104 29 131/72 (91) 80   


 


2/4/21 17:30  103 28 130/74 (92) 79   


 


2/4/21 17:15  104 26 135/73 (93) 77   


 


2/4/21 17:00  104 29 137/76 (96) 75   


 


2/4/21 16:45  106 28 147/75 (99) 71   


 


2/4/21 16:30 98.5 103 23 131/69 (89) 69   


 


2/4/21 16:15  102 31 123/73 (90) 68   


 


2/4/21 16:00  83      


 


2/4/21 16:00      Mechanical Ventilator  


 


2/4/21 16:00        100


 


2/4/21 16:00  102 28 118/67 (84) 66   


 


2/4/21 15:45  95 30 83/54 (64) 63   


 


2/4/21 15:30  98 29 89/50 (63) 63   


 


2/4/21 15:25  97 33     100


 


2/4/21 15:15  102 26 99/55 (70)    


 


2/4/21 15:00  99 24 132/74 (93) 90   


 


2/4/21 14:45  103 26 150/84 (106) 91   


 


2/4/21 14:30  115 29 182/95 (124)    


 


2/4/21 14:27  111 30 149/93 (111)    


 


2/4/21 14:05  68 31 93/41 (58) 86   


 


2/4/21 14:00  44 28 59/36 (44) 86   


 


2/4/21 13:45  107 31 105/61 (76) 73   


 


2/4/21 13:43  94 32 193/86 (121) 81   


 


2/4/21 13:30  76 32 65/43 (50) 88   


 


2/4/21 13:15  87 29 103/61 (75) 89   


 


2/4/21 13:10   28   Mechanical Ventilator  100


 


2/4/21 13:00  85 30 100/61 (74) 89   


 


2/4/21 12:54  81 31 90/60 (70) 88   


 


2/4/21 12:49  70 32 77/53 (61) 88   


 


2/4/21 12:45  67 31 74/47 (56) 87   


 


2/4/21 12:30 97.2 68 31 90/60 (70) 89   


 


2/4/21 12:20   30   Mechanical Ventilator  100


 


2/4/21 12:00        100


 


2/4/21 12:00      Mechanical Ventilator  


 


2/4/21 12:00  69      


 


2/4/21 12:00  83 29 123/70 (87) 88   


 


2/4/21 11:39   33 111/70  Mechanical Ventilator  100


 


2/4/21 11:37   33   Mechanical Ventilator  100


 


2/4/21 11:30  86 28 111/70 (84) 81   








Height (Feet):  5


Height (Inches):  7.00


Weight (Pounds):  198





Microbiology








 Date/Time


Source Procedure


Growth Status





 


 2/4/21 02:30


Urine,Clean Catch Urine Culture - Preliminary


YEAST Resulted











Laboratory Tests








Test


 2/4/21


13:09 2/5/21


03:25 2/5/21


05:36


 


POC Whole Blood Glucose


 190 MG/DL


()  H 


 





 


White Blood Count


 


 7.6 K/UL


(4.8-10.8) 





 


Red Blood Count


 


 2.96 M/UL


(4.70-6.10)  L 





 


Hemoglobin


 


 8.5 G/DL


(14.2-18.0)  L 





 


Hematocrit


 


 28.6 %


(42.0-52.0)  L 





 


Mean Corpuscular Volume  97 FL (80-99)   


 


Mean Corpuscular Hemoglobin


 


 28.8 PG


(27.0-31.0) 





 


Mean Corpuscular Hemoglobin


Concent 


 29.8 G/DL


(32.0-36.0)  L 





 


Red Cell Distribution Width


 


 18.5 %


(11.6-14.8)  H 





 


Platelet Count


 


 260 K/UL


(150-450) 





 


Mean Platelet Volume


 


 8.8 FL


(6.5-10.1) 





 


Neutrophils (%) (Auto)


 


 84.3 %


(45.0-75.0)  H 





 


Lymphocytes (%) (Auto)


 


 8.0 %


(20.0-45.0)  L 





 


Monocytes (%) (Auto)


 


 6.6 %


(1.0-10.0) 





 


Eosinophils (%) (Auto)


 


 0.0 %


(0.0-3.0) 





 


Basophils (%) (Auto)


 


 1.1 %


(0.0-2.0) 





 


Sodium Level


 


 142 MMOL/L


(136-145) 





 


Potassium Level


 


 4.6 MMOL/L


(3.5-5.1) 





 


Chloride Level


 


 104 MMOL/L


() 





 


Carbon Dioxide Level


 


 29 MMOL/L


(21-32) 





 


Anion Gap


 


 9 mmol/L


(5-15) 





 


Blood Urea Nitrogen


 


 92 mg/dL


(7-18)  H 





 


Creatinine


 


 3.4 MG/DL


(0.55-1.30)  H 





 


Estimat Glomerular Filtration


Rate 


 18.2 mL/min


(>60) 





 


Glucose Level


 


 134 MG/DL


()  H 





 


Uric Acid


 


 10.6 MG/DL


(2.6-7.2)  H 





 


Calcium Level


 


 8.0 MG/DL


(8.5-10.1)  L 





 


Phosphorus Level


 


 6.3 MG/DL


(2.5-4.9)  H 





 


Magnesium Level


 


 2.6 MG/DL


(1.8-2.4)  H 





 


Total Bilirubin


 


 0.7 MG/DL


(0.2-1.0) 





 


Aspartate Amino Transf


(AST/SGOT) 


 58 U/L (15-37)


H 





 


Alanine Aminotransferase


(ALT/SGPT) 


 85 U/L (12-78)


H 





 


Alkaline Phosphatase


 


 398 U/L


()  H 





 


C-Reactive Protein,


Quantitative 


 5.1 mg/dL


(0.00-0.90)  H 





 


Pro-B-Type Natriuretic Peptide


 


 > 43975 pg/mL


(0-125)  H 





 


Total Protein


 


 6.2 G/DL


(6.4-8.2)  L 





 


Albumin


 


 2.2 G/DL


(3.4-5.0)  L 





 


Globulin  4.0 g/dL   


 


Albumin/Globulin Ratio


 


 0.6 (1.0-2.7)


L 





 


Arterial Blood pH


 


 


 7.203


(7.350-7.450)


 


Arterial Blood Partial


Pressure CO2 


 


 73.2 mmHg


(35.0-45.0)  *H


 


Arterial Blood Partial


Pressure O2 


 


 51.3 mmHg


(75.0-100.0)  L


 


Arterial Blood HCO3


 


 


 28.2 mmol/L


(22.0-26.0)  H


 


Arterial Blood Oxygen


Saturation 


 


 82.1 %


()  *L


 


Arterial Blood Base Excess   -0.7 (-2-2)  


 


Abelardo Test   Positive  











Current Medications








 Medications


  (Trade)  Dose


 Ordered  Sig/Julisa


 Route


 PRN Reason  Start Time


 Stop Time Status Last Admin


Dose Admin


 


 Acetaminophen


  (Tylenol)  650 mg  Q4H  PRN


 GT


 Temp >100.5  1/15/21 17:15


 2/14/21 17:14  1/29/21 06:59





 


 Amiodarone HCl


  (Cordarone)  200 mg  DAILY


 NG


   1/26/21 09:00


 4/19/21 20:59  2/5/21 08:30





 


 Cefepime HCl 1 gm/


 Dextrose  55 ml @ 


 110 mls/hr  Q24H


 IVPB


   2/5/21 11:00


 2/12/21 10:59   





 


 Chlorhexidine


 Gluconate


  (Vanessa-Hex 2%)  1 applic  DAILY@2000


 TOPIC


   1/19/21 20:00


 4/19/21 19:59  2/4/21 21:08





 


 Dextrose


  (Dextrose 50%)  25 ml  Q30M  PRN


 IV


 Hypoglycemia  1/9/21 13:30


 4/9/21 13:29   





 


 Dextrose


  (Dextrose 50%)  50 ml  Q30M  PRN


 IV


 Hypoglycemia  1/9/21 13:30


 4/9/21 13:29   





 


 Docusate Sodium


  (Colace)  100 mg  TIDPRN  PRN


 GT


 Constipation  1/12/21 01:15


 2/11/21 01:14  1/12/21 01:42





 


 Enoxaparin Sodium


  (Lovenox)  80 mg  DAILY


 SUBQ


   2/4/21 09:00


 5/5/21 08:59  2/5/21 08:33





 


 Fentanyl Citrate  250 ml @ 1


 mls/hr  Q24H


 IV


   2/3/21 17:00


 2/5/21 16:59  2/5/21 08:43





 


 Hydrocortisone


  (Solu-CORTEF)  100 mg  EVERY 8  HOURS


 IV


   2/1/21 15:45


 5/2/21 15:44  2/5/21 05:10





 


 Insulin Aspart


  (NovoLOG)    Q6HR


 SUBQ


   1/14/21 12:00


 4/9/21 17:59  2/5/21 00:00





 


 Insulin Detemir


  (Levemir)  26 units  Q12HR


 SUBQ


   2/2/21 09:00


 4/12/21 08:59  2/5/21 08:36





 


 Midazolam HCl 50


 mg/Sodium Chloride  100 ml @ 0


 mls/hr  Q24H  PRN


 IV


 Restlessness  2/3/21 21:51


 2/5/21 21:50  2/5/21 02:25





 


 Midodrine


  (Pro-Amatine)  10 mg  Q8HR


 ORAL


   2/1/21 15:30


 5/2/21 15:29  2/5/21 05:10





 


 Norepinephrine


 Bitartrate 16 mg/


 Dextrose  516 ml @ 0


 mls/hr  Q24H


 IV


   2/3/21 10:30


 2/6/21 10:29  2/5/21 09:20





 


 Pantoprazole


  (Protonix)  40 mg  Q12HR


 IVP


   2/1/21 21:00


 2/13/21 08:59  2/5/21 08:31





 


 Quetiapine


 Fumarate


  (SEROqueL)  50 mg  Q12HR


 ORAL


   2/4/21 11:45


 3/21/21 11:44  2/5/21 08:30





 


 Sodium Chloride  500 ml @ 


 999 mls/hr  Q31M PRN


 IV


 For hypotension  1/15/21 18:30


 2/14/21 18:29   





 


 Sodium Chloride  1,000 ml @ 


 30 mls/hr  Q24H


 IV


   2/1/21 15:30


 3/3/21 15:29  2/5/21 09:19




















Ashley Davis MD       Feb 5, 2021 11:21

## 2021-02-05 NOTE — NUR
NURSE HAND-OFF REPORT: 



Latest Vital Signs: Temperature 98.2 , Pulse 94 , B/P 117 /67 , Respiratory Rate 23 , O2 SAT 
76 , Mechanical Ventilator, O2 Flow Rate  .  

Vital Sign Comment: 



EKG Rhythm: Sinus Rhythm

Rhythm change?: N 

MD Notified?: Y -Dr Ariel GUTIERREZ Response: No New Orders Received



Latest Singh Fall Score: 50  

Fall Risk: High Risk 

Safety Measures: Call light Within Reach, Bed Alarm Zone 1, Side Rails Side Rails x3, Bed 
position Low and Locked.

Fall Precautions: 

Patient Fall Education



Report given to SUZI Herrera.

## 2021-02-05 NOTE — NEPHROLOGY PROGRESS NOTE
Assessment/Plan


Problem List:  


(1) TO (acute kidney injury)


(2) Acute respiratory failure


(3) Pneumonia due to COVID-19 virus


(4) Diabetes mellitus out of control


(5) Hyperkalemia


Assessment





Acute kidney injury, multifactorial


Septic shock


Acute respiratory failure


Hyperkalemia, metabolic acidosis


Above-mentioned condition


Plan





February 5: Patient status changed to DNR.  Is deteriorating.  On pressors.  

Renal failure worse.  Labs reviewed.  Discussed with RN.  No dialysis is being 

offered at this time.  Medication list reviewed.  I concur if aim is towards 

comfort care.  Digoxin was discontinued.


February 4: Remains full code.  Remains intubated on ventilator.  Labs reviewed.

 Serum creatinine 2.8.  Medication list reviewed.  Continue current management 

and per consultants.  Prognosis remains poor.


February 3: Labs reviewed.  Medication reviewed.  Patient full code.  Remains 

intubated on ventilator.  Hypotensive.  Renal parameters unchanged.  Continue 

current support.  Prognosis dismal.


February 2: Labs reviewed.  Medication list reviewed.  Discussed with RN.  

Patient remains full code.  Patient is still intubated on ventilator.  

Hyperkalemia improved.  Serum creatinine 2.6 unchanged.  Status remains guarded.

 Continue per consultants.  Continue monitor renal parameters.  Taper pressors 

as possible.  Sodium bicarb IV





Previosly


Feeding changed to Nepro


Midodrine 10 mg every 8 hours


Albumin bolus


1/2 Normal saline 100 cc an hour


Stop Seroquel


Monitor renal parameters


Avoid nephrotoxic's


1 amp of sodium bicarbonate


Stress dose of steroids


Discussed with USZI Murphy





Subjective


ROS Limited/Unobtainable:  Yes





Objective


Objective





Last 24 Hour Vital Signs








  Date Time  Temp Pulse Resp B/P (MAP) Pulse Ox O2 Delivery O2 Flow Rate FiO2


 


2/5/21 06:00  87 30 125/69 (87) 85   


 


2/5/21 05:30  87 30 128/67 (87) 85   


 


2/5/21 05:15  88 31 129/72 (91) 85   


 


2/5/21 05:00  88 31 130/72 (91) 84   


 


2/5/21 04:45  88 29 130/69 (89) 83   


 


2/5/21 04:30  89 29 128/70 (89) 84   


 


2/5/21 04:15  88 30 131/69 (89) 85   


 


2/5/21 04:00 99.0 88 29 129/68 (88) 85   


 


2/5/21 04:00        100


 


2/5/21 04:00      Mechanical Ventilator  


 


2/5/21 03:45  89 27 126/69 (88) 85   


 


2/5/21 03:39  90 33     100


 


2/5/21 03:30  90 27 126/69 (88) 84   


 


2/5/21 03:15  92 30 125/72 (89) 86   


 


2/5/21 03:00  90 32 130/70 (90) 89   


 


2/5/21 02:25   18   Mechanical Ventilator  


 


2/5/21 02:12    140/78    


 


2/5/21 02:00  91 30 140/78 (98) 85   


 


2/5/21 01:45  91 31 142/78 (99) 85   


 


2/5/21 01:30  91 33 140/78 (98) 85   


 


2/5/21 01:15  92 31 144/76 (98) 85   


 


2/5/21 01:00  92 29 141/76 (97) 85   


 


2/5/21 01:00    140/78    


 


2/5/21 00:15  94 32 138/74 (95) 85   


 


2/5/21 00:00      Mechanical Ventilator  


 


2/5/21 00:00        100


 


2/5/21 00:00 98.7 96 31 140/74 (96) 84   


 


2/4/21 23:45  95 31 139/78 (98) 85   


 


2/4/21 23:30  95 31 137/74 (95) 83   


 


2/4/21 23:29  94 35     100


 


2/4/21 23:15  94 29 138/72 (94) 84   


 


2/4/21 23:09   18 132/76  Mechanical Ventilator  100


 


2/4/21 23:00  95 31 132/76 (94) 84   


 


2/4/21 22:15  95 31 131/72 (91) 82   


 


2/4/21 22:00  96 32 125/69 (87) 81   


 


2/4/21 21:45  97 33 130/69 (89) 80   


 


2/4/21 21:30  97 31 125/69 (87) 81   


 


2/4/21 21:15  98 31 117/70 (86) 80   


 


2/4/21 21:00  98 31 119/67 (84) 79   


 


2/4/21 20:15  103 30 130/72 (91) 79   


 


2/4/21 20:02  102 29 105/65 (78) 75   


 


2/4/21 20:00      Mechanical Ventilator  


 


2/4/21 20:00        100


 


2/4/21 20:00 99.2 104 32 106/62 (77) 75   


 


2/4/21 19:58  107 33 114/60 (78) 75   


 


2/4/21 19:58  105 33     100


 


2/4/21 19:54  122 32 134/73 (93) 66   


 


2/4/21 19:52  130 33 171/82 (111) 57   


 


2/4/21 19:50  140 32 190/106 (134) 65   


 


2/4/21 19:45  40 21  70   


 


2/4/21 19:30  96 29 95/55 (68) 79   


 


2/4/21 19:15  100 29 126/71 (89) 80   


 


2/4/21 19:00  100 31 126/73 (90) 80   


 


2/4/21 18:45  101 30  80   


 


2/4/21 18:30  102 30 128/74 (92) 80   


 


2/4/21 18:15  102 26 128/73 (91) 81   


 


2/4/21 18:00  103 27 128/69 (88) 80   


 


2/4/21 17:45  104 29 131/72 (91) 80   


 


2/4/21 17:30  103 28 130/74 (92) 79   


 


2/4/21 17:15  104 26 135/73 (93) 77   


 


2/4/21 17:00  104 29 137/76 (96) 75   


 


2/4/21 16:45  106 28 147/75 (99) 71   


 


2/4/21 16:30 98.5 103 23 131/69 (89) 69   


 


2/4/21 16:15  102 31 123/73 (90) 68   


 


2/4/21 16:00  83      


 


2/4/21 16:00      Mechanical Ventilator  


 


2/4/21 16:00        100


 


2/4/21 16:00  102 28 118/67 (84) 66   


 


2/4/21 15:45  95 30 83/54 (64) 63   


 


2/4/21 15:30  98 29 89/50 (63) 63   


 


2/4/21 15:25  97 33     100


 


2/4/21 15:15  102 26 99/55 (70)    


 


2/4/21 15:00  99 24 132/74 (93) 90   


 


2/4/21 14:45  103 26 150/84 (106) 91   


 


2/4/21 14:30  115 29 182/95 (124)    


 


2/4/21 14:27  111 30 149/93 (111)    


 


2/4/21 14:05  68 31 93/41 (58) 86   


 


2/4/21 14:00  44 28 59/36 (44) 86   


 


2/4/21 13:45  107 31 105/61 (76) 73   


 


2/4/21 13:43  94 32 193/86 (121) 81   


 


2/4/21 13:30  76 32 65/43 (50) 88   


 


2/4/21 13:15  87 29 103/61 (75) 89   


 


2/4/21 13:10   28   Mechanical Ventilator  100


 


2/4/21 13:00  85 30 100/61 (74) 89   


 


2/4/21 12:54  81 31 90/60 (70) 88   


 


2/4/21 12:49  70 32 77/53 (61) 88   


 


2/4/21 12:45  67 31 74/47 (56) 87   


 


2/4/21 12:30 97.2 68 31 90/60 (70) 89   


 


2/4/21 12:20   30   Mechanical Ventilator  100


 


2/4/21 12:00        100


 


2/4/21 12:00      Mechanical Ventilator  


 


2/4/21 12:00  69      


 


2/4/21 12:00  83 29 123/70 (87) 88   


 


2/4/21 11:39   33 111/70  Mechanical Ventilator  100


 


2/4/21 11:37   33   Mechanical Ventilator  100


 


2/4/21 11:30  86 28 111/70 (84) 81   


 


2/4/21 11:00  87 26 127/76 (93) 87   


 


2/4/21 10:30  89 32     100


 


2/4/21 10:00  88 30 120/66 (84) 81   


 


2/4/21 09:57  89      


 


2/4/21 09:00  88 30 123/71 (88) 88   

















Intake and Output  


 


 2/4/21 2/5/21





 19:00 07:00


 


Intake Total 2120.82 ml 1799.11 ml


 


Output Total 250 ml 60 ml


 


Balance 1870.82 ml 1739.11 ml


 


  


 


IV Total 1875.82 ml 1449.11 ml


 


Tube Feeding 245 ml 350 ml


 


Output Urine Total 250 ml 10 ml


 


Chest Tube Drainage Total  50 ml


 


# Voids  35


 


# Bowel Movements  10











Current Medications








 Medications


  (Trade)  Dose


 Ordered  Sig/Ujlisa


 Route


 PRN Reason  Start Time


 Stop Time Status Last Admin


Dose Admin


 


 Acetaminophen


  (Tylenol)  650 mg  Q4H  PRN


 GT


 Temp >100.5  1/15/21 17:15


 2/14/21 17:14  1/29/21 06:59





 


 Amiodarone HCl


  (Cordarone)  200 mg  DAILY


 NG


   1/26/21 09:00


 4/19/21 20:59  2/4/21 09:58





 


 Cefepime HCl 1 gm/


 Dextrose  55 ml @ 


 110 mls/hr  Q24H


 IVPB


   2/5/21 11:00


 2/12/21 10:59   





 


 Chlorhexidine


 Gluconate


  (Vanessa-Hex 2%)  1 applic  DAILY@2000


 TOPIC


   1/19/21 20:00


 4/19/21 19:59  2/4/21 21:08





 


 Dextrose


  (Dextrose 50%)  25 ml  Q30M  PRN


 IV


 Hypoglycemia  1/9/21 13:30


 4/9/21 13:29   





 


 Dextrose


  (Dextrose 50%)  50 ml  Q30M  PRN


 IV


 Hypoglycemia  1/9/21 13:30


 4/9/21 13:29   





 


 Digoxin


  (Lanoxin)  0.125 mg  DAILY


 NG


   1/21/21 09:00


 4/21/21 08:59  2/4/21 09:57





 


 Docusate Sodium


  (Colace)  100 mg  TIDPRN  PRN


 GT


 Constipation  1/12/21 01:15


 2/11/21 01:14  1/12/21 01:42





 


 Enoxaparin Sodium


  (Lovenox)  80 mg  DAILY


 SUBQ


   2/4/21 09:00


 5/5/21 08:59  2/4/21 09:57





 


 Fentanyl Citrate  250 ml @ 1


 mls/hr  Q24H


 IV


   2/3/21 17:00


 2/5/21 16:59  2/4/21 23:09





 


 Hydrocortisone


  (Solu-CORTEF)  100 mg  EVERY 8  HOURS


 IV


   2/1/21 15:45


 5/2/21 15:44  2/5/21 05:10





 


 Insulin Aspart


  (NovoLOG)    Q6HR


 SUBQ


   1/14/21 12:00


 4/9/21 17:59  2/5/21 00:00





 


 Insulin Detemir


  (Levemir)  26 units  Q12HR


 SUBQ


   2/2/21 09:00


 4/12/21 08:59  2/4/21 21:00





 


 Midazolam HCl 50


 mg/Sodium Chloride  100 ml @ 0


 mls/hr  Q24H  PRN


 IV


 Restlessness  2/3/21 21:51


 2/5/21 21:50  2/5/21 02:25





 


 Midodrine


  (Pro-Amatine)  10 mg  Q8HR


 ORAL


   2/1/21 15:30


 5/2/21 15:29  2/5/21 05:10





 


 Norepinephrine


 Bitartrate 16 mg/


 Dextrose  516 ml @ 0


 mls/hr  Q24H


 IV


   2/3/21 10:30


 2/6/21 10:29  2/5/21 02:12





 


 Pantoprazole


  (Protonix)  40 mg  Q12HR


 IVP


   2/1/21 21:00


 2/13/21 08:59  2/4/21 21:08





 


 Quetiapine


 Fumarate


  (SEROqueL)  50 mg  Q12HR


 ORAL


   2/4/21 11:45


 3/21/21 11:44  2/4/21 21:08





 


 Sodium Chloride  500 ml @ 


 999 mls/hr  Q31M PRN


 IV


 For hypotension  1/15/21 18:30


 2/14/21 18:29   





 


 Sodium Chloride  1,000 ml @ 


 100 mls/hr  Q10H


 IV


   2/1/21 15:30


 3/3/21 15:29  2/4/21 23:10








Laboratory Tests


2/4/21 09:25: Lactic Acid Level 2.00


2/4/21 13:09: POC Whole Blood Glucose 190H


2/5/21 03:25: 


White Blood Count 7.6, Red Blood Count 2.96L, Hemoglobin 8.5L, Hematocrit 28.6L,

Mean Corpuscular Volume 97, Mean Corpuscular Hemoglobin 28.8, Mean Corpuscular 

Hemoglobin Concent 29.8L, Red Cell Distribution Width 18.5H, Platelet Count 260,

Mean Platelet Volume 8.8, Neutrophils (%) (Auto) 84.3H, Lymphocytes (%) (Auto) 

8.0L, Monocytes (%) (Auto) 6.6, Eosinophils (%) (Auto) 0.0, Basophils (%) (Auto)

1.1, Sodium Level 142, Potassium Level 4.6, Chloride Level 104, Carbon Dioxide 

Level 29, Anion Gap 9, Blood Urea Nitrogen 92H, Creatinine 3.4H, Estimat 

Glomerular Filtration Rate 18.2, Glucose Level 134H, Uric Acid 10.6H, Calcium 

Level 8.0L, Phosphorus Level 6.3H, Magnesium Level 2.6H, Total Bilirubin 0.7, 

Aspartate Amino Transf (AST/SGOT) 58H, Alanine Aminotransferase (ALT/SGPT) 85H, 

Alkaline Phosphatase 398H, C-Reactive Protein, Quantitative 5.1H, Pro-B-Type 

Natriuretic Peptide > 93804W, Total Protein 6.2L, Albumin 2.2L, Globulin 4.0, 

Albumin/Globulin Ratio 0.6L


2/5/21 05:36: 


Arterial Blood pH 7.203*L, Arterial Blood Partial Pressure CO2 73.2*H, Arterial 

Blood Partial Pressure O2 51.3L, Arterial Blood HCO3 28.2H, Arterial Blood 

Oxygen Saturation 82.1*L, Arterial Blood Base Excess -0.7, Abelardo Test Positive


Height (Feet):  5


Height (Inches):  7.00


Weight (Pounds):  198


General Appearance:  no apparent distress


EENT:  other - Intubated on ventilator


Cardiovascular:  normal rate


Respiratory/Chest:  decreased breath sounds


Abdomen:  distended











Damien Powell MD             Feb 5, 2021 08:36

## 2021-02-05 NOTE — CARDIAC ELECTROPHYSIOLOGY PN
Assessment/Plan


Assessment/Plan


1. Atrial fibrillation with rapid ventricular response.      


      On digoxin 0.125 mg p.o. daily and Amiodarone 200 po qd  


       In SR.     Dig level 1.0





2. Acute renal failure. 


3. Right pneumothorax, status post chest tube with subcutaneous emphysema. 


No significant drainage





4. COVID-19 pneumonia, 100% FiO2 and PEEP of 12, on Remdesivir and Solu-Medrol  

.


5. Diabetes.


6. Septic shock. On iv fluid and Levophed increased to 16 Mcg  


7. S/P PEA arrest 1/29/21 and 1/31/21 and 2/5/21


8. DNR





DW RN and Dr Powell





Subjective


Subjective


In ICU on 100% Fio2 and PEEP 12 and Levo increased to 16 Mcg  


On Fentanyl rip at 30. Intermittent LBBB.


 Right chest tube  . In SR on Dig and Amiodarone 


Coded twice 1/30/21  for PEA with chest compressions and Epi and bicarb 

injections


Coded again today and Levo incresed to 16 mcg


Now DNR





Objective





Last 24 Hour Vital Signs








  Date Time  Temp Pulse Resp B/P (MAP) Pulse Ox O2 Delivery O2 Flow Rate FiO2


 


2/5/21 13:00  93 33 104/61 (75) 75   


 


2/5/21 12:45  94 32 109/61 (77) 76   


 


2/5/21 12:30    98/58 (71) 78   


 


2/5/21 12:15  93 18 141/83 (102) 72   


 


2/5/21 12:00 97.5 95 27 113/66 (82) 81   


 


2/5/21 12:00      Mechanical Ventilator  


 


2/5/21 12:00        100


 


2/5/21 11:45  94 27 102/64 (77) 81   


 


2/5/21 11:30  95 25 114/67 (83) 82   


 


2/5/21 11:15  95 27 108/64 (79) 81   


 


2/5/21 11:00  94 30 109/65 (80) 80   


 


2/5/21 10:45  89 31 95/60 (72) 80   


 


2/5/21 10:45  89 34     100


 


2/5/21 10:42  85 29 89/57 (68) 79   


 


2/5/21 10:30  82 30 85/54 (64) 78   


 


2/5/21 10:15  89 31 102/64 (77) 77   


 


2/5/21 10:00  87 31 92/59 (70) 76   


 


2/5/21 09:45  87 30 102/56 (71) 75   


 


2/5/21 09:30  90 31 106/62 (77) 72   


 


2/5/21 09:20    115/64    


 


2/5/21 09:15  90 29 115/64 (81) 75   


 


2/5/21 09:00  92 29 139/81 (100) 76   


 


2/5/21 08:45  90 31 122/66 (84) 77   


 


2/5/21 08:43   30 118/64  Mechanical Ventilator  100


 


2/5/21 08:31  90      


 


2/5/21 08:30  89 31 118/64 (82) 80   


 


2/5/21 08:15  87 31 129/76 (93) 85   


 


2/5/21 08:00 97.8 85 29 129/74 (92) 85   


 


2/5/21 08:00        100


 


2/5/21 08:00      Mechanical Ventilator  


 


2/5/21 07:45  87 29 127/72 (90) 85   


 


2/5/21 07:30  87 31 134/74 (94) 83   


 


2/5/21 07:15  87 34     100


 


2/5/21 07:15  88 30 136/75 (95) 82   


 


2/5/21 07:00  88 30 125/71 (89) 82   


 


2/5/21 06:00  87 30 125/69 (87) 85   


 


2/5/21 05:30  87 30 128/67 (87) 85   


 


2/5/21 05:15  88 31 129/72 (91) 85   


 


2/5/21 05:00  88 31 130/72 (91) 84   


 


2/5/21 04:45  88 29 130/69 (89) 83   


 


2/5/21 04:30  89 29 128/70 (89) 84   


 


2/5/21 04:15  88 30 131/69 (89) 85   


 


2/5/21 04:00 99.0 88 29 129/68 (88) 85   


 


2/5/21 04:00        100


 


2/5/21 04:00      Mechanical Ventilator  


 


2/5/21 03:45  89 27 126/69 (88) 85   


 


2/5/21 03:39  90 33     100


 


2/5/21 03:30  90 27 126/69 (88) 84   


 


2/5/21 03:15  92 30 125/72 (89) 86   


 


2/5/21 03:00  90 32 130/70 (90) 89   


 


2/5/21 02:25   18   Mechanical Ventilator  


 


2/5/21 02:12    140/78    


 


2/5/21 02:00  91 30 140/78 (98) 85   


 


2/5/21 01:45  91 31 142/78 (99) 85   


 


2/5/21 01:30  91 33 140/78 (98) 85   


 


2/5/21 01:15  92 31 144/76 (98) 85   


 


2/5/21 01:00  92 29 141/76 (97) 85   


 


2/5/21 01:00    140/78    


 


2/5/21 00:15  94 32 138/74 (95) 85   


 


2/5/21 00:00      Mechanical Ventilator  


 


2/5/21 00:00        100


 


2/5/21 00:00 98.7 96 31 140/74 (96) 84   


 


2/4/21 23:45  95 31 139/78 (98) 85   


 


2/4/21 23:30  95 31 137/74 (95) 83   


 


2/4/21 23:29  94 35     100


 


2/4/21 23:15  94 29 138/72 (94) 84   


 


2/4/21 23:09   18 132/76  Mechanical Ventilator  100


 


2/4/21 23:00  95 31 132/76 (94) 84   


 


2/4/21 22:15  95 31 131/72 (91) 82   


 


2/4/21 22:00  96 32 125/69 (87) 81   


 


2/4/21 21:45  97 33 130/69 (89) 80   


 


2/4/21 21:30  97 31 125/69 (87) 81   


 


2/4/21 21:15  98 31 117/70 (86) 80   


 


2/4/21 21:00  98 31 119/67 (84) 79   


 


2/4/21 20:15  103 30 130/72 (91) 79   


 


2/4/21 20:02  102 29 105/65 (78) 75   


 


2/4/21 20:00      Mechanical Ventilator  


 


2/4/21 20:00        100


 


2/4/21 20:00 99.2 104 32 106/62 (77) 75   


 


2/4/21 19:58  107 33 114/60 (78) 75   


 


2/4/21 19:58  105 33     100


 


2/4/21 19:54  122 32 134/73 (93) 66   


 


2/4/21 19:52  130 33 171/82 (111) 57   


 


2/4/21 19:50  140 32 190/106 (134) 65   


 


2/4/21 19:45  40 21  70   


 


2/4/21 19:30  96 29 95/55 (68) 79   


 


2/4/21 19:15  100 29 126/71 (89) 80   


 


2/4/21 19:00  100 31 126/73 (90) 80   


 


2/4/21 18:45  101 30  80   


 


2/4/21 18:30  102 30 128/74 (92) 80   


 


2/4/21 18:15  102 26 128/73 (91) 81   


 


2/4/21 18:00  103 27 128/69 (88) 80   


 


2/4/21 17:45  104 29 131/72 (91) 80   


 


2/4/21 17:30  103 28 130/74 (92) 79   


 


2/4/21 17:15  104 26 135/73 (93) 77   


 


2/4/21 17:00  104 29 137/76 (96) 75   


 


2/4/21 16:45  106 28 147/75 (99) 71   


 


2/4/21 16:30 98.5 103 23 131/69 (89) 69   


 


2/4/21 16:15  102 31 123/73 (90) 68   


 


2/4/21 16:00  83      


 


2/4/21 16:00      Mechanical Ventilator  


 


2/4/21 16:00        100


 


2/4/21 16:00  102 28 118/67 (84) 66   


 


2/4/21 15:45  95 30 83/54 (64) 63   


 


2/4/21 15:30  98 29 89/50 (63) 63   


 


2/4/21 15:25  97 33     100


 


2/4/21 15:15  102 26 99/55 (70)    


 


2/4/21 15:00  99 24 132/74 (93) 90   


 


2/4/21 14:45  103 26 150/84 (106) 91   


 


2/4/21 14:30  115 29 182/95 (124)    


 


2/4/21 14:27  111 30 149/93 (111)    


 


2/4/21 14:05  68 31 93/41 (58) 86   


 


2/4/21 14:00  44 28 59/36 (44) 86   


 


2/4/21 13:45  107 31 105/61 (76) 73   


 


2/4/21 13:43  94 32 193/86 (121) 81   


 


2/4/21 13:30  76 32 65/43 (50) 88   

















Intake and Output  


 


 2/4/21 2/5/21





 19:00 07:00


 


Intake Total 2120.82 ml 2001.07 ml


 


Output Total 250 ml 60 ml


 


Balance 1870.82 ml 1941.07 ml


 


  


 


IV Total 1875.82 ml 1616.07 ml


 


Tube Feeding 245 ml 385 ml


 


Output Urine Total 250 ml 10 ml


 


Chest Tube Drainage Total  50 ml


 


# Voids  35


 


# Bowel Movements  10











Laboratory Tests








Test


 2/5/21


03:25 2/5/21


05:36


 


White Blood Count


 7.6 K/UL


(4.8-10.8) 





 


Red Blood Count


 2.96 M/UL


(4.70-6.10)  L 





 


Hemoglobin


 8.5 G/DL


(14.2-18.0)  L 





 


Hematocrit


 28.6 %


(42.0-52.0)  L 





 


Mean Corpuscular Volume 97 FL (80-99)   


 


Mean Corpuscular Hemoglobin


 28.8 PG


(27.0-31.0) 





 


Mean Corpuscular Hemoglobin


Concent 29.8 G/DL


(32.0-36.0)  L 





 


Red Cell Distribution Width


 18.5 %


(11.6-14.8)  H 





 


Platelet Count


 260 K/UL


(150-450) 





 


Mean Platelet Volume


 8.8 FL


(6.5-10.1) 





 


Neutrophils (%) (Auto)


 84.3 %


(45.0-75.0)  H 





 


Lymphocytes (%) (Auto)


 8.0 %


(20.0-45.0)  L 





 


Monocytes (%) (Auto)


 6.6 %


(1.0-10.0) 





 


Eosinophils (%) (Auto)


 0.0 %


(0.0-3.0) 





 


Basophils (%) (Auto)


 1.1 %


(0.0-2.0) 





 


Sodium Level


 142 MMOL/L


(136-145) 





 


Potassium Level


 4.6 MMOL/L


(3.5-5.1) 





 


Chloride Level


 104 MMOL/L


() 





 


Carbon Dioxide Level


 29 MMOL/L


(21-32) 





 


Anion Gap


 9 mmol/L


(5-15) 





 


Blood Urea Nitrogen


 92 mg/dL


(7-18)  H 





 


Creatinine


 3.4 MG/DL


(0.55-1.30)  H 





 


Estimat Glomerular Filtration


Rate 18.2 mL/min


(>60) 





 


Glucose Level


 134 MG/DL


()  H 





 


Uric Acid


 10.6 MG/DL


(2.6-7.2)  H 





 


Calcium Level


 8.0 MG/DL


(8.5-10.1)  L 





 


Phosphorus Level


 6.3 MG/DL


(2.5-4.9)  H 





 


Magnesium Level


 2.6 MG/DL


(1.8-2.4)  H 





 


Total Bilirubin


 0.7 MG/DL


(0.2-1.0) 





 


Aspartate Amino Transf


(AST/SGOT) 58 U/L (15-37)


H 





 


Alanine Aminotransferase


(ALT/SGPT) 85 U/L (12-78)


H 





 


Alkaline Phosphatase


 398 U/L


()  H 





 


C-Reactive Protein,


Quantitative 5.1 mg/dL


(0.00-0.90)  H 





 


Pro-B-Type Natriuretic Peptide


 > 03812 pg/mL


(0-125)  H 





 


Total Protein


 6.2 G/DL


(6.4-8.2)  L 





 


Albumin


 2.2 G/DL


(3.4-5.0)  L 





 


Globulin 4.0 g/dL   


 


Albumin/Globulin Ratio


 0.6 (1.0-2.7)


L 





 


Arterial Blood pH


 


 7.203


(7.350-7.450)


 


Arterial Blood Partial


Pressure CO2 


 73.2 mmHg


(35.0-45.0)  *H


 


Arterial Blood Partial


Pressure O2 


 51.3 mmHg


(75.0-100.0)  L


 


Arterial Blood HCO3


 


 28.2 mmol/L


(22.0-26.0)  H


 


Arterial Blood Oxygen


Saturation 


 82.1 %


()  *L


 


Arterial Blood Base Excess  -0.7 (-2-2)  


 


Abelardo Test  Positive  











Microbiology








 Date/Time


Source Procedure


Growth Status





 


 2/4/21 02:30


Urine,Clean Catch Urine Culture - Preliminary


YEAST Resulted








Objective


HEENT: No JVD. Orally intubated


LUNGS:  Have decreased breath sounds with the chest tube on the right side


and subcutaneous emphysema.


CARDIOVASCULAR:  Regular S1, S2 with


no gallop.


ABDOMEN:  Soft.


EXTREMITIES:  A 1+ pitting edema.











Jake George MD                Feb 5, 2021 13:27

## 2021-02-05 NOTE — NUR
NURSE NOTES:

Pt is sedated On the bed and RASS -2. SaO2 84% with current Vent setting. Turn and 
reposition. Noted /74mmHg and decreased Levophed drip @ 16mcg/min. Will continue to 
monitor any change of condition.

## 2021-02-05 NOTE — NUR
NURSE NOTES:

Received report from SUZI Argueta. Pt is sedated on the bed and RASS score -2, Pt has ETT and 
orally intubated. Vent dependent and setting with ACL 32, PS25, P14, FiO2 100% and Amina 77% 
noted. given suction and oral care. on cardiac monitor with SR. no fever noted. Pt has OGT 
and noted residual 10cc. on running with nephro @ 35cc/hr. Keep HOB position. Pt has Stern 
cath and anuria. Pt has Rectal tube and on placed and drainage well. Pt has lt. upper arm 
PICC line and dressing is clean and dry. on running with 1/2NS @ 30cc/hr. Levophed @ 
18mcg/min, Versed @ 3mg/hr and Fentanyl drip @ 300mcg/hr. Pt has Rt. chest tube and 
connected low suction and noted sero-sanguineous drain. dressing is clean and dry and tube 
fixed secured. Pt's code status is DNR. Placed fall precaution. On proper isolation for 
COVID-19. Will continue to care plan.

## 2021-02-05 NOTE — PULMONOLOGY PROGRESS NOTE
Subjective


ROS Limited/Unobtainable:  Yes


Interval Events:  code blue (1/29)


Constitutional:  Reports: fever, other - Tw=174.2


HEENT:  Repors: no symptoms


Respiratory:  Reports: shortness of breath - better


Cardiovascular:  Reports: no symptoms


Gastrointestinal/Abdominal:  Reports: diarrhea


Allergies:  


Coded Allergies:  


     No Known Allergies (Unverified , 6/11/19)


All Systems:  reviewed and negative except above





Objective





Last 24 Hour Vital Signs








  Date Time  Temp Pulse Resp B/P (MAP) Pulse Ox O2 Delivery O2 Flow Rate FiO2


 


2/5/21 08:31  90      


 


2/5/21 06:00  87 30 125/69 (87) 85   


 


2/5/21 05:30  87 30 128/67 (87) 85   


 


2/5/21 05:15  88 31 129/72 (91) 85   


 


2/5/21 05:00  88 31 130/72 (91) 84   


 


2/5/21 04:45  88 29 130/69 (89) 83   


 


2/5/21 04:30  89 29 128/70 (89) 84   


 


2/5/21 04:15  88 30 131/69 (89) 85   


 


2/5/21 04:00 99.0 88 29 129/68 (88) 85   


 


2/5/21 04:00        100


 


2/5/21 04:00      Mechanical Ventilator  


 


2/5/21 03:45  89 27 126/69 (88) 85   


 


2/5/21 03:39  90 33     100


 


2/5/21 03:30  90 27 126/69 (88) 84   


 


2/5/21 03:15  92 30 125/72 (89) 86   


 


2/5/21 03:00  90 32 130/70 (90) 89   


 


2/5/21 02:25   18   Mechanical Ventilator  


 


2/5/21 02:12    140/78    


 


2/5/21 02:00  91 30 140/78 (98) 85   


 


2/5/21 01:45  91 31 142/78 (99) 85   


 


2/5/21 01:30  91 33 140/78 (98) 85   


 


2/5/21 01:15  92 31 144/76 (98) 85   


 


2/5/21 01:00  92 29 141/76 (97) 85   


 


2/5/21 01:00    140/78    


 


2/5/21 00:15  94 32 138/74 (95) 85   


 


2/5/21 00:00      Mechanical Ventilator  


 


2/5/21 00:00        100


 


2/5/21 00:00 98.7 96 31 140/74 (96) 84   


 


2/4/21 23:45  95 31 139/78 (98) 85   


 


2/4/21 23:30  95 31 137/74 (95) 83   


 


2/4/21 23:29  94 35     100


 


2/4/21 23:15  94 29 138/72 (94) 84   


 


2/4/21 23:09   18 132/76  Mechanical Ventilator  100


 


2/4/21 23:00  95 31 132/76 (94) 84   


 


2/4/21 22:15  95 31 131/72 (91) 82   


 


2/4/21 22:00  96 32 125/69 (87) 81   


 


2/4/21 21:45  97 33 130/69 (89) 80   


 


2/4/21 21:30  97 31 125/69 (87) 81   


 


2/4/21 21:15  98 31 117/70 (86) 80   


 


2/4/21 21:00  98 31 119/67 (84) 79   


 


2/4/21 20:15  103 30 130/72 (91) 79   


 


2/4/21 20:02  102 29 105/65 (78) 75   


 


2/4/21 20:00      Mechanical Ventilator  


 


2/4/21 20:00        100


 


2/4/21 20:00 99.2 104 32 106/62 (77) 75   


 


2/4/21 19:58  107 33 114/60 (78) 75   


 


2/4/21 19:58  105 33     100


 


2/4/21 19:54  122 32 134/73 (93) 66   


 


2/4/21 19:52  130 33 171/82 (111) 57   


 


2/4/21 19:50  140 32 190/106 (134) 65   


 


2/4/21 19:45  40 21  70   


 


2/4/21 19:30  96 29 95/55 (68) 79   


 


2/4/21 19:15  100 29 126/71 (89) 80   


 


2/4/21 19:00  100 31 126/73 (90) 80   


 


2/4/21 18:45  101 30  80   


 


2/4/21 18:30  102 30 128/74 (92) 80   


 


2/4/21 18:15  102 26 128/73 (91) 81   


 


2/4/21 18:00  103 27 128/69 (88) 80   


 


2/4/21 17:45  104 29 131/72 (91) 80   


 


2/4/21 17:30  103 28 130/74 (92) 79   


 


2/4/21 17:15  104 26 135/73 (93) 77   


 


2/4/21 17:00  104 29 137/76 (96) 75   


 


2/4/21 16:45  106 28 147/75 (99) 71   


 


2/4/21 16:30 98.5 103 23 131/69 (89) 69   


 


2/4/21 16:15  102 31 123/73 (90) 68   


 


2/4/21 16:00  83      


 


2/4/21 16:00      Mechanical Ventilator  


 


2/4/21 16:00        100


 


2/4/21 16:00  102 28 118/67 (84) 66   


 


2/4/21 15:45  95 30 83/54 (64) 63   


 


2/4/21 15:30  98 29 89/50 (63) 63   


 


2/4/21 15:25  97 33     100


 


2/4/21 15:15  102 26 99/55 (70)    


 


2/4/21 15:00  99 24 132/74 (93) 90   


 


2/4/21 14:45  103 26 150/84 (106) 91   


 


2/4/21 14:30  115 29 182/95 (124)    


 


2/4/21 14:27  111 30 149/93 (111)    


 


2/4/21 14:05  68 31 93/41 (58) 86   


 


2/4/21 14:00  44 28 59/36 (44) 86   


 


2/4/21 13:45  107 31 105/61 (76) 73   


 


2/4/21 13:43  94 32 193/86 (121) 81   


 


2/4/21 13:30  76 32 65/43 (50) 88   


 


2/4/21 13:15  87 29 103/61 (75) 89   


 


2/4/21 13:10   28   Mechanical Ventilator  100


 


2/4/21 13:00  85 30 100/61 (74) 89   


 


2/4/21 12:54  81 31 90/60 (70) 88   


 


2/4/21 12:49  70 32 77/53 (61) 88   


 


2/4/21 12:45  67 31 74/47 (56) 87   


 


2/4/21 12:30 97.2 68 31 90/60 (70) 89   


 


2/4/21 12:20   30   Mechanical Ventilator  100


 


2/4/21 12:00        100


 


2/4/21 12:00      Mechanical Ventilator  


 


2/4/21 12:00  69      


 


2/4/21 12:00  83 29 123/70 (87) 88   


 


2/4/21 11:39   33 111/70  Mechanical Ventilator  100


 


2/4/21 11:37   33   Mechanical Ventilator  100


 


2/4/21 11:30  86 28 111/70 (84) 81   


 


2/4/21 11:00  87 26 127/76 (93) 87   


 


2/4/21 10:30  89 32     100


 


2/4/21 10:00  88 30 120/66 (84) 81   


 


2/4/21 09:57  89      


 


2/4/21 09:00  88 30 123/71 (88) 88   

















Intake and Output  


 


 2/4/21 2/5/21





 19:00 07:00


 


Intake Total 2120.82 ml 1799.11 ml


 


Output Total 250 ml 60 ml


 


Balance 1870.82 ml 1739.11 ml


 


  


 


IV Total 1875.82 ml 1449.11 ml


 


Tube Feeding 245 ml 350 ml


 


Output Urine Total 250 ml 10 ml


 


Chest Tube Drainage Total  50 ml


 


# Voids  35


 


# Bowel Movements  10








Objective


On Versed drip


General Appearance:  no acute distress


HEENT:  atraumatic


Respiratory:  lungs clear, decreased breath sounds


Cardiovascular:  normal rate, regular rhythm


Abdomen:  soft, non tender, no organomegaly





Microbiology








 Date/Time


Source Procedure


Growth Status





 


 2/4/21 02:30


Urine,Clean Catch Urine Culture - Preliminary


YEAST Resulted








Laboratory Tests


2/4/21 09:25: Lactic Acid Level 2.00


2/4/21 13:09: POC Whole Blood Glucose 190H


2/5/21 03:25: 


White Blood Count 7.6, Red Blood Count 2.96L, Hemoglobin 8.5L, Hematocrit 28.6L,

Mean Corpuscular Volume 97, Mean Corpuscular Hemoglobin 28.8, Mean Corpuscular 

Hemoglobin Concent 29.8L, Red Cell Distribution Width 18.5H, Platelet Count 260,

Mean Platelet Volume 8.8, Neutrophils (%) (Auto) 84.3H, Lymphocytes (%) (Auto) 

8.0L, Monocytes (%) (Auto) 6.6, Eosinophils (%) (Auto) 0.0, Basophils (%) (Auto)

1.1, Sodium Level 142, Potassium Level 4.6, Chloride Level 104, Carbon Dioxide 

Level 29, Anion Gap 9, Blood Urea Nitrogen 92H, Creatinine 3.4H, Estimat 

Glomerular Filtration Rate 18.2, Glucose Level 134H, Uric Acid 10.6H, Calcium 

Level 8.0L, Phosphorus Level 6.3H, Magnesium Level 2.6H, Total Bilirubin 0.7, 

Aspartate Amino Transf (AST/SGOT) 58H, Alanine Aminotransferase (ALT/SGPT) 85H, 

Alkaline Phosphatase 398H, C-Reactive Protein, Quantitative 5.1H, Pro-B-Type 

Natriuretic Peptide > 55631Q, Total Protein 6.2L, Albumin 2.2L, Globulin 4.0, 

Albumin/Globulin Ratio 0.6L


2/5/21 05:36: 


Arterial Blood pH 7.203*L, Arterial Blood Partial Pressure CO2 73.2*H, Arterial 

Blood Partial Pressure O2 51.3L, Arterial Blood HCO3 28.2H, Arterial Blood 

Oxygen Saturation 82.1*L, Arterial Blood Base Excess -0.7, Abelardo Test Positive





Current Medications








 Medications


  (Trade)  Dose


 Ordered  Sig/Julisa


 Route


 PRN Reason  Start Time


 Stop Time Status Last Admin


Dose Admin


 


 Acetaminophen


  (Tylenol)  650 mg  Q4H  PRN


 GT


 Temp >100.5  1/15/21 17:15


 2/14/21 17:14  1/29/21 06:59





 


 Amiodarone HCl


  (Cordarone)  200 mg  DAILY


 NG


   1/26/21 09:00


 4/19/21 20:59  2/5/21 08:30





 


 Cefepime HCl 1 gm/


 Dextrose  55 ml @ 


 110 mls/hr  Q24H


 IVPB


   2/5/21 11:00


 2/12/21 10:59   





 


 Chlorhexidine


 Gluconate


  (Vanessa-Hex 2%)  1 applic  DAILY@2000


 TOPIC


   1/19/21 20:00


 4/19/21 19:59  2/4/21 21:08





 


 Dextrose


  (Dextrose 50%)  25 ml  Q30M  PRN


 IV


 Hypoglycemia  1/9/21 13:30


 4/9/21 13:29   





 


 Dextrose


  (Dextrose 50%)  50 ml  Q30M  PRN


 IV


 Hypoglycemia  1/9/21 13:30


 4/9/21 13:29   





 


 Docusate Sodium


  (Colace)  100 mg  TIDPRN  PRN


 GT


 Constipation  1/12/21 01:15


 2/11/21 01:14  1/12/21 01:42





 


 Enoxaparin Sodium


  (Lovenox)  80 mg  DAILY


 SUBQ


   2/4/21 09:00


 5/5/21 08:59  2/5/21 08:33





 


 Fentanyl Citrate  250 ml @ 1


 mls/hr  Q24H


 IV


   2/3/21 17:00


 2/5/21 16:59  2/4/21 23:09





 


 Hydrocortisone


  (Solu-CORTEF)  100 mg  EVERY 8  HOURS


 IV


   2/1/21 15:45


 5/2/21 15:44  2/5/21 05:10





 


 Insulin Aspart


  (NovoLOG)    Q6HR


 SUBQ


   1/14/21 12:00


 4/9/21 17:59  2/5/21 00:00





 


 Insulin Detemir


  (Levemir)  26 units  Q12HR


 SUBQ


   2/2/21 09:00


 4/12/21 08:59  2/5/21 08:36





 


 Midazolam HCl 50


 mg/Sodium Chloride  100 ml @ 0


 mls/hr  Q24H  PRN


 IV


 Restlessness  2/3/21 21:51


 2/5/21 21:50  2/5/21 02:25





 


 Midodrine


  (Pro-Amatine)  10 mg  Q8HR


 ORAL


   2/1/21 15:30


 5/2/21 15:29  2/5/21 05:10





 


 Norepinephrine


 Bitartrate 16 mg/


 Dextrose  516 ml @ 0


 mls/hr  Q24H


 IV


   2/3/21 10:30


 2/6/21 10:29  2/5/21 02:12





 


 Pantoprazole


  (Protonix)  40 mg  Q12HR


 IVP


   2/1/21 21:00


 2/13/21 08:59  2/5/21 08:31





 


 Quetiapine


 Fumarate


  (SEROqueL)  50 mg  Q12HR


 ORAL


   2/4/21 11:45


 3/21/21 11:44  2/5/21 08:30





 


 Sodium Chloride  500 ml @ 


 999 mls/hr  Q31M PRN


 IV


 For hypotension  1/15/21 18:30


 2/14/21 18:29   





 


 Sodium Chloride  1,000 ml @ 


 30 mls/hr  Q24H


 IV


   2/1/21 15:30


 3/3/21 15:29  2/4/21 23:10














Assessment/Plan


Assessment/Plan


1. COVID-19 pneumonia.


   - s/p Decadron, azithromycin, and ceftriaxone


   - Intubated; on PC ventilation


          -Currently 100% FiO2. PEEP 12; SaO2 78-84 ->91%


            - Has R apical PTX and subcut emphysema


             - S/p R CT placement





2. Diabetes mellitus.; 


3. Elevated inflammatory markers.


4. High D-dimer. On full dose Lovenox


5. Hypernatremia; will continue D5W


6. Hyperglycemia.


   - BG control


7. Hypoxemia., secondary to #1; improved





DVT prophylaxis   On Lovenox.


Sedated; advised RN to increase sedation





Hypotensive


On levophed


Continue PEEP 12; PC 25; rate 32


Has hypercapnia, acidemia


Poor prognosis


Sedated, 


FiO2 100%











Sung Lemos MD            Feb 5, 2021 08:41

## 2021-02-06 VITALS — SYSTOLIC BLOOD PRESSURE: 125 MMHG | DIASTOLIC BLOOD PRESSURE: 78 MMHG

## 2021-02-06 VITALS — SYSTOLIC BLOOD PRESSURE: 122 MMHG | DIASTOLIC BLOOD PRESSURE: 69 MMHG

## 2021-02-06 VITALS — SYSTOLIC BLOOD PRESSURE: 124 MMHG | DIASTOLIC BLOOD PRESSURE: 70 MMHG

## 2021-02-06 VITALS — DIASTOLIC BLOOD PRESSURE: 76 MMHG | SYSTOLIC BLOOD PRESSURE: 125 MMHG

## 2021-02-06 VITALS — DIASTOLIC BLOOD PRESSURE: 69 MMHG | SYSTOLIC BLOOD PRESSURE: 122 MMHG

## 2021-02-06 VITALS — SYSTOLIC BLOOD PRESSURE: 112 MMHG | DIASTOLIC BLOOD PRESSURE: 64 MMHG

## 2021-02-06 VITALS — DIASTOLIC BLOOD PRESSURE: 68 MMHG | SYSTOLIC BLOOD PRESSURE: 122 MMHG

## 2021-02-06 VITALS — DIASTOLIC BLOOD PRESSURE: 60 MMHG | SYSTOLIC BLOOD PRESSURE: 104 MMHG

## 2021-02-06 VITALS — SYSTOLIC BLOOD PRESSURE: 104 MMHG | DIASTOLIC BLOOD PRESSURE: 63 MMHG

## 2021-02-06 VITALS — SYSTOLIC BLOOD PRESSURE: 120 MMHG | DIASTOLIC BLOOD PRESSURE: 70 MMHG

## 2021-02-06 VITALS — SYSTOLIC BLOOD PRESSURE: 121 MMHG | DIASTOLIC BLOOD PRESSURE: 68 MMHG

## 2021-02-06 VITALS — DIASTOLIC BLOOD PRESSURE: 63 MMHG | SYSTOLIC BLOOD PRESSURE: 108 MMHG

## 2021-02-06 VITALS — DIASTOLIC BLOOD PRESSURE: 60 MMHG | SYSTOLIC BLOOD PRESSURE: 101 MMHG

## 2021-02-06 VITALS — SYSTOLIC BLOOD PRESSURE: 108 MMHG | DIASTOLIC BLOOD PRESSURE: 63 MMHG

## 2021-02-06 VITALS — SYSTOLIC BLOOD PRESSURE: 121 MMHG | DIASTOLIC BLOOD PRESSURE: 70 MMHG

## 2021-02-06 VITALS — SYSTOLIC BLOOD PRESSURE: 123 MMHG | DIASTOLIC BLOOD PRESSURE: 72 MMHG

## 2021-02-06 VITALS — SYSTOLIC BLOOD PRESSURE: 117 MMHG | DIASTOLIC BLOOD PRESSURE: 69 MMHG

## 2021-02-06 VITALS — DIASTOLIC BLOOD PRESSURE: 63 MMHG | SYSTOLIC BLOOD PRESSURE: 103 MMHG

## 2021-02-06 VITALS — DIASTOLIC BLOOD PRESSURE: 62 MMHG | SYSTOLIC BLOOD PRESSURE: 103 MMHG

## 2021-02-06 VITALS — SYSTOLIC BLOOD PRESSURE: 120 MMHG | DIASTOLIC BLOOD PRESSURE: 69 MMHG

## 2021-02-06 VITALS — SYSTOLIC BLOOD PRESSURE: 100 MMHG | DIASTOLIC BLOOD PRESSURE: 61 MMHG

## 2021-02-06 VITALS — DIASTOLIC BLOOD PRESSURE: 66 MMHG | SYSTOLIC BLOOD PRESSURE: 106 MMHG

## 2021-02-06 VITALS — DIASTOLIC BLOOD PRESSURE: 77 MMHG | SYSTOLIC BLOOD PRESSURE: 149 MMHG

## 2021-02-06 VITALS — DIASTOLIC BLOOD PRESSURE: 55 MMHG | SYSTOLIC BLOOD PRESSURE: 92 MMHG

## 2021-02-06 VITALS — DIASTOLIC BLOOD PRESSURE: 61 MMHG | SYSTOLIC BLOOD PRESSURE: 101 MMHG

## 2021-02-06 VITALS — DIASTOLIC BLOOD PRESSURE: 57 MMHG | SYSTOLIC BLOOD PRESSURE: 99 MMHG

## 2021-02-06 VITALS — DIASTOLIC BLOOD PRESSURE: 60 MMHG | SYSTOLIC BLOOD PRESSURE: 121 MMHG

## 2021-02-06 VITALS — DIASTOLIC BLOOD PRESSURE: 64 MMHG | SYSTOLIC BLOOD PRESSURE: 104 MMHG

## 2021-02-06 VITALS — DIASTOLIC BLOOD PRESSURE: 59 MMHG | SYSTOLIC BLOOD PRESSURE: 104 MMHG

## 2021-02-06 VITALS — DIASTOLIC BLOOD PRESSURE: 73 MMHG | SYSTOLIC BLOOD PRESSURE: 125 MMHG

## 2021-02-06 VITALS — SYSTOLIC BLOOD PRESSURE: 126 MMHG | DIASTOLIC BLOOD PRESSURE: 71 MMHG

## 2021-02-06 VITALS — SYSTOLIC BLOOD PRESSURE: 129 MMHG | DIASTOLIC BLOOD PRESSURE: 73 MMHG

## 2021-02-06 VITALS — SYSTOLIC BLOOD PRESSURE: 119 MMHG | DIASTOLIC BLOOD PRESSURE: 66 MMHG

## 2021-02-06 VITALS — SYSTOLIC BLOOD PRESSURE: 95 MMHG | DIASTOLIC BLOOD PRESSURE: 56 MMHG

## 2021-02-06 VITALS — SYSTOLIC BLOOD PRESSURE: 122 MMHG | DIASTOLIC BLOOD PRESSURE: 72 MMHG

## 2021-02-06 VITALS — SYSTOLIC BLOOD PRESSURE: 121 MMHG | DIASTOLIC BLOOD PRESSURE: 69 MMHG

## 2021-02-06 VITALS — DIASTOLIC BLOOD PRESSURE: 64 MMHG | SYSTOLIC BLOOD PRESSURE: 111 MMHG

## 2021-02-06 VITALS — DIASTOLIC BLOOD PRESSURE: 71 MMHG | SYSTOLIC BLOOD PRESSURE: 118 MMHG

## 2021-02-06 VITALS — SYSTOLIC BLOOD PRESSURE: 119 MMHG | DIASTOLIC BLOOD PRESSURE: 71 MMHG

## 2021-02-06 VITALS — DIASTOLIC BLOOD PRESSURE: 61 MMHG | SYSTOLIC BLOOD PRESSURE: 105 MMHG

## 2021-02-06 LAB
ADD MANUAL DIFF: NO
ANION GAP SERPL CALC-SCNC: 11 MMOL/L (ref 5–15)
BUN SERPL-MCNC: 105 MG/DL (ref 7–18)
CALCIUM SERPL-MCNC: 8 MG/DL (ref 8.5–10.1)
CHLORIDE SERPL-SCNC: 102 MMOL/L (ref 98–107)
CO2 SERPL-SCNC: 26 MMOL/L (ref 21–32)
CREAT SERPL-MCNC: 4.4 MG/DL (ref 0.55–1.3)
ERYTHROCYTE [DISTWIDTH] IN BLOOD BY AUTOMATED COUNT: 20.6 % (ref 11.6–14.8)
HCT VFR BLD CALC: 29.2 % (ref 42–52)
HGB BLD-MCNC: 8.6 G/DL (ref 14.2–18)
MCV RBC AUTO: 97 FL (ref 80–99)
PLATELET # BLD: 239 K/UL (ref 150–450)
POTASSIUM SERPL-SCNC: 4.7 MMOL/L (ref 3.5–5.1)
RBC # BLD AUTO: 3 M/UL (ref 4.7–6.1)
SODIUM SERPL-SCNC: 139 MMOL/L (ref 136–145)
WBC # BLD AUTO: 9.9 K/UL (ref 4.8–10.8)

## 2021-02-06 RX ADMIN — HYDROCORTISONE SODIUM SUCCINATE SCH MG: 100 INJECTION, POWDER, FOR SOLUTION INTRAMUSCULAR; INTRAVENOUS at 05:15

## 2021-02-06 RX ADMIN — HYDROCORTISONE SODIUM SUCCINATE SCH MG: 100 INJECTION, POWDER, FOR SOLUTION INTRAMUSCULAR; INTRAVENOUS at 13:31

## 2021-02-06 RX ADMIN — PANTOPRAZOLE SODIUM SCH MG: 40 INJECTION, POWDER, FOR SOLUTION INTRAVENOUS at 20:38

## 2021-02-06 RX ADMIN — INSULIN DETEMIR SCH UNITS: 100 INJECTION, SOLUTION SUBCUTANEOUS at 20:53

## 2021-02-06 RX ADMIN — Medication SCH MLS/HR: at 10:55

## 2021-02-06 RX ADMIN — CHLORHEXIDINE GLUCONATE SCH APPLIC: 213 SOLUTION TOPICAL at 20:00

## 2021-02-06 RX ADMIN — INSULIN DETEMIR SCH UNITS: 100 INJECTION, SOLUTION SUBCUTANEOUS at 09:32

## 2021-02-06 RX ADMIN — INSULIN ASPART SCH UNITS: 100 INJECTION, SOLUTION INTRAVENOUS; SUBCUTANEOUS at 23:18

## 2021-02-06 RX ADMIN — INSULIN ASPART SCH UNITS: 100 INJECTION, SOLUTION INTRAVENOUS; SUBCUTANEOUS at 12:34

## 2021-02-06 RX ADMIN — INSULIN ASPART SCH UNITS: 100 INJECTION, SOLUTION INTRAVENOUS; SUBCUTANEOUS at 17:35

## 2021-02-06 RX ADMIN — SODIUM CHLORIDE SCH MLS/HR: 0.9 INJECTION INTRAVENOUS at 11:38

## 2021-02-06 RX ADMIN — VITAMIN D, TAB 1000IU (100/BT) SCH UNIT: 25 TAB at 17:43

## 2021-02-06 RX ADMIN — FLUCONAZOLE SCH MG: 100 TABLET ORAL at 13:31

## 2021-02-06 RX ADMIN — ENOXAPARIN SODIUM SCH MG: 80 INJECTION SUBCUTANEOUS at 08:57

## 2021-02-06 RX ADMIN — INSULIN ASPART SCH UNITS: 100 INJECTION, SOLUTION INTRAVENOUS; SUBCUTANEOUS at 05:38

## 2021-02-06 RX ADMIN — MIDAZOLAM HYDROCHLORIDE PRN MLS/HR: 5 INJECTION INTRAMUSCULAR; INTRAVENOUS at 19:00

## 2021-02-06 RX ADMIN — AMIODARONE HYDROCHLORIDE SCH MG: 200 TABLET ORAL at 08:56

## 2021-02-06 RX ADMIN — Medication SCH MLS/HR: at 02:34

## 2021-02-06 RX ADMIN — Medication SCH MLS/HR: at 21:07

## 2021-02-06 RX ADMIN — MIDODRINE HYDROCHLORIDE SCH MG: 10 TABLET ORAL at 21:07

## 2021-02-06 RX ADMIN — SODIUM CHLORIDE SCH MLS/HR: 900 INJECTION, SOLUTION INTRAVENOUS at 10:30

## 2021-02-06 RX ADMIN — HYDROCORTISONE SODIUM SUCCINATE SCH MG: 100 INJECTION, POWDER, FOR SOLUTION INTRAMUSCULAR; INTRAVENOUS at 21:07

## 2021-02-06 RX ADMIN — PANTOPRAZOLE SODIUM SCH MG: 40 INJECTION, POWDER, FOR SOLUTION INTRAVENOUS at 08:56

## 2021-02-06 RX ADMIN — MIDODRINE HYDROCHLORIDE SCH MG: 10 TABLET ORAL at 13:23

## 2021-02-06 RX ADMIN — MIDODRINE HYDROCHLORIDE SCH MG: 10 TABLET ORAL at 05:15

## 2021-02-06 NOTE — NUR
NURSE NOTES

Patient vitals stable at this time.  Patient sedated.  patient repositioned for comfort.  
Patient son called for an update.  Rn will continue to monitor.

## 2021-02-06 NOTE — NUR
NURSE NOTES:



Pt's VS remain stable, afebrile, no signs of distress noted. Pt's status discussed with MD Lemos and MD George, no new orders received. Safety precautions maintained. Will continue 
to monitor.

## 2021-02-06 NOTE — NEPHROLOGY PROGRESS NOTE
Assessment/Plan


Problem List:  


(1) TO (acute kidney injury)


(2) Acute respiratory failure


(3) Pneumonia due to COVID-19 virus


(4) Diabetes mellitus out of control


(5) Hyperkalemia


Assessment





Acute kidney injury, multifactorial


Septic shock


Acute respiratory failure


Hyperkalemia, metabolic acidosis


Above-mentioned condition


Plan





February 6: Patient continues to decline.  Renal parameters reviewed, serum 

creatinine worsened.  Now DNR.  Intubated on ventilator.  Discussed with RN.  

Labs and medication list reviewed.  Continue current support.


February 5: Patient status changed to DNR.  Is deteriorating.  On pressors.  

Renal failure worse.  Labs reviewed.  Discussed with RN.  No dialysis is being 

offered at this time.  Medication list reviewed.  I concur if aim is towards 

comfort care.  Digoxin was discontinued.


February 4: Remains full code.  Remains intubated on ventilator.  Labs reviewed.

 Serum creatinine 2.8.  Medication list reviewed.  Continue current management 

and per consultants.  Prognosis remains poor.


February 3: Labs reviewed.  Medication reviewed.  Patient full code.  Remains 

intubated on ventilator.  Hypotensive.  Renal parameters unchanged.  Continue 

current support.  Prognosis dismal.


February 2: Labs reviewed.  Medication list reviewed.  Discussed with RN.  

Patient remains full code.  Patient is still intubated on ventilator.  

Hyperkalemia improved.  Serum creatinine 2.6 unchanged.  Status remains guarded.

 Continue per consultants.  Continue monitor renal parameters.  Taper pressors 

as possible.  Sodium bicarb IV





Previosly


Feeding changed to Nepro


Midodrine 10 mg every 8 hours


Albumin bolus


1/2 Normal saline 100 cc an hour


Stop Seroquel


Monitor renal parameters


Avoid nephrotoxic's


1 amp of sodium bicarbonate


Stress dose of steroids


Discussed with SUZI Murphy





Subjective


ROS Limited/Unobtainable:  Yes





Objective


Objective





Last 24 Hour Vital Signs








  Date Time  Temp Pulse Resp B/P (MAP) Pulse Ox O2 Delivery O2 Flow Rate FiO2


 


2/6/21 17:10  86 34     100


 


2/6/21 16:00        100


 


2/6/21 16:00      Mechanical Ventilator  


 


2/6/21 15:10  86 34     100


 


2/6/21 15:00    128/71    


 


2/6/21 15:00   30   Mechanical Ventilator  100


 


2/6/21 15:00   30 128/71  Mechanical Ventilator  100


 


2/6/21 14:00    122/69    


 


2/6/21 14:00   30   Mechanical Ventilator  100


 


2/6/21 14:00   30 122/69  Mechanical Ventilator  100


 


2/6/21 13:28  85 34     100


 


2/6/21 13:00    122/71    


 


2/6/21 13:00   30   Mechanical Ventilator  100


 


2/6/21 13:00   30 122/71  Mechanical Ventilator  100


 


2/6/21 13:00  87 24 121/70 (87) 83   


 


2/6/21 12:00      Mechanical Ventilator  


 


2/6/21 12:00        100


 


2/6/21 12:00    106/66    


 


2/6/21 12:00   32   Mechanical Ventilator  100


 


2/6/21 12:00   30 106/66  Mechanical Ventilator  100


 


2/6/21 12:00 98.3 83 32 106/66 (79) 86   


 


2/6/21 11:26  85      


 


2/6/21 11:06  84 34     100


 


2/6/21 11:00    120/70    


 


2/6/21 11:00   31   Mechanical Ventilator  100


 


2/6/21 11:00   31 120/70  Mechanical Ventilator  100


 


2/6/21 11:00  84 32 120/70 (87) 87   


 


2/6/21 10:55   29 110/67  Mechanical Ventilator  100


 


2/6/21 10:30    116/68    


 


2/6/21 10:00  84 30 125/78 (94) 87   


 


2/6/21 10:00   30   Mechanical Ventilator  100


 


2/6/21 10:00   30 124/73  Mechanical Ventilator  100


 


2/6/21 10:00    124/73    


 


2/6/21 09:20  85 34     100


 


2/6/21 09:00   32   Mechanical Ventilator  100


 


2/6/21 09:00   32 129/77  Mechanical Ventilator  100


 


2/6/21 09:00    129/77    


 


2/6/21 09:00  85 32 122/69 (86) 88   


 


2/6/21 08:00 97.8 85 33 121/68 (85) 86   


 


2/6/21 08:00        100


 


2/6/21 08:00   30   Mechanical Ventilator  100


 


2/6/21 08:00   30 122/70  Mechanical Ventilator  100


 


2/6/21 08:00    122/70    


 


2/6/21 08:00      Mechanical Ventilator  


 


2/6/21 07:40  85      


 


2/6/21 07:30  85 33     100


 


2/6/21 07:00  85 28 119/71 (87) 87   


 


2/6/21 07:00   28   Mechanical Ventilator  100


 


2/6/21 07:00   28 119/71  Mechanical Ventilator  100


 


2/6/21 07:00    119/71    


 


2/6/21 06:30  85 30 118/71 (87) 86   


 


2/6/21 06:00   31   Mechanical Ventilator  100


 


2/6/21 06:00   31 126/71  Mechanical Ventilator  100


 


2/6/21 06:00    126/71    


 


2/6/21 06:00  85 31 126/71 (89) 86   


 


2/6/21 05:30  86 33 125/73 (90) 87   


 


2/6/21 05:18  86 34     100


 


2/6/21 05:00  85 32 120/69 (86) 90   


 


2/6/21 05:00   32   Mechanical Ventilator  100


 


2/6/21 05:00   32 120/69  Mechanical Ventilator  100


 


2/6/21 05:00    120/69    


 


2/6/21 04:30  86 29 125/76 (92) 85   


 


2/6/21 04:30    125/76    


 


2/6/21 04:00        100


 


2/6/21 04:00      Mechanical Ventilator  


 


2/6/21 04:00   29   Mechanical Ventilator  100


 


2/6/21 04:00   29 121/68  Mechanical Ventilator  100


 


2/6/21 04:00    121/68    


 


2/6/21 04:00 98.4 85 29 121/68 (85) 87   


 


2/6/21 04:00  86      


 


2/6/21 03:30  85 35     100


 


2/6/21 03:30  86 29 121/68 (85) 86   


 


2/6/21 03:00   30   Mechanical Ventilator  100


 


2/6/21 03:00   30 117/69  Mechanical Ventilator  100


 


2/6/21 03:00    117/69    


 


2/6/21 03:00  86 30 117/69 (85) 86   


 


2/6/21 02:34   26 113/86  Mechanical Ventilator  100


 


2/6/21 02:30  86 30 119/66 (83) 87   


 


2/6/21 02:00    121/69    


 


2/6/21 02:00  87 29 121/69 (86) 86   


 


2/6/21 01:30  89 30 149/77 (101) 83   


 


2/6/21 01:30  88 33     100


 


2/6/21 01:30    149/77    


 


2/6/21 01:00  88 29 123/72 (89) 86   


 


2/6/21 01:00   28   Mechanical Ventilator  100


 


2/6/21 01:00   28 123/72  Mechanical Ventilator  100


 


2/6/21 01:00    123/72    


 


2/6/21 00:30  88 28 124/70 (88) 87   


 


2/6/21 00:30    124/72    


 


2/6/21 00:00        100


 


2/6/21 00:00   31   Mechanical Ventilator  100


 


2/6/21 00:00   31 129/73  Mechanical Ventilator  100


 


2/6/21 00:00  84      


 


2/6/21 00:00      Mechanical Ventilator  


 


2/6/21 00:00 98.5 88 31 129/73 (91) 86   


 


2/5/21 23:30  88 35     100


 


2/5/21 23:30  88 30 124/71 (88) 85   


 


2/5/21 23:00   29   Mechanical Ventilator  100


 


2/5/21 23:00   29 129/74  Mechanical Ventilator  100


 


2/5/21 23:00    129/74    


 


2/5/21 23:00  88 29 129/74 (92) 84   


 


2/5/21 22:30  89 28 121/71 (88) 85   


 


2/5/21 22:00   29   Mechanical Ventilator  100


 


2/5/21 22:00   29 130/74  Mechanical Ventilator  100


 


2/5/21 22:00    130/74    


 


2/5/21 22:00  89 29 130/74 (92) 84   


 


2/5/21 21:53   23   Mechanical Ventilator  100


 


2/5/21 21:30  90 35     100


 


2/5/21 21:30  90 28 129/71 (90) 81   


 


2/5/21 21:00   27 125/70  Mechanical Ventilator  100


 


2/5/21 21:00    125/70    


 


2/5/21 21:00  90 27 125/70 (88) 78   


 


2/5/21 20:30  90 27 122/68 (86) 77   


 


2/5/21 20:00      Mechanical Ventilator  


 


2/5/21 20:00  92 27 119/72 (88) 76   


 


2/5/21 20:00   27 119/72  Mechanical Ventilator  100


 


2/5/21 20:00    119/72    


 


2/5/21 20:00  93      


 


2/5/21 20:00        100


 


2/5/21 19:30  92 32     100


 


2/5/21 19:30  92 24 120/70 (87) 77   


 


2/5/21 19:00 98.7 93 23 119/67 (84) 77   


 


2/5/21 18:45  94 23 117/67 (84) 76   


 


2/5/21 18:32   22 121/67  Mechanical Ventilator  100


 


2/5/21 18:30  95 29 121/67 (85) 76   


 


2/5/21 18:15  96 21 116/66 (83) 76   


 


2/5/21 18:00  97 22 125/72 (89) 77   


 


2/5/21 17:45  93 26 106/61 (76) 80   


 


2/5/21 17:30  95 22 116/69 (85) 78   

















Intake and Output  


 


 2/5/21 2/6/21





 19:00 07:00


 


Intake Total 2577.22 ml 1464.60 ml


 


Output Total 395 ml 90 ml


 


Balance 2182.22 ml 1374.60 ml


 


  


 


Free Water 150 ml 


 


IV Total 2007.22 ml 1044.60 ml


 


Tube Feeding 420 ml 420 ml


 


Output Urine Total 15 ml 10 ml


 


Stool Total  0 ml


 


Chest Tube Drainage Total 380 ml 80 ml


 


# Bowel Movements 101 











Current Medications








 Medications


  (Trade)  Dose


 Ordered  Sig/Julisa


 Route


 PRN Reason  Start Time


 Stop Time Status Last Admin


Dose Admin


 


 Acetaminophen


  (Tylenol)  650 mg  Q4H  PRN


 GT


 Temp >100.5  1/15/21 17:15


 2/14/21 17:14  1/29/21 06:59





 


 Amiodarone HCl


  (Cordarone)  200 mg  DAILY


 NG


   1/26/21 09:00


 4/19/21 20:59  2/6/21 08:56





 


 Chlorhexidine


 Gluconate


  (Vanessa-Hex 2%)  1 applic  DAILY@2000


 TOPIC


   1/19/21 20:00


 4/19/21 19:59  2/5/21 20:12





 


 Dextrose


  (Dextrose 50%)  25 ml  Q30M  PRN


 IV


 Hypoglycemia  1/9/21 13:30


 4/9/21 13:29   





 


 Dextrose


  (Dextrose 50%)  50 ml  Q30M  PRN


 IV


 Hypoglycemia  1/9/21 13:30


 4/9/21 13:29   





 


 Docusate Sodium


  (Colace)  100 mg  TIDPRN  PRN


 GT


 Constipation  1/12/21 01:15


 2/11/21 01:14  1/12/21 01:42





 


 Enoxaparin Sodium


  (Lovenox)  80 mg  DAILY


 SUBQ


   2/4/21 09:00


 5/5/21 08:59  2/6/21 08:57





 


 Fentanyl Citrate  250 ml @ 0


 mls/hr  Q24H


 IV


   2/5/21 18:22


 2/7/21 18:21  2/6/21 10:55





 


 Fluconazole


  (Diflucan)  100 mg  DAILY


 GT


   2/6/21 12:45


 2/13/21 12:44  2/6/21 13:31





 


 Hydrocortisone


  (Solu-CORTEF)  100 mg  EVERY 8  HOURS


 IV


   2/1/21 15:45


 5/2/21 15:44  2/6/21 13:31





 


 Insulin Aspart


  (NovoLOG)    Q6HR


 SUBQ


   1/14/21 12:00


 4/9/21 17:59  2/6/21 12:34





 


 Insulin Detemir


  (Levemir)  26 units  Q12HR


 SUBQ


   2/2/21 09:00


 4/12/21 08:59  2/6/21 09:32





 


 Midazolam HCl 50


 mg/Sodium Chloride  100 ml @ 0


 mls/hr  Q24H  PRN


 IV


 AS DIRECTED  2/5/21 19:52


 2/7/21 19:51  2/5/21 21:53





 


 Midodrine


  (Pro-Amatine)  10 mg  Q8HR


 ORAL


   2/1/21 15:30


 5/2/21 15:29  2/6/21 05:15





 


 Norepinephrine


 Bitartrate 16 mg/


 Sodium Chloride  500 ml @ 0


 mls/hr  Q24H


 IV


   2/6/21 10:30


 2/9/21 10:29  2/6/21 10:30





 


 Pantoprazole


  (Protonix)  40 mg  Q12HR


 IVP


   2/1/21 21:00


 2/13/21 08:59  2/6/21 08:56





 


 Quetiapine


 Fumarate


  (SEROqueL)  50 mg  Q12HR


 ORAL


   2/4/21 11:45


 3/21/21 11:44  2/5/21 20:13





 


 Sodium Chloride  500 ml @ 


 999 mls/hr  Q31M PRN


 IV


 For hypotension  1/15/21 18:30


 2/14/21 18:29   





 


 Sodium Chloride  1,000 ml @ 


 30 mls/hr  Q24H


 IV


   2/1/21 15:30


 3/3/21 15:29  2/5/21 19:45








Laboratory Tests


2/5/21 18:13: POC Whole Blood Glucose 162H


2/5/21 20:20: POC Whole Blood Glucose 168H


2/5/21 23:44: POC Whole Blood Glucose 183H


2/6/21 09:05: 


White Blood Count 9.9, Red Blood Count 3.00L, Hemoglobin 8.6L, Hematocrit 29.2L,

Mean Corpuscular Volume 97, Mean Corpuscular Hemoglobin 28.7, Mean Corpuscular 

Hemoglobin Concent 29.5L, Red Cell Distribution Width 20.6H, Platelet Count 239,

Mean Platelet Volume 8.6, Neutrophils (%) (Auto) , Lymphocytes (%) (Auto) , 

Monocytes (%) (Auto) , Eosinophils (%) (Auto) , Basophils (%) (Auto) , Sodium 

Level 139, Potassium Level 4.7, Chloride Level 102, Carbon Dioxide Level 26, 

Anion Gap 11, Blood Urea Nitrogen 105H, Creatinine 4.4H, Estimat Glomerular 

Filtration Rate 13.5, Glucose Level 143H, Calcium Level 8.0L


Height (Feet):  5


Height (Inches):  7.00


Weight (Pounds):  198


General Appearance:  no apparent distress


EENT:  other - Intubated on ventilator


Cardiovascular:  normal rate


Respiratory/Chest:  decreased breath sounds


Abdomen:  distended











Damien Powell MD             Feb 6, 2021 17:29

## 2021-02-06 NOTE — NUR
NURSE NOTES:

Pt sedated on bed & RASS -2 maintained. Blood sugar checked (183mg/dL) & insulin given. 
Turned & repositioned. All dips maintained. Pt SaO2 86% with current vent setting. Will 
continue to monitor for changes.

## 2021-02-06 NOTE — NUR
RD ASSESSMENT & RECOMMENDATIONS

SEE CARE ACTIVITY FOR COMPLETE ASSESSMENT



DAILY ESTIMATED NEEDS:

Needs based on Critical Care, ARF/ 73kg abw 

22-28  kcals/kg 

6348-0167  total kcals

0.8-1.0  g protein/kg

58-73  g total protein 

20-25  mL/kg

8021-9533  total fluid mLs



NUTRITION DIAGNOSIS:

Swallowing difficulty R/T respiratory failure as evidenced by pt now

orally intubated, on OGT feeds.



 

CURRENT TF:Nepro @ 35ml/hr x 24 hrs 



 



ENTERAL NUTRITION RECOMMENDATIONS:

Nepro @ 35ml/hr x 24 hrs  to provide 840ml, 1512kcal, 68g prot, 611ml free water 



**** FEED WITH HEMODYNAMIC STABILITY

* Continue NEPRO consistently elev phos, creat trend up

* W/ worsening renal fxn, okay to maintain current TF rate not to exceed est prot needs.

* HOB over 30 degrees/ water flush per MD

----

*** WITHOUT HEMODYNAMIC STABILITY, rec trophic feeding of Nepro @ 10ml/hr

x 24 hrs to maintain gut integrity if able to keep HOB >30 degrees ***



 

 



ADDITIONAL RECOMMENDATIONS:

* Calibrated bedscale wt 

* Monitor BGs, need for insulin adjustment- BGs improved 

* Monitor HD stability: NE @ 8mcg 

* Consider phos binders: consistently elev phos 

* Monitor renal fxn and lytes: worsening, no HD at this time 

. 

.

## 2021-02-06 NOTE — INFECTIOUS DISEASES PROG NOTE
Assessment/Plan


Assessment/Plan


A


1. COVID-19 pneumonia.


2. New-onset diabetes.


3. Hypoxic respiratory failure


4. Sepsis with fever, tachycardia & leukocytosis


5. Bacteremia with COANS, ? line infection


6. Atrial fibrillation


7. Diarrhea, C. difficile negative


8. Cardiac arrest


9. Acute renal failure


10. Shock


11. Anemia





P


1. Finished remdesivir course


2. Discontinue Cefepime


3. Start on Fluconazole





Subjective


ROS Limited/Unobtainable:  Yes


Neurologic:  Reports: other - sedated


Allergies:  


Coded Allergies:  


     No Known Allergies (Unverified , 6/11/19)





Objective





Last 24 Hour Vital Signs








  Date Time  Temp Pulse Resp B/P (MAP) Pulse Ox O2 Delivery O2 Flow Rate FiO2


 


2/6/21 11:26  85      


 


2/6/21 11:06  84 34     100


 


2/6/21 11:00  84 32 120/70 (87) 87   


 


2/6/21 10:55   29 110/67  Mechanical Ventilator  100


 


2/6/21 10:30    116/68    


 


2/6/21 10:00  84 30 125/78 (94) 87   


 


2/6/21 09:20  85 34     100


 


2/6/21 09:00  85 32 122/69 (86) 88   


 


2/6/21 08:00 97.8 85 33 121/68 (85) 86   


 


2/6/21 08:00        100


 


2/6/21 08:00      Mechanical Ventilator  


 


2/6/21 07:40  85      


 


2/6/21 07:30  85 33     100


 


2/6/21 07:00  85 28 119/71 (87) 87   


 


2/6/21 07:00   28   Mechanical Ventilator  100


 


2/6/21 07:00   28 119/71  Mechanical Ventilator  100


 


2/6/21 07:00    119/71    


 


2/6/21 06:30  85 30 118/71 (87) 86   


 


2/6/21 06:00   31   Mechanical Ventilator  100


 


2/6/21 06:00   31 126/71  Mechanical Ventilator  100


 


2/6/21 06:00    126/71    


 


2/6/21 06:00  85 31 126/71 (89) 86   


 


2/6/21 05:30  86 33 125/73 (90) 87   


 


2/6/21 05:18  86 34     100


 


2/6/21 05:00  85 32 120/69 (86) 90   


 


2/6/21 05:00   32   Mechanical Ventilator  100


 


2/6/21 05:00   32 120/69  Mechanical Ventilator  100


 


2/6/21 05:00    120/69    


 


2/6/21 04:30  86 29 125/76 (92) 85   


 


2/6/21 04:30    125/76    


 


2/6/21 04:00        100


 


2/6/21 04:00      Mechanical Ventilator  


 


2/6/21 04:00   29   Mechanical Ventilator  100


 


2/6/21 04:00   29 121/68  Mechanical Ventilator  100


 


2/6/21 04:00    121/68    


 


2/6/21 04:00 98.4 85 29 121/68 (85) 87   


 


2/6/21 04:00  86      


 


2/6/21 03:30  85 35     100


 


2/6/21 03:30  86 29 121/68 (85) 86   


 


2/6/21 03:00   30   Mechanical Ventilator  100


 


2/6/21 03:00   30 117/69  Mechanical Ventilator  100


 


2/6/21 03:00    117/69    


 


2/6/21 03:00  86 30 117/69 (85) 86   


 


2/6/21 02:34   26 113/86  Mechanical Ventilator  100


 


2/6/21 02:30  86 30 119/66 (83) 87   


 


2/6/21 02:00    121/69    


 


2/6/21 02:00  87 29 121/69 (86) 86   


 


2/6/21 01:30  89 30 149/77 (101) 83   


 


2/6/21 01:30  88 33     100


 


2/6/21 01:30    149/77    


 


2/6/21 01:00  88 29 123/72 (89) 86   


 


2/6/21 01:00   28   Mechanical Ventilator  100


 


2/6/21 01:00   28 123/72  Mechanical Ventilator  100


 


2/6/21 01:00    123/72    


 


2/6/21 00:30  88 28 124/70 (88) 87   


 


2/6/21 00:30    124/72    


 


2/6/21 00:00        100


 


2/6/21 00:00   31   Mechanical Ventilator  100


 


2/6/21 00:00   31 129/73  Mechanical Ventilator  100


 


2/6/21 00:00  84      


 


2/6/21 00:00      Mechanical Ventilator  


 


2/6/21 00:00 98.5 88 31 129/73 (91) 86   


 


2/5/21 23:30  88 35     100


 


2/5/21 23:30  88 30 124/71 (88) 85   


 


2/5/21 23:00   29   Mechanical Ventilator  100


 


2/5/21 23:00   29 129/74  Mechanical Ventilator  100


 


2/5/21 23:00    129/74    


 


2/5/21 23:00  88 29 129/74 (92) 84   


 


2/5/21 22:30  89 28 121/71 (88) 85   


 


2/5/21 22:00   29   Mechanical Ventilator  100


 


2/5/21 22:00   29 130/74  Mechanical Ventilator  100


 


2/5/21 22:00    130/74    


 


2/5/21 22:00  89 29 130/74 (92) 84   


 


2/5/21 21:53   23   Mechanical Ventilator  100


 


2/5/21 21:30  90 35     100


 


2/5/21 21:30  90 28 129/71 (90) 81   


 


2/5/21 21:00   27 125/70  Mechanical Ventilator  100


 


2/5/21 21:00    125/70    


 


2/5/21 21:00  90 27 125/70 (88) 78   


 


2/5/21 20:30  90 27 122/68 (86) 77   


 


2/5/21 20:00      Mechanical Ventilator  


 


2/5/21 20:00  92 27 119/72 (88) 76   


 


2/5/21 20:00   27 119/72  Mechanical Ventilator  100


 


2/5/21 20:00    119/72    


 


2/5/21 20:00  93      


 


2/5/21 20:00        100


 


2/5/21 19:30  92 32     100


 


2/5/21 19:30  92 24 120/70 (87) 77   


 


2/5/21 19:00 98.7 93 23 119/67 (84) 77   


 


2/5/21 18:45  94 23 117/67 (84) 76   


 


2/5/21 18:32   22 121/67  Mechanical Ventilator  100


 


2/5/21 18:30  95 29 121/67 (85) 76   


 


2/5/21 18:15  96 21 116/66 (83) 76   


 


2/5/21 18:00  97 22 125/72 (89) 77   


 


2/5/21 17:45  93 26 106/61 (76) 80   


 


2/5/21 17:30  95 22 116/69 (85) 78   


 


2/5/21 17:15  93 25 121/71 (88) 77   


 


2/5/21 17:00  93 32 114/69 (84) 74   


 


2/5/21 16:45  93 34 116/68 (84) 75   


 


2/5/21 16:30  94 33 117/68 (84) 75   


 


2/5/21 16:15  91 32 103/61 (75) 76   


 


2/5/21 16:00      Mechanical Ventilator  


 


2/5/21 16:00  93      


 


2/5/21 16:00 98.2 93 33 112/68 (83) 79   


 


2/5/21 16:00        100


 


2/5/21 15:45  93 33 116/68 (84) 80   


 


2/5/21 15:30  93 33 114/67 (83) 81   


 


2/5/21 15:28  93 34     100


 


2/5/21 15:15  93 33 112/67 (82) 81   


 


2/5/21 15:00  93 33 109/66 (80) 82   


 


2/5/21 14:45  93 34 107/65 (79) 81   


 


2/5/21 14:30  93 32 108/64 (79) 80   


 


2/5/21 14:15  93 33 106/64 (78) 80   


 


2/5/21 14:00  93 33 105/64 (78) 79   


 


2/5/21 13:45  93 31 103/64 (77) 77   


 


2/5/21 13:30  93 33 105/63 (77) 76   


 


2/5/21 13:15  93 33 104/61 (75) 76   


 


2/5/21 13:00  93 33 104/61 (75) 75   


 


2/5/21 12:45  94 32 109/61 (77) 76   








Height (Feet):  5


Height (Inches):  7.00


Weight (Pounds):  198


HEENT:  other - orally intubated


Respiratory/Chest:  other - on ventilator, [id number]%, R chest tube


Cardiovascular:  normal rate, other - PICC line, on Levophed


Abdomen:  soft, non tender, other - OG tube feeding


Extremities:  other - edema


Neurologic/Psychiatric:  other - sedated





Microbiology








 Date/Time


Source Procedure


Growth Status





 


 2/4/21 02:30


Urine,Clean Catch Urine Culture - Preliminary


YEAST Resulted











Laboratory Tests








Test


 2/5/21


12:45 2/5/21


18:13 2/5/21


20:20 2/5/21


23:44


 


POC Whole Blood Glucose


 182 MG/DL


()  H 162 MG/DL


()  H 168 MG/DL


()  H 183 MG/DL


()  H


 


Test


 2/6/21


09:05 


 


 





 


White Blood Count


 9.9 K/UL


(4.8-10.8) 


 


 





 


Red Blood Count


 3.00 M/UL


(4.70-6.10)  L 


 


 





 


Hemoglobin


 8.6 G/DL


(14.2-18.0)  L 


 


 





 


Hematocrit


 29.2 %


(42.0-52.0)  L 


 


 





 


Mean Corpuscular Volume 97 FL (80-99)     


 


Mean Corpuscular Hemoglobin


 28.7 PG


(27.0-31.0) 


 


 





 


Mean Corpuscular Hemoglobin


Concent 29.5 G/DL


(32.0-36.0)  L 


 


 





 


Red Cell Distribution Width


 20.6 %


(11.6-14.8)  H 


 


 





 


Platelet Count


 239 K/UL


(150-450) 


 


 





 


Mean Platelet Volume


 8.6 FL


(6.5-10.1) 


 


 





 


Neutrophils (%) (Auto)


 % (45.0-75.0)


 


 


 





 


Lymphocytes (%) (Auto)


 % (20.0-45.0)


 


 


 





 


Monocytes (%) (Auto)  % (1.0-10.0)     


 


Eosinophils (%) (Auto)  % (0.0-3.0)     


 


Basophils (%) (Auto)  % (0.0-2.0)     


 


Sodium Level


 139 MMOL/L


(136-145) 


 


 





 


Potassium Level


 4.7 MMOL/L


(3.5-5.1) 


 


 





 


Chloride Level


 102 MMOL/L


() 


 


 





 


Carbon Dioxide Level


 26 MMOL/L


(21-32) 


 


 





 


Anion Gap


 11 mmol/L


(5-15) 


 


 





 


Blood Urea Nitrogen


 105 mg/dL


(7-18)  H 


 


 





 


Creatinine


 4.4 MG/DL


(0.55-1.30)  H 


 


 





 


Estimat Glomerular Filtration


Rate 13.5 mL/min


(>60) 


 


 





 


Glucose Level


 143 MG/DL


()  H 


 


 





 


Calcium Level


 8.0 MG/DL


(8.5-10.1)  L 


 


 














Current Medications








 Medications


  (Trade)  Dose


 Ordered  Sig/Julisa


 Route


 PRN Reason  Start Time


 Stop Time Status Last Admin


Dose Admin


 


 Acetaminophen


  (Tylenol)  650 mg  Q4H  PRN


 GT


 Temp >100.5  1/15/21 17:15


 2/14/21 17:14  1/29/21 06:59





 


 Amiodarone HCl


  (Cordarone)  200 mg  DAILY


 NG


   1/26/21 09:00


 4/19/21 20:59  2/6/21 08:56





 


 Cefepime HCl 1 gm/


 Dextrose  55 ml @ 


 110 mls/hr  Q24H


 IVPB


   2/5/21 11:00


 2/12/21 10:59  2/6/21 11:38





 


 Chlorhexidine


 Gluconate


  (Vanessa-Hex 2%)  1 applic  DAILY@2000


 TOPIC


   1/19/21 20:00


 4/19/21 19:59  2/5/21 20:12





 


 Dextrose


  (Dextrose 50%)  25 ml  Q30M  PRN


 IV


 Hypoglycemia  1/9/21 13:30


 4/9/21 13:29   





 


 Dextrose


  (Dextrose 50%)  50 ml  Q30M  PRN


 IV


 Hypoglycemia  1/9/21 13:30


 4/9/21 13:29   





 


 Docusate Sodium


  (Colace)  100 mg  TIDPRN  PRN


 GT


 Constipation  1/12/21 01:15


 2/11/21 01:14  1/12/21 01:42





 


 Enoxaparin Sodium


  (Lovenox)  80 mg  DAILY


 SUBQ


   2/4/21 09:00


 5/5/21 08:59  2/6/21 08:57





 


 Fentanyl Citrate  250 ml @ 0


 mls/hr  Q24H


 IV


   2/5/21 18:22


 2/7/21 18:21  2/6/21 10:55





 


 Hydrocortisone


  (Solu-CORTEF)  100 mg  EVERY 8  HOURS


 IV


   2/1/21 15:45


 5/2/21 15:44  2/6/21 05:15





 


 Insulin Aspart


  (NovoLOG)    Q6HR


 SUBQ


   1/14/21 12:00


 4/9/21 17:59  2/6/21 12:34





 


 Insulin Detemir


  (Levemir)  26 units  Q12HR


 SUBQ


   2/2/21 09:00


 4/12/21 08:59  2/6/21 09:32





 


 Midazolam HCl 50


 mg/Sodium Chloride  100 ml @ 0


 mls/hr  Q24H  PRN


 IV


 AS DIRECTED  2/5/21 19:52


 2/7/21 19:51  2/5/21 21:53





 


 Midodrine


  (Pro-Amatine)  10 mg  Q8HR


 ORAL


   2/1/21 15:30


 5/2/21 15:29  2/6/21 05:15





 


 Norepinephrine


 Bitartrate 16 mg/


 Sodium Chloride  500 ml @ 0


 mls/hr  Q24H


 IV


   2/6/21 10:30


 2/9/21 10:29  2/6/21 10:30





 


 Pantoprazole


  (Protonix)  40 mg  Q12HR


 IVP


   2/1/21 21:00


 2/13/21 08:59  2/6/21 08:56





 


 Quetiapine


 Fumarate


  (SEROqueL)  50 mg  Q12HR


 ORAL


   2/4/21 11:45


 3/21/21 11:44  2/5/21 20:13





 


 Sodium Chloride  500 ml @ 


 999 mls/hr  Q31M PRN


 IV


 For hypotension  1/15/21 18:30


 2/14/21 18:29   





 


 Sodium Chloride  1,000 ml @ 


 30 mls/hr  Q24H


 IV


   2/1/21 15:30


 3/3/21 15:29  2/5/21 19:45




















Lamin Felix MD                Feb 6, 2021 12:41

## 2021-02-06 NOTE — NUR
NURSE NOTES:

Turned & repositioned pt. Changed  IV tubings. Changed fentanyl bag with same 
settings, cosigned & witnessed by SUZI Parsons. Pt still sedated with RASS -2. Will continue to 
monitor.

## 2021-02-06 NOTE — NUR
NURSE HAND-OFF REPORT: 



Latest Vital Signs: Temperature 98.4 , Pulse 85 , B/P 119 /71 , Respiratory Rate 28 , O2 SAT 
87 , Mechanical Ventilator, O2 Flow Rate  .  

Vital Sign Comment: none



EKG Rhythm: Sinus Rhythm

Rhythm change?: N 

MD Notified?: 

MD Response:



Latest Singh Fall Score: 50  

Fall Risk: High Risk 

Safety Measures: Call light Within Reach, Bed Alarm Zone 1, Side Rails Side Rails x3, Bed 
position Low and Locked.

Fall Precautions: 

Patient Fall Education



Report given to Rosalee Aguilar RN. Drips fentanyl 300 mcg/hr, Versed 3mg/hr, and Levophed 
8mcg/min. 1/2 NS @ 30cc/hr. RASS -2.

## 2021-02-06 NOTE — NUR
NURSE NOTES:



Pt's VSS, no signs of distress noted. Scheduled medications given per MD order, pt tolerated 
well. Will continue to monitor.

## 2021-02-06 NOTE — NUR
NURSE NOTES:

Noted SaO2 96% with current vent setting. Given suction and oral care. Noted BP  : 
126/71mmHg. Decrease to Levophed gtt @ 8mcg/min. Noted 80cc output via chest tube. Turn and 
reposition. Will continue to monitor any change of condition.

## 2021-02-06 NOTE — CARDIAC ELECTROPHYSIOLOGY PN
Assessment/Plan


Assessment/Plan


1. Atrial fibrillation with rapid ventricular response.      


      On digoxin 0.125 mg p.o. daily and Amiodarone 200 po qd  


       In SR.     Dig level 1.0





2. Acute renal failure. 


3. Right pneumothorax, status post chest tube with subcutaneous emphysema. 


No significant drainage





4. COVID-19 pneumonia, 100% FiO2 and PEEP of 12, on Remdesivir and Solu-Medrol  

.


5. Diabetes.


6. Septic shock. On iv fluid and Levophed increased to 16 Mcg  


7. S/P PEA arrest 1/29/21 and 1/31/21 and 2/5/21


8. DNR





DW RN and Dr Powell





Subjective


Subjective


In ICU on 100% Fio2 and PEEP 14 and Levo increased to 16 Mcg  


On Fentanyl rip at 30. Intermittent LBBB.


 Right chest tube . In SR  


Coded twice 1/30/21  for PEA with chest compressions and Epi and bicarb 

injections


Coded again 2/5/21 and Levo increased to 16 mcg





DNR





Objective





Last 24 Hour Vital Signs








  Date Time  Temp Pulse Resp B/P (MAP) Pulse Ox O2 Delivery O2 Flow Rate FiO2


 


2/6/21 17:10  86 34     100


 


2/6/21 16:00        100


 


2/6/21 16:00      Mechanical Ventilator  


 


2/6/21 15:10  86 34     100


 


2/6/21 15:00    128/71    


 


2/6/21 15:00   30   Mechanical Ventilator  100


 


2/6/21 15:00   30 128/71  Mechanical Ventilator  100


 


2/6/21 14:00    122/69    


 


2/6/21 14:00   30   Mechanical Ventilator  100


 


2/6/21 14:00   30 122/69  Mechanical Ventilator  100


 


2/6/21 13:28  85 34     100


 


2/6/21 13:00    122/71    


 


2/6/21 13:00   30   Mechanical Ventilator  100


 


2/6/21 13:00   30 122/71  Mechanical Ventilator  100


 


2/6/21 13:00  87 24 121/70 (87) 83   


 


2/6/21 12:00      Mechanical Ventilator  


 


2/6/21 12:00        100


 


2/6/21 12:00    106/66    


 


2/6/21 12:00   32   Mechanical Ventilator  100


 


2/6/21 12:00   30 106/66  Mechanical Ventilator  100


 


2/6/21 12:00 98.3 83 32 106/66 (79) 86   


 


2/6/21 11:26  85      


 


2/6/21 11:06  84 34     100


 


2/6/21 11:00    120/70    


 


2/6/21 11:00   31   Mechanical Ventilator  100


 


2/6/21 11:00   31 120/70  Mechanical Ventilator  100


 


2/6/21 11:00  84 32 120/70 (87) 87   


 


2/6/21 10:55   29 110/67  Mechanical Ventilator  100


 


2/6/21 10:30    116/68    


 


2/6/21 10:00  84 30 125/78 (94) 87   


 


2/6/21 10:00   30   Mechanical Ventilator  100


 


2/6/21 10:00   30 124/73  Mechanical Ventilator  100


 


2/6/21 10:00    124/73    


 


2/6/21 09:20  85 34     100


 


2/6/21 09:00   32   Mechanical Ventilator  100


 


2/6/21 09:00   32 129/77  Mechanical Ventilator  100


 


2/6/21 09:00    129/77    


 


2/6/21 09:00  85 32 122/69 (86) 88   


 


2/6/21 08:00 97.8 85 33 121/68 (85) 86   


 


2/6/21 08:00        100


 


2/6/21 08:00   30   Mechanical Ventilator  100


 


2/6/21 08:00   30 122/70  Mechanical Ventilator  100


 


2/6/21 08:00    122/70    


 


2/6/21 08:00      Mechanical Ventilator  


 


2/6/21 07:40  85      


 


2/6/21 07:30  85 33     100


 


2/6/21 07:00  85 28 119/71 (87) 87   


 


2/6/21 07:00   28   Mechanical Ventilator  100


 


2/6/21 07:00   28 119/71  Mechanical Ventilator  100


 


2/6/21 07:00    119/71    


 


2/6/21 06:30  85 30 118/71 (87) 86   


 


2/6/21 06:00   31   Mechanical Ventilator  100


 


2/6/21 06:00   31 126/71  Mechanical Ventilator  100


 


2/6/21 06:00    126/71    


 


2/6/21 06:00  85 31 126/71 (89) 86   


 


2/6/21 05:30  86 33 125/73 (90) 87   


 


2/6/21 05:18  86 34     100


 


2/6/21 05:00  85 32 120/69 (86) 90   


 


2/6/21 05:00   32   Mechanical Ventilator  100


 


2/6/21 05:00   32 120/69  Mechanical Ventilator  100


 


2/6/21 05:00    120/69    


 


2/6/21 04:30  86 29 125/76 (92) 85   


 


2/6/21 04:30    125/76    


 


2/6/21 04:00        100


 


2/6/21 04:00      Mechanical Ventilator  


 


2/6/21 04:00   29   Mechanical Ventilator  100


 


2/6/21 04:00   29 121/68  Mechanical Ventilator  100


 


2/6/21 04:00    121/68    


 


2/6/21 04:00 98.4 85 29 121/68 (85) 87   


 


2/6/21 04:00  86      


 


2/6/21 03:30  85 35     100


 


2/6/21 03:30  86 29 121/68 (85) 86   


 


2/6/21 03:00   30   Mechanical Ventilator  100


 


2/6/21 03:00   30 117/69  Mechanical Ventilator  100


 


2/6/21 03:00    117/69    


 


2/6/21 03:00  86 30 117/69 (85) 86   


 


2/6/21 02:34   26 113/86  Mechanical Ventilator  100


 


2/6/21 02:30  86 30 119/66 (83) 87   


 


2/6/21 02:00    121/69    


 


2/6/21 02:00  87 29 121/69 (86) 86   


 


2/6/21 01:30  89 30 149/77 (101) 83   


 


2/6/21 01:30  88 33     100


 


2/6/21 01:30    149/77    


 


2/6/21 01:00  88 29 123/72 (89) 86   


 


2/6/21 01:00   28   Mechanical Ventilator  100


 


2/6/21 01:00   28 123/72  Mechanical Ventilator  100


 


2/6/21 01:00    123/72    


 


2/6/21 00:30  88 28 124/70 (88) 87   


 


2/6/21 00:30    124/72    


 


2/6/21 00:00        100


 


2/6/21 00:00   31   Mechanical Ventilator  100


 


2/6/21 00:00   31 129/73  Mechanical Ventilator  100


 


2/6/21 00:00  84      


 


2/6/21 00:00      Mechanical Ventilator  


 


2/6/21 00:00 98.5 88 31 129/73 (91) 86   


 


2/5/21 23:30  88 35     100


 


2/5/21 23:30  88 30 124/71 (88) 85   


 


2/5/21 23:00   29   Mechanical Ventilator  100


 


2/5/21 23:00   29 129/74  Mechanical Ventilator  100


 


2/5/21 23:00    129/74    


 


2/5/21 23:00  88 29 129/74 (92) 84   


 


2/5/21 22:30  89 28 121/71 (88) 85   


 


2/5/21 22:00   29   Mechanical Ventilator  100


 


2/5/21 22:00   29 130/74  Mechanical Ventilator  100


 


2/5/21 22:00    130/74    


 


2/5/21 22:00  89 29 130/74 (92) 84   


 


2/5/21 21:53   23   Mechanical Ventilator  100


 


2/5/21 21:30  90 35     100


 


2/5/21 21:30  90 28 129/71 (90) 81   


 


2/5/21 21:00   27 125/70  Mechanical Ventilator  100


 


2/5/21 21:00    125/70    


 


2/5/21 21:00  90 27 125/70 (88) 78   


 


2/5/21 20:30  90 27 122/68 (86) 77   


 


2/5/21 20:00      Mechanical Ventilator  


 


2/5/21 20:00  92 27 119/72 (88) 76   


 


2/5/21 20:00   27 119/72  Mechanical Ventilator  100


 


2/5/21 20:00    119/72    


 


2/5/21 20:00  93      


 


2/5/21 20:00        100


 


2/5/21 19:30  92 32     100


 


2/5/21 19:30  92 24 120/70 (87) 77   


 


2/5/21 19:00 98.7 93 23 119/67 (84) 77   


 


2/5/21 18:45  94 23 117/67 (84) 76   


 


2/5/21 18:32   22 121/67  Mechanical Ventilator  100


 


2/5/21 18:30  95 29 121/67 (85) 76   


 


2/5/21 18:15  96 21 116/66 (83) 76   

















Intake and Output  


 


 2/5/21 2/6/21





 19:00 07:00


 


Intake Total 2577.22 ml 1464.60 ml


 


Output Total 395 ml 90 ml


 


Balance 2182.22 ml 1374.60 ml


 


  


 


Free Water 150 ml 


 


IV Total 2007.22 ml 1044.60 ml


 


Tube Feeding 420 ml 420 ml


 


Output Urine Total 15 ml 10 ml


 


Stool Total  0 ml


 


Chest Tube Drainage Total 380 ml 80 ml


 


# Bowel Movements 101 











Laboratory Tests








Test


 2/5/21


18:13 2/5/21


20:20 2/5/21


23:44 2/6/21


09:05


 


POC Whole Blood Glucose


 162 MG/DL


()  H 168 MG/DL


()  H 183 MG/DL


()  H 





 


White Blood Count


 


 


 


 9.9 K/UL


(4.8-10.8)


 


Red Blood Count


 


 


 


 3.00 M/UL


(4.70-6.10)  L


 


Hemoglobin


 


 


 


 8.6 G/DL


(14.2-18.0)  L


 


Hematocrit


 


 


 


 29.2 %


(42.0-52.0)  L


 


Mean Corpuscular Volume    97 FL (80-99)  


 


Mean Corpuscular Hemoglobin


 


 


 


 28.7 PG


(27.0-31.0)


 


Mean Corpuscular Hemoglobin


Concent 


 


 


 29.5 G/DL


(32.0-36.0)  L


 


Red Cell Distribution Width


 


 


 


 20.6 %


(11.6-14.8)  H


 


Platelet Count


 


 


 


 239 K/UL


(150-450)


 


Mean Platelet Volume


 


 


 


 8.6 FL


(6.5-10.1)


 


Neutrophils (%) (Auto)


 


 


 


 % (45.0-75.0)





 


Lymphocytes (%) (Auto)


 


 


 


 % (20.0-45.0)





 


Monocytes (%) (Auto)     % (1.0-10.0)  


 


Eosinophils (%) (Auto)     % (0.0-3.0)  


 


Basophils (%) (Auto)     % (0.0-2.0)  


 


Sodium Level


 


 


 


 139 MMOL/L


(136-145)


 


Potassium Level


 


 


 


 4.7 MMOL/L


(3.5-5.1)


 


Chloride Level


 


 


 


 102 MMOL/L


()


 


Carbon Dioxide Level


 


 


 


 26 MMOL/L


(21-32)


 


Anion Gap


 


 


 


 11 mmol/L


(5-15)


 


Blood Urea Nitrogen


 


 


 


 105 mg/dL


(7-18)  H


 


Creatinine


 


 


 


 4.4 MG/DL


(0.55-1.30)  H


 


Estimat Glomerular Filtration


Rate 


 


 


 13.5 mL/min


(>60)


 


Glucose Level


 


 


 


 143 MG/DL


()  H


 


Calcium Level


 


 


 


 8.0 MG/DL


(8.5-10.1)  L











Microbiology








 Date/Time


Source Procedure


Growth Status





 


 2/4/21 02:30


Urine,Clean Catch Urine Culture - Preliminary


YEAST Resulted








Objective


HEENT: No JVD. Orally intubated


LUNGS:  Have decreased breath sounds with the chest tube on the right side


and subcutaneous emphysema.


CARDIOVASCULAR:  Regular S1, S2 with


no gallop.


ABDOMEN:  Soft.


EXTREMITIES:  A 1+ pitting edema.











Jake George MD                Feb 6, 2021 18:03

## 2021-02-06 NOTE — NUR
NURSE NOTES:

Patient vitals stable at this time.  patient repositioned for comfort.  Towel placed under 
scrotum to elevate due to scrotal edema. Patient meds being titrated to maintain blood 
pressure and sedation. Very small amounts of urine output at this time.  RN will continue to 
monitor.

## 2021-02-06 NOTE — GENERAL PROGRESS NOTE
Subjective


Allergies:  


Coded Allergies:  


     No Known Allergies (Unverified , 6/11/19)


Subjective


recurrent code blue now dnr/dni


on ventilator 60% fio2


on sediation


unresponsive


in icu


rt chest tube s placed due to pneumothorex


afib is controlled


hypotension on levophed drip





Objective





Last 24 Hour Vital Signs








  Date Time  Temp Pulse Resp B/P (MAP) Pulse Ox O2 Delivery O2 Flow Rate FiO2


 


2/6/21 17:10  86 34     100


 


2/6/21 16:00        100


 


2/6/21 16:00      Mechanical Ventilator  


 


2/6/21 15:10  86 34     100


 


2/6/21 15:00    128/71    


 


2/6/21 15:00   30   Mechanical Ventilator  100


 


2/6/21 15:00   30 128/71  Mechanical Ventilator  100


 


2/6/21 14:00    122/69    


 


2/6/21 14:00   30   Mechanical Ventilator  100


 


2/6/21 14:00   30 122/69  Mechanical Ventilator  100


 


2/6/21 13:28  85 34     100


 


2/6/21 13:00    122/71    


 


2/6/21 13:00   30   Mechanical Ventilator  100


 


2/6/21 13:00   30 122/71  Mechanical Ventilator  100


 


2/6/21 13:00  87 24 121/70 (87) 83   


 


2/6/21 12:00      Mechanical Ventilator  


 


2/6/21 12:00        100


 


2/6/21 12:00    106/66    


 


2/6/21 12:00   32   Mechanical Ventilator  100


 


2/6/21 12:00   30 106/66  Mechanical Ventilator  100


 


2/6/21 12:00 98.3 83 32 106/66 (79) 86   


 


2/6/21 11:26  85      


 


2/6/21 11:06  84 34     100


 


2/6/21 11:00    120/70    


 


2/6/21 11:00   31   Mechanical Ventilator  100


 


2/6/21 11:00   31 120/70  Mechanical Ventilator  100


 


2/6/21 11:00  84 32 120/70 (87) 87   


 


2/6/21 10:55   29 110/67  Mechanical Ventilator  100


 


2/6/21 10:30    116/68    


 


2/6/21 10:00  84 30 125/78 (94) 87   


 


2/6/21 10:00   30   Mechanical Ventilator  100


 


2/6/21 10:00   30 124/73  Mechanical Ventilator  100


 


2/6/21 10:00    124/73    


 


2/6/21 09:20  85 34     100


 


2/6/21 09:00   32   Mechanical Ventilator  100


 


2/6/21 09:00   32 129/77  Mechanical Ventilator  100


 


2/6/21 09:00    129/77    


 


2/6/21 09:00  85 32 122/69 (86) 88   


 


2/6/21 08:00 97.8 85 33 121/68 (85) 86   


 


2/6/21 08:00        100


 


2/6/21 08:00   30   Mechanical Ventilator  100


 


2/6/21 08:00   30 122/70  Mechanical Ventilator  100


 


2/6/21 08:00    122/70    


 


2/6/21 08:00      Mechanical Ventilator  


 


2/6/21 07:40  85      


 


2/6/21 07:30  85 33     100


 


2/6/21 07:00  85 28 119/71 (87) 87   


 


2/6/21 07:00   28   Mechanical Ventilator  100


 


2/6/21 07:00   28 119/71  Mechanical Ventilator  100


 


2/6/21 07:00    119/71    


 


2/6/21 06:30  85 30 118/71 (87) 86   


 


2/6/21 06:00   31   Mechanical Ventilator  100


 


2/6/21 06:00   31 126/71  Mechanical Ventilator  100


 


2/6/21 06:00    126/71    


 


2/6/21 06:00  85 31 126/71 (89) 86   


 


2/6/21 05:30  86 33 125/73 (90) 87   


 


2/6/21 05:18  86 34     100


 


2/6/21 05:00  85 32 120/69 (86) 90   


 


2/6/21 05:00   32   Mechanical Ventilator  100


 


2/6/21 05:00   32 120/69  Mechanical Ventilator  100


 


2/6/21 05:00    120/69    


 


2/6/21 04:30  86 29 125/76 (92) 85   


 


2/6/21 04:30    125/76    


 


2/6/21 04:00        100


 


2/6/21 04:00      Mechanical Ventilator  


 


2/6/21 04:00   29   Mechanical Ventilator  100


 


2/6/21 04:00   29 121/68  Mechanical Ventilator  100


 


2/6/21 04:00    121/68    


 


2/6/21 04:00 98.4 85 29 121/68 (85) 87   


 


2/6/21 04:00  86      


 


2/6/21 03:30  85 35     100


 


2/6/21 03:30  86 29 121/68 (85) 86   


 


2/6/21 03:00   30   Mechanical Ventilator  100


 


2/6/21 03:00   30 117/69  Mechanical Ventilator  100


 


2/6/21 03:00    117/69    


 


2/6/21 03:00  86 30 117/69 (85) 86   


 


2/6/21 02:34   26 113/86  Mechanical Ventilator  100


 


2/6/21 02:30  86 30 119/66 (83) 87   


 


2/6/21 02:00    121/69    


 


2/6/21 02:00  87 29 121/69 (86) 86   


 


2/6/21 01:30  89 30 149/77 (101) 83   


 


2/6/21 01:30  88 33     100


 


2/6/21 01:30    149/77    


 


2/6/21 01:00  88 29 123/72 (89) 86   


 


2/6/21 01:00   28   Mechanical Ventilator  100


 


2/6/21 01:00   28 123/72  Mechanical Ventilator  100


 


2/6/21 01:00    123/72    


 


2/6/21 00:30  88 28 124/70 (88) 87   


 


2/6/21 00:30    124/72    


 


2/6/21 00:00        100


 


2/6/21 00:00   31   Mechanical Ventilator  100


 


2/6/21 00:00   31 129/73  Mechanical Ventilator  100


 


2/6/21 00:00  84      


 


2/6/21 00:00      Mechanical Ventilator  


 


2/6/21 00:00 98.5 88 31 129/73 (91) 86   


 


2/5/21 23:30  88 35     100


 


2/5/21 23:30  88 30 124/71 (88) 85   


 


2/5/21 23:00   29   Mechanical Ventilator  100


 


2/5/21 23:00   29 129/74  Mechanical Ventilator  100


 


2/5/21 23:00    129/74    


 


2/5/21 23:00  88 29 129/74 (92) 84   


 


2/5/21 22:30  89 28 121/71 (88) 85   


 


2/5/21 22:00   29   Mechanical Ventilator  100


 


2/5/21 22:00   29 130/74  Mechanical Ventilator  100


 


2/5/21 22:00    130/74    


 


2/5/21 22:00  89 29 130/74 (92) 84   


 


2/5/21 21:53   23   Mechanical Ventilator  100


 


2/5/21 21:30  90 35     100


 


2/5/21 21:30  90 28 129/71 (90) 81   


 


2/5/21 21:00   27 125/70  Mechanical Ventilator  100


 


2/5/21 21:00    125/70    


 


2/5/21 21:00  90 27 125/70 (88) 78   


 


2/5/21 20:30  90 27 122/68 (86) 77   


 


2/5/21 20:00      Mechanical Ventilator  


 


2/5/21 20:00  92 27 119/72 (88) 76   


 


2/5/21 20:00   27 119/72  Mechanical Ventilator  100


 


2/5/21 20:00    119/72    


 


2/5/21 20:00  93      


 


2/5/21 20:00        100


 


2/5/21 19:30  92 32     100


 


2/5/21 19:30  92 24 120/70 (87) 77   


 


2/5/21 19:00 98.7 93 23 119/67 (84) 77   


 


2/5/21 18:45  94 23 117/67 (84) 76   

















Intake and Output  


 


 2/5/21 2/6/21





 19:00 07:00


 


Intake Total 2577.22 ml 1464.60 ml


 


Output Total 395 ml 90 ml


 


Balance 2182.22 ml 1374.60 ml


 


  


 


Free Water 150 ml 


 


IV Total 2007.22 ml 1044.60 ml


 


Tube Feeding 420 ml 420 ml


 


Output Urine Total 15 ml 10 ml


 


Stool Total  0 ml


 


Chest Tube Drainage Total 380 ml 80 ml


 


# Bowel Movements 101 








Laboratory Tests


2/5/21 20:20: POC Whole Blood Glucose 168H


2/5/21 23:44: POC Whole Blood Glucose 183H


2/6/21 09:05: 


White Blood Count 9.9, Red Blood Count 3.00L, Hemoglobin 8.6L, Hematocrit 29.2L,

Mean Corpuscular Volume 97, Mean Corpuscular Hemoglobin 28.7, Mean Corpuscular 

Hemoglobin Concent 29.5L, Red Cell Distribution Width 20.6H, Platelet Count 239,

Mean Platelet Volume 8.6, Neutrophils (%) (Auto) , Lymphocytes (%) (Auto) , 

Monocytes (%) (Auto) , Eosinophils (%) (Auto) , Basophils (%) (Auto) , Sodium 

Level 139, Potassium Level 4.7, Chloride Level 102, Carbon Dioxide Level 26, Ani

on Gap 11, Blood Urea Nitrogen 105H, Creatinine 4.4H, Estimat Glomerular 

Filtration Rate 13.5, Glucose Level 143H, Calcium Level 8.0L


Height (Feet):  5


Height (Inches):  7.00


Weight (Pounds):  198


Neck:  supple


Cardiovascular:  regular rhythm


Respiratory/Chest:  crackles/rales


Abdomen:  non tender, soft





Assessment/Plan


Assessment/Plan:


dnr/dni


hypotension better on tirate down levophed drip


afib with rvr on digoxine , add amiodarone , cardiology on case


covid pna


ac resp distress on ventilator


etoh abused


dehydration


ac renal failure- nephro consult 


severe meta acidosis


cont on ventilator at icu


cont iv abx and iv decadrone


pulmonary and gi on consult





poor prognosis , left message to the son


recomended dnr 


dw icu nurse











Moi Travis MD              Feb 6, 2021 18:37

## 2021-02-06 NOTE — NUR
NURSE NOTES:



Scheduled medication given per MD order, pt tolerated well. No signs of distress noted, 
safety precautions maintained. Will continue to monitor.

## 2021-02-06 NOTE — NUR
NURSE NOTES:

Cleaned pt. Turned & repositioned. Oral care & suctioning done. No active bleedings noted. 
Chest tube is secured. Will continue to monitor.

## 2021-02-06 NOTE — PULMONOLOGY PROGRESS NOTE
Subjective


ROS Limited/Unobtainable:  Yes


Interval Events:  code blue (1/29)


Constitutional:  Reports: fever, other - Uk=444.2


HEENT:  Repors: no symptoms


Respiratory:  Reports: shortness of breath - better


Cardiovascular:  Reports: no symptoms


Gastrointestinal/Abdominal:  Reports: diarrhea


Allergies:  


Coded Allergies:  


     No Known Allergies (Unverified , 6/11/19)


All Systems:  reviewed and negative except above





Objective





Last 24 Hour Vital Signs








  Date Time  Temp Pulse Resp B/P (MAP) Pulse Ox O2 Delivery O2 Flow Rate FiO2


 


2/6/21 13:28  85 34     100


 


2/6/21 13:00  87 24 121/70 (87) 83   


 


2/6/21 12:00      Mechanical Ventilator  


 


2/6/21 12:00        100


 


2/6/21 12:00 98.3 83 32 106/66 (79) 86   


 


2/6/21 11:26  85      


 


2/6/21 11:06  84 34     100


 


2/6/21 11:00  84 32 120/70 (87) 87   


 


2/6/21 10:55   29 110/67  Mechanical Ventilator  100


 


2/6/21 10:30    116/68    


 


2/6/21 10:00  84 30 125/78 (94) 87   


 


2/6/21 09:20  85 34     100


 


2/6/21 09:00  85 32 122/69 (86) 88   


 


2/6/21 08:00 97.8 85 33 121/68 (85) 86   


 


2/6/21 08:00        100


 


2/6/21 08:00      Mechanical Ventilator  


 


2/6/21 07:40  85      


 


2/6/21 07:30  85 33     100


 


2/6/21 07:00  85 28 119/71 (87) 87   


 


2/6/21 07:00   28   Mechanical Ventilator  100


 


2/6/21 07:00   28 119/71  Mechanical Ventilator  100


 


2/6/21 07:00    119/71    


 


2/6/21 06:30  85 30 118/71 (87) 86   


 


2/6/21 06:00   31   Mechanical Ventilator  100


 


2/6/21 06:00   31 126/71  Mechanical Ventilator  100


 


2/6/21 06:00    126/71    


 


2/6/21 06:00  85 31 126/71 (89) 86   


 


2/6/21 05:30  86 33 125/73 (90) 87   


 


2/6/21 05:18  86 34     100


 


2/6/21 05:00  85 32 120/69 (86) 90   


 


2/6/21 05:00   32   Mechanical Ventilator  100


 


2/6/21 05:00   32 120/69  Mechanical Ventilator  100


 


2/6/21 05:00    120/69    


 


2/6/21 04:30  86 29 125/76 (92) 85   


 


2/6/21 04:30    125/76    


 


2/6/21 04:00        100


 


2/6/21 04:00      Mechanical Ventilator  


 


2/6/21 04:00   29   Mechanical Ventilator  100


 


2/6/21 04:00   29 121/68  Mechanical Ventilator  100


 


2/6/21 04:00    121/68    


 


2/6/21 04:00 98.4 85 29 121/68 (85) 87   


 


2/6/21 04:00  86      


 


2/6/21 03:30  85 35     100


 


2/6/21 03:30  86 29 121/68 (85) 86   


 


2/6/21 03:00   30   Mechanical Ventilator  100


 


2/6/21 03:00   30 117/69  Mechanical Ventilator  100


 


2/6/21 03:00    117/69    


 


2/6/21 03:00  86 30 117/69 (85) 86   


 


2/6/21 02:34   26 113/86  Mechanical Ventilator  100


 


2/6/21 02:30  86 30 119/66 (83) 87   


 


2/6/21 02:00    121/69    


 


2/6/21 02:00  87 29 121/69 (86) 86   


 


2/6/21 01:30  89 30 149/77 (101) 83   


 


2/6/21 01:30  88 33     100


 


2/6/21 01:30    149/77    


 


2/6/21 01:00  88 29 123/72 (89) 86   


 


2/6/21 01:00   28   Mechanical Ventilator  100


 


2/6/21 01:00   28 123/72  Mechanical Ventilator  100


 


2/6/21 01:00    123/72    


 


2/6/21 00:30  88 28 124/70 (88) 87   


 


2/6/21 00:30    124/72    


 


2/6/21 00:00        100


 


2/6/21 00:00   31   Mechanical Ventilator  100


 


2/6/21 00:00   31 129/73  Mechanical Ventilator  100


 


2/6/21 00:00  84      


 


2/6/21 00:00      Mechanical Ventilator  


 


2/6/21 00:00 98.5 88 31 129/73 (91) 86   


 


2/5/21 23:30  88 35     100


 


2/5/21 23:30  88 30 124/71 (88) 85   


 


2/5/21 23:00   29   Mechanical Ventilator  100


 


2/5/21 23:00   29 129/74  Mechanical Ventilator  100


 


2/5/21 23:00    129/74    


 


2/5/21 23:00  88 29 129/74 (92) 84   


 


2/5/21 22:30  89 28 121/71 (88) 85   


 


2/5/21 22:00   29   Mechanical Ventilator  100


 


2/5/21 22:00   29 130/74  Mechanical Ventilator  100


 


2/5/21 22:00    130/74    


 


2/5/21 22:00  89 29 130/74 (92) 84   


 


2/5/21 21:53   23   Mechanical Ventilator  100


 


2/5/21 21:30  90 35     100


 


2/5/21 21:30  90 28 129/71 (90) 81   


 


2/5/21 21:00   27 125/70  Mechanical Ventilator  100


 


2/5/21 21:00    125/70    


 


2/5/21 21:00  90 27 125/70 (88) 78   


 


2/5/21 20:30  90 27 122/68 (86) 77   


 


2/5/21 20:00      Mechanical Ventilator  


 


2/5/21 20:00  92 27 119/72 (88) 76   


 


2/5/21 20:00   27 119/72  Mechanical Ventilator  100


 


2/5/21 20:00    119/72    


 


2/5/21 20:00  93      


 


2/5/21 20:00        100


 


2/5/21 19:30  92 32     100


 


2/5/21 19:30  92 24 120/70 (87) 77   


 


2/5/21 19:00 98.7 93 23 119/67 (84) 77   


 


2/5/21 18:45  94 23 117/67 (84) 76   


 


2/5/21 18:32   22 121/67  Mechanical Ventilator  100


 


2/5/21 18:30  95 29 121/67 (85) 76   


 


2/5/21 18:15  96 21 116/66 (83) 76   


 


2/5/21 18:00  97 22 125/72 (89) 77   


 


2/5/21 17:45  93 26 106/61 (76) 80   


 


2/5/21 17:30  95 22 116/69 (85) 78   


 


2/5/21 17:15  93 25 121/71 (88) 77   


 


2/5/21 17:00  93 32 114/69 (84) 74   


 


2/5/21 16:45  93 34 116/68 (84) 75   


 


2/5/21 16:30  94 33 117/68 (84) 75   


 


2/5/21 16:15  91 32 103/61 (75) 76   


 


2/5/21 16:00      Mechanical Ventilator  


 


2/5/21 16:00  93      


 


2/5/21 16:00 98.2 93 33 112/68 (83) 79   


 


2/5/21 16:00        100


 


2/5/21 15:45  93 33 116/68 (84) 80   

















Intake and Output  


 


 2/5/21 2/6/21





 19:00 07:00


 


Intake Total 2577.22 ml 1464.60 ml


 


Output Total 395 ml 90 ml


 


Balance 2182.22 ml 1374.60 ml


 


  


 


Free Water 150 ml 


 


IV Total 2007.22 ml 1044.60 ml


 


Tube Feeding 420 ml 420 ml


 


Output Urine Total 15 ml 10 ml


 


Stool Total  0 ml


 


Chest Tube Drainage Total 380 ml 80 ml


 


# Bowel Movements 101 








Objective


On Versed drip


General Appearance:  no acute distress


HEENT:  atraumatic


Respiratory:  lungs clear, decreased breath sounds


Cardiovascular:  normal rate, regular rhythm


Abdomen:  soft, non tender, no organomegaly





Microbiology








 Date/Time


Source Procedure


Growth Status





 


 2/4/21 02:30


Urine,Clean Catch Urine Culture - Preliminary


YEAST Resulted








Laboratory Tests


2/5/21 18:13: POC Whole Blood Glucose 162H


2/5/21 20:20: POC Whole Blood Glucose 168H


2/5/21 23:44: POC Whole Blood Glucose 183H


2/6/21 09:05: 


White Blood Count 9.9, Red Blood Count 3.00L, Hemoglobin 8.6L, Hematocrit 29.2L,

Mean Corpuscular Volume 97, Mean Corpuscular Hemoglobin 28.7, Mean Corpuscular 

Hemoglobin Concent 29.5L, Red Cell Distribution Width 20.6H, Platelet Count 239,

Mean Platelet Volume 8.6, Neutrophils (%) (Auto) , Lymphocytes (%) (Auto) , 

Monocytes (%) (Auto) , Eosinophils (%) (Auto) , Basophils (%) (Auto) , Sodium 

Level 139, Potassium Level 4.7, Chloride Level 102, Carbon Dioxide Level 26, 

Anion Gap 11, Blood Urea Nitrogen 105H, Creatinine 4.4H, Estimat Glomerular 

Filtration Rate 13.5, Glucose Level 143H, Calcium Level 8.0L





Current Medications








 Medications


  (Trade)  Dose


 Ordered  Sig/Julisa


 Route


 PRN Reason  Start Time


 Stop Time Status Last Admin


Dose Admin


 


 Acetaminophen


  (Tylenol)  650 mg  Q4H  PRN


 GT


 Temp >100.5  1/15/21 17:15


 2/14/21 17:14  1/29/21 06:59





 


 Amiodarone HCl


  (Cordarone)  200 mg  DAILY


 NG


   1/26/21 09:00


 4/19/21 20:59  2/6/21 08:56





 


 Chlorhexidine


 Gluconate


  (Vanessa-Hex 2%)  1 applic  DAILY@2000


 TOPIC


   1/19/21 20:00


 4/19/21 19:59  2/5/21 20:12





 


 Dextrose


  (Dextrose 50%)  25 ml  Q30M  PRN


 IV


 Hypoglycemia  1/9/21 13:30


 4/9/21 13:29   





 


 Dextrose


  (Dextrose 50%)  50 ml  Q30M  PRN


 IV


 Hypoglycemia  1/9/21 13:30


 4/9/21 13:29   





 


 Docusate Sodium


  (Colace)  100 mg  TIDPRN  PRN


 GT


 Constipation  1/12/21 01:15


 2/11/21 01:14  1/12/21 01:42





 


 Enoxaparin Sodium


  (Lovenox)  80 mg  DAILY


 SUBQ


   2/4/21 09:00


 5/5/21 08:59  2/6/21 08:57





 


 Fentanyl Citrate  250 ml @ 0


 mls/hr  Q24H


 IV


   2/5/21 18:22


 2/7/21 18:21  2/6/21 10:55





 


 Fluconazole


  (Diflucan)  100 mg  DAILY


 GT


   2/6/21 12:45


 2/13/21 12:44  2/6/21 13:31





 


 Hydrocortisone


  (Solu-CORTEF)  100 mg  EVERY 8  HOURS


 IV


   2/1/21 15:45


 5/2/21 15:44  2/6/21 13:31





 


 Insulin Aspart


  (NovoLOG)    Q6HR


 SUBQ


   1/14/21 12:00


 4/9/21 17:59  2/6/21 12:34





 


 Insulin Detemir


  (Levemir)  26 units  Q12HR


 SUBQ


   2/2/21 09:00


 4/12/21 08:59  2/6/21 09:32





 


 Midazolam HCl 50


 mg/Sodium Chloride  100 ml @ 0


 mls/hr  Q24H  PRN


 IV


 AS DIRECTED  2/5/21 19:52


 2/7/21 19:51  2/5/21 21:53





 


 Midodrine


  (Pro-Amatine)  10 mg  Q8HR


 ORAL


   2/1/21 15:30


 5/2/21 15:29  2/6/21 05:15





 


 Norepinephrine


 Bitartrate 16 mg/


 Sodium Chloride  500 ml @ 0


 mls/hr  Q24H


 IV


   2/6/21 10:30


 2/9/21 10:29  2/6/21 10:30





 


 Pantoprazole


  (Protonix)  40 mg  Q12HR


 IVP


   2/1/21 21:00


 2/13/21 08:59  2/6/21 08:56





 


 Quetiapine


 Fumarate


  (SEROqueL)  50 mg  Q12HR


 ORAL


   2/4/21 11:45


 3/21/21 11:44  2/5/21 20:13





 


 Sodium Chloride  500 ml @ 


 999 mls/hr  Q31M PRN


 IV


 For hypotension  1/15/21 18:30


 2/14/21 18:29   





 


 Sodium Chloride  1,000 ml @ 


 30 mls/hr  Q24H


 IV


   2/1/21 15:30


 3/3/21 15:29  2/5/21 19:45














Assessment/Plan


Assessment/Plan


1. COVID-19 pneumonia.


   - s/p Decadron, azithromycin, and ceftriaxone


   - Intubated; on PC ventilation


          -Currently 100% FiO2. PEEP 14; SaO2 mid 80's%


            - Has R apical PTX and subcut emphysema


             - S/p R CT placement





2. Diabetes mellitus.; 


3. Elevated inflammatory markers.


4. High D-dimer. On full dose Lovenox


5. Hypernatremia; will continue D5W


6. Hyperglycemia.


   - BG control


7. Hypoxemia., secondary to #1; improved





DVT prophylaxis   On Lovenox.


Sedated; advised RN to increase sedation





Hypotensive


On levophed


Continue PEEP 12; PC 25; rate 32


Has hypercapnia, acidemia


Poor prognosis


Sedated, 


FiO2 100%











Sung Lemos MD            Feb 6, 2021 15:39

## 2021-02-06 NOTE — NUR
NURSE NOTES:

Received report on patient from dayshift RN Rosalee Aguilar.  Patient resting.  Patient vitals 
stable at this time.  Patient has BRANDI and LEvophed infusing to maintain vitals.  Patient has 
Fentanyl and Versed infusing for sedation.  Patient is on a mechanical ventilator with 
settings of Assist control 32, Tidal volume 600 PEEP of 14 and pressure support of 25.  
Patient 02 sats at 81%.  Patient has a cash, Gtube, chest tube, and retal tube.  Rn will 
assess patient and needs.

## 2021-02-06 NOTE — NUR
NURSE NOTES:



Received bedside report from SUZI Herrera. Pt's O2 sat 85-88%, MD aware, other VS stable. Pt is 
sedated, does not open eyes, does not follow commands. Pt NSR with BBB on the cardiac 
monitor, HR between 80-90. Pt intubated, ETT 7.5 at 27 cm lip line, vent settings: A/C 32, 
P/S 25, T/V 600, Peep 14, FiO2 100%, O2 sat between 85-88%, respirations even and unlabored. 
Pt has OGT, Nepro running at 35 mL/hr. Pt has rectal tube, brown liquid stool noted, 
draining well. Pt has cash catheter, 10mL dark myrna urine noted, pt is oliguric, MD aware. 
Pt has Right Chest Tube, no signs of infection or complication noted from site, suction 
noted in pleura-vac container, 10mL serousanginous drainage noted. Pt's skin alterations 
noted in chart and protected with optifoam. Pt has Left Upper Arm PICC, no signs of 
infection or complication noted, with the following drips infusing: Fentanyl at 300mcg/hr, 
Versed 3mg/hr, Levophed 8mcg, 1/2 NS 30mL/hr. Head of bed at 30 degrees. Bed locked and in 
lowest position, bed rails up. Safety precautions maintained. Will continue to monitor.

## 2021-02-06 NOTE — SURGERY PROGRESS NOTE
Surgery Progress Note


Subjective


Procedure Performed


Right tube thoracostomy chest tube insertion


Additional Comments


ill appearing


on support


desaturating


on vent


chest tube stable functional





Objective





Last 24 Hour Vital Signs








  Date Time  Temp Pulse Resp B/P (MAP) Pulse Ox O2 Delivery O2 Flow Rate FiO2


 


2/6/21 13:28  85 34     100


 


2/6/21 13:00  87 24 121/70 (87) 83   


 


2/6/21 12:00      Mechanical Ventilator  


 


2/6/21 12:00        100


 


2/6/21 12:00 98.3 83 32 106/66 (79) 86   


 


2/6/21 11:26  85      


 


2/6/21 11:06  84 34     100


 


2/6/21 11:00  84 32 120/70 (87) 87   


 


2/6/21 10:55   29 110/67  Mechanical Ventilator  100


 


2/6/21 10:30    116/68    


 


2/6/21 10:00  84 30 125/78 (94) 87   


 


2/6/21 09:20  85 34     100


 


2/6/21 09:00  85 32 122/69 (86) 88   


 


2/6/21 08:00 97.8 85 33 121/68 (85) 86   


 


2/6/21 08:00        100


 


2/6/21 08:00      Mechanical Ventilator  


 


2/6/21 07:40  85      


 


2/6/21 07:30  85 33     100


 


2/6/21 07:00  85 28 119/71 (87) 87   


 


2/6/21 07:00   28   Mechanical Ventilator  100


 


2/6/21 07:00   28 119/71  Mechanical Ventilator  100


 


2/6/21 07:00    119/71    


 


2/6/21 06:30  85 30 118/71 (87) 86   


 


2/6/21 06:00   31   Mechanical Ventilator  100


 


2/6/21 06:00   31 126/71  Mechanical Ventilator  100


 


2/6/21 06:00    126/71    


 


2/6/21 06:00  85 31 126/71 (89) 86   


 


2/6/21 05:30  86 33 125/73 (90) 87   


 


2/6/21 05:18  86 34     100


 


2/6/21 05:00  85 32 120/69 (86) 90   


 


2/6/21 05:00   32   Mechanical Ventilator  100


 


2/6/21 05:00   32 120/69  Mechanical Ventilator  100


 


2/6/21 05:00    120/69    


 


2/6/21 04:30  86 29 125/76 (92) 85   


 


2/6/21 04:30    125/76    


 


2/6/21 04:00        100


 


2/6/21 04:00      Mechanical Ventilator  


 


2/6/21 04:00   29   Mechanical Ventilator  100


 


2/6/21 04:00   29 121/68  Mechanical Ventilator  100


 


2/6/21 04:00    121/68    


 


2/6/21 04:00 98.4 85 29 121/68 (85) 87   


 


2/6/21 04:00  86      


 


2/6/21 03:30  85 35     100


 


2/6/21 03:30  86 29 121/68 (85) 86   


 


2/6/21 03:00   30   Mechanical Ventilator  100


 


2/6/21 03:00   30 117/69  Mechanical Ventilator  100


 


2/6/21 03:00    117/69    


 


2/6/21 03:00  86 30 117/69 (85) 86   


 


2/6/21 02:34   26 113/86  Mechanical Ventilator  100


 


2/6/21 02:30  86 30 119/66 (83) 87   


 


2/6/21 02:00    121/69    


 


2/6/21 02:00  87 29 121/69 (86) 86   


 


2/6/21 01:30  89 30 149/77 (101) 83   


 


2/6/21 01:30  88 33     100


 


2/6/21 01:30    149/77    


 


2/6/21 01:00  88 29 123/72 (89) 86   


 


2/6/21 01:00   28   Mechanical Ventilator  100


 


2/6/21 01:00   28 123/72  Mechanical Ventilator  100


 


2/6/21 01:00    123/72    


 


2/6/21 00:30  88 28 124/70 (88) 87   


 


2/6/21 00:30    124/72    


 


2/6/21 00:00        100


 


2/6/21 00:00   31   Mechanical Ventilator  100


 


2/6/21 00:00   31 129/73  Mechanical Ventilator  100


 


2/6/21 00:00  84      


 


2/6/21 00:00      Mechanical Ventilator  


 


2/6/21 00:00 98.5 88 31 129/73 (91) 86   


 


2/5/21 23:30  88 35     100


 


2/5/21 23:30  88 30 124/71 (88) 85   


 


2/5/21 23:00   29   Mechanical Ventilator  100


 


2/5/21 23:00   29 129/74  Mechanical Ventilator  100


 


2/5/21 23:00    129/74    


 


2/5/21 23:00  88 29 129/74 (92) 84   


 


2/5/21 22:30  89 28 121/71 (88) 85   


 


2/5/21 22:00   29   Mechanical Ventilator  100


 


2/5/21 22:00   29 130/74  Mechanical Ventilator  100


 


2/5/21 22:00    130/74    


 


2/5/21 22:00  89 29 130/74 (92) 84   


 


2/5/21 21:53   23   Mechanical Ventilator  100


 


2/5/21 21:30  90 35     100


 


2/5/21 21:30  90 28 129/71 (90) 81   


 


2/5/21 21:00   27 125/70  Mechanical Ventilator  100


 


2/5/21 21:00    125/70    


 


2/5/21 21:00  90 27 125/70 (88) 78   


 


2/5/21 20:30  90 27 122/68 (86) 77   


 


2/5/21 20:00      Mechanical Ventilator  


 


2/5/21 20:00  92 27 119/72 (88) 76   


 


2/5/21 20:00   27 119/72  Mechanical Ventilator  100


 


2/5/21 20:00    119/72    


 


2/5/21 20:00  93      


 


2/5/21 20:00        100


 


2/5/21 19:30  92 32     100


 


2/5/21 19:30  92 24 120/70 (87) 77   


 


2/5/21 19:00 98.7 93 23 119/67 (84) 77   


 


2/5/21 18:45  94 23 117/67 (84) 76   


 


2/5/21 18:32   22 121/67  Mechanical Ventilator  100


 


2/5/21 18:30  95 29 121/67 (85) 76   


 


2/5/21 18:15  96 21 116/66 (83) 76   


 


2/5/21 18:00  97 22 125/72 (89) 77   


 


2/5/21 17:45  93 26 106/61 (76) 80   


 


2/5/21 17:30  95 22 116/69 (85) 78   


 


2/5/21 17:15  93 25 121/71 (88) 77   


 


2/5/21 17:00  93 32 114/69 (84) 74   


 


2/5/21 16:45  93 34 116/68 (84) 75   


 


2/5/21 16:30  94 33 117/68 (84) 75   


 


2/5/21 16:15  91 32 103/61 (75) 76   


 


2/5/21 16:00      Mechanical Ventilator  


 


2/5/21 16:00  93      


 


2/5/21 16:00 98.2 93 33 112/68 (83) 79   


 


2/5/21 16:00        100


 


2/5/21 15:45  93 33 116/68 (84) 80   








I&O











Intake and Output  


 


 2/5/21 2/6/21





 19:00 07:00


 


Intake Total 2577.22 ml 1464.60 ml


 


Output Total 395 ml 90 ml


 


Balance 2182.22 ml 1374.60 ml


 


  


 


Free Water 150 ml 


 


IV Total 2007.22 ml 1044.60 ml


 


Tube Feeding 420 ml 420 ml


 


Output Urine Total 15 ml 10 ml


 


Stool Total  0 ml


 


Chest Tube Drainage Total 380 ml 80 ml


 


# Bowel Movements 101 








Dressing:  other


Wound:  other


Drains:  other


Cardiovascular:  RSR


Respiratory:  decreased breath sounds


Abdomen:  soft, non-tender, decreased bowel sounds


Extremities:  edema, no tenderness, no cyanosis





Laboratory Tests








Test


 2/5/21


18:13 2/5/21


20:20 2/5/21


23:44 2/6/21


09:05


 


POC Whole Blood Glucose


 162 MG/DL


()  H 168 MG/DL


()  H 183 MG/DL


()  H 





 


White Blood Count


 


 


 


 9.9 K/UL


(4.8-10.8)


 


Red Blood Count


 


 


 


 3.00 M/UL


(4.70-6.10)  L


 


Hemoglobin


 


 


 


 8.6 G/DL


(14.2-18.0)  L


 


Hematocrit


 


 


 


 29.2 %


(42.0-52.0)  L


 


Mean Corpuscular Volume    97 FL (80-99)  


 


Mean Corpuscular Hemoglobin


 


 


 


 28.7 PG


(27.0-31.0)


 


Mean Corpuscular Hemoglobin


Concent 


 


 


 29.5 G/DL


(32.0-36.0)  L


 


Red Cell Distribution Width


 


 


 


 20.6 %


(11.6-14.8)  H


 


Platelet Count


 


 


 


 239 K/UL


(150-450)


 


Mean Platelet Volume


 


 


 


 8.6 FL


(6.5-10.1)


 


Neutrophils (%) (Auto)


 


 


 


 % (45.0-75.0)





 


Lymphocytes (%) (Auto)


 


 


 


 % (20.0-45.0)





 


Monocytes (%) (Auto)     % (1.0-10.0)  


 


Eosinophils (%) (Auto)     % (0.0-3.0)  


 


Basophils (%) (Auto)     % (0.0-2.0)  


 


Sodium Level


 


 


 


 139 MMOL/L


(136-145)


 


Potassium Level


 


 


 


 4.7 MMOL/L


(3.5-5.1)


 


Chloride Level


 


 


 


 102 MMOL/L


()


 


Carbon Dioxide Level


 


 


 


 26 MMOL/L


(21-32)


 


Anion Gap


 


 


 


 11 mmol/L


(5-15)


 


Blood Urea Nitrogen


 


 


 


 105 mg/dL


(7-18)  H


 


Creatinine


 


 


 


 4.4 MG/DL


(0.55-1.30)  H


 


Estimat Glomerular Filtration


Rate 


 


 


 13.5 mL/min


(>60)


 


Glucose Level


 


 


 


 143 MG/DL


()  H


 


Calcium Level


 


 


 


 8.0 MG/DL


(8.5-10.1)  L











Plan


Problems:  


(1) Pneumonia due to COVID-19 virus


Assessment & Plan:  Interim endotracheal intubation, endotracheal tube tip in 

good position


approximately 4 cm above the tito. There are extensive bilateral infiltrates, 

which


are markedly increased from the prior study. Pleural spaces are probably clear, 

not


well demonstrated


Markedly increased and now extensive bilateral infiltrates 


cont as per pulm and iID





(2) Hypoxia


Assessment & Plan:  patient developing DTI. in current condition, critically ill

and declining potential inevitable decline 


all care precautions taken and will cont to monitor and assist with improvement 





(3) Abdominal pain


Assessment & Plan:  65M abd pain, septic, covid, intubated ons upport


currently abd exam benign but limited given condition


line bo mary noted


okay for meds and feeds


nutritional optimization 


DAILY ESTIMATED NEEDS:


Needs based on Critical Care/ 72kg abw 


22-28  kcals/kg 


7529-9769  total kcals


1.2-2  g protein/kg


  g total protein 


25-30  mL/kg


3030-9397  total fluid mLs





NUTRITION DIAGNOSIS:


Swallowing difficulty R/T respiratory failure as evidenced by pt now


orally intubated, OGT in place, NPO





 


CURRENT TF:NPO 





 





ENTERAL NUTRITION RECOMMENDATIONS:


Vital AF 1.2 @ 60ml/hr x 24 hrs  to provide 1440ml, 1728kcal, 116g prot, 1167ml 

free water 





* As medically appropriate, initiate critical care and carb controlled TF 

formula of Vital AF 1.2


* Initiate @ 20ml/hr x 6hrs, advance 10ml q 4-6 hrs as tolerated to goal rate


* HOB over 30 degrees/ water flush per MD


* Does not exceed est kcal needs w/ Propofol running @ 8.083ml/hr x 24 hrs 

(provides 213 lipid kcal)





 





ADDITIONAL RECOMMENDATIONS:


* Calibrated bedscale wt 


* Monitor BGs closely w/ Decadron and TF, need for long acting insulin 


* Monitor lytes, replete as needed  


* Monitor Propofol rate, need to adjust TF rate  





(4) Acute metabolic encephalopathy


(5) Pneumothorax on right


Assessment & Plan:  right ptx


chest tube placed


decreased air


stable ptx


cont tube to suction


Endotracheal tube tip projects at the level of the thoracic inlet.


Orogastric tube tip projects at the level of the gastric fundus. Again 

demonstrated


is extensive subcutaneous emphysema, which appears somewhat worse on the left. 

Right


chest tube remains in place. Small right medial and apical pneumothorax are 

present,


slightly more conspicuous than on the previous exam, still small, somewhat 

difficult


to identify due to overlying subcutaneous emphysema. There is probably a tiny 

left


apical pneumothorax as well. Previously demonstrated pneumomediastinum has 

improved


considerably although still present. Bilateral infiltrates appear worse on the 

right.


 


Impression: Equivocally slightly increased but still small right pneumothorax.


 


Suspect tiny left apical pneumothorax


 


Improving pneumomediastinum


 


Worsening subcutaneous emphysema


 


Worsening right and stable left lung infiltrates 





 Right chest tube remains. There is still a small apical pneumothorax. Tiny


left apical pneumothorax persists, unchanged. There is decreased 

pneumomediastinum.


Subcutaneous gas has decreased as well. Bilateral infiltrates are unchanged.


Endotracheal tube remains at the level of the thoracic inlet. Orogastric tube is


again demonstrated, tip not well-visualized but probably stable in position


 


Impression: Stable tiny bilateral apical pneumothoraces


 


Unchanged bilateral infiltrates


 


Decreased pneumomediastinum and subcutaneous emphysema














Norberto Vazquez                 Feb 6, 2021 15:42

## 2021-02-06 NOTE — NUR
NURSE NOTES:



Scheduled medications given per MD order, pt tolerated well. Pt's gown and bed linens 
changed, oral care provided. Safety precautions maintained. Will continue to monitor.

## 2021-02-06 NOTE — NUR
NURSE NOTES:



Pt's OGT placement checked, tip of tube in stomach, no residual noted. Pt's scheduled 
seroquel not given due to pt is already sedated, other scheduled medications given, pt 
tolerated well. Will continue to monitor.

## 2021-02-06 NOTE — NUR
NURSE HAND-OFF REPORT: 



Latest Vital Signs: Temperature 98.5 , Pulse 86 , B/P 100 /61 , Respiratory Rate 35 , O2 SAT 
83 , Mechanical Ventilator, 100% FiO2  .  

Vital Sign Comment: 



EKG Rhythm: Sinus Rhythm

Rhythm change?: BRIGHT GUTIERREZ Notified?: 

MD Response: 



Latest Singh Fall Score: 50  

Fall Risk: High Risk 

Safety Measures: Call light Within Reach, Bed Alarm Zone 1, Side Rails Side Rails x3, Bed 
position Low and Locked.

Fall Precautions: 

Patient Fall Education



Report given to SUZI Pardo for continuity of care. Pt stable..

-------------------------------------------------------------------------------

Addendum: 02/06/21 at 1955 by Sonia Kennedy RN

-------------------------------------------------------------------------------

NURSE HAND-OFF REPORT: 





Latest Vital Signs: Temperature 98.5 , Pulse 86 , B/P 100 /61 , Respiratory Rate 35 , O2 SAT 
83 , Mechanical Ventilator, 100% FiO2  .  

Vital Sign Comment: 



EKG Rhythm: Sinus Rhythm

Rhythm change?: BRIGHT GUTIERREZ Notified?: 

MD Response: 



Latest Singh Fall Score: 50  

Fall Risk: High Risk 

Safety Measures: Call light Within Reach, Bed Alarm Zone 1, Side Rails Side Rails x3, Bed 
position Low and Locked.

Fall Precautions: 

Patient Fall Education



Report given to SUZI Pardo for continuity of care. Pt anirudh..

## 2021-02-07 VITALS — SYSTOLIC BLOOD PRESSURE: 89 MMHG | DIASTOLIC BLOOD PRESSURE: 55 MMHG

## 2021-02-07 VITALS — DIASTOLIC BLOOD PRESSURE: 51 MMHG | SYSTOLIC BLOOD PRESSURE: 79 MMHG

## 2021-02-07 VITALS — DIASTOLIC BLOOD PRESSURE: 56 MMHG | SYSTOLIC BLOOD PRESSURE: 93 MMHG

## 2021-02-07 VITALS — DIASTOLIC BLOOD PRESSURE: 62 MMHG | SYSTOLIC BLOOD PRESSURE: 101 MMHG

## 2021-02-07 VITALS — SYSTOLIC BLOOD PRESSURE: 103 MMHG | DIASTOLIC BLOOD PRESSURE: 64 MMHG

## 2021-02-07 VITALS — DIASTOLIC BLOOD PRESSURE: 52 MMHG | SYSTOLIC BLOOD PRESSURE: 75 MMHG

## 2021-02-07 VITALS — SYSTOLIC BLOOD PRESSURE: 105 MMHG | DIASTOLIC BLOOD PRESSURE: 61 MMHG

## 2021-02-07 VITALS — SYSTOLIC BLOOD PRESSURE: 79 MMHG | DIASTOLIC BLOOD PRESSURE: 52 MMHG

## 2021-02-07 VITALS — DIASTOLIC BLOOD PRESSURE: 63 MMHG | SYSTOLIC BLOOD PRESSURE: 105 MMHG

## 2021-02-07 VITALS — DIASTOLIC BLOOD PRESSURE: 60 MMHG | SYSTOLIC BLOOD PRESSURE: 95 MMHG

## 2021-02-07 VITALS — SYSTOLIC BLOOD PRESSURE: 76 MMHG | DIASTOLIC BLOOD PRESSURE: 52 MMHG

## 2021-02-07 VITALS — DIASTOLIC BLOOD PRESSURE: 56 MMHG | SYSTOLIC BLOOD PRESSURE: 80 MMHG

## 2021-02-07 VITALS — SYSTOLIC BLOOD PRESSURE: 85 MMHG | DIASTOLIC BLOOD PRESSURE: 54 MMHG

## 2021-02-07 VITALS — SYSTOLIC BLOOD PRESSURE: 73 MMHG | DIASTOLIC BLOOD PRESSURE: 49 MMHG

## 2021-02-07 VITALS — SYSTOLIC BLOOD PRESSURE: 101 MMHG | DIASTOLIC BLOOD PRESSURE: 61 MMHG

## 2021-02-07 VITALS — DIASTOLIC BLOOD PRESSURE: 58 MMHG | SYSTOLIC BLOOD PRESSURE: 76 MMHG

## 2021-02-07 VITALS — SYSTOLIC BLOOD PRESSURE: 102 MMHG | DIASTOLIC BLOOD PRESSURE: 61 MMHG

## 2021-02-07 VITALS — SYSTOLIC BLOOD PRESSURE: 77 MMHG | DIASTOLIC BLOOD PRESSURE: 53 MMHG

## 2021-02-07 VITALS — DIASTOLIC BLOOD PRESSURE: 51 MMHG | SYSTOLIC BLOOD PRESSURE: 78 MMHG

## 2021-02-07 VITALS — DIASTOLIC BLOOD PRESSURE: 53 MMHG | SYSTOLIC BLOOD PRESSURE: 77 MMHG

## 2021-02-07 VITALS — DIASTOLIC BLOOD PRESSURE: 52 MMHG | SYSTOLIC BLOOD PRESSURE: 84 MMHG

## 2021-02-07 VITALS — SYSTOLIC BLOOD PRESSURE: 75 MMHG | DIASTOLIC BLOOD PRESSURE: 53 MMHG

## 2021-02-07 VITALS — SYSTOLIC BLOOD PRESSURE: 69 MMHG | DIASTOLIC BLOOD PRESSURE: 51 MMHG

## 2021-02-07 VITALS — DIASTOLIC BLOOD PRESSURE: 52 MMHG | SYSTOLIC BLOOD PRESSURE: 83 MMHG

## 2021-02-07 VITALS — SYSTOLIC BLOOD PRESSURE: 74 MMHG | DIASTOLIC BLOOD PRESSURE: 51 MMHG

## 2021-02-07 VITALS — DIASTOLIC BLOOD PRESSURE: 52 MMHG | SYSTOLIC BLOOD PRESSURE: 89 MMHG

## 2021-02-07 VITALS — DIASTOLIC BLOOD PRESSURE: 59 MMHG | SYSTOLIC BLOOD PRESSURE: 95 MMHG

## 2021-02-07 VITALS — DIASTOLIC BLOOD PRESSURE: 58 MMHG | SYSTOLIC BLOOD PRESSURE: 99 MMHG

## 2021-02-07 VITALS — DIASTOLIC BLOOD PRESSURE: 51 MMHG | SYSTOLIC BLOOD PRESSURE: 73 MMHG

## 2021-02-07 VITALS — DIASTOLIC BLOOD PRESSURE: 49 MMHG | SYSTOLIC BLOOD PRESSURE: 83 MMHG

## 2021-02-07 VITALS — DIASTOLIC BLOOD PRESSURE: 53 MMHG | SYSTOLIC BLOOD PRESSURE: 78 MMHG

## 2021-02-07 VITALS — DIASTOLIC BLOOD PRESSURE: 53 MMHG | SYSTOLIC BLOOD PRESSURE: 75 MMHG

## 2021-02-07 VITALS — DIASTOLIC BLOOD PRESSURE: 63 MMHG | SYSTOLIC BLOOD PRESSURE: 102 MMHG

## 2021-02-07 VITALS — DIASTOLIC BLOOD PRESSURE: 61 MMHG | SYSTOLIC BLOOD PRESSURE: 100 MMHG

## 2021-02-07 VITALS — SYSTOLIC BLOOD PRESSURE: 101 MMHG | DIASTOLIC BLOOD PRESSURE: 58 MMHG

## 2021-02-07 VITALS — SYSTOLIC BLOOD PRESSURE: 78 MMHG | DIASTOLIC BLOOD PRESSURE: 49 MMHG

## 2021-02-07 VITALS — SYSTOLIC BLOOD PRESSURE: 103 MMHG | DIASTOLIC BLOOD PRESSURE: 60 MMHG

## 2021-02-07 VITALS — SYSTOLIC BLOOD PRESSURE: 81 MMHG | DIASTOLIC BLOOD PRESSURE: 54 MMHG

## 2021-02-07 VITALS — DIASTOLIC BLOOD PRESSURE: 54 MMHG | SYSTOLIC BLOOD PRESSURE: 79 MMHG

## 2021-02-07 VITALS — DIASTOLIC BLOOD PRESSURE: 62 MMHG | SYSTOLIC BLOOD PRESSURE: 98 MMHG

## 2021-02-07 VITALS — DIASTOLIC BLOOD PRESSURE: 51 MMHG | SYSTOLIC BLOOD PRESSURE: 74 MMHG

## 2021-02-07 LAB
ADD MANUAL DIFF: YES
ALBUMIN SERPL-MCNC: 1.9 G/DL (ref 3.4–5)
ALBUMIN/GLOB SERPL: 0.5 {RATIO} (ref 1–2.7)
ALP SERPL-CCNC: 458 U/L (ref 46–116)
ALT SERPL-CCNC: 53 U/L (ref 12–78)
ANION GAP SERPL CALC-SCNC: 11 MMOL/L (ref 5–15)
AST SERPL-CCNC: 25 U/L (ref 15–37)
BILIRUB SERPL-MCNC: 0.6 MG/DL (ref 0.2–1)
BUN SERPL-MCNC: 116 MG/DL (ref 7–18)
CALCIUM SERPL-MCNC: 7.5 MG/DL (ref 8.5–10.1)
CHLORIDE SERPL-SCNC: 100 MMOL/L (ref 98–107)
CO2 SERPL-SCNC: 25 MMOL/L (ref 21–32)
CREAT SERPL-MCNC: 5.3 MG/DL (ref 0.55–1.3)
ERYTHROCYTE [DISTWIDTH] IN BLOOD BY AUTOMATED COUNT: 21.5 % (ref 11.6–14.8)
GLOBULIN SER-MCNC: 4 G/DL
HCT VFR BLD CALC: 29.8 % (ref 42–52)
HGB BLD-MCNC: 8.9 G/DL (ref 14.2–18)
MCV RBC AUTO: 99 FL (ref 80–99)
PHOSPHATE SERPL-MCNC: 8.9 MG/DL (ref 2.5–4.9)
PLATELET # BLD: 261 K/UL (ref 150–450)
POTASSIUM SERPL-SCNC: 5.2 MMOL/L (ref 3.5–5.1)
RBC # BLD AUTO: 3.02 M/UL (ref 4.7–6.1)
SODIUM SERPL-SCNC: 136 MMOL/L (ref 136–145)
WBC # BLD AUTO: 11.2 K/UL (ref 4.8–10.8)

## 2021-02-07 RX ADMIN — AMIODARONE HYDROCHLORIDE SCH MG: 200 TABLET ORAL at 08:18

## 2021-02-07 RX ADMIN — INSULIN ASPART SCH UNITS: 100 INJECTION, SOLUTION INTRAVENOUS; SUBCUTANEOUS at 12:21

## 2021-02-07 RX ADMIN — INSULIN ASPART SCH UNITS: 100 INJECTION, SOLUTION INTRAVENOUS; SUBCUTANEOUS at 06:09

## 2021-02-07 RX ADMIN — MIDAZOLAM HYDROCHLORIDE PRN MLS/HR: 5 INJECTION INTRAMUSCULAR; INTRAVENOUS at 08:21

## 2021-02-07 RX ADMIN — Medication SCH MLS/HR: at 04:58

## 2021-02-07 RX ADMIN — HYDROCORTISONE SODIUM SUCCINATE SCH MG: 100 INJECTION, POWDER, FOR SOLUTION INTRAMUSCULAR; INTRAVENOUS at 13:23

## 2021-02-07 RX ADMIN — VITAMIN D, TAB 1000IU (100/BT) SCH UNIT: 25 TAB at 08:18

## 2021-02-07 RX ADMIN — INSULIN DETEMIR SCH UNITS: 100 INJECTION, SOLUTION SUBCUTANEOUS at 09:02

## 2021-02-07 RX ADMIN — Medication SCH MLS/HR: at 14:03

## 2021-02-07 RX ADMIN — SODIUM CHLORIDE SCH MLS/HR: 900 INJECTION, SOLUTION INTRAVENOUS at 05:18

## 2021-02-07 RX ADMIN — SODIUM CHLORIDE SCH MLS/HR: 900 INJECTION, SOLUTION INTRAVENOUS at 14:01

## 2021-02-07 RX ADMIN — MIDODRINE HYDROCHLORIDE SCH MG: 10 TABLET ORAL at 06:08

## 2021-02-07 RX ADMIN — HYDROCORTISONE SODIUM SUCCINATE SCH MG: 100 INJECTION, POWDER, FOR SOLUTION INTRAMUSCULAR; INTRAVENOUS at 06:08

## 2021-02-07 RX ADMIN — FLUCONAZOLE SCH MG: 100 TABLET ORAL at 08:18

## 2021-02-07 RX ADMIN — ENOXAPARIN SODIUM SCH MG: 80 INJECTION SUBCUTANEOUS at 08:19

## 2021-02-07 RX ADMIN — MIDODRINE HYDROCHLORIDE SCH MG: 10 TABLET ORAL at 13:23

## 2021-02-07 RX ADMIN — PANTOPRAZOLE SODIUM SCH MG: 40 INJECTION, POWDER, FOR SOLUTION INTRAVENOUS at 08:18

## 2021-02-07 NOTE — NUR
NURSE NOTES:

Patient vitals labile.  Patient unable to be fully turned due to desats when turned.  
Patient meds titrated to meet parameters.  RN will continue to monitor.

## 2021-02-07 NOTE — NUR
NURSE NOTES:

Patient vitals labile.  Patient unable to be fully turned due to desats when turning 
patient.  Patient meds titrated to meet parameters.  RN will continue to monitor.

## 2021-02-07 NOTE — SURGERY PROGRESS NOTE
Surgery Progress Note


Subjective


Procedure Performed


Right tube thoracostomy chest tube insertion


Additional Comments


ill appearing


on support


ct okay


prognosis guarded





Objective





Last 24 Hour Vital Signs








  Date Time  Temp Pulse Resp B/P (MAP) Pulse Ox O2 Delivery O2 Flow Rate FiO2


 


2/7/21 11:30  95 33 78/49 (59) 83   


 


2/7/21 11:00  95 33 75/53 (60) 82   


 


2/7/21 11:00  97 36     100


 


2/7/21 11:00  93 34 73/50 (58) 82   


 


2/7/21 11:00    73/50    


 


2/7/21 11:00   33   Mechanical Ventilator  100


 


2/7/21 11:00   33 73/50  Mechanical Ventilator  100


 


2/7/21 10:30  96 34 75/53 (60) 82   


 


2/7/21 10:00  91 33 73/51 (58) 78   


 


2/7/21 10:00  92 33 78/53 (61) 78   


 


2/7/21 10:00    73/51    


 


2/7/21 10:00   33   Mechanical Ventilator  100


 


2/7/21 10:00   34 73/51  Mechanical Ventilator  100


 


2/7/21 09:30  92 33 78/53 (61) 78   


 


2/7/21 09:00  97 34 77/53 (61) 77   


 


2/7/21 09:00    77/53    


 


2/7/21 09:00   33   Mechanical Ventilator  100


 


2/7/21 09:00   33 77/53  Mechanical Ventilator  100


 


2/7/21 09:00  95 33 77/53 (61) 77   


 


2/7/21 08:30  98 32 80/56 (64) 77   


 


2/7/21 08:21   28   Mechanical Ventilator  100


 


2/7/21 08:00  96 30 83/52 (62) 74   


 


2/7/21 08:00 99.1 97 30 83/52 (62) 75   


 


2/7/21 08:00        100


 


2/7/21 08:00    83/52    


 


2/7/21 08:00   30   Mechanical Ventilator  100


 


2/7/21 08:00   30 83/52  Mechanical Ventilator  100


 


2/7/21 08:00  96      


 


2/7/21 08:00      Mechanical Ventilator  


 


2/7/21 07:30  97 30 89/55 (66) 76   


 


2/7/21 07:30  96 36     100


 


2/7/21 07:00  100 30 101/58 (72) 78   


 


2/7/21 07:00    89/55    


 


2/7/21 07:00   29   Mechanical Ventilator  100


 


2/7/21 07:00   30 89/55  Mechanical Ventilator  100


 


2/7/21 06:45  98 29 102/61 (75) 77   


 


2/7/21 06:39    71/53    


 


2/7/21 06:35  87 31 79/54 (62) 77   


 


2/7/21 06:30  86 28 73/49 (57) 77   


 


2/7/21 06:28  87 29 74/51 (59) 77   


 


2/7/21 06:15  89 30 79/52 (61) 78   


 


2/7/21 06:00  90 33 81/54 (63) 77   


 


2/7/21 06:00    77/54    


 


2/7/21 06:00   32   Mechanical Ventilator  100


 


2/7/21 06:00   32 81/54  Mechanical Ventilator  100


 


2/7/21 06:00  90 33 81/54 (63) 77   


 


2/7/21 05:52  90 30 77/53 (61) 77   


 


2/7/21 05:45  89 30 79/52 (61) 77   


 


2/7/21 05:34   36   Mechanical Ventilator  100


 


2/7/21 05:30    79/53    


 


2/7/21 05:30  86 30 74/51 (59) 77   


 


2/7/21 05:21  81      


 


2/7/21 05:18    69/43    


 


2/7/21 05:18   32   Mechanical Ventilator  100


 


2/7/21 05:15  89 28 79/51 (60) 76   


 


2/7/21 05:15  89 28  76   


 


2/7/21 05:11  89 32 85/54 (64) 76   


 


2/7/21 05:11  89 32 85/54 (64) 76   


 


2/7/21 05:02  82 31 78/51 (60) 74   


 


2/7/21 05:02  82 31 78/51 (60) 74   


 


2/7/21 05:00   32   Mechanical Ventilator  100


 


2/7/21 05:00  78 29 69/51 (57) 74   


 


2/7/21 05:00  78 29 69/51 (57) 74   


 


2/7/21 04:58   32 85/39  Mechanical Ventilator  100


 


2/7/21 04:45  88 29 83/49 (60) 76   


 


2/7/21 04:44  88 29 83/52 (62) 76   


 


2/7/21 04:30  88 26 84/52 (63) 76   


 


2/7/21 04:07   30 93/55  Mechanical Ventilator  100


 


2/7/21 04:00  92 25 93/56 (68) 77   


 


2/7/21 04:00   32   Mechanical Ventilator  100


 


2/7/21 04:00        100


 


2/7/21 04:00      Mechanical Ventilator  


 


2/7/21 03:45  90 30 89/52 (64) 77   


 


2/7/21 03:30  94 30 99/58 (72) 79   


 


2/7/21 03:15  90 30 95/59 (71) 79   


 


2/7/21 03:07   32 104/55  Mechanical Ventilator  100


 


2/7/21 03:00  90 33 95/60 (72) 81   


 


2/7/21 03:00   32   Mechanical Ventilator  100


 


2/7/21 03:00  90 33 95/60 (72) 81   


 


2/7/21 02:45  92 31 102/63 (76) 81   


 


2/7/21 02:30  92 26 100/61 (74) 82   


 


2/7/21 02:15  91 28 98/62 (74) 82   


 


2/7/21 02:07   32 108/77  Mechanical Ventilator  100


 


2/7/21 02:01   30   Mechanical Ventilator  100


 


2/7/21 02:00  91 27 102/61 (75) 82   


 


2/7/21 02:00  91 27 102/61 (75) 82   


 


2/7/21 01:45  91 24 103/60 (74) 81   


 


2/7/21 01:30  92 28 105/61 (76) 82   


 


2/7/21 01:23  91 40     100


 


2/7/21 01:15  91 26 101/62 (75) 81   


 


2/7/21 01:07   30 104/68  Mechanical Ventilator  100


 


2/7/21 01:01   32   Mechanical Ventilator  100


 


2/7/21 01:00  91 27 101/62 (75) 82   


 


2/7/21 01:00  91 27 101/62 (75) 82   


 


2/7/21 00:45  91 25 101/61 (74) 81   


 


2/7/21 00:30  91 29 105/63 (77) 82   


 


2/7/21 00:15  89 28 103/64 (77) 81   


 


2/7/21 00:07   32 101/63  Mechanical Ventilator  100


 


2/7/21 00:01   22   Mechanical Ventilator  100


 


2/7/21 00:00      Mechanical Ventilator  


 


2/7/21 00:00  90 23 100/61 (74) 81   


 


2/6/21 23:07   32 103/66  Mechanical Ventilator  100


 


2/6/21 23:01   18   Mechanical Ventilator  100


 


2/6/21 23:00  90 26 103/63 (76) 82   


 


2/6/21 22:45  90 28 103/62 (76) 83   


 


2/6/21 22:30  88 29 104/63 (77) 83   


 


2/6/21 22:30  88 29 104/63 (77) 83   


 


2/6/21 22:15  90 23 104/59 (74) 83   


 


2/6/21 22:07   32 103/63  Mechanical Ventilator  100


 


2/6/21 22:00  90 23 101/60 (74) 83   


 


2/6/21 22:00   18   Mechanical Ventilator  100


 


2/6/21 21:45  89 25 104/64 (77) 84   


 


2/6/21 21:30  89 26 105/61 (76) 84   


 


2/6/21 21:15  89 22 104/63 (77) 83   


 


2/6/21 21:07   12 110/83  Mechanical Ventilator  100


 


2/6/21 21:00  90 25 111/64 (80) 79   


 


2/6/21 21:00    80/46    


 


2/6/21 21:00   20   Mechanical Ventilator  100


 


2/6/21 21:00  90 25 111/64 (80) 79   


 


2/6/21 20:45  92 31 112/64 (80) 73   


 


2/6/21 20:30  90 33 108/63 (78) 70   


 


2/6/21 20:30  90 33 108/63 (78) 70   


 


2/6/21 20:15  87 30 95/56 (69) 79   


 


2/6/21 20:00      Mechanical Ventilator  


 


2/6/21 20:00  87      


 


2/6/21 20:00   20   Mechanical Ventilator  100


 


2/6/21 20:00   20 101/78  Mechanical Ventilator  100


 


2/6/21 20:00        100


 


2/6/21 20:00  87 31 99/57 (71) 81   


 


2/6/21 19:45  87 27 92/55 (67) 84   


 


2/6/21 19:30  87 30 100/61 (74) 83   


 


2/6/21 19:15  87 31 101/61 (74) 83   


 


2/6/21 19:10  86 35     100


 


2/6/21 19:00  87 31 104/60 (75) 83   


 


2/6/21 19:00    100/61    


 


2/6/21 19:00   30   Mechanical Ventilator  100


 


2/6/21 19:00   32 100/61  Mechanical Ventilator  100


 


2/6/21 19:00  87 31 104/60 (75) 83   


 


2/6/21 18:00  87 33 108/63 (78) 85   


 


2/6/21 18:00    103/60    


 


2/6/21 18:00   31   Mechanical Ventilator  100


 


2/6/21 18:00   31 103/60  Mechanical Ventilator  100


 


2/6/21 17:10  86 34     100


 


2/6/21 17:00    107/66    


 


2/6/21 17:00   32   Mechanical Ventilator  100


 


2/6/21 17:00   31 106/61  Mechanical Ventilator  100


 


2/6/21 17:00  87 32 121/60 (80) 83   


 


2/6/21 16:00        100


 


2/6/21 16:00    125/69    


 


2/6/21 16:00   31   Mechanical Ventilator  100


 


2/6/21 16:00   32 125/69  Mechanical Ventilator  100


 


2/6/21 16:00 98.5 86 28 122/72 (89) 83   


 


2/6/21 16:00      Mechanical Ventilator  


 


2/6/21 15:44  86      


 


2/6/21 15:10  86 34     100


 


2/6/21 15:00  86 31 122/68 (86) 83   


 


2/6/21 15:00    128/71    


 


2/6/21 15:00   30   Mechanical Ventilator  100


 


2/6/21 15:00   30 128/71  Mechanical Ventilator  100


 


2/6/21 14:00  86 31 122/69 (86) 85   


 


2/6/21 14:00    122/69    


 


2/6/21 14:00   30   Mechanical Ventilator  100


 


2/6/21 14:00   30 122/69  Mechanical Ventilator  100








I&O











Intake and Output  


 


 2/6/21 2/7/21





 19:00 07:00


 


Intake Total 1419.44 ml 1501.188 ml


 


Output Total 110 ml 10 ml


 


Balance 1309.44 ml 1491.188 ml


 


  


 


Free Water 40 ml 


 


IV Total 959.44 ml 1081.188 ml


 


Tube Feeding 420 ml 420 ml


 


Output Urine Total 35 ml 10 ml


 


Stool Total 50 ml 0 ml


 


Chest Tube Drainage Total 25 ml 








Dressing:  saturated


Drains:  other


Cardiovascular:  RSR


Respiratory:  decreased breath sounds


Abdomen:  soft, flat, decreased bowel sounds


Extremities:  edema, no cyanosis





Laboratory Tests








Test


 2/7/21


05:15 2/7/21


05:52


 


White Blood Count


 11.2 K/UL


(4.8-10.8)  H 





 


Red Blood Count


 3.02 M/UL


(4.70-6.10)  L 





 


Hemoglobin


 8.9 G/DL


(14.2-18.0)  L 





 


Hematocrit


 29.8 %


(42.0-52.0)  L 





 


Mean Corpuscular Volume 99 FL (80-99)   


 


Mean Corpuscular Hemoglobin


 29.4 PG


(27.0-31.0) 





 


Mean Corpuscular Hemoglobin


Concent 29.7 G/DL


(32.0-36.0)  L 





 


Red Cell Distribution Width


 21.5 %


(11.6-14.8)  H 





 


Platelet Count


 261 K/UL


(150-450) 





 


Mean Platelet Volume


 8.8 FL


(6.5-10.1) 





 


Neutrophils (%) (Auto)


 % (45.0-75.0)


 





 


Lymphocytes (%) (Auto)


 % (20.0-45.0)


 





 


Monocytes (%) (Auto)  % (1.0-10.0)   


 


Eosinophils (%) (Auto)  % (0.0-3.0)   


 


Basophils (%) (Auto)  % (0.0-2.0)   


 


Differential Total Cells


Counted 100  


 





 


Neutrophils % (Manual) 84 % (45-75)  H 


 


Lymphocytes % (Manual) 8 % (20-45)  L 


 


Monocytes % (Manual) 4 % (1-10)   


 


Eosinophils % (Manual) 0 % (0-3)   


 


Basophils % (Manual) 0 % (0-2)   


 


Myelocytes % 3 % (0-0)  H 


 


Band Neutrophils 1 % (0-8)   


 


Nucleated Red Blood Cells 5 /100 WBC   


 


Platelet Estimate Adequate   


 


Platelet Morphology Normal   


 


Polychromasia 2+   


 


Hypochromasia 1+   


 


Anisocytosis 3+   


 


Sodium Level


 136 MMOL/L


(136-145) 





 


Potassium Level


 5.2 MMOL/L


(3.5-5.1)  H 





 


Chloride Level


 100 MMOL/L


() 





 


Carbon Dioxide Level


 25 MMOL/L


(21-32) 





 


Anion Gap


 11 mmol/L


(5-15) 





 


Blood Urea Nitrogen


 116 mg/dL


(7-18)  H 





 


Creatinine


 5.3 MG/DL


(0.55-1.30)  H 





 


Estimat Glomerular Filtration


Rate 10.9 mL/min


(>60) 





 


Glucose Level


 236 MG/DL


()  H 





 


Uric Acid


 11.6 MG/DL


(2.6-7.2)  H 





 


Calcium Level


 7.5 MG/DL


(8.5-10.1)  L 





 


Phosphorus Level


 8.9 MG/DL


(2.5-4.9)  H 





 


Magnesium Level


 2.6 MG/DL


(1.8-2.4)  H 





 


Total Bilirubin


 0.6 MG/DL


(0.2-1.0) 





 


Aspartate Amino Transf


(AST/SGOT) 25 U/L (15-37)


 





 


Alanine Aminotransferase


(ALT/SGPT) 53 U/L (12-78)


 





 


Alkaline Phosphatase


 458 U/L


()  H 





 


C-Reactive Protein,


Quantitative 4.8 mg/dL


(0.00-0.90)  H 





 


Pro-B-Type Natriuretic Peptide


 > 38783 pg/mL


(0-125)  H 





 


Total Protein


 5.9 G/DL


(6.4-8.2)  L 





 


Albumin


 1.9 G/DL


(3.4-5.0)  L 





 


Globulin 4.0 g/dL   


 


Albumin/Globulin Ratio


 0.5 (1.0-2.7)


L 





 


POC Whole Blood Glucose  Pending  











Plan


Problems:  


(1) Pneumonia due to COVID-19 virus


Assessment & Plan:  Interim endotracheal intubation, endotracheal tube tip in 

good position


approximately 4 cm above the tito. There are extensive bilateral infiltrates, 

which


are markedly increased from the prior study. Pleural spaces are probably clear, 

not


well demonstrated


Markedly increased and now extensive bilateral infiltrates 


cont as per pulm and iID





(2) Hypoxia


Assessment & Plan:  patient developing DTI. in current condition, critically ill

and declining potential inevitable decline 


all care precautions taken and will cont to monitor and assist with improvement 





(3) Abdominal pain


Assessment & Plan:  65M abd pain, septic, covid, intubated ons upport


currently abd exam benign but limited given condition


line bo mary noted


okay for meds and feeds


nutritional optimization 


DAILY ESTIMATED NEEDS:


Needs based on Critical Care/ 72kg abw 


22-28  kcals/kg 


4297-5029  total kcals


1.2-2  g protein/kg


  g total protein 


25-30  mL/kg


7843-0836  total fluid mLs





NUTRITION DIAGNOSIS:


Swallowing difficulty R/T respiratory failure as evidenced by pt now


orally intubated, OGT in place, NPO





 


CURRENT TF:NPO 





 





ENTERAL NUTRITION RECOMMENDATIONS:


Vital AF 1.2 @ 60ml/hr x 24 hrs  to provide 1440ml, 1728kcal, 116g prot, 1167ml 

free water 





* As medically appropriate, initiate critical care and carb controlled TF 

formula of Vital AF 1.2


* Initiate @ 20ml/hr x 6hrs, advance 10ml q 4-6 hrs as tolerated to goal rate


* HOB over 30 degrees/ water flush per MD


* Does not exceed est kcal needs w/ Propofol running @ 8.083ml/hr x 24 hrs 

(provides 213 lipid kcal)





 





ADDITIONAL RECOMMENDATIONS:


* Calibrated bedscale wt 


* Monitor BGs closely w/ Decadron and TF, need for long acting insulin 


* Monitor lytes, replete as needed  


* Monitor Propofol rate, need to adjust TF rate  





(4) Acute metabolic encephalopathy


(5) Pneumothorax on right


Assessment & Plan:  right ptx


chest tube placed


decreased air


stable ptx


cont tube to suction


Endotracheal tube tip projects at the level of the thoracic inlet.


Orogastric tube tip projects at the level of the gastric fundus. Again 

demonstrated


is extensive subcutaneous emphysema, which appears somewhat worse on the left. 

Right


chest tube remains in place. Small right medial and apical pneumothorax are pr

esent,


slightly more conspicuous than on the previous exam, still small, somewhat 

difficult


to identify due to overlying subcutaneous emphysema. There is probably a tiny 

left


apical pneumothorax as well. Previously demonstrated pneumomediastinum has 

improved


considerably although still present. Bilateral infiltrates appear worse on the 

right.


 


Impression: Equivocally slightly increased but still small right pneumothorax.


 


Suspect tiny left apical pneumothorax


 


Improving pneumomediastinum


 


Worsening subcutaneous emphysema


 


Worsening right and stable left lung infiltrates 





 Right chest tube remains. There is still a small apical pneumothorax. Tiny


left apical pneumothorax persists, unchanged. There is decreased 

pneumomediastinum.


Subcutaneous gas has decreased as well. Bilateral infiltrates are unchanged.


Endotracheal tube remains at the level of the thoracic inlet. Orogastric tube is


again demonstrated, tip not well-visualized but probably stable in position


 


Impression: Stable tiny bilateral apical pneumothoraces


 


Unchanged bilateral infiltrates


 


Decreased pneumomediastinum and subcutaneous emphysema














Norberto Vazquez                 Feb 7, 2021 13:44

## 2021-02-07 NOTE — NUR
NURSE NOTES:



Pt's HR decreasing down to the 30s. Patient assessed. Doppler used to check for pulse, no 
pulse noted. Made CN aware. ER MD called to assess patient.

## 2021-02-07 NOTE — NUR
NURSE NOTES:



Pt's tube feeding residual checked, no residual noted. BG checked, 248. Scheduled 
medications given per MD order, pt tolerated well. Safety precautions maintained. Will 
continue to monitor.

## 2021-02-07 NOTE — EMERGENCY ROOM REPORT
Physical Exam





Vital Signs








  Date Time  Temp Pulse Resp B/P (MAP) Pulse Ox O2 Delivery O2 Flow Rate FiO2


 


2/3/21 07:00  88 29 119/66 (83) 69   


 


2/3/21 07:07        100


 


2/3/21 08:00      Mechanical Ventilator  


 


2/3/21 08:00 97.5       











Medical Decision Making


Diagnostic Impression:  


   Primary Impression:  


   Cardiopulmonary arrest


   Additional Impression:  


   Pneumonia due to COVID-19 virus


ER Course


Called to ICU to pronounce patient .  Patient made DNR/DNI after 

multiple codes.  He was previously intubated and on ventilator admitted for 

COVID-19 pneumonia with respiratory failure.  Asystole on monitor.  No palpable 

pulse.  No audible heart sounds.  Pronounced  1423.





Last Vital Signs








  Date Time  Temp Pulse Resp B/P (MAP) Pulse Ox O2 Delivery O2 Flow Rate FiO2


 


21 14:03   30 76/58  Mechanical Ventilator  100


 


21 11:30  95   83   


 


21 08:00 99.1       








Disposition:  


Condition:  


Referrals:  


NOT CHOSEN IPA/MD,REFERRING (PCP)











Hoang Brown MD               2021 15:22

## 2021-02-07 NOTE — NUR
NURSE NOTES:



Received bedside report from SUZI Pardo. Pt's O2 sat 75-78%, MD aware, other VS stable. Pt 
is sedated, does not open eyes, does not follow commands. Pt NSR with BBB on the cardiac 
monitor, HR between 80-90. Pt intubated, ETT 7.5 at 27 cm lip line, vent settings: A/C 32, 
P/S 25, T/V 600, Peep 14, FiO2 100%, O2 sat between 75-78%, respirations even and unlabored. 
Pt has OGT, Nepro running at 35 mL/hr. Pt has rectal tube, brown liquid stool noted, 
draining well. Pt has cash catheter, 5mL dark myrna urine noted, pt is oliguric, MD aware. 
Pt has Right Chest Tube, no signs of infection or complication noted from site, suction 
noted in pleura-vac container, 10mL serousanginous drainage noted. Pt's skin alterations 
noted in chart and protected with optifoam. Pt has Left Upper Arm PICC, no signs of 
infection or complication noted, with the following drips infusing: Fentanyl at 300mcg/hr, 
Versed 10mg/hr, Levophed 30mcg, 1/2 NS 30mL/hr. Head of bed at 30 degrees. Bed locked and in 
lowest position, bed rails up. Safety precautions maintained. Will continue to monitor. 

-------------------------------------------------------------------------------

Addendum: 02/07/21 at 0909 by Sonia Kennedy RN

-------------------------------------------------------------------------------

NURSE NOTES:



Received bedside report from SUZI Pardo. Pt's O2 sat 75-78%, MD aware, other VS stable. Pt 
is sedated, does not open eyes, does not follow commands. Pt NSR with BBB on the cardiac 
monitor, HR between 80-90. Pt intubated, ETT 7.5 at 27 cm lip line, vent settings: A/C 32, 
P/S 25, T/V 600, Peep 14, FiO2 100%, O2 sat between 75-78%, respirations even and unlabored. 
Pt has OGT, Nepro running at 35 mL/hr. Pt has rectal tube, brown liquid stool noted, 
draining well. Pt has cash catheter, 5mL dark myrna urine noted, pt is oliguric, MD aware. 
Pt has Right Chest Tube, no signs of infection or complication noted from site, suction 
noted in pleura-vac container, 10mL serousanginous drainage noted. Pt's skin alterations 
noted in chart and protected with optifoam. Pt has Left Upper Arm PICC, no signs of 
infection or complication noted, with the following drips infusing: Fentanyl at 300mcg/hr, 
Versed 5mg/hr, Levophed 30mcg, 1/2 NS 30mL/hr. Head of bed at 30 degrees. Bed locked and in 
lowest position, bed rails up. Safety precautions maintained. Will continue to monitor.

## 2021-02-07 NOTE — NUR
NURSE HAND-OFF REPORT: 



Latest Vital Signs: Temperature 98.5 , Pulse 100 , B/P 101 /58 , Respiratory Rate 30 , O2 
SAT 78 , Mechanical Ventilator, O2 Flow Rate  .  

Vital Sign Comment: 



EKG Rhythm: SR W/BBB

Rhythm change?: N 

MD Notified?: Y -Dr Ariel GUTIERREZ Response: No New Orders Received



Latest Singh Fall Score: 50  

Fall Risk: High Risk 

Safety Measures: Call light Within Reach, Bed Alarm Zone 1, Side Rails Side Rails x3, Bed 
position Low and Locked.

Fall Precautions: 

Patient Fall Education



Report given to Rosalee Aguilar.

## 2021-02-07 NOTE — NEPHROLOGY PROGRESS NOTE
Assessment/Plan


Problem List:  


(1) TO (acute kidney injury)


(2) Acute respiratory failure


(3) Pneumonia due to COVID-19 virus


(4) Diabetes mellitus out of control


(5) Hyperkalemia


Assessment





Acute kidney injury, multifactorial


Septic shock


Acute respiratory failure


Hyperkalemia, metabolic acidosis


Above-mentioned condition


Plan


February 7: Doing poorly.  Hypotensive.  Worsening renal failure.  Maxed on 1 

pressor.  Labs reviewed.  Will discuss with intensivist with regard to the 

extent of further care which in my view is futile.  Discussed with RN.


February 6: Patient continues to decline.  Renal parameters reviewed, serum 

creatinine worsened.  Now DNR.  Intubated on ventilator.  Discussed with RN.  

Labs and medication list reviewed.  Continue current support.


February 5: Patient status changed to DNR.  Is deteriorating.  On pressors.  

Renal failure worse.  Labs reviewed.  Discussed with RN.  No dialysis is being 

offered at this time.  Medication list reviewed.  I concur if aim is towards 

comfort care.  Digoxin was discontinued.


February 4: Remains full code.  Remains intubated on ventilator.  Labs reviewed.

 Serum creatinine 2.8.  Medication list reviewed.  Continue current management 

and per consultants.  Prognosis remains poor.


February 3: Labs reviewed.  Medication reviewed.  Patient full code.  Remains i

ntubated on ventilator.  Hypotensive.  Renal parameters unchanged.  Continue 

current support.  Prognosis dismal.


February 2: Labs reviewed.  Medication list reviewed.  Discussed with RN.  

Patient remains full code.  Patient is still intubated on ventilator.  

Hyperkalemia improved.  Serum creatinine 2.6 unchanged.  Status remains guarded.

 Continue per consultants.  Continue monitor renal parameters.  Taper pressors 

as possible.  Sodium bicarb IV





Previosly


Feeding changed to Nepro


Midodrine 10 mg every 8 hours


Albumin bolus


1/2 Normal saline 100 cc an hour


Stop Seroquel


Monitor renal parameters


Avoid nephrotoxic's


1 amp of sodium bicarbonate


Stress dose of steroids


Discussed with RN Katherine





Subjective


ROS Limited/Unobtainable:  Yes





Objective


Objective





Last 24 Hour Vital Signs








  Date Time  Temp Pulse Resp B/P (MAP) Pulse Ox O2 Delivery O2 Flow Rate FiO2


 


2/7/21 08:21   28   Mechanical Ventilator  100


 


2/7/21 07:00  100 30 101/58 (72) 78   


 


2/7/21 06:45  98 29 102/61 (75) 77   


 


2/7/21 06:39    71/53    


 


2/7/21 06:35  87 31 79/54 (62) 77   


 


2/7/21 06:30  86 28 73/49 (57) 77   


 


2/7/21 06:28  87 29 74/51 (59) 77   


 


2/7/21 06:15  89 30 79/52 (61) 78   


 


2/7/21 06:00  90 33 81/54 (63) 77   


 


2/7/21 06:00    77/54    


 


2/7/21 06:00   32   Mechanical Ventilator  100


 


2/7/21 06:00   32 81/54  Mechanical Ventilator  100


 


2/7/21 06:00  90 33 81/54 (63) 77   


 


2/7/21 05:52  90 30 77/53 (61) 77   


 


2/7/21 05:45  89 30 79/52 (61) 77   


 


2/7/21 05:34   36   Mechanical Ventilator  100


 


2/7/21 05:30    79/53    


 


2/7/21 05:30  86 30 74/51 (59) 77   


 


2/7/21 05:21  81      


 


2/7/21 05:18    69/43    


 


2/7/21 05:18   32   Mechanical Ventilator  100


 


2/7/21 05:15  89 28 79/51 (60) 76   


 


2/7/21 05:15  89 28  76   


 


2/7/21 05:11  89 32 85/54 (64) 76   


 


2/7/21 05:11  89 32 85/54 (64) 76   


 


2/7/21 05:02  82 31 78/51 (60) 74   


 


2/7/21 05:02  82 31 78/51 (60) 74   


 


2/7/21 05:00   32   Mechanical Ventilator  100


 


2/7/21 05:00  78 29 69/51 (57) 74   


 


2/7/21 05:00  78 29 69/51 (57) 74   


 


2/7/21 04:58   32 85/39  Mechanical Ventilator  100


 


2/7/21 04:45  88 29 83/49 (60) 76   


 


2/7/21 04:44  88 29 83/52 (62) 76   


 


2/7/21 04:30  88 26 84/52 (63) 76   


 


2/7/21 04:07   30 93/55  Mechanical Ventilator  100


 


2/7/21 04:00  92 25 93/56 (68) 77   


 


2/7/21 04:00   32   Mechanical Ventilator  100


 


2/7/21 04:00        100


 


2/7/21 04:00      Mechanical Ventilator  


 


2/7/21 03:45  90 30 89/52 (64) 77   


 


2/7/21 03:30  94 30 99/58 (72) 79   


 


2/7/21 03:15  90 30 95/59 (71) 79   


 


2/7/21 03:07   32 104/55  Mechanical Ventilator  100


 


2/7/21 03:00  90 33 95/60 (72) 81   


 


2/7/21 03:00   32   Mechanical Ventilator  100


 


2/7/21 03:00  90 33 95/60 (72) 81   


 


2/7/21 02:45  92 31 102/63 (76) 81   


 


2/7/21 02:30  92 26 100/61 (74) 82   


 


2/7/21 02:15  91 28 98/62 (74) 82   


 


2/7/21 02:07   32 108/77  Mechanical Ventilator  100


 


2/7/21 02:01   30   Mechanical Ventilator  100


 


2/7/21 02:00  91 27 102/61 (75) 82   


 


2/7/21 02:00  91 27 102/61 (75) 82   


 


2/7/21 01:45  91 24 103/60 (74) 81   


 


2/7/21 01:30  92 28 105/61 (76) 82   


 


2/7/21 01:23  91 40     100


 


2/7/21 01:15  91 26 101/62 (75) 81   


 


2/7/21 01:07   30 104/68  Mechanical Ventilator  100


 


2/7/21 01:01   32   Mechanical Ventilator  100


 


2/7/21 01:00  91 27 101/62 (75) 82   


 


2/7/21 01:00  91 27 101/62 (75) 82   


 


2/7/21 00:45  91 25 101/61 (74) 81   


 


2/7/21 00:30  91 29 105/63 (77) 82   


 


2/7/21 00:15  89 28 103/64 (77) 81   


 


2/7/21 00:07   32 101/63  Mechanical Ventilator  100


 


2/7/21 00:01   22   Mechanical Ventilator  100


 


2/7/21 00:00      Mechanical Ventilator  


 


2/7/21 00:00  90 23 100/61 (74) 81   


 


2/6/21 23:07   32 103/66  Mechanical Ventilator  100


 


2/6/21 23:01   18   Mechanical Ventilator  100


 


2/6/21 23:00  90 26 103/63 (76) 82   


 


2/6/21 22:45  90 28 103/62 (76) 83   


 


2/6/21 22:30  88 29 104/63 (77) 83   


 


2/6/21 22:30  88 29 104/63 (77) 83   


 


2/6/21 22:15  90 23 104/59 (74) 83   


 


2/6/21 22:07   32 103/63  Mechanical Ventilator  100


 


2/6/21 22:00  90 23 101/60 (74) 83   


 


2/6/21 22:00   18   Mechanical Ventilator  100


 


2/6/21 21:45  89 25 104/64 (77) 84   


 


2/6/21 21:30  89 26 105/61 (76) 84   


 


2/6/21 21:15  89 22 104/63 (77) 83   


 


2/6/21 21:07   12 110/83  Mechanical Ventilator  100


 


2/6/21 21:00  90 25 111/64 (80) 79   


 


2/6/21 21:00    80/46    


 


2/6/21 21:00   20   Mechanical Ventilator  100


 


2/6/21 21:00  90 25 111/64 (80) 79   


 


2/6/21 20:45  92 31 112/64 (80) 73   


 


2/6/21 20:30  90 33 108/63 (78) 70   


 


2/6/21 20:30  90 33 108/63 (78) 70   


 


2/6/21 20:15  87 30 95/56 (69) 79   


 


2/6/21 20:00      Mechanical Ventilator  


 


2/6/21 20:00  87      


 


2/6/21 20:00   20   Mechanical Ventilator  100


 


2/6/21 20:00   20 101/78  Mechanical Ventilator  100


 


2/6/21 20:00        100


 


2/6/21 20:00  87 31 99/57 (71) 81   


 


2/6/21 19:45  87 27 92/55 (67) 84   


 


2/6/21 19:30  87 30 100/61 (74) 83   


 


2/6/21 19:15  87 31 101/61 (74) 83   


 


2/6/21 19:10  86 35     100


 


2/6/21 19:00  87 31 104/60 (75) 83   


 


2/6/21 19:00    100/61    


 


2/6/21 19:00   30   Mechanical Ventilator  100


 


2/6/21 19:00   32 100/61  Mechanical Ventilator  100


 


2/6/21 19:00  87 31 104/60 (75) 83   


 


2/6/21 18:00  87 33 108/63 (78) 85   


 


2/6/21 18:00    103/60    


 


2/6/21 18:00   31   Mechanical Ventilator  100


 


2/6/21 18:00   31 103/60  Mechanical Ventilator  100


 


2/6/21 17:10  86 34     100


 


2/6/21 17:00    107/66    


 


2/6/21 17:00   32   Mechanical Ventilator  100


 


2/6/21 17:00   31 106/61  Mechanical Ventilator  100


 


2/6/21 17:00  87 32 121/60 (80) 83   


 


2/6/21 16:00        100


 


2/6/21 16:00    125/69    


 


2/6/21 16:00   31   Mechanical Ventilator  100


 


2/6/21 16:00   32 125/69  Mechanical Ventilator  100


 


2/6/21 16:00 98.5 86 28 122/72 (89) 83   


 


2/6/21 16:00      Mechanical Ventilator  


 


2/6/21 15:44  86      


 


2/6/21 15:10  86 34     100


 


2/6/21 15:00  86 31 122/68 (86) 83   


 


2/6/21 15:00    128/71    


 


2/6/21 15:00   30   Mechanical Ventilator  100


 


2/6/21 15:00   30 128/71  Mechanical Ventilator  100


 


2/6/21 14:00  86 31 122/69 (86) 85   


 


2/6/21 14:00    122/69    


 


2/6/21 14:00   30   Mechanical Ventilator  100


 


2/6/21 14:00   30 122/69  Mechanical Ventilator  100


 


2/6/21 13:28  85 34     100


 


2/6/21 13:00    122/71    


 


2/6/21 13:00   30   Mechanical Ventilator  100


 


2/6/21 13:00   30 122/71  Mechanical Ventilator  100


 


2/6/21 13:00  87 24 121/70 (87) 83   


 


2/6/21 12:00      Mechanical Ventilator  


 


2/6/21 12:00        100


 


2/6/21 12:00    106/66    


 


2/6/21 12:00   32   Mechanical Ventilator  100


 


2/6/21 12:00   30 106/66  Mechanical Ventilator  100


 


2/6/21 12:00 98.3 83 32 106/66 (79) 86   


 


2/6/21 11:26  85      


 


2/6/21 11:06  84 34     100


 


2/6/21 11:00    120/70    


 


2/6/21 11:00   31   Mechanical Ventilator  100


 


2/6/21 11:00   31 120/70  Mechanical Ventilator  100


 


2/6/21 11:00  84 32 120/70 (87) 87   


 


2/6/21 10:55   29 110/67  Mechanical Ventilator  100


 


2/6/21 10:30    116/68    


 


2/6/21 10:00  84 30 125/78 (94) 87   


 


2/6/21 10:00   30   Mechanical Ventilator  100


 


2/6/21 10:00   30 124/73  Mechanical Ventilator  100


 


2/6/21 10:00    124/73    

















Intake and Output  


 


 2/6/21 2/7/21





 19:00 07:00


 


Intake Total 1419.44 ml 1425 ml


 


Output Total 110 ml 10 ml


 


Balance 1309.44 ml 1415 ml


 


  


 


Free Water 40 ml 


 


IV Total 959.44 ml 1005 ml


 


Tube Feeding 420 ml 420 ml


 


Output Urine Total 35 ml 10 ml


 


Stool Total 50 ml 0 ml


 


Chest Tube Drainage Total 25 ml 











Current Medications








 Medications


  (Trade)  Dose


 Ordered  Sig/Julisa


 Route


 PRN Reason  Start Time


 Stop Time Status Last Admin


Dose Admin


 


 Acetaminophen


  (Tylenol)  650 mg  Q4H  PRN


 GT


 Temp >100.5  1/15/21 17:15


 2/14/21 17:14  1/29/21 06:59





 


 Amiodarone HCl


  (Cordarone)  200 mg  DAILY


 NG


   1/26/21 09:00


 4/19/21 20:59  2/7/21 08:18





 


 Chlorhexidine


 Gluconate


  (Vanessa-Hex 2%)  1 applic  DAILY@2000


 TOPIC


   1/19/21 20:00


 4/19/21 19:59  2/6/21 20:00





 


 Dextrose


  (Dextrose 50%)  25 ml  Q30M  PRN


 IV


 Hypoglycemia  1/9/21 13:30


 4/9/21 13:29   





 


 Dextrose


  (Dextrose 50%)  50 ml  Q30M  PRN


 IV


 Hypoglycemia  1/9/21 13:30


 4/9/21 13:29   





 


 Docusate Sodium


  (Colace)  100 mg  TIDPRN  PRN


 GT


 Constipation  1/12/21 01:15


 2/11/21 01:14  1/12/21 01:42





 


 Enoxaparin Sodium


  (Lovenox)  80 mg  DAILY


 SUBQ


   2/4/21 09:00


 5/5/21 08:59  2/7/21 08:19





 


 Fentanyl Citrate  250 ml @ 0


 mls/hr  Q24H


 IV


   2/5/21 18:22


 2/7/21 18:21  2/7/21 04:58





 


 Fluconazole


  (Diflucan)  100 mg  DAILY


 GT


   2/6/21 12:45


 2/13/21 12:44  2/7/21 08:18





 


 Hydrocortisone


  (Solu-CORTEF)  100 mg  EVERY 8  HOURS


 IV


   2/1/21 15:45


 5/2/21 15:44  2/7/21 06:08





 


 Insulin Aspart


  (NovoLOG)    Q6HR


 SUBQ


   1/14/21 12:00


 4/9/21 17:59  2/7/21 06:09





 


 Insulin Detemir


  (Levemir)  26 units  Q12HR


 SUBQ


   2/2/21 09:00


 4/12/21 08:59  2/7/21 09:02





 


 Midazolam HCl 50


 mg/Sodium Chloride  100 ml @ 0


 mls/hr  Q24H  PRN


 IV


 AS DIRECTED  2/5/21 19:52


 2/7/21 19:51  2/7/21 08:21





 


 Midodrine


  (Pro-Amatine)  10 mg  Q8HR


 ORAL


   2/1/21 15:30


 5/2/21 15:29  2/7/21 06:08





 


 Norepinephrine


 Bitartrate 16 mg/


 Sodium Chloride  500 ml @ 0


 mls/hr  Q24H


 IV


   2/6/21 10:30


 2/9/21 10:29  2/7/21 05:18





 


 Pantoprazole


  (Protonix)  40 mg  Q12HR


 IVP


   2/1/21 21:00


 2/13/21 08:59  2/7/21 08:18





 


 Quetiapine


 Fumarate


  (SEROqueL)  50 mg  Q12HR


 ORAL


   2/4/21 11:45


 3/21/21 11:44  2/6/21 20:38





 


 Sevelamer


 Carbonate


  (Renvela)  1,600 mg  THREE TIMES A  DAY


 NG


   2/7/21 13:00


 5/8/21 12:59 UNV  





 


 Sodium Chloride  500 ml @ 


 999 mls/hr  Q31M PRN


 IV


 For hypotension  1/15/21 18:30


 2/14/21 18:29   





 


 Sodium Chloride  1,000 ml @ 


 30 mls/hr  Q24H


 IV


   2/1/21 15:30


 3/3/21 15:29  2/7/21 06:27





 


 Vitamin D


  (Vitamin D)  5,000 unit  DAILY


 GT


   2/6/21 17:30


 3/8/21 17:29  2/7/21 08:18








Laboratory Tests


2/7/21 05:15: 


White Blood Count 11.2H, Red Blood Count 3.02L, Hemoglobin 8.9L, Hematocrit 

29.8L, Mean Corpuscular Volume 99, Mean Corpuscular Hemoglobin 29.4, Mean 

Corpuscular Hemoglobin Concent 29.7L, Red Cell Distribution Width 21.5H, 

Platelet Count 261, Mean Platelet Volume 8.8, Neutrophils (%) (Auto) , 

Lymphocytes (%) (Auto) , Monocytes (%) (Auto) , Eosinophils (%) (Auto) , 

Basophils (%) (Auto) , Differential Total Cells Counted 100, Neutrophils % 

(Manual) 84H, Lymphocytes % (Manual) 8L, Monocytes % (Manual) 4, Eosinophils % 

(Manual) 0, Basophils % (Manual) 0, Myelocytes % 3H, Band Neutrophils 1, 

Nucleated Red Blood Cells 5, Platelet Estimate Adequate, Platelet Morphology 

Normal, Polychromasia 2+, Hypochromasia 1+, Anisocytosis 3+, Sodium Level 136, 

Potassium Level 5.2H, Chloride Level 100, Carbon Dioxide Level 25, Anion Gap 11,

Blood Urea Nitrogen 116H, Creatinine 5.3H, Estimat Glomerular Filtration Rate 

10.9, Glucose Level 236H, Uric Acid 11.6H, Calcium Level 7.5L, Phosphorus Level 

8.9H, Magnesium Level 2.6H, Total Bilirubin 0.6, Aspartate Amino Transf 

(AST/SGOT) 25, Alanine Aminotransferase (ALT/SGPT) 53, Alkaline Phosphatase 458H

, C-Reactive Protein, Quantitative 4.8H, Pro-B-Type Natriuretic Peptide > 44353A

, Total Protein 5.9L, Albumin 1.9L, Globulin 4.0, Albumin/Globulin Ratio 0.5L


2/7/21 05:52: POC Whole Blood Glucose [Pending]


Height (Feet):  5


Height (Inches):  7.00


Weight (Pounds):  198


General Appearance:  no apparent distress


Cardiovascular:  tachycardia


Respiratory/Chest:  decreased breath sounds


Abdomen:  distended











Damien Powell MD             Feb 7, 2021 09:59

## 2021-02-07 NOTE — NUR
NURSE NOTES:



Scheduled medications given per MD order, pt tolerated well. Safety precautions maintained. 
Will continue to monitor.

## 2021-02-07 NOTE — NUR
NURSE NOTES:



Oral care provided, pt repositioned. No signs of infection or complication noted from right 
Chest tube, suction noted. Safety precautions maintained. Will continue to monitor.

## 2021-02-07 NOTE — PULMONOLOGY PROGRESS NOTE
Subjective


ROS Limited/Unobtainable:  Yes


Interval Events:  code blue (1/29)


Constitutional:  Reports: fever, other - Zu=562.2


HEENT:  Repors: no symptoms


Respiratory:  Reports: shortness of breath - better


Cardiovascular:  Reports: no symptoms


Gastrointestinal/Abdominal:  Reports: diarrhea


Allergies:  


Coded Allergies:  


     No Known Allergies (Unverified , 6/11/19)


All Systems:  reviewed and negative except above





Objective





Last 24 Hour Vital Signs








  Date Time  Temp Pulse Resp B/P (MAP) Pulse Ox O2 Delivery O2 Flow Rate FiO2


 


2/7/21 11:00    73/50    


 


2/7/21 11:00   33   Mechanical Ventilator  100


 


2/7/21 10:00    73/51    


 


2/7/21 10:00   33   Mechanical Ventilator  100


 


2/7/21 09:00    77/53    


 


2/7/21 09:00   33   Mechanical Ventilator  100


 


2/7/21 08:21   28   Mechanical Ventilator  100


 


2/7/21 08:00    83/52    


 


2/7/21 08:00   30   Mechanical Ventilator  100


 


2/7/21 07:30  96 36     100


 


2/7/21 07:00  100 30 101/58 (72) 78   


 


2/7/21 07:00    89/55    


 


2/7/21 07:00   29   Mechanical Ventilator  100


 


2/7/21 06:45  98 29 102/61 (75) 77   


 


2/7/21 06:39    71/53    


 


2/7/21 06:35  87 31 79/54 (62) 77   


 


2/7/21 06:30  86 28 73/49 (57) 77   


 


2/7/21 06:28  87 29 74/51 (59) 77   


 


2/7/21 06:15  89 30 79/52 (61) 78   


 


2/7/21 06:00  90 33 81/54 (63) 77   


 


2/7/21 06:00    77/54    


 


2/7/21 06:00   32   Mechanical Ventilator  100


 


2/7/21 06:00   32 81/54  Mechanical Ventilator  100


 


2/7/21 06:00  90 33 81/54 (63) 77   


 


2/7/21 05:52  90 30 77/53 (61) 77   


 


2/7/21 05:45  89 30 79/52 (61) 77   


 


2/7/21 05:34   36   Mechanical Ventilator  100


 


2/7/21 05:30    79/53    


 


2/7/21 05:30  86 30 74/51 (59) 77   


 


2/7/21 05:21  81      


 


2/7/21 05:18    69/43    


 


2/7/21 05:18   32   Mechanical Ventilator  100


 


2/7/21 05:15  89 28 79/51 (60) 76   


 


2/7/21 05:15  89 28  76   


 


2/7/21 05:11  89 32 85/54 (64) 76   


 


2/7/21 05:11  89 32 85/54 (64) 76   


 


2/7/21 05:02  82 31 78/51 (60) 74   


 


2/7/21 05:02  82 31 78/51 (60) 74   


 


2/7/21 05:00   32   Mechanical Ventilator  100


 


2/7/21 05:00  78 29 69/51 (57) 74   


 


2/7/21 05:00  78 29 69/51 (57) 74   


 


2/7/21 04:58   32 85/39  Mechanical Ventilator  100


 


2/7/21 04:45  88 29 83/49 (60) 76   


 


2/7/21 04:44  88 29 83/52 (62) 76   


 


2/7/21 04:30  88 26 84/52 (63) 76   


 


2/7/21 04:07   30 93/55  Mechanical Ventilator  100


 


2/7/21 04:00  92 25 93/56 (68) 77   


 


2/7/21 04:00   32   Mechanical Ventilator  100


 


2/7/21 04:00        100


 


2/7/21 04:00      Mechanical Ventilator  


 


2/7/21 03:45  90 30 89/52 (64) 77   


 


2/7/21 03:30  94 30 99/58 (72) 79   


 


2/7/21 03:15  90 30 95/59 (71) 79   


 


2/7/21 03:07   32 104/55  Mechanical Ventilator  100


 


2/7/21 03:00  90 33 95/60 (72) 81   


 


2/7/21 03:00   32   Mechanical Ventilator  100


 


2/7/21 03:00  90 33 95/60 (72) 81   


 


2/7/21 02:45  92 31 102/63 (76) 81   


 


2/7/21 02:30  92 26 100/61 (74) 82   


 


2/7/21 02:15  91 28 98/62 (74) 82   


 


2/7/21 02:07   32 108/77  Mechanical Ventilator  100


 


2/7/21 02:01   30   Mechanical Ventilator  100


 


2/7/21 02:00  91 27 102/61 (75) 82   


 


2/7/21 02:00  91 27 102/61 (75) 82   


 


2/7/21 01:45  91 24 103/60 (74) 81   


 


2/7/21 01:30  92 28 105/61 (76) 82   


 


2/7/21 01:23  91 40     100


 


2/7/21 01:15  91 26 101/62 (75) 81   


 


2/7/21 01:07   30 104/68  Mechanical Ventilator  100


 


2/7/21 01:01   32   Mechanical Ventilator  100


 


2/7/21 01:00  91 27 101/62 (75) 82   


 


2/7/21 01:00  91 27 101/62 (75) 82   


 


2/7/21 00:45  91 25 101/61 (74) 81   


 


2/7/21 00:30  91 29 105/63 (77) 82   


 


2/7/21 00:15  89 28 103/64 (77) 81   


 


2/7/21 00:07   32 101/63  Mechanical Ventilator  100


 


2/7/21 00:01   22   Mechanical Ventilator  100


 


2/7/21 00:00      Mechanical Ventilator  


 


2/7/21 00:00  90 23 100/61 (74) 81   


 


2/6/21 23:07   32 103/66  Mechanical Ventilator  100


 


2/6/21 23:01   18   Mechanical Ventilator  100


 


2/6/21 23:00  90 26 103/63 (76) 82   


 


2/6/21 22:45  90 28 103/62 (76) 83   


 


2/6/21 22:30  88 29 104/63 (77) 83   


 


2/6/21 22:30  88 29 104/63 (77) 83   


 


2/6/21 22:15  90 23 104/59 (74) 83   


 


2/6/21 22:07   32 103/63  Mechanical Ventilator  100


 


2/6/21 22:00  90 23 101/60 (74) 83   


 


2/6/21 22:00   18   Mechanical Ventilator  100


 


2/6/21 21:45  89 25 104/64 (77) 84   


 


2/6/21 21:30  89 26 105/61 (76) 84   


 


2/6/21 21:15  89 22 104/63 (77) 83   


 


2/6/21 21:07   12 110/83  Mechanical Ventilator  100


 


2/6/21 21:00  90 25 111/64 (80) 79   


 


2/6/21 21:00    80/46    


 


2/6/21 21:00   20   Mechanical Ventilator  100


 


2/6/21 21:00  90 25 111/64 (80) 79   


 


2/6/21 20:45  92 31 112/64 (80) 73   


 


2/6/21 20:30  90 33 108/63 (78) 70   


 


2/6/21 20:30  90 33 108/63 (78) 70   


 


2/6/21 20:15  87 30 95/56 (69) 79   


 


2/6/21 20:00      Mechanical Ventilator  


 


2/6/21 20:00  87      


 


2/6/21 20:00   20   Mechanical Ventilator  100


 


2/6/21 20:00   20 101/78  Mechanical Ventilator  100


 


2/6/21 20:00        100


 


2/6/21 20:00  87 31 99/57 (71) 81   


 


2/6/21 19:45  87 27 92/55 (67) 84   


 


2/6/21 19:30  87 30 100/61 (74) 83   


 


2/6/21 19:15  87 31 101/61 (74) 83   


 


2/6/21 19:10  86 35     100


 


2/6/21 19:00  87 31 104/60 (75) 83   


 


2/6/21 19:00    100/61    


 


2/6/21 19:00   30   Mechanical Ventilator  100


 


2/6/21 19:00   32 100/61  Mechanical Ventilator  100


 


2/6/21 19:00  87 31 104/60 (75) 83   


 


2/6/21 18:00  87 33 108/63 (78) 85   


 


2/6/21 18:00    103/60    


 


2/6/21 18:00   31   Mechanical Ventilator  100


 


2/6/21 18:00   31 103/60  Mechanical Ventilator  100


 


2/6/21 17:10  86 34     100


 


2/6/21 17:00    107/66    


 


2/6/21 17:00   32   Mechanical Ventilator  100


 


2/6/21 17:00   31 106/61  Mechanical Ventilator  100


 


2/6/21 17:00  87 32 121/60 (80) 83   


 


2/6/21 16:00        100


 


2/6/21 16:00    125/69    


 


2/6/21 16:00   31   Mechanical Ventilator  100


 


2/6/21 16:00   32 125/69  Mechanical Ventilator  100


 


2/6/21 16:00 98.5 86 28 122/72 (89) 83   


 


2/6/21 16:00      Mechanical Ventilator  


 


2/6/21 15:44  86      


 


2/6/21 15:10  86 34     100


 


2/6/21 15:00  86 31 122/68 (86) 83   


 


2/6/21 15:00    128/71    


 


2/6/21 15:00   30   Mechanical Ventilator  100


 


2/6/21 15:00   30 128/71  Mechanical Ventilator  100


 


2/6/21 14:00  86 31 122/69 (86) 85   


 


2/6/21 14:00    122/69    


 


2/6/21 14:00   30   Mechanical Ventilator  100


 


2/6/21 14:00   30 122/69  Mechanical Ventilator  100


 


2/6/21 13:28  85 34     100


 


2/6/21 13:00    122/71    


 


2/6/21 13:00   30   Mechanical Ventilator  100


 


2/6/21 13:00   30 122/71  Mechanical Ventilator  100


 


2/6/21 13:00  87 24 121/70 (87) 83   


 


2/6/21 12:00      Mechanical Ventilator  


 


2/6/21 12:00        100


 


2/6/21 12:00    106/66    


 


2/6/21 12:00   32   Mechanical Ventilator  100


 


2/6/21 12:00   30 106/66  Mechanical Ventilator  100


 


2/6/21 12:00 98.3 83 32 106/66 (79) 86   


 


2/6/21 11:26  85      

















Intake and Output  


 


 2/6/21 2/7/21





 19:00 07:00


 


Intake Total 1419.44 ml 1471.188 ml


 


Output Total 110 ml 10 ml


 


Balance 1309.44 ml 1461.188 ml


 


  


 


Free Water 40 ml 


 


IV Total 959.44 ml 1051.188 ml


 


Tube Feeding 420 ml 420 ml


 


Output Urine Total 35 ml 10 ml


 


Stool Total 50 ml 0 ml


 


Chest Tube Drainage Total 25 ml 








Objective


On Versed drip


General Appearance:  no acute distress


HEENT:  atraumatic


Respiratory:  lungs clear, decreased breath sounds


Cardiovascular:  normal rate, regular rhythm


Abdomen:  soft, non tender, no organomegaly


Laboratory Tests


2/7/21 05:15: 


White Blood Count 11.2H, Red Blood Count 3.02L, Hemoglobin 8.9L, Hematocrit 

29.8L, Mean Corpuscular Volume 99, Mean Corpuscular Hemoglobin 29.4, Mean 

Corpuscular Hemoglobin Concent 29.7L, Red Cell Distribution Width 21.5H, Plat

elet Count 261, Mean Platelet Volume 8.8, Neutrophils (%) (Auto) , Lymphocytes 

(%) (Auto) , Monocytes (%) (Auto) , Eosinophils (%) (Auto) , Basophils (%) 

(Auto) , Differential Total Cells Counted 100, Neutrophils % (Manual) 84H, 

Lymphocytes % (Manual) 8L, Monocytes % (Manual) 4, Eosinophils % (Manual) 0, 

Basophils % (Manual) 0, Myelocytes % 3H, Band Neutrophils 1, Nucleated Red Blood

Cells 5, Platelet Estimate Adequate, Platelet Morphology Normal, Polychromasia 

2+, Hypochromasia 1+, Anisocytosis 3+, Sodium Level 136, Potassium Level 5.2H, 

Chloride Level 100, Carbon Dioxide Level 25, Anion Gap 11, Blood Urea Nitrogen 

116H, Creatinine 5.3H, Estimat Glomerular Filtration Rate 10.9, Glucose Level 

236H, Uric Acid 11.6H, Calcium Level 7.5L, Phosphorus Level 8.9H, Magnesium 

Level 2.6H, Total Bilirubin 0.6, Aspartate Amino Transf (AST/SGOT) 25, Alanine 

Aminotransferase (ALT/SGPT) 53, Alkaline Phosphatase 458H, C-Reactive Protein, 

Quantitative 4.8H, Pro-B-Type Natriuretic Peptide > 83153Z, Total Protein 5.9L, 

Albumin 1.9L, Globulin 4.0, Albumin/Globulin Ratio 0.5L


2/7/21 05:52: POC Whole Blood Glucose [Pending]





Current Medications








 Medications


  (Trade)  Dose


 Ordered  Sig/Julisa


 Route


 PRN Reason  Start Time


 Stop Time Status Last Admin


Dose Admin


 


 Acetaminophen


  (Tylenol)  650 mg  Q4H  PRN


 GT


 Temp >100.5  1/15/21 17:15


 2/14/21 17:14  1/29/21 06:59





 


 Allopurinol


  (allopurinoL)  300 mg  DAILY


 NG


   2/7/21 11:15


 3/9/21 11:14 UNV  





 


 Amiodarone HCl


  (Cordarone)  200 mg  DAILY


 NG


   1/26/21 09:00


 4/19/21 20:59  2/7/21 08:18





 


 Chlorhexidine


 Gluconate


  (Vanessa-Hex 2%)  1 applic  DAILY@2000


 TOPIC


   1/19/21 20:00


 4/19/21 19:59  2/6/21 20:00





 


 Dextrose


  (Dextrose 50%)  25 ml  Q30M  PRN


 IV


 Hypoglycemia  1/9/21 13:30


 4/9/21 13:29   





 


 Dextrose


  (Dextrose 50%)  50 ml  Q30M  PRN


 IV


 Hypoglycemia  1/9/21 13:30


 4/9/21 13:29   





 


 Docusate Sodium


  (Colace)  100 mg  TIDPRN  PRN


 GT


 Constipation  1/12/21 01:15


 2/11/21 01:14  1/12/21 01:42





 


 Enoxaparin Sodium


  (Lovenox)  80 mg  DAILY


 SUBQ


   2/4/21 09:00


 5/5/21 08:59  2/7/21 08:19





 


 Fentanyl Citrate  250 ml @ 0


 mls/hr  Q24H


 IV


   2/5/21 18:22


 2/7/21 18:21  2/7/21 04:58





 


 Fluconazole


  (Diflucan)  100 mg  DAILY


 GT


   2/6/21 12:45


 2/13/21 12:44  2/7/21 08:18





 


 Hydrocortisone


  (Solu-CORTEF)  100 mg  EVERY 8  HOURS


 IV


   2/1/21 15:45


 5/2/21 15:44  2/7/21 06:08





 


 Insulin Aspart


  (NovoLOG)    Q6HR


 SUBQ


   1/14/21 12:00


 4/9/21 17:59  2/7/21 06:09





 


 Insulin Detemir


  (Levemir)  26 units  Q12HR


 SUBQ


   2/2/21 09:00


 4/12/21 08:59  2/7/21 09:02





 


 Midazolam HCl 50


 mg/Sodium Chloride  100 ml @ 0


 mls/hr  Q24H  PRN


 IV


 AS DIRECTED  2/5/21 19:52


 2/7/21 19:51  2/7/21 08:21





 


 Midodrine


  (Pro-Amatine)  10 mg  Q8HR


 ORAL


   2/1/21 15:30


 5/2/21 15:29  2/7/21 06:08





 


 Norepinephrine


 Bitartrate 16 mg/


 Sodium Chloride  500 ml @ 0


 mls/hr  Q24H


 IV


   2/6/21 10:30


 2/9/21 10:29  2/7/21 05:18





 


 Pantoprazole


  (Protonix)  40 mg  Q12HR


 IVP


   2/1/21 21:00


 2/13/21 08:59  2/7/21 08:18





 


 Quetiapine


 Fumarate


  (SEROqueL)  50 mg  Q12HR


 ORAL


   2/4/21 11:45


 3/21/21 11:44  2/6/21 20:38





 


 Sevelamer


 Carbonate


  (Renvela)  1,600 mg  THREE TIMES A  DAY


 NG


   2/7/21 13:00


 5/8/21 12:59 UNV  





 


 Sodium Chloride  500 ml @ 


 999 mls/hr  Q31M PRN


 IV


 For hypotension  1/15/21 18:30


 2/14/21 18:29   





 


 Sodium Chloride  1,000 ml @ 


 30 mls/hr  Q24H


 IV


   2/1/21 15:30


 3/3/21 15:29  2/7/21 06:27





 


 Vitamin D


  (Vitamin D)  5,000 unit  DAILY


 GT


   2/6/21 17:30


 3/8/21 17:29  2/7/21 08:18














Assessment/Plan


Assessment/Plan


1. COVID-19 pneumonia.


   - s/p Decadron, azithromycin, and ceftriaxone


   - Intubated; on PC ventilation


          -Currently 100% FiO2. PEEP 14; SaO2 mid 80's%


            - Has R apical PTX and subcut emphysema


             - S/p R CT placement





2. Diabetes mellitus.; 


3. Elevated inflammatory markers.


4. High D-dimer. On full dose Lovenox


5. Hypernatremia; will continue D5W


6. Hyperglycemia.


   - BG control


7. Hypoxemia., secondary to #1; improved





DVT prophylaxis   On Lovenox.


Sedated; advised RN to increase sedation





Hypotensive


On levophed


Continue PEEP 12; PC 25; rate 32


Has hypercapnia, acidemia


Poor prognosis


Sedated, 


FiO2 100%











Sung Lemos MD            Feb 7, 2021 11:25

## 2021-02-07 NOTE — NUR
NURSE NOTES:



MD Lemos at bedside to assess pt. Made MD Lemos aware pt's BP remains in the 70s/50s. 
Pt's current VS: BP 78/49, HR 95, 83% O2 sat, RR 32. Pt is already on Levophed at 30mcg/hr, 
MD aware, MD Lemos not ordering another pressor at this time. No new orders received. Will 
continue to monitor pt.

## 2021-02-07 NOTE — CARDIAC ELECTROPHYSIOLOGY PN
Assessment/Plan


Assessment/Plan


1. Atrial fibrillation with rapid ventricular response.      


      On digoxin 0.125 mg p.o. daily and Amiodarone 200 po qd  


       In SR.     Dig level 1.0





2. Acute renal failure. 


3. Right pneumothorax, status post chest tube with subcutaneous emphysema. 


No significant drainage





4. COVID-19 pneumonia, 100% FiO2 and PEEP of 12, on Remdesivir and Solu-Medrol  

.


5. Diabetes.


6. Septic shock. On iv fluid and Levophed 16 Mcg  


7. S/P PEA arrest 1/29/21 and 1/31/21 and 2/5/21


    Coded again and since was DNR/DNI was pronounced by ER MD at 14:32 





DW RN


Family at bedside





Subjective


Subjective


In ICU on 100% Fio2 and PEEP 14 and Levo  16 Mcg  


On Fentanyl rip at 30. Intermittent LBBB.


 Right chest tube . In SR  


Coded twice 1/30/21  for PEA with chest compressions and Epi and bicarb 

injections


Coded again 2/5/21  


Now DNR and DNI





DNR





Objective





Last 24 Hour Vital Signs








  Date Time  Temp Pulse Resp B/P (MAP) Pulse Ox O2 Delivery O2 Flow Rate FiO2


 


2/7/21 14:20  0 0     


 


2/7/21 14:15  0 0     


 


2/7/21 14:10  50 31  80   


 


2/7/21 14:05  34 18  85   


 


2/7/21 14:03   30 76/58  Mechanical Ventilator  100


 


2/7/21 14:01    76/58    


 


2/7/21 14:00  99 28 76/52 (60) 85   


 


2/7/21 14:00    78/57    


 


2/7/21 14:00   25   Mechanical Ventilator  100


 


2/7/21 14:00   28 78/57  Mechanical Ventilator  100


 


2/7/21 13:00    73/51    


 


2/7/21 13:00   30   Mechanical Ventilator  100


 


2/7/21 13:00   29 73/51  Mechanical Ventilator  100


 


2/7/21 13:00  97 33 73/51 (58) 85   


 


2/7/21 12:00    75/52    


 


2/7/21 12:00   28   Mechanical Ventilator  100


 


2/7/21 12:00   33 75/52  Mechanical Ventilator  100


 


2/7/21 12:00      Mechanical Ventilator  


 


2/7/21 12:00 99.2 97 32 75/52 (60) 84   


 


2/7/21 12:00        100


 


2/7/21 11:30  95 33 78/49 (59) 83   


 


2/7/21 11:25  95      


 


2/7/21 11:00  95 33 75/53 (60) 82   


 


2/7/21 11:00  97 36     100


 


2/7/21 11:00  93 34 73/50 (58) 82   


 


2/7/21 11:00    73/50    


 


2/7/21 11:00   33   Mechanical Ventilator  100


 


2/7/21 11:00   33 73/50  Mechanical Ventilator  100


 


2/7/21 10:30  96 34 75/53 (60) 82   


 


2/7/21 10:00  91 33 73/51 (58) 78   


 


2/7/21 10:00  92 33 78/53 (61) 78   


 


2/7/21 10:00    73/51    


 


2/7/21 10:00   33   Mechanical Ventilator  100


 


2/7/21 10:00   34 73/51  Mechanical Ventilator  100


 


2/7/21 09:30  92 33 78/53 (61) 78   


 


2/7/21 09:00  97 34 77/53 (61) 77   


 


2/7/21 09:00    77/53    


 


2/7/21 09:00   33   Mechanical Ventilator  100


 


2/7/21 09:00   33 77/53  Mechanical Ventilator  100


 


2/7/21 09:00  95 33 77/53 (61) 77   


 


2/7/21 08:30  98 32 80/56 (64) 77   


 


2/7/21 08:21   28   Mechanical Ventilator  100


 


2/7/21 08:00  96 30 83/52 (62) 74   


 


2/7/21 08:00 99.1 97 30 83/52 (62) 75   


 


2/7/21 08:00        100


 


2/7/21 08:00    83/52    


 


2/7/21 08:00   30   Mechanical Ventilator  100


 


2/7/21 08:00   30 83/52  Mechanical Ventilator  100


 


2/7/21 08:00  96      


 


2/7/21 08:00      Mechanical Ventilator  


 


2/7/21 07:30  97 30 89/55 (66) 76   


 


2/7/21 07:30  96 36     100


 


2/7/21 07:00  100 30 101/58 (72) 78   


 


2/7/21 07:00    89/55    


 


2/7/21 07:00   29   Mechanical Ventilator  100


 


2/7/21 07:00   30 89/55  Mechanical Ventilator  100


 


2/7/21 06:45  98 29 102/61 (75) 77   


 


2/7/21 06:39    71/53    


 


2/7/21 06:35  87 31 79/54 (62) 77   


 


2/7/21 06:30  86 28 73/49 (57) 77   


 


2/7/21 06:28  87 29 74/51 (59) 77   


 


2/7/21 06:15  89 30 79/52 (61) 78   


 


2/7/21 06:00  90 33 81/54 (63) 77   


 


2/7/21 06:00    77/54    


 


2/7/21 06:00   32   Mechanical Ventilator  100


 


2/7/21 06:00   32 81/54  Mechanical Ventilator  100


 


2/7/21 06:00  90 33 81/54 (63) 77   


 


2/7/21 05:52  90 30 77/53 (61) 77   


 


2/7/21 05:45  89 30 79/52 (61) 77   


 


2/7/21 05:34   36   Mechanical Ventilator  100


 


2/7/21 05:30    79/53    


 


2/7/21 05:30  86 30 74/51 (59) 77   


 


2/7/21 05:21  81      


 


2/7/21 05:18    69/43    


 


2/7/21 05:18   32   Mechanical Ventilator  100


 


2/7/21 05:15  89 28 79/51 (60) 76   


 


2/7/21 05:15  89 28  76   


 


2/7/21 05:11  89 32 85/54 (64) 76   


 


2/7/21 05:11  89 32 85/54 (64) 76   


 


2/7/21 05:02  82 31 78/51 (60) 74   


 


2/7/21 05:02  82 31 78/51 (60) 74   


 


2/7/21 05:00   32   Mechanical Ventilator  100


 


2/7/21 05:00  78 29 69/51 (57) 74   


 


2/7/21 05:00  78 29 69/51 (57) 74   


 


2/7/21 04:58   32 85/39  Mechanical Ventilator  100


 


2/7/21 04:45  88 29 83/49 (60) 76   


 


2/7/21 04:44  88 29 83/52 (62) 76   


 


2/7/21 04:30  88 26 84/52 (63) 76   


 


2/7/21 04:07   30 93/55  Mechanical Ventilator  100


 


2/7/21 04:00  92 25 93/56 (68) 77   


 


2/7/21 04:00   32   Mechanical Ventilator  100


 


2/7/21 04:00        100


 


2/7/21 04:00      Mechanical Ventilator  


 


2/7/21 03:45  90 30 89/52 (64) 77   


 


2/7/21 03:30  94 30 99/58 (72) 79   


 


2/7/21 03:15  90 30 95/59 (71) 79   


 


2/7/21 03:07   32 104/55  Mechanical Ventilator  100


 


2/7/21 03:00  90 33 95/60 (72) 81   


 


2/7/21 03:00   32   Mechanical Ventilator  100


 


2/7/21 03:00  90 33 95/60 (72) 81   


 


2/7/21 02:45  92 31 102/63 (76) 81   


 


2/7/21 02:30  92 26 100/61 (74) 82   


 


2/7/21 02:15  91 28 98/62 (74) 82   


 


2/7/21 02:07   32 108/77  Mechanical Ventilator  100


 


2/7/21 02:01   30   Mechanical Ventilator  100


 


2/7/21 02:00  91 27 102/61 (75) 82   


 


2/7/21 02:00  91 27 102/61 (75) 82   


 


2/7/21 01:45  91 24 103/60 (74) 81   


 


2/7/21 01:30  92 28 105/61 (76) 82   


 


2/7/21 01:23  91 40     100


 


2/7/21 01:15  91 26 101/62 (75) 81   


 


2/7/21 01:07   30 104/68  Mechanical Ventilator  100


 


2/7/21 01:01   32   Mechanical Ventilator  100


 


2/7/21 01:00  91 27 101/62 (75) 82   


 


2/7/21 01:00  91 27 101/62 (75) 82   


 


2/7/21 00:45  91 25 101/61 (74) 81   


 


2/7/21 00:30  91 29 105/63 (77) 82   


 


2/7/21 00:15  89 28 103/64 (77) 81   


 


2/7/21 00:07   32 101/63  Mechanical Ventilator  100


 


2/7/21 00:01   22   Mechanical Ventilator  100


 


2/7/21 00:00      Mechanical Ventilator  


 


2/7/21 00:00  90 23 100/61 (74) 81   


 


2/6/21 23:07   32 103/66  Mechanical Ventilator  100


 


2/6/21 23:01   18   Mechanical Ventilator  100


 


2/6/21 23:00  90 26 103/63 (76) 82   


 


2/6/21 22:45  90 28 103/62 (76) 83   


 


2/6/21 22:30  88 29 104/63 (77) 83   


 


2/6/21 22:30  88 29 104/63 (77) 83   


 


2/6/21 22:15  90 23 104/59 (74) 83   


 


2/6/21 22:07   32 103/63  Mechanical Ventilator  100


 


2/6/21 22:00  90 23 101/60 (74) 83   


 


2/6/21 22:00   18   Mechanical Ventilator  100


 


2/6/21 21:45  89 25 104/64 (77) 84   


 


2/6/21 21:30  89 26 105/61 (76) 84   


 


2/6/21 21:15  89 22 104/63 (77) 83   


 


2/6/21 21:07   12 110/83  Mechanical Ventilator  100


 


2/6/21 21:00  90 25 111/64 (80) 79   


 


2/6/21 21:00    80/46    


 


2/6/21 21:00   20   Mechanical Ventilator  100


 


2/6/21 21:00  90 25 111/64 (80) 79   


 


2/6/21 20:45  92 31 112/64 (80) 73   


 


2/6/21 20:30  90 33 108/63 (78) 70   


 


2/6/21 20:30  90 33 108/63 (78) 70   


 


2/6/21 20:15  87 30 95/56 (69) 79   


 


2/6/21 20:00      Mechanical Ventilator  


 


2/6/21 20:00  87      


 


2/6/21 20:00   20   Mechanical Ventilator  100


 


2/6/21 20:00   20 101/78  Mechanical Ventilator  100


 


2/6/21 20:00        100


 


2/6/21 20:00  87 31 99/57 (71) 81   


 


2/6/21 19:45  87 27 92/55 (67) 84   


 


2/6/21 19:30  87 30 100/61 (74) 83   


 


2/6/21 19:15  87 31 101/61 (74) 83   


 


2/6/21 19:10  86 35     100


 


2/6/21 19:00  87 31 104/60 (75) 83   


 


2/6/21 19:00    100/61    


 


2/6/21 19:00   30   Mechanical Ventilator  100


 


2/6/21 19:00   32 100/61  Mechanical Ventilator  100


 


2/6/21 19:00  87 31 104/60 (75) 83   


 


2/6/21 18:00  87 33 108/63 (78) 85   


 


2/6/21 18:00    103/60    


 


2/6/21 18:00   31   Mechanical Ventilator  100


 


2/6/21 18:00   31 103/60  Mechanical Ventilator  100

















Intake and Output  


 


 2/6/21 2/7/21





 19:00 07:00


 


Intake Total 1419.44 ml 1501.188 ml


 


Output Total 110 ml 10 ml


 


Balance 1309.44 ml 1491.188 ml


 


  


 


Free Water 40 ml 


 


IV Total 959.44 ml 1081.188 ml


 


Tube Feeding 420 ml 420 ml


 


Output Urine Total 35 ml 10 ml


 


Stool Total 50 ml 0 ml


 


Chest Tube Drainage Total 25 ml 











Laboratory Tests








Test


 2/7/21


05:15 2/7/21


05:52


 


White Blood Count


 11.2 K/UL


(4.8-10.8)  H 





 


Red Blood Count


 3.02 M/UL


(4.70-6.10)  L 





 


Hemoglobin


 8.9 G/DL


(14.2-18.0)  L 





 


Hematocrit


 29.8 %


(42.0-52.0)  L 





 


Mean Corpuscular Volume 99 FL (80-99)   


 


Mean Corpuscular Hemoglobin


 29.4 PG


(27.0-31.0) 





 


Mean Corpuscular Hemoglobin


Concent 29.7 G/DL


(32.0-36.0)  L 





 


Red Cell Distribution Width


 21.5 %


(11.6-14.8)  H 





 


Platelet Count


 261 K/UL


(150-450) 





 


Mean Platelet Volume


 8.8 FL


(6.5-10.1) 





 


Neutrophils (%) (Auto)


 % (45.0-75.0)


 





 


Lymphocytes (%) (Auto)


 % (20.0-45.0)


 





 


Monocytes (%) (Auto)  % (1.0-10.0)   


 


Eosinophils (%) (Auto)  % (0.0-3.0)   


 


Basophils (%) (Auto)  % (0.0-2.0)   


 


Differential Total Cells


Counted 100  


 





 


Neutrophils % (Manual) 84 % (45-75)  H 


 


Lymphocytes % (Manual) 8 % (20-45)  L 


 


Monocytes % (Manual) 4 % (1-10)   


 


Eosinophils % (Manual) 0 % (0-3)   


 


Basophils % (Manual) 0 % (0-2)   


 


Myelocytes % 3 % (0-0)  H 


 


Band Neutrophils 1 % (0-8)   


 


Nucleated Red Blood Cells 5 /100 WBC   


 


Platelet Estimate Adequate   


 


Platelet Morphology Normal   


 


Polychromasia 2+   


 


Hypochromasia 1+   


 


Anisocytosis 3+   


 


Sodium Level


 136 MMOL/L


(136-145) 





 


Potassium Level


 5.2 MMOL/L


(3.5-5.1)  H 





 


Chloride Level


 100 MMOL/L


() 





 


Carbon Dioxide Level


 25 MMOL/L


(21-32) 





 


Anion Gap


 11 mmol/L


(5-15) 





 


Blood Urea Nitrogen


 116 mg/dL


(7-18)  H 





 


Creatinine


 5.3 MG/DL


(0.55-1.30)  H 





 


Estimat Glomerular Filtration


Rate 10.9 mL/min


(>60) 





 


Glucose Level


 236 MG/DL


()  H 





 


Uric Acid


 11.6 MG/DL


(2.6-7.2)  H 





 


Calcium Level


 7.5 MG/DL


(8.5-10.1)  L 





 


Phosphorus Level


 8.9 MG/DL


(2.5-4.9)  H 





 


Magnesium Level


 2.6 MG/DL


(1.8-2.4)  H 





 


Total Bilirubin


 0.6 MG/DL


(0.2-1.0) 





 


Aspartate Amino Transf


(AST/SGOT) 25 U/L (15-37)


 





 


Alanine Aminotransferase


(ALT/SGPT) 53 U/L (12-78)


 





 


Alkaline Phosphatase


 458 U/L


()  H 





 


C-Reactive Protein,


Quantitative 4.8 mg/dL


(0.00-0.90)  H 





 


Pro-B-Type Natriuretic Peptide


 > 23682 pg/mL


(0-125)  H 





 


Total Protein


 5.9 G/DL


(6.4-8.2)  L 





 


Albumin


 1.9 G/DL


(3.4-5.0)  L 





 


Globulin 4.0 g/dL   


 


Albumin/Globulin Ratio


 0.5 (1.0-2.7)


L 





 


POC Whole Blood Glucose  Pending  








Objective














Jake George MD                Feb 7, 2021 17:54

## 2021-02-07 NOTE — HEMATOLOGY/ONC PROGRESS NOTE
Assessment/Plan


Assessment/Plan


Leukocytosis 2/2 covid pna wbc 15->14-->17-->9.6-->13--.11.9


Anemia 2/2 chronic disease, hgb 11-->10.4-->9.8-->8.6-->8.5


Thrombocytopenia with plt 122


Hypercoag disorder now on lovenox sq


Ac resp distress on ventilator


Pneumomediastinum


etoh abused


agitated -halodol


vent





Vent


Pressors


cont iv abx and iv decadrone


pulmonary and gi on consult


smear is noted


pneumomediastinum per pulm


dw charge nurse


lovenox sq





Subjective


HEENT:  Denies: no symptoms, eye pain, blurred vision, tearing, double vision, 

ear pain, ear discharge, nose pain, nose congestion, throat pain, throat 

swelling, mouth pain, mouth swelling, other


Cardiovascular:  Denies: no symptoms, chest pain, edema, irregular heart rate, 

lightheadedness, palpitations, syncope, other


Gastrointestinal/Abdominal:  Denies: no symptoms, abdomen distended, abdominal 

pain, black stools, tarry stools, blood in stool, constipated, diarrhea, 

difficulty swallowing, nausea, poor appetite, poor fluid intake, rectal 

bleeding, vomiting, other


Genitourinary:  Denies: no symptoms, burning, discharge, frequency, flank pain, 

hematuria, incontinence, pain, urgency, other


Neurologic/Psychiatric:  Denies: no symptoms, anxiety, depressed, emotional 

problems, headache, numbness, paresthesia, pre-existing deficit, seizure, 

tingling, tremors, weakness, other


Endocrine:  Denies: no symptoms, excessive sweating, flushing, intolerance to 

cold, intolerance to heat, increased hunger, increased thirst, increased urine, 

unexplained weight gain, unexplained weight loss, other


Hematologic/Lymphatic:  Denies: no symptoms, anemia, easy bleeding, easy 

bruising, adenopathy, other


Allergies:  


Coded Allergies:  


     No Known Allergies (Unverified , 6/11/19)


Subjective


1/10 on ventilator 60% fio2, on sediation, unresponsive, in icu


1/14 on vent, icu, wbc elev, no bleeding, unresponsive


1/15 on vent, with cash, icu, no bleeding, unresponsive


1/17 on vent, dw rn, no major changes, icu, nv


1/18 nv, labs reviewd, hr is elev, with afib, is onlovenox sq


1/19 remains on vent, sedated with wrist restraints, icu


1/20 on vent, with hematuria, ngt, in icu, labs reviewed


1/21 remains on vent, settings have been adjusted as per icu care


1/22 remains on vent, icu, on levo 6mcg, off versed, labs noted, elmer rn


1/25 remains onv ent, in icu, with restraints, on levo gtt as well


1/26 nv, suctioned, rectal tube, in icu, with restraints, no bleeding


1/27 nv, remains on vent, rectal tube, labs reviewed, elmer rn


1/28 nv, icu, on vent/ bipap, labs noted, no bleeding, meds reviewed


1/29 nv, on vent, overnight no changes, meds reviewed, labs noted


1/30: code blue last night.


1/31 labs are noted, nv, on vent, meds reviewed, no new change, elmer rn, on 

pressors


2/1 vent, labs have been ordered, nv, meds reviewed, elmer rn


2/2 vent, levo, fentanyl, meds reviewed, nv


2/3 vent, labs reviewed, meds noted, hgb 8.6


2/4 nv, on vent, repositioned, hgb 8.5, no bleeding


2/5 nv, is on vent, fentanyl as well as versed, hgb 8.5


2/7 nv, remains on vent, labs noted, no bleeding, hgb 8.9





Objective


Objective





Current Medications








 Medications


  (Trade)  Dose


 Ordered  Sig/Julisa


 Route


 PRN Reason  Start Time


 Stop Time Status Last Admin


Dose Admin


 


 Acetaminophen


  (Tylenol)  650 mg  Q4H  PRN


 GT


 Temp >100.5  1/15/21 17:15


 2/14/21 17:14  1/29/21 06:59





 


 Amiodarone HCl


  (Cordarone)  200 mg  DAILY


 NG


   1/26/21 09:00


 4/19/21 20:59  2/6/21 08:56





 


 Chlorhexidine


 Gluconate


  (Vanessa-Hex 2%)  1 applic  DAILY@2000


 TOPIC


   1/19/21 20:00


 4/19/21 19:59  2/6/21 20:00





 


 Dextrose


  (Dextrose 50%)  25 ml  Q30M  PRN


 IV


 Hypoglycemia  1/9/21 13:30


 4/9/21 13:29   





 


 Dextrose


  (Dextrose 50%)  50 ml  Q30M  PRN


 IV


 Hypoglycemia  1/9/21 13:30


 4/9/21 13:29   





 


 Docusate Sodium


  (Colace)  100 mg  TIDPRN  PRN


 GT


 Constipation  1/12/21 01:15


 2/11/21 01:14  1/12/21 01:42





 


 Enoxaparin Sodium


  (Lovenox)  80 mg  DAILY


 SUBQ


   2/4/21 09:00


 5/5/21 08:59  2/6/21 08:57





 


 Fentanyl Citrate  250 ml @ 0


 mls/hr  Q24H


 IV


   2/5/21 18:22


 2/7/21 18:21  2/7/21 04:58





 


 Fluconazole


  (Diflucan)  100 mg  DAILY


 GT


   2/6/21 12:45


 2/13/21 12:44  2/6/21 13:31





 


 Hydrocortisone


  (Solu-CORTEF)  100 mg  EVERY 8  HOURS


 IV


   2/1/21 15:45


 5/2/21 15:44  2/7/21 06:08





 


 Insulin Aspart


  (NovoLOG)    Q6HR


 SUBQ


   1/14/21 12:00


 4/9/21 17:59  2/7/21 06:09





 


 Insulin Detemir


  (Levemir)  26 units  Q12HR


 SUBQ


   2/2/21 09:00


 4/12/21 08:59  2/6/21 20:53





 


 Midazolam HCl 50


 mg/Sodium Chloride  100 ml @ 0


 mls/hr  Q24H  PRN


 IV


 AS DIRECTED  2/5/21 19:52


 2/7/21 19:51  2/6/21 19:00





 


 Midodrine


  (Pro-Amatine)  10 mg  Q8HR


 ORAL


   2/1/21 15:30


 5/2/21 15:29  2/7/21 06:08





 


 Norepinephrine


 Bitartrate 16 mg/


 Sodium Chloride  500 ml @ 0


 mls/hr  Q24H


 IV


   2/6/21 10:30


 2/9/21 10:29  2/7/21 05:18





 


 Pantoprazole


  (Protonix)  40 mg  Q12HR


 IVP


   2/1/21 21:00


 2/13/21 08:59  2/6/21 20:38





 


 Quetiapine


 Fumarate


  (SEROqueL)  50 mg  Q12HR


 ORAL


   2/4/21 11:45


 3/21/21 11:44  2/6/21 20:38





 


 Sodium Chloride  500 ml @ 


 999 mls/hr  Q31M PRN


 IV


 For hypotension  1/15/21 18:30


 2/14/21 18:29   





 


 Sodium Chloride  1,000 ml @ 


 30 mls/hr  Q24H


 IV


   2/1/21 15:30


 3/3/21 15:29  2/7/21 06:27





 


 Vitamin D


  (Vitamin D)  5,000 unit  DAILY


 GT


   2/6/21 17:30


 3/8/21 17:29  2/6/21 17:43














Last 24 Hour Vital Signs








  Date Time  Temp Pulse Resp B/P (MAP) Pulse Ox O2 Delivery O2 Flow Rate FiO2


 


2/7/21 06:39    71/53    


 


2/7/21 06:00  90 33 81/54 (63) 77   


 


2/7/21 06:00    77/54    


 


2/7/21 06:00   32   Mechanical Ventilator  100


 


2/7/21 06:00   32 81/54  Mechanical Ventilator  100


 


2/7/21 05:52  90 30 77/53 (61) 77   


 


2/7/21 05:45  89 30 79/52 (61) 77   


 


2/7/21 05:34   36   Mechanical Ventilator  100


 


2/7/21 05:30    79/53    


 


2/7/21 05:30  86 30 74/51 (59) 77   


 


2/7/21 05:21  81      


 


2/7/21 05:18    69/43    


 


2/7/21 05:18   32   Mechanical Ventilator  100


 


2/7/21 05:15  89 28 79/51 (60) 76   


 


2/7/21 05:15  89 28  76   


 


2/7/21 05:11  89 32 85/54 (64) 76   


 


2/7/21 05:11  89 32 85/54 (64) 76   


 


2/7/21 05:02  82 31 78/51 (60) 74   


 


2/7/21 05:02  82 31 78/51 (60) 74   


 


2/7/21 05:00   32   Mechanical Ventilator  100


 


2/7/21 05:00  78 29 69/51 (57) 74   


 


2/7/21 05:00  78 29 69/51 (57) 74   


 


2/7/21 04:58   32 85/39  Mechanical Ventilator  100


 


2/7/21 04:45  88 29 83/49 (60) 76   


 


2/7/21 04:44  88 29 83/52 (62) 76   


 


2/7/21 04:30  88 26 84/52 (63) 76   


 


2/7/21 04:07   30 93/55  Mechanical Ventilator  100


 


2/7/21 04:00  92 25 93/56 (68) 77   


 


2/7/21 04:00   32   Mechanical Ventilator  100


 


2/7/21 04:00        100


 


2/7/21 04:00      Mechanical Ventilator  


 


2/7/21 03:45  90 30 89/52 (64) 77   


 


2/7/21 03:30  94 30 99/58 (72) 79   


 


2/7/21 03:15  90 30 95/59 (71) 79   


 


2/7/21 03:07   32 104/55  Mechanical Ventilator  100


 


2/7/21 03:00  90 33 95/60 (72) 81   


 


2/7/21 03:00   32   Mechanical Ventilator  100


 


2/7/21 03:00  90 33 95/60 (72) 81   


 


2/7/21 02:45  92 31 102/63 (76) 81   


 


2/7/21 02:30  92 26 100/61 (74) 82   


 


2/7/21 02:15  91 28 98/62 (74) 82   


 


2/7/21 02:07   32 108/77  Mechanical Ventilator  100


 


2/7/21 02:01   30   Mechanical Ventilator  100


 


2/7/21 02:00  91 27 102/61 (75) 82   


 


2/7/21 02:00  91 27 102/61 (75) 82   


 


2/7/21 01:45  91 24 103/60 (74) 81   


 


2/7/21 01:30  92 28 105/61 (76) 82   


 


2/7/21 01:23  91 40     100


 


2/7/21 01:15  91 26 101/62 (75) 81   


 


2/7/21 01:07   30 104/68  Mechanical Ventilator  100


 


2/7/21 01:01   32   Mechanical Ventilator  100


 


2/7/21 01:00  91 27 101/62 (75) 82   


 


2/7/21 01:00  91 27 101/62 (75) 82   


 


2/7/21 00:45  91 25 101/61 (74) 81   


 


2/7/21 00:30  91 29 105/63 (77) 82   


 


2/7/21 00:15  89 28 103/64 (77) 81   


 


2/7/21 00:07   32 101/63  Mechanical Ventilator  100


 


2/7/21 00:01   22   Mechanical Ventilator  100


 


2/7/21 00:00      Mechanical Ventilator  


 


2/7/21 00:00  90 23 100/61 (74) 81   


 


2/6/21 23:07   32 103/66  Mechanical Ventilator  100


 


2/6/21 23:01   18   Mechanical Ventilator  100


 


2/6/21 23:00  90 26 103/63 (76) 82   


 


2/6/21 22:45  90 28 103/62 (76) 83   


 


2/6/21 22:30  88 29 104/63 (77) 83   


 


2/6/21 22:30  88 29 104/63 (77) 83   


 


2/6/21 22:15  90 23 104/59 (74) 83   


 


2/6/21 22:07   32 103/63  Mechanical Ventilator  100


 


2/6/21 22:00  90 23 101/60 (74) 83   


 


2/6/21 22:00   18   Mechanical Ventilator  100


 


2/6/21 21:45  89 25 104/64 (77) 84   


 


2/6/21 21:30  89 26 105/61 (76) 84   


 


2/6/21 21:15  89 22 104/63 (77) 83   


 


2/6/21 21:07   12 110/83  Mechanical Ventilator  100


 


2/6/21 21:00  90 25 111/64 (80) 79   


 


2/6/21 21:00    80/46    


 


2/6/21 21:00   20   Mechanical Ventilator  100


 


2/6/21 21:00  90 25 111/64 (80) 79   


 


2/6/21 20:45  92 31 112/64 (80) 73   


 


2/6/21 20:30  90 33 108/63 (78) 70   


 


2/6/21 20:30  90 33 108/63 (78) 70   


 


2/6/21 20:15  87 30 95/56 (69) 79   


 


2/6/21 20:00      Mechanical Ventilator  


 


2/6/21 20:00  87      


 


2/6/21 20:00   20   Mechanical Ventilator  100


 


2/6/21 20:00   20 101/78  Mechanical Ventilator  100


 


2/6/21 20:00        100


 


2/6/21 20:00  87 31 99/57 (71) 81   


 


2/6/21 19:45  87 27 92/55 (67) 84   


 


2/6/21 19:30  87 30 100/61 (74) 83   


 


2/6/21 19:15  87 31 101/61 (74) 83   


 


2/6/21 19:10  86 35     100


 


2/6/21 19:00  87 31 104/60 (75) 83   


 


2/6/21 19:00    100/61    


 


2/6/21 19:00   30   Mechanical Ventilator  100


 


2/6/21 19:00   32 100/61  Mechanical Ventilator  100


 


2/6/21 19:00  87 31 104/60 (75) 83   


 


2/6/21 18:00  87 33 108/63 (78) 85   


 


2/6/21 18:00    103/60    


 


2/6/21 18:00   31   Mechanical Ventilator  100


 


2/6/21 18:00   31 103/60  Mechanical Ventilator  100


 


2/6/21 17:10  86 34     100


 


2/6/21 17:00    107/66    


 


2/6/21 17:00   32   Mechanical Ventilator  100


 


2/6/21 17:00   31 106/61  Mechanical Ventilator  100


 


2/6/21 17:00  87 32 121/60 (80) 83   


 


2/6/21 16:00        100


 


2/6/21 16:00    125/69    


 


2/6/21 16:00   31   Mechanical Ventilator  100


 


2/6/21 16:00   32 125/69  Mechanical Ventilator  100


 


2/6/21 16:00 98.5 86 28 122/72 (89) 83   


 


2/6/21 16:00      Mechanical Ventilator  


 


2/6/21 15:44  86      


 


2/6/21 15:10  86 34     100


 


2/6/21 15:00  86 31 122/68 (86) 83   


 


2/6/21 15:00    128/71    


 


2/6/21 15:00   30   Mechanical Ventilator  100


 


2/6/21 15:00   30 128/71  Mechanical Ventilator  100


 


2/6/21 14:00  86 31 122/69 (86) 85   


 


2/6/21 14:00    122/69    


 


2/6/21 14:00   30   Mechanical Ventilator  100


 


2/6/21 14:00   30 122/69  Mechanical Ventilator  100


 


2/6/21 13:28  85 34     100


 


2/6/21 13:00    122/71    


 


2/6/21 13:00   30   Mechanical Ventilator  100


 


2/6/21 13:00   30 122/71  Mechanical Ventilator  100


 


2/6/21 13:00  87 24 121/70 (87) 83   


 


2/6/21 12:00      Mechanical Ventilator  


 


2/6/21 12:00        100


 


2/6/21 12:00    106/66    


 


2/6/21 12:00   32   Mechanical Ventilator  100


 


2/6/21 12:00   30 106/66  Mechanical Ventilator  100


 


2/6/21 12:00 98.3 83 32 106/66 (79) 86   


 


2/6/21 11:26  85      


 


2/6/21 11:06  84 34     100


 


2/6/21 11:00    120/70    


 


2/6/21 11:00   31   Mechanical Ventilator  100


 


2/6/21 11:00   31 120/70  Mechanical Ventilator  100


 


2/6/21 11:00  84 32 120/70 (87) 87   


 


2/6/21 10:55   29 110/67  Mechanical Ventilator  100


 


2/6/21 10:30    116/68    


 


2/6/21 10:00  84 30 125/78 (94) 87   


 


2/6/21 10:00   30   Mechanical Ventilator  100


 


2/6/21 10:00   30 124/73  Mechanical Ventilator  100


 


2/6/21 10:00    124/73    


 


2/6/21 09:20  85 34     100


 


2/6/21 09:00   32   Mechanical Ventilator  100


 


2/6/21 09:00   32 129/77  Mechanical Ventilator  100


 


2/6/21 09:00    129/77    


 


2/6/21 09:00  85 32 122/69 (86) 88   


 


2/6/21 08:00 97.8 85 33 121/68 (85) 86   


 


2/6/21 08:00        100


 


2/6/21 08:00   30   Mechanical Ventilator  100


 


2/6/21 08:00   30 122/70  Mechanical Ventilator  100


 


2/6/21 08:00    122/70    


 


2/6/21 08:00      Mechanical Ventilator  


 


2/6/21 07:40  85      


 


2/6/21 07:30  85 33     100


 


2/6/21 07:00  85 28 119/71 (87) 87   


 


2/6/21 07:00   28   Mechanical Ventilator  100


 


2/6/21 07:00   28 119/71  Mechanical Ventilator  100


 


2/6/21 07:00    119/71    


 


2/6/21 06:30  85 30 118/71 (87) 86   


 


2/6/21 06:00   31   Mechanical Ventilator  100


 


2/6/21 06:00   31 126/71  Mechanical Ventilator  100


 


2/6/21 06:00    126/71    


 


2/6/21 06:00  85 31 126/71 (89) 86   


 


2/6/21 05:30  86 33 125/73 (90) 87   


 


2/6/21 05:18  86 34     100


 


2/6/21 05:00  85 32 120/69 (86) 90   


 


2/6/21 05:00   32   Mechanical Ventilator  100


 


2/6/21 05:00   32 120/69  Mechanical Ventilator  100


 


2/6/21 05:00    120/69    


 


2/6/21 04:30  86 29 125/76 (92) 85   


 


2/6/21 04:30    125/76    


 


2/6/21 04:00        100


 


2/6/21 04:00      Mechanical Ventilator  


 


2/6/21 04:00   29   Mechanical Ventilator  100


 


2/6/21 04:00   29 121/68  Mechanical Ventilator  100


 


2/6/21 04:00    121/68    


 


2/6/21 04:00 98.4 85 29 121/68 (85) 87   


 


2/6/21 04:00  86      


 


2/6/21 03:30  85 35     100


 


2/6/21 03:30  86 29 121/68 (85) 86   


 


2/6/21 03:00   30   Mechanical Ventilator  100


 


2/6/21 03:00   30 117/69  Mechanical Ventilator  100


 


2/6/21 03:00    117/69    


 


2/6/21 03:00  86 30 117/69 (85) 86   


 


2/6/21 02:34   26 113/86  Mechanical Ventilator  100


 


2/6/21 02:30  86 30 119/66 (83) 87   


 


2/6/21 02:00    121/69    


 


2/6/21 02:00  87 29 121/69 (86) 86   


 


2/6/21 01:30  89 30 149/77 (101) 83   


 


2/6/21 01:30  88 33     100


 


2/6/21 01:30    149/77    


 


2/6/21 01:00  88 29 123/72 (89) 86   


 


2/6/21 01:00   28   Mechanical Ventilator  100


 


2/6/21 01:00   28 123/72  Mechanical Ventilator  100


 


2/6/21 01:00    123/72    


 


2/6/21 00:30  88 28 124/70 (88) 87   


 


2/6/21 00:30    124/72    


 


2/6/21 00:00        100


 


2/6/21 00:00   31   Mechanical Ventilator  100


 


2/6/21 00:00   31 129/73  Mechanical Ventilator  100


 


2/6/21 00:00  84      


 


2/6/21 00:00      Mechanical Ventilator  


 


2/6/21 00:00 98.5 88 31 129/73 (91) 86   


 


2/5/21 23:30  88 35     100


 


2/5/21 23:30  88 30 124/71 (88) 85   


 


2/5/21 23:00   29   Mechanical Ventilator  100


 


2/5/21 23:00   29 129/74  Mechanical Ventilator  100


 


2/5/21 23:00    129/74    


 


2/5/21 23:00  88 29 129/74 (92) 84   


 


2/5/21 22:30  89 28 121/71 (88) 85   


 


2/5/21 22:00   29   Mechanical Ventilator  100


 


2/5/21 22:00   29 130/74  Mechanical Ventilator  100


 


2/5/21 22:00    130/74    


 


2/5/21 22:00  89 29 130/74 (92) 84   


 


2/5/21 21:53   23   Mechanical Ventilator  100


 


2/5/21 21:30  90 35     100


 


2/5/21 21:30  90 28 129/71 (90) 81   


 


2/5/21 21:00   27 125/70  Mechanical Ventilator  100


 


2/5/21 21:00    125/70    


 


2/5/21 21:00  90 27 125/70 (88) 78   


 


2/5/21 20:30  90 27 122/68 (86) 77   


 


2/5/21 20:00      Mechanical Ventilator  


 


2/5/21 20:00  92 27 119/72 (88) 76   


 


2/5/21 20:00   27 119/72  Mechanical Ventilator  100


 


2/5/21 20:00    119/72    


 


2/5/21 20:00  93      


 


2/5/21 20:00        100


 


2/5/21 19:30  92 32     100


 


2/5/21 19:30  92 24 120/70 (87) 77   


 


2/5/21 19:00 98.7 93 23 119/67 (84) 77   


 


2/5/21 18:45  94 23 117/67 (84) 76   


 


2/5/21 18:32   22 121/67  Mechanical Ventilator  100


 


2/5/21 18:30  95 29 121/67 (85) 76   


 


2/5/21 18:15  96 21 116/66 (83) 76   


 


2/5/21 18:00  97 22 125/72 (89) 77   


 


2/5/21 17:45  93 26 106/61 (76) 80   


 


2/5/21 17:30  95 22 116/69 (85) 78   


 


2/5/21 17:15  93 25 121/71 (88) 77   


 


2/5/21 17:00  93 32 114/69 (84) 74   


 


2/5/21 16:45  93 34 116/68 (84) 75   


 


2/5/21 16:30  94 33 117/68 (84) 75   


 


2/5/21 16:15  91 32 103/61 (75) 76   


 


2/5/21 16:00      Mechanical Ventilator  


 


2/5/21 16:00  93      


 


2/5/21 16:00 98.2 93 33 112/68 (83) 79   


 


2/5/21 16:00        100


 


2/5/21 15:45  93 33 116/68 (84) 80   


 


2/5/21 15:30  93 33 114/67 (83) 81   


 


2/5/21 15:28  93 34     100


 


2/5/21 15:15  93 33 112/67 (82) 81   


 


2/5/21 15:00  93 33 109/66 (80) 82   


 


2/5/21 14:45  93 34 107/65 (79) 81   


 


2/5/21 14:30  93 32 108/64 (79) 80   


 


2/5/21 14:15  93 33 106/64 (78) 80   


 


2/5/21 14:00  93 33 105/64 (78) 79   


 


2/5/21 13:45  93 31 103/64 (77) 77   


 


2/5/21 13:30  93 33 105/63 (77) 76   


 


2/5/21 13:15  93 33 104/61 (75) 76   


 


2/5/21 13:00  93 33 104/61 (75) 75   


 


2/5/21 12:45  94 32 109/61 (77) 76   


 


2/5/21 12:30    98/58 (71) 78   


 


2/5/21 12:15  93 18 141/83 (102) 72   


 


2/5/21 12:00 97.5 95 27 113/66 (82) 81   


 


2/5/21 12:00  94      


 


2/5/21 12:00      Mechanical Ventilator  


 


2/5/21 12:00        100


 


2/5/21 11:45  94 27 102/64 (77) 81   


 


2/5/21 11:30  95 25 114/67 (83) 82   


 


2/5/21 11:15  95 27 108/64 (79) 81   


 


2/5/21 11:00  94 30 109/65 (80) 80   


 


2/5/21 10:45  89 31 95/60 (72) 80   


 


2/5/21 10:45  89 34     100


 


2/5/21 10:42  85 29 89/57 (68) 79   


 


2/5/21 10:30  82 30 85/54 (64) 78   


 


2/5/21 10:15  89 31 102/64 (77) 77   


 


2/5/21 10:00  87 31 92/59 (70) 76   


 


2/5/21 09:45  87 30 102/56 (71) 75   


 


2/5/21 09:30  90 31 106/62 (77) 72   


 


2/5/21 09:20    115/64    


 


2/5/21 09:15  90 29 115/64 (81) 75   


 


2/5/21 09:00  92 29 139/81 (100) 76   


 


2/5/21 08:45  90 31 122/66 (84) 77   


 


2/5/21 08:43   30 118/64  Mechanical Ventilator  100


 


2/5/21 08:31  90      


 


2/5/21 08:30  89 31 118/64 (82) 80   


 


2/5/21 08:15  87 31 129/76 (93) 85   


 


2/5/21 08:00 97.8 85 29 129/74 (92) 85   


 


2/5/21 08:00        100


 


2/5/21 08:00  87      


 


2/5/21 08:00      Mechanical Ventilator  


 


2/5/21 07:45  87 29 127/72 (90) 85   


 


2/5/21 07:30  87 31 134/74 (94) 83   


 


2/5/21 07:15  87 34     100


 


2/5/21 07:15  88 30 136/75 (95) 82   


 


2/5/21 07:00  88 30 125/71 (89) 82   

















Intake and Output  


 


 2/6/21 2/7/21





 19:00 07:00


 


Intake Total 1419.44 ml 1390 ml


 


Output Total 110 ml 10 ml


 


Balance 1309.44 ml 1380 ml


 


  


 


Free Water 40 ml 


 


IV Total 959.44 ml 1005 ml


 


Tube Feeding 420 ml 385 ml


 


Output Urine Total 35 ml 10 ml


 


Stool Total 50 ml 0 ml


 


Chest Tube Drainage Total 25 ml 











Labs








Test


 2/4/21


07:26 2/4/21


09:25 2/4/21


09:34 2/4/21


13:09


 


Arterial Blood pH


 7.200


(7.350-7.450) 


 


 





 


Arterial Blood Partial


Pressure CO2 78.8 mmHg


(35.0-45.0) 


 


 





 


Arterial Blood Partial


Pressure O2 43.1 mmHg


(75.0-100.0) 


 


 





 


Arterial Blood HCO3


 30.1 mmol/L


(22.0-26.0) 


 


 





 


Arterial Blood Oxygen


Saturation 73.3 %


() 


 


 





 


Arterial Blood Base Excess 1.0 (-2-2)    


 


Abelardo Test Positive    


 


Lactic Acid Level


 


 2.00 mmol/L


(0.4-2.0) 


 





 


POC Whole Blood Glucose


 


 


 


 190 MG/DL


()


 


Test


 2/4/21


19:55 2/4/21


23:14 2/5/21


03:25 2/5/21


05:36


 


POC Whole Blood Glucose


 186 MG/DL


() 152 MG/DL


() 


 





 


White Blood Count


 


 


 7.6 K/UL


(4.8-10.8) 





 


Red Blood Count


 


 


 2.96 M/UL


(4.70-6.10) 





 


Hemoglobin


 


 


 8.5 G/DL


(14.2-18.0) 





 


Hematocrit


 


 


 28.6 %


(42.0-52.0) 





 


Mean Corpuscular Volume   97 FL (80-99)  


 


Mean Corpuscular Hemoglobin


 


 


 28.8 PG


(27.0-31.0) 





 


Mean Corpuscular Hemoglobin


Concent 


 


 29.8 G/DL


(32.0-36.0) 





 


Red Cell Distribution Width


 


 


 18.5 %


(11.6-14.8) 





 


Platelet Count


 


 


 260 K/UL


(150-450) 





 


Mean Platelet Volume


 


 


 8.8 FL


(6.5-10.1) 





 


Neutrophils (%) (Auto)


 


 


 84.3 %


(45.0-75.0) 





 


Lymphocytes (%) (Auto)


 


 


 8.0 %


(20.0-45.0) 





 


Monocytes (%) (Auto)


 


 


 6.6 %


(1.0-10.0) 





 


Eosinophils (%) (Auto)


 


 


 0.0 %


(0.0-3.0) 





 


Basophils (%) (Auto)


 


 


 1.1 %


(0.0-2.0) 





 


Sodium Level


 


 


 142 MMOL/L


(136-145) 





 


Potassium Level


 


 


 4.6 MMOL/L


(3.5-5.1) 





 


Chloride Level


 


 


 104 MMOL/L


() 





 


Carbon Dioxide Level


 


 


 29 MMOL/L


(21-32) 





 


Anion Gap


 


 


 9 mmol/L


(5-15) 





 


Blood Urea Nitrogen


 


 


 92 mg/dL


(7-18) 





 


Creatinine


 


 


 3.4 MG/DL


(0.55-1.30) 





 


Estimat Glomerular Filtration


Rate 


 


 18.2 mL/min


(>60) 





 


Glucose Level


 


 


 134 MG/DL


() 





 


Uric Acid


 


 


 10.6 MG/DL


(2.6-7.2) 





 


Calcium Level


 


 


 8.0 MG/DL


(8.5-10.1) 





 


Phosphorus Level


 


 


 6.3 MG/DL


(2.5-4.9) 





 


Magnesium Level


 


 


 2.6 MG/DL


(1.8-2.4) 





 


Total Bilirubin


 


 


 0.7 MG/DL


(0.2-1.0) 





 


Aspartate Amino Transf


(AST/SGOT) 


 


 58 U/L (15-37) 


 





 


Alanine Aminotransferase


(ALT/SGPT) 


 


 85 U/L (12-78) 


 





 


Alkaline Phosphatase


 


 


 398 U/L


() 





 


C-Reactive Protein,


Quantitative 


 


 5.1 mg/dL


(0.00-0.90) 





 


Pro-B-Type Natriuretic Peptide


 


 


 > 56072 pg/mL


(0-125) 





 


Total Protein


 


 


 6.2 G/DL


(6.4-8.2) 





 


Albumin


 


 


 2.2 G/DL


(3.4-5.0) 





 


Globulin   4.0 g/dL  


 


Albumin/Globulin Ratio   0.6 (1.0-2.7)  


 


Arterial Blood pH


 


 


 


 7.203


(7.350-7.450)


 


Arterial Blood Partial


Pressure CO2 


 


 


 73.2 mmHg


(35.0-45.0)


 


Arterial Blood Partial


Pressure O2 


 


 


 51.3 mmHg


(75.0-100.0)


 


Arterial Blood HCO3


 


 


 


 28.2 mmol/L


(22.0-26.0)


 


Arterial Blood Oxygen


Saturation 


 


 


 82.1 %


()


 


Arterial Blood Base Excess    -0.7 (-2-2) 


 


Abelardo Test    Positive 


 


Test


 2/5/21


08:14 2/5/21


12:45 2/5/21


18:13 2/5/21


20:20


 


POC Whole Blood Glucose


 106 MG/DL


() 182 MG/DL


() 162 MG/DL


() 168 MG/DL


()


 


Test


 2/5/21


23:44 2/6/21


09:05 2/7/21


05:15 2/7/21


05:52


 


POC Whole Blood Glucose


 183 MG/DL


() 


 


 





 


White Blood Count


 


 9.9 K/UL


(4.8-10.8) 11.2 K/UL


(4.8-10.8) 





 


Red Blood Count


 


 3.00 M/UL


(4.70-6.10) 3.02 M/UL


(4.70-6.10) 





 


Hemoglobin


 


 8.6 G/DL


(14.2-18.0) 8.9 G/DL


(14.2-18.0) 





 


Hematocrit


 


 29.2 %


(42.0-52.0) 29.8 %


(42.0-52.0) 





 


Mean Corpuscular Volume  97 FL (80-99)  99 FL (80-99)  


 


Mean Corpuscular Hemoglobin


 


 28.7 PG


(27.0-31.0) 29.4 PG


(27.0-31.0) 





 


Mean Corpuscular Hemoglobin


Concent 


 29.5 G/DL


(32.0-36.0) 29.7 G/DL


(32.0-36.0) 





 


Red Cell Distribution Width


 


 20.6 %


(11.6-14.8) 21.5 %


(11.6-14.8) 





 


Platelet Count


 


 239 K/UL


(150-450) 261 K/UL


(150-450) 





 


Mean Platelet Volume


 


 8.6 FL


(6.5-10.1) 8.8 FL


(6.5-10.1) 





 


Neutrophils (%) (Auto)   % (45.0-75.0)   % (45.0-75.0)  


 


Lymphocytes (%) (Auto)   % (20.0-45.0)   % (20.0-45.0)  


 


Monocytes (%) (Auto)   % (1.0-10.0)   % (1.0-10.0)  


 


Eosinophils (%) (Auto)   % (0.0-3.0)   % (0.0-3.0)  


 


Basophils (%) (Auto)   % (0.0-2.0)   % (0.0-2.0)  


 


Sodium Level


 


 139 MMOL/L


(136-145) 


 





 


Potassium Level


 


 4.7 MMOL/L


(3.5-5.1) 


 





 


Chloride Level


 


 102 MMOL/L


() 


 





 


Carbon Dioxide Level


 


 26 MMOL/L


(21-32) 


 





 


Anion Gap


 


 11 mmol/L


(5-15) 


 





 


Blood Urea Nitrogen


 


 105 mg/dL


(7-18) 


 





 


Creatinine


 


 4.4 MG/DL


(0.55-1.30) 


 





 


Estimat Glomerular Filtration


Rate 


 13.5 mL/min


(>60) 


 





 


Glucose Level


 


 143 MG/DL


() 


 





 


Calcium Level


 


 8.0 MG/DL


(8.5-10.1) 


 











Height (Feet):  5


Height (Inches):  7.00


Weight (Pounds):  198


Objective


General Appearance:  lethargic, moderate distress


Neck:  supple


Cardiovascular:  regular rhythm


Respiratory/Chest:  crackles/rales, rhonchi - bilaterally vent++


Abdomen:  non tender, soft


Extremities:  non-tender











Emmett Cook MD           Feb 7, 2021 06:50

## 2021-02-07 NOTE — NUR
NURSE NOTES:

Patient vitals stable at this time.  Patient has IV medications infusing.  Patient very 
fragile.  patien deats to 60% when turning or if sedation turns off.  RN not able to take 
pictures of wounds at this time due to desaturations when turning.  RN will continue to 
monitor.

## 2021-02-07 NOTE — GENERAL PROGRESS NOTE
Subjective


Allergies:  


Coded Allergies:  


     No Known Allergies (Unverified , 6/11/19)


Subjective


 dnr/dni


on ventilator 60% fio2


on sediation


unresponsive


in icu


rt chest tube s placed due to pneumothorex


afib is controlled


hypotension on levophed drip





Objective





Last 24 Hour Vital Signs








  Date Time  Temp Pulse Resp B/P (MAP) Pulse Ox O2 Delivery O2 Flow Rate FiO2


 


2/7/21 14:03   30 76/58  Mechanical Ventilator  100


 


2/7/21 14:01    76/58    


 


2/7/21 11:30  95 33 78/49 (59) 83   


 


2/7/21 11:00  95 33 75/53 (60) 82   


 


2/7/21 11:00  97 36     100


 


2/7/21 11:00  93 34 73/50 (58) 82   


 


2/7/21 11:00    73/50    


 


2/7/21 11:00   33   Mechanical Ventilator  100


 


2/7/21 11:00   33 73/50  Mechanical Ventilator  100


 


2/7/21 10:30  96 34 75/53 (60) 82   


 


2/7/21 10:00  91 33 73/51 (58) 78   


 


2/7/21 10:00  92 33 78/53 (61) 78   


 


2/7/21 10:00    73/51    


 


2/7/21 10:00   33   Mechanical Ventilator  100


 


2/7/21 10:00   34 73/51  Mechanical Ventilator  100


 


2/7/21 09:30  92 33 78/53 (61) 78   


 


2/7/21 09:00  97 34 77/53 (61) 77   


 


2/7/21 09:00    77/53    


 


2/7/21 09:00   33   Mechanical Ventilator  100


 


2/7/21 09:00   33 77/53  Mechanical Ventilator  100


 


2/7/21 09:00  95 33 77/53 (61) 77   


 


2/7/21 08:30  98 32 80/56 (64) 77   


 


2/7/21 08:21   28   Mechanical Ventilator  100


 


2/7/21 08:00  96 30 83/52 (62) 74   


 


2/7/21 08:00 99.1 97 30 83/52 (62) 75   


 


2/7/21 08:00        100


 


2/7/21 08:00    83/52    


 


2/7/21 08:00   30   Mechanical Ventilator  100


 


2/7/21 08:00   30 83/52  Mechanical Ventilator  100


 


2/7/21 08:00  96      


 


2/7/21 08:00      Mechanical Ventilator  


 


2/7/21 07:30  97 30 89/55 (66) 76   


 


2/7/21 07:30  96 36     100


 


2/7/21 07:00  100 30 101/58 (72) 78   


 


2/7/21 07:00    89/55    


 


2/7/21 07:00   29   Mechanical Ventilator  100


 


2/7/21 07:00   30 89/55  Mechanical Ventilator  100


 


2/7/21 06:45  98 29 102/61 (75) 77   


 


2/7/21 06:39    71/53    


 


2/7/21 06:35  87 31 79/54 (62) 77   


 


2/7/21 06:30  86 28 73/49 (57) 77   


 


2/7/21 06:28  87 29 74/51 (59) 77   


 


2/7/21 06:15  89 30 79/52 (61) 78   


 


2/7/21 06:00  90 33 81/54 (63) 77   


 


2/7/21 06:00    77/54    


 


2/7/21 06:00   32   Mechanical Ventilator  100


 


2/7/21 06:00   32 81/54  Mechanical Ventilator  100


 


2/7/21 06:00  90 33 81/54 (63) 77   


 


2/7/21 05:52  90 30 77/53 (61) 77   


 


2/7/21 05:45  89 30 79/52 (61) 77   


 


2/7/21 05:34   36   Mechanical Ventilator  100


 


2/7/21 05:30    79/53    


 


2/7/21 05:30  86 30 74/51 (59) 77   


 


2/7/21 05:21  81      


 


2/7/21 05:18    69/43    


 


2/7/21 05:18   32   Mechanical Ventilator  100


 


2/7/21 05:15  89 28 79/51 (60) 76   


 


2/7/21 05:15  89 28  76   


 


2/7/21 05:11  89 32 85/54 (64) 76   


 


2/7/21 05:11  89 32 85/54 (64) 76   


 


2/7/21 05:02  82 31 78/51 (60) 74   


 


2/7/21 05:02  82 31 78/51 (60) 74   


 


2/7/21 05:00   32   Mechanical Ventilator  100


 


2/7/21 05:00  78 29 69/51 (57) 74   


 


2/7/21 05:00  78 29 69/51 (57) 74   


 


2/7/21 04:58   32 85/39  Mechanical Ventilator  100


 


2/7/21 04:45  88 29 83/49 (60) 76   


 


2/7/21 04:44  88 29 83/52 (62) 76   


 


2/7/21 04:30  88 26 84/52 (63) 76   


 


2/7/21 04:07   30 93/55  Mechanical Ventilator  100


 


2/7/21 04:00  92 25 93/56 (68) 77   


 


2/7/21 04:00   32   Mechanical Ventilator  100


 


2/7/21 04:00        100


 


2/7/21 04:00      Mechanical Ventilator  


 


2/7/21 03:45  90 30 89/52 (64) 77   


 


2/7/21 03:30  94 30 99/58 (72) 79   


 


2/7/21 03:15  90 30 95/59 (71) 79   


 


2/7/21 03:07   32 104/55  Mechanical Ventilator  100


 


2/7/21 03:00  90 33 95/60 (72) 81   


 


2/7/21 03:00   32   Mechanical Ventilator  100


 


2/7/21 03:00  90 33 95/60 (72) 81   


 


2/7/21 02:45  92 31 102/63 (76) 81   


 


2/7/21 02:30  92 26 100/61 (74) 82   


 


2/7/21 02:15  91 28 98/62 (74) 82   


 


2/7/21 02:07   32 108/77  Mechanical Ventilator  100


 


2/7/21 02:01   30   Mechanical Ventilator  100


 


2/7/21 02:00  91 27 102/61 (75) 82   


 


2/7/21 02:00  91 27 102/61 (75) 82   


 


2/7/21 01:45  91 24 103/60 (74) 81   


 


2/7/21 01:30  92 28 105/61 (76) 82   


 


2/7/21 01:23  91 40     100


 


2/7/21 01:15  91 26 101/62 (75) 81   


 


2/7/21 01:07   30 104/68  Mechanical Ventilator  100


 


2/7/21 01:01   32   Mechanical Ventilator  100


 


2/7/21 01:00  91 27 101/62 (75) 82   


 


2/7/21 01:00  91 27 101/62 (75) 82   


 


2/7/21 00:45  91 25 101/61 (74) 81   


 


2/7/21 00:30  91 29 105/63 (77) 82   


 


2/7/21 00:15  89 28 103/64 (77) 81   


 


2/7/21 00:07   32 101/63  Mechanical Ventilator  100


 


2/7/21 00:01   22   Mechanical Ventilator  100


 


2/7/21 00:00      Mechanical Ventilator  


 


2/7/21 00:00  90 23 100/61 (74) 81   


 


2/6/21 23:07   32 103/66  Mechanical Ventilator  100


 


2/6/21 23:01   18   Mechanical Ventilator  100


 


2/6/21 23:00  90 26 103/63 (76) 82   


 


2/6/21 22:45  90 28 103/62 (76) 83   


 


2/6/21 22:30  88 29 104/63 (77) 83   


 


2/6/21 22:30  88 29 104/63 (77) 83   


 


2/6/21 22:15  90 23 104/59 (74) 83   


 


2/6/21 22:07   32 103/63  Mechanical Ventilator  100


 


2/6/21 22:00  90 23 101/60 (74) 83   


 


2/6/21 22:00   18   Mechanical Ventilator  100


 


2/6/21 21:45  89 25 104/64 (77) 84   


 


2/6/21 21:30  89 26 105/61 (76) 84   


 


2/6/21 21:15  89 22 104/63 (77) 83   


 


2/6/21 21:07   12 110/83  Mechanical Ventilator  100


 


2/6/21 21:00  90 25 111/64 (80) 79   


 


2/6/21 21:00    80/46    


 


2/6/21 21:00   20   Mechanical Ventilator  100


 


2/6/21 21:00  90 25 111/64 (80) 79   


 


2/6/21 20:45  92 31 112/64 (80) 73   


 


2/6/21 20:30  90 33 108/63 (78) 70   


 


2/6/21 20:30  90 33 108/63 (78) 70   


 


2/6/21 20:15  87 30 95/56 (69) 79   


 


2/6/21 20:00      Mechanical Ventilator  


 


2/6/21 20:00  87      


 


2/6/21 20:00   20   Mechanical Ventilator  100


 


2/6/21 20:00   20 101/78  Mechanical Ventilator  100


 


2/6/21 20:00        100


 


2/6/21 20:00  87 31 99/57 (71) 81   


 


2/6/21 19:45  87 27 92/55 (67) 84   


 


2/6/21 19:30  87 30 100/61 (74) 83   


 


2/6/21 19:15  87 31 101/61 (74) 83   


 


2/6/21 19:10  86 35     100


 


2/6/21 19:00  87 31 104/60 (75) 83   


 


2/6/21 19:00    100/61    


 


2/6/21 19:00   30   Mechanical Ventilator  100


 


2/6/21 19:00   32 100/61  Mechanical Ventilator  100


 


2/6/21 19:00  87 31 104/60 (75) 83   


 


2/6/21 18:00  87 33 108/63 (78) 85   


 


2/6/21 18:00    103/60    


 


2/6/21 18:00   31   Mechanical Ventilator  100


 


2/6/21 18:00   31 103/60  Mechanical Ventilator  100


 


2/6/21 17:10  86 34     100


 


2/6/21 17:00    107/66    


 


2/6/21 17:00   32   Mechanical Ventilator  100


 


2/6/21 17:00   31 106/61  Mechanical Ventilator  100


 


2/6/21 17:00  87 32 121/60 (80) 83   


 


2/6/21 16:00        100


 


2/6/21 16:00    125/69    


 


2/6/21 16:00   31   Mechanical Ventilator  100


 


2/6/21 16:00   32 125/69  Mechanical Ventilator  100


 


2/6/21 16:00 98.5 86 28 122/72 (89) 83   


 


2/6/21 16:00      Mechanical Ventilator  


 


2/6/21 15:44  86      


 


2/6/21 15:10  86 34     100


 


2/6/21 15:00  86 31 122/68 (86) 83   


 


2/6/21 15:00    128/71    


 


2/6/21 15:00   30   Mechanical Ventilator  100


 


2/6/21 15:00   30 128/71  Mechanical Ventilator  100

















Intake and Output  


 


 2/6/21 2/7/21





 19:00 07:00


 


Intake Total 1419.44 ml 1501.188 ml


 


Output Total 110 ml 10 ml


 


Balance 1309.44 ml 1491.188 ml


 


  


 


Free Water 40 ml 


 


IV Total 959.44 ml 1081.188 ml


 


Tube Feeding 420 ml 420 ml


 


Output Urine Total 35 ml 10 ml


 


Stool Total 50 ml 0 ml


 


Chest Tube Drainage Total 25 ml 








Laboratory Tests


2/7/21 05:15: 


White Blood Count 11.2H, Red Blood Count 3.02L, Hemoglobin 8.9L, Hematocrit 

29.8L, Mean Corpuscular Volume 99, Mean Corpuscular Hemoglobin 29.4, Mean 

Corpuscular Hemoglobin Concent 29.7L, Red Cell Distribution Width 21.5H, 

Platelet Count 261, Mean Platelet Volume 8.8, Neutrophils (%) (Auto) , 

Lymphocytes (%) (Auto) , Monocytes (%) (Auto) , Eosinophils (%) (Auto) , 

Basophils (%) (Auto) , Differential Total Cells Counted 100, Neutrophils % 

(Manual) 84H, Lymphocytes % (Manual) 8L, Monocytes % (Manual) 4, Eosinophils % 

(Manual) 0, Basophils % (Manual) 0, Myelocytes % 3H, Band Neutrophils 1, 

Nucleated Red Blood Cells 5, Platelet Estimate Adequate, Platelet Morphology 

Normal, Polychromasia 2+, Hypochromasia 1+, Anisocytosis 3+, Sodium Level 136, 

Potassium Level 5.2H, Chloride Level 100, Carbon Dioxide Level 25, Anion Gap 11,

Blood Urea Nitrogen 116H, Creatinine 5.3H, Estimat Glomerular Filtration Rate 

10.9, Glucose Level 236H, Uric Acid 11.6H, Calcium Level 7.5L, Phosphorus Level 

8.9H, Magnesium Level 2.6H, Total Bilirubin 0.6, Aspartate Amino Transf 

(AST/SGOT) 25, Alanine Aminotransferase (ALT/SGPT) 53, Alkaline Phosphatase 458H

, C-Reactive Protein, Quantitative 4.8H, Pro-B-Type Natriuretic Peptide > 83707L

, Total Protein 5.9L, Albumin 1.9L, Globulin 4.0, Albumin/Globulin Ratio 0.5L


2/7/21 05:52: POC Whole Blood Glucose [Pending]


Height (Feet):  5


Height (Inches):  7.00


Weight (Pounds):  198


Neck:  supple


Cardiovascular:  regular rhythm, regularly irregular


Respiratory/Chest:  crackles/rales


Abdomen:  non tender, soft


Extremities:  non-tender





Assessment/Plan


Assessment/Plan:


dnr/dni


hypotension better on tirate down levophed drip


afib with rvr on digoxine , add amiodarone , cardiology on case


covid pna


ac resp distress on ventilator


etoh abused


dehydration


ac renal failure- nephro consult 


severe meta acidosis


cont on ventilator at icu


cont iv abx and iv decadrone


pulmonary and gi on consult





poor prognosis , left message to the son


recomended dnr 


dw icu nurse











Moi Travis MD              Feb 7, 2021 14:35

## 2021-02-08 NOTE — CONSULTATION
DATE OF CONSULTATION:  02/06/2021

ENDOCRINOLOGY CONSULTATION



This is a coverage for Dr. Nick Mcmahon.



CONSULTING PHYSICIAN:  Christian Mijares M.D.



REFERRING PHYSICIAN:  Moi Travis M.D.



REASON FOR CONSULTATION:  I was asked to see this 65-year-old  male

by Dr. Nick Mcmahon in endocrinology consultation _____ COVID pneumonia.

The patient _____ on 01/02/2021 _____.



REVIEW OF SYSTEMS:  Unremarkable.



PHYSICAL EXAMINATION:

GENERAL:  The patient is in no acute distress.

VITAL SIGNS:  Blood pressure is 102/64, pulse 91, respiratory rate 20,

temperature 98.

HEAD AND NECK:  Unremarkable.

LUNGS:  Clear.

HEART:  Heart sound distant.

ABDOMEN:  Soft.  Bowel sounds present.

EXTREMITIES:  No edema.

NEUROLOGICAL:  Cranial nerves II through XII are intact.  Toes are

downgoing to plantar stimulation.



LABORATORY DATA:  Glucose 166.



ASSESSMENT:

1. Diabetes mellitus, stable.

2. Acute COVID pneumonia.



PLAN:  Continue _____ IV push q.8h., _____ 26 units b.i.d., _____ q.6h.,

_____ 35 mL _____ adequate control.









  ______________________________________________

  Christian Mijares M.D.





DR:  KAREN

D:  02/07/2021 02:11

T:  02/07/2021 06:16

JOB#:  [id number]

CC:

## 2021-02-10 NOTE — DISCHARGE SUMMARY
Discharge Summary


Discharge Summary


_


Date of admission: 2021





Date of expiration: 2021





History of Present Illness and Brief Hospital Course


Mr. Avery was a 65-year-old male with no reported past medical history who 

presented from home due to shortness of breath.  Patient reported that 2 of his 

household members were positive with COVID-19.  Patient reported he had been 

sick for 2 days prior to presentation.  Patient was hypoxic on arrival and was 

placed on supplemental oxygen.  Patient's symptoms were consistent with COVID-19

and he tested positive for COVID-19 via rapid gene assay in the ER.  Patient was

given IV antibiotics, IV Decadron and was placed on BiPAP for respiratory 

support.  He was also given Lovenox due to elevated D-dimer.  Chest x-ray was 

notable for bilateral infiltrates.  Patient was then admitted to the ICU for 

further management.





For his COVID-19 pneumonia, patient was started on remdesivir, broad-spectrum 

antibiotics, and steroid.  Patient also received 1 dose of ivermectin.  Patient 

was kept in isolation room.  His blood culture was positive for Staph epid

ermidis.  Sputum culture was positive for Candida albicans and patient was 

started on fluconazole.  Urine culture also grew Candida albicans.





Due to worsening hypoxemia, patient ended up intubated on 2021.  Numerous 

weaning trials were attempted but patient did not tolerate lower FiO2.  

Eventually, patient was found to have right apical pneumothorax and subcutaneous

emphysema.  A right chest tube was inserted.





Throughout his hospitalization, patient's prognosis remained poor and worsened. 

Patient incrementally required higher level of supplemental.  He had persistent 

leukocytosis and worsening anemia.  Patient coded multiple times. Patient's CODE

STATUS was changed to DNR DNI per family request.  Patient coded again on 

2021.  Unfortunately, patient was pronounced  on 2021 at 14:32. 




Cause of death: Cardiopulmonary arrest





Consultants:


Cardiology Dr. George


Endocrinology Dr. Mijares


Nephrology Dr. Fouladin


Endocrinology Dr. Mcmahon


Surgery Dr. Vazquez


Infectious disease Dr. Davis


Pulmonology Dr. Lemos





Final diagnoses


Atrial fibrillation with rapid ventricular response


Acute renal failure


Right pneumothorax, status post chest tube


COVID-19 pneumonia


Diabetes mellitus


Septic shock


PEA arrest


Leukocytosis


Anemia secondary to chronic disease


Thrombocytopenia


Hypercoagulable disorder


Acute respiratory distress, on ventilator


Pneumomediastinum


Alcohol abuse


Agitation


Bacteremia


Diarrhea


Hyperkalemia


Metabolic acidosis


Hypernatremia


Hyperglycemia





I have been assigned to dictate discharge summary for this account.











Lg Miranda                 Feb 10, 2021 14:41

## 2021-02-10 NOTE — CARDIOLOGY REPORT
--------------- APPROVED REPORT --------------





EKG Measurement

Heart [id number]

WI [id number]

[id number]

[id number]

[id number]



<Conclusion>

Normal sinus rhythm

Left bundle branch block

Abnormal ECG

## 2022-02-28 NOTE — NUR
NURSE NOTES:

received pt from Dian GIFFORD RN., pt is resting on the bed, opens eye with shaking, AOx0 at 
this time. pt orally intubated ETT 7.5n lip line 26cm AC 16  Fio2 100% Peep 12 and 
O2sat ranges from 89-92%. pt has vital AF running at 55ml/hr OGT, no residual noted at this 
time, intact, clean, and patent. pt has rectal tube draining well with gravity. pt Stern 
cath is in, no hematuria noted, and draining well with gravity. Left upper arm PICC line 
dressing site intact, clean, and patent. D5W is running at 50ml/hr and Levophed is running 2 
mcg/hr. ABD large and soft, hyperactive. right side chest tube noted with continuos suction, 
no leakage noted. bilateral soft wrist restrain noted, pulse noted, and skin intact. call 
light within reach. will continue to monitor pt with plan of care. bed at the lowest 
position, side rails x3 up, alarmed, and locked. What Is The Reason For Today's Visit?: the development of new lesions

## 2025-01-16 NOTE — NUR
NURSE NOTES:

MD. ALONZO HERE TO PLACE CHEST TUBE IN PT DUE TO INCREASING SUBCUTANEOUS EMPHYSEMA 
INCREASE. PT PLACED BACK ON VERSED DRIP. MORE RESPONSIVE. no